# Patient Record
Sex: MALE | Race: ASIAN | NOT HISPANIC OR LATINO | URBAN - METROPOLITAN AREA
[De-identification: names, ages, dates, MRNs, and addresses within clinical notes are randomized per-mention and may not be internally consistent; named-entity substitution may affect disease eponyms.]

---

## 2018-11-01 ENCOUNTER — OUTPATIENT (OUTPATIENT)
Dept: OUTPATIENT SERVICES | Facility: HOSPITAL | Age: 78
LOS: 1 days | End: 2018-11-01
Payer: MEDICARE

## 2018-11-26 PROBLEM — Z00.00 ENCOUNTER FOR PREVENTIVE HEALTH EXAMINATION: Status: ACTIVE | Noted: 2018-11-26

## 2018-11-27 ENCOUNTER — APPOINTMENT (OUTPATIENT)
Dept: NEPHROLOGY | Facility: CLINIC | Age: 78
End: 2018-11-27
Payer: COMMERCIAL

## 2018-11-27 VITALS
DIASTOLIC BLOOD PRESSURE: 80 MMHG | OXYGEN SATURATION: 95 % | HEART RATE: 59 BPM | WEIGHT: 153 LBS | HEIGHT: 66 IN | BODY MASS INDEX: 24.59 KG/M2 | SYSTOLIC BLOOD PRESSURE: 128 MMHG

## 2018-11-27 DIAGNOSIS — I10 ESSENTIAL (PRIMARY) HYPERTENSION: ICD-10-CM

## 2018-11-27 DIAGNOSIS — Z85.118 PERSONAL HISTORY OF OTHER MALIGNANT NEOPLASM OF BRONCHUS AND LUNG: ICD-10-CM

## 2018-11-27 DIAGNOSIS — Z87.891 PERSONAL HISTORY OF NICOTINE DEPENDENCE: ICD-10-CM

## 2018-11-27 DIAGNOSIS — N18.3 CHRONIC KIDNEY DISEASE, STAGE 3 (MODERATE): ICD-10-CM

## 2018-11-27 DIAGNOSIS — I25.10 ATHEROSCLEROTIC HEART DISEASE OF NATIVE CORONARY ARTERY W/OUT ANGINA PECTORIS: ICD-10-CM

## 2018-11-27 PROCEDURE — 99215 OFFICE O/P EST HI 40 MIN: CPT

## 2018-11-28 ENCOUNTER — INPATIENT (INPATIENT)
Facility: HOSPITAL | Age: 78
LOS: 16 days | Discharge: HOME | DRG: 246 | End: 2018-12-15
Attending: FAMILY MEDICINE | Admitting: FAMILY MEDICINE
Payer: COMMERCIAL

## 2018-11-28 VITALS
HEIGHT: 66 IN | RESPIRATION RATE: 20 BRPM | TEMPERATURE: 98 F | SYSTOLIC BLOOD PRESSURE: 126 MMHG | DIASTOLIC BLOOD PRESSURE: 47 MMHG | OXYGEN SATURATION: 94 % | WEIGHT: 114.64 LBS | HEART RATE: 45 BPM

## 2018-11-28 DIAGNOSIS — I10 ESSENTIAL (PRIMARY) HYPERTENSION: ICD-10-CM

## 2018-11-28 DIAGNOSIS — N19 UNSPECIFIED KIDNEY FAILURE: ICD-10-CM

## 2018-11-28 DIAGNOSIS — N17.9 ACUTE KIDNEY FAILURE, UNSPECIFIED: ICD-10-CM

## 2018-11-28 DIAGNOSIS — I25.10 ATHEROSCLEROTIC HEART DISEASE OF NATIVE CORONARY ARTERY WITHOUT ANGINA PECTORIS: ICD-10-CM

## 2018-11-28 DIAGNOSIS — R00.1 BRADYCARDIA, UNSPECIFIED: ICD-10-CM

## 2018-11-28 DIAGNOSIS — Z95.1 PRESENCE OF AORTOCORONARY BYPASS GRAFT: Chronic | ICD-10-CM

## 2018-11-28 DIAGNOSIS — R05 COUGH: ICD-10-CM

## 2018-11-28 LAB
ALBUMIN SERPL ELPH-MCNC: 4.3 G/DL — SIGNIFICANT CHANGE UP (ref 3.3–5.2)
ALP SERPL-CCNC: 40 U/L — SIGNIFICANT CHANGE UP (ref 40–120)
ALT FLD-CCNC: 9 U/L — SIGNIFICANT CHANGE UP
ANION GAP SERPL CALC-SCNC: 12 MMOL/L — SIGNIFICANT CHANGE UP (ref 5–17)
APTT BLD: 33 SEC — SIGNIFICANT CHANGE UP (ref 27.5–36.3)
AST SERPL-CCNC: 15 U/L — SIGNIFICANT CHANGE UP
BASOPHILS # BLD AUTO: 0 K/UL — SIGNIFICANT CHANGE UP (ref 0–0.2)
BASOPHILS NFR BLD AUTO: 0.4 % — SIGNIFICANT CHANGE UP (ref 0–2)
BILIRUB SERPL-MCNC: 0.3 MG/DL — LOW (ref 0.4–2)
BUN SERPL-MCNC: 60 MG/DL — HIGH (ref 8–20)
CALCIUM SERPL-MCNC: 8.3 MG/DL — LOW (ref 8.6–10.2)
CHLORIDE SERPL-SCNC: 103 MMOL/L — SIGNIFICANT CHANGE UP (ref 98–107)
CO2 SERPL-SCNC: 20 MMOL/L — LOW (ref 22–29)
CREAT SERPL-MCNC: 3.89 MG/DL — HIGH (ref 0.5–1.3)
EOSINOPHIL # BLD AUTO: 0.5 K/UL — SIGNIFICANT CHANGE UP (ref 0–0.5)
EOSINOPHIL NFR BLD AUTO: 6.5 % — HIGH (ref 0–5)
GLUCOSE SERPL-MCNC: 100 MG/DL — SIGNIFICANT CHANGE UP (ref 70–115)
HCT VFR BLD CALC: 34 % — LOW (ref 42–52)
HGB BLD-MCNC: 11.1 G/DL — LOW (ref 14–18)
INR BLD: 1.02 RATIO — SIGNIFICANT CHANGE UP (ref 0.88–1.16)
LYMPHOCYTES # BLD AUTO: 2.2 K/UL — SIGNIFICANT CHANGE UP (ref 1–4.8)
LYMPHOCYTES # BLD AUTO: 27.9 % — SIGNIFICANT CHANGE UP (ref 20–55)
MCHC RBC-ENTMCNC: 29.8 PG — SIGNIFICANT CHANGE UP (ref 27–31)
MCHC RBC-ENTMCNC: 32.6 G/DL — SIGNIFICANT CHANGE UP (ref 32–36)
MCV RBC AUTO: 91.2 FL — SIGNIFICANT CHANGE UP (ref 80–94)
MONOCYTES # BLD AUTO: 0.8 K/UL — SIGNIFICANT CHANGE UP (ref 0–0.8)
MONOCYTES NFR BLD AUTO: 10.2 % — HIGH (ref 3–10)
NEUTROPHILS # BLD AUTO: 4.4 K/UL — SIGNIFICANT CHANGE UP (ref 1.8–8)
NEUTROPHILS NFR BLD AUTO: 54.9 % — SIGNIFICANT CHANGE UP (ref 37–73)
NT-PROBNP SERPL-SCNC: 2201 PG/ML — HIGH (ref 0–300)
PLATELET # BLD AUTO: 211 K/UL — SIGNIFICANT CHANGE UP (ref 150–400)
POTASSIUM SERPL-MCNC: 5.2 MMOL/L — SIGNIFICANT CHANGE UP (ref 3.5–5.3)
POTASSIUM SERPL-SCNC: 5.2 MMOL/L — SIGNIFICANT CHANGE UP (ref 3.5–5.3)
PROT SERPL-MCNC: 8 G/DL — SIGNIFICANT CHANGE UP (ref 6.6–8.7)
PROTHROM AB SERPL-ACNC: 11.7 SEC — SIGNIFICANT CHANGE UP (ref 10–12.9)
RAPID RVP RESULT: SIGNIFICANT CHANGE UP
RBC # BLD: 3.73 M/UL — LOW (ref 4.6–6.2)
RBC # FLD: 13.1 % — SIGNIFICANT CHANGE UP (ref 11–15.6)
SODIUM SERPL-SCNC: 135 MMOL/L — SIGNIFICANT CHANGE UP (ref 135–145)
TROPONIN T SERPL-MCNC: <0.01 NG/ML — SIGNIFICANT CHANGE UP (ref 0–0.06)
WBC # BLD: 8 K/UL — SIGNIFICANT CHANGE UP (ref 4.8–10.8)
WBC # FLD AUTO: 8 K/UL — SIGNIFICANT CHANGE UP (ref 4.8–10.8)

## 2018-11-28 PROCEDURE — 99285 EMERGENCY DEPT VISIT HI MDM: CPT

## 2018-11-28 PROCEDURE — 71046 X-RAY EXAM CHEST 2 VIEWS: CPT | Mod: 26

## 2018-11-28 PROCEDURE — 99223 1ST HOSP IP/OBS HIGH 75: CPT

## 2018-11-28 PROCEDURE — 93010 ELECTROCARDIOGRAM REPORT: CPT

## 2018-11-28 RX ORDER — GABAPENTIN 400 MG/1
300 CAPSULE ORAL THREE TIMES A DAY
Refills: 0 | Status: DISCONTINUED | OUTPATIENT
Start: 2018-11-28 | End: 2018-12-03

## 2018-11-28 RX ORDER — PANTOPRAZOLE SODIUM 20 MG/1
40 TABLET, DELAYED RELEASE ORAL
Refills: 0 | Status: DISCONTINUED | OUTPATIENT
Start: 2018-11-28 | End: 2018-12-15

## 2018-11-28 RX ORDER — IPRATROPIUM/ALBUTEROL SULFATE 18-103MCG
3 AEROSOL WITH ADAPTER (GRAM) INHALATION ONCE
Refills: 0 | Status: COMPLETED | OUTPATIENT
Start: 2018-11-28 | End: 2018-11-28

## 2018-11-28 RX ORDER — LORATADINE 10 MG/1
10 TABLET ORAL DAILY
Refills: 0 | Status: DISCONTINUED | OUTPATIENT
Start: 2018-11-28 | End: 2018-12-15

## 2018-11-28 RX ORDER — SODIUM CHLORIDE 9 MG/ML
500 INJECTION INTRAMUSCULAR; INTRAVENOUS; SUBCUTANEOUS ONCE
Refills: 0 | Status: COMPLETED | OUTPATIENT
Start: 2018-11-28 | End: 2018-11-28

## 2018-11-28 RX ORDER — SIMVASTATIN 20 MG/1
20 TABLET, FILM COATED ORAL AT BEDTIME
Refills: 0 | Status: DISCONTINUED | OUTPATIENT
Start: 2018-11-28 | End: 2018-12-01

## 2018-11-28 RX ORDER — HEPARIN SODIUM 5000 [USP'U]/ML
5000 INJECTION INTRAVENOUS; SUBCUTANEOUS EVERY 8 HOURS
Refills: 0 | Status: DISCONTINUED | OUTPATIENT
Start: 2018-11-28 | End: 2018-12-02

## 2018-11-28 RX ORDER — HYDRALAZINE HCL 50 MG
10 TABLET ORAL EVERY 8 HOURS
Refills: 0 | Status: DISCONTINUED | OUTPATIENT
Start: 2018-11-28 | End: 2018-12-02

## 2018-11-28 RX ORDER — SODIUM CHLORIDE 0.65 %
1 AEROSOL, SPRAY (ML) NASAL EVERY 4 HOURS
Refills: 0 | Status: DISCONTINUED | OUTPATIENT
Start: 2018-11-28 | End: 2018-12-15

## 2018-11-28 RX ORDER — DULOXETINE HYDROCHLORIDE 30 MG/1
20 CAPSULE, DELAYED RELEASE ORAL DAILY
Refills: 0 | Status: DISCONTINUED | OUTPATIENT
Start: 2018-11-28 | End: 2018-12-15

## 2018-11-28 RX ORDER — ASPIRIN/CALCIUM CARB/MAGNESIUM 324 MG
81 TABLET ORAL DAILY
Refills: 0 | Status: DISCONTINUED | OUTPATIENT
Start: 2018-11-28 | End: 2018-12-15

## 2018-11-28 RX ORDER — CLOPIDOGREL BISULFATE 75 MG/1
75 TABLET, FILM COATED ORAL DAILY
Refills: 0 | Status: DISCONTINUED | OUTPATIENT
Start: 2018-11-28 | End: 2018-12-15

## 2018-11-28 RX ADMIN — Medication 10 MILLIGRAM(S): at 23:20

## 2018-11-28 RX ADMIN — Medication 1 SPRAY(S): at 23:20

## 2018-11-28 RX ADMIN — GABAPENTIN 300 MILLIGRAM(S): 400 CAPSULE ORAL at 23:20

## 2018-11-28 RX ADMIN — SIMVASTATIN 20 MILLIGRAM(S): 20 TABLET, FILM COATED ORAL at 23:20

## 2018-11-28 RX ADMIN — Medication 200 MILLIGRAM(S): at 23:20

## 2018-11-28 RX ADMIN — SODIUM CHLORIDE 500 MILLILITER(S): 9 INJECTION INTRAMUSCULAR; INTRAVENOUS; SUBCUTANEOUS at 18:44

## 2018-11-28 RX ADMIN — Medication 3 MILLILITER(S): at 15:36

## 2018-11-28 RX ADMIN — HEPARIN SODIUM 5000 UNIT(S): 5000 INJECTION INTRAVENOUS; SUBCUTANEOUS at 23:20

## 2018-11-28 NOTE — H&P ADULT - PROBLEM SELECTOR PLAN 1
pt. will be admitted to telemetry, will get echo, serial trop, will hold norvasc, imdur at this point, will get TFT. pt. has h/o cad but not on b.blk may be has h/o bradycardia. ? cardiology [facility].

## 2018-11-28 NOTE — ED ADULT NURSE NOTE - OBJECTIVE STATEMENT
78 year old male in no acute distress, alert and oriented x 4 c/o cough x 2 days. Pt on cardiac and O2 monitoring, 10% on RA noted to have HR in 40's and Dr Payne is aware. Pt denies history of bradycardia.

## 2018-11-28 NOTE — ED STATDOCS - PROGRESS NOTE DETAILS
79 y/o M pt with hx of HTN, right lung CA (chemo & radiation 2006-07), CABG (1993), pneumonia (2017) presents to ED c/o Productive cough with yellow sputum for 2 days. Pt admits to intermittent chest pain and SOB. Pt states he has difficulty holding his urine; no hx of BPH. Denies fevers,

## 2018-11-28 NOTE — H&P ADULT - PROBLEM SELECTOR PLAN 2
no prior lab work available for comparison, will get renal sono, repeat labs, will hold lasix , no signs of chf, no sob. hold losartan as well. renal consult Dr. Costa, Dr. Miles group.

## 2018-11-28 NOTE — ED PROVIDER NOTE - CARE PLAN
Principal Discharge DX:	Cough Principal Discharge DX:	Acute renal failure, unspecified acute renal failure type  Secondary Diagnosis:	Acute congestive heart failure, unspecified heart failure type

## 2018-11-28 NOTE — H&P ADULT - HISTORY OF PRESENT ILLNESS
79 y/o male was brought in by family for dry cough on and off for past 2 days, no fever, no sick contact. + nasal stuffiness  and scratchy throat. As per family every year around this time he gets this. no h/o asthma. no cp. no abd. pain. no n/v/d. no sob reported. In the er pt's hr was in mid 40's. pt. denies dizziness but reports some fatigue. Family is not aware if he has h/o bradycardia. pt's Cr is 3.89 from blood work done in ER. family does not know if pt. has any kidney problem.  As per family pt. was seen by pcp about 2 weeks ago and had blood work done but results are not known to family and they did not get any call about abnormal labs. Pt. was seen by nephrologist Dr. Costa yesterday as routine check up as per son in law but blood work from pcp office was not available so pt. was instructed to return when blood work is available. As per son in law pt's cough has topped while in the ER.  no other complaints.

## 2018-11-28 NOTE — ED ADULT TRIAGE NOTE - CHIEF COMPLAINT QUOTE
pt son-in-law provides information that pt with cough and back pain that began last night. denies fevers, denies CP

## 2018-11-28 NOTE — ED ADULT NURSE NOTE - NEURO ASSESSMENT
I have sent for pt.
Pt is still waiting for his meds from the mail order pharmacy. He is asking if we can call in a couple weeks of     glimepiride 2 MG Oral Tab  valsartan 80 MG Oral Tab  Linagliptin (TRADJENTA) 5 MG    Sent to the [facility] on file. Please advise. Thank you.
WDL

## 2018-11-28 NOTE — H&P ADULT - PSH
S/P CABG (coronary artery bypass graft)  pt's son in law states h/o some stents near neck area ? carotid ? he is not sure.

## 2018-11-28 NOTE — H&P ADULT - NSHPPHYSICALEXAM_GEN_ALL_CORE
General: Well developed [place] man lying in bed not in distress.   HEENT: AT, NC. PERRL. intact EOM. no throat erythema or exudate.   Neck: supple. no JVD.   Chest: CTA bilaterally.   Heart: normal S1,S2. RRR. no heart murmur. no edema.   Abdomen: soft. non-tender. non-distended. + BS.   rectal : deferred by pt.   Ext: no calf tenderness on either side.   FROM of all ext.  Vascular : 2 + DP B/L.   Neuro: AAO x3. no focal weakness. no speech disorder.  m/ r/s intact. CN II to XII intact.   psych : normal affect, co-operative. no anxiety. no SI/HI.

## 2018-11-28 NOTE — H&P ADULT - PROBLEM SELECTOR PLAN 3
possible from nasal congestion, post nasal drip, seasonal allergies ? will keep on loratadine and nasal saline drops. no fever, no cough in ER, influenza negative. possible from nasal congestion, post nasal drip, seasonal allergies ? will keep on loratadine and nasal saline drops. no fever, no cough in ER, influenza negative.  robitussin prn.

## 2018-11-28 NOTE — H&P ADULT - PMH
Bipolar disorder    CAD (coronary artery disease)    High cholesterol    Hypertension    Lung cancer  had yoni radiation in 2006.

## 2018-11-29 LAB
ANION GAP SERPL CALC-SCNC: 12 MMOL/L — SIGNIFICANT CHANGE UP (ref 5–17)
BASOPHILS # BLD AUTO: 0 K/UL — SIGNIFICANT CHANGE UP (ref 0–0.2)
BASOPHILS NFR BLD AUTO: 0.8 % — SIGNIFICANT CHANGE UP (ref 0–2)
BUN SERPL-MCNC: 52 MG/DL — HIGH (ref 8–20)
CALCIUM SERPL-MCNC: 8.5 MG/DL — LOW (ref 8.6–10.2)
CHLORIDE SERPL-SCNC: 107 MMOL/L — SIGNIFICANT CHANGE UP (ref 98–107)
CK SERPL-CCNC: 100 U/L — SIGNIFICANT CHANGE UP (ref 30–200)
CK SERPL-CCNC: 76 U/L — SIGNIFICANT CHANGE UP (ref 30–200)
CK SERPL-CCNC: 83 U/L — SIGNIFICANT CHANGE UP (ref 30–200)
CO2 SERPL-SCNC: 19 MMOL/L — LOW (ref 22–29)
CREAT SERPL-MCNC: 3.89 MG/DL — HIGH (ref 0.5–1.3)
EOSINOPHIL # BLD AUTO: 0.6 K/UL — HIGH (ref 0–0.5)
EOSINOPHIL NFR BLD AUTO: 9.6 % — HIGH (ref 0–5)
GLUCOSE SERPL-MCNC: 83 MG/DL — SIGNIFICANT CHANGE UP (ref 70–115)
HCT VFR BLD CALC: 36.3 % — LOW (ref 42–52)
HGB BLD-MCNC: 11.7 G/DL — LOW (ref 14–18)
LYMPHOCYTES # BLD AUTO: 2 K/UL — SIGNIFICANT CHANGE UP (ref 1–4.8)
LYMPHOCYTES # BLD AUTO: 31.4 % — SIGNIFICANT CHANGE UP (ref 20–55)
MCHC RBC-ENTMCNC: 29.5 PG — SIGNIFICANT CHANGE UP (ref 27–31)
MCHC RBC-ENTMCNC: 32.2 G/DL — SIGNIFICANT CHANGE UP (ref 32–36)
MCV RBC AUTO: 91.7 FL — SIGNIFICANT CHANGE UP (ref 80–94)
MONOCYTES # BLD AUTO: 0.8 K/UL — SIGNIFICANT CHANGE UP (ref 0–0.8)
MONOCYTES NFR BLD AUTO: 12 % — HIGH (ref 3–10)
NEUTROPHILS # BLD AUTO: 3 K/UL — SIGNIFICANT CHANGE UP (ref 1.8–8)
NEUTROPHILS NFR BLD AUTO: 46 % — SIGNIFICANT CHANGE UP (ref 37–73)
PLATELET # BLD AUTO: 203 K/UL — SIGNIFICANT CHANGE UP (ref 150–400)
POTASSIUM SERPL-MCNC: 5.1 MMOL/L — SIGNIFICANT CHANGE UP (ref 3.5–5.3)
POTASSIUM SERPL-MCNC: 6.1 MMOL/L — CRITICAL HIGH (ref 3.5–5.3)
POTASSIUM SERPL-SCNC: 5.1 MMOL/L — SIGNIFICANT CHANGE UP (ref 3.5–5.3)
POTASSIUM SERPL-SCNC: 6.1 MMOL/L — CRITICAL HIGH (ref 3.5–5.3)
RBC # BLD: 3.96 M/UL — LOW (ref 4.6–6.2)
RBC # FLD: 13.2 % — SIGNIFICANT CHANGE UP (ref 11–15.6)
SODIUM SERPL-SCNC: 138 MMOL/L — SIGNIFICANT CHANGE UP (ref 135–145)
T3 SERPL-MCNC: 74 NG/DL — LOW (ref 80–200)
T4 AB SER-ACNC: 5.6 UG/DL — SIGNIFICANT CHANGE UP (ref 4.5–12)
TROPONIN T SERPL-MCNC: <0.01 NG/ML — SIGNIFICANT CHANGE UP (ref 0–0.06)
TSH SERPL-MCNC: 7.33 UIU/ML — HIGH (ref 0.27–4.2)
WBC # BLD: 6.4 K/UL — SIGNIFICANT CHANGE UP (ref 4.8–10.8)
WBC # FLD AUTO: 6.4 K/UL — SIGNIFICANT CHANGE UP (ref 4.8–10.8)

## 2018-11-29 PROCEDURE — 76775 US EXAM ABDO BACK WALL LIM: CPT | Mod: 26

## 2018-11-29 PROCEDURE — 99233 SBSQ HOSP IP/OBS HIGH 50: CPT

## 2018-11-29 PROCEDURE — 99232 SBSQ HOSP IP/OBS MODERATE 35: CPT

## 2018-11-29 PROCEDURE — 99223 1ST HOSP IP/OBS HIGH 75: CPT

## 2018-11-29 RX ORDER — SODIUM POLYSTYRENE SULFONATE 4.1 MEQ/G
15 POWDER, FOR SUSPENSION ORAL ONCE
Refills: 0 | Status: COMPLETED | OUTPATIENT
Start: 2018-11-29 | End: 2018-11-29

## 2018-11-29 RX ORDER — SODIUM CHLORIDE 9 MG/ML
1000 INJECTION INTRAMUSCULAR; INTRAVENOUS; SUBCUTANEOUS
Refills: 0 | Status: DISCONTINUED | OUTPATIENT
Start: 2018-11-29 | End: 2018-12-02

## 2018-11-29 RX ADMIN — GABAPENTIN 300 MILLIGRAM(S): 400 CAPSULE ORAL at 11:35

## 2018-11-29 RX ADMIN — Medication 1 TABLET(S): at 11:35

## 2018-11-29 RX ADMIN — SODIUM CHLORIDE 80 MILLILITER(S): 9 INJECTION INTRAMUSCULAR; INTRAVENOUS; SUBCUTANEOUS at 17:21

## 2018-11-29 RX ADMIN — GABAPENTIN 300 MILLIGRAM(S): 400 CAPSULE ORAL at 21:53

## 2018-11-29 RX ADMIN — Medication 1 SPRAY(S): at 22:01

## 2018-11-29 RX ADMIN — Medication 10 MILLIGRAM(S): at 21:53

## 2018-11-29 RX ADMIN — Medication 200 MILLIGRAM(S): at 17:21

## 2018-11-29 RX ADMIN — Medication 10 MILLIGRAM(S): at 05:47

## 2018-11-29 RX ADMIN — CLOPIDOGREL BISULFATE 75 MILLIGRAM(S): 75 TABLET, FILM COATED ORAL at 11:35

## 2018-11-29 RX ADMIN — SIMVASTATIN 20 MILLIGRAM(S): 20 TABLET, FILM COATED ORAL at 21:54

## 2018-11-29 RX ADMIN — LORATADINE 10 MILLIGRAM(S): 10 TABLET ORAL at 11:35

## 2018-11-29 RX ADMIN — Medication 1 SPRAY(S): at 05:47

## 2018-11-29 RX ADMIN — HEPARIN SODIUM 5000 UNIT(S): 5000 INJECTION INTRAVENOUS; SUBCUTANEOUS at 21:53

## 2018-11-29 RX ADMIN — SODIUM CHLORIDE 80 MILLILITER(S): 9 INJECTION INTRAMUSCULAR; INTRAVENOUS; SUBCUTANEOUS at 11:36

## 2018-11-29 RX ADMIN — DULOXETINE HYDROCHLORIDE 20 MILLIGRAM(S): 30 CAPSULE, DELAYED RELEASE ORAL at 11:35

## 2018-11-29 RX ADMIN — HEPARIN SODIUM 5000 UNIT(S): 5000 INJECTION INTRAVENOUS; SUBCUTANEOUS at 05:47

## 2018-11-29 RX ADMIN — PANTOPRAZOLE SODIUM 40 MILLIGRAM(S): 20 TABLET, DELAYED RELEASE ORAL at 05:47

## 2018-11-29 RX ADMIN — GABAPENTIN 300 MILLIGRAM(S): 400 CAPSULE ORAL at 05:47

## 2018-11-29 RX ADMIN — Medication 10 MILLIGRAM(S): at 14:06

## 2018-11-29 RX ADMIN — SODIUM POLYSTYRENE SULFONATE 15 GRAM(S): 4.1 POWDER, FOR SUSPENSION ORAL at 11:35

## 2018-11-29 RX ADMIN — Medication 81 MILLIGRAM(S): at 11:35

## 2018-11-29 NOTE — PROGRESS NOTE ADULT - ASSESSMENT
# sinus bradycardia - no evidence for chronotropic incompetence.  Asymptomatic.  Monitor on telemetry for now.  Doubt bradycardia causing hypoperfusion - normal mentation, no chest pain/dizziness.   May be related to hypothyroidism.  Consider increasing levothyroxine.  TTE pending.   # CAD - stable, no anginal symptoms, resume home meds - aspirin, plavix, statin, imdur  # renal failure - ? etiology.  Doubt bradycardia causing hypoperfusion - normal mentation, no chest pain/dizziness. Holding ACEi, further workup by primary team.   Hep SQ for DVT prophylaxis    Thank you for allowing me to participate in care of your patient.   Will follow

## 2018-11-29 NOTE — PROGRESS NOTE ADULT - SUBJECTIVE AND OBJECTIVE BOX
[facility]                                                        Office: [address] [address]                                                       Telephone: [phone]. Fax:[phone]      CARDIOLOGY PROGRESS NOTE   ([facility])    Subjective: Patient seen and examined.  Reports cough.  HRs overnight 40 and abov.e TSH elevated. Neg RVP.     ROS: No headache, no chest pain, no SOB, no palpitations, no dizziness, no nausea, no bleeding    Chronic Conditions:     CURRENT MEDICATIONS  hydrALAZINE 10 milliGRAM(s) Oral every 8 hours      guaiFENesin    Syrup 200 milliGRAM(s) Oral every 6 hours PRN  loratadine 10 milliGRAM(s) Oral daily    DULoxetine 20 milliGRAM(s) Oral daily  gabapentin 300 milliGRAM(s) Oral three times a day    pantoprazole    Tablet 40 milliGRAM(s) Oral before breakfast    simvastatin 20 milliGRAM(s) Oral at bedtime    aspirin enteric coated 81 milliGRAM(s) Oral daily  clopidogrel Tablet 75 milliGRAM(s) Oral daily  heparin  Injectable 5000 Unit(s) SubCutaneous every 8 hours  multivitamin 1 Tablet(s) Oral daily  sodium chloride 0.65% Nasal 1 Spray(s) Both Nostrils every 4 hours    	  TELEMETRY: SR 40s, no significant pauses.   Vitals:  T(C): 36.7 (11-29-18 @ 07:48), Max: 36.8 (11-28-18 @ 21:39)  HR: 63 (11-29-18 @ 07:48) (45 - 63)  BP: 141/64 (11-29-18 @ 07:48) (126/47 - 159/75)  RR: 18 (11-29-18 @ 07:48) (18 - 20)  SpO2: 100% (11-29-18 @ 07:48) (93% - 100%)  Wt(kg): --  I&O's Summary    Height (cm): 167.64 (11-28 @ 12:43)  Weight (kg): 52 (11-28 @ 12:43)  BMI (kg/m2): 18.5 (11-28 @ 12:43)  BSA (m2): 1.58 (11-28 @ 12:43)  Daily T(C): 36.7 (11-29-18 @ 07:48), Max: 36.8 (11-28-18 @ 21:39)  HR: 63 (11-29-18 @ 07:48) (45 - 63)  BP: 141/64 (11-29-18 @ 07:48) (126/47 - 159/75)  RR: 18 (11-29-18 @ 07:48) (18 - 20)  SpO2: 100% (11-29-18 @ 07:48) (93% - 100%)  Wt(kg): --    Daily Height (cm): 167.64 (11-28 @ 12:43)  Weight (kg): 52 (11-28 @ 12:43)  BMI (kg/m2): 18.5 (11-28 @ 12:43)  BSA (m2): 1.58 (11-28 @ 12:43)    PHYSICAL EXAM:  Appearance: Normal	  HEENT:   Normal oral mucosa, PERRL, EOMI	  Lymphatic: No lymphadenopathy  Cardiovascular: Normal S1 S2, No JVD, No murmurs, No edema  Respiratory: Lungs clear to auscultation	  Psychiatry: A & O x 3, Mood & affect appropriate  Gastrointestinal:  Soft, Non-tender, + BS	  Skin: No rashes, No ecchymoses, No cyanosis  Neurologic: Non-focal  Extremities: Normal range of motion, No clubbing, cyanosis or edema  Vascular: Peripheral pulses palpable 2+ bilaterally, warm      ECG (tracing reviewed by me):  	    DIAGNOSTIC TESTING:  Echocardiogram pending                                    11.7   6.4   )-----------( 203      ( 29 Nov 2018 07:11 )             36.3     11-29    138  |  107  |  52.0<H>  ----------------------------<  83  6.1<HH>   |  19.0<L>  |  3.89<H>    Ca    8.5<L>      29 Nov 2018 07:11    TPro  8.0  /  Alb  4.3  /  TBili  0.3<L>  /  DBili  x   /  AST  15  /  ALT  9   /  AlkPhos  40  11-28    BNP: Serum Pro-Brain Natriuretic Peptide: 2201 pg/mL (11-28 @ 16:17)    Lipid Profile:   HgA1c:   TSH: Thyroid Stimulating Hormone, Serum: 7.33 uIU/mL (11-29 @ 02:31)         Rapid Respiratory Viral Panel (11.28.18 @ 16:14)    Rapid RVP Result: [facility] The Film[employer] RVP Rapid uses polymerase chain reaction (PCR) and melt  curve analysis to screen for adenovirus; coronavirus HKU1, NL63, 229E,  OC43; human metapneumovirus (hMPV); human enterovirus/rhinovirus  (Entero/RV); influenza A; influenza A/H1;influenza A/H3; influenza  A/H1-2009; influenza B; parainfluenza viruses 1, 2, 3, 4; respiratory  syncytial virus; Bordetella pertussis; Mycoplasma pneumoniae; and  Chlamydophila pneumoniae.

## 2018-11-29 NOTE — PROGRESS NOTE ADULT - SUBJECTIVE AND OBJECTIVE BOX
Patient: KAUSHIK HOFFMAN [id number] 78y Male                           Internal Medicine Hospitalist Progress Note  Chief Complaint: Patient is a 78y old  Male who presents with a chief complaint of cough (29 Nov 2018 13:39)    Initial HPI:  79 y/o male PMH HTN, CAD, presented for cough x 2 days, found to have ARIELA Cr 3.89, HR 40s.  Admitted for bradycardia, ARIELA.  Started on IVF.    Seen and examined today.  Denies complaints.  No chest pain / palpitations. No SOB.  Still bradycardic.     ____________________PHYSICAL EXAM:  Vitals reviewed as indicated below  GENERAL:  NAD Alert and Oriented x 3   HEENT: NCAT  CARDIOVASCULAR:  S1, S2  LUNGS: CTAB  ABDOMEN:  soft, (-) tenderness, (-) distension, (+) bowel sounds, (-) guarding, (-) rebound (-) rigidity  EXTREMITIES:  no cyanosis / clubbing / edema.   ____________________      BACKGROUND:  HEALTH ISSUES - PROBLEM Dx:  CAD (coronary artery disease): CAD (coronary artery disease)  Essential hypertension: Essential hypertension  Cough: Cough  Renal failure, unspecified chronicity: Renal failure, unspecified chronicity  Bradycardia: Bradycardia        Allergies    No Known Allergies    Intolerances      PAST MEDICAL & SURGICAL HISTORY:  CAD (coronary artery disease)  Lung cancer: had yoni radiation in 2006.  Bipolar disorder  High cholesterol  Hypertension  S/P CABG (coronary artery bypass graft): pt&#x27;s son in [facility] h/o some stents near neck area ? carotid ? he is not sure.        VITALS:  Vital Signs Last 24 Hrs  T(C): 36.7 (29 Nov 2018 07:48), Max: 36.8 (28 Nov 2018 21:39)  T(F): 98.1 (29 Nov 2018 07:48), Max: 98.3 (28 Nov 2018 21:39)  HR: 94 (29 Nov 2018 12:59) (45 - 94)  BP: 163/54 (29 Nov 2018 12:59) (128/65 - 163/54)  BP(mean): --  RR: 17 (29 Nov 2018 12:59) (17 - 18)  SpO2: 96% (29 Nov 2018 12:59) (93% - 100%) Daily     Daily   CAPILLARY BLOOD GLUCOSE        I&O's Summary        LABS:                        11.7   6.4   )-----------( 203      ( 29 Nov 2018 07:11 )             36.3     11-29    138  |  107  |  52.0<H>  ----------------------------<  83  6.1<HH>   |  19.0<L>  |  3.89<H>    Ca    8.5<L>      29 Nov 2018 07:11    TPro  8.0  /  Alb  4.3  /  TBili  0.3<L>  /  DBili  x   /  AST  15  /  ALT  9   /  AlkPhos  40  11-28    PT/INR - ( 28 Nov 2018 16:17 )   PT: 11.7 sec;   INR: 1.02 ratio         PTT - ( 28 Nov 2018 16:17 )  PTT:33.0 sec  LIVER FUNCTIONS - ( 28 Nov 2018 16:17 )  Alb: 4.3 g/dL / Pro: 8.0 g/dL / ALK PHOS: 40 U/L / ALT: 9 U/L / AST: 15 U/L / GGT: x             CARDIAC MARKERS ( 29 Nov 2018 07:11 )  x     / <0.01 ng/mL / 83 U/L / x     / x      CARDIAC MARKERS ( 29 Nov 2018 02:31 )  x     / <0.01 ng/mL / 76 U/L / x     / x      CARDIAC MARKERS ( 28 Nov 2018 16:17 )  x     / <0.01 ng/mL / x     / x     / x              MEDICATIONS:  MEDICATIONS  (STANDING):  aspirin enteric coated 81 milliGRAM(s) Oral daily  clopidogrel Tablet 75 milliGRAM(s) Oral daily  DULoxetine 20 milliGRAM(s) Oral daily  gabapentin 300 milliGRAM(s) Oral three times a day  heparin  Injectable 5000 Unit(s) SubCutaneous every 8 hours  hydrALAZINE 10 milliGRAM(s) Oral every 8 hours  loratadine 10 milliGRAM(s) Oral daily  multivitamin 1 Tablet(s) Oral daily  pantoprazole    Tablet 40 milliGRAM(s) Oral before breakfast  simvastatin 20 milliGRAM(s) Oral at bedtime  sodium chloride 0.65% Nasal 1 Spray(s) Both Nostrils every 4 hours  sodium chloride 0.9%. 1000 milliLiter(s) (80 mL/Hr) IV Continuous <Continuous>    MEDICATIONS  (PRN):  guaiFENesin    Syrup 200 milliGRAM(s) Oral every 6 hours PRN Cough

## 2018-11-30 DIAGNOSIS — Z71.89 OTHER SPECIFIED COUNSELING: ICD-10-CM

## 2018-11-30 LAB
ANION GAP SERPL CALC-SCNC: 14 MMOL/L — SIGNIFICANT CHANGE UP (ref 5–17)
BUN SERPL-MCNC: 52 MG/DL — HIGH (ref 8–20)
CALCIUM SERPL-MCNC: 8 MG/DL — LOW (ref 8.6–10.2)
CHLORIDE SERPL-SCNC: 110 MMOL/L — HIGH (ref 98–107)
CO2 SERPL-SCNC: 18 MMOL/L — LOW (ref 22–29)
CREAT SERPL-MCNC: 3.55 MG/DL — HIGH (ref 0.5–1.3)
GLUCOSE SERPL-MCNC: 84 MG/DL — SIGNIFICANT CHANGE UP (ref 70–115)
HCT VFR BLD CALC: 34.9 % — LOW (ref 42–52)
HGB BLD-MCNC: 11.1 G/DL — LOW (ref 14–18)
MCHC RBC-ENTMCNC: 28.8 PG — SIGNIFICANT CHANGE UP (ref 27–31)
MCHC RBC-ENTMCNC: 31.8 G/DL — LOW (ref 32–36)
MCV RBC AUTO: 90.4 FL — SIGNIFICANT CHANGE UP (ref 80–94)
PLATELET # BLD AUTO: 184 K/UL — SIGNIFICANT CHANGE UP (ref 150–400)
POTASSIUM SERPL-MCNC: 5.2 MMOL/L — SIGNIFICANT CHANGE UP (ref 3.5–5.3)
POTASSIUM SERPL-MCNC: 5.8 MMOL/L — HIGH (ref 3.5–5.3)
POTASSIUM SERPL-SCNC: 5.2 MMOL/L — SIGNIFICANT CHANGE UP (ref 3.5–5.3)
POTASSIUM SERPL-SCNC: 5.8 MMOL/L — HIGH (ref 3.5–5.3)
RBC # BLD: 3.86 M/UL — LOW (ref 4.6–6.2)
RBC # FLD: 13 % — SIGNIFICANT CHANGE UP (ref 11–15.6)
SODIUM SERPL-SCNC: 142 MMOL/L — SIGNIFICANT CHANGE UP (ref 135–145)
WBC # BLD: 7.5 K/UL — SIGNIFICANT CHANGE UP (ref 4.8–10.8)
WBC # FLD AUTO: 7.5 K/UL — SIGNIFICANT CHANGE UP (ref 4.8–10.8)

## 2018-11-30 PROCEDURE — 99232 SBSQ HOSP IP/OBS MODERATE 35: CPT

## 2018-11-30 PROCEDURE — 99233 SBSQ HOSP IP/OBS HIGH 50: CPT

## 2018-11-30 RX ORDER — LEVOTHYROXINE SODIUM 125 MCG
100 TABLET ORAL DAILY
Refills: 0 | Status: DISCONTINUED | OUTPATIENT
Start: 2018-11-30 | End: 2018-12-15

## 2018-11-30 RX ORDER — SODIUM POLYSTYRENE SULFONATE 4.1 MEQ/G
15 POWDER, FOR SUSPENSION ORAL ONCE
Refills: 0 | Status: COMPLETED | OUTPATIENT
Start: 2018-11-30 | End: 2018-11-30

## 2018-11-30 RX ORDER — SODIUM POLYSTYRENE SULFONATE 4.1 MEQ/G
30 POWDER, FOR SUSPENSION ORAL ONCE
Refills: 0 | Status: DISCONTINUED | OUTPATIENT
Start: 2018-11-30 | End: 2018-11-30

## 2018-11-30 RX ADMIN — GABAPENTIN 300 MILLIGRAM(S): 400 CAPSULE ORAL at 21:59

## 2018-11-30 RX ADMIN — Medication 10 MILLIGRAM(S): at 21:59

## 2018-11-30 RX ADMIN — LORATADINE 10 MILLIGRAM(S): 10 TABLET ORAL at 11:10

## 2018-11-30 RX ADMIN — Medication 1 SPRAY(S): at 14:38

## 2018-11-30 RX ADMIN — Medication 1 SPRAY(S): at 06:25

## 2018-11-30 RX ADMIN — SIMVASTATIN 20 MILLIGRAM(S): 20 TABLET, FILM COATED ORAL at 21:59

## 2018-11-30 RX ADMIN — GABAPENTIN 300 MILLIGRAM(S): 400 CAPSULE ORAL at 14:39

## 2018-11-30 RX ADMIN — Medication 10 MILLIGRAM(S): at 06:24

## 2018-11-30 RX ADMIN — Medication 81 MILLIGRAM(S): at 11:10

## 2018-11-30 RX ADMIN — SODIUM POLYSTYRENE SULFONATE 15 GRAM(S): 4.1 POWDER, FOR SUSPENSION ORAL at 12:52

## 2018-11-30 RX ADMIN — HEPARIN SODIUM 5000 UNIT(S): 5000 INJECTION INTRAVENOUS; SUBCUTANEOUS at 06:23

## 2018-11-30 RX ADMIN — Medication 1 SPRAY(S): at 11:09

## 2018-11-30 RX ADMIN — CLOPIDOGREL BISULFATE 75 MILLIGRAM(S): 75 TABLET, FILM COATED ORAL at 11:09

## 2018-11-30 RX ADMIN — Medication 10 MILLIGRAM(S): at 14:38

## 2018-11-30 RX ADMIN — Medication 1 TABLET(S): at 11:10

## 2018-11-30 RX ADMIN — SODIUM CHLORIDE 80 MILLILITER(S): 9 INJECTION INTRAMUSCULAR; INTRAVENOUS; SUBCUTANEOUS at 11:09

## 2018-11-30 RX ADMIN — GABAPENTIN 300 MILLIGRAM(S): 400 CAPSULE ORAL at 06:24

## 2018-11-30 RX ADMIN — HEPARIN SODIUM 5000 UNIT(S): 5000 INJECTION INTRAVENOUS; SUBCUTANEOUS at 14:39

## 2018-11-30 RX ADMIN — PANTOPRAZOLE SODIUM 40 MILLIGRAM(S): 20 TABLET, DELAYED RELEASE ORAL at 06:23

## 2018-11-30 RX ADMIN — DULOXETINE HYDROCHLORIDE 20 MILLIGRAM(S): 30 CAPSULE, DELAYED RELEASE ORAL at 11:10

## 2018-11-30 RX ADMIN — Medication 1 SPRAY(S): at 21:59

## 2018-11-30 RX ADMIN — HEPARIN SODIUM 5000 UNIT(S): 5000 INJECTION INTRAVENOUS; SUBCUTANEOUS at 21:58

## 2018-11-30 NOTE — PROGRESS NOTE ADULT - PROBLEM SELECTOR PLAN 5
no cp, first trop negative, will get echo, serial trop.
Stable.  Continue ASA, Plavix.  Off BBlocker.

## 2018-11-30 NOTE — PROGRESS NOTE ADULT - PROBLEM SELECTOR PLAN 2
? ARIELA.  No baseline Cr available.  Monitor BMP.  Renal input d/w Dr. Miles.  On IVF.  Avoid nephrotoxic agents.
? ARIELA.  No baseline Cr available.  Monitor BMP.  Renal input d/w Dr. Miles.  On IVF.  Avoid nephrotoxic agents.  Repeat Kayexalate for hyperkalemia.  Repeat K level ordered for today.

## 2018-11-30 NOTE — PROGRESS NOTE ADULT - SUBJECTIVE AND OBJECTIVE BOX
[facility] DIVISION OF KIDNEY DISEASES AND HYPERTENSION -- FOLLOW UP NOTE  --------------------------------------------------------------------------------  Chief Complaint: ARIELA on CKD    24 hour events/subjective:  Pt seen and examined  Cr improving  K+ running high ; drinking juice/eating joão donuts;      PAST HISTORY  --------------------------------------------------------------------------------  No significant changes to PMH, PSH, FHx, SHx, unless otherwise noted    ALLERGIES & MEDICATIONS  --------------------------------------------------------------------------------  Allergies    No Known Allergies    Intolerances      Standing Inpatient Medications  aspirin enteric coated 81 milliGRAM(s) Oral daily  clopidogrel Tablet 75 milliGRAM(s) Oral daily  DULoxetine 20 milliGRAM(s) Oral daily  gabapentin 300 milliGRAM(s) Oral three times a day  heparin  Injectable 5000 Unit(s) SubCutaneous every 8 hours  hydrALAZINE 10 milliGRAM(s) Oral every 8 hours  loratadine 10 milliGRAM(s) Oral daily  multivitamin 1 Tablet(s) Oral daily  pantoprazole    Tablet 40 milliGRAM(s) Oral before breakfast  simvastatin 20 milliGRAM(s) Oral at bedtime  sodium chloride 0.65% Nasal 1 Spray(s) Both Nostrils every 4 hours  sodium chloride 0.9%. 1000 milliLiter(s) IV Continuous <Continuous>    PRN Inpatient Medications  guaiFENesin    Syrup 200 milliGRAM(s) Oral every 6 hours PRN      REVIEW OF SYSTEMS  --------------------------------------------------------------------------------  Gen: No weight changes, fatigue, fevers/chills, weakness  Skin: No rashes  Head/Eyes/Ears/Mouth: No headache; Normal hearing; Normal vision w/o blurriness; No sinus pain/discomfort, sore throat  Respiratory: No dyspnea, cough, wheezing, hemoptysis  CV: No chest pain, PND, orthopnea  GI: No abdominal pain, diarrhea, constipation, nausea, vomiting, melena, hematochezia  : No increased frequency, dysuria, hematuria, nocturia  MSK: No joint pain/swelling; no back pain; no edema  Neuro: No dizziness/lightheadedness, weakness, seizures, numbness, tingling  Heme: No easy bruising or bleeding  Endo: No heat/cold intolerance  Psych: No significant nervousness, anxiety, stress, depression    All other systems were reviewed and are negative, except as noted.    VITALS/PHYSICAL EXAM  --------------------------------------------------------------------------------  T(C): 36.7 (11-30-18 @ 11:00), Max: 36.8 (11-29-18 @ 15:22)  HR: 51 (11-30-18 @ 11:00) (48 - 53)  BP: 167/64 (11-30-18 @ 11:00) (126/72 - 167/64)  RR: 18 (11-30-18 @ 11:00) (18 - 18)  SpO2: 98% (11-30-18 @ 11:00) (92% - 98%)  Wt(kg): --        11-29-18 @ 07:01  -  11-30-18 @ 07:00  --------------------------------------------------------  IN: 480 mL / OUT: 250 mL / NET: 230 mL    11-30-18 @ 07:01  -  11-30-18 @ 13:54  --------------------------------------------------------  IN: 760 mL / OUT: 600 mL / NET: 160 mL      Physical Exam:  	Gen: NAD, well-appearing  	HEENT: PERRL, supple neck, clear oropharynx  	Pulm: CTA B/L  	CV: RRR, S1S2; no rub  	Back: No spinal or CVA tenderness; no sacral edema  	Abd: +BS, soft, nontender/nondistended  	: No suprapubic tenderness  	UE: Warm, FROM, no clubbing, intact strength; no edema; no asterixis  	LE: Warm, FROM, no clubbing, intact strength; no edema  	Neuro: No focal deficits, intact gait  	Psych: Normal affect and mood  	Skin: Warm, without rashes  	Vascular access:    LABS/STUDIES  --------------------------------------------------------------------------------              11.1   7.5   >-----------<  184      [11-30-18 @ 06:09]              34.9     142  |  110  |  52.0  ----------------------------<  84      [11-30-18 @ 06:09]  5.8   |  18.0  |  3.55        Ca     8.0     [11-30-18 @ 06:09]    TPro  8.0  /  Alb  4.3  /  TBili  0.3  /  DBili  x   /  AST  15  /  ALT  9   /  AlkPhos  40  [11-28-18 @ 16:17]    PT/INR: PT 11.7 , INR 1.02       [11-28-18 @ 16:17]  PTT: 33.0       [11-28-18 @ 16:17]    Troponin <0.01      [11-29-18 @ 16:13]        [11-29-18 @ 16:13]    Creatinine Trend:  SCr 3.55 [11-30 @ 06:09]  SCr 3.89 [11-29 @ 07:11]  SCr 3.89 [11-28 @ 16:17]        TSH 7.33      [11-29-18 @ 02:31]

## 2018-11-30 NOTE — PROGRESS NOTE ADULT - SUBJECTIVE AND OBJECTIVE BOX
[facility]                                                        Office: [address] [address]                                                       Telephone: [phone]. Fax:[phone]      CARDIOLOGY PROGRESS NOTE   ([facility])    Subjective: Patient seen and examined earlier today.  Feels well.  HRs stable 40s-90s.  Coughing less.     ROS: No headache, no chest pain, no SOB, no palpitations, no dizziness, no nausea, no bleeding    Chronic Conditions:  CAD- stable, no acute issues.      CURRENT MEDICATIONS  hydrALAZINE 10 milliGRAM(s) Oral every 8 hours  guaiFENesin    Syrup 200 milliGRAM(s) Oral every 6 hours PRN  loratadine 10 milliGRAM(s) Oral daily  DULoxetine 20 milliGRAM(s) Oral daily  gabapentin 300 milliGRAM(s) Oral three times a day  pantoprazole    Tablet 40 milliGRAM(s) Oral before breakfast  simvastatin 20 milliGRAM(s) Oral at bedtime  aspirin enteric coated 81 milliGRAM(s) Oral daily  clopidogrel Tablet 75 milliGRAM(s) Oral daily  heparin  Injectable 5000 Unit(s) SubCutaneous every 8 hours  multivitamin 1 Tablet(s) Oral daily  sodium chloride 0.65% Nasal 1 Spray(s) Both Nostrils every 4 hours  sodium chloride 0.9%. 1000 milliLiter(s) IV Continuous <Continuous>    	  TELEMETRY: SB 40s  Vitals:  T(C): 36.4 (11-30-18 @ 06:17), Max: 36.8 (11-29-18 @ 15:22)  HR: 48 (11-30-18 @ 06:17) (48 - 94)  BP: 156/62 (11-30-18 @ 06:17) (126/72 - 163/54)  RR: 18 (11-30-18 @ 06:17) (17 - 18)  SpO2: 92% (11-30-18 @ 06:17) (92% - 97%)  Wt(kg): --  I&O's Summary    29 Nov 2018 07:01  -  30 Nov 2018 07:00  --------------------------------------------------------  IN: 480 mL / OUT: 250 mL / NET: 230 mL    30 Nov 2018 07:01  -  30 Nov 2018 10:27  --------------------------------------------------------  IN: 240 mL / OUT: 600 mL / NET: -360 mL        Daily T(C): 36.4 (11-30-18 @ 06:17), Max: 36.8 (11-29-18 @ 15:22)  HR: 48 (11-30-18 @ 06:17) (48 - 94)  BP: 156/62 (11-30-18 @ 06:17) (126/72 - 163/54)  RR: 18 (11-30-18 @ 06:17) (17 - 18)  SpO2: 92% (11-30-18 @ 06:17) (92% - 97%)  Wt(kg): --    Daily     PHYSICAL EXAM:  Appearance: Normal	  HEENT:   Normal oral mucosa, PERRL, EOMI	  Lymphatic: No lymphadenopathy  Cardiovascular: Normal S1 S2, No JVD, No murmurs, No edema, bradycardic  Respiratory: Lungs clear to auscultation	  Psychiatry: A & O x 3, Mood & affect appropriate  Gastrointestinal:  Soft, Non-tender, + BS	  Skin: No rashes, No ecchymoses, No cyanosis  Neurologic: Non-focal  Extremities: Normal range of motion, No clubbing, cyanosis or edema  Vascular: Peripheral pulses palpable 2+ bilaterally, warm      ECG (tracing reviewed by me):  	    DIAGNOSTIC TESTING:  Echocardiogram (images reviewed by me): < from: TTE Echo Complete w/Doppler (11.29.18 @ 13:00) >  Summary:   1. Left ventricular ejection fraction, by visual estimation, is 60 to   65%.   2. Normal global left ventricular systolic function.   3. Normal right ventricular size and function.   4. There is no evidenceof pericardial effusion.   5. Moderate mitral valve regurgitation.   6. Thickening of the anterior and posterior mitral valve leaflets.   7. Mild tricuspid regurgitation.   8. Sclerotic aortic valve with normal opening.   9. Trace pulmonic valve regurgitation.  10. The main pulmonary artery is normal in size.    [id number] Morgan Mccann MD, Electronically signed on 11/30/2018 at   12:52:45 AM       < end of copied text >    Catheterization:  Stress Test:    CXR (image reviewed by me):  OTHER: 	    LABS:	 	  CARDIAC MARKERS:                                  11.1   7.5   )-----------( 184      ( 30 Nov 2018 06:09 )             34.9     11-30    142  |  110<H>  |  52.0<H>  ----------------------------<  84  5.8<H>   |  18.0<L>  |  3.55<H>    Ca    8.0<L>      30 Nov 2018 06:09    TPro  8.0  /  Alb  4.3  /  TBili  0.3<L>  /  DBili  x   /  AST  15  /  ALT  9   /  AlkPhos  40  11-28    BNP:   Lipid Profile:   HgA1c:   TSH:

## 2018-11-30 NOTE — PROGRESS NOTE ADULT - SUBJECTIVE AND OBJECTIVE BOX
Patient: KAUSHIK HOFFMAN [id number] 78y Male                           Internal Medicine Hospitalist Progress Note  Chief Complaint: Patient is a 78y old  Male who presents with a chief complaint of cough (2018 13:39)    Initial HPI:  77 y/o male PMH HTN, CAD, presented for cough x 2 days, found to have ARIELA Cr 3.89, HR 40s.  Admitted for bradycardia, ARIELA.  Started on IVF.  Given Kayexalate for hyperkalemia.      Seen and examined today.  Denies complaints.  No chest pain / palpitations. No SOB.     ____________________PHYSICAL EXAM:  Vitals reviewed as indicated below  GENERAL:  NAD Alert and Oriented x 3   HEENT: NCAT  CARDIOVASCULAR:  S1, S2  LUNGS: CTAB  ABDOMEN:  soft, (-) tenderness, (-) distension, (+) bowel sounds, (-) guarding, (-) rebound (-) rigidity  EXTREMITIES:  no cyanosis / clubbing / edema.   ____________________    VITALS:  Vital Signs Last 24 Hrs  T(C): 36.7 (2018 11:00), Max: 36.8 (2018 15:22)  T(F): 98.1 (2018 11:00), Max: 98.2 (2018 15:22)  HR: 49 (2018 14:35) (48 - 53)  BP: 162/68 (2018 14:35) (126/72 - 167/64)  BP(mean): --  RR: 18 (2018 11:00) (18 - 18)  SpO2: 98% (2018 11:00) (92% - 98%) Daily     Daily Weight in k (2018 06:12)  CAPILLARY BLOOD GLUCOSE        I&O's Summary    2018 07:  -  2018 07:00  --------------------------------------------------------  IN: 480 mL / OUT: 250 mL / NET: 230 mL    2018 07:  -  2018 15:16  --------------------------------------------------------  IN: 1000 mL / OUT: 1000 mL / NET: 0 mL        LABS:                        11.1   7.5   )-----------( 184      ( 2018 06:09 )             34.9     11-30    142  |  110<H>  |  52.0<H>  ----------------------------<  84  5.8<H>   |  18.0<L>  |  3.55<H>    Ca    8.0<L>      2018 06:09    TPro  8.0  /  Alb  4.3  /  TBili  0.3<L>  /  DBili  x   /  AST  15  /  ALT  9   /  AlkPhos  40  11-28    PT/INR - ( 2018 16:17 )   PT: 11.7 sec;   INR: 1.02 ratio         PTT - ( 2018 16:17 )  PTT:33.0 sec  LIVER FUNCTIONS - ( 2018 16:17 )  Alb: 4.3 g/dL / Pro: 8.0 g/dL / ALK PHOS: 40 U/L / ALT: 9 U/L / AST: 15 U/L / GGT: x             CARDIAC MARKERS ( 2018 16:13 )  x     / <0.01 ng/mL / 100 U/L / x     / x      CARDIAC MARKERS ( 2018 07:11 )  x     / <0.01 ng/mL / 83 U/L / x     / x      CARDIAC MARKERS ( 2018 02:31 )  x     / <0.01 ng/mL / 76 U/L / x     / x      CARDIAC MARKERS ( 2018 16:17 )  x     / <0.01 ng/mL / x     / x     / x            MEDICATIONS:  aspirin enteric coated 81 milliGRAM(s) Oral daily  clopidogrel Tablet 75 milliGRAM(s) Oral daily  DULoxetine 20 milliGRAM(s) Oral daily  gabapentin 300 milliGRAM(s) Oral three times a day  guaiFENesin    Syrup 200 milliGRAM(s) Oral every 6 hours PRN  heparin  Injectable 5000 Unit(s) SubCutaneous every 8 hours  hydrALAZINE 10 milliGRAM(s) Oral every 8 hours  levothyroxine 100 MICROGram(s) Oral daily  loratadine 10 milliGRAM(s) Oral daily  multivitamin 1 Tablet(s) Oral daily  pantoprazole    Tablet 40 milliGRAM(s) Oral before breakfast  simvastatin 20 milliGRAM(s) Oral at bedtime  sodium chloride 0.65% Nasal 1 Spray(s) Both Nostrils every 4 hours  sodium chloride 0.9%. 1000 milliLiter(s) IV Continuous <Continuous>

## 2018-11-30 NOTE — PROGRESS NOTE ADULT - PROBLEM SELECTOR PLAN 3
possible from nasal congestion, post nasal drip, seasonal allergies ? will keep on loratadine and nasal saline drops. no fever, no cough in ER, influenza negative.  robitussin prn.
possible from nasal congestion, post nasal drip, seasonal allergies ? will keep on loratadine and nasal saline drops. no fever, no cough in ER, influenza negative.  robitussin prn.

## 2018-11-30 NOTE — PROGRESS NOTE ADULT - ASSESSMENT
1) ARIELA on CKD IV  2) Prerenal  3) Acidosis  4) Anemia renal dx  5) Hyperkalemia    Renal sono ordered  Check Urine lytes, urine osmo, UA  Continue home meds  Continue with IV NS ;  Start oral sodium bicarb 650mg TID for acidosis and K+  ; to help slow progression of CKD; should be on as outpt as well  Renal diet; explained to son and pt that he should not be drinking juices high in K+ ; K+ restriction diet;

## 2018-11-30 NOTE — PROGRESS NOTE ADULT - ASSESSMENT
# sinus bradycardia - no evidence for chronotropic incompetence.  Asymptomatic.  Monitor on telemetry for now.  Doubt bradycardia causing hypoperfusion - normal mentation, no chest pain/dizziness.   May be related to hypothyroidism.  Consider increasing levothyroxine.  Normal biventricular systolic function, no regional wall motion abnormalities on echo.  Avoid any AV ana blocking agents.  Outpatient surveillance.  No further inpatient cardiac workup needed   # CAD - stable, no anginal symptoms, continue aspirin, plavix, statin, imdur  # renal failure - ? etiology.  Doubt bradycardia causing hypoperfusion - normal mentation, no chest pain/dizziness. Holding ACEi, further workup by primary team.   # moderate mitral regurgitation - BP control, outpatient surveillance    Hep SQ for DVT prophylaxis    Thank you for allowing me to participate in care of your patient.   Please call as needed.

## 2018-12-01 LAB
ALBUMIN SERPL ELPH-MCNC: 4 G/DL — SIGNIFICANT CHANGE UP (ref 3.3–5.2)
ALP SERPL-CCNC: 43 U/L — SIGNIFICANT CHANGE UP (ref 40–120)
ALT FLD-CCNC: 8 U/L — SIGNIFICANT CHANGE UP
ANION GAP SERPL CALC-SCNC: 14 MMOL/L — SIGNIFICANT CHANGE UP (ref 5–17)
ANION GAP SERPL CALC-SCNC: 9 MMOL/L — SIGNIFICANT CHANGE UP (ref 5–17)
APTT BLD: 33.5 SEC — SIGNIFICANT CHANGE UP (ref 27.5–36.3)
AST SERPL-CCNC: 15 U/L — SIGNIFICANT CHANGE UP
BASOPHILS # BLD AUTO: 0 K/UL — SIGNIFICANT CHANGE UP (ref 0–0.2)
BASOPHILS NFR BLD AUTO: 0.5 % — SIGNIFICANT CHANGE UP (ref 0–2)
BILIRUB SERPL-MCNC: 0.3 MG/DL — LOW (ref 0.4–2)
BUN SERPL-MCNC: 42 MG/DL — HIGH (ref 8–20)
BUN SERPL-MCNC: 49 MG/DL — HIGH (ref 8–20)
CALCIUM SERPL-MCNC: 8.2 MG/DL — LOW (ref 8.6–10.2)
CALCIUM SERPL-MCNC: 8.4 MG/DL — LOW (ref 8.6–10.2)
CHLORIDE SERPL-SCNC: 109 MMOL/L — HIGH (ref 98–107)
CHLORIDE SERPL-SCNC: 110 MMOL/L — HIGH (ref 98–107)
CK SERPL-CCNC: 81 U/L — SIGNIFICANT CHANGE UP (ref 30–200)
CO2 SERPL-SCNC: 20 MMOL/L — LOW (ref 22–29)
CO2 SERPL-SCNC: 22 MMOL/L — SIGNIFICANT CHANGE UP (ref 22–29)
CREAT SERPL-MCNC: 3.18 MG/DL — HIGH (ref 0.5–1.3)
CREAT SERPL-MCNC: 3.19 MG/DL — HIGH (ref 0.5–1.3)
EOSINOPHIL # BLD AUTO: 0.7 K/UL — HIGH (ref 0–0.5)
EOSINOPHIL NFR BLD AUTO: 8.1 % — HIGH (ref 0–5)
GLUCOSE BLDC GLUCOMTR-MCNC: 113 MG/DL — HIGH (ref 70–99)
GLUCOSE SERPL-MCNC: 121 MG/DL — HIGH (ref 70–115)
GLUCOSE SERPL-MCNC: 90 MG/DL — SIGNIFICANT CHANGE UP (ref 70–115)
HCT VFR BLD CALC: 34.4 % — LOW (ref 42–52)
HCT VFR BLD CALC: 35.5 % — LOW (ref 42–52)
HGB BLD-MCNC: 10.9 G/DL — LOW (ref 14–18)
HGB BLD-MCNC: 11.3 G/DL — LOW (ref 14–18)
INR BLD: 0.96 RATIO — SIGNIFICANT CHANGE UP (ref 0.88–1.16)
LACTATE SERPL-SCNC: 0.8 MMOL/L — SIGNIFICANT CHANGE UP (ref 0.5–2)
LYMPHOCYTES # BLD AUTO: 2.6 K/UL — SIGNIFICANT CHANGE UP (ref 1–4.8)
LYMPHOCYTES # BLD AUTO: 31.9 % — SIGNIFICANT CHANGE UP (ref 20–55)
MCHC RBC-ENTMCNC: 29 PG — SIGNIFICANT CHANGE UP (ref 27–31)
MCHC RBC-ENTMCNC: 29.1 PG — SIGNIFICANT CHANGE UP (ref 27–31)
MCHC RBC-ENTMCNC: 31.7 G/DL — LOW (ref 32–36)
MCHC RBC-ENTMCNC: 31.8 G/DL — LOW (ref 32–36)
MCV RBC AUTO: 91 FL — SIGNIFICANT CHANGE UP (ref 80–94)
MCV RBC AUTO: 91.7 FL — SIGNIFICANT CHANGE UP (ref 80–94)
MONOCYTES # BLD AUTO: 0.7 K/UL — SIGNIFICANT CHANGE UP (ref 0–0.8)
MONOCYTES NFR BLD AUTO: 8.5 % — SIGNIFICANT CHANGE UP (ref 3–10)
NEUTROPHILS # BLD AUTO: 4.1 K/UL — SIGNIFICANT CHANGE UP (ref 1.8–8)
NEUTROPHILS NFR BLD AUTO: 50.8 % — SIGNIFICANT CHANGE UP (ref 37–73)
PLATELET # BLD AUTO: 190 K/UL — SIGNIFICANT CHANGE UP (ref 150–400)
PLATELET # BLD AUTO: 200 K/UL — SIGNIFICANT CHANGE UP (ref 150–400)
POTASSIUM SERPL-MCNC: 4.9 MMOL/L — SIGNIFICANT CHANGE UP (ref 3.5–5.3)
POTASSIUM SERPL-MCNC: 5.9 MMOL/L — HIGH (ref 3.5–5.3)
POTASSIUM SERPL-SCNC: 4.9 MMOL/L — SIGNIFICANT CHANGE UP (ref 3.5–5.3)
POTASSIUM SERPL-SCNC: 5.9 MMOL/L — HIGH (ref 3.5–5.3)
PROT SERPL-MCNC: 8.1 G/DL — SIGNIFICANT CHANGE UP (ref 6.6–8.7)
PROTHROM AB SERPL-ACNC: 11 SEC — SIGNIFICANT CHANGE UP (ref 10–12.9)
RBC # BLD: 3.75 M/UL — LOW (ref 4.6–6.2)
RBC # BLD: 3.9 M/UL — LOW (ref 4.6–6.2)
RBC # FLD: 13.1 % — SIGNIFICANT CHANGE UP (ref 11–15.6)
RBC # FLD: 13.1 % — SIGNIFICANT CHANGE UP (ref 11–15.6)
SODIUM SERPL-SCNC: 141 MMOL/L — SIGNIFICANT CHANGE UP (ref 135–145)
SODIUM SERPL-SCNC: 143 MMOL/L — SIGNIFICANT CHANGE UP (ref 135–145)
TROPONIN T SERPL-MCNC: <0.01 NG/ML — SIGNIFICANT CHANGE UP (ref 0–0.06)
WBC # BLD: 7.2 K/UL — SIGNIFICANT CHANGE UP (ref 4.8–10.8)
WBC # BLD: 8.1 K/UL — SIGNIFICANT CHANGE UP (ref 4.8–10.8)
WBC # FLD AUTO: 7.2 K/UL — SIGNIFICANT CHANGE UP (ref 4.8–10.8)
WBC # FLD AUTO: 8.1 K/UL — SIGNIFICANT CHANGE UP (ref 4.8–10.8)

## 2018-12-01 PROCEDURE — 99233 SBSQ HOSP IP/OBS HIGH 50: CPT

## 2018-12-01 PROCEDURE — 71045 X-RAY EXAM CHEST 1 VIEW: CPT | Mod: 26

## 2018-12-01 PROCEDURE — 93010 ELECTROCARDIOGRAM REPORT: CPT | Mod: 76

## 2018-12-01 RX ORDER — HYDRALAZINE HCL 50 MG
10 TABLET ORAL ONCE
Refills: 0 | Status: COMPLETED | OUTPATIENT
Start: 2018-12-01 | End: 2018-12-01

## 2018-12-01 RX ORDER — ATORVASTATIN CALCIUM 80 MG/1
80 TABLET, FILM COATED ORAL AT BEDTIME
Refills: 0 | Status: DISCONTINUED | OUTPATIENT
Start: 2018-12-01 | End: 2018-12-15

## 2018-12-01 RX ORDER — SODIUM POLYSTYRENE SULFONATE 4.1 MEQ/G
30 POWDER, FOR SUSPENSION ORAL ONCE
Refills: 0 | Status: COMPLETED | OUTPATIENT
Start: 2018-12-01 | End: 2018-12-01

## 2018-12-01 RX ORDER — NITROGLYCERIN 6.5 MG
0.4 CAPSULE, EXTENDED RELEASE ORAL ONCE
Refills: 0 | Status: COMPLETED | OUTPATIENT
Start: 2018-12-01 | End: 2018-12-01

## 2018-12-01 RX ORDER — CLOPIDOGREL BISULFATE 75 MG/1
300 TABLET, FILM COATED ORAL ONCE
Refills: 0 | Status: COMPLETED | OUTPATIENT
Start: 2018-12-01 | End: 2018-12-01

## 2018-12-01 RX ORDER — HYDRALAZINE HCL 50 MG
10 TABLET ORAL ONCE
Refills: 0 | Status: DISCONTINUED | OUTPATIENT
Start: 2018-12-01 | End: 2018-12-01

## 2018-12-01 RX ORDER — ATORVASTATIN CALCIUM 80 MG/1
40 TABLET, FILM COATED ORAL AT BEDTIME
Refills: 0 | Status: DISCONTINUED | OUTPATIENT
Start: 2018-12-01 | End: 2018-12-01

## 2018-12-01 RX ORDER — METOPROLOL TARTRATE 50 MG
25 TABLET ORAL
Refills: 0 | Status: DISCONTINUED | OUTPATIENT
Start: 2018-12-01 | End: 2018-12-02

## 2018-12-01 RX ORDER — ACETAMINOPHEN 500 MG
1000 TABLET ORAL ONCE
Refills: 0 | Status: COMPLETED | OUTPATIENT
Start: 2018-12-01 | End: 2018-12-01

## 2018-12-01 RX ORDER — MORPHINE SULFATE 50 MG/1
2 CAPSULE, EXTENDED RELEASE ORAL ONCE
Refills: 0 | Status: DISCONTINUED | OUTPATIENT
Start: 2018-12-01 | End: 2018-12-01

## 2018-12-01 RX ORDER — ASPIRIN/CALCIUM CARB/MAGNESIUM 324 MG
325 TABLET ORAL ONCE
Refills: 0 | Status: COMPLETED | OUTPATIENT
Start: 2018-12-01 | End: 2018-12-01

## 2018-12-01 RX ORDER — ISOSORBIDE MONONITRATE 60 MG/1
30 TABLET, EXTENDED RELEASE ORAL DAILY
Refills: 0 | Status: DISCONTINUED | OUTPATIENT
Start: 2018-12-01 | End: 2018-12-15

## 2018-12-01 RX ORDER — NITROGLYCERIN 6.5 MG
1 CAPSULE, EXTENDED RELEASE ORAL ONCE
Refills: 0 | Status: COMPLETED | OUTPATIENT
Start: 2018-12-01 | End: 2018-12-01

## 2018-12-01 RX ADMIN — Medication 200 MILLIGRAM(S): at 21:28

## 2018-12-01 RX ADMIN — Medication 1 SPRAY(S): at 05:27

## 2018-12-01 RX ADMIN — LORATADINE 10 MILLIGRAM(S): 10 TABLET ORAL at 12:00

## 2018-12-01 RX ADMIN — Medication 1 TABLET(S): at 12:00

## 2018-12-01 RX ADMIN — HEPARIN SODIUM 5000 UNIT(S): 5000 INJECTION INTRAVENOUS; SUBCUTANEOUS at 05:25

## 2018-12-01 RX ADMIN — Medication 100 MICROGRAM(S): at 05:25

## 2018-12-01 RX ADMIN — MORPHINE SULFATE 2 MILLIGRAM(S): 50 CAPSULE, EXTENDED RELEASE ORAL at 21:55

## 2018-12-01 RX ADMIN — GABAPENTIN 300 MILLIGRAM(S): 400 CAPSULE ORAL at 13:07

## 2018-12-01 RX ADMIN — PANTOPRAZOLE SODIUM 40 MILLIGRAM(S): 20 TABLET, DELAYED RELEASE ORAL at 05:25

## 2018-12-01 RX ADMIN — Medication 10 MILLIGRAM(S): at 05:25

## 2018-12-01 RX ADMIN — MORPHINE SULFATE 2 MILLIGRAM(S): 50 CAPSULE, EXTENDED RELEASE ORAL at 22:10

## 2018-12-01 RX ADMIN — Medication 400 MILLIGRAM(S): at 18:42

## 2018-12-01 RX ADMIN — Medication 1 SPRAY(S): at 21:28

## 2018-12-01 RX ADMIN — Medication 1 INCH(S): at 19:23

## 2018-12-01 RX ADMIN — Medication 10 MILLIGRAM(S): at 13:07

## 2018-12-01 RX ADMIN — Medication 81 MILLIGRAM(S): at 11:59

## 2018-12-01 RX ADMIN — Medication 0.4 MILLIGRAM(S): at 18:49

## 2018-12-01 RX ADMIN — Medication 0.4 MILLIGRAM(S): at 18:58

## 2018-12-01 RX ADMIN — DULOXETINE HYDROCHLORIDE 20 MILLIGRAM(S): 30 CAPSULE, DELAYED RELEASE ORAL at 12:00

## 2018-12-01 RX ADMIN — Medication 10 MILLIGRAM(S): at 21:28

## 2018-12-01 RX ADMIN — Medication 0.4 MILLIGRAM(S): at 19:04

## 2018-12-01 RX ADMIN — Medication 10 MILLIGRAM(S): at 18:14

## 2018-12-01 RX ADMIN — HEPARIN SODIUM 5000 UNIT(S): 5000 INJECTION INTRAVENOUS; SUBCUTANEOUS at 21:28

## 2018-12-01 RX ADMIN — Medication 1 SPRAY(S): at 11:59

## 2018-12-01 RX ADMIN — SODIUM POLYSTYRENE SULFONATE 30 GRAM(S): 4.1 POWDER, FOR SUSPENSION ORAL at 11:59

## 2018-12-01 RX ADMIN — GABAPENTIN 300 MILLIGRAM(S): 400 CAPSULE ORAL at 05:25

## 2018-12-01 RX ADMIN — ATORVASTATIN CALCIUM 80 MILLIGRAM(S): 80 TABLET, FILM COATED ORAL at 21:28

## 2018-12-01 RX ADMIN — HEPARIN SODIUM 5000 UNIT(S): 5000 INJECTION INTRAVENOUS; SUBCUTANEOUS at 13:07

## 2018-12-01 RX ADMIN — CLOPIDOGREL BISULFATE 75 MILLIGRAM(S): 75 TABLET, FILM COATED ORAL at 11:59

## 2018-12-01 RX ADMIN — Medication 30 MILLILITER(S): at 18:43

## 2018-12-01 RX ADMIN — CLOPIDOGREL BISULFATE 300 MILLIGRAM(S): 75 TABLET, FILM COATED ORAL at 19:58

## 2018-12-01 RX ADMIN — GABAPENTIN 300 MILLIGRAM(S): 400 CAPSULE ORAL at 21:28

## 2018-12-01 RX ADMIN — Medication 1000 MILLIGRAM(S): at 21:20

## 2018-12-01 RX ADMIN — Medication 10 MILLIGRAM(S): at 19:42

## 2018-12-01 RX ADMIN — Medication 325 MILLIGRAM(S): at 19:58

## 2018-12-01 NOTE — PROGRESS NOTE ADULT - SUBJECTIVE AND OBJECTIVE BOX
CC: ARIELA .Bradycardia.    HPI:  77 y/o male was brought in by family for dry cough on and off for past 2 days, no fever, no sick contact. + nasal stuffiness  and scratchy throat. As per family every year around this time he gets this. no h/o asthma. no cp. no abd. pain. no n/v/d. no sob reported. In the er pt's hr was in mid 40's. pt. denies dizziness but reports some fatigue. Family is not aware if he has h/o bradycardia. pt's Cr is 3.89 from blood work done in ER. family does not know if pt. has any kidney problem.  As per family pt. was seen by pcp about 2 weeks ago and had blood work done but results are not known to family and they did not get any call about abnormal labs. Pt. was seen by nephrologist Dr. Costa yesterday as routine check up as per son in law but blood work from pcp office was not available so pt. was instructed to return when blood work is available. As per son in law pt's cough has topped while in the ER.  no other complaints. (28 Nov 2018 19:56)    REVIEW OF SYSTEMS:    Patient denied fever, chills, abdominal pain, nausea, vomiting, cough, shortness of breath, chest pain or palpitations    Vital Signs Last 24 Hrs  T(C): 36.8 (01 Dec 2018 16:03), Max: 37.2 (30 Nov 2018 16:47)  T(F): 98.3 (01 Dec 2018 16:03), Max: 99 (30 Nov 2018 16:47)  HR: 56 (01 Dec 2018 16:03) (51 - 58)  BP: 158/56 (01 Dec 2018 12:01) (158/56 - 180/60)  BP(mean): --  RR: 18 (01 Dec 2018 16:03) (17 - 18)  SpO2: 96% (01 Dec 2018 16:03) (96% - 98%)I&O's Summary    30 Nov 2018 07:01  -  01 Dec 2018 07:00  --------------------------------------------------------  IN: 2620 mL / OUT: 2480 mL / NET: 140 mL    01 Dec 2018 07:01  -  01 Dec 2018 16:23  --------------------------------------------------------  IN: 360 mL / OUT: 500 mL / NET: -140 mL      PHYSICAL EXAM:  GENERAL: NAD,   HEENT: PERRL, +EOMI, anicteric, no [place]  NECK: Supple, No JVD   CHEST/LUNG: CTA bilaterally; Normal effort  HEART: S1S2 Normal intensity, no murmurs, gallops or rubs noted  ABDOMEN: Soft, BS Normoactive, NT, ND, no HSM noted  EXTREMITIES:  2+ radial and DP pulses noted, no clubbing, cyanosis, or edema noted, Limited mobility   SKIN: No rashes or lesions noted  NEURO: A&O, no focal deficits noted, CN II-XII intact  PSYCH: Depressed mood and affect; insight/judgement appropriate  LABS:                        10.9   7.2   )-----------( 190      ( 01 Dec 2018 06:30 )             34.4     12-01    141  |  110<H>  |  49.0<H>  ----------------------------<  90  5.9<H>   |  22.0  |  3.19<H>    Ca    8.2<L>      01 Dec 2018 06:30          RADIOLOGY & ADDITIONAL TESTS:    MEDICATIONS:  MEDICATIONS  (STANDING):  aspirin enteric coated 81 milliGRAM(s) Oral daily  clopidogrel Tablet 75 milliGRAM(s) Oral daily  DULoxetine 20 milliGRAM(s) Oral daily  gabapentin 300 milliGRAM(s) Oral three times a day  heparin  Injectable 5000 Unit(s) SubCutaneous every 8 hours  hydrALAZINE 10 milliGRAM(s) Oral every 8 hours  levothyroxine 100 MICROGram(s) Oral daily  loratadine 10 milliGRAM(s) Oral daily  multivitamin 1 Tablet(s) Oral daily  pantoprazole    Tablet 40 milliGRAM(s) Oral before breakfast  simvastatin 20 milliGRAM(s) Oral at bedtime  sodium chloride 0.65% Nasal 1 Spray(s) Both Nostrils every 4 hours  sodium chloride 0.9%. 1000 milliLiter(s) (80 mL/Hr) IV Continuous <Continuous>    MEDICATIONS  (PRN):  guaiFENesin    Syrup 200 milliGRAM(s) Oral every 6 hours PRN Cough

## 2018-12-01 NOTE — PROGRESS NOTE ADULT - SUBJECTIVE AND OBJECTIVE BOX
Called to evaluate for chest pain.   Pain, sudden midsternal, burping and gas noted as well.  Pain 8/10, skin warm and dry, no nausea no vomiting, no palpitation, stat ekg with flipped t waves in 1, 2, avf, avl, and v4.   MD Cordoba notified and message left with ani.         Chest pain  nitro given x 2 without pain relief  rapid response called.    ekg with flipped t waves noted  Mylanta also given with no relief. Called to evaluate for chest pain.   Pain, sudden midsternal, burping and gas noted as well.  Pain 8/10, skin warm and dry, no nausea no vomiting, no palpitation, stat ekg with flipped t waves in 1, 2, avf, avl, and v4.   MD Cordoba notified and message left with ani.         Chest pain  nitro given x 2 without pain relief  rapid response called.    ekg with flipped t waves noted  Mylanta also given with no relief.    report given to night np

## 2018-12-01 NOTE — PROGRESS NOTE ADULT - ASSESSMENT
Problem: Bradycardia.  Plan: Cardiology recommend adjust  Synthroid dosage upwards . No intervention procedure.      Problem/Plan - 2:  ·  Problem: Renal failure, unspecified chronicity.  Plan: ? ARIELA.  Renal indices is improving . Monitor BMP.  Renal input d/w Dr. Miles.  On IVF.  Avoid nephrotoxic agents.  Repeat Kayexalate for hyperkalemia.       Problem/Plan - 3:  ·  Problem: Cough.  Plan: possible from nasal congestion, post nasal drip, seasonal allergies ? will keep on loratadine and nasal saline drops. no fever, no cough in ER, influenza negative.  robitussin prn.      Problem/Plan - 4:  ·  Problem: Essential hypertension.  Plan: BP still elevated.   Hydralazine.      Problem/Plan - 5:  ·  Problem: CAD (coronary artery disease).  Plan: Stable.  Continue ASA, Plavix.  Off BBlocker.     Disposition: discharge planning .

## 2018-12-01 NOTE — PROGRESS NOTE ADULT - ASSESSMENT
1) ARIELA on CKD IV  2) Prerenal  3) Acidosis  4) Anemia renal dx  5) Hyperkalemia    Renal sono reviewed; small cysts  Start oral sodium bicarb 650mg TID for acidosis and K+  ; to help slow progression of CKD; should be on as outpt as well  Renal diet; explained to son and pt that he should not be drinking juices high in K+ ; K+ restriction diet;    Will follow as outpatient  [address]  [place]  Please recall if needed

## 2018-12-01 NOTE — PROGRESS NOTE ADULT - SUBJECTIVE AND OBJECTIVE BOX
Patient with noted elevated bp as per nursing.  Denies fever, chills, n/v/d dizziness, pain 4/10. perrla, no neurological deficits noted.      HTN  stat dose of apresoline  repeat 3o minutes   Notify pcp MD if remains elevated    Pain  IV Tylenol stat x 1.

## 2018-12-01 NOTE — PROGRESS NOTE ADULT - SUBJECTIVE AND OBJECTIVE BOX
[facility] DIVISION OF KIDNEY DISEASES AND HYPERTENSION -- FOLLOW UP NOTE  --------------------------------------------------------------------------------  Chief Complaint:  ARIELA on CKD    24 hour events/subjective:  Pt seen and examined  Cr improving      PAST HISTORY  --------------------------------------------------------------------------------  No significant changes to PMH, PSH, FHx, SHx, unless otherwise noted    ALLERGIES & MEDICATIONS  --------------------------------------------------------------------------------  Allergies    No Known Allergies    Intolerances      Standing Inpatient Medications  aspirin enteric coated 81 milliGRAM(s) Oral daily  clopidogrel Tablet 75 milliGRAM(s) Oral daily  DULoxetine 20 milliGRAM(s) Oral daily  gabapentin 300 milliGRAM(s) Oral three times a day  heparin  Injectable 5000 Unit(s) SubCutaneous every 8 hours  hydrALAZINE 10 milliGRAM(s) Oral every 8 hours  levothyroxine 100 MICROGram(s) Oral daily  loratadine 10 milliGRAM(s) Oral daily  multivitamin 1 Tablet(s) Oral daily  pantoprazole    Tablet 40 milliGRAM(s) Oral before breakfast  simvastatin 20 milliGRAM(s) Oral at bedtime  sodium chloride 0.65% Nasal 1 Spray(s) Both Nostrils every 4 hours  sodium chloride 0.9%. 1000 milliLiter(s) IV Continuous <Continuous>    PRN Inpatient Medications  guaiFENesin    Syrup 200 milliGRAM(s) Oral every 6 hours PRN      REVIEW OF SYSTEMS  --------------------------------------------------------------------------------  Gen: No weight changes, fatigue, fevers/chills, weakness  Skin: No rashes  Head/Eyes/Ears/Mouth: No headache; Normal hearing; Normal vision w/o blurriness; No sinus pain/discomfort, sore throat  Respiratory: No dyspnea, cough, wheezing, hemoptysis  CV: No chest pain, PND, orthopnea  GI: No abdominal pain, diarrhea, constipation, nausea, vomiting, melena, hematochezia  : No increased frequency, dysuria, hematuria, nocturia  MSK: No joint pain/swelling; no back pain; no edema  Neuro: No dizziness/lightheadedness, weakness, seizures, numbness, tingling  Heme: No easy bruising or bleeding  Endo: No heat/cold intolerance  Psych: No significant nervousness, anxiety, stress, depression    All other systems were reviewed and are negative, except as noted.    VITALS/PHYSICAL EXAM  --------------------------------------------------------------------------------  T(C): 36.3 (12-01-18 @ 05:20), Max: 37.2 (11-30-18 @ 16:47)  HR: 58 (12-01-18 @ 12:01) (49 - 58)  BP: 158/56 (12-01-18 @ 12:01) (158/56 - 180/60)  RR: 18 (12-01-18 @ 12:01) (17 - 18)  SpO2: 98% (12-01-18 @ 12:01) (98% - 98%)  Wt(kg): --        11-30-18 @ 07:01  -  12-01-18 @ 07:00  --------------------------------------------------------  IN: 2620 mL / OUT: 2480 mL / NET: 140 mL    12-01-18 @ 07:01  -  12-01-18 @ 13:45  --------------------------------------------------------  IN: 360 mL / OUT: 200 mL / NET: 160 mL      Physical Exam:  	Gen: NAD, well-appearing  	HEENT: PERRL, supple neck, clear oropharynx  	Pulm: CTA B/L  	CV: RRR, S1S2; no rub  	Back: No spinal or CVA tenderness; no sacral edema  	Abd: +BS, soft, nontender/nondistended  	: No suprapubic tenderness  	UE: Warm, FROM, no clubbing, intact strength; no edema; no asterixis  	LE: Warm, FROM, no clubbing, intact strength; no edema  	Neuro: No focal deficits, intact gait  	Psych: Normal affect and mood  	Skin: Warm, without rashes  	Vascular access:    LABS/STUDIES  --------------------------------------------------------------------------------              10.9   7.2   >-----------<  190      [12-01-18 @ 06:30]              34.4     141  |  110  |  49.0  ----------------------------<  90      [12-01-18 @ 06:30]  5.9   |  22.0  |  3.19        Ca     8.2     [12-01-18 @ 06:30]          Troponin <0.01      [11-29-18 @ 16:13]        [11-29-18 @ 16:13]    Creatinine Trend:  SCr 3.19 [12-01 @ 06:30]  SCr 3.55 [11-30 @ 06:09]  SCr 3.89 [11-29 @ 07:11]  SCr 3.89 [11-28 @ 16:17]        TSH 7.33      [11-29-18 @ 02:31]

## 2018-12-02 LAB
APTT BLD: 48.6 SEC — HIGH (ref 27.5–36.3)
HCT VFR BLD CALC: 34.5 % — LOW (ref 42–52)
HGB BLD-MCNC: 11.3 G/DL — LOW (ref 14–18)
MCHC RBC-ENTMCNC: 30 PG — SIGNIFICANT CHANGE UP (ref 27–31)
MCHC RBC-ENTMCNC: 32.8 G/DL — SIGNIFICANT CHANGE UP (ref 32–36)
MCV RBC AUTO: 91.5 FL — SIGNIFICANT CHANGE UP (ref 80–94)
PLATELET # BLD AUTO: 184 K/UL — SIGNIFICANT CHANGE UP (ref 150–400)
RBC # BLD: 3.77 M/UL — LOW (ref 4.6–6.2)
RBC # FLD: 13.2 % — SIGNIFICANT CHANGE UP (ref 11–15.6)
TROPONIN T SERPL-MCNC: 0.22 NG/ML — HIGH (ref 0–0.06)
WBC # BLD: 9.6 K/UL — SIGNIFICANT CHANGE UP (ref 4.8–10.8)
WBC # FLD AUTO: 9.6 K/UL — SIGNIFICANT CHANGE UP (ref 4.8–10.8)

## 2018-12-02 PROCEDURE — 99233 SBSQ HOSP IP/OBS HIGH 50: CPT

## 2018-12-02 RX ORDER — HEPARIN SODIUM 5000 [USP'U]/ML
3200 INJECTION INTRAVENOUS; SUBCUTANEOUS EVERY 6 HOURS
Refills: 0 | Status: DISCONTINUED | OUTPATIENT
Start: 2018-12-02 | End: 2018-12-05

## 2018-12-02 RX ORDER — AMLODIPINE BESYLATE 2.5 MG/1
10 TABLET ORAL DAILY
Refills: 0 | Status: DISCONTINUED | OUTPATIENT
Start: 2018-12-02 | End: 2018-12-15

## 2018-12-02 RX ORDER — HYDRALAZINE HCL 50 MG
50 TABLET ORAL EVERY 8 HOURS
Refills: 0 | Status: DISCONTINUED | OUTPATIENT
Start: 2018-12-02 | End: 2018-12-15

## 2018-12-02 RX ORDER — FUROSEMIDE 40 MG
20 TABLET ORAL ONCE
Refills: 0 | Status: COMPLETED | OUTPATIENT
Start: 2018-12-02 | End: 2018-12-02

## 2018-12-02 RX ORDER — HEPARIN SODIUM 5000 [USP'U]/ML
INJECTION INTRAVENOUS; SUBCUTANEOUS
Qty: 25000 | Refills: 0 | Status: DISCONTINUED | OUTPATIENT
Start: 2018-12-02 | End: 2018-12-05

## 2018-12-02 RX ORDER — METOPROLOL TARTRATE 50 MG
50 TABLET ORAL
Refills: 0 | Status: DISCONTINUED | OUTPATIENT
Start: 2018-12-02 | End: 2018-12-15

## 2018-12-02 RX ORDER — IPRATROPIUM/ALBUTEROL SULFATE 18-103MCG
3 AEROSOL WITH ADAPTER (GRAM) INHALATION EVERY 6 HOURS
Refills: 0 | Status: DISCONTINUED | OUTPATIENT
Start: 2018-12-02 | End: 2018-12-10

## 2018-12-02 RX ORDER — ACETAMINOPHEN 500 MG
650 TABLET ORAL EVERY 6 HOURS
Refills: 0 | Status: DISCONTINUED | OUTPATIENT
Start: 2018-12-02 | End: 2018-12-15

## 2018-12-02 RX ADMIN — DULOXETINE HYDROCHLORIDE 20 MILLIGRAM(S): 30 CAPSULE, DELAYED RELEASE ORAL at 11:21

## 2018-12-02 RX ADMIN — Medication 200 MILLIGRAM(S): at 05:41

## 2018-12-02 RX ADMIN — HEPARIN SODIUM 650 UNIT(S)/HR: 5000 INJECTION INTRAVENOUS; SUBCUTANEOUS at 06:33

## 2018-12-02 RX ADMIN — Medication 10 MILLIGRAM(S): at 05:25

## 2018-12-02 RX ADMIN — GABAPENTIN 300 MILLIGRAM(S): 400 CAPSULE ORAL at 13:48

## 2018-12-02 RX ADMIN — Medication 1 TABLET(S): at 11:20

## 2018-12-02 RX ADMIN — Medication 100 MICROGRAM(S): at 05:25

## 2018-12-02 RX ADMIN — Medication 3 MILLILITER(S): at 20:59

## 2018-12-02 RX ADMIN — Medication 650 MILLIGRAM(S): at 16:43

## 2018-12-02 RX ADMIN — ATORVASTATIN CALCIUM 80 MILLIGRAM(S): 80 TABLET, FILM COATED ORAL at 21:24

## 2018-12-02 RX ADMIN — CLOPIDOGREL BISULFATE 75 MILLIGRAM(S): 75 TABLET, FILM COATED ORAL at 11:21

## 2018-12-02 RX ADMIN — Medication 200 MILLIGRAM(S): at 11:47

## 2018-12-02 RX ADMIN — Medication 1 SPRAY(S): at 02:30

## 2018-12-02 RX ADMIN — Medication 1 SPRAY(S): at 11:19

## 2018-12-02 RX ADMIN — Medication 20 MILLIGRAM(S): at 07:24

## 2018-12-02 RX ADMIN — GABAPENTIN 300 MILLIGRAM(S): 400 CAPSULE ORAL at 21:24

## 2018-12-02 RX ADMIN — HEPARIN SODIUM 750 UNIT(S)/HR: 5000 INJECTION INTRAVENOUS; SUBCUTANEOUS at 17:06

## 2018-12-02 RX ADMIN — Medication 1 SPRAY(S): at 21:24

## 2018-12-02 RX ADMIN — Medication 650 MILLIGRAM(S): at 15:43

## 2018-12-02 RX ADMIN — Medication 1 SPRAY(S): at 13:47

## 2018-12-02 RX ADMIN — Medication 50 MILLIGRAM(S): at 21:24

## 2018-12-02 RX ADMIN — PANTOPRAZOLE SODIUM 40 MILLIGRAM(S): 20 TABLET, DELAYED RELEASE ORAL at 05:26

## 2018-12-02 RX ADMIN — Medication 50 MILLIGRAM(S): at 13:48

## 2018-12-02 RX ADMIN — Medication 1 SPRAY(S): at 05:25

## 2018-12-02 RX ADMIN — ISOSORBIDE MONONITRATE 30 MILLIGRAM(S): 60 TABLET, EXTENDED RELEASE ORAL at 11:20

## 2018-12-02 RX ADMIN — Medication 81 MILLIGRAM(S): at 11:23

## 2018-12-02 RX ADMIN — Medication 1 INCH(S): at 07:00

## 2018-12-02 RX ADMIN — LORATADINE 10 MILLIGRAM(S): 10 TABLET ORAL at 11:21

## 2018-12-02 RX ADMIN — GABAPENTIN 300 MILLIGRAM(S): 400 CAPSULE ORAL at 05:25

## 2018-12-02 RX ADMIN — Medication 25 MILLIGRAM(S): at 05:27

## 2018-12-02 RX ADMIN — AMLODIPINE BESYLATE 10 MILLIGRAM(S): 2.5 TABLET ORAL at 11:20

## 2018-12-02 RX ADMIN — Medication 50 MILLIGRAM(S): at 17:19

## 2018-12-02 NOTE — PROGRESS NOTE ADULT - SUBJECTIVE AND OBJECTIVE BOX
CC: Cough headache. Hypertension . Stage 4 CKD.   HPI:  79 y/o male was brought in by family for dry cough on and off for past 2 days, no fever, no sick contact. + nasal stuffiness  and scratchy throat. As per family every year around this time he gets this. no h/o asthma. no cp. no abd. pain. no n/v/d. no sob reported. In the er pt's hr was in mid 40's. pt. denies dizziness but reports some fatigue. Family is not aware if he has h/o bradycardia. pt's Cr is 3.89 from blood work done in ER. family does not know if pt. has any kidney problem.  As per family pt. was seen by pcp about 2 weeks ago and had blood work done but results are not known to family and they did not get any call about abnormal labs. Pt. was seen by nephrologist Dr. Costa yesterday as routine check up as per son in law but blood work from pcp office was not available so pt. was instructed to return when blood work is available. As per son in law pt's cough has topped while in the ER.  no other complaints. (28 Nov 2018 19:56)    REVIEW OF SYSTEMS:    Patient denied fever, chills, abdominal pain, nausea, vomiting, cough, shortness of breath, chest pain or palpitations    Vital Signs Last 24 Hrs  T(C): 36.7 (02 Dec 2018 11:17), Max: 36.8 (01 Dec 2018 16:03)  T(F): 98 (02 Dec 2018 11:17), Max: 98.3 (01 Dec 2018 16:03)  HR: 64 (02 Dec 2018 11:17) (56 - 86)  BP: 154/66 (02 Dec 2018 11:17) (146/60 - 198/82)  BP(mean): --  RR: 20 (02 Dec 2018 11:17) (18 - 28)  SpO2: 97% (02 Dec 2018 11:17) (94% - 97%)I&O's Summary    01 Dec 2018 07:01  -  02 Dec 2018 07:00  --------------------------------------------------------  IN: 600 mL / OUT: 825 mL / NET: -225 mL    02 Dec 2018 07:01  -  02 Dec 2018 15:00  --------------------------------------------------------  IN: 840 mL / OUT: 1650 mL / NET: -810 mL      PHYSICAL EXAM:  GENERAL: NAD,   HEENT: PERRL, +EOMI, anicteric, no [place]  NECK: Supple, No JVD   CHEST/LUNG: bilateral rales crackles   HEART: S1S2 Normal intensity, no murmurs, gallops or rubs noted  ABDOMEN: Soft, BS Normoactive, NT, ND, no HSM noted  EXTREMITIES:  2+ radial and DP pulses noted, no clubbing, cyanosis, or edema noted, Limited mobility   SKIN: No rashes or lesions noted  NEURO: A&O, no focal deficits noted, CN II-XII intact  PSYCH: Depressed mood and affect; insight/judgement appropriate  LABS:                        11.3   8.1   )-----------( 200      ( 01 Dec 2018 19:51 )             35.5     12-01    143  |  109<H>  |  42.0<H>  ----------------------------<  121<H>  4.9   |  20.0<L>  |  3.18<H>    Ca    8.4<L>      01 Dec 2018 19:46    TPro  8.1  /  Alb  4.0  /  TBili  0.3<L>  /  DBili  x   /  AST  15  /  ALT  8   /  AlkPhos  43  12-01    PT/INR - ( 01 Dec 2018 19:52 )   PT: 11.0 sec;   INR: 0.96 ratio         PTT - ( 01 Dec 2018 19:52 )  PTT:33.5 sec    RADIOLOGY & ADDITIONAL TESTS:    MEDICATIONS:  MEDICATIONS  (STANDING):  amLODIPine   Tablet 10 milliGRAM(s) Oral daily  aspirin enteric coated 81 milliGRAM(s) Oral daily  atorvastatin 80 milliGRAM(s) Oral at bedtime  clopidogrel Tablet 75 milliGRAM(s) Oral daily  DULoxetine 20 milliGRAM(s) Oral daily  gabapentin 300 milliGRAM(s) Oral three times a day  heparin  Infusion.  Unit(s)/Hr (6.5 mL/Hr) IV Continuous <Continuous>  hydrALAZINE 50 milliGRAM(s) Oral every 8 hours  isosorbide   mononitrate ER Tablet (IMDUR) 30 milliGRAM(s) Oral daily  levothyroxine 100 MICROGram(s) Oral daily  loratadine 10 milliGRAM(s) Oral daily  metoprolol tartrate 50 milliGRAM(s) Oral two times a day  multivitamin 1 Tablet(s) Oral daily  pantoprazole    Tablet 40 milliGRAM(s) Oral before breakfast  sodium chloride 0.65% Nasal 1 Spray(s) Both Nostrils every 4 hours    MEDICATIONS  (PRN):  aluminum hydroxide/magnesium hydroxide/simethicone Suspension 30 milliLiter(s) Oral every 4 hours PRN Dyspepsia  guaiFENesin    Syrup 200 milliGRAM(s) Oral every 6 hours PRN Cough  heparin  Injectable 3200 Unit(s) IV Push every 6 hours PRN For aPTT less than 40

## 2018-12-02 NOTE — PROGRESS NOTE ADULT - SUBJECTIVE AND OBJECTIVE BOX
Events noted : sscpwith dynamic STD inf/apical in context of HTN urgency (+) tni today CP free BP improved with pseudo normalization of ECG changes       TELE:SR    MEDICATIONS  (STANDING):  amLODIPine   Tablet 10 milliGRAM(s) Oral daily  aspirin enteric coated 81 milliGRAM(s) Oral daily  atorvastatin 80 milliGRAM(s) Oral at bedtime  clopidogrel Tablet 75 milliGRAM(s) Oral daily  DULoxetine 20 milliGRAM(s) Oral daily  gabapentin 300 milliGRAM(s) Oral three times a day  heparin  Infusion.  Unit(s)/Hr (6.5 mL/Hr) IV Continuous <Continuous>  hydrALAZINE 50 milliGRAM(s) Oral every 8 hours  isosorbide   mononitrate ER Tablet (IMDUR) 30 milliGRAM(s) Oral daily  levothyroxine 100 MICROGram(s) Oral daily  loratadine 10 milliGRAM(s) Oral daily  metoprolol tartrate 50 milliGRAM(s) Oral two times a day  multivitamin 1 Tablet(s) Oral daily  pantoprazole    Tablet 40 milliGRAM(s) Oral before breakfast  sodium chloride 0.65% Nasal 1 Spray(s) Both Nostrils every 4 hours    MEDICATIONS  (PRN):  aluminum hydroxide/magnesium hydroxide/simethicone Suspension 30 milliLiter(s) Oral every 4 hours PRN Dyspepsia  guaiFENesin    Syrup 200 milliGRAM(s) Oral every 6 hours PRN Cough  heparin  Injectable 3200 Unit(s) IV Push every 6 hours PRN For aPTT less than 40      Allergies    No Known Allergies    Intolerances      PAST MEDICAL & SURGICAL HISTORY:  CAD (coronary artery disease)  Lung cancer: had yoni radiation in 2006.  Bipolar disorder  High cholesterol  Hypertension  S/P CABG (coronary artery bypass graft): pt&#x27;s son in [facility] h/o some stents near neck area ? carotid ? he is not sure.      Vital Signs Last 24 Hrs  T(C): 36.7 (02 Dec 2018 05:19), Max: 36.8 (01 Dec 2018 16:03)  T(F): 98 (02 Dec 2018 05:19), Max: 98.3 (01 Dec 2018 16:03)  HR: 70 (02 Dec 2018 05:19) (56 - 86)  BP: 156/76 (02 Dec 2018 05:19) (146/60 - 198/82)  BP(mean): --  RR: 20 (02 Dec 2018 05:19) (18 - 28)  SpO2: 97% (02 Dec 2018 05:19) (94% - 98%)    Physical Exam:  Constitutional: NAD, AAOx3  Cardiovascular: +S1S2 RRR  Pulmonary: CTA b/l, unlabored  Abd: soft NTND +BS  Groins: C/D/I bilaterally; no bleeding, hematoma, edema  Extremities: no pedal edema, +distal pulses b/l  Neuro: non focal, MORALES x4    LABS:                        11.3   8.1   )-----------( 200      ( 01 Dec 2018 19:51 )             35.5     12-01    143  |  109<H>  |  42.0<H>  ----------------------------<  121<H>  4.9   |  20.0<L>  |  3.18<H>    Ca    8.4<L>      01 Dec 2018 19:46    TPro  8.1  /  Alb  4.0  /  TBili  0.3<L>  /  DBili  x   /  AST  15  /  ALT  8   /  AlkPhos  43  12-01    PT/INR - ( 01 Dec 2018 19:52 )   PT: 11.0 sec;   INR: 0.96 ratio         PTT - ( 01 Dec 2018 19:52 )  PTT:33.5 sec

## 2018-12-02 NOTE — PROGRESS NOTE ADULT - ASSESSMENT
77 y/o male was brought in by family for dry cough on and off for past 2 days, no fever, no sick contact. + nasal stuffiness  and scratchy throat. As per family every year around this time he gets this. no h/o asthma. no cp. no abd. pain. no n/v/d. no sob reported. In the er pt's hr was in mid 40's. pt. denies dizziness but reports some fatigue. Family is not aware if he has h/o bradycardia. pt's Cr is 3.89 from blood work done in ER. family does not know if pt. has any kidney problem.  As per family pt. was seen by pcp about 2 weeks ago and had blood work done but results are not known to family and they did not get any call about abnormal labs. Pt. was seen by nephrologist Dr. Costa yesterday as routine check up as per son in law but blood work from pcp office was not available so pt. was instructed to return when blood work is available. As per son in law pt's cough has topped while in the ER.  no other complaints. (28 Nov 2018 19:56)    Problem/Plan -1   Problem: CAD. Plan: Cardiology planning ROBERT , heart cath.  Gentle diuresis  Continue ASA, Plavix.  Off B-Blocker.    Problem: Bradycardia.  Plan: Cardiology recommend adjust  Synthroid dosage upwards.      Problem/Plan - 2:  ·  Problem: Renal failure, unspecified chronicity.  Plan: ? ARIELA.  Renal indices is improving . Monitor BMP.  Renal input d/w Dr. Miles.  On IVF.  Avoid nephrotoxic agents.  Repeat Kayexalate for hyperkalemia.       Problem/Plan - 3:  ·  Problem: Cough.  Plan: possible from nasal congestion, post nasal drip, seasonal allergies ? will keep on loratadine and nasal saline drops. no fever, no cough in ER, influenza negative.  robitussin prn. Duonebs.     Problem/Plan - 4:  ·  Problem: Essential hypertension.  Plan: BP still elevated.   Hydralazine.

## 2018-12-02 NOTE — PROGRESS NOTE ADULT - ASSESSMENT
sscp with dynamic STD inf/apical in context of HTN urgency (+) tni today CP free BP improved with pseudo normalization of ECG changes     Imp;    CAD s/p remote CABG 1996 presents with cough and atypical CP, patient REYES (-) x 3 tni and TTE LVEF 60%. yesterday acut eonset SSCP in context of labile BP. (+) TNI overnight started on Heparin infusion and s/p plavix 300mg po x 1. Currently CP free.    recc:  CT After load reduction with hydralzaine/nitrates/BB DAPT (ASA+Plavix) statin  and UFH infusion for now, repeat TNI  Repeat TTE to assess for new RWMA  Gentle diuresis   tele  Renal eval today please in anticipation of possible cardiac cath  Optimize Bp

## 2018-12-03 DIAGNOSIS — Z76.89 PERSONS ENCOUNTERING HEALTH SERVICES IN OTHER SPECIFIED CIRCUMSTANCES: ICD-10-CM

## 2018-12-03 LAB
ANION GAP SERPL CALC-SCNC: 15 MMOL/L — SIGNIFICANT CHANGE UP (ref 5–17)
APTT BLD: 44.4 SEC — HIGH (ref 27.5–36.3)
APTT BLD: 48.4 SEC — HIGH (ref 27.5–36.3)
APTT BLD: 60.6 SEC — HIGH (ref 27.5–36.3)
BUN SERPL-MCNC: 46 MG/DL — HIGH (ref 8–20)
CALCIUM SERPL-MCNC: 8.3 MG/DL — LOW (ref 8.6–10.2)
CHLORIDE SERPL-SCNC: 102 MMOL/L — SIGNIFICANT CHANGE UP (ref 98–107)
CK MB CFR SERPL CALC: 13 NG/ML — HIGH (ref 0–6.7)
CK MB CFR SERPL CALC: 15.7 NG/ML — HIGH (ref 0–6.7)
CK SERPL-CCNC: 254 U/L — HIGH (ref 30–200)
CK SERPL-CCNC: 283 U/L — HIGH (ref 30–200)
CO2 SERPL-SCNC: 18 MMOL/L — LOW (ref 22–29)
CREAT SERPL-MCNC: 3.58 MG/DL — HIGH (ref 0.5–1.3)
GLUCOSE SERPL-MCNC: 121 MG/DL — HIGH (ref 70–115)
HCT VFR BLD CALC: 34.3 % — LOW (ref 42–52)
HGB BLD-MCNC: 11.1 G/DL — LOW (ref 14–18)
INR BLD: 1.03 RATIO — SIGNIFICANT CHANGE UP (ref 0.88–1.16)
MCHC RBC-ENTMCNC: 29.7 PG — SIGNIFICANT CHANGE UP (ref 27–31)
MCHC RBC-ENTMCNC: 32.4 G/DL — SIGNIFICANT CHANGE UP (ref 32–36)
MCV RBC AUTO: 91.7 FL — SIGNIFICANT CHANGE UP (ref 80–94)
NT-PROBNP SERPL-SCNC: HIGH PG/ML (ref 0–300)
NT-PROBNP SERPL-SCNC: HIGH PG/ML (ref 0–300)
PLATELET # BLD AUTO: 212 K/UL — SIGNIFICANT CHANGE UP (ref 150–400)
POTASSIUM SERPL-MCNC: 4.8 MMOL/L — SIGNIFICANT CHANGE UP (ref 3.5–5.3)
POTASSIUM SERPL-SCNC: 4.8 MMOL/L — SIGNIFICANT CHANGE UP (ref 3.5–5.3)
PROTHROM AB SERPL-ACNC: 11.8 SEC — SIGNIFICANT CHANGE UP (ref 10–12.9)
RBC # BLD: 3.74 M/UL — LOW (ref 4.6–6.2)
RBC # FLD: 13.5 % — SIGNIFICANT CHANGE UP (ref 11–15.6)
SODIUM SERPL-SCNC: 135 MMOL/L — SIGNIFICANT CHANGE UP (ref 135–145)
TROPONIN T SERPL-MCNC: 1.06 NG/ML — HIGH (ref 0–0.06)
TROPONIN T SERPL-MCNC: 1.07 NG/ML — HIGH (ref 0–0.06)
TROPONIN T SERPL-MCNC: 1.09 NG/ML — HIGH (ref 0–0.06)
WBC # BLD: 9.8 K/UL — SIGNIFICANT CHANGE UP (ref 4.8–10.8)
WBC # FLD AUTO: 9.8 K/UL — SIGNIFICANT CHANGE UP (ref 4.8–10.8)

## 2018-12-03 PROCEDURE — 99233 SBSQ HOSP IP/OBS HIGH 50: CPT

## 2018-12-03 PROCEDURE — 93306 TTE W/DOPPLER COMPLETE: CPT | Mod: 26

## 2018-12-03 PROCEDURE — 71045 X-RAY EXAM CHEST 1 VIEW: CPT | Mod: 26

## 2018-12-03 RX ORDER — FUROSEMIDE 40 MG
20 TABLET ORAL ONCE
Refills: 0 | Status: COMPLETED | OUTPATIENT
Start: 2018-12-03 | End: 2018-12-03

## 2018-12-03 RX ORDER — FUROSEMIDE 40 MG
40 TABLET ORAL ONCE
Refills: 0 | Status: COMPLETED | OUTPATIENT
Start: 2018-12-03 | End: 2018-12-03

## 2018-12-03 RX ADMIN — ISOSORBIDE MONONITRATE 30 MILLIGRAM(S): 60 TABLET, EXTENDED RELEASE ORAL at 12:47

## 2018-12-03 RX ADMIN — Medication 100 MICROGRAM(S): at 05:42

## 2018-12-03 RX ADMIN — Medication 3 MILLILITER(S): at 09:38

## 2018-12-03 RX ADMIN — HEPARIN SODIUM 750 UNIT(S)/HR: 5000 INJECTION INTRAVENOUS; SUBCUTANEOUS at 00:30

## 2018-12-03 RX ADMIN — Medication 3 MILLILITER(S): at 20:24

## 2018-12-03 RX ADMIN — AMLODIPINE BESYLATE 10 MILLIGRAM(S): 2.5 TABLET ORAL at 05:42

## 2018-12-03 RX ADMIN — Medication 1 TABLET(S): at 12:48

## 2018-12-03 RX ADMIN — LORATADINE 10 MILLIGRAM(S): 10 TABLET ORAL at 22:12

## 2018-12-03 RX ADMIN — Medication 50 MILLIGRAM(S): at 14:45

## 2018-12-03 RX ADMIN — Medication 50 MILLIGRAM(S): at 22:12

## 2018-12-03 RX ADMIN — Medication 1 SPRAY(S): at 22:06

## 2018-12-03 RX ADMIN — HEPARIN SODIUM 950 UNIT(S)/HR: 5000 INJECTION INTRAVENOUS; SUBCUTANEOUS at 18:16

## 2018-12-03 RX ADMIN — DULOXETINE HYDROCHLORIDE 20 MILLIGRAM(S): 30 CAPSULE, DELAYED RELEASE ORAL at 12:48

## 2018-12-03 RX ADMIN — Medication 3 MILLILITER(S): at 15:07

## 2018-12-03 RX ADMIN — Medication 50 MILLIGRAM(S): at 05:42

## 2018-12-03 RX ADMIN — Medication 40 MILLIGRAM(S): at 09:00

## 2018-12-03 RX ADMIN — CLOPIDOGREL BISULFATE 75 MILLIGRAM(S): 75 TABLET, FILM COATED ORAL at 12:47

## 2018-12-03 RX ADMIN — GABAPENTIN 300 MILLIGRAM(S): 400 CAPSULE ORAL at 05:42

## 2018-12-03 RX ADMIN — Medication 81 MILLIGRAM(S): at 12:47

## 2018-12-03 RX ADMIN — Medication 3 MILLILITER(S): at 03:49

## 2018-12-03 RX ADMIN — Medication 20 MILLIGRAM(S): at 01:50

## 2018-12-03 RX ADMIN — Medication 200 MILLIGRAM(S): at 01:50

## 2018-12-03 RX ADMIN — ATORVASTATIN CALCIUM 80 MILLIGRAM(S): 80 TABLET, FILM COATED ORAL at 22:12

## 2018-12-03 RX ADMIN — PANTOPRAZOLE SODIUM 40 MILLIGRAM(S): 20 TABLET, DELAYED RELEASE ORAL at 05:42

## 2018-12-03 RX ADMIN — Medication 200 MILLIGRAM(S): at 22:10

## 2018-12-03 RX ADMIN — Medication 200 MILLIGRAM(S): at 12:47

## 2018-12-03 RX ADMIN — HEPARIN SODIUM 850 UNIT(S)/HR: 5000 INJECTION INTRAVENOUS; SUBCUTANEOUS at 07:40

## 2018-12-03 RX ADMIN — Medication 50 MILLIGRAM(S): at 18:01

## 2018-12-03 NOTE — DIETITIAN INITIAL EVALUATION ADULT. - NS AS NUTRI INTERV ED CONTENT
Diet education left at bedside- RD to follow up with verbal education as feasible/Purpose of the nutrition education/Nutrition relationship to health/disease/Recommended modifications

## 2018-12-03 NOTE — PROGRESS NOTE ADULT - ASSESSMENT
sscp with dynamic STD inf/apical in context of HTN urgency (+) tni today CP free BP improved with pseudo normalization of ECG changes     Imp;    CAD s/p remote CABG 1996 presents with cough and atypical CP, patient REYES (-) x 3 tni and TTE LVEF 60%. yesterday acut eonset SSCP in context of labile BP. (+) TNI overnight started on Heparin infusion and s/p plavix 300mg po x 1. Currently CP free.  hypertension - uncontrolled  worsening renal failure  dyspnea    recc:  repeat TTE  IV heparin  will need cath - renal consult for optimization  continue beta blocker, plavix, statin, aspirin  add imdur 30 mg daily - uptitrate as needed for BP control  continue amlodipine  diurese - lasix 40 mg IV x 1  repeat CXR  trend cardiac enzymes (include CK/CKMB), BNP    D/W Dr. Ani sscp with dynamic STD inf/apical in context of HTN urgency (+) tni today CP free BP improved with pseudo normalization of ECG changes     Imp;    CAD s/p remote CABG 1996 presents with cough and atypical CP, patient REYES (-) x 3 tni and TTE LVEF 60%. yesterday acut eonset SSCP in context of labile BP. (+) TNI overnight started on Heparin infusion and s/p plavix 300mg po x 1. Currently CP free.  hypertension - uncontrolled  worsening renal failure  dyspnea    recc:  repeat TTE  IV heparin  will need cath - renal consult for optimization  continue beta blocker, plavix, statin, aspirin  on imdur 30 mg daily - uptitrate as needed for BP control  continue amlodipine  diurese - lasix 40 mg IV x 1  repeat CXR  trend cardiac enzymes (include CK/CKMB), BNP    D/W Dr. Ani

## 2018-12-03 NOTE — PROGRESS NOTE ADULT - ASSESSMENT
77 y/o male was brought in by family for dry cough on and off for past 2 days, no fever, no sick contact. + nasal stuffiness  and scratchy throat. As per family every year around this time he gets this. no h/o asthma. no cp. no abd. pain. no n/v/d. no sob reported. In the er pt's hr was in mid 40's. pt. denies dizziness but reports some fatigue. Family is not aware if he has h/o bradycardia. pt's Cr is 3.89 from blood work done in ER. family does not know if pt. has any kidney problem.  As per family pt. was seen by pcp about 2 weeks ago and had blood work done but results are not known to family and they did not get any call about abnormal labs. Pt. was seen by nephrologist Dr. Costa yesterday as routine check up as per son in law but blood work from pcp office was not available so pt. was instructed to return when blood work is available. As per son in law pt's cough has topped while in the ER.  no other complaints. (28 Nov 2018 19:56)    Problem/Plan -1   Problem: CAD with elevated troponin NSTEMI. Plan: Cardiology planning ROBERT , heart cath.  Gentle diuresis  Continue ASA, Plavix. B-Blocker.    Problem: Bradycardia.  Plan: Cardiology recommend adjust  Synthroid dosage upwards.      Problem/Plan - 2:  ·  Problem: Renal failure, unspecified chronicity.  Plan: ? ARIELA.  Renal indices slight worsening since starting loop diuretics . Monitor BMP.  Recall Renal  Dr. Miles.   Avoid nephrotoxic agents.         Problem/Plan - 3:  ·  Problem: Cough.  Plan: possible from nasal congestion, post nasal drip, seasonal allergies ? will keep on loratadine and nasal saline drops. no fever, no cough in ER, influenza negative.  robitussin prn. Duonebs.     Problem/Plan - 4:  ·  Problem: Essential hypertension.  Plan: BP still elevated.   Hydralazine. Metoprolo 77 y/o male was brought in by family for dry cough on and off for past 2 days, no fever, no sick contact. + nasal stuffiness  and scratchy throat. As per family every year around this time he gets this. no h/o asthma. no cp. no abd. pain. no n/v/d. no sob reported. In the er pt's hr was in mid 40's. pt. denies dizziness but reports some fatigue. Family is not aware if he has h/o bradycardia. pt's Cr is 3.89 from blood work done in ER. family does not know if pt. has any kidney problem.  As per family pt. was seen by pcp about 2 weeks ago and had blood work done but results are not known to family and they did not get any call about abnormal labs. Pt. was seen by nephrologist Dr. Costa yesterday as routine check up as per son in law but blood work from pcp office was not available so pt. was instructed to return when blood work is available. As per son in law pt's cough has topped while in the ER.  no other complaints. (28 Nov 2018 19:56)    Problem/Plan -1   Problem: CAD with elevated troponin NSTEMI. Plan: Cardiology planning ROBERT , heart cath.  Gentle diuresis  Continue ASA, Plavix. B-Blocker. Heparin infusion.  Trend cardiac enzyme.     Problem: Bradycardia.  Plan: Cardiology recommend adjust  Synthroid dosage upwards.      Problem/Plan - 2:  ·  Problem: Renal failure, unspecified chronicity.  Plan: ? ARIELA.  Renal indices slight worsening since starting loop diuretics . Monitor BMP.  Recall Renal  Dr. Miles.   Avoid nephrotoxic agents.         Problem/Plan - 3:  ·  Problem: Cough.  Plan: possible from nasal congestion, post nasal drip, seasonal allergies ? will keep on loratadine and nasal saline drops. no fever, no cough in ER, influenza negative.  robitussin prn. Duonebs.     Problem/Plan - 4:  ·  Problem: Essential hypertension.  Plan: BP still elevated.   Hydralazine. Metoprolol

## 2018-12-03 NOTE — DIETITIAN INITIAL EVALUATION ADULT. - PERTINENT LABORATORY DATA
12-03 Na135 mmol/L Glu 121 mg/dL<H> K+ 4.8 mmol/L Cr  3.58 mg/dL<H> BUN 46.0 mg/dL<H> Phos n/a   Alb n/a   PAB n/a

## 2018-12-03 NOTE — DIETITIAN INITIAL EVALUATION ADULT. - PHYSICAL APPEARANCE
BMI 24.9 (based on current EMR weight of 154.3 lbs- appears more accurate than admission weight of 114.6 lbs)

## 2018-12-03 NOTE — DIETITIAN INITIAL EVALUATION ADULT. - OTHER INFO
77 y/o male PMH HTN, CAD, presented for cough x 2 days, admitted with bradycardia and ARIELA. Attempted to interview x 2, pt sleeping soundly, unarousable. No family at bedside to obtain nutrition hx. Per EMR review, pt with fair/good po intake. Lunch tray noted at bedside, pt consumed 100% of grilled chicken, also noted containers of food likely from outside hospital. Left CKD nutrition therapy diet ed at bedside for pt/family.

## 2018-12-03 NOTE — PROGRESS NOTE ADULT - SUBJECTIVE AND OBJECTIVE BOX
[facility]                                                        Office: [address] [place] [address]                                                       Telephone: [phone]. Fax:[phone]      CARDIOLOGY PROGRESS NOTE   ([facility])    Subjective: Patient seen and examined.  Tachypneic, short of breath - began last night.  Received 20 mg IV lasix this am, neb tx - minimal help. Saturday evening (12/1) with hypertensive urgency, developed chest pain , troponins now elevated.      ROS: No headache, no chest pain, no SOB, no palpitations, no dizziness, no nausea, no bleeding    Chronic Conditions:     CURRENT MEDICATIONS  amLODIPine   Tablet 10 milliGRAM(s) Oral daily  hydrALAZINE 50 milliGRAM(s) Oral every 8 hours  isosorbide   mononitrate ER Tablet (IMDUR) 30 milliGRAM(s) Oral daily  metoprolol tartrate 50 milliGRAM(s) Oral two times a day      ALBUTerol/ipratropium for Nebulization 3 milliLiter(s) Nebulizer every 6 hours  guaiFENesin    Syrup 200 milliGRAM(s) Oral every 6 hours PRN  loratadine 10 milliGRAM(s) Oral daily    acetaminophen   Tablet .. 650 milliGRAM(s) Oral every 6 hours PRN  DULoxetine 20 milliGRAM(s) Oral daily  gabapentin 300 milliGRAM(s) Oral three times a day    aluminum hydroxide/magnesium hydroxide/simethicone Suspension 30 milliLiter(s) Oral every 4 hours PRN  pantoprazole    Tablet 40 milliGRAM(s) Oral before breakfast    atorvastatin 80 milliGRAM(s) Oral at bedtime  levothyroxine 100 MICROGram(s) Oral daily    aspirin enteric coated 81 milliGRAM(s) Oral daily  clopidogrel Tablet 75 milliGRAM(s) Oral daily  heparin  Infusion.  Unit(s)/Hr IV Continuous <Continuous>  heparin  Injectable 3200 Unit(s) IV Push every 6 hours PRN  multivitamin 1 Tablet(s) Oral daily  sodium chloride 0.65% Nasal 1 Spray(s) Both Nostrils every 4 hours    	  TELEMETRY:   Vitals:  T(C): 36.7 (12-03-18 @ 05:39), Max: 37.5 (12-02-18 @ 21:21)  HR: 71 (12-03-18 @ 05:39) (63 - 71)  BP: 164/76 (12-03-18 @ 05:39) (123/55 - 164/76)  RR: 20 (12-03-18 @ 05:39) (19 - 20)  SpO2: 97% (12-03-18 @ 05:39) (94% - 97%)  Wt(kg): --  I&O's Summary    02 Dec 2018 07:01  -  03 Dec 2018 07:00  --------------------------------------------------------  IN: 984 mL / OUT: 2050 mL / NET: -1066 mL        Daily T(C): 36.7 (12-03-18 @ 05:39), Max: 37.5 (12-02-18 @ 21:21)  HR: 71 (12-03-18 @ 05:39) (63 - 71)  BP: 164/76 (12-03-18 @ 05:39) (123/55 - 164/76)  RR: 20 (12-03-18 @ 05:39) (19 - 20)  SpO2: 97% (12-03-18 @ 05:39) (94% - 97%)  Wt(kg): --    Daily     PHYSICAL EXAM:  Appearance: Normal	  HEENT:   Normal oral mucosa, PERRL, EOMI	  Lymphatic: No lymphadenopathy  Cardiovascular: Normal S1 S2, No JVD, No murmurs, no edema  Respiratory: diffuse crackles and expiratory wheezing  Psychiatry: A & O x 3, Mood & affect appropriate  Gastrointestinal:  Soft, Non-tender, + BS	  Skin: No rashes, No ecchymoses, No cyanosis  Neurologic: Non-focal  Extremities: Normal range of motion, No clubbing, cyanosis or edema  Vascular: Peripheral pulses palpable 2+ bilaterally, warm      ECG (tracing reviewed by me):  	    DIAGNOSTIC TESTING:  Echocardiogram (images reviewed by me): < from: TTE Echo Complete w/Doppler (11.29.18 @ 13:00) >  Summary:   1. Left ventricular ejection fraction, by visual estimation, is 60 to   65%.   2. Normal global left ventricular systolic function.   3. Normal right ventricular size and function.   4. There is no evidenceof pericardial effusion.   5. Moderate mitral valve regurgitation.   6. Thickening of the anterior and posterior mitral valve leaflets.   7. Mild tricuspid regurgitation.   8. Sclerotic aortic valve with normal opening.   9. Trace pulmonic valve regurgitation.  10. The main pulmonary artery is normal in size.    [id number] Morgan Mccann MD, Electronically signed on 11/30/2018 at   12:52:45 AM       < end of copied text >    Catheterization:  Stress Test:    CXR (image reviewed by me): < from: Xray Chest 1 View-PORTABLE IMMEDIATE (12.01.18 @ 19:55) >  IMPRESSION:   Cardiomegaly. Status post coronary artery bypass graft  procedure. The  Interstitial increased markings with RIGHT effusion.    Findings may indicate pulmonary edema and RIGHT effusion of cardiac or   noncardiac origin.                GALLITO DE SANTIAGO M.D., ATTENDING RADIOLOGIST  This document has been electronically signed. Dec  2 2018 12:44PM        < end of copied text >    OTHER: 	    LABS:	 	  CARDIAC MARKERS:                                  11.1   9.8   )-----------( 212      ( 03 Dec 2018 07:03 )             34.3     12-03    135  |  102  |  46.0<H>  ----------------------------<  121<H>  4.8   |  18.0<L>  |  3.58<H>    Ca    8.3<L>      03 Dec 2018 07:03    TPro  8.1  /  Alb  4.0  /  TBili  0.3<L>  /  DBili  x   /  AST  15  /  ALT  8   /  AlkPhos  43  12-01    BNP:   Lipid Profile:   HgA1c:   TSH:      CARDIAC MARKERS ( 03 Dec 2018 07:03 )  x     / 1.07 ng/mL / x     / x     / x      CARDIAC MARKERS ( 02 Dec 2018 03:53 )  x     / 0.22 ng/mL / x     / x     / x      CARDIAC MARKERS ( 01 Dec 2018 19:46 )  x     / <0.01 ng/mL / 81 U/L / x     / x

## 2018-12-03 NOTE — PROGRESS NOTE ADULT - SUBJECTIVE AND OBJECTIVE BOX
CC: Cough and shortness of breath. No chest pain. CXR pulm edema, right lung effusion.  BNP 40,000, slight worsening of renal indices on loop diuretics for fluid overload. Trop elevation trending up in the setting of stage 4 renal disease.   HPI:  77 y/o male was brought in by family for dry cough on and off for past 2 days, no fever, no sick contact. + nasal stuffiness  and scratchy throat. As per family every year around this time he gets this. no h/o asthma. no cp. no abd. pain. no n/v/d. no sob reported. In the er pt's hr was in mid 40's. pt. denies dizziness but reports some fatigue. Family is not aware if he has h/o bradycardia. pt's Cr is 3.89 from blood work done in ER. family does not know if pt. has any kidney problem.  As per family pt. was seen by pcp about 2 weeks ago and had blood work done but results are not known to family and they did not get any call about abnormal labs. Pt. was seen by nephrologist Dr. Costa yesterday as routine check up as per son in law but blood work from pcp office was not available so pt. was instructed to return when blood work is available. As per son in law pt's cough has topped while in the ER.  no other complaints. (28 Nov 2018 19:56)    REVIEW OF SYSTEMS:    Patient denied fever, chills, abdominal pain, nausea, vomiting, cough, shortness of breath, chest pain or palpitations    Vital Signs Last 24 Hrs  T(C): 37.1 (03 Dec 2018 09:45), Max: 37.5 (02 Dec 2018 21:21)  T(F): 98.8 (03 Dec 2018 09:45), Max: 99.5 (02 Dec 2018 21:21)  HR: 62 (03 Dec 2018 09:45) (61 - 71)  BP: 122/66 (03 Dec 2018 09:45) (122/66 - 164/76)  BP(mean): --  RR: 20 (03 Dec 2018 09:45) (19 - 20)  SpO2: 95% (03 Dec 2018 09:45) (94% - 97%)I&O's Summary    02 Dec 2018 07:01  -  03 Dec 2018 07:00  --------------------------------------------------------  IN: 984 mL / OUT: 2050 mL / NET: -1066 mL    03 Dec 2018 07:01  -  03 Dec 2018 12:01  --------------------------------------------------------  IN: 25.5 mL / OUT: 0 mL / NET: 25.5 mL      PHYSICAL EXAM:  GENERAL: NAD,   HEENT: PERRL, +EOMI, anicteric, no [place]  NECK: Supple, No JVD   CHEST/LUNG: Bilateral rales, crackles; Normal effort  HEART: S1S2 Normal intensity, no murmurs, gallops or rubs noted  ABDOMEN: Soft, BS Normoactive, NT, ND, no HSM noted  EXTREMITIES:  2+ radial and DP pulses noted, no clubbing, cyanosis, or edema noted, Limited mobility   SKIN: No rashes or lesions noted  NEURO: A&O, no focal deficits noted, CN II-XII intact  PSYCH: Depressed mood and affect; insight/judgement appropriate  LABS:                        11.1   9.8   )-----------( 212      ( 03 Dec 2018 07:03 )             34.3     12-03    135  |  102  |  46.0<H>  ----------------------------<  121<H>  4.8   |  18.0<L>  |  3.58<H>    Ca    8.3<L>      03 Dec 2018 07:03    TPro  8.1  /  Alb  4.0  /  TBili  0.3<L>  /  DBili  x   /  AST  15  /  ALT  8   /  AlkPhos  43  12-01    PT/INR - ( 01 Dec 2018 19:52 )   PT: 11.0 sec;   INR: 0.96 ratio         PTT - ( 03 Dec 2018 07:14 )  PTT:48.4 sec    RADIOLOGY & ADDITIONAL TESTS:    MEDICATIONS:  MEDICATIONS  (STANDING):  ALBUTerol/ipratropium for Nebulization 3 milliLiter(s) Nebulizer every 6 hours  amLODIPine   Tablet 10 milliGRAM(s) Oral daily  aspirin enteric coated 81 milliGRAM(s) Oral daily  atorvastatin 80 milliGRAM(s) Oral at bedtime  clopidogrel Tablet 75 milliGRAM(s) Oral daily  DULoxetine 20 milliGRAM(s) Oral daily  gabapentin 300 milliGRAM(s) Oral three times a day  heparin  Infusion.  Unit(s)/Hr (6.5 mL/Hr) IV Continuous <Continuous>  hydrALAZINE 50 milliGRAM(s) Oral every 8 hours  isosorbide   mononitrate ER Tablet (IMDUR) 30 milliGRAM(s) Oral daily  levothyroxine 100 MICROGram(s) Oral daily  loratadine 10 milliGRAM(s) Oral daily  metoprolol tartrate 50 milliGRAM(s) Oral two times a day  multivitamin 1 Tablet(s) Oral daily  pantoprazole    Tablet 40 milliGRAM(s) Oral before breakfast  sodium chloride 0.65% Nasal 1 Spray(s) Both Nostrils every 4 hours    MEDICATIONS  (PRN):  acetaminophen   Tablet .. 650 milliGRAM(s) Oral every 6 hours PRN Temp greater or equal to 38C (100.4F), Moderate Pain (4 - 6)  aluminum hydroxide/magnesium hydroxide/simethicone Suspension 30 milliLiter(s) Oral every 4 hours PRN Dyspepsia  guaiFENesin    Syrup 200 milliGRAM(s) Oral every 6 hours PRN Cough  heparin  Injectable 3200 Unit(s) IV Push every 6 hours PRN For aPTT less than 40

## 2018-12-03 NOTE — PROGRESS NOTE ADULT - SUBJECTIVE AND OBJECTIVE BOX
[facility] DIVISION OF KIDNEY DISEASES AND HYPERTENSION -- FOLLOW UP NOTE  --------------------------------------------------------------------------------  Chief Complaint:   ARIELA on CKD IV (Recalled - worsening SCr.)    24 hour events/subjective:  Pt seen and examined  NAD  SCr worsened, 3.5 today  eGFR<20        PAST HISTORY  --------------------------------------------------------------------------------  No significant changes to PMH, PSH, FHx, SHx, unless otherwise noted    ALLERGIES & MEDICATIONS  --------------------------------------------------------------------------------  Allergies    No Known Allergies    Intolerances      Standing Inpatient Medications  ALBUTerol/ipratropium for Nebulization 3 milliLiter(s) Nebulizer every 6 hours  amLODIPine   Tablet 10 milliGRAM(s) Oral daily  aspirin enteric coated 81 milliGRAM(s) Oral daily  atorvastatin 80 milliGRAM(s) Oral at bedtime  clopidogrel Tablet 75 milliGRAM(s) Oral daily  DULoxetine 20 milliGRAM(s) Oral daily  heparin  Infusion.  Unit(s)/Hr IV Continuous <Continuous>  hydrALAZINE 50 milliGRAM(s) Oral every 8 hours  isosorbide   mononitrate ER Tablet (IMDUR) 30 milliGRAM(s) Oral daily  levothyroxine 100 MICROGram(s) Oral daily  loratadine 10 milliGRAM(s) Oral daily  metoprolol tartrate 50 milliGRAM(s) Oral two times a day  multivitamin 1 Tablet(s) Oral daily  pantoprazole    Tablet 40 milliGRAM(s) Oral before breakfast  sodium chloride 0.65% Nasal 1 Spray(s) Both Nostrils every 4 hours    PRN Inpatient Medications  acetaminophen   Tablet .. 650 milliGRAM(s) Oral every 6 hours PRN  aluminum hydroxide/magnesium hydroxide/simethicone Suspension 30 milliLiter(s) Oral every 4 hours PRN  guaiFENesin    Syrup 200 milliGRAM(s) Oral every 6 hours PRN  heparin  Injectable 3200 Unit(s) IV Push every 6 hours PRN      REVIEW OF SYSTEMS  --------------------------------------------------------------------------------  Gen: No weight changes, fatigue, fevers/chills, weakness  Skin: No rashes  Head/Eyes/Ears/Mouth: No headache; Normal hearing; Normal vision w/o blurriness; No sinus pain/discomfort, sore throat  Respiratory: No dyspnea, cough, wheezing, hemoptysis  CV: No chest pain, PND, orthopnea  GI: No abdominal pain, diarrhea, constipation, nausea, vomiting, melena, hematochezia  : No increased frequency, dysuria, hematuria, nocturia  MSK: No joint pain/swelling; no back pain; no edema  Neuro: No dizziness/lightheadedness, weakness, seizures, numbness, tingling  Heme: No easy bruising or bleeding  Endo: No heat/cold intolerance  Psych: No significant nervousness, anxiety, stress, depression    All other systems were reviewed and are negative, except as noted.    VITALS/PHYSICAL EXAM  --------------------------------------------------------------------------------  T(C): 37.1 (12-03-18 @ 09:45), Max: 37.5 (12-02-18 @ 21:21)  HR: 62 (12-03-18 @ 09:45) (61 - 71)  BP: 122/66 (12-03-18 @ 09:45) (122/66 - 164/76)  RR: 20 (12-03-18 @ 09:45) (19 - 20)  SpO2: 95% (12-03-18 @ 09:45) (94% - 97%)  Wt(kg): --        12-02-18 @ 07:01  -  12-03-18 @ 07:00  --------------------------------------------------------  IN: 984 mL / OUT: 2050 mL / NET: -1066 mL    12-03-18 @ 07:01  -  12-03-18 @ 12:34  --------------------------------------------------------  IN: 265.5 mL / OUT: 300 mL / NET: -34.5 mL      Physical Exam:  	Gen: NAD, well-appearing  	HEENT: PERRL, supple neck, clear oropharynx  	Pulm: CTA B/L  	CV: RRR, S1S2; no rub  	Back: No spinal or CVA tenderness; no sacral edema  	Abd: +BS, soft, nontender/nondistended  	: No suprapubic tenderness  	UE: Warm, FROM, no clubbing, intact strength; no edema; no asterixis  	LE: Warm, FROM, no clubbing, intact strength; no edema  	Neuro: No focal deficits, intact gait  	Psych: Normal affect and mood  	Skin: Warm, without rashes  	Vascular access:    LABS/STUDIES  --------------------------------------------------------------------------------              11.1   9.8   >-----------<  212      [12-03-18 @ 07:03]              34.3     135  |  102  |  46.0  ----------------------------<  121      [12-03-18 @ 07:03]  4.8   |  18.0  |  3.58        Ca     8.3     [12-03-18 @ 07:03]    TPro  8.1  /  Alb  4.0  /  TBili  0.3  /  DBili  x   /  AST  15  /  ALT  8   /  AlkPhos  43  [12-01-18 @ 19:46]    PT/INR: PT 11.0 , INR 0.96       [12-01-18 @ 19:52]  PTT: 48.4       [12-03-18 @ 07:14]    Troponin 1.09      [12-03-18 @ 10:11]        [12-03-18 @ 10:11]    Creatinine Trend:  SCr 3.58 [12-03 @ 07:03]  SCr 3.18 [12-01 @ 19:46]  SCr 3.19 [12-01 @ 06:30]  SCr 3.55 [11-30 @ 06:09]  SCr 3.89 [11-29 @ 07:11]        TSH 7.33      [11-29-18 @ 02:31]

## 2018-12-03 NOTE — DIETITIAN INITIAL EVALUATION ADULT. - PROBLEM SELECTOR PLAN 3
possible from nasal congestion, post nasal drip, seasonal allergies ? will keep on loratadine and nasal saline drops. no fever, no cough in ER, influenza negative.  robitussin prn.

## 2018-12-03 NOTE — PROGRESS NOTE ADULT - ASSESSMENT
1) ARIELA on CKD IV  2) Prerenal  3) Acidosis  4) Anemia renal dx  5) Hyperkalemia    Recalled for worsening SCr.  Renal sono reviewed; small cysts  Start oral sodium bicarb 650mg TID for acidosis and K+; to help slow progression of CKD; should be on as outpt as well  Renal diet; explained to son and pt that he should not be drinking juices high in K+ ; K+ restriction diet;  Recommend Vascular consult for vein mapping/eventual HD access creation  Will follow

## 2018-12-04 DIAGNOSIS — R06.89 OTHER ABNORMALITIES OF BREATHING: ICD-10-CM

## 2018-12-04 LAB
ALT FLD-CCNC: 16 U/L — SIGNIFICANT CHANGE UP
ANION GAP SERPL CALC-SCNC: 15 MMOL/L — SIGNIFICANT CHANGE UP (ref 5–17)
APTT BLD: 45.8 SEC — HIGH (ref 27.5–36.3)
APTT BLD: 47.9 SEC — HIGH (ref 27.5–36.3)
APTT BLD: 60.5 SEC — HIGH (ref 27.5–36.3)
BASE EXCESS BLDA CALC-SCNC: -6.7 MMOL/L — LOW (ref -2–2)
BLOOD GAS COMMENTS ARTERIAL: SIGNIFICANT CHANGE UP
BUN SERPL-MCNC: 55 MG/DL — HIGH (ref 8–20)
CALCIUM SERPL-MCNC: 8.7 MG/DL — SIGNIFICANT CHANGE UP (ref 8.6–10.2)
CHLORIDE SERPL-SCNC: 99 MMOL/L — SIGNIFICANT CHANGE UP (ref 98–107)
CO2 SERPL-SCNC: 18 MMOL/L — LOW (ref 22–29)
CREAT SERPL-MCNC: 3.95 MG/DL — HIGH (ref 0.5–1.3)
GAS PNL BLDA: SIGNIFICANT CHANGE UP
GLUCOSE SERPL-MCNC: 113 MG/DL — SIGNIFICANT CHANGE UP (ref 70–115)
HCO3 BLDA-SCNC: 19 MMOL/L — LOW (ref 20–26)
HCT VFR BLD CALC: 32.5 % — LOW (ref 42–52)
HGB BLD-MCNC: 10.4 G/DL — LOW (ref 14–18)
HOROWITZ INDEX BLDA+IHG-RTO: SIGNIFICANT CHANGE UP
MCHC RBC-ENTMCNC: 29.2 PG — SIGNIFICANT CHANGE UP (ref 27–31)
MCHC RBC-ENTMCNC: 32 G/DL — SIGNIFICANT CHANGE UP (ref 32–36)
MCV RBC AUTO: 91.3 FL — SIGNIFICANT CHANGE UP (ref 80–94)
PCO2 BLDA: 49 MMHG — HIGH (ref 35–45)
PH BLDA: 7.24 — LOW (ref 7.35–7.45)
PLATELET # BLD AUTO: 200 K/UL — SIGNIFICANT CHANGE UP (ref 150–400)
PO2 BLDA: 72 MMHG — LOW (ref 83–108)
POTASSIUM SERPL-MCNC: 5 MMOL/L — SIGNIFICANT CHANGE UP (ref 3.5–5.3)
POTASSIUM SERPL-SCNC: 5 MMOL/L — SIGNIFICANT CHANGE UP (ref 3.5–5.3)
RBC # BLD: 3.56 M/UL — LOW (ref 4.6–6.2)
RBC # FLD: 13.2 % — SIGNIFICANT CHANGE UP (ref 11–15.6)
SAO2 % BLDA: 91 % — LOW (ref 95–99)
SODIUM SERPL-SCNC: 132 MMOL/L — LOW (ref 135–145)
TROPONIN T SERPL-MCNC: 1.09 NG/ML — HIGH (ref 0–0.06)
WBC # BLD: 8.9 K/UL — SIGNIFICANT CHANGE UP (ref 4.8–10.8)
WBC # FLD AUTO: 8.9 K/UL — SIGNIFICANT CHANGE UP (ref 4.8–10.8)

## 2018-12-04 PROCEDURE — 90937 HEMODIALYSIS REPEATED EVAL: CPT

## 2018-12-04 PROCEDURE — 99223 1ST HOSP IP/OBS HIGH 75: CPT

## 2018-12-04 PROCEDURE — 71045 X-RAY EXAM CHEST 1 VIEW: CPT | Mod: 26,77

## 2018-12-04 PROCEDURE — 99233 SBSQ HOSP IP/OBS HIGH 50: CPT

## 2018-12-04 PROCEDURE — 71045 X-RAY EXAM CHEST 1 VIEW: CPT | Mod: 26

## 2018-12-04 PROCEDURE — 99222 1ST HOSP IP/OBS MODERATE 55: CPT

## 2018-12-04 RX ORDER — FUROSEMIDE 40 MG
5 TABLET ORAL
Qty: 500 | Refills: 0 | Status: DISCONTINUED | OUTPATIENT
Start: 2018-12-04 | End: 2018-12-04

## 2018-12-04 RX ORDER — FUROSEMIDE 40 MG
60 TABLET ORAL ONCE
Refills: 0 | Status: COMPLETED | OUTPATIENT
Start: 2018-12-04 | End: 2018-12-04

## 2018-12-04 RX ORDER — ALBUTEROL 90 UG/1
2.5 AEROSOL, METERED ORAL ONCE
Refills: 0 | Status: COMPLETED | OUTPATIENT
Start: 2018-12-04 | End: 2018-12-04

## 2018-12-04 RX ORDER — FUROSEMIDE 40 MG
15 TABLET ORAL
Qty: 500 | Refills: 0 | Status: DISCONTINUED | OUTPATIENT
Start: 2018-12-04 | End: 2018-12-05

## 2018-12-04 RX ORDER — FUROSEMIDE 40 MG
60 TABLET ORAL ONCE
Refills: 0 | Status: DISCONTINUED | OUTPATIENT
Start: 2018-12-04 | End: 2018-12-04

## 2018-12-04 RX ADMIN — Medication 3 MILLILITER(S): at 04:38

## 2018-12-04 RX ADMIN — HEPARIN SODIUM 1050 UNIT(S)/HR: 5000 INJECTION INTRAVENOUS; SUBCUTANEOUS at 08:23

## 2018-12-04 RX ADMIN — Medication 1 SPRAY(S): at 05:57

## 2018-12-04 RX ADMIN — Medication 3 MILLILITER(S): at 08:41

## 2018-12-04 RX ADMIN — Medication 650 MILLIGRAM(S): at 18:20

## 2018-12-04 RX ADMIN — Medication 3 MILLILITER(S): at 14:13

## 2018-12-04 RX ADMIN — DULOXETINE HYDROCHLORIDE 20 MILLIGRAM(S): 30 CAPSULE, DELAYED RELEASE ORAL at 12:32

## 2018-12-04 RX ADMIN — ALBUTEROL 2.5 MILLIGRAM(S): 90 AEROSOL, METERED ORAL at 07:17

## 2018-12-04 RX ADMIN — Medication 1 SPRAY(S): at 21:29

## 2018-12-04 RX ADMIN — Medication 50 MILLIGRAM(S): at 05:58

## 2018-12-04 RX ADMIN — LORATADINE 10 MILLIGRAM(S): 10 TABLET ORAL at 21:26

## 2018-12-04 RX ADMIN — ATORVASTATIN CALCIUM 80 MILLIGRAM(S): 80 TABLET, FILM COATED ORAL at 21:26

## 2018-12-04 RX ADMIN — CLOPIDOGREL BISULFATE 75 MILLIGRAM(S): 75 TABLET, FILM COATED ORAL at 12:33

## 2018-12-04 RX ADMIN — Medication 50 MILLIGRAM(S): at 21:26

## 2018-12-04 RX ADMIN — Medication 650 MILLIGRAM(S): at 17:45

## 2018-12-04 RX ADMIN — HEPARIN SODIUM 1050 UNIT(S)/HR: 5000 INJECTION INTRAVENOUS; SUBCUTANEOUS at 01:51

## 2018-12-04 RX ADMIN — Medication 3 MILLILITER(S): at 21:38

## 2018-12-04 RX ADMIN — Medication 50 MILLIGRAM(S): at 21:27

## 2018-12-04 RX ADMIN — Medication 81 MILLIGRAM(S): at 12:32

## 2018-12-04 RX ADMIN — Medication 60 MILLIGRAM(S): at 06:33

## 2018-12-04 RX ADMIN — AMLODIPINE BESYLATE 10 MILLIGRAM(S): 2.5 TABLET ORAL at 05:57

## 2018-12-04 RX ADMIN — ISOSORBIDE MONONITRATE 30 MILLIGRAM(S): 60 TABLET, EXTENDED RELEASE ORAL at 12:33

## 2018-12-04 RX ADMIN — Medication 1 TABLET(S): at 12:33

## 2018-12-04 RX ADMIN — Medication 100 MICROGRAM(S): at 05:58

## 2018-12-04 RX ADMIN — PANTOPRAZOLE SODIUM 40 MILLIGRAM(S): 20 TABLET, DELAYED RELEASE ORAL at 05:58

## 2018-12-04 NOTE — PROCEDURE NOTE - NSSITEPREP_SKIN_A_CORE
Outpatient Physical Therapy Ortho Progress Note  [facility]     Patient Name: Allyssa Holden  : 1990  MRN: [id number]  Today's Date: 10/10/2017      Visit Date: 10/10/2017    Visit Dx:    ICD-10-CM ICD-9-CM   1. Right shoulder strain, initial encounter S46.911A 840.9   2. Acute pain of right shoulder M25.511 719.41       There is no problem list on file for this patient.       Past Medical History:   Diagnosis Date   • ADD (attention deficit disorder)    • Anxiety    • Dementia    • Hypertension    • PTSD (post-traumatic stress disorder)         Past Surgical History:   Procedure Laterality Date   • APPENDECTOMY     •  SECTION                               PT Assessment/Plan       10/10/17 1441       PT Assessment    Assessment Comments This was only the third session in which I have treated her, and while she has made some progress it has been a little less than what I would normally expect. I advised her due to the continued elevated pain rating over an extended period it may be beneficial to contct her PCP and follow up our POC with some imaging and or other treatment options for better success. She still requires some significant strengthening especially for the scappular stabilizers and posterior cuff as well.  -EC     PT Plan    PT Plan Comments Progress strengthening, but attempt to avoid activities that increase the sharp pain senstions that rowing and shoulder extension did today.  -EC       User Key  (r) = Recorded By, (t) = Taken By, (c) = Cosigned By    Initials Name Provider Type    EC Spike Estrada PTA Physical Therapy Assistant                    Exercises       10/10/17 1400 10/10/17 1300       Subjective Comments    Subjective Comments  She states her shoulder pain fluctuates, felt really stiff this past weekend. She reports she feels like she is 60 % recovered   -EC     Subjective Pain    Able to rate subjective pain?  yes  -EC     Pre-Treatment Pain Level  4  -EC      chlorhexidine "Exercise 1    Exercise Name 1  reviewed all goals for progress note  -EC     Exercise 2    Exercise Name 2 wand flexion with 1# to abut 90; focuse on preventign shruggingn.   -EC      Cueing 2 Verbal  -EC      Sets 2 1  -EC      Reps 2 15  -EC      Additional Comments #2 hooklying  -EC      Exercise 3    Exercise Name 3 isometric ER/IR holds with manual perturbations  -EC      Sets 3 4  -EC      Time (Seconds) 3 30  -EC      Exercise 4    Exercise Name 4 attempted prone row and shoulder extension but c/o increased and sharp pain  -EC      Exercise 5    Exercise Name 5 prone \"W's\"  -EC      Sets 5 2  -EC      Reps 5 10  -EC        User Key  (r) = Recorded By, (t) = Taken By, (c) = Cosigned By    Initials Name Provider Type    DEVAN Estrada PTA Physical Therapy Assistant                               PT OP Goals       10/10/17 1349       PT Short Term Goals    STG Date to Achieve 10/11/17  -EC     STG 1 Pt will report no pain greater than 3-4/10 with ADLs.  -EC     STG 1 Progress Ongoing  -EC     STG 1 Progress Comments 4/10 today prior to treatment  -EC     STG 2 Pt will demonstrated 120 or greater shouldler AROM in supine.   -EC     STG 2 Progress Met  -EC     STG 3 Pt will score 30 or less on Quick Dash  -EC     STG 3 Progress Partially Met  -EC     Long Term Goals    LTG Date to Achieve 11/01/17  -EC     LTG 1 Pt will be able to reach behind head and back for hygene and other ADLs.   -EC     LTG 1 Progress Ongoing  -EC     LTG 1 Progress Comments ER 60 degrees, IR 40 degrees  -EC     LTG 2 Pt will have 120 or greater AROM shoulder flexion in sitting to improve reaching with household and work activities.   -EC     LTG 2 Progress Ongoing  -EC     LTG 2 Progress Comments 110 in sitting  -EC     LTG 3 Pt will have 4+/5 shoulder strength, globally in order to litft children ad perfrom other household and work activities.   -EC     LTG 3 Progress Ongoing  -EC     LTG 3 Progress Comments abduction increased pain  " -EC     LTG 4 Pt will score 10 or less on Quick Dash   -EC     LTG 4 Progress Ongoing  -EC     LTG 4 Progress Comments today's score 50  -EC     Time Calculation    PT Goal Re-Cert Due Date 11/09/17  -EC       User Key  (r) = Recorded By, (t) = Taken By, (c) = Cosigned By    Initials Name Provider Type    EC Spike Estrada PTA Physical Therapy Assistant                Therapy Education       10/10/17 1438          Therapy Education    Given Symptoms/condition management;Posture/body mechanics  -EC      How Provided Verbal  -EC      Provided to Patient  -EC      Level of Understanding Verbalized  -EC        User Key  (r) = Recorded By, (t) = Taken By, (c) = Cosigned By    Initials Name Provider Type    DEVAN Estrada PTA Physical Therapy Assistant                Outcome Measures       10/10/17 1400          Quick DASH    Open a tight or new jar. 2  -EC      Do heavy household chores (e.g., wash walls, wash floors) 3  -EC      Carry a shopping bag or briefcase 2  -EC      Wash your back 4  -EC      Use a knife to cut food 2  -EC      Recreational activities in which you take some force or impact through your arm, should or hand (e.g. golf, hammering, tennis, etc.) 5  -EC      During the past week, to what extent has your arm, shoulder, or hand problem interfered with your normal social activites with family, friends, neighbors or groups? 3  -EC      During the past week, were you limited in your work or other regular daily activities as a result of your arm, shoulder or hand problem? 3  -EC      Arm, Shoulder, or hand pain 3  -EC      Tingling (pins and needles) in your arm, shoulder, or hand 3  -EC      During the past week, how much difficulty have you had sleeping because of the pain in your arm, shoulder or hand? 3  -EC      Number of Questions Answered 11  -EC      Quick DASH Score 50  -EC        User Key  (r) = Recorded By, (t) = Taken By, (c) = Cosigned By    Initials Name Provider Type    DEVAN MYERS  PAULIE Estrada Physical Therapy Assistant            Time Calculation:   Start Time: 1348  Stop Time: 1432  Time Calculation (min): 44 min  Total Timed Code Minutes- PT: 44 minute(s)    Therapy Charges for Today     Code Description Service Date Service Provider Modifiers Qty    [id number] HC PT THER PROC EA 15 MIN 10/10/2017 Spike Estrada PTA GP 3                    Spike Estrada PTA  10/10/2017

## 2018-12-04 NOTE — PROGRESS NOTE ADULT - ATTENDING COMMENTS
Discussed with patient and son-in-law at bedside.  Discussed with Dr Nichole my recommendations.  No need for MICU at present; please reconsult with additional questions/concerns/issues.  Thank you.
Spoke with Dr. Rosie Worthy for AVF creation;  To get vein mapping;   No emergent need for HD at this time
Patient seen and examined in hemodialysis.  Remains on BIPAP, undergoing ultrafiltration (so far nearly -2L).  Tolerating UF.  Breathing better.  For cardiac cath tomorrow.
The Hospitalist Service will assume care of this patient beginning tomorrow.

## 2018-12-04 NOTE — PROGRESS NOTE ADULT - ASSESSMENT
Imp;  CAD s/p remote CABG 1996 presents with cough and atypical CP, patient REYES (-) x 3 tni and TTE LVEF 60%. yesterday acut eonset SSCP in context of labile BP. (+) TNI started on Heparin infusion and s/p plavix 300mg po x 1. Currently with mild chest pain.   hypertension - improving   worsening renal failure  dyspnea now requiring BiPAP   Repeat TTE with EF 60-65%, basal anteroseptal wall is akinetic with basal and mid inferior wall is hypokinetic.       recc:  Pt with worsening fluid overload despite IV lasix.   Initiated lasix gtt at 5mg/hr   Continue BiPAP  Pulm consult for possible COPD component   IV heparin  Needs cardiac cath - renal consult for optimization and timing.   continue beta blocker, plavix, statin, aspirin  on imdur 30 mg daily - uptitrate as needed for BP control  continue amlodipine  repeat CXR with continued pulmonary vascular congestion  trend cardiac enzymes (include CK/CKMB), BNP    D/W Dr. Ani

## 2018-12-04 NOTE — PROGRESS NOTE ADULT - SUBJECTIVE AND OBJECTIVE BOX
Cardiology PA note    Called by nurse patient c/o increased WOB SOB, known ARF with increased PVC in CXR and increasing Pro BNP. Patient sitting up in bed with NC O2 in use SP{O2  92%.   TTE 12/3/19: Summary:   1. Left ventricular ejection fraction, by visual estimation, is 60 to   65%.   2. Normal global left ventricular systolic function.   3. Basal anteroseptal segment and basal and mid inferior wall are   abnormal as described above.   4. Spectral Doppler shows pseudonormal pattern of left ventricular   myocardial filling (Grade II diastolic dysfunction).   5. Estimated pulmonary artery systolic pressure is 53.7 mmHg assuming a   right atrial pressure of 8 mmHg, which is consistent with moderate   pulmonary hypertension.   6. Endocardial visualization was enhanced with intravenous echo contrast.    [id number] Arik Hernandez MD, Electronically signed on 12/3/2018 at 5:44:10   PM    PE  Vital Signs Last 24 Hrs  T(C): 36.7 (04 Dec 2018 05:53), Max: 37.1 (03 Dec 2018 09:45)  T(F): 98 (04 Dec 2018 05:53), Max: 98.8 (03 Dec 2018 09:45)  HR: 69 (04 Dec 2018 05:53) (61 - 73)  BP: 150/75 (04 Dec 2018 05:53) (122/66 - 150/75)  BP(mean): --  RR: 21 (04 Dec 2018 05:53) (19 - 21)  SpO2: 92% (04 Dec 2018 05:53) (92% - 97%)    A&OX3 + dyspnia   Lung defuse bilateral wheezing poor air entry  Heart RRR S1 S2  Abd soft NT  EXT: no edema     A: ARF with marked increase WOB/ SOB defuse bilateral wheezing, known fluid overload,  TTE Ef 60%, PRO BNP [id number], Followed by Dr Higgins renal Recommend Vascular consult for vein mapping/eventual HD access creation.     P: Lasix 60 mg IVP X 1       Albuterol med neb X 1      ABG, CXR, BMP ordered       BIPAP 14/6 50% FIO2       D/W MICU PA and Dr Brown Cardiology PA note    Called by nurse patient c/o increased WOB SOB, known ARF with increased PVC in CXR and increasing Pro BNP. Patient sitting up in bed with NC O2 in use SP{O2  92%.   TTE 12/3/19: Summary:   1. Left ventricular ejection fraction, by visual estimation, is 60 to   65%.   2. Normal global left ventricular systolic function.   3. Basal anteroseptal segment and basal and mid inferior wall are   abnormal as described above.   4. Spectral Doppler shows pseudonormal pattern of left ventricular   myocardial filling (Grade II diastolic dysfunction).   5. Estimated pulmonary artery systolic pressure is 53.7 mmHg assuming a   right atrial pressure of 8 mmHg, which is consistent with moderate   pulmonary hypertension.   6. Endocardial visualization was enhanced with intravenous echo contrast.    [id number] Arik Hernandez MD, Electronically signed on 12/3/2018 at 5:44:10   PM    PE  Vital Signs Last 24 Hrs  T(C): 36.7 (04 Dec 2018 05:53), Max: 37.1 (03 Dec 2018 09:45)  T(F): 98 (04 Dec 2018 05:53), Max: 98.8 (03 Dec 2018 09:45)  HR: 69 (04 Dec 2018 05:53) (61 - 73)  BP: 150/75 (04 Dec 2018 05:53) (122/66 - 150/75)  BP(mean): --  RR: 21 (04 Dec 2018 05:53) (19 - 21)  SpO2: 92% (04 Dec 2018 05:53) (92% - 97%)    A&OX3 + dyspnia   Lung defuse bilateral wheezing poor air entry  Heart RRR S1 S2  Abd soft NT  EXT: no edema     A: ARF with marked increase WOB/ SOB defuse bilateral wheezing, known fluid overload,  TTE Ef 60%, PRO BNP [id number], Followed by Dr lovell renal Recommend Vascular consult for vein mapping/eventual HD access creation.     P: Lasix 60 mg IVP X 1       Albuterol med neb X 1      ABG, CXR, BMP ordered       BIPAP 14/6 50% FIO2       D/W MICU PA and Dr Brown Cardiology PA note    Called by nurse patient c/o increased WOB SOB, known ARF with increased PVC in CXR and increasing Pro BNP. Patient sitting up in bed with NC O2 in use SP{O2  92%.   TTE 12/3/19: Summary:   1. Left ventricular ejection fraction, by visual estimation, is 60 to   65%.   2. Normal global left ventricular systolic function.   3. Basal anteroseptal segment and basal and mid inferior wall are   abnormal as described above.   4. Spectral Doppler shows pseudonormal pattern of left ventricular   myocardial filling (Grade II diastolic dysfunction).   5. Estimated pulmonary artery systolic pressure is 53.7 mmHg assuming a   right atrial pressure of 8 mmHg, which is consistent with moderate   pulmonary hypertension.   6. Endocardial visualization was enhanced with intravenous echo contrast.    [id number] Arik Hernandez MD, Electronically signed on 12/3/2018 at 5:44:10   PM    PE  Vital Signs Last 24 Hrs  T(C): 36.7 (04 Dec 2018 05:53), Max: 37.1 (03 Dec 2018 09:45)  T(F): 98 (04 Dec 2018 05:53), Max: 98.8 (03 Dec 2018 09:45)  HR: 69 (04 Dec 2018 05:53) (61 - 73)  BP: 150/75 (04 Dec 2018 05:53) (122/66 - 150/75)  BP(mean): --  RR: 21 (04 Dec 2018 05:53) (19 - 21)  SpO2: 92% (04 Dec 2018 05:53) (92% - 97%)    A&OX3 + dyspnia   Lung defuse bilateral wheezing poor air entry  Heart RRR S1 S2  Abd soft NT  EXT: no edema     A: ARF with marked increase WOB/ SOB defuse bilateral wheezing, known fluid overload,  TTE Ef 60%, PRO BNP [id number], Followed by Dr lovell renal Recommend Vascular consult for vein mapping/eventual HD access creation.     P: Lasix 60 mg IVP X 1       Albuterol med neb X 1      ABG, CXR, BMP ordered       BIPAP 14/6 50% FIO2       D/W MICU PA and Dr Brown     Addendum : ABG - ( 04 Dec 2018 06:50 )  pH, Arterial: 7.24  pH, Blood: x     /  pCO2: 49    /  pO2: 72    / HCO3: 19    / Base Excess: -6.7  /  SaO2: 91        Respiratory putting patient on Bipap now   Spoke to icu PA

## 2018-12-04 NOTE — PROGRESS NOTE ADULT - SUBJECTIVE AND OBJECTIVE BOX
Vital Signs Last 24 Hrs,    T(C): 36.7 (04 Dec 2018 05:53), Max: 36.9 (03 Dec 2018 22:01)  T(F): 98 (04 Dec 2018 05:53), Max: 98.5 (03 Dec 2018 22:01)  HR: 59 (04 Dec 2018 08:41) (59 - 73)  BP: 150/75 (04 Dec 2018 05:53) (128/64 - 150/75)  BP(mean): --  RR: 20 (04 Dec 2018 08:37) (19 - 21)  SpO2: 95% (04 Dec 2018 08:41) (92% - 97%)    132<L>  |  99     |  55.0<H>  ----------------------------<  113    Ca:8.7   (04 Dec 2018 07:56)  5.0     |  18.0<L>  |  3.95<H>      eGFR if Non : 14 <L>      TPro  8.1    /  Alb  4.0    /  TBili  0.3<L>  /  DBili  x      /  AST  15     /  ALT  8      /  AlkPhos  43     01 Dec 2018 19:46                        10.4<L>  8.9   )-----------( 200      ( 04 Dec 2018 07:56 )             32.5<L>    Phos:-- Mg:-- PTH:-- Uric acid:-- Serum Osm:--  Ferritin:-- Iron:-- TIBC:-- Tsat:--  B12:7.33 uIU/mL<H> TSH:-- (11-29 @ 02:31)    Patient in DOMI, Son @ Bedside,    Cardiology , CC Medicine F.U Noted , D/W Dr. Rosie Worthy ( V. S ) & Dr. Nichole,    P : Maximize Diuresis,     Emergent PUF, ( Access : Emergent  Non Tunneled Catheter - R IJ ) - Scheduled this PM,

## 2018-12-04 NOTE — PROGRESS NOTE ADULT - ASSESSMENT
79 y/o M h/o CAD, HLD, HTN with acute NSTEMI. Patient has ARIELA on CKD with worsening creatinine. Patient requires urgent access for dialysis.    Plan:  -Will place dialysis catheter at bedside  -Dialysis scheduled for this evening  -Rest of medical management as per primary team  -Vascular surgery will continue to follow    Discussed with vascular surgery attending Dr. Briones.

## 2018-12-04 NOTE — PROGRESS NOTE ADULT - SUBJECTIVE AND OBJECTIVE BOX
HD for 2hrs completed.     pt aware. improved  R -  neck pain after receiving  tylenol  650 mg po .     2.0 L  fluid removal. pt is on O2 ( 3liter /min ) w/ O2 sat : 93 %.     post access care done without complications.     Report given to Mary LESTER

## 2018-12-04 NOTE — CONSULT NOTE ADULT - ASSESSMENT
77 y/o M h/o CAD, HLD, HTN with acute NSTEMI. Patient has ARIELA on CKD with worsening creatinine. Patient would benefit from AVF creation in the future.    Plan:  -No vascular surgery intervention at present time  -Will follow up vein mapping study  -Patient should follow up with Dr. Briones upon discharge to discuss dialysis access options  -Vascular surgery will sign off.     Case discussed with Dr. Briones.
1. CKD  2. Cough  3. HTN  4. Lung Cancer  5. Bipolar Disorder    Renal sono ordered  Check Urine lytes, urine osmo, UA  Continue home meds  S/P Echocardiogram - results pending  Serum K+ - 6.1  Bicarb 19  Start Bicarb gtt  Monitor and trend BMP  Continue current management  Will follow
Assess    ARF  Pulmonary Edema    Plan    Hemodialysis  Cardio FU and KAM  Bipap as needed
# sinus bradycardia - no evidence for chronotropic incompetence.  Asymptomatic.  Monitor on telemetry for now.  Doubt bradycardia causing hypoperfusion - normal mentation, no chest pain/dizziness.  Check TSH, check TTE.   # CAD - stable, no anginal symptoms, resume home meds - aspirin, plavix, statin, imdur  # renal failure - ? etiology.  Doubt bradycardia causing hypoperfusion - normal mentation, no chest pain/dizziness. Holding ACEi, further workup by primary team.   Hep SQ for DVT prophylaxis    Thank you for allowing me to participate in care of your patient.   Will follow  D/W Dr Ellington

## 2018-12-04 NOTE — CONSULT NOTE ADULT - SUBJECTIVE AND OBJECTIVE BOX
PULMONARY CONSULT NOTE      KAUSHIK HOFFMANYOVANNY-[id number]    Patient is a 78y old  Male who presents with a chief complaint of cough (04 Dec 2018 10:14)      HISTORY OF PRESENT ILLNESS:    Renal Failure  Episodic respiratory failure requiring bipap  Lung Ca in 2006  CAD post CABG    MEDICATIONS  (STANDING):  ALBUTerol/ipratropium for Nebulization 3 milliLiter(s) Nebulizer every 6 hours  amLODIPine   Tablet 10 milliGRAM(s) Oral daily  aspirin enteric coated 81 milliGRAM(s) Oral daily  atorvastatin 80 milliGRAM(s) Oral at bedtime  clopidogrel Tablet 75 milliGRAM(s) Oral daily  DULoxetine 20 milliGRAM(s) Oral daily  furosemide Infusion 15 mG/Hr (7.5 mL/Hr) IV Continuous <Continuous>  heparin  Infusion.  Unit(s)/Hr (6.5 mL/Hr) IV Continuous <Continuous>  hydrALAZINE 50 milliGRAM(s) Oral every 8 hours  isosorbide   mononitrate ER Tablet (IMDUR) 30 milliGRAM(s) Oral daily  levothyroxine 100 MICROGram(s) Oral daily  loratadine 10 milliGRAM(s) Oral daily  metoprolol tartrate 50 milliGRAM(s) Oral two times a day  multivitamin 1 Tablet(s) Oral daily  pantoprazole    Tablet 40 milliGRAM(s) Oral before breakfast  sodium chloride 0.65% Nasal 1 Spray(s) Both Nostrils every 4 hours      MEDICATIONS  (PRN):  acetaminophen   Tablet .. 650 milliGRAM(s) Oral every 6 hours PRN Temp greater or equal to 38C (100.4F), Moderate Pain (4 - 6)  aluminum hydroxide/magnesium hydroxide/simethicone Suspension 30 milliLiter(s) Oral every 4 hours PRN Dyspepsia  guaiFENesin    Syrup 200 milliGRAM(s) Oral every 6 hours PRN Cough  heparin  Injectable 3200 Unit(s) IV Push every 6 hours PRN For aPTT less than 40      Allergies    No Known Allergies    Intolerances        PAST MEDICAL & SURGICAL HISTORY:  CAD (coronary artery disease)  Lung cancer: had yoni radiation in 2006.  Bipolar disorder  High cholesterol  Hypertension  S/P CABG (coronary artery bypass graft): pt&#x27;s son in [facility] states h/o some stents near neck area ? carotid ? he is not sure.      FAMILY HISTORY:  No pertinent family history in first degree relatives      SOCIAL HISTORY  Smoking History:     REVIEW OF SYSTEMS:    CONSTITUTIONAL:  No fevers, chills, sweats    HEENT:  Eyes:  No diplopia or blurred vision. ENT:  No earache, sore throat or runny nose.    CARDIOVASCULAR:  No pressure, squeezing, tightness, or heaviness about the chest; no palpitations.    RESPIRATORY:  No PND or orthopnea. Mod SOBOE    GASTROINTESTINAL:  No abdominal pain, nausea, vomiting or diarrhea.    GENITOURINARY:  No dysuria, frequency or urgency.    NEUROLOGIC:  No paresthesias, fasciculations, seizures or weakness.    PSYCHIATRIC:  No disorder of thought or mood.    Vital Signs Last 24 Hrs  T(C): 36.7 (04 Dec 2018 05:53), Max: 36.9 (03 Dec 2018 22:01)  T(F): 98 (04 Dec 2018 05:53), Max: 98.5 (03 Dec 2018 22:01)  HR: 67 (04 Dec 2018 11:10) (59 - 73)  BP: 150/75 (04 Dec 2018 05:53) (128/64 - 150/75)  BP(mean): --  RR: 20 (04 Dec 2018 08:37) (19 - 21)  SpO2: 97% (04 Dec 2018 11:10) (92% - 97%)    PHYSICAL EXAMINATION:    GENERAL: The patient is a well-developed, well-nourished _____in no apparent distress.     HEENT: Head is normocephalic and atraumatic. Extraocular muscles are intact. Mucous membranes are moist.     NECK: Supple.     LUNGS: Basilar rale to auscultation without wheezing, rales, or rhonchi. Respirations unlabored    HEART: Regular rate and rhythm without murmur.    ABDOMEN: Soft, nontender, and nondistended.  No hepatosplenomegaly is noted.    EXTREMITIES: Without any cyanosis, clubbing, rash, lesions or edema.    NEUROLOGIC: Grossly intact.      LABS:                        10.4   8.9   )-----------( 200      ( 04 Dec 2018 07:56 )             32.5     12-04    132<L>  |  99  |  55.0<H>  ----------------------------<  113  5.0   |  18.0<L>  |  3.95<H>    Ca    8.7      04 Dec 2018 07:56      PT/INR - ( 03 Dec 2018 17:58 )   PT: 11.8 sec;   INR: 1.03 ratio         PTT - ( 04 Dec 2018 07:58 )  PTT:60.5 sec    ABG - ( 04 Dec 2018 06:50 )  pH, Arterial: 7.24  pH, Blood: x     /  pCO2: 49    /  pO2: 72    / HCO3: 19    / Base Excess: -6.7  /  SaO2: 91                CARDIAC MARKERS ( 04 Dec 2018 01:20 )  x     / 1.09 ng/mL / x     / x     / x      CARDIAC MARKERS ( 03 Dec 2018 15:57 )  x     / 1.06 ng/mL / 254 U/L / x     / 13.0 ng/mL  CARDIAC MARKERS ( 03 Dec 2018 10:11 )  x     / 1.09 ng/mL / 283 U/L / x     / 15.7 ng/mL  CARDIAC MARKERS ( 03 Dec 2018 07:03 )  x     / 1.07 ng/mL / x     / x     / x            Serum Pro-Brain Natriuretic Peptide: 09244 pg/mL (12-03-18 @ 15:56)  Serum Pro-Brain Natriuretic Peptide: 17055 pg/mL (12-03-18 @ 10:11)      RADIOLOGY & ADDITIONAL STUDIES:    CXR on 12/3/18  Pulmonary Edema
ACUTE CARE SURGERY CONSULT    Consulting surgical team: [facility] - Acute Care Surgery  Consulting attending:  Patient seen and examined: 12-03-18 @ 15:58    HPI: 79 y/o M h/o CAD, HLD, HTN that is admitted to medical service for evaluation and management of NSTEMI. Vascular surgery consulted for chronic kidney disease and for dialysis access. Patient has dyspnea, no other symptoms at time of interview. Patient has never been on dialysis before. Tolerating diet.     PAST MEDICAL HISTORY:  CAD (coronary artery disease)  Lung cancer  Bipolar disorder  High cholesterol  Hypertension    PAST SURGICAL HISTORY:  S/P CABG (coronary artery bypass graft)    ALLERGIES:  No Known Allergies    MEDICATIONS  (STANDING):  ALBUTerol/ipratropium for Nebulization 3 milliLiter(s) Nebulizer every 6 hours  amLODIPine   Tablet 10 milliGRAM(s) Oral daily  aspirin enteric coated 81 milliGRAM(s) Oral daily  atorvastatin 80 milliGRAM(s) Oral at bedtime  clopidogrel Tablet 75 milliGRAM(s) Oral daily  DULoxetine 20 milliGRAM(s) Oral daily  heparin  Infusion.  Unit(s)/Hr (6.5 mL/Hr) IV Continuous <Continuous>  hydrALAZINE 50 milliGRAM(s) Oral every 8 hours  isosorbide   mononitrate ER Tablet (IMDUR) 30 milliGRAM(s) Oral daily  levothyroxine 100 MICROGram(s) Oral daily  loratadine 10 milliGRAM(s) Oral daily  metoprolol tartrate 50 milliGRAM(s) Oral two times a day  multivitamin 1 Tablet(s) Oral daily  pantoprazole    Tablet 40 milliGRAM(s) Oral before breakfast  sodium chloride 0.65% Nasal 1 Spray(s) Both Nostrils every 4 hours    MEDICATIONS  (PRN):  acetaminophen   Tablet .. 650 milliGRAM(s) Oral every 6 hours PRN Temp greater or equal to 38C (100.4F), Moderate Pain (4 - 6)  aluminum hydroxide/magnesium hydroxide/simethicone Suspension 30 milliLiter(s) Oral every 4 hours PRN Dyspepsia  guaiFENesin    Syrup 200 milliGRAM(s) Oral every 6 hours PRN Cough  heparin  Injectable 3200 Unit(s) IV Push every 6 hours PRN For aPTT less than 40      VITALS & I/Os:  Vital Signs Last 24 Hrs  T(C): 37.1 (03 Dec 2018 09:45), Max: 37.5 (02 Dec 2018 21:21)  T(F): 98.8 (03 Dec 2018 09:45), Max: 99.5 (02 Dec 2018 21:21)  HR: 66 (03 Dec 2018 15:08) (61 - 73)  BP: 128/64 (03 Dec 2018 14:47) (122/66 - 164/76)  BP(mean): --  RR: 20 (03 Dec 2018 14:47) (19 - 20)  SpO2: 92% (03 Dec 2018 15:08) (92% - 97%)  CAPILLARY BLOOD GLUCOSE    I&O's Summary    02 Dec 2018 07:01  -  03 Dec 2018 07:00  --------------------------------------------------------  IN: 984 mL / OUT: 2050 mL / NET: -1066 mL    03 Dec 2018 07:01  -  03 Dec 2018 15:58  --------------------------------------------------------  IN: 282.5 mL / OUT: 300 mL / NET: -17.5 mL    General: NAD, AOx3, comfortable on examination  HEENT: PERRLA, EOMI, moist mucous membranes  Neck: supple, nontender  Cardiovascular: heart RRR, no murmurs  Respiratory: moderate respiratory distress, course breath sounds, audible wheeze  Abdomen: soft, nontender, nondistended. No guarding or rebound. Normal bowel sounds.  Extremities: no peripheral edema. Normal ROM.  Integumentary: warm, no rash  Neuro: no motor or sensory deficits      LABS:                        11.1   9.8   )-----------( 212      ( 03 Dec 2018 07:03 )             34.3     12-03    135  |  102  |  46.0<H>  ----------------------------<  121<H>  4.8   |  18.0<L>  |  3.58<H>    Ca    8.3<L>      03 Dec 2018 07:03    TPro  8.1  /  Alb  4.0  /  TBili  0.3<L>  /  DBili  x   /  AST  15  /  ALT  8   /  AlkPhos  43  12-01      PT/INR - ( 01 Dec 2018 19:52 )   PT: 11.0 sec;   INR: 0.96 ratio         PTT - ( 03 Dec 2018 07:14 )  PTT:48.4 sec    CARDIAC MARKERS ( 03 Dec 2018 10:11 )  x     / 1.09 ng/mL / 283 U/L / x     / 15.7 ng/mL  CARDIAC MARKERS ( 03 Dec 2018 07:03 )  x     / 1.07 ng/mL / x     / x     / x      CARDIAC MARKERS ( 02 Dec 2018 03:53 )  x     / 0.22 ng/mL / x     / x     / x      CARDIAC MARKERS ( 01 Dec 2018 19:46 )  x     / <0.01 ng/mL / 81 U/L / x     / x        IMAGING:    < from: US Renal (11.29.18 @ 19:58) >   EXAM:  US KIDNEY(S)                          PROCEDURE DATE:  11/29/2018          INTERPRETATION:  CLINICAL INFORMATION: Renal failure    COMPARISON: None available.    TECHNIQUE: Sonography of the kidneys and bladder.     FINDINGS: The kidneys are echogenic suggesting medical renal disease.    Right kidney:  9.8 cm. No renal mass, hydronephrosis or calculi. Upper   pole cyst measures 1.5 x 1.4 x 1.6 cm.    Left kidney:  9.9 cm. No renal mass, hydronephrosis or calculi. Upper   pole cyst measures 0.8 x 0.7 x 0.9 cm.    Urinary bladder: Within normal limits.    IMPRESSION:     Echogenic kidneys suggestive of medical renal disease. Small cysts are   identified..                    NIEVES TRIANA M.D.,ATTENDING RADIOLOGIST  This document has been electronically signed. Nov 29 2018  8:17PM    < end of copied text >
[facility]                                                        Office: [address] [address]                                                       Telephone: [phone]. Fax:[phone]    Patient is a 78y old  Male who presents with a chief complaint of renal failure (28 Nov 2018 20:01)      HPI: Patient seen and examined earlier this evening.  Son at bedside assisted translating.    79 y/o male with hx of CAD s/p CABG in distant past (> 20 yrs ago), PCI? unknown vessels, hypertension, hyperlipidemia,  was brought in by family for dry cough on and off for past 2 days, no fever, no sick contact. + nasal stuffiness  and scratchy throat. As per family every year around this time he gets this. no h/o asthma. no cp. no abd. pain. no n/v/d. no sob reported. In the er pt's hr was in mid 40's. pt. denies dizziness but reports some fatigue. Family is not aware if he has h/o bradycardia. pt's Cr is 3.89 from blood work done in ER. family does not know if pt. has any kidney problem.  As per family pt. was seen by pcp about 2 weeks ago and had blood work done but results are not known to family and they did not get any call about abnormal labs. Pt. was seen by nephrologist Dr. Costa yesterday as routine check up as per son in law but blood work from pcp office was not available so pt. was instructed to return when blood work is available. As per son in law pt's cough has topped while in the ER.  no other complaints. (28 Nov 2018 19:56)      Cardiologist - in [place], they do not recall the name.     PAST MEDICAL & SURGICAL HISTORY:  CAD (coronary artery disease)  Lung cancer: had yoni radiation in 2006.  Bipolar disorder  High cholesterol  Hypertension  S/P CABG (coronary artery bypass graft): pt&#x27;s son in law states h/o some stents near neck area ? carotid ? he is not sure.      Allergies    No Known Allergies    Intolerances        Home Medications:  aspirin 81 mg oral tablet: 1 tab(s) orally once a day (28 Nov 2018 20:07)  Cymbalta 20 mg oral delayed release capsule: 20 milligram(s) orally once a day (28 Nov 2018 20:09)  fexofenadine 60 mg oral tablet: 60 milligram(s) orally once a day (28 Nov 2018 20:13)  gabapentin 300 mg oral capsule: 1 cap(s) orally 3 times a day (28 Nov 2018 20:09)  Imdur 30 mg oral tablet, extended release: 1 tab(s) orally once a day (in the morning) (28 Nov 2018 20:09)  Lasix 20 mg oral tablet: 1 tab(s) orally once a day (28 Nov 2018 20:10)  losartan 100 mg oral tablet: 1 tab(s) orally once a day (28 Nov 2018 20:10)  Norvasc 5 mg oral tablet: 1 tab(s) orally once a day (28 Nov 2018 20:11)  omeprazole 40 mg oral delayed release capsule: 1 cap(s) orally once a day (28 Nov 2018 20:11)  Plavix 75 mg oral tablet: 1 tab(s) orally once a day (28 Nov 2018 20:07)  Pravachol 20 mg oral tablet: 1 tab(s) orally once a day (28 Nov 2018 20:11)  Synthroid 75 mcg (0.075 mg) oral tablet: 1 tab(s) orally once a day (28 Nov 2018 20:12)      MEDICATIONS  (STANDING):  aspirin enteric coated 81 milliGRAM(s) Oral daily  clopidogrel Tablet 75 milliGRAM(s) Oral daily  DULoxetine 20 milliGRAM(s) Oral daily  gabapentin 300 milliGRAM(s) Oral three times a day  heparin  Injectable 5000 Unit(s) SubCutaneous every 8 hours  hydrALAZINE 10 milliGRAM(s) Oral every 8 hours  loratadine 10 milliGRAM(s) Oral daily  multivitamin 1 Tablet(s) Oral daily  pantoprazole    Tablet 40 milliGRAM(s) Oral before breakfast  simvastatin 20 milliGRAM(s) Oral at bedtime  sodium chloride 0.65% Nasal 1 Spray(s) Both Nostrils every 4 hours    MEDICATIONS  (PRN):  guaiFENesin    Syrup 200 milliGRAM(s) Oral every 6 hours PRN Cough      FAMILY HISTORY:  No pertinent family history in first degree relatives      SOCIAL HISTORY: No tobacco/ No etoh/ No illicit drug use    PREVIOUS DIAGNOSTIC TESTING:  NONE IN OUR SYSTEM    ECHO FINDINGS:   STRESS FINDINGS:   CATHETERIZATION FINDINGS:     REVIEW OF SYSTEMS:  CONSTITUTIONAL: [-]fever   [-] weight loss   [-] fatigue  EYES: [-]  eye pain   [-] visual disturbances      [-]  discharge  ENMT:  [-]  difficulty hearing,   [-]  tinnitus   [-] vertigo    [-]  sinus or throat pain  NECK: [-]  pain or stiffness  RESPIRATORY: [-]  cough 	[-] wheezing 	[-]  hemoptysis 		[-]   Shortness of Breath  CARDIOVASCULAR: [-]  chest pain	[-] palpitations		[-]  passing out 		[-] dizziness 	[-]  leg swelling  		[-]  PND 	[-] orthopnea  GASTROINTESTINAL: [-]  abdominal pain		[-]nausea	[-] vomiting	[-]  hematemesis 	[-]  diarrhea  	[-] constipation 		[-]  melena 	[-] hematochezia.  GENITOURINARY: [-] dysuria	[-] frequency	[-] hematuria	[-]  incontinence  NEUROLOGICAL: [-]  headaches		[-] memory loss 	[-]  loss of strength  			[-]  numbness/tingling 	[-]  tremors  SKIN: [-]  itching 	[-] rashes 	[-]  lesions   LYMPH Nodes: [-] enlarged glands  ENDOCRINE: [-] heat or cold intolerance 	[-]   hair loss  MUSCULOSKELETAL: [-] joint pain  [-] joint swelling	[-]  muscle, back, or extremity pain  PSYCHIATRIC: [-]  depression	[-] anxiety	[-] mood swings		[-]  difficulty sleeping   HEME: [-]  easy bruising 	[-]  bleeding   ALLERY AND IMMUNOLOGIC: [-]  hives or eczema	      Vital Signs Last 24 Hrs  T(C): 36.8 (28 Nov 2018 21:39), Max: 36.8 (28 Nov 2018 21:39)  T(F): 98.3 (28 Nov 2018 21:39), Max: 98.3 (28 Nov 2018 21:39)  HR: 52 (28 Nov 2018 21:39) (45 - 52)  BP: 159/75 (28 Nov 2018 21:39) (126/47 - 159/75)  BP(mean): --  RR: 18 (28 Nov 2018 21:39) (18 - 20)  SpO2: 93% (28 Nov 2018 21:39) (93% - 94%)  Daily Height in cm: 167.64 (28 Nov 2018 12:43)    Daily   I&O's Detail        PHYSICAL EXAM: AT bedside while walking in place, HR increased to 70s  Appearance: Normal, well nourished, NAD	  HEENT:   Normal oral mucosa, PERRL, EOMI, sclera non-icteric	  Lymphatic: No cervical lymphadenopathy  Cardiovascular: Normal S1 S2, No JVD, No cardiac murmurs, No carotid bruits, No peripheral edema;  Respiratory: Lungs clear to auscultation	  Psychiatry: A & O x 3, Mood & affect appropriate  Gastrointestinal:  Soft, Non-tender, + BS, no bruits	  Skin: No rashes, No ecchymoses, No cyanosis, Dry  Neurologic: Grossly non-focal with full strength in all four extremities  Extremities: Normal range of motion, No clubbing, cyanosis or edema  Vascular: Peripheral pulses palpable 2+ bilaterally, warm    INTERPRETATION OF TELEMETRY: SR 40s-50s.  While walking in place, HR increased to 70s-90s    ECG (tracing reviewed by me): SR 46 bpm, LAE, anteroseptal infarct (age indeterminate), lateral T wave inversions    LABS:                        11.1   8.0   )-----------( 211      ( 28 Nov 2018 16:17 )             34.0     11-28    135  |  103  |  60.0<H>  ----------------------------<  100  5.2   |  20.0<L>  |  3.89<H>    Ca    8.3<L>      28 Nov 2018 16:17    TPro  8.0  /  Alb  4.3  /  TBili  0.3<L>  /  DBili  x   /  AST  15  /  ALT  9   /  AlkPhos  40  11-28    CARDIAC MARKERS ( 28 Nov 2018 16:17 )  x     / <0.01 ng/mL / x     / x     / x          PT/INR - ( 28 Nov 2018 16:17 )   PT: 11.7 sec;   INR: 1.02 ratio         PTT - ( 28 Nov 2018 16:17 )  PTT:33.0 sec    BNPSerum Pro-Brain Natriuretic Peptide: 2201 pg/mL (11-28 @ 16:17)      RADIOLOGY & ADDITIONAL STUDIES:  CXR (image reviewed by me): < from: Xray Chest 2 Views PA/Lat (11.28.18 @ 15:58) >  IMPRESSION: Sternotomy. Extensive right chest findings with hilar   retraction superiorly and extensive scarring. There is also interstitial   prominence right base.    CAT scan assessment and search for prior history would be appropriate.    < end of copied text >
[facility] DIVISION OF KIDNEY DISEASES AND HYPERTENSION -- INITIAL CONSULT NOTE  --------------------------------------------------------------------------------  HPI: 79 y/o male was brought in by family for dry cough on and off for past 2 days, no fever, no sick contact. + nasal stuffiness and scratchy throat. As per family every year around this time he gets this. Denies h/o asthma, cp, abd. pain, n/v/d, sob reported. In the er pt's hr was in mid 40's. pt. denies dizziness but reports some fatigue. Family is not aware if he has h/o bradycardia. pt's Cr is 3.89 from blood work done in ER. Family does not know if pt. has any kidney problem.  As per family pt. was seen by pcp about 2 weeks ago and had blood work done but results are not known to family and they did not get any call about abnormal labs. Pt. was seen by nephrologist Dr. Newberry yesterday as routine check up as per son in law but blood work from pcp office was not available so pt. was instructed to return when blood work is available. As per son in law pt's cough has topped while in the ER.  no other complaints.   Nephrology consult called for renal work-up for CKD.      PAST HISTORY  --------------------------------------------------------------------------------  PAST MEDICAL & SURGICAL HISTORY:  CAD (coronary artery disease)  Lung cancer: had yoni radiation in 2006.  Bipolar disorder  High cholesterol  Hypertension  S/P CABG (coronary artery bypass graft): pt&#x27;s son in law states h/o some stents near neck area ? carotid ? he is not sure.    FAMILY HISTORY:  No pertinent family history in first degree relatives    PAST SOCIAL HISTORY:    ALLERGIES & MEDICATIONS  --------------------------------------------------------------------------------  Allergies    No Known Allergies    Intolerances      Standing Inpatient Medications  aspirin enteric coated 81 milliGRAM(s) Oral daily  clopidogrel Tablet 75 milliGRAM(s) Oral daily  DULoxetine 20 milliGRAM(s) Oral daily  gabapentin 300 milliGRAM(s) Oral three times a day  heparin  Injectable 5000 Unit(s) SubCutaneous every 8 hours  hydrALAZINE 10 milliGRAM(s) Oral every 8 hours  loratadine 10 milliGRAM(s) Oral daily  multivitamin 1 Tablet(s) Oral daily  pantoprazole    Tablet 40 milliGRAM(s) Oral before breakfast  simvastatin 20 milliGRAM(s) Oral at bedtime  sodium chloride 0.65% Nasal 1 Spray(s) Both Nostrils every 4 hours  sodium chloride 0.9%. 1000 milliLiter(s) IV Continuous <Continuous>    PRN Inpatient Medications  guaiFENesin    Syrup 200 milliGRAM(s) Oral every 6 hours PRN      REVIEW OF SYSTEMS  --------------------------------------------------------------------------------  Gen: No weight changes, fatigue, fevers/chills, weakness  Skin: No rashes  Head/Eyes/Ears/Mouth: No headache; Normal hearing; Normal vision w/o blurriness; No sinus pain/discomfort, sore throat  Respiratory: No dyspnea, cough, wheezing, hemoptysis  CV: No chest pain, PND, orthopnea  GI: No abdominal pain, diarrhea, constipation, nausea, vomiting, melena, hematochezia  : No increased frequency, dysuria, hematuria, nocturia  MSK: No joint pain/swelling; no back pain; no edema  Neuro: No dizziness/lightheadedness, weakness, seizures, numbness, tingling  Heme: No easy bruising or bleeding  Endo: No heat/cold intolerance  Psych: No significant nervousness, anxiety, stress, depression    All other systems were reviewed and are negative, except as noted.    VITALS/PHYSICAL EXAM  --------------------------------------------------------------------------------  T(C): 36.7 (11-29-18 @ 07:48), Max: 36.8 (11-28-18 @ 21:39)  HR: 94 (11-29-18 @ 12:59) (45 - 94)  BP: 163/54 (11-29-18 @ 12:59) (128/65 - 163/54)  RR: 17 (11-29-18 @ 12:59) (17 - 18)  SpO2: 96% (11-29-18 @ 12:59) (93% - 100%)  Wt(kg): --  Height (cm): 167.64 (11-28-18 @ 12:43)  Weight (kg): 52 (11-28-18 @ 12:43)  BMI (kg/m2): 18.5 (11-28-18 @ 12:43)  BSA (m2): 1.58 (11-28-18 @ 12:43)      Physical Exam:  	Gen: NAD, well-appearing  	HEENT: PERRL, supple neck, clear oropharynx  	Pulm: CTA B/L  	CV: RRR, S1S2; no rub  	Back: No spinal or CVA tenderness; no sacral edema  	Abd: +BS, soft, nontender/nondistended  	: No suprapubic tenderness  	UE: Warm, FROM, no clubbing, intact strength; no edema; no asterixis  	LE: Warm, FROM, no clubbing, intact strength; no edema  	Neuro: No focal deficits, intact gait  	Psych: Normal affect and mood  	Skin: Warm, without rashes  	Vascular access:    LABS/STUDIES  --------------------------------------------------------------------------------              11.7   6.4   >-----------<  203      [11-29-18 @ 07:11]              36.3     138  |  107  |  52.0  ----------------------------<  83      [11-29-18 @ 07:11]  6.1   |  19.0  |  3.89        Ca     8.5     [11-29-18 @ 07:11]    TPro  8.0  /  Alb  4.3  /  TBili  0.3  /  DBili  x   /  AST  15  /  ALT  9   /  AlkPhos  40  [11-28-18 @ 16:17]    PT/INR: PT 11.7 , INR 1.02       [11-28-18 @ 16:17]  PTT: 33.0       [11-28-18 @ 16:17]    Troponin <0.01      [11-29-18 @ 07:11]  CK 83      [11-29-18 @ 07:11]    Creatinine Trend:  SCr 3.89 [11-29 @ 07:11]  SCr 3.89 [11-28 @ 16:17]        TSH 7.33      [11-29-18 @ 02:31]
[facility]                                                        Office: [address] [address]                                                       Telephone: [phone]. Fax:[phone]    Patient is a 78y old  Male who presents with a chief complaint of     HPI:      PAST MEDICAL & SURGICAL HISTORY:  Bipolar disorder  High cholesterol  Hypertension      Allergies    No Known Allergies    Intolerances        Home Medications:      MEDICATIONS  (STANDING):    MEDICATIONS  (PRN):      FAMILY HISTORY:      SOCIAL HISTORY: No tobacco/ No etoh/ No illicit drug use    PREVIOUS DIAGNOSTIC TESTING:      ECHO FINDINGS:  STRESS FINDINGS:  CATHETERIZATION FINDINGS:    REVIEW OF SYSTEMS:  CONSTITUTIONAL: [-]fever   [-] weight loss   [-] fatigue  EYES: [-]  eye pain   [-] visual disturbances      [-]  discharge  ENMT:  [-]  difficulty hearing,   [-]  tinnitus   [-] vertigo    [-]  sinus or throat pain  NECK: [-]  pain or stiffness  RESPIRATORY: [-]  cough 	[-] wheezing 	[-]  hemoptysis 		[-]   Shortness of Breath  CARDIOVASCULAR: [-]  chest pain	[-] palpitations		[-]  passing out 		[-] dizziness 	[-]  leg swelling  		[-]  PND 	[-] orthopnea  GASTROINTESTINAL: [-]  abdominal pain		[-]nausea	[-] vomiting	[-]  hematemesis 	[-]  diarrhea  	[-] constipation 		[-]  melena 	[-] hematochezia.  GENITOURINARY: [-] dysuria	[-] frequency	[-] hematuria	[-]  incontinence  NEUROLOGICAL: [-]  headaches		[-] memory loss 	[-]  loss of strength  			[-]  numbness/tingling 	[-]  tremors  SKIN: [-]  itching 	[-] rashes 	[-]  lesions   LYMPH Nodes: [-] enlarged glands  ENDOCRINE: [-] heat or cold intolerance 	[-]   hair loss  MUSCULOSKELETAL: [-] joint pain  [-] joint swelling	[-]  muscle, back, or extremity pain  PSYCHIATRIC: [-]  depression	[-] anxiety	[-] mood swings		[-]  difficulty sleeping   HEME: [-]  easy bruising 	[-]  bleeding   ALLERY AND IMMUNOLOGIC: [-]  hives or eczema	      Vital Signs Last 24 Hrs  T(C): 36.5 (28 Nov 2018 18:36), Max: 36.5 (28 Nov 2018 12:43)  T(F): 97.7 (28 Nov 2018 18:36), Max: 97.7 (28 Nov 2018 12:43)  HR: 47 (28 Nov 2018 18:36) (45 - 47)  BP: 151/54 (28 Nov 2018 18:36) (126/47 - 151/54)  BP(mean): --  RR: 18 (28 Nov 2018 18:36) (18 - 20)  SpO2: 94% (28 Nov 2018 18:36) (94% - 94%)  Daily Height in cm: 167.64 (28 Nov 2018 12:43)    Daily   I&O's Detail        PHYSICAL EXAM:  Appearance: Normal, well nourished, NAD	  HEENT:   Normal oral mucosa, PERRL, EOMI, sclera non-icteric	  Lymphatic: No cervical lymphadenopathy  Cardiovascular: Normal S1 S2, No JVD, No cardiac murmurs, No carotid bruits, No peripheral edema  Respiratory: Lungs clear to auscultation	  Psychiatry: A & O x 3, Mood & affect appropriate  Gastrointestinal:  Soft, Non-tender, + BS, no bruits	  Skin: No rashes, No ecchymoses, No cyanosis, Dry  Neurologic: Grossly non-focal with full strength in all four extremities  Extremities: Normal range of motion, No clubbing, cyanosis or edema  Vascular: Peripheral pulses palpable 2+ bilaterally, warm    INTERPRETATION OF TELEMETRY:    ECG (tracing reviewed by me):    LABS:                        11.1   8.0   )-----------( 211      ( 28 Nov 2018 16:17 )             34.0     11-28    135  |  103  |  60.0<H>  ----------------------------<  100  5.2   |  20.0<L>  |  3.89<H>    Ca    8.3<L>      28 Nov 2018 16:17    TPro  8.0  /  Alb  4.3  /  TBili  0.3<L>  /  DBili  x   /  AST  15  /  ALT  9   /  AlkPhos  40  11-28    CARDIAC MARKERS ( 28 Nov 2018 16:17 )  x     / <0.01 ng/mL / x     / x     / x          PT/INR - ( 28 Nov 2018 16:17 )   PT: 11.7 sec;   INR: 1.02 ratio         PTT - ( 28 Nov 2018 16:17 )  PTT:33.0 sec    BNPSerum Pro-Brain Natriuretic Peptide: 2201 pg/mL (11-28 @ 16:17)      RADIOLOGY & ADDITIONAL STUDIES:  CXR (image reviewed by me):

## 2018-12-04 NOTE — PROGRESS NOTE ADULT - SUBJECTIVE AND OBJECTIVE BOX
INTERVAL HPI/OVERNIGHT EVENTS: Worsening respiratory failure, placed on BiPAP. Vascular surgery called back to place dialysis catheter for urgent dialysis. Patient is comfortable on BiPAP. No chest pain currently. No fever, chills.    MEDICATIONS  (STANDING):  ALBUTerol/ipratropium for Nebulization 3 milliLiter(s) Nebulizer every 6 hours  amLODIPine   Tablet 10 milliGRAM(s) Oral daily  aspirin enteric coated 81 milliGRAM(s) Oral daily  atorvastatin 80 milliGRAM(s) Oral at bedtime  clopidogrel Tablet 75 milliGRAM(s) Oral daily  DULoxetine 20 milliGRAM(s) Oral daily  furosemide Infusion 15 mG/Hr (7.5 mL/Hr) IV Continuous <Continuous>  heparin  Infusion.  Unit(s)/Hr (6.5 mL/Hr) IV Continuous <Continuous>  hydrALAZINE 50 milliGRAM(s) Oral every 8 hours  isosorbide   mononitrate ER Tablet (IMDUR) 30 milliGRAM(s) Oral daily  levothyroxine 100 MICROGram(s) Oral daily  loratadine 10 milliGRAM(s) Oral daily  metoprolol tartrate 50 milliGRAM(s) Oral two times a day  multivitamin 1 Tablet(s) Oral daily  pantoprazole    Tablet 40 milliGRAM(s) Oral before breakfast  sodium chloride 0.65% Nasal 1 Spray(s) Both Nostrils every 4 hours    MEDICATIONS  (PRN):  acetaminophen   Tablet .. 650 milliGRAM(s) Oral every 6 hours PRN Temp greater or equal to 38C (100.4F), Moderate Pain (4 - 6)  aluminum hydroxide/magnesium hydroxide/simethicone Suspension 30 milliLiter(s) Oral every 4 hours PRN Dyspepsia  guaiFENesin    Syrup 200 milliGRAM(s) Oral every 6 hours PRN Cough  heparin  Injectable 3200 Unit(s) IV Push every 6 hours PRN For aPTT less than 40      Vital Signs Last 24 Hrs  T(C): 36.7 (04 Dec 2018 05:53), Max: 36.9 (03 Dec 2018 22:01)  T(F): 98 (04 Dec 2018 05:53), Max: 98.5 (03 Dec 2018 22:01)  HR: 62 (04 Dec 2018 12:48) (59 - 73)  BP: 140/70 (04 Dec 2018 12:48) (128/64 - 150/75)  BP(mean): --  RR: 18 (04 Dec 2018 12:48) (18 - 21)  SpO2: 95% (04 Dec 2018 12:48) (92% - 97%)    Physical exam:  General: NAD, AOx3, resting comfortably in bed  HEENT: PERRLA, EOMI  Neck: supple, nontender  Respiratory: no respiratory distress, course breath sounds, on BiPAP  Heart: regular rate and rhythm, no murmurs  Abdomen: soft, nontender, nondistended. Normal bowel sounds. No guarding or rebound.  Extremities: no peripheral edema. Normal ROM.      I&O's Detail    03 Dec 2018 07:01  -  04 Dec 2018 07:00  --------------------------------------------------------  IN:    heparin  Infusion.: 198.5 mL    Oral Fluid: 240 mL  Total IN: 438.5 mL    OUT:    Voided: 950 mL  Total OUT: 950 mL    Total NET: -511.5 mL      04 Dec 2018 07:01  -  04 Dec 2018 13:03  --------------------------------------------------------  IN:    heparin  Infusion.: 42 mL  Total IN: 42 mL    OUT:    Voided: 800 mL  Total OUT: 800 mL    Total NET: -758 mL          LABS:                        10.4   8.9   )-----------( 200      ( 04 Dec 2018 07:56 )             32.5     12-04    132<L>  |  99  |  55.0<H>  ----------------------------<  113  5.0   |  18.0<L>  |  3.95<H>    Ca    8.7      04 Dec 2018 07:56      PT/INR - ( 03 Dec 2018 17:58 )   PT: 11.8 sec;   INR: 1.03 ratio         PTT - ( 04 Dec 2018 07:58 )  PTT:60.5 sec

## 2018-12-04 NOTE — PROCEDURE NOTE - NSPOSTPRCRAD_GEN_A_CORE
central line located in the/central line located in the superior vena cava/no pneumothorax/post-procedure radiography performed

## 2018-12-04 NOTE — PROCEDURE NOTE - PROCEDURE
<<-----Click on this checkbox to enter Procedure Insertion of catheter for hemodialysis  12/04/2018    Active  GALYATEEM

## 2018-12-04 NOTE — PROGRESS NOTE ADULT - ASSESSMENT
77 y/o male was brought in by family for dry cough on and off for past 2 days, no fever, no sick contact. + nasal stuffiness  and scratchy throat. As per family every year around this time he gets this. no h/o asthma. no cp. no abd. pain. no n/v/d. no sob reported. In the er pt's hr was in mid 40's. pt. denies dizziness but reports some fatigue. Family is not aware if he has h/o bradycardia. pt's Cr is 3.89 from blood work done in ER. family does not know if pt. has any kidney problem.  As per family pt. was seen by pcp about 2 weeks ago and had blood work done but results are not known to family and they did not get any call about abnormal labs. Pt. was seen by nephrologist Dr. Costa yesterday as routine check up as per son in law but blood work from pcp office was not available so pt. was instructed to return when blood work is available. As per son in law pt's cough has topped while in the ER.  no other complaints. (28 Nov 2018 19:56)     Problem/Plan - 1:  ·  Problem: Pulmonary Edema.   Plan: Fluid overload .  Had Arlen HD catheter placed to start HD.  Nephrology recommended lasix drip.     Duonebs.    Problem/Plan -2   Problem: CAD with elevated troponin NSTEMI. Plan: TTE  EF 60-65% grade 2 diastolic dysfunction. Cardiology planning heart cath.    Continue ASA, Plavix. B-Blocker. Heparin infusion.  Trend cardiac enzyme.     Problem: Bradycardia.  Plan: Cardiology recommend adjust  Synthroid dosage upwards.      Problem/Plan - 3:  ·  Problem: Renal failure, unspecified chronicity.  Plan: ? ARIELA.  Renal indices slight worsening since starting loop diuretics . Monitor BMP.  Recall Renal  Dr. Miles.   Avoid nephrotoxic agents.         Problem/Plan - 4:  ·  Problem: Essential hypertension.  Plan: BP still elevated.   Hydralazine. Metoprolol

## 2018-12-04 NOTE — PROGRESS NOTE ADULT - SUBJECTIVE AND OBJECTIVE BOX
[facility]                                                        Office: [address] [place] [address]                                                       Telephone: [phone]. Fax:[phone]      CARDIOLOGY PROGRESS NOTE   ([facility])    Subjective: Patient seen and examined.  Patient with episode of acute dyspnea overnight, started on BiPAP, given a nebulizer, and given Lasix 60 IVP. Patient states that he feels a little bit better this morning, but is still having left sided chest pain and dyspnea. Patient's troponin has trended back up to 1.09. On heparin drip at this time. Still on BiPAP.       ROS: No headache, no chest pain, no SOB, no palpitations, no dizziness, no nausea, no bleeding    Chronic Conditions:    MEDICATIONS  (STANDING):  ALBUTerol/ipratropium for Nebulization 3 milliLiter(s) Nebulizer every 6 hours  amLODIPine   Tablet 10 milliGRAM(s) Oral daily  aspirin enteric coated 81 milliGRAM(s) Oral daily  atorvastatin 80 milliGRAM(s) Oral at bedtime  clopidogrel Tablet 75 milliGRAM(s) Oral daily  DULoxetine 20 milliGRAM(s) Oral daily  furosemide Infusion 5 mG/Hr (2.5 mL/Hr) IV Continuous <Continuous>  heparin  Infusion.  Unit(s)/Hr (6.5 mL/Hr) IV Continuous <Continuous>  hydrALAZINE 50 milliGRAM(s) Oral every 8 hours  isosorbide   mononitrate ER Tablet (IMDUR) 30 milliGRAM(s) Oral daily  levothyroxine 100 MICROGram(s) Oral daily  loratadine 10 milliGRAM(s) Oral daily  metoprolol tartrate 50 milliGRAM(s) Oral two times a day  multivitamin 1 Tablet(s) Oral daily  pantoprazole    Tablet 40 milliGRAM(s) Oral before breakfast  sodium chloride 0.65% Nasal 1 Spray(s) Both Nostrils every 4 hours    MEDICATIONS  (PRN):  acetaminophen   Tablet .. 650 milliGRAM(s) Oral every 6 hours PRN Temp greater or equal to 38C (100.4F), Moderate Pain (4 - 6)  aluminum hydroxide/magnesium hydroxide/simethicone Suspension 30 milliLiter(s) Oral every 4 hours PRN Dyspepsia  guaiFENesin    Syrup 200 milliGRAM(s) Oral every 6 hours PRN Cough  heparin  Injectable 3200 Unit(s) IV Push every 6 hours PRN For aPTT less than 40      	  TELEMETRY:   Vital Signs Last 24 Hrs  T(C): 36.7 (04 Dec 2018 05:53), Max: 36.9 (03 Dec 2018 22:01)  T(F): 98 (04 Dec 2018 05:53), Max: 98.5 (03 Dec 2018 22:01)  HR: 59 (04 Dec 2018 08:41) (59 - 73)  BP: 150/75 (04 Dec 2018 05:53) (128/64 - 150/75)  RR: 20 (04 Dec 2018 08:37) (19 - 21)  SpO2: 95% (04 Dec 2018 08:41) (92% - 97%)      PHYSICAL EXAM:  Appearance: Normal, BiPAP in place 	  HEENT:   Normal oral mucosa, PERRL, EOMI	  Lymphatic: No lymphadenopathy  Cardiovascular: Normal S1 S2, No JVD, No murmurs,  Respiratory: diffuse crackles and expiratory wheezing  Psychiatry: A & O x 3, Mood & affect appropriate  Gastrointestinal:  Soft, Non-tender, + BS	  Skin: No rashes, No ecchymoses, No cyanosis  Neurologic: Non-focal  Extremities: Normal range of motion, No clubbing, cyanosis or edema  Vascular: Peripheral pulses palpable 2+ bilaterally, warm      ECG (tracing reviewed by me):  	    DIAGNOSTIC TESTING:  < from: TTE Echo Complete w/Doppler (12.03.18 @ 11:22) >    Summary:   1. Left ventricular ejection fraction, by visual estimation, is 60 to   65%.   2. Normal global left ventricular systolic function.   3. Basal anteroseptal segment and basal and mid inferior wall are   abnormalas described above.   4. Spectral Doppler shows pseudonormal pattern of left ventricular   myocardial filling (Grade II diastolic dysfunction).   5. Estimated pulmonary artery systolic pressure is 53.7 mmHg assuming a   right atrial pressure of 8 mmHg, which is consistent with moderate   pulmonary hypertension.   6. Endocardial visualization was enhanced with intravenous echo contrast.    [id number] Arik Hernandez MD, Electronically signed on 12/3/2018 at 5:44:10   PM       < end of copied text >    Echocardiogram (images reviewed by me): < from: TTE Echo Complete w/Doppler (11.29.18 @ 13:00) >  Summary:   1. Left ventricular ejection fraction, by visual estimation, is 60 to   65%.   2. Normal global left ventricular systolic function.   3. Normal right ventricular size and function.   4. There is no evidenceof pericardial effusion.   5. Moderate mitral valve regurgitation.   6. Thickening of the anterior and posterior mitral valve leaflets.   7. Mild tricuspid regurgitation.   8. Sclerotic aortic valve with normal opening.   9. Trace pulmonic valve regurgitation.  10. The main pulmonary artery is normal in size.    [id number] Morgan Mccann MD, Electronically signed on 11/30/2018 at   12:52:45 AM       < end of copied text >    Catheterization:  Stress Test:    < from: Xray Chest 1 View- PORTABLE-Urgent (12.03.18 @ 08:58) >  INTERPRETATION:  CHEST AP PORTABLE:    History: Abnormal Chest Sounds.     Date and time of exam: 12/3/2018 8:52 AM.    Technique: A single AP view ofthe chest was obtained.    Comparison exam: 12/1/2018.    Findings:  Bilateral pleural effusions, unchanged. Increase in pulmonary vascular   congestion. Increasing bilateral pulmonary edema. Evidence of prior heart   surgery..    Impression:  Worsening congestive heart failure and pulmonary edema..      BERT BARKSDALE M.D., ATTENDING RADIOLOGIST  This document has been electronically signed. Dec  3 2018  2:09PM    < end of copied text >            GALLITO DE SANTIAGO M.D., ATTENDING RADIOLOGIST  This document has been electronically signed. Dec  2 2018 12:44PM        < end of copied text >    OTHER: 	    LABS:	 	  CARDIAC MARKERS:                          10.4   8.9   )-----------( 200      ( 04 Dec 2018 07:56 )             32.5     12-04    132<L>  |  99  |  55.0<H>  ----------------------------<  113  5.0   |  18.0<L>  |  3.95<H>    Ca    8.7      04 Dec 2018 07:56        BNP: 55927    CARDIAC MARKERS ( 04 Dec 2018 01:20 )  x     / 1.09 ng/mL / x     / x     / x      CARDIAC MARKERS ( 03 Dec 2018 15:57 )  x     / 1.06 ng/mL / 254 U/L / x     / 13.0 ng/mL  CARDIAC MARKERS ( 03 Dec 2018 10:11 )  x     / 1.09 ng/mL / 283 U/L / x     / 15.7 ng/mL  CARDIAC MARKERS ( 03 Dec 2018 07:03 )  x     / 1.07 ng/mL / x     / x     / x

## 2018-12-04 NOTE — PROGRESS NOTE ADULT - SUBJECTIVE AND OBJECTIVE BOX
CC: Shortness of breath. ARIELA on CKD statge 4.  Pulm edema.   HPI:  79 y/o male was brought in by family for dry cough on and off for past 2 days, no fever, no sick contact. + nasal stuffiness  and scratchy throat. As per family every year around this time he gets this. no h/o asthma. no cp. no abd. pain. no n/v/d. no sob reported. In the er pt's hr was in mid 40's. pt. denies dizziness but reports some fatigue. Family is not aware if he has h/o bradycardia. pt's Cr is 3.89 from blood work done in ER. family does not know if pt. has any kidney problem.  As per family pt. was seen by pcp about 2 weeks ago and had blood work done but results are not known to family and they did not get any call about abnormal labs. Pt. was seen by nephrologist Dr. Costa yesterday as routine check up as per son in law but blood work from pcp office was not available so pt. was instructed to return when blood work is available. As per son in law pt's cough has topped while in the ER.  no other complaints. (28 Nov 2018 19:56)    REVIEW OF SYSTEMS:    Patient denied fever, chills, abdominal pain, nausea, vomiting, cough, shortness of breath, chest pain or palpitations    Vital Signs Last 24 Hrs  T(C): 36.3 (04 Dec 2018 16:30), Max: 36.9 (03 Dec 2018 22:01)  T(F): 97.3 (04 Dec 2018 16:30), Max: 98.5 (03 Dec 2018 22:01)  HR: 60 (04 Dec 2018 17:15) (59 - 74)  BP: 151/67 (04 Dec 2018 16:30) (140/70 - 151/67)  BP(mean): --  RR: 18 (04 Dec 2018 17:15) (18 - 21)  SpO2: 92% (04 Dec 2018 17:15) (92% - 97%)I&O's Summary    03 Dec 2018 07:01  -  04 Dec 2018 07:00  --------------------------------------------------------  IN: 438.5 mL / OUT: 950 mL / NET: -511.5 mL    04 Dec 2018 07:01  -  04 Dec 2018 17:19  --------------------------------------------------------  IN: 42 mL / OUT: 800 mL / NET: -758 mL      PHYSICAL EXAM:  GENERAL: NAD,   HEENT: PERRL, +EOMI, anicteric, no [place]  NECK: Supple, No JVD   CHEST/LUNG: bilateral rales ;   HEART: S1S2 Normal intensity, no murmurs, gallops or rubs noted  ABDOMEN: Soft, BS Normoactive, NT, ND, no HSM noted  EXTREMITIES:  2+ radial and DP pulses noted, no clubbing, cyanosis, or edema noted, Limited mobility   SKIN: No rashes or lesions noted  NEURO: A&O, no focal deficits noted, CN II-XII intact  PSYCH: Depressed mood and affect; insight/judgement inappropriate  LABS:                        10.4   8.9   )-----------( 200      ( 04 Dec 2018 07:56 )             32.5     12-04    132<L>  |  99  |  55.0<H>  ----------------------------<  113  5.0   |  18.0<L>  |  3.95<H>    Ca    8.7      04 Dec 2018 07:56      PT/INR - ( 03 Dec 2018 17:58 )   PT: 11.8 sec;   INR: 1.03 ratio         PTT - ( 04 Dec 2018 14:45 )  PTT:45.8 sec    RADIOLOGY & ADDITIONAL TESTS:    MEDICATIONS:  MEDICATIONS  (STANDING):  ALBUTerol/ipratropium for Nebulization 3 milliLiter(s) Nebulizer every 6 hours  amLODIPine   Tablet 10 milliGRAM(s) Oral daily  aspirin enteric coated 81 milliGRAM(s) Oral daily  atorvastatin 80 milliGRAM(s) Oral at bedtime  clopidogrel Tablet 75 milliGRAM(s) Oral daily  DULoxetine 20 milliGRAM(s) Oral daily  furosemide Infusion 15 mG/Hr (7.5 mL/Hr) IV Continuous <Continuous>  heparin  Infusion.  Unit(s)/Hr (6.5 mL/Hr) IV Continuous <Continuous>  hydrALAZINE 50 milliGRAM(s) Oral every 8 hours  isosorbide   mononitrate ER Tablet (IMDUR) 30 milliGRAM(s) Oral daily  levothyroxine 100 MICROGram(s) Oral daily  loratadine 10 milliGRAM(s) Oral daily  metoprolol tartrate 50 milliGRAM(s) Oral two times a day  multivitamin 1 Tablet(s) Oral daily  pantoprazole    Tablet 40 milliGRAM(s) Oral before breakfast  sodium chloride 0.65% Nasal 1 Spray(s) Both Nostrils every 4 hours    MEDICATIONS  (PRN):  acetaminophen   Tablet .. 650 milliGRAM(s) Oral every 6 hours PRN Temp greater or equal to 38C (100.4F), Moderate Pain (4 - 6)  aluminum hydroxide/magnesium hydroxide/simethicone Suspension 30 milliLiter(s) Oral every 4 hours PRN Dyspepsia  guaiFENesin    Syrup 200 milliGRAM(s) Oral every 6 hours PRN Cough  heparin  Injectable 3200 Unit(s) IV Push every 6 hours PRN For aPTT less than 40

## 2018-12-04 NOTE — PROGRESS NOTE ADULT - SUBJECTIVE AND OBJECTIVE BOX
pt's HD tx. for 2hrs completed.   pt aware.  significantly  improved rt neck pain after receiving Tylenol 650 mg po .   2.0 kg fluid removal.   pt is on O2 ( 3liter /min ) w/ O2 sat : 93 %.   post access care done without complications.     report given to abdoulaye LESTER

## 2018-12-04 NOTE — PROGRESS NOTE ADULT - SUBJECTIVE AND OBJECTIVE BOX
Patient is a 78y old  Male who presents with a chief complaint of cough (04 Dec 2018 06:09)      BRIEF HOSPITAL COURSE: admitted for cough, acute on chronic renal insufficiency    Events last 24 hours: developed acute respiratory insufficiency last few hours; improved with nippv.  was given furosemide, 220 mL urine output.  Continues to have dyspnea, but improved.  no chest discomfort.  cough mildly improved.      PAST MEDICAL & SURGICAL HISTORY:  CAD (coronary artery disease)  Lung cancer: had yoni radiation in 2006.  Bipolar disorder  High cholesterol  Hypertension  S/P CABG (coronary artery bypass graft): pt&#x27;s son in [facility] h/o some stents near neck area ? carotid ? he is not sure.    Allergies    No Known Allergies    Intolerances      FAMILY HISTORY:  No pertinent family history in first degree relatives      Family history otherwise noncontributory.    Social History: +tobacco, 40 pack year, quit 1993 with CABG.  Review of Systems:    ALL OTHER REVIEW OF SYSTEMS EXCEPT PER HPI NEGATIVE.      Medications:    amLODIPine   Tablet 10 milliGRAM(s) Oral daily  furosemide   Injectable 60 milliGRAM(s) IV Push once  hydrALAZINE 50 milliGRAM(s) Oral every 8 hours  isosorbide   mononitrate ER Tablet (IMDUR) 30 milliGRAM(s) Oral daily  metoprolol tartrate 50 milliGRAM(s) Oral two times a day    ALBUTerol/ipratropium for Nebulization 3 milliLiter(s) Nebulizer every 6 hours  guaiFENesin    Syrup 200 milliGRAM(s) Oral every 6 hours PRN  loratadine 10 milliGRAM(s) Oral daily    acetaminophen   Tablet .. 650 milliGRAM(s) Oral every 6 hours PRN  DULoxetine 20 milliGRAM(s) Oral daily      aspirin enteric coated 81 milliGRAM(s) Oral daily  clopidogrel Tablet 75 milliGRAM(s) Oral daily  heparin  Infusion.  Unit(s)/Hr IV Continuous <Continuous>  heparin  Injectable 3200 Unit(s) IV Push every 6 hours PRN    aluminum hydroxide/magnesium hydroxide/simethicone Suspension 30 milliLiter(s) Oral every 4 hours PRN  pantoprazole    Tablet 40 milliGRAM(s) Oral before breakfast      atorvastatin 80 milliGRAM(s) Oral at bedtime  levothyroxine 100 MICROGram(s) Oral daily    multivitamin 1 Tablet(s) Oral daily      sodium chloride 0.65% Nasal 1 Spray(s) Both Nostrils every 4 hours            ICU Vital Signs Last 24 Hrs  T(C): 36.7 (04 Dec 2018 05:53), Max: 37.1 (03 Dec 2018 09:45)  T(F): 98 (04 Dec 2018 05:53), Max: 98.8 (03 Dec 2018 09:45)  HR: 59 (04 Dec 2018 08:41) (59 - 73)  BP: 150/75 (04 Dec 2018 05:53) (122/66 - 150/75)  BP(mean): --  ABP: --  ABP(mean): --  RR: 20 (04 Dec 2018 08:37) (19 - 21)  SpO2: 95% (04 Dec 2018 08:41) (92% - 97%)    Vital Signs Last 24 Hrs  T(C): 36.7 (04 Dec 2018 05:53), Max: 37.1 (03 Dec 2018 09:45)  T(F): 98 (04 Dec 2018 05:53), Max: 98.8 (03 Dec 2018 09:45)  HR: 59 (04 Dec 2018 08:41) (59 - 73)  BP: 150/75 (04 Dec 2018 05:53) (122/66 - 150/75)  BP(mean): --  RR: 20 (04 Dec 2018 08:37) (19 - 21)  SpO2: 95% (04 Dec 2018 08:41) (92% - 97%)    ABG - ( 04 Dec 2018 06:50 )  pH, Arterial: 7.24  pH, Blood: x     /  pCO2: 49    /  pO2: 72    / HCO3: 19    / Base Excess: -6.7  /  SaO2: 91                  I&O's Detail    03 Dec 2018 07:01  -  04 Dec 2018 07:00  --------------------------------------------------------  IN:    heparin  Infusion.: 198.5 mL    Oral Fluid: 240 mL  Total IN: 438.5 mL    OUT:    Voided: 950 mL  Total OUT: 950 mL    Total NET: -511.5 mL            LABS:                        10.4   8.9   )-----------( 200      ( 04 Dec 2018 07:56 )             32.5     12-04    132<L>  |  99  |  55.0<H>  ----------------------------<  113  5.0   |  18.0<L>  |  3.95<H>    Ca    8.7      04 Dec 2018 07:56        CARDIAC MARKERS ( 04 Dec 2018 01:20 )  x     / 1.09 ng/mL / x     / x     / x      CARDIAC MARKERS ( 03 Dec 2018 15:57 )  x     / 1.06 ng/mL / 254 U/L / x     / 13.0 ng/mL  CARDIAC MARKERS ( 03 Dec 2018 10:11 )  x     / 1.09 ng/mL / 283 U/L / x     / 15.7 ng/mL  CARDIAC MARKERS ( 03 Dec 2018 07:03 )  x     / 1.07 ng/mL / x     / x     / x          CAPILLARY BLOOD GLUCOSE        PT/INR - ( 03 Dec 2018 17:58 )   PT: 11.8 sec;   INR: 1.03 ratio         PTT - ( 04 Dec 2018 07:58 )  PTT:60.5 sec    CULTURES:      Physical Examination:    GENERAL: No acute distress.      EYES: Pupils equal, reactive to light.  Symmetric.    EARS, NOSE, THROAT: Normal; supple neck, no lymphadenopathy; trachea midline    PULM: No increased wob.  speaks in complete sentences on NIPPV.  diffuse bilateral wheezing, R>L, throughout expiratory phase, with prolongation.    CVS: Regular rate and rhythm, no murmurs, rubs, or gallops    GI: Soft, nondistended, nontender, normoactive bowel sounds, no masses, no guarding    EXTREMITIES: No edema    SKIN: Warm and well perfused, no rashes noted.    NEURO: Alert, oriented, interactive, nonfocal    RADIOLOGY: +pulm edema

## 2018-12-05 ENCOUNTER — TRANSCRIPTION ENCOUNTER (OUTPATIENT)
Age: 78
End: 2018-12-05

## 2018-12-05 DIAGNOSIS — I21.4 NON-ST ELEVATION (NSTEMI) MYOCARDIAL INFARCTION: ICD-10-CM

## 2018-12-05 LAB
ANION GAP SERPL CALC-SCNC: 17 MMOL/L — SIGNIFICANT CHANGE UP (ref 5–17)
APTT BLD: 31.2 SEC — SIGNIFICANT CHANGE UP (ref 27.5–36.3)
BLD GP AB SCN SERPL QL: SIGNIFICANT CHANGE UP
BUN SERPL-MCNC: 65 MG/DL — HIGH (ref 8–20)
CALCIUM SERPL-MCNC: 8.6 MG/DL — SIGNIFICANT CHANGE UP (ref 8.6–10.2)
CHLORIDE SERPL-SCNC: 94 MMOL/L — LOW (ref 98–107)
CO2 SERPL-SCNC: 23 MMOL/L — SIGNIFICANT CHANGE UP (ref 22–29)
CREAT SERPL-MCNC: 4.44 MG/DL — HIGH (ref 0.5–1.3)
GLUCOSE SERPL-MCNC: 107 MG/DL — SIGNIFICANT CHANGE UP (ref 70–115)
HAV IGM SER-ACNC: SIGNIFICANT CHANGE UP
HBV CORE AB SER-ACNC: SIGNIFICANT CHANGE UP
HBV CORE IGM SER-ACNC: SIGNIFICANT CHANGE UP
HBV SURFACE AB SER-ACNC: <3 MIU/ML — LOW
HBV SURFACE AG SER-ACNC: SIGNIFICANT CHANGE UP
HCT VFR BLD CALC: 31.1 % — LOW (ref 42–52)
HCV AB S/CO SERPL IA: 0.14 S/CO — SIGNIFICANT CHANGE UP
HCV AB SERPL-IMP: SIGNIFICANT CHANGE UP
HGB BLD-MCNC: 10.1 G/DL — LOW (ref 14–18)
MCHC RBC-ENTMCNC: 29.1 PG — SIGNIFICANT CHANGE UP (ref 27–31)
MCHC RBC-ENTMCNC: 32.5 G/DL — SIGNIFICANT CHANGE UP (ref 32–36)
MCV RBC AUTO: 89.6 FL — SIGNIFICANT CHANGE UP (ref 80–94)
PLATELET # BLD AUTO: 201 K/UL — SIGNIFICANT CHANGE UP (ref 150–400)
POTASSIUM SERPL-MCNC: 4.2 MMOL/L — SIGNIFICANT CHANGE UP (ref 3.5–5.3)
POTASSIUM SERPL-SCNC: 4.2 MMOL/L — SIGNIFICANT CHANGE UP (ref 3.5–5.3)
RBC # BLD: 3.47 M/UL — LOW (ref 4.6–6.2)
RBC # FLD: 13 % — SIGNIFICANT CHANGE UP (ref 11–15.6)
SODIUM SERPL-SCNC: 134 MMOL/L — LOW (ref 135–145)
TYPE + AB SCN PNL BLD: SIGNIFICANT CHANGE UP
WBC # BLD: 7.7 K/UL — SIGNIFICANT CHANGE UP (ref 4.8–10.8)
WBC # FLD AUTO: 7.7 K/UL — SIGNIFICANT CHANGE UP (ref 4.8–10.8)

## 2018-12-05 PROCEDURE — 90937 HEMODIALYSIS REPEATED EVAL: CPT

## 2018-12-05 PROCEDURE — 99233 SBSQ HOSP IP/OBS HIGH 50: CPT

## 2018-12-05 PROCEDURE — 93010 ELECTROCARDIOGRAM REPORT: CPT

## 2018-12-05 PROCEDURE — 76937 US GUIDE VASCULAR ACCESS: CPT | Mod: 26

## 2018-12-05 PROCEDURE — 92941 PRQ TRLML REVSC TOT OCCL AMI: CPT | Mod: LC

## 2018-12-05 PROCEDURE — 93567 NJX CAR CTH SPRVLV AORTGRPHY: CPT

## 2018-12-05 PROCEDURE — 93459 L HRT ART/GRFT ANGIO: CPT | Mod: 26,XU

## 2018-12-05 PROCEDURE — 99152 MOD SED SAME PHYS/QHP 5/>YRS: CPT

## 2018-12-05 RX ORDER — FUROSEMIDE 40 MG
40 TABLET ORAL
Refills: 0 | Status: COMPLETED | OUTPATIENT
Start: 2018-12-05 | End: 2018-12-06

## 2018-12-05 RX ORDER — TUBERCULIN PURIFIED PROTEIN DERIVATIVE 5 [IU]/.1ML
5 INJECTION, SOLUTION INTRADERMAL ONCE
Refills: 0 | Status: COMPLETED | OUTPATIENT
Start: 2018-12-05 | End: 2018-12-06

## 2018-12-05 RX ADMIN — Medication 3 MILLILITER(S): at 08:50

## 2018-12-05 RX ADMIN — DULOXETINE HYDROCHLORIDE 20 MILLIGRAM(S): 30 CAPSULE, DELAYED RELEASE ORAL at 21:09

## 2018-12-05 RX ADMIN — Medication 1 SPRAY(S): at 05:29

## 2018-12-05 RX ADMIN — AMLODIPINE BESYLATE 10 MILLIGRAM(S): 2.5 TABLET ORAL at 05:28

## 2018-12-05 RX ADMIN — HEPARIN SODIUM 1200 UNIT(S)/HR: 5000 INJECTION INTRAVENOUS; SUBCUTANEOUS at 08:20

## 2018-12-05 RX ADMIN — HEPARIN SODIUM 3200 UNIT(S): 5000 INJECTION INTRAVENOUS; SUBCUTANEOUS at 08:24

## 2018-12-05 RX ADMIN — PANTOPRAZOLE SODIUM 40 MILLIGRAM(S): 20 TABLET, DELAYED RELEASE ORAL at 05:29

## 2018-12-05 RX ADMIN — Medication 650 MILLIGRAM(S): at 22:05

## 2018-12-05 RX ADMIN — Medication 3 MILLILITER(S): at 03:49

## 2018-12-05 RX ADMIN — Medication 50 MILLIGRAM(S): at 17:48

## 2018-12-05 RX ADMIN — ISOSORBIDE MONONITRATE 30 MILLIGRAM(S): 60 TABLET, EXTENDED RELEASE ORAL at 21:09

## 2018-12-05 RX ADMIN — Medication 1 TABLET(S): at 21:09

## 2018-12-05 RX ADMIN — Medication 40 MILLIGRAM(S): at 20:23

## 2018-12-05 RX ADMIN — Medication 50 MILLIGRAM(S): at 05:28

## 2018-12-05 RX ADMIN — Medication 650 MILLIGRAM(S): at 22:45

## 2018-12-05 RX ADMIN — Medication 40 MILLIGRAM(S): at 17:30

## 2018-12-05 RX ADMIN — Medication 50 MILLIGRAM(S): at 21:09

## 2018-12-05 RX ADMIN — Medication 1 SPRAY(S): at 21:09

## 2018-12-05 RX ADMIN — Medication 1 SPRAY(S): at 02:27

## 2018-12-05 RX ADMIN — CLOPIDOGREL BISULFATE 75 MILLIGRAM(S): 75 TABLET, FILM COATED ORAL at 11:04

## 2018-12-05 RX ADMIN — Medication 81 MILLIGRAM(S): at 11:04

## 2018-12-05 RX ADMIN — Medication 3 MILLILITER(S): at 21:48

## 2018-12-05 RX ADMIN — LORATADINE 10 MILLIGRAM(S): 10 TABLET ORAL at 21:09

## 2018-12-05 RX ADMIN — ATORVASTATIN CALCIUM 80 MILLIGRAM(S): 80 TABLET, FILM COATED ORAL at 21:09

## 2018-12-05 RX ADMIN — Medication 100 MICROGRAM(S): at 05:29

## 2018-12-05 NOTE — PROGRESS NOTE ADULT - SUBJECTIVE AND OBJECTIVE BOX
CC: Dyspnea is resolving. Patient is more comfortable today since initiation of HD.   HPI:  77 y/o male was brought in by family for dry cough on and off for past 2 days, no fever, no sick contact. + nasal stuffiness  and scratchy throat. As per family every year around this time he gets this. no h/o asthma. no cp. no abd. pain. no n/v/d. no sob reported. In the er pt's hr was in mid 40's. pt. denies dizziness but reports some fatigue. Family is not aware if he has h/o bradycardia. pt's Cr is 3.89 from blood work done in ER. family does not know if pt. has any kidney problem.  As per family pt. was seen by pcp about 2 weeks ago and had blood work done but results are not known to family and they did not get any call about abnormal labs. Pt. was seen by nephrologist Dr. Costa yesterday as routine check up as per son in law but blood work from pcp office was not available so pt. was instructed to return when blood work is available. As per son in law pt's cough has topped while in the ER.  no other complaints. (28 Nov 2018 19:56)    REVIEW OF SYSTEMS:    Patient denied fever, chills, abdominal pain, nausea, vomiting, cough, shortness of breath, chest pain or palpitations    Vital Signs Last 24 Hrs  T(C): 37.1 (05 Dec 2018 11:26), Max: 37.5 (05 Dec 2018 10:00)  T(F): 98.8 (05 Dec 2018 11:26), Max: 99.5 (05 Dec 2018 10:00)  HR: 68 (05 Dec 2018 11:26) (18 - 130)  BP: 147/69 (05 Dec 2018 11:26) (105/45 - 151/67)  BP(mean): --  RR: 18 (05 Dec 2018 11:26) (18 - 20)  SpO2: 95% (05 Dec 2018 11:26) (90% - 98%)I&O's Summary    04 Dec 2018 07:01  -  05 Dec 2018 07:00  --------------------------------------------------------  IN: 582 mL / OUT: 3700 mL / NET: -3118 mL    05 Dec 2018 07:01  -  05 Dec 2018 11:56  --------------------------------------------------------  IN: 0 mL / OUT: 925 mL / NET: -925 mL      PHYSICAL EXAM:  GENERAL: NAD, well-groomed  HEENT: PERRL, +EOMI, anicteric, no [place]  NECK: Supple, No JVD   CHEST/LUNG: CTA bilaterally; Normal effort  HEART: S1S2 Normal intensity, no murmurs, gallops or rubs noted  ABDOMEN: Soft, BS Normoactive, NT, ND, no HSM noted  EXTREMITIES:  2+ radial and DP pulses noted, no clubbing, cyanosis, or edema noted, FROM x 4  SKIN: No rashes or lesions noted  NEURO: A&Ox3, no focal deficits noted, CN II-XII intact  PSYCH: normal mood and affect; insight/judgement appropriate  LABS:                        10.1   7.7   )-----------( 201      ( 05 Dec 2018 06:14 )             31.1     12-05    134<L>  |  94<L>  |  65.0<H>  ----------------------------<  107  4.2   |  23.0  |  4.44<H>    Ca    8.6      05 Dec 2018 06:14    TPro  x   /  Alb  x   /  TBili  x   /  DBili  x   /  AST  x   /  ALT  16  /  AlkPhos  x   12-04    PT/INR - ( 03 Dec 2018 17:58 )   PT: 11.8 sec;   INR: 1.03 ratio         PTT - ( 05 Dec 2018 07:53 )  PTT:31.2 sec    RADIOLOGY & ADDITIONAL TESTS:    MEDICATIONS:  MEDICATIONS  (STANDING):  ALBUTerol/ipratropium for Nebulization 3 milliLiter(s) Nebulizer every 6 hours  amLODIPine   Tablet 10 milliGRAM(s) Oral daily  aspirin enteric coated 81 milliGRAM(s) Oral daily  atorvastatin 80 milliGRAM(s) Oral at bedtime  clopidogrel Tablet 75 milliGRAM(s) Oral daily  DULoxetine 20 milliGRAM(s) Oral daily  heparin  Infusion.  Unit(s)/Hr (6.5 mL/Hr) IV Continuous <Continuous>  hydrALAZINE 50 milliGRAM(s) Oral every 8 hours  isosorbide   mononitrate ER Tablet (IMDUR) 30 milliGRAM(s) Oral daily  levothyroxine 100 MICROGram(s) Oral daily  loratadine 10 milliGRAM(s) Oral daily  metoprolol tartrate 50 milliGRAM(s) Oral two times a day  multivitamin 1 Tablet(s) Oral daily  pantoprazole    Tablet 40 milliGRAM(s) Oral before breakfast  PPD  5 Tuberculin Unit(s) Injectable 5 Unit(s) IntraDermal once  sodium chloride 0.65% Nasal 1 Spray(s) Both Nostrils every 4 hours    MEDICATIONS  (PRN):  acetaminophen   Tablet .. 650 milliGRAM(s) Oral every 6 hours PRN Temp greater or equal to 38C (100.4F), Moderate Pain (4 - 6)  aluminum hydroxide/magnesium hydroxide/simethicone Suspension 30 milliLiter(s) Oral every 4 hours PRN Dyspepsia  guaiFENesin    Syrup 200 milliGRAM(s) Oral every 6 hours PRN Cough  heparin  Injectable 3200 Unit(s) IV Push every 6 hours PRN For aPTT less than 40 CC: Dyspnea is resolving. Patient is more comfortable today since initiation of HD. ESRD .  Bronchitis  HPI:  77 y/o male was brought in by family for dry cough on and off for past 2 days, no fever, no sick contact. + nasal stuffiness  and scratchy throat. As per family every year around this time he gets this. no h/o asthma. no cp. no abd. pain. no n/v/d. no sob reported. In the er pt's hr was in mid 40's. pt. denies dizziness but reports some fatigue. Family is not aware if he has h/o bradycardia. pt's Cr is 3.89 from blood work done in ER. family does not know if pt. has any kidney problem.  As per family pt. was seen by pcp about 2 weeks ago and had blood work done but results are not known to family and they did not get any call about abnormal labs. Pt. was seen by nephrologist Dr. Costa yesterday as routine check up as per son in law but blood work from pcp office was not available so pt. was instructed to return when blood work is available. As per son in law pt's cough has topped while in the ER.  no other complaints. (28 Nov 2018 19:56)    REVIEW OF SYSTEMS:    Patient denied fever, chills, abdominal pain, nausea, vomiting, cough, shortness of breath, chest pain or palpitations    Vital Signs Last 24 Hrs  T(C): 37.1 (05 Dec 2018 11:26), Max: 37.5 (05 Dec 2018 10:00)  T(F): 98.8 (05 Dec 2018 11:26), Max: 99.5 (05 Dec 2018 10:00)  HR: 68 (05 Dec 2018 11:26) (18 - 130)  BP: 147/69 (05 Dec 2018 11:26) (105/45 - 151/67)  BP(mean): --  RR: 18 (05 Dec 2018 11:26) (18 - 20)  SpO2: 95% (05 Dec 2018 11:26) (90% - 98%)I&O's Summary    04 Dec 2018 07:01  -  05 Dec 2018 07:00  --------------------------------------------------------  IN: 582 mL / OUT: 3700 mL / NET: -3118 mL    05 Dec 2018 07:01  -  05 Dec 2018 11:56  --------------------------------------------------------  IN: 0 mL / OUT: 925 mL / NET: -925 mL      PHYSICAL EXAM:  GENERAL: NAD,   HEENT: PERRL, +EOMI, anicteric, no [place]  NECK: Supple, No JVD   CHEST/LUNG: bilateral basilar crackles R>L  HEART: S1S2 Normal intensity, no murmurs, gallops or rubs noted  ABDOMEN: Soft, BS Normoactive, NT, ND, no HSM noted  EXTREMITIES:  2+ radial and DP pulses noted, no clubbing, cyanosis, or edema noted, Limited mobility   SKIN: No rashes or lesions noted  NEURO: A&O, no focal deficits noted, CN II-XII intact  PSYCH: Depressed mood and affect; insight/judgement appropriate  LABS:                        10.1   7.7   )-----------( 201      ( 05 Dec 2018 06:14 )             31.1     12-05    134<L>  |  94<L>  |  65.0<H>  ----------------------------<  107  4.2   |  23.0  |  4.44<H>    Ca    8.6      05 Dec 2018 06:14    TPro  x   /  Alb  x   /  TBili  x   /  DBili  x   /  AST  x   /  ALT  16  /  AlkPhos  x   12-04    PT/INR - ( 03 Dec 2018 17:58 )   PT: 11.8 sec;   INR: 1.03 ratio         PTT - ( 05 Dec 2018 07:53 )  PTT:31.2 sec    RADIOLOGY & ADDITIONAL TESTS:    MEDICATIONS:  MEDICATIONS  (STANDING):  ALBUTerol/ipratropium for Nebulization 3 milliLiter(s) Nebulizer every 6 hours  amLODIPine   Tablet 10 milliGRAM(s) Oral daily  aspirin enteric coated 81 milliGRAM(s) Oral daily  atorvastatin 80 milliGRAM(s) Oral at bedtime  clopidogrel Tablet 75 milliGRAM(s) Oral daily  DULoxetine 20 milliGRAM(s) Oral daily  heparin  Infusion.  Unit(s)/Hr (6.5 mL/Hr) IV Continuous <Continuous>  hydrALAZINE 50 milliGRAM(s) Oral every 8 hours  isosorbide   mononitrate ER Tablet (IMDUR) 30 milliGRAM(s) Oral daily  levothyroxine 100 MICROGram(s) Oral daily  loratadine 10 milliGRAM(s) Oral daily  metoprolol tartrate 50 milliGRAM(s) Oral two times a day  multivitamin 1 Tablet(s) Oral daily  pantoprazole    Tablet 40 milliGRAM(s) Oral before breakfast  PPD  5 Tuberculin Unit(s) Injectable 5 Unit(s) IntraDermal once  sodium chloride 0.65% Nasal 1 Spray(s) Both Nostrils every 4 hours    MEDICATIONS  (PRN):  acetaminophen   Tablet .. 650 milliGRAM(s) Oral every 6 hours PRN Temp greater or equal to 38C (100.4F), Moderate Pain (4 - 6)  aluminum hydroxide/magnesium hydroxide/simethicone Suspension 30 milliLiter(s) Oral every 4 hours PRN Dyspepsia  guaiFENesin    Syrup 200 milliGRAM(s) Oral every 6 hours PRN Cough  heparin  Injectable 3200 Unit(s) IV Push every 6 hours PRN For aPTT less than 40

## 2018-12-05 NOTE — DISCHARGE NOTE ADULT - MEDICATION SUMMARY - MEDICATIONS TO CHANGE
I will SWITCH the dose or number of times a day I take the medications listed below when I get home from the hospital:    gabapentin 300 mg oral capsule  -- 1 cap(s) by mouth 3 times a day    Norvasc 5 mg oral tablet  -- 1 tab(s) by mouth once a day I will SWITCH the dose or number of times a day I take the medications listed below when I get home from the hospital:    gabapentin 300 mg oral capsule  -- 1 cap(s) by mouth 3 times a day    Norvasc 5 mg oral tablet  -- 1 tab(s) by mouth once a day    Synthroid 75 mcg (0.075 mg) oral tablet  -- 1 tab(s) by mouth once a day

## 2018-12-05 NOTE — DISCHARGE NOTE ADULT - HOME CARE AGENCY
cardiology DC follow up appt. us with Christelle MCINTOSH  Address: [address] [place] [address]  Phone Number: [phone]  Date and Time of appointment: Monday 12/31/2018 at 10:45 office will call pt it something becomes available sooner.

## 2018-12-05 NOTE — DISCHARGE NOTE ADULT - MEDICATION SUMMARY - MEDICATIONS TO STOP TAKING
I will STOP taking the medications listed below when I get home from the hospital:    Lasix 20 mg oral tablet  -- 1 tab(s) by mouth once a day    losartan 100 mg oral tablet  -- 1 tab(s) by mouth once a day I will STOP taking the medications listed below when I get home from the hospital:    Lasix 20 mg oral tablet  -- 1 tab(s) by mouth once a day    Pravachol 20 mg oral tablet  -- 1 tab(s) by mouth once a day

## 2018-12-05 NOTE — DISCHARGE NOTE ADULT - CARE PROVIDERS DIRECT ADDRESSES
[email or web address]

## 2018-12-05 NOTE — PROGRESS NOTE ADULT - PROBLEM SELECTOR PLAN 1
Cardiology input noted.  Monitor on Tele.  Currently asymptomatic.
Agree with NIPPV for now; taking good volumes at this point.  Does have a history of treated lung cancer with 40 pack year history of tobacco, now with ?bronchospasm vs cardiac wheezing with diastolic dysfunction.  Agree with furosemide administration earlier; some urine output seen.  Does have pulmonary edema; will administer another dose of furosemide.  Continue NIPPV for now; taking good volumes, no increased work of breathing.  bronchodilators.  consider potential for steroids as well if he also has copd; would suggest pulmonary medicine consultation.
DAP aspirin/plavix  Continue statin and prior meds  Rt groin care w/instruction to pt  AM HD  Post LHC diuresis with Lasix ordered  AM ECG/Labs  FU Dr. San outpt
Cardiology followup noted.  Synthroid dosage changed to 100mcg daily.

## 2018-12-05 NOTE — PROGRESS NOTE ADULT - ASSESSMENT
1) ARIELA on CKD IV  2) Prerenal  3) Acidosis  4) Anemia renal dx  5) Hyperkalemia    HD today;  Likely will HD tomorrow;  Assess renal fx ; may require ongoing HD from this point onwards  d/w son;

## 2018-12-05 NOTE — DISCHARGE NOTE ADULT - HOSPITAL COURSE
NSTEMI  s/p C #6 with CHAN PCI mCIRC Patient was admitted with Bradycardia and Acute on Chronic Kidney Injury. During hospitalization, he started complaining of chest pain - found to have NSTEMI. Had LHC with CHAN x 1. Postprocedure he was monitored closely. His symptoms have improved.  For his acute on chronic kidney injury, he was seen by Nephrology and was started on Dialysis. He is getting HD on Tuesday, Thursday and Saturday.  He is feeling much better and is stable for discharge. He will be discharged today after dialysis and will follow up at [facility] on Tuesday for outpatient dialysis.     Vital Signs   T(C): 37.1 (15 Dec 2018 11:05), Max: 37.3 (14 Dec 2018 15:25)  T(F): 98.8 (15 Dec 2018 11:05), Max: 99.1 (14 Dec 2018 15:25)  HR: 62 (15 Dec 2018 11:05) (61 - 67)  BP: 128/63 (15 Dec 2018 11:05) (118/48 - 136/66)  RR: 18 (15 Dec 2018 11:05) (18 - 19)  SpO2: 95% (15 Dec 2018 11:05) (93% - 97%)  General: Elderly male lying in bed comfortably. No acute distress  HEENT: PERRLA. EOMI. Clear conjunctivae. Moist mucus membrane  Neck: Supple. No JVD. No Thyromegaly   Chest: CTA bilaterally - no wheezing, rales or rhonchi. Permacath on right side of chest.   Heart: Normal S1 & S2. RRR. No murmur.   Abdomen: Soft. Non-tender. Non-distended. + BS  Ext: No pedal edema. No calf tenderness   Neuro: AAO x 3. No focal deficit. No speech disorder  Skin: Warm and Dry  Psychiatry: Normal mood and affect    Time spent: 39 minutes

## 2018-12-05 NOTE — PROGRESS NOTE ADULT - PROBLEM SELECTOR PROBLEM 1
Acute respiratory insufficiency
NSTEMI (non-ST elevated myocardial infarction)
Bradycardia
Bradycardia

## 2018-12-05 NOTE — PROGRESS NOTE ADULT - SUBJECTIVE AND OBJECTIVE BOX
INTERVAL HPI/OVERNIGHT EVENTS/SUBJECTIVE:  Pt seen and examined. s/p R IJ shiley placement. Dialyzed yesterday.     ICU Vital Signs Last 24 Hrs  T(C): 37.1 (05 Dec 2018 11:26), Max: 37.5 (05 Dec 2018 10:00)  T(F): 98.8 (05 Dec 2018 11:26), Max: 99.5 (05 Dec 2018 10:00)  HR: 66 (05 Dec 2018 14:34) (18 - 130)  BP: 132/63 (05 Dec 2018 14:34) (105/45 - 147/69)  BP(mean): --  ABP: --  ABP(mean): --  RR: 18 (05 Dec 2018 14:34) (18 - 19)  SpO2: 95% (05 Dec 2018 14:34) (90% - 98%)      I&O's Detail    04 Dec 2018 07:01  -  05 Dec 2018 07:00  --------------------------------------------------------  IN:    heparin  Infusion.: 42 mL    Oral Fluid: 240 mL    Other: 300 mL  Total IN: 582 mL    OUT:    Other: 2300 mL    Voided: 1400 mL  Total OUT: 3700 mL    Total NET: -3118 mL      05 Dec 2018 07:01  -  05 Dec 2018 16:56  --------------------------------------------------------  IN:  Total IN: 0 mL    OUT:    Voided: 925 mL  Total OUT: 925 mL    Total NET: -925 mL      ABG - ( 04 Dec 2018 06:50 )  pH, Arterial: 7.24  pH, Blood: x     /  pCO2: 49    /  pO2: 72    / HCO3: 19    / Base Excess: -6.7  /  SaO2: 91        MEDICATIONS  (STANDING):  ALBUTerol/ipratropium for Nebulization 3 milliLiter(s) Nebulizer every 6 hours  amLODIPine   Tablet 10 milliGRAM(s) Oral daily  aspirin enteric coated 81 milliGRAM(s) Oral daily  atorvastatin 80 milliGRAM(s) Oral at bedtime  clopidogrel Tablet 75 milliGRAM(s) Oral daily  DULoxetine 20 milliGRAM(s) Oral daily  heparin  Infusion.  Unit(s)/Hr (6.5 mL/Hr) IV Continuous <Continuous>  hydrALAZINE 50 milliGRAM(s) Oral every 8 hours  isosorbide   mononitrate ER Tablet (IMDUR) 30 milliGRAM(s) Oral daily  levothyroxine 100 MICROGram(s) Oral daily  loratadine 10 milliGRAM(s) Oral daily  metoprolol tartrate 50 milliGRAM(s) Oral two times a day  multivitamin 1 Tablet(s) Oral daily  pantoprazole    Tablet 40 milliGRAM(s) Oral before breakfast  PPD  5 Tuberculin Unit(s) Injectable 5 Unit(s) IntraDermal once  sodium chloride 0.65% Nasal 1 Spray(s) Both Nostrils every 4 hours    MEDICATIONS  (PRN):  acetaminophen   Tablet .. 650 milliGRAM(s) Oral every 6 hours PRN Temp greater or equal to 38C (100.4F), Moderate Pain (4 - 6)  aluminum hydroxide/magnesium hydroxide/simethicone Suspension 30 milliLiter(s) Oral every 4 hours PRN Dyspepsia  guaiFENesin    Syrup 200 milliGRAM(s) Oral every 6 hours PRN Cough  heparin  Injectable 3200 Unit(s) IV Push every 6 hours PRN For aPTT less than 40    MISC:     PHYSICAL EXAM:    Gen: NAD, laying comfortably  Neurological: sensations intact  Neck: R IJ shiley placed. neck soft.   Pulmonary: non-labored, no accessory muscle use  Cardiovascular: s1/s2  Skin: warm, dry, no rashes    LABS:  CBC Full  -  ( 05 Dec 2018 06:14 )  WBC Count : 7.7 K/uL  Hemoglobin : 10.1 g/dL  Hematocrit : 31.1 %  Platelet Count - Automated : 201 K/uL  Mean Cell Volume : 89.6 fl  Mean Cell Hemoglobin : 29.1 pg  Mean Cell Hemoglobin Concentration : 32.5 g/dL  Auto Neutrophil # : x  Auto Lymphocyte # : x  Auto Monocyte # : x  Auto Eosinophil # : x  Auto Basophil # : x  Auto Neutrophil % : x  Auto Lymphocyte % : x  Auto Monocyte % : x  Auto Eosinophil % : x  Auto Basophil % : x    12-05    134<L>  |  94<L>  |  65.0<H>  ----------------------------<  107  4.2   |  23.0  |  4.44<H>    Ca    8.6      05 Dec 2018 06:14    TPro  x   /  Alb  x   /  TBili  x   /  DBili  x   /  AST  x   /  ALT  16  /  AlkPhos  x   12-04    PT/INR - ( 03 Dec 2018 17:58 )   PT: 11.8 sec;   INR: 1.03 ratio         PTT - ( 05 Dec 2018 07:53 )  PTT:31.2 sec    LIVER FUNCTIONS - ( 04 Dec 2018 16:50 )  Alb: x     / Pro: x     / ALK PHOS: x     / ALT: 16 U/L / AST: x     / GGT: x           CARDIAC MARKERS ( 04 Dec 2018 01:20 )  x     / 1.09 ng/mL / x     / x     / x          ASSESSMENT/PLAN:  78yMale  h/o CAD, HLD, HTN with acute NSTEMI. Patient has ARIELA on CKD with worsening creatinine. Patient requiredurgent access for dialysis. R IJ placed yesterday.  -f/u vein mapping for eventual AVF/AVG  -HD through [facility] per primary team

## 2018-12-05 NOTE — DISCHARGE NOTE ADULT - PATIENT PORTAL LINK FT
You can access the [facility] Patient Portal, offered by [facility], by registering with the following website: [email or web address]

## 2018-12-05 NOTE — PROGRESS NOTE ADULT - SUBJECTIVE AND OBJECTIVE BOX
NPO>4 hours  ASA 3 Mallampati 2  Heparin DCd for LHC  Daughter at bedside  Dr. Hussein to consent  +troponins  [place] vague history of some sort of stenting  BIPAP for resp failure this admission  Persistent cough with green expectorate per pt.  Maintained NC O2 in CCL HR  Right IJ HD cath with DSD   Facial bandage for BIPAP/Mask skin tears  HD today per Dr. Loredo  ECHO:Summary:   1. Left ventricular ejection fraction, by visual estimation, is 60 to   65%.   2. Normal global left ventricular systolic function.   3. Basal anteroseptal segment and basal and mid inferior wall are   abnormalas described above.   4. Spectral Doppler shows pseudonormal pattern of left ventricular   myocardial filling (Grade II diastolic dysfunction).   5. Estimated pulmonary artery systolic pressure is 53.7 mmHg assuming a   right atrial pressure of 8 mmHg, which is consistent with moderate   pulmonary hypertension.   6. Endocardial visualization was enhanced with intravenous echo contrast.  Aspirin 81/Plavix 75 today NPO>4 hours  ASA 3 Mallampati 2  Heparin DCd for [facility]  Daughter at bedside  Dr. Hussein to consent  +troponins  s/[place]ABG [place] with vague history of some sort of stenting  BIPAP for resp failure this admission  Persistent cough with green expectorate per pt.  Maintained NC O2 in CCL HR  Right IJ HD cath with DSD   Facial bandage for BIPAP/Mask skin tears  HD today per Dr. Loredo  ECHO:Summary:   1. Left ventricular ejection fraction, by visual estimation, is 60 to   65%.   2. Normal global left ventricular systolic function.   3. Basal anteroseptal segment and basal and mid inferior wall are   abnormalas described above.   4. Spectral Doppler shows pseudonormal pattern of left ventricular   myocardial filling (Grade II diastolic dysfunction).   5. Estimated pulmonary artery systolic pressure is 53.7 mmHg assuming a   right atrial pressure of 8 mmHg, which is consistent with moderate   pulmonary hypertension.   6. Endocardial visualization was enhanced with intravenous echo contrast.  Aspirin 81/Plavix 75 today    1715  S/P [facility] RFA #6   CHAN [employer] [place] mCIRC 90% 3.0x15mm  Sutured insitu  Tolerated procedure well  D/W Dr Thurman contrast of 108cc Will do HD early morning and ordered Lasix 40 IVP q3 x3 doses  Nasal O2 4L in place          REVIEW OF SYSTEMS:  Denies SOB, CP, NV, HA, dizziness, palpitations, site pain    PHYSICAL EXAM: A&Ox3 NAD Skin warm and dry  NEURO: Speech intact +gag +swallow Tongue midline MORALES  NECK: No JVD, trachea midline. Eupneic  HEART: RRR NSR on tele/ECG ST T wave abn  PULMONARY:  CTA kimi  ABDOMEN: Soft nontender X4 +BS Vdg/eating  EXTREMITIES:RFA site: No bleed, hematoma, pain, ecchymosis or swelling Rt DP/PT+ NPO>4 hours  ASA 3 Mallampati 2  Heparin DCd for [facility]  Daughter at bedside  Dr. Hussein to consent  +troponins  [place]G [place] with vague history of some sort of stenting  BIPAP for resp failure this admission  Persistent cough with green expectorate per pt.  Maintained NC O2 in CCL HR  Right IJ HD cath with DSD   Facial bandage for BIPAP/Mask skin tears  HD today per Dr. Loredo  ECHO:Summary:   1. Left ventricular ejection fraction, by visual estimation, is 60 to   65%.   2. Normal global left ventricular systolic function.   3. Basal anteroseptal segment and basal and mid inferior wall are   abnormalas described above.   4. Spectral Doppler shows pseudonormal pattern of left ventricular   myocardial filling (Grade II diastolic dysfunction).   5. Estimated pulmonary artery systolic pressure is 53.7 mmHg assuming a   right atrial pressure of 8 mmHg, which is consistent with moderate   pulmonary hypertension.   6. Endocardial visualization was enhanced with intravenous echo contrast.  Aspirin 81/Plavix 75 today    1715  S/P [facility] RFA #6   CHAN [facility]olute [place] mCIRC 90% 3.0x15mm  Sutured insitu  Tolerated procedure well  D/W Dr Thurman contrast of 108cc Will do HD early morning and ordered Lasix 40 IVP q3 x3 doses  Nasal O2 4L in place          REVIEW OF SYSTEMS:  Denies SOB, CP, NV, HA, dizziness, palpitations, site pain    PHYSICAL EXAM: A&Ox3 NAD Skin warm and dry  NEURO: Speech intact +gag +swallow Tongue midline MORALES  NECK: No JVD, trachea midline. Eupneic  HEART: RRR NSR on tele/ECG ST T wave abn  PULMONARY:  CTA kimi  ABDOMEN: Soft nontender X4 +BS   EXTREMITIES:RFA site: No bleed, hematoma, pain, ecchymosis or swelling Rt DP/PT+ #6 RFA sheath sutured insitu NPO>4 hours  ASA 3 Mallampati 2  Heparin DCd for [facility]  Daughter at bedside  Dr. Hussein to consent  +troponins  [place] with vague history of some sort of stenting  BIPAP for resp failure this admission  Persistent cough with green expectorate per pt.  Maintained NC O2 in CCL HR  Right IJ HD cath with DSD   Facial bandage for BIPAP/Mask skin tears  HD today per Dr. Loredo  ECHO:Summary:   1. Left ventricular ejection fraction, by visual estimation, is 60 to   65%.   2. Normal global left ventricular systolic function.   3. Basal anteroseptal segment and basal and mid inferior wall are   abnormalas described above.   4. Spectral Doppler shows pseudonormal pattern of left ventricular   myocardial filling (Grade II diastolic dysfunction).   5. Estimated pulmonary artery systolic pressure is 53.7 mmHg assuming a   right atrial pressure of 8 mmHg, which is consistent with moderate   pulmonary hypertension.   6. Endocardial visualization was enhanced with intravenous echo contrast.  Aspirin 81/Plavix 75 today    1715  S/P [facility] RFA #6   CHAN [employer] [place] mCIRC 90% 3.0x15mm  Sutured insitu  Tolerated procedure well  D/W Dr Thurman contrast of 108cc Will do HD early morning and ordered Lasix 40 IVP q3 x3 doses  Nasal O2 4L in place          REVIEW OF SYSTEMS:  Denies SOB, CP, NV, HA, dizziness, palpitations, site pain    PHYSICAL EXAM: A&Ox3 NAD Skin warm and dry  NEURO: Speech intact +gag +swallow Tongue midline MORALES  NECK: No JVD, trachea midline. Eupneic  HEART: RRR NSR on tele/ECG ST T wave abn  PULMONARY:  CTA kimi  ABDOMEN: Soft nontender X4 +BS   EXTREMITIES:RFA site: No bleed, hematoma, pain, ecchymosis or swelling Rt DP/PT+ #6 RFA sheath sutured insitu  1930  #6 RFA sheath aseptically removed with adequate hemostasis w/DSTAT x25 minutes DSD applied report to NP given

## 2018-12-05 NOTE — PROGRESS NOTE ADULT - ASSESSMENT
79 y/o male was brought in by family for dry cough on and off for past 2 days, no fever, no sick contact. + nasal stuffiness  and scratchy throat. As per family every year around this time he gets this. no h/o asthma. no cp. no abd. pain. no n/v/d. no sob reported. In the er pt's hr was in mid 40's. pt. denies dizziness but reports some fatigue. Family is not aware if he has h/o bradycardia. pt's Cr is 3.89 from blood work done in ER. family does not know if pt. has any kidney problem.  As per family pt. was seen by pcp about 2 weeks ago and had blood work done but results are not known to family and they did not get any call about abnormal labs. Pt. was seen by nephrologist Dr. Costa yesterday as routine check up as per son in law but blood work from pcp office was not available so pt. was instructed to return when blood work is available. As per son in law pt's cough has topped while in the ER.  no other complaints. (28 Nov 2018 19:56)     Problem/Plan - 1:  ·  Problem: Pulmonary Edema.   Plan: Fluid overload .  Had Arlen HD catheter placed to start HD.  Nephrology recommended lasix drip.     Duonebs.    Problem/Plan -2   Problem: CAD with elevated troponin NSTEMI. Plan: TTE  EF 60-65% grade 2 diastolic dysfunction. Cardiology planning heart cath.    Continue ASA, Plavix. B-Blocker. Heparin infusion.  Trend cardiac enzyme.     Problem: Bradycardia.  Plan: Cardiology recommend adjust  Synthroid dosage upwards.      Problem/Plan - 3:  ·  Problem: Renal failure, unspecified chronicity.  Plan: ? ARIELA.  Renal indices slight worsening since starting loop diuretics . Monitor BMP.  Recall Renal  Dr. Miles.   Avoid nephrotoxic agents.         Problem/Plan - 4:  ·  Problem: Essential hypertension.  Plan: BP still elevated.   Hydralazine. Metoprolol 79 y/o male was brought in by family for dry cough on and off for past 2 days, no fever, no sick contact. + nasal stuffiness  and scratchy throat. As per family every year around this time he gets this. no h/o asthma. no cp. no abd. pain. no n/v/d. no sob reported. In the er pt's hr was in mid 40's. pt. denies dizziness but reports some fatigue. Family is not aware if he has h/o bradycardia. pt's Cr is 3.89 from blood work done in ER. family does not know if pt. has any kidney problem.  As per family pt. was seen by pcp about 2 weeks ago and had blood work done but results are not known to family and they did not get any call about abnormal labs. Pt. was seen by nephrologist Dr. Costa yesterday as routine check up as per son in law but blood work from pcp office was not available so pt. was instructed to return when blood work is available. As per son in law pt's cough has topped while in the ER.  no other complaints. (28 Nov 2018 19:56)     Problem/Plan - 1:  ·  Problem: Pulmonary Edema.   Plan: Fluid overload .  Had Arlen HD catheter placed and initiated HD to good effect.  Respiratory distress is resolving.      Duonebs.    Problem/Plan -2   Problem: CAD with elevated troponin NSTEMI. Plan: TTE  EF 60-65% grade 2 diastolic dysfunction. Cardiology planned for  heart cath today.     Continue ASA, Plavix. B-Blocker. Heparin infusion.     Problem: Bradycardia.  Plan: Cardiology recommend adjust  Synthroid dosage upwards.      Problem/Plan - 3:  ·  Problem: ESRD on HD.  Plan:  Initiated HD and  demonstrable clinical improvement in fluid overload symptoms.          Problem/Plan - 4:  ·  Problem: Essential hypertension.  Plan: BP still elevated.   Hydralazine. Metoprolol

## 2018-12-05 NOTE — DISCHARGE NOTE ADULT - NS AS ACTIVITY OBS
No Heavy lifting/straining/Do not make important decisions/Showering allowed Bathing allowed/Showering allowed/Walking-Indoors allowed/Walking-Outdoors allowed/Stairs allowed

## 2018-12-05 NOTE — PROGRESS NOTE ADULT - SUBJECTIVE AND OBJECTIVE BOX
[facility]                                                        Office: [address] [address]                                                       Telephone: [phone]. Fax:[phone]      CARDIOLOGY PROGRESS NOTE   (Ensign Cardiology)    Subjective: Patient seen and examined.  Reports pain at [facility] Cath site. Breathing is much better.  Tolerated UF yesterday.  For cardiac cath today.     ROS: No headache, no chest pain, no SOB, no palpitations, no dizziness, no nausea, no bleeding    Chronic Conditions:     CURRENT MEDICATIONS  amLODIPine   Tablet 10 milliGRAM(s) Oral daily  furosemide Infusion 15 mG/Hr IV Continuous <Continuous>  hydrALAZINE 50 milliGRAM(s) Oral every 8 hours  isosorbide   mononitrate ER Tablet (IMDUR) 30 milliGRAM(s) Oral daily  metoprolol tartrate 50 milliGRAM(s) Oral two times a day      ALBUTerol/ipratropium for Nebulization 3 milliLiter(s) Nebulizer every 6 hours  guaiFENesin    Syrup 200 milliGRAM(s) Oral every 6 hours PRN  loratadine 10 milliGRAM(s) Oral daily    acetaminophen   Tablet .. 650 milliGRAM(s) Oral every 6 hours PRN  DULoxetine 20 milliGRAM(s) Oral daily    aluminum hydroxide/magnesium hydroxide/simethicone Suspension 30 milliLiter(s) Oral every 4 hours PRN  pantoprazole    Tablet 40 milliGRAM(s) Oral before breakfast    atorvastatin 80 milliGRAM(s) Oral at bedtime  levothyroxine 100 MICROGram(s) Oral daily    aspirin enteric coated 81 milliGRAM(s) Oral daily  clopidogrel Tablet 75 milliGRAM(s) Oral daily  heparin  Infusion.  Unit(s)/Hr IV Continuous <Continuous>  heparin  Injectable 3200 Unit(s) IV Push every 6 hours PRN  multivitamin 1 Tablet(s) Oral daily  PPD  5 Tuberculin Unit(s) Injectable 5 Unit(s) IntraDermal once  sodium chloride 0.65% Nasal 1 Spray(s) Both Nostrils every 4 hours    	  TELEMETRY: SR, frequent PACs  Vitals:  T(C): 36.6 (12-05-18 @ 05:27), Max: 36.6 (12-05-18 @ 05:27)  HR: 18 (12-05-18 @ 08:53) (18 - 74)  BP: 121/55 (12-05-18 @ 05:27) (121/55 - 151/67)  RR: 18 (12-05-18 @ 05:27) (18 - 20)  SpO2: 96% (12-05-18 @ 08:53) (90% - 98%)  Wt(kg): --  I&O's Summary    04 Dec 2018 07:01  -  05 Dec 2018 07:00  --------------------------------------------------------  IN: 582 mL / OUT: 3700 mL / NET: -3118 mL    05 Dec 2018 07:01  -  05 Dec 2018 10:24  --------------------------------------------------------  IN: 0 mL / OUT: 925 mL / NET: -925 mL        Daily T(C): 36.6 (12-05-18 @ 05:27), Max: 36.6 (12-05-18 @ 05:27)  HR: 18 (12-05-18 @ 08:53) (18 - 74)  BP: 121/55 (12-05-18 @ 05:27) (121/55 - 151/67)  RR: 18 (12-05-18 @ 05:27) (18 - 20)  SpO2: 96% (12-05-18 @ 08:53) (90% - 98%)  Wt(kg): --    Daily     PHYSICAL EXAM:  Appearance: Normal, NAD	  HEENT:   Normal oral mucosa, PERRL, EOMI	  Lymphatic: No lymphadenopathy  Cardiovascular: Normal S1 S2, No JVD, No murmurs, No edema  Respiratory: decreased breath sounds mid to base bilaterally.   Psychiatry: A & O x 3, Mood & affect appropriate  Gastrointestinal:  Soft, Non-tender, + BS	  Skin: No rashes, No ecchymoses, No cyanosis  Neurologic: Non-focal  Extremities: Normal range of motion, No clubbing, cyanosis or edema  Vascular: Peripheral pulses palpable 2+ bilaterally, warm; R IJ venous access      ECG (tracing reviewed by me):  	    DIAGNOSTIC TESTING:  Echocardiogram (images reviewed by me):  < from: TTE Echo Complete w/Doppler (12.03.18 @ 11:22) >  Summary:   1. Left ventricular ejection fraction, by visual estimation, is 60 to   65%.   2. Normal global left ventricular systolic function.   3. Basal anteroseptal segment and basal and mid inferior wall are   abnormalas described above.   4. Spectral Doppler shows pseudonormal pattern of left ventricular   myocardial filling (Grade II diastolic dysfunction).   5. Estimated pulmonary artery systolic pressure is 53.7 mmHg assuming a   right atrial pressure of 8 mmHg, which is consistent with moderate   pulmonary hypertension.   6. Endocardial visualization was enhanced with intravenous echo contrast.    [id number] Arik Hernandez MD, Electronically signed on 12/3/2018 at 5:44:10   PM       < end of copied text >    Catheterization:  Stress Test:    CXR (image reviewed by me):  OTHER: 	    LABS:	 	  CARDIAC MARKERS:                                  10.1   7.7   )-----------( 201      ( 05 Dec 2018 06:14 )             31.1     12-05    134<L>  |  94<L>  |  65.0<H>  ----------------------------<  107  4.2   |  23.0  |  4.44<H>    Ca    8.6      05 Dec 2018 06:14    TPro  x   /  Alb  x   /  TBili  x   /  DBili  x   /  AST  x   /  ALT  16  /  AlkPhos  x   12-04    BNP:   Lipid Profile:   HgA1c:   TSH:

## 2018-12-05 NOTE — DISCHARGE NOTE ADULT - CARE PROVIDER_API CALL
Nora Euceda), Internal Medicine  [address] [place] [address]  Phone: [phone]  Fax: [phone]    Melanie San (DO), Cardiology; Internal Medicine  [address] [address]  Phone: [phone]  Fax: [phone]    Brigido Newberry (), Internal Medicine  [address] [place] [address]  Phone: [phone]  Fax: [phone]

## 2018-12-05 NOTE — PROGRESS NOTE ADULT - ASSESSMENT
# CAD s/p remote CABG 1996 presents with cough and atypical CP, patient REYES (-) x 3 tni and TTE LVEF 60%. NSTEMI in setting of labile hypertension. Repeat TTE with EF 60-65%, basal anteroseptal wall is akinetic with basal and mid inferior wall is hypokinetic.     # decompensated HFpEF/pulmonary edema - improved  # acute renal failure  # hypertension  # acute respiratory failure - now resolved after volume removal.  # bradycardia - stable  # hypothyroidism - on levothyroxine    for cardiac catheterization today  IV heparin  place on standing lasix dose - 60 mg IV q 12hr  aspirin/plavix/statin/imdur/amlodipine  HD planning per renal    D/W patient (via ), son

## 2018-12-05 NOTE — DISCHARGE NOTE ADULT - CARE PLAN
Principal Discharge DX:	NSTEMI (non-ST elevated myocardial infarction)  Goal:	OPTIMAL CARDAC FUNCTION  Assessment and plan of treatment:	COLLEEN San 2-4 WEEKS Principal Discharge DX:	NSTEMI (non-ST elevated myocardial infarction)  Goal:	OPTIMAL CARDAC FUNCTION  Assessment and plan of treatment:	Continue medications as prescribed.  Follow up with Dr. San in 2 weeks.  Secondary Diagnosis:	ESRD (end stage renal disease)  Assessment and plan of treatment:	Follow up with Nephrology for Dialysis on Tuesday / Thursday / Saturday at [facility].  Secondary Diagnosis:	HTN (hypertension)  Assessment and plan of treatment:	Continue medications as prescribed.  Follow up with PMD in 1 week.  Secondary Diagnosis:	HLD (hyperlipidemia)  Assessment and plan of treatment:	Continue medications as prescribed.  Follow up with PMD in 1 week. Principal Discharge DX:	NSTEMI (non-ST elevated myocardial infarction)  Goal:	OPTIMAL CARDIAC FUNCTION  Assessment and plan of treatment:	Continue medications as prescribed.  Follow up with Dr. San in 2 weeks.  Secondary Diagnosis:	ESRD (end stage renal disease)  Assessment and plan of treatment:	Follow up with Nephrology for Dialysis on Tuesday / Thursday / Saturday at [facility].  Secondary Diagnosis:	HTN (hypertension)  Assessment and plan of treatment:	Continue medications as prescribed.  Follow up with PMD in 1 week.  Secondary Diagnosis:	HLD (hyperlipidemia)  Assessment and plan of treatment:	Continue medications as prescribed.  Follow up with PMD in 1 week.

## 2018-12-05 NOTE — DISCHARGE NOTE ADULT - INSTRUCTIONS
No heavy lifting, driving, sex, tub baths, swimming, or any activity that submerges the lower half of the body in water for 48 hours.  Limited walking and stairs for 48 hours.    Change the bandaid after 24 hours and every 24 hours after that.  Keep the puncture site dry and covered with a bandaid until a scab forms.    Observe the site frequently.  If bleeding or a large lump (the size of a golf ball or bigger) occurs lie flat, apply continuous direct pressure just above the puncture site for at least 10 minutes, and notify your physician immediately.  If the bleeding cannot be controlled, call 911 immediately for assistance.  Notify your physician of pain, swelling or any drainage.    Notify your physician immediately if coldness, numbness, discoloration or pain in your foot occurs.  REMOVE RIGHT GROIN DRESSING WITHIN 24 HOURS OF DISCHARGE Renal / Low Salt / Low Fat

## 2018-12-05 NOTE — PROGRESS NOTE ADULT - SUBJECTIVE AND OBJECTIVE BOX
[facility] DIVISION OF KIDNEY DISEASES AND HYPERTENSION -- FOLLOW UP NOTE  --------------------------------------------------------------------------------  Chief Complaint: ARIELA on CKD    24 hour events/subjective:  Pt seen and examined  Tolerated UF yesterday  HD today      PAST HISTORY  --------------------------------------------------------------------------------  No significant changes to PMH, PSH, FHx, SHx, unless otherwise noted    ALLERGIES & MEDICATIONS  --------------------------------------------------------------------------------  Allergies    No Known Allergies    Intolerances      Standing Inpatient Medications  ALBUTerol/ipratropium for Nebulization 3 milliLiter(s) Nebulizer every 6 hours  amLODIPine   Tablet 10 milliGRAM(s) Oral daily  aspirin enteric coated 81 milliGRAM(s) Oral daily  atorvastatin 80 milliGRAM(s) Oral at bedtime  clopidogrel Tablet 75 milliGRAM(s) Oral daily  DULoxetine 20 milliGRAM(s) Oral daily  heparin  Infusion.  Unit(s)/Hr IV Continuous <Continuous>  hydrALAZINE 50 milliGRAM(s) Oral every 8 hours  isosorbide   mononitrate ER Tablet (IMDUR) 30 milliGRAM(s) Oral daily  levothyroxine 100 MICROGram(s) Oral daily  loratadine 10 milliGRAM(s) Oral daily  metoprolol tartrate 50 milliGRAM(s) Oral two times a day  multivitamin 1 Tablet(s) Oral daily  pantoprazole    Tablet 40 milliGRAM(s) Oral before breakfast  PPD  5 Tuberculin Unit(s) Injectable 5 Unit(s) IntraDermal once  sodium chloride 0.65% Nasal 1 Spray(s) Both Nostrils every 4 hours    PRN Inpatient Medications  acetaminophen   Tablet .. 650 milliGRAM(s) Oral every 6 hours PRN  aluminum hydroxide/magnesium hydroxide/simethicone Suspension 30 milliLiter(s) Oral every 4 hours PRN  guaiFENesin    Syrup 200 milliGRAM(s) Oral every 6 hours PRN  heparin  Injectable 3200 Unit(s) IV Push every 6 hours PRN      REVIEW OF SYSTEMS  --------------------------------------------------------------------------------  Gen: No weight changes, fatigue, fevers/chills, weakness  Skin: No rashes  Head/Eyes/Ears/Mouth: No headache; Normal hearing; Normal vision w/o blurriness; No sinus pain/discomfort, sore throat  Respiratory: No dyspnea, cough, wheezing, hemoptysis  CV: No chest pain, PND, orthopnea  GI: No abdominal pain, diarrhea, constipation, nausea, vomiting, melena, hematochezia  : No increased frequency, dysuria, hematuria, nocturia  MSK: No joint pain/swelling; no back pain; no edema  Neuro: No dizziness/lightheadedness, weakness, seizures, numbness, tingling  Heme: No easy bruising or bleeding  Endo: No heat/cold intolerance  Psych: No significant nervousness, anxiety, stress, depression    All other systems were reviewed and are negative, except as noted.    VITALS/PHYSICAL EXAM  --------------------------------------------------------------------------------  T(C): 37.1 (12-05-18 @ 11:26), Max: 37.5 (12-05-18 @ 10:00)  HR: 68 (12-05-18 @ 11:26) (18 - 130)  BP: 147/69 (12-05-18 @ 11:26) (105/45 - 151/67)  RR: 18 (12-05-18 @ 11:26) (18 - 20)  SpO2: 95% (12-05-18 @ 11:26) (90% - 98%)  Wt(kg): --        12-04-18 @ 07:01  -  12-05-18 @ 07:00  --------------------------------------------------------  IN: 582 mL / OUT: 3700 mL / NET: -3118 mL    12-05-18 @ 07:01  -  12-05-18 @ 12:58  --------------------------------------------------------  IN: 0 mL / OUT: 925 mL / NET: -925 mL      Physical Exam:  	Gen: NAD  	HEENT: PERRL, supple neck, clear oropharynx  	Pulm: CTA B/L  	CV: RRR, S1S2; no rub  	Back: No spinal or CVA tenderness; no sacral edema  	Abd: +BS, soft, nontender/nondistended  	: No suprapubic tenderness  	UE: Warm, FROM, no clubbing, intact strength; no edema; no asterixis  	LE: Warm, FROM, no clubbing, intact strength; no edema  	Neuro: No focal deficits, intact gait  	Psych: Normal affect and mood  	Skin: Warm, without rashes      LABS/STUDIES  --------------------------------------------------------------------------------              10.1   7.7   >-----------<  201      [12-05-18 @ 06:14]              31.1     134  |  94  |  65.0  ----------------------------<  107      [12-05-18 @ 06:14]  4.2   |  23.0  |  4.44        Ca     8.6     [12-05-18 @ 06:14]    TPro  x   /  Alb  x   /  TBili  x   /  DBili  x   /  AST  x   /  ALT  16  /  AlkPhos  x   [12-04-18 @ 16:50]    PT/INR: PT 11.8 , INR 1.03       [12-03-18 @ 17:58]  PTT: 31.2       [12-05-18 @ 07:53]    Troponin 1.09      [12-04-18 @ 01:20]        [12-03-18 @ 15:57]    Creatinine Trend:  SCr 4.44 [12-05 @ 06:14]  SCr 3.95 [12-04 @ 07:56]  SCr 3.58 [12-03 @ 07:03]  SCr 3.18 [12-01 @ 19:46]  SCr 3.19 [12-01 @ 06:30]        TSH 7.33      [11-29-18 @ 02:31]

## 2018-12-05 NOTE — DISCHARGE NOTE ADULT - PLAN OF CARE
COLLEEN San 2-4 WEEKS OPTIMAL CARDAC FUNCTION Continue medications as prescribed.  Follow up with Dr. San in 2 weeks. Follow up with Nephrology for Dialysis on Tuesday / Thursday / Saturday at [facility] in [place]. Continue medications as prescribed.  Follow up with PMD in 1 week. OPTIMAL CARDIAC FUNCTION

## 2018-12-05 NOTE — DISCHARGE NOTE ADULT - MEDICATION SUMMARY - MEDICATIONS TO TAKE
I will START or STAY ON the medications listed below when I get home from the hospital:    aspirin 81 mg oral tablet  -- 1 tab(s) by mouth once a day  -- Indication: For CAD (coronary artery disease)    isosorbide mononitrate 30 mg oral tablet, extended release  -- 1 tab(s) by mouth once a day   -- Do not drink alcoholic beverages when taking this medication.  It is very important that you take or use this exactly as directed.  Do not skip doses or discontinue unless directed by your doctor.  Swallow whole.  Do not crush.    -- Indication: For CAD (coronary artery disease)    gabapentin 100 mg oral capsule  -- 1 cap(s) by mouth 3 times a day x 30 days   -- It is very important that you take or use this exactly as directed.  Do not skip doses or discontinue unless directed by your doctor.  May cause drowsiness.  Alcohol may intensify this effect.  Use care when operating dangerous machinery.    -- Indication: For Neuropathy    Cymbalta 20 mg oral delayed release capsule  -- 20 milligram(s) by mouth once a day  -- Indication: For Neuropathy    fexofenadine 60 mg oral tablet  -- 60 milligram(s) by mouth once a day  -- Indication: For Allergy    atorvastatin 80 mg oral tablet  -- 1 tab(s) by mouth once a day (at bedtime)  -- Indication: For CAD (coronary artery disease)    Plavix 75 mg oral tablet  -- 1 tab(s) by mouth once a day  -- Indication: For CAD (coronary artery disease)    metoprolol tartrate 50 mg oral tablet  -- 1 tab(s) by mouth 2 times a day  -- Indication: For CAD (coronary artery disease)    amLODIPine 10 mg oral tablet  -- 1 tab(s) by mouth once a day  -- Indication: For HTN    epoetin nguyễn  -- 6000 unit(s) injectable Tuesday, Thursday, Saturday with dialysis  -- Indication: For Anemia    omeprazole 40 mg oral delayed release capsule  -- 1 cap(s) by mouth once a day  -- Indication: For GERD    levothyroxine 100 mcg (0.1 mg) oral tablet  -- 1 tab(s) by mouth once a day  -- Indication: For Hypothyroidism    hydrALAZINE 50 mg oral tablet  -- 1 tab(s) by mouth every 8 hours  -- Indication: For HTN    Nephro-Gloria oral tablet  -- 1 tab(s) by mouth once a day   -- Indication: For Vitamin Supplement I will START or STAY ON the medications listed below when I get home from the hospital:    aspirin 81 mg oral tablet  -- 1 tab(s) by mouth once a day  -- Indication: For CAD (coronary artery disease)    isosorbide mononitrate 30 mg oral tablet, extended release  -- 1 tab(s) by mouth once a day   -- Do not drink alcoholic beverages when taking this medication.  It is very important that you take or use this exactly as directed.  Do not skip doses or discontinue unless directed by your doctor.  Swallow whole.  Do not crush.    -- Indication: For CAD (coronary artery disease)    gabapentin 100 mg oral capsule  -- 1 cap(s) by mouth 3 times a day x 30 days   -- It is very important that you take or use this exactly as directed.  Do not skip doses or discontinue unless directed by your doctor.  May cause drowsiness.  Alcohol may intensify this effect.  Use care when operating dangerous machinery.    -- Indication: For Neuropathy    Cymbalta 20 mg oral delayed release capsule  -- 20 milligram(s) by mouth once a day  -- Indication: For Neuropathy    fexofenadine 60 mg oral tablet  -- 60 milligram(s) by mouth once a day  -- Indication: For Allergy    atorvastatin 80 mg oral tablet  -- 1 tab(s) by mouth once a day (at bedtime)  -- Indication: For CAD (coronary artery disease)    Plavix 75 mg oral tablet  -- 1 tab(s) by mouth once a day  -- Indication: For CAD (coronary artery disease)    metoprolol tartrate 50 mg oral tablet  -- 1 tab(s) by mouth 2 times a day  -- Indication: For HTN (hypertension)    amLODIPine 10 mg oral tablet  -- 1 tab(s) by mouth once a day  -- Indication: For HTN (hypertension)    epoetin nguyễn  -- 6000 unit(s) injectable Tuesday, Thursday, Saturday with dialysis  -- Indication: For Anemia    omeprazole 40 mg oral delayed release capsule  -- 1 cap(s) by mouth once a day  -- Indication: For GERD    levothyroxine 100 mcg (0.1 mg) oral tablet  -- 1 tab(s) by mouth once a day  -- Indication: For Hypothyroidism    hydrALAZINE 50 mg oral tablet  -- 1 tab(s) by mouth every 8 hours  -- Indication: For HTN (hypertension)    Nephro-Gloria oral tablet  -- 1 tab(s) by mouth once a day   -- Indication: For Vitamin Supplement

## 2018-12-05 NOTE — DISCHARGE NOTE ADULT - ADDITIONAL INSTRUCTIONS
Follow up with PMD in 1 week.  Follow up with Nephrology on Tuesday, 12/18/18 (3:45 pm) at [facility] ([address] [place] [address]).  Follow up with Cardio in 2 weeks.

## 2018-12-06 PROBLEM — Z00.00 ENCOUNTER FOR PREVENTIVE HEALTH EXAMINATION: Status: ACTIVE | Noted: 2018-12-06

## 2018-12-06 LAB
ABO RH CONFIRMATION: SIGNIFICANT CHANGE UP
ALBUMIN SERPL ELPH-MCNC: 3.6 G/DL — SIGNIFICANT CHANGE UP (ref 3.3–5.2)
ALP SERPL-CCNC: 41 U/L — SIGNIFICANT CHANGE UP (ref 40–120)
ALT FLD-CCNC: 14 U/L — SIGNIFICANT CHANGE UP
ANION GAP SERPL CALC-SCNC: 18 MMOL/L — HIGH (ref 5–17)
ANION GAP SERPL CALC-SCNC: 19 MMOL/L — HIGH (ref 5–17)
APTT BLD: 31.2 SEC — SIGNIFICANT CHANGE UP (ref 27.5–36.3)
APTT BLD: 31.3 SEC — SIGNIFICANT CHANGE UP (ref 27.5–36.3)
AST SERPL-CCNC: 26 U/L — SIGNIFICANT CHANGE UP
BASOPHILS # BLD AUTO: 0 K/UL — SIGNIFICANT CHANGE UP (ref 0–0.2)
BASOPHILS NFR BLD AUTO: 0.1 % — SIGNIFICANT CHANGE UP (ref 0–2)
BILIRUB SERPL-MCNC: 0.5 MG/DL — SIGNIFICANT CHANGE UP (ref 0.4–2)
BUN SERPL-MCNC: 52 MG/DL — HIGH (ref 8–20)
BUN SERPL-MCNC: 73 MG/DL — HIGH (ref 8–20)
CALCIUM SERPL-MCNC: 8.2 MG/DL — LOW (ref 8.6–10.2)
CALCIUM SERPL-MCNC: 8.6 MG/DL — SIGNIFICANT CHANGE UP (ref 8.6–10.2)
CHLORIDE SERPL-SCNC: 90 MMOL/L — LOW (ref 98–107)
CHLORIDE SERPL-SCNC: 94 MMOL/L — LOW (ref 98–107)
CO2 SERPL-SCNC: 20 MMOL/L — LOW (ref 22–29)
CO2 SERPL-SCNC: 21 MMOL/L — LOW (ref 22–29)
CREAT SERPL-MCNC: 3.4 MG/DL — HIGH (ref 0.5–1.3)
CREAT SERPL-MCNC: 4.65 MG/DL — HIGH (ref 0.5–1.3)
EOSINOPHIL # BLD AUTO: 0.2 K/UL — SIGNIFICANT CHANGE UP (ref 0–0.5)
EOSINOPHIL NFR BLD AUTO: 2.3 % — SIGNIFICANT CHANGE UP (ref 0–5)
GAS PNL BLDA: SIGNIFICANT CHANGE UP
GLUCOSE SERPL-MCNC: 124 MG/DL — HIGH (ref 70–115)
GLUCOSE SERPL-MCNC: 124 MG/DL — HIGH (ref 70–115)
HCT VFR BLD CALC: 31.8 % — LOW (ref 42–52)
HCT VFR BLD CALC: 32.6 % — LOW (ref 42–52)
HGB BLD-MCNC: 10.4 G/DL — LOW (ref 14–18)
HGB BLD-MCNC: 10.9 G/DL — LOW (ref 14–18)
INR BLD: 1.06 RATIO — SIGNIFICANT CHANGE UP (ref 0.88–1.16)
LYMPHOCYTES # BLD AUTO: 1.4 K/UL — SIGNIFICANT CHANGE UP (ref 1–4.8)
LYMPHOCYTES # BLD AUTO: 14.5 % — LOW (ref 20–55)
MAGNESIUM SERPL-MCNC: 1.9 MG/DL — SIGNIFICANT CHANGE UP (ref 1.6–2.6)
MCHC RBC-ENTMCNC: 28.9 PG — SIGNIFICANT CHANGE UP (ref 27–31)
MCHC RBC-ENTMCNC: 29.8 PG — SIGNIFICANT CHANGE UP (ref 27–31)
MCHC RBC-ENTMCNC: 32.7 G/DL — SIGNIFICANT CHANGE UP (ref 32–36)
MCHC RBC-ENTMCNC: 33.4 G/DL — SIGNIFICANT CHANGE UP (ref 32–36)
MCV RBC AUTO: 88.3 FL — SIGNIFICANT CHANGE UP (ref 80–94)
MCV RBC AUTO: 89.1 FL — SIGNIFICANT CHANGE UP (ref 80–94)
MONOCYTES # BLD AUTO: 0.4 K/UL — SIGNIFICANT CHANGE UP (ref 0–0.8)
MONOCYTES NFR BLD AUTO: 4.5 % — SIGNIFICANT CHANGE UP (ref 3–10)
NEUTROPHILS # BLD AUTO: 7.7 K/UL — SIGNIFICANT CHANGE UP (ref 1.8–8)
NEUTROPHILS NFR BLD AUTO: 78.2 % — HIGH (ref 37–73)
PHOSPHATE SERPL-MCNC: 4 MG/DL — SIGNIFICANT CHANGE UP (ref 2.4–4.7)
PLATELET # BLD AUTO: 210 K/UL — SIGNIFICANT CHANGE UP (ref 150–400)
PLATELET # BLD AUTO: 223 K/UL — SIGNIFICANT CHANGE UP (ref 150–400)
POTASSIUM SERPL-MCNC: 3.6 MMOL/L — SIGNIFICANT CHANGE UP (ref 3.5–5.3)
POTASSIUM SERPL-MCNC: 3.9 MMOL/L — SIGNIFICANT CHANGE UP (ref 3.5–5.3)
POTASSIUM SERPL-SCNC: 3.6 MMOL/L — SIGNIFICANT CHANGE UP (ref 3.5–5.3)
POTASSIUM SERPL-SCNC: 3.9 MMOL/L — SIGNIFICANT CHANGE UP (ref 3.5–5.3)
PROT SERPL-MCNC: 7.6 G/DL — SIGNIFICANT CHANGE UP (ref 6.6–8.7)
PROTHROM AB SERPL-ACNC: 12.2 SEC — SIGNIFICANT CHANGE UP (ref 10–12.9)
RBC # BLD: 3.6 M/UL — LOW (ref 4.6–6.2)
RBC # BLD: 3.66 M/UL — LOW (ref 4.6–6.2)
RBC # FLD: 13 % — SIGNIFICANT CHANGE UP (ref 11–15.6)
RBC # FLD: 13 % — SIGNIFICANT CHANGE UP (ref 11–15.6)
SODIUM SERPL-SCNC: 130 MMOL/L — LOW (ref 135–145)
SODIUM SERPL-SCNC: 132 MMOL/L — LOW (ref 135–145)
TROPONIN T SERPL-MCNC: 2.76 NG/ML — HIGH (ref 0–0.06)
WBC # BLD: 10.4 K/UL — SIGNIFICANT CHANGE UP (ref 4.8–10.8)
WBC # BLD: 9.9 K/UL — SIGNIFICANT CHANGE UP (ref 4.8–10.8)
WBC # FLD AUTO: 10.4 K/UL — SIGNIFICANT CHANGE UP (ref 4.8–10.8)
WBC # FLD AUTO: 9.9 K/UL — SIGNIFICANT CHANGE UP (ref 4.8–10.8)

## 2018-12-06 PROCEDURE — G0365: CPT | Mod: 26

## 2018-12-06 PROCEDURE — 74176 CT ABD & PELVIS W/O CONTRAST: CPT | Mod: 26

## 2018-12-06 PROCEDURE — 93010 ELECTROCARDIOGRAM REPORT: CPT

## 2018-12-06 PROCEDURE — 99232 SBSQ HOSP IP/OBS MODERATE 35: CPT

## 2018-12-06 PROCEDURE — 99233 SBSQ HOSP IP/OBS HIGH 50: CPT

## 2018-12-06 PROCEDURE — 93926 LOWER EXTREMITY STUDY: CPT | Mod: 26,RT

## 2018-12-06 PROCEDURE — 90937 HEMODIALYSIS REPEATED EVAL: CPT

## 2018-12-06 RX ORDER — CHLORHEXIDINE GLUCONATE 213 G/1000ML
1 SOLUTION TOPICAL DAILY
Refills: 0 | Status: DISCONTINUED | OUTPATIENT
Start: 2018-12-06 | End: 2018-12-15

## 2018-12-06 RX ORDER — LACTULOSE 10 G/15ML
10 SOLUTION ORAL ONCE
Refills: 0 | Status: COMPLETED | OUTPATIENT
Start: 2018-12-06 | End: 2018-12-06

## 2018-12-06 RX ORDER — DOCUSATE SODIUM 100 MG
100 CAPSULE ORAL DAILY
Refills: 0 | Status: DISCONTINUED | OUTPATIENT
Start: 2018-12-06 | End: 2018-12-15

## 2018-12-06 RX ORDER — SENNA PLUS 8.6 MG/1
2 TABLET ORAL AT BEDTIME
Refills: 0 | Status: DISCONTINUED | OUTPATIENT
Start: 2018-12-06 | End: 2018-12-15

## 2018-12-06 RX ADMIN — Medication 1 SPRAY(S): at 05:40

## 2018-12-06 RX ADMIN — ISOSORBIDE MONONITRATE 30 MILLIGRAM(S): 60 TABLET, EXTENDED RELEASE ORAL at 18:42

## 2018-12-06 RX ADMIN — ATORVASTATIN CALCIUM 80 MILLIGRAM(S): 80 TABLET, FILM COATED ORAL at 21:47

## 2018-12-06 RX ADMIN — Medication 50 MILLIGRAM(S): at 05:39

## 2018-12-06 RX ADMIN — Medication 650 MILLIGRAM(S): at 13:20

## 2018-12-06 RX ADMIN — Medication 3 MILLILITER(S): at 20:13

## 2018-12-06 RX ADMIN — LACTULOSE 10 GRAM(S): 10 SOLUTION ORAL at 21:48

## 2018-12-06 RX ADMIN — Medication 50 MILLIGRAM(S): at 18:42

## 2018-12-06 RX ADMIN — Medication 650 MILLIGRAM(S): at 15:28

## 2018-12-06 RX ADMIN — SENNA PLUS 2 TABLET(S): 8.6 TABLET ORAL at 21:47

## 2018-12-06 RX ADMIN — Medication 50 MILLIGRAM(S): at 21:47

## 2018-12-06 RX ADMIN — Medication 1 TABLET(S): at 13:18

## 2018-12-06 RX ADMIN — PANTOPRAZOLE SODIUM 40 MILLIGRAM(S): 20 TABLET, DELAYED RELEASE ORAL at 05:39

## 2018-12-06 RX ADMIN — AMLODIPINE BESYLATE 10 MILLIGRAM(S): 2.5 TABLET ORAL at 05:39

## 2018-12-06 RX ADMIN — Medication 3 MILLILITER(S): at 15:23

## 2018-12-06 RX ADMIN — Medication 40 MILLIGRAM(S): at 02:00

## 2018-12-06 RX ADMIN — TUBERCULIN PURIFIED PROTEIN DERIVATIVE 5 UNIT(S): 5 INJECTION, SOLUTION INTRADERMAL at 19:21

## 2018-12-06 RX ADMIN — Medication 3 MILLILITER(S): at 09:54

## 2018-12-06 RX ADMIN — Medication 100 MILLIGRAM(S): at 21:47

## 2018-12-06 RX ADMIN — Medication 1 SPRAY(S): at 18:43

## 2018-12-06 RX ADMIN — DULOXETINE HYDROCHLORIDE 20 MILLIGRAM(S): 30 CAPSULE, DELAYED RELEASE ORAL at 18:42

## 2018-12-06 RX ADMIN — Medication 100 MICROGRAM(S): at 19:42

## 2018-12-06 RX ADMIN — LORATADINE 10 MILLIGRAM(S): 10 TABLET ORAL at 18:42

## 2018-12-06 RX ADMIN — Medication 81 MILLIGRAM(S): at 13:18

## 2018-12-06 RX ADMIN — Medication 3 MILLILITER(S): at 03:08

## 2018-12-06 RX ADMIN — CLOPIDOGREL BISULFATE 75 MILLIGRAM(S): 75 TABLET, FILM COATED ORAL at 13:18

## 2018-12-06 NOTE — PROGRESS NOTE ADULT - ASSESSMENT
12/3/18 TTELVEF 60-65%, basal anteroseptum, basal to mid inferior hypokinesis, moderate diastolic dysfunction, moderate pulmonary hypertension  12/5/18 Catheterization: d/w Dr. Hussein - ANA to LAD patent, other grafts down.  Status post PCI to Lcx with CHAN x 1    # abdominal pain/ groin pain - groin hematoma, + bruit -  groin US to evaluate for pseudoaneurysm or AV fistula.  No retroperitoneal hematoma on CT.   # vasovagal syncope - triggered by abdominal pain.  Monitor on telemetry.    # CAD s/p remote CABG 1996 presents with cough and atypical CP initially normal wall motion on echo, then had NSTEMI with inferior hypokinesis on echo.  Cardiac cath s/p PCI to LCx with CHAN x 1.    # decompensated HFpEF/pulmonary edema - improved  # acute renal failure  # hypertension - well controlled on current regimen  # acute respiratory failure - now resolved after volume removal.  # bradycardia - stable, monitor while on BB.   # hypothyroidism - on levothyroxine    aspirin/plavix/statin/imdur/amlodipine  HD planning per renal    D/W patient, cardiology NP

## 2018-12-06 NOTE — PROGRESS NOTE ADULT - ASSESSMENT
77 y/o male was brought in by family for dry cough on and off for past 2 days, no fever, no sick contact. + nasal stuffiness  and scratchy throat. As per family every year around this time he gets this. no h/o asthma. no cp. no abd. pain. no n/v/d. no sob reported. In the er pt's hr was in mid 40's. pt. denies dizziness but reports some fatigue. Family is not aware if he has h/o bradycardia. pt's Cr is 3.89 from blood work done in ER. family does not know if pt. has any kidney problem.  As per family pt. was seen by pcp about 2 weeks ago and had blood work done but results are not known to family and they did not get any call about abnormal labs. Pt. was seen by nephrologist Dr. Yoon  yesterday as routine check up as per son in law but blood work from pcp office was not available so pt. was instructed to return when blood work is available. As per son in law pt's cough has topped while in the ER.  no other complaints. (28 Nov 2018 19:56)    Problem: CAD with elevated troponin NSTEMI. Plan: Status post PCI 12/5/18  [facility]  patent ANA to LAD ,other grafts occluded DESx1 to LCirc   TTE  EF 60-65% grade 2 diastolic dysfunction.    Continue ASA, Plavix. B-Blocker. Imdur     Problem/Plan - 3:  Problem: ESRD on HD.  Plan:  Initiated HD and  demonstrable clinical improvement in fluid overload symptoms.       Became unresponsive during HD session today. Severe hypotension. Fluid replaced. Hypotension resolved. Abdominal pain likely secondary to acute bowel ischemia from hypotension. Now resolved. HD session held to do tomorrow.     ·  Problem: Pulmonary Edema.   Plan: Fluid overload .  Initiated HD with ultrafiltration fluid removal. Respiratory distress is resolved..      Duonebs.    Problem: Bradycardia.  Plan: Cardiology recommend adjust  Synthroid dosage upwards.     Problem: Essential hypertension.  Plan: BP still elevated.   Hydralazine. Metoprolol 77 y/o male was brought in by family for dry cough on and off for past 2 days, no fever, no sick contact. + nasal stuffiness  and scratchy throat. As per family every year around this time he gets this. no h/o asthma. no cp. no abd. pain. no n/v/d. no sob reported. In the er pt's hr was in mid 40's. pt. denies dizziness but reports some fatigue. Family is not aware if he has h/o bradycardia. pt's Cr is 3.89 from blood work done in ER. family does not know if pt. has any kidney problem.  As per family pt. was seen by pcp about 2 weeks ago and had blood work done but results are not known to family and they did not get any call about abnormal labs. Pt. was seen by nephrologist Dr. Yoon  yesterday as routine check up as per son in law but blood work from pcp office was not available so pt. was instructed to return when blood work is available. As per son in law pt's cough has topped while in the ER.  no other complaints. (28 Nov 2018 19:56)    Problem: CAD with elevated troponin NSTEMI. Plan: Status post PCI 12/5/18  [facility]  patent ANA to LAD ,other grafts occluded DESx1 to LCirc   TTE  EF 60-65% grade 2 diastolic dysfunction.    Continue ASA, Plavix. B-Blocker. Imdur     Problem/Plan - 3:  Problem: ESRD on HD.  Plan:  Initiated HD and  demonstrable clinical improvement in fluid overload symptoms.       Became unresponsive during HD session today. Severe hypotension. Fluid replaced. Hypotension resolved. Abdominal pain likely secondary to acute bowel ischemia from hypotension. Abd ct showing small groin hematoma. No pseudoaneurysm on right groin arterial doppler. Abd pain is now resolved. HD session held to do tomorrow.     ·  Problem: Pulmonary Edema.   Plan: Fluid overload .  Initiated HD with ultrafiltration fluid removal. Respiratory distress is resolved..      Duonebs.    Problem: Bradycardia.  Plan: Cardiology recommend adjust  Synthroid dosage upwards.     Problem: Essential hypertension.  Plan: BP still elevated.   Hydralazine. Metoprolol

## 2018-12-06 NOTE — PROGRESS NOTE ADULT - SUBJECTIVE AND OBJECTIVE BOX
[facility]                                                        Office: [address] [address]                                                       Telephone: [phone]. Fax:[phone]      CARDIOLOGY PROGRESS NOTE   ([facility])    Subjective: Patient seen and examined.  Syncopal episode this am during HD - briefly unresponsive, hypotensive, preceded by abdominal pain.  Reports bilateral lower quadrant pain.  With right groin tenderness. CT abd showed groin hematoma.      ROS: No headache, no chest pain, no SOB, no palpitations, no dizziness, no nausea, no bleeding    Chronic Conditions:     CURRENT MEDICATIONS  amLODIPine   Tablet 10 milliGRAM(s) Oral daily  hydrALAZINE 50 milliGRAM(s) Oral every 8 hours  isosorbide   mononitrate ER Tablet (IMDUR) 30 milliGRAM(s) Oral daily  metoprolol tartrate 50 milliGRAM(s) Oral two times a day      ALBUTerol/ipratropium for Nebulization 3 milliLiter(s) Nebulizer every 6 hours  guaiFENesin    Syrup 200 milliGRAM(s) Oral every 6 hours PRN  loratadine 10 milliGRAM(s) Oral daily    acetaminophen   Tablet .. 650 milliGRAM(s) Oral every 6 hours PRN  DULoxetine 20 milliGRAM(s) Oral daily    aluminum hydroxide/magnesium hydroxide/simethicone Suspension 30 milliLiter(s) Oral every 4 hours PRN  pantoprazole    Tablet 40 milliGRAM(s) Oral before breakfast    atorvastatin 80 milliGRAM(s) Oral at bedtime  levothyroxine 100 MICROGram(s) Oral daily    aspirin enteric coated 81 milliGRAM(s) Oral daily  clopidogrel Tablet 75 milliGRAM(s) Oral daily  multivitamin 1 Tablet(s) Oral daily  PPD  5 Tuberculin Unit(s) Injectable 5 Unit(s) IntraDermal once  sodium chloride 0.65% Nasal 1 Spray(s) Both Nostrils every 4 hours    	  TELEMETRY: SR  Vitals:  T(C): 37 (12-06-18 @ 07:10), Max: 37.1 (12-05-18 @ 11:26)  HR: 64 (12-06-18 @ 09:00) (56 - 72)  BP: 128/56 (12-06-18 @ 09:00) (120/58 - 170/77)  RR: 18 (12-06-18 @ 09:00) (11 - 22)  SpO2: 98% (12-06-18 @ 09:00) (92% - 100%)  Wt(kg): --  I&O's Summary    05 Dec 2018 07:01  -  06 Dec 2018 07:00  --------------------------------------------------------  IN: 120 mL / OUT: 1225 mL / NET: -1105 mL    06 Dec 2018 07:01  -  06 Dec 2018 10:28  --------------------------------------------------------  IN: 0 mL / OUT: 175 mL / NET: -175 mL        Daily T(C): 37 (12-06-18 @ 07:10), Max: 37.1 (12-05-18 @ 11:26)  HR: 64 (12-06-18 @ 09:00) (56 - 72)  BP: 128/56 (12-06-18 @ 09:00) (120/58 - 170/77)  RR: 18 (12-06-18 @ 09:00) (11 - 22)  SpO2: 98% (12-06-18 @ 09:00) (92% - 100%)  Wt(kg): --    Daily     PHYSICAL EXAM:  Appearance: lethargic,	  HEENT:   Normal oral mucosa, PERRL, EOMI	  Lymphatic: No lymphadenopathy  Cardiovascular: Normal S1 S2, No JVD, No murmurs, No edema  Respiratory: Lungs clear to auscultation	  Psychiatry: A & O x 3, Mood & affect appropriate  Gastrointestinal:  Soft, Non-tender, + BS	  Skin: No rashes, No ecchymoses, No cyanosis  Neurologic: Non-focal  Extremities: Normal range of motion, No clubbing, cyanosis or edema  Vascular: Peripheral pulses palpable 2+ bilaterally, warm; right groin with bruit, tenderness to palpation.       ECG (tracing reviewed by me):  	    DIAGNOSTIC TESTING:  Echocardiogram (images reviewed by me): < from: TTE Echo Complete w/Doppler (12.03.18 @ 11:22) >  Summary:   1. Left ventricular ejection fraction, by visual estimation, is 60 to   65%.   2. Normal global left ventricular systolic function.   3. Basal anteroseptal segment and basal and mid inferior wall are   abnormalas described above.   4. Spectral Doppler shows pseudonormal pattern of left ventricular   myocardial filling (Grade II diastolic dysfunction).   5. Estimated pulmonary artery systolic pressure is 53.7 mmHg assuming a   right atrial pressure of 8 mmHg, which is consistent with moderate   pulmonary hypertension.   6. Endocardial visualization was enhanced with intravenous echo contrast.    < end of copied text >    Catheterization: d/w Dr. Keenan PEREZ to LAD patent, other grafts down.  Status post PCI to Lcx with CHAN x 1  Stress Test:    CXR (image reviewed by me):  OTHER: 	    LABS:	 	  CARDIAC MARKERS:                                  10.4   9.9   )-----------( 210      ( 06 Dec 2018 08:17 )             31.8     12-06    132<L>  |  94<L>  |  52.0<H>  ----------------------------<  124<H>  3.6   |  20.0<L>  |  3.40<H>    Ca    8.2<L>      06 Dec 2018 08:17  Phos  4.0     12-06  Mg     1.9     12-06    TPro  7.6  /  Alb  3.6  /  TBili  0.5  /  DBili  x   /  AST  26  /  ALT  14  /  AlkPhos  41  12-06    BNP:   Lipid Profile:   HgA1c:   TSH:

## 2018-12-06 NOTE — CHART NOTE - NSCHARTNOTEFT_GEN_A_CORE
Code Blue PGY 2/ PGY 3 Note  Case discussed with Hospitalist FREDY Herrera    79 y/o male PMH CAD, HTN, s/p CABG was brought in by family for dry cough on and off for past 2 days, no fever, no sick contact. + nasal stuffiness  and scratchy throat.   Patient admitted for pulmonary edema found in fluid overload, had tab HD catheter placed.      Code Blue team called because patient had abdominal pain, then developed syncopal episode while laying in bed, getting HD.  -patient was giving 600cc fluid, diuresed 300cc fluid, net positive 300cc fluid.     -Patient was seen and examined at the bedside by the Code Blue team.    Vital Signs Last 24 Hrs  T(C): 36.4 (06 Dec 2018 05:37), Max: 37.5 (05 Dec 2018 10:00)  T(F): 97.6 (06 Dec 2018 05:37), Max: 99.5 (05 Dec 2018 10:00)  HR: 69 (06 Dec 2018 05:37) (18 - 130)  BP: 126/62 (06 Dec 2018 05:37) (105/45 - 170/77)  RR: 16 (06 Dec 2018 05:37) (11 - 22)  SpO2: 96% (06 Dec 2018 05:37) (92% - 100%)    Gen: Elderly male, laying in bed, sleepy, waking up  HEENT: PERRL, constricted  from 3mm to 2mm b/l; supple neck, clear oropharynx  Pulm: CTA B/L  CV: RRR, S1S2; no rub;  Right groin catheter size, no bruit appreciated;  Abd: +BS, soft, nontender/nondistended  Extremities:  no edema/clubbing;  intact stength;   FROM;    Neuro: No focal deficits illicited;   Psych: sleepy;    SKIN:  bruising of Right lower abdomen skin;         12-05 @ 07:01  -  12-06 @ 07:00  --------------------------------------------------------  IN: 120 mL / OUT: 1225 mL / NET: -1105 mL                        10.1   7.7   )-----------( 201      ( 05 Dec 2018 06:14 )             31.1     12-05    134<L>  |  94<L>  |  65.0<H>  ----------------------------<  107  4.2   |  23.0  |  4.44<H>    Ca    8.6      05 Dec 2018 06:14    TPro  x   /  Alb  x   /  TBili  x   /  DBili  x   /  AST  x   /  ALT  16  /  AlkPhos  x   12-04         LIVER FUNCTIONS - ( 04 Dec 2018 16:50 )  Alb: x     / Pro: x     / ALK PHOS: x     / ALT: 16 U/L / AST: x     / GGT: x         PTT - ( 06 Dec 2018 02:45 )  PTT:31.2 sec    -EKG similar to previous on 12/5/18 on 5:03pm;    st dep in leads 1, v5, v6, mildly more pronounced;         Assessment- 79 y/o male PMH CAD, HTN, s/p CABG was brought in by family for dry cough on and off for past 2 days, no fever, no sick contact. + nasal stuffiness  and scratchy throat.   Patient admitted for pulmonary edema found in fluid overload, had tab HD catheter placed.      Code [facility] team called because patient had abdominal pain, then developed syncopal episode while laying in bed, getting HD.    Plan-  -Concern for possible electrolyte abnormality vs vasovagal syncope  -patient reported that he had abdominal pain preceding syncopal episode.   Patient has mild ttp abdominal exam, nd, no guarding;      -CT Abdomen stat;      -Pending cbc, cmp, mag, phos, trop,   -Patient is s/p cath yesterday, as per nurses;      -[facility]  Case discussed with Hospitalist , FREDY Ricardo    79 y/o male PMH CAD, HTN, s/p CABG was brought in by family for dry cough on and off for past 2 days, no fever, no sick contact. + nasal stuffiness  and scratchy throat.   Patient admitted for pulmonary edema found in fluid overload, had tab HD catheter placed.      [facility] team called because patient had abdominal pain, then developed syncopal episode while laying in bed, getting HD.  -patient was giving 600cc fluid, diuresed 300cc fluid, net positive 300cc fluid.     -Patient was seen and examined at the bedside by the [facility] team.    Vital Signs Last 24 Hrs  T(C): 36.4 (06 Dec 2018 05:37), Max: 37.5 (05 Dec 2018 10:00)  T(F): 97.6 (06 Dec 2018 05:37), Max: 99.5 (05 Dec 2018 10:00)  HR: 69 (06 Dec 2018 05:37) (18 - 130)  BP: 126/62 (06 Dec 2018 05:37) (105/45 - 170/77)  RR: 16 (06 Dec 2018 05:37) (11 - 22)  SpO2: 96% (06 Dec 2018 05:37) (92% - 100%)    Gen: Elderly male, laying in bed, sleepy, waking up  HEENT: PERRL, constricted  from 3mm to 2mm b/l; supple neck, clear oropharynx  Pulm: CTA B/L  CV: RRR, S1S2; no rub;  Right groin catheter size, no bruit appreciated;  Abd: +BS, soft, nondistended, mild ttp;    Extremities:  no edema/clubbing;  intact stength;   FROM;    Neuro: No focal deficits illicited;   Psych: sleepy;    SKIN:  bruising of Right lower abdomen skin;         12-05 @ 07:01  -  12-06 @ 07:00  --------------------------------------------------------  IN: 120 mL / OUT: 1225 mL / NET: -1105 mL                        10.1   7.7   )-----------( 201      ( 05 Dec 2018 06:14 )             31.1     12-05    134<L>  |  94<L>  |  65.0<H>  ----------------------------<  107  4.2   |  23.0  |  4.44<H>    Ca    8.6      05 Dec 2018 06:14    TPro  x   /  Alb  x   /  TBili  x   /  DBili  x   /  AST  x   /  ALT  16  /  AlkPhos  x   12-04         LIVER FUNCTIONS - ( 04 Dec 2018 16:50 )  Alb: x     / Pro: x     / ALK PHOS: x     / ALT: 16 U/L / AST: x     / GGT: x         PTT - ( 06 Dec 2018 02:45 )  PTT:31.2 sec    -EKG similar to previous on 12/5/18 on 5:03pm;    st dep in leads 1, v5, v6, mildly more pronounced;         Assessment- 79 y/o male PMH CAD, HTN, s/p CABG was brought in by family for dry cough on and off for past 2 days, no fever, no sick contact. + nasal stuffiness  and scratchy throat.   Patient admitted for pulmonary edema found in fluid overload, had tab HD catheter placed.      Code Blue team called because patient had abdominal pain, then developed syncopal episode while laying in bed, getting HD.    Plan-  -Concern for possible electrolyte abnormality vs vasovagal syncope  -patient reported that he had abdominal pain preceding syncopal episode.   Patient has mild ttp abdominal exam, nd, no guarding;      -CT Abdomen stat;      -Pending cbc, cmp, mag, phos, trop,   -Patient is s/p cath yesterday, as per nurses;      -[facility] , [facility] 3 [facility]  Case discussed with Hospitalist , FREDY Ricardo    79 y/o male PMH CAD, HTN, s/p CABG was brought in by family for dry cough on and off for past 2 days, no fever, no sick contact. + nasal stuffiness  and scratchy throat.   Patient admitted for pulmonary edema found in fluid overload, had tab HD catheter placed.      Code Blue team called because patient had abdominal pain, then developed syncopal episode while laying in bed, getting HD.  -reports diffuse abdominal pain that comes and goes;    -patient was giving 600cc fluid, diuresed 300cc fluid, net positive 300cc fluid.     -Patient was seen and examined at the bedside by the Code [facility] team.    Vital Signs Last 24 Hrs  T(C): 36.4 (06 Dec 2018 05:37), Max: 37.5 (05 Dec 2018 10:00)  T(F): 97.6 (06 Dec 2018 05:37), Max: 99.5 (05 Dec 2018 10:00)  HR: 69 (06 Dec 2018 05:37) (18 - 130)  BP: 126/62 (06 Dec 2018 05:37) (105/45 - 170/77)  RR: 16 (06 Dec 2018 05:37) (11 - 22)  SpO2: 96% (06 Dec 2018 05:37) (92% - 100%)    Gen: Elderly male, laying in bed, sleepy, waking up  HEENT: PERRL, constricted  from 3mm to 2mm b/l; supple neck, clear oropharynx  Pulm: CTA B/L  CV: RRR, S1S2; no rub;  Right groin catheter size, no bruit appreciated;  Abd: +BS, soft, nondistended, nt;     Extremities:  no edema/clubbing;  intact stength;   FROM;    Neuro: No focal deficits illicited;   Psych: sleepy;    SKIN:  bruising of Right lower abdomen skin;         12-05 @ 07:01  -  12-06 @ 07:00  --------------------------------------------------------  IN: 120 mL / OUT: 1225 mL / NET: -1105 mL                        10.1   7.7   )-----------( 201      ( 05 Dec 2018 06:14 )             31.1     12-05    134<L>  |  94<L>  |  65.0<H>  ----------------------------<  107  4.2   |  23.0  |  4.44<H>    Ca    8.6      05 Dec 2018 06:14    TPro  x   /  Alb  x   /  TBili  x   /  DBili  x   /  AST  x   /  ALT  16  /  AlkPhos  x   12-04         LIVER FUNCTIONS - ( 04 Dec 2018 16:50 )  Alb: x     / Pro: x     / ALK PHOS: x     / ALT: 16 U/L / AST: x     / GGT: x         PTT - ( 06 Dec 2018 02:45 )  PTT:31.2 sec    -EKG similar to previous on 12/5/18 on 5:03pm;    st dep in leads 1, v5, v6, mildly more pronounced;         Assessment- 79 y/o male PMH CAD, HTN, s/p CABG was brought in by family for dry cough on and off for past 2 days, no fever, no sick contact. + nasal stuffiness  and scratchy throat.   Patient admitted for pulmonary edema found in fluid overload, had tab HD catheter placed.  patient likely had hypoperpufsion to gi tract, resulting in diffuse abdominal pain;      Code Blue team called because patient had abdominal pain, then developed syncopal episode while laying in bed, getting HD.    Plan-  -Concern for possible electrolyte abnormality vs vasovagal syncope  -patient reported that he had abdominal pain preceding syncopal episode.   Patient has benign abdominal exam, nd, nt, no guarding;   abdominal pain coming and going;      -CT Abdomen stat;      -Pending cbc, cmp, mag, phos, trop,   -Patient is s/p cath yesterday, as per nurses;      -Case discussed with Hospitalist , FREDY Ricardo Code Blue PGY 3 Note  Case discussed with Hospitalist , [facility] WALKER Ricardo, Nephro     79 y/o male PMH CAD, HTN, s/p CABG was brought in by family for dry cough on and off for past 2 days, no fever, no sick contact. + nasal stuffiness  and scratchy throat.   Patient admitted for pulmonary edema found in fluid overload, had tab HD catheter placed.      Code Blue team called because patient had abdominal pain, then developed syncopal episode while laying in bed, getting HD.  -reports diffuse abdominal pain that comes and goes;    -patient was given 600cc fluid, diuresed 300cc fluid, net positive 300cc fluid.     -Patient was seen and examined at the bedside by the [facility] team.    Vital Signs Last 24 Hrs  T(C): 36.4 (06 Dec 2018 05:37), Max: 37.5 (05 Dec 2018 10:00)  T(F): 97.6 (06 Dec 2018 05:37), Max: 99.5 (05 Dec 2018 10:00)  HR: 69 (06 Dec 2018 05:37) (18 - 130)  BP: 126/62 (06 Dec 2018 05:37) (105/45 - 170/77)  RR: 16 (06 Dec 2018 05:37) (11 - 22)  SpO2: 96% (06 Dec 2018 05:37) (92% - 100%)    Gen: Elderly male, laying in bed, sleepy, waking up  HEENT: PERRL, constricted  from 3mm to 2mm b/l; supple neck, clear oropharynx  Pulm: CTA B/L  CV: RRR, S1S2; no rub;  Right groin catheter size, no bruit appreciated;  Abd: +BS, soft, nondistended, nt;     Extremities:  no edema/clubbing;  intact stength;   FROM;    Neuro: No focal deficits illicited;   Psych: sleepy;    SKIN:  bruising of Right lower abdomen skin;         12-05 @ 07:01  -  12-06 @ 07:00  --------------------------------------------------------  IN: 120 mL / OUT: 1225 mL / NET: -1105 mL                        10.1   7.7   )-----------( 201      ( 05 Dec 2018 06:14 )             31.1     12-05    134<L>  |  94<L>  |  65.0<H>  ----------------------------<  107  4.2   |  23.0  |  4.44<H>    Ca    8.6      05 Dec 2018 06:14    TPro  x   /  Alb  x   /  TBili  x   /  DBili  x   /  AST  x   /  ALT  16  /  AlkPhos  x   12-04         LIVER FUNCTIONS - ( 04 Dec 2018 16:50 )  Alb: x     / Pro: x     / ALK PHOS: x     / ALT: 16 U/L / AST: x     / GGT: x         PTT - ( 06 Dec 2018 02:45 )  PTT:31.2 sec    -EKG similar to previous on 12/5/18 on 5:03pm;    st dep in leads 1, v5, v6, mildly more pronounced;         Assessment- 79 y/o male PMH CAD, HTN, s/p CABG was brought in by family for dry cough on and off for past 2 days, no fever, no sick contact. + nasal stuffiness  and scratchy throat.   Patient admitted for pulmonary edema found in fluid overload, had tab HD catheter placed.  patient likely had hypoperpufsion to gi tract, resulting in diffuse abdominal pain;      Code [facility] team called because patient had abdominal pain, then developed syncopal episode while laying in bed, getting HD.    Plan-  -Concern for possible electrolyte abnormality vs vasovagal syncope  -patient reported that he had abdominal pain preceding syncopal episode.   Patient has benign abdominal exam, nd, nt, no guarding;   abdominal pain coming and going;      -CT Abdomen stat shows Hematoma of Right groin, Pulmonary Edema, spoke with Radiology ;    says he will put in an order for a midline;     -Pending cbc, cmp, mag, phos, trop,   -Patient is s/p cath yesterday;       -Case discussed with Hospitalist , FREDY Riacrdo Nephro

## 2018-12-06 NOTE — PROGRESS NOTE ADULT - SUBJECTIVE AND OBJECTIVE BOX
SUBJECTIVE:  Cardiology NP F/U note:  SP: C which revealed:  Patent ANA to LAD. Other grafts occluded.  Severe circumflex disease. PCI performed with 1 CHAN.  denies complaints of chest pain/sob/dizziness/palps overnight  this am went to HD and had brief beriod of unresponsiveness and hypotension/ volume added and BP normalized RRT was called  pt complained of right lower abd pain / CT abd done urgently revealed nothing acute in the abd but a sm. hematoma of the right groin      	  MEDICATIONS:  amLODIPine   Tablet 10 milliGRAM(s) Oral daily  hydrALAZINE 50 milliGRAM(s) Oral every 8 hours  isosorbide   mononitrate ER Tablet (IMDUR) 30 milliGRAM(s) Oral daily  metoprolol tartrate 50 milliGRAM(s) Oral two times a day  ALBUTerol/ipratropium for Nebulization 3 milliLiter(s) Nebulizer every 6 hours  guaiFENesin    Syrup 200 milliGRAM(s) Oral every 6 hours PRN  loratadine 10 milliGRAM(s) Oral daily  acetaminophen   Tablet .. 650 milliGRAM(s) Oral every 6 hours PRN  DULoxetine 20 milliGRAM(s) Oral daily  aluminum hydroxide/magnesium hydroxide/simethicone Suspension 30 milliLiter(s) Oral every 4 hours PRN  pantoprazole    Tablet 40 milliGRAM(s) Oral before breakfast  atorvastatin 80 milliGRAM(s) Oral at bedtime  levothyroxine 100 MICROGram(s) Oral daily  aspirin enteric coated 81 milliGRAM(s) Oral daily  clopidogrel Tablet 75 milliGRAM(s) Oral daily  multivitamin 1 Tablet(s) Oral daily  PPD  5 Tuberculin Unit(s) Injectable 5 Unit(s) IntraDermal once  sodium chloride 0.65% Nasal 1 Spray(s) Both Nostrils every 4 hours        PHYSICAL EXAM:    T(C): 36.8 (18 @ 08:00), Max: 37.1 (18 @ 11:26)  HR: 64 (18 @ 09:00) (56 - 72)  BP: 128/56 (18 @ 09:00) (120/58 - 170/77)  RR: 18 (18 @ 09:00) (11 - 22)  SpO2: 98% (18 @ 09:00) (92% - 100%)  Wt(kg): --    I&O's Summary    05 Dec 2018 07:  -  06 Dec 2018 07:00  --------------------------------------------------------  IN: 120 mL / OUT: 1225 mL / NET: -1105 mL    06 Dec 2018 07:  -  06 Dec 2018 11:02  --------------------------------------------------------  IN: 900 mL / OUT: 475 mL / NET: 425 mL        Daily     Daily Weight in k.5 (06 Dec 2018 07:10)    Appearance: lethargic / alert , will answer questions	  HEENT:   MM dry / Right neck HD cath in place  Lymphatic: No lymphadenopathy  Cardiovascular: Normal S1 S2,RRR 70's No JVD, No murmurs, No edema  Respiratory: Lungs clear to auscultation	  Psychiatry: Alert  lethargic at times.   Gastrointestinal:  firm + distended  + BS	  Skin: warm and dry  Neurologic: Non-focal  Extremities: Normal range of motion,:  Right Groin soft /small hematoma/ compressed x 15 min/ + tenderness/ + pulse   dressing removed  Vascular: Peripheral pulses difficult but palpable 1+ pulses bilaterally heard with doppler     TELEMETRY: 	RSR 70's / no events on tele    ECG:  	  RADIOLOGY:   DIAGNOSTIC TESTING:  [X ] Echocardiogram:    < from: TTE Echo Complete w/Doppler (18 @ 11:22) >   Left ventricular ejection fraction, by visual estimation, is 60 to   65%.   2. Normal global left ventricular systolic function.   3. Basal anteroseptal segment and basal and mid inferior wall are   abnormalas described above.   4. Spectral Doppler shows pseudonormal pattern of left ventricular   myocardial filling (Grade II diastolic dysfunction).   5. Estimated pulmonary artery systolic pressure is 53.7 mmHg assuming a   right atrial pressure of 8 mmHg, which is consistent with moderate   pulmonary hypertension.   6. Endocardial visualization was enhanced with intravenous echo contrast.    < end of copied text >  [ X]  Catheterization:    < from: Cardiac Cath Lab - Adult (18 @ 15:40) >   Patent ANA to LAD. Other grafts occluded.  Severe circumflex disease. PCI performed with 1 CHAN.      [ ] Stress Test:    OTHER: 	    LABS:	 	    CARDIAC MARKERS positive                                  10.4   9.9   )-----------( 210      ( 06 Dec 2018 08:17 )             31.8     12-    132<L>  |  94<L>  |  52.0<H>  ----------------------------<  124<H>  3.6   |  20.0<L>  |  3.40<H>    Ca    8.2<L>      06 Dec 2018 08:17  Phos  4.0     12-  Mg     1.9     12-    TPro  7.6  /  Alb  3.6  /  TBili  0.5  /  DBili  x   /  AST  26  /  ALT  14  /  AlkPhos  41  12-      ASSESSMENT:  78M presents with cough x 2 days/ noted to have bradycardia and ARF in ED/ + troponins/ NSTEMI  HX: Bipolar/ CAD/CABG/HTN/HLD/   SP: resp failure requiring BIPAP/ SP acute decompensated diastolic  HF pEF / improved   SP renal failure requiring HD  SP: [facility] 18 patent ANA to LAD / other grafts occluded/ DESx1 to Circ  no chest pain overnight / no sob / labs reviewed/ EKG s pending.   SP RRT this am for syncope and abdominal pain/ CT abd , nothing acute/ No RPB / except for right groin hematoma   compressed x 15 min .. area soft to begin with? / Tender      Plan:  continue current meds and management ASA/ Plavix/statin BB/ no ACE/ARB secondary to ARF  right groin sono to eval for pseudoanuerysm/ fistula: d/e Dr. San  HD per schedule  f/u sono results

## 2018-12-06 NOTE — PROGRESS NOTE ADULT - SUBJECTIVE AND OBJECTIVE BOX
Patient was seen and evaluated on dialysis.     Became Hypotensive w. Abdominal Pain,  no F/C  no swelling    T(C): 36.8 (12-06-18 @ 08:00), Max: 37 (12-06-18 @ 07:10)  HR: 67 (12-06-18 @ 11:39) (56 - 72)  BP: 128/56 (12-06-18 @ 09:00) (120/58 - 170/77)    PE ;  NAD, Pale,  lungs - CTA  CV gr 1 murmur,  No gallop or rub  Abd : soft, NT BS +, No masses  Ext- No edema  Neuro : Grossly intact, moving extremities     R - Groin site dry,                          10.4   9.9   )-----------( 210      ( 06 Dec 2018 08:17 )             31.8        12-06    132<L>  |  94<L>  |  52.0<H>  ----------------------------<  124<H>  3.6   |  20.0<L>  |  3.40<H>    Ca    8.2<L>      06 Dec 2018 08:17  Phos  4.0     12-06  Mg     1.9     12-06    TPro  7.6  /  Alb  3.6  /  TBili  0.5  /  DBili  x   /  AST  26  /  ALT  14  /  AlkPhos  41  12-06      MEDICATIONS  (STANDING):  acetaminophen   Tablet .. PRN  ALBUTerol/ipratropium for Nebulization  aluminum hydroxide/magnesium hydroxide/simethicone Suspension PRN  amLODIPine   Tablet  aspirin enteric coated  atorvastatin  clopidogrel Tablet  DULoxetine  guaiFENesin    Syrup PRN  hydrALAZINE  isosorbide   mononitrate ER Tablet (IMDUR)  levothyroxine  loratadine  metoprolol tartrate  multivitamin  pantoprazole    Tablet  PPD  5 Tuberculin Unit(s) Injectable  sodium chloride 0.65% Nasal    HD Terminated,     For CT Abdomen ( No IV C )     Continue  HD in AM,  D/W the PGY,

## 2018-12-06 NOTE — PROGRESS NOTE ADULT - ASSESSMENT
12/3/18 TTE LVEF 60-65%, basal anteroseptum, basal to mid inferior hypokinesis, moderate diastolic dysfunction, moderate pulmonary hypertension  12/5/18 Catheterization: d/w Dr. Hussein - ANA to LAD patent, other grafts down.         Status post PCI to Lcx with CHAN x 1    # abdominal pain/ groin pain - groin hematoma, + bruit -  groin US to evaluate for pseudoaneurysm or AV fistula.  No retroperitoneal hematoma on CT.   # vasovagal syncope - triggered by abdominal pain.  Monitor on telemetry.    # CAD s/p remote CABG 1996 presents with cough and atypical CP initially normal wall motion on echo, then had NSTEMI with inferior hypokinesis on echo.  Cardiac cath s/p PCI to LCx with CHAN x 1.    # decompensated HFpEF/ pulmonary edema - improved  # hypertension - well controlled on current regimen  # acute respiratory failure - now resolved after volume removal.  # bradycardia - stable, monitor while on BB.   # hypothyroidism - on levothyroxine    aspirin/ plavix/statin/imdur/amlodipine      HD - UF in AM,

## 2018-12-06 NOTE — PROGRESS NOTE ADULT - SUBJECTIVE AND OBJECTIVE BOX
CC: ESRD on HD. Improving renal function on HD.  Sudden severe hypertension 90/40  and acute mental change at HD today. Fluid was replaced and HD session was immediately aborted. To do HD tomorrow. Hypotension resolved. Abd pains, now resolved. No shortness of breath.   HPI:  79 y/o male was brought in by family for dry cough on and off for past 2 days, no fever, no sick contact. + nasal stuffiness  and scratchy throat. As per family every year around this time he gets this. no h/o asthma. no cp. no abd. pain. no n/v/d. no sob reported. In the er pt's hr was in mid 40's. pt. denies dizziness but reports some fatigue. Family is not aware if he has h/o bradycardia. pt's Cr is 3.89 from blood work done in ER. family does not know if pt. has any kidney problem.  As per family pt. was seen by pcp about 2 weeks ago and had blood work done but results are not known to family and they did not get any call about abnormal labs. Pt. was seen by nephrologist Dr. Costa yesterday as routine check up as per son in law but blood work from pcp office was not available so pt. was instructed to return when blood work is available. As per son in law pt's cough has topped while in the ER.  no other complaints. (28 Nov 2018 19:56)    REVIEW OF SYSTEMS:    Patient denied fever, chills, abdominal pain, nausea, vomiting, cough, shortness of breath, chest pain or palpitations    Vital Signs Last 24 Hrs  T(C): 36.8 (06 Dec 2018 08:00), Max: 37 (06 Dec 2018 07:10)  T(F): 98.2 (06 Dec 2018 08:00), Max: 98.6 (06 Dec 2018 07:10)  HR: 65 (06 Dec 2018 12:05) (56 - 72)  BP: 128/56 (06 Dec 2018 09:00) (120/58 - 170/77)  BP(mean): --  RR: 18 (06 Dec 2018 11:39) (11 - 22)  SpO2: 94% (06 Dec 2018 12:05) (92% - 100%)I&O's Summary    05 Dec 2018 07:01  -  06 Dec 2018 07:00  --------------------------------------------------------  IN: 120 mL / OUT: 1225 mL / NET: -1105 mL    06 Dec 2018 07:01  -  06 Dec 2018 16:02  --------------------------------------------------------  IN: 900 mL / OUT: 475 mL / NET: 425 mL      PHYSICAL EXAM:  GENERAL: NAD,   HEENT: PERRL, +EOMI, anicteric, no [place]  NECK: Supple, No JVD   CHEST/LUNG: CTA bilaterally; Normal effort  HEART: S1S2 Normal intensity, no murmurs, gallops or rubs noted  ABDOMEN: Soft, BS Normoactive, NT, ND, no HSM noted  EXTREMITIES:  2+ radial and DP pulses noted, no clubbing, cyanosis, or edema noted, Limited mobility   SKIN: No rashes or lesions noted  NEURO: A&O, no focal deficits noted, CN II-XII intact  PSYCH: Depression  mood and affect; insight/judgement appropriate  LABS:                        10.4   9.9   )-----------( 210      ( 06 Dec 2018 08:17 )             31.8     12-06    132<L>  |  94<L>  |  52.0<H>  ----------------------------<  124<H>  3.6   |  20.0<L>  |  3.40<H>    Ca    8.2<L>      06 Dec 2018 08:17  Phos  4.0     12-06  Mg     1.9     12-06    TPro  7.6  /  Alb  3.6  /  TBili  0.5  /  DBili  x   /  AST  26  /  ALT  14  /  AlkPhos  41  12-06    PT/INR - ( 06 Dec 2018 06:41 )   PT: 12.2 sec;   INR: 1.06 ratio         PTT - ( 06 Dec 2018 06:41 )  PTT:31.3 sec    RADIOLOGY & ADDITIONAL TESTS:    MEDICATIONS:  MEDICATIONS  (STANDING):  ALBUTerol/ipratropium for Nebulization 3 milliLiter(s) Nebulizer every 6 hours  amLODIPine   Tablet 10 milliGRAM(s) Oral daily  aspirin enteric coated 81 milliGRAM(s) Oral daily  atorvastatin 80 milliGRAM(s) Oral at bedtime  chlorhexidine 2% Cloths 1 Application(s) Topical daily  clopidogrel Tablet 75 milliGRAM(s) Oral daily  DULoxetine 20 milliGRAM(s) Oral daily  hydrALAZINE 50 milliGRAM(s) Oral every 8 hours  isosorbide   mononitrate ER Tablet (IMDUR) 30 milliGRAM(s) Oral daily  levothyroxine 100 MICROGram(s) Oral daily  loratadine 10 milliGRAM(s) Oral daily  metoprolol tartrate 50 milliGRAM(s) Oral two times a day  multivitamin 1 Tablet(s) Oral daily  pantoprazole    Tablet 40 milliGRAM(s) Oral before breakfast  PPD  5 Tuberculin Unit(s) Injectable 5 Unit(s) IntraDermal once  sodium chloride 0.65% Nasal 1 Spray(s) Both Nostrils every 4 hours    MEDICATIONS  (PRN):  acetaminophen   Tablet .. 650 milliGRAM(s) Oral every 6 hours PRN Temp greater or equal to 38C (100.4F), Moderate Pain (4 - 6)  aluminum hydroxide/magnesium hydroxide/simethicone Suspension 30 milliLiter(s) Oral every 4 hours PRN Dyspepsia  guaiFENesin    Syrup 200 milliGRAM(s) Oral every 6 hours PRN Cough

## 2018-12-06 NOTE — CHART NOTE - NSCHARTNOTEFT_GEN_A_CORE
Rapid response f/u note  Pt. seen at bedside at US getting right groin sono to eval for pseudoanuerysm/ fistula.  Resting comfortably in bed, in NAD  No acute recommendations at this time.  Remaining management as per primary team.

## 2018-12-07 LAB
ANION GAP SERPL CALC-SCNC: 19 MMOL/L — HIGH (ref 5–17)
BUN SERPL-MCNC: 64 MG/DL — HIGH (ref 8–20)
CALCIUM SERPL-MCNC: 8.6 MG/DL — SIGNIFICANT CHANGE UP (ref 8.6–10.2)
CHLORIDE SERPL-SCNC: 92 MMOL/L — LOW (ref 98–107)
CO2 SERPL-SCNC: 22 MMOL/L — SIGNIFICANT CHANGE UP (ref 22–29)
CREAT SERPL-MCNC: 5.05 MG/DL — HIGH (ref 0.5–1.3)
GLUCOSE SERPL-MCNC: 119 MG/DL — HIGH (ref 70–115)
HCT VFR BLD CALC: 31.7 % — LOW (ref 42–52)
HGB BLD-MCNC: 10.5 G/DL — LOW (ref 14–18)
MCHC RBC-ENTMCNC: 29.4 PG — SIGNIFICANT CHANGE UP (ref 27–31)
MCHC RBC-ENTMCNC: 33.1 G/DL — SIGNIFICANT CHANGE UP (ref 32–36)
MCV RBC AUTO: 88.8 FL — SIGNIFICANT CHANGE UP (ref 80–94)
MRSA PCR RESULT.: SIGNIFICANT CHANGE UP
PLATELET # BLD AUTO: 213 K/UL — SIGNIFICANT CHANGE UP (ref 150–400)
POTASSIUM SERPL-MCNC: 4.2 MMOL/L — SIGNIFICANT CHANGE UP (ref 3.5–5.3)
POTASSIUM SERPL-SCNC: 4.2 MMOL/L — SIGNIFICANT CHANGE UP (ref 3.5–5.3)
RBC # BLD: 3.57 M/UL — LOW (ref 4.6–6.2)
RBC # FLD: 12.7 % — SIGNIFICANT CHANGE UP (ref 11–15.6)
S AUREUS DNA NOSE QL NAA+PROBE: DETECTED
SODIUM SERPL-SCNC: 133 MMOL/L — LOW (ref 135–145)
WBC # BLD: 15 K/UL — HIGH (ref 4.8–10.8)
WBC # FLD AUTO: 15 K/UL — HIGH (ref 4.8–10.8)

## 2018-12-07 PROCEDURE — 90937 HEMODIALYSIS REPEATED EVAL: CPT

## 2018-12-07 PROCEDURE — 99232 SBSQ HOSP IP/OBS MODERATE 35: CPT

## 2018-12-07 PROCEDURE — 99233 SBSQ HOSP IP/OBS HIGH 50: CPT

## 2018-12-07 RX ORDER — FUROSEMIDE 40 MG
40 TABLET ORAL DAILY
Refills: 0 | Status: DISCONTINUED | OUTPATIENT
Start: 2018-12-07 | End: 2018-12-15

## 2018-12-07 RX ORDER — ONDANSETRON 8 MG/1
4 TABLET, FILM COATED ORAL EVERY 4 HOURS
Refills: 0 | Status: DISCONTINUED | OUTPATIENT
Start: 2018-12-07 | End: 2018-12-15

## 2018-12-07 RX ADMIN — Medication 3 MILLILITER(S): at 09:01

## 2018-12-07 RX ADMIN — Medication 1 SPRAY(S): at 17:40

## 2018-12-07 RX ADMIN — CHLORHEXIDINE GLUCONATE 1 APPLICATION(S): 213 SOLUTION TOPICAL at 11:55

## 2018-12-07 RX ADMIN — PANTOPRAZOLE SODIUM 40 MILLIGRAM(S): 20 TABLET, DELAYED RELEASE ORAL at 05:17

## 2018-12-07 RX ADMIN — Medication 1 SPRAY(S): at 05:19

## 2018-12-07 RX ADMIN — Medication 50 MILLIGRAM(S): at 17:39

## 2018-12-07 RX ADMIN — Medication 100 MICROGRAM(S): at 05:17

## 2018-12-07 RX ADMIN — Medication 50 MILLIGRAM(S): at 21:26

## 2018-12-07 RX ADMIN — Medication 1 SPRAY(S): at 12:01

## 2018-12-07 RX ADMIN — DULOXETINE HYDROCHLORIDE 20 MILLIGRAM(S): 30 CAPSULE, DELAYED RELEASE ORAL at 17:39

## 2018-12-07 RX ADMIN — Medication 3 MILLILITER(S): at 21:08

## 2018-12-07 RX ADMIN — Medication 3 MILLILITER(S): at 03:20

## 2018-12-07 RX ADMIN — LORATADINE 10 MILLIGRAM(S): 10 TABLET ORAL at 17:40

## 2018-12-07 RX ADMIN — CLOPIDOGREL BISULFATE 75 MILLIGRAM(S): 75 TABLET, FILM COATED ORAL at 12:01

## 2018-12-07 RX ADMIN — Medication 50 MILLIGRAM(S): at 05:18

## 2018-12-07 RX ADMIN — Medication 1 TABLET(S): at 12:01

## 2018-12-07 RX ADMIN — Medication 1 SPRAY(S): at 03:44

## 2018-12-07 RX ADMIN — ISOSORBIDE MONONITRATE 30 MILLIGRAM(S): 60 TABLET, EXTENDED RELEASE ORAL at 17:39

## 2018-12-07 RX ADMIN — Medication 50 MILLIGRAM(S): at 05:16

## 2018-12-07 RX ADMIN — Medication 100 MILLIGRAM(S): at 12:01

## 2018-12-07 RX ADMIN — Medication 1 SPRAY(S): at 21:27

## 2018-12-07 RX ADMIN — SENNA PLUS 2 TABLET(S): 8.6 TABLET ORAL at 21:27

## 2018-12-07 RX ADMIN — ATORVASTATIN CALCIUM 80 MILLIGRAM(S): 80 TABLET, FILM COATED ORAL at 21:26

## 2018-12-07 RX ADMIN — Medication 81 MILLIGRAM(S): at 12:01

## 2018-12-07 RX ADMIN — AMLODIPINE BESYLATE 10 MILLIGRAM(S): 2.5 TABLET ORAL at 05:16

## 2018-12-07 NOTE — PROGRESS NOTE ADULT - ASSESSMENT
79 y/o male was brought in by family for dry cough on and off for past 2 days, no fever, no sick contact. + nasal stuffiness  and scratchy throat. As per family every year around this time he gets this. no h/o asthma. no cp. no abd. pain. no n/v/d. no sob reported. In the er pt's hr was in mid 40's. pt. denies dizziness but reports some fatigue. Family is not aware if he has h/o bradycardia. pt's Cr is 3.89 from blood work done in ER. family does not know if pt. has any kidney problem.  As per family pt. was seen by pcp about 2 weeks ago and had blood work done but results are not known to family and they did not get any call about abnormal labs. Pt. was seen by nephrologist Dr. Yoon  yesterday as routine check up as per son in law but blood work from pcp office was not available so pt. was instructed to return when blood work is available. As per son in law pt's cough has topped while in the ER.  no other complaints. (28 Nov 2018 19:56)    Problem: CAD with elevated troponin NSTEMI. Plan: Status post PCI 12/5/18  [facility]  patent ANA to LAD ,other grafts occluded DESx1 to LCirc   TTE  EF 60-65% grade 2 diastolic dysfunction.    Continue ASA, Plavix. B-Blocker. Imdur     Problem/Plan - 3:  Problem: ESRD on HD.  Plan:  Initiated HD and  demonstrable clinical improvement in fluid overload symptoms.       Became unresponsive during HD session today. Severe hypotension. Fluid replaced. Hypotension resolved. Abdominal pain likely secondary to acute bowel ischemia from hypotension. Abd ct showing small groin hematoma. No pseudoaneurysm on right groin arterial doppler. Abd pain is now resolved. HD session held to do tomorrow.     ·  Problem: Pulmonary Edema.   Plan: Fluid overload .  Initiated HD with ultrafiltration fluid removal. Respiratory distress is resolved..      Duonebs.    Problem: Bradycardia.  Plan: Cardiology recommend adjust  Synthroid dosage upwards.     Problem: Essential hypertension.  Plan: BP still elevated.   Hydralazine. Metoprolol 77 y/o male was brought in by family for dry cough on and off for past 2 days, no fever, no sick contact. + nasal stuffiness  and scratchy throat. As per family every year around this time he gets this. no h/o asthma. no cp. no abd. pain. no n/v/d. no sob reported. In the er pt's hr was in mid 40's. pt. denies dizziness but reports some fatigue. Family is not aware if he has h/o bradycardia. pt's Cr is 3.89 from blood work done in ER. family does not know if pt. has any kidney problem.  As per family pt. was seen by pcp about 2 weeks ago and had blood work done but results are not known to family and they did not get any call about abnormal labs. Pt. was seen by nephrologist Dr. Yoon  yesterday as routine check up as per son in law but blood work from pcp office was not available so pt. was instructed to return when blood work is available. As per son in law pt's cough has topped while in the ER.  no other complaints. (28 Nov 2018 19:56)    Problem: CAD with elevated troponin NSTEMI. Plan: Status post PCI 12/5/18  [facility]  patent ANA to LAD ,other grafts occluded DESx1 to LCirc   TTE  EF 60-65% grade 2 diastolic dysfunction.    Continue ASA, Plavix. B-Blocker. Imdur     Problem/Plan - 3:  Problem: ESRD on HD.  Plan:  Initiated HD and  demonstrable clinical improvement in fluid overload symptoms.       Became altered during HD session yesterday Severe hypotension. Fluid replaced. Hypotension resolved. Abdominal pain likely secondary to acute bowel ischemia from hypotension. Abd ct showing small groin hematoma. No pseudoaneurysm on right groin arterial doppler. Abd pain is now resolved. HD session held to do today.     ·  Problem: CHF  chronic diastolic dysfunction with Pulmonary Edema.   Plan: Fluid overload .  Initiated HD with ultrafiltration fluid removal. Respiratory distress is resolved.      Duonebs.    Problem: Bradycardia.  Plan: Heart rate is normalized.  Synthroid dosage upped.     Problem: Essential hypertension.  Plan: BP still elevated.   Hydralazine. Metoprolol    Disposition: Supportive care.  HD. [facility]waiting [facility] for HD.

## 2018-12-07 NOTE — PROGRESS NOTE ADULT - SUBJECTIVE AND OBJECTIVE BOX
CC: ESRD on HD. Cough and shortness of breath is resolved. Had episode of hypotension with confusion yesterday at HD   HPI:  77 y/o male was brought in by family for dry cough on and off for past 2 days, no fever, no sick contact. + nasal stuffiness  and scratchy throat. As per family every year around this time he gets this. no h/o asthma. no cp. no abd. pain. no n/v/d. no sob reported. In the er pt's hr was in mid 40's. pt. denies dizziness but reports some fatigue. Family is not aware if he has h/o bradycardia. pt's Cr is 3.89 from blood work done in ER. family does not know if pt. has any kidney problem.  As per family pt. was seen by pcp about 2 weeks ago and had blood work done but results are not known to family and they did not get any call about abnormal labs. Pt. was seen by nephrologist Dr. Costa yesterday as routine check up as per son in law but blood work from pcp office was not available so pt. was instructed to return when blood work is available. As per son in law pt's cough has topped while in the ER.  no other complaints. (28 Nov 2018 19:56)    REVIEW OF SYSTEMS:    Patient denied fever, chills, abdominal pain, nausea, vomiting, cough, shortness of breath, chest pain or palpitations    Vital Signs Last 24 Hrs  T(C): 36.9 (07 Dec 2018 05:14), Max: 36.9 (06 Dec 2018 21:46)  T(F): 98.5 (07 Dec 2018 05:14), Max: 98.5 (06 Dec 2018 21:46)  HR: 70 (07 Dec 2018 09:20) (65 - 87)  BP: 161/82 (07 Dec 2018 05:14) (130/48 - 161/82)  BP(mean): --  RR: 18 (07 Dec 2018 05:14) (16 - 18)  SpO2: 96% (07 Dec 2018 09:20) (94% - 99%)I&O's Summary    06 Dec 2018 07:01  -  07 Dec 2018 07:00  --------------------------------------------------------  IN: 900 mL / OUT: 475 mL / NET: 425 mL      PHYSICAL EXAM:  GENERAL: NAD, well-groomed  HEENT: PERRL, +EOMI, anicteric, no [place]  NECK: Supple, No JVD   CHEST/LUNG: CTA bilaterally; Normal effort  HEART: S1S2 Normal intensity, no murmurs, gallops or rubs noted  ABDOMEN: Soft, BS Normoactive, NT, ND, no HSM noted  EXTREMITIES:  2+ radial and DP pulses noted, no clubbing, cyanosis, or edema noted, FROM x 4  SKIN: No rashes or lesions noted  NEURO: A&Ox3, no focal deficits noted, CN II-XII intact  PSYCH: normal mood and affect; insight/judgement appropriate  LABS:                        10.5   15.0  )-----------( 213      ( 07 Dec 2018 05:47 )             31.7     12-07    133<L>  |  92<L>  |  64.0<H>  ----------------------------<  119<H>  4.2   |  22.0  |  5.05<H>    Ca    8.6      07 Dec 2018 05:47  Phos  4.0     12-06  Mg     1.9     12-06    TPro  7.6  /  Alb  3.6  /  TBili  0.5  /  DBili  x   /  AST  26  /  ALT  14  /  AlkPhos  41  12-06    PT/INR - ( 06 Dec 2018 06:41 )   PT: 12.2 sec;   INR: 1.06 ratio         PTT - ( 06 Dec 2018 06:41 )  PTT:31.3 sec    RADIOLOGY & ADDITIONAL TESTS:    MEDICATIONS:  MEDICATIONS  (STANDING):  ALBUTerol/ipratropium for Nebulization 3 milliLiter(s) Nebulizer every 6 hours  amLODIPine   Tablet 10 milliGRAM(s) Oral daily  aspirin enteric coated 81 milliGRAM(s) Oral daily  atorvastatin 80 milliGRAM(s) Oral at bedtime  chlorhexidine 2% Cloths 1 Application(s) Topical daily  clopidogrel Tablet 75 milliGRAM(s) Oral daily  docusate sodium 100 milliGRAM(s) Oral daily  DULoxetine 20 milliGRAM(s) Oral daily  hydrALAZINE 50 milliGRAM(s) Oral every 8 hours  isosorbide   mononitrate ER Tablet (IMDUR) 30 milliGRAM(s) Oral daily  levothyroxine 100 MICROGram(s) Oral daily  loratadine 10 milliGRAM(s) Oral daily  metoprolol tartrate 50 milliGRAM(s) Oral two times a day  multivitamin 1 Tablet(s) Oral daily  pantoprazole    Tablet 40 milliGRAM(s) Oral before breakfast  senna 2 Tablet(s) Oral at bedtime  sodium chloride 0.65% Nasal 1 Spray(s) Both Nostrils every 4 hours    MEDICATIONS  (PRN):  acetaminophen   Tablet .. 650 milliGRAM(s) Oral every 6 hours PRN Temp greater or equal to 38C (100.4F), Moderate Pain (4 - 6)  aluminum hydroxide/magnesium hydroxide/simethicone Suspension 30 milliLiter(s) Oral every 4 hours PRN Dyspepsia  guaiFENesin    Syrup 200 milliGRAM(s) Oral every 6 hours PRN Cough CC: ESRD on HD. Cough and shortness of breath is resolved. Had episode of hypotension with confusion yesterday at HD. HD session was discontinued to be re-attempted today. Had LHC done yesterday.  Patent ANA other graft occluded. Severe circumflex disease with 1 CHAN to Cx.  Today Leukocytosis 15,000 . No fever. Appetite is poor.  Right groin ultrasound no fusiform aneursysm.   HPI:  79 y/o male was brought in by family for dry cough on and off for past 2 days, no fever, no sick contact. + nasal stuffiness  and scratchy throat. As per family every year around this time he gets this. no h/o asthma. no cp. no abd. pain. no n/v/d. no sob reported. In the er pt's hr was in mid 40's. pt. denies dizziness but reports some fatigue. Family is not aware if he has h/o bradycardia. pt's Cr is 3.89 from blood work done in ER. family does not know if pt. has any kidney problem.  As per family pt. was seen by pcp about 2 weeks ago and had blood work done but results are not known to family and they did not get any call about abnormal labs. Pt. was seen by nephrologist Dr. Costa yesterday as routine check up as per son in law but blood work from pcp office was not available so pt. was instructed to return when blood work is available. As per son in law pt's cough has topped while in the ER.  no other complaints. (28 Nov 2018 19:56)    REVIEW OF SYSTEMS:    Patient denied fever, chills, abdominal pain, nausea, vomiting, cough, shortness of breath, chest pain or palpitations    Vital Signs Last 24 Hrs  T(C): 36.9 (07 Dec 2018 05:14), Max: 36.9 (06 Dec 2018 21:46)  T(F): 98.5 (07 Dec 2018 05:14), Max: 98.5 (06 Dec 2018 21:46)  HR: 70 (07 Dec 2018 09:20) (65 - 87)  BP: 161/82 (07 Dec 2018 05:14) (130/48 - 161/82)  BP(mean): --  RR: 18 (07 Dec 2018 05:14) (16 - 18)  SpO2: 96% (07 Dec 2018 09:20) (94% - 99%)I&O's Summary    06 Dec 2018 07:01  -  07 Dec 2018 07:00  --------------------------------------------------------  IN: 900 mL / OUT: 475 mL / NET: 425 mL      PHYSICAL EXAM:  GENERAL: NAD,   HEENT: PERRL, +EOMI, anicteric, no [place]  NECK: Supple, No JVD   CHEST/LUNG: bilateral basilar rales   HEART: S1S2 Normal intensity, no murmurs, gallops or rubs noted  ABDOMEN: Soft, BS Normoactive, NT, ND, no HSM noted  EXTREMITIES:  2+ radial and DP pulses noted, no clubbing, cyanosis, or edema noted, Limited mobility   SKIN: No rashes or lesions noted  NEURO: A&O, no focal deficits noted, CN II-XII intact  PSYCH: Depressed mood and affect; insight/judgement appropriate  LABS:                        10.5   15.0  )-----------( 213      ( 07 Dec 2018 05:47 )             31.7     12-07    133<L>  |  92<L>  |  64.0<H>  ----------------------------<  119<H>  4.2   |  22.0  |  5.05<H>    Ca    8.6      07 Dec 2018 05:47  Phos  4.0     12-06  Mg     1.9     12-06    TPro  7.6  /  Alb  3.6  /  TBili  0.5  /  DBili  x   /  AST  26  /  ALT  14  /  AlkPhos  41  12-06    PT/INR - ( 06 Dec 2018 06:41 )   PT: 12.2 sec;   INR: 1.06 ratio         PTT - ( 06 Dec 2018 06:41 )  PTT:31.3 sec    RADIOLOGY & ADDITIONAL TESTS:    MEDICATIONS:  MEDICATIONS  (STANDING):  ALBUTerol/ipratropium for Nebulization 3 milliLiter(s) Nebulizer every 6 hours  amLODIPine   Tablet 10 milliGRAM(s) Oral daily  aspirin enteric coated 81 milliGRAM(s) Oral daily  atorvastatin 80 milliGRAM(s) Oral at bedtime  chlorhexidine 2% Cloths 1 Application(s) Topical daily  clopidogrel Tablet 75 milliGRAM(s) Oral daily  docusate sodium 100 milliGRAM(s) Oral daily  DULoxetine 20 milliGRAM(s) Oral daily  hydrALAZINE 50 milliGRAM(s) Oral every 8 hours  isosorbide   mononitrate ER Tablet (IMDUR) 30 milliGRAM(s) Oral daily  levothyroxine 100 MICROGram(s) Oral daily  loratadine 10 milliGRAM(s) Oral daily  metoprolol tartrate 50 milliGRAM(s) Oral two times a day  multivitamin 1 Tablet(s) Oral daily  pantoprazole    Tablet 40 milliGRAM(s) Oral before breakfast  senna 2 Tablet(s) Oral at bedtime  sodium chloride 0.65% Nasal 1 Spray(s) Both Nostrils every 4 hours    MEDICATIONS  (PRN):  acetaminophen   Tablet .. 650 milliGRAM(s) Oral every 6 hours PRN Temp greater or equal to 38C (100.4F), Moderate Pain (4 - 6)  aluminum hydroxide/magnesium hydroxide/simethicone Suspension 30 milliLiter(s) Oral every 4 hours PRN Dyspepsia  guaiFENesin    Syrup 200 milliGRAM(s) Oral every 6 hours PRN Cough

## 2018-12-07 NOTE — PROGRESS NOTE ADULT - ASSESSMENT
12/3/18 TTELVEF 60-65%, basal anteroseptum, basal to mid inferior hypokinesis, moderate diastolic dysfunction, moderate pulmonary hypertension  12/5/18 Catheterization: d/w Dr. Hussein - ANA to LAD patent, other grafts down.  Status post PCI to Lcx with CHAN x 1    # abdominal pain/ groin pain - groin hematoma, + bruit - No retroperitoneal hematoma on CT, no AVF or pseudoaneurysm on R CFA US  # vasovagal syncope - triggered by abdominal pain.  Monitor on telemetry.  Can take off Zoll pads.   # CAD s/p remote CABG 1996 presents with cough and atypical CP initially normal wall motion on echo, then had NSTEMI with inferior hypokinesis on echo.  Cardiac cath s/p PCI to LCx with CHAN x 1.  Aspirin, statin, imdur, metoprolol.   # decompensated HFpEF/pulmonary edema - improved.  Place on standing lasix 40 mg daily.   # acute renal failure - HD planning per renal.  Would benefit from HD today.   # hypertension - elevated, starting on lasix. Continue imdur, amlodipine, metoprolol.   # bradycardia - stable, monitor while on BB.   # hypothyroidism - on levothyroxine    D/W patient, family  Thank you for allowing me to participate in care of your patient.

## 2018-12-07 NOTE — PROGRESS NOTE ADULT - ASSESSMENT
1) ARIELA on CKD IV  2) Prerenal  3) Acidosis  4) Anemia renal dx  5) Hyperkalemia    HD today; with minimal UF  Needs outpatient HD facility;  Will need AVF; tunneled CVC  Plan for Monday CVC     d/w Dr Rosie Worthy

## 2018-12-07 NOTE — PROGRESS NOTE ADULT - SUBJECTIVE AND OBJECTIVE BOX
[facility] DIVISION OF KIDNEY DISEASES AND HYPERTENSION -- FOLLOW UP NOTE  --------------------------------------------------------------------------------  Chief Complaint: ESRD HD    24 hour events/subjective:  Seen and examined  Had hypotensive episode on HD yesterday; rinsed back  Today appears very well; sitting up in NAD;      PAST HISTORY  --------------------------------------------------------------------------------  No significant changes to PMH, PSH, FHx, SHx, unless otherwise noted    ALLERGIES & MEDICATIONS  --------------------------------------------------------------------------------  Allergies    No Known Allergies    Intolerances      Standing Inpatient Medications  ALBUTerol/ipratropium for Nebulization 3 milliLiter(s) Nebulizer every 6 hours  amLODIPine   Tablet 10 milliGRAM(s) Oral daily  aspirin enteric coated 81 milliGRAM(s) Oral daily  atorvastatin 80 milliGRAM(s) Oral at bedtime  chlorhexidine 2% Cloths 1 Application(s) Topical daily  clopidogrel Tablet 75 milliGRAM(s) Oral daily  docusate sodium 100 milliGRAM(s) Oral daily  DULoxetine 20 milliGRAM(s) Oral daily  furosemide    Tablet 40 milliGRAM(s) Oral daily  hydrALAZINE 50 milliGRAM(s) Oral every 8 hours  isosorbide   mononitrate ER Tablet (IMDUR) 30 milliGRAM(s) Oral daily  levothyroxine 100 MICROGram(s) Oral daily  loratadine 10 milliGRAM(s) Oral daily  metoprolol tartrate 50 milliGRAM(s) Oral two times a day  multivitamin 1 Tablet(s) Oral daily  pantoprazole    Tablet 40 milliGRAM(s) Oral before breakfast  senna 2 Tablet(s) Oral at bedtime  sodium chloride 0.65% Nasal 1 Spray(s) Both Nostrils every 4 hours    PRN Inpatient Medications  acetaminophen   Tablet .. 650 milliGRAM(s) Oral every 6 hours PRN  aluminum hydroxide/magnesium hydroxide/simethicone Suspension 30 milliLiter(s) Oral every 4 hours PRN  guaiFENesin    Syrup 200 milliGRAM(s) Oral every 6 hours PRN      REVIEW OF SYSTEMS  --------------------------------------------------------------------------------  Gen: No weight changes, fatigue, fevers/chills, weakness  Skin: No rashes  Head/Eyes/Ears/Mouth: No headache; Normal hearing; Normal vision w/o blurriness; No sinus pain/discomfort, sore throat  Respiratory: No dyspnea, cough, wheezing, hemoptysis  CV: No chest pain, PND, orthopnea  GI: No abdominal pain, diarrhea, constipation, nausea, vomiting, melena, hematochezia  : No increased frequency, dysuria, hematuria, nocturia  MSK: No joint pain/swelling; no back pain; no edema  Neuro: No dizziness/lightheadedness, weakness, seizures, numbness, tingling  Heme: No easy bruising or bleeding  Endo: No heat/cold intolerance  Psych: No significant nervousness, anxiety, stress, depression    All other systems were reviewed and are negative, except as noted.    VITALS/PHYSICAL EXAM  --------------------------------------------------------------------------------  T(C): 37.6 (12-07-18 @ 14:02), Max: 37.6 (12-07-18 @ 14:02)  HR: 71 (12-07-18 @ 14:02) (65 - 87)  BP: 153/90 (12-07-18 @ 14:02) (130/48 - 161/82)  RR: 18 (12-07-18 @ 14:02) (16 - 18)  SpO2: 98% (12-07-18 @ 14:02) (96% - 99%)  Wt(kg): --        12-06-18 @ 07:01  -  12-07-18 @ 07:00  --------------------------------------------------------  IN: 900 mL / OUT: 475 mL / NET: 425 mL      Physical Exam:  	Gen: NAD, well-appearing  	HEENT: PERRL, supple neck, clear oropharynx  	Pulm: CTA B/L  	CV: RRR, S1S2; no rub  	Back: No spinal or CVA tenderness; no sacral edema  	Abd: +BS, soft, nontender/nondistended  	: No suprapubic tenderness  	UE: Warm, FROM, no clubbing, intact strength; no edema; no asterixis  	LE: Warm, FROM, no clubbing, intact strength; no edema  	Neuro: No focal deficits, intact gait  	Psych: Normal affect and mood  	Skin: Warm, without rashes  	Vascular access:    LABS/STUDIES  --------------------------------------------------------------------------------              10.5   15.0  >-----------<  213      [12-07-18 @ 05:47]              31.7     133  |  92  |  64.0  ----------------------------<  119      [12-07-18 @ 05:47]  4.2   |  22.0  |  5.05        Ca     8.6     [12-07-18 @ 05:47]      Mg     1.9     [12-06-18 @ 08:17]      Phos  4.0     [12-06-18 @ 08:17]    TPro  7.6  /  Alb  3.6  /  TBili  0.5  /  DBili  x   /  AST  26  /  ALT  14  /  AlkPhos  41  [12-06-18 @ 08:17]    PT/INR: PT 12.2 , INR 1.06       [12-06-18 @ 06:41]  PTT: 31.3       [12-06-18 @ 06:41]    Troponin 2.76      [12-06-18 @ 08:17]    Creatinine Trend:  SCr 5.05 [12-07 @ 05:47]  SCr 3.40 [12-06 @ 08:17]  SCr 4.65 [12-06 @ 06:45]  SCr 4.44 [12-05 @ 06:14]  SCr 3.95 [12-04 @ 07:56]        TSH 7.33      [11-29-18 @ 02:31]    HBsAb <3.0      [12-05-18 @ 16:33]  HBsAg Nonreact      [12-05-18 @ 16:33]  HBcAb Nonreact      [12-05-18 @ 16:33]  HCV 0.14, Nonreact      [12-05-18 @ 16:33]

## 2018-12-07 NOTE — PROGRESS NOTE ADULT - SUBJECTIVE AND OBJECTIVE BOX
[facility]/Health system Practice                                                        Office: [address] [place] [address]                                                       Telephone: [phone]. Fax:[phone]      CARDIOLOGY PROGRESS NOTE   ([facility])    Subjective: Patient seen and examined.  No events overnight. No further abdominal pain.  No bowel movements in 2 days.  Mild right groin tenderness. Family at bedside.     ROS: No headache, no chest pain, no SOB, no palpitations, no dizziness, no nausea, no bleeding    Chronic Conditions:     CURRENT MEDICATIONS  amLODIPine   Tablet 10 milliGRAM(s) Oral daily  hydrALAZINE 50 milliGRAM(s) Oral every 8 hours  isosorbide   mononitrate ER Tablet (IMDUR) 30 milliGRAM(s) Oral daily  metoprolol tartrate 50 milliGRAM(s) Oral two times a day      ALBUTerol/ipratropium for Nebulization 3 milliLiter(s) Nebulizer every 6 hours  guaiFENesin    Syrup 200 milliGRAM(s) Oral every 6 hours PRN  loratadine 10 milliGRAM(s) Oral daily    acetaminophen   Tablet .. 650 milliGRAM(s) Oral every 6 hours PRN  DULoxetine 20 milliGRAM(s) Oral daily    aluminum hydroxide/magnesium hydroxide/simethicone Suspension 30 milliLiter(s) Oral every 4 hours PRN  docusate sodium 100 milliGRAM(s) Oral daily  pantoprazole    Tablet 40 milliGRAM(s) Oral before breakfast  senna 2 Tablet(s) Oral at bedtime    atorvastatin 80 milliGRAM(s) Oral at bedtime  levothyroxine 100 MICROGram(s) Oral daily    aspirin enteric coated 81 milliGRAM(s) Oral daily  chlorhexidine 2% Cloths 1 Application(s) Topical daily  clopidogrel Tablet 75 milliGRAM(s) Oral daily  multivitamin 1 Tablet(s) Oral daily  sodium chloride 0.65% Nasal 1 Spray(s) Both Nostrils every 4 hours    	  TELEMETRY: SR  Vitals:  T(C): 36.9 (12-07-18 @ 05:14), Max: 36.9 (12-06-18 @ 21:46)  HR: 70 (12-07-18 @ 09:20) (65 - 87)  BP: 161/82 (12-07-18 @ 05:14) (130/48 - 161/82)  RR: 18 (12-07-18 @ 05:14) (16 - 18)  SpO2: 96% (12-07-18 @ 09:20) (94% - 99%)  Wt(kg): --  I&O's Summary    06 Dec 2018 07:01  -  07 Dec 2018 07:00  --------------------------------------------------------  IN: 900 mL / OUT: 475 mL / NET: 425 mL        Daily T(C): 36.9 (12-07-18 @ 05:14), Max: 36.9 (12-06-18 @ 21:46)  HR: 70 (12-07-18 @ 09:20) (65 - 87)  BP: 161/82 (12-07-18 @ 05:14) (130/48 - 161/82)  RR: 18 (12-07-18 @ 05:14) (16 - 18)  SpO2: 96% (12-07-18 @ 09:20) (94% - 99%)  Wt(kg): --    Daily     PHYSICAL EXAM:  Appearance: NAD  HEENT:   Normal oral mucosa, PERRL, EOMI	  Lymphatic: No lymphadenopathy  Cardiovascular: Normal S1 S2, No JVD, No murmurs, No edema  Respiratory: bibasilar crackles  Psychiatry: A & O x 3, Mood & affect appropriate  Gastrointestinal:  Soft, Non-tender, + BS	  Skin: No rashes, No ecchymoses, No cyanosis  Neurologic: Non-focal  Extremities: Normal range of motion, No clubbing, cyanosis or edema  Vascular: Peripheral pulses palpable 2+ bilaterally, warm; right groin mild tenderness, + bruit      ECG (tracing reviewed by me):  	    DIAGNOSTIC TESTING:  Echocardiogram (images reviewed by me):   Catheterization:  Stress Test:    CXR (image reviewed by me):  OTHER: 	    LABS:	 	  CARDIAC MARKERS:                                  10.5   15.0  )-----------( 213      ( 07 Dec 2018 05:47 )             31.7     12-07    133<L>  |  92<L>  |  64.0<H>  ----------------------------<  119<H>  4.2   |  22.0  |  5.05<H>    Ca    8.6      07 Dec 2018 05:47  Phos  4.0     12-06  Mg     1.9     12-06    TPro  7.6  /  Alb  3.6  /  TBili  0.5  /  DBili  x   /  AST  26  /  ALT  14  /  AlkPhos  41  12-06    BNP:   Lipid Profile:   HgA1c:   TSH:        < from: US Duplex Arterial Lower Ext Ltd, Right (12.06.18 @ 12:31) >    TECHNIQUE: Grey-scale and color-flow Doppler imaging   FINDINGS: No evidence of hematoma or pseudoaneurysm seen. Right common   femoral artery and vein are patent.    IMPRESSION: No evidence of pseudoaneurysm    < end of copied text >  < from: US Vessel Mapping Hemodialysis (12.06.18 @ 12:28) >  Technically difficult study due to patient's condition.  Left cephalic vein       Patency: Yes       Significant Stenosis: No       Branches: No                Upper Arm:                          proximal: 0.2 cm                          distal: 0.3 cm                 Forearm:                          proximal: 0.2 cm                          wrist: 0.2 cm    IMPRESSION:  Left cephalic vein is patent with measurements as described.    < end of copied text >

## 2018-12-08 LAB
ANISOCYTOSIS BLD QL: SLIGHT — SIGNIFICANT CHANGE UP
BASOPHILS # BLD AUTO: 0 K/UL — SIGNIFICANT CHANGE UP (ref 0–0.2)
BASOPHILS NFR BLD AUTO: 0 % — SIGNIFICANT CHANGE UP (ref 0–2)
DACRYOCYTES BLD QL SMEAR: SLIGHT — SIGNIFICANT CHANGE UP
EOSINOPHIL # BLD AUTO: 0.5 K/UL — SIGNIFICANT CHANGE UP (ref 0–0.5)
EOSINOPHIL NFR BLD AUTO: 4 % — SIGNIFICANT CHANGE UP (ref 0–6)
HCT VFR BLD CALC: 28.3 % — LOW (ref 42–52)
HGB BLD-MCNC: 9.2 G/DL — LOW (ref 14–18)
HYPOCHROMIA BLD QL: SLIGHT — SIGNIFICANT CHANGE UP
LACTATE BLDV-MCNC: 0.5 MMOL/L — SIGNIFICANT CHANGE UP (ref 0.5–2)
LYMPHOCYTES # BLD AUTO: 1.7 K/UL — SIGNIFICANT CHANGE UP (ref 1–4.8)
LYMPHOCYTES # BLD AUTO: 12 % — LOW (ref 20–55)
MCHC RBC-ENTMCNC: 29.1 PG — SIGNIFICANT CHANGE UP (ref 27–31)
MCHC RBC-ENTMCNC: 32.5 G/DL — SIGNIFICANT CHANGE UP (ref 32–36)
MCV RBC AUTO: 89.6 FL — SIGNIFICANT CHANGE UP (ref 80–94)
MONOCYTES # BLD AUTO: 2.1 K/UL — HIGH (ref 0–0.8)
MONOCYTES NFR BLD AUTO: 17 % — HIGH (ref 3–10)
NEUTROPHILS # BLD AUTO: 9.6 K/UL — HIGH (ref 1.8–8)
NEUTROPHILS NFR BLD AUTO: 67 % — SIGNIFICANT CHANGE UP (ref 37–73)
OVALOCYTES BLD QL SMEAR: SLIGHT — SIGNIFICANT CHANGE UP
PLAT MORPH BLD: NORMAL — SIGNIFICANT CHANGE UP
PLATELET # BLD AUTO: 211 K/UL — SIGNIFICANT CHANGE UP (ref 150–400)
POIKILOCYTOSIS BLD QL AUTO: SLIGHT — SIGNIFICANT CHANGE UP
PROCALCITONIN SERPL-MCNC: 0.83 NG/ML — HIGH (ref 0–0.04)
RBC # BLD: 3.16 M/UL — LOW (ref 4.6–6.2)
RBC # FLD: 12.6 % — SIGNIFICANT CHANGE UP (ref 11–15.6)
RBC BLD AUTO: ABNORMAL
SCHISTOCYTES BLD QL AUTO: SLIGHT — SIGNIFICANT CHANGE UP
WBC # BLD: 14 K/UL — HIGH (ref 4.8–10.8)
WBC # FLD AUTO: 14 K/UL — HIGH (ref 4.8–10.8)

## 2018-12-08 PROCEDURE — 99233 SBSQ HOSP IP/OBS HIGH 50: CPT

## 2018-12-08 RX ORDER — HEPARIN SODIUM 5000 [USP'U]/ML
5000 INJECTION INTRAVENOUS; SUBCUTANEOUS EVERY 12 HOURS
Refills: 0 | Status: DISCONTINUED | OUTPATIENT
Start: 2018-12-08 | End: 2018-12-15

## 2018-12-08 RX ORDER — POLYETHYLENE GLYCOL 3350 17 G/17G
17 POWDER, FOR SOLUTION ORAL ONCE
Refills: 0 | Status: COMPLETED | OUTPATIENT
Start: 2018-12-08 | End: 2018-12-08

## 2018-12-08 RX ADMIN — Medication 3 MILLILITER(S): at 20:17

## 2018-12-08 RX ADMIN — Medication 50 MILLIGRAM(S): at 14:15

## 2018-12-08 RX ADMIN — Medication 650 MILLIGRAM(S): at 20:33

## 2018-12-08 RX ADMIN — HEPARIN SODIUM 5000 UNIT(S): 5000 INJECTION INTRAVENOUS; SUBCUTANEOUS at 17:31

## 2018-12-08 RX ADMIN — CHLORHEXIDINE GLUCONATE 1 APPLICATION(S): 213 SOLUTION TOPICAL at 12:36

## 2018-12-08 RX ADMIN — POLYETHYLENE GLYCOL 3350 17 GRAM(S): 17 POWDER, FOR SOLUTION ORAL at 11:43

## 2018-12-08 RX ADMIN — Medication 3 MILLILITER(S): at 03:19

## 2018-12-08 RX ADMIN — Medication 50 MILLIGRAM(S): at 05:06

## 2018-12-08 RX ADMIN — PANTOPRAZOLE SODIUM 40 MILLIGRAM(S): 20 TABLET, DELAYED RELEASE ORAL at 05:06

## 2018-12-08 RX ADMIN — Medication 1 TABLET(S): at 11:43

## 2018-12-08 RX ADMIN — LORATADINE 10 MILLIGRAM(S): 10 TABLET ORAL at 11:43

## 2018-12-08 RX ADMIN — Medication 50 MILLIGRAM(S): at 21:27

## 2018-12-08 RX ADMIN — Medication 3 MILLILITER(S): at 09:23

## 2018-12-08 RX ADMIN — Medication 650 MILLIGRAM(S): at 20:03

## 2018-12-08 RX ADMIN — Medication 1 SPRAY(S): at 11:42

## 2018-12-08 RX ADMIN — Medication 40 MILLIGRAM(S): at 05:06

## 2018-12-08 RX ADMIN — Medication 100 MILLIGRAM(S): at 11:44

## 2018-12-08 RX ADMIN — Medication 50 MILLIGRAM(S): at 17:31

## 2018-12-08 RX ADMIN — Medication 100 MICROGRAM(S): at 05:06

## 2018-12-08 RX ADMIN — Medication 1 SPRAY(S): at 17:31

## 2018-12-08 RX ADMIN — CLOPIDOGREL BISULFATE 75 MILLIGRAM(S): 75 TABLET, FILM COATED ORAL at 11:44

## 2018-12-08 RX ADMIN — DULOXETINE HYDROCHLORIDE 20 MILLIGRAM(S): 30 CAPSULE, DELAYED RELEASE ORAL at 11:43

## 2018-12-08 RX ADMIN — Medication 1 SPRAY(S): at 21:27

## 2018-12-08 RX ADMIN — TUBERCULIN PURIFIED PROTEIN DERIVATIVE 5 UNIT(S): 5 INJECTION, SOLUTION INTRADERMAL at 19:45

## 2018-12-08 RX ADMIN — SENNA PLUS 2 TABLET(S): 8.6 TABLET ORAL at 21:27

## 2018-12-08 RX ADMIN — AMLODIPINE BESYLATE 10 MILLIGRAM(S): 2.5 TABLET ORAL at 05:06

## 2018-12-08 RX ADMIN — Medication 81 MILLIGRAM(S): at 12:36

## 2018-12-08 RX ADMIN — Medication 3 MILLILITER(S): at 15:50

## 2018-12-08 RX ADMIN — ATORVASTATIN CALCIUM 80 MILLIGRAM(S): 80 TABLET, FILM COATED ORAL at 21:27

## 2018-12-08 RX ADMIN — ISOSORBIDE MONONITRATE 30 MILLIGRAM(S): 60 TABLET, EXTENDED RELEASE ORAL at 12:34

## 2018-12-08 RX ADMIN — Medication 1 SPRAY(S): at 05:06

## 2018-12-08 NOTE — PROGRESS NOTE ADULT - SUBJECTIVE AND OBJECTIVE BOX
Acute renal failure      HPI:  79 y/o male was brought in by family for dry cough on and off for past 2 days, no fever, no sick contact. + nasal stuffiness  and scratchy throat. As per family every year around this time he gets this. no h/o asthma. no cp. no abd. pain. no n/v/d. no sob reported. In the er pt's hr was in mid 40's. pt. denies dizziness but reports some fatigue. Family is not aware if he has h/o bradycardia. pt's Cr is 3.89 from blood work done in ER. family does not know if pt. has any kidney problem.  As per family pt. was seen by pcp about 2 weeks ago and had blood work done but results are not known to family and they did not get any call about abnormal labs. Pt. was seen by nephrologist Dr. Costa yesterday as routine check up as per son in law but blood work from pcp office was not available so pt. was instructed to return when blood work is available. As per son in law pt's cough has topped while in the ER.  no other complaints. (28 Nov 2018 19:56)      Interval History:  Patient was seen and examined at bedside around 9:30 am. Feeling better.   Denies chest pain, palpitations, shortness of breath, headache, dizziness, visual symptoms, nausea, vomiting, abdominal pain, urinary symptoms or fever.  No alarms on telemetry.     ROS:  As per interval history otherwise unremarkable.    PHYSICAL EXAM:  Vital Signs   T(C): 37.3 (08 Dec 2018 16:44), Max: 37.4 (07 Dec 2018 17:15)  T(F): 99.2 (08 Dec 2018 16:44), Max: 99.4 (07 Dec 2018 17:15)  HR: 73 (08 Dec 2018 16:44) (65 - 85)  BP: 119/60 (08 Dec 2018 16:44) (119/60 - 155/69)  RR: 18 (08 Dec 2018 16:44) (18 - 18)  SpO2: 97% (08 Dec 2018 16:44) (97% - 100%)  General: Elderly male lying in bed comfortably. No acute distress  HEENT: PERRLA. EOMI. Clear conjunctivae. Moist mucus membrane  Neck: Supple. No JVD. No Thyromegaly   Chest: CTA bilaterally - no wheezing, rales or rhonchi. Dialysis catheter on right side of neck.   Heart: Normal S1 & S2. RRR. No murmur.   Abdomen: Soft. Non-tender. Non-distended. + BS  Ext: No pedal edema. No calf tenderness   Neuro: AAO x 3. No focal deficit. No speech disorder  Skin: Warm and Dry  Psychiatry: Normal mood and affect    I&O's Summary    07 Dec 2018 07:01  -  08 Dec 2018 07:00  --------------------------------------------------------  IN: 820 mL / OUT: 1600 mL / NET: -780 mL    08 Dec 2018 07:01  -  08 Dec 2018 16:48  --------------------------------------------------------  IN: 240 mL / OUT: 300 mL / NET: -60 mL    MEDICATIONS  (STANDING):  ALBUTerol/ipratropium for Nebulization 3 milliLiter(s) Nebulizer every 6 hours  amLODIPine   Tablet 10 milliGRAM(s) Oral daily  aspirin enteric coated 81 milliGRAM(s) Oral daily  atorvastatin 80 milliGRAM(s) Oral at bedtime  chlorhexidine 2% Cloths 1 Application(s) Topical daily  clopidogrel Tablet 75 milliGRAM(s) Oral daily  docusate sodium 100 milliGRAM(s) Oral daily  DULoxetine 20 milliGRAM(s) Oral daily  furosemide    Tablet 40 milliGRAM(s) Oral daily  hydrALAZINE 50 milliGRAM(s) Oral every 8 hours  isosorbide   mononitrate ER Tablet (IMDUR) 30 milliGRAM(s) Oral daily  levothyroxine 100 MICROGram(s) Oral daily  loratadine 10 milliGRAM(s) Oral daily  metoprolol tartrate 50 milliGRAM(s) Oral two times a day  multivitamin 1 Tablet(s) Oral daily  pantoprazole    Tablet 40 milliGRAM(s) Oral before breakfast  senna 2 Tablet(s) Oral at bedtime  sodium chloride 0.65% Nasal 1 Spray(s) Both Nostrils every 4 hours    MEDICATIONS  (PRN):  acetaminophen   Tablet .. 650 milliGRAM(s) Oral every 6 hours PRN Temp greater or equal to 38C (100.4F), Moderate Pain (4 - 6)  aluminum hydroxide/magnesium hydroxide/simethicone Suspension 30 milliLiter(s) Oral every 4 hours PRN Dyspepsia  guaiFENesin    Syrup 200 milliGRAM(s) Oral every 6 hours PRN Cough  ondansetron Injectable 4 milliGRAM(s) IV Push every 4 hours PRN Nausea and/or Vomiting      LABS:                        9.2    14.0  )-----------( 211      ( 08 Dec 2018 13:20 )             28.3     12-07    133<L>  |  92<L>  |  64.0<H>  ----------------------------<  119<H>  4.2   |  22.0  |  5.05<H>    Ca    8.6      07 Dec 2018 05:47      RADIOLOGY & ADDITIONAL STUDIES:  Reviewed

## 2018-12-08 NOTE — PROGRESS NOTE ADULT - ASSESSMENT
1) ARIELA on CKD IV  2) Prerenal  3) Acidosis  4) Anemia renal dx  5) Hyperkalemia    HD Monday  Needs outpatient HD facility;  Will need AVF; tunneled CVC  Plan for Monday CVC   Check labs in AM

## 2018-12-08 NOTE — PROGRESS NOTE ADULT - SUBJECTIVE AND OBJECTIVE BOX
[facility] DIVISION OF KIDNEY DISEASES AND HYPERTENSION -- FOLLOW UP NOTE  --------------------------------------------------------------------------------  Chief Complaint:  ESRD HD    24 hour events/subjective:  Seen and examined  Sitting up in NAD  Appears well    PAST HISTORY  --------------------------------------------------------------------------------  No significant changes to PMH, PSH, FHx, SHx, unless otherwise noted    ALLERGIES & MEDICATIONS  --------------------------------------------------------------------------------  Allergies    No Known Allergies    Intolerances      Standing Inpatient Medications  ALBUTerol/ipratropium for Nebulization 3 milliLiter(s) Nebulizer every 6 hours  amLODIPine   Tablet 10 milliGRAM(s) Oral daily  aspirin enteric coated 81 milliGRAM(s) Oral daily  atorvastatin 80 milliGRAM(s) Oral at bedtime  chlorhexidine 2% Cloths 1 Application(s) Topical daily  clopidogrel Tablet 75 milliGRAM(s) Oral daily  docusate sodium 100 milliGRAM(s) Oral daily  DULoxetine 20 milliGRAM(s) Oral daily  furosemide    Tablet 40 milliGRAM(s) Oral daily  hydrALAZINE 50 milliGRAM(s) Oral every 8 hours  isosorbide   mononitrate ER Tablet (IMDUR) 30 milliGRAM(s) Oral daily  levothyroxine 100 MICROGram(s) Oral daily  loratadine 10 milliGRAM(s) Oral daily  metoprolol tartrate 50 milliGRAM(s) Oral two times a day  multivitamin 1 Tablet(s) Oral daily  pantoprazole    Tablet 40 milliGRAM(s) Oral before breakfast  senna 2 Tablet(s) Oral at bedtime  sodium chloride 0.65% Nasal 1 Spray(s) Both Nostrils every 4 hours    PRN Inpatient Medications  acetaminophen   Tablet .. 650 milliGRAM(s) Oral every 6 hours PRN  aluminum hydroxide/magnesium hydroxide/simethicone Suspension 30 milliLiter(s) Oral every 4 hours PRN  guaiFENesin    Syrup 200 milliGRAM(s) Oral every 6 hours PRN  ondansetron Injectable 4 milliGRAM(s) IV Push every 4 hours PRN      REVIEW OF SYSTEMS  --------------------------------------------------------------------------------  Gen: No weight changes, fatigue, fevers/chills, weakness  Skin: No rashes  Head/Eyes/Ears/Mouth: No headache; Normal hearing; Normal vision w/o blurriness; No sinus pain/discomfort, sore throat  Respiratory: No dyspnea, cough, wheezing, hemoptysis  CV: No chest pain, PND, orthopnea  GI: No abdominal pain, diarrhea, constipation, nausea, vomiting, melena, hematochezia  : No increased frequency, dysuria, hematuria, nocturia  MSK: No joint pain/swelling; no back pain; no edema  Neuro: No dizziness/lightheadedness, weakness, seizures, numbness, tingling  Heme: No easy bruising or bleeding  Endo: No heat/cold intolerance  Psych: No significant nervousness, anxiety, stress, depression    All other systems were reviewed and are negative, except as noted.    VITALS/PHYSICAL EXAM  --------------------------------------------------------------------------------  T(C): 36.9 (12-08-18 @ 05:02), Max: 37.4 (12-07-18 @ 17:15)  HR: 78 (12-08-18 @ 14:13) (65 - 85)  BP: 134/78 (12-08-18 @ 14:13) (128/52 - 155/69)  RR: 18 (12-08-18 @ 05:02) (18 - 18)  SpO2: 97% (12-08-18 @ 09:23) (97% - 100%)  Wt(kg): --        12-07-18 @ 07:01  -  12-08-18 @ 07:00  --------------------------------------------------------  IN: 820 mL / OUT: 1600 mL / NET: -780 mL    12-08-18 @ 07:01  -  12-08-18 @ 15:20  --------------------------------------------------------  IN: 240 mL / OUT: 300 mL / NET: -60 mL      Physical Exam:  	Gen: NAD, well-appearing  	HEENT: PERRL, supple neck, clear oropharynx  	Pulm: CTA B/L  	CV: RRR, S1S2; no rub  	Back: No spinal or CVA tenderness; no sacral edema  	Abd: +BS, soft, nontender/nondistended  	: No suprapubic tenderness  	UE: Warm, FROM, no clubbing, intact strength; no edema; no asterixis  	LE: Warm, FROM, no clubbing, intact strength; no edema  	Neuro: No focal deficits, intact gait  	Psych: Normal affect and mood  	Skin: Warm, without rashes  	Vascular access:    LABS/STUDIES  --------------------------------------------------------------------------------              9.2    14.0  >-----------<  211      [12-08-18 @ 13:20]              28.3     133  |  92  |  64.0  ----------------------------<  119      [12-07-18 @ 05:47]  4.2   |  22.0  |  5.05        Ca     8.6     [12-07-18 @ 05:47]            Creatinine Trend:  SCr 5.05 [12-07 @ 05:47]  SCr 3.40 [12-06 @ 08:17]  SCr 4.65 [12-06 @ 06:45]  SCr 4.44 [12-05 @ 06:14]  SCr 3.95 [12-04 @ 07:56]        TSH 7.33      [11-29-18 @ 02:31]    HBsAb <3.0      [12-05-18 @ 16:33]  HBsAg Nonreact      [12-05-18 @ 16:33]  HBcAb Nonreact      [12-05-18 @ 16:33]  HCV 0.14, Nonreact      [12-05-18 @ 16:33]

## 2018-12-08 NOTE — PROGRESS NOTE ADULT - ASSESSMENT
78 years old male with,    1) NSTEMI  - s/p PCI to LCx with CHAN x 1  - Continue Aspirin, Plavix, Imdur, Lipitor and Metoprolol  - Cardiology recommendations appreciated  2) Diastolic Heart Failure  - Continue Lasix and Metoprolol  3) ARIELA on CKD IV  - requiring HD  - Nephrology on board  - AVF and tunnelled CVC on Monday  4) HTN  - Continue Amlodipine, Hydralazine, Imdur and Metoprolol   5) Bradycardia  - stable  - Monitor while on Metoprolol  6) Leukocytosis  - Likely reactive. No signs of infection  7) Hypothyroidism  - Continue Synthroid   DVT Prophylaxis -- Heparin 5000 Units

## 2018-12-09 LAB
BASOPHILS # BLD AUTO: 0.1 K/UL — SIGNIFICANT CHANGE UP (ref 0–0.2)
BASOPHILS NFR BLD AUTO: 0.5 % — SIGNIFICANT CHANGE UP (ref 0–2)
EOSINOPHIL # BLD AUTO: 1.4 K/UL — HIGH (ref 0–0.5)
EOSINOPHIL NFR BLD AUTO: 11 % — HIGH (ref 0–6)
HCT VFR BLD CALC: 28.2 % — LOW (ref 42–52)
HGB BLD-MCNC: 9.6 G/DL — LOW (ref 14–18)
LYMPHOCYTES # BLD AUTO: 16.2 % — LOW (ref 20–55)
LYMPHOCYTES # BLD AUTO: 2.1 K/UL — SIGNIFICANT CHANGE UP (ref 1–4.8)
MAGNESIUM SERPL-MCNC: 2 MG/DL — SIGNIFICANT CHANGE UP (ref 1.6–2.6)
MCHC RBC-ENTMCNC: 30.2 PG — SIGNIFICANT CHANGE UP (ref 27–31)
MCHC RBC-ENTMCNC: 34 G/DL — SIGNIFICANT CHANGE UP (ref 32–36)
MCV RBC AUTO: 88.7 FL — SIGNIFICANT CHANGE UP (ref 80–94)
MONOCYTES # BLD AUTO: 1.8 K/UL — HIGH (ref 0–0.8)
MONOCYTES NFR BLD AUTO: 13.8 % — HIGH (ref 3–10)
NEUTROPHILS # BLD AUTO: 7.6 K/UL — SIGNIFICANT CHANGE UP (ref 1.8–8)
NEUTROPHILS NFR BLD AUTO: 58 % — SIGNIFICANT CHANGE UP (ref 37–73)
PLATELET # BLD AUTO: 244 K/UL — SIGNIFICANT CHANGE UP (ref 150–400)
RBC # BLD: 3.18 M/UL — LOW (ref 4.6–6.2)
RBC # FLD: 12.3 % — SIGNIFICANT CHANGE UP (ref 11–15.6)
WBC # BLD: 13 K/UL — HIGH (ref 4.8–10.8)
WBC # FLD AUTO: 13 K/UL — HIGH (ref 4.8–10.8)

## 2018-12-09 PROCEDURE — 99233 SBSQ HOSP IP/OBS HIGH 50: CPT

## 2018-12-09 PROCEDURE — 99232 SBSQ HOSP IP/OBS MODERATE 35: CPT

## 2018-12-09 RX ORDER — LANOLIN ALCOHOL/MO/W.PET/CERES
3 CREAM (GRAM) TOPICAL AT BEDTIME
Refills: 0 | Status: DISCONTINUED | OUTPATIENT
Start: 2018-12-09 | End: 2018-12-15

## 2018-12-09 RX ADMIN — Medication 50 MILLIGRAM(S): at 17:50

## 2018-12-09 RX ADMIN — LORATADINE 10 MILLIGRAM(S): 10 TABLET ORAL at 11:38

## 2018-12-09 RX ADMIN — Medication 50 MILLIGRAM(S): at 15:39

## 2018-12-09 RX ADMIN — DULOXETINE HYDROCHLORIDE 20 MILLIGRAM(S): 30 CAPSULE, DELAYED RELEASE ORAL at 11:38

## 2018-12-09 RX ADMIN — AMLODIPINE BESYLATE 10 MILLIGRAM(S): 2.5 TABLET ORAL at 05:38

## 2018-12-09 RX ADMIN — Medication 50 MILLIGRAM(S): at 05:38

## 2018-12-09 RX ADMIN — Medication 1 SPRAY(S): at 05:38

## 2018-12-09 RX ADMIN — CLOPIDOGREL BISULFATE 75 MILLIGRAM(S): 75 TABLET, FILM COATED ORAL at 11:38

## 2018-12-09 RX ADMIN — Medication 3 MILLILITER(S): at 02:47

## 2018-12-09 RX ADMIN — PANTOPRAZOLE SODIUM 40 MILLIGRAM(S): 20 TABLET, DELAYED RELEASE ORAL at 05:38

## 2018-12-09 RX ADMIN — CHLORHEXIDINE GLUCONATE 1 APPLICATION(S): 213 SOLUTION TOPICAL at 11:41

## 2018-12-09 RX ADMIN — ATORVASTATIN CALCIUM 80 MILLIGRAM(S): 80 TABLET, FILM COATED ORAL at 21:23

## 2018-12-09 RX ADMIN — Medication 81 MILLIGRAM(S): at 11:38

## 2018-12-09 RX ADMIN — Medication 3 MILLILITER(S): at 08:41

## 2018-12-09 RX ADMIN — Medication 1 SPRAY(S): at 11:37

## 2018-12-09 RX ADMIN — ISOSORBIDE MONONITRATE 30 MILLIGRAM(S): 60 TABLET, EXTENDED RELEASE ORAL at 11:38

## 2018-12-09 RX ADMIN — HEPARIN SODIUM 5000 UNIT(S): 5000 INJECTION INTRAVENOUS; SUBCUTANEOUS at 11:41

## 2018-12-09 RX ADMIN — Medication 3 MILLILITER(S): at 15:36

## 2018-12-09 RX ADMIN — Medication 1 TABLET(S): at 11:38

## 2018-12-09 RX ADMIN — Medication 50 MILLIGRAM(S): at 21:23

## 2018-12-09 RX ADMIN — Medication 3 MILLILITER(S): at 20:44

## 2018-12-09 RX ADMIN — Medication 100 MICROGRAM(S): at 05:38

## 2018-12-09 RX ADMIN — Medication 40 MILLIGRAM(S): at 05:38

## 2018-12-09 RX ADMIN — SENNA PLUS 2 TABLET(S): 8.6 TABLET ORAL at 21:23

## 2018-12-09 RX ADMIN — Medication 1 SPRAY(S): at 21:23

## 2018-12-09 RX ADMIN — HEPARIN SODIUM 5000 UNIT(S): 5000 INJECTION INTRAVENOUS; SUBCUTANEOUS at 21:23

## 2018-12-09 RX ADMIN — Medication 3 MILLIGRAM(S): at 00:17

## 2018-12-09 RX ADMIN — Medication 1 SPRAY(S): at 17:50

## 2018-12-09 NOTE — PROGRESS NOTE ADULT - SUBJECTIVE AND OBJECTIVE BOX
[facility] DIVISION OF KIDNEY DISEASES AND HYPERTENSION -- FOLLOW UP NOTE  --------------------------------------------------------------------------------  Chief Complaint: ESRD HD    24 hour events/subjective:  Pt seen and examined this AM  Plan for hd tomorrow      PAST HISTORY  --------------------------------------------------------------------------------  No significant changes to PMH, PSH, FHx, SHx, unless otherwise noted    ALLERGIES & MEDICATIONS  --------------------------------------------------------------------------------  Allergies    No Known Allergies    Intolerances      Standing Inpatient Medications  ALBUTerol/ipratropium for Nebulization 3 milliLiter(s) Nebulizer every 6 hours  amLODIPine   Tablet 10 milliGRAM(s) Oral daily  aspirin enteric coated 81 milliGRAM(s) Oral daily  atorvastatin 80 milliGRAM(s) Oral at bedtime  chlorhexidine 2% Cloths 1 Application(s) Topical daily  clopidogrel Tablet 75 milliGRAM(s) Oral daily  docusate sodium 100 milliGRAM(s) Oral daily  DULoxetine 20 milliGRAM(s) Oral daily  furosemide    Tablet 40 milliGRAM(s) Oral daily  heparin  Injectable 5000 Unit(s) SubCutaneous every 12 hours  hydrALAZINE 50 milliGRAM(s) Oral every 8 hours  isosorbide   mononitrate ER Tablet (IMDUR) 30 milliGRAM(s) Oral daily  levothyroxine 100 MICROGram(s) Oral daily  loratadine 10 milliGRAM(s) Oral daily  metoprolol tartrate 50 milliGRAM(s) Oral two times a day  multivitamin 1 Tablet(s) Oral daily  pantoprazole    Tablet 40 milliGRAM(s) Oral before breakfast  senna 2 Tablet(s) Oral at bedtime  sodium chloride 0.65% Nasal 1 Spray(s) Both Nostrils every 4 hours    PRN Inpatient Medications  acetaminophen   Tablet .. 650 milliGRAM(s) Oral every 6 hours PRN  aluminum hydroxide/magnesium hydroxide/simethicone Suspension 30 milliLiter(s) Oral every 4 hours PRN  guaiFENesin    Syrup 200 milliGRAM(s) Oral every 6 hours PRN  melatonin 3 milliGRAM(s) Oral at bedtime PRN  ondansetron Injectable 4 milliGRAM(s) IV Push every 4 hours PRN      REVIEW OF SYSTEMS  --------------------------------------------------------------------------------  Gen: No weight changes, fatigue, fevers/chills, weakness  Skin: No rashes  Head/Eyes/Ears/Mouth: No headache; Normal hearing; Normal vision w/o blurriness; No sinus pain/discomfort, sore throat  Respiratory: No dyspnea, cough, wheezing, hemoptysis  CV: No chest pain, PND, orthopnea  GI: No abdominal pain, diarrhea, constipation, nausea, vomiting, melena, hematochezia  : No increased frequency, dysuria, hematuria, nocturia  MSK: No joint pain/swelling; no back pain; no edema  Neuro: No dizziness/lightheadedness, weakness, seizures, numbness, tingling  Heme: No easy bruising or bleeding  Endo: No heat/cold intolerance  Psych: No significant nervousness, anxiety, stress, depression    All other systems were reviewed and are negative, except as noted.    VITALS/PHYSICAL EXAM  --------------------------------------------------------------------------------  T(C): 37 (12-09-18 @ 11:36), Max: 37.4 (12-08-18 @ 21:24)  HR: 76 (12-09-18 @ 11:36) (59 - 76)  BP: 128/54 (12-09-18 @ 11:36) (119/60 - 148/62)  RR: 18 (12-09-18 @ 11:36) (18 - 18)  SpO2: 100% (12-09-18 @ 11:36) (96% - 100%)  Wt(kg): --        12-08-18 @ 07:01  -  12-09-18 @ 07:00  --------------------------------------------------------  IN: 860 mL / OUT: 450 mL / NET: 410 mL    12-09-18 @ 07:01  -  12-09-18 @ 15:22  --------------------------------------------------------  IN: 240 mL / OUT: 800 mL / NET: -560 mL      Physical Exam:  	Gen: NAD, well-appearing  	HEENT: PERRL, supple neck, clear oropharynx  	Pulm: CTA B/L  	CV: RRR, S1S2; no rub  	Back: No spinal or CVA tenderness; no sacral edema  	Abd: +BS, soft, nontender/nondistended  	: No suprapubic tenderness  	UE: Warm, FROM, no clubbing, intact strength; no edema; no asterixis  	LE: Warm, FROM, no clubbing, intact strength; no edema  	Neuro: No focal deficits, intact gait  	Psych: Normal affect and mood  	Skin: Warm, without rashes  	Vascular access:    LABS/STUDIES  --------------------------------------------------------------------------------              9.6    13.0  >-----------<  244      [12-09-18 @ 06:12]              28.2                 Creatinine Trend:  SCr 5.05 [12-07 @ 05:47]  SCr 3.40 [12-06 @ 08:17]  SCr 4.65 [12-06 @ 06:45]  SCr 4.44 [12-05 @ 06:14]  SCr 3.95 [12-04 @ 07:56]        TSH 7.33      [11-29-18 @ 02:31]    HBsAb <3.0      [12-05-18 @ 16:33]  HBsAg Nonreact      [12-05-18 @ 16:33]  HBcAb Nonreact      [12-05-18 @ 16:33]  HCV 0.14, Nonreact      [12-05-18 @ 16:33]

## 2018-12-09 NOTE — PROGRESS NOTE ADULT - ASSESSMENT
78 years old male with,    1) NSTEMI  - s/p PCI to LCx with CHAN x 1  - Continue Aspirin, Plavix, Imdur, Lipitor and Metoprolol  - Cardiology recommendations appreciated  2) Diastolic Heart Failure  - Continue Lasix and Metoprolol  3) ARIELA on CKD IV  - requiring HD  - Nephrology on board  - AVF and tunnelled CVC on Monday  4) HTN  - Continue Amlodipine, Hydralazine, Imdur and Metoprolol   5) Bradycardia  - stable  - Monitor while on Metoprolol  6) Leukocytosis  - Likely reactive. No signs of infection  7) Hypothyroidism  - Continue Synthroid   DVT Prophylaxis -- Heparin 5000 Units 78 years old male with,    1) NSTEMI  - s/p PCI to LCx with CHAN x 1  - Continue Aspirin, Plavix, Imdur, Lipitor and Metoprolol  - Cardiology recommendations appreciated  2) Diastolic Heart Failure  - Continue Lasix and Metoprolol  3) ARIELA on CKD IV  - requiring HD  - Nephrology on board  - Tunnelled CVC on Monday  4) HTN  - Continue Amlodipine, Hydralazine, Imdur and Metoprolol   5) Bradycardia  - stable  - Monitor while on Metoprolol  6) Leukocytosis  - Likely reactive. No signs of infection. Improving.   7) Hypothyroidism  - Continue Synthroid   DVT Prophylaxis -- Heparin 5000 Units

## 2018-12-09 NOTE — CHART NOTE - NSCHARTNOTEFT_GEN_A_CORE
Called to see patient having unifocal pvc  Strips reviewed no nsvt  unifocal pvc  K 4.5  EF 65%  b/p 120/70  patient is without sob cp or palp  will continue to montior  no intervention at this time

## 2018-12-09 NOTE — PROGRESS NOTE ADULT - SUBJECTIVE AND OBJECTIVE BOX
Acute renal failure    HPI:  77 y/o male was brought in by family for dry cough on and off for past 2 days, no fever, no sick contact. + nasal stuffiness  and scratchy throat. As per family every year around this time he gets this. no h/o asthma. no cp. no abd. pain. no n/v/d. no sob reported. In the er pt's hr was in mid 40's. pt. denies dizziness but reports some fatigue. Family is not aware if he has h/o bradycardia. pt's Cr is 3.89 from blood work done in ER. family does not know if pt. has any kidney problem.  As per family pt. was seen by pcp about 2 weeks ago and had blood work done but results are not known to family and they did not get any call about abnormal labs. Pt. was seen by nephrologist Dr. Costa yesterday as routine check up as per son in law but blood work from pcp office was not available so pt. was instructed to return when blood work is available. As per son in law pt's cough has topped while in the ER.  no other complaints. (28 Nov 2018 19:56)    Interval History:  Patient was seen and examined at bedside this morning around 8:30 am, was getting neb treatment. Complaining of decreased appetite and not feeling well. Denies chest pain, palpitations, shortness of breath, headache, dizziness, visual symptoms, nausea, vomiting, abdominal pain, urinary symptoms or fever. No alarms on telemetry.     ROS:  As per interval history otherwise unremarkable.    PHYSICAL EXAM:  Vital Signs   T(C): 37 (09 Dec 2018 11:36), Max: 37.4 (08 Dec 2018 21:24)  T(F): 98.6 (09 Dec 2018 11:36), Max: 99.3 (08 Dec 2018 21:24)  HR: 83 (09 Dec 2018 15:38) (59 - 83)  BP: 138/64 (09 Dec 2018 15:38) (119/60 - 148/62)  RR: 18 (09 Dec 2018 15:38) (18 - 18)  SpO2: 100% (09 Dec 2018 15:38) (96% - 100%)  General: Elderly male lying in bed comfortably - getting neb treatment. No acute distress  HEENT: PERRLA. EOMI. Clear conjunctivae. Moist mucus membrane  Neck: Supple. No JVD. No Thyromegaly   Chest: CTA bilaterally - no wheezing, rales or rhonchi. Dialysis catheter on right side of neck.   Heart: Normal S1 & S2. RRR. No murmur.   Abdomen: Soft. Non-tender. Non-distended. + BS  Ext: No pedal edema. No calf tenderness   Neuro: AAO x 3. No focal deficit. No speech disorder  Skin: Warm and Dry  Psychiatry: Normal mood and affect    I&O's Summary    07 Dec 2018 07:01  -  08 Dec 2018 07:00  --------------------------------------------------------  IN: 820 mL / OUT: 1600 mL / NET: -780 mL    08 Dec 2018 07:01  -  08 Dec 2018 16:48  --------------------------------------------------------  IN: 240 mL / OUT: 300 mL / NET: -60 mL    MEDICATIONS  (STANDING):  ALBUTerol/ipratropium for Nebulization 3 milliLiter(s) Nebulizer every 6 hours  amLODIPine   Tablet 10 milliGRAM(s) Oral daily  aspirin enteric coated 81 milliGRAM(s) Oral daily  atorvastatin 80 milliGRAM(s) Oral at bedtime  chlorhexidine 2% Cloths 1 Application(s) Topical daily  clopidogrel Tablet 75 milliGRAM(s) Oral daily  docusate sodium 100 milliGRAM(s) Oral daily  DULoxetine 20 milliGRAM(s) Oral daily  furosemide    Tablet 40 milliGRAM(s) Oral daily  hydrALAZINE 50 milliGRAM(s) Oral every 8 hours  isosorbide   mononitrate ER Tablet (IMDUR) 30 milliGRAM(s) Oral daily  levothyroxine 100 MICROGram(s) Oral daily  loratadine 10 milliGRAM(s) Oral daily  metoprolol tartrate 50 milliGRAM(s) Oral two times a day  multivitamin 1 Tablet(s) Oral daily  pantoprazole    Tablet 40 milliGRAM(s) Oral before breakfast  senna 2 Tablet(s) Oral at bedtime  sodium chloride 0.65% Nasal 1 Spray(s) Both Nostrils every 4 hours    MEDICATIONS  (PRN):  acetaminophen   Tablet .. 650 milliGRAM(s) Oral every 6 hours PRN Temp greater or equal to 38C (100.4F), Moderate Pain (4 - 6)  aluminum hydroxide/magnesium hydroxide/simethicone Suspension 30 milliLiter(s) Oral every 4 hours PRN Dyspepsia  guaiFENesin    Syrup 200 milliGRAM(s) Oral every 6 hours PRN Cough  ondansetron Injectable 4 milliGRAM(s) IV Push every 4 hours PRN Nausea and/or Vomiting      LABS:                        9.2    14.0  )-----------( 211      ( 08 Dec 2018 13:20 )             28.3     12-07    133<L>  |  92<L>  |  64.0<H>  ----------------------------<  119<H>  4.2   |  22.0  |  5.05<H>    Ca    8.6      07 Dec 2018 05:47      RADIOLOGY & ADDITIONAL STUDIES:  Reviewed Acute renal failure    HPI:  79 y/o male was brought in by family for dry cough on and off for past 2 days, no fever, no sick contact. + nasal stuffiness  and scratchy throat. As per family every year around this time he gets this. no h/o asthma. no cp. no abd. pain. no n/v/d. no sob reported. In the er pt's hr was in mid 40's. pt. denies dizziness but reports some fatigue. Family is not aware if he has h/o bradycardia. pt's Cr is 3.89 from blood work done in ER. family does not know if pt. has any kidney problem.  As per family pt. was seen by pcp about 2 weeks ago and had blood work done but results are not known to family and they did not get any call about abnormal labs. Pt. was seen by nephrologist Dr. Costa yesterday as routine check up as per son in law but blood work from pcp office was not available so pt. was instructed to return when blood work is available. As per son in law pt's cough has topped while in the ER.  no other complaints. (28 Nov 2018 19:56)    Interval History:  Patient was seen and examined at bedside this morning around 8:30 am, was getting neb treatment. Complaining of decreased appetite and not feeling well. Denies chest pain, palpitations, shortness of breath, headache, dizziness, visual symptoms, nausea, vomiting, abdominal pain, urinary symptoms or fever. No alarms on telemetry.     ROS:  As per interval history otherwise unremarkable.    PHYSICAL EXAM:  Vital Signs   T(C): 37 (09 Dec 2018 11:36), Max: 37.4 (08 Dec 2018 21:24)  T(F): 98.6 (09 Dec 2018 11:36), Max: 99.3 (08 Dec 2018 21:24)  HR: 83 (09 Dec 2018 15:38) (59 - 83)  BP: 138/64 (09 Dec 2018 15:38) (119/60 - 148/62)  RR: 18 (09 Dec 2018 15:38) (18 - 18)  SpO2: 100% (09 Dec 2018 15:38) (96% - 100%)  General: Elderly male lying in bed comfortably - getting neb treatment. No acute distress  HEENT: PERRLA. EOMI. Clear conjunctivae. Moist mucus membrane  Neck: Supple. No JVD. No Thyromegaly   Chest: CTA bilaterally - no wheezing, rales or rhonchi. Dialysis catheter on right side of neck.   Heart: Normal S1 & S2. RRR. No murmur.   Abdomen: Soft. Non-tender. Non-distended. + BS  Ext: No pedal edema. No calf tenderness   Neuro: AAO x 3. No focal deficit. No speech disorder  Skin: Warm and Dry  Psychiatry: Normal mood and affect    MEDICATIONS  (STANDING):  ALBUTerol/ipratropium for Nebulization 3 milliLiter(s) Nebulizer every 6 hours  amLODIPine   Tablet 10 milliGRAM(s) Oral daily  aspirin enteric coated 81 milliGRAM(s) Oral daily  atorvastatin 80 milliGRAM(s) Oral at bedtime  chlorhexidine 2% Cloths 1 Application(s) Topical daily  clopidogrel Tablet 75 milliGRAM(s) Oral daily  docusate sodium 100 milliGRAM(s) Oral daily  DULoxetine 20 milliGRAM(s) Oral daily  furosemide    Tablet 40 milliGRAM(s) Oral daily  heparin  Injectable 5000 Unit(s) SubCutaneous every 12 hours  hydrALAZINE 50 milliGRAM(s) Oral every 8 hours  isosorbide   mononitrate ER Tablet (IMDUR) 30 milliGRAM(s) Oral daily  levothyroxine 100 MICROGram(s) Oral daily  loratadine 10 milliGRAM(s) Oral daily  metoprolol tartrate 50 milliGRAM(s) Oral two times a day  multivitamin 1 Tablet(s) Oral daily  pantoprazole    Tablet 40 milliGRAM(s) Oral before breakfast  senna 2 Tablet(s) Oral at bedtime  sodium chloride 0.65% Nasal 1 Spray(s) Both Nostrils every 4 hours    MEDICATIONS  (PRN):  acetaminophen   Tablet .. 650 milliGRAM(s) Oral every 6 hours PRN Temp greater or equal to 38C (100.4F), Moderate Pain (4 - 6)  aluminum hydroxide/magnesium hydroxide/simethicone Suspension 30 milliLiter(s) Oral every 4 hours PRN Dyspepsia  guaiFENesin    Syrup 200 milliGRAM(s) Oral every 6 hours PRN Cough  melatonin 3 milliGRAM(s) Oral at bedtime PRN Insomnia  ondansetron Injectable 4 milliGRAM(s) IV Push every 4 hours PRN Nausea and/or Vomiting    LABS:                        9.6    13.0  )-----------( 244      ( 09 Dec 2018 06:12 )             28.2     RADIOLOGY & ADDITIONAL STUDIES:  Reviewed

## 2018-12-10 ENCOUNTER — TRANSCRIPTION ENCOUNTER (OUTPATIENT)
Age: 78
End: 2018-12-10

## 2018-12-10 LAB
ANION GAP SERPL CALC-SCNC: 15 MMOL/L — SIGNIFICANT CHANGE UP (ref 5–17)
BUN SERPL-MCNC: 51 MG/DL — HIGH (ref 8–20)
CALCIUM SERPL-MCNC: 8.1 MG/DL — LOW (ref 8.6–10.2)
CHLORIDE SERPL-SCNC: 90 MMOL/L — LOW (ref 98–107)
CO2 SERPL-SCNC: 27 MMOL/L — SIGNIFICANT CHANGE UP (ref 22–29)
CREAT SERPL-MCNC: 4.99 MG/DL — HIGH (ref 0.5–1.3)
GLUCOSE SERPL-MCNC: 147 MG/DL — HIGH (ref 70–115)
HCT VFR BLD CALC: 26 % — LOW (ref 42–52)
HGB BLD-MCNC: 8.7 G/DL — LOW (ref 14–18)
MCHC RBC-ENTMCNC: 29 PG — SIGNIFICANT CHANGE UP (ref 27–31)
MCHC RBC-ENTMCNC: 33.5 G/DL — SIGNIFICANT CHANGE UP (ref 32–36)
MCV RBC AUTO: 86.7 FL — SIGNIFICANT CHANGE UP (ref 80–94)
PLATELET # BLD AUTO: 218 K/UL — SIGNIFICANT CHANGE UP (ref 150–400)
POTASSIUM SERPL-MCNC: 3.4 MMOL/L — LOW (ref 3.5–5.3)
POTASSIUM SERPL-SCNC: 3.4 MMOL/L — LOW (ref 3.5–5.3)
RBC # BLD: 3 M/UL — LOW (ref 4.6–6.2)
RBC # FLD: 12.2 % — SIGNIFICANT CHANGE UP (ref 11–15.6)
SODIUM SERPL-SCNC: 132 MMOL/L — LOW (ref 135–145)
WBC # BLD: 9.2 K/UL — SIGNIFICANT CHANGE UP (ref 4.8–10.8)
WBC # FLD AUTO: 9.2 K/UL — SIGNIFICANT CHANGE UP (ref 4.8–10.8)

## 2018-12-10 PROCEDURE — 93010 ELECTROCARDIOGRAM REPORT: CPT

## 2018-12-10 PROCEDURE — 71045 X-RAY EXAM CHEST 1 VIEW: CPT | Mod: 26

## 2018-12-10 PROCEDURE — 99232 SBSQ HOSP IP/OBS MODERATE 35: CPT

## 2018-12-10 PROCEDURE — 90937 HEMODIALYSIS REPEATED EVAL: CPT

## 2018-12-10 RX ADMIN — Medication 650 MILLIGRAM(S): at 19:52

## 2018-12-10 RX ADMIN — Medication 3 MILLILITER(S): at 09:29

## 2018-12-10 RX ADMIN — HEPARIN SODIUM 5000 UNIT(S): 5000 INJECTION INTRAVENOUS; SUBCUTANEOUS at 21:40

## 2018-12-10 RX ADMIN — Medication 50 MILLIGRAM(S): at 05:40

## 2018-12-10 RX ADMIN — AMLODIPINE BESYLATE 10 MILLIGRAM(S): 2.5 TABLET ORAL at 05:40

## 2018-12-10 RX ADMIN — Medication 1 SPRAY(S): at 21:40

## 2018-12-10 RX ADMIN — Medication 50 MILLIGRAM(S): at 21:42

## 2018-12-10 RX ADMIN — ISOSORBIDE MONONITRATE 30 MILLIGRAM(S): 60 TABLET, EXTENDED RELEASE ORAL at 23:06

## 2018-12-10 RX ADMIN — ATORVASTATIN CALCIUM 80 MILLIGRAM(S): 80 TABLET, FILM COATED ORAL at 21:42

## 2018-12-10 RX ADMIN — PANTOPRAZOLE SODIUM 40 MILLIGRAM(S): 20 TABLET, DELAYED RELEASE ORAL at 05:40

## 2018-12-10 RX ADMIN — Medication 100 MICROGRAM(S): at 05:40

## 2018-12-10 RX ADMIN — LORATADINE 10 MILLIGRAM(S): 10 TABLET ORAL at 12:29

## 2018-12-10 RX ADMIN — Medication 81 MILLIGRAM(S): at 12:03

## 2018-12-10 RX ADMIN — Medication 40 MILLIGRAM(S): at 05:40

## 2018-12-10 RX ADMIN — SENNA PLUS 2 TABLET(S): 8.6 TABLET ORAL at 21:42

## 2018-12-10 RX ADMIN — Medication 650 MILLIGRAM(S): at 19:22

## 2018-12-10 RX ADMIN — DULOXETINE HYDROCHLORIDE 20 MILLIGRAM(S): 30 CAPSULE, DELAYED RELEASE ORAL at 12:29

## 2018-12-10 RX ADMIN — Medication 200 MILLIGRAM(S): at 21:41

## 2018-12-10 RX ADMIN — Medication 100 MILLIGRAM(S): at 21:42

## 2018-12-10 RX ADMIN — CLOPIDOGREL BISULFATE 75 MILLIGRAM(S): 75 TABLET, FILM COATED ORAL at 12:29

## 2018-12-10 RX ADMIN — Medication 1 SPRAY(S): at 05:39

## 2018-12-10 NOTE — CHART NOTE - NSCHARTNOTEFT_GEN_A_CORE
Source: Patient [ ]  Family [ ]   other [x ] RN.  Pt is currently sleeping, unable to arouse.  Pt is currently NPO for permacath placement today.    Current Diet: Diet, Renal Restrictions:   For patients receiving Renal Replacement - No Protein Restr, No Conc K, No Conc Phos, Low Sodium  Halal  Supplement Feeding Modality:  Oral  Nepro Cans or Servings Per Day:  1       Frequency:  Three Times a day (12-07-18 @ 11:23)    PO intake:  Pt with fair to good po intake at meals per RN flowsheets.    Current Weight: aware admission wt 114#-- question accuracy  (12/3) 154#  (12/10) 150#    % Weight Change will continue to trend wts for accuracy    Pertinent Medications: MEDICATIONS  (STANDING):  ALBUTerol/ipratropium for Nebulization 3 milliLiter(s) Nebulizer every 6 hours  amLODIPine   Tablet 10 milliGRAM(s) Oral daily  aspirin enteric coated 81 milliGRAM(s) Oral daily  atorvastatin 80 milliGRAM(s) Oral at bedtime  chlorhexidine 2% Cloths 1 Application(s) Topical daily  clopidogrel Tablet 75 milliGRAM(s) Oral daily  docusate sodium 100 milliGRAM(s) Oral daily  DULoxetine 20 milliGRAM(s) Oral daily  furosemide    Tablet 40 milliGRAM(s) Oral daily  heparin  Injectable 5000 Unit(s) SubCutaneous every 12 hours  hydrALAZINE 50 milliGRAM(s) Oral every 8 hours  isosorbide   mononitrate ER Tablet (IMDUR) 30 milliGRAM(s) Oral daily  levothyroxine 100 MICROGram(s) Oral daily  loratadine 10 milliGRAM(s) Oral daily  metoprolol tartrate 50 milliGRAM(s) Oral two times a day  multivitamin 1 Tablet(s) Oral daily  pantoprazole    Tablet 40 milliGRAM(s) Oral before breakfast  senna 2 Tablet(s) Oral at bedtime  sodium chloride 0.65% Nasal 1 Spray(s) Both Nostrils every 4 hours    MEDICATIONS  (PRN):  acetaminophen   Tablet .. 650 milliGRAM(s) Oral every 6 hours PRN Temp greater or equal to 38C (100.4F), Moderate Pain (4 - 6)  aluminum hydroxide/magnesium hydroxide/simethicone Suspension 30 milliLiter(s) Oral every 4 hours PRN Dyspepsia  guaiFENesin    Syrup 200 milliGRAM(s) Oral every 6 hours PRN Cough  melatonin 3 milliGRAM(s) Oral at bedtime PRN Insomnia  ondansetron Injectable 4 milliGRAM(s) IV Push every 4 hours PRN Nausea and/or Vomiting    Pertinent Labs: CBC Full  -  ( 09 Dec 2018 06:12 )  WBC Count : 13.0 K/uL  Hemoglobin : 9.6 g/dL  Hematocrit : 28.2 %  Platelet Count - Automated : 244 K/uL  Mean Cell Volume : 88.7 fl  Mean Cell Hemoglobin : 30.2 pg  Mean Cell Hemoglobin Concentration : 34.0 g/dL  Auto Neutrophil # : 7.6 K/uL  Auto Lymphocyte # : 2.1 K/uL  Auto Monocyte # : 1.8 K/uL  Auto Eosinophil # : 1.4 K/uL  Auto Basophil # : 0.1 K/uL  Auto Neutrophil % : 58.0 %  Auto Lymphocyte % : 16.2 %  Auto Monocyte % : 13.8 %  Auto Eosinophil % : 11.0 %  Auto Basophil % : 0.5 %    Skin: sx incision right groin    Nutrition focused physical exam NOT conducted - found signs of malnutrition [ ]absent [ ]present    Subcutaneous fat loss: [ ] Orbital fat pads region, [ ]Buccal fat region, [ ]Triceps region,  [ ]Ribs region    Muscle wasting: [ ]Temples region, [ ]Clavicle region, [ ]Shoulder region, [ ]Scapula region, [ ]Interosseous region,  [ ]thigh region, [ ]Calf region    Estimated Needs:   [x ] no change since previous assessment  [ ] recalculated:     Current Nutrition Diagnosis: Pt remains at nutrition risk secondary to altered nutrition labs related to renal insufficiency- on HD as evidenced by pt with elevated BUN/creat and low H/H and GFR.    Aware nutrition literature left at bedside on previous interview- will continue to follow up when pt is more awake.    Recommendations:   Continue with diet rx and encourage intake of Nepro tid  Change MVI to Neprovite daily  Daily wts for accuracy    Monitoring and Evaluation:   [x ] PO intake [x ] Tolerance to diet prescription [X] Weights  [X] Follow up per protocol [X] Labs:

## 2018-12-10 NOTE — PROGRESS NOTE ADULT - ASSESSMENT
1) ARIELA on CKD IV  2) Prerenal  3) Acidosis  4) Anemia renal dx  5) Hyperkalemia    HD today  Needs outpatient HD facility;  Will need AVF; tunneled CVC  Plan for CVC tomorrow by Dr Rosie Worthy

## 2018-12-10 NOTE — PROGRESS NOTE ADULT - SUBJECTIVE AND OBJECTIVE BOX
[facility] DIVISION OF KIDNEY DISEASES AND HYPERTENSION -- FOLLOW UP NOTE  --------------------------------------------------------------------------------  Chief Complaint:  ESRD HD    24 hour events/subjective:  Pt seen and examined  Comfortable  Family at bedside  Plan for tunneled CVC tomorrow      PAST HISTORY  --------------------------------------------------------------------------------  No significant changes to PMH, PSH, FHx, SHx, unless otherwise noted    ALLERGIES & MEDICATIONS  --------------------------------------------------------------------------------  Allergies    No Known Allergies    Intolerances      Standing Inpatient Medications  amLODIPine   Tablet 10 milliGRAM(s) Oral daily  aspirin enteric coated 81 milliGRAM(s) Oral daily  atorvastatin 80 milliGRAM(s) Oral at bedtime  chlorhexidine 2% Cloths 1 Application(s) Topical daily  clopidogrel Tablet 75 milliGRAM(s) Oral daily  docusate sodium 100 milliGRAM(s) Oral daily  DULoxetine 20 milliGRAM(s) Oral daily  furosemide    Tablet 40 milliGRAM(s) Oral daily  heparin  Injectable 5000 Unit(s) SubCutaneous every 12 hours  hydrALAZINE 50 milliGRAM(s) Oral every 8 hours  isosorbide   mononitrate ER Tablet (IMDUR) 30 milliGRAM(s) Oral daily  levothyroxine 100 MICROGram(s) Oral daily  loratadine 10 milliGRAM(s) Oral daily  metoprolol tartrate 50 milliGRAM(s) Oral two times a day  Nephro-jeana 1 Tablet(s) Oral daily  pantoprazole    Tablet 40 milliGRAM(s) Oral before breakfast  senna 2 Tablet(s) Oral at bedtime  sodium chloride 0.65% Nasal 1 Spray(s) Both Nostrils every 4 hours    PRN Inpatient Medications  acetaminophen   Tablet .. 650 milliGRAM(s) Oral every 6 hours PRN  aluminum hydroxide/magnesium hydroxide/simethicone Suspension 30 milliLiter(s) Oral every 4 hours PRN  guaiFENesin    Syrup 200 milliGRAM(s) Oral every 6 hours PRN  melatonin 3 milliGRAM(s) Oral at bedtime PRN  ondansetron Injectable 4 milliGRAM(s) IV Push every 4 hours PRN      REVIEW OF SYSTEMS  --------------------------------------------------------------------------------  Gen: No weight changes, fatigue, fevers/chills, weakness  Skin: No rashes  Head/Eyes/Ears/Mouth: No headache; Normal hearing; Normal vision w/o blurriness; No sinus pain/discomfort, sore throat  Respiratory: No dyspnea, cough, wheezing, hemoptysis  CV: No chest pain, PND, orthopnea  GI: No abdominal pain, diarrhea, constipation, nausea, vomiting, melena, hematochezia  : No increased frequency, dysuria, hematuria, nocturia  MSK: No joint pain/swelling; no back pain; no edema  Neuro: No dizziness/lightheadedness, weakness, seizures, numbness, tingling  Heme: No easy bruising or bleeding  Endo: No heat/cold intolerance  Psych: No significant nervousness, anxiety, stress, depression    All other systems were reviewed and are negative, except as noted.    VITALS/PHYSICAL EXAM  --------------------------------------------------------------------------------  T(C): 36.9 (12-10-18 @ 16:22), Max: 37.3 (12-10-18 @ 10:05)  HR: 68 (12-10-18 @ 16:22) (63 - 70)  BP: 136/52 (12-10-18 @ 16:22) (122/54 - 136/52)  RR: 18 (12-10-18 @ 16:22) (18 - 18)  SpO2: 97% (12-10-18 @ 16:22) (97% - 99%)  Wt(kg): --    Weight (kg): 52 (12-10-18 @ 08:31)      12-09-18 @ 07:01  -  12-10-18 @ 07:00  --------------------------------------------------------  IN: 240 mL / OUT: 1550 mL / NET: -1310 mL    12-10-18 @ 07:01  -  12-10-18 @ 18:14  --------------------------------------------------------  IN: 0 mL / OUT: 475 mL / NET: -475 mL      Physical Exam:  	Gen: NAD, well-appearing  	HEENT: PERRL, supple neck, clear oropharynx  	Pulm: CTA B/L  	CV: RRR, S1S2; no rub  	Back: No spinal or CVA tenderness; no sacral edema  	Abd: +BS, soft, nontender/nondistended  	: No suprapubic tenderness  	UE: Warm, FROM, no clubbing, intact strength; no edema; no asterixis  	LE: Warm, FROM, no clubbing, intact strength; no edema  	Neuro: No focal deficits, intact gait  	Psych: Normal affect and mood  	Skin: Warm, without rashes  	Vascular access:    LABS/STUDIES  --------------------------------------------------------------------------------              9.6    13.0  >-----------<  244      [12-09-18 @ 06:12]              28.2           Mg     2.0     [12-09-18 @ 19:04]            Creatinine Trend:  SCr 5.05 [12-07 @ 05:47]  SCr 3.40 [12-06 @ 08:17]  SCr 4.65 [12-06 @ 06:45]  SCr 4.44 [12-05 @ 06:14]  SCr 3.95 [12-04 @ 07:56]        TSH 7.33      [11-29-18 @ 02:31]    HBsAb <3.0      [12-05-18 @ 16:33]  HBsAg Nonreact      [12-05-18 @ 16:33]  HBcAb Nonreact      [12-05-18 @ 16:33]  HCV 0.14, Nonreact      [12-05-18 @ 16:33]

## 2018-12-10 NOTE — PROGRESS NOTE ADULT - ASSESSMENT
78 years old male with,    1) NSTEMI  - s/p PCI to LCx with CHAN x 1  - Continue Aspirin, Plavix, Imdur, Lipitor and Metoprolol  - Cardiology recommendations appreciated  2) Diastolic Heart Failure  - Continue Lasix and Metoprolol  3) ARIELA on CKD IV  - requiring HD  - Nephrology on board  - Tunnelled CVC tomorrow. Vascular Surgery unable to do it today.   4) HTN  - Continue Amlodipine, Hydralazine, Imdur and Metoprolol   5) Bradycardia  - stable  - Monitor while on Metoprolol  6) Leukocytosis  - Likely reactive. No signs of infection. Improving.   7) Hypothyroidism  - Continue Synthroid   DVT Prophylaxis -- Heparin 5000 Units

## 2018-12-10 NOTE — PROGRESS NOTE ADULT - SUBJECTIVE AND OBJECTIVE BOX
Acute renal failure    HPI:  77 y/o male was brought in by family for dry cough on and off for past 2 days, no fever, no sick contact. + nasal stuffiness  and scratchy throat. As per family every year around this time he gets this. no h/o asthma. no cp. no abd. pain. no n/v/d. no sob reported. In the er pt's hr was in mid 40's. pt. denies dizziness but reports some fatigue. Family is not aware if he has h/o bradycardia. pt's Cr is 3.89 from blood work done in ER. family does not know if pt. has any kidney problem.  As per family pt. was seen by pcp about 2 weeks ago and had blood work done but results are not known to family and they did not get any call about abnormal labs. Pt. was seen by nephrologist Dr. Cosat yesterday as routine check up as per son in law but blood work from pcp office was not available so pt. was instructed to return when blood work is available. As per son in law pt's cough has topped while in the ER.  no other complaints. (28 Nov 2018 19:56)    Interval History:  Patient was seen and examined at bedside. Feeling very weak and tired. Denies chest pain, palpitations, shortness of breath, headache, dizziness, visual symptoms, nausea, vomiting, abdominal pain, urinary symptoms or fever. No alarms on telemetry.     ROS:  As per interval history otherwise unremarkable.    PHYSICAL EXAM:  Vital Signs   T(C): 37 (10 Dec 2018 05:00), Max: 37 (09 Dec 2018 11:36)  T(F): 98.6 (10 Dec 2018 05:00), Max: 98.6 (09 Dec 2018 11:36)  HR: 63 (10 Dec 2018 09:30) (63 - 83)  BP: 128/64 (10 Dec 2018 05:00) (122/54 - 138/64)  RR: 18 (10 Dec 2018 05:00) (18 - 18)  SpO2: 98% (10 Dec 2018 09:30) (97% - 100%)  General: Elderly male lying in bed comfortably. No acute distress  HEENT: PERRLA. EOMI. Clear conjunctivae. Moist mucus membrane  Neck: Supple. No JVD. No Thyromegaly   Chest: CTA bilaterally - no wheezing, rales or rhonchi. Dialysis catheter on right side of neck.   Heart: Normal S1 & S2. RRR. No murmur.   Abdomen: Soft. Non-tender. Non-distended. + BS  Ext: No pedal edema. No calf tenderness   Neuro: AAO x 3. No focal deficit. No speech disorder  Skin: Warm and Dry  Psychiatry: Normal mood and affect    MEDICATIONS  (STANDING):  ALBUTerol/ipratropium for Nebulization 3 milliLiter(s) Nebulizer every 6 hours  amLODIPine   Tablet 10 milliGRAM(s) Oral daily  aspirin enteric coated 81 milliGRAM(s) Oral daily  atorvastatin 80 milliGRAM(s) Oral at bedtime  chlorhexidine 2% Cloths 1 Application(s) Topical daily  clopidogrel Tablet 75 milliGRAM(s) Oral daily  docusate sodium 100 milliGRAM(s) Oral daily  DULoxetine 20 milliGRAM(s) Oral daily  furosemide    Tablet 40 milliGRAM(s) Oral daily  heparin  Injectable 5000 Unit(s) SubCutaneous every 12 hours  hydrALAZINE 50 milliGRAM(s) Oral every 8 hours  isosorbide   mononitrate ER Tablet (IMDUR) 30 milliGRAM(s) Oral daily  levothyroxine 100 MICROGram(s) Oral daily  loratadine 10 milliGRAM(s) Oral daily  metoprolol tartrate 50 milliGRAM(s) Oral two times a day  multivitamin 1 Tablet(s) Oral daily  pantoprazole    Tablet 40 milliGRAM(s) Oral before breakfast  senna 2 Tablet(s) Oral at bedtime  sodium chloride 0.65% Nasal 1 Spray(s) Both Nostrils every 4 hours    MEDICATIONS  (PRN):  acetaminophen   Tablet .. 650 milliGRAM(s) Oral every 6 hours PRN Temp greater or equal to 38C (100.4F), Moderate Pain (4 - 6)  aluminum hydroxide/magnesium hydroxide/simethicone Suspension 30 milliLiter(s) Oral every 4 hours PRN Dyspepsia  guaiFENesin    Syrup 200 milliGRAM(s) Oral every 6 hours PRN Cough  melatonin 3 milliGRAM(s) Oral at bedtime PRN Insomnia  ondansetron Injectable 4 milliGRAM(s) IV Push every 4 hours PRN Nausea and/or Vomiting    LABS:                        9.6    13.0  )-----------( 244      ( 09 Dec 2018 06:12 )             28.2     RADIOLOGY & ADDITIONAL STUDIES:  Reviewed

## 2018-12-11 LAB
ANION GAP SERPL CALC-SCNC: 16 MMOL/L — SIGNIFICANT CHANGE UP (ref 5–17)
APTT BLD: 29.8 SEC — SIGNIFICANT CHANGE UP (ref 27.5–36.3)
BASOPHILS # BLD AUTO: 0 K/UL — SIGNIFICANT CHANGE UP (ref 0–0.2)
BASOPHILS NFR BLD AUTO: 0.3 % — SIGNIFICANT CHANGE UP (ref 0–2)
BLD GP AB SCN SERPL QL: SIGNIFICANT CHANGE UP
BUN SERPL-MCNC: 57 MG/DL — HIGH (ref 8–20)
CALCIUM SERPL-MCNC: 8.4 MG/DL — LOW (ref 8.6–10.2)
CHLORIDE SERPL-SCNC: 90 MMOL/L — LOW (ref 98–107)
CO2 SERPL-SCNC: 25 MMOL/L — SIGNIFICANT CHANGE UP (ref 22–29)
CREAT SERPL-MCNC: 5.33 MG/DL — HIGH (ref 0.5–1.3)
EOSINOPHIL # BLD AUTO: 1 K/UL — HIGH (ref 0–0.5)
EOSINOPHIL NFR BLD AUTO: 8.6 % — HIGH (ref 0–5)
GLUCOSE SERPL-MCNC: 106 MG/DL — SIGNIFICANT CHANGE UP (ref 70–115)
HCT VFR BLD CALC: 29 % — LOW (ref 42–52)
HGB BLD-MCNC: 9.7 G/DL — LOW (ref 14–18)
INR BLD: 1 RATIO — SIGNIFICANT CHANGE UP (ref 0.88–1.16)
LYMPHOCYTES # BLD AUTO: 1.6 K/UL — SIGNIFICANT CHANGE UP (ref 1–4.8)
LYMPHOCYTES # BLD AUTO: 13.6 % — LOW (ref 20–55)
MAGNESIUM SERPL-MCNC: 2.2 MG/DL — SIGNIFICANT CHANGE UP (ref 1.6–2.6)
MCHC RBC-ENTMCNC: 29.2 PG — SIGNIFICANT CHANGE UP (ref 27–31)
MCHC RBC-ENTMCNC: 33.4 G/DL — SIGNIFICANT CHANGE UP (ref 32–36)
MCV RBC AUTO: 87.3 FL — SIGNIFICANT CHANGE UP (ref 80–94)
MONOCYTES # BLD AUTO: 1.3 K/UL — HIGH (ref 0–0.8)
MONOCYTES NFR BLD AUTO: 11.1 % — HIGH (ref 3–10)
NEUTROPHILS # BLD AUTO: 7.8 K/UL — SIGNIFICANT CHANGE UP (ref 1.8–8)
NEUTROPHILS NFR BLD AUTO: 66.1 % — SIGNIFICANT CHANGE UP (ref 37–73)
PHOSPHATE SERPL-MCNC: 5 MG/DL — HIGH (ref 2.4–4.7)
PLATELET # BLD AUTO: 264 K/UL — SIGNIFICANT CHANGE UP (ref 150–400)
POTASSIUM SERPL-MCNC: 4 MMOL/L — SIGNIFICANT CHANGE UP (ref 3.5–5.3)
POTASSIUM SERPL-SCNC: 4 MMOL/L — SIGNIFICANT CHANGE UP (ref 3.5–5.3)
PROTHROM AB SERPL-ACNC: 11.5 SEC — SIGNIFICANT CHANGE UP (ref 10–12.9)
RBC # BLD: 3.32 M/UL — LOW (ref 4.6–6.2)
RBC # FLD: 12.3 % — SIGNIFICANT CHANGE UP (ref 11–15.6)
SODIUM SERPL-SCNC: 131 MMOL/L — LOW (ref 135–145)
TYPE + AB SCN PNL BLD: SIGNIFICANT CHANGE UP
WBC # BLD: 11.8 K/UL — HIGH (ref 4.8–10.8)
WBC # FLD AUTO: 11.8 K/UL — HIGH (ref 4.8–10.8)

## 2018-12-11 PROCEDURE — 36558 INSERT TUNNELED CV CATH: CPT | Mod: RT

## 2018-12-11 PROCEDURE — 90937 HEMODIALYSIS REPEATED EVAL: CPT

## 2018-12-11 PROCEDURE — 99232 SBSQ HOSP IP/OBS MODERATE 35: CPT

## 2018-12-11 PROCEDURE — 76937 US GUIDE VASCULAR ACCESS: CPT | Mod: 26

## 2018-12-11 RX ADMIN — PANTOPRAZOLE SODIUM 40 MILLIGRAM(S): 20 TABLET, DELAYED RELEASE ORAL at 05:10

## 2018-12-11 RX ADMIN — Medication 50 MILLIGRAM(S): at 05:09

## 2018-12-11 RX ADMIN — CLOPIDOGREL BISULFATE 75 MILLIGRAM(S): 75 TABLET, FILM COATED ORAL at 21:18

## 2018-12-11 RX ADMIN — Medication 1 TABLET(S): at 05:09

## 2018-12-11 RX ADMIN — Medication 40 MILLIGRAM(S): at 05:10

## 2018-12-11 RX ADMIN — Medication 30 MILLILITER(S): at 18:11

## 2018-12-11 RX ADMIN — Medication 81 MILLIGRAM(S): at 21:18

## 2018-12-11 RX ADMIN — DULOXETINE HYDROCHLORIDE 20 MILLIGRAM(S): 30 CAPSULE, DELAYED RELEASE ORAL at 21:18

## 2018-12-11 RX ADMIN — AMLODIPINE BESYLATE 10 MILLIGRAM(S): 2.5 TABLET ORAL at 05:10

## 2018-12-11 RX ADMIN — HEPARIN SODIUM 5000 UNIT(S): 5000 INJECTION INTRAVENOUS; SUBCUTANEOUS at 21:20

## 2018-12-11 RX ADMIN — Medication 1 TABLET(S): at 21:17

## 2018-12-11 RX ADMIN — ATORVASTATIN CALCIUM 80 MILLIGRAM(S): 80 TABLET, FILM COATED ORAL at 21:19

## 2018-12-11 RX ADMIN — ISOSORBIDE MONONITRATE 30 MILLIGRAM(S): 60 TABLET, EXTENDED RELEASE ORAL at 21:21

## 2018-12-11 RX ADMIN — Medication 30 MILLILITER(S): at 21:29

## 2018-12-11 RX ADMIN — Medication 50 MILLIGRAM(S): at 21:17

## 2018-12-11 RX ADMIN — SENNA PLUS 2 TABLET(S): 8.6 TABLET ORAL at 21:18

## 2018-12-11 RX ADMIN — Medication 3 MILLIGRAM(S): at 21:18

## 2018-12-11 RX ADMIN — Medication 1 SPRAY(S): at 05:09

## 2018-12-11 RX ADMIN — LORATADINE 10 MILLIGRAM(S): 10 TABLET ORAL at 21:18

## 2018-12-11 RX ADMIN — Medication 100 MICROGRAM(S): at 05:09

## 2018-12-11 RX ADMIN — Medication 100 MILLIGRAM(S): at 21:18

## 2018-12-11 RX ADMIN — Medication 50 MILLIGRAM(S): at 21:27

## 2018-12-11 NOTE — PROGRESS NOTE ADULT - SUBJECTIVE AND OBJECTIVE BOX
Acute renal failure    HPI:  77 y/o male was brought in by family for dry cough on and off for past 2 days, no fever, no sick contact. + nasal stuffiness  and scratchy throat. As per family every year around this time he gets this. no h/o asthma. no cp. no abd. pain. no n/v/d. no sob reported. In the er pt's hr was in mid 40's. pt. denies dizziness but reports some fatigue. Family is not aware if he has h/o bradycardia. pt's Cr is 3.89 from blood work done in ER. family does not know if pt. has any kidney problem.  As per family pt. was seen by pcp about 2 weeks ago and had blood work done but results are not known to family and they did not get any call about abnormal labs. Pt. was seen by nephrologist Dr. Costa yesterday as routine check up as per son in law but blood work from pcp office was not available so pt. was instructed to return when blood work is available. As per son in law pt's cough has topped while in the ER.  no other complaints. (28 Nov 2018 19:56)    Interval History:  Patient was seen and examined at bedside around 10 am. Was waiting for PermCath. Complaining of generalized weakness. Denies chest pain, palpitations, shortness of breath, headache, dizziness, visual symptoms, nausea, vomiting, abdominal pain, urinary symptoms or fever.      ROS:  As per interval history otherwise unremarkable.    PHYSICAL EXAM:  Vital Signs   T(C): 37.1 (11 Dec 2018 10:45), Max: 37.1 (10 Dec 2018 20:01)  T(F): 98.7 (11 Dec 2018 10:45), Max: 98.8 (10 Dec 2018 20:01)  HR: 67 (11 Dec 2018 10:45) (62 - 75)  BP: 136/71 (11 Dec 2018 10:45) (122/62 - 143/69)  RR: 18 (11 Dec 2018 10:45) (18 - 18)  SpO2: 98% (11 Dec 2018 10:45) (96% - 99%)  General: Elderly male lying in bed comfortably. No acute distress  HEENT: PERRLA. EOMI. Clear conjunctivae. Moist mucus membrane  Neck: Supple. No JVD. No Thyromegaly   Chest: CTA bilaterally - no wheezing, rales or rhonchi. Dialysis catheter on right side of neck.   Heart: Normal S1 & S2. RRR. No murmur.   Abdomen: Soft. Non-tender. Non-distended. + BS  Ext: No pedal edema. No calf tenderness   Neuro: AAO x 3. No focal deficit. No speech disorder  Skin: Warm and Dry  Psychiatry: Normal mood and affect    MEDICATIONS  (STANDING):  amLODIPine   Tablet 10 milliGRAM(s) Oral daily  aspirin enteric coated 81 milliGRAM(s) Oral daily  atorvastatin 80 milliGRAM(s) Oral at bedtime  chlorhexidine 2% Cloths 1 Application(s) Topical daily  clopidogrel Tablet 75 milliGRAM(s) Oral daily  docusate sodium 100 milliGRAM(s) Oral daily  DULoxetine 20 milliGRAM(s) Oral daily  furosemide    Tablet 40 milliGRAM(s) Oral daily  heparin  Injectable 5000 Unit(s) SubCutaneous every 12 hours  hydrALAZINE 50 milliGRAM(s) Oral every 8 hours  isosorbide   mononitrate ER Tablet (IMDUR) 30 milliGRAM(s) Oral daily  levothyroxine 100 MICROGram(s) Oral daily  loratadine 10 milliGRAM(s) Oral daily  metoprolol tartrate 50 milliGRAM(s) Oral two times a day  Nephro-jeana 1 Tablet(s) Oral daily  pantoprazole    Tablet 40 milliGRAM(s) Oral before breakfast  senna 2 Tablet(s) Oral at bedtime  sodium chloride 0.65% Nasal 1 Spray(s) Both Nostrils every 4 hours    MEDICATIONS  (PRN):  acetaminophen   Tablet .. 650 milliGRAM(s) Oral every 6 hours PRN Temp greater or equal to 38C (100.4F), Moderate Pain (4 - 6)  aluminum hydroxide/magnesium hydroxide/simethicone Suspension 30 milliLiter(s) Oral every 4 hours PRN Dyspepsia  guaiFENesin    Syrup 200 milliGRAM(s) Oral every 6 hours PRN Cough  melatonin 3 milliGRAM(s) Oral at bedtime PRN Insomnia  ondansetron Injectable 4 milliGRAM(s) IV Push every 4 hours PRN Nausea and/or Vomiting    LABS:                        9.7    11.8  )-----------( 264      ( 11 Dec 2018 06:04 )             29.0     12-11    131<L>  |  90<L>  |  57.0<H>  ----------------------------<  106  4.0   |  25.0  |  5.33<H>    Ca    8.4<L>      11 Dec 2018 06:04  Phos  5.0     12-11  Mg     2.2     12-11      PT/INR - ( 11 Dec 2018 06:04 )   PT: 11.5 sec;   INR: 1.00 ratio         PTT - ( 11 Dec 2018 06:04 )  PTT:29.8 sec    Blood Culture: 12-08 @ 19:45  Organism --  Gram Stain Blood -- Gram Stain --  Specimen Source .Blood  Culture-Blood --    RADIOLOGY & ADDITIONAL STUDIES:  Reviewed

## 2018-12-11 NOTE — BRIEF OPERATIVE NOTE - PROCEDURE
Insertion of catheter for hemodialysis  12/04/2018  Ti Jaramillo <<-----Click on this checkbox to enter Procedure

## 2018-12-11 NOTE — PROGRESS NOTE ADULT - SUBJECTIVE AND OBJECTIVE BOX
Patient seen and examined    I&O's Summary    10 Dec 2018 07:01  -  11 Dec 2018 07:00  --------------------------------------------------------  IN: 0 mL / OUT: 475 mL / NET: -475 mL    11 Dec 2018 07:01  -  11 Dec 2018 13:18  --------------------------------------------------------  IN: 0 mL / OUT: 100 mL / NET: -100 mL    REVIEW OF SYSTEMS:    CONSTITUTIONAL: No F/C  RESPIRATORY: No cough or SOB  CARDIOVASCULAR: No CP/palpitations,    GASTROINTESTINAL: No abdominal pain , NVD   GENITOURINARY: No UTI sx  NEUROLOGICAL: No headaches/wk/numbness  MUSCULOSKELETAL:  No joint pain/swelling; No LBP  EXTREMITIES : no swelling,    Vital Signs Last 24 Hrs  T(C): 37.1 (11 Dec 2018 10:45), Max: 37.1 (10 Dec 2018 20:01)  T(F): 98.7 (11 Dec 2018 10:45), Max: 98.8 (10 Dec 2018 20:01)  HR: 67 (11 Dec 2018 10:45) (62 - 75)  BP: 136/71 (11 Dec 2018 10:45) (122/62 - 143/69)  BP(mean): --  RR: 18 (11 Dec 2018 10:45) (18 - 18)  SpO2: 98% (11 Dec 2018 10:45) (96% - 99%)    PHYSICAL EXAM:    GENERAL: NAD,   EYES:  conjunctiva and sclera clear  NECK: Supple, No JVD/Bruit  NERVOUS SYSTEM:  A/O x3,   CHEST:  CTA ,No rales or rhonchi  HEART:  RRR, No murmurs  ABDOMEN: Soft, NT/ND BS+  EXTREMITIES:  No Edema;  SKIN: No rashes    LABS:                        9.7    11.8  )-----------( 264      ( 11 Dec 2018 06:04 )             29.0     12-11    131<L>  |  90<L>  |  57.0<H>  ----------------------------<  106  4.0   |  25.0  |  5.33<H>    Ca    8.4<L>      11 Dec 2018 06:04  Phos  5.0     12-11  Mg     2.2     12-11        MEDICATIONS  (STANDING):  acetaminophen   Tablet .. PRN  aluminum hydroxide/magnesium hydroxide/simethicone Suspension PRN  amLODIPine   Tablet  aspirin enteric coated  atorvastatin  chlorhexidine 2% Cloths  clopidogrel Tablet  docusate sodium  DULoxetine  furosemide    Tablet  guaiFENesin    Syrup PRN  heparin  Injectable  hydrALAZINE  isosorbide   mononitrate ER Tablet (IMDUR)  levothyroxine  loratadine  melatonin PRN  metoprolol tartrate  Nephro-jeana  ondansetron Injectable PRN  pantoprazole    Tablet  senna  sodium chloride 0.65% Nasal      HD This PM, After CVC Insertion,    D/W TAYLER Ceron,

## 2018-12-11 NOTE — PROGRESS NOTE ADULT - ASSESSMENT
78 years old male with,    1) NSTEMI  - s/p PCI to LCx with CHAN x 1  - Continue Aspirin, Plavix, Imdur, Lipitor and Metoprolol  - Cardiology recommendations appreciated  2) Diastolic Heart Failure  - Continue Lasix and Metoprolol  3) ARIELA on CKD IV  - requiring HD  - Nephrology on board  - s/p Tunnelled CVC    4) HTN  - Continue Amlodipine, Hydralazine, Imdur and Metoprolol   5) Bradycardia  - stable  - Tolerating Metoprolol  6) Leukocytosis  - Likely reactive. No signs of infection.   7) Hypothyroidism  - Continue Synthroid   DVT Prophylaxis -- Heparin 5000 Units

## 2018-12-11 NOTE — PROGRESS NOTE ADULT - ASSESSMENT
A/P: 78 year old male s/p RIJ permacath placement POD#0 receiving HD now, seen to be doing well postoperatively with no major complaints.    -HD  -Long term HD access planning if needed  -rest of care per primary team

## 2018-12-11 NOTE — PROGRESS NOTE ADULT - SUBJECTIVE AND OBJECTIVE BOX
Progress/ Postoperative Note    HPI: Patient s/p RIJ permacath placement was seen resting well postoperatively with no major complaints. Patient was seen in the HD unit getting HD via the permacath with no issues. He denies SOB, chest pain, diarrhea, nausea, vomiting, fever nor chills. Patient states that the pain is well managed at this moment.      MEDICATIONS  (STANDING):  amLODIPine   Tablet 10 milliGRAM(s) Oral daily  aspirin enteric coated 81 milliGRAM(s) Oral daily  atorvastatin 80 milliGRAM(s) Oral at bedtime  chlorhexidine 2% Cloths 1 Application(s) Topical daily  clopidogrel Tablet 75 milliGRAM(s) Oral daily  docusate sodium 100 milliGRAM(s) Oral daily  DULoxetine 20 milliGRAM(s) Oral daily  furosemide    Tablet 40 milliGRAM(s) Oral daily  heparin  Injectable 5000 Unit(s) SubCutaneous every 12 hours  hydrALAZINE 50 milliGRAM(s) Oral every 8 hours  isosorbide   mononitrate ER Tablet (IMDUR) 30 milliGRAM(s) Oral daily  levothyroxine 100 MICROGram(s) Oral daily  loratadine 10 milliGRAM(s) Oral daily  metoprolol tartrate 50 milliGRAM(s) Oral two times a day  Nephro-jeana 1 Tablet(s) Oral daily  pantoprazole    Tablet 40 milliGRAM(s) Oral before breakfast  senna 2 Tablet(s) Oral at bedtime  sodium chloride 0.65% Nasal 1 Spray(s) Both Nostrils every 4 hours    MEDICATIONS  (PRN):  acetaminophen   Tablet .. 650 milliGRAM(s) Oral every 6 hours PRN Temp greater or equal to 38C (100.4F), Moderate Pain (4 - 6)  aluminum hydroxide/magnesium hydroxide/simethicone Suspension 30 milliLiter(s) Oral every 4 hours PRN Dyspepsia  guaiFENesin    Syrup 200 milliGRAM(s) Oral every 6 hours PRN Cough  melatonin 3 milliGRAM(s) Oral at bedtime PRN Insomnia  ondansetron Injectable 4 milliGRAM(s) IV Push every 4 hours PRN Nausea and/or Vomiting      Vital Signs Last 24 Hrs  T(C): 37.1 (11 Dec 2018 15:39), Max: 37.1 (10 Dec 2018 20:01)  T(F): 98.8 (11 Dec 2018 15:39), Max: 98.8 (10 Dec 2018 20:01)  HR: 69 (11 Dec 2018 15:39) (62 - 75)  BP: 136/67 (11 Dec 2018 15:39) (122/62 - 143/69)  BP(mean): --  RR: 18 (11 Dec 2018 15:39) (16 - 18)  SpO2: 92% (11 Dec 2018 15:39) (92% - 99%)    PE  Gen: Not in acute distress  HEENT: RIJ permacath in place with no hematoma nor active bleeding  Pulm: non-labored breathing  CV: RRR, normal S1 and S2  Abd: Soft, non-tender, non-distended  Ext: no pitting edema b/l  Vasc: 2+ radial and PT pulses b/l  Neuro: AAOX3      I&O's Detail    10 Dec 2018 07:01  -  11 Dec 2018 07:00  --------------------------------------------------------  IN:  Total IN: 0 mL    OUT:    Voided: 475 mL  Total OUT: 475 mL    Total NET: -475 mL      11 Dec 2018 07:01  -  11 Dec 2018 18:20  --------------------------------------------------------  IN:  Total IN: 0 mL    OUT:    Voided: 100 mL  Total OUT: 100 mL    Total NET: -100 mL          LABS:                        9.7    11.8  )-----------( 264      ( 11 Dec 2018 06:04 )             29.0     12-11    131<L>  |  90<L>  |  57.0<H>  ----------------------------<  106  4.0   |  25.0  |  5.33<H>    Ca    8.4<L>      11 Dec 2018 06:04  Phos  5.0     12-11  Mg     2.2     12-11      PT/INR - ( 11 Dec 2018 06:04 )   PT: 11.5 sec;   INR: 1.00 ratio         PTT - ( 11 Dec 2018 06:04 )  PTT:29.8 sec      RADIOLOGY & ADDITIONAL STUDIES:

## 2018-12-12 PROCEDURE — 99232 SBSQ HOSP IP/OBS MODERATE 35: CPT

## 2018-12-12 PROCEDURE — 99233 SBSQ HOSP IP/OBS HIGH 50: CPT

## 2018-12-12 RX ADMIN — Medication 1 SPRAY(S): at 22:22

## 2018-12-12 RX ADMIN — ISOSORBIDE MONONITRATE 30 MILLIGRAM(S): 60 TABLET, EXTENDED RELEASE ORAL at 12:31

## 2018-12-12 RX ADMIN — Medication 1 SPRAY(S): at 17:49

## 2018-12-12 RX ADMIN — HEPARIN SODIUM 5000 UNIT(S): 5000 INJECTION INTRAVENOUS; SUBCUTANEOUS at 22:23

## 2018-12-12 RX ADMIN — Medication 50 MILLIGRAM(S): at 13:55

## 2018-12-12 RX ADMIN — HEPARIN SODIUM 5000 UNIT(S): 5000 INJECTION INTRAVENOUS; SUBCUTANEOUS at 09:05

## 2018-12-12 RX ADMIN — SENNA PLUS 2 TABLET(S): 8.6 TABLET ORAL at 22:23

## 2018-12-12 RX ADMIN — Medication 50 MILLIGRAM(S): at 22:23

## 2018-12-12 RX ADMIN — Medication 1 TABLET(S): at 12:31

## 2018-12-12 RX ADMIN — ATORVASTATIN CALCIUM 80 MILLIGRAM(S): 80 TABLET, FILM COATED ORAL at 22:23

## 2018-12-12 RX ADMIN — DULOXETINE HYDROCHLORIDE 20 MILLIGRAM(S): 30 CAPSULE, DELAYED RELEASE ORAL at 12:31

## 2018-12-12 RX ADMIN — Medication 100 MICROGRAM(S): at 06:13

## 2018-12-12 RX ADMIN — Medication 40 MILLIGRAM(S): at 06:12

## 2018-12-12 RX ADMIN — PANTOPRAZOLE SODIUM 40 MILLIGRAM(S): 20 TABLET, DELAYED RELEASE ORAL at 06:13

## 2018-12-12 RX ADMIN — Medication 50 MILLIGRAM(S): at 06:12

## 2018-12-12 RX ADMIN — Medication 1 SPRAY(S): at 09:05

## 2018-12-12 RX ADMIN — LORATADINE 10 MILLIGRAM(S): 10 TABLET ORAL at 12:32

## 2018-12-12 RX ADMIN — Medication 50 MILLIGRAM(S): at 17:49

## 2018-12-12 RX ADMIN — Medication 1 SPRAY(S): at 13:54

## 2018-12-12 RX ADMIN — CLOPIDOGREL BISULFATE 75 MILLIGRAM(S): 75 TABLET, FILM COATED ORAL at 12:30

## 2018-12-12 RX ADMIN — Medication 50 MILLIGRAM(S): at 06:13

## 2018-12-12 RX ADMIN — Medication 81 MILLIGRAM(S): at 12:32

## 2018-12-12 RX ADMIN — AMLODIPINE BESYLATE 10 MILLIGRAM(S): 2.5 TABLET ORAL at 06:13

## 2018-12-12 RX ADMIN — CHLORHEXIDINE GLUCONATE 1 APPLICATION(S): 213 SOLUTION TOPICAL at 12:34

## 2018-12-12 NOTE — PROGRESS NOTE ADULT - ASSESSMENT
78 years old male with,    1) NSTEMI  - s/p PCI to LCx with CHAN x 1  - Continue Aspirin, Plavix, Imdur, Lipitor and Metoprolol  - Cardiology recommendations appreciated  2) Diastolic Heart Failure  - Continue Lasix and Metoprolol  3) ARIELA on CKD IV  - requiring HD (TTS)  - Nephrology on board  - s/p Tunnelled CVC (POD#1)  - CC working on HD seat  4) HTN  - Continue Amlodipine, Hydralazine, Imdur and Metoprolol   5) Bradycardia  - stable  - Tolerating Metoprolol  6) Leukocytosis  - Likely reactive. No signs of infection.   7) Hypothyroidism  - Continue Synthroid   DVT Prophylaxis -- Heparin 5000 Units

## 2018-12-12 NOTE — PROGRESS NOTE ADULT - ASSESSMENT
1) ARIELA on CKD IV  2) Prerenal  3) Acidosis  4) Anemia renal dx  5) Hyperkalemia    HD in AM via tunneled CVC  [facility] as outpt  Needs outpatient HD center

## 2018-12-12 NOTE — PROGRESS NOTE ADULT - SUBJECTIVE AND OBJECTIVE BOX
Acute renal failure    HPI:  77 y/o male was brought in by family for dry cough on and off for past 2 days, no fever, no sick contact. + nasal stuffiness  and scratchy throat. As per family every year around this time he gets this. no h/o asthma. no cp. no abd. pain. no n/v/d. no sob reported. In the er pt's hr was in mid 40's. pt. denies dizziness but reports some fatigue. Family is not aware if he has h/o bradycardia. pt's Cr is 3.89 from blood work done in ER. family does not know if pt. has any kidney problem.  As per family pt. was seen by pcp about 2 weeks ago and had blood work done but results are not known to family and they did not get any call about abnormal labs. Pt. was seen by nephrologist Dr. Costa yesterday as routine check up as per son in law but blood work from pcp office was not available so pt. was instructed to return when blood work is available. As per son in law pt's cough has topped while in the ER.  no other complaints. (28 Nov 2018 19:56)    Interval History:  Patient was seen and examined at bedside. Feeling better today.   Denies chest pain, palpitations, shortness of breath, headache, dizziness, visual symptoms, nausea, vomiting, abdominal pain, urinary symptoms or fever.      ROS:  As per interval history otherwise unremarkable.    PHYSICAL EXAM:  Vital Signs   T(C): 36.8 (11 Dec 2018 23:12), Max: 37.3 (11 Dec 2018 19:01)  T(F): 98.3 (11 Dec 2018 23:12), Max: 99.2 (11 Dec 2018 19:01)  HR: 78 (12 Dec 2018 06:12) (69 - 87)  BP: 142/64 (12 Dec 2018 06:12) (130/69 - 153/77)  RR: 19 (11 Dec 2018 23:12) (16 - 19)  SpO2: 99% (11 Dec 2018 23:12) (92% - 99%)  General: Elderly male lying in bed comfortably. No acute distress  HEENT: PERRLA. EOMI. Clear conjunctivae. Moist mucus membrane  Neck: Supple. No JVD. No Thyromegaly   Chest: CTA bilaterally - no wheezing, rales or rhonchi. Dialysis catheter on right side of neck.   Heart: Normal S1 & S2. RRR. No murmur.   Abdomen: Soft. Non-tender. Non-distended. + BS  Ext: No pedal edema. No calf tenderness   Neuro: AAO x 3. No focal deficit. No speech disorder  Skin: Warm and Dry  Psychiatry: Normal mood and affect    MEDICATIONS  (STANDING):  amLODIPine   Tablet 10 milliGRAM(s) Oral daily  aspirin enteric coated 81 milliGRAM(s) Oral daily  atorvastatin 80 milliGRAM(s) Oral at bedtime  chlorhexidine 2% Cloths 1 Application(s) Topical daily  clopidogrel Tablet 75 milliGRAM(s) Oral daily  docusate sodium 100 milliGRAM(s) Oral daily  DULoxetine 20 milliGRAM(s) Oral daily  furosemide    Tablet 40 milliGRAM(s) Oral daily  heparin  Injectable 5000 Unit(s) SubCutaneous every 12 hours  hydrALAZINE 50 milliGRAM(s) Oral every 8 hours  isosorbide   mononitrate ER Tablet (IMDUR) 30 milliGRAM(s) Oral daily  levothyroxine 100 MICROGram(s) Oral daily  loratadine 10 milliGRAM(s) Oral daily  metoprolol tartrate 50 milliGRAM(s) Oral two times a day  Nephro-jeana 1 Tablet(s) Oral daily  pantoprazole    Tablet 40 milliGRAM(s) Oral before breakfast  senna 2 Tablet(s) Oral at bedtime  sodium chloride 0.65% Nasal 1 Spray(s) Both Nostrils every 4 hours    MEDICATIONS  (PRN):  acetaminophen   Tablet .. 650 milliGRAM(s) Oral every 6 hours PRN Temp greater or equal to 38C (100.4F), Moderate Pain (4 - 6)  aluminum hydroxide/magnesium hydroxide/simethicone Suspension 30 milliLiter(s) Oral every 4 hours PRN Dyspepsia  guaiFENesin    Syrup 200 milliGRAM(s) Oral every 6 hours PRN Cough  melatonin 3 milliGRAM(s) Oral at bedtime PRN Insomnia  ondansetron Injectable 4 milliGRAM(s) IV Push every 4 hours PRN Nausea and/or Vomiting    LABS:                        9.7    11.8  )-----------( 264      ( 11 Dec 2018 06:04 )             29.0     12-11    131<L>  |  90<L>  |  57.0<H>  ----------------------------<  106  4.0   |  25.0  |  5.33<H>    Ca    8.4<L>      11 Dec 2018 06:04  Phos  5.0     12-11  Mg     2.2     12-11      PT/INR - ( 11 Dec 2018 06:04 )   PT: 11.5 sec;   INR: 1.00 ratio         PTT - ( 11 Dec 2018 06:04 )  PTT:29.8 sec    Blood Culture: 12-08 @ 19:45  Organism --  Gram Stain Blood -- Gram Stain --  Specimen Source .Blood  Culture-Blood --    RADIOLOGY & ADDITIONAL STUDIES:  Reviewed

## 2018-12-12 NOTE — PROGRESS NOTE ADULT - SUBJECTIVE AND OBJECTIVE BOX
[facility] DIVISION OF KIDNEY DISEASES AND HYPERTENSION -- FOLLOW UP NOTE  --------------------------------------------------------------------------------  Chief Complaint:  ESRD HD    24 hour events/subjective:  Pt seen and examined  s/p tunneled CVC  Outpt AVF ;     PAST HISTORY  --------------------------------------------------------------------------------  No significant changes to PMH, PSH, FHx, SHx, unless otherwise noted    ALLERGIES & MEDICATIONS  --------------------------------------------------------------------------------  Allergies    No Known Allergies    Intolerances      Standing Inpatient Medications  amLODIPine   Tablet 10 milliGRAM(s) Oral daily  aspirin enteric coated 81 milliGRAM(s) Oral daily  atorvastatin 80 milliGRAM(s) Oral at bedtime  chlorhexidine 2% Cloths 1 Application(s) Topical daily  clopidogrel Tablet 75 milliGRAM(s) Oral daily  docusate sodium 100 milliGRAM(s) Oral daily  DULoxetine 20 milliGRAM(s) Oral daily  furosemide    Tablet 40 milliGRAM(s) Oral daily  heparin  Injectable 5000 Unit(s) SubCutaneous every 12 hours  hydrALAZINE 50 milliGRAM(s) Oral every 8 hours  isosorbide   mononitrate ER Tablet (IMDUR) 30 milliGRAM(s) Oral daily  levothyroxine 100 MICROGram(s) Oral daily  loratadine 10 milliGRAM(s) Oral daily  metoprolol tartrate 50 milliGRAM(s) Oral two times a day  Nephro-jeana 1 Tablet(s) Oral daily  pantoprazole    Tablet 40 milliGRAM(s) Oral before breakfast  senna 2 Tablet(s) Oral at bedtime  sodium chloride 0.65% Nasal 1 Spray(s) Both Nostrils every 4 hours    PRN Inpatient Medications  acetaminophen   Tablet .. 650 milliGRAM(s) Oral every 6 hours PRN  aluminum hydroxide/magnesium hydroxide/simethicone Suspension 30 milliLiter(s) Oral every 4 hours PRN  guaiFENesin    Syrup 200 milliGRAM(s) Oral every 6 hours PRN  melatonin 3 milliGRAM(s) Oral at bedtime PRN  ondansetron Injectable 4 milliGRAM(s) IV Push every 4 hours PRN      REVIEW OF SYSTEMS  --------------------------------------------------------------------------------  Gen: No weight changes, fatigue, fevers/chills, weakness  Skin: No rashes  Head/Eyes/Ears/Mouth: No headache; Normal hearing; Normal vision w/o blurriness; No sinus pain/discomfort, sore throat  Respiratory: No dyspnea, cough, wheezing, hemoptysis  CV: No chest pain, PND, orthopnea  GI: No abdominal pain, diarrhea, constipation, nausea, vomiting, melena, hematochezia  : No increased frequency, dysuria, hematuria, nocturia  MSK: No joint pain/swelling; no back pain; no edema  Neuro: No dizziness/lightheadedness, weakness, seizures, numbness, tingling  Heme: No easy bruising or bleeding  Endo: No heat/cold intolerance  Psych: No significant nervousness, anxiety, stress, depression    All other systems were reviewed and are negative, except as noted.    VITALS/PHYSICAL EXAM  --------------------------------------------------------------------------------  T(C): 36.8 (12-11-18 @ 23:12), Max: 37.3 (12-11-18 @ 19:01)  HR: 78 (12-12-18 @ 06:12) (69 - 87)  BP: 142/64 (12-12-18 @ 06:12) (130/69 - 153/77)  RR: 19 (12-11-18 @ 23:12) (16 - 19)  SpO2: 99% (12-11-18 @ 23:12) (92% - 99%)  Wt(kg): --        12-11-18 @ 07:01  -  12-12-18 @ 07:00  --------------------------------------------------------  IN: 0 mL / OUT: 1100 mL / NET: -1100 mL      Physical Exam:  	Gen: NAD, well-appearing  	HEENT: PERRL, supple neck, clear oropharynx  	Pulm: CTA B/L  	CV: RRR, S1S2; no rub  	Back: No spinal or CVA tenderness; no sacral edema  	Abd: +BS, soft, nontender/nondistended  	: No suprapubic tenderness  	UE: Warm, FROM, no clubbing, intact strength; no edema; no asterixis  	LE: Warm, FROM, no clubbing, intact strength; no edema  	Neuro: No focal deficits, intact gait  	Psych: Normal affect and mood  	Skin: Warm, without rashes  	Vascular access:    LABS/STUDIES  --------------------------------------------------------------------------------              9.7    11.8  >-----------<  264      [12-11-18 @ 06:04]              29.0     131  |  90  |  57.0  ----------------------------<  106      [12-11-18 @ 06:04]  4.0   |  25.0  |  5.33        Ca     8.4     [12-11-18 @ 06:04]      Mg     2.2     [12-11-18 @ 06:04]      Phos  5.0     [12-11-18 @ 06:04]      PT/INR: PT 11.5 , INR 1.00       [12-11-18 @ 06:04]  PTT: 29.8       [12-11-18 @ 06:04]      Creatinine Trend:  SCr 5.33 [12-11 @ 06:04]  SCr 4.99 [12-10 @ 20:35]  SCr 5.05 [12-07 @ 05:47]  SCr 3.40 [12-06 @ 08:17]  SCr 4.65 [12-06 @ 06:45]        TSH 7.33      [11-29-18 @ 02:31]    HBsAb <3.0      [12-05-18 @ 16:33]  HBsAg Nonreact      [12-05-18 @ 16:33]  HBcAb Nonreact      [12-05-18 @ 16:33]  HCV 0.14, Nonreact      [12-05-18 @ 16:33]

## 2018-12-12 NOTE — PHYSICAL THERAPY INITIAL EVALUATION ADULT - GAIT PATTERN USED, PT EVAL
decreased gait velocity and activity tolerance, verbal cues for sequencing, decreased kimi step length

## 2018-12-12 NOTE — PROGRESS NOTE ADULT - SUBJECTIVE AND OBJECTIVE BOX
POD 1 of [facility] Permacath. Catheter functioning well for HD. No hematoma at the insertion site. Will wait to establish a long term HD plan given recent MI.

## 2018-12-12 NOTE — PHYSICAL THERAPY INITIAL EVALUATION ADULT - CRITERIA FOR SKILLED THERAPEUTIC INTERVENTIONS
impairments found/functional limitations in following categories/rehab potential/therapy frequency/predicted duration of therapy intervention/anticipated discharge recommendation

## 2018-12-12 NOTE — PHYSICAL THERAPY INITIAL EVALUATION ADULT - PERTINENT HX OF CURRENT PROBLEM, REHAB EVAL
pt presents to [facility] due to ARF, cough, NSTEMI, RIJ PermaCath 12/11, s/p PCI c DESx1

## 2018-12-13 LAB
ANION GAP SERPL CALC-SCNC: 16 MMOL/L — SIGNIFICANT CHANGE UP (ref 5–17)
BUN SERPL-MCNC: 47 MG/DL — HIGH (ref 8–20)
CALCIUM SERPL-MCNC: 8.4 MG/DL — LOW (ref 8.6–10.2)
CHLORIDE SERPL-SCNC: 91 MMOL/L — LOW (ref 98–107)
CO2 SERPL-SCNC: 24 MMOL/L — SIGNIFICANT CHANGE UP (ref 22–29)
CREAT SERPL-MCNC: 4.88 MG/DL — HIGH (ref 0.5–1.3)
CULTURE RESULTS: SIGNIFICANT CHANGE UP
CULTURE RESULTS: SIGNIFICANT CHANGE UP
GLUCOSE SERPL-MCNC: 116 MG/DL — HIGH (ref 70–115)
HCT VFR BLD CALC: 28.5 % — LOW (ref 42–52)
HGB BLD-MCNC: 9.4 G/DL — LOW (ref 14–18)
MCHC RBC-ENTMCNC: 28.9 PG — SIGNIFICANT CHANGE UP (ref 27–31)
MCHC RBC-ENTMCNC: 33 G/DL — SIGNIFICANT CHANGE UP (ref 32–36)
MCV RBC AUTO: 87.7 FL — SIGNIFICANT CHANGE UP (ref 80–94)
PLATELET # BLD AUTO: 318 K/UL — SIGNIFICANT CHANGE UP (ref 150–400)
POTASSIUM SERPL-MCNC: 3.4 MMOL/L — LOW (ref 3.5–5.3)
POTASSIUM SERPL-SCNC: 3.4 MMOL/L — LOW (ref 3.5–5.3)
RBC # BLD: 3.25 M/UL — LOW (ref 4.6–6.2)
RBC # FLD: 12.2 % — SIGNIFICANT CHANGE UP (ref 11–15.6)
SODIUM SERPL-SCNC: 131 MMOL/L — LOW (ref 135–145)
SPECIMEN SOURCE: SIGNIFICANT CHANGE UP
SPECIMEN SOURCE: SIGNIFICANT CHANGE UP
WBC # BLD: 11.4 K/UL — HIGH (ref 4.8–10.8)
WBC # FLD AUTO: 11.4 K/UL — HIGH (ref 4.8–10.8)

## 2018-12-13 PROCEDURE — 90937 HEMODIALYSIS REPEATED EVAL: CPT

## 2018-12-13 PROCEDURE — 99232 SBSQ HOSP IP/OBS MODERATE 35: CPT

## 2018-12-13 RX ORDER — ALTEPLASE 100 MG
2 KIT INTRAVENOUS ONCE
Refills: 0 | Status: DISCONTINUED | OUTPATIENT
Start: 2018-12-13 | End: 2018-12-15

## 2018-12-13 RX ORDER — POTASSIUM CHLORIDE 20 MEQ
40 PACKET (EA) ORAL ONCE
Refills: 0 | Status: COMPLETED | OUTPATIENT
Start: 2018-12-13 | End: 2018-12-14

## 2018-12-13 RX ORDER — ALTEPLASE 100 MG
2 KIT INTRAVENOUS ONCE
Refills: 0 | Status: COMPLETED | OUTPATIENT
Start: 2018-12-13 | End: 2018-12-13

## 2018-12-13 RX ADMIN — PANTOPRAZOLE SODIUM 40 MILLIGRAM(S): 20 TABLET, DELAYED RELEASE ORAL at 06:21

## 2018-12-13 RX ADMIN — Medication 1 SPRAY(S): at 11:46

## 2018-12-13 RX ADMIN — DULOXETINE HYDROCHLORIDE 20 MILLIGRAM(S): 30 CAPSULE, DELAYED RELEASE ORAL at 17:25

## 2018-12-13 RX ADMIN — Medication 50 MILLIGRAM(S): at 06:21

## 2018-12-13 RX ADMIN — Medication 100 MICROGRAM(S): at 06:21

## 2018-12-13 RX ADMIN — LORATADINE 10 MILLIGRAM(S): 10 TABLET ORAL at 17:25

## 2018-12-13 RX ADMIN — Medication 1 SPRAY(S): at 17:19

## 2018-12-13 RX ADMIN — Medication 50 MILLIGRAM(S): at 17:25

## 2018-12-13 RX ADMIN — HEPARIN SODIUM 5000 UNIT(S): 5000 INJECTION INTRAVENOUS; SUBCUTANEOUS at 22:08

## 2018-12-13 RX ADMIN — Medication 1 SPRAY(S): at 22:12

## 2018-12-13 RX ADMIN — ATORVASTATIN CALCIUM 80 MILLIGRAM(S): 80 TABLET, FILM COATED ORAL at 22:08

## 2018-12-13 RX ADMIN — ALTEPLASE 2 MILLIGRAM(S): KIT at 14:26

## 2018-12-13 RX ADMIN — Medication 50 MILLIGRAM(S): at 22:08

## 2018-12-13 RX ADMIN — Medication 40 MILLIGRAM(S): at 06:21

## 2018-12-13 RX ADMIN — SENNA PLUS 2 TABLET(S): 8.6 TABLET ORAL at 22:08

## 2018-12-13 RX ADMIN — ISOSORBIDE MONONITRATE 30 MILLIGRAM(S): 60 TABLET, EXTENDED RELEASE ORAL at 17:25

## 2018-12-13 RX ADMIN — Medication 1 TABLET(S): at 17:25

## 2018-12-13 RX ADMIN — AMLODIPINE BESYLATE 10 MILLIGRAM(S): 2.5 TABLET ORAL at 06:20

## 2018-12-13 RX ADMIN — Medication 1 SPRAY(S): at 06:21

## 2018-12-13 RX ADMIN — CLOPIDOGREL BISULFATE 75 MILLIGRAM(S): 75 TABLET, FILM COATED ORAL at 17:25

## 2018-12-13 NOTE — PROGRESS NOTE ADULT - SUBJECTIVE AND OBJECTIVE BOX
Acute renal failure    HPI:  77 y/o male was brought in by family for dry cough on and off for past 2 days, no fever, no sick contact. + nasal stuffiness  and scratchy throat. As per family every year around this time he gets this. no h/o asthma. no cp. no abd. pain. no n/v/d. no sob reported. In the er pt's hr was in mid 40's. pt. denies dizziness but reports some fatigue. Family is not aware if he has h/o bradycardia. pt's Cr is 3.89 from blood work done in ER. family does not know if pt. has any kidney problem.  As per family pt. was seen by pcp about 2 weeks ago and had blood work done but results are not known to family and they did not get any call about abnormal labs. Pt. was seen by nephrologist Dr. Costa yesterday as routine check up as per son in law but blood work from pcp office was not available so pt. was instructed to return when blood work is available. As per son in law pt's cough has topped while in the ER.  no other complaints. (28 Nov 2018 19:56)    Interval History:  Patient was seen and examined at bedside around 9:30 this morning. Doing well.    Denies chest pain, palpitations, shortness of breath, headache, dizziness, visual symptoms, nausea, vomiting, abdominal pain, urinary symptoms or fever.      ROS:  As per interval history otherwise unremarkable.    PHYSICAL EXAM:  Vital Signs   T(C): 36.7 (13 Dec 2018 15:05), Max: 37 (13 Dec 2018 11:25)  T(F): 98.1 (13 Dec 2018 15:05), Max: 98.6 (13 Dec 2018 11:25)  HR: 68 (13 Dec 2018 15:05) (56 - 93)  BP: 157/60 (13 Dec 2018 15:05) (110/55 - 157/60)  RR: 18 (13 Dec 2018 15:05) (18 - 19)  SpO2: 92% (13 Dec 2018 15:05) (92% - 98%)  General: Elderly male lying in bed comfortably. No acute distress  HEENT: PERRLA. EOMI. Clear conjunctivae. Moist mucus membrane  Neck: Supple. No JVD. No Thyromegaly   Chest: CTA bilaterally - no wheezing, rales or rhonchi. Permacath on right side of chest.   Heart: Normal S1 & S2. RRR. No murmur.   Abdomen: Soft. Non-tender. Non-distended. + BS  Ext: No pedal edema. No calf tenderness   Neuro: AAO x 3. No focal deficit. No speech disorder  Skin: Warm and Dry  Psychiatry: Normal mood and affect    MEDICATIONS  (STANDING):  amLODIPine   Tablet 10 milliGRAM(s) Oral daily  aspirin enteric coated 81 milliGRAM(s) Oral daily  atorvastatin 80 milliGRAM(s) Oral at bedtime  chlorhexidine 2% Cloths 1 Application(s) Topical daily  clopidogrel Tablet 75 milliGRAM(s) Oral daily  docusate sodium 100 milliGRAM(s) Oral daily  DULoxetine 20 milliGRAM(s) Oral daily  furosemide    Tablet 40 milliGRAM(s) Oral daily  heparin  Injectable 5000 Unit(s) SubCutaneous every 12 hours  hydrALAZINE 50 milliGRAM(s) Oral every 8 hours  isosorbide   mononitrate ER Tablet (IMDUR) 30 milliGRAM(s) Oral daily  levothyroxine 100 MICROGram(s) Oral daily  loratadine 10 milliGRAM(s) Oral daily  metoprolol tartrate 50 milliGRAM(s) Oral two times a day  Nephro-jeana 1 Tablet(s) Oral daily  pantoprazole    Tablet 40 milliGRAM(s) Oral before breakfast  senna 2 Tablet(s) Oral at bedtime  sodium chloride 0.65% Nasal 1 Spray(s) Both Nostrils every 4 hours    MEDICATIONS  (PRN):  acetaminophen   Tablet .. 650 milliGRAM(s) Oral every 6 hours PRN Temp greater or equal to 38C (100.4F), Moderate Pain (4 - 6)  aluminum hydroxide/magnesium hydroxide/simethicone Suspension 30 milliLiter(s) Oral every 4 hours PRN Dyspepsia  guaiFENesin    Syrup 200 milliGRAM(s) Oral every 6 hours PRN Cough  melatonin 3 milliGRAM(s) Oral at bedtime PRN Insomnia  ondansetron Injectable 4 milliGRAM(s) IV Push every 4 hours PRN Nausea and/or Vomiting        LABS:                        9.4    11.4  )-----------( 318      ( 13 Dec 2018 12:18 )             28.5     12-13    131<L>  |  91<L>  |  47.0<H>  ----------------------------<  116<H>  3.4<L>   |  24.0  |  4.88<H>    Ca    8.4<L>      13 Dec 2018 12:18    RADIOLOGY & ADDITIONAL STUDIES:  Reviewed

## 2018-12-13 NOTE — PROGRESS NOTE ADULT - ASSESSMENT
78 years old male with,    1) NSTEMI  - s/p PCI to LCx with CHAN x 1  - Continue Aspirin, Plavix, Imdur, Lipitor and Metoprolol  - Cardiology recommendations appreciated  2) Diastolic Heart Failure  - Continue Lasix and Metoprolol  3) ARIELA on CKD IV  - requiring HD (TTS)  - Nephrology on board  - s/p Tunnelled CVC (POD#2)  - CC working on HD seat  4) HTN  - Continue Amlodipine, Hydralazine, Imdur and Metoprolol   5) Bradycardia  - stable  - Tolerating Metoprolol  6) Leukocytosis  - Likely reactive. No signs of infection.   7) Hypothyroidism  - Continue Synthroid   8) Hypokalemia  - KCl 40 mEq x 1  DVT Prophylaxis -- Heparin 5000 Units

## 2018-12-13 NOTE — PROGRESS NOTE ADULT - SUBJECTIVE AND OBJECTIVE BOX
Vital Signs Last 24 Hrs  T(C): 37 (13 Dec 2018 11:25), Max: 37 (13 Dec 2018 11:25)  T(F): 98.6 (13 Dec 2018 11:), Max: 98.6 (13 Dec 2018 11:)  HR: 93 (13 Dec 2018 11:) (56 - 93)  BP: 133/60 (13 Dec 2018 11:25) (110/55 - 133/60)  BP(mean): --  RR: 18 (13 Dec 2018 11:) (18 - 19)  SpO2: 94% (13 Dec 2018 11:) (92% - 98%)    Phos: 5.0 mg/dL<H> M.2 mg/dL PTH:-- Uric acid:-- Serum Osm:--  Ferritin:-- Iron:-- TIBC:-- Tsat:--  B12:-- TSH:-- ( @ 06:04)    Patient was seen and evaluated on dialysis.   Patient is tolerating the procedure well.   T(C): 37 (18 @ 11:25), Max: 37 (18 @ 11:25)  HR: 93 (18 @ 11:25) (56 - 93)  BP: 133/60 (18 @ 11:25) (110/55 - 133/60)  Continue dialysis:   Dialyzer:  Revaclear 300        QB:  400 ml.,      QD: 600ml.,  Goal UF  0.5 L  over 3.5 Hours

## 2018-12-14 PROCEDURE — 99233 SBSQ HOSP IP/OBS HIGH 50: CPT

## 2018-12-14 PROCEDURE — 99232 SBSQ HOSP IP/OBS MODERATE 35: CPT

## 2018-12-14 RX ADMIN — Medication 1 SPRAY(S): at 13:39

## 2018-12-14 RX ADMIN — AMLODIPINE BESYLATE 10 MILLIGRAM(S): 2.5 TABLET ORAL at 05:13

## 2018-12-14 RX ADMIN — DULOXETINE HYDROCHLORIDE 20 MILLIGRAM(S): 30 CAPSULE, DELAYED RELEASE ORAL at 12:01

## 2018-12-14 RX ADMIN — CLOPIDOGREL BISULFATE 75 MILLIGRAM(S): 75 TABLET, FILM COATED ORAL at 12:01

## 2018-12-14 RX ADMIN — LORATADINE 10 MILLIGRAM(S): 10 TABLET ORAL at 12:01

## 2018-12-14 RX ADMIN — Medication 1 SPRAY(S): at 17:21

## 2018-12-14 RX ADMIN — Medication 50 MILLIGRAM(S): at 22:01

## 2018-12-14 RX ADMIN — HEPARIN SODIUM 5000 UNIT(S): 5000 INJECTION INTRAVENOUS; SUBCUTANEOUS at 22:01

## 2018-12-14 RX ADMIN — SENNA PLUS 2 TABLET(S): 8.6 TABLET ORAL at 22:02

## 2018-12-14 RX ADMIN — Medication 50 MILLIGRAM(S): at 13:39

## 2018-12-14 RX ADMIN — Medication 1 SPRAY(S): at 22:02

## 2018-12-14 RX ADMIN — Medication 1 SPRAY(S): at 02:49

## 2018-12-14 RX ADMIN — Medication 50 MILLIGRAM(S): at 05:13

## 2018-12-14 RX ADMIN — Medication 100 MILLIGRAM(S): at 12:01

## 2018-12-14 RX ADMIN — ATORVASTATIN CALCIUM 80 MILLIGRAM(S): 80 TABLET, FILM COATED ORAL at 22:02

## 2018-12-14 RX ADMIN — Medication 100 MICROGRAM(S): at 05:13

## 2018-12-14 RX ADMIN — Medication 1 SPRAY(S): at 10:08

## 2018-12-14 RX ADMIN — Medication 1 TABLET(S): at 12:01

## 2018-12-14 RX ADMIN — Medication 40 MILLIEQUIVALENT(S): at 05:13

## 2018-12-14 RX ADMIN — ISOSORBIDE MONONITRATE 30 MILLIGRAM(S): 60 TABLET, EXTENDED RELEASE ORAL at 12:01

## 2018-12-14 RX ADMIN — Medication 1 SPRAY(S): at 05:14

## 2018-12-14 RX ADMIN — Medication 40 MILLIGRAM(S): at 05:13

## 2018-12-14 RX ADMIN — Medication 50 MILLIGRAM(S): at 17:21

## 2018-12-14 RX ADMIN — HEPARIN SODIUM 5000 UNIT(S): 5000 INJECTION INTRAVENOUS; SUBCUTANEOUS at 10:08

## 2018-12-14 RX ADMIN — PANTOPRAZOLE SODIUM 40 MILLIGRAM(S): 20 TABLET, DELAYED RELEASE ORAL at 05:13

## 2018-12-14 RX ADMIN — CHLORHEXIDINE GLUCONATE 1 APPLICATION(S): 213 SOLUTION TOPICAL at 13:38

## 2018-12-14 RX ADMIN — Medication 81 MILLIGRAM(S): at 12:04

## 2018-12-14 NOTE — PROGRESS NOTE ADULT - SUBJECTIVE AND OBJECTIVE BOX
[facility] DIVISION OF KIDNEY DISEASES AND HYPERTENSION -- FOLLOW UP NOTE  --------------------------------------------------------------------------------  Chief Complaint:  ESRD HD    24 hour events/subjective:  Pt seen and examined  s/p tunneled CVC  Outpt AVF     PAST HISTORY  --------------------------------------------------------------------------------  No significant changes to PMH, PSH, FHx, SHx, unless otherwise noted    ALLERGIES & MEDICATIONS  --------------------------------------------------------------------------------  Allergies    No Known Allergies    Intolerances      Standing Inpatient Medications  alteplase for catheter clearance 2 milliGRAM(s) Catheter once  alteplase for catheter clearance 2 milliGRAM(s) Catheter once  amLODIPine   Tablet 10 milliGRAM(s) Oral daily  aspirin enteric coated 81 milliGRAM(s) Oral daily  atorvastatin 80 milliGRAM(s) Oral at bedtime  chlorhexidine 2% Cloths 1 Application(s) Topical daily  clopidogrel Tablet 75 milliGRAM(s) Oral daily  docusate sodium 100 milliGRAM(s) Oral daily  DULoxetine 20 milliGRAM(s) Oral daily  furosemide    Tablet 40 milliGRAM(s) Oral daily  heparin  Injectable 5000 Unit(s) SubCutaneous every 12 hours  hydrALAZINE 50 milliGRAM(s) Oral every 8 hours  isosorbide   mononitrate ER Tablet (IMDUR) 30 milliGRAM(s) Oral daily  levothyroxine 100 MICROGram(s) Oral daily  loratadine 10 milliGRAM(s) Oral daily  metoprolol tartrate 50 milliGRAM(s) Oral two times a day  Nephro-jeana 1 Tablet(s) Oral daily  pantoprazole    Tablet 40 milliGRAM(s) Oral before breakfast  senna 2 Tablet(s) Oral at bedtime  sodium chloride 0.65% Nasal 1 Spray(s) Both Nostrils every 4 hours    PRN Inpatient Medications  acetaminophen   Tablet .. 650 milliGRAM(s) Oral every 6 hours PRN  aluminum hydroxide/magnesium hydroxide/simethicone Suspension 30 milliLiter(s) Oral every 4 hours PRN  guaiFENesin    Syrup 200 milliGRAM(s) Oral every 6 hours PRN  melatonin 3 milliGRAM(s) Oral at bedtime PRN  ondansetron Injectable 4 milliGRAM(s) IV Push every 4 hours PRN      REVIEW OF SYSTEMS  --------------------------------------------------------------------------------  Gen: No weight changes, fatigue, fevers/chills, weakness  Skin: No rashes  Head/Eyes/Ears/Mouth: No headache; Normal hearing; Normal vision w/o blurriness; No sinus pain/discomfort, sore throat  Respiratory: No dyspnea, cough, wheezing, hemoptysis  CV: No chest pain, PND, orthopnea  GI: No abdominal pain, diarrhea, constipation, nausea, vomiting, melena, hematochezia  : No increased frequency, dysuria, hematuria, nocturia  MSK: No joint pain/swelling; no back pain; no edema  Neuro: No dizziness/lightheadedness, weakness, seizures, numbness, tingling  Heme: No easy bruising or bleeding  Endo: No heat/cold intolerance  Psych: No significant nervousness, anxiety, stress, depression    All other systems were reviewed and are negative, except as noted.    VITALS/PHYSICAL EXAM  --------------------------------------------------------------------------------  T(C): 36.8 (12-14-18 @ 08:41), Max: 37.2 (12-13-18 @ 23:18)  HR: 62 (12-14-18 @ 08:41) (61 - 68)  BP: 109/59 (12-14-18 @ 08:41) (109/59 - 157/60)  RR: 18 (12-14-18 @ 08:41) (18 - 19)  SpO2: 96% (12-14-18 @ 08:41) (92% - 97%)  Wt(kg): --        12-13-18 @ 07:01  -  12-14-18 @ 07:00  --------------------------------------------------------  IN: 0 mL / OUT: 500 mL / NET: -500 mL      Physical Exam:  	Gen: NAD, well-appearing  	HEENT: PERRL, supple neck, clear oropharynx  	Pulm: CTA B/L  	CV: RRR, S1S2; no rub  	Back: No spinal or CVA tenderness; no sacral edema  	Abd: +BS, soft, nontender/nondistended  	: No suprapubic tenderness  	UE: Warm, FROM, no clubbing, intact strength; no edema; no asterixis  	LE: Warm, FROM, no clubbing, intact strength; no edema  	Neuro: No focal deficits, intact gait  	Psych: Normal affect and mood  	Skin: Warm, without rashes  	Vascular access: CVC+    LABS/STUDIES  --------------------------------------------------------------------------------              9.4    11.4  >-----------<  318      [12-13-18 @ 12:18]              28.5     131  |  91  |  47.0  ----------------------------<  116      [12-13-18 @ 12:18]  3.4   |  24.0  |  4.88        Ca     8.4     [12-13-18 @ 12:18]            Creatinine Trend:  SCr 4.88 [12-13 @ 12:18]  SCr 5.33 [12-11 @ 06:04]  SCr 4.99 [12-10 @ 20:35]  SCr 5.05 [12-07 @ 05:47]  SCr 3.40 [12-06 @ 08:17]        TSH 7.33      [11-29-18 @ 02:31]    HBsAb <3.0      [12-05-18 @ 16:33]  HBsAg Nonreact      [12-05-18 @ 16:33]  HBcAb Nonreact      [12-05-18 @ 16:33]  HCV 0.14, Nonreact      [12-05-18 @ 16:33]

## 2018-12-14 NOTE — PROGRESS NOTE ADULT - ASSESSMENT
78 years old male with,    1) NSTEMI  - s/p PCI to LCx with CHAN x 1  - Continue Aspirin, Plavix, Imdur, Lipitor and Metoprolol  - Cardiology recommendations appreciated  2) Diastolic Heart Failure  - Continue Lasix and Metoprolol  3) ARIELA on CKD IV  - requiring HD (TTS)  - Nephrology on board  - s/p Tunnelled CVC (POD#3)  - HD Seat approved at [facility] in [place]. Will start from Tuesday 12/18/18.   4) HTN  - Continue Amlodipine, Hydralazine, Imdur and Metoprolol   5) Bradycardia  - stable  - Tolerating Metoprolol  6) Leukocytosis  - Likely reactive. No signs of infection.   7) Hypothyroidism  - Continue Synthroid   8) Hypokalemia  - Replaced  DVT Prophylaxis -- Heparin 5000 Units    Dispo: D/C in am after HD.

## 2018-12-14 NOTE — PROGRESS NOTE ADULT - ASSESSMENT
1) ARIELA on CKD IV  2) Prerenal  3) Acidosis  4) Anemia renal dx  5) Hyperkalemia    HD in AM via tunneled CVC  To see Dr Rosie Worthy upon discharge for AVF  Accepted at [place] TTS will start Tues 3:45pm; then TTS 6:20am  d/w Dr Parker

## 2018-12-14 NOTE — PROGRESS NOTE ADULT - SUBJECTIVE AND OBJECTIVE BOX
Acute renal failure    HPI:  79 y/o male was brought in by family for dry cough on and off for past 2 days, no fever, no sick contact. + nasal stuffiness  and scratchy throat. As per family every year around this time he gets this. no h/o asthma. no cp. no abd. pain. no n/v/d. no sob reported. In the er pt's hr was in mid 40's. pt. denies dizziness but reports some fatigue. Family is not aware if he has h/o bradycardia. pt's Cr is 3.89 from blood work done in ER. family does not know if pt. has any kidney problem.  As per family pt. was seen by pcp about 2 weeks ago and had blood work done but results are not known to family and they did not get any call about abnormal labs. Pt. was seen by nephrologist Dr. Costa yesterday as routine check up as per son in law but blood work from pcp office was not available so pt. was instructed to return when blood work is available. As per son in law pt's cough has topped while in the ER.  no other complaints. (28 Nov 2018 19:56)    Interval History:  Patient was seen and examined at bedside around 8 am this morning. Feeling better.    Denies chest pain, palpitations, shortness of breath, headache, dizziness, visual symptoms, nausea, vomiting, abdominal pain, urinary symptoms or fever.    Tolerating PO. No overnight issues.     ROS:  As per interval history otherwise unremarkable.    PHYSICAL EXAM:  Vital Signs   T(C): 37.3 (14 Dec 2018 15:25), Max: 37.3 (14 Dec 2018 15:25)  T(F): 99.1 (14 Dec 2018 15:25), Max: 99.1 (14 Dec 2018 15:25)  HR: 67 (14 Dec 2018 15:25) (61 - 67)  BP: 118/56 (14 Dec 2018 15:25) (109/59 - 118/56)  RR: 18 (14 Dec 2018 15:25) (18 - 19)  SpO2: 93% (14 Dec 2018 15:25) (93% - 97%)  General: Elderly male lying in bed comfortably. No acute distress  HEENT: PERRLA. EOMI. Clear conjunctivae. Moist mucus membrane  Neck: Supple. No JVD. No Thyromegaly   Chest: CTA bilaterally - no wheezing, rales or rhonchi. Permacath on right side of chest.   Heart: Normal S1 & S2. RRR. No murmur.   Abdomen: Soft. Non-tender. Non-distended. + BS  Ext: No pedal edema. No calf tenderness   Neuro: AAO x 3. No focal deficit. No speech disorder  Skin: Warm and Dry  Psychiatry: Normal mood and affect    MEDICATIONS  (STANDING):  alteplase for catheter clearance 2 milliGRAM(s) Catheter once  alteplase for catheter clearance 2 milliGRAM(s) Catheter once  amLODIPine   Tablet 10 milliGRAM(s) Oral daily  aspirin enteric coated 81 milliGRAM(s) Oral daily  atorvastatin 80 milliGRAM(s) Oral at bedtime  chlorhexidine 2% Cloths 1 Application(s) Topical daily  clopidogrel Tablet 75 milliGRAM(s) Oral daily  docusate sodium 100 milliGRAM(s) Oral daily  DULoxetine 20 milliGRAM(s) Oral daily  furosemide    Tablet 40 milliGRAM(s) Oral daily  heparin  Injectable 5000 Unit(s) SubCutaneous every 12 hours  hydrALAZINE 50 milliGRAM(s) Oral every 8 hours  isosorbide   mononitrate ER Tablet (IMDUR) 30 milliGRAM(s) Oral daily  levothyroxine 100 MICROGram(s) Oral daily  loratadine 10 milliGRAM(s) Oral daily  metoprolol tartrate 50 milliGRAM(s) Oral two times a day  Nephro-jeana 1 Tablet(s) Oral daily  pantoprazole    Tablet 40 milliGRAM(s) Oral before breakfast  senna 2 Tablet(s) Oral at bedtime  sodium chloride 0.65% Nasal 1 Spray(s) Both Nostrils every 4 hours    MEDICATIONS  (PRN):  acetaminophen   Tablet .. 650 milliGRAM(s) Oral every 6 hours PRN Temp greater or equal to 38C (100.4F), Moderate Pain (4 - 6)  aluminum hydroxide/magnesium hydroxide/simethicone Suspension 30 milliLiter(s) Oral every 4 hours PRN Dyspepsia  guaiFENesin    Syrup 200 milliGRAM(s) Oral every 6 hours PRN Cough  melatonin 3 milliGRAM(s) Oral at bedtime PRN Insomnia  ondansetron Injectable 4 milliGRAM(s) IV Push every 4 hours PRN Nausea and/or Vomiting    LABS:                        9.4    11.4  )-----------( 318      ( 13 Dec 2018 12:18 )             28.5     12-13    131<L>  |  91<L>  |  47.0<H>  ----------------------------<  116<H>  3.4<L>   |  24.0  |  4.88<H>    Ca    8.4<L>      13 Dec 2018 12:18    RADIOLOGY & ADDITIONAL STUDIES:  Reviewed

## 2018-12-15 VITALS
HEART RATE: 70 BPM | SYSTOLIC BLOOD PRESSURE: 130 MMHG | RESPIRATION RATE: 18 BRPM | OXYGEN SATURATION: 96 % | DIASTOLIC BLOOD PRESSURE: 65 MMHG | TEMPERATURE: 99 F

## 2018-12-15 LAB
ANION GAP SERPL CALC-SCNC: 13 MMOL/L — SIGNIFICANT CHANGE UP (ref 5–17)
BUN SERPL-MCNC: 37 MG/DL — HIGH (ref 8–20)
CALCIUM SERPL-MCNC: 8.2 MG/DL — LOW (ref 8.6–10.2)
CHLORIDE SERPL-SCNC: 94 MMOL/L — LOW (ref 98–107)
CO2 SERPL-SCNC: 25 MMOL/L — SIGNIFICANT CHANGE UP (ref 22–29)
CREAT SERPL-MCNC: 4.35 MG/DL — HIGH (ref 0.5–1.3)
GLUCOSE SERPL-MCNC: 128 MG/DL — HIGH (ref 70–115)
HCT VFR BLD CALC: 25.5 % — LOW (ref 42–52)
HGB BLD-MCNC: 8.3 G/DL — LOW (ref 14–18)
MCHC RBC-ENTMCNC: 28.6 PG — SIGNIFICANT CHANGE UP (ref 27–31)
MCHC RBC-ENTMCNC: 32.5 G/DL — SIGNIFICANT CHANGE UP (ref 32–36)
MCV RBC AUTO: 87.9 FL — SIGNIFICANT CHANGE UP (ref 80–94)
PLATELET # BLD AUTO: 287 K/UL — SIGNIFICANT CHANGE UP (ref 150–400)
POTASSIUM SERPL-MCNC: 3.4 MMOL/L — LOW (ref 3.5–5.3)
POTASSIUM SERPL-SCNC: 3.4 MMOL/L — LOW (ref 3.5–5.3)
RBC # BLD: 2.9 M/UL — LOW (ref 4.6–6.2)
RBC # FLD: 12.2 % — SIGNIFICANT CHANGE UP (ref 11–15.6)
SODIUM SERPL-SCNC: 132 MMOL/L — LOW (ref 135–145)
WBC # BLD: 9.5 K/UL — SIGNIFICANT CHANGE UP (ref 4.8–10.8)
WBC # FLD AUTO: 9.5 K/UL — SIGNIFICANT CHANGE UP (ref 4.8–10.8)

## 2018-12-15 PROCEDURE — 80048 BASIC METABOLIC PNL TOTAL CA: CPT

## 2018-12-15 PROCEDURE — 84484 ASSAY OF TROPONIN QUANT: CPT

## 2018-12-15 PROCEDURE — C1887: CPT

## 2018-12-15 PROCEDURE — 84145 PROCALCITONIN (PCT): CPT

## 2018-12-15 PROCEDURE — C1769: CPT

## 2018-12-15 PROCEDURE — 86709 HEPATITIS A IGM ANTIBODY: CPT

## 2018-12-15 PROCEDURE — 87640 STAPH A DNA AMP PROBE: CPT

## 2018-12-15 PROCEDURE — 99153 MOD SED SAME PHYS/QHP EA: CPT

## 2018-12-15 PROCEDURE — 99239 HOSP IP/OBS DSCHRG MGMT >30: CPT

## 2018-12-15 PROCEDURE — 99285 EMERGENCY DEPT VISIT HI MDM: CPT | Mod: 25

## 2018-12-15 PROCEDURE — C1750: CPT

## 2018-12-15 PROCEDURE — 82962 GLUCOSE BLOOD TEST: CPT

## 2018-12-15 PROCEDURE — 82553 CREATINE MB FRACTION: CPT

## 2018-12-15 PROCEDURE — 86900 BLOOD TYPING SEROLOGIC ABO: CPT

## 2018-12-15 PROCEDURE — 97163 PT EVAL HIGH COMPLEX 45 MIN: CPT

## 2018-12-15 PROCEDURE — 86706 HEP B SURFACE ANTIBODY: CPT

## 2018-12-15 PROCEDURE — 84443 ASSAY THYROID STIM HORMONE: CPT

## 2018-12-15 PROCEDURE — 93306 TTE W/DOPPLER COMPLETE: CPT

## 2018-12-15 PROCEDURE — 86850 RBC ANTIBODY SCREEN: CPT

## 2018-12-15 PROCEDURE — 76775 US EXAM ABDO BACK WALL LIM: CPT

## 2018-12-15 PROCEDURE — C1894: CPT

## 2018-12-15 PROCEDURE — 85027 COMPLETE CBC AUTOMATED: CPT

## 2018-12-15 PROCEDURE — 82947 ASSAY GLUCOSE BLOOD QUANT: CPT

## 2018-12-15 PROCEDURE — 84100 ASSAY OF PHOSPHORUS: CPT

## 2018-12-15 PROCEDURE — 93005 ELECTROCARDIOGRAM TRACING: CPT

## 2018-12-15 PROCEDURE — 94760 N-INVAS EAR/PLS OXIMETRY 1: CPT

## 2018-12-15 PROCEDURE — 84436 ASSAY OF TOTAL THYROXINE: CPT

## 2018-12-15 PROCEDURE — 99261: CPT

## 2018-12-15 PROCEDURE — G0365: CPT

## 2018-12-15 PROCEDURE — 84295 ASSAY OF SERUM SODIUM: CPT

## 2018-12-15 PROCEDURE — 87040 BLOOD CULTURE FOR BACTERIA: CPT

## 2018-12-15 PROCEDURE — 86803 HEPATITIS C AB TEST: CPT

## 2018-12-15 PROCEDURE — 84132 ASSAY OF SERUM POTASSIUM: CPT

## 2018-12-15 PROCEDURE — 84460 ALANINE AMINO (ALT) (SGPT): CPT

## 2018-12-15 PROCEDURE — 74176 CT ABD & PELVIS W/O CONTRAST: CPT

## 2018-12-15 PROCEDURE — 80053 COMPREHEN METABOLIC PANEL: CPT

## 2018-12-15 PROCEDURE — 71045 X-RAY EXAM CHEST 1 VIEW: CPT

## 2018-12-15 PROCEDURE — 82330 ASSAY OF CALCIUM: CPT

## 2018-12-15 PROCEDURE — 93926 LOWER EXTREMITY STUDY: CPT

## 2018-12-15 PROCEDURE — 85730 THROMBOPLASTIN TIME PARTIAL: CPT

## 2018-12-15 PROCEDURE — 83880 ASSAY OF NATRIURETIC PEPTIDE: CPT

## 2018-12-15 PROCEDURE — 94640 AIRWAY INHALATION TREATMENT: CPT

## 2018-12-15 PROCEDURE — 71046 X-RAY EXAM CHEST 2 VIEWS: CPT

## 2018-12-15 PROCEDURE — 86704 HEP B CORE ANTIBODY TOTAL: CPT

## 2018-12-15 PROCEDURE — 86705 HEP B CORE ANTIBODY IGM: CPT

## 2018-12-15 PROCEDURE — 97116 GAIT TRAINING THERAPY: CPT

## 2018-12-15 PROCEDURE — 82803 BLOOD GASES ANY COMBINATION: CPT

## 2018-12-15 PROCEDURE — 93459 L HRT ART/GRFT ANGIO: CPT | Mod: XU

## 2018-12-15 PROCEDURE — 90937 HEMODIALYSIS REPEATED EVAL: CPT

## 2018-12-15 PROCEDURE — 76942 ECHO GUIDE FOR BIOPSY: CPT

## 2018-12-15 PROCEDURE — 99152 MOD SED SAME PHYS/QHP 5/>YRS: CPT

## 2018-12-15 PROCEDURE — 94660 CPAP INITIATION&MGMT: CPT

## 2018-12-15 PROCEDURE — 87340 HEPATITIS B SURFACE AG IA: CPT

## 2018-12-15 PROCEDURE — 84480 ASSAY TRIIODOTHYRONINE (T3): CPT

## 2018-12-15 PROCEDURE — 77001 FLUOROGUIDE FOR VEIN DEVICE: CPT

## 2018-12-15 PROCEDURE — 36600 WITHDRAWAL OF ARTERIAL BLOOD: CPT

## 2018-12-15 PROCEDURE — 36415 COLL VENOUS BLD VENIPUNCTURE: CPT

## 2018-12-15 PROCEDURE — 76937 US GUIDE VASCULAR ACCESS: CPT

## 2018-12-15 PROCEDURE — 93567 NJX CAR CTH SPRVLV AORTGRPHY: CPT

## 2018-12-15 PROCEDURE — 82550 ASSAY OF CK (CPK): CPT

## 2018-12-15 PROCEDURE — 83605 ASSAY OF LACTIC ACID: CPT

## 2018-12-15 PROCEDURE — 82435 ASSAY OF BLOOD CHLORIDE: CPT

## 2018-12-15 PROCEDURE — C1874: CPT

## 2018-12-15 PROCEDURE — 87486 CHLMYD PNEUM DNA AMP PROBE: CPT

## 2018-12-15 PROCEDURE — 85610 PROTHROMBIN TIME: CPT

## 2018-12-15 PROCEDURE — 87581 M.PNEUMON DNA AMP PROBE: CPT

## 2018-12-15 PROCEDURE — 87798 DETECT AGENT NOS DNA AMP: CPT

## 2018-12-15 PROCEDURE — 85014 HEMATOCRIT: CPT

## 2018-12-15 PROCEDURE — 86901 BLOOD TYPING SEROLOGIC RH(D): CPT

## 2018-12-15 PROCEDURE — 87641 MR-STAPH DNA AMP PROBE: CPT

## 2018-12-15 PROCEDURE — 87633 RESP VIRUS 12-25 TARGETS: CPT

## 2018-12-15 PROCEDURE — C9606: CPT | Mod: LC

## 2018-12-15 PROCEDURE — 83735 ASSAY OF MAGNESIUM: CPT

## 2018-12-15 PROCEDURE — C1725: CPT

## 2018-12-15 RX ORDER — ISOSORBIDE MONONITRATE 60 MG/1
1 TABLET, EXTENDED RELEASE ORAL
Qty: 30 | Refills: 0
Start: 2018-12-15 | End: 2019-01-13

## 2018-12-15 RX ORDER — HYDRALAZINE HCL 50 MG
1 TABLET ORAL
Qty: 90 | Refills: 0
Start: 2018-12-15 | End: 2019-01-13

## 2018-12-15 RX ORDER — LOSARTAN POTASSIUM 100 MG/1
1 TABLET, FILM COATED ORAL
Qty: 30 | Refills: 0
Start: 2018-12-15 | End: 2019-01-13

## 2018-12-15 RX ORDER — GABAPENTIN 400 MG/1
1 CAPSULE ORAL
Qty: 90 | Refills: 0
Start: 2018-12-15 | End: 2019-01-13

## 2018-12-15 RX ORDER — ATORVASTATIN CALCIUM 80 MG/1
1 TABLET, FILM COATED ORAL
Qty: 30 | Refills: 0
Start: 2018-12-15 | End: 2019-01-13

## 2018-12-15 RX ORDER — AMLODIPINE BESYLATE 2.5 MG/1
1 TABLET ORAL
Qty: 30 | Refills: 0
Start: 2018-12-15 | End: 2019-01-13

## 2018-12-15 RX ORDER — ASPIRIN/CALCIUM CARB/MAGNESIUM 324 MG
1 TABLET ORAL
Qty: 30 | Refills: 0
Start: 2018-12-15 | End: 2019-01-13

## 2018-12-15 RX ORDER — ERYTHROPOIETIN 10000 [IU]/ML
6000 INJECTION, SOLUTION INTRAVENOUS; SUBCUTANEOUS
Refills: 0 | Status: DISCONTINUED | OUTPATIENT
Start: 2018-12-15 | End: 2018-12-15

## 2018-12-15 RX ORDER — METOPROLOL TARTRATE 50 MG
1 TABLET ORAL
Qty: 60 | Refills: 0
Start: 2018-12-15 | End: 2019-01-13

## 2018-12-15 RX ORDER — CLOPIDOGREL BISULFATE 75 MG/1
1 TABLET, FILM COATED ORAL
Qty: 30 | Refills: 0
Start: 2018-12-15 | End: 2019-01-13

## 2018-12-15 RX ORDER — LEVOTHYROXINE SODIUM 125 MCG
1 TABLET ORAL
Qty: 30 | Refills: 0
Start: 2018-12-15 | End: 2019-01-13

## 2018-12-15 RX ADMIN — Medication 1 SPRAY(S): at 05:26

## 2018-12-15 RX ADMIN — PANTOPRAZOLE SODIUM 40 MILLIGRAM(S): 20 TABLET, DELAYED RELEASE ORAL at 05:26

## 2018-12-15 RX ADMIN — Medication 100 MICROGRAM(S): at 05:26

## 2018-12-15 RX ADMIN — ERYTHROPOIETIN 6000 UNIT(S): 10000 INJECTION, SOLUTION INTRAVENOUS; SUBCUTANEOUS at 11:37

## 2018-12-15 RX ADMIN — Medication 40 MILLIGRAM(S): at 05:26

## 2018-12-15 RX ADMIN — AMLODIPINE BESYLATE 10 MILLIGRAM(S): 2.5 TABLET ORAL at 05:26

## 2018-12-15 RX ADMIN — Medication 50 MILLIGRAM(S): at 05:26

## 2018-12-15 RX ADMIN — Medication 1 SPRAY(S): at 02:26

## 2018-12-15 NOTE — PROGRESS NOTE ADULT - REASON FOR ADMISSION
cough

## 2018-12-15 NOTE — PROGRESS NOTE ADULT - SUBJECTIVE AND OBJECTIVE BOX
Patient seen and examined    I&O's Summary      REVIEW OF SYSTEMS:    CONSTITUTIONAL: No F/C  RESPIRATORY: No cough or SOB  CARDIOVASCULAR: No CP/palpitations,    GASTROINTESTINAL: No abdominal pain , NVD   GENITOURINARY: No UTI sx  NEUROLOGICAL: No headaches/wk/numbness  MUSCULOSKELETAL:  No joint pain/swelling; No LBP  EXTREMITIES : no swelling,    Vital Signs Last 24 Hrs  T(C): 36.7 (14 Dec 2018 23:11), Max: 37.3 (14 Dec 2018 15:25)  T(F): 98 (14 Dec 2018 23:11), Max: 99.1 (14 Dec 2018 15:25)  HR: 61 (14 Dec 2018 23:11) (61 - 67)  BP: 118/48 (14 Dec 2018 23:11) (109/59 - 118/56)  BP(mean): --  RR: 19 (14 Dec 2018 23:11) (18 - 19)  SpO2: 94% (14 Dec 2018 23:11) (93% - 96%)    PHYSICAL EXAM:    GENERAL: NAD, Pale,  EYES:  conjunctiva and sclera clear  NECK: Supple, No JVD/Bruit  NERVOUS SYSTEM:  A/O x3,   CHEST:  CTA ,No rales or rhonchi  HEART:  RRR, No murmurs  ABDOMEN: Soft, NT/ND BS+  EXTREMITIES:  No Edema;  SKIN: No rashes , R - CVC Exit site dry,    LABS:                        9.4    11.4  )-----------( 318      ( 13 Dec 2018 12:18 )             28.5     12-13    131<L>  |  91<L>  |  47.0<H>  ----------------------------<  116<H>  3.4<L>   |  24.0  |  4.88<H>    Ca    8.4<L>      13 Dec 2018 12:18    MEDICATIONS  (STANDING):  acetaminophen   Tablet .. PRN  alteplase for catheter clearance  alteplase for catheter clearance  aluminum hydroxide/magnesium hydroxide/simethicone Suspension PRN  amLODIPine   Tablet  aspirin enteric coated  atorvastatin  chlorhexidine 2% Cloths  clopidogrel Tablet  docusate sodium  DULoxetine  furosemide    Tablet  guaiFENesin    Syrup PRN  heparin  Injectable  hydrALAZINE  isosorbide   mononitrate ER Tablet (IMDUR)  levothyroxine  loratadine  melatonin PRN  metoprolol tartrate  Nephro-jeana  ondansetron Injectable PRN  pantoprazole    Tablet  senna  sodium chloride 0.65% Nasal

## 2018-12-15 NOTE — PROGRESS NOTE ADULT - PROVIDER SPECIALTY LIST ADULT
Cardiology
Hospitalist
Nephrology
Vascular Surgery
Cardiology
Hospitalist
Nephrology
Nephrology
Vascular Surgery
Vascular Surgery
Cardiology
Nephrology
Vascular Surgery
Vascular Surgery
Cardiology
Cardiology
Hospitalist
Nephrology
Hospitalist
Hospitalist
Critical Care

## 2018-12-15 NOTE — PROGRESS NOTE ADULT - ASSESSMENT
ESRD - HD,    Access : Tunneled CVC,    HD today,    OP HD arrangements complete, Cleared for discharge after HD , today,

## 2018-12-22 ENCOUNTER — INPATIENT (INPATIENT)
Facility: HOSPITAL | Age: 78
LOS: 19 days | Discharge: HOME HEALTH | DRG: 264 | End: 2019-01-11
Attending: INTERNAL MEDICINE | Admitting: HOSPITALIST
Payer: COMMERCIAL

## 2018-12-22 VITALS
DIASTOLIC BLOOD PRESSURE: 53 MMHG | HEIGHT: 64 IN | SYSTOLIC BLOOD PRESSURE: 102 MMHG | WEIGHT: 119.93 LBS | OXYGEN SATURATION: 85 % | TEMPERATURE: 98 F | RESPIRATION RATE: 22 BRPM | HEART RATE: 61 BPM

## 2018-12-22 DIAGNOSIS — Z95.1 PRESENCE OF AORTOCORONARY BYPASS GRAFT: Chronic | ICD-10-CM

## 2018-12-22 DIAGNOSIS — R09.02 HYPOXEMIA: ICD-10-CM

## 2018-12-22 LAB
ALBUMIN SERPL ELPH-MCNC: 3.7 G/DL — SIGNIFICANT CHANGE UP (ref 3.3–5.2)
ALP SERPL-CCNC: 61 U/L — SIGNIFICANT CHANGE UP (ref 40–120)
ALT FLD-CCNC: 28 U/L — SIGNIFICANT CHANGE UP
ANION GAP SERPL CALC-SCNC: 13 MMOL/L — SIGNIFICANT CHANGE UP (ref 5–17)
APTT BLD: 29.4 SEC — SIGNIFICANT CHANGE UP (ref 27.5–36.3)
AST SERPL-CCNC: 32 U/L — SIGNIFICANT CHANGE UP
BASE EXCESS BLDA CALC-SCNC: 9.6 MMOL/L — HIGH (ref -2–2)
BASOPHILS # BLD AUTO: 0.1 K/UL — SIGNIFICANT CHANGE UP (ref 0–0.2)
BASOPHILS NFR BLD AUTO: 0.5 % — SIGNIFICANT CHANGE UP (ref 0–2)
BILIRUB SERPL-MCNC: 0.4 MG/DL — SIGNIFICANT CHANGE UP (ref 0.4–2)
BLOOD GAS COMMENTS ARTERIAL: SIGNIFICANT CHANGE UP
BUN SERPL-MCNC: 11 MG/DL — SIGNIFICANT CHANGE UP (ref 8–20)
CALCIUM SERPL-MCNC: 7.8 MG/DL — LOW (ref 8.6–10.2)
CHLORIDE SERPL-SCNC: 93 MMOL/L — LOW (ref 98–107)
CO2 SERPL-SCNC: 30 MMOL/L — HIGH (ref 22–29)
CREAT SERPL-MCNC: 2.66 MG/DL — HIGH (ref 0.5–1.3)
EOSINOPHIL # BLD AUTO: 1.4 K/UL — HIGH (ref 0–0.5)
EOSINOPHIL NFR BLD AUTO: 9.8 % — HIGH (ref 0–5)
GAS PNL BLDA: SIGNIFICANT CHANGE UP
GLUCOSE SERPL-MCNC: 124 MG/DL — HIGH (ref 70–115)
HCO3 BLDA-SCNC: 33 MMOL/L — HIGH (ref 20–26)
HCT VFR BLD CALC: 26.7 % — LOW (ref 42–52)
HGB BLD-MCNC: 8.4 G/DL — LOW (ref 14–18)
HOROWITZ INDEX BLDA+IHG-RTO: 3 — SIGNIFICANT CHANGE UP
INR BLD: 1.03 RATIO — SIGNIFICANT CHANGE UP (ref 0.88–1.16)
LYMPHOCYTES # BLD AUTO: 1.8 K/UL — SIGNIFICANT CHANGE UP (ref 1–4.8)
LYMPHOCYTES # BLD AUTO: 11.9 % — LOW (ref 20–55)
MCHC RBC-ENTMCNC: 28.9 PG — SIGNIFICANT CHANGE UP (ref 27–31)
MCHC RBC-ENTMCNC: 31.5 G/DL — LOW (ref 32–36)
MCV RBC AUTO: 91.8 FL — SIGNIFICANT CHANGE UP (ref 80–94)
MONOCYTES # BLD AUTO: 1.2 K/UL — HIGH (ref 0–0.8)
MONOCYTES NFR BLD AUTO: 8.5 % — SIGNIFICANT CHANGE UP (ref 3–10)
NEUTROPHILS # BLD AUTO: 10.1 K/UL — HIGH (ref 1.8–8)
NEUTROPHILS NFR BLD AUTO: 69 % — SIGNIFICANT CHANGE UP (ref 37–73)
NT-PROBNP SERPL-SCNC: HIGH PG/ML (ref 0–300)
PCO2 BLDA: 44 MMHG — SIGNIFICANT CHANGE UP (ref 35–45)
PH BLDA: 7.5 — HIGH (ref 7.35–7.45)
PLATELET # BLD AUTO: 254 K/UL — SIGNIFICANT CHANGE UP (ref 150–400)
PO2 BLDA: 58 MMHG — LOW (ref 83–108)
POTASSIUM SERPL-MCNC: 3.2 MMOL/L — LOW (ref 3.5–5.3)
POTASSIUM SERPL-SCNC: 3.2 MMOL/L — LOW (ref 3.5–5.3)
PROT SERPL-MCNC: 7.6 G/DL — SIGNIFICANT CHANGE UP (ref 6.6–8.7)
PROTHROM AB SERPL-ACNC: 11.9 SEC — SIGNIFICANT CHANGE UP (ref 10–12.9)
RBC # BLD: 2.91 M/UL — LOW (ref 4.6–6.2)
RBC # FLD: 12.6 % — SIGNIFICANT CHANGE UP (ref 11–15.6)
SAO2 % BLDA: 92 % — LOW (ref 95–99)
SODIUM SERPL-SCNC: 136 MMOL/L — SIGNIFICANT CHANGE UP (ref 135–145)
TROPONIN T SERPL-MCNC: 0.1 NG/ML — HIGH (ref 0–0.06)
WBC # BLD: 14.7 K/UL — HIGH (ref 4.8–10.8)
WBC # FLD AUTO: 14.7 K/UL — HIGH (ref 4.8–10.8)

## 2018-12-22 PROCEDURE — 71045 X-RAY EXAM CHEST 1 VIEW: CPT | Mod: 26

## 2018-12-22 PROCEDURE — 93010 ELECTROCARDIOGRAM REPORT: CPT

## 2018-12-22 PROCEDURE — 71250 CT THORAX DX C-: CPT | Mod: 26

## 2018-12-22 PROCEDURE — 99285 EMERGENCY DEPT VISIT HI MDM: CPT

## 2018-12-22 RX ORDER — PIPERACILLIN AND TAZOBACTAM 4; .5 G/20ML; G/20ML
3.38 INJECTION, POWDER, LYOPHILIZED, FOR SOLUTION INTRAVENOUS ONCE
Refills: 0 | Status: COMPLETED | OUTPATIENT
Start: 2018-12-22 | End: 2018-12-22

## 2018-12-22 RX ORDER — IPRATROPIUM/ALBUTEROL SULFATE 18-103MCG
3 AEROSOL WITH ADAPTER (GRAM) INHALATION ONCE
Refills: 0 | Status: COMPLETED | OUTPATIENT
Start: 2018-12-22 | End: 2018-12-22

## 2018-12-22 RX ORDER — VANCOMYCIN HCL 1 G
1000 VIAL (EA) INTRAVENOUS ONCE
Refills: 0 | Status: COMPLETED | OUTPATIENT
Start: 2018-12-22 | End: 2018-12-22

## 2018-12-22 RX ORDER — SODIUM CHLORIDE 9 MG/ML
3 INJECTION INTRAMUSCULAR; INTRAVENOUS; SUBCUTANEOUS ONCE
Refills: 0 | Status: COMPLETED | OUTPATIENT
Start: 2018-12-22 | End: 2018-12-22

## 2018-12-22 RX ADMIN — SODIUM CHLORIDE 3 MILLILITER(S): 9 INJECTION INTRAMUSCULAR; INTRAVENOUS; SUBCUTANEOUS at 18:09

## 2018-12-22 RX ADMIN — Medication 250 MILLIGRAM(S): at 20:12

## 2018-12-22 RX ADMIN — PIPERACILLIN AND TAZOBACTAM 200 GRAM(S): 4; .5 INJECTION, POWDER, LYOPHILIZED, FOR SOLUTION INTRAVENOUS at 22:05

## 2018-12-22 RX ADMIN — Medication 3 MILLILITER(S): at 19:10

## 2018-12-22 NOTE — ED ADULT TRIAGE NOTE - CHIEF COMPLAINT QUOTE
Patient arrived to ED today due to his right chest port-a-cath not working properly.  Patient arrived to have hemodialysis today and was told to come to ED due to catheter not working.

## 2018-12-22 NOTE — ED PROVIDER NOTE - OBJECTIVE STATEMENT
79 yo male with ESRD, diastolic CHF, CAD s/p PCI and CABG; recently on dialysis; presents for "leaking around catheter" after dialysis today; patient completed 3 hrs of outpt dialysis without incident, and afterwards was noted by staff to have leaking around the catheter site; currently has no bleeding; feels SOB however and noted to be hypoxic at intake; according to patient and son, he has been having progressive SOB for several weeks, has seen PMD but was unable to be prescribed home oxygen, unclear why he needs home oxygen; has no O2 at home; +subjectively felt febrile earlier took tylenol

## 2018-12-22 NOTE — ED PROVIDER NOTE - CARE PLAN
Principal Discharge DX:	Hypoxia  Secondary Diagnosis:	Pulmonary scarring  Secondary Diagnosis:	Complication of vascular dialysis catheter, initial encounter

## 2018-12-22 NOTE — ED ADULT NURSE REASSESSMENT NOTE - NS ED NURSE REASSESS COMMENT FT1
Pt. received at 1930. resting comfortably in stretcher, denies any pain or discomfort at this time. VSS, informed of plan of care.

## 2018-12-22 NOTE — ED ADULT NURSE NOTE - NSIMPLEMENTINTERV_GEN_ALL_ED
Implemented All Universal Safety Interventions:  [place] to call system. Call bell, personal items and telephone within reach. Instruct patient to call for assistance. Room bathroom lighting operational. Non-slip footwear when patient is off stretcher. Physically safe environment: no spills, clutter or unnecessary equipment. Stretcher in lowest position, wheels locked, appropriate side rails in place.

## 2018-12-22 NOTE — ED PROVIDER NOTE - PROGRESS NOTE DETAILS
d/w renal dr sandhu and hospitalist for admission; renal states patient was noted to have thick purulent d/c from catheter site, not bleeding; has some concern for infection; plus hypoxic with chronic pulm findings that need to be addressed, will admit for antibiotics, O2 supplementation, pulm and renal eval.

## 2018-12-22 NOTE — ED PROVIDER NOTE - CARDIAC, MLM
Normal rate, regular rhythm.  Heart sounds S1, S2.  No murmurs, rubs or gallops. right chest wall permacath

## 2018-12-22 NOTE — ED ADULT NURSE NOTE - OBJECTIVE STATEMENT
78 yr old male a+ox4 presents to ED c/o LUQ and LLQ pain, and permacath complications.  pt states he was at dialysis center this morning, but was unable to be dialyzed.  pt also reports one episode of vomiting this morning and fever.  pt received tylenol at dialysis center for fever.  he does not remember what temp was.

## 2018-12-22 NOTE — ED PROVIDER NOTE - MEDICAL DECISION MAKING DETAILS
hypoxic, requiring 4 L NC; paged renal; patient states makes no urine, so unlikely to respond to lasix, pending imaging results; will need to admit for hypoxia

## 2018-12-22 NOTE — PROGRESS NOTE ADULT - SUBJECTIVE AND OBJECTIVE BOX
Nephrology Telephone Progress Note    Patient had 3 hours of dialysis today at [facility];  Noted by RN at facility to have thick discharge around permacath site with swelling/erythema; temp of 100 (low grade).  Sent to [facility] ER post HD; blood cultures x 2 after speaking with Dr. Coughlin from ER; given vanco/zosyn in ER  Will need ID consult ;   May need possible removal of tunneled CVC  SOB ; underlying lung dx ; improved O2 sat on nasal cannula; not fluid related  Will follow; discussed with Dr. Coughlin in ER  Plan for HD Monday

## 2018-12-23 DIAGNOSIS — T82.9XXA UNSPECIFIED COMPLICATION OF CARDIAC AND VASCULAR PROSTHETIC DEVICE, IMPLANT AND GRAFT, INITIAL ENCOUNTER: ICD-10-CM

## 2018-12-23 DIAGNOSIS — N18.6 END STAGE RENAL DISEASE: ICD-10-CM

## 2018-12-23 DIAGNOSIS — E78.00 PURE HYPERCHOLESTEROLEMIA, UNSPECIFIED: ICD-10-CM

## 2018-12-23 DIAGNOSIS — C34.90 MALIGNANT NEOPLASM OF UNSPECIFIED PART OF UNSPECIFIED BRONCHUS OR LUNG: ICD-10-CM

## 2018-12-23 DIAGNOSIS — D64.9 ANEMIA, UNSPECIFIED: ICD-10-CM

## 2018-12-23 DIAGNOSIS — I10 ESSENTIAL (PRIMARY) HYPERTENSION: ICD-10-CM

## 2018-12-23 DIAGNOSIS — R09.02 HYPOXEMIA: ICD-10-CM

## 2018-12-23 DIAGNOSIS — I25.10 ATHEROSCLEROTIC HEART DISEASE OF NATIVE CORONARY ARTERY WITHOUT ANGINA PECTORIS: ICD-10-CM

## 2018-12-23 PROCEDURE — 12345: CPT | Mod: NC

## 2018-12-23 PROCEDURE — 99223 1ST HOSP IP/OBS HIGH 75: CPT

## 2018-12-23 PROCEDURE — 99221 1ST HOSP IP/OBS SF/LOW 40: CPT

## 2018-12-23 RX ORDER — ASPIRIN/CALCIUM CARB/MAGNESIUM 324 MG
81 TABLET ORAL DAILY
Refills: 0 | Status: DISCONTINUED | OUTPATIENT
Start: 2018-12-23 | End: 2018-12-27

## 2018-12-23 RX ORDER — ISOSORBIDE MONONITRATE 60 MG/1
30 TABLET, EXTENDED RELEASE ORAL DAILY
Refills: 0 | Status: DISCONTINUED | OUTPATIENT
Start: 2018-12-23 | End: 2019-01-03

## 2018-12-23 RX ORDER — ONDANSETRON 8 MG/1
4 TABLET, FILM COATED ORAL EVERY 6 HOURS
Refills: 0 | Status: DISCONTINUED | OUTPATIENT
Start: 2018-12-23 | End: 2019-01-03

## 2018-12-23 RX ORDER — HYDRALAZINE HCL 50 MG
50 TABLET ORAL EVERY 8 HOURS
Refills: 0 | Status: DISCONTINUED | OUTPATIENT
Start: 2018-12-23 | End: 2019-01-03

## 2018-12-23 RX ORDER — METOPROLOL TARTRATE 50 MG
50 TABLET ORAL
Refills: 0 | Status: DISCONTINUED | OUTPATIENT
Start: 2018-12-23 | End: 2019-01-03

## 2018-12-23 RX ORDER — PIPERACILLIN AND TAZOBACTAM 4; .5 G/20ML; G/20ML
2.25 INJECTION, POWDER, LYOPHILIZED, FOR SOLUTION INTRAVENOUS EVERY 12 HOURS
Refills: 0 | Status: DISCONTINUED | OUTPATIENT
Start: 2018-12-23 | End: 2018-12-27

## 2018-12-23 RX ORDER — DULOXETINE HYDROCHLORIDE 30 MG/1
20 CAPSULE, DELAYED RELEASE ORAL DAILY
Refills: 0 | Status: DISCONTINUED | OUTPATIENT
Start: 2018-12-23 | End: 2019-01-03

## 2018-12-23 RX ORDER — ATORVASTATIN CALCIUM 80 MG/1
80 TABLET, FILM COATED ORAL AT BEDTIME
Refills: 0 | Status: DISCONTINUED | OUTPATIENT
Start: 2018-12-23 | End: 2019-01-03

## 2018-12-23 RX ORDER — SODIUM CHLORIDE 9 MG/ML
3 INJECTION INTRAMUSCULAR; INTRAVENOUS; SUBCUTANEOUS EVERY 8 HOURS
Refills: 0 | Status: DISCONTINUED | OUTPATIENT
Start: 2018-12-23 | End: 2019-01-03

## 2018-12-23 RX ORDER — CLOPIDOGREL BISULFATE 75 MG/1
75 TABLET, FILM COATED ORAL DAILY
Refills: 0 | Status: DISCONTINUED | OUTPATIENT
Start: 2018-12-23 | End: 2018-12-27

## 2018-12-23 RX ORDER — HEPARIN SODIUM 5000 [USP'U]/ML
5000 INJECTION INTRAVENOUS; SUBCUTANEOUS EVERY 8 HOURS
Refills: 0 | Status: DISCONTINUED | OUTPATIENT
Start: 2018-12-23 | End: 2018-12-27

## 2018-12-23 RX ORDER — LEVOTHYROXINE SODIUM 125 MCG
100 TABLET ORAL DAILY
Refills: 0 | Status: DISCONTINUED | OUTPATIENT
Start: 2018-12-23 | End: 2019-01-03

## 2018-12-23 RX ORDER — GABAPENTIN 400 MG/1
100 CAPSULE ORAL THREE TIMES A DAY
Refills: 0 | Status: DISCONTINUED | OUTPATIENT
Start: 2018-12-23 | End: 2018-12-24

## 2018-12-23 RX ORDER — MENTHOL AND METHYL SALICYLATE 10; 30 G/100G; G/100G
1 STICK TOPICAL
Refills: 0 | Status: DISCONTINUED | OUTPATIENT
Start: 2018-12-23 | End: 2019-01-03

## 2018-12-23 RX ORDER — AMLODIPINE BESYLATE 2.5 MG/1
10 TABLET ORAL DAILY
Refills: 0 | Status: DISCONTINUED | OUTPATIENT
Start: 2018-12-23 | End: 2019-01-03

## 2018-12-23 RX ORDER — PANTOPRAZOLE SODIUM 20 MG/1
40 TABLET, DELAYED RELEASE ORAL
Refills: 0 | Status: DISCONTINUED | OUTPATIENT
Start: 2018-12-23 | End: 2018-12-29

## 2018-12-23 RX ADMIN — PIPERACILLIN AND TAZOBACTAM 200 GRAM(S): 4; .5 INJECTION, POWDER, LYOPHILIZED, FOR SOLUTION INTRAVENOUS at 06:10

## 2018-12-23 RX ADMIN — SODIUM CHLORIDE 3 MILLILITER(S): 9 INJECTION INTRAMUSCULAR; INTRAVENOUS; SUBCUTANEOUS at 11:50

## 2018-12-23 RX ADMIN — PIPERACILLIN AND TAZOBACTAM 200 GRAM(S): 4; .5 INJECTION, POWDER, LYOPHILIZED, FOR SOLUTION INTRAVENOUS at 17:33

## 2018-12-23 RX ADMIN — Medication 50 MILLIGRAM(S): at 17:34

## 2018-12-23 RX ADMIN — Medication 50 MILLIGRAM(S): at 06:17

## 2018-12-23 RX ADMIN — ISOSORBIDE MONONITRATE 30 MILLIGRAM(S): 60 TABLET, EXTENDED RELEASE ORAL at 11:49

## 2018-12-23 RX ADMIN — Medication 1 TABLET(S): at 11:49

## 2018-12-23 RX ADMIN — Medication 81 MILLIGRAM(S): at 11:50

## 2018-12-23 RX ADMIN — Medication 50 MILLIGRAM(S): at 06:11

## 2018-12-23 RX ADMIN — HEPARIN SODIUM 5000 UNIT(S): 5000 INJECTION INTRAVENOUS; SUBCUTANEOUS at 21:20

## 2018-12-23 RX ADMIN — HEPARIN SODIUM 5000 UNIT(S): 5000 INJECTION INTRAVENOUS; SUBCUTANEOUS at 17:33

## 2018-12-23 RX ADMIN — PANTOPRAZOLE SODIUM 40 MILLIGRAM(S): 20 TABLET, DELAYED RELEASE ORAL at 06:12

## 2018-12-23 RX ADMIN — GABAPENTIN 100 MILLIGRAM(S): 400 CAPSULE ORAL at 21:19

## 2018-12-23 RX ADMIN — Medication 100 MICROGRAM(S): at 06:11

## 2018-12-23 RX ADMIN — Medication 50 MILLIGRAM(S): at 21:19

## 2018-12-23 RX ADMIN — ATORVASTATIN CALCIUM 80 MILLIGRAM(S): 80 TABLET, FILM COATED ORAL at 21:20

## 2018-12-23 RX ADMIN — GABAPENTIN 100 MILLIGRAM(S): 400 CAPSULE ORAL at 17:34

## 2018-12-23 RX ADMIN — AMLODIPINE BESYLATE 10 MILLIGRAM(S): 2.5 TABLET ORAL at 06:17

## 2018-12-23 RX ADMIN — SODIUM CHLORIDE 3 MILLILITER(S): 9 INJECTION INTRAMUSCULAR; INTRAVENOUS; SUBCUTANEOUS at 20:58

## 2018-12-23 RX ADMIN — HEPARIN SODIUM 5000 UNIT(S): 5000 INJECTION INTRAVENOUS; SUBCUTANEOUS at 06:12

## 2018-12-23 RX ADMIN — SODIUM CHLORIDE 3 MILLILITER(S): 9 INJECTION INTRAMUSCULAR; INTRAVENOUS; SUBCUTANEOUS at 06:12

## 2018-12-23 RX ADMIN — CLOPIDOGREL BISULFATE 75 MILLIGRAM(S): 75 TABLET, FILM COATED ORAL at 11:50

## 2018-12-23 RX ADMIN — DULOXETINE HYDROCHLORIDE 20 MILLIGRAM(S): 30 CAPSULE, DELAYED RELEASE ORAL at 11:50

## 2018-12-23 RX ADMIN — GABAPENTIN 100 MILLIGRAM(S): 400 CAPSULE ORAL at 11:49

## 2018-12-23 NOTE — CONSULT NOTE ADULT - SUBJECTIVE AND OBJECTIVE BOX
78 year old male sent in from outpatient HD yesterday after having a low grade fever of 100 during the session. Session was able to be completed however HD nurses apparently noted thick whitish drainage from permacath site. Patient is admitted to medicine receiving IV antibiotics. R internal jugular vein permacath placed by vascular service 12/11 without complications. Prior to that, patient had a R IJ shiley  placed. Currently, patient does not express severe tenderness around permacath site.  since admission patient has been afebrile.     ROS: denies fevers, chills, nausea, vomiting, chest pain, shortness of breath    PAST MEDICAL HISTORY:  CAD (coronary artery disease)  Lung cancer  Bipolar disorder  High cholesterol  Hypertension    PAST SURGICAL HISTORY:  S/P CABG (coronary artery bypass graft)  s/p R IJ permacath placement 12/11/18    ALLERGIES:  No Known Allergies    MEDICATIONS:   as per med rec    FH: noncontributory    SH: denies toxic habits x3          MEDICATIONS  (STANDING):  amLODIPine   Tablet 10 milliGRAM(s) Oral daily  aspirin enteric coated 81 milliGRAM(s) Oral daily  atorvastatin 80 milliGRAM(s) Oral at bedtime  clopidogrel Tablet 75 milliGRAM(s) Oral daily  DULoxetine 20 milliGRAM(s) Oral daily  gabapentin 100 milliGRAM(s) Oral three times a day  heparin  Injectable 5000 Unit(s) SubCutaneous every 8 hours  hydrALAZINE 50 milliGRAM(s) Oral every 8 hours  isosorbide   mononitrate ER Tablet (IMDUR) 30 milliGRAM(s) Oral daily  levothyroxine 100 MICROGram(s) Oral daily  methyl salicylate 14%/menthol 6% Topical Ointment 1 Application(s) Topical two times a day  metoprolol tartrate 50 milliGRAM(s) Oral two times a day  Nephro-jeana 1 Tablet(s) Oral daily  pantoprazole    Tablet 40 milliGRAM(s) Oral before breakfast  piperacillin/tazobactam IVPB. 2.25 Gram(s) IV Intermittent every 12 hours  sodium chloride 0.9% lock flush 3 milliLiter(s) IV Push every 8 hours    MEDICATIONS  (PRN):  ondansetron Injectable 4 milliGRAM(s) IV Push every 6 hours PRN Nausea      Vital Signs Last 24 Hrs  T(C): 36.8 (23 Dec 2018 23:56), Max: 36.9 (23 Dec 2018 03:40)  T(F): 98.2 (23 Dec 2018 23:56), Max: 98.4 (23 Dec 2018 03:40)  HR: 58 (23 Dec 2018 23:56) (58 - 64)  BP: 120/55 (23 Dec 2018 23:56) (120/55 - 138/70)  BP(mean): --  RR: 20 (23 Dec 2018 16:54) (20 - 22)  SpO2: 95% (23 Dec 2018 23:56) (93% - 96%)    Physical Exam:    General: no acute distress, AOx3  HEENT: PERRLA, EOMI, no drainage or redness  Respiratory: Breath Sounds equal & clear to auscultation, no accessory muscle use  Cardiovascular: Regular rate & rhythm, normal S1, S2; no murmurs, gallops or rubs  Gastrointestinal: Soft, non-tender, normal bowel sounds  Extremities: No peripheral edema, No cyanosis, clubbing   Vascular: Equal and normal pulses: 2+ peripheral pulses throughout  Skin: mild erythema on superior aspect of catheter site. no induration, fluctuance or drainage noted      I&O's Detail    22 Dec 2018 07:01  -  23 Dec 2018 07:00  --------------------------------------------------------  IN:  Total IN: 0 mL    OUT:    Voided: 325 mL  Total OUT: 325 mL    Total NET: -325 mL          LABS:                        8.4    14.7  )-----------( 254      ( 22 Dec 2018 18:10 )             26.7     12-22    136  |  93<L>  |  11.0  ----------------------------<  124<H>  3.2<L>   |  30.0<H>  |  2.66<H>    Ca    7.8<L>      22 Dec 2018 18:10    TPro  7.6  /  Alb  3.7  /  TBili  0.4  /  DBili  x   /  AST  32  /  ALT  28  /  AlkPhos  61  12-22    PT/INR - ( 22 Dec 2018 18:10 )   PT: 11.9 sec;   INR: 1.03 ratio         PTT - ( 22 Dec 2018 18:10 )  PTT:29.4 sec      RADIOLOGY & ADDITIONAL STUDIES:

## 2018-12-23 NOTE — PROGRESS NOTE ADULT - SUBJECTIVE AND OBJECTIVE BOX
cc: fever       interval hx: pt seen and evaluated comfortable. in no acute distress. family at bedside. denies any fever at this time. rt upper chest port erythema noted. deneis cp, sob, n/v/abd pain/ diarrhea.     Vital Signs Last 24 Hrs  T(C): 36.8 (23 Dec 2018 08:01), Max: 37.3 (22 Dec 2018 20:05)  T(F): 98.2 (23 Dec 2018 08:01), Max: 99.2 (22 Dec 2018 20:05)  HR: 62 (23 Dec 2018 08:01) (60 - 76)  BP: 138/70 (23 Dec 2018 08:01) (130/53 - 143/76)  BP(mean): 98 (23 Dec 2018 02:19) (98 - 98)  RR: 20 (23 Dec 2018 08:01) (18 - 24)  SpO2: 95% (23 Dec 2018 08:01) (93% - 96%)    Physical Exam:  · Constitutional	detailed exam  · Constitutional Details	no distress  · Constitutional Comments	lying in bed in NAD  · Eyes	detailed exam  · Eyes Details	conjunctiva clear  · ENMT	detailed exam  · ENMT Details	mouth  · Mouth	moist; dental caries  · Neck	detailed exam  · Neck Details	no JVD  · Respiratory	detailed exam  · Respiratory Details	no rales; no rhonchi; no wheezes  · Cardiovascular	detailed exam  · Cardiovascular Details	regular rate and rhythm  no rub  · Cardiovascular Details	positive S1; positive S2  · Gastrointestinal	detailed exam  · GI Normal	soft; nontender  · Extremities	detailed exam  · Extremities Details	no clubbing; no cyanosis  · Vascular	detailed exam  · DP Pulse	+1  · PT Pulse	+1  · Neurological	detailed exam  · Mental Status	AAOx3  · Cranial Nerve	2-12 intact  · Motor	5/5 x 4  · Sensory	intact to light touch  · Gait/Balance	not tested  · Skin	detailed exam  · Skin Details	warm and dry  · Skin Comments	left PermCath insertion site as described above  · Musculoskeletal	detailed exam  · Musculoskeletal Details	no joint swelling  · Psychiatric	Affect and characteristics of appearance, verbalizations, behaviors are appropriate

## 2018-12-23 NOTE — CONSULT NOTE ADULT - SUBJECTIVE AND OBJECTIVE BOX
[facility] DIVISION OF KIDNEY DISEASES AND HYPERTENSION -- INITIAL CONSULT NOTE  --------------------------------------------------------------------------------  HPI:  77 y/o male with hx of ESRD on HD T/Th/Sat, CAD s/p PCI on recent admission, HTN, Pulm HTN, Lung cancer s/p XRT in 06 Was at HD yesterday evening and noted to have low grade temp and purulent drainage from catheter insertion site with erythema. Patient completed his HD session and was sent to ED. In ED was noted to have WBC of 15, no shift, no fever. There was some scant drainage around catheter insertion site and erythema. Patient also noted to have hypoxia. Denies cough, CP, Chills. Admits to subjective fevers at home over past few days. No focal weakness.   Seen and examined this AM; doing well; on IV abx; on nasal O2 with improvement in saturation.      PAST HISTORY  --------------------------------------------------------------------------------  PAST MEDICAL & SURGICAL HISTORY:  Chronic anemia  ESRD (end stage renal disease)  CAD (coronary artery disease)  Lung cancer: had yoni radiation in 2006.  Bipolar disorder  High cholesterol  Hypertension  S/P CABG (coronary artery bypass graft): pt&#x27;s son in [facility] h/o some stents near neck area ? carotid ? he is not sure.    FAMILY HISTORY:  Family history of essential hypertension (Father)    PAST SOCIAL HISTORY:    ALLERGIES & MEDICATIONS  --------------------------------------------------------------------------------  Allergies    No Known Allergies    Intolerances      Standing Inpatient Medications  amLODIPine   Tablet 10 milliGRAM(s) Oral daily  aspirin enteric coated 81 milliGRAM(s) Oral daily  atorvastatin 80 milliGRAM(s) Oral at bedtime  clopidogrel Tablet 75 milliGRAM(s) Oral daily  DULoxetine 20 milliGRAM(s) Oral daily  gabapentin 100 milliGRAM(s) Oral three times a day  heparin  Injectable 5000 Unit(s) SubCutaneous every 8 hours  hydrALAZINE 50 milliGRAM(s) Oral every 8 hours  isosorbide   mononitrate ER Tablet (IMDUR) 30 milliGRAM(s) Oral daily  levothyroxine 100 MICROGram(s) Oral daily  metoprolol tartrate 50 milliGRAM(s) Oral two times a day  Nephro-jeana 1 Tablet(s) Oral daily  pantoprazole    Tablet 40 milliGRAM(s) Oral before breakfast  piperacillin/tazobactam IVPB. 2.25 Gram(s) IV Intermittent every 12 hours  sodium chloride 0.9% lock flush 3 milliLiter(s) IV Push every 8 hours    PRN Inpatient Medications  ondansetron Injectable 4 milliGRAM(s) IV Push every 6 hours PRN      REVIEW OF SYSTEMS  --------------------------------------------------------------------------------  Gen: No weight changes, fatigue, fevers/chills, weakness  Skin: No rashes  Head/Eyes/Ears/Mouth: No headache; Normal hearing; Normal vision w/o blurriness; No sinus pain/discomfort, sore throat  Respiratory: No dyspnea, cough, wheezing, hemoptysis  CV: No chest pain, PND, orthopnea  GI: No abdominal pain, diarrhea, constipation, nausea, vomiting, melena, hematochezia  : No increased frequency, dysuria, hematuria, nocturia  MSK: No joint pain/swelling; no back pain; no edema  Neuro: No dizziness/lightheadedness, weakness, seizures, numbness, tingling  Heme: No easy bruising or bleeding  Endo: No heat/cold intolerance  Psych: No significant nervousness, anxiety, stress, depression    All other systems were reviewed and are negative, except as noted.    VITALS/PHYSICAL EXAM  --------------------------------------------------------------------------------  T(C): 36.8 (12-23-18 @ 08:01), Max: 37.3 (12-22-18 @ 20:05)  HR: 62 (12-23-18 @ 08:01) (60 - 76)  BP: 138/70 (12-23-18 @ 08:01) (102/53 - 143/76)  RR: 20 (12-23-18 @ 08:01) (18 - 24)  SpO2: 95% (12-23-18 @ 08:01) (85% - 96%)  Wt(kg): --  Height (cm): 162.56 (12-22-18 @ 15:17)  Weight (kg): 54.255478645536034 (12-22-18 @ 15:17)  BMI (kg/m2): 20.6 (12-22-18 @ 15:17)  BSA (m2): 1.57 (12-22-18 @ 15:17)      12-22-18 @ 07:01  -  12-23-18 @ 07:00  --------------------------------------------------------  IN: 0 mL / OUT: 325 mL / NET: -325 mL      Physical Exam:  · Constitutional	detailed exam	  · Constitutional Details	no distress	  · Constitutional Comments	lying in bed in NAD	  · Eyes	detailed exam	  · Eyes Details	conjunctiva clear	  · ENMT	detailed exam	  · ENMT Details	mouth	  · Mouth	moist; dental caries	  · Neck	detailed exam	  · Neck Details	no JVD	  · Respiratory	detailed exam	  · Respiratory Details	no rales; no rhonchi; no wheezes	  · Cardiovascular	detailed exam	  · Cardiovascular Details	regular rate and rhythm  no rub	  · Cardiovascular Details	positive S1; positive S2	  · Gastrointestinal	detailed exam	  · GI Normal	soft; nontender	  · Extremities	detailed exam	  · Extremities Details	no clubbing; no cyanosis	  · Vascular	detailed exam	  · DP Pulse	+1	  · PT Pulse	+1	  · Neurological	detailed exam	  · Mental Status	AAOx3	  · Cranial Nerve	2-12 intact	  · Motor	5/5 x 4	  · Sensory	intact to light touch	  · Gait/Balance	not tested	  · Skin	detailed exam	  · Skin Details	warm and dry	  · Skin Comments	left PermCath insertion site as described above	  · Musculoskeletal	detailed exam	  · Musculoskeletal Details	no joint swelling	  · Psychiatric	Affect and characteristics of appearance, verbalizations, behaviors are appropriate	    LABS/STUDIES  --------------------------------------------------------------------------------              8.4    14.7  >-----------<  254      [12-22-18 @ 18:10]              26.7     136  |  93  |  11.0  ----------------------------<  124      [12-22-18 @ 18:10]  3.2   |  30.0  |  2.66        Ca     7.8     [12-22-18 @ 18:10]    TPro  7.6  /  Alb  3.7  /  TBili  0.4  /  DBili  x   /  AST  32  /  ALT  28  /  AlkPhos  61  [12-22-18 @ 18:10]    PT/INR: PT 11.9 , INR 1.03       [12-22-18 @ 18:10]  PTT: 29.4       [12-22-18 @ 18:10]    Troponin 0.10      [12-22-18 @ 18:10]    Creatinine Trend:  SCr 2.66 [12-22 @ 18:10]  SCr 4.35 [12-15 @ 11:18]  SCr 4.88 [12-13 @ 12:18]  SCr 5.33 [12-11 @ 06:04]  SCr 4.99 [12-10 @ 20:35]        TSH 7.33      [11-29-18 @ 02:31]    HBsAb <3.0      [12-05-18 @ 16:33]  HBsAg Nonreact      [12-05-18 @ 16:33]  HBcAb Nonreact      [12-05-18 @ 16:33]  HCV 0.14, Nonreact      [12-05-18 @ 16:33]

## 2018-12-23 NOTE — H&P ADULT - PROBLEM SELECTOR PLAN 1
Cont. empiric abx for now. Patient is unsure when catheter was placed. If infection localized to the proximal insertion site may be treatable with abx and avoid needing catheter changed. No wound culture sent in ED. Blood cx sent. Will order wound cx to asses for mrsa. S/P 1gm vanco in ED, will hold further dosing being patient is on HD. No probiotics with central venous catheter. Monitor WBC, temp curve. ID eval.

## 2018-12-23 NOTE — CONSULT NOTE ADULT - SUBJECTIVE AND OBJECTIVE BOX
[facility]  INFECTIOUS DISEASES AND INTERNAL MEDICINE at [place]  =======================================================  Cricket Lara MD  Diplomates American Board of Internal Medicine and Infectious Diseases  =======================================================      MRN-[id number]  KAUSHIK HOFFMAN    CC: Patient is a 78y old  Male who presents with a chief complaint of Sent in for possible catheter infection (23 Dec 2018 13:53)    79y/o  Male with h/o ESRD on HD T/Th/Sat, CAD s/p PCI on recent admission, HTN, Pulm HTN, Lung cancer s/p XRT in 2006 ([place]). Patient was at HD yesterday evening and noted to have low grade temp and purulent drainage from catheter insertion site with overlying erythema. Patient completed his HD session and was sent to ED. In ED was noted to have WBC of 15, no shift, no fever. There was some scant drainage around catheter insertion site and erythema. Patient also noted to have hypoxia. Patient was given a dose of IV vancomycin and continued on Zosyn. ID input requested.       Past Medical & Surgical Hx:  Chronic anemia  ESRD (end stage renal disease)  CAD (coronary artery disease)  Lung cancer: had yoni radiation in 2006.  Bipolar disorder  High cholesterol  Hypertension  S/P CABG (coronary artery bypass graft): pt&#x27;s son in [facility] states h/o some stents near neck area ? carotid ? he is not sure.      Social Hx:  Former smoker      FAMILY HISTORY:  Family history of essential hypertension (Father)      Allergies  No Known Allergies      Antibiotics:  piperacillin/tazobactam IVPB. 2.25 Gram(s) IV Intermittent every 12 hours       REVIEW OF SYSTEMS:  CONSTITUTIONAL:  No Fever or chills  HEENT:  No diplopia or blurred vision.  No earache, sore throat or runny nose.  CARDIOVASCULAR:  No pressure, squeezing, strangling, tightness, heaviness or aching about the chest, neck, axilla or epigastrium.  RESPIRATORY:  No cough, shortness of breath  GASTROINTESTINAL:  No nausea, vomiting or diarrhea.  GENITOURINARY:  No flank pain  MUSCULOSKELETAL:  no joint aches, no muscle pain  SKIN:  No change in skin, hair or nails.  NEUROLOGIC:  No paresthesias, fasciculations  PSYCHIATRIC:  No disorder of thought or mood.  ENDOCRINE:  No heat or cold intolerance  HEMATOLOGICAL:  No easy bruising or bleeding.       Physical Exam:  Vital Signs Last 24 Hrs  T(C): 36.8 (23 Dec 2018 08:01), Max: 37.3 (22 Dec 2018 20:05)  T(F): 98.2 (23 Dec 2018 08:01), Max: 99.2 (22 Dec 2018 20:05)  HR: 62 (23 Dec 2018 08:01) (60 - 76)  BP: 138/70 (23 Dec 2018 08:01) (102/53 - 143/76)  BP(mean): 98 (23 Dec 2018 02:19) (98 - 98)  RR: 20 (23 Dec 2018 08:01) (18 - 24)  SpO2: 95% (23 Dec 2018 08:01) (85% - 96%)  Height (cm): 162.56 (12-22 @ 15:17)  Weight (kg): 54.049055007028957 (12-22 @ 15:17)  BMI (kg/m2): 20.6 (12-22 @ 15:17)  BSA (m2): 1.57 (12-22 @ 15:17)      GEN: NAD, pleasant  HEENT: normocephalic and atraumatic. EOMI. PERRL.  Anicteric  NECK: Supple.   LUNGS: Clear to auscultation.  HEART: Regular rate and rhythm   ABDOMEN: Soft, nontender, and nondistended.  Positive bowel sounds.    : No CVA tenderness  EXTREMITIES: Without any edema.  MSK: No joint swelling  NEUROLOGIC: No Focal Deficits  PSYCHIATRIC: Appropriate affect .  SKIN: No Rash      Labs:  12-22    136  |  93<L>  |  11.0  ----------------------------<  124<H>  3.2<L>   |  30.0<H>  |  2.66<H>    Ca    7.8<L>      22 Dec 2018 18:10    TPro  7.6  /  Alb  3.7  /  TBili  0.4  /  DBili  x   /  AST  32  /  ALT  28  /  AlkPhos  61  12-22                          8.4    14.7  )-----------( 254      ( 22 Dec 2018 18:10 )             26.7       PT/INR - ( 22 Dec 2018 18:10 )   PT: 11.9 sec;   INR: 1.03 ratio         PTT - ( 22 Dec 2018 18:10 )  PTT:29.4 sec    LIVER FUNCTIONS - ( 22 Dec 2018 18:10 )  Alb: 3.7 g/dL / Pro: 7.6 g/dL / ALK PHOS: 61 U/L / ALT: 28 U/L / AST: 32 U/L / GGT: x           CARDIAC MARKERS ( 22 Dec 2018 18:10 )  x     / 0.10 ng/mL / x     / x     / x        ABG - ( 22 Dec 2018 17:47 )  pH, Arterial: 7.50  pH, Blood: x     /  pCO2: 44    /  pO2: 58    / HCO3: 33    / Base Excess: 9.6   /  SaO2: 92              EXAM:  CT CHEST                        PROCEDURE DATE:  12/22/2018    INTERPRETATION:  CT of the chest  Indication: [Leaking catheter, evaluate for infiltrate or edema  Technique: Axial images were obtained from the thoracic inlet through   lung bases without oral or IV contrast.       Reformatted coronal and   sagittal images were submitted.  Comparison: None  FINDINGS:  Evaluation of the solid abdominal organs is limited without contrast.  The visualized neck, axilla and subcutaneous tissues are unremarkable.  The tracheobronchial tree is patent centrally.  There is no mediastinal   hematoma.  The great vessels are not enlarged.  There are atherosclerotic   calcifications of the thoracic urinary and coronary arteries. There is a   dual-lumen catheter in the right supraclavicular region with its catheter   tip terminating in the SVC. Pericardiophrenic lymph nodes measure up to   2.1 x 1.2 cm. The heart is enlarged.  There is no pericardial effusion.  Lungs: Mild centrilobular emphysematous change. Airspace opacity with   linear demarcation in the medial aspect of the right upper lobe may   represent post radiation change or fibrosis. There are fibrotic changes   in the right middle and lower lobes. There are calcified granuloma in the lung bases.  Pleura: Trace bilateral pleural effusions  Bones: Chronic degenerative changes of the spine.  Subcutaneous tissues: Sternotomy wires.  Small hiatal hernia.  IMPRESSION:  Tip of central venous catheter tip terminates in the SVC.  Patchy airspace opacity in the right upper lobe may represent   postradiation change or scarring from remote infection/inflammation.  Mild centrilobular emphysematous change. Comparison with previous studies   would be helpful. Fibrotic changes in the right middle and lower lobes.  Trace bilateral pleural effusions.  Cardiomegaly.

## 2018-12-23 NOTE — PROGRESS NOTE ADULT - ASSESSMENT
79 y/o male with infection of permacath site, hypoxia, ESRD on HD, HTN, Pulm HTN, CAD s/p PCI    >Complication of vascular dialysis catheter, initial encounter/ possible infection  -Cont. empiric abx for now. Patient is unsure when catheter was placed. If infection localized to the proximal insertion site may be treatable with abx and avoid needing catheter changed. No wound culture sent in ED. Blood cx sent. Will order wound cx to asses for mrsa. S/P 1gm vanco in ED, will hold further dosing being patient is on HD.   -No probiotics with central venous catheter.   -Monitor WBC, temp curve. ID eval.   -ID on board   -vascular consult for cath removal   -f/u cxs      Problem/Plan - 2:  ·  Problem: R/O Hypoxia.  Plan: likely related to prior hx of lung ca with xrt induced pulm fibrosis and pulm htn. No SOB, 02 sat on 3 liters is 96%. Check sat with ambulation to see if may need home 02. No evidence of PNA, or volume overload.      Problem/Plan - 3:  ·  Problem: ESRD (end stage renal disease).  Plan: Cont. HD as per Renal.      Problem/Plan - 4:  ·  Problem: Hypertension, unspecified type.  Plan: cont. o/p regimen, renal/low sodium diet.      Problem/Plan - 5:  ·  Problem: Chronic anemia.  Plan: stable.      Problem/Plan - 6:  Problem: Coronary artery disease involving native coronary artery of native heart without angina pectoris. Plan: No CP, cont. o/p regimen.     Problem/Plan - 7:  ·  Problem: High cholesterol.  Plan: Statin. 77 y/o male with infection of permacath site, hypoxia, ESRD on HD, HTN, Pulm HTN, CAD s/p PCI    >fever/ likely source dialysis catheter possible infection  -Cont. empiric abx for now.  -f/u Blood cx sent.   -will get cxs from cath as well   -f/u wound cx to asses for mrsa. S/P 1gm vanco in ED,  -No probiotics with central venous catheter.   -Monitor WBC, temp curve. ID eval.   -ID on board   -vascular consult called for cath removal   -f/u cxs     >Hypoxia.  Plan: likely related to prior hx of lung ca with xrt induced pulm fibrosis and pulm htn.   - 02 sat on 3 liters is 96%. Check sat with ambulation to see if may need home 02.   -No evidence of PNA, or volume overload.      >ESRD (end stage renal disease).  Plan: Cont. HD as per Renal.     >Hypertension, unspecified type.  Plan: cont. o/p regimen, renal/low sodium diet.     >anemia of esrd, stable , monitor      > Coronary artery disease involving native coronary artery of native heart without angina pectoris. Plan: No CP, cont. o/p regimen.    >hlp:  -c/w  Statin.

## 2018-12-23 NOTE — H&P ADULT - HISTORY OF PRESENT ILLNESS
77 y/o male with hx of ESRD on HD T/Th/Sat, CAD s/p PCI on recent admission, HTN, Pulm HTN, Lung cancer s/p XRT in 06 Was at HD yesterday evening and noted to have low grade temp and purulent drainage from catheter insertion site with erythema. Patient completed his HD session and was sent to ED. In ED was noted to have WBC of 15, no shift, no fever. There was some scant drainage around catheter insertion site and erythema. Patient also noted to have hypoxia. Denies cough, CP, Chills. Admits to subjective fevers at home over past few days. No focal weakness.

## 2018-12-23 NOTE — H&P ADULT - PROBLEM SELECTOR PLAN 2
likely related to prior hx of lung ca with xrt induced pulm fibrosis and pulm htn. No SOB, 02 sat on 3 liters is 96%. Check sat with ambulation to see if may need home 02. No evidence of PNA, or volume overload

## 2018-12-23 NOTE — H&P ADULT - PMH
Bipolar disorder    CAD (coronary artery disease)    Chronic anemia    ESRD (end stage renal disease)    High cholesterol    Hypertension    Lung cancer  had yoni radiation in 2006.

## 2018-12-24 LAB
ANION GAP SERPL CALC-SCNC: 17 MMOL/L — SIGNIFICANT CHANGE UP (ref 5–17)
BUN SERPL-MCNC: 31 MG/DL — HIGH (ref 8–20)
CALCIUM SERPL-MCNC: 7.9 MG/DL — LOW (ref 8.6–10.2)
CHLORIDE SERPL-SCNC: 93 MMOL/L — LOW (ref 98–107)
CO2 SERPL-SCNC: 25 MMOL/L — SIGNIFICANT CHANGE UP (ref 22–29)
CREAT SERPL-MCNC: 5.65 MG/DL — HIGH (ref 0.5–1.3)
GLUCOSE SERPL-MCNC: 119 MG/DL — HIGH (ref 70–115)
HCT VFR BLD CALC: 25 % — LOW (ref 42–52)
HGB BLD-MCNC: 7.8 G/DL — LOW (ref 14–18)
MCHC RBC-ENTMCNC: 28.9 PG — SIGNIFICANT CHANGE UP (ref 27–31)
MCHC RBC-ENTMCNC: 31.2 G/DL — LOW (ref 32–36)
MCV RBC AUTO: 92.6 FL — SIGNIFICANT CHANGE UP (ref 80–94)
PLATELET # BLD AUTO: 290 K/UL — SIGNIFICANT CHANGE UP (ref 150–400)
POTASSIUM SERPL-MCNC: 3.8 MMOL/L — SIGNIFICANT CHANGE UP (ref 3.5–5.3)
POTASSIUM SERPL-SCNC: 3.8 MMOL/L — SIGNIFICANT CHANGE UP (ref 3.5–5.3)
RBC # BLD: 2.7 M/UL — LOW (ref 4.6–6.2)
RBC # FLD: 12.9 % — SIGNIFICANT CHANGE UP (ref 11–15.6)
SODIUM SERPL-SCNC: 135 MMOL/L — SIGNIFICANT CHANGE UP (ref 135–145)
VANCOMYCIN FLD-MCNC: 12 UG/ML — SIGNIFICANT CHANGE UP
WBC # BLD: 9.6 K/UL — SIGNIFICANT CHANGE UP (ref 4.8–10.8)
WBC # FLD AUTO: 9.6 K/UL — SIGNIFICANT CHANGE UP (ref 4.8–10.8)

## 2018-12-24 PROCEDURE — 99232 SBSQ HOSP IP/OBS MODERATE 35: CPT

## 2018-12-24 PROCEDURE — 90937 HEMODIALYSIS REPEATED EVAL: CPT

## 2018-12-24 PROCEDURE — 99223 1ST HOSP IP/OBS HIGH 75: CPT

## 2018-12-24 RX ORDER — CHLORHEXIDINE GLUCONATE 213 G/1000ML
1 SOLUTION TOPICAL DAILY
Refills: 0 | Status: DISCONTINUED | OUTPATIENT
Start: 2018-12-24 | End: 2019-01-03

## 2018-12-24 RX ORDER — VANCOMYCIN HCL 1 G
750 VIAL (EA) INTRAVENOUS ONCE
Refills: 0 | Status: COMPLETED | OUTPATIENT
Start: 2018-12-24 | End: 2018-12-24

## 2018-12-24 RX ORDER — GABAPENTIN 400 MG/1
200 CAPSULE ORAL THREE TIMES A DAY
Refills: 0 | Status: DISCONTINUED | OUTPATIENT
Start: 2018-12-24 | End: 2018-12-24

## 2018-12-24 RX ORDER — GABAPENTIN 400 MG/1
100 CAPSULE ORAL DAILY
Refills: 0 | Status: DISCONTINUED | OUTPATIENT
Start: 2018-12-24 | End: 2019-01-03

## 2018-12-24 RX ORDER — IPRATROPIUM/ALBUTEROL SULFATE 18-103MCG
3 AEROSOL WITH ADAPTER (GRAM) INHALATION EVERY 6 HOURS
Refills: 0 | Status: DISCONTINUED | OUTPATIENT
Start: 2018-12-24 | End: 2019-01-03

## 2018-12-24 RX ADMIN — ISOSORBIDE MONONITRATE 30 MILLIGRAM(S): 60 TABLET, EXTENDED RELEASE ORAL at 17:39

## 2018-12-24 RX ADMIN — CHLORHEXIDINE GLUCONATE 1 APPLICATION(S): 213 SOLUTION TOPICAL at 17:32

## 2018-12-24 RX ADMIN — Medication 50 MILLIGRAM(S): at 17:39

## 2018-12-24 RX ADMIN — SODIUM CHLORIDE 3 MILLILITER(S): 9 INJECTION INTRAMUSCULAR; INTRAVENOUS; SUBCUTANEOUS at 17:34

## 2018-12-24 RX ADMIN — PIPERACILLIN AND TAZOBACTAM 200 GRAM(S): 4; .5 INJECTION, POWDER, LYOPHILIZED, FOR SOLUTION INTRAVENOUS at 17:40

## 2018-12-24 RX ADMIN — MENTHOL AND METHYL SALICYLATE 1 APPLICATION(S): 10; 30 STICK TOPICAL at 06:04

## 2018-12-24 RX ADMIN — ATORVASTATIN CALCIUM 80 MILLIGRAM(S): 80 TABLET, FILM COATED ORAL at 21:15

## 2018-12-24 RX ADMIN — PANTOPRAZOLE SODIUM 40 MILLIGRAM(S): 20 TABLET, DELAYED RELEASE ORAL at 05:59

## 2018-12-24 RX ADMIN — Medication 3 MILLILITER(S): at 20:12

## 2018-12-24 RX ADMIN — Medication 200 MILLIGRAM(S): at 17:39

## 2018-12-24 RX ADMIN — Medication 81 MILLIGRAM(S): at 17:39

## 2018-12-24 RX ADMIN — Medication 50 MILLIGRAM(S): at 06:04

## 2018-12-24 RX ADMIN — AMLODIPINE BESYLATE 10 MILLIGRAM(S): 2.5 TABLET ORAL at 05:59

## 2018-12-24 RX ADMIN — Medication 50 MILLIGRAM(S): at 21:15

## 2018-12-24 RX ADMIN — CLOPIDOGREL BISULFATE 75 MILLIGRAM(S): 75 TABLET, FILM COATED ORAL at 17:39

## 2018-12-24 RX ADMIN — GABAPENTIN 100 MILLIGRAM(S): 400 CAPSULE ORAL at 17:39

## 2018-12-24 RX ADMIN — Medication 1 TABLET(S): at 17:39

## 2018-12-24 RX ADMIN — HEPARIN SODIUM 5000 UNIT(S): 5000 INJECTION INTRAVENOUS; SUBCUTANEOUS at 17:38

## 2018-12-24 RX ADMIN — SODIUM CHLORIDE 3 MILLILITER(S): 9 INJECTION INTRAMUSCULAR; INTRAVENOUS; SUBCUTANEOUS at 20:56

## 2018-12-24 RX ADMIN — Medication 250 MILLIGRAM(S): at 20:11

## 2018-12-24 RX ADMIN — Medication 100 MICROGRAM(S): at 05:59

## 2018-12-24 RX ADMIN — Medication 50 MILLIGRAM(S): at 05:59

## 2018-12-24 RX ADMIN — SODIUM CHLORIDE 3 MILLILITER(S): 9 INJECTION INTRAMUSCULAR; INTRAVENOUS; SUBCUTANEOUS at 05:33

## 2018-12-24 RX ADMIN — DULOXETINE HYDROCHLORIDE 20 MILLIGRAM(S): 30 CAPSULE, DELAYED RELEASE ORAL at 17:39

## 2018-12-24 RX ADMIN — PIPERACILLIN AND TAZOBACTAM 200 GRAM(S): 4; .5 INJECTION, POWDER, LYOPHILIZED, FOR SOLUTION INTRAVENOUS at 05:59

## 2018-12-24 RX ADMIN — HEPARIN SODIUM 5000 UNIT(S): 5000 INJECTION INTRAVENOUS; SUBCUTANEOUS at 22:10

## 2018-12-24 RX ADMIN — MENTHOL AND METHYL SALICYLATE 1 APPLICATION(S): 10; 30 STICK TOPICAL at 17:39

## 2018-12-24 RX ADMIN — GABAPENTIN 100 MILLIGRAM(S): 400 CAPSULE ORAL at 05:59

## 2018-12-24 RX ADMIN — HEPARIN SODIUM 5000 UNIT(S): 5000 INJECTION INTRAVENOUS; SUBCUTANEOUS at 06:04

## 2018-12-24 NOTE — CONSULT NOTE ADULT - SUBJECTIVE AND OBJECTIVE BOX
PULMONARY CONSULT NOTE      KAUSHIK HOFFMANYOVANNY-[id number]    Patient is a 78y old  Male who presents with a chief complaint of Sent in for possible catheter infection (24 Dec 2018 08:25)      INTERVAL HPI/OVERNIGHT EVENTS:79 y/o male with hx of ESRD on HD T/Th/Sat, CAD s/p PCI on recent admission, HTN, Pulm HTN, Lung cancer s/p XRT in 06 Was at HD yesterday evening and noted to have low grade temp and purulent drainage from catheter insertion site with erythema. Patient completed his HD session and was sent to ED. In ED was noted to have WBC of 15, no shift, no fever. There was some scant drainage around catheter insertion site and erythema. Patient also noted to have hypoxia. Denies cough, CP, Chills. Admits to subjective fevers at home over past few days. No focal weakness.     Pt with h/o lung Ca s/p rtx 2006.  Has had no pulm f/u in many yrs.  Former smoker.  2 wks cough SOB.    MEDICATIONS  (STANDING):  amLODIPine   Tablet 10 milliGRAM(s) Oral daily  aspirin enteric coated 81 milliGRAM(s) Oral daily  atorvastatin 80 milliGRAM(s) Oral at bedtime  chlorhexidine 2% Cloths 1 Application(s) Topical daily  clopidogrel Tablet 75 milliGRAM(s) Oral daily  DULoxetine 20 milliGRAM(s) Oral daily  gabapentin 100 milliGRAM(s) Oral three times a day  heparin  Injectable 5000 Unit(s) SubCutaneous every 8 hours  hydrALAZINE 50 milliGRAM(s) Oral every 8 hours  isosorbide   mononitrate ER Tablet (IMDUR) 30 milliGRAM(s) Oral daily  levothyroxine 100 MICROGram(s) Oral daily  methyl salicylate 14%/menthol 6% Topical Ointment 1 Application(s) Topical two times a day  metoprolol tartrate 50 milliGRAM(s) Oral two times a day  Nephro-jeana 1 Tablet(s) Oral daily  pantoprazole    Tablet 40 milliGRAM(s) Oral before breakfast  piperacillin/tazobactam IVPB. 2.25 Gram(s) IV Intermittent every 12 hours  sodium chloride 0.9% lock flush 3 milliLiter(s) IV Push every 8 hours      MEDICATIONS  (PRN):  ondansetron Injectable 4 milliGRAM(s) IV Push every 6 hours PRN Nausea      Allergies    No Known Allergies    Intolerances        PAST MEDICAL & SURGICAL HISTORY:  Chronic anemia  ESRD (end stage renal disease)  CAD (coronary artery disease)  Lung cancer: had yoni radiation in 2006.  Bipolar disorder  High cholesterol  Hypertension  S/P CABG (coronary artery bypass graft): pt&#x27;s son in [facility] states h/o some stents near neck area ? carotid ? he is not sure.      FAMILY HISTORY:  Family history of essential hypertension (Father)      SOCIAL HISTORY  Smoking History: former    REVIEW OF SYSTEMS:    CONSTITUTIONAL:  As per HPI.    HEENT:  Eyes:  No diplopia or blurred vision. ENT:  No earache, sore throat or runny nose.    CARDIOVASCULAR:  No pressure, squeezing, tightness, heaviness or aching about the chest; no palpitations.    RESPIRATORY:  Per HPI    GASTROINTESTINAL:  No nausea, vomiting or diarrhea.    GENITOURINARY:  No dysuria, frequency or urgency.    MUSCULOSKELETAL:  No joint pains    SKIN:  No new lesions.    NEUROLOGIC:  No paresthesias, fasciculations, seizures or weakness.    PSYCHIATRIC:  No disorder of thought or mood.    ENDOCRINE:  No heat or cold intolerance, polyuria or polydipsia.    HEMATOLOGICAL:  No easy bruising or bleeding.     Vital Signs Last 24 Hrs  T(C): 36.8 (24 Dec 2018 09:39), Max: 36.8 (23 Dec 2018 16:54)  T(F): 98.3 (24 Dec 2018 09:39), Max: 98.3 (24 Dec 2018 09:39)  HR: 60 (24 Dec 2018 09:39) (58 - 60)  BP: 131/72 (24 Dec 2018 09:39) (120/55 - 131/72)  BP(mean): --  RR: 18 (24 Dec 2018 09:39) (18 - 20)  SpO2: 96% (24 Dec 2018 09:39) (95% - 96%)    PHYSICAL EXAMINATION:    GENERAL: The patient is a well-developed, well-nourished _AM____in no apparent distress.     HEENT: Head is normocephalic and atraumatic. Extraocular muscles are intact. Mucous membranes are moist.     NECK: Supple.     LUNGS: diminished bs, scattered exp wheezes.    HEART: Regular rate and rhythm without murmur.    ABDOMEN: Soft, nontender, and nondistended.  No hepatosplenomegaly is noted.    EXTREMITIES: Without any cyanosis, clubbing, rash, lesions or edema.    NEUROLOGIC: Grossly intact.    SKIN: No ulceration or induration present.      LABS:                        8.4    14.7  )-----------( 254      ( 22 Dec 2018 18:10 )             26.7     12-24    135  |  93<L>  |  31.0<H>  ----------------------------<  119<H>  3.8   |  25.0  |  5.65<H>    Ca    7.9<L>      24 Dec 2018 10:07    TPro  7.6  /  Alb  3.7  /  TBili  0.4  /  DBili  x   /  AST  32  /  ALT  28  /  AlkPhos  61  12-22    PT/INR - ( 22 Dec 2018 18:10 )   PT: 11.9 sec;   INR: 1.03 ratio         PTT - ( 22 Dec 2018 18:10 )  PTT:29.4 sec    ABG - ( 22 Dec 2018 17:47 )  pH, Arterial: 7.50  pH, Blood: x     /  pCO2: 44    /  pO2: 58    / HCO3: 33    / Base Excess: 9.6   /  SaO2: 92                CARDIAC MARKERS ( 22 Dec 2018 18:10 )  x     / 0.10 ng/mL / x     / x     / x            Serum Pro-Brain Natriuretic Peptide: [id number] pg/mL (12-22-18 @ 18:10)          MICROBIOLOGY:Rapid Respiratory Viral Panel (11.28.18 @ 16:14)    Rapid RVP Result: NotDetec: The Film[employer] RVP Rapid uses polymerase chain reaction (PCR) and melt  curve analysis to screen for adenovirus; coronavirus HKU1, NL63, 229E,  OC43; human metapneumovirus (hMPV); human enterovirus/rhinovirus  (Entero/RV); influenza A; influenza A/H1;influenza A/H3; influenza  A/H1-2009; influenza B; parainfluenza viruses 1, 2, 3, 4; respiratory  syncytial virus; Bordetella pertussis; Mycoplasma pneumoniae; and  Chlamydophila pneumoniae.        RADIOLOGY & ADDITIONAL STUDIES:< from: CT Chest No Cont (12.22.18 @ 18:32) >  IMPRESSION:    Tip of central venous catheter tip terminates in the SVC.  Patchy airspace opacity in the right upper lobe may represent   postradiation change or scarring from remote infection/inflammation.  Mild centrilobular emphysematous change. Comparison with previous studies   would be helpful. Fibrotic changes in the right middle and lower lobes.  Trace bilateral pleural effusions.  Cardiomegaly.    < end of copied text >

## 2018-12-24 NOTE — PROGRESS NOTE ADULT - SUBJECTIVE AND OBJECTIVE BOX
KAUSHIK HOFFMAN    [id number]    History:    Vascular surgery follow up.  ERSD wth HD via right IJ permacath.  There are some concerns for permacath site compromise due to elevated wbc, and reported drainage from the catheter site when patient was in the ED.  Currently Denies nausea, vomiting, chest pain, shortness of breath, abdominal pain or fever. No new complaints. No acute motor or sensory changes are reported.    Vital Signs Last 24 Hrs  T(C): 36.8 (23 Dec 2018 23:56), Max: 36.8 (23 Dec 2018 16:54)  T(F): 98.2 (23 Dec 2018 23:56), Max: 98.2 (23 Dec 2018 16:54)  HR: 58 (23 Dec 2018 23:56) (58 - 58)  BP: 120/55 (23 Dec 2018 23:56) (120/55 - 130/67)  BP(mean): --  RR: 20 (23 Dec 2018 16:54) (20 - 20)  SpO2: 95% (23 Dec 2018 23:56) (95% - 96%)  I&O's Summary                            8.4    14.7  )-----------( 254      ( 22 Dec 2018 18:10 )             26.7     12-22    136  |  93<L>  |  11.0  ----------------------------<  124<H>  3.2<L>   |  30.0<H>  |  2.66<H>    Ca    7.8<L>      22 Dec 2018 18:10    TPro  7.6  /  Alb  3.7  /  TBili  0.4  /  DBili  x   /  AST  32  /  ALT  28  /  AlkPhos  61  12-22      MEDICATIONS  (STANDING):  amLODIPine   Tablet 10 milliGRAM(s) Oral daily  aspirin enteric coated 81 milliGRAM(s) Oral daily  atorvastatin 80 milliGRAM(s) Oral at bedtime  clopidogrel Tablet 75 milliGRAM(s) Oral daily  DULoxetine 20 milliGRAM(s) Oral daily  gabapentin 100 milliGRAM(s) Oral three times a day  heparin  Injectable 5000 Unit(s) SubCutaneous every 8 hours  hydrALAZINE 50 milliGRAM(s) Oral every 8 hours  isosorbide   mononitrate ER Tablet (IMDUR) 30 milliGRAM(s) Oral daily  levothyroxine 100 MICROGram(s) Oral daily  methyl salicylate 14%/menthol 6% Topical Ointment 1 Application(s) Topical two times a day  metoprolol tartrate 50 milliGRAM(s) Oral two times a day  Nephro-jeana 1 Tablet(s) Oral daily  pantoprazole    Tablet 40 milliGRAM(s) Oral before breakfast  piperacillin/tazobactam IVPB. 2.25 Gram(s) IV Intermittent every 12 hours  sodium chloride 0.9% lock flush 3 milliLiter(s) IV Push every 8 hours    MEDICATIONS  (PRN):  ondansetron Injectable 4 milliGRAM(s) IV Push every 6 hours PRN Nausea      Physical exam:   Ill appearing  no acute distress.  Right chest, The dressing is clean, No satish- wound erythema, discharge, drainage is noted.   The felt of the permacath is exposed and the catheter is loose.  No palpable tenderness around exit site     Assessment:  • leukocytosis, moderate suspicion for infected right IJ permacath   Blood cultures negative thus far  Plan:   • continue with HD today  - will have the covering attending evaluate and if needed we will remove catheter AFTER dialysis today KAUSHIK HOFFMAN    [id number]    History:    Vascular surgery follow up.  ERSD wth HD via right IJ permacath.  There are some concerns for permacath site compromise due to elevated wbc, and reported drainage from the catheter site when patient was in the ED.  Currently Denies nausea, vomiting, chest pain, shortness of breath, abdominal pain or fever. No new complaints. No acute motor or sensory changes are reported.    Vital Signs Last 24 Hrs  T(C): 36.8 (23 Dec 2018 23:56), Max: 36.8 (23 Dec 2018 16:54)  T(F): 98.2 (23 Dec 2018 23:56), Max: 98.2 (23 Dec 2018 16:54)  HR: 58 (23 Dec 2018 23:56) (58 - 58)  BP: 120/55 (23 Dec 2018 23:56) (120/55 - 130/67)  BP(mean): --  RR: 20 (23 Dec 2018 16:54) (20 - 20)  SpO2: 95% (23 Dec 2018 23:56) (95% - 96%)  I&O's Summary                            8.4    14.7  )-----------( 254      ( 22 Dec 2018 18:10 )             26.7     12-22    136  |  93<L>  |  11.0  ----------------------------<  124<H>  3.2<L>   |  30.0<H>  |  2.66<H>    Ca    7.8<L>      22 Dec 2018 18:10    TPro  7.6  /  Alb  3.7  /  TBili  0.4  /  DBili  x   /  AST  32  /  ALT  28  /  AlkPhos  61  12-22      MEDICATIONS  (STANDING):  amLODIPine   Tablet 10 milliGRAM(s) Oral daily  aspirin enteric coated 81 milliGRAM(s) Oral daily  atorvastatin 80 milliGRAM(s) Oral at bedtime  clopidogrel Tablet 75 milliGRAM(s) Oral daily  DULoxetine 20 milliGRAM(s) Oral daily  gabapentin 100 milliGRAM(s) Oral three times a day  heparin  Injectable 5000 Unit(s) SubCutaneous every 8 hours  hydrALAZINE 50 milliGRAM(s) Oral every 8 hours  isosorbide   mononitrate ER Tablet (IMDUR) 30 milliGRAM(s) Oral daily  levothyroxine 100 MICROGram(s) Oral daily  methyl salicylate 14%/menthol 6% Topical Ointment 1 Application(s) Topical two times a day  metoprolol tartrate 50 milliGRAM(s) Oral two times a day  Nephro-jeana 1 Tablet(s) Oral daily  pantoprazole    Tablet 40 milliGRAM(s) Oral before breakfast  piperacillin/tazobactam IVPB. 2.25 Gram(s) IV Intermittent every 12 hours  sodium chloride 0.9% lock flush 3 milliLiter(s) IV Push every 8 hours    MEDICATIONS  (PRN):  ondansetron Injectable 4 milliGRAM(s) IV Push every 6 hours PRN Nausea      Physical exam:   Ill appearing  no acute distress.  Right chest, The dressing is clean, No satish- wound erythema, discharge, drainage is noted.   The felt of the permacath is exposed and the catheter is loose.  No palpable tenderness around exit site     Assessment:  • leukocytosis, moderate suspicion for infected right IJ permacath   No blood cultures to date   Plan:   • continue with HD today  - will have the covering attending evaluate and if needed we will remove catheter AFTER dialysis today

## 2018-12-24 NOTE — PROGRESS NOTE ADULT - SUBJECTIVE AND OBJECTIVE BOX
cc: fever , cough , catheter infection       interval hx: pt seen and evaluated comfortable. in no acute distress. family at bedside. denies any fever at this time. rt upper chest port erythema noted. deneis cp, sob, n/v/abd pain/ diarrhea. c/o dry cough     Vital Signs Last 24 Hrs  T(C): 36.7 (24 Dec 2018 13:37), Max: 36.8 (23 Dec 2018 16:54)  T(F): 98.1 (24 Dec 2018 13:37), Max: 98.3 (24 Dec 2018 09:39)  HR: 56 (24 Dec 2018 13:37) (56 - 60)  BP: 127/65 (24 Dec 2018 13:37) (120/55 - 131/72)  BP(mean): --  RR: 18 (24 Dec 2018 13:37) (18 - 20)  SpO2: 92% (24 Dec 2018 13:37) (92% - 96%)      Physical Exam:  · Constitutional Details	no distress  · Constitutional Comments	lying in bed in NAD  · Eyes Details	conjunctiva clear  · ENMT Details	mouth  · Mouth	moist; dental caries  · Neck Details	no JVD  · Respiratory Details	no rales; no rhonchi; no wheezes  · Cardiovascular Details	regular rate and rhythm  no rub  · Cardiovascular Details	positive S1; positive S2  · Gastrointestinal	detailed exam  · GI Normal	soft; nontender  · Extremities	detailed exam  · Extremities Details	no clubbing; no cyanosis  · Vascular	detailed exam  · DP Pulse	+1  · PT Pulse	+1  · Neurological	detailed exam  · Mental Status	AAOx3  · Cranial Nerve	2-12 intact  · Motor	5/5 x 4  · Sensory	intact to light touch  · Gait/Balance	not tested  · Skin Details	warm and dry  · Skin Comments	left PermCath insertion site as described above  · Musculoskeletal Details	no joint swelling  · Psychiatric	Affect and characteristics of appearance, verbalizations, behaviors are appropriate                            7.8    9.6   )-----------( 290      ( 24 Dec 2018 15:03 )             25.0   12-24    135  |  93<L>  |  31.0<H>  ----------------------------<  119<H>  3.8   |  25.0  |  5.65<H>    Ca    7.9<L>      24 Dec 2018 10:07    TPro  7.6  /  Alb  3.7  /  TBili  0.4  /  DBili  x   /  AST  32  /  ALT  28  /  AlkPhos  61  12-22

## 2018-12-24 NOTE — PROGRESS NOTE ADULT - ASSESSMENT
79 y/o male with infection of permacath site, hypoxia, ESRD on HD, HTN, Pulm HTN, CAD s/p PCI    >fever/ likely source dialysis catheter possible infection  -Cont. empiric abx for now.  -f/u Blood cx pending   -will get cxs from cath as well   -f/u wound cx   -No probiotics with central venous catheter.   -ID on board   -vascular consult called for cath removal / tip to be sent for cxs/ vascular team aware   -c/w vanc with HD   -monitor vanc trough     >Hypoxia.  Plan: likely related to prior hx of lung ca with xrt induced pulm fibrosis and pulm htn.   - 02 sat on 3 liters is 96%. Check sat with ambulation to see if may need home 02.   -No evidence of PNA, or volume overload.   -no steroids as per pulm given sepsis   -Robitussin prn  / nebs      >ESRD (end stage renal disease).  Plan: Cont. HD as per Renal.     >Hypertension, unspecified type.  Plan: cont. o/p regimen, renal/low sodium diet.     >anemia of esrd, stable , monitor      > Coronary artery disease involving native coronary artery of native heart without angina pectoris. Plan: No CP, cont. o/p regimen.    >hlp:  -c/w  Statin.

## 2018-12-24 NOTE — PROGRESS NOTE ADULT - SUBJECTIVE AND OBJECTIVE BOX
[facility]  INFECTIOUS DISEASES AND INTERNAL MEDICINE at [place]  =======================================================  Cricket Lara MD  Diplomates American Board of Internal Medicine and Infectious Diseases  =======================================================    YASMIN KAUSHIK [id number]    Follow up: Permacath infection    no fever  no chills  No complaints    Allergies:  No Known Allergies      Antibiotics:   piperacillin/tazobactam IVPB. 2.25 Gram(s) IV Intermittent every 12 hours      REVIEW OF SYSTEMS:  CONSTITUTIONAL:  No Fever or chills  HEENT:  No diplopia or blurred vision.  No earache, sore throat or runny nose.  CARDIOVASCULAR:  No pressure, squeezing, strangling, tightness, heaviness or aching about the chest, neck, axilla or epigastrium.  RESPIRATORY:  No cough, shortness of breath  GASTROINTESTINAL:  No nausea, vomiting or diarrhea.  GENITOURINARY:  No flank pain  MUSCULOSKELETAL:  no joint aches, no muscle pain  SKIN:  No change in skin, hair or nails.  NEUROLOGIC:  No paresthesias, fasciculations  PSYCHIATRIC:  No disorder of thought or mood.  ENDOCRINE:  No heat or cold intolerance  HEMATOLOGICAL:  No easy bruising or bleeding.         Physical Exam:  Vital Signs Last 24 Hrs  T(C): 36.7 (24 Dec 2018 13:37), Max: 36.8 (23 Dec 2018 16:54)  T(F): 98.1 (24 Dec 2018 13:37), Max: 98.3 (24 Dec 2018 09:39)  HR: 56 (24 Dec 2018 13:37) (56 - 60)  BP: 127/65 (24 Dec 2018 13:37) (120/55 - 131/72)  RR: 18 (24 Dec 2018 13:37) (18 - 20)  SpO2: 92% (24 Dec 2018 13:37) (92% - 96%)      GEN: NAD, pleasant  HEENT: normocephalic and atraumatic. EOMI. PERRL.  Anicteric  NECK: Supple.   LUNGS: Clear to auscultation.  HEART: Regular rate and rhythm   ABDOMEN: Soft, nontender, and nondistended.  Positive bowel sounds.    : No CVA tenderness  EXTREMITIES: Without any edema.  MSK: No joint swelling  NEUROLOGIC: No Focal Deficits  PSYCHIATRIC: Appropriate affect .  SKIN: No Rash      Labs:  12-24    135  |  93<L>  |  31.0<H>  ----------------------------<  119<H>  3.8   |  25.0  |  5.65<H>    Ca    7.9<L>      24 Dec 2018 10:07    TPro  7.6  /  Alb  3.7  /  TBili  0.4  /  DBili  x   /  AST  32  /  ALT  28  /  AlkPhos  61  12-22               8.4    14.7  )-----------( 254      ( 22 Dec 2018 18:10 )             26.7       PT/INR - ( 22 Dec 2018 18:10 )   PT: 11.9 sec;   INR: 1.03 ratio         PTT - ( 22 Dec 2018 18:10 )  PTT:29.4 sec    LIVER FUNCTIONS - ( 22 Dec 2018 18:10 )  Alb: 3.7 g/dL / Pro: 7.6 g/dL / ALK PHOS: 61 U/L / ALT: 28 U/L / AST: 32 U/L / GGT: x           CARDIAC MARKERS ( 22 Dec 2018 18:10 )  x     / 0.10 ng/mL / x     / x     / x          ABG - ( 22 Dec 2018 17:47 )  pH, Arterial: 7.50  pH, Blood: x     /  pCO2: 44    /  pO2: 58    / HCO3: 33    / Base Excess: 9.6   /  SaO2: 92

## 2018-12-24 NOTE — PROGRESS NOTE ADULT - ASSESSMENT
77y/o  Male with h/o ESRD on HD T/Th/Sat, CAD s/p PCI on recent admission, s/p CABG HTN, Pulm HTN, [place]   Here with Permacath infection      Permacath infection  Hypoxia   ESRD on HD  CAD s/p PCI  Pulm HTN  Lung Ca    - Blood cultures pending  - Would remove permacath and culture tip  - Patient will need 3 day holiday and negative blood cultures prior to replacing the permacath  - Will Continue Vancomycin post HD  - Trend fever  - Trend leukocytosis    Will Follow

## 2018-12-24 NOTE — PROGRESS NOTE ADULT - SUBJECTIVE AND OBJECTIVE BOX
[facility] DIVISION OF KIDNEY DISEASES AND HYPERTENSION -- HEMODIALYSIS NOTE  --------------------------------------------------------------------------------  Chief Complaint: ESRD/Ongoing hemodialysis requirement    24 hour events/subjective:  HD today; catheter still in;      PAST HISTORY  --------------------------------------------------------------------------------  No significant changes to PMH, PSH, FHx, SHx, unless otherwise noted    ALLERGIES & MEDICATIONS  --------------------------------------------------------------------------------  Allergies    No Known Allergies    Intolerances      Standing Inpatient Medications  ALBUTerol/ipratropium for Nebulization 3 milliLiter(s) Nebulizer every 6 hours  amLODIPine   Tablet 10 milliGRAM(s) Oral daily  aspirin enteric coated 81 milliGRAM(s) Oral daily  atorvastatin 80 milliGRAM(s) Oral at bedtime  chlorhexidine 2% Cloths 1 Application(s) Topical daily  clopidogrel Tablet 75 milliGRAM(s) Oral daily  DULoxetine 20 milliGRAM(s) Oral daily  gabapentin 100 milliGRAM(s) Oral three times a day  heparin  Injectable 5000 Unit(s) SubCutaneous every 8 hours  hydrALAZINE 50 milliGRAM(s) Oral every 8 hours  isosorbide   mononitrate ER Tablet (IMDUR) 30 milliGRAM(s) Oral daily  levothyroxine 100 MICROGram(s) Oral daily  methyl salicylate 14%/menthol 6% Topical Ointment 1 Application(s) Topical two times a day  metoprolol tartrate 50 milliGRAM(s) Oral two times a day  Nephro-jeana 1 Tablet(s) Oral daily  pantoprazole    Tablet 40 milliGRAM(s) Oral before breakfast  piperacillin/tazobactam IVPB. 2.25 Gram(s) IV Intermittent every 12 hours  sodium chloride 0.9% lock flush 3 milliLiter(s) IV Push every 8 hours    PRN Inpatient Medications  ondansetron Injectable 4 milliGRAM(s) IV Push every 6 hours PRN      REVIEW OF SYSTEMS  --------------------------------------------------------------------------------  Gen: No weight changes, fatigue, fevers/chills, weakness  Skin: No rashes  Head/Eyes/Ears/Mouth: No headache; Normal hearing; Normal vision w/o blurriness; No sinus pain/discomfort, sore throat  Respiratory: No dyspnea, cough, wheezing, hemoptysis  CV: No chest pain, PND, orthopnea  GI: No abdominal pain, diarrhea, constipation, nausea, vomiting, melena, hematochezia  : No increased frequency, dysuria, hematuria, nocturia  MSK: No joint pain/swelling; no back pain; no edema  Neuro: No dizziness/lightheadedness, weakness, seizures, numbness, tingling  Heme: No easy bruising or bleeding  Endo: No heat/cold intolerance  Psych: No significant nervousness, anxiety, stress, depression    All other systems were reviewed and are negative, except as noted.    VITALS/PHYSICAL EXAM  --------------------------------------------------------------------------------  T(C): 36.8 (12-24-18 @ 09:39), Max: 36.8 (12-23-18 @ 16:54)  HR: 60 (12-24-18 @ 09:39) (58 - 60)  BP: 131/72 (12-24-18 @ 09:39) (120/55 - 131/72)  RR: 18 (12-24-18 @ 09:39) (18 - 20)  SpO2: 96% (12-24-18 @ 09:39) (95% - 96%)  Wt(kg): --  Height (cm): 162.56 (12-22-18 @ 15:17)  Weight (kg): 54.592579583789167 (12-22-18 @ 15:17)  BMI (kg/m2): 20.6 (12-22-18 @ 15:17)  BSA (m2): 1.57 (12-22-18 @ 15:17)      Physical Exam:  	Gen: NAD,   	HEENT: PERRL, supple neck, clear oropharynx  	Pulm: CTA B/L  	CV: RRR, S1S2; no rub  	Back: No spinal or CVA tenderness; no sacral edema  	Abd: +BS, soft, nontender/nondistended  	: No suprapubic tenderness  	UE: Warm, FROM, no clubbing, intact strength; no edema; no asterixis  	LE: Warm, FROM, no clubbing, intact strength; no edema  	Neuro: No focal deficits, intact gait  	Psych: Normal affect and mood  	Skin: Warm, without rashes  	Vascular access: tunneled CVC    LABS/STUDIES  --------------------------------------------------------------------------------              8.4    14.7  >-----------<  254      [12-22-18 @ 18:10]              26.7     135  |  93  |  31.0  ----------------------------<  119      [12-24-18 @ 10:07]  3.8   |  25.0  |  5.65        Ca     7.9     [12-24-18 @ 10:07]    TPro  7.6  /  Alb  3.7  /  TBili  0.4  /  DBili  x   /  AST  32  /  ALT  28  /  AlkPhos  61  [12-22-18 @ 18:10]    PT/INR: PT 11.9 , INR 1.03       [12-22-18 @ 18:10]  PTT: 29.4       [12-22-18 @ 18:10]    Troponin 0.10      [12-22-18 @ 18:10]    TSH 7.33      [11-29-18 @ 02:31]    HBsAb <3.0      [12-05-18 @ 16:33]  HBsAg Nonreact      [12-05-18 @ 16:33]  HBcAb Nonreact      [12-05-18 @ 16:33]  HCV 0.14, Nonreact      [12-05-18 @ 16:33]

## 2018-12-24 NOTE — PROGRESS NOTE ADULT - ASSESSMENT
1) ESRD on HD  2 MBD of renal dx  3) Anemia of renal dx  4) Vol HTN  5) Permacath site infx    Spoke with infectious disease; will need permacath removed and holiday for 3 days  Well dialyzed at [place]; BUN down considerably;  Catheter needs to be removed post HD today; discussed with vascular yesterday and again today  On Zosyn;  Awaiting culture results; send catheter for culture  On schedule for HD today;    d/w vascular surgery and ID Dr Ratliff

## 2018-12-25 LAB
ANION GAP SERPL CALC-SCNC: 15 MMOL/L — SIGNIFICANT CHANGE UP (ref 5–17)
BUN SERPL-MCNC: 18 MG/DL — SIGNIFICANT CHANGE UP (ref 8–20)
CALCIUM SERPL-MCNC: 8 MG/DL — LOW (ref 8.6–10.2)
CHLORIDE SERPL-SCNC: 94 MMOL/L — LOW (ref 98–107)
CO2 SERPL-SCNC: 26 MMOL/L — SIGNIFICANT CHANGE UP (ref 22–29)
CREAT SERPL-MCNC: 4.39 MG/DL — HIGH (ref 0.5–1.3)
GLUCOSE SERPL-MCNC: 173 MG/DL — HIGH (ref 70–115)
HCT VFR BLD CALC: 27.3 % — LOW (ref 42–52)
HGB BLD-MCNC: 8.5 G/DL — LOW (ref 14–18)
MCHC RBC-ENTMCNC: 29 PG — SIGNIFICANT CHANGE UP (ref 27–31)
MCHC RBC-ENTMCNC: 31.1 G/DL — LOW (ref 32–36)
MCV RBC AUTO: 93.2 FL — SIGNIFICANT CHANGE UP (ref 80–94)
PLATELET # BLD AUTO: 274 K/UL — SIGNIFICANT CHANGE UP (ref 150–400)
POTASSIUM SERPL-MCNC: 4.3 MMOL/L — SIGNIFICANT CHANGE UP (ref 3.5–5.3)
POTASSIUM SERPL-SCNC: 4.3 MMOL/L — SIGNIFICANT CHANGE UP (ref 3.5–5.3)
RBC # BLD: 2.93 M/UL — LOW (ref 4.6–6.2)
RBC # FLD: 13.1 % — SIGNIFICANT CHANGE UP (ref 11–15.6)
SODIUM SERPL-SCNC: 135 MMOL/L — SIGNIFICANT CHANGE UP (ref 135–145)
WBC # BLD: 10.8 K/UL — SIGNIFICANT CHANGE UP (ref 4.8–10.8)
WBC # FLD AUTO: 10.8 K/UL — SIGNIFICANT CHANGE UP (ref 4.8–10.8)

## 2018-12-25 PROCEDURE — 99232 SBSQ HOSP IP/OBS MODERATE 35: CPT

## 2018-12-25 PROCEDURE — 99233 SBSQ HOSP IP/OBS HIGH 50: CPT

## 2018-12-25 RX ORDER — DICLOFENAC SODIUM 30 MG/G
4 GEL TOPICAL
Refills: 0 | Status: DISCONTINUED | OUTPATIENT
Start: 2018-12-25 | End: 2019-01-03

## 2018-12-25 RX ADMIN — DULOXETINE HYDROCHLORIDE 20 MILLIGRAM(S): 30 CAPSULE, DELAYED RELEASE ORAL at 12:16

## 2018-12-25 RX ADMIN — HEPARIN SODIUM 5000 UNIT(S): 5000 INJECTION INTRAVENOUS; SUBCUTANEOUS at 05:47

## 2018-12-25 RX ADMIN — Medication 50 MILLIGRAM(S): at 05:47

## 2018-12-25 RX ADMIN — HEPARIN SODIUM 5000 UNIT(S): 5000 INJECTION INTRAVENOUS; SUBCUTANEOUS at 12:16

## 2018-12-25 RX ADMIN — SODIUM CHLORIDE 3 MILLILITER(S): 9 INJECTION INTRAMUSCULAR; INTRAVENOUS; SUBCUTANEOUS at 22:16

## 2018-12-25 RX ADMIN — SODIUM CHLORIDE 3 MILLILITER(S): 9 INJECTION INTRAMUSCULAR; INTRAVENOUS; SUBCUTANEOUS at 12:07

## 2018-12-25 RX ADMIN — SODIUM CHLORIDE 3 MILLILITER(S): 9 INJECTION INTRAMUSCULAR; INTRAVENOUS; SUBCUTANEOUS at 05:45

## 2018-12-25 RX ADMIN — PIPERACILLIN AND TAZOBACTAM 200 GRAM(S): 4; .5 INJECTION, POWDER, LYOPHILIZED, FOR SOLUTION INTRAVENOUS at 16:50

## 2018-12-25 RX ADMIN — CLOPIDOGREL BISULFATE 75 MILLIGRAM(S): 75 TABLET, FILM COATED ORAL at 12:15

## 2018-12-25 RX ADMIN — Medication 100 MICROGRAM(S): at 05:48

## 2018-12-25 RX ADMIN — Medication 3 MILLILITER(S): at 15:44

## 2018-12-25 RX ADMIN — ATORVASTATIN CALCIUM 80 MILLIGRAM(S): 80 TABLET, FILM COATED ORAL at 22:16

## 2018-12-25 RX ADMIN — PIPERACILLIN AND TAZOBACTAM 200 GRAM(S): 4; .5 INJECTION, POWDER, LYOPHILIZED, FOR SOLUTION INTRAVENOUS at 05:48

## 2018-12-25 RX ADMIN — ISOSORBIDE MONONITRATE 30 MILLIGRAM(S): 60 TABLET, EXTENDED RELEASE ORAL at 16:50

## 2018-12-25 RX ADMIN — Medication 1 TABLET(S): at 16:49

## 2018-12-25 RX ADMIN — Medication 200 MILLIGRAM(S): at 20:12

## 2018-12-25 RX ADMIN — AMLODIPINE BESYLATE 10 MILLIGRAM(S): 2.5 TABLET ORAL at 05:47

## 2018-12-25 RX ADMIN — HEPARIN SODIUM 5000 UNIT(S): 5000 INJECTION INTRAVENOUS; SUBCUTANEOUS at 22:16

## 2018-12-25 RX ADMIN — MENTHOL AND METHYL SALICYLATE 1 APPLICATION(S): 10; 30 STICK TOPICAL at 16:51

## 2018-12-25 RX ADMIN — Medication 3 MILLILITER(S): at 03:17

## 2018-12-25 RX ADMIN — Medication 50 MILLIGRAM(S): at 12:16

## 2018-12-25 RX ADMIN — Medication 3 MILLILITER(S): at 09:24

## 2018-12-25 RX ADMIN — GABAPENTIN 100 MILLIGRAM(S): 400 CAPSULE ORAL at 12:16

## 2018-12-25 RX ADMIN — PANTOPRAZOLE SODIUM 40 MILLIGRAM(S): 20 TABLET, DELAYED RELEASE ORAL at 05:49

## 2018-12-25 RX ADMIN — CHLORHEXIDINE GLUCONATE 1 APPLICATION(S): 213 SOLUTION TOPICAL at 12:16

## 2018-12-25 RX ADMIN — Medication 50 MILLIGRAM(S): at 16:50

## 2018-12-25 RX ADMIN — Medication 200 MILLIGRAM(S): at 08:45

## 2018-12-25 RX ADMIN — Medication 3 MILLILITER(S): at 20:20

## 2018-12-25 RX ADMIN — Medication 81 MILLIGRAM(S): at 12:15

## 2018-12-25 NOTE — PROGRESS NOTE ADULT - ASSESSMENT
77y/o  Male with h/o ESRD on HD T/Th/Sat, CAD s/p PCI on recent admission, s/p CABG HTN, Pulm HTN, Lung [place]   Here with Permacath infection      Permacath infection  Hypoxia   ESRD on HD  CAD s/p PCI  Pulm HTN  Lung Ca    - Blood cultures no growth  - S/P permacath removal 12/24/18  - Follow up culture tip  - Patient will need 3 day holiday and negative blood cultures prior to replacing the permacath  - Will Continue Vancomycin post HD  - Trend fever  - Trend leukocytosis    Will Follow

## 2018-12-25 NOTE — PROGRESS NOTE ADULT - ASSESSMENT
Assessment/plan:     • permacath removed and tip sent for culture. Leucocytosis resolved .  . FU tip and blood culture  . C/w IV abx  . Access holiday for now   No blood cultures to date   will continue to follow.

## 2018-12-25 NOTE — PROGRESS NOTE ADULT - SUBJECTIVE AND OBJECTIVE BOX
PULMONARY CONSULT NOTE      KAUSHIK HOFFMANYOVANNY-[id number]    Patient is a 78y old  Male who presents with a chief complaint of Sent in for possible catheter infection (24 Dec 2018 08:25)    HPI  77 y/o male with hx of ESRD on HD T/Th/Sat, CAD s/p PCI on recent admission, HTN, Pulm HTN, Lung cancer s/p XRT in 06 Was at HD 12/23/18 evening and noted to have low grade temp and purulent drainage from catheter insertion site with erythema. Patient completed his HD session and was sent to ED. In ED was noted to have WBC of 15, no shift, no fever. There was some scant drainage around catheter insertion site and erythema. Patient also noted to have hypoxia. Denies cough, CP, Chills. Admits to subjective fevers at home over past few days. No focal weakness.     Pt with h/o lung Ca s/p rtx 2006.  Has had no pulm f/u in many yrs.  Former smoker.  2 wks cough SOB.    INTERVAL HPI/OVERNIGHT EVENTS: Comfortable.  No complaints    MEDICATIONS  (STANDING):  amLODIPine   Tablet 10 milliGRAM(s) Oral daily  aspirin enteric coated 81 milliGRAM(s) Oral daily  atorvastatin 80 milliGRAM(s) Oral at bedtime  chlorhexidine 2% Cloths 1 Application(s) Topical daily  clopidogrel Tablet 75 milliGRAM(s) Oral daily  DULoxetine 20 milliGRAM(s) Oral daily  gabapentin 100 milliGRAM(s) Oral three times a day  heparin  Injectable 5000 Unit(s) SubCutaneous every 8 hours  hydrALAZINE 50 milliGRAM(s) Oral every 8 hours  isosorbide   mononitrate ER Tablet (IMDUR) 30 milliGRAM(s) Oral daily  levothyroxine 100 MICROGram(s) Oral daily  methyl salicylate 14%/menthol 6% Topical Ointment 1 Application(s) Topical two times a day  metoprolol tartrate 50 milliGRAM(s) Oral two times a day  Nephro-jeana 1 Tablet(s) Oral daily  pantoprazole    Tablet 40 milliGRAM(s) Oral before breakfast  piperacillin/tazobactam IVPB. 2.25 Gram(s) IV Intermittent every 12 hours  sodium chloride 0.9% lock flush 3 milliLiter(s) IV Push every 8 hours      MEDICATIONS  (PRN):  ondansetron Injectable 4 milliGRAM(s) IV Push every 6 hours PRN Nausea      Allergies    No Known Allergies    Intolerances        PAST MEDICAL & SURGICAL HISTORY:  Chronic anemia  ESRD (end stage renal disease)  CAD (coronary artery disease)  Lung cancer: had yoni radiation in 2006.  Bipolar disorder  High cholesterol  Hypertension  S/P CABG (coronary artery bypass graft): pt&#x27;s son in [facility] states h/o some stents near neck area ? carotid ? he is not sure.      FAMILY HISTORY:  Family history of essential hypertension (Father)      SOCIAL HISTORY  Smoking History: former    REVIEW OF SYSTEMS:    CONSTITUTIONAL:  As per HPI.    HEENT:  Eyes:  No diplopia or blurred vision. ENT:  No earache, sore throat or runny nose.    CARDIOVASCULAR:  No pressure, squeezing, tightness, heaviness or aching about the chest; no palpitations.    RESPIRATORY:  Per HPI    GASTROINTESTINAL:  No nausea, vomiting or diarrhea.    GENITOURINARY:  No dysuria, frequency or urgency.    MUSCULOSKELETAL:  No joint pains    SKIN:  No new lesions.    NEUROLOGIC:  No paresthesias, fasciculations, seizures or weakness.    PSYCHIATRIC:  No disorder of thought or mood.    ENDOCRINE:  No heat or cold intolerance, polyuria or polydipsia.    HEMATOLOGICAL:  No easy bruising or bleeding.     Vital Signs Last 24 Hrs  T(C): 36.8 (24 Dec 2018 09:39), Max: 36.8 (23 Dec 2018 16:54)  T(F): 98.3 (24 Dec 2018 09:39), Max: 98.3 (24 Dec 2018 09:39)  HR: 60 (24 Dec 2018 09:39) (58 - 60)  BP: 131/72 (24 Dec 2018 09:39) (120/55 - 131/72)  BP(mean): --  RR: 18 (24 Dec 2018 09:39) (18 - 20)  SpO2: 96% (24 Dec 2018 09:39) (95% - 96%)    PHYSICAL EXAMINATION:    GENERAL: The patient is a well-developed, well-nourished _AM____in no apparent distress.     HEENT: Head is normocephalic and atraumatic. Extraocular muscles are intact. Mucous membranes are moist.     NECK: Supple.     LUNGS: diminished bs, scattered exp wheezes.    HEART: Regular rate and rhythm without murmur.    ABDOMEN: Soft, nontender, and nondistended.  No hepatosplenomegaly is noted.    EXTREMITIES: Without any cyanosis, clubbing, rash, lesions or edema.    NEUROLOGIC: Grossly intact.    SKIN: No ulceration or induration present.      LABS:                        8.4    14.7  )-----------( 254      ( 22 Dec 2018 18:10 )             26.7     12-24    135  |  93<L>  |  31.0<H>  ----------------------------<  119<H>  3.8   |  25.0  |  5.65<H>    Ca    7.9<L>      24 Dec 2018 10:07    TPro  7.6  /  Alb  3.7  /  TBili  0.4  /  DBili  x   /  AST  32  /  ALT  28  /  AlkPhos  61  12-22    PT/INR - ( 22 Dec 2018 18:10 )   PT: 11.9 sec;   INR: 1.03 ratio         PTT - ( 22 Dec 2018 18:10 )  PTT:29.4 sec    ABG - ( 22 Dec 2018 17:47 )  pH, Arterial: 7.50  pH, Blood: x     /  pCO2: 44    /  pO2: 58    / HCO3: 33    / Base Excess: 9.6   /  SaO2: 92                CARDIAC MARKERS ( 22 Dec 2018 18:10 )  x     / 0.10 ng/mL / x     / x     / x            Serum Pro-Brain Natriuretic Peptide: 81317 pg/mL (12-22-18 @ 18:10)          MICROBIOLOGY:Rapid Respiratory Viral Panel (11.28.18 @ 16:14)    Rapid RVP Result: NotDete: The [employer] RVP Rapid uses polymerase chain reaction (PCR) and melt  curve analysis to screen for adenovirus; coronavirus HKU1, NL63, 229E,  OC43; human metapneumovirus (hMPV); human enterovirus/rhinovirus  (Entero/RV); influenza A; influenza A/H1;influenza A/H3; influenza  A/H1-2009; influenza B; parainfluenza viruses 1, 2, 3, 4; respiratory  syncytial virus; Bordetella pertussis; Mycoplasma pneumoniae; and  Chlamydophila pneumoniae.        RADIOLOGY & ADDITIONAL STUDIES:< from: CT Chest No Cont (12.22.18 @ 18:32) >  IMPRESSION:    Tip of central venous catheter tip terminates in the SVC.  Patchy airspace opacity in the right upper lobe may represent   postradiation change or scarring from remote infection/inflammation.  Mild centrilobular emphysematous change. Comparison with previous studies   would be helpful. Fibrotic changes in the right middle and lower lobes.  Trace bilateral pleural effusions.  Cardiomegaly.    < end of copied text >

## 2018-12-25 NOTE — PROGRESS NOTE ADULT - ASSESSMENT
77 y/o male with infection of permacath site, hypoxia, ESRD on HD, HTN, Pulm HTN, CAD s/p PCI    >fever/ likely source dialysis catheter possible infection  -Cont.abx   -f/u Blood cx neg   -perm cath tip cxs/ wound cxs pending   -ID on board   -c/w vanc with HD / monitor vanc trough     >Hypoxia.  Plan: likely related to prior hx of lung ca with xrt induced pulm fibrosis and pulm htn.   - 02 sat on 3 liters is 96%. Check sat with ambulation to see if may need home 02.   -No evidence of PNA, or volume overload.   -no steroids as per pulm given sepsis   -Robitussin prn  / nebs      >ESRD (end stage renal disease).  Plan: Cont. HD as per Renal.     >Hypertension, unspecified type.  Plan: cont. o/p regimen, renal/low sodium diet.     >anemia of esrd, stable , monitor      > Coronary artery disease involving native coronary artery of native heart without angina pectoris. Plan: No CP, cont. o/p regimen.    >hlp:  -c/w  Statin.

## 2018-12-25 NOTE — PROGRESS NOTE ADULT - SUBJECTIVE AND OBJECTIVE BOX
INTERVAL HPI/OVERNIGHT EVENTS:   Vascular surgery follow up.  ERSD wth HD via right IJ permacath yesterday  There are some concerns for permacath site compromise due to elevated wbc, and reported drainage from the catheter site when patient was in the ED, Premacath was removed after dialysis and tip sent for culture.   Currently Denies nausea, vomiting, chest pain, shortness of breath, abdominal pain or fever. No new complaints. No acute motor or sensory changes are reported.  leucocystosis resolved.         MEDICATIONS  (STANDING):  ALBUTerol/ipratropium for Nebulization 3 milliLiter(s) Nebulizer every 6 hours  amLODIPine   Tablet 10 milliGRAM(s) Oral daily  aspirin enteric coated 81 milliGRAM(s) Oral daily  atorvastatin 80 milliGRAM(s) Oral at bedtime  chlorhexidine 2% Cloths 1 Application(s) Topical daily  clopidogrel Tablet 75 milliGRAM(s) Oral daily  DULoxetine 20 milliGRAM(s) Oral daily  gabapentin 100 milliGRAM(s) Oral daily  heparin  Injectable 5000 Unit(s) SubCutaneous every 8 hours  hydrALAZINE 50 milliGRAM(s) Oral every 8 hours  isosorbide   mononitrate ER Tablet (IMDUR) 30 milliGRAM(s) Oral daily  levothyroxine 100 MICROGram(s) Oral daily  methyl salicylate 14%/menthol 6% Topical Ointment 1 Application(s) Topical two times a day  metoprolol tartrate 50 milliGRAM(s) Oral two times a day  Nephro-jeana 1 Tablet(s) Oral daily  pantoprazole    Tablet 40 milliGRAM(s) Oral before breakfast  piperacillin/tazobactam IVPB. 2.25 Gram(s) IV Intermittent every 12 hours  sodium chloride 0.9% lock flush 3 milliLiter(s) IV Push every 8 hours    MEDICATIONS  (PRN):  guaiFENesin    Syrup 200 milliGRAM(s) Oral every 6 hours PRN Cough  ondansetron Injectable 4 milliGRAM(s) IV Push every 6 hours PRN Nausea      Vital Signs Last 24 Hrs  T(C): 36.9 (25 Dec 2018 08:09), Max: 37.7 (24 Dec 2018 21:20)  T(F): 98.4 (25 Dec 2018 08:09), Max: 99.9 (24 Dec 2018 21:27)  HR: 54 (25 Dec 2018 08:09) (54 - 66)  BP: 121/51 (25 Dec 2018 08:09) (120/61 - 129/53)  BP(mean): --  RR: 19 (25 Dec 2018 08:09) (18 - 19)  SpO2: 92% (25 Dec 2018 08:09) (90% - 97%)    Physical Exam:    Physical exam:   Ill appearing  no acute distress.  Right chest, The dressing is c/d/i          I&O's Detail    24 Dec 2018 07:01  -  25 Dec 2018 07:00  --------------------------------------------------------  IN:  Total IN: 0 mL    OUT:    Other: 1000 mL  Total OUT: 1000 mL    Total NET: -1000 mL          LABS:                        8.5    10.8  )-----------( 274      ( 25 Dec 2018 07:06 )             27.3     12-25    135  |  94<L>  |  18.0  ----------------------------<  173<H>  4.3   |  26.0  |  4.39<H>    Ca    8.0<L>      25 Dec 2018 07:06            RADIOLOGY & ADDITIONAL STUDIES:

## 2018-12-25 NOTE — PROGRESS NOTE ADULT - SUBJECTIVE AND OBJECTIVE BOX
cc: fever , cough , catheter infection       interval hx: pt seen and evaluated comfortable. in no acute distress.   family at bedside. denies any fever at this time. rt upper chest port erythema noted. deneis cp, sob, n/v/abd pain/ diarrhea.   s/p HD cath removal 12/24/18       Vital Signs Last 24 Hrs  T(C): 36.7 (25 Dec 2018 16:30), Max: 37.7 (24 Dec 2018 21:20)  T(F): 98 (25 Dec 2018 16:30), Max: 99.9 (24 Dec 2018 21:27)  HR: 64 (25 Dec 2018 16:30) (54 - 66)  BP: 123/63 (25 Dec 2018 16:30) (120/61 - 129/53)  BP(mean): --  RR: 18 (25 Dec 2018 16:30) (18 - 19)  SpO2: 94% (25 Dec 2018 15:45) (90% - 97%)    Physical Exam:  · Constitutional Details	no distress  · Constitutional Comments	lying in bed in NAD  · Eyes Details	conjunctiva clear  · ENMT Details	mouth  · Mouth	moist; dental caries  · Neck Details	no JVD  · Respiratory Details	no rales; no rhonchi; no wheezes  · Cardiovascular Details	regular rate and rhythm  no rub  · Cardiovascular Details	positive S1; positive S2  · Gastrointestinal	detailed exam  · GI Normal	soft; nontender  · Extremities	detailed exam  · Extremities Details	no clubbing; no cyanosis  · Vascular	detailed exam  · DP Pulse	+1  · PT Pulse	+1  · Neurological	detailed exam  · Mental Status	AAOx3  · Cranial Nerve	2-12 intact  · Motor	5/5 x 4  · Sensory	intact to light touch  · Gait/Balance	not tested  · Skin Details	warm and dry  · Skin Comments	left PermCath insertion site as described above  · Musculoskeletal Details	no joint swelling  · Psychiatric	Affect and characteristics of appearance, verbalizations, behaviors are appropriate                          8.5    10.8  )-----------( 274      ( 25 Dec 2018 07:06 )             27.3   12-25    135  |  94<L>  |  18.0  ----------------------------<  173<H>  4.3   |  26.0  |  4.39<H>    Ca    8.0<L>      25 Dec 2018 07:06

## 2018-12-25 NOTE — PROGRESS NOTE ADULT - SUBJECTIVE AND OBJECTIVE BOX
[facility] DIVISION OF KIDNEY DISEASES AND HYPERTENSION -- FOLLOW UP NOTE  --------------------------------------------------------------------------------  Chief Complaint: ESRD HD    24 hour events/subjective:  Seen/examined  Tunneled CVC removed post HD yesterday  On Abx  Lying in bed in NAD      PAST HISTORY  --------------------------------------------------------------------------------  No significant changes to PMH, PSH, FHx, SHx, unless otherwise noted    ALLERGIES & MEDICATIONS  --------------------------------------------------------------------------------  Allergies    No Known Allergies    Intolerances      Standing Inpatient Medications  ALBUTerol/ipratropium for Nebulization 3 milliLiter(s) Nebulizer every 6 hours  amLODIPine   Tablet 10 milliGRAM(s) Oral daily  aspirin enteric coated 81 milliGRAM(s) Oral daily  atorvastatin 80 milliGRAM(s) Oral at bedtime  chlorhexidine 2% Cloths 1 Application(s) Topical daily  clopidogrel Tablet 75 milliGRAM(s) Oral daily  DULoxetine 20 milliGRAM(s) Oral daily  gabapentin 100 milliGRAM(s) Oral daily  heparin  Injectable 5000 Unit(s) SubCutaneous every 8 hours  hydrALAZINE 50 milliGRAM(s) Oral every 8 hours  isosorbide   mononitrate ER Tablet (IMDUR) 30 milliGRAM(s) Oral daily  levothyroxine 100 MICROGram(s) Oral daily  methyl salicylate 14%/menthol 6% Topical Ointment 1 Application(s) Topical two times a day  metoprolol tartrate 50 milliGRAM(s) Oral two times a day  Nephro-jeana 1 Tablet(s) Oral daily  pantoprazole    Tablet 40 milliGRAM(s) Oral before breakfast  piperacillin/tazobactam IVPB. 2.25 Gram(s) IV Intermittent every 12 hours  sodium chloride 0.9% lock flush 3 milliLiter(s) IV Push every 8 hours    PRN Inpatient Medications  guaiFENesin    Syrup 200 milliGRAM(s) Oral every 6 hours PRN  ondansetron Injectable 4 milliGRAM(s) IV Push every 6 hours PRN      REVIEW OF SYSTEMS  --------------------------------------------------------------------------------  Gen: No weight changes, fatigue, fevers/chills, weakness  Skin: No rashes  Head/Eyes/Ears/Mouth: No headache; Normal hearing; Normal vision w/o blurriness; No sinus pain/discomfort, sore throat  Respiratory: No dyspnea, cough, wheezing, hemoptysis  CV: No chest pain, PND, orthopnea  GI: No abdominal pain, diarrhea, constipation, nausea, vomiting, melena, hematochezia  : No increased frequency, dysuria, hematuria, nocturia  MSK: No joint pain/swelling; no back pain; no edema  Neuro: No dizziness/lightheadedness, weakness, seizures, numbness, tingling  Heme: No easy bruising or bleeding  Endo: No heat/cold intolerance  Psych: No significant nervousness, anxiety, stress, depression    All other systems were reviewed and are negative, except as noted.    VITALS/PHYSICAL EXAM  --------------------------------------------------------------------------------  T(C): 36.9 (12-25-18 @ 08:09), Max: 37.7 (12-24-18 @ 21:20)  HR: 54 (12-25-18 @ 08:09) (54 - 66)  BP: 121/51 (12-25-18 @ 08:09) (120/61 - 129/53)  RR: 19 (12-25-18 @ 08:09) (18 - 19)  SpO2: 94% (12-25-18 @ 10:15) (90% - 97%)  Wt(kg): --        12-24-18 @ 07:01  -  12-25-18 @ 07:00  --------------------------------------------------------  IN: 0 mL / OUT: 1000 mL / NET: -1000 mL      Physical Exam:  	Gen: NAD, well-appearing  	HEENT: PERRL, supple neck, clear oropharynx  	Pulm: CTA B/L  	CV: RRR, S1S2; no rub  	Back: No spinal or CVA tenderness; no sacral edema  	Abd: +BS, soft, nontender/nondistended  	: No suprapubic tenderness  	UE: Warm, FROM, no clubbing, intact strength; no edema; no asterixis  	LE: Warm, FROM, no clubbing, intact strength; no edema  	Neuro: No focal deficits, intact gait  	Psych: Normal affect and mood  	Skin: Warm, without rashes  	Vascular access:    LABS/STUDIES  --------------------------------------------------------------------------------              8.5    10.8  >-----------<  274      [12-25-18 @ 07:06]              27.3     135  |  94  |  18.0  ----------------------------<  173      [12-25-18 @ 07:06]  4.3   |  26.0  |  4.39        Ca     8.0     [12-25-18 @ 07:06]            Creatinine Trend:  SCr 4.39 [12-25 @ 07:06]  SCr 5.65 [12-24 @ 10:07]  SCr 2.66 [12-22 @ 18:10]  SCr 4.35 [12-15 @ 11:18]  SCr 4.88 [12-13 @ 12:18]        TSH 7.33      [11-29-18 @ 02:31]    HBsAb <3.0      [12-05-18 @ 16:33]  HBsAg Nonreact      [12-05-18 @ 16:33]  HBcAb Nonreact      [12-05-18 @ 16:33]  HCV 0.14, Nonreact      [12-05-18 @ 16:33]

## 2018-12-25 NOTE — PROGRESS NOTE ADULT - ASSESSMENT
1) ESRD on HD  2 MBD of renal dx  3) Anemia of renal dx  4) Vol HTN  5) Permacath site infx    Plan for CVC replacement on 27th or 28th by vascular  Well dialyzed at [facility]; BUN down considerably;  On Zosyn;  Awaiting culture results; sent catheter tip for culture    d/w vascular surgery and ID

## 2018-12-25 NOTE — PROGRESS NOTE ADULT - SUBJECTIVE AND OBJECTIVE BOX
[facility]  INFECTIOUS DISEASES AND INTERNAL MEDICINE at [place]  =======================================================  Cricket Lara MD  Diplomates American Board of Internal Medicine and Infectious Diseases  =======================================================    YASMIN KAUSHIK [id number]    Follow up: Permacath infection    no fever  no chills  No complaints    Allergies:  No Known Allergies      Antibiotics:   piperacillin/tazobactam IVPB. 2.25 Gram(s) IV Intermittent every 12 hours      REVIEW OF SYSTEMS:  CONSTITUTIONAL:  No Fever or chills  HEENT:  No diplopia or blurred vision.  No earache, sore throat or runny nose.  CARDIOVASCULAR:  No pressure, squeezing, strangling, tightness, heaviness or aching about the chest, neck, axilla or epigastrium.  RESPIRATORY:  No cough, shortness of breath  GASTROINTESTINAL:  No nausea, vomiting or diarrhea.  GENITOURINARY:  No flank pain  MUSCULOSKELETAL:  no joint aches, no muscle pain  SKIN:  No change in skin, hair or nails.  NEUROLOGIC:  No paresthesias, fasciculations  PSYCHIATRIC:  No disorder of thought or mood.  ENDOCRINE:  No heat or cold intolerance  HEMATOLOGICAL:  No easy bruising or bleeding.         Physical Exam:  Vital Signs Last 24 Hrs  T(C): 36.7 (25 Dec 2018 16:30), Max: 37.7 (24 Dec 2018 21:20)  T(F): 98 (25 Dec 2018 16:30), Max: 99.9 (24 Dec 2018 21:27)  HR: 64 (25 Dec 2018 16:30) (54 - 66)  BP: 123/63 (25 Dec 2018 16:30) (120/61 - 129/53)  RR: 18 (25 Dec 2018 16:30) (18 - 19)  SpO2: 94% (25 Dec 2018 10:15) (90% - 97%)      GEN: NAD, pleasant  HEENT: normocephalic and atraumatic. EOMI. PERRL.  Anicteric  NECK: Supple.   LUNGS: Clear to auscultation.  HEART: Regular rate and rhythm   ABDOMEN: Soft, nontender, and nondistended.  Positive bowel sounds.    : No CVA tenderness  EXTREMITIES: Without any edema.  MSK: No joint swelling  NEUROLOGIC: No Focal Deficits  PSYCHIATRIC: Appropriate affect .  SKIN: No Rash      Labs:  12-25    135  |  94<L>  |  18.0  ----------------------------<  173<H>  4.3   |  26.0  |  4.39<H>    Ca    8.0<L>      25 Dec 2018 07:06                        8.5    10.8  )-----------( 274      ( 25 Dec 2018 07:06 )             27.3         RECENT CULTURES:  12-22 @ 19:42 .Blood     No growth at 48 hours      12-22 @ 18:14 .Blood     No growth at 48 hours

## 2018-12-25 NOTE — PROGRESS NOTE ADULT - ASSESSMENT
Imp--Pt with evidence RT fibrosis, COPD  Plan--Nebs, abx for infected catheter.  Will defer systemic steroids in face of possible sepsis.  ABX per ID  Catheter per vascular  May need home O2 and OP pulm FU

## 2018-12-26 ENCOUNTER — APPOINTMENT (OUTPATIENT)
Dept: NEPHROLOGY | Facility: CLINIC | Age: 78
End: 2018-12-26

## 2018-12-26 DIAGNOSIS — Z51.5 ENCOUNTER FOR PALLIATIVE CARE: ICD-10-CM

## 2018-12-26 DIAGNOSIS — G47.00 INSOMNIA, UNSPECIFIED: ICD-10-CM

## 2018-12-26 LAB
ANION GAP SERPL CALC-SCNC: 16 MMOL/L — SIGNIFICANT CHANGE UP (ref 5–17)
BASE EXCESS BLDA CALC-SCNC: 1.8 MMOL/L — SIGNIFICANT CHANGE UP (ref -2–2)
BLD GP AB SCN SERPL QL: SIGNIFICANT CHANGE UP
BUN SERPL-MCNC: 29 MG/DL — HIGH (ref 8–20)
CALCIUM SERPL-MCNC: 7.7 MG/DL — LOW (ref 8.6–10.2)
CHLORIDE SERPL-SCNC: 93 MMOL/L — LOW (ref 98–107)
CO2 SERPL-SCNC: 25 MMOL/L — SIGNIFICANT CHANGE UP (ref 22–29)
CREAT SERPL-MCNC: 6.12 MG/DL — HIGH (ref 0.5–1.3)
GAS PNL BLDA: SIGNIFICANT CHANGE UP
GLUCOSE SERPL-MCNC: 106 MG/DL — SIGNIFICANT CHANGE UP (ref 70–115)
HCO3 BLDA-SCNC: 26 MMOL/L — SIGNIFICANT CHANGE UP (ref 20–26)
HCT VFR BLD CALC: 24.6 % — LOW (ref 42–52)
HGB BLD-MCNC: 7.8 G/DL — LOW (ref 14–18)
HOROWITZ INDEX BLDA+IHG-RTO: SIGNIFICANT CHANGE UP
MCHC RBC-ENTMCNC: 28.9 PG — SIGNIFICANT CHANGE UP (ref 27–31)
MCHC RBC-ENTMCNC: 31.7 G/DL — LOW (ref 32–36)
MCV RBC AUTO: 91.1 FL — SIGNIFICANT CHANGE UP (ref 80–94)
PCO2 BLDA: 45 MMHG — SIGNIFICANT CHANGE UP (ref 35–45)
PH BLDA: 7.39 — SIGNIFICANT CHANGE UP (ref 7.35–7.45)
PLATELET # BLD AUTO: 276 K/UL — SIGNIFICANT CHANGE UP (ref 150–400)
PO2 BLDA: 74 MMHG — LOW (ref 83–108)
POTASSIUM SERPL-MCNC: 4.2 MMOL/L — SIGNIFICANT CHANGE UP (ref 3.5–5.3)
POTASSIUM SERPL-SCNC: 4.2 MMOL/L — SIGNIFICANT CHANGE UP (ref 3.5–5.3)
RBC # BLD: 2.7 M/UL — LOW (ref 4.6–6.2)
RBC # FLD: 13.3 % — SIGNIFICANT CHANGE UP (ref 11–15.6)
SAO2 % BLDA: 95 % — SIGNIFICANT CHANGE UP (ref 95–99)
SODIUM SERPL-SCNC: 134 MMOL/L — LOW (ref 135–145)
TYPE + AB SCN PNL BLD: SIGNIFICANT CHANGE UP
WBC # BLD: 9.2 K/UL — SIGNIFICANT CHANGE UP (ref 4.8–10.8)
WBC # FLD AUTO: 9.2 K/UL — SIGNIFICANT CHANGE UP (ref 4.8–10.8)

## 2018-12-26 PROCEDURE — 93010 ELECTROCARDIOGRAM REPORT: CPT

## 2018-12-26 PROCEDURE — 99223 1ST HOSP IP/OBS HIGH 75: CPT

## 2018-12-26 PROCEDURE — 99233 SBSQ HOSP IP/OBS HIGH 50: CPT

## 2018-12-26 PROCEDURE — 71045 X-RAY EXAM CHEST 1 VIEW: CPT | Mod: 26

## 2018-12-26 RX ORDER — FUROSEMIDE 40 MG
80 TABLET ORAL ONCE
Refills: 0 | Status: COMPLETED | OUTPATIENT
Start: 2018-12-26 | End: 2018-12-26

## 2018-12-26 RX ORDER — MORPHINE SULFATE 50 MG/1
2 CAPSULE, EXTENDED RELEASE ORAL ONCE
Refills: 0 | Status: DISCONTINUED | OUTPATIENT
Start: 2018-12-26 | End: 2018-12-26

## 2018-12-26 RX ORDER — ALBUTEROL 90 UG/1
2.5 AEROSOL, METERED ORAL ONCE
Refills: 0 | Status: COMPLETED | OUTPATIENT
Start: 2018-12-26 | End: 2018-12-26

## 2018-12-26 RX ORDER — FUROSEMIDE 40 MG
40 TABLET ORAL DAILY
Refills: 0 | Status: DISCONTINUED | OUTPATIENT
Start: 2018-12-26 | End: 2018-12-28

## 2018-12-26 RX ADMIN — CHLORHEXIDINE GLUCONATE 1 APPLICATION(S): 213 SOLUTION TOPICAL at 11:14

## 2018-12-26 RX ADMIN — Medication 3 MILLILITER(S): at 08:02

## 2018-12-26 RX ADMIN — MORPHINE SULFATE 2 MILLIGRAM(S): 50 CAPSULE, EXTENDED RELEASE ORAL at 20:51

## 2018-12-26 RX ADMIN — MORPHINE SULFATE 2 MILLIGRAM(S): 50 CAPSULE, EXTENDED RELEASE ORAL at 20:36

## 2018-12-26 RX ADMIN — Medication 40 MILLIGRAM(S): at 20:35

## 2018-12-26 RX ADMIN — MENTHOL AND METHYL SALICYLATE 1 APPLICATION(S): 10; 30 STICK TOPICAL at 06:43

## 2018-12-26 RX ADMIN — Medication 3 MILLILITER(S): at 20:15

## 2018-12-26 RX ADMIN — ATORVASTATIN CALCIUM 80 MILLIGRAM(S): 80 TABLET, FILM COATED ORAL at 20:35

## 2018-12-26 RX ADMIN — Medication 81 MILLIGRAM(S): at 11:14

## 2018-12-26 RX ADMIN — SODIUM CHLORIDE 3 MILLILITER(S): 9 INJECTION INTRAMUSCULAR; INTRAVENOUS; SUBCUTANEOUS at 06:45

## 2018-12-26 RX ADMIN — Medication 50 MILLIGRAM(S): at 06:42

## 2018-12-26 RX ADMIN — Medication 200 MILLIGRAM(S): at 17:42

## 2018-12-26 RX ADMIN — PANTOPRAZOLE SODIUM 40 MILLIGRAM(S): 20 TABLET, DELAYED RELEASE ORAL at 06:42

## 2018-12-26 RX ADMIN — PIPERACILLIN AND TAZOBACTAM 200 GRAM(S): 4; .5 INJECTION, POWDER, LYOPHILIZED, FOR SOLUTION INTRAVENOUS at 06:43

## 2018-12-26 RX ADMIN — Medication 80 MILLIGRAM(S): at 17:44

## 2018-12-26 RX ADMIN — HEPARIN SODIUM 5000 UNIT(S): 5000 INJECTION INTRAVENOUS; SUBCUTANEOUS at 20:35

## 2018-12-26 RX ADMIN — Medication 3 MILLILITER(S): at 15:37

## 2018-12-26 RX ADMIN — AMLODIPINE BESYLATE 10 MILLIGRAM(S): 2.5 TABLET ORAL at 06:42

## 2018-12-26 RX ADMIN — GABAPENTIN 100 MILLIGRAM(S): 400 CAPSULE ORAL at 11:14

## 2018-12-26 RX ADMIN — Medication 3 MILLILITER(S): at 03:58

## 2018-12-26 RX ADMIN — Medication 50 MILLIGRAM(S): at 20:35

## 2018-12-26 RX ADMIN — SODIUM CHLORIDE 3 MILLILITER(S): 9 INJECTION INTRAMUSCULAR; INTRAVENOUS; SUBCUTANEOUS at 13:48

## 2018-12-26 RX ADMIN — Medication 100 MICROGRAM(S): at 06:42

## 2018-12-26 RX ADMIN — DULOXETINE HYDROCHLORIDE 20 MILLIGRAM(S): 30 CAPSULE, DELAYED RELEASE ORAL at 11:14

## 2018-12-26 RX ADMIN — MENTHOL AND METHYL SALICYLATE 1 APPLICATION(S): 10; 30 STICK TOPICAL at 17:44

## 2018-12-26 RX ADMIN — CLOPIDOGREL BISULFATE 75 MILLIGRAM(S): 75 TABLET, FILM COATED ORAL at 11:14

## 2018-12-26 RX ADMIN — Medication 1 TABLET(S): at 11:10

## 2018-12-26 RX ADMIN — HEPARIN SODIUM 5000 UNIT(S): 5000 INJECTION INTRAVENOUS; SUBCUTANEOUS at 13:43

## 2018-12-26 RX ADMIN — SODIUM CHLORIDE 3 MILLILITER(S): 9 INJECTION INTRAMUSCULAR; INTRAVENOUS; SUBCUTANEOUS at 22:00

## 2018-12-26 RX ADMIN — Medication 50 MILLIGRAM(S): at 13:43

## 2018-12-26 RX ADMIN — ISOSORBIDE MONONITRATE 30 MILLIGRAM(S): 60 TABLET, EXTENDED RELEASE ORAL at 11:14

## 2018-12-26 RX ADMIN — Medication 50 MILLIGRAM(S): at 17:42

## 2018-12-26 RX ADMIN — HEPARIN SODIUM 5000 UNIT(S): 5000 INJECTION INTRAVENOUS; SUBCUTANEOUS at 06:42

## 2018-12-26 RX ADMIN — PIPERACILLIN AND TAZOBACTAM 200 GRAM(S): 4; .5 INJECTION, POWDER, LYOPHILIZED, FOR SOLUTION INTRAVENOUS at 17:55

## 2018-12-26 RX ADMIN — Medication 200 MILLIGRAM(S): at 06:45

## 2018-12-26 NOTE — PROVIDER CONTACT NOTE (OTHER) - SITUATION
PT IN 6220W C/O CHEST PAIN UNABLE TO CONTACT ECG. TRIED OVERHEAD PAGING STAT, TRIED ON VOCERA, TRIED CALLING EXTENSION. KIERRA SUPERVISOR WAS MADE AWARE

## 2018-12-26 NOTE — PROVIDER CONTACT NOTE (MEDICATION) - RECOMMENDATIONS
pt needs resp tx, md gonzalez made aware to come to unit to assess patient.
stat ecg and assess patient

## 2018-12-26 NOTE — CONSULT NOTE ADULT - SUBJECTIVE AND OBJECTIVE BOX
Palliative Medicine Initial Consultation Note    HPI:  History from chart and son in law- patient deferring all questioning to son in law  77 y/o male with hx of ESRD on HD T/Th/Sat, CAD s/p PCI on recent admission, HTN, Pulm HTN, Lung cancer s/p XRT in 06 Was at HD yesterday evening and noted to have low grade temp and purulent drainage from catheter insertion site with erythema. Patient completed his HD session and was sent to ED.    Per son in law- patient recently began HD, had 3 sessions prior to hospitalizations. Son in law drives him to HD, needs some assistance with ADLs.   Discussed with nurse- episode of cough and desaturation. CXR ordered and placed on venti mask from nasal canula.     PERTINENT PMH REVIEWED:  [ x] YES [ ] NO          Past Medical History:  Bipolar disorder    CAD (coronary artery disease)    Chronic anemia    ESRD (end stage renal disease)    High cholesterol    Hypertension    Lung cancer  had yoni radiation in 2006.     Past Surgical History:  S/P CABG (coronary artery bypass graft)  pt's son in law states h/o some stents near neck area ? carotid ? he is not sure.    SOCIAL HISTORY:  EtOH [ ] Yes  [ x] No                                    Drugs [ ] Yes [ x] No                                   [ ] smoker [ ]x nonsmoker                                    Admitted from: [x ] home [ ] SNF _________ [ ] [facility] ________    Surrogate/HCP/Guardian: [ ] YES [ ] NO                                Phone#:    FAMILY HISTORY:  Family history of essential hypertension (Father)  Unable to obtain further FH 2/2 patient's condition    Baseline ADLs (prior to admission):  Independent [ ] moderately [ ] fully   Dependent   [ ] moderately [ ]fully    MEDICATIONS  (STANDING):  ALBUTerol/ipratropium for Nebulization 3 milliLiter(s) Nebulizer every 6 hours  amLODIPine   Tablet 10 milliGRAM(s) Oral daily  aspirin enteric coated 81 milliGRAM(s) Oral daily  atorvastatin 80 milliGRAM(s) Oral at bedtime  chlorhexidine 2% Cloths 1 Application(s) Topical daily  clopidogrel Tablet 75 milliGRAM(s) Oral daily  diclofenac sodium 1% Gel 4 Gram(s) Topical <User Schedule>  DULoxetine 20 milliGRAM(s) Oral daily  gabapentin 100 milliGRAM(s) Oral daily  heparin  Injectable 5000 Unit(s) SubCutaneous every 8 hours  hydrALAZINE 50 milliGRAM(s) Oral every 8 hours  isosorbide   mononitrate ER Tablet (IMDUR) 30 milliGRAM(s) Oral daily  levothyroxine 100 MICROGram(s) Oral daily  methyl salicylate 14%/menthol 6% Topical Ointment 1 Application(s) Topical two times a day  metoprolol tartrate 50 milliGRAM(s) Oral two times a day  Nephro-jeana 1 Tablet(s) Oral daily  pantoprazole    Tablet 40 milliGRAM(s) Oral before breakfast  piperacillin/tazobactam IVPB. 2.25 Gram(s) IV Intermittent every 12 hours  sodium chloride 0.9% lock flush 3 milliLiter(s) IV Push every 8 hours    MEDICATIONS  (PRN):  benzonatate 100 milliGRAM(s) Oral every 8 hours PRN Cough  guaiFENesin    Syrup 200 milliGRAM(s) Oral every 6 hours PRN Cough  ondansetron Injectable 4 milliGRAM(s) IV Push every 6 hours PRN Nausea      Allergies    No Known Allergies    Intolerances        REVIEW OF SYSTEMS   see HPI    [x ] Unable to obtain due to patient not up to conversing    	  Karnofsky Performance Score/Palliative Performance Status Version 2:  30   %    Vital Signs Last 24 Hrs  T(C): 37 (26 Dec 2018 08:06), Max: 37.2 (25 Dec 2018 23:41)  T(F): 98.6 (26 Dec 2018 08:06), Max: 98.9 (25 Dec 2018 23:41)  HR: 64 (26 Dec 2018 08:06) (54 - 76)  BP: 137/64 (26 Dec 2018 08:06) (121/60 - 137/64)  BP(mean): --  RR: 18 (26 Dec 2018 08:06) (18 - 18)  SpO2: 95% (26 Dec 2018 11:41) (87% - 99%)    PHYSICAL EXAM:    General: [ ] alert  [ ] oriented x ____ [ ] lethargic [ ] agitated                  [ ] cachexia  [ ] nonverbal  [ ] coma    HEENT: [ ] normal  [ ] dry mouth  [ ] ET tube/trach    Lungs: [ ] comfortable [ ] tachypnea/labored breathing  [ ] excessive secretions    CV: [ ] normal  [ ] tachycardia    GI: [ ] normal  [ ] distended  [ ] tender  [ ] no BS               [ ] PEG/NG/OG tube    : [ ] normal  [ ] incontinent  [ ] oliguria/anuria  [ ] olea    MSK: [ ] normal  [ ] weakness  [ ] edema             [ ] ambulatory  [ ] bedbound/wheelchair bound    Skin: [ ] normal  [ ] pressure ulcers- Stage_____  [ ] no rash    LABS:                        7.8    9.2   )-----------( 276      ( 26 Dec 2018 09:34 )             24.6     12-26    134<L>  |  93<L>  |  29.0<H>  ----------------------------<  106  4.2   |  25.0  |  6.12<H>    Ca    7.7<L>      26 Dec 2018 09:34      RADIOLOGY & ADDITIONAL STUDIES:  < from: Xray Chest 1 View-PORTABLE IMMEDIATE (12.26.18 @ 10:14) >     EXAM:  XR CHEST PORTABLE IMMED 1V                          PROCEDURE DATE:  12/26/2018          INTERPRETATION:  XR CHEST PORTABLE IMMED 1V    Single AP view    HISTORY:  Abnormal Chest Sounds with history of lung cancer    Comparison:  Chest x-ray 12/22/2018    Status post sternotomy and CABG. Removal of dialysis catheter. Worsening   pulmonary edema with moderate right and small left pleural effusion.    IMPRESSION: Worsening pulmonary edema with moderate right and small left   pleural effusion.        < end of copied text >    ADVANCE DIRECTIVES:  [ ] YES [x ] NO   DNR [ ] YES [x ] NO  Completed on:                     MOLST  [ ] YES [x NO   Completed on:  Living Will  [ ] YES [ x] NO   Completed on:    Thank you for the opportunity to assist with the care of this patient.   [place] [facility] Consult Service [phone].

## 2018-12-26 NOTE — PROVIDER CONTACT NOTE (OTHER) - ACTION/TREATMENT ORDERED:
AS PER KIERRA NURSING SUPERVISOR HE WILL GET IN TOUCH WITH ECG AND SEND THEM TO [facility]

## 2018-12-26 NOTE — PROGRESS NOTE ADULT - ASSESSMENT
1) ESRD on HD  2 MBD of renal dx  3) Anemia of renal dx  4) Vol HTN  5) Permacath site infx    Plan for CVC replacement on 27th by vascular Dr Sparks ; HD tomorrow post catheter  Well dialyzed at [place]; BUN down considerably;  On Zosyn;  Awaiting culture results; sent catheter tip for culture    d/w vascular surgery this AM

## 2018-12-26 NOTE — PROGRESS NOTE ADULT - SUBJECTIVE AND OBJECTIVE BOX
cc: fever , cough , catheter infection , sob       interval hx: pt seen and evaluated comfortable. c/o sob this am requiring more o2. sats low 90s on 5 L NC , given neb tx and switched to high flow, sats now   family at bedside. denies any fever at this time. rt upper chest port erythema noted. deneis cp, sob, n/v/abd pain/ diarrhea.   s/p HD cath removal 12/24/18     Vital Signs Last 24 Hrs  T(C): 37 (26 Dec 2018 08:06), Max: 37.2 (25 Dec 2018 23:41)  T(F): 98.6 (26 Dec 2018 08:06), Max: 98.9 (25 Dec 2018 23:41)  HR: 66 (26 Dec 2018 13:41) (54 - 76)  BP: 129/60 (26 Dec 2018 13:41) (121/60 - 137/64)  BP(mean): --  RR: 18 (26 Dec 2018 08:06) (18 - 18)  SpO2: 95% (26 Dec 2018 11:41) (87% - 99%)    Physical Exam:  · Constitutional Comments siting in bed in mod resp distress.   · Eyes Details	conjunctiva clear  · Mouth	moist; dental caries  · Respiratory Details	b/l rhales, r>L , good air entry b/l   · Cardiovascular Details	positive S1; positive S2  · GI Normal	soft; nontender  · Extremities Details	no clubbing; no cyanosis  · Vascular	detailed exam  · DP Pulse	+1  · PT Pulse	+1  · Neurological	detailed exam  · Mental Status	AAOx3  · Cranial Nerve	2-12 intact  · Motor	5/5 x 4  · Sensory	intact to light touch  · Skin Details	warm and dry  · Skin Comments	left PermCath insertion site as described above  · Musculoskeletal Details	no joint swelling  · Psychiatric	Affect and characteristics of appearance, verbalizations, behaviors are appropriate                                     7.8    9.2   )-----------( 276      ( 26 Dec 2018 09:34 )             24.6   12-26    134<L>  |  93<L>  |  29.0<H>  ----------------------------<  106  4.2   |  25.0  |  6.12<H>    Ca    7.7<L>      26 Dec 2018 09:34

## 2018-12-26 NOTE — PROVIDER CONTACT NOTE (MEDICATION) - SITUATION
pt states he has chest pain
pts cxr results pt has worsening pulmonary edema and r/l pleural effusions
assessed pt at 0730, pt noted to have o2 sat 84% on 4L 02 nc. no s/s resp distress. pt afebrile, vss. pt denies sob, pt denies pain. resp therapist manda called and made aware of pts o2 sat

## 2018-12-26 NOTE — CONSULT NOTE ADULT - PROBLEM SELECTOR RECOMMENDATION 6
Met with patient on venti mask,  son in law at bedside. Patient deferring conversation to son in law. Son in law states, patient recently started HD, completed 3 sessions thus far.  He is aware of current issue of infected catheter and need for replacement.    Patient currently residing with wife, daughter and son in law.  Given patient not up to discussions, will need next surrogate, wife and then daughter for further goals of care discussions. Met with patient on venti mask,  son in law at bedside. Patient deferring conversation to son in law. Son in law states, patient recently started HD, completed 3 sessions thus far.  He is aware of current issue of infected catheter and need for replacement.    Patient currently residing with wife, daughter and son in law.  Given patient not up to discussions, will need next surrogate, wife and then daughter for further goals of care discussions.  Plan to reach out to family Met with patient on venti mask,  son in law at bedside. Patient deferring conversation to son in law. Son in law states, patient recently started HD, completed 3 sessions thus far.  He is aware of current issue of infected catheter and need for replacement.    Patient currently residing with wife, daughter and son in law.  Given patient not up to discussions, would need next surrogate, wife and then daughter for further goals of care discussions, unless patient designates son in law as official health care proxy.  Palliative Care to follow

## 2018-12-26 NOTE — PROGRESS NOTE ADULT - SUBJECTIVE AND OBJECTIVE BOX
[facility] DIVISION OF KIDNEY DISEASES AND HYPERTENSION -- FOLLOW UP NOTE  --------------------------------------------------------------------------------  Chief Complaint: ESRD HD    24 hour events/subjective:  Tunneled CVC removed; on catheter holiday  Plan for tunneled catheter tomorrow AM  HD tomorrow;      PAST HISTORY  --------------------------------------------------------------------------------  No significant changes to PMH, PSH, FHx, SHx, unless otherwise noted    ALLERGIES & MEDICATIONS  --------------------------------------------------------------------------------  Allergies    No Known Allergies    Intolerances      Standing Inpatient Medications  ALBUTerol/ipratropium for Nebulization 3 milliLiter(s) Nebulizer every 6 hours  amLODIPine   Tablet 10 milliGRAM(s) Oral daily  aspirin enteric coated 81 milliGRAM(s) Oral daily  atorvastatin 80 milliGRAM(s) Oral at bedtime  chlorhexidine 2% Cloths 1 Application(s) Topical daily  clopidogrel Tablet 75 milliGRAM(s) Oral daily  diclofenac sodium 1% Gel 4 Gram(s) Topical <User Schedule>  DULoxetine 20 milliGRAM(s) Oral daily  gabapentin 100 milliGRAM(s) Oral daily  heparin  Injectable 5000 Unit(s) SubCutaneous every 8 hours  hydrALAZINE 50 milliGRAM(s) Oral every 8 hours  isosorbide   mononitrate ER Tablet (IMDUR) 30 milliGRAM(s) Oral daily  levothyroxine 100 MICROGram(s) Oral daily  methyl salicylate 14%/menthol 6% Topical Ointment 1 Application(s) Topical two times a day  metoprolol tartrate 50 milliGRAM(s) Oral two times a day  Nephro-jeana 1 Tablet(s) Oral daily  pantoprazole    Tablet 40 milliGRAM(s) Oral before breakfast  piperacillin/tazobactam IVPB. 2.25 Gram(s) IV Intermittent every 12 hours  sodium chloride 0.9% lock flush 3 milliLiter(s) IV Push every 8 hours    PRN Inpatient Medications  benzonatate 100 milliGRAM(s) Oral every 8 hours PRN  guaiFENesin    Syrup 200 milliGRAM(s) Oral every 6 hours PRN  ondansetron Injectable 4 milliGRAM(s) IV Push every 6 hours PRN      REVIEW OF SYSTEMS  --------------------------------------------------------------------------------  Gen: No weight changes, fatigue, fevers/chills, weakness  Skin: No rashes  Head/Eyes/Ears/Mouth: No headache; Normal hearing; Normal vision w/o blurriness; No sinus pain/discomfort, sore throat  Respiratory: No dyspnea, cough, wheezing, hemoptysis  CV: No chest pain, PND, orthopnea  GI: No abdominal pain, diarrhea, constipation, nausea, vomiting, melena, hematochezia  : No increased frequency, dysuria, hematuria, nocturia  MSK: No joint pain/swelling; no back pain; no edema  Neuro: No dizziness/lightheadedness, weakness, seizures, numbness, tingling  Heme: No easy bruising or bleeding  Endo: No heat/cold intolerance  Psych: No significant nervousness, anxiety, stress, depression    All other systems were reviewed and are negative, except as noted.    VITALS/PHYSICAL EXAM  --------------------------------------------------------------------------------  T(C): 37 (12-26-18 @ 08:06), Max: 37.2 (12-25-18 @ 23:41)  HR: 64 (12-26-18 @ 08:06) (54 - 76)  BP: 137/64 (12-26-18 @ 08:06) (121/60 - 137/64)  RR: 18 (12-26-18 @ 08:06) (18 - 18)  SpO2: 95% (12-26-18 @ 11:41) (87% - 99%)  Wt(kg): --        Physical Exam:  	Gen: NAD  	HEENT: PERRL, supple neck, clear oropharynx  	Pulm: CTA B/L  	CV: RRR, S1S2; no rub  	Back: No spinal or CVA tenderness; no sacral edema  	Abd: +BS, soft, nontender/nondistended  	: No suprapubic tenderness  	UE: Warm, FROM, no clubbing, intact strength; no edema; no asterixis  	LE: Warm, FROM, no clubbing, intact strength; no edema  	Neuro: No focal deficits, intact gait  	Psych: Normal affect and mood  	Skin: Warm, without rashes  	Vascular access:    LABS/STUDIES  --------------------------------------------------------------------------------              7.8    9.2   >-----------<  276      [12-26-18 @ 09:34]              24.6     134  |  93  |  29.0  ----------------------------<  106      [12-26-18 @ 09:34]  4.2   |  25.0  |  6.12        Ca     7.7     [12-26-18 @ 09:34]            Creatinine Trend:  SCr 6.12 [12-26 @ 09:34]  SCr 4.39 [12-25 @ 07:06]  SCr 5.65 [12-24 @ 10:07]  SCr 2.66 [12-22 @ 18:10]  SCr 4.35 [12-15 @ 11:18]        TSH 7.33      [11-29-18 @ 02:31]    HBsAb <3.0      [12-05-18 @ 16:33]  HBsAg Nonreact      [12-05-18 @ 16:33]  HBcAb Nonreact      [12-05-18 @ 16:33]  HCV 0.14, Nonreact      [12-05-18 @ 16:33]

## 2018-12-26 NOTE — PROGRESS NOTE ADULT - SUBJECTIVE AND OBJECTIVE BOX
Patient discussed with Dr. Newberry    Plans for HD tomorrow    will reschedule permacath tomorrow     - restart diet  - npo at midnight

## 2018-12-26 NOTE — PROGRESS NOTE ADULT - ASSESSMENT
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   - 02 sat on 3 liters is 96%. Check sat with ambulation to see if may need home 02.   -No evidence of PNA, or volume overload.   -no steroids as per pulm given sepsis   -Robitussin prn  / nebs     >fever/ likely source dialysis catheter possible infection  -Cont.abx   -f/u Blood cx neg   -perm cath tip cxs/ wound cxs pending   -ID on board   -c/w vanc with HD / monitor vanc trough      >ESRD (end stage renal disease).  Plan: Cont. HD as per Renal.     >Hypertension, unspecified type.  Plan: cont. o/p regimen, renal/low sodium diet.     >anemia of esrd, stable , monitor      > Coronary artery disease involving native coronary artery of native heart without angina pectoris. Plan: No CP, cont. o/p regimen.    >hlp:  -c/w  Statin.

## 2018-12-26 NOTE — PROVIDER CONTACT NOTE (MEDICATION) - ASSESSMENT
pt on 50% vm o2 sat 95% no s/s resp distress pt denies sob
right lung sounds diminished md made aware of patient condition. left lung sounds +wheezing
pt vss, afebrile, o2 sat 94% on 50% VM. no s/s resp distress. pt denies sob

## 2018-12-26 NOTE — PROVIDER CONTACT NOTE (MEDICATION) - ACTION/TREATMENT ORDERED:
no further tx ordered at this time. continue to monitor pts  and vs
STAT ECG ORDERED, PA paged to come assess patient because md gonzalez is no longer in hospital
chest xray stat ordered

## 2018-12-27 DIAGNOSIS — J90 PLEURAL EFFUSION, NOT ELSEWHERE CLASSIFIED: ICD-10-CM

## 2018-12-27 DIAGNOSIS — I50.31 ACUTE DIASTOLIC (CONGESTIVE) HEART FAILURE: ICD-10-CM

## 2018-12-27 DIAGNOSIS — J96.01 ACUTE RESPIRATORY FAILURE WITH HYPOXIA: ICD-10-CM

## 2018-12-27 LAB
-  AMPICILLIN/SULBACTAM: SIGNIFICANT CHANGE UP
-  CEFAZOLIN: SIGNIFICANT CHANGE UP
-  CLINDAMYCIN: SIGNIFICANT CHANGE UP
-  ERYTHROMYCIN: SIGNIFICANT CHANGE UP
-  GENTAMICIN: SIGNIFICANT CHANGE UP
-  OXACILLIN: SIGNIFICANT CHANGE UP
-  PENICILLIN: SIGNIFICANT CHANGE UP
-  RIFAMPIN: SIGNIFICANT CHANGE UP
-  TETRACYCLINE: SIGNIFICANT CHANGE UP
-  TRIMETHOPRIM/SULFAMETHOXAZOLE: SIGNIFICANT CHANGE UP
-  VANCOMYCIN: SIGNIFICANT CHANGE UP
ANION GAP SERPL CALC-SCNC: 16 MMOL/L — SIGNIFICANT CHANGE UP (ref 5–17)
BASE EXCESS BLDA CALC-SCNC: -0.4 MMOL/L — SIGNIFICANT CHANGE UP (ref -2–2)
BASE EXCESS BLDA CALC-SCNC: -1.6 MMOL/L — SIGNIFICANT CHANGE UP (ref -2–2)
BLOOD GAS COMMENTS ARTERIAL: SIGNIFICANT CHANGE UP
BLOOD GAS COMMENTS ARTERIAL: SIGNIFICANT CHANGE UP
BUN SERPL-MCNC: 29 MG/DL — HIGH (ref 8–20)
CALCIUM SERPL-MCNC: 8 MG/DL — LOW (ref 8.6–10.2)
CHLORIDE SERPL-SCNC: 93 MMOL/L — LOW (ref 98–107)
CO2 SERPL-SCNC: 25 MMOL/L — SIGNIFICANT CHANGE UP (ref 22–29)
CREAT SERPL-MCNC: 4.09 MG/DL — HIGH (ref 0.5–1.3)
CULTURE RESULTS: SIGNIFICANT CHANGE UP
GAS PNL BLDA: SIGNIFICANT CHANGE UP
GAS PNL BLDA: SIGNIFICANT CHANGE UP
GLUCOSE SERPL-MCNC: 107 MG/DL — SIGNIFICANT CHANGE UP (ref 70–115)
HCO3 BLDA-SCNC: 23 MMOL/L — SIGNIFICANT CHANGE UP (ref 20–26)
HCO3 BLDA-SCNC: 24 MMOL/L — SIGNIFICANT CHANGE UP (ref 20–26)
HCT VFR BLD CALC: 25.2 % — LOW (ref 42–52)
HGB BLD-MCNC: 8 G/DL — LOW (ref 14–18)
HOROWITZ INDEX BLDA+IHG-RTO: 5 — SIGNIFICANT CHANGE UP
HOROWITZ INDEX BLDA+IHG-RTO: 60 — SIGNIFICANT CHANGE UP
MCHC RBC-ENTMCNC: 28.7 PG — SIGNIFICANT CHANGE UP (ref 27–31)
MCHC RBC-ENTMCNC: 31.7 G/DL — LOW (ref 32–36)
MCV RBC AUTO: 90.3 FL — SIGNIFICANT CHANGE UP (ref 80–94)
METHOD TYPE: SIGNIFICANT CHANGE UP
ORGANISM # SPEC MICROSCOPIC CNT: SIGNIFICANT CHANGE UP
ORGANISM # SPEC MICROSCOPIC CNT: SIGNIFICANT CHANGE UP
PCO2 BLDA: 42 MMHG — SIGNIFICANT CHANGE UP (ref 35–45)
PCO2 BLDA: 49 MMHG — HIGH (ref 35–45)
PH BLDA: 7.31 — LOW (ref 7.35–7.45)
PH BLDA: 7.38 — SIGNIFICANT CHANGE UP (ref 7.35–7.45)
PLATELET # BLD AUTO: 296 K/UL — SIGNIFICANT CHANGE UP (ref 150–400)
PO2 BLDA: 58 MMHG — LOW (ref 83–108)
PO2 BLDA: 79 MMHG — LOW (ref 83–108)
POTASSIUM SERPL-MCNC: 4 MMOL/L — SIGNIFICANT CHANGE UP (ref 3.5–5.3)
POTASSIUM SERPL-SCNC: 4 MMOL/L — SIGNIFICANT CHANGE UP (ref 3.5–5.3)
RBC # BLD: 2.79 M/UL — LOW (ref 4.6–6.2)
RBC # FLD: 13.4 % — SIGNIFICANT CHANGE UP (ref 11–15.6)
SAO2 % BLDA: 90 % — LOW (ref 95–99)
SAO2 % BLDA: 95 % — SIGNIFICANT CHANGE UP (ref 95–99)
SODIUM SERPL-SCNC: 134 MMOL/L — LOW (ref 135–145)
SPECIMEN SOURCE: SIGNIFICANT CHANGE UP
WBC # BLD: 6.3 K/UL — SIGNIFICANT CHANGE UP (ref 4.8–10.8)
WBC # FLD AUTO: 6.3 K/UL — SIGNIFICANT CHANGE UP (ref 4.8–10.8)

## 2018-12-27 PROCEDURE — 99233 SBSQ HOSP IP/OBS HIGH 50: CPT

## 2018-12-27 PROCEDURE — 99291 CRITICAL CARE FIRST HOUR: CPT

## 2018-12-27 PROCEDURE — 36569 INSJ PICC 5 YR+ W/O IMAGING: CPT

## 2018-12-27 PROCEDURE — 99232 SBSQ HOSP IP/OBS MODERATE 35: CPT

## 2018-12-27 PROCEDURE — 90937 HEMODIALYSIS REPEATED EVAL: CPT

## 2018-12-27 RX ORDER — FUROSEMIDE 40 MG
80 TABLET ORAL ONCE
Refills: 0 | Status: COMPLETED | OUTPATIENT
Start: 2018-12-27 | End: 2018-12-27

## 2018-12-27 RX ORDER — VANCOMYCIN HCL 1 G
1000 VIAL (EA) INTRAVENOUS
Refills: 0 | Status: DISCONTINUED | OUTPATIENT
Start: 2018-12-27 | End: 2018-12-28

## 2018-12-27 RX ADMIN — Medication 50 MILLIGRAM(S): at 06:02

## 2018-12-27 RX ADMIN — ISOSORBIDE MONONITRATE 30 MILLIGRAM(S): 60 TABLET, EXTENDED RELEASE ORAL at 18:38

## 2018-12-27 RX ADMIN — Medication 80 MILLIGRAM(S): at 09:40

## 2018-12-27 RX ADMIN — DULOXETINE HYDROCHLORIDE 20 MILLIGRAM(S): 30 CAPSULE, DELAYED RELEASE ORAL at 18:38

## 2018-12-27 RX ADMIN — CHLORHEXIDINE GLUCONATE 1 APPLICATION(S): 213 SOLUTION TOPICAL at 11:30

## 2018-12-27 RX ADMIN — Medication 80 MILLIGRAM(S): at 18:38

## 2018-12-27 RX ADMIN — Medication 250 MILLIGRAM(S): at 19:07

## 2018-12-27 RX ADMIN — Medication 50 MILLIGRAM(S): at 21:16

## 2018-12-27 RX ADMIN — ATORVASTATIN CALCIUM 80 MILLIGRAM(S): 80 TABLET, FILM COATED ORAL at 21:16

## 2018-12-27 RX ADMIN — Medication 3 MILLILITER(S): at 15:34

## 2018-12-27 RX ADMIN — PANTOPRAZOLE SODIUM 40 MILLIGRAM(S): 20 TABLET, DELAYED RELEASE ORAL at 06:02

## 2018-12-27 RX ADMIN — Medication 100 MICROGRAM(S): at 06:02

## 2018-12-27 RX ADMIN — AMLODIPINE BESYLATE 10 MILLIGRAM(S): 2.5 TABLET ORAL at 06:02

## 2018-12-27 RX ADMIN — Medication 3 MILLILITER(S): at 03:01

## 2018-12-27 RX ADMIN — SODIUM CHLORIDE 3 MILLILITER(S): 9 INJECTION INTRAMUSCULAR; INTRAVENOUS; SUBCUTANEOUS at 21:17

## 2018-12-27 RX ADMIN — Medication 3 MILLILITER(S): at 20:17

## 2018-12-27 RX ADMIN — MENTHOL AND METHYL SALICYLATE 1 APPLICATION(S): 10; 30 STICK TOPICAL at 18:39

## 2018-12-27 RX ADMIN — MENTHOL AND METHYL SALICYLATE 1 APPLICATION(S): 10; 30 STICK TOPICAL at 06:01

## 2018-12-27 RX ADMIN — SODIUM CHLORIDE 3 MILLILITER(S): 9 INJECTION INTRAMUSCULAR; INTRAVENOUS; SUBCUTANEOUS at 11:34

## 2018-12-27 RX ADMIN — GABAPENTIN 100 MILLIGRAM(S): 400 CAPSULE ORAL at 18:38

## 2018-12-27 RX ADMIN — HEPARIN SODIUM 5000 UNIT(S): 5000 INJECTION INTRAVENOUS; SUBCUTANEOUS at 14:03

## 2018-12-27 RX ADMIN — SODIUM CHLORIDE 3 MILLILITER(S): 9 INJECTION INTRAMUSCULAR; INTRAVENOUS; SUBCUTANEOUS at 06:02

## 2018-12-27 RX ADMIN — Medication 1 TABLET(S): at 18:38

## 2018-12-27 RX ADMIN — Medication 3 MILLILITER(S): at 08:25

## 2018-12-27 RX ADMIN — PIPERACILLIN AND TAZOBACTAM 200 GRAM(S): 4; .5 INJECTION, POWDER, LYOPHILIZED, FOR SOLUTION INTRAVENOUS at 06:01

## 2018-12-27 RX ADMIN — Medication 80 MILLIGRAM(S): at 10:00

## 2018-12-27 RX ADMIN — Medication 50 MILLIGRAM(S): at 18:38

## 2018-12-27 NOTE — PROCEDURE NOTE - PROCEDURE
Insertion of dual-lumen, non-tunneled, non-cuffed central venous dialysis catheter into subclavian vein  12/27/2018  right femoral  Active  JULIA <<-----Click on this checkbox to enter Procedure

## 2018-12-27 NOTE — PROGRESS NOTE ADULT - SUBJECTIVE AND OBJECTIVE BOX
[facility]  INFECTIOUS DISEASES AND INTERNAL MEDICINE at [place]  =======================================================  Cricket Lara MD  Diplomates American Board of Internal Medicine and Infectious Diseases  =======================================================    KAUSHIK HOFFMAN [id number]    Follow up: Permacath infection    Developed respiratory distress  On BiPAP   In MICU now  no fever  no chills    Allergies:  No Known Allergies      Antibiotics:   vancomycin  IVPB 1000 milliGRAM(s) IV Intermittent <User Schedule>      REVIEW OF SYSTEMS:  Unable to obtain, BIPAP mask and SOB        Physical Exam:  Vital Signs Last 24 Hrs  T(C): 36.3 (27 Dec 2018 13:55), Max: 36.8 (27 Dec 2018 00:49)  T(F): 97.4 (27 Dec 2018 13:55), Max: 98.3 (27 Dec 2018 07:20)  HR: 62 (27 Dec 2018 14:07) (56 - 88)  BP: 129/64 (27 Dec 2018 14:07) (120/60 - 153/65)  BP(mean): 92 (27 Dec 2018 14:07) (92 - 92)  RR: 28 (27 Dec 2018 14:07) (18 - 42)  SpO2: 95% (27 Dec 2018 14:07) (91% - 100%)      GEN: Mild respiratory distress  HEENT: normocephalic and atraumatic. EOMI. PERRL.  Anicteric+ BIPAP  NECK: Supple.   LUNGS: Coarse BS B/L  HEART: Regular rate and rhythm   ABDOMEN: Soft, nontender, and nondistended.  Positive bowel sounds.    : No CVA tenderness  EXTREMITIES: Without any edema.  MSK: No joint swelling  NEUROLOGIC: No Focal Deficits  PSYCHIATRIC: Unable to assess   SKIN: No Rash      Labs:  12-27    134<L>  |  93<L>  |  29.0<H>  ----------------------------<  107  4.0   |  25.0  |  4.09<H>    Ca    8.0<L>      27 Dec 2018 14:22               8.0    6.3   )-----------( 296      ( 27 Dec 2018 14:22 )             25.2     ABG - ( 27 Dec 2018 06:48 )  pH, Arterial: 7.31  pH, Blood: x     /  pCO2: 49    /  pO2: 79    / HCO3: 23    / Base Excess: -1.6  /  SaO2: 95          RECENT CULTURES:  12-24 @ 18:42 .Catheter     < 15 colonies Staphylococcus species Identification and susceptibility to follow.  Culture in progress      12-22 @ 19:42 .Blood     No growth at 48 hours      12-22 @ 18:14 .Blood     No growth at 48 hours

## 2018-12-27 NOTE — CONSULT NOTE ADULT - PROBLEM SELECTOR RECOMMENDATION 3
patient hx of lung cancer 2006- underwent chemo and radiation.  Follows with oncologist in [facility].
as above

## 2018-12-27 NOTE — PROGRESS NOTE ADULT - SUBJECTIVE AND OBJECTIVE BOX
[facility] DIVISION OF KIDNEY DISEASES AND HYPERTENSION -- FOLLOW UP NOTE  --------------------------------------------------------------------------------  Chief Complaint: ESRD on HD    24 hour events/subjective:  Pt seen in SICU today  Lying in bed, BiPap in use, family at bedside   NAD  Transferred to MICU service, boarding in SICU for respiratory distress - now on BiPaP  S/P Right femoral Shiley placed today  HD today        PAST HISTORY  --------------------------------------------------------------------------------  No significant changes to PMH, PSH, FHx, SHx, unless otherwise noted    ALLERGIES & MEDICATIONS  --------------------------------------------------------------------------------  Allergies    No Known Allergies    Intolerances      Standing Inpatient Medications  ALBUTerol/ipratropium for Nebulization 3 milliLiter(s) Nebulizer every 6 hours  amLODIPine   Tablet 10 milliGRAM(s) Oral daily  aspirin enteric coated 81 milliGRAM(s) Oral daily  atorvastatin 80 milliGRAM(s) Oral at bedtime  chlorhexidine 2% Cloths 1 Application(s) Topical daily  clopidogrel Tablet 75 milliGRAM(s) Oral daily  diclofenac sodium 1% Gel 4 Gram(s) Topical <User Schedule>  DULoxetine 20 milliGRAM(s) Oral daily  furosemide   Injectable 40 milliGRAM(s) IV Push daily  gabapentin 100 milliGRAM(s) Oral daily  heparin  Injectable 5000 Unit(s) SubCutaneous every 8 hours  hydrALAZINE 50 milliGRAM(s) Oral every 8 hours  isosorbide   mononitrate ER Tablet (IMDUR) 30 milliGRAM(s) Oral daily  levothyroxine 100 MICROGram(s) Oral daily  methyl salicylate 14%/menthol 6% Topical Ointment 1 Application(s) Topical two times a day  methylPREDNISolone sodium succinate Injectable 80 milliGRAM(s) IV Push every 6 hours  metoprolol tartrate 50 milliGRAM(s) Oral two times a day  Nephro-jeana 1 Tablet(s) Oral daily  pantoprazole    Tablet 40 milliGRAM(s) Oral before breakfast  piperacillin/tazobactam IVPB. 2.25 Gram(s) IV Intermittent every 12 hours  sodium chloride 0.9% lock flush 3 milliLiter(s) IV Push every 8 hours    PRN Inpatient Medications  benzonatate 100 milliGRAM(s) Oral every 8 hours PRN  guaiFENesin    Syrup 200 milliGRAM(s) Oral every 6 hours PRN  ondansetron Injectable 4 milliGRAM(s) IV Push every 6 hours PRN      REVIEW OF SYSTEMS  --------------------------------------------------------------------------------  Gen: No weight changes, fatigue, fevers/chills, weakness  Skin: No rashes  Head/Eyes/Ears/Mouth: No headache; Normal hearing; Normal vision w/o blurriness; No sinus pain/discomfort, sore throat  Respiratory: No dyspnea, cough, wheezing, hemoptysis  CV: No chest pain, PND, orthopnea  GI: No abdominal pain, diarrhea, constipation, nausea, vomiting, melena, hematochezia  : No increased frequency, dysuria, hematuria, nocturia  MSK: No joint pain/swelling; no back pain; no edema  Neuro: No dizziness/lightheadedness, weakness, seizures, numbness, tingling  Heme: No easy bruising or bleeding  Endo: No heat/cold intolerance  Psych: No significant nervousness, anxiety, stress, depression    All other systems were reviewed and are negative, except as noted.    VITALS/PHYSICAL EXAM  --------------------------------------------------------------------------------  T(C): 36.4 (12-27-18 @ 11:00), Max: 36.8 (12-27-18 @ 00:49)  HR: 58 (12-27-18 @ 13:07) (56 - 88)  BP: 153/65 (12-27-18 @ 12:00) (120/60 - 153/65)  RR: 20 (12-27-18 @ 12:00) (18 - 30)  SpO2: 94% (12-27-18 @ 13:07) (91% - 100%)  Wt(kg): --        Physical Exam:  	Gen: NAD, well-appearing  	HEENT: PERRL, supple neck, clear oropharynx  	Pulm: BiPap  	CV: RRR, S1S2; no rub  	Back: No spinal or CVA tenderness; no sacral edema  	Abd: +BS, soft, nontender/nondistended  	: No suprapubic tenderness  	UE: Warm, FROM, no clubbing, intact strength; no edema; no asterixis  	LE: Warm, FROM, no clubbing, intact strength; no edema  	Neuro: No focal deficits, intact gait  	Psych: Normal affect and mood  	Skin: Warm, without rashes  	Vascular access: Right femoral Shiley catheter    LABS/STUDIES  --------------------------------------------------------------------------------              7.8    9.2   >-----------<  276      [12-26-18 @ 09:34]              24.6     134  |  93  |  29.0  ----------------------------<  106      [12-26-18 @ 09:34]  4.2   |  25.0  |  6.12        Ca     7.7     [12-26-18 @ 09:34]            Creatinine Trend:  SCr 6.12 [12-26 @ 09:34]  SCr 4.39 [12-25 @ 07:06]  SCr 5.65 [12-24 @ 10:07]  SCr 2.66 [12-22 @ 18:10]  SCr 4.35 [12-15 @ 11:18]        TSH 7.33      [11-29-18 @ 02:31]    HBsAb <3.0      [12-05-18 @ 16:33]  HBsAg Nonreact      [12-05-18 @ 16:33]  HBcAb Nonreact      [12-05-18 @ 16:33]  HCV 0.14, Nonreact      [12-05-18 @ 16:33]

## 2018-12-27 NOTE — PROGRESS NOTE ADULT - PROBLEM SELECTOR PLAN 1
post procedure orders and observations  Admit to MICU   MICU  team for management   Dialysis today   reviewed with patient and family at bedside

## 2018-12-27 NOTE — PROGRESS NOTE ADULT - ASSESSMENT
1) ESRD on HD  2 MBD of renal dx  3) Anemia of renal dx  4) Vol HTN  5) Permacath site infx    S/P Respiratory distress on [address] [place] - MICU consulted and accepted to service  Boarding in SICU for now, BiPap in use  Failed CVC replacement today - Right femoral Shiley placed for now.  HD today  On Zosyn  Awaiting culture results; sent catheter tip for culture  Continue care as per MICU

## 2018-12-27 NOTE — PROGRESS NOTE ADULT - ASSESSMENT
79 y/o male with hx of ESRD on HD T/Th/Sat, CAD s/p PCI on recent admission, HTN, Pulm HTN, Lung cancer s/p XRT in 06 Was at HD yesterday evening and noted to have low grade temp and purulent drainage from catheter insertion site with erythema. Patient completed his HD session and was sent to ED. In ED was noted to have WBC of 15, no shift, no fever. Pt found to have a infected permacath removed and on dialysis holiday for 3 days, pt presented today for insertion of perma-cath . pt onadmission to [facility] found to be SOb and unable to lie flat   nebulizer given. reviewed case with MD . Attempted to insert perma-cath unable a R femoral shiley was inserted fro urgent dialysis   Pt increased SOB  post procedure   Dr Jensen notified MICU consult . Pt placed on  Bi pap , given lasix and steroids with improvement

## 2018-12-27 NOTE — PROGRESS NOTE ADULT - SUBJECTIVE AND OBJECTIVE BOX
Subjective:Pt presents for  permacath insertion   Currently remains on O2  with some SOB     Medications:  ALBUTerol/ipratropium for Nebulization 3 milliLiter(s) Nebulizer every 6 hours  amLODIPine   Tablet 10 milliGRAM(s) Oral daily  aspirin enteric coated 81 milliGRAM(s) Oral daily  atorvastatin 80 milliGRAM(s) Oral at bedtime  benzonatate 100 milliGRAM(s) Oral every 8 hours PRN  chlorhexidine 2% Cloths 1 Application(s) Topical daily  clopidogrel Tablet 75 milliGRAM(s) Oral daily  diclofenac sodium 1% Gel 4 Gram(s) Topical <User Schedule>  DULoxetine 20 milliGRAM(s) Oral daily  furosemide   Injectable 40 milliGRAM(s) IV Push daily  gabapentin 100 milliGRAM(s) Oral daily  guaiFENesin    Syrup 200 milliGRAM(s) Oral every 6 hours PRN  heparin  Injectable 5000 Unit(s) SubCutaneous every 8 hours  hydrALAZINE 50 milliGRAM(s) Oral every 8 hours  isosorbide   mononitrate ER Tablet (IMDUR) 30 milliGRAM(s) Oral daily  levothyroxine 100 MICROGram(s) Oral daily  methyl salicylate 14%/menthol 6% Topical Ointment 1 Application(s) Topical two times a day  metoprolol tartrate 50 milliGRAM(s) Oral two times a day  Nephro-jeana 1 Tablet(s) Oral daily  ondansetron Injectable 4 milliGRAM(s) IV Push every 6 hours PRN  pantoprazole    Tablet 40 milliGRAM(s) Oral before breakfast  piperacillin/tazobactam IVPB. 2.25 Gram(s) IV Intermittent every 12 hours  sodium chloride 0.9% lock flush 3 milliLiter(s) IV Push every 8 hours      PHYSICAL EXAM:  Vital Signs Last 24 Hrs  T(C): 36.8 (27 Dec 2018 07:57), Max: 36.8 (27 Dec 2018 00:49)  T(F): 98.3 (27 Dec 2018 07:57), Max: 98.3 (27 Dec 2018 07:20)  HR: 58 (27 Dec 2018 07:57) (58 - 88)  BP: 143/66 (27 Dec 2018 07:57) (120/60 - 143/66)  RR: 24 (27 Dec 2018 07:57) (18 - 24)  SpO2: 100% (27 Dec 2018 07:57) (91% - 100%  Daily   I&O's Detail      General: A/ox 3, No acute Distress  Neck: Supple, NO JVD  Cardiac: S1 S2, No M/R/G  Pulmonary: On o2   Abdomen: Soft, Non -tender, +BS   Extremities: No Rashes, No edema  Neuro: A/o x 3, No focal deficits  Psch: normal mood , normal affect    LABS:                          7.8    9.2   )-----------( 276      ( 26 Dec 2018 09:34 )             24.6     12-26    134<L>  |  93<L>  |  29.0<H>  ----------------------------<  106  4.2   |  25.0  |  6.12<H>    Ca    7.7<L>      26 Dec 2018 09:34

## 2018-12-27 NOTE — PROGRESS NOTE ADULT - ASSESSMENT
78 yr man, ESRD recently placed on  HD, HTN, Pulm HTN, CAD s/p PCI male admitted with infection from  [facility]

## 2018-12-27 NOTE — PROGRESS NOTE ADULT - ASSESSMENT
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   --> pulm following     3) fever/ likely source dialysis catheter possible infection  -Cont.abx   -f/u Blood cx neg   -perm cath tip cxs/ wound cxs pending   -ID folowing     4) ESRD (end stage renal disease).  hd today     5) Hypertension, unspecified type.  Plan: cont. o/p regimen, renal/low sodium diet.     6) anemia of esrd, stable , monitor     7)  Coronary artery disease involving native coronary artery of native heart without angina pectoris. Plan: No CP, cont. o/p regimen.    8) hld - statin     9) dvt prop --> hep sub q

## 2018-12-27 NOTE — DIETITIAN INITIAL EVALUATION ADULT. - PERTINENT LABORATORY DATA
12/26:  Hgb 7.8 <L>   Hct 24.6 <L>   Na 134 <L>   BUN 29.0  <H>    creat 6.12 <H>  Ca 7.7 <L>   eGFR 8 <L>

## 2018-12-27 NOTE — PROGRESS NOTE ADULT - SUBJECTIVE AND OBJECTIVE BOX
KAUSHIK HOFFMAN    [id number]    78y      Male    INTERVAL HPI/OVERNIGHT EVENTS:    patient being seen for chf, ckd and med management. was called by nurse in cath lab post shiley placement and was sob. patient will be placed on bipap and may receive hd in micu.       REVIEW OF SYSTEMS:    CONSTITUTIONAL: No fever, weight loss, or fatigue  RESPIRATORY: sob and cough   CARDIOVASCULAR: No chest pain, palpitations  GASTROINTESTINAL: No abdominal or epigastric pain. No nausea, vomiting  NEUROLOGICAL: No headaches, memory loss, loss of strength.  MISCELLANEOUS:      Vital Signs Last 24 Hrs  T(C): 36.8 (27 Dec 2018 07:57), Max: 36.8 (27 Dec 2018 00:49)  T(F): 98.3 (27 Dec 2018 07:57), Max: 98.3 (27 Dec 2018 07:20)  HR: 58 (27 Dec 2018 10:15) (56 - 88)  BP: 124/61 (27 Dec 2018 10:15) (120/60 - 153/60)  BP(mean): --  RR: 28 (27 Dec 2018 10:15) (18 - 30)  SpO2: 93% (27 Dec 2018 10:15) (91% - 100%)    PHYSICAL EXAM:    GENERAL: anxious,   HEENT: PERRL, +EOMI  NECK: soft, Supple, No JVD,   CHEST/LUNG: wheezing   HEART: S1S2+, Regular rate and rhythm; No murmurs, rubs, or gallops  ABDOMEN: Soft, Nontender,  EXTREMITIES: +2 pulses  SKIN: No rashes or lesions  NEURO: AAOX3, no focal deficits,   PSYCH: normal mood      LABS:                        7.8    9.2   )-----------( 276      ( 26 Dec 2018 09:34 )             24.6     12-26    134<L>  |  93<L>  |  29.0<H>  ----------------------------<  106  4.2   |  25.0  |  6.12<H>    Ca    7.7<L>      26 Dec 2018 09:34              MEDICATIONS  (STANDING):  ALBUTerol/ipratropium for Nebulization 3 milliLiter(s) Nebulizer every 6 hours  amLODIPine   Tablet 10 milliGRAM(s) Oral daily  aspirin enteric coated 81 milliGRAM(s) Oral daily  atorvastatin 80 milliGRAM(s) Oral at bedtime  chlorhexidine 2% Cloths 1 Application(s) Topical daily  clopidogrel Tablet 75 milliGRAM(s) Oral daily  diclofenac sodium 1% Gel 4 Gram(s) Topical <User Schedule>  DULoxetine 20 milliGRAM(s) Oral daily  furosemide   Injectable 40 milliGRAM(s) IV Push daily  gabapentin 100 milliGRAM(s) Oral daily  heparin  Injectable 5000 Unit(s) SubCutaneous every 8 hours  hydrALAZINE 50 milliGRAM(s) Oral every 8 hours  isosorbide   mononitrate ER Tablet (IMDUR) 30 milliGRAM(s) Oral daily  levothyroxine 100 MICROGram(s) Oral daily  methyl salicylate 14%/menthol 6% Topical Ointment 1 Application(s) Topical two times a day  methylPREDNISolone sodium succinate Injectable 80 milliGRAM(s) IV Push every 6 hours  metoprolol tartrate 50 milliGRAM(s) Oral two times a day  Nephro-jeana 1 Tablet(s) Oral daily  pantoprazole    Tablet 40 milliGRAM(s) Oral before breakfast  piperacillin/tazobactam IVPB. 2.25 Gram(s) IV Intermittent every 12 hours  sodium chloride 0.9% lock flush 3 milliLiter(s) IV Push every 8 hours    MEDICATIONS  (PRN):  benzonatate 100 milliGRAM(s) Oral every 8 hours PRN Cough  guaiFENesin    Syrup 200 milliGRAM(s) Oral every 6 hours PRN Cough  ondansetron Injectable 4 milliGRAM(s) IV Push every 6 hours PRN Nausea      RADIOLOGY & ADDITIONAL TESTS:

## 2018-12-27 NOTE — DIETITIAN INITIAL EVALUATION ADULT. - NS AS NUTRI INTERV ED CONTENT
RD to follow up with verbal education/Purpose of the nutrition education/Priority modifications/Nutrition relationship to health/disease/Recommended modifications

## 2018-12-27 NOTE — DIETITIAN INITIAL EVALUATION ADULT. - OTHER INFO
Pt admit with hypoxemia, hx of lung CA (s/p XRT 2006), ESRD on HD. Pt for placement of permacath today, unable to interview at this time. Pt with varied PO, % per documentation. Pt relatively new to HD, Pt was presented with written literature at previous admission Nov 2018. Pt had 3 sessions of HD as outpatient before this admission, may benefit from additional, verbal nutrition education at follow up.

## 2018-12-27 NOTE — PROGRESS NOTE ADULT - SUBJECTIVE AND OBJECTIVE BOX
Patient seen earlier this AM     awaiting call for HD access    patient currently receiving nebulizer due to sob and difficulty laying flat.    currently positioned right side lateral  on bed    - Hd access today perm vs shiley    - if shiley is placed, will be femoral access and patient will need a permacath post HD if his ability to lay flat improves (can be scheduled tomorrow with the IR team)

## 2018-12-27 NOTE — PROGRESS NOTE ADULT - PROBLEM SELECTOR PLAN 1
PRE-PROCEDURE ASSESSMENT  Permacath   -Patient seen and examined  -Labs reviewed  -Pre-procedure teaching completed with patient and family  -Informed consent witnessed  -Questions answered PRE-PROCEDURE ASSESSMENT  Permacath vs femoral temporary cath pending on ability to lie flat   -Patient seen and examined  -Labs reviewed  -Pre-procedure teaching completed with patient   -Informed consent witnessed  -Questions answered  _ reviewed with MD   _ give Nebulizer now

## 2018-12-27 NOTE — PROGRESS NOTE ADULT - ASSESSMENT
79y/o  Male with h/o ESRD on HD T/Th/Sat, CAD s/p PCI on recent admission, s/p CABG HTN, Pulm HTN, Lung [place]   Here with Permacath infection      Permacath infection  Hypoxia   ESRD on HD  CAD s/p PCI  Pulm HTN  Lung Ca    - Blood cultures no growth  - S/P permacath removal 12/24/18  - culture tip with Staph sp  - Will Continue Vancomycin post HD  - D/C Zosyn  - Trend fever  - Trend leukocytosis    Will Follow

## 2018-12-27 NOTE — PROGRESS NOTE ADULT - SUBJECTIVE AND OBJECTIVE BOX
Subjective:  Pt unable to lie flat for permacath       Medications:  ALBUTerol/ipratropium for Nebulization 3 milliLiter(s) Nebulizer every 6 hours  amLODIPine   Tablet 10 milliGRAM(s) Oral daily  aspirin enteric coated 81 milliGRAM(s) Oral daily  atorvastatin 80 milliGRAM(s) Oral at bedtime  benzonatate 100 milliGRAM(s) Oral every 8 hours PRN  chlorhexidine 2% Cloths 1 Application(s) Topical daily  clopidogrel Tablet 75 milliGRAM(s) Oral daily  diclofenac sodium 1% Gel 4 Gram(s) Topical <User Schedule>  DULoxetine 20 milliGRAM(s) Oral daily  furosemide   Injectable 40 milliGRAM(s) IV Push daily  gabapentin 100 milliGRAM(s) Oral daily  guaiFENesin    Syrup 200 milliGRAM(s) Oral every 6 hours PRN  heparin  Injectable 5000 Unit(s) SubCutaneous every 8 hours  hydrALAZINE 50 milliGRAM(s) Oral every 8 hours  isosorbide   mononitrate ER Tablet (IMDUR) 30 milliGRAM(s) Oral daily  levothyroxine 100 MICROGram(s) Oral daily  methyl salicylate 14%/menthol 6% Topical Ointment 1 Application(s) Topical two times a day  methylPREDNISolone sodium succinate Injectable 80 milliGRAM(s) IV Push every 6 hours  metoprolol tartrate 50 milliGRAM(s) Oral two times a day  Nephro-jeana 1 Tablet(s) Oral daily  ondansetron Injectable 4 milliGRAM(s) IV Push every 6 hours PRN  pantoprazole    Tablet 40 milliGRAM(s) Oral before breakfast  piperacillin/tazobactam IVPB. 2.25 Gram(s) IV Intermittent every 12 hours  sodium chloride 0.9% lock flush 3 milliLiter(s) IV Push every 8 hours      PHYSICAL EXAM:  Vital Signs Last 24 Hrs  T(C): 36.8 (27 Dec 2018 07:57), Max: 36.8 (27 Dec 2018 00:49)  T(F): 98.3 (27 Dec 2018 07:57), Max: 98.3 (27 Dec 2018 07:20)  HR: 56 (27 Dec 2018 09:45) (56 - 88)  BP: 129/60 (27 Dec 2018 09:45) (120/60 - 153/60)  BP(mean): --  RR: 30 (27 Dec 2018 09:45) (18 - 30)  SpO2: 94% (27 Dec 2018 09:45) (91% - 100%)  Daily     Daily Weight in k.9 (27 Dec 2018 10:00)  I&O's Detail      General: A/ox 3, No acute Distress  Neck: Supple, NO JVD  Cardiac: S1 S2, No M/R/G  Pulmonary: CTAB, Breathing unlabored, No Rhonchi/Rales/Wheezing  Abdomen: Soft, Non -tender, +BS   R femoral shiley inserted    Extremities: No Rashes, No edema  Neuro: A/o x 3, No focal deficits  Psch: normal mood , normal affect    LABS:                          7.8    9.2   )-----------( 276      ( 26 Dec 2018 09:34 )             24.6     12-26    134<L>  |  93<L>  |  29.0<H>  ----------------------------<  106  4.2   |  25.0  |  6.12<H>    Ca    7.7<L>      26 Dec 2018 09:34

## 2018-12-27 NOTE — CONSULT NOTE ADULT - PROBLEM SELECTOR PROBLEM 4
Hypoxia
Coronary artery disease involving native coronary artery of native heart without angina pectoris
Hypertension, unspecified type

## 2018-12-27 NOTE — CONSULT NOTE ADULT - SUBJECTIVE AND OBJECTIVE BOX
Patient is a 78y old  Male who presents with a chief complaint of Sent in for possible catheter infection (27 Dec 2018 09:14)      BRIEF HOSPITAL COURSE: 78M with Chronic renal failure. Admitted with permacath site infection, requiring removal and re-insertion of a new temporary HD Cath. During the procedure today, the patient developed respratory distress, ulitimately requiring Bi[pap support. his CXr shows a moderate sized pleural effusion with PVC. Of note he is due for HD today.  He denies Chest pain abnd there are no ischemic changes on EKG.        PAST MEDICAL & SURGICAL HISTORY:  Chronic anemia  ESRD (end stage renal disease)  CAD (coronary artery disease)  Lung cancer: had yoni radiation in 2006.  Bipolar disorder  High cholesterol  Hypertension  S/P CABG (coronary artery bypass graft): pt&#x27;s son in [facility] states h/o some stents near neck area ? carotid ? he is not sure.    Allergies    No Known Allergies    Intolerances      FAMILY HISTORY:  Family history of essential hypertension (Father)      Family history otherwise noncontributory.    Social History: No ETOH Non-smoker   Review of Systems:    ALL OTHER REVIEW OF SYSTEMS EXCEPT PER HPI NEGATIVE.      Medications:  piperacillin/tazobactam IVPB. 2.25 Gram(s) IV Intermittent every 12 hours    amLODIPine   Tablet 10 milliGRAM(s) Oral daily  furosemide   Injectable 40 milliGRAM(s) IV Push daily  hydrALAZINE 50 milliGRAM(s) Oral every 8 hours  isosorbide   mononitrate ER Tablet (IMDUR) 30 milliGRAM(s) Oral daily  metoprolol tartrate 50 milliGRAM(s) Oral two times a day    ALBUTerol/ipratropium for Nebulization 3 milliLiter(s) Nebulizer every 6 hours  benzonatate 100 milliGRAM(s) Oral every 8 hours PRN  guaiFENesin    Syrup 200 milliGRAM(s) Oral every 6 hours PRN    DULoxetine 20 milliGRAM(s) Oral daily  gabapentin 100 milliGRAM(s) Oral daily  ondansetron Injectable 4 milliGRAM(s) IV Push every 6 hours PRN      aspirin enteric coated 81 milliGRAM(s) Oral daily  clopidogrel Tablet 75 milliGRAM(s) Oral daily  heparin  Injectable 5000 Unit(s) SubCutaneous every 8 hours    pantoprazole    Tablet 40 milliGRAM(s) Oral before breakfast      atorvastatin 80 milliGRAM(s) Oral at bedtime  levothyroxine 100 MICROGram(s) Oral daily  methylPREDNISolone sodium succinate Injectable 80 milliGRAM(s) IV Push every 6 hours    Nephro-jeana 1 Tablet(s) Oral daily  sodium chloride 0.9% lock flush 3 milliLiter(s) IV Push every 8 hours      chlorhexidine 2% Cloths 1 Application(s) Topical daily  diclofenac sodium 1% Gel 4 Gram(s) Topical <User Schedule>  methyl salicylate 14%/menthol 6% Topical Ointment 1 Application(s) Topical two times a day            ICU Vital Signs Last 24 Hrs  T(C): 36.8 (27 Dec 2018 07:57), Max: 36.8 (27 Dec 2018 00:49)  T(F): 98.3 (27 Dec 2018 07:57), Max: 98.3 (27 Dec 2018 07:20)  HR: 56 (27 Dec 2018 09:45) (56 - 88)  BP: 129/60 (27 Dec 2018 09:45) (120/60 - 153/60)  BP(mean): --  ABP: --  ABP(mean): --  RR: 30 (27 Dec 2018 09:45) (18 - 30)  SpO2: 94% (27 Dec 2018 09:45) (91% - 100%)    Vital Signs Last 24 Hrs  T(C): 36.8 (27 Dec 2018 07:57), Max: 36.8 (27 Dec 2018 00:49)  T(F): 98.3 (27 Dec 2018 07:57), Max: 98.3 (27 Dec 2018 07:20)  HR: 56 (27 Dec 2018 09:45) (56 - 88)  BP: 129/60 (27 Dec 2018 09:45) (120/60 - 153/60)  BP(mean): --  RR: 30 (27 Dec 2018 09:45) (18 - 30)  SpO2: 94% (27 Dec 2018 09:45) (91% - 100%)    ABG - ( 27 Dec 2018 06:48 )  pH, Arterial: 7.31  pH, Blood: x     /  pCO2: 49    /  pO2: 79    / HCO3: 23    / Base Excess: -1.6  /  SaO2: 95                  I&O's Detail        LABS:                        7.8    9.2   )-----------( 276      ( 26 Dec 2018 09:34 )             24.6     12-26    134<L>  |  93<L>  |  29.0<H>  ----------------------------<  106  4.2   |  25.0  |  6.12<H>    Ca    7.7<L>      26 Dec 2018 09:34            CAPILLARY BLOOD GLUCOSE            CULTURES:  Culture Results:   < 15 colonies Staphylococcus species Identification and susceptibility to  follow.  Culture in progress (12-24 @ 18:42)  Culture Results:   No growth at 48 hours (12-22 @ 19:42)  Culture Results:   No growth at 48 hours (12-22 @ 18:14)      Physical Examination:    GENERAL: Initially in respiratory distress, with severe accessory muscle use, in "tripod" position. Now improved, with moderate tachypnea, breathing comfortably on Bipap      EYES: Pupils equal, reactive to light.  Symmetric.    EARS, NOSE, THROAT: Normal; supple neck, no lymphadenopathy; trachea midline, Mild JVD    PULM: decreased BS on Left with Mild Sales throughout    CVS: Regular rate and rhythm, no murmurs, rubs, or gallops    GI: Soft, nondistended, nontender, normoactive bowel sounds, no masses, no guarding    EXTREMITIES: No edema    SKIN: Warm and well perfused, no rashes noted.    NEURO: Alert, oriented, interactive, nonfocal    DEVICES: BIPAP  RADIOLOGY: CXR: Moderate right Pleural Effusion With PVC     CRITICAL CARE TIME SPENT:  40 mins were spent evaluating and treating this critical patient, including speaking with staff and consultants

## 2018-12-27 NOTE — PROGRESS NOTE ADULT - SUBJECTIVE AND OBJECTIVE BOX
CC: follow up GOC  INTERVAL HPI/OVERNIGHT EVENTS:  events noted on BIPAP  - bordering in SICU  PRESENT SYMPTOMS: SOURCE:  Patient/Family/Team    PAIN SCALE:  0 = none  1 = mild   2 = moderate  3 = severe    Pain: denies    Dyspnea:  [ ] YES [x ] NO on Bipap  Anxiety:  [ ] YES [ x] NO  Fatigue: [x ] YES [ ] NO  Nausea: [ ] YES [ x] NO  Loss of Appetite: [ ] YES [ ] NO  na  Other symptoms: __________    MEDICATIONS  (STANDING):  ALBUTerol/ipratropium for Nebulization 3 milliLiter(s) Nebulizer every 6 hours  amLODIPine   Tablet 10 milliGRAM(s) Oral daily  atorvastatin 80 milliGRAM(s) Oral at bedtime  chlorhexidine 2% Cloths 1 Application(s) Topical daily  diclofenac sodium 1% Gel 4 Gram(s) Topical <User Schedule>  DULoxetine 20 milliGRAM(s) Oral daily  furosemide   Injectable 40 milliGRAM(s) IV Push daily  gabapentin 100 milliGRAM(s) Oral daily  hydrALAZINE 50 milliGRAM(s) Oral every 8 hours  isosorbide   mononitrate ER Tablet (IMDUR) 30 milliGRAM(s) Oral daily  levothyroxine 100 MICROGram(s) Oral daily  methyl salicylate 14%/menthol 6% Topical Ointment 1 Application(s) Topical two times a day  methylPREDNISolone sodium succinate Injectable 80 milliGRAM(s) IV Push every 6 hours  metoprolol tartrate 50 milliGRAM(s) Oral two times a day  Nephro-jeana 1 Tablet(s) Oral daily  pantoprazole    Tablet 40 milliGRAM(s) Oral before breakfast  piperacillin/tazobactam IVPB. 2.25 Gram(s) IV Intermittent every 12 hours  sodium chloride 0.9% lock flush 3 milliLiter(s) IV Push every 8 hours    MEDICATIONS  (PRN):  benzonatate 100 milliGRAM(s) Oral every 8 hours PRN Cough  guaiFENesin    Syrup 200 milliGRAM(s) Oral every 6 hours PRN Cough  ondansetron Injectable 4 milliGRAM(s) IV Push every 6 hours PRN Nausea      Allergies    No Known Allergies    Intolerances    Karnofsky Performance Score/Palliative Performance Status Version 2:  30   %    Vital Signs Last 24 Hrs  T(C): 36.3 (27 Dec 2018 13:55), Max: 36.8 (27 Dec 2018 00:49)  T(F): 97.4 (27 Dec 2018 13:55), Max: 98.3 (27 Dec 2018 07:20)  HR: 62 (27 Dec 2018 14:07) (56 - 88)  BP: 129/64 (27 Dec 2018 14:07) (120/60 - 153/65)  BP(mean): 92 (27 Dec 2018 14:07) (92 - 92)  RR: 28 (27 Dec 2018 14:07) (18 - 42)  SpO2: 95% (27 Dec 2018 14:07) (91% - 100%)    PHYSICAL EXAM:    General:  Awake alert NAD  HEENT: [ x] normal  [ ] dry mouth  [ ] ET tube/trach    Lungs: [ x] comfortable - on Bipap    CV: [ x] normal  [ ] tachycardia    GI: [x normal  [ ] distended  [ ] tender  [ ] no BS               [ ] PEG/NG/OG tube    : [ ] normal  [ ] incontinent  [ x] oliguria/anuria  HD    MSK: [ ] normal  [ x] weakness  [ ] edema             [ ] ambulatory  [ ] bedbound/wheelchair bound    Skin: [ ] normal  [ ] pressure ulcers- Stage_____  [x ] no rash    LABS:                        8.0    6.3   )-----------( 296      ( 27 Dec 2018 14:22 )             25.2     12-27    134<L>  |  93<L>  |  29.0<H>  ----------------------------<  107  4.0   |  25.0  |  4.09<H>    Ca    8.0<L>      27 Dec 2018 14:22        Thank you for the opportunity to assist with the care of this patient.   [facility] [phone].

## 2018-12-28 LAB
ANION GAP SERPL CALC-SCNC: 15 MMOL/L — SIGNIFICANT CHANGE UP (ref 5–17)
BUN SERPL-MCNC: 40 MG/DL — HIGH (ref 8–20)
CALCIUM SERPL-MCNC: 8.1 MG/DL — LOW (ref 8.6–10.2)
CHLORIDE SERPL-SCNC: 97 MMOL/L — LOW (ref 98–107)
CO2 SERPL-SCNC: 24 MMOL/L — SIGNIFICANT CHANGE UP (ref 22–29)
CREAT SERPL-MCNC: 4.86 MG/DL — HIGH (ref 0.5–1.3)
GLUCOSE SERPL-MCNC: 143 MG/DL — HIGH (ref 70–115)
HCT VFR BLD CALC: 23.5 % — LOW (ref 42–52)
HGB BLD-MCNC: 7.5 G/DL — LOW (ref 14–18)
MAGNESIUM SERPL-MCNC: 2.2 MG/DL — SIGNIFICANT CHANGE UP (ref 1.6–2.6)
MCHC RBC-ENTMCNC: 29.1 PG — SIGNIFICANT CHANGE UP (ref 27–31)
MCHC RBC-ENTMCNC: 31.9 G/DL — LOW (ref 32–36)
MCV RBC AUTO: 91.1 FL — SIGNIFICANT CHANGE UP (ref 80–94)
PLATELET # BLD AUTO: 306 K/UL — SIGNIFICANT CHANGE UP (ref 150–400)
POTASSIUM SERPL-MCNC: 4.5 MMOL/L — SIGNIFICANT CHANGE UP (ref 3.5–5.3)
POTASSIUM SERPL-SCNC: 4.5 MMOL/L — SIGNIFICANT CHANGE UP (ref 3.5–5.3)
RBC # BLD: 2.58 M/UL — LOW (ref 4.6–6.2)
RBC # FLD: 13.3 % — SIGNIFICANT CHANGE UP (ref 11–15.6)
SODIUM SERPL-SCNC: 136 MMOL/L — SIGNIFICANT CHANGE UP (ref 135–145)
VANCOMYCIN TROUGH SERPL-MCNC: 22.7 UG/ML — HIGH (ref 10–20)
WBC # BLD: 4.7 K/UL — LOW (ref 4.8–10.8)
WBC # FLD AUTO: 4.7 K/UL — LOW (ref 4.8–10.8)

## 2018-12-28 PROCEDURE — 99232 SBSQ HOSP IP/OBS MODERATE 35: CPT

## 2018-12-28 PROCEDURE — 90937 HEMODIALYSIS REPEATED EVAL: CPT

## 2018-12-28 PROCEDURE — 99233 SBSQ HOSP IP/OBS HIGH 50: CPT

## 2018-12-28 PROCEDURE — 71045 X-RAY EXAM CHEST 1 VIEW: CPT | Mod: 26

## 2018-12-28 RX ORDER — HEPARIN SODIUM 5000 [USP'U]/ML
5000 INJECTION INTRAVENOUS; SUBCUTANEOUS EVERY 12 HOURS
Refills: 0 | Status: DISCONTINUED | OUTPATIENT
Start: 2018-12-28 | End: 2018-12-30

## 2018-12-28 RX ORDER — LOSARTAN POTASSIUM 100 MG/1
50 TABLET, FILM COATED ORAL AT BEDTIME
Refills: 0 | Status: DISCONTINUED | OUTPATIENT
Start: 2018-12-28 | End: 2019-01-03

## 2018-12-28 RX ORDER — ALPRAZOLAM 0.25 MG
0.25 TABLET ORAL
Refills: 0 | Status: DISCONTINUED | OUTPATIENT
Start: 2018-12-28 | End: 2019-01-03

## 2018-12-28 RX ORDER — CLOPIDOGREL BISULFATE 75 MG/1
75 TABLET, FILM COATED ORAL DAILY
Refills: 0 | Status: DISCONTINUED | OUTPATIENT
Start: 2018-12-28 | End: 2019-01-03

## 2018-12-28 RX ORDER — VANCOMYCIN HCL 1 G
750 VIAL (EA) INTRAVENOUS
Refills: 0 | Status: COMPLETED | OUTPATIENT
Start: 2018-12-28 | End: 2018-12-28

## 2018-12-28 RX ORDER — HEPARIN SODIUM 5000 [USP'U]/ML
5000 INJECTION INTRAVENOUS; SUBCUTANEOUS EVERY 8 HOURS
Refills: 0 | Status: DISCONTINUED | OUTPATIENT
Start: 2018-12-28 | End: 2018-12-28

## 2018-12-28 RX ORDER — ASPIRIN/CALCIUM CARB/MAGNESIUM 324 MG
81 TABLET ORAL DAILY
Refills: 0 | Status: DISCONTINUED | OUTPATIENT
Start: 2018-12-28 | End: 2019-01-03

## 2018-12-28 RX ADMIN — Medication 60 MILLIGRAM(S): at 18:22

## 2018-12-28 RX ADMIN — Medication 3 MILLILITER(S): at 03:08

## 2018-12-28 RX ADMIN — Medication 3 MILLILITER(S): at 08:40

## 2018-12-28 RX ADMIN — Medication 40 MILLIGRAM(S): at 05:34

## 2018-12-28 RX ADMIN — Medication 50 MILLIGRAM(S): at 21:27

## 2018-12-28 RX ADMIN — Medication 80 MILLIGRAM(S): at 05:34

## 2018-12-28 RX ADMIN — Medication 3 MILLILITER(S): at 15:25

## 2018-12-28 RX ADMIN — LOSARTAN POTASSIUM 50 MILLIGRAM(S): 100 TABLET, FILM COATED ORAL at 21:27

## 2018-12-28 RX ADMIN — Medication 200 MILLIGRAM(S): at 04:29

## 2018-12-28 RX ADMIN — HEPARIN SODIUM 5000 UNIT(S): 5000 INJECTION INTRAVENOUS; SUBCUTANEOUS at 18:22

## 2018-12-28 RX ADMIN — Medication 81 MILLIGRAM(S): at 18:25

## 2018-12-28 RX ADMIN — GABAPENTIN 100 MILLIGRAM(S): 400 CAPSULE ORAL at 18:25

## 2018-12-28 RX ADMIN — CHLORHEXIDINE GLUCONATE 1 APPLICATION(S): 213 SOLUTION TOPICAL at 12:13

## 2018-12-28 RX ADMIN — Medication 3 MILLILITER(S): at 20:55

## 2018-12-28 RX ADMIN — ATORVASTATIN CALCIUM 80 MILLIGRAM(S): 80 TABLET, FILM COATED ORAL at 21:27

## 2018-12-28 RX ADMIN — Medication 50 MILLIGRAM(S): at 15:16

## 2018-12-28 RX ADMIN — Medication 250 MILLIGRAM(S): at 19:54

## 2018-12-28 RX ADMIN — MENTHOL AND METHYL SALICYLATE 1 APPLICATION(S): 10; 30 STICK TOPICAL at 18:22

## 2018-12-28 RX ADMIN — PANTOPRAZOLE SODIUM 40 MILLIGRAM(S): 20 TABLET, DELAYED RELEASE ORAL at 06:07

## 2018-12-28 RX ADMIN — ISOSORBIDE MONONITRATE 30 MILLIGRAM(S): 60 TABLET, EXTENDED RELEASE ORAL at 18:25

## 2018-12-28 RX ADMIN — DULOXETINE HYDROCHLORIDE 20 MILLIGRAM(S): 30 CAPSULE, DELAYED RELEASE ORAL at 18:25

## 2018-12-28 RX ADMIN — Medication 50 MILLIGRAM(S): at 18:25

## 2018-12-28 RX ADMIN — Medication 80 MILLIGRAM(S): at 00:08

## 2018-12-28 RX ADMIN — Medication 80 MILLIGRAM(S): at 12:57

## 2018-12-28 RX ADMIN — MENTHOL AND METHYL SALICYLATE 1 APPLICATION(S): 10; 30 STICK TOPICAL at 06:10

## 2018-12-28 RX ADMIN — SODIUM CHLORIDE 3 MILLILITER(S): 9 INJECTION INTRAMUSCULAR; INTRAVENOUS; SUBCUTANEOUS at 08:21

## 2018-12-28 RX ADMIN — Medication 200 MILLIGRAM(S): at 20:24

## 2018-12-28 RX ADMIN — Medication 100 MICROGRAM(S): at 06:06

## 2018-12-28 RX ADMIN — AMLODIPINE BESYLATE 10 MILLIGRAM(S): 2.5 TABLET ORAL at 06:06

## 2018-12-28 RX ADMIN — SODIUM CHLORIDE 3 MILLILITER(S): 9 INJECTION INTRAMUSCULAR; INTRAVENOUS; SUBCUTANEOUS at 21:21

## 2018-12-28 RX ADMIN — Medication 50 MILLIGRAM(S): at 06:07

## 2018-12-28 RX ADMIN — Medication 50 MILLIGRAM(S): at 06:06

## 2018-12-28 RX ADMIN — Medication 1 TABLET(S): at 18:25

## 2018-12-28 RX ADMIN — SODIUM CHLORIDE 3 MILLILITER(S): 9 INJECTION INTRAMUSCULAR; INTRAVENOUS; SUBCUTANEOUS at 06:09

## 2018-12-28 RX ADMIN — CLOPIDOGREL BISULFATE 75 MILLIGRAM(S): 75 TABLET, FILM COATED ORAL at 18:25

## 2018-12-28 NOTE — PROGRESS NOTE ADULT - ASSESSMENT
77y/o  Male with h/o ESRD on HD T/Th/Sat, CAD s/p PCI on recent admission, s/p CABG HTN, Pulm HTN, Lung [place]   Here with Permacath infection      Permacath infection  Hypoxia   ESRD on HD  CAD s/p PCI  Pulm HTN  Lung Ca    - Blood cultures no growth  - S/P permacath removal 12/24/18  - culture tip with CoNS  - Will Continue Vancomycin post HD  - Trend fever  - Trend leukocytosis  - Patient will need IV Vancomycin post HD till 1/7/19      Please call with questions over the weekend. Will follow up next week.

## 2018-12-28 NOTE — PROGRESS NOTE ADULT - ASSESSMENT
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   --> pulm following     3) fever/ likely source dialysis catheter possible infection  off abx  --> all cultures negative      4) ESRD (end stage renal disease).  hd again today     5) Hypertension, unspecified type.  Plan: cont. o/p regimen, renal/low sodium diet.     6) anemia of esrd, stable , monitor     7)  Coronary artery disease involving native coronary artery of native heart without angina pectoris. Plan: No CP, cont. o/p regimen.    8) hld - statin     9) dvt prop --> put back on heparin sub q

## 2018-12-28 NOTE — PROGRESS NOTE ADULT - SUBJECTIVE AND OBJECTIVE BOX
INTERVAL HPI/OVERNIGHT EVENTS:  Patient seen at bedside this AM, noted improvement in his respiratory status. Patient was scheduled for permacath placement yesterday 12/27 but had to be aborted since the patient could not lay flat for extended periods secondary to SOB. A right femoral Shiley was placed for immediate HD. Patient scheduled for permacath placement with IR today if no further complications arise. Vascular Surgery will monitor outcome to determine if patient will need further HD access in the near future.       MEDICATIONS  (STANDING):  ALBUTerol/ipratropium for Nebulization 3 milliLiter(s) Nebulizer every 6 hours  amLODIPine   Tablet 10 milliGRAM(s) Oral daily  aspirin enteric coated 81 milliGRAM(s) Oral daily  atorvastatin 80 milliGRAM(s) Oral at bedtime  chlorhexidine 2% Cloths 1 Application(s) Topical daily  clopidogrel Tablet 75 milliGRAM(s) Oral daily  diclofenac sodium 1% Gel 4 Gram(s) Topical <User Schedule>  DULoxetine 20 milliGRAM(s) Oral daily  furosemide   Injectable 40 milliGRAM(s) IV Push daily  gabapentin 100 milliGRAM(s) Oral daily  hydrALAZINE 50 milliGRAM(s) Oral every 8 hours  isosorbide   mononitrate ER Tablet (IMDUR) 30 milliGRAM(s) Oral daily  levothyroxine 100 MICROGram(s) Oral daily  methyl salicylate 14%/menthol 6% Topical Ointment 1 Application(s) Topical two times a day  methylPREDNISolone sodium succinate Injectable 80 milliGRAM(s) IV Push every 6 hours  metoprolol tartrate 50 milliGRAM(s) Oral two times a day  Nephro-jeana 1 Tablet(s) Oral daily  pantoprazole    Tablet 40 milliGRAM(s) Oral before breakfast  sodium chloride 0.9% lock flush 3 milliLiter(s) IV Push every 8 hours    MEDICATIONS  (PRN):  benzonatate 100 milliGRAM(s) Oral every 8 hours PRN Cough  guaiFENesin    Syrup 200 milliGRAM(s) Oral every 6 hours PRN Cough  ondansetron Injectable 4 milliGRAM(s) IV Push every 6 hours PRN Nausea      Vital Signs Last 24 Hrs  T(C): 36.8 (28 Dec 2018 04:03), Max: 37.1 (28 Dec 2018 00:00)  T(F): 98.2 (28 Dec 2018 04:03), Max: 98.8 (28 Dec 2018 00:00)  HR: 60 (28 Dec 2018 08:40) (56 - 72)  BP: 143/65 (28 Dec 2018 08:00) (122/58 - 156/71)  BP(mean): 86 (28 Dec 2018 06:00) (85 - 102)  RR: 20 (28 Dec 2018 08:00) (16 - 42)  SpO2: 95% (28 Dec 2018 08:40) (91% - 98%)    PE  Gen: Not in acute distress   Pulm:  CV:  Abd:  :  Ext:  Vasc:  Neuro:      I&O's Detail    27 Dec 2018 07:01  -  28 Dec 2018 07:00  --------------------------------------------------------  IN:    IV PiggyBack: 250 mL    Oral Fluid: 60 mL  Total IN: 310 mL    OUT:    Other: 1000 mL    Voided: 250 mL  Total OUT: 1250 mL    Total NET: -940 mL          LABS:                        7.5    4.7   )-----------( 306      ( 28 Dec 2018 03:43 )             23.5     12-28    136  |  97<L>  |  40.0<H>  ----------------------------<  143<H>  4.5   |  24.0  |  4.86<H>    Ca    8.1<L>      28 Dec 2018 03:43  Mg     2.2     12-28            RADIOLOGY & ADDITIONAL STUDIES: INTERVAL HPI/OVERNIGHT EVENTS:  Patient seen at bedside this AM, noted improvement in his respiratory status. Patient was scheduled for permacath placement yesterday 12/27 but had to be aborted since the patient could not lay flat for extended periods secondary to SOB. A right femoral Shiley was placed for immediate HD. Patient scheduled for permacath placement with IR today if no further complications arise. Vascular Surgery will monitor outcome to determine if patient will need further HD access in the near future.       MEDICATIONS  (STANDING):  ALBUTerol/ipratropium for Nebulization 3 milliLiter(s) Nebulizer every 6 hours  amLODIPine   Tablet 10 milliGRAM(s) Oral daily  aspirin enteric coated 81 milliGRAM(s) Oral daily  atorvastatin 80 milliGRAM(s) Oral at bedtime  chlorhexidine 2% Cloths 1 Application(s) Topical daily  clopidogrel Tablet 75 milliGRAM(s) Oral daily  diclofenac sodium 1% Gel 4 Gram(s) Topical <User Schedule>  DULoxetine 20 milliGRAM(s) Oral daily  furosemide   Injectable 40 milliGRAM(s) IV Push daily  gabapentin 100 milliGRAM(s) Oral daily  hydrALAZINE 50 milliGRAM(s) Oral every 8 hours  isosorbide   mononitrate ER Tablet (IMDUR) 30 milliGRAM(s) Oral daily  levothyroxine 100 MICROGram(s) Oral daily  methyl salicylate 14%/menthol 6% Topical Ointment 1 Application(s) Topical two times a day  methylPREDNISolone sodium succinate Injectable 80 milliGRAM(s) IV Push every 6 hours  metoprolol tartrate 50 milliGRAM(s) Oral two times a day  Nephro-jeana 1 Tablet(s) Oral daily  pantoprazole    Tablet 40 milliGRAM(s) Oral before breakfast  sodium chloride 0.9% lock flush 3 milliLiter(s) IV Push every 8 hours    MEDICATIONS  (PRN):  benzonatate 100 milliGRAM(s) Oral every 8 hours PRN Cough  guaiFENesin    Syrup 200 milliGRAM(s) Oral every 6 hours PRN Cough  ondansetron Injectable 4 milliGRAM(s) IV Push every 6 hours PRN Nausea      Vital Signs Last 24 Hrs  T(C): 36.8 (28 Dec 2018 04:03), Max: 37.1 (28 Dec 2018 00:00)  T(F): 98.2 (28 Dec 2018 04:03), Max: 98.8 (28 Dec 2018 00:00)  HR: 60 (28 Dec 2018 08:40) (56 - 72)  BP: 143/65 (28 Dec 2018 08:00) (122/58 - 156/71)  BP(mean): 86 (28 Dec 2018 06:00) (85 - 102)  RR: 20 (28 Dec 2018 08:00) (16 - 42)  SpO2: 95% (28 Dec 2018 08:40) (91% - 98%)    PE  Gen: Not in acute distress   Pulm: Non-labored breathing, on High Flow  CV: RRR  Abd: Soft, nondistended  Ext: Right femoral Shiley for HD access in place with no active bleeding nor surround erythema  Neuro: AAOX3, no neurosensory deficits       I&O's Detail    27 Dec 2018 07:01  -  28 Dec 2018 07:00  --------------------------------------------------------  IN:    IV PiggyBack: 250 mL    Oral Fluid: 60 mL  Total IN: 310 mL    OUT:    Other: 1000 mL    Voided: 250 mL  Total OUT: 1250 mL    Total NET: -940 mL          LABS:                        7.5    4.7   )-----------( 306      ( 28 Dec 2018 03:43 )             23.5     12-28    136  |  97<L>  |  40.0<H>  ----------------------------<  143<H>  4.5   |  24.0  |  4.86<H>    Ca    8.1<L>      28 Dec 2018 03:43  Mg     2.2     12-28            RADIOLOGY & ADDITIONAL STUDIES:

## 2018-12-28 NOTE — PROGRESS NOTE ADULT - ASSESSMENT
Patient is tolerating the procedure well.   T(C): 36.8 (12-29-18 @ 04:25), Max: 36.9 (12-28-18 @ 23:00)  HR: 57 (12-29-18 @ 04:25) (57 - 67)  BP: 109/46 (12-29-18 @ 04:25) (109/46 - 146/67)        Continue dialysis: M W F  Dialyzer: Revaclear 300          QB:  400 ml.,      QD: 500ml.,  Goal UF  2 L  over  3 Hours

## 2018-12-28 NOTE — PROGRESS NOTE ADULT - SUBJECTIVE AND OBJECTIVE BOX
KUASHIK HOFFMAN    [id number]    78y      Male    INTERVAL HPI/OVERNIGHT EVENTS:    patient being seen for chf, ckd and med management. patient seen post IR attempt. Patient is on high flow. Patient is upset about being on high flow.       REVIEW OF SYSTEMS:    CONSTITUTIONAL: No fever, weight loss, or fatigue  RESPIRATORY: No cough, wheezing, hemoptysis; No shortness of breath  CARDIOVASCULAR: No chest pain, palpitations  GASTROINTESTINAL: No abdominal or epigastric pain. No nausea, vomiting  NEUROLOGICAL: No headaches, memory loss, loss of strength.  MISCELLANEOUS:      Vital Signs Last 24 Hrs  T(C): 36.4 (28 Dec 2018 12:05), Max: 37.1 (28 Dec 2018 00:00)  T(F): 97.6 (28 Dec 2018 12:05), Max: 98.8 (28 Dec 2018 00:00)  HR: 58 (28 Dec 2018 12:00) (58 - 72)  BP: 142/61 (28 Dec 2018 12:00) (122/58 - 156/71)  BP(mean): 86 (28 Dec 2018 06:00) (85 - 102)  RR: 26 (28 Dec 2018 12:00) (16 - 42)  SpO2: 97% (28 Dec 2018 12:00) (91% - 98%)    PHYSICAL EXAM:    GENERAL: NAD, high flow   HEENT: PERRL, +EOMI  NECK: soft, Supple, No JVD,   CHEST/LUNG: crackles, rhonchi   HEART: S1S2+, Regular rate and rhythm; No murmurs, rubs, or gallops  ABDOMEN: Soft, Nontender, Nondistended; Bowel sounds present  EXTREMITIES:  2+ Peripheral Pulses, No edema  SKIN: No rashes or lesions  NEURO: AAOX3, no focal deficits,   PSYCH: normal mood      LABS:                        7.5    4.7   )-----------( 306      ( 28 Dec 2018 03:43 )             23.5     12-28    136  |  97<L>  |  40.0<H>  ----------------------------<  143<H>  4.5   |  24.0  |  4.86<H>    Ca    8.1<L>      28 Dec 2018 03:43  Mg     2.2     12-28        MEDICATIONS  (STANDING):  ALBUTerol/ipratropium for Nebulization 3 milliLiter(s) Nebulizer every 6 hours  amLODIPine   Tablet 10 milliGRAM(s) Oral daily  aspirin enteric coated 81 milliGRAM(s) Oral daily  atorvastatin 80 milliGRAM(s) Oral at bedtime  chlorhexidine 2% Cloths 1 Application(s) Topical daily  clopidogrel Tablet 75 milliGRAM(s) Oral daily  diclofenac sodium 1% Gel 4 Gram(s) Topical <User Schedule>  DULoxetine 20 milliGRAM(s) Oral daily  gabapentin 100 milliGRAM(s) Oral daily  hydrALAZINE 50 milliGRAM(s) Oral every 8 hours  isosorbide   mononitrate ER Tablet (IMDUR) 30 milliGRAM(s) Oral daily  levothyroxine 100 MICROGram(s) Oral daily  methyl salicylate 14%/menthol 6% Topical Ointment 1 Application(s) Topical two times a day  methylPREDNISolone sodium succinate Injectable 80 milliGRAM(s) IV Push every 6 hours  metoprolol tartrate 50 milliGRAM(s) Oral two times a day  Nephro-jeana 1 Tablet(s) Oral daily  pantoprazole    Tablet 40 milliGRAM(s) Oral before breakfast  sodium chloride 0.9% lock flush 3 milliLiter(s) IV Push every 8 hours    MEDICATIONS  (PRN):  benzonatate 100 milliGRAM(s) Oral every 8 hours PRN Cough  guaiFENesin    Syrup 200 milliGRAM(s) Oral every 6 hours PRN Cough  ondansetron Injectable 4 milliGRAM(s) IV Push every 6 hours PRN Nausea      RADIOLOGY & ADDITIONAL TESTS:

## 2018-12-28 NOTE — PROGRESS NOTE ADULT - SUBJECTIVE AND OBJECTIVE BOX
[facility]  INFECTIOUS DISEASES AND INTERNAL MEDICINE at [place]  =======================================================  Cricket Lara MD  Diplomates American Board of Internal Medicine and Infectious Diseases  =======================================================    YASMIN KAUSHIK [id number]    Follow up: Permacath infection    Mild respiratory distress    In MICU   no fever  no chills    Allergies:  No Known Allergies      Antibiotics:   vancomycin  IVPB 1000 milliGRAM(s) IV Intermittent <User Schedule>      REVIEW OF SYSTEMS:  Unable to obtain, BIPAP mask and SOB        Physical Exam:  Vital Signs Last 24 Hrs  T(C): 36.4 (28 Dec 2018 12:05), Max: 37.1 (28 Dec 2018 00:00)  T(F): 97.6 (28 Dec 2018 12:05), Max: 98.8 (28 Dec 2018 00:00)  HR: 58 (28 Dec 2018 12:00) (58 - 72)  BP: 142/61 (28 Dec 2018 12:00) (122/58 - 156/71)  BP(mean): 86 (28 Dec 2018 06:00) (85 - 102)  RR: 26 (28 Dec 2018 12:00) (16 - 28)  SpO2: 97% (28 Dec 2018 12:00) (91% - 98%)      GEN: Mild respiratory distress  HEENT: normocephalic and atraumatic. EOMI. PERRL.  Anicteric + High low O2  NECK: Supple.   LUNGS: Coarse BS B/L  HEART: Regular rate and rhythm   ABDOMEN: Soft, nontender, and nondistended.  Positive bowel sounds.    : No CVA tenderness  EXTREMITIES: Without any edema.  MSK: No joint swelling  NEUROLOGIC: No Focal Deficits  PSYCHIATRIC: Appropriate affect   SKIN: No Rash      Labs:  12-28    136  |  97<L>  |  40.0<H>  ----------------------------<  143<H>  4.5   |  24.0  |  4.86<H>    Ca    8.1<L>      28 Dec 2018 03:43  Mg     2.2     12-28               7.5    4.7   )-----------( 306      ( 28 Dec 2018 03:43 )             23.5       ABG - ( 27 Dec 2018 12:03 )  pH, Arterial: 7.38  pH, Blood: x     /  pCO2: 42    /  pO2: 58    / HCO3: 24    / Base Excess: -0.4  /  SaO2: 90            RECENT CULTURES:  12-24 @ 18:42 .Catheter Coag Negative Staphylococcus    < 15 colonies Coag Negative Staphylococcus      12-22 @ 19:42 .Blood     No growth at 5 days.      12-22 @ 18:14 .Blood     No growth at 5 days.

## 2018-12-28 NOTE — PROGRESS NOTE ADULT - ASSESSMENT
Pt brought to IR for Tunneled CVC  insertion and placed on IR table, but patient was unable to tolerate laying flat, so procedure couldn't be done and was cancelled.      Discussed with Dr. Jensen.    HD This PM ( Access : FVC )    Transfuse 1 Unit - PRBC,

## 2018-12-28 NOTE — PROGRESS NOTE ADULT - ASSESSMENT
A/P: 78 year old male with ESRD on HD in need of permacath insertion scheduled for today.     -IR to place permacath today  -Vascular surgery to follow if any issues arise  -discontinue groin shiley once permacath HD is achieved   -Rest of care per primary team

## 2018-12-28 NOTE — PROGRESS NOTE ADULT - ASSESSMENT
77 yo male pmhx ESRD on HD (T/Th/Sat), CAD s/p PCI on recent admission, HTN, pulm HTN, Lung Ca s/p XRT (2006) admitted on 12/23 with perma-cath site infection now with respiratory distress due to volume overload.      NEURO: No active issues.    CV: PCI with recent stent placement.  Chart reviewed, no contraindications ASA and Plavix restarted.  HLD on statin therapy. HTN on amlodipine, imdur and metoprolol.    RESP: SOB secondary to pulmonary edema due to volume overload.  HD took off 1L today, patient appears comfortable, given HFNC for hypoxia.  Cough on benzonatate and robitussin.    RENAL: ESRD on HD.  Renally dose medications.  Trend electrolytes and replace as needed.  Strict I&Os.  Continue lasix. Plan for OR by vascular team for placement of new permacath.    GI: NPO.  Pantoprazole.   ENDO: Hypothyroidism on levothyroxine.   ID: Staph aureus bacteremia secondary to HD permacath.  On vancomycin for coverage.  Blood cultures negative.    HEME: Holding heparin subq for permacath placement.   DISPO: Full code. 79 yo male pmhx ESRD on HD (T/Th/Sat), CAD s/p PCI on recent admission, HTN, pulm HTN, Lung Ca s/p XRT (2006) admitted on 12/23 with perma-cath site infection now with respiratory distress due to volume overload.      NEURO: No active issues.    CV: PCI with recent stent placement.  Chart reviewed, no contraindications ASA and Plavix restarted.  HLD on statin therapy. HTN on amlodipine, imdur and metoprolol.    RESP: SOB secondary to pulmonary edema due to volume overload.  HD took off 1L today, patient appears comfortable, given HFNC for hypoxia.  Cough on benzonatate and robitussin.    RENAL: ESRD on HD.  Renally dose medications.  Trend electrolytes and replace as needed.  Strict I&Os.  Continue lasix. Plan for OR by vascular team for placement of new permacath.    GI: NPO.  Pantoprazole.   ENDO: Hypothyroidism on levothyroxine.   ID: Coag negative staph d/t HD permacath.  On vancomycin for coverage.  Blood cultures negative.    HEME: Holding heparin subq for permacath placement.   DISPO: Full code.

## 2018-12-28 NOTE — PROGRESS NOTE ADULT - SUBJECTIVE AND OBJECTIVE BOX
Patient was seen and evaluated on dialysis.   No c/o CP SOB NV  no F/C  ++ swelling    T(C): 36.8 (12-29-18 @ 04:25), Max: 36.9 (12-28-18 @ 23:00)  HR: 57 (12-29-18 @ 04:25) (57 - 67)  BP: 109/46 (12-29-18 @ 04:25) (109/46 - 146/67)  Wt(kg): --  PE ;  NAD, Pale,  lungs - Rales & Rhonchi,  CV gr 1 murmur,  No gallop or rub  Abd : soft, NT BS +, No masses  Ext- ++ edema  Neuro : Grossly intact, moving extremities                         7.2    6.7   )-----------( 332                   22.9     136  |  96<L>  |  45.0<H>  ----------------------------<  141<H>  4.6   |  26.0  |  4.12<H>    Ca    8.3<L>        Mg     2.4         MEDICATIONS  (STANDING):  ALBUTerol/ipratropium for Nebulization  ALPRAZolam PRN  amLODIPine   Tablet  aspirin enteric coated  atorvastatin  benzonatate PRN  chlorhexidine 2% Cloths  clopidogrel Tablet  diclofenac sodium 1% Gel  DULoxetine  gabapentin  guaiFENesin    Syrup PRN  heparin  Injectable  hydrALAZINE  isosorbide   mononitrate ER Tablet (IMDUR)  levothyroxine  losartan  methyl salicylate 14%/menthol 6% Topical Ointment  methylPREDNISolone sodium succinate Injectable  metoprolol tartrate  Nephro-jeana  ondansetron Injectable PRN  pantoprazole    Tablet  sodium chloride 0.9% lock flush      Patient stable  [place] HD easily  Continue

## 2018-12-28 NOTE — PROGRESS NOTE ADULT - SUBJECTIVE AND OBJECTIVE BOX
Pt brought to IR for permacath insertion and placed on IR table, but patient was unable to tolerate laying flat, so procedure couldn't be done and was cancelled.  Discussed with Dr. Jensen.

## 2018-12-28 NOTE — PROGRESS NOTE ADULT - SUBJECTIVE AND OBJECTIVE BOX
Patient is a 78y old  Male who presents with a chief complaint of Sent in for possible catheter infection (27 Dec 2018 15:13)      BRIEF HOSPITAL COURSE:   77 yo male pmhx ESRD on HD (T/Th/Sat), CAD s/p PCI on recent admission, HTN, pulm HTN, Lung Ca s/p XRT (2006) admitted on 12/23 with perma-cath site infection, requiring removal of HD perma-cath and insertion of a new temporary HD Cath. Patient went for placement of temp HD cath for which during the procedure the patient developed respiratory distress requiring Bipap support. Chest xray revealed bilateral cephalization and moderate sized pleural effusion with PVC.  Patient transferred to SICU under MICU service for further management.  Patient received HD and had 1L removed.      Past 24 hour events:   Patient endorses that his breathing feels okay but he feels like his "oxygen is low".  Patient without work of breathing but with sPO2 90% on 5L NC.  HFNC ordered.  Patient endorses mild SOB and cough.       PAST MEDICAL & SURGICAL HISTORY:  Chronic anemia  ESRD (end stage renal disease)  CAD (coronary artery disease)  Lung cancer: had yoni radiation in 2006.  Bipolar disorder  High cholesterol  Hypertension  S/P CABG (coronary artery bypass graft): pt&#x27;s son in law states h/o some stents near neck area ? carotid ? he is not sure.    Allergies  No Known Allergies      FAMILY HISTORY:  Family history of essential hypertension (Father)      Social History:   No etoh, illicit drug or tobacco use.  Ambulates with cane.        Review of Systems:  See past 24 hour events.        Vitals During Exam:   HR: 67  BP: 156/71 mmHg  RR: 28  sPO2: 90% on 5L NC    Physical Examination:    General: Elderly male, sitting in bed, appears comfortable.     HEENT: NC/AT, Pupils equal, reactive to light.  Symmetric. NC in place.     PULM: Symmetrical thorax expansion upon respiration.  Clear to auscultation bilaterally, no significant sputum production appreciated.     CVS: Regular rate and rhythm, no murmurs, rubs, or gallops appreciated.     ABD: Soft, nondistended, nontender, normoactive bowel sounds, no masses appreciated.  +mild ecchymosis at subq heparin injection site.      EXT: No edema, nontender, DP pulses symmetrical     SKIN: Warm and well perfused, no rashes noted.    NEURO: Alert, oriented, interactive, nonfocal      Medications:  vancomycin  IVPB 1000 milliGRAM(s) IV Intermittent <User Schedule>  amLODIPine   Tablet 10 milliGRAM(s) Oral daily  furosemide   Injectable 40 milliGRAM(s) IV Push daily  hydrALAZINE 50 milliGRAM(s) Oral every 8 hours  isosorbide   mononitrate ER Tablet (IMDUR) 30 milliGRAM(s) Oral daily  metoprolol tartrate 50 milliGRAM(s) Oral two times a day  ALBUTerol/ipratropium for Nebulization 3 milliLiter(s) Nebulizer every 6 hours  benzonatate 100 milliGRAM(s) Oral every 8 hours PRN  guaiFENesin    Syrup 200 milliGRAM(s) Oral every 6 hours PRN  DULoxetine 20 milliGRAM(s) Oral daily  gabapentin 100 milliGRAM(s) Oral daily  ondansetron Injectable 4 milliGRAM(s) IV Push every 6 hours PRN  pantoprazole    Tablet 40 milliGRAM(s) Oral before breakfast  atorvastatin 80 milliGRAM(s) Oral at bedtime  levothyroxine 100 MICROGram(s) Oral daily  methylPREDNISolone sodium succinate Injectable 80 milliGRAM(s) IV Push every 6 hours  Nephro-jeana 1 Tablet(s) Oral daily  sodium chloride 0.9% lock flush 3 milliLiter(s) IV Push every 8 hours  chlorhexidine 2% Cloths 1 Application(s) Topical daily  diclofenac sodium 1% Gel 4 Gram(s) Topical <User Schedule>  methyl salicylate 14%/menthol 6% Topical Ointment 1 Application(s) Topical two times a day      ICU Vital Signs Last 24 Hrs  T(C): 37.1 (28 Dec 2018 00:00), Max: 37.1 (28 Dec 2018 00:00)  T(F): 98.8 (28 Dec 2018 00:00), Max: 98.8 (28 Dec 2018 00:00)  HR: 60 (28 Dec 2018 03:09) (56 - 72)  BP: 136/65 (28 Dec 2018 00:00) (122/58 - 156/71)  BP(mean): 93 (28 Dec 2018 00:00) (85 - 102)  ABP: --  ABP(mean): --  RR: 20 (28 Dec 2018 00:00) (16 - 42)  SpO2: 95% (28 Dec 2018 03:09) (91% - 100%)    Vital Signs Last 24 Hrs  T(C): 37.1 (28 Dec 2018 00:00), Max: 37.1 (28 Dec 2018 00:00)  T(F): 98.8 (28 Dec 2018 00:00), Max: 98.8 (28 Dec 2018 00:00)  HR: 60 (28 Dec 2018 03:09) (56 - 72)  BP: 136/65 (28 Dec 2018 00:00) (122/58 - 156/71)  BP(mean): 93 (28 Dec 2018 00:00) (85 - 102)  RR: 20 (28 Dec 2018 00:00) (16 - 42)  SpO2: 95% (28 Dec 2018 03:09) (91% - 100%)    ABG - ( 27 Dec 2018 12:03 )  pH, Arterial: 7.38  pH, Blood: x     /  pCO2: 42    /  pO2: 58    / HCO3: 24    / Base Excess: -0.4  /  SaO2: 90          I&O's Detail    27 Dec 2018 07:01  -  28 Dec 2018 03:58  --------------------------------------------------------  IN:    IV PiggyBack: 250 mL    Oral Fluid: 60 mL  Total IN: 310 mL    OUT:    Other: 1000 mL    Voided: 250 mL  Total OUT: 1250 mL  Total NET: -940 mL      LABS:                        8.0    6.3   )-----------( 296      ( 27 Dec 2018 14:22 )             25.2     12-27    134<L>  |  93<L>  |  29.0<H>  ----------------------------<  107  4.0   |  25.0  |  4.09<H>    Ca    8.0<L>      27 Dec 2018 14:22      CULTURES:  Culture Results:   < 15 colonies Coag Negative Staphylococcus (12-24 @ 18:42)  Culture Results:   No growth at 5 days. (12-22 @ 19:42)  Culture Results:   No growth at 5 days. (12-22 @ 18:14)      RADIOLOGY:   < from: Xray Chest 1 View-PORTABLE IMMEDIATE (12.26.18 @ 10:14) >   EXAM:  XR CHEST PORTABLE IMMED 1V                          PROCEDURE DATE:  12/26/2018          INTERPRETATION:  XR CHEST PORTABLE IMMED 1V    Single AP view    HISTORY:  Abnormal Chest Sounds with history of lung cancer    Comparison:  Chest x-ray 12/22/2018    Status post sternotomy and CABG. Removal of dialysis catheter. Worsening   pulmonary edema with moderate right and small left pleural effusion.    IMPRESSION: Worsening pulmonary edema with moderate right and small left   pleural effusion.      < end of copied text >      SUPPLEMENTAL O2: NC to HFNC  LINES: Peripheral   BAILEY: N  PPx:   CONTACT: N

## 2018-12-28 NOTE — PROGRESS NOTE ADULT - SUBJECTIVE AND OBJECTIVE BOX
Patient seen and examined    I&O's Summary    27 Dec 2018 07:01  -  28 Dec 2018 07:00  --------------------------------------------------------  IN: 310 mL / OUT: 1250 mL / NET: -940 mL    REVIEW OF SYSTEMS:    CONSTITUTIONAL: No F/C  RESPIRATORY: No cough , +  SOB , Orthopnea,   CARDIOVASCULAR: No CP/palpitations,    GASTROINTESTINAL: No abdominal pain , NVD   GENITOURINARY: No UTI sx  NEUROLOGICAL: No headaches/wk/numbness  MUSCULOSKELETAL:  No joint pain/swelling; No LBP  EXTREMITIES : + swelling,    Vital Signs Last 24 Hrs  T(C): 36.4 (28 Dec 2018 12:05), Max: 37.1 (28 Dec 2018 00:00)  T(F): 97.6 (28 Dec 2018 12:05), Max: 98.8 (28 Dec 2018 00:00)  HR: 58 (28 Dec 2018 12:00) (58 - 72)  BP: 142/61 (28 Dec 2018 12:00) (122/58 - 156/71)  BP(mean): 86 (28 Dec 2018 06:00) (85 - 102)  RR: 26 (28 Dec 2018 12:00) (16 - 42)  SpO2: 97% (28 Dec 2018 12:00) (91% - 98%)    PHYSICAL EXAM:    GENERAL: NAD, Pale,  EYES:  conjunctiva and sclera clear  NECK: Supple, No JVD/Bruit  NERVOUS SYSTEM:  A/O x3,   CHEST:  CTA ,No rales or rhonchi  HEART:  RRR, No murmurs  ABDOMEN: Soft, NT/ND BS+  EXTREMITIES:  +  Edema;  SKIN: No rashes    LABS:                        7.5    4.7   )-----------( 306      ( 28 Dec 2018 03:43 )             23.5     12-28    136  |  97<L>  |  40.0<H>  ----------------------------<  143<H>  4.5   |  24.0  |  4.86<H>    Ca    8.1<L>      28 Dec 2018 03:43  Mg     2.2     12-28    MEDICATIONS  (STANDING):  ALBUTerol/ipratropium for Nebulization  amLODIPine   Tablet  aspirin enteric coated  atorvastatin  benzonatate PRN  chlorhexidine 2% Cloths  clopidogrel Tablet  diclofenac sodium 1% Gel  DULoxetine  gabapentin  guaiFENesin    Syrup PRN  heparin  Injectable  hydrALAZINE  isosorbide   mononitrate ER Tablet (IMDUR)  levothyroxine  methyl salicylate 14%/menthol 6% Topical Ointment  methylPREDNISolone sodium succinate Injectable  metoprolol tartrate  Nephro-jeana  ondansetron Injectable PRN  pantoprazole    Tablet  sodium chloride 0.9% lock flush

## 2018-12-29 LAB
ANION GAP SERPL CALC-SCNC: 14 MMOL/L — SIGNIFICANT CHANGE UP (ref 5–17)
BLD GP AB SCN SERPL QL: SIGNIFICANT CHANGE UP
BUN SERPL-MCNC: 45 MG/DL — HIGH (ref 8–20)
CALCIUM SERPL-MCNC: 8.3 MG/DL — LOW (ref 8.6–10.2)
CHLORIDE SERPL-SCNC: 96 MMOL/L — LOW (ref 98–107)
CO2 SERPL-SCNC: 26 MMOL/L — SIGNIFICANT CHANGE UP (ref 22–29)
CREAT SERPL-MCNC: 4.12 MG/DL — HIGH (ref 0.5–1.3)
GLUCOSE SERPL-MCNC: 141 MG/DL — HIGH (ref 70–115)
HCT VFR BLD CALC: 22.9 % — LOW (ref 42–52)
HGB BLD-MCNC: 7.2 G/DL — LOW (ref 14–18)
MAGNESIUM SERPL-MCNC: 2.4 MG/DL — SIGNIFICANT CHANGE UP (ref 1.6–2.6)
MCHC RBC-ENTMCNC: 28.9 PG — SIGNIFICANT CHANGE UP (ref 27–31)
MCHC RBC-ENTMCNC: 31.4 G/DL — LOW (ref 32–36)
MCV RBC AUTO: 92 FL — SIGNIFICANT CHANGE UP (ref 80–94)
PLATELET # BLD AUTO: 332 K/UL — SIGNIFICANT CHANGE UP (ref 150–400)
POTASSIUM SERPL-MCNC: 4.6 MMOL/L — SIGNIFICANT CHANGE UP (ref 3.5–5.3)
POTASSIUM SERPL-SCNC: 4.6 MMOL/L — SIGNIFICANT CHANGE UP (ref 3.5–5.3)
RBC # BLD: 2.49 M/UL — LOW (ref 4.6–6.2)
RBC # FLD: 13.3 % — SIGNIFICANT CHANGE UP (ref 11–15.6)
SODIUM SERPL-SCNC: 136 MMOL/L — SIGNIFICANT CHANGE UP (ref 135–145)
TYPE + AB SCN PNL BLD: SIGNIFICANT CHANGE UP
WBC # BLD: 6.7 K/UL — SIGNIFICANT CHANGE UP (ref 4.8–10.8)
WBC # FLD AUTO: 6.7 K/UL — SIGNIFICANT CHANGE UP (ref 4.8–10.8)

## 2018-12-29 PROCEDURE — 99232 SBSQ HOSP IP/OBS MODERATE 35: CPT

## 2018-12-29 PROCEDURE — 99233 SBSQ HOSP IP/OBS HIGH 50: CPT

## 2018-12-29 RX ORDER — PANTOPRAZOLE SODIUM 20 MG/1
40 TABLET, DELAYED RELEASE ORAL EVERY 12 HOURS
Refills: 0 | Status: DISCONTINUED | OUTPATIENT
Start: 2018-12-29 | End: 2019-01-03

## 2018-12-29 RX ADMIN — Medication 1 TABLET(S): at 13:39

## 2018-12-29 RX ADMIN — MENTHOL AND METHYL SALICYLATE 1 APPLICATION(S): 10; 30 STICK TOPICAL at 18:33

## 2018-12-29 RX ADMIN — SODIUM CHLORIDE 3 MILLILITER(S): 9 INJECTION INTRAMUSCULAR; INTRAVENOUS; SUBCUTANEOUS at 14:07

## 2018-12-29 RX ADMIN — PANTOPRAZOLE SODIUM 40 MILLIGRAM(S): 20 TABLET, DELAYED RELEASE ORAL at 18:33

## 2018-12-29 RX ADMIN — Medication 3 MILLILITER(S): at 21:23

## 2018-12-29 RX ADMIN — GABAPENTIN 100 MILLIGRAM(S): 400 CAPSULE ORAL at 13:43

## 2018-12-29 RX ADMIN — Medication 40 MILLIGRAM(S): at 13:43

## 2018-12-29 RX ADMIN — Medication 40 MILLIGRAM(S): at 22:25

## 2018-12-29 RX ADMIN — Medication 50 MILLIGRAM(S): at 14:11

## 2018-12-29 RX ADMIN — Medication 60 MILLIGRAM(S): at 00:30

## 2018-12-29 RX ADMIN — HEPARIN SODIUM 5000 UNIT(S): 5000 INJECTION INTRAVENOUS; SUBCUTANEOUS at 18:33

## 2018-12-29 RX ADMIN — ISOSORBIDE MONONITRATE 30 MILLIGRAM(S): 60 TABLET, EXTENDED RELEASE ORAL at 13:40

## 2018-12-29 RX ADMIN — PANTOPRAZOLE SODIUM 40 MILLIGRAM(S): 20 TABLET, DELAYED RELEASE ORAL at 05:55

## 2018-12-29 RX ADMIN — Medication 50 MILLIGRAM(S): at 18:33

## 2018-12-29 RX ADMIN — AMLODIPINE BESYLATE 10 MILLIGRAM(S): 2.5 TABLET ORAL at 05:55

## 2018-12-29 RX ADMIN — Medication 3 MILLILITER(S): at 04:07

## 2018-12-29 RX ADMIN — SODIUM CHLORIDE 3 MILLILITER(S): 9 INJECTION INTRAMUSCULAR; INTRAVENOUS; SUBCUTANEOUS at 05:52

## 2018-12-29 RX ADMIN — MENTHOL AND METHYL SALICYLATE 1 APPLICATION(S): 10; 30 STICK TOPICAL at 05:56

## 2018-12-29 RX ADMIN — HEPARIN SODIUM 5000 UNIT(S): 5000 INJECTION INTRAVENOUS; SUBCUTANEOUS at 05:55

## 2018-12-29 RX ADMIN — Medication 100 MICROGRAM(S): at 05:55

## 2018-12-29 RX ADMIN — SODIUM CHLORIDE 3 MILLILITER(S): 9 INJECTION INTRAMUSCULAR; INTRAVENOUS; SUBCUTANEOUS at 22:21

## 2018-12-29 RX ADMIN — CHLORHEXIDINE GLUCONATE 1 APPLICATION(S): 213 SOLUTION TOPICAL at 14:12

## 2018-12-29 RX ADMIN — CLOPIDOGREL BISULFATE 75 MILLIGRAM(S): 75 TABLET, FILM COATED ORAL at 14:11

## 2018-12-29 RX ADMIN — Medication 81 MILLIGRAM(S): at 13:42

## 2018-12-29 RX ADMIN — Medication 3 MILLILITER(S): at 15:48

## 2018-12-29 RX ADMIN — DULOXETINE HYDROCHLORIDE 20 MILLIGRAM(S): 30 CAPSULE, DELAYED RELEASE ORAL at 13:40

## 2018-12-29 RX ADMIN — Medication 60 MILLIGRAM(S): at 05:55

## 2018-12-29 RX ADMIN — Medication 3 MILLILITER(S): at 09:06

## 2018-12-29 RX ADMIN — ATORVASTATIN CALCIUM 80 MILLIGRAM(S): 80 TABLET, FILM COATED ORAL at 22:25

## 2018-12-29 NOTE — PROGRESS NOTE ADULT - SUBJECTIVE AND OBJECTIVE BOX
[facility]  INFECTIOUS DISEASES AND INTERNAL MEDICINE at [place]  =======================================================  Cricket Lara MD  Diplomates American Board of Internal Medicine and Infectious Diseases  =======================================================    KAUSHIK HOFFMAN [id number]    Follow up: Permacath infection  pt non toxic on high flow o2    Mild respiratory distress    In MICU   no fever  no chills    Allergies:  No Known Allergies      Antibiotics:   vancomycin  IVPB 1000 milliGRAM(s) IV Intermittent <User Schedule>      REVIEW OF SYSTEMS:  Unable to obtain, BIPAP mask and SOB        Physical Exam:   Vital Signs Last 24 Hrs  T(C): 36.8 (29 Dec 2018 04:25), Max: 36.9 (28 Dec 2018 23:00)  T(F): 98.2 (29 Dec 2018 04:25), Max: 98.5 (28 Dec 2018 23:00)  HR: 57 (29 Dec 2018 04:25) (57 - 67)  BP: 109/46 (29 Dec 2018 04:25) (109/46 - 146/67)  BP(mean): 96 (28 Dec 2018 20:00) (88 - 100)  RR: 20 (29 Dec 2018 04:25) (20 - 30)  SpO2: 98% (29 Dec 2018 09:00) (91% - 99%)    GEN: Mild respiratory distress  HEENT: normocephalic and atraumatic. EOMI. PERRL.  Anicteric + High low O2  NECK: Supple.   LUNGS: Coarse BS B/L  HEART: Regular rate and rhythm   ABDOMEN: Soft, nontender, and nondistended.  Positive bowel sounds.    : No CVA tenderness  EXTREMITIES: Without any edema.  MSK: No joint swelling  NEUROLOGIC: No Focal Deficits  PSYCHIATRIC: Appropriate affect   SKIN: No Rash      Labs:  1                       7.2    6.7   )-----------( 332      ( 29 Dec 2018 09:24 )             22.9   12-29    136  |  96<L>  |  45.0<H>  ----------------------------<  141<H>  4.6   |  26.0  |  4.12<H>    Ca    8.3<L>      29 Dec 2018 09:24  Mg     2.4     12-29        RECENT CULTURES:  12-24 @ 18:42 .Catheter Coag Negative Staphylococcus    < 15 colonies Coag Negative Staphylococcus      12-22 @ 19:42 .Blood     No growth at 5 days.      12-22 @ 18:14 .Blood     No growth at 5 days.

## 2018-12-29 NOTE — PROGRESS NOTE ADULT - ASSESSMENT
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   --> pulm following     3) fever/ likely source dialysis catheter possible infection  --> id following      4) ESRD (end stage renal disease). renal following   --> FOR PERMACATH placement and AVF on 1.3 as per vascular     5) Hypertension, unspecified type.  Plan: cont. o/p regimen, renal/low sodium diet.     6) anemia of esrd, stable , monitor     7)  Coronary artery disease involving native coronary artery of native heart without angina pectoris. Plan: No CP, cont. o/p regimen.    8) hld - statin     9) chronic blood loss anemia --> 1 unit prbc ordered for today with HD

## 2018-12-29 NOTE — PROGRESS NOTE ADULT - ASSESSMENT
79y/o  Male with h/o ESRD on HD T/Th/Sat, CAD s/p PCI on recent admission, s/p CABG HTN, Pulm HTN, Lung [place]   Here with Permacath infection      Permacath infection  Hypoxia   ESRD on HD  CAD s/p PCI  Pulm HTN  Lung Ca    - Blood cultures no growth  - S/P permacath removal 12/24/18  - culture tip with CoNS  - Will Continue Vancomycin post HD  - Trend fever  - Trend leukocytosis  - Patient will need IV Vancomycin post HD till 1/7/19    OVERALL STABLE SPOKE TO WIFE AT BEDSIDE   .

## 2018-12-29 NOTE — PROGRESS NOTE ADULT - SUBJECTIVE AND OBJECTIVE BOX
INTERVAL HPI/OVERNIGHT EVENTS:  No acute events reported overnight. Patient was scheduled for permacath placement by IR yesterday but encountered the same limiting respiratory issues which prevented permacath placement on 12/27. Patient with right groin Shiley in place receiving HD. Plan is to schedule patient to OR for permacath placement and AVF creation under general anesthesia.  Vein mapping for AVF planning will be needed and preservation of the LUE is also needed in preparation. Previous Permacath tip culture resulted in coag negative Staph cultures. Patient receiving Vancomycin.       MEDICATIONS  (STANDING):  ALBUTerol/ipratropium for Nebulization 3 milliLiter(s) Nebulizer every 6 hours  amLODIPine   Tablet 10 milliGRAM(s) Oral daily  aspirin enteric coated 81 milliGRAM(s) Oral daily  atorvastatin 80 milliGRAM(s) Oral at bedtime  chlorhexidine 2% Cloths 1 Application(s) Topical daily  clopidogrel Tablet 75 milliGRAM(s) Oral daily  diclofenac sodium 1% Gel 4 Gram(s) Topical <User Schedule>  DULoxetine 20 milliGRAM(s) Oral daily  gabapentin 100 milliGRAM(s) Oral daily  heparin  Injectable 5000 Unit(s) SubCutaneous every 12 hours  hydrALAZINE 50 milliGRAM(s) Oral every 8 hours  isosorbide   mononitrate ER Tablet (IMDUR) 30 milliGRAM(s) Oral daily  levothyroxine 100 MICROGram(s) Oral daily  losartan 50 milliGRAM(s) Oral at bedtime  methyl salicylate 14%/menthol 6% Topical Ointment 1 Application(s) Topical two times a day  methylPREDNISolone sodium succinate Injectable 60 milliGRAM(s) IV Push every 6 hours  metoprolol tartrate 50 milliGRAM(s) Oral two times a day  Nephro-jeana 1 Tablet(s) Oral daily  pantoprazole    Tablet 40 milliGRAM(s) Oral before breakfast  sodium chloride 0.9% lock flush 3 milliLiter(s) IV Push every 8 hours    MEDICATIONS  (PRN):  ALPRAZolam 0.25 milliGRAM(s) Oral two times a day PRN anxiety  benzonatate 100 milliGRAM(s) Oral every 8 hours PRN Cough  guaiFENesin    Syrup 200 milliGRAM(s) Oral every 6 hours PRN Cough  ondansetron Injectable 4 milliGRAM(s) IV Push every 6 hours PRN Nausea      Vital Signs Last 24 Hrs  T(C): 36.7 (28 Dec 2018 19:00), Max: 36.8 (28 Dec 2018 04:03)  T(F): 98 (28 Dec 2018 19:00), Max: 98.3 (28 Dec 2018 08:00)  HR: 58 (28 Dec 2018 20:56) (58 - 67)  BP: 146/67 (28 Dec 2018 20:00) (124/60 - 146/67)  BP(mean): 96 (28 Dec 2018 20:00) (85 - 100)  RR: 23 (28 Dec 2018 20:00) (18 - 30)  SpO2: 97% (28 Dec 2018 20:56) (94% - 99%)    PE  Gen: Not in acute distress   Pulm: Non-labored breathing  CV: RRR  Abd: Soft, nondistended  Ext: Right femoral Shiley for HD access in place with no active bleeding nor surround erythema  Neuro: AAOX3, no neurosensory deficits       I&O's Detail    27 Dec 2018 07:01  -  28 Dec 2018 07:00  --------------------------------------------------------  IN:    IV PiggyBack: 250 mL    Oral Fluid: 60 mL  Total IN: 310 mL    OUT:    Other: 1000 mL    Voided: 250 mL  Total OUT: 1250 mL    Total NET: -940 mL      28 Dec 2018 07:01  -  29 Dec 2018 01:56  --------------------------------------------------------  IN:    IV PiggyBack: 250 mL  Total IN: 250 mL    OUT:    Other: 1400 mL  Total OUT: 1400 mL    Total NET: -1150 mL          LABS:                        7.5    4.7   )-----------( 306      ( 28 Dec 2018 03:43 )             23.5     12-28    136  |  97<L>  |  40.0<H>  ----------------------------<  143<H>  4.5   |  24.0  |  4.86<H>    Ca    8.1<L>      28 Dec 2018 03:43  Mg     2.2     12-28            RADIOLOGY & ADDITIONAL STUDIES:

## 2018-12-29 NOTE — PROGRESS NOTE ADULT - SUBJECTIVE AND OBJECTIVE BOX
KAUSHIK HOFFMAN    [id number]    78y      Male    INTERVAL HPI/OVERNIGHT EVENTS:    patient being seen for chf, ckd and med management. patient seen at bedside and states feeling slightly better.       REVIEW OF SYSTEMS:    CONSTITUTIONAL: No fever, weight loss, or fatigue  RESPIRATORY: sob   CARDIOVASCULAR: No chest pain, palpitations  GASTROINTESTINAL: No abdominal or epigastric pain. No nausea, vomiting  NEUROLOGICAL: No headaches, memory loss, loss of strength.  MISCELLANEOUS:      Vital Signs Last 24 Hrs  T(C): 36.8 (29 Dec 2018 04:25), Max: 36.9 (28 Dec 2018 23:00)  T(F): 98.2 (29 Dec 2018 04:25), Max: 98.5 (28 Dec 2018 23:00)  HR: 57 (29 Dec 2018 04:25) (57 - 67)  BP: 109/46 (29 Dec 2018 04:25) (109/46 - 146/67)  BP(mean): 96 (28 Dec 2018 20:00) (88 - 100)  RR: 20 (29 Dec 2018 04:25) (20 - 30)  SpO2: 98% (29 Dec 2018 09:00) (91% - 99%)    PHYSICAL EXAM:    GENERAL: NAD, high flow   HEENT: PERRL, +EOMI  NECK: soft, Supple, No JVD,   CHEST/LUNG: crackles,  HEART: S1S2+, Regular rate and rhythm; No murmurs, rubs, or gallops  ABDOMEN: Soft, Nontender, Nondistended; Bowel sounds present  EXTREMITIES:  2+ Peripheral Pulses, No edema  SKIN: No rashes or lesions  NEURO: AAOX3, no focal deficits,   PSYCH: normal mood        LABS:                        7.2    6.7   )-----------( 332      ( 29 Dec 2018 09:24 )             22.9     12-29    136  |  96<L>  |  45.0<H>  ----------------------------<  141<H>  4.6   |  26.0  |  4.12<H>    Ca    8.3<L>      29 Dec 2018 09:24  Mg     2.4     12-29        MEDICATIONS  (STANDING):  ALBUTerol/ipratropium for Nebulization 3 milliLiter(s) Nebulizer every 6 hours  amLODIPine   Tablet 10 milliGRAM(s) Oral daily  aspirin enteric coated 81 milliGRAM(s) Oral daily  atorvastatin 80 milliGRAM(s) Oral at bedtime  chlorhexidine 2% Cloths 1 Application(s) Topical daily  clopidogrel Tablet 75 milliGRAM(s) Oral daily  diclofenac sodium 1% Gel 4 Gram(s) Topical <User Schedule>  DULoxetine 20 milliGRAM(s) Oral daily  gabapentin 100 milliGRAM(s) Oral daily  heparin  Injectable 5000 Unit(s) SubCutaneous every 12 hours  hydrALAZINE 50 milliGRAM(s) Oral every 8 hours  isosorbide   mononitrate ER Tablet (IMDUR) 30 milliGRAM(s) Oral daily  levothyroxine 100 MICROGram(s) Oral daily  losartan 50 milliGRAM(s) Oral at bedtime  methyl salicylate 14%/menthol 6% Topical Ointment 1 Application(s) Topical two times a day  methylPREDNISolone sodium succinate Injectable 40 milliGRAM(s) IV Push every 8 hours  metoprolol tartrate 50 milliGRAM(s) Oral two times a day  Nephro-jeana 1 Tablet(s) Oral daily  pantoprazole    Tablet 40 milliGRAM(s) Oral before breakfast  sodium chloride 0.9% lock flush 3 milliLiter(s) IV Push every 8 hours    MEDICATIONS  (PRN):  ALPRAZolam 0.25 milliGRAM(s) Oral two times a day PRN anxiety  benzonatate 100 milliGRAM(s) Oral every 8 hours PRN Cough  guaiFENesin    Syrup 200 milliGRAM(s) Oral every 6 hours PRN Cough  ondansetron Injectable 4 milliGRAM(s) IV Push every 6 hours PRN Nausea      RADIOLOGY & ADDITIONAL TESTS:

## 2018-12-29 NOTE — PROGRESS NOTE ADULT - ASSESSMENT
HD on Monday, After CVC ( Tunneled ) insertion,    Hi Flow Oxygen, TX 1 unit - PRBC,    D/W Wife @ Bedside,

## 2018-12-29 NOTE — PROGRESS NOTE ADULT - SUBJECTIVE AND OBJECTIVE BOX
[facility] DIVISION OF KIDNEY DISEASES AND HYPERTENSION -- FOLLOW UP NOTE  --------------------------------------------------------------------------------  Chief Complaint: Dialysis ( FVC ) Last ella,     24 hour events/subjective: Less Dyspneic, More Alert,     PAST HISTORY  --------------------------------------------------------------------------------  No significant changes to PMH, PSH, FHx, SHx, unless otherwise noted    ALLERGIES & MEDICATIONS  --------------------------------------------------------------------------------  Allergies    No Known Allergies    Standing Inpatient Medications  ALBUTerol/ipratropium for Nebulization 3 milliLiter(s) Nebulizer every 6 hours  amLODIPine   Tablet 10 milliGRAM(s) Oral daily  aspirin enteric coated 81 milliGRAM(s) Oral daily  atorvastatin 80 milliGRAM(s) Oral at bedtime  chlorhexidine 2% Cloths 1 Application(s) Topical daily  clopidogrel Tablet 75 milliGRAM(s) Oral daily  diclofenac sodium 1% Gel 4 Gram(s) Topical <User Schedule>  DULoxetine 20 milliGRAM(s) Oral daily  gabapentin 100 milliGRAM(s) Oral daily  heparin  Injectable 5000 Unit(s) SubCutaneous every 12 hours  hydrALAZINE 50 milliGRAM(s) Oral every 8 hours  isosorbide   mononitrate ER Tablet (IMDUR) 30 milliGRAM(s) Oral daily  levothyroxine 100 MICROGram(s) Oral daily  losartan 50 milliGRAM(s) Oral at bedtime  methyl salicylate 14%/menthol 6% Topical Ointment 1 Application(s) Topical two times a day  methylPREDNISolone sodium succinate Injectable 60 milliGRAM(s) IV Push every 6 hours  metoprolol tartrate 50 milliGRAM(s) Oral two times a day  Nephro-jeana 1 Tablet(s) Oral daily  pantoprazole    Tablet 40 milliGRAM(s) Oral before breakfast  sodium chloride 0.9% lock flush 3 milliLiter(s) IV Push every 8 hours    PRN Inpatient Medications  ALPRAZolam 0.25 milliGRAM(s) Oral two times a day PRN  benzonatate 100 milliGRAM(s) Oral every 8 hours PRN  guaiFENesin    Syrup 200 milliGRAM(s) Oral every 6 hours PRN  ondansetron Injectable 4 milliGRAM(s) IV Push every 6 hours PRN    REVIEW OF SYSTEMS  --------------------------------------------------------------------------------  Gen: + weight changes, fatigue,  No fevers/chills, weakness  Skin: No rashes  Head/Eyes/Ears/Mouth: No headache; Normal hearing; Normal vision w/o blurriness; No sinus pain/discomfort, sore throat  Respiratory: No dyspnea, cough, wheezing, hemoptysis  CV: No chest pain, PND, orthopnea  GI: No abdominal pain, diarrhea, constipation, nausea, vomiting, melena, hematochezia  : No increased frequency, dysuria, hematuria, nocturia  MSK: No joint pain/swelling; no back pain; no edema  Neuro: No dizziness/lightheadedness, weakness, seizures, numbness, tingling  Heme: No easy bruising or bleeding  Endo: No heat/cold intolerance  Psych: No significant nervousness, anxiety, stress, depression    All other systems were reviewed and are negative, except as noted.    VITALS/PHYSICAL EXAM  --------------------------------------------------------------------------------  T(C): 36.8 (12-29-18 @ 04:25), Max: 36.9 (12-28-18 @ 23:00)  HR: 57 (12-29-18 @ 04:25) (57 - 67)  BP: 109/46 (12-29-18 @ 04:25) (109/46 - 146/67)  RR: 20 (12-29-18 @ 04:25) (20 - 30)  SpO2: 98% (12-29-18 @ 09:00) (91% - 99%)  Wt(kg): --    12-28-18 @ 07:01  -  12-29-18 @ 07:00  --------------------------------------------------------  IN: 250 mL / OUT: 1400 mL / NET: -1150 mL    Physical Exam:  	Gen: NAD, ill-appearing, Pale,  	HEENT: PERRL, supple neck,   	Pulm: CTA B/L  	CV: RRR, S1S2; no rub  	Back: No spinal or CVA tenderness; no sacral edema  	Abd: +BS, soft, nontender/nondistended  	: No suprapubic tenderness  	UE: Warm, FROM, no clubbing, intact strength; no edema; no asterixis  	LE: Warm, FROM, no clubbing, intact strength; no edema  	Neuro: No focal deficits,  	Psych: Normal affect and mood  	Skin: Warm, without rashes  	Vascular access: FVC,    LABS/STUDIES  --------------------------------------------------------------------------------              7.2    6.7   >-----------<  332      [12-29-18 @ 09:24]              22.9     136  |  96  |  45.0  ----------------------------<  141      [12-29-18 @ 09:24]  4.6   |  26.0  |  4.12        Ca     8.3     [12-29-18 @ 09:24]      Mg     2.4     [12-29-18 @ 09:24]    Creatinine Trend:  SCr 4.12 [12-29 @ 09:24]  SCr 4.86 [12-28 @ 03:43]  SCr 4.09 [12-27 @ 14:22]  SCr 6.12 [12-26 @ 09:34]  SCr 4.39 [12-25 @ 07:06]    TSH 7.33      [11-29-18 @ 02:31]    HBsAb <3.0      [12-05-18 @ 16:33]  HBsAg Nonreact      [12-05-18 @ 16:33]  HBcAb Nonreact      [12-05-18 @ 16:33]  HCV 0.14, Nonreact      [12-05-18 @ 16:33]

## 2018-12-29 NOTE — PROGRESS NOTE ADULT - ASSESSMENT
A/P: 78 year old male with ESRD on HD with multiple aborted permacath insertion attempts secondary to respiratory issues now scheduled for OR on 1/3 for Permacath placement and AVF creation under general anesthesia.     -OR on 1/3 for Permacath placement and AVF creation under general anesthesia.   -Medical clearance for OR  -vein mapping  -protect LUE from IV access and blood draws  -Rest of care per primary team

## 2018-12-30 LAB
ANION GAP SERPL CALC-SCNC: 19 MMOL/L — HIGH (ref 5–17)
BUN SERPL-MCNC: 74 MG/DL — HIGH (ref 8–20)
CALCIUM SERPL-MCNC: 8 MG/DL — LOW (ref 8.6–10.2)
CHLORIDE SERPL-SCNC: 95 MMOL/L — LOW (ref 98–107)
CO2 SERPL-SCNC: 22 MMOL/L — SIGNIFICANT CHANGE UP (ref 22–29)
CREAT SERPL-MCNC: 5.51 MG/DL — HIGH (ref 0.5–1.3)
GLUCOSE SERPL-MCNC: 136 MG/DL — HIGH (ref 70–115)
HCT VFR BLD CALC: 23.3 % — LOW (ref 42–52)
HGB BLD-MCNC: 7.3 G/DL — LOW (ref 14–18)
MAGNESIUM SERPL-MCNC: 2.6 MG/DL — SIGNIFICANT CHANGE UP (ref 1.6–2.6)
MCHC RBC-ENTMCNC: 28.6 PG — SIGNIFICANT CHANGE UP (ref 27–31)
MCHC RBC-ENTMCNC: 31.3 G/DL — LOW (ref 32–36)
MCV RBC AUTO: 91.4 FL — SIGNIFICANT CHANGE UP (ref 80–94)
NT-PROBNP SERPL-SCNC: HIGH PG/ML (ref 0–300)
PHOSPHATE SERPL-MCNC: 5 MG/DL — HIGH (ref 2.4–4.7)
PLATELET # BLD AUTO: 331 K/UL — SIGNIFICANT CHANGE UP (ref 150–400)
POTASSIUM SERPL-MCNC: 5.5 MMOL/L — HIGH (ref 3.5–5.3)
POTASSIUM SERPL-SCNC: 5.5 MMOL/L — HIGH (ref 3.5–5.3)
RBC # BLD: 2.55 M/UL — LOW (ref 4.6–6.2)
RBC # FLD: 13.8 % — SIGNIFICANT CHANGE UP (ref 11–15.6)
SODIUM SERPL-SCNC: 136 MMOL/L — SIGNIFICANT CHANGE UP (ref 135–145)
VANCOMYCIN TROUGH SERPL-MCNC: 21.3 UG/ML — HIGH (ref 10–20)
WBC # BLD: 6.4 K/UL — SIGNIFICANT CHANGE UP (ref 4.8–10.8)
WBC # FLD AUTO: 6.4 K/UL — SIGNIFICANT CHANGE UP (ref 4.8–10.8)

## 2018-12-30 PROCEDURE — 99232 SBSQ HOSP IP/OBS MODERATE 35: CPT

## 2018-12-30 PROCEDURE — 90937 HEMODIALYSIS REPEATED EVAL: CPT

## 2018-12-30 RX ORDER — SODIUM POLYSTYRENE SULFONATE 4.1 MEQ/G
30 POWDER, FOR SUSPENSION ORAL ONCE
Refills: 0 | Status: COMPLETED | OUTPATIENT
Start: 2018-12-30 | End: 2018-12-30

## 2018-12-30 RX ORDER — VANCOMYCIN HCL 1 G
1000 VIAL (EA) INTRAVENOUS ONCE
Refills: 0 | Status: DISCONTINUED | OUTPATIENT
Start: 2018-12-30 | End: 2018-12-30

## 2018-12-30 RX ORDER — VANCOMYCIN HCL 1 G
750 VIAL (EA) INTRAVENOUS ONCE
Refills: 0 | Status: COMPLETED | OUTPATIENT
Start: 2018-12-30 | End: 2018-12-30

## 2018-12-30 RX ADMIN — Medication 81 MILLIGRAM(S): at 15:26

## 2018-12-30 RX ADMIN — Medication 40 MILLIGRAM(S): at 15:25

## 2018-12-30 RX ADMIN — Medication 250 MILLIGRAM(S): at 16:32

## 2018-12-30 RX ADMIN — Medication 100 MILLIGRAM(S): at 12:26

## 2018-12-30 RX ADMIN — SODIUM CHLORIDE 3 MILLILITER(S): 9 INJECTION INTRAMUSCULAR; INTRAVENOUS; SUBCUTANEOUS at 22:47

## 2018-12-30 RX ADMIN — SODIUM POLYSTYRENE SULFONATE 30 GRAM(S): 4.1 POWDER, FOR SUSPENSION ORAL at 15:32

## 2018-12-30 RX ADMIN — PANTOPRAZOLE SODIUM 40 MILLIGRAM(S): 20 TABLET, DELAYED RELEASE ORAL at 19:43

## 2018-12-30 RX ADMIN — ATORVASTATIN CALCIUM 80 MILLIGRAM(S): 80 TABLET, FILM COATED ORAL at 22:50

## 2018-12-30 RX ADMIN — Medication 50 MILLIGRAM(S): at 19:43

## 2018-12-30 RX ADMIN — Medication 200 MILLIGRAM(S): at 05:57

## 2018-12-30 RX ADMIN — Medication 3 MILLILITER(S): at 04:41

## 2018-12-30 RX ADMIN — CHLORHEXIDINE GLUCONATE 1 APPLICATION(S): 213 SOLUTION TOPICAL at 15:51

## 2018-12-30 RX ADMIN — DULOXETINE HYDROCHLORIDE 20 MILLIGRAM(S): 30 CAPSULE, DELAYED RELEASE ORAL at 15:26

## 2018-12-30 RX ADMIN — LOSARTAN POTASSIUM 50 MILLIGRAM(S): 100 TABLET, FILM COATED ORAL at 22:50

## 2018-12-30 RX ADMIN — GABAPENTIN 100 MILLIGRAM(S): 400 CAPSULE ORAL at 15:26

## 2018-12-30 RX ADMIN — MENTHOL AND METHYL SALICYLATE 1 APPLICATION(S): 10; 30 STICK TOPICAL at 05:56

## 2018-12-30 RX ADMIN — AMLODIPINE BESYLATE 10 MILLIGRAM(S): 2.5 TABLET ORAL at 05:56

## 2018-12-30 RX ADMIN — Medication 50 MILLIGRAM(S): at 15:32

## 2018-12-30 RX ADMIN — SODIUM CHLORIDE 3 MILLILITER(S): 9 INJECTION INTRAMUSCULAR; INTRAVENOUS; SUBCUTANEOUS at 05:56

## 2018-12-30 RX ADMIN — Medication 40 MILLIGRAM(S): at 05:55

## 2018-12-30 RX ADMIN — Medication 3 MILLILITER(S): at 14:54

## 2018-12-30 RX ADMIN — Medication 3 MILLILITER(S): at 09:23

## 2018-12-30 RX ADMIN — Medication 50 MILLIGRAM(S): at 22:49

## 2018-12-30 RX ADMIN — Medication 1 TABLET(S): at 15:25

## 2018-12-30 RX ADMIN — Medication 50 MILLIGRAM(S): at 05:56

## 2018-12-30 RX ADMIN — SODIUM CHLORIDE 3 MILLILITER(S): 9 INJECTION INTRAMUSCULAR; INTRAVENOUS; SUBCUTANEOUS at 15:31

## 2018-12-30 RX ADMIN — ISOSORBIDE MONONITRATE 30 MILLIGRAM(S): 60 TABLET, EXTENDED RELEASE ORAL at 15:25

## 2018-12-30 RX ADMIN — Medication 3 MILLILITER(S): at 20:56

## 2018-12-30 RX ADMIN — PANTOPRAZOLE SODIUM 40 MILLIGRAM(S): 20 TABLET, DELAYED RELEASE ORAL at 05:55

## 2018-12-30 RX ADMIN — Medication 200 MILLIGRAM(S): at 12:25

## 2018-12-30 RX ADMIN — Medication 100 MICROGRAM(S): at 05:56

## 2018-12-30 RX ADMIN — CLOPIDOGREL BISULFATE 75 MILLIGRAM(S): 75 TABLET, FILM COATED ORAL at 15:26

## 2018-12-30 RX ADMIN — HEPARIN SODIUM 5000 UNIT(S): 5000 INJECTION INTRAVENOUS; SUBCUTANEOUS at 05:56

## 2018-12-30 NOTE — PROGRESS NOTE ADULT - SUBJECTIVE AND OBJECTIVE BOX
136    |  95<L>  |  74.0<H>  ----------------------------<  136<H>  Ca:8.0<L> (30 Dec 2018 08:28)  5.5<H>   |  22.0   |  5.51<H>      eGFR if Non : 9 <L>  eGFR if : 11 <L>                        7.3<L>  6.4   )-----------( 331      ( 30 Dec 2018 08:28 )             23.3<L>    Phos:-- M.6 mg/dL PTH:-- Uric acid:-- Serum Osm:--  Ferritin:-- Iron:-- TIBC:-- Tsat:--  B12:-- TSH:-- ( @ 08:28)      Patient was seen and evaluated on dialysis.   Patient is tolerating the procedure well.   T(C): 36.4 (18 @ 11:10), Max: 36.9 (18 @ 18:28)  HR: 57 (18 @ 11:10) (55 - 68)  BP: 123/59 (18 @ 11:10) (115/55 - 133/66)  Continue dialysis:   Dialyzer: Revaclear 300  QB: 400 ml., QD: 500ml.,  Goal UF 1.5 - 2 L  over  3 Hours     Receiving 1 Unit PRBC,

## 2018-12-30 NOTE — PROGRESS NOTE ADULT - SUBJECTIVE AND OBJECTIVE BOX
KAUSHIK HOFFMAN    [id number]    78y      Male    INTERVAL HPI/OVERNIGHT EVENTS:    patient being seen for chf, ckd and med management. patient seen at bedside with wife and is less sob .     patient is for hd today     patient complaining of reddish stools.       REVIEW OF SYSTEMS:    CONSTITUTIONAL: No fever, weight loss, or fatigue  RESPIRATORY: No cough, wheezing, hemoptysis; No shortness of breath  CARDIOVASCULAR: No chest pain, palpitations  GASTROINTESTINAL: No abdominal or epigastric pain. No nausea, vomiting  NEUROLOGICAL: No headaches, memory loss, loss of strength.  MISCELLANEOUS:      Vital Signs Last 24 Hrs  T(C): 36.4 (30 Dec 2018 11:10), Max: 36.9 (29 Dec 2018 18:28)  T(F): 97.5 (30 Dec 2018 11:10), Max: 98.5 (29 Dec 2018 18:28)  HR: 57 (30 Dec 2018 11:10) (55 - 68)  BP: 123/59 (30 Dec 2018 11:10) (115/55 - 133/66)  BP(mean): --  RR: 18 (30 Dec 2018 11:10) (16 - 18)  SpO2: 97% (30 Dec 2018 09:35) (95% - 98%)    PHYSICAL EXAM:    GENERAL: NAD, high flow   HEENT: PERRL, +EOMI  NECK: soft, Supple, No JVD,   CHEST/LUNG: crackles,  HEART: S1S2+, Regular rate and rhythm; No murmurs, rubs, or gallops  ABDOMEN: no hemorrhoids noted   EXTREMITIES:  2+ Peripheral Pulses, No edema  SKIN: No rashes or lesions  NEURO: AAOX3, no focal deficits,   PSYCH: normal mood      LABS:                        7.3    6.4   )-----------( 331      ( 30 Dec 2018 08:28 )             23.3     12-30    136  |  95<L>  |  74.0<H>  ----------------------------<  136<H>  5.5<H>   |  22.0  |  5.51<H>    Ca    8.0<L>      30 Dec 2018 08:28  Mg     2.6     12-30          stool guiac pending     MEDICATIONS  (STANDING):  ALBUTerol/ipratropium for Nebulization 3 milliLiter(s) Nebulizer every 6 hours  amLODIPine   Tablet 10 milliGRAM(s) Oral daily  aspirin enteric coated 81 milliGRAM(s) Oral daily  atorvastatin 80 milliGRAM(s) Oral at bedtime  chlorhexidine 2% Cloths 1 Application(s) Topical daily  clopidogrel Tablet 75 milliGRAM(s) Oral daily  diclofenac sodium 1% Gel 4 Gram(s) Topical <User Schedule>  DULoxetine 20 milliGRAM(s) Oral daily  gabapentin 100 milliGRAM(s) Oral daily  hydrALAZINE 50 milliGRAM(s) Oral every 8 hours  isosorbide   mononitrate ER Tablet (IMDUR) 30 milliGRAM(s) Oral daily  levothyroxine 100 MICROGram(s) Oral daily  losartan 50 milliGRAM(s) Oral at bedtime  methyl salicylate 14%/menthol 6% Topical Ointment 1 Application(s) Topical two times a day  prednisone 40mg po daily   metoprolol tartrate 50 milliGRAM(s) Oral two times a day  Nephro-jeana 1 Tablet(s) Oral daily  pantoprazole  Injectable 40 milliGRAM(s) IV Push every 12 hours  sodium chloride 0.9% lock flush 3 milliLiter(s) IV Push every 8 hours  sodium polystyrene sulfonate Suspension 30 Gram(s) Oral once  vancomycin  IVPB 750 milliGRAM(s) IV Intermittent once    MEDICATIONS  (PRN):  ALPRAZolam 0.25 milliGRAM(s) Oral two times a day PRN anxiety  benzonatate 100 milliGRAM(s) Oral every 8 hours PRN Cough  guaiFENesin    Syrup 200 milliGRAM(s) Oral every 6 hours PRN Cough  ondansetron Injectable 4 milliGRAM(s) IV Push every 6 hours PRN Nausea      RADIOLOGY & ADDITIONAL TESTS:

## 2018-12-30 NOTE — PROGRESS NOTE ADULT - ASSESSMENT
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   --> pulm following     3) fever/ likely source dialysis catheter possible infection  --> id following   --> iv vanco     4) ESRD (end stage renal disease). renal following   --> FOR PERMACATH placement and AVF on 1/3 as per vascular   --> will call back cardio in am for cardiac clearance for procedure    5) Hypertension, unspecified type.  Plan: cont. o/p regimen, renal/low sodium diet.     6) anemia of esrd, stable , monitor     7)  Coronary artery disease involving native coronary artery of native heart without angina pectoris. Plan: No CP, cont. o/p regimen.    8) hld - statin     9) chronic blood loss anemia --> 1 unit prbc ordered for today with HD  --> will consult GI for am  --> c.w ppi

## 2018-12-30 NOTE — PROGRESS NOTE ADULT - ASSESSMENT
A/P: 78 year old male with ESRD on HD with multiple aborted  CVC ( Tunneled )  insertion attempts , secondary to respiratory issues now scheduled for OR on 1/3 for CVC  and AVF creation under general anesthesia.     -OR on 1/3 for CVC  placement and AVF creation under general anesthesia.   -vein mapping  -protect LUE from IV access and blood draws    Continue to optimize w. Volume management , ARBs  added,    Goal Hgb > 10 gms.,

## 2018-12-30 NOTE — PROGRESS NOTE ADULT - SUBJECTIVE AND OBJECTIVE BOX
INTERVAL HPI/OVERNIGHT EVENTS:    No acute events reported overnight. Patient was scheduled for permacath placement by IR 12/28 but encountered the same limiting respiratory issues which prevented permacath placement on 12/27. Patient with right groin Shiley in place receiving HD. Plan is to schedule patient to OR for permacath placement and AVF creation under general anesthesia.  Vein mapping for AVF planning will be needed and preservation of the LUE is also needed in preparation. Previous Permacath tip culture resulted in coag negative Staph cultures. Patient receiving Vancomycin.     MEDICATIONS  (STANDING):  ALBUTerol/ipratropium for Nebulization 3 milliLiter(s) Nebulizer every 6 hours  amLODIPine   Tablet 10 milliGRAM(s) Oral daily  aspirin enteric coated 81 milliGRAM(s) Oral daily  atorvastatin 80 milliGRAM(s) Oral at bedtime  chlorhexidine 2% Cloths 1 Application(s) Topical daily  clopidogrel Tablet 75 milliGRAM(s) Oral daily  diclofenac sodium 1% Gel 4 Gram(s) Topical <User Schedule>  DULoxetine 20 milliGRAM(s) Oral daily  gabapentin 100 milliGRAM(s) Oral daily  hydrALAZINE 50 milliGRAM(s) Oral every 8 hours  isosorbide   mononitrate ER Tablet (IMDUR) 30 milliGRAM(s) Oral daily  levothyroxine 100 MICROGram(s) Oral daily  losartan 50 milliGRAM(s) Oral at bedtime  methyl salicylate 14%/menthol 6% Topical Ointment 1 Application(s) Topical two times a day  methylPREDNISolone sodium succinate Injectable 40 milliGRAM(s) IV Push every 8 hours  metoprolol tartrate 50 milliGRAM(s) Oral two times a day  Nephro-jeana 1 Tablet(s) Oral daily  pantoprazole  Injectable 40 milliGRAM(s) IV Push every 12 hours  sodium chloride 0.9% lock flush 3 milliLiter(s) IV Push every 8 hours  sodium polystyrene sulfonate Suspension 30 Gram(s) Oral once  vancomycin  IVPB 1000 milliGRAM(s) IV Intermittent once    MEDICATIONS  (PRN):  ALPRAZolam 0.25 milliGRAM(s) Oral two times a day PRN anxiety  benzonatate 100 milliGRAM(s) Oral every 8 hours PRN Cough  guaiFENesin    Syrup 200 milliGRAM(s) Oral every 6 hours PRN Cough  ondansetron Injectable 4 milliGRAM(s) IV Push every 6 hours PRN Nausea      Vital Signs Last 24 Hrs  T(C): 36.6 (30 Dec 2018 09:05), Max: 36.9 (29 Dec 2018 18:28)  T(F): 97.8 (30 Dec 2018 09:05), Max: 98.5 (29 Dec 2018 18:28)  HR: 56 (30 Dec 2018 09:35) (55 - 68)  BP: 133/66 (30 Dec 2018 09:05) (93/48 - 133/66)  BP(mean): --  RR: 18 (30 Dec 2018 09:05) (16 - 18)  SpO2: 97% (30 Dec 2018 09:35) (95% - 98%)    PE  Gen: Not in acute distress   Pulm: Non-labored breathing  CV: RRR  Abd: Soft, nondistended  Ext: Right femoral Shiley for HD access in place with no active bleeding nor surround erythema  Neuro: AAOX3, no neurosensory deficits       LABS:                        7.3    6.4   )-----------( 331      ( 30 Dec 2018 08:28 )             23.3     12-30    136  |  95<L>  |  74.0<H>  ----------------------------<  136<H>  5.5<H>   |  22.0  |  5.51<H>    Ca    8.0<L>      30 Dec 2018 08:28  Mg     2.6     12-30            RADIOLOGY & ADDITIONAL STUDIES:

## 2018-12-31 ENCOUNTER — APPOINTMENT (OUTPATIENT)
Dept: CARDIOLOGY | Facility: CLINIC | Age: 78
End: 2018-12-31

## 2018-12-31 LAB
ANION GAP SERPL CALC-SCNC: 14 MMOL/L — SIGNIFICANT CHANGE UP (ref 5–17)
BUN SERPL-MCNC: 57 MG/DL — HIGH (ref 8–20)
CALCIUM SERPL-MCNC: 7.5 MG/DL — LOW (ref 8.6–10.2)
CHLORIDE SERPL-SCNC: 93 MMOL/L — LOW (ref 98–107)
CO2 SERPL-SCNC: 26 MMOL/L — SIGNIFICANT CHANGE UP (ref 22–29)
CREAT SERPL-MCNC: 4.73 MG/DL — HIGH (ref 0.5–1.3)
GLUCOSE SERPL-MCNC: 123 MG/DL — HIGH (ref 70–115)
HCT VFR BLD CALC: 27.3 % — LOW (ref 42–52)
HGB BLD-MCNC: 8.6 G/DL — LOW (ref 14–18)
MAGNESIUM SERPL-MCNC: 2.2 MG/DL — SIGNIFICANT CHANGE UP (ref 1.6–2.6)
MCHC RBC-ENTMCNC: 28.4 PG — SIGNIFICANT CHANGE UP (ref 27–31)
MCHC RBC-ENTMCNC: 31.5 G/DL — LOW (ref 32–36)
MCV RBC AUTO: 90.1 FL — SIGNIFICANT CHANGE UP (ref 80–94)
PLATELET # BLD AUTO: 269 K/UL — SIGNIFICANT CHANGE UP (ref 150–400)
POTASSIUM SERPL-MCNC: 4.4 MMOL/L — SIGNIFICANT CHANGE UP (ref 3.5–5.3)
POTASSIUM SERPL-SCNC: 4.4 MMOL/L — SIGNIFICANT CHANGE UP (ref 3.5–5.3)
RBC # BLD: 3.03 M/UL — LOW (ref 4.6–6.2)
RBC # FLD: 14.2 % — SIGNIFICANT CHANGE UP (ref 11–15.6)
SODIUM SERPL-SCNC: 133 MMOL/L — LOW (ref 135–145)
WBC # BLD: 6.3 K/UL — SIGNIFICANT CHANGE UP (ref 4.8–10.8)
WBC # FLD AUTO: 6.3 K/UL — SIGNIFICANT CHANGE UP (ref 4.8–10.8)

## 2018-12-31 PROCEDURE — 99223 1ST HOSP IP/OBS HIGH 75: CPT

## 2018-12-31 PROCEDURE — 76870 US EXAM SCROTUM: CPT | Mod: 26

## 2018-12-31 PROCEDURE — 74176 CT ABD & PELVIS W/O CONTRAST: CPT | Mod: 26

## 2018-12-31 PROCEDURE — 99232 SBSQ HOSP IP/OBS MODERATE 35: CPT

## 2018-12-31 RX ORDER — TRAMADOL HYDROCHLORIDE 50 MG/1
25 TABLET ORAL ONCE
Refills: 0 | Status: DISCONTINUED | OUTPATIENT
Start: 2018-12-31 | End: 2018-12-31

## 2018-12-31 RX ORDER — VANCOMYCIN HCL 1 G
750 VIAL (EA) INTRAVENOUS
Refills: 0 | Status: DISCONTINUED | OUTPATIENT
Start: 2018-12-31 | End: 2019-01-03

## 2018-12-31 RX ADMIN — Medication 1 TABLET(S): at 15:48

## 2018-12-31 RX ADMIN — Medication 3 MILLILITER(S): at 20:39

## 2018-12-31 RX ADMIN — Medication 100 MICROGRAM(S): at 06:04

## 2018-12-31 RX ADMIN — PANTOPRAZOLE SODIUM 40 MILLIGRAM(S): 20 TABLET, DELAYED RELEASE ORAL at 18:20

## 2018-12-31 RX ADMIN — Medication 50 MILLIGRAM(S): at 06:09

## 2018-12-31 RX ADMIN — GABAPENTIN 100 MILLIGRAM(S): 400 CAPSULE ORAL at 15:46

## 2018-12-31 RX ADMIN — Medication 100 MILLIGRAM(S): at 06:06

## 2018-12-31 RX ADMIN — ATORVASTATIN CALCIUM 80 MILLIGRAM(S): 80 TABLET, FILM COATED ORAL at 22:28

## 2018-12-31 RX ADMIN — Medication 3 MILLILITER(S): at 08:47

## 2018-12-31 RX ADMIN — ISOSORBIDE MONONITRATE 30 MILLIGRAM(S): 60 TABLET, EXTENDED RELEASE ORAL at 15:46

## 2018-12-31 RX ADMIN — SODIUM CHLORIDE 3 MILLILITER(S): 9 INJECTION INTRAMUSCULAR; INTRAVENOUS; SUBCUTANEOUS at 06:03

## 2018-12-31 RX ADMIN — TRAMADOL HYDROCHLORIDE 25 MILLIGRAM(S): 50 TABLET ORAL at 11:08

## 2018-12-31 RX ADMIN — SODIUM CHLORIDE 3 MILLILITER(S): 9 INJECTION INTRAMUSCULAR; INTRAVENOUS; SUBCUTANEOUS at 22:29

## 2018-12-31 RX ADMIN — DULOXETINE HYDROCHLORIDE 20 MILLIGRAM(S): 30 CAPSULE, DELAYED RELEASE ORAL at 15:46

## 2018-12-31 RX ADMIN — CLOPIDOGREL BISULFATE 75 MILLIGRAM(S): 75 TABLET, FILM COATED ORAL at 15:46

## 2018-12-31 RX ADMIN — Medication 40 MILLIGRAM(S): at 06:04

## 2018-12-31 RX ADMIN — Medication 3 MILLILITER(S): at 15:02

## 2018-12-31 RX ADMIN — PANTOPRAZOLE SODIUM 40 MILLIGRAM(S): 20 TABLET, DELAYED RELEASE ORAL at 06:07

## 2018-12-31 RX ADMIN — TRAMADOL HYDROCHLORIDE 25 MILLIGRAM(S): 50 TABLET ORAL at 11:15

## 2018-12-31 RX ADMIN — AMLODIPINE BESYLATE 10 MILLIGRAM(S): 2.5 TABLET ORAL at 06:07

## 2018-12-31 RX ADMIN — LOSARTAN POTASSIUM 50 MILLIGRAM(S): 100 TABLET, FILM COATED ORAL at 22:28

## 2018-12-31 RX ADMIN — SODIUM CHLORIDE 3 MILLILITER(S): 9 INJECTION INTRAMUSCULAR; INTRAVENOUS; SUBCUTANEOUS at 15:56

## 2018-12-31 RX ADMIN — Medication 50 MILLIGRAM(S): at 22:28

## 2018-12-31 RX ADMIN — Medication 50 MILLIGRAM(S): at 18:20

## 2018-12-31 RX ADMIN — Medication 3 MILLILITER(S): at 04:17

## 2018-12-31 RX ADMIN — Medication 50 MILLIGRAM(S): at 06:04

## 2018-12-31 RX ADMIN — Medication 81 MILLIGRAM(S): at 15:46

## 2018-12-31 RX ADMIN — CHLORHEXIDINE GLUCONATE 1 APPLICATION(S): 213 SOLUTION TOPICAL at 15:53

## 2018-12-31 NOTE — PROGRESS NOTE ADULT - SUBJECTIVE AND OBJECTIVE BOX
CC: groin and lower abdomen pain    INTERVAL HPI/OVERNIGHT EVENTS:  Patient being seen for chf, ckd and med management. Patient seen at bedside with wife and is less sob.  Patient complaining of reddish stools.  Patient today complain of groin pain and suprapubic discomfort; 10/10; received tramadol  patient had right inguinal hematoma on 12/6; however also had shiley placed 2 days ago on same side  discussed with patient that re-imaging is necessary    he also has had dark urine for 2 days now. He doesn't make much urine at baseline.      Vital Signs Last 24 Hrs  ICU Vital Signs Last 24 Hrs  T(C): 36.8 (31 Dec 2018 11:10), Max: 37.2 (30 Dec 2018 14:20)  T(F): 98.3 (31 Dec 2018 11:10), Max: 99 (30 Dec 2018 14:20)  HR: 55 (31 Dec 2018 11:10) (55 - 67)  BP: 100/58 (31 Dec 2018 11:10) (100/58 - 129/50)  BP(mean): --  ABP: --  ABP(mean): --  RR: 18 (31 Dec 2018 11:10) (18 - 18)  SpO2: 96% (31 Dec 2018 03:57) (96% - 98%)    PHYSICAL EXAM:    GENERAL: NAD, high flow   HEENT: PERRL, +EOMI  NECK: soft, Supple, No JVD,   CHEST/LUNG: crackles,  HEART: S1S2+, Regular rate and rhythm; No murmurs, rubs, or gallops  ABDOMEN: tender suprapubic area noted, otherwise abdomen benign and soft.  EXTREMITIES:  2+ Peripheral Pulses, No edema  GENITOURINARY: penis normal appearing, non-tender scrotum, no swelling on either side. R fem shiley in place. tender suprapubic area  SKIN: No rashes or lesions  NEURO: AAOX3, no focal deficits,       LABS:                        8.6    6.3   )-----------( 269      ( 31 Dec 2018 08:15 )             27.3     12-31    133<L>  |  93<L>  |  57.0<H>  ----------------------------<  123<H>  4.4   |  26.0  |  4.73<H>    Ca    7.5<L>      31 Dec 2018 08:15  Phos  5.0     12-30  Mg     2.2     12-31    MEDICATIONS  (STANDING):  ALBUTerol/ipratropium for Nebulization 3 milliLiter(s) Nebulizer every 6 hours  amLODIPine   Tablet 10 milliGRAM(s) Oral daily  aspirin enteric coated 81 milliGRAM(s) Oral daily  atorvastatin 80 milliGRAM(s) Oral at bedtime  chlorhexidine 2% Cloths 1 Application(s) Topical daily  clopidogrel Tablet 75 milliGRAM(s) Oral daily  diclofenac sodium 1% Gel 4 Gram(s) Topical <User Schedule>  DULoxetine 20 milliGRAM(s) Oral daily  gabapentin 100 milliGRAM(s) Oral daily  hydrALAZINE 50 milliGRAM(s) Oral every 8 hours  isosorbide   mononitrate ER Tablet (IMDUR) 30 milliGRAM(s) Oral daily  levothyroxine 100 MICROGram(s) Oral daily  losartan 50 milliGRAM(s) Oral at bedtime  methyl salicylate 14%/menthol 6% Topical Ointment 1 Application(s) Topical two times a day  prednisone 40mg po daily   metoprolol tartrate 50 milliGRAM(s) Oral two times a day  Nephro-jeana 1 Tablet(s) Oral daily  pantoprazole  Injectable 40 milliGRAM(s) IV Push every 12 hours  sodium chloride 0.9% lock flush 3 milliLiter(s) IV Push every 8 hours  sodium polystyrene sulfonate Suspension 30 Gram(s) Oral once  vancomycin  IVPB 750 milliGRAM(s) IV Intermittent once    MEDICATIONS  (PRN):  ALPRAZolam 0.25 milliGRAM(s) Oral two times a day PRN anxiety  benzonatate 100 milliGRAM(s) Oral every 8 hours PRN Cough  guaiFENesin    Syrup 200 milliGRAM(s) Oral every 6 hours PRN Cough  ondansetron Injectable 4 milliGRAM(s) IV Push every 6 hours PRN Nausea

## 2018-12-31 NOTE — PROGRESS NOTE ADULT - SUBJECTIVE AND OBJECTIVE BOX
Subjective: No acute events reported overnight. Patient was scheduled for permacath placement by IR 12/28 but encountered the same limiting respiratory issues which prevented permacath placement on 12/27.  Patient with right groin Shiley in place receiving HD. Plan is to schedule patient to OR for permacath placement and AVF creation under general anesthesia. No acute changes. Previous Permacath tip culture resulted in coag negative Staph cultures. Patient receiving Vancomycin.   Tolerating HD via shiley   no complaints           MEDICATIONS  (STANDING):  ALBUTerol/ipratropium for Nebulization 3 milliLiter(s) Nebulizer every 6 hours  amLODIPine   Tablet 10 milliGRAM(s) Oral daily  aspirin enteric coated 81 milliGRAM(s) Oral daily  atorvastatin 80 milliGRAM(s) Oral at bedtime  chlorhexidine 2% Cloths 1 Application(s) Topical daily  clopidogrel Tablet 75 milliGRAM(s) Oral daily  diclofenac sodium 1% Gel 4 Gram(s) Topical <User Schedule>  DULoxetine 20 milliGRAM(s) Oral daily  gabapentin 100 milliGRAM(s) Oral daily  hydrALAZINE 50 milliGRAM(s) Oral every 8 hours  isosorbide   mononitrate ER Tablet (IMDUR) 30 milliGRAM(s) Oral daily  levothyroxine 100 MICROGram(s) Oral daily  losartan 50 milliGRAM(s) Oral at bedtime  methyl salicylate 14%/menthol 6% Topical Ointment 1 Application(s) Topical two times a day  metoprolol tartrate 50 milliGRAM(s) Oral two times a day  Nephro-jeana 1 Tablet(s) Oral daily  pantoprazole  Injectable 40 milliGRAM(s) IV Push every 12 hours  predniSONE   Tablet 40 milliGRAM(s) Oral daily  sodium chloride 0.9% lock flush 3 milliLiter(s) IV Push every 8 hours    MEDICATIONS  (PRN):  ALPRAZolam 0.25 milliGRAM(s) Oral two times a day PRN anxiety  benzonatate 100 milliGRAM(s) Oral every 8 hours PRN Cough  guaiFENesin    Syrup 200 milliGRAM(s) Oral every 6 hours PRN Cough  ondansetron Injectable 4 milliGRAM(s) IV Push every 6 hours PRN Nausea      Vital Signs Last 24 Hrs  T(C): 36.9 (31 Dec 2018 05:15), Max: 37.2 (30 Dec 2018 14:20)  T(F): 98.4 (31 Dec 2018 05:15), Max: 99 (30 Dec 2018 14:20)  HR: 63 (31 Dec 2018 05:15) (55 - 67)  BP: 107/58 (31 Dec 2018 05:15) (107/58 - 133/66)  BP(mean): --  RR: 18 (31 Dec 2018 05:15) (18 - 18)  SpO2: 96% (31 Dec 2018 03:57) (96% - 98%)    Physical Exam:    Constitutional: NAD  Gen: Not in acute distress   Pulm: Non-labored breathing  CV: RRR  Abd: Soft, nondistended  Ext: Right femoral Shiley for HD access in place with no active bleeding nor surround erythema    LABS:                        7.3    6.4   )-----------( 331      ( 30 Dec 2018 08:28 )             23.3     12-30    136  |  95<L>  |  74.0<H>  ----------------------------<  136<H>  5.5<H>   |  22.0  |  5.51<H>    Ca    8.0<L>      30 Dec 2018 08:28  Phos  5.0     12-30  Mg     2.6     12-30

## 2018-12-31 NOTE — CONSULT NOTE ADULT - SUBJECTIVE AND OBJECTIVE BOX
Patient is a 78y old  Male who presents with a chief complaint of Sent in for possible catheter infection (31 Dec 2018 09:24)      HPI: 77 y/o M PMHx of CAD (s/p CABG >20 years ago, recent CHAN to LCx on 18), Pulm HTN, ESRD on HD T/Th/Sat, Lung CA s/p XRT , HTN, and HLD who was transferred from rehab on 18 due to low grade temp and purulent drainage from catheter insertion site. Patient has since had previous permacath removed, and had a new temporary HD cath placed on 18 with episode of respiratory distress requiring MICU management, now currently on 5 tower resting comfortable. Pt currently on Vancomycin followed by ID for previous permacath infection, Vascular planning to take the patient for AVF on 18. Patient       77 y/o male with hx of ESRD on HD T/Th/Sat, CAD s/p PCI on recent admission, HTN, Pulm HTN, Lung cancer s/p XRT in  Was at HD yesterday evening and noted to have low grade temp and purulent drainage from catheter insertion site with erythema. Patient completed his HD session and was sent to ED. In ED was noted to have WBC of 15, no shift, no fever. There was some scant drainage around catheter insertion site and erythema. Patient also noted to have hypoxia. Denies cough, CP, Chills. Admits to subjective fevers at home over past few days. No focal weakness. (23 Dec 2018 02:19)    Patient seen and examined earlier this evening.  Son at bedside assisted translating.    77 y/o male with hx of CAD s/p CABG in distant past (> 20 yrs ago), PCI? unknown vessels, hypertension, hyperlipidemia,  was brought in by family for dry cough on and off for past 2 days, no fever, no sick contact. + nasal stuffiness  and scratchy throat. As per family every year around this time he gets this. no h/o asthma. no cp. no abd. pain. no n/v/d. no sob reported. In the er pt's hr was in mid 40's. pt. denies dizziness but reports some fatigue. Family is not aware if he has h/o bradycardia. pt's Cr is 3.89 from blood work done in ER. family does not know if pt. has any kidney problem.  As per family pt. was seen by pcp about 2 weeks ago and had blood work done but results are not known to family and they did not get any call about abnormal labs. Pt. was seen by nephrologist Dr. Costa yesterday as routine check up as per son in law but blood work from pcp office was not available so pt. was instructed to return when blood work is available. As per son in law pt's cough has topped while in the ER.  no other complaints. (2018 19:56)      PAST MEDICAL & SURGICAL HISTORY:  Chronic anemia  ESRD (end stage renal disease)  CAD (coronary artery disease)  Lung cancer: had yoni radiation in .  Bipolar disorder  High cholesterol  Hypertension  S/P CABG (coronary artery bypass graft): pt&#x27;s son in law states h/o some stents near neck area ? carotid ? he is not sure.      PREVIOUS DIAGNOSTIC TESTING:      ECHO  FINDINGS:    STRESS  FINDINGS:    CATHETERIZATION  FINDINGS:      Allergies    No Known Allergies    Intolerances        MEDICATIONS  (STANDING):  ALBUTerol/ipratropium for Nebulization 3 milliLiter(s) Nebulizer every 6 hours  amLODIPine   Tablet 10 milliGRAM(s) Oral daily  aspirin enteric coated 81 milliGRAM(s) Oral daily  atorvastatin 80 milliGRAM(s) Oral at bedtime  chlorhexidine 2% Cloths 1 Application(s) Topical daily  clopidogrel Tablet 75 milliGRAM(s) Oral daily  diclofenac sodium 1% Gel 4 Gram(s) Topical <User Schedule>  DULoxetine 20 milliGRAM(s) Oral daily  gabapentin 100 milliGRAM(s) Oral daily  hydrALAZINE 50 milliGRAM(s) Oral every 8 hours  isosorbide   mononitrate ER Tablet (IMDUR) 30 milliGRAM(s) Oral daily  levothyroxine 100 MICROGram(s) Oral daily  losartan 50 milliGRAM(s) Oral at bedtime  methyl salicylate 14%/menthol 6% Topical Ointment 1 Application(s) Topical two times a day  metoprolol tartrate 50 milliGRAM(s) Oral two times a day  Nephro-jeana 1 Tablet(s) Oral daily  pantoprazole  Injectable 40 milliGRAM(s) IV Push every 12 hours  predniSONE   Tablet 40 milliGRAM(s) Oral daily  sodium chloride 0.9% lock flush 3 milliLiter(s) IV Push every 8 hours  traMADol 25 milliGRAM(s) Oral once    MEDICATIONS  (PRN):  ALPRAZolam 0.25 milliGRAM(s) Oral two times a day PRN anxiety  benzonatate 100 milliGRAM(s) Oral every 8 hours PRN Cough  guaiFENesin    Syrup 200 milliGRAM(s) Oral every 6 hours PRN Cough  ondansetron Injectable 4 milliGRAM(s) IV Push every 6 hours PRN Nausea      FAMILY HISTORY:  Family history of essential hypertension (Father)      SOCIAL HISTORY:    CIGARETTES:    ALCOHOL:    REVIEW OF SYSTEMS:  CONSTITUTIONAL: No fever, weight loss, or fatigue  EYES: No eye pain, visual disturbances, or discharge  ENMT:  No difficulty hearing, tinnitus, vertigo; No sinus or throat pain  NECK: No pain or stiffness  RESPIRATORY: No cough, wheezing, chills or hemoptysis; No Shortness of Breath  CARDIOVASCULAR: No chest pain, palpitations, passing out, dizziness, or leg swelling  GASTROINTESTINAL: No abdominal or epigastric pain. No nausea, vomiting, or hematemesis; No diarrhea or constipation. No melena or hematochezia.  GENITOURINARY: No dysuria, frequency, hematuria, or incontinence  NEUROLOGICAL: No headaches, memory loss, loss of strength, numbness, or tremors  SKIN: No itching, burning, rashes, or lesions   LYMPH Nodes: No enlarged glands  ENDOCRINE: No heat or cold intolerance; No hair loss  MUSCULOSKELETAL: No joint pain or swelling; No muscle, back, or extremity pain  PSYCHIATRIC: No depression, anxiety, mood swings, or difficulty sleeping  HEME/LYMPH: No easy bruising, or bleeding gums  ALLERY AND IMMUNOLOGIC: No hives or eczema	      REVIEW OF SYMPTOMS: Cardiovascular:     chest pain,  dyspnea,  syncope,    palpitaitons,      dizziness,    Orthopnea,      Paroxsymal nocturnal dyspnea;  Respiratory:    Dyspnea,   cough,   ;   Genitourinary:  No dysuria, no hematuria; Gastrointestinal:   No dark color stool, no melena, no diarrhea, no constipation, no abdominal pain; Neurological: No headache, no dizziness, no slurred speech;  Psychiatric: No agitation, no anxiety.  ALL OTHER REVIEW OF SYSTEMS ARE NEGATIVE.    Vital Signs Last 24 Hrs  T(C): 36.9 (31 Dec 2018 05:15), Max: 37.2 (30 Dec 2018 14:20)  T(F): 98.4 (31 Dec 2018 05:15), Max: 99 (30 Dec 2018 14:20)  HR: 63 (31 Dec 2018 05:15) (56 - 67)  BP: 107/58 (31 Dec 2018 05:15) (107/58 - 129/50)  BP(mean): --  RR: 18 (31 Dec 2018 05:15) (18 - 18)  SpO2: 96% (31 Dec 2018 03:57) (96% - 98%)    Daily     Daily Weight in k (30 Dec 2018 14:20)    I&O's Detail    30 Dec 2018 07:01  -  31 Dec 2018 07:00  --------------------------------------------------------  IN:  Total IN: 0 mL    OUT:    Other: 2000 mL  Total OUT: 2000 mL    Total NET: -2000 mL          PHYSICAL EXAM:  Appearance: Normal, well nourished	  HEENT:   Normal oral mucosa, PERRL, EOMI, sclera non-icteric	  Lymphatic: No cervical lymphadenopathy  Cardiovascular: Normal S1 S2, No JVD, No cardiac murmurs, No carotid bruits, No peripheral edema  Respiratory: Lungs clear to auscultation	  Psychiatry: A & O x 3, Mood & affect appropriate  Gastrointestinal:  Soft, Non-tender, + BS, no bruits	  Skin: No rashes, No ecchymoses, No cyanosis  Neurologic: Grossly non-focal with full strength in all four extremities  Extremities: Normal range of motion, No clubbing, cyanosis or edema  Vascular: Peripheral pulses palpable 2+ bilaterally      INTERPRETATION OF TELEMETRY:    ECG:    LABS:                        8.6    6.3   )-----------( 269      ( 31 Dec 2018 08:15 )             27.3     12    133<L>  |  93<L>  |  57.0<H>  ----------------------------<  123<H>  4.4   |  26.0  |  4.73<H>    Ca    7.5<L>      31 Dec 2018 08:15  Phos  5.0       Mg     2.2                   I&O's Summary    30 Dec 2018 07:01  -  31 Dec 2018 07:00  --------------------------------------------------------  IN: 0 mL / OUT: 2000 mL / NET: -2000 mL      BNPSerum Pro-Brain Natriuretic Peptide: 94241 pg/mL ( @ 21:12)    Serum Pro-Brain Natriuretic Peptide: 41808 pg/mL (18 @ 21:12)    RADIOLOGY & ADDITIONAL STUDIES: Patient is a 78y old  Male who presents with a chief complaint of Sent in for possible catheter infection (31 Dec 2018 09:24)      HPI: 77 y/o M PMHx of CAD (s/p CABG >20 years ago, recent CHAN to LCx on 18), HFpEF, Pulm HTN, ESRD on HD T/Th/Sat, Lung CA s/p XRT , HTN, and HLD who was transferred from rehab on 18 due to low grade temp and purulent drainage from catheter insertion site. Patient has since had previous permacath removed, and had a new temporary HD cath placed on 18 with episode of respiratory distress requiring MICU management, now currently on 5 tower resting comfortable. Pt currently on Vancomycin followed by ID for previous permacath infection, Vascular planning to take the patient for AVF on 18. Patient states that he is having a lot pressure in his suprapubic area, and has very dark brown urine. Otherwise patient states that he is feeling ok, denies any chest pain or shortness of breath at this time. Does note a continuos cough. Patient with no recent fevers, chills, CP, palpitations, n/v/d, headache, or dizziness.       PAST MEDICAL & SURGICAL HISTORY:  Chronic anemia  ESRD (end stage renal disease)  CAD (coronary artery disease)  Lung cancer: had yoni radiation in .  Bipolar disorder  High cholesterol  Hypertension  S/P CABG (coronary artery bypass graft): pt&#x27;s son in law states h/o some stents near neck area ? carotid ? he is not sure.      PREVIOUS DIAGNOSTIC TESTING:      ECHO  FINDINGS:< from: TTE Echo Complete w/Doppler (18 @ 11:22) >     PHYSICIAN INTERPRETATION:  Left Ventricle: Endocardial visualization was enhanced with intravenous   echo contrast. The left ventricular internal cavity size is normal.  Global LV systolic function was normal. Left ventricular ejection   fraction, by visual estimation, is 60 to 65%. Spectral Doppler shows   pseudonormal pattern of left ventricular myocardial filling (Grade II   diastolic dysfunction).       LV Wall Scoring:  The basal anteroseptal segment is akinetic. The basal and mid inferior   wall is  hypokinetic.    Right Ventricle: Normal right ventricular size and function.  Left Atrium: Mildlyenlarged left atrium.  Right Atrium: The right atrium is normal in size.  Pericardium: There is no evidence of pericardial effusion.  Mitral Valve: Thickening of the anterior and posterior mitral valve   leaflets. Moderate mitral valve regurgitation is seen.  Tricuspid Valve: The tricuspid valve is normal. Mild-moderate tricuspid   regurgitation is visualized. Estimated pulmonary artery systolic pressure   is 53.7 mmHg assuming a right atrial pressure of 8 mmHg, which is   consistent with moderate pulmonary hypertension.  Aortic Valve: Sclerotic aortic valve with normal opening. No evidence of   aortic valve regurgitation is seen.  Pulmonic Valve: Structurally normal pulmonic valve, with normal leaflet   excursion. Trace pulmonic valve regurgitation.  Aorta: The aortic root and ascending aorta are structurally normal, with   no evidence of dilitation.  Pulmonary Artery: The main pulmonary artery is normal in size.  Venous: The inferior vena cava was normal sized, with respiratory size   variation less than 50%.       Summary:   1. Left ventricular ejection fraction, by visual estimation, is 60 to   65%.   2. Normal global left ventricular systolic function.   3. Basal anteroseptal segment and basal and mid inferior wall are   abnormalas described above.   4. Spectral Doppler shows pseudonormal pattern of left ventricular   myocardial filling (Grade II diastolic dysfunction).   5. Estimated pulmonary artery systolic pressure is 53.7 mmHg assuming a   right atrial pressure of 8 mmHg, which is consistent with moderate   pulmonary hypertension.   6. Endocardial visualization was enhanced with intravenous echo contrast.    [id number] Arik Hernandez MD, Electronically signed on 12/3/2018 at 5:44:10   PM            < end of copied text >    CATHETERIZATION  FINDINGS:  < from: Cardiac Cath Lab - Adult (1205.18 @ 15:40) >  CORONARY VESSELS: The coronary circulation is right dominant.  LM:   --  Ostial LM: There was a 20 % stenosis within the stented segment.  LAD:   --  Ostial LAD: There was a 100 % stenosis.  CX:  --  Mid circumflex: There was a 90 % stenosis.  RCA:   --  Ostial RCA: There was a 100 % stenosis.  GRAFTS:   --  Graft to the LAD: The graft was a ANA. Graft angiography  showed no evidence of disease.  AORTA: Ascending aorta: Normal.  COMPLICATIONS: No complications occurred during the cath lab visit. No  complications occurred during the cath lab visit.  DIAGNOSTIC IMPRESSIONS: Patent ANA to LAD. Other grafts occluded.  Severe circumflex disease. PCI performed with 1 CHAN.  DIAGNOSTIC RECOMMENDATIONS: Aspirin and Plavix.  INTERVENTIONAL IMPRESSIONS: Patent ANA to LAD. Other grafts occluded.  Severe circumflex disease. PCI performed with 1 CHAN.  INTERVENTIONAL RECOMMENDATIONS: Aspirin and Plavix.  Prepared and signed by  Raghav Plunkett  Signed 2018 09:53:03    < end of copied text >        Allergies    No Known Allergies    Intolerances        MEDICATIONS  (STANDING):  ALBUTerol/ipratropium for Nebulization 3 milliLiter(s) Nebulizer every 6 hours  amLODIPine   Tablet 10 milliGRAM(s) Oral daily  aspirin enteric coated 81 milliGRAM(s) Oral daily  atorvastatin 80 milliGRAM(s) Oral at bedtime  chlorhexidine 2% Cloths 1 Application(s) Topical daily  clopidogrel Tablet 75 milliGRAM(s) Oral daily  diclofenac sodium 1% Gel 4 Gram(s) Topical <User Schedule>  DULoxetine 20 milliGRAM(s) Oral daily  gabapentin 100 milliGRAM(s) Oral daily  hydrALAZINE 50 milliGRAM(s) Oral every 8 hours  isosorbide   mononitrate ER Tablet (IMDUR) 30 milliGRAM(s) Oral daily  levothyroxine 100 MICROGram(s) Oral daily  losartan 50 milliGRAM(s) Oral at bedtime  methyl salicylate 14%/menthol 6% Topical Ointment 1 Application(s) Topical two times a day  metoprolol tartrate 50 milliGRAM(s) Oral two times a day  Nephro-jeana 1 Tablet(s) Oral daily  pantoprazole  Injectable 40 milliGRAM(s) IV Push every 12 hours  predniSONE   Tablet 40 milliGRAM(s) Oral daily  sodium chloride 0.9% lock flush 3 milliLiter(s) IV Push every 8 hours  traMADol 25 milliGRAM(s) Oral once    MEDICATIONS  (PRN):  ALPRAZolam 0.25 milliGRAM(s) Oral two times a day PRN anxiety  benzonatate 100 milliGRAM(s) Oral every 8 hours PRN Cough  guaiFENesin    Syrup 200 milliGRAM(s) Oral every 6 hours PRN Cough  ondansetron Injectable 4 milliGRAM(s) IV Push every 6 hours PRN Nausea      FAMILY HISTORY:  Family history of essential hypertension (Father)      SOCIAL HISTORY:    CIGARETTES: Former smoker    ALCOHOL: Denies      REVIEW OF SYMPTOMS:   Cardiovascular:  Denies   chest pain,  dyspnea,  syncope,    palpitaitons,      dizziness,    Orthopnea,      Paroxsymal nocturnal dyspnea;    Respiratory:  AS PER HPI  Genitourinary:  No dysuria, no hematuria;   Gastrointestinal:   No dark color stool, no melena, no diarrhea, no constipation, no abdominal pain;   Neurological: No headache, no dizziness, no slurred speech;    Psychiatric: No agitation, no anxiety.    ALL OTHER REVIEW OF SYSTEMS ARE NEGATIVE.    Vital Signs Last 24 Hrs  T(C): 36.9 (31 Dec 2018 05:15), Max: 37.2 (30 Dec 2018 14:20)  T(F): 98.4 (31 Dec 2018 05:15), Max: 99 (30 Dec 2018 14:20)  HR: 63 (31 Dec 2018 05:15) (56 - 67)  BP: 107/58 (31 Dec 2018 05:15) (107/58 - 129/50)  RR: 18 (31 Dec 2018 05:15) (18 - 18)  SpO2: 96% (31 Dec 2018 03:57) (96% - 98%)    Daily     Daily Weight in k (30 Dec 2018 14:20)    I&O's Detail    30 Dec 2018 07:01  -  31 Dec 2018 07:00  --------------------------------------------------------  IN:  Total IN: 0 mL    OUT:    Other: 2000 mL  Total OUT: 2000 mL    Total NET: -2000 mL          PHYSICAL EXAM:  Appearance: Normal, well nourished	  HEENT:   Normal oral mucosa, PERRL, EOMI, sclera non-icteric	  Lymphatic: No cervical lymphadenopathy  Cardiovascular: Normal S1 S2, No JVD, II/VI systolic murmur lsb, No carotid bruits, No peripheral edema  Respiratory: Coarse rhonchi in upper lungs, trace rales at bases  Psychiatry: A & O x 3, Mood & affect appropriate  Gastrointestinal:  Soft, Non-tender, + BS, no bruits	  Skin: No rashes, No ecchymoses, No cyanosis  Neurologic: Grossly non-focal with full strength in all four extremities  Extremities: Normal range of motion, No clubbing, cyanosis or edema  Vascular: Peripheral pulses palpable 2+ bilaterally      INTERPRETATION OF TELEMETRY: NSR/SB, PVCs    ECG: NSR, prolonged QT, old anteroseptal infarct, nonspecific T wave abnormalities     LABS:                        8.6    6.3   )-----------( 269      ( 31 Dec 2018 08:15 )             27.3         133<L>  |  93<L>  |  57.0<H>  ----------------------------<  123<H>  4.4   |  26.0  |  4.73<H>    Ca    7.5<L>      31 Dec 2018 08:15  Phos  5.0       Mg     2.2         BNPSerum Pro-Brain Natriuretic Peptide: 57258 pg/mL ( @ 21:12)    Serum Pro-Brain Natriuretic Peptide: 16039 pg/mL (18 @ 21:12)    RADIOLOGY & ADDITIONAL STUDIES:  < from: Xray Chest 1 View- PORTABLE-Routine (18 @ 05:17) >   EXAM:  XR CHEST PORTABLE ROUTINE 1V                          PROCEDURE DATE:  2018          INTERPRETATION:  Portable chest radiograph        CLINICAL INFORMATION:   Pulmonary edema. Follow-up.    TECHNIQUE:  Portable  AP view of the chest was obtained.    COMPARISON: 2018 available for review.    FINDINGS:   The lungs  show similar M moderate RIGHT pleural effusion causing   haziness overlying RIGHT lung condition to the RIGHT of a diffuse of   pulmonary opacities. Effusion layersto the RIGHT posterior 10th rib .  There are is a small LEFT pleural effusion. Is mild vascular congestion.    There is a cardiomegaly with changes from prior coronary artery bypass   graft coronary artery bypass graft procedure.         .         Visualized osseous structures are intact.        IMPRESSION:   No interval change as described..            < end of copied text >

## 2018-12-31 NOTE — CONSULT NOTE ADULT - SUBJECTIVE AND OBJECTIVE BOX
HISTORY OF PRESENT ILLNESS:  This is a 78y old Male with a past medical history significant for lung cancer, CAD, CABG, CHF, ESRD, HTN presents with sob. He is being treated for CHF exacerbation. Patient has worsening anemia since admission and complains of constipation. He has a bowel movement every 2-3 days, occasional straining, light brown stool occasionally has red blood on the stool.     REVIEW OF SYSTEMS:  Constitutional:  No unintentional weight loss, fevers, chills or night sweats	  Eyes: No eye pain, redness, discharge, or proptosis  ENMT: No sore throat, ear pain, mouth sores, or swollen glands in the neck  Respiratory: No dyspnea, cough or wheezing  Cardiovascular: No chest pain, dyspnea on exertion, or orthopnea  Gastrointestinal:	Please see HPI  Genitourinary: No dysuria or hematuria  Neurological:	 No changes in sleep/wake cycle, convulsions, confusion, dizziness or lightheadedness  Psychiatric: No changes in personality or emotional problems   Hematology: No easy bruising   Endocrine: No hot or cold flashes or deepening of voice	  All other review of systems were completed and were otherwise negative save what is reported in the HPI.    PAST MEDICAL/SURGICAL HISTORY:  Chronic anemia  ESRD (end stage renal disease)  CAD (coronary artery disease)  Lung cancer: had yoni radiation in 2006.  Bipolar disorder  High cholesterol  Hypertension  S/P CABG (coronary artery bypass graft): pt&#x27;s son in [facility] h/o some stents near neck area ? carotid ? he is not sure.    SOCIAL HISTORY:  - TOBACCO: quit 10 years ago   - ALCOHOL: none  - ILLICIT DRUG USE: Denies    FAMILY HISTORY:  No known history of gastrointestinal or liver disease;  Family history of essential hypertension (Father)      HOME MEDICATIONS:  Cymbalta 20 mg oral delayed release capsule: 20 milligram(s) orally once a day (28 Nov 2018 20:09)  epoetin nguyễn: 6000 unit(s) injectable Tuesday, Thursday, Saturday with dialysis (15 Dec 2018 10:38)  fexofenadine 60 mg oral tablet: 60 milligram(s) orally once a day (28 Nov 2018 20:13)  omeprazole 40 mg oral delayed release capsule: 1 cap(s) orally once a day (28 Nov 2018 20:11)    INPATIENT MEDICATIONS:  MEDICATIONS  (STANDING):  ALBUTerol/ipratropium for Nebulization 3 milliLiter(s) Nebulizer every 6 hours  amLODIPine   Tablet 10 milliGRAM(s) Oral daily  aspirin enteric coated 81 milliGRAM(s) Oral daily  atorvastatin 80 milliGRAM(s) Oral at bedtime  chlorhexidine 2% Cloths 1 Application(s) Topical daily  clopidogrel Tablet 75 milliGRAM(s) Oral daily  diclofenac sodium 1% Gel 4 Gram(s) Topical <User Schedule>  DULoxetine 20 milliGRAM(s) Oral daily  gabapentin 100 milliGRAM(s) Oral daily  hydrALAZINE 50 milliGRAM(s) Oral every 8 hours  isosorbide   mononitrate ER Tablet (IMDUR) 30 milliGRAM(s) Oral daily  levothyroxine 100 MICROGram(s) Oral daily  losartan 50 milliGRAM(s) Oral at bedtime  methyl salicylate 14%/menthol 6% Topical Ointment 1 Application(s) Topical two times a day  metoprolol tartrate 50 milliGRAM(s) Oral two times a day  Nephro-jeana 1 Tablet(s) Oral daily  pantoprazole  Injectable 40 milliGRAM(s) IV Push every 12 hours  predniSONE   Tablet 40 milliGRAM(s) Oral daily  sodium chloride 0.9% lock flush 3 milliLiter(s) IV Push every 8 hours    MEDICATIONS  (PRN):  ALPRAZolam 0.25 milliGRAM(s) Oral two times a day PRN anxiety  benzonatate 100 milliGRAM(s) Oral every 8 hours PRN Cough  guaiFENesin    Syrup 200 milliGRAM(s) Oral every 6 hours PRN Cough  ondansetron Injectable 4 milliGRAM(s) IV Push every 6 hours PRN Nausea    ALLERGIES:  No Known Allergies    VITAL SIGNS LAST 24 HOURS:  T(C): 36.9 (31 Dec 2018 05:15), Max: 37.2 (30 Dec 2018 14:20)  T(F): 98.4 (31 Dec 2018 05:15), Max: 99 (30 Dec 2018 14:20)  HR: 63 (31 Dec 2018 05:15) (56 - 67)  BP: 107/58 (31 Dec 2018 05:15) (107/58 - 129/50)  BP(mean): --  RR: 18 (31 Dec 2018 05:15) (18 - 18)  SpO2: 96% (31 Dec 2018 03:57) (96% - 98%)      12-30-18 @ 07:01  -  12-31-18 @ 07:00  --------------------------------------------------------  IN: 0 mL / OUT: 2000 mL / NET: -2000 mL        12-30-18 @ 07:01  -  12-31-18 @ 07:00  --------------------------------------------------------  IN: 0 mL / OUT: 2000 mL / NET: -2000 mL          PHYSICAL EXAM:  Constitutional: Well-developed, well-nourished, in no apparent distress on ventimask  Eyes: Sclerae anicteric, conjunctivae normal  ENMT: Mucus membranes moist, no oropharyngeal thrush noted  Neck: No thyroid nodules appreciated, no significant cervical or supraclavicular lymphadenopathy  Respiratory: tachypneic ; rales at bases B/l  Cardiovascular: Regular rate and rhythm  Gastrointestinal: Soft, nontender, nondistended, normoactive bowel sounds; no hepatosplenomegaly appreciated; no rebound tenderness or involuntary guarding  Rectal: Perianal exam within normal limits; normal sphincter tone; Scant brown stool on glove no blood or external hemorrhoids  Extremities: No clubbing, cyanosis or edema  Neurological: Alert and oriented to person, place and time; no asterixis  Skin: No jaundice  Lymph Nodes: No significant lymphadenopathy  Musculoskeletal: No significant peripheral atrophy    LABS:                        8.6    6.3   )-----------( 269      ( 31 Dec 2018 08:15 )             27.3       12-31    133<L>  |  93<L>  |  57.0<H>  ----------------------------<  123<H>  4.4   |  26.0  |  4.73<H>    Ca    7.5<L>      31 Dec 2018 08:15  Phos  5.0     12-30  Mg     2.2     12-31    Serum Pro-Brain Natriuretic Peptide: 68614: NT-proBNP Interpretive comments:  Acute Congestive Heart Failure is unlikely if NT-proBNP is less than 300  pg/mL, for any age.  Consider Acute Congestive Heart Failure if:  AGE               NT-proBNP Result  ___               ________________  < 50year           > 450 pg/mL  50 - 75 years     > 900 pg/mL  > 75 years         > 1800 pg/ml  All results require clinical correlation. Consider obtaining a baseline or  "dry" NT-proBNP level when the patient is stabilized, so that subsequent  levels can be related to that. Patients with recurrent CHF may have  elevated  NT-proBNP levels. Acute failure episodes generally produce levels at  least 25  % greater than base line levels. The above values are derived from a large  multi-center international study, "Gaston, MELISSA, et al, European Heart  Journal,  2006; 27:330-337. pg/mL (12.30.18 @ 21:12)      IMAGING:  < from: Xray Chest 1 View- PORTABLE-Routine (12.28.18 @ 05:17) >  FINDINGS:   The lungs  show similar M moderate RIGHT pleural effusion causing   haziness overlying RIGHT lung condition to the RIGHT of a diffuse of   pulmonary opacities. Effusion layersto the RIGHT posterior 10th rib .  There are is a small LEFT pleural effusion. Is mild vascular congestion.    There is a cardiomegaly with changes from prior coronary artery bypass   graft coronary artery bypass graft procedure.         Visualized osseous structures are intact.    IMPRESSION:   No interval change as described..          < end of copied text >      < from: TTE Echo Complete w/Doppler (12.03.18 @ 11:22) >  Summary:   1. Left ventricular ejection fraction, by visual estimation, is 60 to   65%.   2. Normal global left ventricular systolic function.   3. Basal anteroseptal segment and basal and mid inferior wall are   abnormalas described above.   4. Spectral Doppler shows pseudonormal pattern of left ventricular   myocardial filling (Grade II diastolic dysfunction).   5. Estimated pulmonary artery systolic pressure is 53.7 mmHg assuming a   right atrial pressure of 8 mmHg, which is consistent with moderate   pulmonary hypertension.   6. Endocardial visualization was enhanced with intravenous echo contrast.    < end of copied text > HISTORY OF PRESENT ILLNESS:  This is a 78y old Male with a past medical history significant for lung cancer s/p XRT, COPD, CAD, CABG, ESRD on HD, HTN presents with permacath infection. He is awaiting new permacath and AVF but it has been delayed due to respiratory issues.  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 Patient has worsening anemia since admission and complains of constipation. He received 1 UPRBC on 12/29. He has a bowel movement every 2-3 days, occasional constipation/straining, light brown stool occasionally has red blood on the stool. He has never had a colonoscopy or EGD. He denies abdominal pain.     REVIEW OF SYSTEMS:  Constitutional:  No unintentional weight loss, fevers, chills or night sweats	  Eyes: No eye pain, redness, discharge, or proptosis  ENMT: No sore throat, ear pain, mouth sores, or swollen glands in the neck  Respiratory: No dyspnea, cough or wheezing  Cardiovascular: No chest pain, dyspnea on exertion, or orthopnea  Gastrointestinal:	Please see HPI  Genitourinary: No dysuria or hematuria  Neurological:	 No changes in sleep/wake cycle, convulsions, confusion, dizziness or lightheadedness  Psychiatric: No changes in personality or emotional problems   Hematology: No easy bruising   Endocrine: No hot or cold flashes or deepening of voice	  All other review of systems were completed and were otherwise negative save what is reported in the HPI.    PAST MEDICAL/SURGICAL HISTORY:  Chronic anemia  ESRD (end stage renal disease)  CAD (coronary artery disease)  Lung cancer: had yoni radiation in 2006.  Bipolar disorder  High cholesterol  Hypertension  S/P CABG (coronary artery bypass graft): pt&#x27;s son in [facility] h/o some stents near neck area ? carotid ? he is not sure.    SOCIAL HISTORY:  - TOBACCO: quit 10 years ago   - ALCOHOL: none  - ILLICIT DRUG USE: Denies    FAMILY HISTORY:  No known history of gastrointestinal or liver disease;  Family history of essential hypertension (Father)      HOME MEDICATIONS:  Cymbalta 20 mg oral delayed release capsule: 20 milligram(s) orally once a day (28 Nov 2018 20:09)  epoetin nguyễn: 6000 unit(s) injectable Tuesday, Thursday, Saturday with dialysis (15 Dec 2018 10:38)  fexofenadine 60 mg oral tablet: 60 milligram(s) orally once a day (28 Nov 2018 20:13)  omeprazole 40 mg oral delayed release capsule: 1 cap(s) orally once a day (28 Nov 2018 20:11)    INPATIENT MEDICATIONS:  MEDICATIONS  (STANDING):  ALBUTerol/ipratropium for Nebulization 3 milliLiter(s) Nebulizer every 6 hours  amLODIPine   Tablet 10 milliGRAM(s) Oral daily  aspirin enteric coated 81 milliGRAM(s) Oral daily  atorvastatin 80 milliGRAM(s) Oral at bedtime  chlorhexidine 2% Cloths 1 Application(s) Topical daily  clopidogrel Tablet 75 milliGRAM(s) Oral daily  diclofenac sodium 1% Gel 4 Gram(s) Topical <User Schedule>  DULoxetine 20 milliGRAM(s) Oral daily  gabapentin 100 milliGRAM(s) Oral daily  hydrALAZINE 50 milliGRAM(s) Oral every 8 hours  isosorbide   mononitrate ER Tablet (IMDUR) 30 milliGRAM(s) Oral daily  levothyroxine 100 MICROGram(s) Oral daily  losartan 50 milliGRAM(s) Oral at bedtime  methyl salicylate 14%/menthol 6% Topical Ointment 1 Application(s) Topical two times a day  metoprolol tartrate 50 milliGRAM(s) Oral two times a day  Nephro-jeana 1 Tablet(s) Oral daily  pantoprazole  Injectable 40 milliGRAM(s) IV Push every 12 hours  predniSONE   Tablet 40 milliGRAM(s) Oral daily  sodium chloride 0.9% lock flush 3 milliLiter(s) IV Push every 8 hours    MEDICATIONS  (PRN):  ALPRAZolam 0.25 milliGRAM(s) Oral two times a day PRN anxiety  benzonatate 100 milliGRAM(s) Oral every 8 hours PRN Cough  guaiFENesin    Syrup 200 milliGRAM(s) Oral every 6 hours PRN Cough  ondansetron Injectable 4 milliGRAM(s) IV Push every 6 hours PRN Nausea    ALLERGIES:  No Known Allergies    VITAL SIGNS LAST 24 HOURS:  T(C): 36.9 (31 Dec 2018 05:15), Max: 37.2 (30 Dec 2018 14:20)  T(F): 98.4 (31 Dec 2018 05:15), Max: 99 (30 Dec 2018 14:20)  HR: 63 (31 Dec 2018 05:15) (56 - 67)  BP: 107/58 (31 Dec 2018 05:15) (107/58 - 129/50)  BP(mean): --  RR: 18 (31 Dec 2018 05:15) (18 - 18)  SpO2: 96% (31 Dec 2018 03:57) (96% - 98%)      12-30-18 @ 07:01  -  12-31-18 @ 07:00  --------------------------------------------------------  IN: 0 mL / OUT: 2000 mL / NET: -2000 mL        12-30-18 @ 07:01  -  12-31-18 @ 07:00  --------------------------------------------------------  IN: 0 mL / OUT: 2000 mL / NET: -2000 mL          PHYSICAL EXAM:  Constitutional: Well-developed, well-nourished, in no apparent distress on ventimask  Eyes: Sclerae anicteric, conjunctivae normal  ENMT: Mucus membranes moist, no oropharyngeal thrush noted  Neck: No thyroid nodules appreciated, no significant cervical or supraclavicular lymphadenopathy  Respiratory: tachypneic ; rales at bases B/l  Cardiovascular: Regular rate and rhythm  Gastrointestinal: Soft, nontender, nondistended, normoactive bowel sounds; no hepatosplenomegaly appreciated; no rebound tenderness or involuntary guarding  Rectal: Perianal exam within normal limits; normal sphincter tone; Scant brown stool on glove no blood or external hemorrhoids  Extremities: No clubbing, cyanosis or edema  Neurological: Alert and oriented to person, place and time; no asterixis  Skin: No jaundice  Lymph Nodes: No significant lymphadenopathy  Musculoskeletal: No significant peripheral atrophy    LABS:                        8.6    6.3   )-----------( 269      ( 31 Dec 2018 08:15 )             27.3       12-31    133<L>  |  93<L>  |  57.0<H>  ----------------------------<  123<H>  4.4   |  26.0  |  4.73<H>    Ca    7.5<L>      31 Dec 2018 08:15  Phos  5.0     12-30  Mg     2.2     12-31    Serum Pro-Brain Natriuretic Peptide: 49819: NT-proBNP Interpretive comments:  Acute Congestive Heart Failure is unlikely if NT-proBNP is less than 300  pg/mL, for any age.  Consider Acute Congestive Heart Failure if:  AGE               NT-proBNP Result  ___               ________________  < 50year           > 450 pg/mL  50 - 75 years     > 900 pg/mL  > 75 years         > 1800 pg/ml  All results require clinical correlation. Consider obtaining a baseline or  "dry" NT-proBNP level when the patient is stabilized, so that subsequent  levels can be related to that. Patients with recurrent CHF may have  elevated  NT-proBNP levels. Acute failure episodes generally produce levels at  least 25  % greater than base line levels. The above values are derived from a large  multi-center international study, "MELISSA Feldman, et al, European Heart  Journal,  2006; 27:330-337. pg/mL (12.30.18 @ 21:12)      IMAGING:  < from: Xray Chest 1 View- PORTABLE-Routine (12.28.18 @ 05:17) >  FINDINGS:   The lungs  show similar M moderate RIGHT pleural effusion causing   haziness overlying RIGHT lung condition to the RIGHT of a diffuse of   pulmonary opacities. Effusion layersto the RIGHT posterior 10th rib .  There are is a small LEFT pleural effusion. Is mild vascular congestion.    There is a cardiomegaly with changes from prior coronary artery bypass   graft coronary artery bypass graft procedure.         Visualized osseous structures are intact.    IMPRESSION:   No interval change as described..          < end of copied text >      < from: TTE Echo Complete w/Doppler (12.03.18 @ 11:22) >  Summary:   1. Left ventricular ejection fraction, by visual estimation, is 60 to   65%.   2. Normal global left ventricular systolic function.   3. Basal anteroseptal segment and basal and mid inferior wall are   abnormalas described above.   4. Spectral Doppler shows pseudonormal pattern of left ventricular   myocardial filling (Grade II diastolic dysfunction).   5. Estimated pulmonary artery systolic pressure is 53.7 mmHg assuming a   right atrial pressure of 8 mmHg, which is consistent with moderate   pulmonary hypertension.   6. Endocardial visualization was enhanced with intravenous echo contrast.    < end of copied text >

## 2018-12-31 NOTE — PROGRESS NOTE ADULT - ASSESSMENT
A/P: 78 year old male with ESRD on HD with multiple aborted permacath insertion attempts secondary to respiratory issues now scheduled for OR on 1/3 for Permacath placement and AVF creation under general anesthesia.     -OR on 1/3 for Permacath placement and AVF creation under general anesthesia.   -Medical clearance for OR  -vein mapping done on 12/6.   -protect LUE from IV access and blood draws  -Rest of care per primary team    -will continue to follow, will preop on 1/2

## 2018-12-31 NOTE — PROGRESS NOTE ADULT - ASSESSMENT
77y/o  Male with h/o ESRD on HD T/Th/Sat, CAD s/p PCI on recent admission, s/p CABG HTN, Pulm HTN, Lung cancer [place] XRT in 2006 ([place]   Here with Permacath infection      Permacath infection  Hypoxia   ESRD on HD  CAD s/p PCI  Pulm HTN  Lung Ca    - Blood cultures no growth x 5 days  - S/P permacath removal 12/24/18  - culture tip with CoNS  - Will Continue Vancomycin post HD  - Trend fever, afebrile  - Trend leukocytosis  - Patient will need IV Vancomycin post HD till 1/7/19  - Ok to place new permacath since blood cultures negative x 5 days      Will sign off. Please call PRN.

## 2018-12-31 NOTE — PROGRESS NOTE ADULT - ASSESSMENT
79 y/o male with infection of permacath site, hypoxia, ESRD on HD, HTN, Pulm HTN, CAD s/p PCI    right groin pain; will evaluate with repeat CT given history of hematoma there to see if further disseminating  --> US scrotum ordered; CT abdomen/pelvis ordered (without contrast)    dark urine; per nephrology patient with ESRD and this may be old urine  --> monitor for now  --> follow up CT but low suspicion for enterovesical fistula  --> hold off on testing urine as it is likely colonized with increased WBC; unless clinical suspicion for UTI.    acute hypoxic resp failure / likely sec to vol overload  --> on NC  --> renal and cardio following  --> HD seems to be improving respiratory status; patient no longer on high flow.  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   --> pulm following     fever/ likely source dialysis catheter possible infection  --> id following   --> iv vanco for now    ESRD (end stage renal disease). renal following   --> FOR PERMACATH placement and AVF on 1/3 as per vascular   --> cardio following for clearance    Hypertension, unspecified type.  Plan: cont. o/p regimen, renal/low sodium diet.     anemia of esrd, stable , monitor     Coronary artery disease involving native coronary artery of native heart without angina pectoris. Plan: No CP, cont. o/p regimen.    HLD - statin     Chronic blood loss anemia   --> 1 unit prbc ordered yesterday; H/H improved  --> will consult GI for am  --> c.w ppi

## 2018-12-31 NOTE — PROGRESS NOTE ADULT - SUBJECTIVE AND OBJECTIVE BOX
[facility]  INFECTIOUS DISEASES AND INTERNAL MEDICINE at [place]  =======================================================  Cricket Lara MD  Diplomates American Board of Internal Medicine and Infectious Diseases  =======================================================    YASMIN KAUSHIK [id number]    Follow up: Permacath infection    No respiratory distress  no fever  no chills    Allergies:  No Known Allergies      Antibiotics:   vancomycin  IVPB 1000 milliGRAM(s) IV Intermittent <User Schedule>      REVIEW OF SYSTEMS:  CONSTITUTIONAL:  No Fever or chills  HEENT:  No diplopia or blurred vision.  No earache, sore throat or runny nose.  CARDIOVASCULAR:  No pressure, squeezing, strangling, tightness, heaviness or aching about the chest, neck, axilla or epigastrium.  RESPIRATORY:  No cough, shortness of breath  GASTROINTESTINAL:  No nausea, vomiting or diarrhea.  GENITOURINARY:  No dysuria, frequency or urgency.  MUSCULOSKELETAL:  no joint aches, no muscle pain  SKIN:  No change in skin, hair or nails.  NEUROLOGIC:  No paresthesias, fasciculations  PSYCHIATRIC:  No disorder of thought or mood.  ENDOCRINE:  No heat or cold intolerance  HEMATOLOGICAL:  No easy bruising or bleeding.       Physical Exam:  Vital Signs Last 24 Hrs  T(C): 36.9 (31 Dec 2018 05:15), Max: 37.2 (30 Dec 2018 14:20)  T(F): 98.4 (31 Dec 2018 05:15), Max: 99 (30 Dec 2018 14:20)  HR: 63 (31 Dec 2018 05:15) (55 - 67)  BP: 107/58 (31 Dec 2018 05:15) (107/58 - 133/66)  RR: 18 (31 Dec 2018 05:15) (18 - 18)  SpO2: 96% (31 Dec 2018 03:57) (96% - 98%)      GEN: NAD  HEENT: normocephalic and atraumatic. EOMI. PERRL.  Anicteric   NECK: Supple.   LUNGS: Coarse BS B/L  HEART: Regular rate and rhythm   ABDOMEN: Soft, nontender, and nondistended.  Positive bowel sounds.    : No CVA tenderness  EXTREMITIES: Without any edema.  MSK: No joint swelling  NEUROLOGIC: No Focal Deficits  PSYCHIATRIC: Appropriate affect   SKIN: No Rash      Labs:  12-30    136  |  95<L>  |  74.0<H>  ----------------------------<  136<H>  5.5<H>   |  22.0  |  5.51<H>    Ca    8.0<L>      30 Dec 2018 08:28  Phos  5.0     12-30  Mg     2.6     12-30               7.3    6.4   )-----------( 331      ( 30 Dec 2018 08:28 )             23.3     RECENT CULTURES:  12-24 @ 18:42 .Catheter Coag Negative Staphylococcus    < 15 colonies Coag Negative Staphylococcus      12-22 @ 19:42 .Blood     No growth at 5 days.      12-22 @ 18:14 .Blood     No growth at 5 days.

## 2019-01-01 LAB
APPEARANCE UR: ABNORMAL
APPEARANCE UR: ABNORMAL
BACTERIA # UR AUTO: ABNORMAL
BACTERIA # UR AUTO: ABNORMAL
BILIRUB UR-MCNC: ABNORMAL
BILIRUB UR-MCNC: NEGATIVE — SIGNIFICANT CHANGE UP
COLOR SPEC: ABNORMAL
COLOR SPEC: ABNORMAL
COMMENT - URINE: SIGNIFICANT CHANGE UP
COMMENT - URINE: SIGNIFICANT CHANGE UP
DIFF PNL FLD: ABNORMAL
DIFF PNL FLD: ABNORMAL
EPI CELLS # UR: NEGATIVE — SIGNIFICANT CHANGE UP
EPI CELLS # UR: NEGATIVE — SIGNIFICANT CHANGE UP
FERRITIN SERPL-MCNC: 470 NG/ML — HIGH (ref 30–400)
FOLATE SERPL-MCNC: 15.2 NG/ML — SIGNIFICANT CHANGE UP
GLUCOSE UR QL: NEGATIVE MG/DL — SIGNIFICANT CHANGE UP
GLUCOSE UR QL: NEGATIVE MG/DL — SIGNIFICANT CHANGE UP
HCT VFR BLD CALC: 30.2 % — LOW (ref 42–52)
HGB BLD-MCNC: 9.5 G/DL — LOW (ref 14–18)
IRON SATN MFR SERPL: 18 % — SIGNIFICANT CHANGE UP (ref 16–55)
IRON SATN MFR SERPL: 46 UG/DL — LOW (ref 59–158)
KETONES UR-MCNC: NEGATIVE — SIGNIFICANT CHANGE UP
KETONES UR-MCNC: NEGATIVE — SIGNIFICANT CHANGE UP
LEUKOCYTE ESTERASE UR-ACNC: ABNORMAL
LEUKOCYTE ESTERASE UR-ACNC: ABNORMAL
MCHC RBC-ENTMCNC: 28.5 PG — SIGNIFICANT CHANGE UP (ref 27–31)
MCHC RBC-ENTMCNC: 31.5 G/DL — LOW (ref 32–36)
MCV RBC AUTO: 90.7 FL — SIGNIFICANT CHANGE UP (ref 80–94)
NITRITE UR-MCNC: NEGATIVE — SIGNIFICANT CHANGE UP
NITRITE UR-MCNC: NEGATIVE — SIGNIFICANT CHANGE UP
PH UR: 5 — SIGNIFICANT CHANGE UP (ref 5–8)
PH UR: 6 — SIGNIFICANT CHANGE UP (ref 5–8)
PLATELET # BLD AUTO: 325 K/UL — SIGNIFICANT CHANGE UP (ref 150–400)
PROT UR-MCNC: 100 MG/DL
PROT UR-MCNC: 500 MG/DL
RBC # BLD: 3.33 M/UL — LOW (ref 4.6–6.2)
RBC # FLD: 14 % — SIGNIFICANT CHANGE UP (ref 11–15.6)
RBC CASTS # UR COMP ASSIST: SIGNIFICANT CHANGE UP /HPF (ref 0–4)
RBC CASTS # UR COMP ASSIST: SIGNIFICANT CHANGE UP /HPF (ref 0–4)
SP GR SPEC: 1.01 — SIGNIFICANT CHANGE UP (ref 1.01–1.02)
SP GR SPEC: 1.02 — SIGNIFICANT CHANGE UP (ref 1.01–1.02)
TIBC SERPL-MCNC: 259 UG/DL — SIGNIFICANT CHANGE UP (ref 220–430)
TRANSFERRIN SERPL-MCNC: 181 MG/DL — SIGNIFICANT CHANGE UP (ref 180–329)
UROBILINOGEN FLD QL: 1 MG/DL
UROBILINOGEN FLD QL: NEGATIVE MG/DL — SIGNIFICANT CHANGE UP
VANCOMYCIN TROUGH SERPL-MCNC: 25.5 UG/ML — CRITICAL HIGH (ref 10–20)
VIT B12 SERPL-MCNC: 1986 PG/ML — HIGH (ref 232–1245)
WBC # BLD: 7.8 K/UL — SIGNIFICANT CHANGE UP (ref 4.8–10.8)
WBC # FLD AUTO: 7.8 K/UL — SIGNIFICANT CHANGE UP (ref 4.8–10.8)
WBC UR QL: ABNORMAL
WBC UR QL: SIGNIFICANT CHANGE UP

## 2019-01-01 PROCEDURE — 99233 SBSQ HOSP IP/OBS HIGH 50: CPT

## 2019-01-01 PROCEDURE — G9005: CPT

## 2019-01-01 PROCEDURE — 99232 SBSQ HOSP IP/OBS MODERATE 35: CPT

## 2019-01-01 RX ORDER — ACETYLCYSTEINE 200 MG/ML
1 VIAL (ML) MISCELLANEOUS ONCE
Refills: 0 | Status: COMPLETED | OUTPATIENT
Start: 2019-01-01 | End: 2019-01-01

## 2019-01-01 RX ADMIN — Medication 40 MILLIGRAM(S): at 05:35

## 2019-01-01 RX ADMIN — Medication 81 MILLIGRAM(S): at 13:38

## 2019-01-01 RX ADMIN — Medication 50 MILLIGRAM(S): at 17:36

## 2019-01-01 RX ADMIN — AMLODIPINE BESYLATE 10 MILLIGRAM(S): 2.5 TABLET ORAL at 05:34

## 2019-01-01 RX ADMIN — PANTOPRAZOLE SODIUM 40 MILLIGRAM(S): 20 TABLET, DELAYED RELEASE ORAL at 17:36

## 2019-01-01 RX ADMIN — SODIUM CHLORIDE 3 MILLILITER(S): 9 INJECTION INTRAMUSCULAR; INTRAVENOUS; SUBCUTANEOUS at 13:39

## 2019-01-01 RX ADMIN — Medication 3 MILLILITER(S): at 01:38

## 2019-01-01 RX ADMIN — Medication 100 MILLIGRAM(S): at 22:04

## 2019-01-01 RX ADMIN — Medication 1 TABLET(S): at 13:38

## 2019-01-01 RX ADMIN — Medication 50 MILLIGRAM(S): at 05:34

## 2019-01-01 RX ADMIN — Medication 1 MILLILITER(S): at 21:45

## 2019-01-01 RX ADMIN — Medication 100 MICROGRAM(S): at 05:34

## 2019-01-01 RX ADMIN — CHLORHEXIDINE GLUCONATE 1 APPLICATION(S): 213 SOLUTION TOPICAL at 13:39

## 2019-01-01 RX ADMIN — Medication 200 MILLIGRAM(S): at 13:37

## 2019-01-01 RX ADMIN — SODIUM CHLORIDE 3 MILLILITER(S): 9 INJECTION INTRAMUSCULAR; INTRAVENOUS; SUBCUTANEOUS at 07:46

## 2019-01-01 RX ADMIN — ATORVASTATIN CALCIUM 80 MILLIGRAM(S): 80 TABLET, FILM COATED ORAL at 22:03

## 2019-01-01 RX ADMIN — PANTOPRAZOLE SODIUM 40 MILLIGRAM(S): 20 TABLET, DELAYED RELEASE ORAL at 05:34

## 2019-01-01 RX ADMIN — CLOPIDOGREL BISULFATE 75 MILLIGRAM(S): 75 TABLET, FILM COATED ORAL at 13:38

## 2019-01-01 RX ADMIN — GABAPENTIN 100 MILLIGRAM(S): 400 CAPSULE ORAL at 13:39

## 2019-01-01 RX ADMIN — Medication 3 MILLILITER(S): at 09:25

## 2019-01-01 RX ADMIN — SODIUM CHLORIDE 3 MILLILITER(S): 9 INJECTION INTRAMUSCULAR; INTRAVENOUS; SUBCUTANEOUS at 21:48

## 2019-01-01 RX ADMIN — Medication 3 MILLILITER(S): at 21:43

## 2019-01-01 RX ADMIN — Medication 50 MILLIGRAM(S): at 13:38

## 2019-01-01 RX ADMIN — ISOSORBIDE MONONITRATE 30 MILLIGRAM(S): 60 TABLET, EXTENDED RELEASE ORAL at 13:38

## 2019-01-01 RX ADMIN — DULOXETINE HYDROCHLORIDE 20 MILLIGRAM(S): 30 CAPSULE, DELAYED RELEASE ORAL at 13:38

## 2019-01-01 RX ADMIN — Medication 200 MILLIGRAM(S): at 05:35

## 2019-01-01 RX ADMIN — Medication 50 MILLIGRAM(S): at 22:03

## 2019-01-01 RX ADMIN — LOSARTAN POTASSIUM 50 MILLIGRAM(S): 100 TABLET, FILM COATED ORAL at 22:03

## 2019-01-01 RX ADMIN — MENTHOL AND METHYL SALICYLATE 1 APPLICATION(S): 10; 30 STICK TOPICAL at 07:45

## 2019-01-01 NOTE — PROGRESS NOTE ADULT - SUBJECTIVE AND OBJECTIVE BOX
CC: groin and lower abdomen pain    INTERVAL HPI/OVERNIGHT EVENTS:  No acute events overnight  reviewed CT from yesterday; no new findings  patient today without complaints feeling better    Vital Signs Last 24 Hrs  ICU Vital Signs Last 24 Hrs  T(C): 36.9 (01 Jan 2019 11:38), Max: 37 (31 Dec 2018 22:24)  T(F): 98.5 (01 Jan 2019 11:38), Max: 98.6 (31 Dec 2018 22:24)  HR: 60 (01 Jan 2019 11:38) (56 - 68)  BP: 116/69 (01 Jan 2019 11:38) (116/69 - 137/69)  BP(mean): --  ABP: --  ABP(mean): --  RR: 18 (01 Jan 2019 11:38) (18 - 18)  SpO2: 98% (01 Jan 2019 09:44) (89% - 98%)    PHYSICAL EXAM:    GENERAL: NAD, on NC  HEENT: PERRL, +EOMI  NECK: soft, Supple, No JVD,   CHEST/LUNG: crackles towards bases bilaterally; unchanged  HEART: S1S2+, Regular rate and rhythm; No murmurs, rubs, or gallops  ABDOMEN: abdomen benign and soft. active bowel sounds  EXTREMITIES:  2+ Peripheral Pulses, No edema  GENITOURINARY: R fem shiley in place. non-tender suprapubic area now  SKIN: No rashes or lesions  NEURO: AAOX3, no focal deficits,       LABS:                          8.6    6.3   )-----------( 269      ( 31 Dec 2018 08:15 )             27.3     12-31    133<L>  |  93<L>  |  57.0<H>  ----------------------------<  123<H>  4.4   |  26.0  |  4.73<H>    Ca    7.5<L>      31 Dec 2018 08:15  Phos  5.0     12-30  Mg     2.2     12-31           MEDICATIONS  (STANDING):  ALBUTerol/ipratropium for Nebulization 3 milliLiter(s) Nebulizer every 6 hours  amLODIPine   Tablet 10 milliGRAM(s) Oral daily  aspirin enteric coated 81 milliGRAM(s) Oral daily  atorvastatin 80 milliGRAM(s) Oral at bedtime  chlorhexidine 2% Cloths 1 Application(s) Topical daily  clopidogrel Tablet 75 milliGRAM(s) Oral daily  diclofenac sodium 1% Gel 4 Gram(s) Topical <User Schedule>  DULoxetine 20 milliGRAM(s) Oral daily  gabapentin 100 milliGRAM(s) Oral daily  hydrALAZINE 50 milliGRAM(s) Oral every 8 hours  isosorbide   mononitrate ER Tablet (IMDUR) 30 milliGRAM(s) Oral daily  levothyroxine 100 MICROGram(s) Oral daily  losartan 50 milliGRAM(s) Oral at bedtime  methyl salicylate 14%/menthol 6% Topical Ointment 1 Application(s) Topical two times a day  metoprolol tartrate 50 milliGRAM(s) Oral two times a day  Nephro-jeana 1 Tablet(s) Oral daily  pantoprazole  Injectable 40 milliGRAM(s) IV Push every 12 hours  predniSONE   Tablet 40 milliGRAM(s) Oral daily  sodium chloride 0.9% lock flush 3 milliLiter(s) IV Push every 8 hours  vancomycin  IVPB 750 milliGRAM(s) IV Intermittent <User Schedule>    MEDICATIONS  (PRN):  ALPRAZolam 0.25 milliGRAM(s) Oral two times a day PRN anxiety  benzonatate 100 milliGRAM(s) Oral every 8 hours PRN Cough  guaiFENesin    Syrup 200 milliGRAM(s) Oral every 6 hours PRN Cough  ondansetron Injectable 4 milliGRAM(s) IV Push every 6 hours PRN Nausea

## 2019-01-01 NOTE — PROGRESS NOTE ADULT - ASSESSMENT
HD in AM (  FVC )    Tunneled CVC Insertion - P :    Catheterize Bladder X 1  ( Bladder Residual )  , Urine C/S,

## 2019-01-01 NOTE — PROGRESS NOTE ADULT - ASSESSMENT
Patient with ESRD, with anemia. Most likely chronic disease anemia. No need for endoscopic work up at this time. Once better, can follow up as outpatient for consideration for EGD and colonoscopy.

## 2019-01-01 NOTE — PROGRESS NOTE ADULT - SUBJECTIVE AND OBJECTIVE BOX
INTERVAL HPI/OVERNIGHT EVENTS:FU for anemia. No evidence of any iron deficiency. C/o bloody urine. NO other GI complaints.     MEDICATIONS  (STANDING):  ALBUTerol/ipratropium for Nebulization 3 milliLiter(s) Nebulizer every 6 hours  amLODIPine   Tablet 10 milliGRAM(s) Oral daily  aspirin enteric coated 81 milliGRAM(s) Oral daily  atorvastatin 80 milliGRAM(s) Oral at bedtime  chlorhexidine 2% Cloths 1 Application(s) Topical daily  clopidogrel Tablet 75 milliGRAM(s) Oral daily  diclofenac sodium 1% Gel 4 Gram(s) Topical <User Schedule>  DULoxetine 20 milliGRAM(s) Oral daily  gabapentin 100 milliGRAM(s) Oral daily  hydrALAZINE 50 milliGRAM(s) Oral every 8 hours  isosorbide   mononitrate ER Tablet (IMDUR) 30 milliGRAM(s) Oral daily  levothyroxine 100 MICROGram(s) Oral daily  losartan 50 milliGRAM(s) Oral at bedtime  methyl salicylate 14%/menthol 6% Topical Ointment 1 Application(s) Topical two times a day  metoprolol tartrate 50 milliGRAM(s) Oral two times a day  Nephro-jeana 1 Tablet(s) Oral daily  pantoprazole  Injectable 40 milliGRAM(s) IV Push every 12 hours  predniSONE   Tablet 40 milliGRAM(s) Oral daily  sodium chloride 0.9% lock flush 3 milliLiter(s) IV Push every 8 hours  vancomycin  IVPB 750 milliGRAM(s) IV Intermittent <User Schedule>    MEDICATIONS  (PRN):  ALPRAZolam 0.25 milliGRAM(s) Oral two times a day PRN anxiety  benzonatate 100 milliGRAM(s) Oral every 8 hours PRN Cough  guaiFENesin    Syrup 200 milliGRAM(s) Oral every 6 hours PRN Cough  ondansetron Injectable 4 milliGRAM(s) IV Push every 6 hours PRN Nausea      Allergies    No Known Allergies    Intolerances        Vital Signs Last 24 Hrs  T(C): 36.9 (2019 11:38), Max: 37 (31 Dec 2018 22:24)  T(F): 98.5 (2019 11:38), Max: 98.6 (31 Dec 2018 22:24)  HR: 59 (2019 13:36) (56 - 68)  BP: 115/58 (2019 13:36) (115/58 - 137/69)  BP(mean): --  RR: 18 (2019 11:38) (18 - 18)  SpO2: 98% (2019 09:44) (89% - 98%)    LABS:                        8.6    6.3   )-----------( 269      ( 31 Dec 2018 08:15 )             27.3     12-    133<L>  |  93<L>  |  57.0<H>  ----------------------------<  123<H>  4.4   |  26.0  |  4.73<H>    Ca    7.5<L>      31 Dec 2018 08:15  Phos  5.0     12-  Mg     2.2             Urinalysis Basic - ( 2019 07:38 )    Color: Brown / Appearance: Cloudy / S.020 / pH: x  Gluc: x / Ketone: Negative  / Bili: Small / Urobili: 1 mg/dL   Blood: x / Protein: 100 mg/dL / Nitrite: Negative   Leuk Esterase: Small / RBC: TNTC /HPF / WBC 6-10   Sq Epi: x / Non Sq Epi: Negative / Bacteria: Occasional        RADIOLOGY & ADDITIONAL TESTS:

## 2019-01-01 NOTE — PROGRESS NOTE ADULT - ASSESSMENT
79 y/o male with infection of permacath site, hypoxia, ESRD on HD, HTN, Pulm HTN, CAD s/p PCI    ESRD (end stage renal disease). renal following   --> FOR PERMACATH placement and AVF on 1/3 as per vascular   --> cardio following for clearance    right groin pain; resolved; reviewed CT of ab/pelvis which showed stable hematoma from previous exam; shiley in place and undisturbed; US of the scrotum showed thickening of scrotum but no significant findings on exam  --> monitor for now.    dark urine; per nephrology patient with ESRD and this may be old urine  --> monitor for now  --> if fever or signs of SIRS will send urine for culture    acute hypoxic resp failure / likely sec to vol overload  --> on NC  --> renal and cardio following  --> HD seems to be improving respiratory status; patient no longer on high flow.  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   --> pulm following     fever/ likely source dialysis catheter possible infection  --> id following   --> iv vanco for now    Hypertension, unspecified type.  Plan: cont. o/p regimen, renal/low sodium diet.     anemia of esrd, stable , monitor     Coronary artery disease involving native coronary artery of native heart without angina pectoris. Plan: No CP, cont. o/p regimen.    HLD - statin     Chronic blood loss anemia   --> 1 unit prbc ordered yesterday; H/H improved  --> GI recs appreciated; will treat constipation  --> c.w ppi 77 y/o male with infection of permacath site, hypoxia, ESRD on HD, HTN, Pulm HTN, CAD s/p PCI.    ESRD (end stage renal disease). renal following   --> FOR PERMACATH placement and AVF on 1/3 as per vascular   --> cardiac risk assessment appreciated  --> patient has no medical issues that would prevent AVF placement surgery and is cleared for 1/3    right groin pain; resolved; reviewed CT of ab/pelvis which showed stable hematoma from previous exam; shiley in place and undisturbed; US of the scrotum showed thickening of scrotum but no significant findings on exam  --> monitor for now.    dark urine; per nephrology patient with ESRD and this may be old urine  --> monitor for now  --> if fever or signs of SIRS will send urine for culture    acute hypoxic resp failure / likely sec to vol overload  --> on NC  --> renal and cardio following  --> HD seems to be improving respiratory status; patient no longer on high flow.  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   --> pulm following     fever/ likely source dialysis catheter possible infection  --> id following   --> iv vanco for now    Hypertension, unspecified type.  Plan: cont. o/p regimen, renal/low sodium diet.     anemia of esrd, stable , monitor     Coronary artery disease involving native coronary artery of native heart without angina pectoris. Plan: No CP, cont. o/p regimen.    HLD - statin     Chronic blood loss anemia   --> 1 unit prbc ordered yesterday; H/H improved  --> GI recs appreciated; will treat constipation  --> c.w ppi

## 2019-01-01 NOTE — PROGRESS NOTE ADULT - SUBJECTIVE AND OBJECTIVE BOX
CC: groin and lower abdominal  pain    INTERVAL HPI/ OVERNIGHT EVENTS:  No acute events overnight  reviewed CT from yesterday; no new findings  patient today without complaints feeling better, Unable to void,    Vital Signs Last 24 Hrs,    T(C): 36.9 (01 Jan 2019 11:38), Max: 37 (31 Dec 2018 22:24)  T(F): 98.5 (01 Jan 2019 11:38), Max: 98.6 (31 Dec 2018 22:24)  HR: 60 (01 Jan 2019 11:38) (56 - 68)  BP: 116/69 (01 Jan 2019 11:38) (116/69 - 137/69)  BP(mean): --  ABP: --  ABP(mean): --  RR: 18 (01 Jan 2019 11:38) (18 - 18)  SpO2: 98% (01 Jan 2019 09:44) (89% - 98%)    PHYSICAL EXAM:    GENERAL: NAD, on NC  HEENT: PERRL, +EOMI  NECK: soft, Supple, No JVD,   CHEST/LUNG: crackles towards bases bilaterally; unchanged  HEART: S1S2+, Regular rate and rhythm; No murmurs, rubs, or gallops  ABDOMEN: abdomen benign and soft. active bowel sounds  EXTREMITIES:  2+ Peripheral Pulses, No edema  GENITOURINARY: R fem shiley in place. non-tender suprapubic area now  SKIN: No rashes or lesions  NEURO: AAOX3, no focal deficits,     LABS:                       8.6    6.3   )-----------( 269      ( 31 Dec 2018 08:15 )             27.3     12-31    133<L>  |  93<L>  |  57.0<H>  ----------------------------<  123<H>  4.4   |  26.0  |  4.73<H>    Ca    7.5<L>      31 Dec 2018 08:15  Phos  5.0     12-30  Mg     2.2     12-31    MEDICATIONS  (STANDING):    ALBUTerol/ipratropium for Nebulization 3 milliLiter(s) Nebulizer every 6 hours  amLODIPine   Tablet 10 milliGRAM(s) Oral daily  aspirin enteric coated 81 milliGRAM(s) Oral daily  atorvastatin 80 milliGRAM(s) Oral at bedtime  chlorhexidine 2% Cloths 1 Application(s) Topical daily  clopidogrel Tablet 75 milliGRAM(s) Oral daily  diclofenac sodium 1% Gel 4 Gram(s) Topical <User Schedule>  DULoxetine 20 milliGRAM(s) Oral daily  gabapentin 100 milliGRAM(s) Oral daily  hydrALAZINE 50 milliGRAM(s) Oral every 8 hours  isosorbide   mononitrate ER Tablet (IMDUR) 30 milliGRAM(s) Oral daily  levothyroxine 100 MICROGram(s) Oral daily  losartan 50 milliGRAM(s) Oral at bedtime  methyl salicylate 14%/menthol 6% Topical Ointment 1 Application(s) Topical two times a day  metoprolol tartrate 50 milliGRAM(s) Oral two times a day  Nephro-jeana 1 Tablet(s) Oral daily  pantoprazole  Injectable 40 milliGRAM(s) IV Push every 12 hours  predniSONE   Tablet 40 milliGRAM(s) Oral daily  sodium chloride 0.9% lock flush 3 milliLiter(s) IV Push every 8 hours  vancomycin  IVPB 750 milliGRAM(s) IV Intermittent <User Schedule>    MEDICATIONS  (PRN):  ALPRAZolam 0.25 milliGRAM(s) Oral two times a day PRN anxiety  benzonatate 100 milliGRAM(s) Oral every 8 hours PRN Cough  guaiFENesin    Syrup 200 milliGRAM(s) Oral every 6 hours PRN Cough  ondansetron Injectable 4 milliGRAM(s) IV Push every 6 hours PRN Nausea                   77 y/o male with infection of Tunneled CVC Exit  site, hypoxia, ESRD on HD, Systemic HTN, Pulm HTN, CAD s/p PCI.    ESRD (end stage renal disease).   --> FOR  CVC  placement and AVF on 1/3 as per vascular  Surgery,    Right groin pain; resolved; reviewed CT of ab/pelvis which showed stable hematoma from previous exam; FVC  in place and undisturbed; US of the scrotum showed thickening of scrotum but no significant findings on exam  --> monitor for now.    dark urine;   --> monitor for now  --> urine for culture    acute hypoxic respiratory  failure / likely sec to vol overload  --> on NC  --> HD seems to be improving respiratory status; patient no longer on high flow.  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   --> pulm following     fever/ likely source dialysis catheter possible infection  --> iv vanco for now    Hypertension, unspecified type.  Plan: cont. o/p regimen, renal/low sodium diet.     anemia of CKD - 5 , stable , monitor     Coronary artery disease involving native coronary artery of native heart without angina pectoris. Plan: No CP, cont. o/p regimen.    Chronic blood loss anemia   --> 1 unit prbc ordered yesterday; H/H improved

## 2019-01-02 ENCOUNTER — TRANSCRIPTION ENCOUNTER (OUTPATIENT)
Age: 79
End: 2019-01-02

## 2019-01-02 DIAGNOSIS — R04.2 HEMOPTYSIS: ICD-10-CM

## 2019-01-02 DIAGNOSIS — N50.89 OTHER SPECIFIED DISORDERS OF THE MALE GENITAL ORGANS: ICD-10-CM

## 2019-01-02 DIAGNOSIS — J18.9 PNEUMONIA, UNSPECIFIED ORGANISM: ICD-10-CM

## 2019-01-02 DIAGNOSIS — R31.9 HEMATURIA, UNSPECIFIED: ICD-10-CM

## 2019-01-02 LAB
CULTURE RESULTS: NO GROWTH — SIGNIFICANT CHANGE UP
SPECIMEN SOURCE: SIGNIFICANT CHANGE UP
VANCOMYCIN TROUGH SERPL-MCNC: 20.9 UG/ML — HIGH (ref 10–20)

## 2019-01-02 PROCEDURE — 99232 SBSQ HOSP IP/OBS MODERATE 35: CPT

## 2019-01-02 PROCEDURE — 99221 1ST HOSP IP/OBS SF/LOW 40: CPT

## 2019-01-02 PROCEDURE — 90937 HEMODIALYSIS REPEATED EVAL: CPT

## 2019-01-02 PROCEDURE — 71250 CT THORAX DX C-: CPT | Mod: 26

## 2019-01-02 PROCEDURE — 99223 1ST HOSP IP/OBS HIGH 75: CPT

## 2019-01-02 RX ORDER — IRON SUCROSE 20 MG/ML
100 INJECTION, SOLUTION INTRAVENOUS
Refills: 0 | Status: DISCONTINUED | OUTPATIENT
Start: 2019-01-02 | End: 2019-01-03

## 2019-01-02 RX ADMIN — Medication 81 MILLIGRAM(S): at 13:51

## 2019-01-02 RX ADMIN — CLOPIDOGREL BISULFATE 75 MILLIGRAM(S): 75 TABLET, FILM COATED ORAL at 13:51

## 2019-01-02 RX ADMIN — SODIUM CHLORIDE 3 MILLILITER(S): 9 INJECTION INTRAMUSCULAR; INTRAVENOUS; SUBCUTANEOUS at 06:08

## 2019-01-02 RX ADMIN — Medication 50 MILLIGRAM(S): at 22:32

## 2019-01-02 RX ADMIN — PANTOPRAZOLE SODIUM 40 MILLIGRAM(S): 20 TABLET, DELAYED RELEASE ORAL at 18:07

## 2019-01-02 RX ADMIN — Medication 3 MILLILITER(S): at 09:45

## 2019-01-02 RX ADMIN — Medication 40 MILLIGRAM(S): at 06:10

## 2019-01-02 RX ADMIN — Medication 50 MILLIGRAM(S): at 13:52

## 2019-01-02 RX ADMIN — Medication 50 MILLIGRAM(S): at 18:07

## 2019-01-02 RX ADMIN — LOSARTAN POTASSIUM 50 MILLIGRAM(S): 100 TABLET, FILM COATED ORAL at 22:32

## 2019-01-02 RX ADMIN — AMLODIPINE BESYLATE 10 MILLIGRAM(S): 2.5 TABLET ORAL at 06:11

## 2019-01-02 RX ADMIN — Medication 100 MICROGRAM(S): at 06:11

## 2019-01-02 RX ADMIN — SODIUM CHLORIDE 3 MILLILITER(S): 9 INJECTION INTRAMUSCULAR; INTRAVENOUS; SUBCUTANEOUS at 13:52

## 2019-01-02 RX ADMIN — ISOSORBIDE MONONITRATE 30 MILLIGRAM(S): 60 TABLET, EXTENDED RELEASE ORAL at 13:51

## 2019-01-02 RX ADMIN — Medication 50 MILLIGRAM(S): at 06:11

## 2019-01-02 RX ADMIN — IRON SUCROSE 210 MILLIGRAM(S): 20 INJECTION, SOLUTION INTRAVENOUS at 19:57

## 2019-01-02 RX ADMIN — DULOXETINE HYDROCHLORIDE 20 MILLIGRAM(S): 30 CAPSULE, DELAYED RELEASE ORAL at 13:52

## 2019-01-02 RX ADMIN — ATORVASTATIN CALCIUM 80 MILLIGRAM(S): 80 TABLET, FILM COATED ORAL at 22:32

## 2019-01-02 RX ADMIN — Medication 3 MILLILITER(S): at 15:02

## 2019-01-02 RX ADMIN — SODIUM CHLORIDE 3 MILLILITER(S): 9 INJECTION INTRAMUSCULAR; INTRAVENOUS; SUBCUTANEOUS at 22:31

## 2019-01-02 RX ADMIN — CHLORHEXIDINE GLUCONATE 1 APPLICATION(S): 213 SOLUTION TOPICAL at 13:52

## 2019-01-02 RX ADMIN — Medication 50 MILLIGRAM(S): at 06:10

## 2019-01-02 RX ADMIN — GABAPENTIN 100 MILLIGRAM(S): 400 CAPSULE ORAL at 13:52

## 2019-01-02 RX ADMIN — PANTOPRAZOLE SODIUM 40 MILLIGRAM(S): 20 TABLET, DELAYED RELEASE ORAL at 06:11

## 2019-01-02 RX ADMIN — Medication 200 MILLIGRAM(S): at 02:40

## 2019-01-02 RX ADMIN — Medication 1 TABLET(S): at 13:52

## 2019-01-02 RX ADMIN — Medication 3 MILLILITER(S): at 04:11

## 2019-01-02 NOTE — CONSULT NOTE ADULT - ATTENDING COMMENTS
77 y/o male with CAD s/p CABG recent CHAN to LCx , HFpEF , moderate pulmonary hypertension , ESRD   going for low risk surgery ( AV fistula)   Elevated risk for perioperative cardiac events under GA   No active cardiac conditions   needs to continue DAPT in the perioperative period ( recent PCI )
Pt seen and evaluated. Agree with resident note above  Has recently placed RIJ permcath, now with mild surrounding erythema and tenderness with poor scarring of tunnel track and visualized cuff  Due for dialysis this AM (12/24)  Plan:  Check blood cultures  f/u WBC count  Emp abx - vanc/zosyn  Will dialyze through cath today and then remove (send tip for culture)  Will then allow for 48 hr line holiday and plan for new catheter placement on 12/26 (shiley vs new permcath depending on culture results)
Patient has gross hematuria. on Palliative care but in need of dialysis catheter replacement.  Etiology of gross hematuria not certain.  May be upper tract given the groin and testicle pain which may be due to ureteral transit.      When stable could consider cysto and bilateral retrograde pyelograms.
78M with Chronic renal failure. Admitted with PermCath site infection, requiring removal and re-insertion of a new temporary HD Cath. During the procedure today, the patient developed respiratory distress, ultimately requiring Bi[pap support. his CXr shows a moderate sized pleural effusion with PVC. Of note he is due for HD today.  He denies Chest pain and there are no ischemic changes on EKG.        1: Acute hypoxic respiratory failure   2: Acute pulmonary edema  3: Acute on chronic heart failure, HFpEF  4: Pleural Effusion    patient seen and examined by me  today  Patient in no resp distress on BiPAP   Will c/w BiPAP while getting HD   HDs for now  Will wean off of BiPAP post HD and trial of HFNC vs NC  If persistent hypoxia-> contemplate thoracentesis     Vanco post HD  Plan d/w ID, S/p source control    CCT : 35 min

## 2019-01-02 NOTE — PROGRESS NOTE ADULT - ASSESSMENT
78 yr man, ESRD recently placed on  HD, HTN, Pulm HTN, CAD s/p PCI male admitted with infection from  [facility] site.

## 2019-01-02 NOTE — CONSULT NOTE ADULT - SUBJECTIVE AND OBJECTIVE BOX
Called to see patient for hematuria, swelling scrotum; who was admitted via emergency room r/o infection dialysis catheter, with drainage from right chest dialysis catheter placed early 12/2018.  Patient with history of ESRD, , SOB, anemia, lung cancer, CHF, HTN, Bipolar, seen by palliative care, CAD, CABG with possible stents ( doesn't recall )  recent placement of right internal jugular perm-cath.     denies any allergies    see admission note for full history.

## 2019-01-02 NOTE — PROGRESS NOTE ADULT - ASSESSMENT
1. ESRD on HD  2. HTN  3. Anemia of chronic renal disease    HD today  Tunneled catheter insertion pending  Continue current management

## 2019-01-02 NOTE — CONSULT NOTE ADULT - PROBLEM SELECTOR PROBLEM 2
ESRD (end stage renal disease)
Acute diastolic congestive heart failure
Hemoptysis
Scrotal swelling
Hypoxia

## 2019-01-02 NOTE — CONSULT NOTE ADULT - REASON FOR ADMISSION
Sent in for possible catheter infection

## 2019-01-02 NOTE — PROGRESS NOTE ADULT - ASSESSMENT
79 y/o male with infection of permacath site, hypoxia, ESRD on HD, HTN, Pulm HTN, CAD s/p PCI.    ESRD (end stage renal disease). renal following   --> FOR PERMACATH placement and AVF on 1/3 as per vascular   --> cardiac risk assessment appreciated  --> patient has no medical issues that would prevent AVF placement surgery and is cleared for 1/3    right groin pain  CT of ab/pelvis which showed stable hematoma from previous exam; shiley in place and undisturbed; US of the scrotum showed thickening of scrotum but no significant findings on exam  --> Urology consult for further evaluation of hematuria and scrotal swelling.    current dark urine in urinal; per nephrology patient with ESRD and this may be old urine  --> monitor for now  --> if fever or signs of SIRS will send urine for culture    acute hypoxic resp failure / likely sec to vol overload  --> on NC  --> renal and cardio following  --> HD seems to be improving respiratory status; patient no longer on high flow.  --> tapering steroids  --->CT chest completed today with worsening condition.  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   --> pulm following     fever/ likely source dialysis catheter possible infection  --> id following   --> iv vanco for now    Hypertension, unspecified type.  Plan: cont. o/p regimen, renal/low sodium diet.     anemia of esrd, stable , monitor     Coronary artery disease involving native coronary artery of native heart without angina pectoris. Plan: No CP, cont. o/p regimen.    HLD - statin     Chronic blood loss anemia   --> 1 unit prbc ordered 12/31; H/H improved  --> GI recs appreciated; will treat constipation  --> c.w ppi

## 2019-01-02 NOTE — PROGRESS NOTE ADULT - SUBJECTIVE AND OBJECTIVE BOX
CC:  follow up GOC  INTERVAL HPI/OVERNIGHT EVENTS:  hemoptysis - CTS consulted  PRESENT SYMPTOMS: SOURCE:  Patient/Family/Team    PAIN SCALE:  0 = none  1 = mild   2 = moderate  3 = severe    Pain: 0    Dyspnea:  [ ] YES [ x] NO  Anxiety:  [ ] YES [ x] NO  Fatigue: [ x] YES [ ] NO  Nausea: [ ] YES [ x] NO  Loss of Appetite: [x ] YES [ ] NO  Other symptoms: __________    MEDICATIONS  (STANDING):  ALBUTerol/ipratropium for Nebulization 3 milliLiter(s) Nebulizer every 6 hours  amLODIPine   Tablet 10 milliGRAM(s) Oral daily  aspirin enteric coated 81 milliGRAM(s) Oral daily  atorvastatin 80 milliGRAM(s) Oral at bedtime  chlorhexidine 2% Cloths 1 Application(s) Topical daily  clopidogrel Tablet 75 milliGRAM(s) Oral daily  diclofenac sodium 1% Gel 4 Gram(s) Topical <User Schedule>  DULoxetine 20 milliGRAM(s) Oral daily  gabapentin 100 milliGRAM(s) Oral daily  hydrALAZINE 50 milliGRAM(s) Oral every 8 hours  iron sucrose IVPB 100 milliGRAM(s) IV Intermittent <User Schedule>  isosorbide   mononitrate ER Tablet (IMDUR) 30 milliGRAM(s) Oral daily  levothyroxine 100 MICROGram(s) Oral daily  losartan 50 milliGRAM(s) Oral at bedtime  methyl salicylate 14%/menthol 6% Topical Ointment 1 Application(s) Topical two times a day  metoprolol tartrate 50 milliGRAM(s) Oral two times a day  Nephro-jeana 1 Tablet(s) Oral daily  pantoprazole  Injectable 40 milliGRAM(s) IV Push every 12 hours  predniSONE   Tablet 40 milliGRAM(s) Oral daily  sodium chloride 0.9% lock flush 3 milliLiter(s) IV Push every 8 hours  vancomycin  IVPB 750 milliGRAM(s) IV Intermittent <User Schedule>    MEDICATIONS  (PRN):  ALPRAZolam 0.25 milliGRAM(s) Oral two times a day PRN anxiety  benzonatate 100 milliGRAM(s) Oral every 8 hours PRN Cough  guaiFENesin    Syrup 200 milliGRAM(s) Oral every 6 hours PRN Cough  ondansetron Injectable 4 milliGRAM(s) IV Push every 6 hours PRN Nausea      Allergies    No Known Allergies    Intolerances    Karnofsky Performance Score/Palliative Performance Status Version 2:   30%    Vital Signs Last 24 Hrs  T(C): 37.1 (2019 11:26), Max: 37.1 (2019 11:26)  T(F): 98.7 (2019 11:26), Max: 98.7 (2019 11:26)  HR: 56 (2019 15:06) (56 - 85)  BP: 121/68 (2019 11:26) (113/46 - 137/74)  BP(mean): --  RR: 18 (2019 11:26) (18 - 19)  SpO2: 92% (2019 15:06) (92% - 97%)    PHYSICAL EXAM:    General:  Sleeping arousable NAD    HEENT: [x ] normal  [ ] dry mouth  [ ] ET tube/trach    Lungs: [ x] comfortable [ ] tachypnea/labored breathing  [ ] excessive secretions    CV: [ x] normal  [ ] tachycardia    GI: [x ] normal  [ ] distended  [ ] tender  [ ] no BS               [ ] PEG/NG/OG tube    : [ x] normal  [ ] incontinent  [ ] oliguria/anuria  [ ] olea    MSK: [ ] normal  [ x] weakness  [ ] edema             [ ] ambulatory  [ ] bedbound/wheelchair bound    Skin: [ ] normal  [ ] pressure ulcers- Stage_____  [x ] no rash    LABS:                        9.5    7.8   )-----------( 325      ( 2019 22:36 )             30.2       Urinalysis Basic - ( 2019 15:02 )    Color: Red / Appearance: Bloody / S.015 / pH: x  Gluc: x / Ketone: Negative  / Bili: Negative / Urobili: Negative mg/dL   Blood: x / Protein: 500 mg/dL / Nitrite: Negative   Leuk Esterase: Trace / RBC: TNTC /HPF / WBC 3-5   Sq Epi: x / Non Sq Epi: Negative / Bacteria: Occasional      I&O's Summary    2019 07:01  -  2019 07:00  --------------------------------------------------------  IN: 1080 mL / OUT: 100 mL / NET: 980 mL    2019 07:01  -  2019 16:14  --------------------------------------------------------  IN: 360 mL / OUT: 150 mL / NET: 210 mL        RADIOLOGY & ADDITIONAL STUDIES:    < from: CT Chest No Cont (19 @ 09:23) >   EXAM:  CT CHEST                          PROCEDURE DATE:  2019          INTERPRETATION:  CLINICAL INFORMATION: Redness of breath for 2 to 3 days.    COMPARISON: 2018.    PROCEDURE:   CT of the Chest was performed without intravenous contrast.  Sagittal and coronal reformats were performed.    FINDINGS:    LUNGS AND LARGE AIRWAYS: Emphysema. Bronchiectasis and consolidation in   the medial right upper lung, possibly post radiation change.. New patchy   airspace opacities in the right lung, consistent with pneumonia.   Unchanged calcification in the right apex.  PLEURA: Small bilateral pleural effusions.  VESSELS: Atherosclerotic calcifications.   HEART: Cardiomegaly. No pericardial effusion.  MEDIASTINUM AND ASHWIN: Unchanged enlarged pericardiophrenic lymph nodes,   measuring up to 2.3 x 1.4 cm.  CHEST WALL AND LOWER NECK: Within normal limits.  VISUALIZED UPPER ABDOMEN: Right renal cyst.  BONES: Degenerative changes of the spine. Sternotomy.    IMPRESSION: Since 2018, new patchy opacities in the right   lung, consistent with pneumonia.    < end of copied text >      Thank you for the opportunity to assist with the care of this patient.   [facility] [phone].

## 2019-01-02 NOTE — CONSULT NOTE ADULT - CONSULT REASON
Anemia
hematuria, swelling scrotum
Hypoxemia
pleural effusion and hemoptysis
Elisabeth-operative Cardiac Risk Stratification
Goals of Care
Permacath infection
Permacath infx
infected right permacath
Hypoxia, respiratory distress

## 2019-01-02 NOTE — PROGRESS NOTE ADULT - SUBJECTIVE AND OBJECTIVE BOX
[id number]  KAUSHIK HOFFMAN      Patient was seen and examined at the bedside by the Hospitalist/PA team.    Allergies    No Known Allergies    Intolerances        PAST MEDICAL & SURGICAL HISTORY:  Chronic anemia  ESRD (end stage renal disease)  CAD (coronary artery disease)  Lung cancer: had yoni radiation in .  Bipolar disorder  High cholesterol  Hypertension  S/P CABG (coronary artery bypass graft): pt&#x27;s son in [facility] states h/o some stents near neck area ? carotid ? he is not sure.      Vital Signs Last 24 Hrs  T(C): 36.9 (2019 04:43), Max: 36.9 (2019 11:38)  T(F): 98.4 (2019 04:43), Max: 98.5 (2019 11:38)  HR: 60 (2019 09:50) (58 - 85)  BP: 113/46 (2019 04:43) (113/46 - 137/74)  BP(mean): --  RR: 19 (2019 04:43) (18 - 20)  SpO2: 92% (2019 09:50) (92% - 97%)          PHYSICAL EXAM:    GENERAL: NAD, well-groomed, well-developed  HEAD:  Atraumatic, Normocephalic  EYES: EOMI, PERRLA, conjunctiva and sclera clear  ENMT: No tonsillar erythema, exudates, or enlargement; Moist mucous membranes, Good dentition, No lesions  NECK: Supple, No JVD, Normal thyroid  NERVOUS SYSTEM:  Alert & Oriented X3, Good concentration; Motor Strength 5/5 B/L upper and lower extremities; DTRs 2+ intact and symmetric  CHEST/LUNG: Clear to percussion bilaterally; No rales, rhonchi, wheezing, or rubs  HEART: Regular rate and rhythm; No murmurs, rubs, or gallops  ABDOMEN: Soft, Nontender, Nondistended; Bowel sounds present  EXTREMITIES:  2+ Peripheral Pulses, No clubbing, cyanosis, or edema  LYMPH: No lymphadenopathy noted  RECTAL: No masses, prostate normal size and smooth, Guiac negative   BREAST: No palpatble masses, skin no lesions no retractions, no discharages. adenexa no palpable masses noted   GYN: uterus normal size, adenexa no palpable masses, no CMT, no uterine discharge   SKIN: No rashes or lesions       @ 07: @ 07:00  --------------------------------------------------------  IN: 1080 mL / OUT: 100 mL / NET: 980 mL     @ 07: @ 11:27  --------------------------------------------------------  IN: 240 mL / OUT: 150 mL / NET: 90 mL                              9.5    7.8   )-----------( 325      ( 2019 22:36 )             30.2                Urinalysis Basic - ( 2019 15:02 )    Color: Red / Appearance: Bloody / S.015 / pH: x  Gluc: x / Ketone: Negative  / Bili: Negative / Urobili: Negative mg/dL   Blood: x / Protein: 500 mg/dL / Nitrite: Negative   Leuk Esterase: Trace / RBC: TNTC /HPF / WBC 3-5   Sq Epi: x / Non Sq Epi: Negative / Bacteria: Occasional       ICU Vital Signs Last 24 Hrs  T(C): 36.9 (2019 04:43), Max: 36.9 (2019 11:38)  T(F): 98.4 (2019 04:43), Max: 98.5 (2019 11:38)  HR: 60 (2019 09:50) (58 - 85)  BP: 113/46 (2019 04:43) (113/46 - 137/74)  BP(mean): --  ABP: --  ABP(mean): --  RR: 19 (2019 04:43) (18 - 20)  SpO2: 92% (2019 09:50) (92% - 97%) [id number]  [facility]      Patient was seen and examined at the bedside by the Hospitalist/PA team.    Interval overnight events:  Patient complains of hemoptysis.  Hematuria per nurse overnight.  Ultrasound reviewed.  Reviewed patient assessment of cardiology risk analysis for AVF 1/3.                   HPI: 78 year old male with ESRD on HD with multiple aborted permacath insertion attempts secondary to respiratory issues scheduled for OR on 1/3 for Permacath placement and AVF creation.        Allergies    No Known Allergies    Intolerances        PAST MEDICAL & SURGICAL HISTORY:  Chronic anemia  ESRD (end stage renal disease)  CAD (coronary artery disease)  Lung cancer: had yoni radiation in .  Bipolar disorder  High cholesterol  Hypertension  S/P CABG (coronary artery bypass graft): pt&#x27;s son in [facility] states h/o some stents near neck area ? carotid ? he is not sure.      Vital Signs Last 24 Hrs  T(C): 36.9 (2019 04:43), Max: 36.9 (2019 11:38)  T(F): 98.4 (2019 04:43), Max: 98.5 (2019 11:38)  HR: 60 (2019 09:50) (58 - 85)  BP: 113/46 (2019 04:43) (113/46 - 137/74)  BP(mean): --  RR: 19 (2019 04:43) (18 - 20)  SpO2: 92% (2019 09:50) (92% - 97%)          PHYSICAL EXAM:    GENERAL: NAD  HEAD:  Atraumatic, Normocephalic  EYES: EOMI, PERRLA, conjunctiva and sclera clear  ENMT: No tonsillar erythema, exudates, or enlargement; Moist mucous membranes, Good dentition, No lesions  NECK: Supple, No JVD, Normal thyroid  NERVOUS SYSTEM:  Alert & Oriented X3, Good concentration; Motor Strength 5/5 B/L upper and lower extremities; DTRs 2+ intact and symmetric  CHEST/LUNG: Clear to percussion bilaterally; crackles towards bases bilaterally; unchanged  HEART: Regular rate and rhythm; No murmurs, rubs, or gallops  ABDOMEN: Soft, Nontender, Nondistended; Bowel sounds present  EXTREMITIES:  2+ Peripheral Pulses, No clubbing, cyanosis, or edema  LYMPH: No lymphadenopathy noted  : Right permacath in groin  SKIN: No rashes or lesions       @ 07: @ 07:00  --------------------------------------------------------  IN: 1080 mL / OUT: 100 mL / NET: 980 mL     @ 07: @ 11:27  --------------------------------------------------------  IN: 240 mL / OUT: 150 mL / NET: 90 mL                              9.5    7.8   )-----------( 325      ( 2019 22:36 )             30.2                Urinalysis Basic - ( 2019 15:02 )    Color: Red / Appearance: Bloody / S.015 / pH: x  Gluc: x / Ketone: Negative  / Bili: Negative / Urobili: Negative mg/dL   Blood: x / Protein: 500 mg/dL / Nitrite: Negative   Leuk Esterase: Trace / RBC: TNTC /HPF / WBC 3-5   Sq Epi: x / Non Sq Epi: Negative / Bacteria: Occasional       ICU Vital Signs Last 24 Hrs  T(C): 36.9 (2019 04:43), Max: 36.9 (2019 11:38)  T(F): 98.4 (2019 04:43), Max: 98.5 (2019 11:38)  HR: 60 (2019 09:50) (58 - 85)  BP: 113/46 (2019 04:43) (113/46 - 137/74)  BP(mean): --  ABP: --  ABP(mean): --  RR: 19 (2019 04:43) (18 - 20)  SpO2: 92% (2019 09:50) (92% - 97%) CC: hemoptysis and hematuria    Patient was seen and examined at the bedside by the Hospitalist/PA team.    Interval overnight events:  Patient complains of hemoptysis.  Hematuria per nurse overnight.  Ultrasound reviewed.  Reviewed patient assessment of cardiology risk analysis for AVF 1/3.          HPI: 78 year old male with ESRD on HD with multiple aborted permacath insertion attempts secondary to respiratory issues scheduled for OR on 1/3 for Permacath placement and AVF creation.      Vital Signs Last 24 Hrs  T(C): 36.9 (2019 04:43), Max: 36.9 (2019 11:38)  T(F): 98.4 (2019 04:43), Max: 98.5 (2019 11:38)  HR: 60 (2019 09:50) (58 - 85)  BP: 113/46 (2019 04:43) (113/46 - 137/74)  BP(mean): --  RR: 19 (2019 04:43) (18 - 20)  SpO2: 92% (2019 09:50) (92% - 97%)    PHYSICAL EXAM:    GENERAL: NAD  HEAD:  Atraumatic, Normocephalic  EYES: EOMI, PERRLA, conjunctiva and sclera clear  ENMT: No tonsillar erythema, exudates, or enlargement; Moist mucous membranes, Good dentition, No lesions  NECK: Supple, No JVD, Normal thyroid  NERVOUS SYSTEM:  Alert & Oriented X3, Good concentration; Motor Strength 5/5 B/L upper and lower extremities; DTRs 2+ intact and symmetric  CHEST/LUNG: Clear to percussion bilaterally; crackles towards bases bilaterally; unchanged  HEART: Regular rate and rhythm; No murmurs, rubs, or gallops  ABDOMEN: Soft, Nontender, Nondistended; Bowel sounds present  EXTREMITIES:  2+ Peripheral Pulses, No clubbing, cyanosis, or edema  LYMPH: No lymphadenopathy noted  : Right permacath in groin  SKIN: No rashes or lesions       @ 07:  -   @ 07:00  --------------------------------------------------------  IN: 1080 mL / OUT: 100 mL / NET: 980 mL     @ 07:01  -   @ 11:27  --------------------------------------------------------  IN: 240 mL / OUT: 150 mL / NET: 90 mL                              9.5    7.8   )-----------( 325      ( 2019 22:36 )             30.2                Urinalysis Basic - ( 2019 15:02 )    Color: Red / Appearance: Bloody / S.015 / pH: x  Gluc: x / Ketone: Negative  / Bili: Negative / Urobili: Negative mg/dL   Blood: x / Protein: 500 mg/dL / Nitrite: Negative   Leuk Esterase: Trace / RBC: TNTC /HPF / WBC 3-5   Sq Epi: x / Non Sq Epi: Negative / Bacteria: Occasional       ICU Vital Signs Last 24 Hrs  T(C): 36.9 (2019 04:43), Max: 36.9 (2019 11:38)  T(F): 98.4 (2019 04:43), Max: 98.5 (2019 11:38)  HR: 60 (2019 09:50) (58 - 85)  BP: 113/46 (2019 04:43) (113/46 - 137/74)  BP(mean): --  ABP: --  ABP(mean): --  RR: 19 (2019 04:43) (18 - 20)  SpO2: 92% (2019 09:50) (92% - 97%)

## 2019-01-02 NOTE — CONSULT NOTE ADULT - SUBJECTIVE AND OBJECTIVE BOX
Surgeon: Yanique    Consult requesting by:Christy    HISTORY OF PRESENT ILLNESS:  77 y/o male with hx of ESRD on HD T//Sat, CAD s/p PCI on recent admission, HTN, Pulm HTN, Lung cancer s/p XRT in 06 Was at HD yesterday evening and noted to have low grade temp and purulent drainage from catheter insertion site with erythema. Patient completed his HD session and was sent to ED. In ED was noted to have WBC of 15, no shift, no fever. There was some scant drainage around catheter insertion site and erythema. Patient also noted to have hypoxia. Denies cough, CP, Chills. Admits to subjective fevers at home over past few days. No focal weakness.     PAST MEDICAL & SURGICAL HISTORY:  Chronic anemia  ESRD (end stage renal disease)  CAD (coronary artery disease)  Lung cancer: had yoni radiation in .  Bipolar disorder  High cholesterol  Hypertension  S/P CABG (coronary artery bypass graft): pt&#x27;s son in [facility] h/o some stents near neck area ? carotid ? he is not sure.      MEDICATIONS  (STANDING):  ALBUTerol/ipratropium for Nebulization 3 milliLiter(s) Nebulizer every 6 hours  amLODIPine   Tablet 10 milliGRAM(s) Oral daily  aspirin enteric coated 81 milliGRAM(s) Oral daily  atorvastatin 80 milliGRAM(s) Oral at bedtime  chlorhexidine 2% Cloths 1 Application(s) Topical daily  clopidogrel Tablet 75 milliGRAM(s) Oral daily  diclofenac sodium 1% Gel 4 Gram(s) Topical <User Schedule>  DULoxetine 20 milliGRAM(s) Oral daily  gabapentin 100 milliGRAM(s) Oral daily  hydrALAZINE 50 milliGRAM(s) Oral every 8 hours  iron sucrose IVPB 100 milliGRAM(s) IV Intermittent <User Schedule>  isosorbide   mononitrate ER Tablet (IMDUR) 30 milliGRAM(s) Oral daily  levothyroxine 100 MICROGram(s) Oral daily  losartan 50 milliGRAM(s) Oral at bedtime  methyl salicylate 14%/menthol 6% Topical Ointment 1 Application(s) Topical two times a day  metoprolol tartrate 50 milliGRAM(s) Oral two times a day  Nephro-jeana 1 Tablet(s) Oral daily  pantoprazole  Injectable 40 milliGRAM(s) IV Push every 12 hours  predniSONE   Tablet 40 milliGRAM(s) Oral daily  sodium chloride 0.9% lock flush 3 milliLiter(s) IV Push every 8 hours  vancomycin  IVPB 750 milliGRAM(s) IV Intermittent <User Schedule>    MEDICATIONS  (PRN):  ALPRAZolam 0.25 milliGRAM(s) Oral two times a day PRN anxiety  benzonatate 100 milliGRAM(s) Oral every 8 hours PRN Cough  guaiFENesin    Syrup 200 milliGRAM(s) Oral every 6 hours PRN Cough  ondansetron Injectable 4 milliGRAM(s) IV Push every 6 hours PRN Nausea    Antiplatelet therapy:     plavix & ASA                       Last dose/amt:    Allergies    No Known Allergies    Intolerances        SOCIAL HISTORY:  Smoker: [ ] Yes  [x ] No        PACK YEARS:                         WHEN QUIT?  ETOH use: [ ] Yes  [x ] No              FREQUENCY / QUANTITY:  Ilicit Drug use:  [ ] Yes  [ x] No  Occupation: retired  Live with: family       FAMILY HISTORY:  Family history of essential hypertension (Father)      Review of Systems  CONSTITUTIONAL:  Fevers[ ] chills[ ] sweats[ ] fatigue[ ] weight loss[ ] weight gain [ ]                                     NEGATIVE [x ]   NEURO:  parathesias[ ] seizures [ ]  syncope [ ]  confusion [ ]                                                                                NEGATIVE[ x]   EYES: glasses[ ]  blurry vision[ ]  discharge[ ] pain[ ] glaucoma [ ]                                                                          NEGATIVE[ x]   ENMT:  difficulty hearing [ ]  vertigo[ ]  dysphagia[ ] epistaxis[ ] recent dental work [ ]                                    NEGATIVE[x ]   CV:  chest pain[ ] palpitations[ ] RODRIGUEZ [ x] diaphoresis [ ]                                                                                           NEGATIVE[ ]   RESPIRATORY:  wheezing[ ] SOB[x ] cough [x ] sputum[ ] hemoptysis[ x]                                                                  NEGATIVE[ ]   GI:  nausea[ ]  vommiting [ ]  diarrhea[ ] constipation [ ] melena [ ]                                                                      NEGATIVE[x ]   : hematuria[ ]  dysuria[ ] urgency[ ] incontinence[ ]                                                                                            NEGATIVE[ x]   MUSKULOSKELETAL:  arthritis[ ]  joint swelling [ ] muscle weakness [ ]                                                                NEGATIVE[x ]   SKIN/BREAST:  rash[ ] itching [ ]  hair loss[ ] masses[ ]                                                                                              NEGATIVE[x ]   PSYCH:  dementia [ ] depresion [ ] anxiety[ ]                                                                                                               NEGATIVE[x ]   HEME/LYMPH:  bruises easily[ ] enlarged lymph nodes[ ] tender lymph nodes[ ]                                               NEGATIVE[x ]   ENDOCRINE:  cold intolerance[ ] heat intolerance[ ] polydipsia[ ]                                                                          NEGATIVE[x ]     PHYSICAL EXAM  Vital Signs Last 24 Hrs  T(C): 37.1 (2019 11:26), Max: 37.1 (2019 11:26)  T(F): 98.7 (2019 11:26), Max: 98.7 (2019 11:26)  HR: 56 (2019 15:06) (56 - 85)  BP: 121/68 (2019 11:26) (113/46 - 137/74)  BP(mean): --  RR: 18 (2019 11:26) (18 - 20)  SpO2: 92% (2019 15:06) (92% - 97%)    CONSTITUTIONAL:                                                                          WNL\[ x]   Neuro: WNL[x ] Normal exam oriented to person/place & time with no focal motor or sensory  deficits. Other                     Eyes: WNL[x ]   Normal exam of conjunctiva & lids, pupils equally reactive. Other     ENT: WNL[x ]    Normal exam of nasal/oral mucosa with absence of cyanosis. Other  Neck: WNL[x ]  Normal exam of jugular veins, trachea & thyroid. Other  Chest: WNL[ ] Normal lung exam with good air movement absence of wheezes, rales, or rhonchi: Other     decreased at bases Rt > Lt                                                                            CV:  Auscultation: normal [x ] S3[ ] S4[ ] Irregular [ ] Rub[ ] Clicks[ ]    Murmurs none:[ x]systolic [ ]  diastolic [ ] holosystolic [ ]  Carotids: No Bruits[x ] Other                 Abdominal Aorta: normal [x ] nonpalpable[ ]Other                                                                                      GI:           WNL[x ] Normal exam of abdomen, liver & spleen with no noted masses or tenderness. Other                                                                                                        Extremities: WNL[x ] Normal no evidence of cyanosis or deformity Edema: none[ ]trace[ ]1+[ ]2+[ ]3+[ ]4+[ ]  Lower Extremity Pulses: Right[2+ ] Left[2+ ]Varicosities[ ]  SKIN :WNL[x ] Normal exam to inspection & palation. Other:                                                          LABS:                        9.5    7.8   )-----------( 325      ( 2019 22:36 )             30.2             Urinalysis Basic - ( 2019 15:02 )    Color: Red / Appearance: Bloody / S.015 / pH: x  Gluc: x / Ketone: Negative  / Bili: Negative / Urobili: Negative mg/dL   Blood: x / Protein: 500 mg/dL / Nitrite: Negative   Leuk Esterase: Trace / RBC: TNTC /HPF / WBC 3-5   Sq Epi: x / Non Sq Epi: Negative / Bacteria: Occasional                TTE / ROBERT:     < from: TTE Echo Complete w/Doppler (18 @ 11:22) >   1. Left ventricular ejection fraction, by visual estimation, is 60 to   65%.   2. Normal global left ventricular systolic function.   3. Basal anteroseptal segment and basal and mid inferior wall are   abnormalas described above.   4. Spectral Doppler shows pseudonormal pattern of left ventricular   myocardial filling (Grade II diastolic dysfunction).   5. Estimated pulmonary artery systolic pressure is 53.7 mmHg assuming a   right atrial pressure of 8 mmHg, which is consistent with moderate   pulmonary hypertension.   6. Endocardial visualization was enhanced with intravenous echo contrast.    < end of copied text >    CT SCAN      < from: CT Chest No Cont (19 @ 09:23) >  LUNGS AND LARGE AIRWAYS: Emphysema. Bronchiectasis and consolidation in   the medial right upper lung, possibly post radiation change.. New patchy   airspace opacities in the right lung, consistent with pneumonia.   Unchanged calcification in the right apex.  PLEURA: Small bilateral pleural effusions.  VESSELS: Atherosclerotic calcifications.   HEART: Cardiomegaly. No pericardial effusion.  MEDIASTINUM AND ASHWIN: Unchanged enlarged pericardiophrenic lymph nodes,   measuring up to 2.3 x 1.4 cm.  CHEST WALL AND LOWER NECK: Within normal limits.  VISUALIZED UPPER ABDOMEN: Right renal cyst.  BONES: Degenerative changes of the spine. Sternotomy.    IMPRESSION: Since 2018, new patchy opacities in the right   lung, consistent with pneumonia.    < end of copied text >

## 2019-01-02 NOTE — PROGRESS NOTE ADULT - SUBJECTIVE AND OBJECTIVE BOX
INTERVAL HPI/OVERNIGHT EVENTS:    Patient pending permacath placement and AVF creation after two aborted permacath insertion attempts due to respiratory issues. OR scheduled for 1/3, Protect LUE. Continue to HD via catheter which will be discontinued as soon as more definitive access is obtained      MEDICATIONS  (STANDING):  ALBUTerol/ipratropium for Nebulization 3 milliLiter(s) Nebulizer every 6 hours  amLODIPine   Tablet 10 milliGRAM(s) Oral daily  aspirin enteric coated 81 milliGRAM(s) Oral daily  atorvastatin 80 milliGRAM(s) Oral at bedtime  chlorhexidine 2% Cloths 1 Application(s) Topical daily  clopidogrel Tablet 75 milliGRAM(s) Oral daily  diclofenac sodium 1% Gel 4 Gram(s) Topical <User Schedule>  DULoxetine 20 milliGRAM(s) Oral daily  gabapentin 100 milliGRAM(s) Oral daily  hydrALAZINE 50 milliGRAM(s) Oral every 8 hours  isosorbide   mononitrate ER Tablet (IMDUR) 30 milliGRAM(s) Oral daily  levothyroxine 100 MICROGram(s) Oral daily  losartan 50 milliGRAM(s) Oral at bedtime  methyl salicylate 14%/menthol 6% Topical Ointment 1 Application(s) Topical two times a day  metoprolol tartrate 50 milliGRAM(s) Oral two times a day  Nephro-jeana 1 Tablet(s) Oral daily  pantoprazole  Injectable 40 milliGRAM(s) IV Push every 12 hours  predniSONE   Tablet 40 milliGRAM(s) Oral daily  sodium chloride 0.9% lock flush 3 milliLiter(s) IV Push every 8 hours  vancomycin  IVPB 750 milliGRAM(s) IV Intermittent <User Schedule>    MEDICATIONS  (PRN):  ALPRAZolam 0.25 milliGRAM(s) Oral two times a day PRN anxiety  benzonatate 100 milliGRAM(s) Oral every 8 hours PRN Cough  guaiFENesin    Syrup 200 milliGRAM(s) Oral every 6 hours PRN Cough  ondansetron Injectable 4 milliGRAM(s) IV Push every 6 hours PRN Nausea      Vital Signs Last 24 Hrs  T(C): 36.8 (2019 20:34), Max: 36.9 (2019 11:38)  T(F): 98.2 (2019 20:34), Max: 98.5 (2019 11:38)  HR: 85 (2019 21:44) (58 - 85)  BP: 134/70 (2019 20:34) (115/58 - 137/74)  BP(mean): --  RR: 19 (2019 20:34) (18 - 20)  SpO2: 97% (2019 21:44) (97% - 98%)    PE  Gen: Not in acute distress   Pulm: Non-labored breathing  CV: RRR  Abd: Soft, nondistended  Ext: Right femoral Shiley for HD access in place with no active bleeding nor surround erythema  Neuro: AAOX3, no neurosensory deficits       I&O's Detail    31 Dec 2018 07:  -  2019 07:00  --------------------------------------------------------  IN:    Nepro with Carb Steady: 550 mL    Oral Fluid: 720 mL  Total IN: 1270 mL    OUT:  Total OUT: 0 mL    Total NET: 1270 mL      2019 07:01  -  2019 04:50  --------------------------------------------------------  IN:    Oral Fluid: 1080 mL  Total IN: 1080 mL    OUT:    Voided: 100 mL  Total OUT: 100 mL    Total NET: 980 mL          LABS:                        9.5    7.8   )-----------( 325      ( 2019 22:36 )             30.2     12-31    133<L>  |  93<L>  |  57.0<H>  ----------------------------<  123<H>  4.4   |  26.0  |  4.73<H>    Ca    7.5<L>      31 Dec 2018 08:15  Mg     2.2             Urinalysis Basic - ( 2019 15:02 )    Color: Red / Appearance: Bloody / S.015 / pH: x  Gluc: x / Ketone: Negative  / Bili: Negative / Urobili: Negative mg/dL   Blood: x / Protein: 500 mg/dL / Nitrite: Negative   Leuk Esterase: Trace / RBC: TNTC /HPF / WBC 3-5   Sq Epi: x / Non Sq Epi: Negative / Bacteria: Occasional        RADIOLOGY & ADDITIONAL STUDIES:

## 2019-01-02 NOTE — CONSULT NOTE ADULT - CONSULT REQUESTED BY NAME
Christy
Dr. Brandon
Dr. Dickerson
Dr. Moura
Martell
Dr Moura
Bipin Brandon
Mikey
Dr. IRVING Brandon
Dr Newberry

## 2019-01-02 NOTE — CONSULT NOTE ADULT - PROBLEM SELECTOR RECOMMENDATION 9
-continue IV antibiotics as per primary team  -vascular surgery attending to evaluate need for permacath removal  -follow up wound and blood cultures
history ESRD with routine dialysis
Continue medical management with ABX
Related to volume overload and needing urgent Hemodialysis.  Will continue Bipap for now for added support, reducing preload.  BP control and initiate urgent hemodialysis ASAP.  Taper off Bipap after HD Completed
Plan for CVC replacement  pending cultures

## 2019-01-02 NOTE — CONSULT NOTE ADULT - PROBLEM SELECTOR PROBLEM 1
Hematuria
PNA (pneumonia)
Acute respiratory failure with hypoxia
ESRD (end stage renal disease)
Complication of vascular dialysis catheter, initial encounter
Complication of vascular dialysis catheter, initial encounter

## 2019-01-02 NOTE — CONSULT NOTE ADULT - PROBLEM SELECTOR RECOMMENDATION 2
kimi small epididymal head cysts.
HD per nephro
D/C AC is possible and observe   Cont medical management   GEOVANY Chanel
Related to volume overload and elevated BP.  Need to control BP with vasodilators and reduce preload with BP now.  Ultimately needs urgent HD

## 2019-01-02 NOTE — CONSULT NOTE ADULT - PROVIDER SPECIALTY LIST ADULT
Vascular Surgery
CT Surgery
Cardiology
Gastroenterology
Nephrology
Palliative Care
Pulmonology
Urology
Critical Care
Infectious Disease

## 2019-01-02 NOTE — PROGRESS NOTE ADULT - ASSESSMENT
A/P: 78 year old male with ESRD on HD with multiple aborted permacath insertion attempts secondary to respiratory issues scheduled for OR on 1/3 for Permacath placement and AVF creation.     -OR on 1/3/19 for Permacath placement and AVF creation.   -protect LUE from IV access and blood draws  -Rest of care per primary team    -Cardiac risk stratification and clearance noted and appreciated  -preop 1/2/19 (NPO, type and screen)

## 2019-01-02 NOTE — PROGRESS NOTE ADULT - SUBJECTIVE AND OBJECTIVE BOX
[facility] DIVISION OF KIDNEY DISEASES AND HYPERTENSION -- FOLLOW UP NOTE  --------------------------------------------------------------------------------  Chief Complaint:  ESRD on HD    24 hour events/subjective:  Pt seen today  NAD  HD today        PAST HISTORY  --------------------------------------------------------------------------------  No significant changes to PMH, PSH, FHx, SHx, unless otherwise noted    ALLERGIES & MEDICATIONS  --------------------------------------------------------------------------------  Allergies    No Known Allergies    Intolerances      Standing Inpatient Medications  ALBUTerol/ipratropium for Nebulization 3 milliLiter(s) Nebulizer every 6 hours  amLODIPine   Tablet 10 milliGRAM(s) Oral daily  aspirin enteric coated 81 milliGRAM(s) Oral daily  atorvastatin 80 milliGRAM(s) Oral at bedtime  chlorhexidine 2% Cloths 1 Application(s) Topical daily  clopidogrel Tablet 75 milliGRAM(s) Oral daily  diclofenac sodium 1% Gel 4 Gram(s) Topical <User Schedule>  DULoxetine 20 milliGRAM(s) Oral daily  gabapentin 100 milliGRAM(s) Oral daily  hydrALAZINE 50 milliGRAM(s) Oral every 8 hours  isosorbide   mononitrate ER Tablet (IMDUR) 30 milliGRAM(s) Oral daily  levothyroxine 100 MICROGram(s) Oral daily  losartan 50 milliGRAM(s) Oral at bedtime  methyl salicylate 14%/menthol 6% Topical Ointment 1 Application(s) Topical two times a day  metoprolol tartrate 50 milliGRAM(s) Oral two times a day  Nephro-jeana 1 Tablet(s) Oral daily  pantoprazole  Injectable 40 milliGRAM(s) IV Push every 12 hours  predniSONE   Tablet 40 milliGRAM(s) Oral daily  sodium chloride 0.9% lock flush 3 milliLiter(s) IV Push every 8 hours  vancomycin  IVPB 750 milliGRAM(s) IV Intermittent <User Schedule>    PRN Inpatient Medications  ALPRAZolam 0.25 milliGRAM(s) Oral two times a day PRN  benzonatate 100 milliGRAM(s) Oral every 8 hours PRN  guaiFENesin    Syrup 200 milliGRAM(s) Oral every 6 hours PRN  ondansetron Injectable 4 milliGRAM(s) IV Push every 6 hours PRN      REVIEW OF SYSTEMS  --------------------------------------------------------------------------------  Gen: No weight changes, fatigue, fevers/chills, weakness  Skin: No rashes  Head/Eyes/Ears/Mouth: No headache; Normal hearing; Normal vision w/o blurriness; No sinus pain/discomfort, sore throat  Respiratory: No dyspnea, cough, wheezing, hemoptysis  CV: No chest pain, PND, orthopnea  GI: No abdominal pain, diarrhea, constipation, nausea, vomiting, melena, hematochezia  : No increased frequency, dysuria, hematuria, nocturia  MSK: No joint pain/swelling; no back pain; no edema  Neuro: No dizziness/lightheadedness, weakness, seizures, numbness, tingling  Heme: No easy bruising or bleeding  Endo: No heat/cold intolerance  Psych: No significant nervousness, anxiety, stress, depression    All other systems were reviewed and are negative, except as noted.    VITALS/PHYSICAL EXAM  --------------------------------------------------------------------------------  T(C): 37.1 (01-02-19 @ 11:26), Max: 37.1 (01-02-19 @ 11:26)  HR: 64 (01-02-19 @ 11:26) (58 - 85)  BP: 121/68 (01-02-19 @ 11:26) (113/46 - 137/74)  RR: 18 (01-02-19 @ 11:26) (18 - 20)  SpO2: 95% (01-02-19 @ 11:26) (92% - 97%)  Wt(kg): --        01-01-19 @ 07:01  -  01-02-19 @ 07:00  --------------------------------------------------------  IN: 1080 mL / OUT: 100 mL / NET: 980 mL    01-02-19 @ 07:01  -  01-02-19 @ 12:45  --------------------------------------------------------  IN: 240 mL / OUT: 150 mL / NET: 90 mL      Physical Exam:  	Gen: NAD, pale  	HEENT: PERRL, supple neck, clear oropharynx  	Pulm: CTA B/L  	CV: RRR, S1S2; no rub  	Back: No spinal or CVA tenderness; no sacral edema  	Abd: +BS, soft, nontender/nondistended  	: No suprapubic tenderness  	UE: Warm, FROM, no clubbing, intact strength; no edema; no asterixis  	LE: Warm, FROM, no clubbing, intact strength; no edema  	Neuro: No focal deficits, intact gait  	Psych: Normal affect and mood  	Skin: Warm, without rashes  	Vascular access: Right femoral Shiley    LABS/STUDIES  --------------------------------------------------------------------------------              9.5    7.8   >-----------<  325      [01-01-19 @ 22:36]              30.2                 Creatinine Trend:  SCr 4.73 [12-31 @ 08:15]  SCr 5.51 [12-30 @ 08:28]  SCr 4.12 [12-29 @ 09:24]  SCr 4.86 [12-28 @ 03:43]  SCr 4.09 [12-27 @ 14:22]    Urinalysis - [01-01-19 @ 15:02]      Color Red / Appearance Bloody / SG 1.015 / pH 6.0      Gluc Negative / Ketone Negative  / Bili Negative / Urobili Negative       Blood Large / Protein 500 / Leuk Est Trace / Nitrite Negative      RBC TNTC / WBC 3-5 / Hyaline  / Gran  / Sq Epi  / Non Sq Epi Negative / Bacteria Occasional      Iron 46, TIBC 259, %sat 18      [01-01-19 @ 09:11]  Ferritin 470      [01-01-19 @ 09:11]  TSH 7.33      [11-29-18 @ 02:31]    HBsAb <3.0      [12-05-18 @ 16:33]  HBsAg Nonreact      [12-05-18 @ 16:33]  HBcAb Nonreact      [12-05-18 @ 16:33]  HCV 0.14, Nonreact      [12-05-18 @ 16:33]

## 2019-01-02 NOTE — CONSULT NOTE ADULT - CONSULT REQUESTED DATE/TIME
23-Dec-2018
31-Dec-2018 10:34
24-Dec-2018
02-Jan-2019 15:30
23-Dec-2018 13:53
31-Dec-2018 09:24
02-Jan-2019 15:42
23-Dec-2018 14:23
26-Dec-2018 13:39
27-Dec-2018 10:22

## 2019-01-03 LAB
ANION GAP SERPL CALC-SCNC: 12 MMOL/L — SIGNIFICANT CHANGE UP (ref 5–17)
BLD GP AB SCN SERPL QL: SIGNIFICANT CHANGE UP
BUN SERPL-MCNC: 57 MG/DL — HIGH (ref 8–20)
CALCIUM SERPL-MCNC: 7.7 MG/DL — LOW (ref 8.6–10.2)
CHLORIDE SERPL-SCNC: 96 MMOL/L — LOW (ref 98–107)
CO2 SERPL-SCNC: 28 MMOL/L — SIGNIFICANT CHANGE UP (ref 22–29)
CREAT SERPL-MCNC: 4.38 MG/DL — HIGH (ref 0.5–1.3)
GLUCOSE SERPL-MCNC: 87 MG/DL — SIGNIFICANT CHANGE UP (ref 70–115)
HCT VFR BLD CALC: 29.7 % — LOW (ref 42–52)
HGB BLD-MCNC: 9.3 G/DL — LOW (ref 14–18)
MAGNESIUM SERPL-MCNC: 2.1 MG/DL — SIGNIFICANT CHANGE UP (ref 1.6–2.6)
MCHC RBC-ENTMCNC: 28.4 PG — SIGNIFICANT CHANGE UP (ref 27–31)
MCHC RBC-ENTMCNC: 31.3 G/DL — LOW (ref 32–36)
MCV RBC AUTO: 90.8 FL — SIGNIFICANT CHANGE UP (ref 80–94)
PHOSPHATE SERPL-MCNC: 4.5 MG/DL — SIGNIFICANT CHANGE UP (ref 2.4–4.7)
PLATELET # BLD AUTO: 279 K/UL — SIGNIFICANT CHANGE UP (ref 150–400)
POTASSIUM SERPL-MCNC: 4.6 MMOL/L — SIGNIFICANT CHANGE UP (ref 3.5–5.3)
POTASSIUM SERPL-SCNC: 4.6 MMOL/L — SIGNIFICANT CHANGE UP (ref 3.5–5.3)
RBC # BLD: 3.27 M/UL — LOW (ref 4.6–6.2)
RBC # FLD: 14.1 % — SIGNIFICANT CHANGE UP (ref 11–15.6)
SODIUM SERPL-SCNC: 136 MMOL/L — SIGNIFICANT CHANGE UP (ref 135–145)
TYPE + AB SCN PNL BLD: SIGNIFICANT CHANGE UP
VANCOMYCIN TROUGH SERPL-MCNC: 18.2 UG/ML — SIGNIFICANT CHANGE UP (ref 10–20)
WBC # BLD: 13.3 K/UL — HIGH (ref 4.8–10.8)
WBC # FLD AUTO: 13.3 K/UL — HIGH (ref 4.8–10.8)

## 2019-01-03 PROCEDURE — 99233 SBSQ HOSP IP/OBS HIGH 50: CPT

## 2019-01-03 PROCEDURE — 76937 US GUIDE VASCULAR ACCESS: CPT | Mod: 26

## 2019-01-03 PROCEDURE — 36830 ARTERY-VEIN NONAUTOGRAFT: CPT | Mod: 58

## 2019-01-03 PROCEDURE — 99232 SBSQ HOSP IP/OBS MODERATE 35: CPT

## 2019-01-03 PROCEDURE — 36558 INSERT TUNNELED CV CATH: CPT | Mod: RT

## 2019-01-03 RX ORDER — IRON SUCROSE 20 MG/ML
100 INJECTION, SOLUTION INTRAVENOUS
Refills: 0 | Status: DISCONTINUED | OUTPATIENT
Start: 2019-01-03 | End: 2019-01-11

## 2019-01-03 RX ORDER — CLOPIDOGREL BISULFATE 75 MG/1
75 TABLET, FILM COATED ORAL DAILY
Refills: 0 | Status: DISCONTINUED | OUTPATIENT
Start: 2019-01-03 | End: 2019-01-11

## 2019-01-03 RX ORDER — GABAPENTIN 400 MG/1
100 CAPSULE ORAL DAILY
Refills: 0 | Status: DISCONTINUED | OUTPATIENT
Start: 2019-01-03 | End: 2019-01-11

## 2019-01-03 RX ORDER — SODIUM CHLORIDE 9 MG/ML
1000 INJECTION INTRAMUSCULAR; INTRAVENOUS; SUBCUTANEOUS
Refills: 0 | Status: DISCONTINUED | OUTPATIENT
Start: 2019-01-03 | End: 2019-01-03

## 2019-01-03 RX ORDER — VANCOMYCIN HCL 1 G
1000 VIAL (EA) INTRAVENOUS ONCE
Refills: 0 | Status: COMPLETED | OUTPATIENT
Start: 2019-01-03 | End: 2019-01-03

## 2019-01-03 RX ORDER — FENTANYL CITRATE 50 UG/ML
25 INJECTION INTRAVENOUS ONCE
Refills: 0 | Status: DISCONTINUED | OUTPATIENT
Start: 2019-01-03 | End: 2019-01-03

## 2019-01-03 RX ORDER — AMLODIPINE BESYLATE 2.5 MG/1
10 TABLET ORAL DAILY
Refills: 0 | Status: DISCONTINUED | OUTPATIENT
Start: 2019-01-03 | End: 2019-01-11

## 2019-01-03 RX ORDER — ATORVASTATIN CALCIUM 80 MG/1
80 TABLET, FILM COATED ORAL AT BEDTIME
Refills: 0 | Status: DISCONTINUED | OUTPATIENT
Start: 2019-01-03 | End: 2019-01-11

## 2019-01-03 RX ORDER — LEVOTHYROXINE SODIUM 125 MCG
100 TABLET ORAL DAILY
Refills: 0 | Status: DISCONTINUED | OUTPATIENT
Start: 2019-01-03 | End: 2019-01-11

## 2019-01-03 RX ORDER — SODIUM CHLORIDE 9 MG/ML
3 INJECTION INTRAMUSCULAR; INTRAVENOUS; SUBCUTANEOUS EVERY 8 HOURS
Refills: 0 | Status: DISCONTINUED | OUTPATIENT
Start: 2019-01-03 | End: 2019-01-11

## 2019-01-03 RX ORDER — ALPRAZOLAM 0.25 MG
0.25 TABLET ORAL
Refills: 0 | Status: DISCONTINUED | OUTPATIENT
Start: 2019-01-03 | End: 2019-01-03

## 2019-01-03 RX ORDER — ONDANSETRON 8 MG/1
4 TABLET, FILM COATED ORAL ONCE
Refills: 0 | Status: DISCONTINUED | OUTPATIENT
Start: 2019-01-03 | End: 2019-01-03

## 2019-01-03 RX ORDER — CHLORHEXIDINE GLUCONATE 213 G/1000ML
1 SOLUTION TOPICAL DAILY
Refills: 0 | Status: DISCONTINUED | OUTPATIENT
Start: 2019-01-03 | End: 2019-01-11

## 2019-01-03 RX ORDER — LOSARTAN POTASSIUM 100 MG/1
50 TABLET, FILM COATED ORAL AT BEDTIME
Refills: 0 | Status: DISCONTINUED | OUTPATIENT
Start: 2019-01-03 | End: 2019-01-11

## 2019-01-03 RX ORDER — PIPERACILLIN AND TAZOBACTAM 4; .5 G/20ML; G/20ML
2.25 INJECTION, POWDER, LYOPHILIZED, FOR SOLUTION INTRAVENOUS ONCE
Refills: 0 | Status: COMPLETED | OUTPATIENT
Start: 2019-01-03 | End: 2019-01-03

## 2019-01-03 RX ORDER — ASPIRIN/CALCIUM CARB/MAGNESIUM 324 MG
81 TABLET ORAL DAILY
Refills: 0 | Status: DISCONTINUED | OUTPATIENT
Start: 2019-01-03 | End: 2019-01-11

## 2019-01-03 RX ORDER — PANTOPRAZOLE SODIUM 20 MG/1
40 TABLET, DELAYED RELEASE ORAL EVERY 12 HOURS
Refills: 0 | Status: DISCONTINUED | OUTPATIENT
Start: 2019-01-03 | End: 2019-01-08

## 2019-01-03 RX ORDER — ONDANSETRON 8 MG/1
4 TABLET, FILM COATED ORAL EVERY 6 HOURS
Refills: 0 | Status: DISCONTINUED | OUTPATIENT
Start: 2019-01-03 | End: 2019-01-11

## 2019-01-03 RX ORDER — METOPROLOL TARTRATE 50 MG
50 TABLET ORAL
Refills: 0 | Status: DISCONTINUED | OUTPATIENT
Start: 2019-01-03 | End: 2019-01-11

## 2019-01-03 RX ORDER — HYDRALAZINE HCL 50 MG
50 TABLET ORAL EVERY 8 HOURS
Refills: 0 | Status: DISCONTINUED | OUTPATIENT
Start: 2019-01-03 | End: 2019-01-11

## 2019-01-03 RX ORDER — DULOXETINE HYDROCHLORIDE 30 MG/1
20 CAPSULE, DELAYED RELEASE ORAL DAILY
Refills: 0 | Status: DISCONTINUED | OUTPATIENT
Start: 2019-01-03 | End: 2019-01-11

## 2019-01-03 RX ORDER — ISOSORBIDE MONONITRATE 60 MG/1
30 TABLET, EXTENDED RELEASE ORAL DAILY
Refills: 0 | Status: DISCONTINUED | OUTPATIENT
Start: 2019-01-03 | End: 2019-01-11

## 2019-01-03 RX ADMIN — ISOSORBIDE MONONITRATE 30 MILLIGRAM(S): 60 TABLET, EXTENDED RELEASE ORAL at 14:29

## 2019-01-03 RX ADMIN — Medication 3 MILLILITER(S): at 03:55

## 2019-01-03 RX ADMIN — CLOPIDOGREL BISULFATE 75 MILLIGRAM(S): 75 TABLET, FILM COATED ORAL at 14:29

## 2019-01-03 RX ADMIN — AMLODIPINE BESYLATE 10 MILLIGRAM(S): 2.5 TABLET ORAL at 06:32

## 2019-01-03 RX ADMIN — Medication 81 MILLIGRAM(S): at 14:29

## 2019-01-03 RX ADMIN — SODIUM CHLORIDE 3 MILLILITER(S): 9 INJECTION INTRAMUSCULAR; INTRAVENOUS; SUBCUTANEOUS at 05:39

## 2019-01-03 RX ADMIN — PANTOPRAZOLE SODIUM 40 MILLIGRAM(S): 20 TABLET, DELAYED RELEASE ORAL at 06:32

## 2019-01-03 RX ADMIN — SODIUM CHLORIDE 3 MILLILITER(S): 9 INJECTION INTRAMUSCULAR; INTRAVENOUS; SUBCUTANEOUS at 14:27

## 2019-01-03 RX ADMIN — Medication 50 MILLIGRAM(S): at 06:32

## 2019-01-03 RX ADMIN — PIPERACILLIN AND TAZOBACTAM 200 GRAM(S): 4; .5 INJECTION, POWDER, LYOPHILIZED, FOR SOLUTION INTRAVENOUS at 18:16

## 2019-01-03 RX ADMIN — SODIUM CHLORIDE 3 MILLILITER(S): 9 INJECTION INTRAMUSCULAR; INTRAVENOUS; SUBCUTANEOUS at 23:13

## 2019-01-03 RX ADMIN — Medication 1 TABLET(S): at 14:29

## 2019-01-03 RX ADMIN — PANTOPRAZOLE SODIUM 40 MILLIGRAM(S): 20 TABLET, DELAYED RELEASE ORAL at 18:16

## 2019-01-03 RX ADMIN — ATORVASTATIN CALCIUM 80 MILLIGRAM(S): 80 TABLET, FILM COATED ORAL at 23:16

## 2019-01-03 RX ADMIN — Medication 100 MICROGRAM(S): at 06:31

## 2019-01-03 RX ADMIN — Medication 40 MILLIGRAM(S): at 06:32

## 2019-01-03 RX ADMIN — DULOXETINE HYDROCHLORIDE 20 MILLIGRAM(S): 30 CAPSULE, DELAYED RELEASE ORAL at 14:29

## 2019-01-03 RX ADMIN — Medication 50 MILLIGRAM(S): at 18:16

## 2019-01-03 RX ADMIN — Medication 250 MILLIGRAM(S): at 18:16

## 2019-01-03 RX ADMIN — Medication 50 MILLIGRAM(S): at 14:29

## 2019-01-03 RX ADMIN — GABAPENTIN 100 MILLIGRAM(S): 400 CAPSULE ORAL at 14:29

## 2019-01-03 RX ADMIN — CHLORHEXIDINE GLUCONATE 1 APPLICATION(S): 213 SOLUTION TOPICAL at 14:30

## 2019-01-03 NOTE — BRIEF OPERATIVE NOTE - PROCEDURE
Permacath dialysis catheter insertion  01/03/2019  right IJ  Active  MSOLIMAN3  AV graft  01/03/2019  left av graft (brachiocephalic)  Active  MSOLIMAN3 <<-----Click on this checkbox to enter Procedure

## 2019-01-03 NOTE — BRIEF OPERATIVE NOTE - OPERATION/FINDINGS
LUE cephalic vein with thrombosis found on ultrasound.  Opted for 4-7mm tapered PTFE av graft.    RIJ permacath placement with fluoroscopic confirmation.

## 2019-01-03 NOTE — PROGRESS NOTE ADULT - SUBJECTIVE AND OBJECTIVE BOX
INTERVAL HPI/OVERNIGHT EVENTS:  Post-op note    SUBJECTIVE:  79yo M h/o ESRD on HD, CAD s/p PCI is now s/p L AV graft and RIJ permacath with removal of R femoral shiley.  Pt tolerated procedures well.  Stable post-operatively.    Pt seen at bedside resting comfortably.  Pt had no complaints.  Mild pain at best.  Pt does endorse sob, unchanged from before the procedure, and especially the patient cannot tolerate staying flat on bed. Pt was able to tolerate 30 degree elevation for the most part.  No cp/lightheadedness/headaches/dizziness/numbness/tingling/weakness/fevers/chills/n/v.      MEDICATIONS  (STANDING):  amLODIPine   Tablet 10 milliGRAM(s) Oral daily  aspirin enteric coated 81 milliGRAM(s) Oral daily  atorvastatin 80 milliGRAM(s) Oral at bedtime  chlorhexidine 2% Cloths 1 Application(s) Topical daily  clopidogrel Tablet 75 milliGRAM(s) Oral daily  DULoxetine 20 milliGRAM(s) Oral daily  gabapentin 100 milliGRAM(s) Oral daily  hydrALAZINE 50 milliGRAM(s) Oral every 8 hours  iron sucrose IVPB 100 milliGRAM(s) IV Intermittent <User Schedule>  isosorbide   mononitrate ER Tablet (IMDUR) 30 milliGRAM(s) Oral daily  levothyroxine 100 MICROGram(s) Oral daily  losartan 50 milliGRAM(s) Oral at bedtime  metoprolol tartrate 50 milliGRAM(s) Oral two times a day  Nephro-jeana 1 Tablet(s) Oral daily  pantoprazole  Injectable 40 milliGRAM(s) IV Push every 12 hours  predniSONE   Tablet 40 milliGRAM(s) Oral daily  sodium chloride 0.9% lock flush 3 milliLiter(s) IV Push every 8 hours    MEDICATIONS  (PRN):  ALPRAZolam 0.25 milliGRAM(s) Oral two times a day PRN anxiety  artificial  tears Solution 1 Drop(s) Both EYES four times a day PRN Dry Eyes  benzonatate 100 milliGRAM(s) Oral every 8 hours PRN Cough  guaiFENesin    Syrup 200 milliGRAM(s) Oral every 6 hours PRN Cough  ondansetron Injectable 4 milliGRAM(s) IV Push every 6 hours PRN Nausea      Vital Signs Last 24 Hrs  SpO2 (%) SpO2 (%): 95 %  O2 delivery Patient On: supplemental O2    Temperature  Temp (F): 98.2 Degrees F  Temp (C) Temp (C): 36.8 Degrees C  Temp site Temp Site: oral    Heart Rate  Heart Rate Heart Rate (beats/min): 60 /min  Heart Rate Method: noninvasive blood pressure monitor    Noninvasive Blood Pressure  BP Systolic Systolic: 119 mm Hg  BP Diastolic Diastolic (mm Hg): 58 mm Hg  Blood Pressure - Site Site: right upper arm  Blood Pressure - Method Method: electronic    Respiratory/Pulse Oximetry  Respiration Rate (breaths/min) Respiration Rate (breaths/min): 20 /min  O2 delivery Patient On: supplemental O2      PE  Gen: NAD  Pulm: nonlabored breathing; has nasal cannula  CV: RRR  Ext: no cyanosis, swelling, or clubbing; Right groin c/d/i with no swelling or discharge.  Right IJ permacath dressing c/d/i, no swelling or discharge.  LUE wound dressings changed due to moderate saturation.  Wounds c/d/i otherwise.  Vasc:  B/L radials 2+, LUE thrill felt  Neuro:  no sensory or motor deficits noted            LABS:                        9.3    13.3  )-----------( 279      ( 03 Jan 2019 08:14 )             29.7     01-03    136  |  96<L>  |  57.0<H>  ----------------------------<  87  4.6   |  28.0  |  4.38<H>    Ca    7.7<L>      03 Jan 2019 08:16  Phos  4.5     01-03  Mg     2.1     01-03            RADIOLOGY & ADDITIONAL STUDIES:

## 2019-01-03 NOTE — PROGRESS NOTE ADULT - SUBJECTIVE AND OBJECTIVE BOX
CC: hemoptysis and hematuria    Patient had AV graft placed today; continues to have hemoptysis and cough; no fevers; slightly elevated WBC count; patient complaining of dressings soaked with blood. Patient otherwise without complaints.    HPI: 78 year old male with ESRD on HD with multiple aborted permacath insertion attempts secondary to respiratory issues scheduled for OR on 1/3 for Permacath placement and AVF creation.      Vital Signs Last 24 Hrs  T(C): 36.9 (2019 04:43), Max: 36.9 (2019 11:38)  T(F): 98.4 (2019 04:43), Max: 98.5 (2019 11:38)  HR: 60 (2019 09:50) (58 - 85)  BP: 113/46 (2019 04:43) (113/46 - 137/74)  BP(mean): --  RR: 19 (2019 04:43) (18 - 20)  SpO2: 92% (2019 09:50) (92% - 97%)    PHYSICAL EXAM:    GENERAL: NAD  HEAD:  Atraumatic, Normocephalic  EYES: EOMI, PERRLA, conjunctiva and sclera clear  ENMT: No tonsillar erythema, exudates, or enlargement; Moist mucous membranes, Good dentition, No lesions  NECK: Supple, No JVD, Normal thyroid  NERVOUS SYSTEM:  Alert & Oriented X3, Good concentration; Motor Strength 5/5 B/L upper and lower extremities; DTRs 2+ intact and symmetric  CHEST/LUNG: Clear to percussion bilaterally; crackles towards bases bilaterally; unchanged  HEART: Regular rate and rhythm; No murmurs, rubs, or gallops  ABDOMEN: Soft, Nontender, Nondistended; Bowel sounds present  EXTREMITIES:  2+ Peripheral Pulses, No clubbing, cyanosis, or edema  LYMPH: No lymphadenopathy noted  : Right permacath in groin  SKIN: No rashes or lesions       @ 07:  -   @ 07:00  --------------------------------------------------------  IN: 1080 mL / OUT: 100 mL / NET: 980 mL     @ 07:01  -   @ 11:27  --------------------------------------------------------  IN: 240 mL / OUT: 150 mL / NET: 90 mL      Complete Blood Count in AM (19 @ 08:14)    Hemoglobin: 9.3 g/dL    Mean Cell Hemoglobin: 28.4 pg    Mean Cell Hemoglobin Conc: 31.3 g/dL       Urinalysis Basic - ( 2019 15:02 )    Color: Red / Appearance: Bloody / S.015 / pH: x  Gluc: x / Ketone: Negative  / Bili: Negative / Urobili: Negative mg/dL   Blood: x / Protein: 500 mg/dL / Nitrite: Negative   Leuk Esterase: Trace / RBC: TNTC /HPF / WBC 3-5   Sq Epi: x / Non Sq Epi: Negative / Bacteria: Occasional       ICU Vital Signs Last 24 Hrs  T(C): 36.9 (2019 04:43), Max: 36.9 (2019 11:38)  T(F): 98.4 (2019 04:43), Max: 98.5 (2019 11:38)  HR: 60 (2019 09:50) (58 - 85)  BP: 113/46 (2019 04:43) (113/46 - 137/74)  BP(mean): --  ABP: --  ABP(mean): --  RR: 19 (2019 04:43) (18 - 20)  SpO2: 92% (2019 09:50) (92% - 97%)

## 2019-01-03 NOTE — PROGRESS NOTE ADULT - SUBJECTIVE AND OBJECTIVE BOX
HPI/OVERNIGHT EVENTS:  No acute events overnight. Patient kept NPO/IVF for OR today for AVF and permcath placement. Patient continues to remain on O2 for shortness of breath. Otherwise denies f/c/n/v/cp.        Vital Signs Last 24 Hrs  T(C): 36.6 (2019 07:41), Max: 36.9 (2019 21:50)  T(F): 97.9 (2019 07:41), Max: 98.5 (2019 21:50)  HR: 76 (2019 07:41) (52 - 76)  BP: 133/48 (2019 07:41) (119/54 - 137/72)  BP(mean): --  RR: 20 (2019 07:41) (18 - 20)  SpO2: 99% (2019 07:41) (92% - 100%)    Gen: Not in acute distress   Pulm: Non-labored breathing  CV: RRR  Abd: Soft, nondistended  Ext: Right femoral Shiley for HD access in place with no active bleeding nor surround erythema  Neuro: AAOX3, no neurosensory deficits       I&O's Detail    2019 07:01  -  2019 07:00  --------------------------------------------------------  IN:    Oral Fluid: 360 mL  Total IN: 360 mL    OUT:    Other: 1000 mL    Voided: 150 mL  Total OUT: 1150 mL    Total NET: -790 mL          LABS:                        9.3    13.3  )-----------( 279      ( 2019 08:14 )             29.7     01-03    136  |  96<L>  |  57.0<H>  ----------------------------<  87  4.6   |  28.0  |  4.38<H>    Ca    7.7<L>      2019 08:16  Phos  4.5     01-03  Mg     2.1     01-03        Urinalysis Basic - ( 2019 15:02 )    Color: Red / Appearance: Bloody / S.015 / pH: x  Gluc: x / Ketone: Negative  / Bili: Negative / Urobili: Negative mg/dL   Blood: x / Protein: 500 mg/dL / Nitrite: Negative   Leuk Esterase: Trace / RBC: TNTC /HPF / WBC 3-5   Sq Epi: x / Non Sq Epi: Negative / Bacteria: Occasional

## 2019-01-03 NOTE — PROGRESS NOTE ADULT - ASSESSMENT
ESRD (end stage renal disease) on dialysis  ,    Post-Op Dx:  ESRD (end stage renal disease) on dialysis ,    Procedure:    Tunneled CVC - R IJ  dialysis catheter insertion  ,  AV graft  - Left,       Operative Findings:  · Operative Findings	LUE cephalic vein with thrombosis found on ultrasound.  Opted for 4-7mm tapered PTFE av graft.  RIJ - Tunneled CVC  placement with fluoroscopic confirmation.	      HD in AM,

## 2019-01-03 NOTE — PROGRESS NOTE ADULT - ASSESSMENT
78 year old male with ESRD on HD with multiple aborted permacath insertion attempts secondary to respiratory issues scheduled for OR on 1/3 for Permacath placement and AVF creation.   - OR today 1/3 for AVF and permacath placement  - remain NPO/IVF  - rest of care per primary team intact

## 2019-01-03 NOTE — PROGRESS NOTE ADULT - ASSESSMENT
79yo M h/o ESRD on HD, CAD s/p PCI is now s/p L AV graft and RIJ permacath with removal of R femoral shiley    - continue ASA and plavix as ordered  - may use permacath for HD  - continue regular diet  - Pt to follow up on outpatient vascular office with Dr. Sparks about one week after discharge  - No further vascular surgery indications at this time.  Thank you for allowing us to share in the health care of your patient.  - rest of care and dispo primary team

## 2019-01-03 NOTE — PROGRESS NOTE ADULT - ASSESSMENT
79 y/o male with infection of permacath site, hypoxia, ESRD on HD, HTN, Pulm HTN, CAD s/p PCI.    ESRD (end stage renal disease). renal following   --> s/p permacath and AV graft placement  --> continues to ooze from site will ask vascular to come evaluate  --> cardiac risk assessment appreciated  --> patient has no medical issues that would prevent AVF placement surgery and is cleared for 1/3    right groin pain  CT of ab/pelvis which showed stable hematoma from previous exam; shiley in place and undisturbed; US of the scrotum showed thickening of scrotum but no significant findings on exam  --> Urology consult for further evaluation of hematuria and scrotal swelling.    current dark urine in urinal; per nephrology patient with ESRD and this may be old urine  --> monitor for now  --> if fever or signs of SIRS will send urine for culture    acute hypoxic resp failure / likely sec to vol overload; now with new infiltrate on CT; may represent pneumonia given elevated WBC and hemoptysis; Wells score of 1  --> on NC  --> renal and cardio following  --> HD seems to be improving respiratory status; patient no longer on high flow.  --> tapering steroids  --->CT chest completed today with worsening condition.  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   --> pulm following     Pneumonia; sputum obtained  - continue zosyn  - follow up sputum    fever/ likely source dialysis catheter possible infection  --> id following   --> iv vanco for now    Hypertension, unspecified type.  Plan: cont. o/p regimen, renal/low sodium diet.     anemia of esrd, stable , monitor     Coronary artery disease involving native coronary artery of native heart without angina pectoris. Plan: No CP, cont. o/p regimen.    HLD - statin     Chronic blood loss anemia   --> 1 unit prbc ordered 12/31; H/H improved  --> GI recs appreciated; will treat constipation  --> c.w ppi

## 2019-01-03 NOTE — PROGRESS NOTE ADULT - SUBJECTIVE AND OBJECTIVE BOX
[facility] DIVISION OF KIDNEY DISEASES AND HYPERTENSION -- FOLLOW UP NOTE  --------------------------------------------------------------------------------  Chief Complaint:  ESRD on HD    24 hour events/subjective:  Pt seen today  NAD    PAST HISTORY  --------------------------------------------------------------------------------  No significant changes to PMH, PSH, FHx, SHx, unless otherwise noted    ALLERGIES & MEDICATIONS  --------------------------------------------------------------------------------  Allergies    No Known Allergies    Standing Inpatient Medications  ALBUTerol/ipratropium for Nebulization 3 milliLiter(s) Nebulizer every 6 hours  amLODIPine   Tablet 10 milliGRAM(s) Oral daily  aspirin enteric coated 81 milliGRAM(s) Oral daily  atorvastatin 80 milliGRAM(s) Oral at bedtime  chlorhexidine 2% Cloths 1 Application(s) Topical daily  clopidogrel Tablet 75 milliGRAM(s) Oral daily  diclofenac sodium 1% Gel 4 Gram(s) Topical <User Schedule>  DULoxetine 20 milliGRAM(s) Oral daily  gabapentin 100 milliGRAM(s) Oral daily  hydrALAZINE 50 milliGRAM(s) Oral every 8 hours  isosorbide   mononitrate ER Tablet (IMDUR) 30 milliGRAM(s) Oral daily  levothyroxine 100 MICROGram(s) Oral daily  losartan 50 milliGRAM(s) Oral at bedtime  methyl salicylate 14%/menthol 6% Topical Ointment 1 Application(s) Topical two times a day  metoprolol tartrate 50 milliGRAM(s) Oral two times a day  Nephro-jeana 1 Tablet(s) Oral daily  pantoprazole  Injectable 40 milliGRAM(s) IV Push every 12 hours  predniSONE   Tablet 40 milliGRAM(s) Oral daily  sodium chloride 0.9% lock flush 3 milliLiter(s) IV Push every 8 hours  vancomycin  IVPB 750 milliGRAM(s) IV Intermittent <User Schedule>    PRN Inpatient Medications  ALPRAZolam 0.25 milliGRAM(s) Oral two times a day PRN  benzonatate 100 milliGRAM(s) Oral every 8 hours PRN  guaiFENesin    Syrup 200 milliGRAM(s) Oral every 6 hours PRN  ondansetron Injectable 4 milliGRAM(s) IV Push every 6 hours PRN    REVIEW OF SYSTEMS  --------------------------------------------------------------------------------  Gen: No weight changes, fatigue, fevers/chills, weakness  Skin: No rashes  Head/Eyes/Ears/Mouth: No headache; Normal hearing; Normal vision w/o blurriness; No sinus pain/discomfort, sore throat  Respiratory: No dyspnea, cough, wheezing, hemoptysis  CV: No chest pain, PND, orthopnea  GI: No abdominal pain, diarrhea, constipation, nausea, vomiting, melena, hematochezia  : No increased frequency, dysuria, hematuria, nocturia  MSK: No joint pain/swelling; no back pain; no edema  Neuro: No dizziness/lightheadedness, weakness, seizures, numbness, tingling  Heme: No easy bruising or bleeding  Endo: No heat/cold intolerance  Psych: No significant nervousness, anxiety, stress, depression    All other systems were reviewed and are negative, except as noted.    VITALS/PHYSICAL EXAM  --------------------------------------------------------------------------------    Vital Signs Last  48 Hrs,    T(C): 36.6 (2019 12:45), Max: 36.9 (2019 21:50)  T(F): 97.8 (2019 12:45), Max: 98.5 (2019 21:50)  HR: 55 (2019 13:00) (52 - 76)  BP: 122/51 (2019 13:00) (119/47 - 137/72)  BP(mean): 66 (2019 12:45) (66 - 66)  RR: 18 (2019 13:00) (14 - 20)  SpO2: 95% (2019 13:00) (92% - 100%),    T(C): 37.1 (19 @ 11:26), Max: 37.1 (19 @ 11:26)  HR: 64 (19 @ 11:26) (58 - 85)  BP: 121/68 (19 @ 11:26) (113/46 - 137/74)  RR: 18 (19 @ 11:26) (18 - 20)  SpO2: 95% (19 @ 11:26) (92% - 97%)  Wt(kg): --    19 @ 07:01  -  19 @ 07:00  --------------------------------------------------------  IN: 1080 mL / OUT: 100 mL / NET: 980 mL    19 @ 07:01  -  19 @ 12:45  --------------------------------------------------------  IN: 240 mL / OUT: 150 mL / NET: 90 mL      Physical Exam:  	Gen: NAD, pale  	HEENT: PERRL, supple neck, clear oropharynx  	Pulm: CTA B/L  	CV: RRR, S1S2; no rub  	Back: No spinal or CVA tenderness; no sacral edema  	Abd: +BS, soft, nontender/nondistended  	: No suprapubic tenderness  	UE: Warm, FROM, no clubbing, intact strength; no edema; no asterixis  	LE: Warm, FROM, no clubbing, intact strength; no edema  	Neuro: No focal deficits, intact gait  	Psych: Normal affect and mood  	Skin: Warm, without rashes  	Vascular access: Right femoral Shiley    LABS/STUDIES  -------------------------------------------------------------------------------    136    |  96<L>  |  57.0<H>  ----------------------------<  87     Ca:7.7<L> (2019 08:16)  4.6     |  28.0   |  4.38<H>      eGFR if Non : 12 <L>  eGFR if : 14 <L>                             9.3<L>  13.3<H> )-----------( 279      ( 2019 08:14 )                 29.7<L>    Phos:4.5 mg/dL M.1 mg/dL PTH:-- Uric acid:-- Serum Osm:--  Ferritin:-- Iron:-- TIBC:-- Tsat:--  B12:-- TSH:-- ( @ 08:16)    Urinalysis Basic - ( 2019 15:02 )  Color: Red<!> / Appearance: Bloody<!> / S.015 / pH: x  Gluc: x / Ketone: Negative  / Bili: Negative / Urobili: Negative mg/dL   Blood: x / Protein: 500 mg/dL<!> / Nitrite: Negative   Leuk Esterase: Trace<!> / RBC: TNTC /HPF / WBC 3-5   Sq Epi: x / Non Sq Epi: Negative / Bacteria: Occasional<!>                9.5    7.8   >-----------<  325      [19 @ 22:36]              30.2     Creatinine Trend:  SCr 4.73 [ @ 08:15]  SCr 5.51 [ @ 08:28]  SCr 4.12 [ @ 09:24]  SCr 4.86 [ @ 03:43]  SCr 4.09 [ @ 14:22]    Urinalysis - [19 @ 15:02]      Color Red / Appearance Bloody / SG 1.015 / pH 6.0      Gluc Negative / Ketone Negative  / Bili Negative / Urobili Negative       Blood Large / Protein 500 / Leuk Est Trace / Nitrite Negative      RBC TNTC / WBC 3-5 / Hyaline  / Gran  / Sq Epi  / Non Sq Epi Negative / Bacteria Occasional      Iron 46, TIBC 259, %sat 18      [19 @ 09:11]  Ferritin 470      [19 @ 09:11]  TSH 7.33      [18 @ 02:31]    HBsAb <3.0      [18 @ 16:33]  HBsAg Nonreact      [18 @ 16:33]  HBcAb Nonreact      [18 @ 16:33]  HCV 0.14, Nonreact      [18 @ 16:33]    1. ESRD on HD  2. HTN  3. Anemia of chronic renal disease    HD in AM,    Continue current management

## 2019-01-04 LAB
GRAM STN FLD: SIGNIFICANT CHANGE UP
HCT VFR BLD CALC: 27.2 % — LOW (ref 42–52)
HGB BLD-MCNC: 8.4 G/DL — LOW (ref 14–18)
MCHC RBC-ENTMCNC: 28.7 PG — SIGNIFICANT CHANGE UP (ref 27–31)
MCHC RBC-ENTMCNC: 30.9 G/DL — LOW (ref 32–36)
MCV RBC AUTO: 92.8 FL — SIGNIFICANT CHANGE UP (ref 80–94)
PLATELET # BLD AUTO: 269 K/UL — SIGNIFICANT CHANGE UP (ref 150–400)
RBC # BLD: 2.93 M/UL — LOW (ref 4.6–6.2)
RBC # FLD: 14.3 % — SIGNIFICANT CHANGE UP (ref 11–15.6)
SPECIMEN SOURCE: SIGNIFICANT CHANGE UP
WBC # BLD: 13.8 K/UL — HIGH (ref 4.8–10.8)
WBC # FLD AUTO: 13.8 K/UL — HIGH (ref 4.8–10.8)

## 2019-01-04 PROCEDURE — 71045 X-RAY EXAM CHEST 1 VIEW: CPT | Mod: 26

## 2019-01-04 PROCEDURE — 90937 HEMODIALYSIS REPEATED EVAL: CPT

## 2019-01-04 PROCEDURE — 99232 SBSQ HOSP IP/OBS MODERATE 35: CPT

## 2019-01-04 RX ORDER — PIPERACILLIN AND TAZOBACTAM 4; .5 G/20ML; G/20ML
2.25 INJECTION, POWDER, LYOPHILIZED, FOR SOLUTION INTRAVENOUS EVERY 12 HOURS
Refills: 0 | Status: DISCONTINUED | OUTPATIENT
Start: 2019-01-04 | End: 2019-01-06

## 2019-01-04 RX ADMIN — Medication 200 MILLIGRAM(S): at 00:44

## 2019-01-04 RX ADMIN — Medication 50 MILLIGRAM(S): at 21:17

## 2019-01-04 RX ADMIN — PIPERACILLIN AND TAZOBACTAM 200 GRAM(S): 4; .5 INJECTION, POWDER, LYOPHILIZED, FOR SOLUTION INTRAVENOUS at 21:16

## 2019-01-04 RX ADMIN — SODIUM CHLORIDE 3 MILLILITER(S): 9 INJECTION INTRAMUSCULAR; INTRAVENOUS; SUBCUTANEOUS at 07:42

## 2019-01-04 RX ADMIN — Medication 100 MICROGRAM(S): at 06:50

## 2019-01-04 RX ADMIN — SODIUM CHLORIDE 3 MILLILITER(S): 9 INJECTION INTRAMUSCULAR; INTRAVENOUS; SUBCUTANEOUS at 13:06

## 2019-01-04 RX ADMIN — GABAPENTIN 100 MILLIGRAM(S): 400 CAPSULE ORAL at 12:12

## 2019-01-04 RX ADMIN — ATORVASTATIN CALCIUM 80 MILLIGRAM(S): 80 TABLET, FILM COATED ORAL at 21:17

## 2019-01-04 RX ADMIN — LOSARTAN POTASSIUM 50 MILLIGRAM(S): 100 TABLET, FILM COATED ORAL at 00:00

## 2019-01-04 RX ADMIN — PANTOPRAZOLE SODIUM 40 MILLIGRAM(S): 20 TABLET, DELAYED RELEASE ORAL at 06:50

## 2019-01-04 RX ADMIN — ISOSORBIDE MONONITRATE 30 MILLIGRAM(S): 60 TABLET, EXTENDED RELEASE ORAL at 13:05

## 2019-01-04 RX ADMIN — LOSARTAN POTASSIUM 50 MILLIGRAM(S): 100 TABLET, FILM COATED ORAL at 22:58

## 2019-01-04 RX ADMIN — IRON SUCROSE 210 MILLIGRAM(S): 20 INJECTION, SOLUTION INTRAVENOUS at 18:23

## 2019-01-04 RX ADMIN — CHLORHEXIDINE GLUCONATE 1 APPLICATION(S): 213 SOLUTION TOPICAL at 12:13

## 2019-01-04 RX ADMIN — CLOPIDOGREL BISULFATE 75 MILLIGRAM(S): 75 TABLET, FILM COATED ORAL at 12:12

## 2019-01-04 RX ADMIN — Medication 1 TABLET(S): at 13:05

## 2019-01-04 RX ADMIN — Medication 40 MILLIGRAM(S): at 06:50

## 2019-01-04 RX ADMIN — DULOXETINE HYDROCHLORIDE 20 MILLIGRAM(S): 30 CAPSULE, DELAYED RELEASE ORAL at 13:05

## 2019-01-04 RX ADMIN — Medication 200 MILLIGRAM(S): at 06:50

## 2019-01-04 RX ADMIN — Medication 81 MILLIGRAM(S): at 12:12

## 2019-01-04 RX ADMIN — Medication 1 DROP(S): at 23:10

## 2019-01-04 RX ADMIN — SODIUM CHLORIDE 3 MILLILITER(S): 9 INJECTION INTRAMUSCULAR; INTRAVENOUS; SUBCUTANEOUS at 22:55

## 2019-01-04 NOTE — PROGRESS NOTE ADULT - ASSESSMENT
1. ESRD on HD  2. HTN  3. Anemia of chronic renal disease    HD today  S/P Tunneled catheter, AVG placement  Continue current management

## 2019-01-04 NOTE — PROGRESS NOTE ADULT - SUBJECTIVE AND OBJECTIVE BOX
[facility] DIVISION OF KIDNEY DISEASES AND HYPERTENSION -- FOLLOW UP NOTE  --------------------------------------------------------------------------------  Chief Complaint:  ESRD on HD    24 hour events/subjective:  Pt seen today  NAD  HD today        PAST HISTORY  --------------------------------------------------------------------------------  No significant changes to PMH, PSH, FHx, SHx, unless otherwise noted    ALLERGIES & MEDICATIONS  --------------------------------------------------------------------------------  Allergies    No Known Allergies    Intolerances      Standing Inpatient Medications  amLODIPine   Tablet 10 milliGRAM(s) Oral daily  aspirin enteric coated 81 milliGRAM(s) Oral daily  atorvastatin 80 milliGRAM(s) Oral at bedtime  chlorhexidine 2% Cloths 1 Application(s) Topical daily  clopidogrel Tablet 75 milliGRAM(s) Oral daily  DULoxetine 20 milliGRAM(s) Oral daily  gabapentin 100 milliGRAM(s) Oral daily  hydrALAZINE 50 milliGRAM(s) Oral every 8 hours  iron sucrose IVPB 100 milliGRAM(s) IV Intermittent <User Schedule>  isosorbide   mononitrate ER Tablet (IMDUR) 30 milliGRAM(s) Oral daily  levothyroxine 100 MICROGram(s) Oral daily  losartan 50 milliGRAM(s) Oral at bedtime  metoprolol tartrate 50 milliGRAM(s) Oral two times a day  Nephro-jeana 1 Tablet(s) Oral daily  pantoprazole  Injectable 40 milliGRAM(s) IV Push every 12 hours  piperacillin/tazobactam IVPB. 2.25 Gram(s) IV Intermittent every 12 hours  predniSONE   Tablet 40 milliGRAM(s) Oral daily  sodium chloride 0.9% lock flush 3 milliLiter(s) IV Push every 8 hours    PRN Inpatient Medications  ALPRAZolam 0.25 milliGRAM(s) Oral two times a day PRN  artificial  tears Solution 1 Drop(s) Both EYES four times a day PRN  benzonatate 100 milliGRAM(s) Oral every 8 hours PRN  guaiFENesin    Syrup 200 milliGRAM(s) Oral every 6 hours PRN  ondansetron Injectable 4 milliGRAM(s) IV Push every 6 hours PRN      REVIEW OF SYSTEMS  --------------------------------------------------------------------------------  Gen: No weight changes, fatigue, fevers/chills, weakness  Skin: No rashes  Head/Eyes/Ears/Mouth: No headache; Normal hearing; Normal vision w/o blurriness; No sinus pain/discomfort, sore throat  Respiratory: No dyspnea, cough, wheezing, hemoptysis  CV: No chest pain, PND, orthopnea  GI: No abdominal pain, diarrhea, constipation, nausea, vomiting, melena, hematochezia  : No increased frequency, dysuria, hematuria, nocturia  MSK: No joint pain/swelling; no back pain; no edema  Neuro: No dizziness/lightheadedness, weakness, seizures, numbness, tingling  Heme: No easy bruising or bleeding  Endo: No heat/cold intolerance  Psych: No significant nervousness, anxiety, stress, depression    All other systems were reviewed and are negative, except as noted.    VITALS/PHYSICAL EXAM  --------------------------------------------------------------------------------  T(C): 36.8 (01-04-19 @ 12:07), Max: 36.8 (01-03-19 @ 16:09)  HR: 52 (01-04-19 @ 12:07) (52 - 62)  BP: 143/57 (01-04-19 @ 12:07) (108/51 - 143/57)  RR: 18 (01-04-19 @ 12:07) (18 - 20)  SpO2: 96% (01-03-19 @ 23:12) (95% - 96%)  Wt(kg): --  Height (cm): 162.56 (01-03-19 @ 04:15)  Weight (kg): 54.4 (01-03-19 @ 04:15)  BMI (kg/m2): 20.6 (01-03-19 @ 04:15)  BSA (m2): 1.57 (01-03-19 @ 04:15)      01-03-19 @ 07:01  -  01-04-19 @ 07:00  --------------------------------------------------------  IN: 385 mL / OUT: 0 mL / NET: 385 mL    01-04-19 @ 07:01  -  01-04-19 @ 14:44  --------------------------------------------------------  IN: 720 mL / OUT: 0 mL / NET: 720 mL      Physical Exam:  	Gen: NAD, well-appearing  	HEENT: PERRL, supple neck, clear oropharynx  	Pulm: CTA B/L  	CV: RRR, S1S2; no rub  	Back: No spinal or CVA tenderness; no sacral edema  	Abd: +BS, soft, nontender/nondistended  	: No suprapubic tenderness  	UE: Warm, FROM, no clubbing, intact strength; no edema; no asterixis  	LE: Warm, FROM, no clubbing, intact strength; no edema  	Neuro: No focal deficits, intact gait  	Psych: Normal affect and mood  	Skin: Warm, without rashes  	Vascular access:  CVC/AVG (maturing)    LABS/STUDIES  --------------------------------------------------------------------------------              8.4    13.8  >-----------<  269      [01-04-19 @ 11:05]              27.2     136  |  96  |  57.0  ----------------------------<  87      [01-03-19 @ 08:16]  4.6   |  28.0  |  4.38        Ca     7.7     [01-03-19 @ 08:16]      Mg     2.1     [01-03-19 @ 08:16]      Phos  4.5     [01-03-19 @ 08:16]            Creatinine Trend:  SCr 4.38 [01-03 @ 08:16]  SCr 4.73 [12-31 @ 08:15]  SCr 5.51 [12-30 @ 08:28]  SCr 4.12 [12-29 @ 09:24]  SCr 4.86 [12-28 @ 03:43]    Urinalysis - [01-01-19 @ 15:02]      Color Red / Appearance Bloody / SG 1.015 / pH 6.0      Gluc Negative / Ketone Negative  / Bili Negative / Urobili Negative       Blood Large / Protein 500 / Leuk Est Trace / Nitrite Negative      RBC TNTC / WBC 3-5 / Hyaline  / Gran  / Sq Epi  / Non Sq Epi Negative / Bacteria Occasional      Iron 46, TIBC 259, %sat 18      [01-01-19 @ 09:11]  Ferritin 470      [01-01-19 @ 09:11]  TSH 7.33      [11-29-18 @ 02:31]    HBsAb <3.0      [12-05-18 @ 16:33]  HBsAg Nonreact      [12-05-18 @ 16:33]  HBcAb Nonreact      [12-05-18 @ 16:33]  HCV 0.14, Nonreact      [12-05-18 @ 16:33]

## 2019-01-04 NOTE — PROGRESS NOTE ADULT - SUBJECTIVE AND OBJECTIVE BOX
Pt seen, chart reviewed.  S/p Left Upper Extremity AV Fistula, Permacath Insertion, POD#1.  VSS.  Adequate pain control.  Resting comfortably.   On Supplemental O2.  Tolerating PO intake.  No N/V.    No anesthesia problems noted.

## 2019-01-04 NOTE — PROGRESS NOTE ADULT - ASSESSMENT
77 y/o male with PMH ESRD on Hd, HTN, Pulm HTN, [place] CAD s/p PCI who presented to [facility] with infection of recently placed RIJ permcath for HD. Pt was in consult with vascular and nephrology. Infected RIJ permacath was removed, blood cultures negative and permacath tip cultures negative. Started on Iv abx post HD to be continued until 1/7 as per ID. A right femoral Shiley was placed for immediate HD during hospital stay. On 1/3,  L AV graft and RIJ permacath were placed and R femoral shiley removed by vascular. Hospital course complicated by hematuria/scrotal swelling and hemoptysis. Consulted by urology, urine cytology studies ordered, and recommended cysto/b/l retrorade pyelograms when stable as outpatient. Also seen in consultation with CTSx for hemoptysis, repeat CT chest completed, positive for PNA.     1. ESRD (end stage renal disease)  - On 1/3, L AV graft and RIJ permacath were placed and R femoral shiley removed by vascular  - HD plan as per nephro   - renal following     2. HCAP  - CT chest reviewed  - started on zosyn  - sputum cultures pending  - trend wbc and temps    3. Right groin pain   - CT of ab/pelvis which showed stable hematoma from previous exam  - Right femoral shiley was removed by vascular  - US of the scrotum showed thickening of the scrotum but no significant findings on exam  - Urology consulted for scrotoal swelling/hematuria, urine cytology studies ordered, and recommended cysto/b/l retrorade pyelograms when stable as outpatient    4. Hematuria  - per nephrology patient with ESRD and this may be old urine  - if fever or signs of SIRS will send urine for culture  - urology consult appreciated, urine cytology studies ordered, and recommended cysto/b/l retrorade pyelograms when stable as outpatient  - monitor for now  - improving today as per pt    5. Acute hypoxic resp failure / likely sec to vol overload  - Pt is now on NC o2, will wean off o2 as pt is not o2 dependent at home  - Renal and cardio following  - HD seems to be improving respiratory status; patient no longer on high flow.  - Tapering steroids (now on PO prednisone day 2)  - CT chest completed, worsening conditions. CT surg eval, no additional recommendations.   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     6. Hypertension, unspecified type  - Cont. o/p regimen, renal/low sodium diet.     7. Anemia / Chronic blood loss anemia   - 1 unit prbc ordered 12/31; H/H improved  - GI recs appreciated (pt complained of bloody BM); will treat constipation  - Continue with PPI  - Secondary to ESRD  - Stable, monitor    8. Coronary artery disease involving native coronary artery of native heart without angina pectoris  - No CP, cont. o/p regimen.    9. HLD - statin 79 y/o male with PMH ESRD on Hd, HTN, Pulm HTN, [place] CAD s/p PCI who presented to [facility] with infection of recently placed RIJ permcath for HD. Pt was in consult with vascular and nephrology. Infected RIJ permacath was removed, blood cultures negative and permacath tip cultures negative. Started on Iv abx post HD to be continued until 1/7 as per ID. A right femoral Shiley was placed for immediate HD during hospital stay. On 1/3,  L AV graft and RIJ permacath were placed and R femoral shiley removed by vascular. Hospital course complicated by hematuria/scrotal swelling and hemoptysis. Consulted by urology, urine cytology studies ordered, and recommended cysto/b/l retrorade pyelograms when stable as outpatient. Also seen in consultation with CTSx for hemoptysis, repeat CT chest completed, positive for PNA.     1. ESRD (end stage renal disease)  - On 1/3, L AV graft and RIJ permacath were placed and R femoral shiley removed by vascular  - HD plan as per nephro   - renal following     2. HCAP  - CT chest reviewed: Since December 22, 2018, new patchy opacities in the right lung, consistent with pneumonia.  - started on zosyn  - sputum cultures pending  - trend wbc and temps    3. Right groin pain   - CT of ab/pelvis which showed stable hematoma from previous exam  - Right femoral shiley was removed by vascular  - US of the scrotum showed thickening of the scrotum but no significant findings on exam  - Urology consulted for scrotoal swelling/hematuria, urine cytology studies ordered, and recommended cysto/b/l retrorade pyelograms when stable as outpatient    4. Hematuria  - per nephrology patient with ESRD and this may be old urine  - if fever or signs of SIRS will send urine for culture  - urology consult appreciated, urine cytology studies ordered, and recommended cysto/b/l retrorade pyelograms when stable as outpatient  - monitor for now  - improving today as per pt    5. Acute hypoxic resp failure / likely sec to vol overload  - Pt is now on NC o2, will wean off o2 as pt is not o2 dependent at home  - Renal and cardio following  - HD seems to be improving respiratory status; patient no longer on high flow.  - Tapering steroids (now on PO prednisone day 2)  - CT chest completed, worsening conditions. CT surg eval, no additional recommendations.   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     6. Hypertension, unspecified type  - Cont. o/p regimen, renal/low sodium diet.     7. Anemia / Chronic blood loss anemia   - 1 unit prbc ordered 12/31; H/H improved  - GI recs appreciated (pt complained of bloody BM); will treat constipation  - Continue with PPI  - Secondary to ESRD  - Stable, monitor    8. Coronary artery disease involving native coronary artery of native heart without angina pectoris  - No CP, cont. o/p regimen.    9. HLD - statin

## 2019-01-04 NOTE — PROGRESS NOTE ADULT - SUBJECTIVE AND OBJECTIVE BOX
[id number]  KAUSHIK HOFFMAN    HPI: 78 year old male with ESRD on HD with multiple aborted permacath insertion attempts secondary to respiratory issues scheduled for OR on 1/3 for Permacath placement and AVF creation.      Interval overnight events:  No overnight events reported    Patient was seen and examined at the bedside by the Hospitalist/PA team using pacific interpreters. Family member present. Pt complains of minor cough, otherwise denies specific medical complaints, denies hemoptysis. Remainder of ROS neg. VSS    Vital Signs Last 24 Hrs  T(C): 36.8 (2019 12:07), Max: 37 (2019 14:31)  T(F): 98.3 (2019 12:07), Max: 98.6 (2019 14:31)  HR: 52 (2019 12:07) (52 - 62)  BP: 143/57 (2019 12:07) (108/51 - 143/57)  BP(mean): --  RR: 18 (2019 12:07) (18 - 20)  SpO2: 96% (2019 23:12) (95% - 97%)    PHYSICAL EXAM:  GENERAL: NAD  HEAD:  Atraumatic, Normocephalic  EYES: EOMI, PERRLA, conjunctiva and sclera clear  ENMT: No tonsillar erythema, exudates, or enlargement; Moist mucous membranes, Good dentition, No lesions  NERVOUS SYSTEM:  Alert & Oriented X3, Good concentration move all extremities   CHEST/LUNG: Clear to percussion bilaterally; crackles towards bases bilaterally; unchanged  HEART: Regular rate and rhythm; No murmurs, rubs, or gallops  ABDOMEN: Soft, Nontender, Nondistended; Bowel sounds present  EXTREMITIES:  2+ Peripheral Pulses, No clubbing, cyanosis, or edema  SKIN: left AVF graft to upper arm and right IJ permacath present. Right femoral shiley has been removed        @ : @ 07:00  --------------------------------------------------------  IN: 1080 mL / OUT: 100 mL / NET: 980 mL     @ : @ 11:27  --------------------------------------------------------  IN: 240 mL / OUT: 150 mL / NET: 90 mL                                     8.4    13.8  )-----------( 269      ( 2019 11:05 )             27.2           136  |  96<L>  |  57.0<H>  ----------------------------<  87  4.6   |  28.0  |  4.38<H>    Ca    7.7<L>      2019 08:16  Phos  4.5       Mg     2.1          Urinalysis Basic - ( 2019 15:02 )  Color: Red / Appearance: Bloody / S.015 / pH: x  Gluc: x / Ketone: Negative  / Bili: Negative / Urobili: Negative mg/dL   Blood: x / Protein: 500 mg/dL / Nitrite: Negative   Leuk Esterase: Trace / RBC: TNTC /HPF / WBC 3-5   Sq Epi: x / Non Sq Epi: Negative / Bacteria: Occasional CC: cough    Interval overnight events:  No overnight events reported    Patient was seen and examined at the bedside by the Hospitalist/PA team using pacific interpreters. Family member present. Pt complains of minor cough, otherwise denies specific medical complaints, denies hemoptysis. Remainder of ROS neg. VSS    Vital Signs Last 24 Hrs  T(C): 36.8 (2019 12:07), Max: 37 (2019 14:31)  T(F): 98.3 (2019 12:07), Max: 98.6 (2019 14:31)  HR: 52 (2019 12:07) (52 - 62)  BP: 143/57 (2019 12:07) (108/51 - 143/57)  BP(mean): --  RR: 18 (2019 12:07) (18 - 20)  SpO2: 96% (2019 23:12) (95% - 97%)    PHYSICAL EXAM:  GENERAL: NAD  HEAD:  Atraumatic, Normocephalic  EYES: EOMI, PERRLA, conjunctiva and sclera clear  ENMT: No tonsillar erythema, exudates, or enlargement; Moist mucous membranes, Good dentition, No lesions  NERVOUS SYSTEM:  Alert & Oriented X3, Good concentration move all extremities   CHEST/LUNG: Clear to percussion bilaterally; crackles towards bases bilaterally; unchanged  HEART: Regular rate and rhythm; No murmurs, rubs, or gallops  ABDOMEN: Soft, Nontender, Nondistended; Bowel sounds present  EXTREMITIES:  2+ Peripheral Pulses, No clubbing, cyanosis, or edema  SKIN: left AVF graft to upper arm and right IJ permacath present. Right femoral shiley has been removed        @ : @ 07:00  --------------------------------------------------------  IN: 1080 mL / OUT: 100 mL / NET: 980 mL     @ 07: @ 11:27  --------------------------------------------------------  IN: 240 mL / OUT: 150 mL / NET: 90 mL                                     8.4    13.8  )-----------( 269      ( 2019 11:05 )             27.2       01-03    136  |  96<L>  |  57.0<H>  ----------------------------<  87  4.6   |  28.0  |  4.38<H>    Ca    7.7<L>      2019 08:16  Phos  4.5       Mg     2.1          Urinalysis Basic - ( 2019 15:02 )  Color: Red / Appearance: Bloody / S.015 / pH: x  Gluc: x / Ketone: Negative  / Bili: Negative / Urobili: Negative mg/dL   Blood: x / Protein: 500 mg/dL / Nitrite: Negative   Leuk Esterase: Trace / RBC: TNTC /HPF / WBC 3-5   Sq Epi: x / Non Sq Epi: Negative / Bacteria: Occasional

## 2019-01-04 NOTE — PROGRESS NOTE ADULT - SUBJECTIVE AND OBJECTIVE BOX
The patient is a 78y old male who presents with a complaint of possible catheter infection.    He is scheduled for a LEFT UPPER EXTREMITY ARTERIOVENOUS FISTULA CREATION AND   PERMACATH PLACEMENT.            (03 Jan 2019 17:18)      PAST MEDICAL HISTORY:  L.V.E.F. = 60 to 65% TTE 12/3/2018 with Grade 2 diastolic dysfunction  ESRD End stage renal disease on dialysis  Coronary artery disease  Lung cancer with radiation in 2006  Bipolar disorder  High cholesterol  Hypertension  Chronic anemia  Pneumonia    PAST SURGICAL HISTORY:  CABG - Coronary artery bypass graft in 1993  Stented circumflex 90% lesion in circumflex 12/5/2018    MEDICATIONS  (STANDING):  amLODIPine   Tablet 10 milliGRAM(s) Oral daily  aspirin enteric coated 81 milliGRAM(s) Oral daily  atorvastatin 80 milliGRAM(s) Oral at bedtime  chlorhexidine 2% Cloths 1 Application(s) Topical daily  clopidogrel Tablet 75 milliGRAM(s) Oral daily  DULoxetine 20 milliGRAM(s) Oral daily  gabapentin 100 milliGRAM(s) Oral daily  hydrALAZINE 50 milliGRAM(s) Oral every 8 hours  iron sucrose IVPB 100 milliGRAM(s) IV Intermittent <User Schedule>  isosorbide   mononitrate ER Tablet (IMDUR) 30 milliGRAM(s) Oral daily  levothyroxine 100 MICROGram(s) Oral daily  losartan 50 milliGRAM(s) Oral at bedtime  metoprolol tartrate 50 milliGRAM(s) Oral two times a day  Nephro-jeana 1 Tablet(s) Oral daily  pantoprazole  Injectable 40 milliGRAM(s) IV Push every 12 hours  predniSONE   Tablet 40 milliGRAM(s) Oral daily  sodium chloride 0.9% lock flush 3 milliLiter(s) IV Push every 8 hours    MEDICATIONS  (PRN):  ALPRAZolam 0.25 milliGRAM(s) Oral two times a day PRN anxiety  artificial  tears Solution 1 Drop(s) Both EYES four times a day PRN Dry Eyes  benzonatate 100 milliGRAM(s) Oral every 8 hours PRN Cough  guaiFENesin    Syrup 200 milliGRAM(s) Oral every 6 hours PRN Cough  ondansetron Injectable 4 milliGRAM(s) IV Push every 6 hours PRN Nausea      Allergies:    No Known Drug Allergies      SOCIAL HISTORY:  He doesn't drink alcohol, smoke or use illicit drugs.                        9.3    13.3  )-----------( 876 ( 03 Jan 2019 08:14 )             29.7     PT/INR - ( 22 Dec 2018 18:10 )   PT: 11.9 sec;   INR: 1.03 ratio       PTT - ( 22 Dec 2018 18:10 )  PTT:29.4 sec    01-03    136  |  96<L>  |  57.0<H>  ----------------------------<  87  4.6   |  28.0  |  4.38<H>    Ca    7.7<L>      03 Jan 2019 08:16  Phos  4.5     01-03  Mg     2.1     01-03    Height (cm): 162.56 (01-03 @ 04:15)  Weight (kg): 54.4 (01-03 @ 04:15)  BMI (kg/m2): 20.6 (01-03 @ 04:15)    EKG:  -  12/27/2018  Sinus rhythm with frequent Premature ventricular complexes  Prolonged QT  Abnormal ECG    TT ECHO:  -  12/3/2018  Summary:   1. Left ventricular ejection fraction, by visual estimation, is 60 to        65%.   2. Normal global left ventricular systolic function.   3. Basal anteroseptal segment and basal and mid inferior wall are        abnormal as described above.   4. Spectral Doppler shows pseudo normal pattern of left ventricular        myocardial filling (Grade II diastolic dysfunction).   5. Estimated pulmonary artery systolic pressure is 53.7 mmHg assuming a        right atrial pressure of 8 mmHg, which is consistent with moderate        pulmonary hypertension.     CT CHEST  -  1/2/2019  FINDINGS:  LUNGS AND LARGE AIRWAYS: Emphysema. Bronchiectasis and consolidation in   the medial right upper lung, possibly post radiation change.. New patchy   airspace opacities in the right lung, consistent with pneumonia.   PLEURA: Small bilateral pleural effusions.  IMPRESSION: Since December 22, 2018, new patchy opacities in the right   lung, consistent with pneumonia.    ASA # = 4  Mallampati # = 3-4

## 2019-01-04 NOTE — PROGRESS NOTE ADULT - ATTENDING COMMENTS
Attending Attestation:  I personally saw and evaluated the patient and agree with the above assessment and plan  general: no acute distress  HEENT: normal oropharynx  Chest: faint crackles bilaterally  Heart: normal rate and rhythm  extremities: no edema  abdomen: normal BS, non-tender, non-distended
Pulmonary edema; had 3 day catheter holiday due to CVC infx  Femoral shiley placed today as pt could not lie flat  Plan for possible HD in AM as well depending on fluid status    d/w MICU PA; d/w Dr Kenyon
Attending Attestation:  I personally saw and evaluated the patient and agree with the above assessment and plan  patient with hemoptysis overnight. will obtain CTA  will consult CT surgery  patient with hematuria at this time not evident  will consult urology  patient NPO for graft placement and permacath placement tomorrow

## 2019-01-05 LAB
HCT VFR BLD CALC: 25.6 % — LOW (ref 42–52)
HGB BLD-MCNC: 7.8 G/DL — LOW (ref 14–18)
MCHC RBC-ENTMCNC: 28.4 PG — SIGNIFICANT CHANGE UP (ref 27–31)
MCHC RBC-ENTMCNC: 30.5 G/DL — LOW (ref 32–36)
MCV RBC AUTO: 93.1 FL — SIGNIFICANT CHANGE UP (ref 80–94)
PLATELET # BLD AUTO: 218 K/UL — SIGNIFICANT CHANGE UP (ref 150–400)
RBC # BLD: 2.75 M/UL — LOW (ref 4.6–6.2)
RBC # FLD: 14.3 % — SIGNIFICANT CHANGE UP (ref 11–15.6)
VANCOMYCIN TROUGH SERPL-MCNC: 23.9 UG/ML — HIGH (ref 10–20)
WBC # BLD: 11 K/UL — HIGH (ref 4.8–10.8)
WBC # FLD AUTO: 11 K/UL — HIGH (ref 4.8–10.8)

## 2019-01-05 PROCEDURE — 99232 SBSQ HOSP IP/OBS MODERATE 35: CPT

## 2019-01-05 PROCEDURE — 99233 SBSQ HOSP IP/OBS HIGH 50: CPT

## 2019-01-05 PROCEDURE — 93010 ELECTROCARDIOGRAM REPORT: CPT

## 2019-01-05 PROCEDURE — 93971 EXTREMITY STUDY: CPT | Mod: 26,LT

## 2019-01-05 RX ADMIN — LOSARTAN POTASSIUM 50 MILLIGRAM(S): 100 TABLET, FILM COATED ORAL at 21:43

## 2019-01-05 RX ADMIN — Medication 100 MICROGRAM(S): at 06:25

## 2019-01-05 RX ADMIN — CHLORHEXIDINE GLUCONATE 1 APPLICATION(S): 213 SOLUTION TOPICAL at 13:31

## 2019-01-05 RX ADMIN — PIPERACILLIN AND TAZOBACTAM 200 GRAM(S): 4; .5 INJECTION, POWDER, LYOPHILIZED, FOR SOLUTION INTRAVENOUS at 18:41

## 2019-01-05 RX ADMIN — SODIUM CHLORIDE 3 MILLILITER(S): 9 INJECTION INTRAMUSCULAR; INTRAVENOUS; SUBCUTANEOUS at 06:26

## 2019-01-05 RX ADMIN — Medication 50 MILLIGRAM(S): at 13:31

## 2019-01-05 RX ADMIN — PIPERACILLIN AND TAZOBACTAM 200 GRAM(S): 4; .5 INJECTION, POWDER, LYOPHILIZED, FOR SOLUTION INTRAVENOUS at 06:26

## 2019-01-05 RX ADMIN — ATORVASTATIN CALCIUM 80 MILLIGRAM(S): 80 TABLET, FILM COATED ORAL at 21:43

## 2019-01-05 RX ADMIN — DULOXETINE HYDROCHLORIDE 20 MILLIGRAM(S): 30 CAPSULE, DELAYED RELEASE ORAL at 13:29

## 2019-01-05 RX ADMIN — GABAPENTIN 100 MILLIGRAM(S): 400 CAPSULE ORAL at 13:30

## 2019-01-05 RX ADMIN — Medication 40 MILLIGRAM(S): at 06:25

## 2019-01-05 RX ADMIN — PANTOPRAZOLE SODIUM 40 MILLIGRAM(S): 20 TABLET, DELAYED RELEASE ORAL at 06:26

## 2019-01-05 RX ADMIN — CLOPIDOGREL BISULFATE 75 MILLIGRAM(S): 75 TABLET, FILM COATED ORAL at 13:30

## 2019-01-05 RX ADMIN — SODIUM CHLORIDE 3 MILLILITER(S): 9 INJECTION INTRAMUSCULAR; INTRAVENOUS; SUBCUTANEOUS at 21:42

## 2019-01-05 RX ADMIN — Medication 50 MILLIGRAM(S): at 06:26

## 2019-01-05 RX ADMIN — SODIUM CHLORIDE 3 MILLILITER(S): 9 INJECTION INTRAMUSCULAR; INTRAVENOUS; SUBCUTANEOUS at 13:42

## 2019-01-05 RX ADMIN — ISOSORBIDE MONONITRATE 30 MILLIGRAM(S): 60 TABLET, EXTENDED RELEASE ORAL at 13:30

## 2019-01-05 RX ADMIN — Medication 1 DROP(S): at 06:27

## 2019-01-05 RX ADMIN — Medication 50 MILLIGRAM(S): at 21:43

## 2019-01-05 RX ADMIN — Medication 50 MILLIGRAM(S): at 18:47

## 2019-01-05 RX ADMIN — AMLODIPINE BESYLATE 10 MILLIGRAM(S): 2.5 TABLET ORAL at 06:26

## 2019-01-05 RX ADMIN — Medication 1 TABLET(S): at 13:30

## 2019-01-05 RX ADMIN — Medication 81 MILLIGRAM(S): at 13:30

## 2019-01-05 RX ADMIN — PANTOPRAZOLE SODIUM 40 MILLIGRAM(S): 20 TABLET, DELAYED RELEASE ORAL at 18:49

## 2019-01-05 NOTE — PROGRESS NOTE ADULT - ASSESSMENT
77 y/o male with PMH ESRD on Hd, HTN, Pulm HTN, [place] CAD s/p PCI who presented to [facility] with infection of recently placed RIJ permcath for HD. Pt was in consult with vascular and nephrology. Infected RIJ permacath was removed, blood cultures negative and permacath tip cultures negative. Started on Iv abx post HD to be continued until 1/7 as per ID. A right femoral Shiley was placed for immediate HD during hospital stay. On 1/3,  L AV graft and RIJ permacath were placed and R femoral shiley removed by vascular. Hospital course complicated by hematuria/scrotal swelling and hemoptysis. Consulted by urology, urine cytology studies ordered, and recommended cysto/b/l retrorade pyelograms when stable as outpatient. Also seen in consultation with CTSx for hemoptysis, repeat CT chest completed, positive for PNA.     1. ESRD (end stage renal disease)  - On 1/3, L AV graft and RIJ permacath were placed and R femoral shiley removed by vascular; will call vascular back to assess swelling in area of graft placement  - US ordered; no DVT.  - HD plan as per nephro   - renal following     2. HCAP - likely 2/2 psuedomonas  - CT chest reviewed: Since December 22, 2018, new patchy opacities in the right lung, consistent with pneumonia.  - started on zosyn  - sputum cultures pending; so far GPR in the culture with some normal miguelina; f/u official read.  - trend wbc and temps    3. Right groin pain   - CT of ab/pelvis which showed stable hematoma from previous exam  - Right femoral shiley was removed by vascular  - US of the scrotum showed thickening of the scrotum but no significant findings on exam  - Urology consulted for scrotoal swelling/hematuria, urine cytology studies ordered, and recommended cysto/b/l retrorade pyelograms when stable as outpatient    4. Hematuria  - per nephrology patient with ESRD and this may be old urine  - if fever or signs of SIRS will send urine for culture  - urology consult appreciated, urine cytology studies ordered, and recommended cysto/b/l retrorade pyelograms when stable as outpatient  - monitor for now  - improving today as per pt    5. Acute hypoxic resp failure / likely sec to vol overload  - Pt is now on NC o2, will wean off o2 as pt is not o2 dependent at home  - Renal and cardio following  - HD seems to be improving respiratory status; patient no longer on high flow.  - Tapering steroids (now on PO prednisone day 2)  - CT chest completed, worsening conditions. CT surg eval, no additional recommendations.   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     6. Hypertension, unspecified type  - Cont. o/p regimen, renal/low sodium diet.     7. Anemia / Chronic blood loss anemia   - 1 unit prbc ordered 12/31; H/H improved  - GI recs appreciated (pt complained of bloody BM); will treat constipation  - Continue with PPI  - Secondary to ESRD  - Stable, monitor    8. Coronary artery disease involving native coronary artery of native heart without angina pectoris  - No CP, cont. o/p regimen.    9. HLD - statin     DISPO; repeat CBC in morning; titrate down FiO2; if patient hypoxia improves can discharge on PO antibiotics; will check with SW on Monday if seat still available in community.

## 2019-01-05 NOTE — PROGRESS NOTE ADULT - SUBJECTIVE AND OBJECTIVE BOX
CC: cough    Interval overnight events:  called for arm swelling overnight. chart note reviewed. patient notes swelling in the left arm where graft was placed. Patient denies shortness of breath but notes some chest discomfort. hemoptysis continues but notably less in amount; asked patient to continue to collect in a cup.    ICU Vital Signs Last 24 Hrs  T(C): 36.8 (2019 10:12), Max: 36.8 (2019 21:15)  T(F): 98.2 (2019 10:12), Max: 98.3 (2019 04:46)  HR: 69 (2019 13:28) (56 - 80)  BP: 132/68 (2019 13:28) (117/44 - 141/74)  BP(mean): --  ABP: --  ABP(mean): --  RR: 18 (2019 10:12) (18 - 20)  SpO2: 94% (2019 10:12) (93% - 94%)    PHYSICAL EXAM:  GENERAL: NAD  HEAD:  Atraumatic, Normocephalic  EYES: EOMI, PERRLA, conjunctiva and sclera clear  ENMT: No tonsillar erythema, exudates, or enlargement; Moist mucous membranes, Good dentition, No lesions  NERVOUS SYSTEM:  Alert & Oriented X3, Good concentration move all extremities   CHEST/LUNG: Clear to percussion bilaterally; crackles towards bases bilaterally; unchanged  HEART: Regular rate and rhythm; No murmurs, rubs, or gallops  ABDOMEN: Soft, Nontender, Nondistended; Bowel sounds present  EXTREMITIES:  2+ Peripheral Pulses, No clubbing, cyanosis, or edema  SKIN: left AVF graft to upper arm and right IJ permacath present. Right femoral shiley has been removed; bruising in the area of graft placement in the left arm; notable discrepancy in arm size; chord like mass felt in the arm which could represent graft otherwise no signs of compartment syndrome.       @ 07:  -   @ 07:00  --------------------------------------------------------  IN: 1080 mL / OUT: 100 mL / NET: 980 mL     @ 07:01  -   @ 11:27  --------------------------------------------------------  IN: 240 mL / OUT: 150 mL / NET: 90 mL                                   7.8    11.0  )-----------( 218      ( 2019 08:14 )             25.6        Urinalysis Basic - ( 2019 15:02 )  Color: Red / Appearance: Bloody / S.015 / pH: x  Gluc: x / Ketone: Negative  / Bili: Negative / Urobili: Negative mg/dL   Blood: x / Protein: 500 mg/dL / Nitrite: Negative   Leuk Esterase: Trace / RBC: TNTC /HPF / WBC 3-5   Sq Epi: x / Non Sq Epi: Negative / Bacteria: Occasional

## 2019-01-05 NOTE — PROGRESS NOTE ADULT - SUBJECTIVE AND OBJECTIVE BOX
[facility] DIVISION OF KIDNEY DISEASES AND HYPERTENSION -- FOLLOW UP NOTE  --------------------------------------------------------------------------------  Chief Complaint:  ESRD on HD    24 hour events/subjective:  Pt seen today  NAD  HD on Monday,    PAST HISTORY  --------------------------------------------------------------------------------  No significant changes to PMH, PSH, FHx, SHx, unless otherwise noted    ALLERGIES & MEDICATIONS  --------------------------------------------------------------------------------  Allergies    No Known Allergies    Standing Inpatient Medications  amLODIPine   Tablet 10 milliGRAM(s) Oral daily  aspirin enteric coated 81 milliGRAM(s) Oral daily  atorvastatin 80 milliGRAM(s) Oral at bedtime  chlorhexidine 2% Cloths 1 Application(s) Topical daily  clopidogrel Tablet 75 milliGRAM(s) Oral daily  DULoxetine 20 milliGRAM(s) Oral daily  gabapentin 100 milliGRAM(s) Oral daily  hydrALAZINE 50 milliGRAM(s) Oral every 8 hours  iron sucrose IVPB 100 milliGRAM(s) IV Intermittent <User Schedule>  isosorbide   mononitrate ER Tablet (IMDUR) 30 milliGRAM(s) Oral daily  levothyroxine 100 MICROGram(s) Oral daily  losartan 50 milliGRAM(s) Oral at bedtime  metoprolol tartrate 50 milliGRAM(s) Oral two times a day  Nephro-jeana 1 Tablet(s) Oral daily  pantoprazole  Injectable 40 milliGRAM(s) IV Push every 12 hours  piperacillin/tazobactam IVPB. 2.25 Gram(s) IV Intermittent every 12 hours  predniSONE   Tablet 40 milliGRAM(s) Oral daily  sodium chloride 0.9% lock flush 3 milliLiter(s) IV Push every 8 hours    REVIEW OF SYSTEMS  --------------------------------------------------------------------------------  Gen: No weight changes, fatigue, fevers/chills, weakness  Skin: No rashes  Head/Eyes/Ears/Mouth: No headache; Normal hearing; Normal vision w/o blurriness; No sinus pain/discomfort, sore throat  Respiratory: No dyspnea, cough, wheezing, hemoptysis  CV: No chest pain, PND, orthopnea  GI: No abdominal pain, diarrhea, constipation, nausea, vomiting, melena, hematochezia  : No increased frequency, dysuria, hematuria, nocturia  MSK: No joint pain/swelling; no back pain; no edema  Neuro: No dizziness/lightheadedness, weakness, seizures, numbness, tingling  Heme: No easy bruising or bleeding  Endo: No heat/cold intolerance  Psych: No significant nervousness, anxiety, stress, depression    All other systems were reviewed and are negative, except as noted.    VITALS/PHYSICAL EXAM  --------------------------------------------------------------------------------  Vital Signs Last 24 Hrs  T(C): 36.8 (05 Jan 2019 10:12), Max: 36.8 (04 Jan 2019 21:15)  T(F): 98.2 (05 Jan 2019 10:12), Max: 98.3 (05 Jan 2019 04:46)  HR: 71 (05 Jan 2019 10:12) (56 - 80)  BP: 118/49 (05 Jan 2019 10:12) (117/44 - 146/52)  BP(mean): --  RR: 18 (05 Jan 2019 10:12) (17 - 20)  SpO2: 94% (05 Jan 2019 10:12) (93% - 97%)      01-03-19 @ 07:01  -  01-04-19 @ 07:00  --------------------------------------------------------  IN: 385 mL / OUT: 0 mL / NET: 385 mL    01-04-19 @ 07:01  -  01-04-19 @ 14:44  --------------------------------------------------------  IN: 720 mL / OUT: 0 mL / NET: 720 mL    Physical Exam:  	Gen: NAD, well-appearing  	HEENT: PERRL, supple neck, clear oropharynx  	Pulm: CTA B/L  	CV: RRR, S1S2; no rub  	Back: No spinal or CVA tenderness; no sacral edema  	Abd: +BS, soft, nontender/nondistended  	: No suprapubic tenderness  	UE: Warm, FROM, no clubbing, intact strength; no edema; no asterixis  	LE: Warm, FROM, no clubbing, intact strength; no edema  	Neuro: No focal deficits, intact gait  	Psych: Normal affect and mood  	Skin: Warm, without rashes  	Vascular access:  CVC/AVG (maturing)    LABS/STUDIES  --------------------------------------------------------------------------------              8.4    13.8  >-----------<  269      [01-04-19 @ 11:05]              27.2     136  |  96  |  57.0  ----------------------------<  87      [01-03-19 @ 08:16]  4.6   |  28.0  |  4.38        Ca     7.7     [01-03-19 @ 08:16]      Mg     2.1     [01-03-19 @ 08:16]      Phos  4.5     [01-03-19 @ 08:16]    Creatinine Trend:  SCr 4.38 [01-03 @ 08:16]  SCr 4.73 [12-31 @ 08:15]  SCr 5.51 [12-30 @ 08:28]  SCr 4.12 [12-29 @ 09:24]  SCr 4.86 [12-28 @ 03:43]    Urinalysis - [01-01-19 @ 15:02]      Color Red / Appearance Bloody / SG 1.015 / pH 6.0      Gluc Negative / Ketone Negative  / Bili Negative / Urobili Negative       Blood Large / Protein 500 / Leuk Est Trace / Nitrite Negative      RBC TNTC / WBC 3-5 / Hyaline  / Gran  / Sq Epi  / Non Sq Epi Negative / Bacteria Occasional    Iron 46, TIBC 259, %sat 18      [01-01-19 @ 09:11]  Ferritin 470      [01-01-19 @ 09:11]  TSH 7.33      [11-29-18 @ 02:31]    HBsAb <3.0      [12-05-18 @ 16:33]  HBsAg Nonreact      [12-05-18 @ 16:33]  HBcAb Nonreact      [12-05-18 @ 16:33]  HCV 0.14, Nonreact      [12-05-18 @ 16:33]    1. ESRD on HD  2. HTN  3. Anemia of chronic renal disease    HD - M W F,   S/P Tunneled catheter, AVG placement  Continue current management

## 2019-01-06 LAB
-  AMIKACIN: SIGNIFICANT CHANGE UP
-  AMPICILLIN/SULBACTAM: SIGNIFICANT CHANGE UP
-  AMPICILLIN: SIGNIFICANT CHANGE UP
-  AZTREONAM: SIGNIFICANT CHANGE UP
-  CEFAZOLIN: SIGNIFICANT CHANGE UP
-  CEFEPIME: SIGNIFICANT CHANGE UP
-  CEFOXITIN: SIGNIFICANT CHANGE UP
-  CEFTRIAXONE: SIGNIFICANT CHANGE UP
-  CIPROFLOXACIN: SIGNIFICANT CHANGE UP
-  ERTAPENEM: SIGNIFICANT CHANGE UP
-  GENTAMICIN: SIGNIFICANT CHANGE UP
-  IMIPENEM: SIGNIFICANT CHANGE UP
-  LEVOFLOXACIN: SIGNIFICANT CHANGE UP
-  MEROPENEM: SIGNIFICANT CHANGE UP
-  PIPERACILLIN/TAZOBACTAM: SIGNIFICANT CHANGE UP
-  TOBRAMYCIN: SIGNIFICANT CHANGE UP
-  TRIMETHOPRIM/SULFAMETHOXAZOLE: SIGNIFICANT CHANGE UP
CULTURE RESULTS: SIGNIFICANT CHANGE UP
HCT VFR BLD CALC: 26 % — LOW (ref 42–52)
HGB BLD-MCNC: 7.9 G/DL — LOW (ref 14–18)
MCHC RBC-ENTMCNC: 28.3 PG — SIGNIFICANT CHANGE UP (ref 27–31)
MCHC RBC-ENTMCNC: 30.4 G/DL — LOW (ref 32–36)
MCV RBC AUTO: 93.2 FL — SIGNIFICANT CHANGE UP (ref 80–94)
METHOD TYPE: SIGNIFICANT CHANGE UP
ORGANISM # SPEC MICROSCOPIC CNT: SIGNIFICANT CHANGE UP
ORGANISM # SPEC MICROSCOPIC CNT: SIGNIFICANT CHANGE UP
PLATELET # BLD AUTO: 241 K/UL — SIGNIFICANT CHANGE UP (ref 150–400)
RBC # BLD: 2.79 M/UL — LOW (ref 4.6–6.2)
RBC # FLD: 14.6 % — SIGNIFICANT CHANGE UP (ref 11–15.6)
SPECIMEN SOURCE: SIGNIFICANT CHANGE UP
WBC # BLD: 11.7 K/UL — HIGH (ref 4.8–10.8)
WBC # FLD AUTO: 11.7 K/UL — HIGH (ref 4.8–10.8)

## 2019-01-06 PROCEDURE — 99233 SBSQ HOSP IP/OBS HIGH 50: CPT

## 2019-01-06 PROCEDURE — 99232 SBSQ HOSP IP/OBS MODERATE 35: CPT

## 2019-01-06 RX ORDER — CEFTRIAXONE 500 MG/1
1 INJECTION, POWDER, FOR SOLUTION INTRAMUSCULAR; INTRAVENOUS EVERY 24 HOURS
Refills: 0 | Status: DISCONTINUED | OUTPATIENT
Start: 2019-01-07 | End: 2019-01-08

## 2019-01-06 RX ORDER — PIPERACILLIN AND TAZOBACTAM 4; .5 G/20ML; G/20ML
2.25 INJECTION, POWDER, LYOPHILIZED, FOR SOLUTION INTRAVENOUS ONCE
Refills: 0 | Status: COMPLETED | OUTPATIENT
Start: 2019-01-06 | End: 2019-01-06

## 2019-01-06 RX ADMIN — AMLODIPINE BESYLATE 10 MILLIGRAM(S): 2.5 TABLET ORAL at 05:18

## 2019-01-06 RX ADMIN — Medication 50 MILLIGRAM(S): at 05:18

## 2019-01-06 RX ADMIN — Medication 50 MILLIGRAM(S): at 17:55

## 2019-01-06 RX ADMIN — DULOXETINE HYDROCHLORIDE 20 MILLIGRAM(S): 30 CAPSULE, DELAYED RELEASE ORAL at 12:45

## 2019-01-06 RX ADMIN — Medication 100 MICROGRAM(S): at 05:18

## 2019-01-06 RX ADMIN — PANTOPRAZOLE SODIUM 40 MILLIGRAM(S): 20 TABLET, DELAYED RELEASE ORAL at 17:55

## 2019-01-06 RX ADMIN — Medication 1 TABLET(S): at 12:45

## 2019-01-06 RX ADMIN — CHLORHEXIDINE GLUCONATE 1 APPLICATION(S): 213 SOLUTION TOPICAL at 12:45

## 2019-01-06 RX ADMIN — LOSARTAN POTASSIUM 50 MILLIGRAM(S): 100 TABLET, FILM COATED ORAL at 21:22

## 2019-01-06 RX ADMIN — ISOSORBIDE MONONITRATE 30 MILLIGRAM(S): 60 TABLET, EXTENDED RELEASE ORAL at 12:45

## 2019-01-06 RX ADMIN — ATORVASTATIN CALCIUM 80 MILLIGRAM(S): 80 TABLET, FILM COATED ORAL at 21:22

## 2019-01-06 RX ADMIN — PIPERACILLIN AND TAZOBACTAM 200 GRAM(S): 4; .5 INJECTION, POWDER, LYOPHILIZED, FOR SOLUTION INTRAVENOUS at 05:19

## 2019-01-06 RX ADMIN — SODIUM CHLORIDE 3 MILLILITER(S): 9 INJECTION INTRAMUSCULAR; INTRAVENOUS; SUBCUTANEOUS at 05:18

## 2019-01-06 RX ADMIN — CLOPIDOGREL BISULFATE 75 MILLIGRAM(S): 75 TABLET, FILM COATED ORAL at 12:45

## 2019-01-06 RX ADMIN — SODIUM CHLORIDE 3 MILLILITER(S): 9 INJECTION INTRAMUSCULAR; INTRAVENOUS; SUBCUTANEOUS at 21:23

## 2019-01-06 RX ADMIN — PIPERACILLIN AND TAZOBACTAM 200 GRAM(S): 4; .5 INJECTION, POWDER, LYOPHILIZED, FOR SOLUTION INTRAVENOUS at 21:23

## 2019-01-06 RX ADMIN — PANTOPRAZOLE SODIUM 40 MILLIGRAM(S): 20 TABLET, DELAYED RELEASE ORAL at 05:18

## 2019-01-06 RX ADMIN — Medication 50 MILLIGRAM(S): at 13:06

## 2019-01-06 RX ADMIN — Medication 81 MILLIGRAM(S): at 12:45

## 2019-01-06 RX ADMIN — GABAPENTIN 100 MILLIGRAM(S): 400 CAPSULE ORAL at 12:45

## 2019-01-06 RX ADMIN — Medication 50 MILLIGRAM(S): at 21:22

## 2019-01-06 RX ADMIN — SODIUM CHLORIDE 3 MILLILITER(S): 9 INJECTION INTRAMUSCULAR; INTRAVENOUS; SUBCUTANEOUS at 13:06

## 2019-01-06 RX ADMIN — Medication 40 MILLIGRAM(S): at 05:18

## 2019-01-06 NOTE — PROGRESS NOTE ADULT - SUBJECTIVE AND OBJECTIVE BOX
CC: cough    Interval overnight events: No events overnight. Patient notes swelling in the left arm where graft was placed. Hemoptysis continues but slightly less in amount; patient failed to collect in the cup. H/H is staying stable but white count still elevated. Based on CT from previous days likely cause is pneumonia. CT surgery following for hemoptysis. Patient has AV graft in left arm and permacath in right chest. For dialysis tomorrow. Still requires some O2 which may be from pneumonia vs fluid overload vs atelectasis. Will see if can dialyze more fluids tomorrow with dialysis, titrate FiO2 (on room air he is 90% at rest) and give incentive spirometer. Asked nurse to place on  as at times o2 requirement goes up. Discussed with family that patient has a dialysis seat in the community but will need to stay until oxygenation starts to improve. Patient could ultimately go home with O2 if family requests based on history of lung fibrosis from previous radiation therapy for lung cancer.    ICU Vital Signs Last 24 Hrs  T(C): 36.6 (2019 04:59), Max: 36.9 (2019 21:41)  T(F): 97.8 (2019 04:59), Max: 98.5 (2019 21:41)  HR: 64 (2019 04:59) (58 - 76)  BP: 122/58 (2019 04:59) (122/58 - 133/65)  BP(mean): --  ABP: --  ABP(mean): --  RR: 18 (2019 04:59) (18 - 19)  SpO2: 96% (2019 04:59) (94% - 96%)    PHYSICAL EXAM:  GENERAL: NAD  HEAD:  Atraumatic, Normocephalic  EYES: EOMI, PERRLA, conjunctiva and sclera clear  ENMT: No tonsillar erythema, exudates, or enlargement; Moist mucous membranes, Good dentition, No lesions  NERVOUS SYSTEM:  Alert & Oriented X3, Good concentration move all extremities   CHEST/LUNG: Clear to percussion bilaterally; crackles towards bases bilaterally; unchanged  HEART: Regular rate and rhythm; No murmurs, rubs, or gallops  ABDOMEN: Soft, Nontender, Nondistended; Bowel sounds present  EXTREMITIES:  2+ Peripheral Pulses, No clubbing, cyanosis, or edema  SKIN: left AVF graft to upper arm and right IJ permacath present. Right femoral shiley has been removed; bruising in the area of graft placement in the left arm; notable discrepancy in arm size; chord like mass felt in the arm which is likely graft otherwise no signs of compartment syndrome.       @ 07: @ 07:00  --------------------------------------------------------  IN: 1080 mL / OUT: 100 mL / NET: 980 mL     @ 07: @ 11:27  --------------------------------------------------------  IN: 240 mL / OUT: 150 mL / NET: 90 mL                                              7.9    11.7  )-----------( 241      ( 2019 07:51 )             26.0        Urinalysis Basic - ( 2019 15:02 )  Color: Red / Appearance: Bloody / S.015 / pH: x  Gluc: x / Ketone: Negative  / Bili: Negative / Urobili: Negative mg/dL   Blood: x / Protein: 500 mg/dL / Nitrite: Negative   Leuk Esterase: Trace / RBC: TNTC /HPF / WBC 3-5   Sq Epi: x / Non Sq Epi: Negative / Bacteria: Occasional                Assessment and Plan:   · Assessment		  77 y/o male with PMH ESRD on Hd, HTN, Pulm HTN, [place] CAD s/p PCI who presented to [facility] with infection of recently placed RIJ permcath for HD. Pt was in consult with vascular and nephrology. Infected RIJ permacath was removed, blood cultures negative and permacath tip cultures negative. Started on Iv abx post HD to be continued until  as per ID. A right femoral Shiley was placed for immediate HD during hospital stay. On 1/3,  L AV graft and RIJ permacath were placed and R femoral shiley removed by vascular. Hospital course complicated by hematuria/scrotal swelling and hemoptysis. Consulted by urology, urine cytology studies ordered, and recommended cysto/b/l retrorade pyelograms when stable as outpatient. Also seen in consultation with CTSx for hemoptysis, repeat CT chest completed, positive for PNA.     1. ESRD (end stage renal disease)  - On 1/3, L AV graft and RIJ permacath were placed and R femoral shiley removed by vascular; asked vascular surgery to come back to assess swelling in area of graft placement  - US ordered; no DVT.  - HD plan as per nephro   - renal following   - will dialyze tomorrow    2. HCAP with hemoptysis - likely 2/2 enterobacter  - CT chest reviewed: Since 2018, new patchy opacities in the right lung, consistent with pneumonia.  - started on zosyn on ; no florastor since permacath  - sputum cultures now growing enterobacter sensitive to ceftriaxone; will change zosyn to ceftriaxone  - trend wbc and temps    3. Right groin pain - resolved  - CT of ab/pelvis which showed stable hematoma from previous exam  - Right femoral shiley was removed by vascular  - US of the scrotum showed thickening of the scrotum but no significant findings on exam  - Urology consulted for scrotoal swelling/hematuria, urine cytology studies ordered, and recommended cysto/b/l retrorade pyelograms when stable as outpatient    4. Hematuria  - per nephrology patient with ESRD and this may be old urine  - if fever or signs of SIRS will send urine for culture  - urology consult appreciated, urine cytology studies ordered, and recommended cysto/b/l retrorade pyelograms when stable as outpatient  - monitor for now  - improving today as per pt    5. Acute hypoxic resp failure / likely sec to pneumonia with volume overload as well; originally on high flow but this improved with dialysis; patient then developed pneumonia while here as demonstrated on CT chest done for hemoptysis  - Pt is now on NC o2, will wean off o2 as pt is not o2 dependent at home; 90% on RA  - Renal and cardio following  - Tapering steroids on prednisone daily 40mg; should discontinue on   - may qualify for home O2 2/2 fibrosis if unable to titrate down completely    6. Hypertension, unspecified type  - Cont. o/p regimen, renal/low sodium diet.     7. Anemia / Chronic blood loss anemia   - 1 unit prbc ordered ; H/H improved  - GI recs appreciated (pt complained of bloody BM); will treat constipation  - Continue with PPI  - Secondary to ESRD  - Stable, monitor    8. Coronary artery disease involving native coronary artery of native heart without angina pectoris  - No CP, cont. o/p regimen.

## 2019-01-06 NOTE — PROGRESS NOTE ADULT - ASSESSMENT
77 y/o male with PMH ESRD on Hd, HTN, Pulm HTN, [place]), CAD s/p PCI who presented to [facility] with infection of recently placed RIJ permcath for HD. Pt was in consult with vascular and nephrology. Infected RIJ permacath was removed, blood cultures negative and permacath tip cultures negative. Started on Iv abx post HD to be continued until 1/7 as per ID. A right femoral Shiley was placed for immediate HD during hospital stay. On 1/3,  L AV graft and RIJ permacath were placed and R femoral shiley removed by vascular. Hospital course complicated by hematuria/scrotal swelling and hemoptysis. Consulted by urology, urine cytology studies ordered, and recommended cysto/b/l retrorade pyelograms when stable as outpatient. Also seen in consultation with CTSx for hemoptysis, repeat CT chest completed, positive for PNA.     1. ESRD (end stage renal disease)  - On 1/3, L AV graft and RIJ permacath were placed and R femoral shiley removed by vascular; asked vascular surgery to come back to assess swelling in area of graft placement  - US ordered; no DVT.  - HD plan as per nephro   - renal following   - will dialyze tomorrow    2. HCAP with hemoptysis - likely 2/2 enterobacter  - CT chest reviewed: Since December 22, 2018, new patchy opacities in the right lung, consistent with pneumonia.  - started on zosyn on 1/4; no florastor since permacath  - sputum cultures now growing enterobacter sensitive to ceftriaxone; will change zosyn to ceftriaxone  - trend wbc and temps    3. Right groin pain - resolved  - CT of ab/pelvis which showed stable hematoma from previous exam  - Right femoral shiley was removed by vascular  - US of the scrotum showed thickening of the scrotum but no significant findings on exam  - Urology consulted for scrotoal swelling/hematuria, urine cytology studies ordered, and recommended cysto/b/l retrorade pyelograms when stable as outpatient    4. Hematuria  - per nephrology patient with ESRD and this may be old urine  - if fever or signs of SIRS will send urine for culture  - urology consult appreciated, urine cytology studies ordered, and recommended cysto/b/l retrorade pyelograms when stable as outpatient  - monitor for now  - improving today as per pt    5. Acute hypoxic resp failure / likely sec to pneumonia with volume overload as well; originally on high flow but this improved with dialysis; patient then developed pneumonia while here as demonstrated on CT chest done for hemoptysis  - Pt is now on NC o2, will wean off o2 as pt is not o2 dependent at home; 90% on RA  - Renal and cardio following  - Tapering steroids on prednisone daily 40mg; should discontinue on 1/8  - may qualify for home O2 2/2 fibrosis if unable to titrate down completely    6. Hypertension, unspecified type  - Cont. o/p regimen, renal/low sodium diet.     7. Anemia / Chronic blood loss anemia   - 1 unit prbc ordered 12/31; H/H improved  - GI recs appreciated (pt complained of bloody BM); will treat constipation  - Continue with PPI  - Secondary to ESRD  - Stable, monitor    8. Coronary artery disease involving native coronary artery of native heart without angina pectoris  - No CP, cont. o/p regimen.    9. HLD - statin     DISPO; trend CBC daily; downgraded abx for enterobacter pneumonia; continue to titrate down FiO2 as we treat pneumonia and give dialysis; can go home after hypoxia improves; family aware.

## 2019-01-06 NOTE — PROGRESS NOTE ADULT - SUBJECTIVE AND OBJECTIVE BOX
CC: cough    Interval overnight events: No events overnight. Patient notes swelling in the left arm where graft was placed. Hemoptysis continues but slightly less in amount; patient failed to collect in the cup. H/H is staying stable but white count still elevated. Based on CT from previous days likely cause is pneumonia. CT surgery following for hemoptysis. Patient has AV graft in left arm and permacath in right chest. For dialysis tomorrow. Still requires some O2 which may be from pneumonia vs fluid overload vs atelectasis. Will see if can dialyze more fluids tomorrow with dialysis, titrate FiO2 (on room air he is 90% at rest) and give incentive spirometer. Asked nurse to place on  as at times o2 requirement goes up. Discussed with family that patient has a dialysis seat in the community but will need to stay until oxygenation starts to improve. Patient could ultimately go home with O2 if family requests based on history of lung fibrosis from previous radiation therapy for lung cancer.    ICU Vital Signs Last 24 Hrs  T(C): 36.6 (2019 04:59), Max: 36.9 (2019 21:41)  T(F): 97.8 (2019 04:59), Max: 98.5 (2019 21:41)  HR: 64 (2019 04:59) (58 - 76)  BP: 122/58 (2019 04:59) (122/58 - 133/65)  BP(mean): --  ABP: --  ABP(mean): --  RR: 18 (2019 04:59) (18 - 19)  SpO2: 96% (2019 04:59) (94% - 96%)    PHYSICAL EXAM:  GENERAL: NAD  HEAD:  Atraumatic, Normocephalic  EYES: EOMI, PERRLA, conjunctiva and sclera clear  ENMT: No tonsillar erythema, exudates, or enlargement; Moist mucous membranes, Good dentition, No lesions  NERVOUS SYSTEM:  Alert & Oriented X3, Good concentration move all extremities   CHEST/LUNG: Clear to percussion bilaterally; crackles towards bases bilaterally; unchanged  HEART: Regular rate and rhythm; No murmurs, rubs, or gallops  ABDOMEN: Soft, Nontender, Nondistended; Bowel sounds present  EXTREMITIES:  2+ Peripheral Pulses, No clubbing, cyanosis, or edema  SKIN: left AVF graft to upper arm and right IJ permacath present. Right femoral shiley has been removed; bruising in the area of graft placement in the left arm; notable discrepancy in arm size; chord like mass felt in the arm which is likely graft otherwise no signs of compartment syndrome.       @ 07: @ 07:00  --------------------------------------------------------  IN: 1080 mL / OUT: 100 mL / NET: 980 mL     @ 07: @ 11:27  --------------------------------------------------------  IN: 240 mL / OUT: 150 mL / NET: 90 mL                                              7.9    11.7  )-----------( 241      ( 2019 07:51 )             26.0        Urinalysis Basic - ( 2019 15:02 )  Color: Red / Appearance: Bloody / S.015 / pH: x  Gluc: x / Ketone: Negative  / Bili: Negative / Urobili: Negative mg/dL   Blood: x / Protein: 500 mg/dL / Nitrite: Negative   Leuk Esterase: Trace / RBC: TNTC /HPF / WBC 3-5   Sq Epi: x / Non Sq Epi: Negative / Bacteria: Occasional

## 2019-01-07 DIAGNOSIS — D64.9 ANEMIA, UNSPECIFIED: ICD-10-CM

## 2019-01-07 LAB
ANION GAP SERPL CALC-SCNC: 15 MMOL/L — SIGNIFICANT CHANGE UP (ref 5–17)
BLD GP AB SCN SERPL QL: SIGNIFICANT CHANGE UP
BUN SERPL-MCNC: 76 MG/DL — HIGH (ref 8–20)
CALCIUM SERPL-MCNC: 7.5 MG/DL — LOW (ref 8.6–10.2)
CHLORIDE SERPL-SCNC: 95 MMOL/L — LOW (ref 98–107)
CO2 SERPL-SCNC: 24 MMOL/L — SIGNIFICANT CHANGE UP (ref 22–29)
CREAT SERPL-MCNC: 6.42 MG/DL — HIGH (ref 0.5–1.3)
GLUCOSE SERPL-MCNC: 122 MG/DL — HIGH (ref 70–115)
POTASSIUM SERPL-MCNC: 5.3 MMOL/L — SIGNIFICANT CHANGE UP (ref 3.5–5.3)
POTASSIUM SERPL-SCNC: 5.3 MMOL/L — SIGNIFICANT CHANGE UP (ref 3.5–5.3)
SODIUM SERPL-SCNC: 134 MMOL/L — LOW (ref 135–145)
TYPE + AB SCN PNL BLD: SIGNIFICANT CHANGE UP

## 2019-01-07 PROCEDURE — 99232 SBSQ HOSP IP/OBS MODERATE 35: CPT

## 2019-01-07 PROCEDURE — 90937 HEMODIALYSIS REPEATED EVAL: CPT

## 2019-01-07 RX ADMIN — GABAPENTIN 100 MILLIGRAM(S): 400 CAPSULE ORAL at 14:16

## 2019-01-07 RX ADMIN — SODIUM CHLORIDE 3 MILLILITER(S): 9 INJECTION INTRAMUSCULAR; INTRAVENOUS; SUBCUTANEOUS at 05:25

## 2019-01-07 RX ADMIN — DULOXETINE HYDROCHLORIDE 20 MILLIGRAM(S): 30 CAPSULE, DELAYED RELEASE ORAL at 14:16

## 2019-01-07 RX ADMIN — IRON SUCROSE 210 MILLIGRAM(S): 20 INJECTION, SOLUTION INTRAVENOUS at 12:10

## 2019-01-07 RX ADMIN — Medication 50 MILLIGRAM(S): at 14:16

## 2019-01-07 RX ADMIN — Medication 100 MICROGRAM(S): at 05:24

## 2019-01-07 RX ADMIN — PANTOPRAZOLE SODIUM 40 MILLIGRAM(S): 20 TABLET, DELAYED RELEASE ORAL at 17:25

## 2019-01-07 RX ADMIN — Medication 50 MILLIGRAM(S): at 05:24

## 2019-01-07 RX ADMIN — Medication 1 TABLET(S): at 14:16

## 2019-01-07 RX ADMIN — Medication 50 MILLIGRAM(S): at 17:26

## 2019-01-07 RX ADMIN — Medication 50 MILLIGRAM(S): at 22:51

## 2019-01-07 RX ADMIN — CLOPIDOGREL BISULFATE 75 MILLIGRAM(S): 75 TABLET, FILM COATED ORAL at 14:16

## 2019-01-07 RX ADMIN — CHLORHEXIDINE GLUCONATE 1 APPLICATION(S): 213 SOLUTION TOPICAL at 14:16

## 2019-01-07 RX ADMIN — CEFTRIAXONE 100 GRAM(S): 500 INJECTION, POWDER, FOR SOLUTION INTRAMUSCULAR; INTRAVENOUS at 05:24

## 2019-01-07 RX ADMIN — ATORVASTATIN CALCIUM 80 MILLIGRAM(S): 80 TABLET, FILM COATED ORAL at 22:50

## 2019-01-07 RX ADMIN — SODIUM CHLORIDE 3 MILLILITER(S): 9 INJECTION INTRAMUSCULAR; INTRAVENOUS; SUBCUTANEOUS at 22:51

## 2019-01-07 RX ADMIN — Medication 200 MILLIGRAM(S): at 17:25

## 2019-01-07 RX ADMIN — SODIUM CHLORIDE 3 MILLILITER(S): 9 INJECTION INTRAMUSCULAR; INTRAVENOUS; SUBCUTANEOUS at 14:22

## 2019-01-07 RX ADMIN — PANTOPRAZOLE SODIUM 40 MILLIGRAM(S): 20 TABLET, DELAYED RELEASE ORAL at 05:25

## 2019-01-07 RX ADMIN — Medication 40 MILLIGRAM(S): at 05:24

## 2019-01-07 RX ADMIN — ISOSORBIDE MONONITRATE 30 MILLIGRAM(S): 60 TABLET, EXTENDED RELEASE ORAL at 14:16

## 2019-01-07 RX ADMIN — AMLODIPINE BESYLATE 10 MILLIGRAM(S): 2.5 TABLET ORAL at 05:24

## 2019-01-07 RX ADMIN — LOSARTAN POTASSIUM 50 MILLIGRAM(S): 100 TABLET, FILM COATED ORAL at 22:50

## 2019-01-07 RX ADMIN — Medication 81 MILLIGRAM(S): at 14:16

## 2019-01-07 NOTE — PROGRESS NOTE ADULT - SUBJECTIVE AND OBJECTIVE BOX
Patient was seen and evaluated on dialysis.   Patient is tolerating the procedure well.   T(C): 36.6 (01-07-19 @ 08:49), Max: 36.8 (01-06-19 @ 13:30)  HR: 56 (01-07-19 @ 08:49) (56 - 68)  BP: 121/54 (01-07-19 @ 08:49) (112/52 - 128/63)  Continue dialysis: M W F  Dialyzer: Revaclear 300         QB:  400 ml.,      QD: 500ml.,  Goal UF  2-3  over 3.5  Hours     Dialysate K + 2.0 Meq.,

## 2019-01-07 NOTE — PROGRESS NOTE ADULT - ASSESSMENT
78 yr man, ESRD recently placed on  HD, HTN, Pulm HTN, CAD s/p PCI male admitted with infection from  [facility]

## 2019-01-07 NOTE — PROGRESS NOTE ADULT - ASSESSMENT
77 y/o male with PMH ESRD on Hd, HTN, Pulm HTN, [place]), CAD s/p PCI who presented to [facility] with infection of recently placed RIJ PermCath for HD. Pt was in consult with vascular and nephrology. Infected RIJ PermCath was removed, blood cultures negative and PermCath tip cultures negative. Started on Iv abx post HD to be continued until 1/7 as per ID. A right femoral Shiley was placed for immediate HD during hospital stay. On 1/3,  L AV graft and RIJ PermCath were placed and R femoral Shiley removed by vascular. Hospital course complicated by hematuria/scrotal swelling and hemoptysis. Consulted by urology, urine cytology studies ordered, and recommended cysto/b/l retrograde pyelograms when stable as outpatient. Also seen in consultation with CTSx for hemoptysis, repeat CT chest completed, positive for PNA.     Assessment/Plan:    1. ESRD (end stage renal disease)  - On 1/3, L AV graft and RIJ PermCath were placed and R femoral shiley removed by vascular; asked vascular surgery to come back to assess swelling in area of graft placement  - US ordered- no DVT.  - HD plan as per nephro   - renal following     2. HCAP with hemoptysis - likely 2/2 enterobacter  - CT chest reviewed: Since December 22, 2018, new patchy opacities in the right lung, consistent with pneumonia.  - started on zosyn on 1/4; no florastor since permacath  - sputum cultures now growing enterobacter sensitive to ceftriaxone    3. Right groin pain - resolved  - CT of ab/pelvis which showed stable hematoma from previous exam  - Right femoral shiley was removed by vascular  - US of the scrotum showed thickening of the scrotum but no significant findings on exam  - Urology consulted for scrotoal swelling/hematuria, urine cytology studies ordered, and recommended cysto/b/l retrograde pyelograms when stable as outpatient    4. Hematuria  - urology consult appreciated, urine cytology studies ordered, and recommended cysto/b/l retrorade pyelograms when stable as outpatient    5. Acute hypoxic resp failure / likely sec to pneumonia with volume overload as well  - Pt is now on NC o2, will wean off o2 as pt is not o2 dependent at home; 90% on RA  - Renal and cardio following  - Tapering steroids on prednisone daily 40mg; should discontinue on 1/8  - may qualify for home O2 2/2 fibrosis if unable to titrate down completely    6. Hypertension,   - Cont. o/p regimen, renal/low sodium diet.     7. Anemia / Chronic blood loss anemia   - 1 unit prbc ordered 12/31; H/H improved  - GI recs appreciated (pt complained of bloody BM); will treat constipation  - Continue with PPI  - Secondary to ESRD  - Stable, monitor    8. Coronary artery disease involving native coronary artery of native heart without angina pectoris  - No CP, cont. o/p regimen.    9. HLD - statin

## 2019-01-07 NOTE — PROGRESS NOTE ADULT - PROBLEM SELECTOR PLAN 2
Cont HD  per Nephro  S/p permacath, AVF  US noted
Receiving HD  monitor - thoracentesis if still symptomatic?
for Permacath on 1/3

## 2019-01-07 NOTE — PROGRESS NOTE ADULT - PROBLEM SELECTOR PLAN 5
Patient overall comfortable.  Patient s/p permacath and AVF, continuing HD.    Patient to continue with current treatment plan  Recommend [facility]  if to go home.  Palliative Care to sign off.
Events noted today - in ICU with Bip[facility] support.  Family  was at bedside earlier. Spoke to nurse- she reports son in law with multiple questions about kidney transplant for patient.  Son in law was instructed to direct these questions to patient's doctor.   It seems family wish to continue with full interventions.  Will continue to monitor hospital course. Cont support.

## 2019-01-07 NOTE — PROGRESS NOTE ADULT - PROBLEM SELECTOR PLAN 4
Cont abx
Urgent HD today
Hospital course complicated by respiratory failure, now off high flow. CT noted with PNA.   Patient and family seeking continued interventions - previously had questions about kidney transplant.   Patient to  eventually have Permacath and AVF.   Would recommend family meeting if condition not improving

## 2019-01-07 NOTE — PROGRESS NOTE ADULT - SUBJECTIVE AND OBJECTIVE BOX
CC: Left upper extremity pain     INTERVAL HPI/OVERNIGHT EVENTS: Patient seen and examined during HD. Complaints of left arm pain and swelling- duplex was negative for dvt.       Vital Signs Last 24 Hrs  T(C): 36.7 (07 Jan 2019 12:35), Max: 36.8 (06 Jan 2019 21:20)  T(F): 98 (07 Jan 2019 12:35), Max: 98.2 (06 Jan 2019 21:20)  HR: 68 (07 Jan 2019 12:35) (56 - 68)  BP: 126/63 (07 Jan 2019 12:35) (115/52 - 128/63)  BP(mean): --  RR: 18 (07 Jan 2019 12:35) (18 - 20)  SpO2: 96% (07 Jan 2019 12:35) (86% - 96%)    PHYSICAL EXAM:    GENERAL: NAD, AOX3  HEAD:  Atraumatic, Normocephalic  CHEST/LUNG: Clear to auscultation bilaterally; No rales, rhonchi, wheezing, or rubs  HEART: Regular rate and rhythm; No murmurs, rubs, or gallops  ABDOMEN: Soft, Nontender, Nondistended; Bowel sounds present  EXTREMITIES:  2+ Peripheral Pulses, No clubbing, cyanosis, or edema  LUE- Edema tenderness         MEDICATIONS  (STANDING):  amLODIPine   Tablet 10 milliGRAM(s) Oral daily  aspirin enteric coated 81 milliGRAM(s) Oral daily  atorvastatin 80 milliGRAM(s) Oral at bedtime  cefTRIAXone   IVPB 1 Gram(s) IV Intermittent every 24 hours  chlorhexidine 2% Cloths 1 Application(s) Topical daily  clopidogrel Tablet 75 milliGRAM(s) Oral daily  DULoxetine 20 milliGRAM(s) Oral daily  gabapentin 100 milliGRAM(s) Oral daily  hydrALAZINE 50 milliGRAM(s) Oral every 8 hours  iron sucrose IVPB 100 milliGRAM(s) IV Intermittent <User Schedule>  isosorbide   mononitrate ER Tablet (IMDUR) 30 milliGRAM(s) Oral daily  levothyroxine 100 MICROGram(s) Oral daily  losartan 50 milliGRAM(s) Oral at bedtime  metoprolol tartrate 50 milliGRAM(s) Oral two times a day  Nephro-jeana 1 Tablet(s) Oral daily  pantoprazole  Injectable 40 milliGRAM(s) IV Push every 12 hours  predniSONE   Tablet 40 milliGRAM(s) Oral daily  sodium chloride 0.9% lock flush 3 milliLiter(s) IV Push every 8 hours    MEDICATIONS  (PRN):  ALPRAZolam 0.25 milliGRAM(s) Oral two times a day PRN anxiety  artificial  tears Solution 1 Drop(s) Both EYES four times a day PRN Dry Eyes  benzonatate 100 milliGRAM(s) Oral every 8 hours PRN Cough  guaiFENesin    Syrup 200 milliGRAM(s) Oral every 6 hours PRN Cough  ondansetron Injectable 4 milliGRAM(s) IV Push every 6 hours PRN Nausea      Allergies    No Known Allergies    Intolerances          LABS:                          7.9    11.7  )-----------( 241      ( 06 Jan 2019 07:51 )             26.0     01-07    134<L>  |  95<L>  |  76.0<H>  ----------------------------<  122<H>  5.3   |  24.0  |  6.42<H>    Ca    7.5<L>      07 Jan 2019 09:13            RADIOLOGY & ADDITIONAL TESTS:

## 2019-01-07 NOTE — PROGRESS NOTE ADULT - SUBJECTIVE AND OBJECTIVE BOX
CC: follow up GOC  INTERVAL HPI/OVERNIGHT EVENTS:  nursing report patient doing okay, got up to commode  PRESENT SYMPTOMS: SOURCE:  Patient/Family/Team    PAIN SCALE:  0 = none  1 = mild   2 = moderate  3 = severe    Pain: denies    Dyspnea:  [ ] YES [x ] NO  Anxiety:  [x ] YES [ ] NO  Fatigue: [x ] YES [ ] NO  Nausea: [ ] YES [ x] NO  Loss of Appetite: [ ] YES [x ] NO  Other symptoms: __________    MEDICATIONS  (STANDING):  amLODIPine   Tablet 10 milliGRAM(s) Oral daily  aspirin enteric coated 81 milliGRAM(s) Oral daily  atorvastatin 80 milliGRAM(s) Oral at bedtime  cefTRIAXone   IVPB 1 Gram(s) IV Intermittent every 24 hours  chlorhexidine 2% Cloths 1 Application(s) Topical daily  clopidogrel Tablet 75 milliGRAM(s) Oral daily  DULoxetine 20 milliGRAM(s) Oral daily  gabapentin 100 milliGRAM(s) Oral daily  hydrALAZINE 50 milliGRAM(s) Oral every 8 hours  iron sucrose IVPB 100 milliGRAM(s) IV Intermittent <User Schedule>  isosorbide   mononitrate ER Tablet (IMDUR) 30 milliGRAM(s) Oral daily  levothyroxine 100 MICROGram(s) Oral daily  losartan 50 milliGRAM(s) Oral at bedtime  metoprolol tartrate 50 milliGRAM(s) Oral two times a day  Nephro-jeana 1 Tablet(s) Oral daily  pantoprazole  Injectable 40 milliGRAM(s) IV Push every 12 hours  predniSONE   Tablet 40 milliGRAM(s) Oral daily  sodium chloride 0.9% lock flush 3 milliLiter(s) IV Push every 8 hours    MEDICATIONS  (PRN):  ALPRAZolam 0.25 milliGRAM(s) Oral two times a day PRN anxiety  artificial  tears Solution 1 Drop(s) Both EYES four times a day PRN Dry Eyes  benzonatate 100 milliGRAM(s) Oral every 8 hours PRN Cough  guaiFENesin    Syrup 200 milliGRAM(s) Oral every 6 hours PRN Cough  ondansetron Injectable 4 milliGRAM(s) IV Push every 6 hours PRN Nausea      Allergies    No Known Allergies    Intolerances    Karnofsky Performance Score/Palliative Performance Status Version 2:  30  %    Vital Signs Last 24 Hrs  T(C): 36.7 (07 Jan 2019 12:35), Max: 36.8 (06 Jan 2019 21:20)  T(F): 98 (07 Jan 2019 12:35), Max: 98.2 (06 Jan 2019 21:20)  HR: 68 (07 Jan 2019 12:35) (56 - 68)  BP: 126/63 (07 Jan 2019 12:35) (115/52 - 128/63)  BP(mean): --  RR: 18 (07 Jan 2019 12:35) (18 - 20)  SpO2: 96% (07 Jan 2019 12:35) (86% - 96%)    PHYSICAL EXAM:    General:  NAD  HEENT: [x ] normal  [ ] dry mouth  [ ] ET tube/trach    Lungs: [x ] comfortable [ ] tachypnea/labored breathing  [ ] excessive secretions    CV: [x ] normal  [ ] tachycardia    GI: [ x] normal  [ ] distended  [ ] tender  [ ] no BS               [ ] PEG/NG/OG tube    : [ ] normal  [ ] incontinent  [x ] oliguria/anuria  on HD  MSK: [ ] normal  [x ] weakness  [ ] edema             [ ] ambulatory  [ ] bedbound/wheelchair bound    Skin: [ ] normal  [ ] pressure ulcers- Stage_____  [ x] no rash    LABS:                        7.9    11.7  )-----------( 241      ( 06 Jan 2019 07:51 )             26.0     01-07    134<L>  |  95<L>  |  76.0<H>  ----------------------------<  122<H>  5.3   |  24.0  |  6.42<H>    Ca    7.5<L>      07 Jan 2019 09:13          I&O's Summary    06 Jan 2019 07:01  -  07 Jan 2019 07:00  --------------------------------------------------------  IN: 720 mL / OUT: 200 mL / NET: 520 mL    07 Jan 2019 07:01  -  07 Jan 2019 16:13  --------------------------------------------------------  IN: 1100 mL / OUT: 2600 mL / NET: -1500 mL        RADIOLOGY & ADDITIONAL STUDIES:  < from: US Duplex Venous Upper Ext Ltd, Left (01.05.19 @ 11:28) >  EXAM:  US DPLX UPR EXT VEINS LTD LT                          PROCEDURE DATE:  01/05/2019          INTERPRETATION:  CLINICAL INFORMATION: Patient is unable to communicate.   Postop. Left upper extremity swelling. Recent AV fistula placement.   Evaluate for DVT.    COMPARISON: None available.    TECHNIQUE: Duplex sonography of the LEFT UPPER extremity with color and   spectral Doppler, with and without compression.      FINDINGS:    The left internal jugular, subclavian, axillary, brachial, basilic and   cephalic veins are patent and compressible where applicable.     Doppler examination shows normal spontaneous and phasic flow.    Patency of the AV fistula outflow is demonstrated.    IMPRESSION:     No evidence of left upper extremity deep venous thrombosis.      Thank you for the opportunity to assist with the care of this patient.   [facility] [phone].

## 2019-01-08 ENCOUNTER — TRANSCRIPTION ENCOUNTER (OUTPATIENT)
Age: 79
End: 2019-01-08

## 2019-01-08 LAB
ANION GAP SERPL CALC-SCNC: 12 MMOL/L — SIGNIFICANT CHANGE UP (ref 5–17)
BUN SERPL-MCNC: 40 MG/DL — HIGH (ref 8–20)
CALCIUM SERPL-MCNC: 7.5 MG/DL — LOW (ref 8.6–10.2)
CHLORIDE SERPL-SCNC: 98 MMOL/L — SIGNIFICANT CHANGE UP (ref 98–107)
CO2 SERPL-SCNC: 29 MMOL/L — SIGNIFICANT CHANGE UP (ref 22–29)
CREAT SERPL-MCNC: 4.67 MG/DL — HIGH (ref 0.5–1.3)
GLUCOSE SERPL-MCNC: 145 MG/DL — HIGH (ref 70–115)
HBV SURFACE AB SER-ACNC: <3 MIU/ML — LOW
HBV SURFACE AG SER-ACNC: SIGNIFICANT CHANGE UP
HCT VFR BLD CALC: 31.1 % — LOW (ref 42–52)
HCV AB S/CO SERPL IA: 0.12 S/CO — SIGNIFICANT CHANGE UP
HCV AB SERPL-IMP: SIGNIFICANT CHANGE UP
HGB BLD-MCNC: 9.7 G/DL — LOW (ref 14–18)
MCHC RBC-ENTMCNC: 29 PG — SIGNIFICANT CHANGE UP (ref 27–31)
MCHC RBC-ENTMCNC: 31.2 G/DL — LOW (ref 32–36)
MCV RBC AUTO: 92.8 FL — SIGNIFICANT CHANGE UP (ref 80–94)
PLATELET # BLD AUTO: 225 K/UL — SIGNIFICANT CHANGE UP (ref 150–400)
POTASSIUM SERPL-MCNC: 4.5 MMOL/L — SIGNIFICANT CHANGE UP (ref 3.5–5.3)
POTASSIUM SERPL-SCNC: 4.5 MMOL/L — SIGNIFICANT CHANGE UP (ref 3.5–5.3)
RBC # BLD: 3.35 M/UL — LOW (ref 4.6–6.2)
RBC # FLD: 15.5 % — SIGNIFICANT CHANGE UP (ref 11–15.6)
SODIUM SERPL-SCNC: 139 MMOL/L — SIGNIFICANT CHANGE UP (ref 135–145)
WBC # BLD: 13.2 K/UL — HIGH (ref 4.8–10.8)
WBC # FLD AUTO: 13.2 K/UL — HIGH (ref 4.8–10.8)

## 2019-01-08 PROCEDURE — 99223 1ST HOSP IP/OBS HIGH 75: CPT

## 2019-01-08 PROCEDURE — 99232 SBSQ HOSP IP/OBS MODERATE 35: CPT

## 2019-01-08 PROCEDURE — 99233 SBSQ HOSP IP/OBS HIGH 50: CPT

## 2019-01-08 RX ORDER — PANTOPRAZOLE SODIUM 20 MG/1
40 TABLET, DELAYED RELEASE ORAL
Refills: 0 | Status: DISCONTINUED | OUTPATIENT
Start: 2019-01-08 | End: 2019-01-11

## 2019-01-08 RX ORDER — CEFTRIAXONE 500 MG/1
1 INJECTION, POWDER, FOR SOLUTION INTRAMUSCULAR; INTRAVENOUS EVERY 24 HOURS
Refills: 0 | Status: COMPLETED | OUTPATIENT
Start: 2019-01-09 | End: 2019-01-09

## 2019-01-08 RX ADMIN — AMLODIPINE BESYLATE 10 MILLIGRAM(S): 2.5 TABLET ORAL at 06:34

## 2019-01-08 RX ADMIN — CEFTRIAXONE 100 GRAM(S): 500 INJECTION, POWDER, FOR SOLUTION INTRAMUSCULAR; INTRAVENOUS at 06:34

## 2019-01-08 RX ADMIN — Medication 50 MILLIGRAM(S): at 21:57

## 2019-01-08 RX ADMIN — CLOPIDOGREL BISULFATE 75 MILLIGRAM(S): 75 TABLET, FILM COATED ORAL at 11:55

## 2019-01-08 RX ADMIN — CHLORHEXIDINE GLUCONATE 1 APPLICATION(S): 213 SOLUTION TOPICAL at 11:57

## 2019-01-08 RX ADMIN — Medication 50 MILLIGRAM(S): at 17:27

## 2019-01-08 RX ADMIN — SODIUM CHLORIDE 3 MILLILITER(S): 9 INJECTION INTRAMUSCULAR; INTRAVENOUS; SUBCUTANEOUS at 06:34

## 2019-01-08 RX ADMIN — Medication 100 MICROGRAM(S): at 06:33

## 2019-01-08 RX ADMIN — Medication 1 TABLET(S): at 11:55

## 2019-01-08 RX ADMIN — Medication 40 MILLIGRAM(S): at 06:33

## 2019-01-08 RX ADMIN — PANTOPRAZOLE SODIUM 40 MILLIGRAM(S): 20 TABLET, DELAYED RELEASE ORAL at 21:57

## 2019-01-08 RX ADMIN — ATORVASTATIN CALCIUM 80 MILLIGRAM(S): 80 TABLET, FILM COATED ORAL at 21:57

## 2019-01-08 RX ADMIN — Medication 50 MILLIGRAM(S): at 06:34

## 2019-01-08 RX ADMIN — LOSARTAN POTASSIUM 50 MILLIGRAM(S): 100 TABLET, FILM COATED ORAL at 21:57

## 2019-01-08 RX ADMIN — Medication 200 MILLIGRAM(S): at 11:55

## 2019-01-08 RX ADMIN — SODIUM CHLORIDE 3 MILLILITER(S): 9 INJECTION INTRAMUSCULAR; INTRAVENOUS; SUBCUTANEOUS at 22:15

## 2019-01-08 RX ADMIN — DULOXETINE HYDROCHLORIDE 20 MILLIGRAM(S): 30 CAPSULE, DELAYED RELEASE ORAL at 11:55

## 2019-01-08 RX ADMIN — ISOSORBIDE MONONITRATE 30 MILLIGRAM(S): 60 TABLET, EXTENDED RELEASE ORAL at 11:55

## 2019-01-08 RX ADMIN — GABAPENTIN 100 MILLIGRAM(S): 400 CAPSULE ORAL at 11:55

## 2019-01-08 RX ADMIN — Medication 50 MILLIGRAM(S): at 06:33

## 2019-01-08 RX ADMIN — SODIUM CHLORIDE 3 MILLILITER(S): 9 INJECTION INTRAMUSCULAR; INTRAVENOUS; SUBCUTANEOUS at 14:39

## 2019-01-08 RX ADMIN — Medication 50 MILLIGRAM(S): at 15:09

## 2019-01-08 RX ADMIN — Medication 81 MILLIGRAM(S): at 11:55

## 2019-01-08 RX ADMIN — PANTOPRAZOLE SODIUM 40 MILLIGRAM(S): 20 TABLET, DELAYED RELEASE ORAL at 06:33

## 2019-01-08 NOTE — DISCHARGE NOTE ADULT - DURABLE MEDICAL EQUIPMENT AGENCY
Formerly [facility] SURGICAL SUPPLY [phone] FOR HOME OXYGEN, ROLLING WALKER, 3-IN-1 COMMODE (DELIVERED TO BEDSIDE PRIOR TO DISCHARGE).

## 2019-01-08 NOTE — DISCHARGE NOTE ADULT - CARE PLAN
Principal Discharge DX:	Acute respiratory failure with hypoxia  Goal:	Resolution  Assessment and plan of treatment:	Completed iv antibiotics for pneumonia and steroids  Duoneb nebulizer treatment every 4 hours as needed  Follow up with your PMD in 1-2 weeks  Secondary Diagnosis:	Complication of vascular dialysis catheter, initial encounter  Assessment and plan of treatment:	Treated with iv antibiotics  Elevated LUE with gentle ace wrapping until swelling resolves  Secondary Diagnosis:	Hypertension, unspecified type  Assessment and plan of treatment:	Continue home medications  Started on PO losartan  monitor blood pressure  Secondary Diagnosis:	Pneumonia due to infectious organism, unspecified laterality, unspecified part of lung  Assessment and plan of treatment:	Treated with iv antibiotics

## 2019-01-08 NOTE — PROGRESS NOTE ADULT - ASSESSMENT
79 y/o male with PMH ESRD on Hd, HTN, Pulm HTN, [place] CAD s/p PCI who presented to [facility] with infection of recently placed RIJ PermCath for HD. Pt was in consult with vascular and nephrology. Infected RIJ PermCath was removed, blood cultures negative and PermCath tip cultures negative. Started on Iv abx post HD to be continued until 1/7 as per ID. A right femoral Shiley was placed for immediate HD during hospital stay. On 1/3,  L AV graft and RIJ PermCath were placed and R femoral Shiley removed by vascular. Hospital course complicated by hematuria/scrotal swelling and hemoptysis. Consulted by urology, urine cytology studies ordered, and recommended cysto/b/l retrograde pyelograms when stable as outpatient. Also seen in consultation with CTSx for hemoptysis, repeat CT chest completed, positive for PNA.     Assessment/Plan:    1. ESRD (end stage renal disease)  - On 1/3, L AV graft and RIJ PermCath were placed and R femoral shiley removed by vascular; asked vascular surgery to come back to assess swelling in area of graft placement  - US ordered- no DVT. Vascular surgery called to follow up   - HD plan as per nephro     2. HCAP with hemoptysis - likely 2/2 enterobacter  Spoke with ID, To complete IV antibiotics today       3. Right groin pain - resolved  - CT of ab/pelvis which showed stable hematoma from previous exam  - Right femoral shiley was removed by vascular  - US of the scrotum showed thickening of the scrotum but no significant findings on exam  - Urology consulted for scrotoal swelling/hematuria, urine cytology studies ordered, and recommended cysto/b/l retrograde pyelograms when stable as outpatient    4. Hematuria  - urology consult appreciated, urine cytology studies ordered, and recommended cysto/b/l retrorade pyelograms when stable as outpatient    5. Acute hypoxic resp failure / likely sec to pneumonia with volume overload as well; IMproved  Complete PO steroids today     6. Hypertension,   - Cont. o/p regimen, renal/low sodium diet.     7. Anemia / Chronic blood loss anemia   - 1 unit prbc ordered 12/31; H/H improved  - GI recs appreciated (pt complained of bloody BM); will treat constipation  - Continue with PPI  - Secondary to ESRD  - Stable, monitor    8. Coronary artery disease involving native coronary artery of native heart without angina pectoris  - No CP, cont. o/p regimen.    9. Radiation induced pulmonary fibrosis: Will need home oxygen on discharge  SPo2 room air at rest of 85% improved to 92% via 2 liters nasal cannula     9. HLD - statin     Plan discussed with patient, family at bedside, ID, RN and CM    PT evaluation  Discharge planning

## 2019-01-08 NOTE — DISCHARGE NOTE ADULT - HOSPITAL COURSE
79 y/o male with PMH ESRD on Hd, HTN, Pulm HTN, [place]), CAD s/p PCI who presented to [facility] with infection of recently placed RIJ PermCath for HD. Pt was in consult with vascular and nephrology. Infected RIJ PermCath was removed, blood cultures negative and PermCath tip cultures negative. Started on Iv abx post HD to be continued until 1/7 as per ID. A right femoral Shiley was placed for immediate HD during hospital stay. On 1/3,  L AV graft and RIJ PermCath were placed and R femoral Shiley removed by vascular. Hospital course complicated by hematuria/scrotal swelling and hemoptysis. Consulted by urology, urine cytology studies ordered, and recommended cysto/b/l retrograde pyelograms when stable as outpatient. Also seen in consultation with CTSx for hemoptysis, repeat CT chest completed, positive for PNA. Completed IV antibiotics for pneumonia and catheter related infection.     Discharged home with home care in stable condition. 50 mins spent coordinating care and discharge 77 y/o male with PMH ESRD on Hd, HTN, Pulm HTN, [place]), CAD s/p PCI who presented to [facility] with infection of recently placed RIJ PermCath for HD. Pt was in consult with vascular and nephrology. Infected RIJ PermCath was removed, blood cultures negative and PermCath tip cultures negative. Started on Iv abx post HD to be continued until 1/7 as per ID. A right femoral Shiley was placed for immediate HD during hospital stay. On 1/3,  L AV graft and RIJ PermCath were placed and R femoral Shiley removed by vascular. Hospital course complicated by hematuria/scrotal swelling and hemoptysis. Consulted by urology, urine cytology studies ordered, and recommended cysto/b/l retrograde pyelograms when stable as outpatient. Also seen in consultation with CTSx for hemoptysis, repeat CT chest completed, positive for PNA. Completed IV antibiotics for pneumonia and catheter related infection.  Post hemodialysis had a low grade fever of 100F, lactate was normal, mild elevation in procalcitonin. Monitored off antibiotics, afebrile x 24 hours. Discharged home in stable condition.      50 mins spent coordinating care and discharge

## 2019-01-08 NOTE — PROGRESS NOTE ADULT - ASSESSMENT
A/P: 78 year old male s/p LUE AV graft creation and RIJ permacath placement POD#5 seen with swelling of LUE with no signs of infection nor occlusion/disfunction of AV graft.    -Elevate LUE  -Light ACE wrapping of LUE up to elbow level  -continue HD per permacath  -No further vascular surgery interventions indicated at this moment. Please call us back if any issues arise.  -rest of care per primary team

## 2019-01-08 NOTE — PROGRESS NOTE ADULT - SUBJECTIVE AND OBJECTIVE BOX
CC: Follow up pneumonia    INTERVAL HPI/OVERNIGHT EVENTS: Patient seen and examined, spo2 on room air at rest of 85% improved to 92% on 2 liters via nasal cannula. + Hemoptysis. denies sob, chest pain or palpitations. Pain LUE with swelling. + Hematuria per patient.       Vital Signs Last 24 Hrs  T(C): 37.1 (07 Jan 2019 22:44), Max: 37.1 (07 Jan 2019 17:15)  T(F): 98.7 (07 Jan 2019 22:44), Max: 98.7 (07 Jan 2019 17:15)  HR: 62 (07 Jan 2019 22:44) (62 - 68)  BP: 129/58 (07 Jan 2019 22:44) (122/64 - 129/58)  BP(mean): --  RR: 18 (07 Jan 2019 22:44) (18 - 18)  SpO2: 93% (08 Jan 2019 09:00) (92% - 97%)    PHYSICAL EXAM:    GENERAL: NAD, AOX3  HEAD:  Atraumatic, Normocephalic  EYES: EOMI, PERRLA, conjunctiva and sclera clear  ENMT: Moist mucous membranes  CHEST/LUNG: Clear to auscultation bilaterally; No rales, rhonchi, wheezing, or rubs  HEART: Regular rate and rhythm; No murmurs, rubs, or gallops  ABDOMEN: Soft, Nontender, Nondistended; Bowel sounds present  EXTREMITIES:  2+ Peripheral Pulses, No clubbing, cyanosis, or edema  LUE= Swelling LUE with erythema and tenderness      MEDICATIONS  (STANDING):  amLODIPine   Tablet 10 milliGRAM(s) Oral daily  aspirin enteric coated 81 milliGRAM(s) Oral daily  atorvastatin 80 milliGRAM(s) Oral at bedtime  cefTRIAXone   IVPB 1 Gram(s) IV Intermittent every 24 hours  chlorhexidine 2% Cloths 1 Application(s) Topical daily  clopidogrel Tablet 75 milliGRAM(s) Oral daily  DULoxetine 20 milliGRAM(s) Oral daily  gabapentin 100 milliGRAM(s) Oral daily  hydrALAZINE 50 milliGRAM(s) Oral every 8 hours  iron sucrose IVPB 100 milliGRAM(s) IV Intermittent <User Schedule>  isosorbide   mononitrate ER Tablet (IMDUR) 30 milliGRAM(s) Oral daily  levothyroxine 100 MICROGram(s) Oral daily  losartan 50 milliGRAM(s) Oral at bedtime  metoprolol tartrate 50 milliGRAM(s) Oral two times a day  Nephro-jeana 1 Tablet(s) Oral daily  pantoprazole    Tablet 40 milliGRAM(s) Oral before breakfast  sodium chloride 0.9% lock flush 3 milliLiter(s) IV Push every 8 hours    MEDICATIONS  (PRN):  ALPRAZolam 0.25 milliGRAM(s) Oral two times a day PRN anxiety  artificial  tears Solution 1 Drop(s) Both EYES four times a day PRN Dry Eyes  benzonatate 100 milliGRAM(s) Oral every 8 hours PRN Cough  guaiFENesin    Syrup 200 milliGRAM(s) Oral every 6 hours PRN Cough  ondansetron Injectable 4 milliGRAM(s) IV Push every 6 hours PRN Nausea      Allergies    No Known Allergies    Intolerances          LABS:                          9.7    13.2  )-----------( 225      ( 08 Jan 2019 10:39 )             31.1     01-08    139  |  98  |  40.0<H>  ----------------------------<  145<H>  4.5   |  29.0  |  4.67<H>    Ca    7.5<L>      08 Jan 2019 10:39            RADIOLOGY & ADDITIONAL TESTS:

## 2019-01-08 NOTE — DISCHARGE NOTE ADULT - SECONDARY DIAGNOSIS.
Hypertension, unspecified type Complication of vascular dialysis catheter, initial encounter Pneumonia due to infectious organism, unspecified laterality, unspecified part of lung

## 2019-01-08 NOTE — PROGRESS NOTE ADULT - SUBJECTIVE AND OBJECTIVE BOX
CC: Follow up pneumonia    INTERVAL HPI/ OVERNIGHT EVENTS: Patient seen and examined, spo2 on room air at rest of 85% improved to 92% on 2 liters via nasal cannula. + Hemoptysis. denies sob, chest pain or palpitations. Pain LUE with swelling. + Hematuria per patient.     Vital Signs Last 24 Hrs  T(C): 37.1 (07 Jan 2019 22:44), Max: 37.1 (07 Jan 2019 17:15)  T(F): 98.7 (07 Jan 2019 22:44), Max: 98.7 (07 Jan 2019 17:15)  HR: 62 (07 Jan 2019 22:44) (62 - 68)  BP: 129/58 (07 Jan 2019 22:44) (122/64 - 129/58)  BP(mean): --  RR: 18 (07 Jan 2019 22:44) (18 - 18)  SpO2: 93% (08 Jan 2019 09:00) (92% - 97%)    PHYSICAL EXAM:    GENERAL: NAD, AOX3  HEAD:  Atraumatic, Normocephalic  EYES: EOMI, PERRLA, conjunctiva and sclera clear  ENMT: Moist mucous membranes  CHEST/LUNG: Clear to auscultation bilaterally; No rales, rhonchi, wheezing, or rubs  HEART: Regular rate and rhythm; No murmurs, rubs, or gallops  ABDOMEN: Soft, Nontender, Nondistended; Bowel sounds present  EXTREMITIES:  2+ Peripheral Pulses, No clubbing, cyanosis, or edema  LUE= Swelling LUE with erythema and tenderness      MEDICATIONS  (STANDING):  amLODIPine   Tablet 10 milliGRAM(s) Oral daily  aspirin enteric coated 81 milliGRAM(s) Oral daily  atorvastatin 80 milliGRAM(s) Oral at bedtime  cefTRIAXone   IVPB 1 Gram(s) IV Intermittent every 24 hours  chlorhexidine 2% Cloths 1 Application(s) Topical daily  clopidogrel Tablet 75 milliGRAM(s) Oral daily  DULoxetine 20 milliGRAM(s) Oral daily  gabapentin 100 milliGRAM(s) Oral daily  hydrALAZINE 50 milliGRAM(s) Oral every 8 hours  iron sucrose IVPB 100 milliGRAM(s) IV Intermittent <User Schedule>  isosorbide   mononitrate ER Tablet (IMDUR) 30 milliGRAM(s) Oral daily  levothyroxine 100 MICROGram(s) Oral daily  losartan 50 milliGRAM(s) Oral at bedtime  metoprolol tartrate 50 milliGRAM(s) Oral two times a day  Nephro-jeana 1 Tablet(s) Oral daily  pantoprazole    Tablet 40 milliGRAM(s) Oral before breakfast  sodium chloride 0.9% lock flush 3 milliLiter(s) IV Push every 8 hours    MEDICATIONS  (PRN):  ALPRAZolam 0.25 milliGRAM(s) Oral two times a day PRN anxiety  artificial  tears Solution 1 Drop(s) Both EYES four times a day PRN Dry Eyes  benzonatate 100 milliGRAM(s) Oral every 8 hours PRN Cough  guaiFENesin    Syrup 200 milliGRAM(s) Oral every 6 hours PRN Cough  ondansetron Injectable 4 milliGRAM(s) IV Push every 6 hours PRN Nausea    Allergies    No Known Allergies    LABS:                      9.7    13.2  )-----------( 225      ( 08 Jan 2019 10:39 )             31.1     01-08    139  |  98  |  40.0<H>  ----------------------------<  145<H>  4.5   |  29.0  |  4.67<H>    Ca    7.5<L>      08 Jan 2019 10:39    Assessment and Plan:     77 y/o male with PMH ESRD on Hd, HTN, Pulm HTN, [place] CAD s/p PCI who presented to [facility] with infection of recently placed RIJ PermCath for HD. Pt was in consult with vascular and nephrology. Infected RIJ PermCath was removed, blood cultures negative and PermCath tip cultures negative. Started on Iv abx post HD to be continued until 1/7 as per ID. A right femoral Shiley was placed for immediate HD during hospital stay. On 1/3,  L AV graft and RIJ PermCath were placed and R femoral Shiley removed by vascular. Hospital course complicated by hematuria/scrotal swelling and hemoptysis. Consulted by urology, urine cytology studies ordered, and recommended cysto/b/l retrograde pyelograms when stable as outpatient. Also seen in consultation with CTSx for hemoptysis, repeat CT chest completed, positive for PNA.     1. ESRD (end stage renal disease)  - On 1/3, L AV graft and RIJ PermCath were placed and R femoral shiley removed by vascular; asked vascular surgery to come back to assess swelling in area of graft placement  - US ordered- no DVT. Vascular surgery called to follow up   - HD plan as per nephro     2. HCAP with hemoptysis - likely 2/2 enterobacter  Spoke with ID, To complete IV antibiotics today       3. Right groin pain - resolved  - CT of ab/pelvis which showed stable hematoma from previous exam  - Right femoral shiley was removed by vascular  - US of the scrotum showed thickening of the scrotum but no significant findings on exam  - Urology consulted for scrotoal swelling/hematuria, urine cytology studies ordered, and recommended cysto/b/l retrograde pyelograms when stable as outpatient    4. Hematuria  - urology consult appreciated, urine cytology studies ordered, and recommended cysto/b/l retrorade pyelograms when stable as outpatient    5. Acute hypoxic resp failure / likely sec to pneumonia with volume overload as well; IMproved  Complete PO steroids today     6. Hypertension,   - Cont. o/p regimen, renal/low sodium diet.     7. Anemia / Chronic blood loss anemia   - 1 unit prbc ordered 12/31; H/H improved  - GI recs appreciated (pt complained of bloody BM); will treat constipation  - Continue with PPI  - Secondary to ESRD  - Stable, monitor    8. Coronary artery disease involving native coronary artery of native heart without angina pectoris  - No CP, cont. o/p regimen.    9. Radiation induced pulmonary fibrosis: Will need home oxygen on discharge  SPo2 room air at rest of 85% improved to 92% via 2 liters nasal cannula

## 2019-01-08 NOTE — PROGRESS NOTE ADULT - PROBLEM SELECTOR PROBLEM 1
Acute respiratory failure with hypoxia
Complication of vascular dialysis catheter, initial encounter
ESRD (end stage renal disease)
Hypoxia
PNA (pneumonia)
Complication of vascular dialysis catheter, initial encounter
Acute respiratory failure with hypoxia

## 2019-01-08 NOTE — DISCHARGE NOTE ADULT - CARE PROVIDER_API CALL
Jessee Miles), Internal Medicine; Nephrology  [address] [place] [address]  Phone: [phone]  Fax: [phone]

## 2019-01-08 NOTE — PHYSICAL THERAPY INITIAL EVALUATION ADULT - ADDITIONAL COMMENTS
Pt lives with daughter and son-in-law in a house, states there are no steps to enter or inside. Reports daughter is home all day and able to assist as needed. Owns a cane and RW.

## 2019-01-08 NOTE — PROGRESS NOTE ADULT - ASSESSMENT
77y/o  Male with h/o ESRD on HD T/Th/Sat, CAD s/p PCI on recent admission, s/p CABG HTN, Pulm HTN, [place]   Here with Permacath infection    HAP  Permacath infection  Hypoxia   ESRD on HD  CAD s/p PCI  Pulm HTN  Lung Ca    - Developed HAP and was switched from Vancomycin to Zosyn on 1/3 by primary team  - Sputum culture with Enterobacter cloacae  sensitive to zosyn and ceftriaxone  - So was switched to Ceftriaxone on 1/7 by primary team, recalled ID today to evaluate antibiotic duration  - Blood cultures no growth x 5 days  - S/P permacath removal 12/24/18  - culture tip with CoNS  - Completed about 2 weeks of Vancomycin post HD for permacath infection  - Trend fever, afebrile  - Trend leukocytosis  - Last day of antibiotics will be January 9 2019      Will sign off. Please call PRN.

## 2019-01-08 NOTE — PROGRESS NOTE ADULT - NSHPATTENDINGPLANDISCUSS_GEN_ALL_CORE
Dr Jensen
pt, family, ID
Dr Moura
Dr Silverio
pt, family, ID, nephro
pt, family
Dr Moura
pt, ID, nephro
Dr Jensen
Dr Odonnell

## 2019-01-08 NOTE — PROGRESS NOTE ADULT - PROBLEM SELECTOR PROBLEM 2
ESRD (end stage renal disease)
Pleural effusion
ESRD (end stage renal disease)

## 2019-01-08 NOTE — DISCHARGE NOTE ADULT - MEDICATION SUMMARY - MEDICATIONS TO TAKE
I will START or STAY ON the medications listed below when I get home from the hospital:    aspirin 81 mg oral tablet  -- 1 tab(s) by mouth once a day  -- Indication: For Cad     losartan 50 mg oral tablet  -- 1 tab(s) by mouth once a day (at bedtime)  -- Indication: For Hypertension, unspecified type    isosorbide mononitrate 30 mg oral tablet, extended release  -- 1 tab(s) by mouth once a day   -- Do not drink alcoholic beverages when taking this medication.  It is very important that you take or use this exactly as directed.  Do not skip doses or discontinue unless directed by your doctor.  Swallow whole.  Do not crush.    -- Indication: For Cad    gabapentin 100 mg oral capsule  -- 1 cap(s) by mouth 3 times a day x 30 days   -- It is very important that you take or use this exactly as directed.  Do not skip doses or discontinue unless directed by your doctor.  May cause drowsiness.  Alcohol may intensify this effect.  Use care when operating dangerous machinery.    -- Indication: For neuropathy    Cymbalta 20 mg oral delayed release capsule  -- 20 milligram(s) by mouth once a day  -- Indication: For neuropathy    fexofenadine 60 mg oral tablet  -- 60 milligram(s) by mouth once a day  -- Indication: For Allergy    atorvastatin 80 mg oral tablet  -- 1 tab(s) by mouth once a day (at bedtime)  -- Indication: For Cad    Plavix 75 mg oral tablet  -- 1 tab(s) by mouth once a day  -- Indication: For Cad    benzonatate 100 mg oral capsule  -- 1 cap(s) by mouth every 8 hours, As Needed -Cough - for cough   -- Indication: For Cough    metoprolol tartrate 50 mg oral tablet  -- 1 tab(s) by mouth 2 times a day  -- Indication: For Hypertension, unspecified type    ipratropium-albuterol 0.5 mg-2.5 mg/3 mLinhalation solution  -- 3 milliliter(s) by nebulizer 4 times a day, As Needed -for bronchospasm   Nebulizer Machine   -- For inhalation only.  It is very important that you take or use this exactly as directed.  Do not skip doses or discontinue unless directed by your doctor.  Obtain medical advice before taking any non-prescription drugs as some may affect the action of this medication.    -- Indication: For Pulmonary scarring    amLODIPine 10 mg oral tablet  -- 1 tab(s) by mouth once a day  -- Indication: For Hypertension, unspecified type    epoetin nguyễn  -- 6000 unit(s) injectable Tuesday, Thursday, Saturday with dialysis  -- Indication: For Anemia    omeprazole 40 mg oral delayed release capsule  -- 1 cap(s) by mouth once a day  -- Indication: For gerd    levothyroxine 100 mcg (0.1 mg) oral tablet  -- 1 tab(s) by mouth once a day  -- Indication: For Hypothyroidism    hydrALAZINE 50 mg oral tablet  -- 1 tab(s) by mouth every 8 hours  -- Indication: For Hypertension, unspecified type    Nephro-Gloria oral tablet  -- 1 tab(s) by mouth once a day   -- Indication: For Supplement

## 2019-01-08 NOTE — PROGRESS NOTE ADULT - SUBJECTIVE AND OBJECTIVE BOX
INTERVAL HPI/OVERNIGHT EVENTS:  Patient seen at bedside today. Primary team concerned since LUE was swollen and tender to palpation. LUE seen to be swollen with pitting edema, and mildly tender but not erythematous. No steal syndrome appreciated of LUE. LUE dressing were removed, no purulent discharge noted, no surround erythema near the incisions. Palpable thrill noted over AV graft site. Duplex of LUE showed no DVT and a patent AV graft with good outflow.      MEDICATIONS  (STANDING):  amLODIPine   Tablet 10 milliGRAM(s) Oral daily  aspirin enteric coated 81 milliGRAM(s) Oral daily  atorvastatin 80 milliGRAM(s) Oral at bedtime  chlorhexidine 2% Cloths 1 Application(s) Topical daily  clopidogrel Tablet 75 milliGRAM(s) Oral daily  DULoxetine 20 milliGRAM(s) Oral daily  gabapentin 100 milliGRAM(s) Oral daily  hydrALAZINE 50 milliGRAM(s) Oral every 8 hours  iron sucrose IVPB 100 milliGRAM(s) IV Intermittent <User Schedule>  isosorbide   mononitrate ER Tablet (IMDUR) 30 milliGRAM(s) Oral daily  levothyroxine 100 MICROGram(s) Oral daily  losartan 50 milliGRAM(s) Oral at bedtime  metoprolol tartrate 50 milliGRAM(s) Oral two times a day  Nephro-jeana 1 Tablet(s) Oral daily  pantoprazole    Tablet 40 milliGRAM(s) Oral before breakfast  sodium chloride 0.9% lock flush 3 milliLiter(s) IV Push every 8 hours    MEDICATIONS  (PRN):  ALPRAZolam 0.25 milliGRAM(s) Oral two times a day PRN anxiety  artificial  tears Solution 1 Drop(s) Both EYES four times a day PRN Dry Eyes  benzonatate 100 milliGRAM(s) Oral every 8 hours PRN Cough  guaiFENesin    Syrup 200 milliGRAM(s) Oral every 6 hours PRN Cough  ondansetron Injectable 4 milliGRAM(s) IV Push every 6 hours PRN Nausea      Vital Signs Last 24 Hrs  T(C): 37 (08 Jan 2019 11:37), Max: 37.1 (07 Jan 2019 17:15)  T(F): 98.6 (08 Jan 2019 11:37), Max: 98.7 (07 Jan 2019 17:15)  HR: 62 (08 Jan 2019 11:37) (62 - 68)  BP: 121/56 (08 Jan 2019 11:37) (121/56 - 129/58)  BP(mean): --  RR: 18 (08 Jan 2019 11:37) (18 - 18)  SpO2: 93% (08 Jan 2019 09:00) (92% - 97%)    PE  Gen: Not in acute distress  Pulm: Non-labored breathing  CV: RRR  Abd: Soft, nontender  Ext: LUE AV graft patent and with palpable thrill. No surrounding erythema noted around the incisions nor purulent discharge. LUE with pitting edema, no steal syndrome.  Neuro: AAOX3, no neurosensory deficits      I&O's Detail    07 Jan 2019 07:01  -  08 Jan 2019 07:00  --------------------------------------------------------  IN:    IV PiggyBack: 100 mL    Oral Fluid: 240 mL    Other: 1100 mL  Total IN: 1440 mL    OUT:    Other: 2600 mL  Total OUT: 2600 mL    Total NET: -1160 mL      08 Jan 2019 07:01  -  08 Jan 2019 12:07  --------------------------------------------------------  IN:    Oral Fluid: 360 mL  Total IN: 360 mL    OUT:  Total OUT: 0 mL    Total NET: 360 mL          LABS:                        9.7    13.2  )-----------( 225      ( 08 Jan 2019 10:39 )             31.1     01-08    139  |  98  |  40.0<H>  ----------------------------<  145<H>  4.5   |  29.0  |  4.67<H>    Ca    7.5<L>      08 Jan 2019 10:39            RADIOLOGY & ADDITIONAL STUDIES:

## 2019-01-08 NOTE — DISCHARGE NOTE ADULT - PATIENT PORTAL LINK FT
You can access the [facility] Patient Portal, offered by [facility], by registering with the following website: [email or web address]

## 2019-01-08 NOTE — PROGRESS NOTE ADULT - SUBJECTIVE AND OBJECTIVE BOX
[facility]  INFECTIOUS DISEASES AND INTERNAL MEDICINE at [place]  =======================================================  Cricket Lara MD  Diplomates American Board of Internal Medicine and Infectious Diseases  =======================================================    KAUSHIK HOFFMAN [id number]    Recalled for pneumonia and Follow up of Permacath infection    Patient Developed HAP and was switched from Vancomycin to Zosyn on 1/3 by primary team. Sputum culture with Enterobacter cloacae  sensitive to zosyn and ceftriaxone. So was switched to Ceftriaxone on 1/7 by primary team, recalled ID today to evaluate antibiotic duration    No respiratory distress  no fever  no chills    Allergies:  No Known Allergies      Antibiotics:   Ceftriaxone      REVIEW OF SYSTEMS:  CONSTITUTIONAL:  No Fever or chills  HEENT:  No diplopia or blurred vision.  No earache, sore throat or runny nose.  CARDIOVASCULAR:  No pressure, squeezing, strangling, tightness, heaviness or aching about the chest, neck, axilla or epigastrium.  RESPIRATORY:  No cough, shortness of breath  GASTROINTESTINAL:  No nausea, vomiting or diarrhea.  GENITOURINARY:  No dysuria, frequency or urgency.  MUSCULOSKELETAL:  no joint aches, no muscle pain  SKIN:  LUE swelling  NEUROLOGIC:  No paresthesias, fasciculations  PSYCHIATRIC:  No disorder of thought or mood.  ENDOCRINE:  No heat or cold intolerance  HEMATOLOGICAL:  No easy bruising or bleeding.       Physical Exam:  Vital Signs Last 24 Hrs  T(C): 37 (08 Jan 2019 11:37), Max: 37.1 (07 Jan 2019 17:15)  T(F): 98.6 (08 Jan 2019 11:37), Max: 98.7 (07 Jan 2019 17:15)  HR: 62 (08 Jan 2019 11:37) (62 - 68)  BP: 121/56 (08 Jan 2019 11:37) (121/56 - 129/58)  RR: 18 (08 Jan 2019 11:37) (18 - 18)  SpO2: 93% (08 Jan 2019 09:00) (92% - 97%)      GEN: NAD  HEENT: normocephalic and atraumatic. EOMI. PERRL.  Anicteric   NECK: Supple.   LUNGS: Coarse BS B/L  HEART: Regular rate and rhythm   ABDOMEN: Soft, nontender, and nondistended.  Positive bowel sounds.    : No CVA tenderness  EXTREMITIES: Without any edema.  MSK: No joint swelling  NEUROLOGIC: No Focal Deficits  PSYCHIATRIC: Appropriate affect   SKIN: No Rash      Labs:  01-08    139  |  98  |  40.0<H>  ----------------------------<  145<H>  4.5   |  29.0  |  4.67<H>    Ca    7.5<L>      08 Jan 2019 10:39               9.7    13.2  )-----------( 225      ( 08 Jan 2019 10:39 )             31.1       RECENT CULTURES:  01-03 @ 21:44 .Sputum Enterobacter cloacae    Moderate Enterobacter cloacae  Moderate Candida albicans  Moderate Routine respiratory miguelina present  Few White blood cells  Few Gram Positive Rods  Few Yeast like cells      01-01 @ 15:02 .Urine     No growth      12-24 @ 18:42 .Catheter Coag Negative Staphylococcus    < 15 colonies Coag Negative Staphylococcus      12-22 @ 19:42 .Blood     No growth at 5 days.      12-22 @ 18:14 .Blood     No growth at 5 days.

## 2019-01-08 NOTE — DISCHARGE NOTE ADULT - PLAN OF CARE
Completed iv antibiotics for pneumonia and steroids  Duoneb nebulizer treatment every 4 hours as needed  Follow up with your PMD in 1-2 weeks Treated with iv antibiotics Continue home medications  Started on PO losartan  monitor blood pressure Resolution Treated with iv antibiotics  Elevated LUE with gentle ace wrapping until swelling resolves

## 2019-01-08 NOTE — DISCHARGE NOTE ADULT - COMMUNITY RESOURCES
[facility] SURGICAL SUPPLY FOR HOME OXYGEN [phone] [facility] SURGICAL SUPPLY FOR HOME OXYGEN [phone]    PULMONARY FOLLOW-UP APPOINTMENT:  Tuesday, January 15, 2019 at 2:00pm  Dr. Patrick Edmond   [address] [place] [address]  Phone: [phone] PULMONARY FOLLOW-UP APPOINTMENT:  Wednesday, January 16, 2019 at 8:30am  Dr. Patrick Edmond   [address] [facility], [place] [address]  Phone: [phone]

## 2019-01-08 NOTE — DISCHARGE NOTE ADULT - HOME CARE AGENCY
[facility] [phone] [facility] [phone] FOR RN, HHA, HOME PT.

## 2019-01-08 NOTE — PHYSICAL THERAPY INITIAL EVALUATION ADULT - GENERAL OBSERVATIONS, REHAB EVAL
Pt received in bed supine, agreeable to PT. Greek  used but pt spoke English. Denies pain. (+) NC O2 and SpO2 monitor.

## 2019-01-09 LAB
HCT VFR BLD CALC: 30.3 % — LOW (ref 42–52)
HGB BLD-MCNC: 9.4 G/DL — LOW (ref 14–18)
MCHC RBC-ENTMCNC: 28.7 PG — SIGNIFICANT CHANGE UP (ref 27–31)
MCHC RBC-ENTMCNC: 31 G/DL — LOW (ref 32–36)
MCV RBC AUTO: 92.4 FL — SIGNIFICANT CHANGE UP (ref 80–94)
PLATELET # BLD AUTO: 210 K/UL — SIGNIFICANT CHANGE UP (ref 150–400)
RBC # BLD: 3.28 M/UL — LOW (ref 4.6–6.2)
RBC # FLD: 15 % — SIGNIFICANT CHANGE UP (ref 11–15.6)
WBC # BLD: 10.1 K/UL — SIGNIFICANT CHANGE UP (ref 4.8–10.8)
WBC # FLD AUTO: 10.1 K/UL — SIGNIFICANT CHANGE UP (ref 4.8–10.8)

## 2019-01-09 PROCEDURE — 90937 HEMODIALYSIS REPEATED EVAL: CPT

## 2019-01-09 PROCEDURE — 99239 HOSP IP/OBS DSCHRG MGMT >30: CPT

## 2019-01-09 RX ORDER — LOSARTAN POTASSIUM 100 MG/1
1 TABLET, FILM COATED ORAL
Qty: 30 | Refills: 0
Start: 2019-01-09 | End: 2019-02-07

## 2019-01-09 RX ORDER — IPRATROPIUM/ALBUTEROL SULFATE 18-103MCG
3 AEROSOL WITH ADAPTER (GRAM) INHALATION
Qty: 300 | Refills: 0
Start: 2019-01-09 | End: 2019-02-07

## 2019-01-09 RX ADMIN — Medication 50 MILLIGRAM(S): at 17:41

## 2019-01-09 RX ADMIN — ATORVASTATIN CALCIUM 80 MILLIGRAM(S): 80 TABLET, FILM COATED ORAL at 22:03

## 2019-01-09 RX ADMIN — Medication 50 MILLIGRAM(S): at 06:14

## 2019-01-09 RX ADMIN — SODIUM CHLORIDE 3 MILLILITER(S): 9 INJECTION INTRAMUSCULAR; INTRAVENOUS; SUBCUTANEOUS at 17:36

## 2019-01-09 RX ADMIN — SODIUM CHLORIDE 3 MILLILITER(S): 9 INJECTION INTRAMUSCULAR; INTRAVENOUS; SUBCUTANEOUS at 06:29

## 2019-01-09 RX ADMIN — Medication 81 MILLIGRAM(S): at 17:36

## 2019-01-09 RX ADMIN — IRON SUCROSE 210 MILLIGRAM(S): 20 INJECTION, SOLUTION INTRAVENOUS at 18:32

## 2019-01-09 RX ADMIN — DULOXETINE HYDROCHLORIDE 20 MILLIGRAM(S): 30 CAPSULE, DELAYED RELEASE ORAL at 17:37

## 2019-01-09 RX ADMIN — SODIUM CHLORIDE 3 MILLILITER(S): 9 INJECTION INTRAMUSCULAR; INTRAVENOUS; SUBCUTANEOUS at 22:04

## 2019-01-09 RX ADMIN — CEFTRIAXONE 100 GRAM(S): 500 INJECTION, POWDER, FOR SOLUTION INTRAMUSCULAR; INTRAVENOUS at 06:14

## 2019-01-09 RX ADMIN — GABAPENTIN 100 MILLIGRAM(S): 400 CAPSULE ORAL at 17:37

## 2019-01-09 RX ADMIN — PANTOPRAZOLE SODIUM 40 MILLIGRAM(S): 20 TABLET, DELAYED RELEASE ORAL at 06:14

## 2019-01-09 RX ADMIN — Medication 50 MILLIGRAM(S): at 22:03

## 2019-01-09 RX ADMIN — Medication 100 MICROGRAM(S): at 06:14

## 2019-01-09 RX ADMIN — AMLODIPINE BESYLATE 10 MILLIGRAM(S): 2.5 TABLET ORAL at 06:14

## 2019-01-09 RX ADMIN — LOSARTAN POTASSIUM 50 MILLIGRAM(S): 100 TABLET, FILM COATED ORAL at 22:03

## 2019-01-09 RX ADMIN — CLOPIDOGREL BISULFATE 75 MILLIGRAM(S): 75 TABLET, FILM COATED ORAL at 17:36

## 2019-01-09 RX ADMIN — CHLORHEXIDINE GLUCONATE 1 APPLICATION(S): 213 SOLUTION TOPICAL at 12:00

## 2019-01-09 RX ADMIN — Medication 1 TABLET(S): at 17:37

## 2019-01-09 NOTE — PROGRESS NOTE ADULT - SUBJECTIVE AND OBJECTIVE BOX
[facility] DIVISION OF KIDNEY DISEASES AND HYPERTENSION -- FOLLOW UP NOTE  --------------------------------------------------------------------------------  Chief Complaint:  ESRD on HD    24 hour events/subjective:  Pt seen and examined  HD today  Hypoxia - on supplemental O2  Hemoptysis        PAST HISTORY  --------------------------------------------------------------------------------  No significant changes to PMH, PSH, FHx, SHx, unless otherwise noted    ALLERGIES & MEDICATIONS  --------------------------------------------------------------------------------  Allergies    No Known Allergies    Intolerances      Standing Inpatient Medications  amLODIPine   Tablet 10 milliGRAM(s) Oral daily  aspirin enteric coated 81 milliGRAM(s) Oral daily  atorvastatin 80 milliGRAM(s) Oral at bedtime  chlorhexidine 2% Cloths 1 Application(s) Topical daily  clopidogrel Tablet 75 milliGRAM(s) Oral daily  DULoxetine 20 milliGRAM(s) Oral daily  gabapentin 100 milliGRAM(s) Oral daily  hydrALAZINE 50 milliGRAM(s) Oral every 8 hours  iron sucrose IVPB 100 milliGRAM(s) IV Intermittent <User Schedule>  isosorbide   mononitrate ER Tablet (IMDUR) 30 milliGRAM(s) Oral daily  levothyroxine 100 MICROGram(s) Oral daily  losartan 50 milliGRAM(s) Oral at bedtime  metoprolol tartrate 50 milliGRAM(s) Oral two times a day  Nephro-jeana 1 Tablet(s) Oral daily  pantoprazole    Tablet 40 milliGRAM(s) Oral before breakfast  sodium chloride 0.9% lock flush 3 milliLiter(s) IV Push every 8 hours    PRN Inpatient Medications  ALPRAZolam 0.25 milliGRAM(s) Oral two times a day PRN  artificial  tears Solution 1 Drop(s) Both EYES four times a day PRN  benzonatate 100 milliGRAM(s) Oral every 8 hours PRN  guaiFENesin    Syrup 200 milliGRAM(s) Oral every 6 hours PRN  ondansetron Injectable 4 milliGRAM(s) IV Push every 6 hours PRN      REVIEW OF SYSTEMS  --------------------------------------------------------------------------------  Gen: No weight changes, fatigue, fevers/chills, weakness  Skin: No rashes  Head/Eyes/Ears/Mouth: No headache; Normal hearing; Normal vision w/o blurriness; No sinus pain/discomfort, sore throat  Respiratory: No dyspnea, cough, wheezing, hemoptysis  CV: No chest pain, PND, orthopnea  GI: No abdominal pain, diarrhea, constipation, nausea, vomiting, melena, hematochezia  : No increased frequency, dysuria, hematuria, nocturia  MSK: No joint pain/swelling; no back pain; no edema  Neuro: No dizziness/lightheadedness, weakness, seizures, numbness, tingling  Heme: No easy bruising or bleeding  Endo: No heat/cold intolerance  Psych: No significant nervousness, anxiety, stress, depression    All other systems were reviewed and are negative, except as noted.    VITALS/PHYSICAL EXAM  --------------------------------------------------------------------------------  T(C): 36.9 (01-09-19 @ 12:06), Max: 37 (01-08-19 @ 21:06)  HR: 57 (01-09-19 @ 12:06) (57 - 69)  BP: 133/61 (01-09-19 @ 12:06) (114/53 - 146/63)  RR: 18 (01-09-19 @ 12:06) (18 - 18)  SpO2: 95% (01-09-19 @ 05:13) (95% - 95%)  Wt(kg): --        01-08-19 @ 07:01  -  01-09-19 @ 07:00  --------------------------------------------------------  IN: 360 mL / OUT: 0 mL / NET: 360 mL      Physical Exam:  	Gen: NAD, well-appearing  	HEENT: PERRL, supple neck, clear oropharynx  	Pulm: CTA B/L  	CV: RRR, S1S2; no rub  	Back: No spinal or CVA tenderness; no sacral edema  	Abd: +BS, soft, nontender/nondistended  	: No suprapubic tenderness  	UE: Warm, FROM, no clubbing, intact strength; no edema; no asterixis; LUE swelling and pain  	LE: Warm, FROM, no clubbing, intact strength; no edema  	Neuro: No focal deficits, intact gait  	Psych: Normal affect and mood  	Skin: Warm, without rashes  	Vascular access: CVC, AVG    LABS/STUDIES  --------------------------------------------------------------------------------              9.4    10.1  >-----------<  210      [01-09-19 @ 08:56]              30.3     139  |  98  |  40.0  ----------------------------<  145      [01-08-19 @ 10:39]  4.5   |  29.0  |  4.67        Ca     7.5     [01-08-19 @ 10:39]            Creatinine Trend:  SCr 4.67 [01-08 @ 10:39]  SCr 6.42 [01-07 @ 09:13]  SCr 4.38 [01-03 @ 08:16]  SCr 4.73 [12-31 @ 08:15]  SCr 5.51 [12-30 @ 08:28]    Urinalysis - [01-01-19 @ 15:02]      Color Red / Appearance Bloody / SG 1.015 / pH 6.0      Gluc Negative / Ketone Negative  / Bili Negative / Urobili Negative       Blood Large / Protein 500 / Leuk Est Trace / Nitrite Negative      RBC TNTC / WBC 3-5 / Hyaline  / Gran  / Sq Epi  / Non Sq Epi Negative / Bacteria Occasional      Iron 46, TIBC 259, %sat 18      [01-01-19 @ 09:11]  Ferritin 470      [01-01-19 @ 09:11]  TSH 7.33      [11-29-18 @ 02:31]    HBsAb <3.0      [01-08-19 @ 17:03]  HBsAg Nonreact      [01-08-19 @ 17:03]  HCV 0.12, Nonreact      [01-08-19 @ 17:03]

## 2019-01-09 NOTE — PROGRESS NOTE ADULT - SUBJECTIVE AND OBJECTIVE BOX
CC: Follow up for pna    INTERVAL HPI/OVERNIGHT EVENTS:  Patient seen and examined, laying comfortably. Small amount hemoptysis in cup at bedside, improving. Denies sob or chest pain>Afebrile.     Vital Signs Last 24 Hrs  T(C): 36.6 (09 Jan 2019 05:13), Max: 37 (08 Jan 2019 11:37)  T(F): 97.8 (09 Jan 2019 05:13), Max: 98.6 (08 Jan 2019 11:37)  HR: 65 (09 Jan 2019 05:13) (62 - 69)  BP: 146/63 (09 Jan 2019 05:13) (114/53 - 146/63)  BP(mean): --  RR: 18 (09 Jan 2019 05:13) (18 - 18)  SpO2: 95% (09 Jan 2019 05:13) (95% - 95%)    PHYSICAL EXAM:    GENERAL: NAD, AOX3  HEAD:  Atraumatic, Normocephalic  EYES: EOMI, PERRLA, conjunctiva and sclera clear  ENMT: Moist mucous membranes  CHEST/LUNG: Bilateral rhonchi   HEART: Regular rate and rhythm; No murmurs, rubs, or gallops  ABDOMEN: Soft, Nontender, Nondistended; Bowel sounds present  EXTREMITIES:  2+ Peripheral Pulses, No clubbing, cyanosis, or edema  LUE edema above AVF site        MEDICATIONS  (STANDING):  amLODIPine   Tablet 10 milliGRAM(s) Oral daily  aspirin enteric coated 81 milliGRAM(s) Oral daily  atorvastatin 80 milliGRAM(s) Oral at bedtime  chlorhexidine 2% Cloths 1 Application(s) Topical daily  clopidogrel Tablet 75 milliGRAM(s) Oral daily  DULoxetine 20 milliGRAM(s) Oral daily  gabapentin 100 milliGRAM(s) Oral daily  hydrALAZINE 50 milliGRAM(s) Oral every 8 hours  iron sucrose IVPB 100 milliGRAM(s) IV Intermittent <User Schedule>  isosorbide   mononitrate ER Tablet (IMDUR) 30 milliGRAM(s) Oral daily  levothyroxine 100 MICROGram(s) Oral daily  losartan 50 milliGRAM(s) Oral at bedtime  metoprolol tartrate 50 milliGRAM(s) Oral two times a day  Nephro-jeana 1 Tablet(s) Oral daily  pantoprazole    Tablet 40 milliGRAM(s) Oral before breakfast  sodium chloride 0.9% lock flush 3 milliLiter(s) IV Push every 8 hours    MEDICATIONS  (PRN):  ALPRAZolam 0.25 milliGRAM(s) Oral two times a day PRN anxiety  artificial  tears Solution 1 Drop(s) Both EYES four times a day PRN Dry Eyes  benzonatate 100 milliGRAM(s) Oral every 8 hours PRN Cough  guaiFENesin    Syrup 200 milliGRAM(s) Oral every 6 hours PRN Cough  ondansetron Injectable 4 milliGRAM(s) IV Push every 6 hours PRN Nausea      Allergies    No Known Allergies    Intolerances          LABS:                          9.4    10.1  )-----------( 210      ( 09 Jan 2019 08:56 )             30.3     01-08    139  |  98  |  40.0<H>  ----------------------------<  145<H>  4.5   |  29.0  |  4.67<H>    Ca    7.5<L>      08 Jan 2019 10:39            RADIOLOGY & ADDITIONAL TESTS:

## 2019-01-09 NOTE — PROGRESS NOTE ADULT - SUBJECTIVE AND OBJECTIVE BOX
139    |  98     |  40.0<H>  ----------------------------<  145<H>  Ca:7.5<L> (2019 10:39)  4.5     |  29.0   |  4.67<H>                       9.4<L>  10.1  )-----------( 210      ( 2019 08:56 )             30.3<L>    Phos: 4.5 mg/dL M.1 mg/dL PTH:-- Uric acid:-- Serum Osm:--  Ferritin:-- Iron:-- TIBC:-- Tsat:--  B12:-- TSH:-- ( @ 08:16)      Patient was seen and evaluated on dialysis.   Patient is tolerating the procedure well.   T(C): 36.9 (19 @ 12:21), Max: 37 (19 @ 21:06)  HR: 65 (19 @ 12:21) (57 - 69)  BP: 103/56 (19 @ 12:21) (103/56 - 146/63)  Continue dialysis: in AM,  Dialyzer: Revaclear 300         QB: 400 ml.,      QD: 600ml.,  Goal UF  3 L  over  3,5  Hours     UF Maximized, SaO2 Improved,     Dietary Non Compliance is a factor,    HD again in AM,

## 2019-01-09 NOTE — PROGRESS NOTE ADULT - ASSESSMENT
79 y/o male with PMH ESRD on Hd, HTN, Pulm HTN, Lung [place]), CAD s/p PCI who presented to [facility] with infection of recently placed RIJ PermCath for HD. Pt was in consult with vascular and nephrology. Infected RIJ PermCath was removed, blood cultures negative and PermCath tip cultures negative. Started on Iv abx post HD to be continued until 1/7 as per ID. A right femoral Shiley was placed for immediate HD during hospital stay. On 1/3,  L AV graft and RIJ PermCath were placed and R femoral Shiley removed by vascular. Hospital course complicated by hematuria/scrotal swelling and hemoptysis. Consulted by urology, urine cytology studies ordered, and recommended cysto/b/l retrograde pyelograms when stable as outpatient. Also seen in consultation with CTSx for hemoptysis, repeat CT chest completed, positive for PNA.     Assessment/Plan:    1. ESRD (end stage renal disease)  - On 1/3, L AV graft and RIJ PermCath were placed and R femoral shiley removed by vascular; asked vascular surgery to come back to assess swelling in area of graft placement  - US ordered- no DVT. Vascular surgery called to follow up; recommend LUE elevation   - HD today     2. HCAP secondary to enterobacter with hemoptysis - likely 2/2 enterobacter  Spoke with ID, completed course of antibiotics and steroids       3. Right groin pain - resolved  - CT of ab/pelvis which showed stable hematoma from previous exam  - Right femoral shiley was removed by vascular  - US of the scrotum showed thickening of the scrotum but no significant findings on exam  - Urology consulted for scrotoal swelling/hematuria, urine cytology studies ordered, and recommended cysto/b/l retrograde pyelograms when stable as outpatient    4. Hematuria  - urology consult appreciated, urine cytology studies ordered, and recommended cysto/b/l retrorade pyelograms when stable as outpatient    5. Acute hypoxic resp failure / likely sec to pneumonia with volume overload as well; IMproved  Complete PO steroids     6. Hypertension,   - Cont. o/p regimen, renal/low sodium diet.     7. Anemia / Chronic blood loss anemia   - 1 unit prbc ordered 12/31; H/H improved  - Continue with PPI  - Secondary to ESRD  - Stable, monitor    8. Coronary artery disease involving native coronary artery of native heart without angina pectoris  - No CP, cont. o/p regimen.    9. Radiation induced pulmonary fibrosis: Will need home oxygen on discharge  SPo2 room air at rest of 85% improved to 92% via 2 liters nasal cannula     9. HLD - statin     Plan discussed with patient, family at bedside,RN and cm     PT evaluation- home with home pt  Discharge home after hd today

## 2019-01-10 LAB
HCT VFR BLD CALC: 31.5 % — LOW (ref 42–52)
HGB BLD-MCNC: 9.5 G/DL — LOW (ref 14–18)
LACTATE SERPL-SCNC: 1.7 MMOL/L — SIGNIFICANT CHANGE UP (ref 0.5–2)
MCHC RBC-ENTMCNC: 28.4 PG — SIGNIFICANT CHANGE UP (ref 27–31)
MCHC RBC-ENTMCNC: 30.2 G/DL — LOW (ref 32–36)
MCV RBC AUTO: 94 FL — SIGNIFICANT CHANGE UP (ref 80–94)
PLATELET # BLD AUTO: 164 K/UL — SIGNIFICANT CHANGE UP (ref 150–400)
PROCALCITONIN SERPL-MCNC: 0.27 NG/ML — HIGH (ref 0–0.04)
RBC # BLD: 3.35 M/UL — LOW (ref 4.6–6.2)
RBC # FLD: 14.5 % — SIGNIFICANT CHANGE UP (ref 11–15.6)
WBC # BLD: 11.8 K/UL — HIGH (ref 4.8–10.8)
WBC # FLD AUTO: 11.8 K/UL — HIGH (ref 4.8–10.8)

## 2019-01-10 PROCEDURE — 90937 HEMODIALYSIS REPEATED EVAL: CPT

## 2019-01-10 PROCEDURE — 99232 SBSQ HOSP IP/OBS MODERATE 35: CPT

## 2019-01-10 RX ORDER — CALCIUM ACETATE 667 MG
1334 TABLET ORAL
Refills: 0 | Status: DISCONTINUED | OUTPATIENT
Start: 2019-01-10 | End: 2019-01-11

## 2019-01-10 RX ORDER — ACETAMINOPHEN 500 MG
650 TABLET ORAL EVERY 6 HOURS
Refills: 0 | Status: DISCONTINUED | OUTPATIENT
Start: 2019-01-10 | End: 2019-01-11

## 2019-01-10 RX ADMIN — LOSARTAN POTASSIUM 50 MILLIGRAM(S): 100 TABLET, FILM COATED ORAL at 22:10

## 2019-01-10 RX ADMIN — CHLORHEXIDINE GLUCONATE 1 APPLICATION(S): 213 SOLUTION TOPICAL at 13:57

## 2019-01-10 RX ADMIN — Medication 50 MILLIGRAM(S): at 17:56

## 2019-01-10 RX ADMIN — Medication 81 MILLIGRAM(S): at 13:57

## 2019-01-10 RX ADMIN — AMLODIPINE BESYLATE 10 MILLIGRAM(S): 2.5 TABLET ORAL at 05:17

## 2019-01-10 RX ADMIN — Medication 1334 MILLIGRAM(S): at 17:56

## 2019-01-10 RX ADMIN — PANTOPRAZOLE SODIUM 40 MILLIGRAM(S): 20 TABLET, DELAYED RELEASE ORAL at 05:17

## 2019-01-10 RX ADMIN — Medication 100 MICROGRAM(S): at 05:17

## 2019-01-10 RX ADMIN — ATORVASTATIN CALCIUM 80 MILLIGRAM(S): 80 TABLET, FILM COATED ORAL at 22:10

## 2019-01-10 RX ADMIN — DULOXETINE HYDROCHLORIDE 20 MILLIGRAM(S): 30 CAPSULE, DELAYED RELEASE ORAL at 13:57

## 2019-01-10 RX ADMIN — Medication 50 MILLIGRAM(S): at 22:10

## 2019-01-10 RX ADMIN — SODIUM CHLORIDE 3 MILLILITER(S): 9 INJECTION INTRAMUSCULAR; INTRAVENOUS; SUBCUTANEOUS at 17:53

## 2019-01-10 RX ADMIN — Medication 50 MILLIGRAM(S): at 05:17

## 2019-01-10 RX ADMIN — Medication 1 TABLET(S): at 13:57

## 2019-01-10 RX ADMIN — CLOPIDOGREL BISULFATE 75 MILLIGRAM(S): 75 TABLET, FILM COATED ORAL at 13:57

## 2019-01-10 RX ADMIN — ISOSORBIDE MONONITRATE 30 MILLIGRAM(S): 60 TABLET, EXTENDED RELEASE ORAL at 13:57

## 2019-01-10 RX ADMIN — GABAPENTIN 100 MILLIGRAM(S): 400 CAPSULE ORAL at 13:57

## 2019-01-10 RX ADMIN — SODIUM CHLORIDE 3 MILLILITER(S): 9 INJECTION INTRAMUSCULAR; INTRAVENOUS; SUBCUTANEOUS at 05:19

## 2019-01-10 RX ADMIN — SODIUM CHLORIDE 3 MILLILITER(S): 9 INJECTION INTRAMUSCULAR; INTRAVENOUS; SUBCUTANEOUS at 22:11

## 2019-01-10 NOTE — PROGRESS NOTE ADULT - ASSESSMENT
79 y/o male with PMH ESRD on Hd, HTN, Pulm HTN, [place] CAD s/p PCI who presented to [facility] with infection of recently placed RIJ PermCath for HD. Pt was in consult with vascular and nephrology. Infected RIJ PermCath was removed, blood cultures negative and PermCath tip cultures negative. Started on Iv abx post HD to be continued until 1/7 as per ID. A right femoral Shiley was placed for immediate HD during hospital stay. On 1/3,  L AV graft and RIJ PermCath were placed and R femoral Shiley removed by vascular. Hospital course complicated by hematuria/scrotal swelling and hemoptysis. Consulted by urology, urine cytology studies ordered, and recommended cysto/b/l retrograde pyelograms when stable as outpatient. Also seen in consultation with CTSx for hemoptysis, repeat CT chest completed, positive for PNA.     Assessment/Plan:    1. ESRD (end stage renal disease)- TTS  - On 1/3, L AV graft and RIJ PermCath were placed and R femoral shiley removed by vascular; asked vascular surgery to come back to assess swelling in area of graft placement  - US ordered- no DVT. Vascular surgery called to follow up; recommend LUE elevation   - HD today     2. HCAP secondary to enterobacter with hemoptysis - likely 2/2 enterobacter  Spoke with ID, completed course of antibiotics and steroids       3. Right groin pain - resolved  - CT of ab/pelvis which showed stable hematoma from previous exam  - Right femoral shiley was removed by vascular  - US of the scrotum showed thickening of the scrotum but no significant findings on exam  - Urology consulted for scrotoal swelling/hematuria, urine cytology studies ordered, and recommended cysto/b/l retrograde pyelograms when stable as outpatient    4. Hematuria  - urology consult appreciated, urine cytology studies ordered, and recommended cysto/b/l retrorade pyelograms when stable as outpatient    5. Acute hypoxic resp failure / likely sec to pneumonia with volume overload as well; IMproved  Complete PO steroids     6. Hypertension,   - Cont. o/p regimen, renal/low sodium diet.     7. Anemia / Chronic blood loss anemia   - 1 unit prbc ordered 12/31; H/H improved  - Continue with PPI  - Secondary to ESRD  - Stable, monitor    8. Coronary artery disease involving native coronary artery of native heart without angina pectoris  - No CP, cont. o/p regimen.    9. Radiation induced pulmonary fibrosis: Will need home oxygen on discharge  SPo2 room air at rest of 85% improved to 92% via 2 liters nasal cannula     9. HLD - statin     Plan discussed with patient, family at bedside,RN and cm     PT evaluation- home with home pt  Discharge home after hd today

## 2019-01-10 NOTE — PROGRESS NOTE ADULT - SUBJECTIVE AND OBJECTIVE BOX
[facility] DIVISION OF KIDNEY DISEASES AND HYPERTENSION -- HEMODIALYSIS NOTE  --------------------------------------------------------------------------------  Chief Complaint: ESRD/Ongoing hemodialysis requirement    PAST HISTORY  --------------------------------------------------------------------------------  No significant changes to PMH, PSH, FHx, SHx, unless otherwise noted    ALLERGIES & MEDICATIONS  --------------------------------------------------------------------------------  Allergies    No Known Allergies    Standing Inpatient Medications  amLODIPine   Tablet 10 milliGRAM(s) Oral daily  aspirin enteric coated 81 milliGRAM(s) Oral daily  atorvastatin 80 milliGRAM(s) Oral at bedtime  calcium acetate 1334 milliGRAM(s) Oral three times a day with meals  chlorhexidine 2% Cloths 1 Application(s) Topical daily  clopidogrel Tablet 75 milliGRAM(s) Oral daily  DULoxetine 20 milliGRAM(s) Oral daily  gabapentin 100 milliGRAM(s) Oral daily  hydrALAZINE 50 milliGRAM(s) Oral every 8 hours  iron sucrose IVPB 100 milliGRAM(s) IV Intermittent <User Schedule>  isosorbide   mononitrate ER Tablet (IMDUR) 30 milliGRAM(s) Oral daily  levothyroxine 100 MICROGram(s) Oral daily  losartan 50 milliGRAM(s) Oral at bedtime  metoprolol tartrate 50 milliGRAM(s) Oral two times a day  Nephro-jeana 1 Tablet(s) Oral daily  pantoprazole    Tablet 40 milliGRAM(s) Oral before breakfast  sodium chloride 0.9% lock flush 3 milliLiter(s) IV Push every 8 hours    PRN Inpatient Medications  acetaminophen   Tablet .. 650 milliGRAM(s) Oral every 6 hours PRN  ALPRAZolam 0.25 milliGRAM(s) Oral two times a day PRN  artificial  tears Solution 1 Drop(s) Both EYES four times a day PRN  benzonatate 100 milliGRAM(s) Oral every 8 hours PRN  guaiFENesin    Syrup 200 milliGRAM(s) Oral every 6 hours PRN  ondansetron Injectable 4 milliGRAM(s) IV Push every 6 hours PRN    REVIEW OF SYSTEMS  --------------------------------------------------------------------------------  Gen: + weight changes, fatigue,  No fevers/chills, weakness  Skin: No rashes  Head/Eyes/Ears/Mouth: No headache; Normal hearing; Normal vision w/o blurriness; No sinus pain/discomfort, sore throat  Respiratory: Less  dyspnea,  No cough, wheezing, hemoptysis  CV: No chest pain, PND, orthopnea  GI: No abdominal pain, diarrhea, constipation, nausea, vomiting, melena, hematochezia  : No increased frequency, dysuria,  + hematuria,  No nocturia  MSK: No joint pain/swelling; no back pain; no edema  Neuro: No dizziness/lightheadedness, weakness, seizures, numbness, tingling  Heme: No easy bruising or bleeding  Endo: No heat/cold intolerance  Psych: No significant nervousness, anxiety, stress, depression    All other systems were reviewed and are negative, except as noted.    VITALS/PHYSICAL EXAM  --------------------------------------------------------------------------------  T(C): 37.3 (01-10-19 @ 13:55), Max: 38.2 (01-10-19 @ 11:40)  HR: 66 (01-10-19 @ 13:55) (60 - 73)  BP: 117/54 (01-10-19 @ 13:55) (109/39 - 149/70)  RR: 18 (01-10-19 @ 13:55) (18 - 19)  SpO2: 94% (01-10-19 @ 11:37) (94% - 100%)    01-09-19 @ 07:01  -  01-10-19 @ 07:00  --------------------------------------------------------  IN: 0 mL / OUT: 3100 mL / NET: -3100 mL    01-10-19 @ 07:01  -  01-10-19 @ 15:09  --------------------------------------------------------  IN: 360 mL / OUT: 2000 mL / NET: -1640 mL    Physical Exam:  	Gen: NAD, Pale, ill-appearing  	HEENT: PERRL, supple neck, c  	Pulm: Rhonchi,  	CV: RRR, S1S2; no rub  	Back: No spinal or CVA tenderness; no sacral edema  	Abd: +BS, soft, nontender/nondistended  	: No suprapubic tenderness  	UE: Warm, FROM, no clubbing, intact strength; no edema; no asterixis  	LE: Warm, FROM, no clubbing, intact strength; no edema  	Neuro: No focal deficits,   	Psych: Normal affect and mood  	Skin: Warm, without rashes  	Vascular access:  AVF    LABS/STUDIES  --------------------------------------------------------------------------------              9.4    10.1  >-----------<  210      [01-09-19 @ 08:56]              30.3     Iron 46, TIBC 259, %sat 18      [01-01-19 @ 09:11]  Ferritin 470      [01-01-19 @ 09:11]  TSH 7.33      [11-29-18 @ 02:31]    HBsAb <3.0      [01-08-19 @ 17:03]  HBsAg Nonreact      [01-08-19 @ 17:03]  HCV 0.12, Nonreact      [01-08-19 @ 17:03]

## 2019-01-10 NOTE — PROGRESS NOTE ADULT - ASSESSMENT
1. ESRD (end stage renal disease)- TTS  - On 1/3, L AV graft and RIJ  CVC  were placed and R FVC  ,   - US ordered- no DVT.   - HD today     2. HCAP secondary to enterobacter with hemoptysis - 2/2 enterobacter  completed course of antibiotics and steroids       3. Hematuria, urine cytology studies ordered, and recommended cysto/b/l retrograde pyelograms when stable as OP,    4. Acute hypoxic resp failure / likely sec to pneumonia with volume overload as well; IMproved  Complete PO steroids     5. Coronary artery disease involving native coronary artery of native heart without angina pectoris  - No CP, cont. o/p regimen.    6. Radiation induced pulmonary fibrosis: Will need home oxygen on discharge  SPo2 room air at rest of 85% improved to 92% via 2 liters nasal cannula         Discharge home after HD ,     Will F/U OP HD TIW,

## 2019-01-10 NOTE — PROGRESS NOTE ADULT - SUBJECTIVE AND OBJECTIVE BOX
CC: Follow up sob    INTERVAL HPI/OVERNIGHT EVENTS: Patient seen and examined during hd this morning. sob improving. no acute events overnight.       Vital Signs Last 24 Hrs  T(C): 37.5 (10 Florentino 2019 10:30), Max: 37.5 (10 Florentino 2019 10:30)  T(F): 99.5 (10 Florentino 2019 10:30), Max: 99.5 (10 Florentino 2019 10:30)  HR: 61 (10 Florentino 2019 10:30) (57 - 71)  BP: 125/70 (10 Florentino 2019 10:30) (103/56 - 149/70)  BP(mean): --  RR: 18 (10 Florentino 2019 10:30) (18 - 19)  SpO2: 100% (10 Florentino 2019 10:30) (95% - 100%)    PHYSICAL EXAM:    GENERAL: NAD, AOX3  CHEST/LUNG: Clear to auscultation bilaterally; No rales, rhonchi, wheezing, or rubs  HEART: Regular rate and rhythm; No murmurs, rubs, or gallops  ABDOMEN: Soft, Nontender, Nondistended; Bowel sounds present  EXTREMITIES:  2+ Peripheral Pulses, No clubbing, cyanosis, or edema        MEDICATIONS  (STANDING):  amLODIPine   Tablet 10 milliGRAM(s) Oral daily  aspirin enteric coated 81 milliGRAM(s) Oral daily  atorvastatin 80 milliGRAM(s) Oral at bedtime  chlorhexidine 2% Cloths 1 Application(s) Topical daily  clopidogrel Tablet 75 milliGRAM(s) Oral daily  DULoxetine 20 milliGRAM(s) Oral daily  gabapentin 100 milliGRAM(s) Oral daily  hydrALAZINE 50 milliGRAM(s) Oral every 8 hours  iron sucrose IVPB 100 milliGRAM(s) IV Intermittent <User Schedule>  isosorbide   mononitrate ER Tablet (IMDUR) 30 milliGRAM(s) Oral daily  levothyroxine 100 MICROGram(s) Oral daily  losartan 50 milliGRAM(s) Oral at bedtime  metoprolol tartrate 50 milliGRAM(s) Oral two times a day  Nephro-jeana 1 Tablet(s) Oral daily  pantoprazole    Tablet 40 milliGRAM(s) Oral before breakfast  sodium chloride 0.9% lock flush 3 milliLiter(s) IV Push every 8 hours    MEDICATIONS  (PRN):  ALPRAZolam 0.25 milliGRAM(s) Oral two times a day PRN anxiety  artificial  tears Solution 1 Drop(s) Both EYES four times a day PRN Dry Eyes  benzonatate 100 milliGRAM(s) Oral every 8 hours PRN Cough  guaiFENesin    Syrup 200 milliGRAM(s) Oral every 6 hours PRN Cough  ondansetron Injectable 4 milliGRAM(s) IV Push every 6 hours PRN Nausea      Allergies    No Known Allergies    Intolerances          LABS:                          9.4    10.1  )-----------( 210      ( 09 Jan 2019 08:56 )             30.3                 RADIOLOGY & ADDITIONAL TESTS:

## 2019-01-11 VITALS
SYSTOLIC BLOOD PRESSURE: 114 MMHG | TEMPERATURE: 99 F | RESPIRATION RATE: 18 BRPM | HEART RATE: 64 BPM | DIASTOLIC BLOOD PRESSURE: 55 MMHG

## 2019-01-11 LAB
HCT VFR BLD CALC: 30.6 % — LOW (ref 42–52)
HGB BLD-MCNC: 9.6 G/DL — LOW (ref 14–18)
MCHC RBC-ENTMCNC: 29.4 PG — SIGNIFICANT CHANGE UP (ref 27–31)
MCHC RBC-ENTMCNC: 31.4 G/DL — LOW (ref 32–36)
MCV RBC AUTO: 93.6 FL — SIGNIFICANT CHANGE UP (ref 80–94)
PLATELET # BLD AUTO: 163 K/UL — SIGNIFICANT CHANGE UP (ref 150–400)
RBC # BLD: 3.27 M/UL — LOW (ref 4.6–6.2)
RBC # FLD: 14.5 % — SIGNIFICANT CHANGE UP (ref 11–15.6)
WBC # BLD: 8.6 K/UL — SIGNIFICANT CHANGE UP (ref 4.8–10.8)
WBC # FLD AUTO: 8.6 K/UL — SIGNIFICANT CHANGE UP (ref 4.8–10.8)

## 2019-01-11 PROCEDURE — 81001 URINALYSIS AUTO W/SCOPE: CPT

## 2019-01-11 PROCEDURE — 99285 EMERGENCY DEPT VISIT HI MDM: CPT | Mod: 25

## 2019-01-11 PROCEDURE — 71045 X-RAY EXAM CHEST 1 VIEW: CPT

## 2019-01-11 PROCEDURE — 83605 ASSAY OF LACTIC ACID: CPT

## 2019-01-11 PROCEDURE — C1769: CPT

## 2019-01-11 PROCEDURE — 99232 SBSQ HOSP IP/OBS MODERATE 35: CPT

## 2019-01-11 PROCEDURE — 36556 INSERT NON-TUNNEL CV CATH: CPT

## 2019-01-11 PROCEDURE — 94640 AIRWAY INHALATION TREATMENT: CPT

## 2019-01-11 PROCEDURE — 74176 CT ABD & PELVIS W/O CONTRAST: CPT

## 2019-01-11 PROCEDURE — 87040 BLOOD CULTURE FOR BACTERIA: CPT

## 2019-01-11 PROCEDURE — 94660 CPAP INITIATION&MGMT: CPT

## 2019-01-11 PROCEDURE — 87340 HEPATITIS B SURFACE AG IA: CPT

## 2019-01-11 PROCEDURE — 93005 ELECTROCARDIOGRAM TRACING: CPT

## 2019-01-11 PROCEDURE — 76937 US GUIDE VASCULAR ACCESS: CPT

## 2019-01-11 PROCEDURE — 82746 ASSAY OF FOLIC ACID SERUM: CPT

## 2019-01-11 PROCEDURE — P9016: CPT

## 2019-01-11 PROCEDURE — 84484 ASSAY OF TROPONIN QUANT: CPT

## 2019-01-11 PROCEDURE — 99261: CPT

## 2019-01-11 PROCEDURE — C1768: CPT

## 2019-01-11 PROCEDURE — 99233 SBSQ HOSP IP/OBS HIGH 50: CPT

## 2019-01-11 PROCEDURE — 83880 ASSAY OF NATRIURETIC PEPTIDE: CPT

## 2019-01-11 PROCEDURE — 80048 BASIC METABOLIC PNL TOTAL CA: CPT

## 2019-01-11 PROCEDURE — 86901 BLOOD TYPING SEROLOGIC RH(D): CPT

## 2019-01-11 PROCEDURE — 86900 BLOOD TYPING SEROLOGIC ABO: CPT

## 2019-01-11 PROCEDURE — 87186 SC STD MICRODIL/AGAR DIL: CPT

## 2019-01-11 PROCEDURE — 97530 THERAPEUTIC ACTIVITIES: CPT

## 2019-01-11 PROCEDURE — 97116 GAIT TRAINING THERAPY: CPT

## 2019-01-11 PROCEDURE — 36430 TRANSFUSION BLD/BLD COMPNT: CPT

## 2019-01-11 PROCEDURE — 83735 ASSAY OF MAGNESIUM: CPT

## 2019-01-11 PROCEDURE — 87086 URINE CULTURE/COLONY COUNT: CPT

## 2019-01-11 PROCEDURE — 76870 US EXAM SCROTUM: CPT

## 2019-01-11 PROCEDURE — 84466 ASSAY OF TRANSFERRIN: CPT

## 2019-01-11 PROCEDURE — 85610 PROTHROMBIN TIME: CPT

## 2019-01-11 PROCEDURE — 83550 IRON BINDING TEST: CPT

## 2019-01-11 PROCEDURE — 82728 ASSAY OF FERRITIN: CPT

## 2019-01-11 PROCEDURE — 36600 WITHDRAWAL OF ARTERIAL BLOOD: CPT

## 2019-01-11 PROCEDURE — 86850 RBC ANTIBODY SCREEN: CPT

## 2019-01-11 PROCEDURE — 84145 PROCALCITONIN (PCT): CPT

## 2019-01-11 PROCEDURE — 82607 VITAMIN B-12: CPT

## 2019-01-11 PROCEDURE — 86706 HEP B SURFACE ANTIBODY: CPT

## 2019-01-11 PROCEDURE — 87070 CULTURE OTHR SPECIMN AEROBIC: CPT

## 2019-01-11 PROCEDURE — 80053 COMPREHEN METABOLIC PANEL: CPT

## 2019-01-11 PROCEDURE — 93971 EXTREMITY STUDY: CPT

## 2019-01-11 PROCEDURE — 82803 BLOOD GASES ANY COMBINATION: CPT

## 2019-01-11 PROCEDURE — 80202 ASSAY OF VANCOMYCIN: CPT

## 2019-01-11 PROCEDURE — 86923 COMPATIBILITY TEST ELECTRIC: CPT

## 2019-01-11 PROCEDURE — C1750: CPT

## 2019-01-11 PROCEDURE — 94760 N-INVAS EAR/PLS OXIMETRY 1: CPT

## 2019-01-11 PROCEDURE — 36415 COLL VENOUS BLD VENIPUNCTURE: CPT

## 2019-01-11 PROCEDURE — 84100 ASSAY OF PHOSPHORUS: CPT

## 2019-01-11 PROCEDURE — 86803 HEPATITIS C AB TEST: CPT

## 2019-01-11 PROCEDURE — 85027 COMPLETE CBC AUTOMATED: CPT

## 2019-01-11 PROCEDURE — 83540 ASSAY OF IRON: CPT

## 2019-01-11 PROCEDURE — 76000 FLUOROSCOPY <1 HR PHYS/QHP: CPT

## 2019-01-11 PROCEDURE — 85730 THROMBOPLASTIN TIME PARTIAL: CPT

## 2019-01-11 PROCEDURE — 71250 CT THORAX DX C-: CPT

## 2019-01-11 RX ADMIN — Medication 50 MILLIGRAM(S): at 13:39

## 2019-01-11 RX ADMIN — CLOPIDOGREL BISULFATE 75 MILLIGRAM(S): 75 TABLET, FILM COATED ORAL at 13:39

## 2019-01-11 RX ADMIN — SODIUM CHLORIDE 3 MILLILITER(S): 9 INJECTION INTRAMUSCULAR; INTRAVENOUS; SUBCUTANEOUS at 06:14

## 2019-01-11 RX ADMIN — Medication 1334 MILLIGRAM(S): at 08:46

## 2019-01-11 RX ADMIN — Medication 1334 MILLIGRAM(S): at 13:39

## 2019-01-11 RX ADMIN — GABAPENTIN 100 MILLIGRAM(S): 400 CAPSULE ORAL at 13:39

## 2019-01-11 RX ADMIN — Medication 81 MILLIGRAM(S): at 13:39

## 2019-01-11 RX ADMIN — AMLODIPINE BESYLATE 10 MILLIGRAM(S): 2.5 TABLET ORAL at 06:13

## 2019-01-11 RX ADMIN — Medication 50 MILLIGRAM(S): at 06:13

## 2019-01-11 RX ADMIN — Medication 100 MICROGRAM(S): at 06:13

## 2019-01-11 RX ADMIN — CHLORHEXIDINE GLUCONATE 1 APPLICATION(S): 213 SOLUTION TOPICAL at 13:40

## 2019-01-11 RX ADMIN — PANTOPRAZOLE SODIUM 40 MILLIGRAM(S): 20 TABLET, DELAYED RELEASE ORAL at 06:13

## 2019-01-11 RX ADMIN — Medication 1 TABLET(S): at 13:39

## 2019-01-11 RX ADMIN — DULOXETINE HYDROCHLORIDE 20 MILLIGRAM(S): 30 CAPSULE, DELAYED RELEASE ORAL at 13:39

## 2019-01-11 RX ADMIN — ISOSORBIDE MONONITRATE 30 MILLIGRAM(S): 60 TABLET, EXTENDED RELEASE ORAL at 13:39

## 2019-01-11 RX ADMIN — SODIUM CHLORIDE 3 MILLILITER(S): 9 INJECTION INTRAMUSCULAR; INTRAVENOUS; SUBCUTANEOUS at 13:40

## 2019-01-11 NOTE — PROGRESS NOTE ADULT - SUBJECTIVE AND OBJECTIVE BOX
[facility] DIVISION OF KIDNEY DISEASES AND HYPERTENSION -- FOLLOW UP NOTE  --------------------------------------------------------------------------------  Chief Complaint: ESRD HD    24 hour events/subjective:  Plan for HD in AM as outpatient at [place]  Had low grade temp yesterday  WBC normalized;      PAST HISTORY  --------------------------------------------------------------------------------  No significant changes to PMH, PSH, FHx, SHx, unless otherwise noted    ALLERGIES & MEDICATIONS  --------------------------------------------------------------------------------  Allergies    No Known Allergies    Intolerances      Standing Inpatient Medications  amLODIPine   Tablet 10 milliGRAM(s) Oral daily  aspirin enteric coated 81 milliGRAM(s) Oral daily  atorvastatin 80 milliGRAM(s) Oral at bedtime  calcium acetate 1334 milliGRAM(s) Oral three times a day with meals  chlorhexidine 2% Cloths 1 Application(s) Topical daily  clopidogrel Tablet 75 milliGRAM(s) Oral daily  DULoxetine 20 milliGRAM(s) Oral daily  gabapentin 100 milliGRAM(s) Oral daily  hydrALAZINE 50 milliGRAM(s) Oral every 8 hours  iron sucrose IVPB 100 milliGRAM(s) IV Intermittent <User Schedule>  isosorbide   mononitrate ER Tablet (IMDUR) 30 milliGRAM(s) Oral daily  levothyroxine 100 MICROGram(s) Oral daily  losartan 50 milliGRAM(s) Oral at bedtime  metoprolol tartrate 50 milliGRAM(s) Oral two times a day  Nephro-jeana 1 Tablet(s) Oral daily  pantoprazole    Tablet 40 milliGRAM(s) Oral before breakfast  sodium chloride 0.9% lock flush 3 milliLiter(s) IV Push every 8 hours    PRN Inpatient Medications  acetaminophen   Tablet .. 650 milliGRAM(s) Oral every 6 hours PRN  artificial  tears Solution 1 Drop(s) Both EYES four times a day PRN  benzonatate 100 milliGRAM(s) Oral every 8 hours PRN  guaiFENesin    Syrup 200 milliGRAM(s) Oral every 6 hours PRN  ondansetron Injectable 4 milliGRAM(s) IV Push every 6 hours PRN      REVIEW OF SYSTEMS  --------------------------------------------------------------------------------  Gen: No weight changes, fatigue, fevers/chills, weakness  Skin: No rashes  Head/Eyes/Ears/Mouth: No headache; Normal hearing; Normal vision w/o blurriness; No sinus pain/discomfort, sore throat  Respiratory: No dyspnea, cough, wheezing, hemoptysis  CV: No chest pain, PND, orthopnea  GI: No abdominal pain, diarrhea, constipation, nausea, vomiting, melena, hematochezia  : No increased frequency, dysuria, hematuria, nocturia  MSK: No joint pain/swelling; no back pain; no edema  Neuro: No dizziness/lightheadedness, weakness, seizures, numbness, tingling  Heme: No easy bruising or bleeding  Endo: No heat/cold intolerance  Psych: No significant nervousness, anxiety, stress, depression    All other systems were reviewed and are negative, except as noted.    VITALS/PHYSICAL EXAM  --------------------------------------------------------------------------------  T(C): 36.9 (01-11-19 @ 11:01), Max: 37.3 (01-10-19 @ 13:55)  HR: 63 (01-11-19 @ 11:01) (63 - 77)  BP: 140/48 (01-11-19 @ 11:01) (108/51 - 153/62)  RR: 18 (01-11-19 @ 11:01) (18 - 19)  SpO2: 96% (01-10-19 @ 20:30) (96% - 96%)  Wt(kg): --        01-10-19 @ 07:01  -  01-11-19 @ 07:00  --------------------------------------------------------  IN: 360 mL / OUT: 2000 mL / NET: -1640 mL      Physical Exam:  	Gen: NAD, well-appearing  	HEENT: PERRL, supple neck, clear oropharynx  	Pulm: CTA B/L  	CV: RRR, S1S2; no rub  	Back: No spinal or CVA tenderness; no sacral edema  	Abd: +BS, soft, nontender/nondistended  	: No suprapubic tenderness  	UE: Warm, FROM, no clubbing, intact strength; no edema; no asterixis  	LE: Warm, FROM, no clubbing, intact strength; no edema  	Neuro: No focal deficits, intact gait  	Psych: Normal affect and mood  	Skin: Warm, without rashes  	Vascular access:    LABS/STUDIES  --------------------------------------------------------------------------------              9.6    8.6   >-----------<  163      [01-11-19 @ 10:27]              30.6                 Creatinine Trend:  SCr 4.67 [01-08 @ 10:39]  SCr 6.42 [01-07 @ 09:13]  SCr 4.38 [01-03 @ 08:16]  SCr 4.73 [12-31 @ 08:15]  SCr 5.51 [12-30 @ 08:28]    Urinalysis - [01-01-19 @ 15:02]      Color Red / Appearance Bloody / SG 1.015 / pH 6.0      Gluc Negative / Ketone Negative  / Bili Negative / Urobili Negative       Blood Large / Protein 500 / Leuk Est Trace / Nitrite Negative      RBC TNTC / WBC 3-5 / Hyaline  / Gran  / Sq Epi  / Non Sq Epi Negative / Bacteria Occasional      Iron 46, TIBC 259, %sat 18      [01-01-19 @ 09:11]  Ferritin 470      [01-01-19 @ 09:11]  TSH 7.33      [11-29-18 @ 02:31]    HBsAb <3.0      [01-08-19 @ 17:03]  HBsAg Nonreact      [01-08-19 @ 17:03]  HCV 0.12, Nonreact      [01-08-19 @ 17:03]

## 2019-01-11 NOTE — PROGRESS NOTE ADULT - SUBJECTIVE AND OBJECTIVE BOX
CC: Follow up fever    INTERVAL HPI/OVERNIGHT EVENTS: Patient seen and examined, discharge cancelled yesterday after rectal temperature of 100.3. Lactate was normal, mild elevation in procacitonin. Afebrile overnight. Feels well this morning.       Vital Signs Last 24 Hrs  T(C): 37.1 (11 Jan 2019 05:00), Max: 38.2 (10 Florentino 2019 11:40)  T(F): 98.7 (11 Jan 2019 05:00), Max: 100.8 (10 Florentino 2019 11:40)  HR: 67 (11 Jan 2019 05:00) (61 - 77)  BP: 108/51 (11 Jan 2019 05:00) (108/51 - 153/62)  BP(mean): --  RR: 18 (11 Jan 2019 05:00) (18 - 19)  SpO2: 96% (10 Florentino 2019 20:30) (94% - 100%)    PHYSICAL EXAM:    GENERAL: NAD, AOX3  CHEST/LUNG: Clear to auscultation bilaterally; No rales, rhonchi, wheezing, or rubs  HEART: Regular rate and rhythm; No murmurs, rubs, or gallops  ABDOMEN: Soft, Nontender, Nondistended; Bowel sounds present  EXTREMITIES:  2+ Peripheral Pulses, No clubbing, cyanosis, or edema        MEDICATIONS  (STANDING):  amLODIPine   Tablet 10 milliGRAM(s) Oral daily  aspirin enteric coated 81 milliGRAM(s) Oral daily  atorvastatin 80 milliGRAM(s) Oral at bedtime  calcium acetate 1334 milliGRAM(s) Oral three times a day with meals  chlorhexidine 2% Cloths 1 Application(s) Topical daily  clopidogrel Tablet 75 milliGRAM(s) Oral daily  DULoxetine 20 milliGRAM(s) Oral daily  gabapentin 100 milliGRAM(s) Oral daily  hydrALAZINE 50 milliGRAM(s) Oral every 8 hours  iron sucrose IVPB 100 milliGRAM(s) IV Intermittent <User Schedule>  isosorbide   mononitrate ER Tablet (IMDUR) 30 milliGRAM(s) Oral daily  levothyroxine 100 MICROGram(s) Oral daily  losartan 50 milliGRAM(s) Oral at bedtime  metoprolol tartrate 50 milliGRAM(s) Oral two times a day  Nephro-jeana 1 Tablet(s) Oral daily  pantoprazole    Tablet 40 milliGRAM(s) Oral before breakfast  sodium chloride 0.9% lock flush 3 milliLiter(s) IV Push every 8 hours    MEDICATIONS  (PRN):  acetaminophen   Tablet .. 650 milliGRAM(s) Oral every 6 hours PRN Temp greater or equal to 38C (100.4F)  artificial  tears Solution 1 Drop(s) Both EYES four times a day PRN Dry Eyes  benzonatate 100 milliGRAM(s) Oral every 8 hours PRN Cough  guaiFENesin    Syrup 200 milliGRAM(s) Oral every 6 hours PRN Cough  ondansetron Injectable 4 milliGRAM(s) IV Push every 6 hours PRN Nausea      Allergies    No Known Allergies    Intolerances          LABS:                          9.5    11.8  )-----------( 164      ( 10 Florentino 2019 15:54 )             31.5                 RADIOLOGY & ADDITIONAL TESTS: CC: Follow up fever    INTERVAL HPI/OVERNIGHT EVENTS: Patient seen and examined, discharge cancelled yesterday after rectal temperature of 100.3. Lactate was normal, mild elevation in procacitonin. Afebrile overnight. Feels well this morning. Denies sob or cough. Epistaxis this morning from dry O2      Vital Signs Last 24 Hrs  T(C): 37.1 (11 Jan 2019 05:00), Max: 38.2 (10 Florentino 2019 11:40)  T(F): 98.7 (11 Jan 2019 05:00), Max: 100.8 (10 Florentino 2019 11:40)  HR: 67 (11 Jan 2019 05:00) (61 - 77)  BP: 108/51 (11 Jan 2019 05:00) (108/51 - 153/62)  BP(mean): --  RR: 18 (11 Jan 2019 05:00) (18 - 19)  SpO2: 96% (10 Florentino 2019 20:30) (94% - 100%)    PHYSICAL EXAM:    GENERAL: NAD, AOX3  ENT: mild anterior epistaxis   CHEST/LUNG: Clear to auscultation bilaterally; No rales, rhonchi, wheezing, or rubs  HEART: Regular rate and rhythm; No murmurs, rubs, or gallops  ABDOMEN: Soft, Nontender, Nondistended; Bowel sounds present  EXTREMITIES:  2+ Peripheral Pulses, No clubbing, cyanosis, or edema        MEDICATIONS  (STANDING):  amLODIPine   Tablet 10 milliGRAM(s) Oral daily  aspirin enteric coated 81 milliGRAM(s) Oral daily  atorvastatin 80 milliGRAM(s) Oral at bedtime  calcium acetate 1334 milliGRAM(s) Oral three times a day with meals  chlorhexidine 2% Cloths 1 Application(s) Topical daily  clopidogrel Tablet 75 milliGRAM(s) Oral daily  DULoxetine 20 milliGRAM(s) Oral daily  gabapentin 100 milliGRAM(s) Oral daily  hydrALAZINE 50 milliGRAM(s) Oral every 8 hours  iron sucrose IVPB 100 milliGRAM(s) IV Intermittent <User Schedule>  isosorbide   mononitrate ER Tablet (IMDUR) 30 milliGRAM(s) Oral daily  levothyroxine 100 MICROGram(s) Oral daily  losartan 50 milliGRAM(s) Oral at bedtime  metoprolol tartrate 50 milliGRAM(s) Oral two times a day  Nephro-jeana 1 Tablet(s) Oral daily  pantoprazole    Tablet 40 milliGRAM(s) Oral before breakfast  sodium chloride 0.9% lock flush 3 milliLiter(s) IV Push every 8 hours    MEDICATIONS  (PRN):  acetaminophen   Tablet .. 650 milliGRAM(s) Oral every 6 hours PRN Temp greater or equal to 38C (100.4F)  artificial  tears Solution 1 Drop(s) Both EYES four times a day PRN Dry Eyes  benzonatate 100 milliGRAM(s) Oral every 8 hours PRN Cough  guaiFENesin    Syrup 200 milliGRAM(s) Oral every 6 hours PRN Cough  ondansetron Injectable 4 milliGRAM(s) IV Push every 6 hours PRN Nausea      Allergies    No Known Allergies    Intolerances          LABS:                          9.5    11.8  )-----------( 164      ( 10 Florentino 2019 15:54 )             31.5                 RADIOLOGY & ADDITIONAL TESTS:

## 2019-01-11 NOTE — PROGRESS NOTE ADULT - ASSESSMENT
79 y/o male with PMH ESRD on Hd, HTN, Pulm HTN, Lung [place]), CAD s/p PCI who presented to [facility] with infection of recently placed RIJ PermCath for HD. Pt was in consult with vascular and nephrology. Infected RIJ PermCath was removed, blood cultures negative and PermCath tip cultures negative. Started on Iv abx post HD to be continued until 1/7 as per ID. A right femoral Shiley was placed for immediate HD during hospital stay. On 1/3,  L AV graft and RIJ PermCath were placed and R femoral Shiley removed by vascular. Hospital course complicated by hematuria/scrotal swelling and hemoptysis. Consulted by urology, urine cytology studies ordered, and recommended cysto/b/l retrograde pyelograms when stable as outpatient. Also seen in consultation with CTSx for hemoptysis, repeat CT chest completed, positive for PNA.     Assessment/Plan:    1. ESRD (end stage renal disease)- TTS  - On 1/3, L AV graft and RIJ PermCath were placed and R femoral shiley removed by vascular; asked vascular surgery to come back to assess swelling in area of graft placement  - US ordered- no DVT. Vascular surgery called to follow up; recommend LUE elevation   - Follow up as outpatient for HD in Am     2. HCAP secondary to enterobacter with hemoptysis - likely 2/2 enterobacter  Spoke with ID, completed course of antibiotics and steroids   - Febrile yesterday 100.3 Tmax, afebrile now, lactate normal, mild elevation in procalcitonin  -CBC pending       3. Right groin pain - resolved  - CT of ab/pelvis which showed stable hematoma from previous exam  - Right femoral shiley was removed by vascular  - US of the scrotum showed thickening of the scrotum but no significant findings on exam  - Urology consulted for scrotoal swelling/hematuria, urine cytology studies ordered, and recommended cysto/b/l retrograde pyelograms when stable as outpatient    4. Hematuria  - urology consult appreciated, and recommended cysto/b/l retrorade pyelograms when stable as outpatient    5. Acute hypoxic resp failure / likely sec to pneumonia with volume overload as well; Improved  Complete PO steroids     6. Hypertension,   - Cont. o/p regimen, renal/low sodium diet.     7. Anemia / Chronic blood loss anemia   - 1 unit prbc ordered 12/31; H/H improved  - Continue with PPI  - Secondary to ESRD  - Stable, monitor    8. Coronary artery disease involving native coronary artery of native heart without angina pectoris  - No CP, cont. o/p regimen.    9. Radiation induced pulmonary fibrosis: Will need home oxygen on discharge  SPo2 room air at rest of 85% improved to 92% via 2 liters nasal cannula     9. HLD - statin     Plan discussed with patient, family at bedside,    PT evaluation- home with home pt      Discharge home with home care today pending CBC

## 2019-01-11 NOTE — PROGRESS NOTE ADULT - REASON FOR ADMISSION
SOB; swelling around CVC
Sent in for possible catheter infection

## 2019-01-11 NOTE — PROGRESS NOTE ADULT - PROVIDER SPECIALTY LIST ADULT
Hospitalist
Infectious Disease
Internal Medicine
Nephrology
Vascular Surgery
Vascular Surgery
Critical Care
Hospitalist
Infectious Disease
Infectious Disease
Internal Medicine
Nephrology
Pulmonology
Vascular Surgery
Anesthesia
Anesthesia
Hospitalist
Internal Medicine
Intervent Radiology
Nephrology
Neurology
Vascular Surgery
Hospitalist
Internal Medicine
Internal Medicine
Nephrology
Vascular Surgery
Cardiology
Hospitalist
Palliative Care
Cardiology
Hospitalist
Infectious Disease
Palliative Care
Palliative Care
Gastroenterology

## 2019-01-11 NOTE — PROGRESS NOTE ADULT - ASSESSMENT
1. ESRD (end stage renal disease)- TTS  - On 1/3, L AV graft and RIJ  CVC  were placed and R FVC  ,   - US ordered- no DVT.   - HD today     2. HCAP secondary to enterobacter with hemoptysis - 2/2 enterobacter  completed course of antibiotics and steroids       3. Hematuria, urine cytology studies ordered, and recommended cysto/b/l retrograde pyelograms when stable as OP,    4. Acute hypoxic resp failure / likely sec to pneumonia with volume overload as well; IMproved  Complete PO steroids     5. Coronary artery disease involving native coronary artery of native heart without angina pectoris  - No CP, cont. o/p regimen.    6. Radiation induced pulmonary fibrosis: Will need home oxygen on discharge  SPo2 room air at rest of 85% improved to 92% via 2 liters nasal cannula       DC planning  Dr Miles to f/u in [facility]

## 2019-01-12 RX ORDER — LEVOTHYROXINE SODIUM 125 MCG
1 TABLET ORAL
Qty: 14 | Refills: 0
Start: 2019-01-12 | End: 2019-01-25

## 2019-01-12 NOTE — CHART NOTE - NSCHARTNOTESELECT_GEN_ALL_CORE
Event Note
Nutrition Services
Nutrition Services
Event Note
Palliative/Event Note

## 2019-01-12 NOTE — CHART NOTE - NSCHARTNOTEFT_GEN_A_CORE
12/26/18 (1930) Medicine PA addendum- stat EKG was read earlier by self, but not noted-  EKG- NSR@ 65, non-specific T waves, no acute changes from 12/22/18
Called by patient requesting refill on levothyroxine 100mcg PO OD  Sent to [facility] pharmacy. Recommend follow up with pmd
Received patient on .50 vm with spo2 90% , pt fio2 changed to .60 CAM with Spo2 96%, PA aware, RN aware, BS crackles B/L, Duoneb given as ordered
Went to follow up with patient- currently in Cath lab
cc: Bloody urine, cough  INTERVAL HPI/OVERNIGHT EVENTS: Called by RN to notify provider for 2 episodes of bright red blood urine during the day. As per family at the bedside, Pt has had bloody urine for the past 2-3 days and yesterday was having urinary retention and required straight cath x1 to collect UA samples. GI note from today notes it. UA collected today shows blood, UA negative for UTI. While at bedside, Pt is receiving Neb treatment and Pt states that he had questionable dark blood tinged sputum earlier today. He complains of purulent cough and inability to expectorate sputum. Pt denies chest pain, palpitations, shortness of breath, abdominal pain, pain while urinating, urgency or frequency, nausea, vomiting, diarrhea or any other complaints.     Allergies  No Known Allergies    HEALTH ISSUES - PROBLEM Dx:  Pleural effusion: Pleural effusion  Acute diastolic congestive heart failure: Acute diastolic congestive heart failure  Acute respiratory failure with hypoxia: Acute respiratory failure with hypoxia  Encounter for palliative care: Encounter for palliative care  Insomnia: Insomnia  Lung cancer: Lung cancer  Hypoxia: Hypoxia  High cholesterol: High cholesterol  Coronary artery disease involving native coronary artery of native heart without angina pectoris: Coronary artery disease involving native coronary artery of native heart without angina pectoris  Chronic anemia: Chronic anemia  Hypertension, unspecified type: Hypertension, unspecified type  ESRD (end stage renal disease): ESRD (end stage renal disease)  Complication of vascular dialysis catheter, initial encounter: Complication of vascular dialysis catheter, initial encounter    PAST MEDICAL & SURGICAL HISTORY:  Chronic anemia  ESRD (end stage renal disease)  CAD (coronary artery disease)  Lung cancer: had yoni radiation in .  Bipolar disorder  High cholesterol  Hypertension  S/P CABG (coronary artery bypass graft): pt&#x27;s son in [facility] states h/o some stents near neck area ? carotid ? he is not sure.    VITAL SIGNS:  T(C): 36.8 (19 @ 20:34), Max: 37 (18 @ 22:24)  HR: 85 (19 @ 21:44) (58 - 85)  BP: 134/70 (19 @ 20:34) (115/58 - 137/74)  RR: 19 (19 @ 20:34) (18 - 20)  SpO2: 97% (19 @ 21:44) (92% - 98%)  Wt(kg): --Vital Signs Last 24 Hrs  T(C): 36.8 (2019 20:34), Max: 37 (31 Dec 2018 22:24)  T(F): 98.2 (2019 20:34), Max: 98.6 (31 Dec 2018 22:24)  HR: 85 (2019 21:44) (58 - 85)  BP: 134/70 (2019 20:34) (115/58 - 137/74)  BP(mean): --  RR: 19 (2019 20:34) (18 - 20)  SpO2: 97% (2019 21:44) (92% - 98%)    PHYSICAL EXAM:  GENERAL: NAD, sitting in bed comfortably, speaking in full sentences, receiving duoneb now  HEAD:  Atraumatic, Normocephalic  EYES: EOMI, PERRLA, conjunctiva and sclera clear  ENT: Moist mucous membranes. No blood, erythema or edema  NECK: Supple, No JVD  CHEST/LUNG: B/L rhonchi, B/L lower rales. No wheezing, or rubs. Respirations unlabored  HEART: Regular rate and rhythm; No murmurs, rubs, or gallops  ABDOMEN: Bowel sounds present; Soft, mildly distended, Nontender, no hepatomegally. Tympanic   EXTREMITIES:  2+ Peripheral Pulses, brisk capillary refill. No clubbing, cyanosis, or edema  NERVOUS SYSTEM:  Alert & Oriented X3, moves all 4 extremities. No deficits   SKIN: No rashes or lesions    LABS:                9.5    7.8   )-----------( 325      ( 2019 22:36 )             30.2     12    133<L>  |  93<L>  |  57.0<H>  ----------------------------<  123<H>  4.4   |  26.0  |  4.73<H>    Ca    7.5<L>      31 Dec 2018 08:15  Mg     2.2     12    Urinalysis Basic - ( 2019 15:02 )  Color: Red / Appearance: Bloody / S.015 / pH: x  Gluc: x / Ketone: Negative  / Bili: Negative / Urobili: Negative mg/dL   Blood: x / Protein: 500 mg/dL / Nitrite: Negative   Leuk Esterase: Trace / RBC: TNTC /HPF / WBC 3-5   Sq Epi: x / Non Sq Epi: Negative / Bacteria: Occasional    ASSESSMENT/ PLAN: 79y/o M with hx of lung ca, CAD, HTN, HLD, anemia admitted for PermCath infection on IV Vancomycin, ESRD on HD with fluid overload, with a hx of anemia with hematuria x2-3 days as per family. Required straight cath x1 to collect urine sample. Pt was admitted with hypoxemia as well and seen by Pulomologist inpt. Was placed on duoneb standing. CT abdomen from  to evaluate right groin pain shows diffuse interstitial infiltrates in the right middle lobe and right lower lobe with Bilateral pleural effusions. Pt O2 sat 97%, Pt is stable.  1. Hematuria: Hx of chronic anemia on aspirin and plavix. Possibly worsened by straight cath insertion trauma, Hemodynamically stable  -UA x2 show blood, RBCS TNTC, Negative nitrites  -STAT CBC showing Hgb is stable. 8.6 yesterday, now 9.5    2. Questionable hemoptysis: possible xrt induced pulm fibrosis and pulm htn  -Pulmonary infiltrates seen on CT abdomen  -Lungs are rhonchorous. Pt in NAD  -continue duoneb treatments  -added Mucomyst 1ml nebulizer x1   -educated Pt to provide sputum sample   -case d/w Dr. Brown. No leukocytosis and afebrile and recommends to cont with Vanco. CT chest in AM  -will signout to AM hospitalist   -call PA for worsening condition. will reassess prn
Asked by RN to see pt, pt POD #2 Left upper arm AV fistula. Nurse noted ecchymosis and  slight edema at site. Pt only c/o little discomfort with movement. Denies tingling, numbness , or a lot of pain.  General A+OX3 NAD VSS  Left arm + ecchymosis  upper arm , slight edema, tender to palpation               FROM, + pulses + sensory, warm to touch , soft to touch, neurovascular intact, no deficits noted, dressing dry and intact  Pt states he does not need anything for pain  Continue to monitor  Call PA if edema increases or pt c/o increase in pain level
Medicine PA- Cd. @1905 for pt. c/o CP/SOB tonight. CP is generalized/substernal, non-radiating, no palpitations. Pt. is ESRD on HD, makes urine as per renal Dr. Newberry, today pt. had fluid overload and given lasix 80mg, zaroxolyn 5mg @ 5pm but hasn't voided yet, bladder scanned w/ 146cc. VSS- 131/63- 18- 64- 98.0 - 95% on 50%venti. On exam: Pt. is anxious, sitting up in bed w/ventimask on. S1S2 ausc.  lungs- +crackles R>L  Abd- soft, non-distended Ext- no edema Pt. is due for dialysis tomorrow and is NPO tonight for permacath placement. (Had infected cath removed upon admission here) Dr. Newberry was called as per nurse and was going to order more diuretic, but at time of exam there was no new order. Duoneb tx stat, lasix 40 mg IVP, morphine 2mg IVP, continue to closely monitor urine output, call PA if status changes.
Patient planned for tunneled HD catheter placement today.  He however was unable to tolerate lying flat due to significant shortness of breath.  Right femoral vein temporary HD catheter placed for urgent HD.  Will reschedule Permacath placement when patient is more stable.
Source: Patient [ ]  Family [ x]   other [ ]    Current Diet: renal replacement    Patient reports [ ] nausea  [ ] vomiting [ ] diarrhea [ ] constipation  [ x]chewing problems [x ] swallowing issues  [ ] other: Family requesting softer foods  agreed to try mech soft    PO intake:  < 50% [x ]   50-75%  [ ]   %  [ ]  other : pe family    Source for PO intake [ ] Patient [ x] family [ ] chart [ ] staff [ ] other    Enteral /Parenteral Nutrition:     Current Weight: 140# 12/26, 132.2# 12/30 question accuracy    % Weight Change     Pertinent Medications: MEDICATIONS  (STANDING):  ALBUTerol/ipratropium for Nebulization 3 milliLiter(s) Nebulizer every 6 hours  amLODIPine   Tablet 10 milliGRAM(s) Oral daily  aspirin enteric coated 81 milliGRAM(s) Oral daily  atorvastatin 80 milliGRAM(s) Oral at bedtime  chlorhexidine 2% Cloths 1 Application(s) Topical daily  clopidogrel Tablet 75 milliGRAM(s) Oral daily  diclofenac sodium 1% Gel 4 Gram(s) Topical <User Schedule>  DULoxetine 20 milliGRAM(s) Oral daily  gabapentin 100 milliGRAM(s) Oral daily  hydrALAZINE 50 milliGRAM(s) Oral every 8 hours  isosorbide   mononitrate ER Tablet (IMDUR) 30 milliGRAM(s) Oral daily  levothyroxine 100 MICROGram(s) Oral daily  losartan 50 milliGRAM(s) Oral at bedtime  methyl salicylate 14%/menthol 6% Topical Ointment 1 Application(s) Topical two times a day  metoprolol tartrate 50 milliGRAM(s) Oral two times a day  Nephro-jeana 1 Tablet(s) Oral daily  pantoprazole  Injectable 40 milliGRAM(s) IV Push every 12 hours  predniSONE   Tablet 40 milliGRAM(s) Oral daily  sodium chloride 0.9% lock flush 3 milliLiter(s) IV Push every 8 hours    MEDICATIONS  (PRN):  ALPRAZolam 0.25 milliGRAM(s) Oral two times a day PRN anxiety  benzonatate 100 milliGRAM(s) Oral every 8 hours PRN Cough  guaiFENesin    Syrup 200 milliGRAM(s) Oral every 6 hours PRN Cough  ondansetron Injectable 4 milliGRAM(s) IV Push every 6 hours PRN Nausea    Pertinent Labs: CBC Full  -  ( 31 Dec 2018 08:15 )  WBC Count : 6.3 K/uL  Hemoglobin : 8.6 g/dL  Hematocrit : 27.3 %  Platelet Count - Automated : 269 K/uL  Mean Cell Volume : 90.1 fl  Mean Cell Hemoglobin : 28.4 pg  Mean Cell Hemoglobin Concentration : 31.5 g/dL  Auto Neutrophil # : x  Auto Lymphocyte # : x  Auto Monocyte # : x  Auto Eosinophil # : x  Auto Basophil # : x  Auto Neutrophil % : x  Auto Lymphocyte % : x  Auto Monocyte % : x  Auto Eosinophil % : x  Auto Basophil % : x      12-31 Na133 mmol/L<L> Glu 123 mg/dL<H> K+ 4.4 mmol/L Cr  4.73 mg/dL<H> BUN 57.0 mg/dL<H> Phos n/a   Alb n/a   PAB n/a           Skin:     Nutrition focused physical exam not conducted - found signs of malnutrition [ ]absent [ ]present  Pt sleeping  Subcutaneous fat loss: [ ] Orbital fat pads region, [ ]Buccal fat region, [ ]Triceps region,  [ ]Ribs region    Muscle wasting: [ ]Temples region, [ ]Clavicle region, [ ]Shoulder region, [ ]Scapula region, [ ]Interosseous region,  [ ]thigh region, [ ]Calf region    Estimated Needs:   [x ] no change since previous assessment  [ ] recalculated:     Current Nutrition Diagnosis: Pt with inadequate energy intake related to ESRD on HD, lung ca, CAD as evidenced by suboptimal po intake, difficulty chewing, swallowing.    Recommendations: Change diet to mech soft, renal replacement, halal, Nepro BID    Monitoring and Evaluation:   [x ] PO intake [x ] Tolerance to diet prescription [X] Weights  [X] Follow up per protocol [X] Labs:
Source: Patient [x ]  Family [x ]   other [ ]    Current Diet: renal replacement, halal with Nepro BID    Patient reports [ ] nausea  [ ] vomiting [ ] diarrhea [ ] constipation  [ ]chewing problems [ ] swallowing issues  [ ] other: eats food from home    PO intake:  < 50% [ ]   50-75%  [x ]   %  [ ]  other :    Source for PO intake [x ] Patient [ ] family [x ] chart [ ] staff [ ] other    Enteral /Parenteral Nutrition:     Current Weight: 12/28 66.9kg, 1/2 60kg, 1/7 60.5kg  question accuracy    % Weight Change     Pertinent Medications: MEDICATIONS  (STANDING):  amLODIPine   Tablet 10 milliGRAM(s) Oral daily  aspirin enteric coated 81 milliGRAM(s) Oral daily  atorvastatin 80 milliGRAM(s) Oral at bedtime  cefTRIAXone   IVPB 1 Gram(s) IV Intermittent every 24 hours  chlorhexidine 2% Cloths 1 Application(s) Topical daily  clopidogrel Tablet 75 milliGRAM(s) Oral daily  DULoxetine 20 milliGRAM(s) Oral daily  gabapentin 100 milliGRAM(s) Oral daily  hydrALAZINE 50 milliGRAM(s) Oral every 8 hours  iron sucrose IVPB 100 milliGRAM(s) IV Intermittent <User Schedule>  isosorbide   mononitrate ER Tablet (IMDUR) 30 milliGRAM(s) Oral daily  levothyroxine 100 MICROGram(s) Oral daily  losartan 50 milliGRAM(s) Oral at bedtime  metoprolol tartrate 50 milliGRAM(s) Oral two times a day  Nephro-jeana 1 Tablet(s) Oral daily  pantoprazole    Tablet 40 milliGRAM(s) Oral before breakfast  sodium chloride 0.9% lock flush 3 milliLiter(s) IV Push every 8 hours    MEDICATIONS  (PRN):  ALPRAZolam 0.25 milliGRAM(s) Oral two times a day PRN anxiety  artificial  tears Solution 1 Drop(s) Both EYES four times a day PRN Dry Eyes  benzonatate 100 milliGRAM(s) Oral every 8 hours PRN Cough  guaiFENesin    Syrup 200 milliGRAM(s) Oral every 6 hours PRN Cough  ondansetron Injectable 4 milliGRAM(s) IV Push every 6 hours PRN Nausea    Pertinent Labs: 01-07 Na134 mmol/L<L> Glu 122 mg/dL<H> K+ 5.3 mmol/L Cr  6.42 mg/dL<H> BUN 76.0 mg/dL<H> Phos n/a   Alb n/a   PAB n/a               Skin:     Nutrition focused physical exam not conducted - found signs of malnutrition [ ]absent [ ]present  Pt does not want to be touched  Subcutaneous fat loss: [ ] Orbital fat pads region, [ ]Buccal fat region, [ ]Triceps region,  [ ]Ribs region    Muscle wasting: [ ]Temples region, [ ]Clavicle region, [ ]Shoulder region, [ ]Scapula region, [ ]Interosseous region,  [ ]thigh region, [ ]Calf region    Estimated Needs:   [x ] no change since previous assessment  [ ] recalculated:     Current Nutrition Diagnosis: Pt with impaired nutrient utilization related to ESRD on HD, lung ca, CAD as evidenced by high bun, high creat.  Pt taking Nepro well. Eats food from home.     Recommendations: Continue diet as ordered. Continue Nepro supplement.       Monitoring and Evaluation:   [x ] PO intake [x ] Tolerance to diet prescription [X] Weights  [X] Follow up per protocol [X] Labs:

## 2019-01-16 ENCOUNTER — APPOINTMENT (OUTPATIENT)
Dept: PULMONOLOGY | Facility: CLINIC | Age: 79
End: 2019-01-16

## 2019-01-23 ENCOUNTER — APPOINTMENT (OUTPATIENT)
Dept: NEPHROLOGY | Facility: CLINIC | Age: 79
End: 2019-01-23

## 2019-01-25 ENCOUNTER — EMERGENCY (EMERGENCY)
Facility: HOSPITAL | Age: 79
LOS: 1 days | Discharge: HOME | End: 2019-01-25
Attending: STUDENT IN AN ORGANIZED HEALTH CARE EDUCATION/TRAINING PROGRAM
Payer: COMMERCIAL

## 2019-01-25 VITALS
OXYGEN SATURATION: 95 % | TEMPERATURE: 98 F | DIASTOLIC BLOOD PRESSURE: 60 MMHG | SYSTOLIC BLOOD PRESSURE: 138 MMHG | HEIGHT: 66 IN | RESPIRATION RATE: 20 BRPM | HEART RATE: 65 BPM | WEIGHT: 149.91 LBS

## 2019-01-25 VITALS — DIASTOLIC BLOOD PRESSURE: 64 MMHG | SYSTOLIC BLOOD PRESSURE: 131 MMHG

## 2019-01-25 DIAGNOSIS — Z95.1 PRESENCE OF AORTOCORONARY BYPASS GRAFT: Chronic | ICD-10-CM

## 2019-01-25 PROCEDURE — 73562 X-RAY EXAM OF KNEE 3: CPT | Mod: 26,RT

## 2019-01-25 PROCEDURE — 73562 X-RAY EXAM OF KNEE 3: CPT

## 2019-01-25 PROCEDURE — 93971 EXTREMITY STUDY: CPT | Mod: 26,RT

## 2019-01-25 PROCEDURE — 93971 EXTREMITY STUDY: CPT

## 2019-01-25 PROCEDURE — 99284 EMERGENCY DEPT VISIT MOD MDM: CPT | Mod: 25

## 2019-01-25 RX ORDER — TRAMADOL HYDROCHLORIDE 50 MG/1
50 TABLET ORAL ONCE
Refills: 0 | Status: DISCONTINUED | OUTPATIENT
Start: 2019-01-25 | End: 2019-01-25

## 2019-01-25 RX ADMIN — TRAMADOL HYDROCHLORIDE 50 MILLIGRAM(S): 50 TABLET ORAL at 02:58

## 2019-01-25 NOTE — ED PROVIDER NOTE - OBJECTIVE STATEMENT
80 y/o M, hx of CAD, anemia, ESRD, HLD, HTN, lung cancer, CABG, on dialysis T/TH/S (last session Thursday with no complications), presents to the ED c/o right knee pain, onset today.  Pt states that pain has progressively worsened and is now unbearable.  Pt states he uses a wheelchair daily.  Self medicated with 2 Tylenol with no relief.  Denies recent trauma or falls. Pt denies fevers/chills, ha, loc, focal neuro deficits, cp/sob/palp, cough, abd pain/n/v/d, urinary symptoms, recent travel and sick contacts. 78 y/o M, hx of CAD, anemia, ESRD, HLD, HTN, lung cancer, CABG, on dialysis T/TH/S (last session Thursday with no complications), presents to the ED c/o right knee pain, onset today.  Pt states that pain has progressively worsened and is now unbearable.  Pt states he uses a wheelchair daily.  Self medicated with 2 Tylenol with no relief.  Denies recent trauma or falls.  On 2L of O2 at home.  Currently on Plavix.  Pt denies fevers/chills, ha, loc, focal neuro deficits, cp/sob/palp, cough, abd pain/n/v/d, urinary symptoms, recent travel and sick contacts.

## 2019-01-25 NOTE — ED PROVIDER NOTE - MEDICAL DECISION MAKING DETAILS
80 y/o M, hx of CAD, anemia, ESRD, HLD, HTN, lung cancer, CABG, on dialysis T/TH/S (last session Thursday with no complications), presents to the ED c/o right knee pain, onset today. 78 y/o M, hx of CAD, anemia, ESRD, HLD, HTN, lung cancer, CABG, on dialysis T/TH/S (last session Thursday with no complications), presents to the ED c/o right knee pain, onset today - neg duplex and xray wnl - likely arthritic pain - pain control

## 2019-01-25 NOTE — ED ADULT TRIAGE NOTE - CHIEF COMPLAINT QUOTE
Patient states "I'm having right knee pain started yesterday getting worse today I did not fall or have injury to knee." BIBA c/o right knee pain 7/10. No bleeding or deformity noted.  patient has history of HD Tu /Th/Sat. has AV fissula LUE and RCW PermCath. is on 2-3 L NC at home SaO2 95% on 2L NC. Patient denies chest pain and SOB. Patient states "I'm having right knee pain started yesterday getting worse today I did not fall or have injury to knee." BIBA c/o right knee pain 7/10. No bleeding or deformity noted, having difficulty ambulating, patient has history of HD Tu /Th/Sat. has AV fissula LUE and RCW PermCath. is on 2-3 L NC at home SaO2 95% on 2L NC. Patient denies chest pain and SOB.

## 2019-01-25 NOTE — ED PROVIDER NOTE - MUSCULOSKELETAL, MLM
Spine appears normal, RLE: full ROM at the knee with no issues, pt states pain with ROM.  Knee nonerythematous, no effusion or warmth noted.  No signs of trauma or skin lesions noted.  No calf tenderness or popliteal tenderness on palpation

## 2019-01-25 NOTE — ED PROVIDER NOTE - CONSTITUTIONAL, MLM
Well appearing, well nourished, awake, alert, oriented to person, place, time/situation and in no apparent distress.  Pt in wheelchair at baseline normal...

## 2019-01-25 NOTE — ED ADULT NURSE NOTE - CHIEF COMPLAINT QUOTE
Patient states "I'm having right knee pain started yesterday getting worse today I did not fall or have injury to knee." BIBA c/o right knee pain 7/10. No bleeding or deformity noted, having difficulty ambulating, patient has history of HD Tu /Th/Sat. has AV fissula LUE and RCW PermCath. is on 2-3 L NC at home SaO2 95% on 2L NC. Patient denies chest pain and SOB.

## 2019-01-25 NOTE — ED PROVIDER NOTE - PROGRESS NOTE DETAILS
PT seen and reassessed.  Patient symptomatically improved.   AAOX3, NAD, VSS.  Discussed test results w/ patient, given copy of results. Patient verbalized understanding of hospital course and outpatient plans, has decisional making capacity.  Will follow-up with Primary care doctor in the next 2 days; patient will call for an appointment. Will return to the ED if there is any worsening of symptoms.  Patient at baseline ambulation status, is tolerating PO intake

## 2019-01-25 NOTE — ED ADULT NURSE NOTE - NSIMPLEMENTINTERV_GEN_ALL_ED
Implemented All Universal Safety Interventions:  [place] to call system. Call bell, personal items and telephone within reach. Instruct patient to call for assistance. Room bathroom lighting operational. Non-slip footwear when patient is off stretcher. Physically safe environment: no spills, clutter or unnecessary equipment. Stretcher in lowest position, wheels locked, appropriate side rails in place.

## 2019-01-28 ENCOUNTER — INPATIENT (INPATIENT)
Facility: HOSPITAL | Age: 79
LOS: 10 days | Discharge: HOME HEALTH | DRG: 177 | End: 2019-02-08
Attending: HOSPITALIST | Admitting: GENERAL ACUTE CARE HOSPITAL
Payer: COMMERCIAL

## 2019-01-28 VITALS
OXYGEN SATURATION: 88 % | TEMPERATURE: 98 F | HEIGHT: 69 IN | HEART RATE: 60 BPM | RESPIRATION RATE: 22 BRPM | WEIGHT: 169.98 LBS | DIASTOLIC BLOOD PRESSURE: 61 MMHG | SYSTOLIC BLOOD PRESSURE: 170 MMHG

## 2019-01-28 DIAGNOSIS — R74.8 ABNORMAL LEVELS OF OTHER SERUM ENZYMES: ICD-10-CM

## 2019-01-28 DIAGNOSIS — I10 ESSENTIAL (PRIMARY) HYPERTENSION: ICD-10-CM

## 2019-01-28 DIAGNOSIS — N18.6 END STAGE RENAL DISEASE: ICD-10-CM

## 2019-01-28 DIAGNOSIS — J18.9 PNEUMONIA, UNSPECIFIED ORGANISM: ICD-10-CM

## 2019-01-28 DIAGNOSIS — I25.708 ATHEROSCLEROSIS OF CORONARY ARTERY BYPASS GRAFT(S), UNSPECIFIED, WITH OTHER FORMS OF ANGINA PECTORIS: ICD-10-CM

## 2019-01-28 DIAGNOSIS — J90 PLEURAL EFFUSION, NOT ELSEWHERE CLASSIFIED: ICD-10-CM

## 2019-01-28 DIAGNOSIS — Z95.1 PRESENCE OF AORTOCORONARY BYPASS GRAFT: Chronic | ICD-10-CM

## 2019-01-28 DIAGNOSIS — D64.9 ANEMIA, UNSPECIFIED: ICD-10-CM

## 2019-01-28 DIAGNOSIS — R06.09 OTHER FORMS OF DYSPNEA: ICD-10-CM

## 2019-01-28 LAB
ALBUMIN SERPL ELPH-MCNC: 3.5 G/DL — SIGNIFICANT CHANGE UP (ref 3.3–5.2)
ALP SERPL-CCNC: 62 U/L — SIGNIFICANT CHANGE UP (ref 40–120)
ALT FLD-CCNC: 17 U/L — SIGNIFICANT CHANGE UP
ANION GAP SERPL CALC-SCNC: 11 MMOL/L — SIGNIFICANT CHANGE UP (ref 5–17)
AST SERPL-CCNC: 40 U/L — HIGH
BASOPHILS # BLD AUTO: 0.1 K/UL — SIGNIFICANT CHANGE UP (ref 0–0.2)
BASOPHILS NFR BLD AUTO: 1.2 % — SIGNIFICANT CHANGE UP (ref 0–2)
BILIRUB SERPL-MCNC: 0.3 MG/DL — LOW (ref 0.4–2)
BUN SERPL-MCNC: 15 MG/DL — SIGNIFICANT CHANGE UP (ref 8–20)
CALCIUM SERPL-MCNC: 8.1 MG/DL — LOW (ref 8.6–10.2)
CHLORIDE SERPL-SCNC: 95 MMOL/L — LOW (ref 98–107)
CO2 SERPL-SCNC: 30 MMOL/L — HIGH (ref 22–29)
CREAT SERPL-MCNC: 5.31 MG/DL — HIGH (ref 0.5–1.3)
EOSINOPHIL # BLD AUTO: 1.7 K/UL — HIGH (ref 0–0.5)
EOSINOPHIL NFR BLD AUTO: 17.8 % — HIGH (ref 0–5)
GLUCOSE SERPL-MCNC: 138 MG/DL — HIGH (ref 70–115)
HCT VFR BLD CALC: 30.3 % — LOW (ref 42–52)
HGB BLD-MCNC: 9.1 G/DL — LOW (ref 14–18)
LACTATE BLDV-MCNC: 1.2 MMOL/L — SIGNIFICANT CHANGE UP (ref 0.5–2)
LYMPHOCYTES # BLD AUTO: 2.6 K/UL — SIGNIFICANT CHANGE UP (ref 1–4.8)
LYMPHOCYTES # BLD AUTO: 27.7 % — SIGNIFICANT CHANGE UP (ref 20–55)
MCHC RBC-ENTMCNC: 28.7 PG — SIGNIFICANT CHANGE UP (ref 27–31)
MCHC RBC-ENTMCNC: 30 G/DL — LOW (ref 32–36)
MCV RBC AUTO: 95.6 FL — HIGH (ref 80–94)
MONOCYTES # BLD AUTO: 1.3 K/UL — HIGH (ref 0–0.8)
MONOCYTES NFR BLD AUTO: 14.1 % — HIGH (ref 3–10)
NEUTROPHILS # BLD AUTO: 3.6 K/UL — SIGNIFICANT CHANGE UP (ref 1.8–8)
NEUTROPHILS NFR BLD AUTO: 38.7 % — SIGNIFICANT CHANGE UP (ref 37–73)
NT-PROBNP SERPL-SCNC: HIGH PG/ML (ref 0–300)
PLATELET # BLD AUTO: 224 K/UL — SIGNIFICANT CHANGE UP (ref 150–400)
POTASSIUM SERPL-MCNC: 4 MMOL/L — SIGNIFICANT CHANGE UP (ref 3.5–5.3)
POTASSIUM SERPL-SCNC: 4 MMOL/L — SIGNIFICANT CHANGE UP (ref 3.5–5.3)
PROT SERPL-MCNC: 7.4 G/DL — SIGNIFICANT CHANGE UP (ref 6.6–8.7)
RBC # BLD: 3.17 M/UL — LOW (ref 4.6–6.2)
RBC # FLD: 15.7 % — HIGH (ref 11–15.6)
SODIUM SERPL-SCNC: 136 MMOL/L — SIGNIFICANT CHANGE UP (ref 135–145)
TROPONIN T SERPL-MCNC: 0.13 NG/ML — HIGH (ref 0–0.06)
WBC # BLD: 9.3 K/UL — SIGNIFICANT CHANGE UP (ref 4.8–10.8)
WBC # FLD AUTO: 9.3 K/UL — SIGNIFICANT CHANGE UP (ref 4.8–10.8)

## 2019-01-28 PROCEDURE — 71045 X-RAY EXAM CHEST 1 VIEW: CPT | Mod: 26

## 2019-01-28 PROCEDURE — 99223 1ST HOSP IP/OBS HIGH 75: CPT

## 2019-01-28 PROCEDURE — 99223 1ST HOSP IP/OBS HIGH 75: CPT | Mod: 25

## 2019-01-28 PROCEDURE — 90937 HEMODIALYSIS REPEATED EVAL: CPT

## 2019-01-28 PROCEDURE — 99285 EMERGENCY DEPT VISIT HI MDM: CPT

## 2019-01-28 PROCEDURE — 71250 CT THORAX DX C-: CPT | Mod: 26

## 2019-01-28 RX ORDER — HEPARIN SODIUM 5000 [USP'U]/ML
5000 INJECTION INTRAVENOUS; SUBCUTANEOUS EVERY 12 HOURS
Refills: 0 | Status: COMPLETED | OUTPATIENT
Start: 2019-01-28 | End: 2019-01-31

## 2019-01-28 RX ORDER — CLOPIDOGREL BISULFATE 75 MG/1
75 TABLET, FILM COATED ORAL DAILY
Refills: 0 | Status: DISCONTINUED | OUTPATIENT
Start: 2019-01-28 | End: 2019-02-08

## 2019-01-28 RX ORDER — LORATADINE 10 MG/1
10 TABLET ORAL DAILY
Refills: 0 | Status: DISCONTINUED | OUTPATIENT
Start: 2019-01-28 | End: 2019-02-08

## 2019-01-28 RX ORDER — VANCOMYCIN HCL 1 G
1000 VIAL (EA) INTRAVENOUS ONCE
Refills: 0 | Status: COMPLETED | OUTPATIENT
Start: 2019-01-28 | End: 2019-01-28

## 2019-01-28 RX ORDER — HYDRALAZINE HCL 50 MG
50 TABLET ORAL EVERY 8 HOURS
Refills: 0 | Status: DISCONTINUED | OUTPATIENT
Start: 2019-01-28 | End: 2019-02-08

## 2019-01-28 RX ORDER — PIPERACILLIN AND TAZOBACTAM 4; .5 G/20ML; G/20ML
2.25 INJECTION, POWDER, LYOPHILIZED, FOR SOLUTION INTRAVENOUS ONCE
Refills: 0 | Status: COMPLETED | OUTPATIENT
Start: 2019-01-28 | End: 2019-01-28

## 2019-01-28 RX ORDER — METOPROLOL TARTRATE 50 MG
50 TABLET ORAL
Refills: 0 | Status: DISCONTINUED | OUTPATIENT
Start: 2019-01-28 | End: 2019-02-08

## 2019-01-28 RX ORDER — ISOSORBIDE MONONITRATE 60 MG/1
30 TABLET, EXTENDED RELEASE ORAL DAILY
Refills: 0 | Status: DISCONTINUED | OUTPATIENT
Start: 2019-01-28 | End: 2019-02-08

## 2019-01-28 RX ORDER — DULOXETINE HYDROCHLORIDE 30 MG/1
20 CAPSULE, DELAYED RELEASE ORAL DAILY
Refills: 0 | Status: DISCONTINUED | OUTPATIENT
Start: 2019-01-28 | End: 2019-02-08

## 2019-01-28 RX ORDER — ERYTHROPOIETIN 10000 [IU]/ML
8000 INJECTION, SOLUTION INTRAVENOUS; SUBCUTANEOUS
Refills: 0 | Status: DISCONTINUED | OUTPATIENT
Start: 2019-01-28 | End: 2019-01-28

## 2019-01-28 RX ORDER — LEVOTHYROXINE SODIUM 125 MCG
100 TABLET ORAL DAILY
Refills: 0 | Status: DISCONTINUED | OUTPATIENT
Start: 2019-01-28 | End: 2019-02-08

## 2019-01-28 RX ORDER — ATORVASTATIN CALCIUM 80 MG/1
80 TABLET, FILM COATED ORAL AT BEDTIME
Refills: 0 | Status: DISCONTINUED | OUTPATIENT
Start: 2019-01-28 | End: 2019-02-08

## 2019-01-28 RX ORDER — HYDROXYZINE HCL 10 MG
50 TABLET ORAL
Refills: 0 | Status: DISCONTINUED | OUTPATIENT
Start: 2019-01-28 | End: 2019-02-01

## 2019-01-28 RX ORDER — IPRATROPIUM/ALBUTEROL SULFATE 18-103MCG
3 AEROSOL WITH ADAPTER (GRAM) INHALATION EVERY 6 HOURS
Refills: 0 | Status: DISCONTINUED | OUTPATIENT
Start: 2019-01-28 | End: 2019-02-07

## 2019-01-28 RX ORDER — ASPIRIN/CALCIUM CARB/MAGNESIUM 324 MG
81 TABLET ORAL DAILY
Refills: 0 | Status: DISCONTINUED | OUTPATIENT
Start: 2019-01-28 | End: 2019-02-08

## 2019-01-28 RX ORDER — GABAPENTIN 400 MG/1
100 CAPSULE ORAL THREE TIMES A DAY
Refills: 0 | Status: DISCONTINUED | OUTPATIENT
Start: 2019-01-28 | End: 2019-02-05

## 2019-01-28 RX ORDER — PIPERACILLIN AND TAZOBACTAM 4; .5 G/20ML; G/20ML
3.38 INJECTION, POWDER, LYOPHILIZED, FOR SOLUTION INTRAVENOUS EVERY 12 HOURS
Refills: 0 | Status: DISCONTINUED | OUTPATIENT
Start: 2019-01-28 | End: 2019-01-31

## 2019-01-28 RX ORDER — VANCOMYCIN HCL 1 G
1000 VIAL (EA) INTRAVENOUS
Refills: 0 | Status: DISCONTINUED | OUTPATIENT
Start: 2019-01-28 | End: 2019-01-29

## 2019-01-28 RX ORDER — LOSARTAN POTASSIUM 100 MG/1
50 TABLET, FILM COATED ORAL DAILY
Refills: 0 | Status: DISCONTINUED | OUTPATIENT
Start: 2019-01-28 | End: 2019-02-08

## 2019-01-28 RX ORDER — ERYTHROPOIETIN 10000 [IU]/ML
8000 INJECTION, SOLUTION INTRAVENOUS; SUBCUTANEOUS
Refills: 0 | Status: DISCONTINUED | OUTPATIENT
Start: 2019-01-28 | End: 2019-02-08

## 2019-01-28 RX ORDER — PANTOPRAZOLE SODIUM 20 MG/1
40 TABLET, DELAYED RELEASE ORAL
Refills: 0 | Status: DISCONTINUED | OUTPATIENT
Start: 2019-01-28 | End: 2019-02-08

## 2019-01-28 RX ORDER — AMLODIPINE BESYLATE 2.5 MG/1
10 TABLET ORAL DAILY
Refills: 0 | Status: DISCONTINUED | OUTPATIENT
Start: 2019-01-28 | End: 2019-02-05

## 2019-01-28 RX ADMIN — Medication 1000 MILLIGRAM(S): at 17:34

## 2019-01-28 RX ADMIN — PIPERACILLIN AND TAZOBACTAM 2.25 GRAM(S): 4; .5 INJECTION, POWDER, LYOPHILIZED, FOR SOLUTION INTRAVENOUS at 16:28

## 2019-01-28 RX ADMIN — ERYTHROPOIETIN 8000 UNIT(S): 10000 INJECTION, SOLUTION INTRAVENOUS; SUBCUTANEOUS at 22:53

## 2019-01-28 RX ADMIN — Medication 3 MILLILITER(S): at 20:31

## 2019-01-28 RX ADMIN — PIPERACILLIN AND TAZOBACTAM 200 GRAM(S): 4; .5 INJECTION, POWDER, LYOPHILIZED, FOR SOLUTION INTRAVENOUS at 15:57

## 2019-01-28 RX ADMIN — Medication 50 MILLIGRAM(S): at 19:03

## 2019-01-28 RX ADMIN — Medication 250 MILLIGRAM(S): at 16:27

## 2019-01-28 NOTE — ED PROVIDER NOTE - OBJECTIVE STATEMENT
79 year old male with PMH ESRD on HD, CAD s/p CABG, lung cancer s/p radiation with subsequent pulmonary fibrosis on 2L home O2 presents with SOB. Pt states that his Sx started today. He was at the vascular surgeon to have is fistula be evaluated and see if it was ready to be sued, when he was found to be hypoxic on RA to 78%. No chest pain, fever, chills, vomiting, leg swelling, but he does c/o cough.

## 2019-01-28 NOTE — H&P ADULT - NSHPPHYSICALEXAM_GEN_ALL_CORE
T(C): 37.1 (01-28-19 @ 19:34), Max: 37.1 (01-28-19 @ 19:34)  HR: 59 (01-28-19 @ 19:34) (59 - 70)  BP: 146/98 (01-28-19 @ 19:34) (146/98 - 170/61)  RR: 20 (01-28-19 @ 19:34) (20 - 22)  SpO2: 94% (01-28-19 @ 19:34) (88% - 96%)  Wt(kg): --    Physical Exam:   GENERAL: well-groomed, well-developed, NAD  HEENT: head NC/AT; EOM intact, conjunctiva & sclera clear; hearing grossly intact, moist mucous membranes  NECK: supple, no JVD  RESPIRATORY: coarse breath sounds b/l, no rales or wheezes  CARDIOVASCULAR: S1&S2, RRR, no murmurs or gallops  ABDOMEN: soft, non-tender, non-distended, + Bowel sounds x4 quadrants, no guarding, rebound or rigidity  MUSCULOSKELETAL:  no clubbing, cyanosis or edema of all 4 extremities  LYMPH: no cervical lymphadenopathy  VASCULAR: Radial pulses 2+ bilaterally, no varicose veins   SKIN: warm and dry, color normal  NEUROLOGIC: AA&O X3, CN2-12 intact w/ no focal deficits, no sensory loss, motor Strength 5/5 in UE & LE B/L  Psych: Normal mood and affect, normal behavior

## 2019-01-28 NOTE — H&P ADULT - NSHPSOCIALHISTORY_GEN_ALL_CORE
Denies fam hx of ESRD in mother and father.   Denies current use of etoh, tobacco and drug use. Was a former smoker

## 2019-01-28 NOTE — ED ADULT NURSE NOTE - NSIMPLEMENTINTERV_GEN_ALL_ED
Implemented All Universal Safety Interventions:  [place] to call system. Call bell, personal items and telephone within reach. Instruct patient to call for assistance. Room bathroom lighting operational. Non-slip footwear when patient is off stretcher. Physically safe environment: no spills, clutter or unnecessary equipment. Stretcher in lowest position, wheels locked, appropriate side rails in place.

## 2019-01-28 NOTE — H&P ADULT - HISTORY OF PRESENT ILLNESS
Pt is a 80yo M presenting w/ SOB found to have HCAP. PMH ESRD on HD, hx of lung cancer s/p radiation, HTN, Anemia, CAD s/p CABG, HLD, recent HCAP tx 3 weeks ago.   Patient has been having cough and SOB for the past 2-3 days and it has gotten progressively worse, he was ofund to be hypoxic earlier today w/ SpO2 78% and sent to the ED. He denies any fevers, chills but states he feels weak. He uses O2 at home 2-3 L NC. Earlier today he was sent in from his vas surgeons office due to the hypoxia. He denies any chest pain, palpitations, abd pain, diarrhea, constipation, melena, hematochezia. He does not produce urine.   He has no other complaints aside from feeling itchy. He denies any allergies to medications.   In ED patient was found to have RUL consolidation, RVP is pending, he also had elevated troponin and seen by cardiology. He has also been seen by nephro. Started on IV abx.

## 2019-01-28 NOTE — ED ADULT NURSE REASSESSMENT NOTE - NS ED NURSE REASSESS COMMENT FT1
spoke to dialyses and they will be getting pt later tonight, per Renal MD, procrit for dialyses RN to give

## 2019-01-28 NOTE — ED PROVIDER NOTE - CARE PLAN
Principal Discharge DX:	HCAP (healthcare-associated pneumonia)  Secondary Diagnosis:	ESRD (end stage renal disease) on dialysis  Secondary Diagnosis:	Acute on chronic respiratory failure with hypoxia

## 2019-01-28 NOTE — ED ADULT NURSE NOTE - CHIEF COMPLAINT QUOTE
80y/o male sent from vascular center for low O2Sat. Pt aox3, resp labored without O2. Fistula noted to left arm, pink band placed,  bedside for evaluation

## 2019-01-28 NOTE — PROGRESS NOTE ADULT - SUBJECTIVE AND OBJECTIVE BOX
hd for 2hrs via rt chest  CVC - uneventful,  biopatch  drsg placed, site clear & dry' uf goal 1kg met;     O2 sat 96% @ 4lpm via cannula; denies any discomfort;     Reports given to  Nasrin LESTER;     back to ED via jennifer padron/ RN escort.    Patient was seen and evaluated on dialysis.   No c/o CP SOB NV  no F/C  no swelling    T(C): 36.6 (01-29-19 @ 07:40), Max: 37.2 (01-28-19 @ 21:30)  HR: 61 (01-29-19 @ 07:40) (59 - 70)  BP: 132/64 (01-29-19 @ 07:40) (132/64 - 170/61)    PE ;  NAD  lungs - Coarse rales,  CV gr 1 murmur,  No gallop or rub  Abd : soft, NT BS +, No masses  Ext- No edema  Neuro : Grossly intact, moving extremities                         9.1    9.3   )-----------( 224      ( 28 Jan 2019 11:35 )             30.3        01-28    136  |  95<L>  |  15.0  ----------------------------<  138<H>  4.0   |  30.0<H>  |  5.31<H>    Ca    8.1<L>      28 Jan 2019 11:35    TPro  7.4  /  Alb  3.5  /  TBili  0.3<L>  /  DBili  x   /  AST  40<H>  /  ALT  17  /  AlkPhos  62  01-28    MEDICATIONS  (STANDING):  ALBUTerol/ipratropium for Nebulization  amLODIPine   Tablet  aspirin enteric coated  atorvastatin  benzonatate PRN  clopidogrel Tablet  DULoxetine  epoetin nguyễn Injectable  gabapentin  heparin  Injectable  hydrALAZINE  hydrOXYzine hydrochloride  isosorbide   mononitrate ER Tablet (IMDUR)  levothyroxine  loratadine  losartan  metoprolol tartrate  multivitamin  pantoprazole    Tablet  piperacillin/tazobactam IVPB.  vancomycin  IVPB    Patient stable  [facility] HD easily  Continue M W F,    D/W [facility],       [facility]F Needs PTA,

## 2019-01-28 NOTE — H&P ADULT - ASSESSMENT
Pt is a 80yo M presenting w/ SOB found to have HCAP. PMH ESRD on HD, hx of lung cancer s/p radiation, HTN, Anemia, CAD s/p CABG, HLD, recent HCAP tx 3 weeks ago.     -HCAP: will continue Vanco and Zosyn. F/u cultures. Monitor for fevers and WBC count. CT chest reviewed.   -Acute hypoxic resp failure 2/2 HCAP: continue O2, place on tele and pulse ox. Duonebs as ordered.   -Elevated Troponin: medical management, possibly due to microvasc ischemia. Evaluated by cardiology today, continue to monitor clinically.   -CAD s/p CABG: continue ASA, plavix, lopressor, imdur, lipitor and losartan.   -ESRD on HD: UF as tolerated as recommended by Dr. Miles  -Anemia of chronic disease: EPO Hgb 9.1 and stable.   -HLD: continue lipitor.   DVT ppx: Heparin  Patient is full code.

## 2019-01-28 NOTE — ED PROVIDER NOTE - CPE EDP NEURO NORM
Spoke with patient re: other TT. She states she was just about to send a [employer] message. About 3 days ago, patient had a migraine. At this time, patient developed a L ear \"sensitivity to noise. \" Reports there is a \"constant buzzing. \" L ear feels \"domo normal...

## 2019-01-28 NOTE — ED ADULT TRIAGE NOTE - CHIEF COMPLAINT QUOTE
78y/o male sent from vascular center for low O2Sat. Pt aox3, resp labored without O2. Fistula noted to left arm, pink band placed,  bedside for evaluation

## 2019-01-29 DIAGNOSIS — C34.90 MALIGNANT NEOPLASM OF UNSPECIFIED PART OF UNSPECIFIED BRONCHUS OR LUNG: ICD-10-CM

## 2019-01-29 DIAGNOSIS — J18.9 PNEUMONIA, UNSPECIFIED ORGANISM: ICD-10-CM

## 2019-01-29 LAB
ANION GAP SERPL CALC-SCNC: 13 MMOL/L — SIGNIFICANT CHANGE UP (ref 5–17)
BASOPHILS # BLD AUTO: 0.1 K/UL — SIGNIFICANT CHANGE UP (ref 0–0.2)
BASOPHILS NFR BLD AUTO: 1.5 % — SIGNIFICANT CHANGE UP (ref 0–2)
BUN SERPL-MCNC: 13 MG/DL — SIGNIFICANT CHANGE UP (ref 8–20)
CALCIUM SERPL-MCNC: 8.3 MG/DL — LOW (ref 8.6–10.2)
CHLORIDE SERPL-SCNC: 95 MMOL/L — LOW (ref 98–107)
CHOLEST SERPL-MCNC: 159 MG/DL — SIGNIFICANT CHANGE UP (ref 110–199)
CO2 SERPL-SCNC: 30 MMOL/L — HIGH (ref 22–29)
CREAT SERPL-MCNC: 4.87 MG/DL — HIGH (ref 0.5–1.3)
EOSINOPHIL # BLD AUTO: 1.1 K/UL — HIGH (ref 0–0.5)
EOSINOPHIL NFR BLD AUTO: 11.5 % — HIGH (ref 0–6)
FERRITIN SERPL-MCNC: 1049 NG/ML — HIGH (ref 30–400)
GLUCOSE SERPL-MCNC: 128 MG/DL — HIGH (ref 70–115)
HCT VFR BLD CALC: 31.7 % — LOW (ref 42–52)
HDLC SERPL-MCNC: 32 MG/DL — LOW
HGB BLD-MCNC: 9.2 G/DL — LOW (ref 14–18)
IRON SATN MFR SERPL: 14 % — LOW (ref 16–55)
IRON SATN MFR SERPL: 34 UG/DL — LOW (ref 59–158)
LIPID PNL WITH DIRECT LDL SERPL: 94 MG/DL — SIGNIFICANT CHANGE UP
LYMPHOCYTES # BLD AUTO: 2.7 K/UL — SIGNIFICANT CHANGE UP (ref 1–4.8)
LYMPHOCYTES # BLD AUTO: 29 % — SIGNIFICANT CHANGE UP (ref 20–55)
MAGNESIUM SERPL-MCNC: 2 MG/DL — SIGNIFICANT CHANGE UP (ref 1.6–2.6)
MCHC RBC-ENTMCNC: 28.1 PG — SIGNIFICANT CHANGE UP (ref 27–31)
MCHC RBC-ENTMCNC: 29 G/DL — LOW (ref 32–36)
MCV RBC AUTO: 96.9 FL — HIGH (ref 80–94)
MONOCYTES # BLD AUTO: 1.3 K/UL — HIGH (ref 0–0.8)
MONOCYTES NFR BLD AUTO: 14.1 % — HIGH (ref 3–10)
NEUTROPHILS # BLD AUTO: 4.1 K/UL — SIGNIFICANT CHANGE UP (ref 1.8–8)
NEUTROPHILS NFR BLD AUTO: 43.6 % — SIGNIFICANT CHANGE UP (ref 37–73)
PHOSPHATE SERPL-MCNC: 3.6 MG/DL — SIGNIFICANT CHANGE UP (ref 2.4–4.7)
PLATELET # BLD AUTO: 254 K/UL — SIGNIFICANT CHANGE UP (ref 150–400)
POTASSIUM SERPL-MCNC: 4.3 MMOL/L — SIGNIFICANT CHANGE UP (ref 3.5–5.3)
POTASSIUM SERPL-SCNC: 4.3 MMOL/L — SIGNIFICANT CHANGE UP (ref 3.5–5.3)
PROCALCITONIN SERPL-MCNC: 0.43 NG/ML — HIGH (ref 0–0.04)
RAPID RVP RESULT: SIGNIFICANT CHANGE UP
RBC # BLD: 3.27 M/UL — LOW (ref 4.6–6.2)
RBC # FLD: 15.6 % — SIGNIFICANT CHANGE UP (ref 11–15.6)
SODIUM SERPL-SCNC: 138 MMOL/L — SIGNIFICANT CHANGE UP (ref 135–145)
TIBC SERPL-MCNC: 235 UG/DL — SIGNIFICANT CHANGE UP (ref 220–430)
TOTAL CHOLESTEROL/HDL RATIO MEASUREMENT: 5 RATIO — SIGNIFICANT CHANGE UP (ref 3.4–9.6)
TRANSFERRIN SERPL-MCNC: 164 MG/DL — LOW (ref 180–329)
TRIGL SERPL-MCNC: 167 MG/DL — SIGNIFICANT CHANGE UP (ref 10–200)
TROPONIN T SERPL-MCNC: 0.13 NG/ML — HIGH (ref 0–0.06)
TSH SERPL-MCNC: 1.9 UIU/ML — SIGNIFICANT CHANGE UP (ref 0.27–4.2)
VANCOMYCIN TROUGH SERPL-MCNC: 13.8 UG/ML — SIGNIFICANT CHANGE UP (ref 10–20)
WBC # BLD: 9.4 K/UL — SIGNIFICANT CHANGE UP (ref 4.8–10.8)
WBC # FLD AUTO: 9.4 K/UL — SIGNIFICANT CHANGE UP (ref 4.8–10.8)

## 2019-01-29 PROCEDURE — 99233 SBSQ HOSP IP/OBS HIGH 50: CPT

## 2019-01-29 PROCEDURE — 93010 ELECTROCARDIOGRAM REPORT: CPT

## 2019-01-29 PROCEDURE — 99223 1ST HOSP IP/OBS HIGH 75: CPT

## 2019-01-29 RX ORDER — DOCUSATE SODIUM 100 MG
100 CAPSULE ORAL THREE TIMES A DAY
Refills: 0 | Status: DISCONTINUED | OUTPATIENT
Start: 2019-01-29 | End: 2019-02-08

## 2019-01-29 RX ORDER — VANCOMYCIN HCL 1 G
1000 VIAL (EA) INTRAVENOUS
Refills: 0 | Status: DISCONTINUED | OUTPATIENT
Start: 2019-01-29 | End: 2019-01-29

## 2019-01-29 RX ORDER — SENNA PLUS 8.6 MG/1
2 TABLET ORAL AT BEDTIME
Refills: 0 | Status: DISCONTINUED | OUTPATIENT
Start: 2019-01-29 | End: 2019-02-08

## 2019-01-29 RX ORDER — POLYETHYLENE GLYCOL 3350 17 G/17G
17 POWDER, FOR SOLUTION ORAL DAILY
Refills: 0 | Status: DISCONTINUED | OUTPATIENT
Start: 2019-01-29 | End: 2019-02-08

## 2019-01-29 RX ADMIN — Medication 3 MILLILITER(S): at 08:39

## 2019-01-29 RX ADMIN — GABAPENTIN 100 MILLIGRAM(S): 400 CAPSULE ORAL at 00:16

## 2019-01-29 RX ADMIN — GABAPENTIN 100 MILLIGRAM(S): 400 CAPSULE ORAL at 06:01

## 2019-01-29 RX ADMIN — DULOXETINE HYDROCHLORIDE 20 MILLIGRAM(S): 30 CAPSULE, DELAYED RELEASE ORAL at 11:30

## 2019-01-29 RX ADMIN — Medication 100 MICROGRAM(S): at 06:00

## 2019-01-29 RX ADMIN — Medication 81 MILLIGRAM(S): at 11:30

## 2019-01-29 RX ADMIN — GABAPENTIN 100 MILLIGRAM(S): 400 CAPSULE ORAL at 15:34

## 2019-01-29 RX ADMIN — ATORVASTATIN CALCIUM 80 MILLIGRAM(S): 80 TABLET, FILM COATED ORAL at 00:16

## 2019-01-29 RX ADMIN — PIPERACILLIN AND TAZOBACTAM 25 GRAM(S): 4; .5 INJECTION, POWDER, LYOPHILIZED, FOR SOLUTION INTRAVENOUS at 18:35

## 2019-01-29 RX ADMIN — GABAPENTIN 100 MILLIGRAM(S): 400 CAPSULE ORAL at 21:34

## 2019-01-29 RX ADMIN — Medication 50 MILLIGRAM(S): at 06:03

## 2019-01-29 RX ADMIN — Medication 50 MILLIGRAM(S): at 06:01

## 2019-01-29 RX ADMIN — AMLODIPINE BESYLATE 10 MILLIGRAM(S): 2.5 TABLET ORAL at 06:00

## 2019-01-29 RX ADMIN — ISOSORBIDE MONONITRATE 30 MILLIGRAM(S): 60 TABLET, EXTENDED RELEASE ORAL at 11:30

## 2019-01-29 RX ADMIN — HEPARIN SODIUM 5000 UNIT(S): 5000 INJECTION INTRAVENOUS; SUBCUTANEOUS at 06:03

## 2019-01-29 RX ADMIN — Medication 50 MILLIGRAM(S): at 06:00

## 2019-01-29 RX ADMIN — HEPARIN SODIUM 5000 UNIT(S): 5000 INJECTION INTRAVENOUS; SUBCUTANEOUS at 18:35

## 2019-01-29 RX ADMIN — ATORVASTATIN CALCIUM 80 MILLIGRAM(S): 80 TABLET, FILM COATED ORAL at 21:34

## 2019-01-29 RX ADMIN — LOSARTAN POTASSIUM 50 MILLIGRAM(S): 100 TABLET, FILM COATED ORAL at 06:00

## 2019-01-29 RX ADMIN — PANTOPRAZOLE SODIUM 40 MILLIGRAM(S): 20 TABLET, DELAYED RELEASE ORAL at 06:00

## 2019-01-29 RX ADMIN — LORATADINE 10 MILLIGRAM(S): 10 TABLET ORAL at 06:06

## 2019-01-29 RX ADMIN — Medication 3 MILLILITER(S): at 14:35

## 2019-01-29 RX ADMIN — Medication 50 MILLIGRAM(S): at 00:17

## 2019-01-29 RX ADMIN — Medication 50 MILLIGRAM(S): at 18:35

## 2019-01-29 RX ADMIN — Medication 50 MILLIGRAM(S): at 15:35

## 2019-01-29 RX ADMIN — Medication 100 MILLIGRAM(S): at 21:34

## 2019-01-29 RX ADMIN — Medication 100 MILLIGRAM(S): at 00:17

## 2019-01-29 RX ADMIN — Medication 1 TABLET(S): at 11:30

## 2019-01-29 RX ADMIN — CLOPIDOGREL BISULFATE 75 MILLIGRAM(S): 75 TABLET, FILM COATED ORAL at 11:30

## 2019-01-29 RX ADMIN — SENNA PLUS 2 TABLET(S): 8.6 TABLET ORAL at 21:34

## 2019-01-29 RX ADMIN — Medication 50 MILLIGRAM(S): at 21:34

## 2019-01-29 RX ADMIN — PIPERACILLIN AND TAZOBACTAM 25 GRAM(S): 4; .5 INJECTION, POWDER, LYOPHILIZED, FOR SOLUTION INTRAVENOUS at 06:01

## 2019-01-29 NOTE — PROGRESS NOTE ADULT - SUBJECTIVE AND OBJECTIVE BOX
[facility] DIVISION OF KIDNEY DISEASES AND HYPERTENSION -- FOLLOW UP NOTE  --------------------------------------------------------------------------------  Chief Complaint:  ESRD on HD    24 hour events/subjective:  Pt seen and examined today  NAD  HD yesterday      PAST HISTORY  --------------------------------------------------------------------------------  No significant changes to PMH, PSH, FHx, SHx, unless otherwise noted    ALLERGIES & MEDICATIONS  --------------------------------------------------------------------------------  Allergies    No Known Allergies    Intolerances      Standing Inpatient Medications  ALBUTerol/ipratropium for Nebulization 3 milliLiter(s) Nebulizer every 6 hours  amLODIPine   Tablet 10 milliGRAM(s) Oral daily  aspirin enteric coated 81 milliGRAM(s) Oral daily  atorvastatin 80 milliGRAM(s) Oral at bedtime  clopidogrel Tablet 75 milliGRAM(s) Oral daily  DULoxetine 20 milliGRAM(s) Oral daily  epoetin nguyễn Injectable 8000 Unit(s) IV Push <User Schedule>  gabapentin 100 milliGRAM(s) Oral three times a day  heparin  Injectable 5000 Unit(s) SubCutaneous every 12 hours  hydrALAZINE 50 milliGRAM(s) Oral every 8 hours  hydrOXYzine hydrochloride 50 milliGRAM(s) Oral two times a day  isosorbide   mononitrate ER Tablet (IMDUR) 30 milliGRAM(s) Oral daily  levothyroxine 100 MICROGram(s) Oral daily  loratadine 10 milliGRAM(s) Oral daily  losartan 50 milliGRAM(s) Oral daily  metoprolol tartrate 50 milliGRAM(s) Oral two times a day  multivitamin 1 Tablet(s) Oral daily  pantoprazole    Tablet 40 milliGRAM(s) Oral before breakfast  piperacillin/tazobactam IVPB. 3.375 Gram(s) IV Intermittent every 12 hours  vancomycin  IVPB 1000 milliGRAM(s) IV Intermittent <User Schedule>    PRN Inpatient Medications  benzonatate 100 milliGRAM(s) Oral every 8 hours PRN      REVIEW OF SYSTEMS  --------------------------------------------------------------------------------  CONSTITUTIONAL: + fever, weight loss,  fatigue  EYES: No eye pain, visual disturbances, or discharge  ENMT:  No difficulty hearing, tinnitus, vertigo; No sinus or throat pain  NECK: No pain or stiffness  RESPIRATORY: + cough, wheezing, chills , No  hemoptysis; + shortness of breath  CARDIOVASCULAR: No chest pain, palpitations, dizziness, or leg swelling  GASTROINTESTINAL: No abdominal or epigastric pain. No nausea, vomiting, or hematemesis; No diarrhea or constipation. No melena or hematochezia.  GENITOURINARY: No dysuria, frequency, hematuria, or incontinence  NEUROLOGICAL: No headaches, memory loss, loss of strength, numbness, or tremors  SKIN: + itching, No burning, rashes, or lesions   LYMPH NODES: No enlarged glands  ENDOCRINE: No heat or cold intolerance; No hair loss  MUSCULOSKELETAL: No joint pain or swelling; No muscle, back, or extremity pain  PSYCHIATRIC: No depression, anxiety, mood swings, or difficulty sleeping  HEME/LYMPH: No easy bruising, or bleeding gums  ALLERGY AND IMMUNOLOGIC: No hives or eczema    VITALS/PHYSICAL EXAM  --------------------------------------------------------------------------------  T(C): 36.6 (01-29-19 @ 11:56), Max: 37.2 (01-28-19 @ 21:30)  HR: 65 (01-29-19 @ 11:56) (59 - 70)  BP: 127/61 (01-29-19 @ 11:56) (127/61 - 166/63)  RR: 18 (01-29-19 @ 11:56) (18 - 20)  SpO2: 90% (01-29-19 @ 11:56) (90% - 96%)  Wt(kg): --  Height (cm): 175.26 (01-28-19 @ 11:04)  Weight (kg): 77.1 (01-28-19 @ 11:04)  BMI (kg/m2): 25.1 (01-28-19 @ 11:04)  BSA (m2): 1.93 (01-28-19 @ 11:04)      01-28-19 @ 07:01  -  01-29-19 @ 07:00  --------------------------------------------------------  IN: 0 mL / OUT: 1000 mL / NET: -1000 mL      Physical Exam:    	Gen: NAD, frail, debilitated, pale  	HEENT: PERRL, supple neck, clear oropharynx  	Pulm:  Dull to percussion bilaterally; + Dry  rales, rhonchi, wheezing, No rubs  	CV: RRR, S1S2; no rub  	Back: No spinal or CVA tenderness; no sacral edema  	Abd: +BS, soft, nontender/nondistended  	: No suprapubic tenderness  	UE: Warm, FROM, no clubbing, intact strength; no edema; no asterixis  	LE: Warm, FROM, no clubbing, intact strength; minimal edema  	Neuro: No focal deficits, intact gait  	Psych: Normal affect and mood  	Skin: Warm, without rashes  	Vascular access: AVF , Right CVC    LABS/STUDIES  --------------------------------------------------------------------------------              9.2    9.4   >-----------<  254      [01-29-19 @ 11:39]              31.7     136  |  95  |  15.0  ----------------------------<  138      [01-28-19 @ 11:35]  4.0   |  30.0  |  5.31        Ca     8.1     [01-28-19 @ 11:35]    TPro  7.4  /  Alb  3.5  /  TBili  0.3  /  DBili  x   /  AST  40  /  ALT  17  /  AlkPhos  62  [01-28-19 @ 11:35]        Troponin 0.13      [01-28-19 @ 11:35]    Creatinine Trend:  SCr 5.31 [01-28 @ 11:35]  SCr 4.67 [01-08 @ 10:39]  SCr 6.42 [01-07 @ 09:13]  SCr 4.38 [01-03 @ 08:16]  SCr 4.73 [12-31 @ 08:15]    Urinalysis - [01-01-19 @ 15:02]      Color Red / Appearance Bloody / SG 1.015 / pH 6.0      Gluc Negative / Ketone Negative  / Bili Negative / Urobili Negative       Blood Large / Protein 500 / Leuk Est Trace / Nitrite Negative      RBC TNTC / WBC 3-5 / Hyaline  / Gran  / Sq Epi  / Non Sq Epi Negative / Bacteria Occasional      Iron 46, TIBC 259, %sat 18      [01-01-19 @ 09:11]  Ferritin 470      [01-01-19 @ 09:11]  TSH 7.33      [11-29-18 @ 02:31]    HBsAb <3.0      [01-08-19 @ 17:03]  HBsAg Nonreact      [01-08-19 @ 17:03]  HCV 0.12, Nonreact      [01-08-19 @ 17:03]

## 2019-01-29 NOTE — PROGRESS NOTE ADULT - ASSESSMENT
1. ESRD on HD  2. RUL PN, Radiation Pneumonitis  3. COPD - O2 dependent  4. Cardiomegally - S/P CABG, Severe LV dysfunction  5. Malnutrition   6. Anemia      HD in am w. UF  Continue IV Antibiotics  On EPO  Continue nutritional support  Oxygen, drug nebs and respiratory treatments as per respiratory  Continue current management

## 2019-01-29 NOTE — PHARMACOTHERAPY INTERVENTION NOTE - COMMENTS
Vanco dose adjusted to 1g TIW on T/Th/S to be given post-hemodialysis on HD days only  Vanco levels ordered for 1/29, 1/31, 2/2 -- all levels to be drawn BEFORE hemodialysis on HD days only

## 2019-01-30 DIAGNOSIS — J96.21 ACUTE AND CHRONIC RESPIRATORY FAILURE WITH HYPOXIA: ICD-10-CM

## 2019-01-30 LAB
ANION GAP SERPL CALC-SCNC: 14 MMOL/L — SIGNIFICANT CHANGE UP (ref 5–17)
BUN SERPL-MCNC: 22 MG/DL — HIGH (ref 8–20)
CALCIUM SERPL-MCNC: 8.4 MG/DL — LOW (ref 8.6–10.2)
CHLORIDE SERPL-SCNC: 98 MMOL/L — SIGNIFICANT CHANGE UP (ref 98–107)
CO2 SERPL-SCNC: 28 MMOL/L — SIGNIFICANT CHANGE UP (ref 22–29)
CREAT SERPL-MCNC: 6.2 MG/DL — HIGH (ref 0.5–1.3)
GLUCOSE SERPL-MCNC: 91 MG/DL — SIGNIFICANT CHANGE UP (ref 70–115)
GRAM STN FLD: SIGNIFICANT CHANGE UP
HCT VFR BLD CALC: 30.7 % — LOW (ref 42–52)
HGB BLD-MCNC: 9.2 G/DL — LOW (ref 14–18)
MAGNESIUM SERPL-MCNC: 2.3 MG/DL — SIGNIFICANT CHANGE UP (ref 1.6–2.6)
MCHC RBC-ENTMCNC: 28.6 PG — SIGNIFICANT CHANGE UP (ref 27–31)
MCHC RBC-ENTMCNC: 30 G/DL — LOW (ref 32–36)
MCV RBC AUTO: 95.3 FL — HIGH (ref 80–94)
MRSA PCR RESULT.: DETECTED
PHOSPHATE SERPL-MCNC: 5 MG/DL — HIGH (ref 2.4–4.7)
PLATELET # BLD AUTO: 272 K/UL — SIGNIFICANT CHANGE UP (ref 150–400)
POTASSIUM SERPL-MCNC: 4.6 MMOL/L — SIGNIFICANT CHANGE UP (ref 3.5–5.3)
POTASSIUM SERPL-SCNC: 4.6 MMOL/L — SIGNIFICANT CHANGE UP (ref 3.5–5.3)
RBC # BLD: 3.22 M/UL — LOW (ref 4.6–6.2)
RBC # FLD: 15.7 % — HIGH (ref 11–15.6)
S AUREUS DNA NOSE QL NAA+PROBE: DETECTED
SODIUM SERPL-SCNC: 140 MMOL/L — SIGNIFICANT CHANGE UP (ref 135–145)
SPECIMEN SOURCE: SIGNIFICANT CHANGE UP
TSH SERPL-MCNC: 2.07 UIU/ML — SIGNIFICANT CHANGE UP (ref 0.27–4.2)
WBC # BLD: 9.7 K/UL — SIGNIFICANT CHANGE UP (ref 4.8–10.8)
WBC # FLD AUTO: 9.7 K/UL — SIGNIFICANT CHANGE UP (ref 4.8–10.8)

## 2019-01-30 PROCEDURE — 90937 HEMODIALYSIS REPEATED EVAL: CPT

## 2019-01-30 PROCEDURE — 99233 SBSQ HOSP IP/OBS HIGH 50: CPT

## 2019-01-30 RX ORDER — CHLORHEXIDINE GLUCONATE 213 G/1000ML
1 SOLUTION TOPICAL DAILY
Refills: 0 | Status: DISCONTINUED | OUTPATIENT
Start: 2019-01-30 | End: 2019-02-08

## 2019-01-30 RX ORDER — BENZOCAINE AND MENTHOL 5; 1 G/100ML; G/100ML
1 LIQUID ORAL
Refills: 0 | Status: DISCONTINUED | OUTPATIENT
Start: 2019-01-30 | End: 2019-02-08

## 2019-01-30 RX ORDER — ACETAMINOPHEN 500 MG
650 TABLET ORAL ONCE
Refills: 0 | Status: COMPLETED | OUTPATIENT
Start: 2019-01-30 | End: 2019-01-30

## 2019-01-30 RX ORDER — ACETYLCYSTEINE 200 MG/ML
4 VIAL (ML) MISCELLANEOUS EVERY 6 HOURS
Refills: 0 | Status: DISCONTINUED | OUTPATIENT
Start: 2019-01-30 | End: 2019-02-02

## 2019-01-30 RX ORDER — IRON SUCROSE 20 MG/ML
100 INJECTION, SOLUTION INTRAVENOUS EVERY 24 HOURS
Refills: 0 | Status: COMPLETED | OUTPATIENT
Start: 2019-01-30 | End: 2019-02-03

## 2019-01-30 RX ADMIN — Medication 1 TABLET(S): at 14:19

## 2019-01-30 RX ADMIN — GABAPENTIN 100 MILLIGRAM(S): 400 CAPSULE ORAL at 06:23

## 2019-01-30 RX ADMIN — Medication 100 MILLIGRAM(S): at 03:07

## 2019-01-30 RX ADMIN — HEPARIN SODIUM 5000 UNIT(S): 5000 INJECTION INTRAVENOUS; SUBCUTANEOUS at 17:59

## 2019-01-30 RX ADMIN — Medication 3 MILLILITER(S): at 09:50

## 2019-01-30 RX ADMIN — Medication 100 MICROGRAM(S): at 06:23

## 2019-01-30 RX ADMIN — Medication 50 MILLIGRAM(S): at 06:23

## 2019-01-30 RX ADMIN — Medication 100 MILLIGRAM(S): at 21:29

## 2019-01-30 RX ADMIN — Medication 50 MILLIGRAM(S): at 14:21

## 2019-01-30 RX ADMIN — DULOXETINE HYDROCHLORIDE 20 MILLIGRAM(S): 30 CAPSULE, DELAYED RELEASE ORAL at 17:57

## 2019-01-30 RX ADMIN — PIPERACILLIN AND TAZOBACTAM 25 GRAM(S): 4; .5 INJECTION, POWDER, LYOPHILIZED, FOR SOLUTION INTRAVENOUS at 17:59

## 2019-01-30 RX ADMIN — Medication 650 MILLIGRAM(S): at 16:23

## 2019-01-30 RX ADMIN — GABAPENTIN 100 MILLIGRAM(S): 400 CAPSULE ORAL at 14:18

## 2019-01-30 RX ADMIN — IRON SUCROSE 210 MILLIGRAM(S): 20 INJECTION, SOLUTION INTRAVENOUS at 10:42

## 2019-01-30 RX ADMIN — LORATADINE 10 MILLIGRAM(S): 10 TABLET ORAL at 14:19

## 2019-01-30 RX ADMIN — Medication 100 MILLIGRAM(S): at 06:24

## 2019-01-30 RX ADMIN — Medication 4 MILLILITER(S): at 20:55

## 2019-01-30 RX ADMIN — Medication 4 MILLILITER(S): at 15:52

## 2019-01-30 RX ADMIN — ISOSORBIDE MONONITRATE 30 MILLIGRAM(S): 60 TABLET, EXTENDED RELEASE ORAL at 14:19

## 2019-01-30 RX ADMIN — Medication 50 MILLIGRAM(S): at 17:59

## 2019-01-30 RX ADMIN — CLOPIDOGREL BISULFATE 75 MILLIGRAM(S): 75 TABLET, FILM COATED ORAL at 14:19

## 2019-01-30 RX ADMIN — Medication 3 MILLILITER(S): at 20:54

## 2019-01-30 RX ADMIN — Medication 50 MILLIGRAM(S): at 21:30

## 2019-01-30 RX ADMIN — Medication 3 MILLILITER(S): at 03:18

## 2019-01-30 RX ADMIN — HEPARIN SODIUM 5000 UNIT(S): 5000 INJECTION INTRAVENOUS; SUBCUTANEOUS at 06:24

## 2019-01-30 RX ADMIN — ATORVASTATIN CALCIUM 80 MILLIGRAM(S): 80 TABLET, FILM COATED ORAL at 21:29

## 2019-01-30 RX ADMIN — POLYETHYLENE GLYCOL 3350 17 GRAM(S): 17 POWDER, FOR SOLUTION ORAL at 14:17

## 2019-01-30 RX ADMIN — GABAPENTIN 100 MILLIGRAM(S): 400 CAPSULE ORAL at 21:29

## 2019-01-30 RX ADMIN — PIPERACILLIN AND TAZOBACTAM 25 GRAM(S): 4; .5 INJECTION, POWDER, LYOPHILIZED, FOR SOLUTION INTRAVENOUS at 06:23

## 2019-01-30 RX ADMIN — SENNA PLUS 2 TABLET(S): 8.6 TABLET ORAL at 21:29

## 2019-01-30 RX ADMIN — Medication 81 MILLIGRAM(S): at 14:19

## 2019-01-30 RX ADMIN — PANTOPRAZOLE SODIUM 40 MILLIGRAM(S): 20 TABLET, DELAYED RELEASE ORAL at 06:24

## 2019-01-30 RX ADMIN — Medication 650 MILLIGRAM(S): at 17:00

## 2019-01-30 RX ADMIN — Medication 100 MILLIGRAM(S): at 14:19

## 2019-01-30 RX ADMIN — CHLORHEXIDINE GLUCONATE 1 APPLICATION(S): 213 SOLUTION TOPICAL at 14:18

## 2019-01-30 RX ADMIN — Medication 3 MILLILITER(S): at 15:52

## 2019-01-30 RX ADMIN — AMLODIPINE BESYLATE 10 MILLIGRAM(S): 2.5 TABLET ORAL at 06:24

## 2019-01-30 RX ADMIN — ERYTHROPOIETIN 8000 UNIT(S): 10000 INJECTION, SOLUTION INTRAVENOUS; SUBCUTANEOUS at 10:41

## 2019-01-30 NOTE — PROGRESS NOTE ADULT - SUBJECTIVE AND OBJECTIVE BOX
[facility]                                                        Office: [address] [place] [address]                                                       Telephone: [phone]. Fax:[phone]      CARDIOLOGY PROGRESS NOTE   ([facility])    Subjective:    ROS: No headache, no chest pain, no SOB, no palpitations, no dizziness, no nausea, no bleeding    Chronic Conditions:     CURRENT MEDICATIONS  amLODIPine   Tablet 10 milliGRAM(s) Oral daily  hydrALAZINE 50 milliGRAM(s) Oral every 8 hours  isosorbide   mononitrate ER Tablet (IMDUR) 30 milliGRAM(s) Oral daily  losartan 50 milliGRAM(s) Oral daily  metoprolol tartrate 50 milliGRAM(s) Oral two times a day    piperacillin/tazobactam IVPB. 3.375 Gram(s) IV Intermittent every 12 hours    acetylcysteine 10%  Inhalation 4 milliLiter(s) Inhalation every 6 hours  ALBUTerol/ipratropium for Nebulization 3 milliLiter(s) Nebulizer every 6 hours  benzonatate 100 milliGRAM(s) Oral every 8 hours PRN  loratadine 10 milliGRAM(s) Oral daily    DULoxetine 20 milliGRAM(s) Oral daily  gabapentin 100 milliGRAM(s) Oral three times a day  hydrOXYzine hydrochloride 50 milliGRAM(s) Oral two times a day    docusate sodium 100 milliGRAM(s) Oral three times a day  pantoprazole    Tablet 40 milliGRAM(s) Oral before breakfast  polyethylene glycol 3350 17 Gram(s) Oral daily  senna 2 Tablet(s) Oral at bedtime    atorvastatin 80 milliGRAM(s) Oral at bedtime  levothyroxine 100 MICROGram(s) Oral daily    aspirin enteric coated 81 milliGRAM(s) Oral daily  benzocaine 15 mG/menthol 3.6 mG Lozenge 1 Lozenge Oral every 2 hours PRN  chlorhexidine 2% Cloths 1 Application(s) Topical daily  clopidogrel Tablet 75 milliGRAM(s) Oral daily  epoetin nguyễn Injectable 8000 Unit(s) IV Push <User Schedule>  heparin  Injectable 5000 Unit(s) SubCutaneous every 12 hours  iron sucrose IVPB 100 milliGRAM(s) IV Intermittent every 24 hours  multivitamin 1 Tablet(s) Oral daily    	  TELEMETRY:   Vitals:  T(C): 36.5 (01-30-19 @ 07:53), Max: 36.9 (01-29-19 @ 16:07)  HR: 63 (01-30-19 @ 05:55) (59 - 89)  BP: 135/66 (01-30-19 @ 05:55) (122/75 - 135/66)  RR: 18 (01-30-19 @ 05:55) (18 - 18)  SpO2: 92% (01-30-19 @ 07:45) (86% - 98%)  Wt(kg): --  I&O's Summary      Weight (kg): 57.9 (01-30 @ 05:55)  Daily T(C): 36.5 (01-30-19 @ 07:53), Max: 36.9 (01-29-19 @ 16:07)  HR: 63 (01-30-19 @ 05:55) (59 - 89)  BP: 135/66 (01-30-19 @ 05:55) (122/75 - 135/66)  RR: 18 (01-30-19 @ 05:55) (18 - 18)  SpO2: 92% (01-30-19 @ 07:45) (86% - 98%)  Wt(kg): --    Daily   Weight (kg): 57.9 (01-30 @ 05:55)    PHYSICAL EXAM:  Appearance: Normal	  HEENT:   Normal oral mucosa, PERRL, EOMI	  Lymphatic: No lymphadenopathy  Cardiovascular: Normal S1 S2, No JVD, No murmurs, No edema  Respiratory: Lungs clear to auscultation	  Psychiatry: A & O x 3, Mood & affect appropriate  Gastrointestinal:  Soft, Non-tender, + BS	  Skin: No rashes, No ecchymoses, No cyanosis  Neurologic: Non-focal  Extremities: Normal range of motion, No clubbing, cyanosis or edema  Vascular: Peripheral pulses palpable 2+ bilaterally, warm      ECG (tracing reviewed by me):  	    DIAGNOSTIC TESTING:  Echocardiogram (images reviewed by me):  Catheterization:  Stress Test:    CXR (image reviewed by me):  OTHER: 	    LABS:	 	  CARDIAC MARKERS:                                  9.2    9.4   )-----------( 254      ( 29 Jan 2019 11:39 )             31.7     01-29    138  |  95<L>  |  13.0  ----------------------------<  128<H>  4.3   |  30.0<H>  |  4.87<H>    Ca    8.3<L>      29 Jan 2019 11:39  Phos  3.6     01-29  Mg     2.0     01-29    TPro  7.4  /  Alb  3.5  /  TBili  0.3<L>  /  DBili  x   /  AST  40<H>  /  ALT  17  /  AlkPhos  62  01-28    BNP:   Lipid Profile:   HgA1c:   TSH: Thyroid Stimulating Hormone, Serum: 1.90 uIU/mL (01-29 @ 11:39) [facility]                                                        Office: [address] [place] [address]                                                       Telephone: [phone]. Fax:[phone]      CARDIOLOGY PROGRESS NOTE   ([facility])    Subjective:    ROS: No headache, no chest pain, no SOB, no palpitations, no dizziness, no nausea, no bleeding    Chronic Conditions:     CURRENT MEDICATIONS  amLODIPine   Tablet 10 milliGRAM(s) Oral daily  hydrALAZINE 50 milliGRAM(s) Oral every 8 hours  isosorbide   mononitrate ER Tablet (IMDUR) 30 milliGRAM(s) Oral daily  losartan 50 milliGRAM(s) Oral daily  metoprolol tartrate 50 milliGRAM(s) Oral two times a day    piperacillin/tazobactam IVPB. 3.375 Gram(s) IV Intermittent every 12 hours    acetylcysteine 10%  Inhalation 4 milliLiter(s) Inhalation every 6 hours  ALBUTerol/ipratropium for Nebulization 3 milliLiter(s) Nebulizer every 6 hours  benzonatate 100 milliGRAM(s) Oral every 8 hours PRN  loratadine 10 milliGRAM(s) Oral daily    DULoxetine 20 milliGRAM(s) Oral daily  gabapentin 100 milliGRAM(s) Oral three times a day  hydrOXYzine hydrochloride 50 milliGRAM(s) Oral two times a day    docusate sodium 100 milliGRAM(s) Oral three times a day  pantoprazole    Tablet 40 milliGRAM(s) Oral before breakfast  polyethylene glycol 3350 17 Gram(s) Oral daily  senna 2 Tablet(s) Oral at bedtime    atorvastatin 80 milliGRAM(s) Oral at bedtime  levothyroxine 100 MICROGram(s) Oral daily    aspirin enteric coated 81 milliGRAM(s) Oral daily  benzocaine 15 mG/menthol 3.6 mG Lozenge 1 Lozenge Oral every 2 hours PRN  chlorhexidine 2% Cloths 1 Application(s) Topical daily  clopidogrel Tablet 75 milliGRAM(s) Oral daily  epoetin nguyễn Injectable 8000 Unit(s) IV Push <User Schedule>  heparin  Injectable 5000 Unit(s) SubCutaneous every 12 hours  iron sucrose IVPB 100 milliGRAM(s) IV Intermittent every 24 hours  multivitamin 1 Tablet(s) Oral daily    	  TELEMETRY: n/a  Vitals:  T(C): 36.5 (01-30-19 @ 07:53), Max: 36.9 (01-29-19 @ 16:07)  HR: 63 (01-30-19 @ 05:55) (59 - 89)  BP: 135/66 (01-30-19 @ 05:55) (122/75 - 135/66)  RR: 18 (01-30-19 @ 05:55) (18 - 18)  SpO2: 92% (01-30-19 @ 07:45) (86% - 98%)  Wt(kg): --  I&O's Summary      Weight (kg): 57.9 (01-30 @ 05:55)  Daily T(C): 36.5 (01-30-19 @ 07:53), Max: 36.9 (01-29-19 @ 16:07)  HR: 63 (01-30-19 @ 05:55) (59 - 89)  BP: 135/66 (01-30-19 @ 05:55) (122/75 - 135/66)  RR: 18 (01-30-19 @ 05:55) (18 - 18)  SpO2: 92% (01-30-19 @ 07:45) (86% - 98%)  Wt(kg): --    Daily   Weight (kg): 57.9 (01-30 @ 05:55)    PHYSICAL EXAM:  Appearance: wearing ventimask, tachypneic	  HEENT:   Normal oral mucosa, PERRL, EOMI	  Lymphatic: No lymphadenopathy  Cardiovascular: Normal S1 S2, No JVD, No murmurs, No edema  Respiratory: entire right and mid to base on left with crackles  Psychiatry: A & O x 3, Mood & affect appropriate  Gastrointestinal:  Soft, Non-tender, + BS	  Skin: No rashes, No ecchymoses, No cyanosis  Neurologic: Non-focal  Extremities: Normal range of motion, No clubbing, cyanosis or edema  Vascular: Peripheral pulses palpable 2+ bilaterally, warm      ECG (tracing reviewed by me):  	SR, PVCs    DIAGNOSTIC TESTING:  Echocardiogram (images reviewed by me):  Catheterization: < from: Cardiac Cath Lab - Adult (12.05.18 @ 15:40) >  CORONARY VESSELS: The coronary circulation is right dominant.  LM:   --  Ostial LM: There was a 20 % stenosis within the stented segment.  LAD:   --  Ostial LAD: There was a 100 % stenosis.  CX:  --  Mid circumflex: There was a 90 % stenosis.  RCA:   --  Ostial RCA: There was a 100 % stenosis.  GRAFTS:   --  Graft to the LAD: The graft was a ANA. Graft angiography  showed no evidence of disease.  AORTA: Ascending aorta: Normal.  COMPLICATIONS: No complications occurred during the cath lab visit. No  complications occurred during the cath lab visit.  DIAGNOSTIC IMPRESSIONS: Patent ANA to LAD. Other grafts occluded.  Severe circumflex disease. PCI performed with 1 CHAN.  DIAGNOSTIC RECOMMENDATIONS: Aspirin and Plavix.  INTERVENTIONAL IMPRESSIONS: Patent ANA to LAD. Other grafts occluded.  Severe circumflex disease. PCI performed with 1 CHAN.  INTERVENTIONAL RECOMMENDATIONS: Aspirin and Plavix.  Prepared and signed by  Raghav Plunkett    < end of copied text >    Stress Test:    CXR (image reviewed by me):  OTHER: 	    LABS:	 	  CARDIAC MARKERS:                                  9.2    9.4   )-----------( 254      ( 29 Jan 2019 11:39 )             31.7     01-29    138  |  95<L>  |  13.0  ----------------------------<  128<H>  4.3   |  30.0<H>  |  4.87<H>    Ca    8.3<L>      29 Jan 2019 11:39  Phos  3.6     01-29  Mg     2.0     01-29    TPro  7.4  /  Alb  3.5  /  TBili  0.3<L>  /  DBili  x   /  AST  40<H>  /  ALT  17  /  AlkPhos  62  01-28    BNP:   Lipid Profile:   HgA1c:   TSH: Thyroid Stimulating Hormone, Serum: 1.90 uIU/mL (01-29 @ 11:39)    < from: CT Chest No Cont (01.28.19 @ 14:03) >  FINDINGS: No evidence of mediastinal or hilar lymphadenopathy. There is a   small right pleural effusion, unchanged since the prior examination.   There is a left pleural effusion which is larger since 1/2/2019.    Extensive emphysematous changes. Left basilar atelectasis, more   pronounced since the prior exam.    There is a region of consolidation or fibrosis in the right apex with   associated calcifications unchanged from the prior exam. There is now   consolidation in the right upper lobe which is new since 1/2/2019.   Diffuse interstitial disease in the right lung, unchanged.    Degenerative changes in the spine.      IMPRESSION:     There is a new region of consolidation in the right upper lobe which was   not seen on 1/2/2019.    Diffuse interstitial disease in the right lung, unchanged.    Bilateral pleural effusions, the right is unchanged and the left is   larger since the prior study.    Increasing atelectasis at the left lung base.    Extensive emphysematous changes, unchanged..                 BERT BARKSDALE M.D., ATTENDING RADIOLOGIST  This document has been electronically signed. Jan 28 2019  2:25PM    < end of copied text >

## 2019-01-30 NOTE — PROGRESS NOTE ADULT - SUBJECTIVE AND OBJECTIVE BOX
KAUSHIK HOFFMAN     Chief Complaint: Patient is a 79y old  Male who presents with a chief complaint of HCAP (29 Jan 2019 10:43)    HPI/OVERNIGHT EVENTS: Patient arousable, mild respiratory distress with Venti mask in place. Patient states it provides some help with breathing. endorses cough and shortness of breath over past few days. Patient sent sputum sample to lab, blood cultures pending. He is on antibiotics but there is no white count or fever. Will await cultures before discontinuing. Discussed with leilani Stafford about consulting with palliative care given patient has had 2 admissions now in past 2 months with also a decline noted as outpatient per nephrologist who know him well. He agreed to consult. I discussed that we will continue with antibiotics to treat pneumonia for now, will follow up oxygen requirements after dialysis. Cardiology recommends echo on this admission, ordered placed. possible thoracentesis for pleural effusion for tomorrow AM if continues to not improve. Will make NPO past midnight.    MEDICATIONS  (STANDING):  ALBUTerol/ipratropium for Nebulization 3 milliLiter(s) Nebulizer every 6 hours  amLODIPine   Tablet 10 milliGRAM(s) Oral daily  aspirin enteric coated 81 milliGRAM(s) Oral daily  atorvastatin 80 milliGRAM(s) Oral at bedtime  clopidogrel Tablet 75 milliGRAM(s) Oral daily  DULoxetine 20 milliGRAM(s) Oral daily  epoetin nguyễn Injectable 8000 Unit(s) IV Push <User Schedule>  gabapentin 100 milliGRAM(s) Oral three times a day  heparin  Injectable 5000 Unit(s) SubCutaneous every 12 hours  hydrALAZINE 50 milliGRAM(s) Oral every 8 hours  hydrOXYzine hydrochloride 50 milliGRAM(s) Oral two times a day  isosorbide   mononitrate ER Tablet (IMDUR) 30 milliGRAM(s) Oral daily  levothyroxine 100 MICROGram(s) Oral daily  loratadine 10 milliGRAM(s) Oral daily  losartan 50 milliGRAM(s) Oral daily  metoprolol tartrate 50 milliGRAM(s) Oral two times a day  multivitamin 1 Tablet(s) Oral daily  pantoprazole    Tablet 40 milliGRAM(s) Oral before breakfast  piperacillin/tazobactam IVPB. 3.375 Gram(s) IV Intermittent every 12 hours  vancomycin  IVPB 1000 milliGRAM(s) IV Intermittent <User Schedule>    ICU Vital Signs Last 24 Hrs  T(C): 36.4 (30 Jan 2019 09:00), Max: 36.9 (29 Jan 2019 16:07)  T(F): 97.5 (30 Jan 2019 09:00), Max: 98.4 (29 Jan 2019 16:07)  HR: 60 (30 Jan 2019 10:11) (59 - 89)  BP: 128/65 (30 Jan 2019 09:00) (122/75 - 135/66)  BP(mean): --  ABP: --  ABP(mean): --  RR: 18 (30 Jan 2019 09:00) (18 - 18)  SpO2: 95% (30 Jan 2019 10:11) (86% - 98%)    PHYSICAL EXAM:  HEENT:  normocephalic, normal oropharynx.   Neck: No LAD, No JVD  Back: No CVA tenderness  Respiratory: Permacath in place in chest wall; clean site. dressing clean. Bilateral bibasilar crackles, poor inspiratory effort. Cardiovascular: S1 and S2, RRR, no M/G/R  Gastrointestinal: BS+, soft, NT/ND  Extremities: av graft in place  Vascular: 2+ peripheral pulses  Neurological: A/O x 3, no focal deficits      LABS:                          9.2    9.7   )-----------( 272      ( 30 Jan 2019 07:49 )             30.7     01-30    140  |  98  |  22.0<H>  ----------------------------<  91  4.6   |  28.0  |  6.20<H>    Ca    8.4<L>      30 Jan 2019 07:49  Phos  5.0     01-30  Mg     2.3     01-30      RADIOLOGY & ADDITIONAL TESTS:

## 2019-01-30 NOTE — PROGRESS NOTE ADULT - SUBJECTIVE AND OBJECTIVE BOX
Patient is a 79y old  Male who presents with a chief complaint of HCAP (30 Jan 2019 11:28)  Reportedly was supposed to have procedure to L AVG at [facility] however sent with hypoxia  L AVG patent as per US and is within timeframe for initiation of use    Pt seen with Dr Bauman in HD. No complaints    PAST MEDICAL & SURGICAL HISTORY:  Chronic anemia  ESRD (end stage renal disease)  CAD (coronary artery disease)  Lung cancer: had yoni radiation in 2006.  Bipolar disorder  High cholesterol  Hypertension  S/P CABG (coronary artery bypass graft): pt&#x27;s son in [facility] h/o some stents near neck area ? carotid ? he is not sure.    MEDICATIONS  (STANDING):  acetylcysteine 10%  Inhalation 4 milliLiter(s) Inhalation every 6 hours  ALBUTerol/ipratropium for Nebulization 3 milliLiter(s) Nebulizer every 6 hours  amLODIPine   Tablet 10 milliGRAM(s) Oral daily  aspirin enteric coated 81 milliGRAM(s) Oral daily  atorvastatin 80 milliGRAM(s) Oral at bedtime  chlorhexidine 2% Cloths 1 Application(s) Topical daily  clopidogrel Tablet 75 milliGRAM(s) Oral daily  docusate sodium 100 milliGRAM(s) Oral three times a day  DULoxetine 20 milliGRAM(s) Oral daily  epoetin nguyễn Injectable 8000 Unit(s) IV Push <User Schedule>  gabapentin 100 milliGRAM(s) Oral three times a day  heparin  Injectable 5000 Unit(s) SubCutaneous every 12 hours  hydrALAZINE 50 milliGRAM(s) Oral every 8 hours  hydrOXYzine hydrochloride 50 milliGRAM(s) Oral two times a day  iron sucrose IVPB 100 milliGRAM(s) IV Intermittent every 24 hours  isosorbide   mononitrate ER Tablet (IMDUR) 30 milliGRAM(s) Oral daily  levothyroxine 100 MICROGram(s) Oral daily  loratadine 10 milliGRAM(s) Oral daily  losartan 50 milliGRAM(s) Oral daily  metoprolol tartrate 50 milliGRAM(s) Oral two times a day  multivitamin 1 Tablet(s) Oral daily  pantoprazole    Tablet 40 milliGRAM(s) Oral before breakfast  piperacillin/tazobactam IVPB. 3.375 Gram(s) IV Intermittent every 12 hours  polyethylene glycol 3350 17 Gram(s) Oral daily  senna 2 Tablet(s) Oral at bedtime    MEDICATIONS  (PRN):  benzocaine 15 mG/menthol 3.6 mG Lozenge 1 Lozenge Oral every 2 hours PRN Sore Throat  benzonatate 100 milliGRAM(s) Oral every 8 hours PRN Cough    Allergies  No Known Allergies    Vital Signs Last 24 Hrs  T(C): 36.4 (30 Jan 2019 09:00), Max: 36.9 (29 Jan 2019 16:07)  T(F): 97.5 (30 Jan 2019 09:00), Max: 98.4 (29 Jan 2019 16:07)  HR: 60 (30 Jan 2019 10:11) (59 - 89)  BP: 128/65 (30 Jan 2019 09:00) (122/75 - 135/66)  BP(mean): --  RR: 18 (30 Jan 2019 09:00) (18 - 18)  SpO2: 95% (30 Jan 2019 10:11) (86% - 98%)  I&O's Detail      Physical Exam:  General: NAD, resting comfortably in bed  Extremities: LUE WWP. L AVG accessed without difficulties with adequate flow volumes/pressures. No complaints of hand pain/numbness. R IJ permcath insitu. Site CDI      LABS:                        9.2    9.7   )-----------( 272      ( 30 Jan 2019 07:49 )             30.7     01-30    140  |  98  |  22.0<H>  ----------------------------<  91  4.6   |  28.0  |  6.20<H>    Ca    8.4<L>      30 Jan 2019 07:49  Phos  5.0     01-30  Mg     2.3     01-30        CAPILLARY BLOOD GLUCOSE        Radiology and Additional Studies:    Assessment and Plan: 79y Male pneumonia and ESRD on HD with L AVG successfully accessed  Cont to use L AVG for HD  If no issues and pt remains in house next week can remove R IJ otherwise if dc before next week can have R IJ removed at office.  Please call if any difficulties arise  Will sign off

## 2019-01-30 NOTE — CHART NOTE - NSCHARTNOTEFT_GEN_A_CORE
Called by RN for O2 order.  Pt with hx of lung CA, O2 dependent at home on 4L admitted with hypoxia, treated for HCAP.  Pt came to floor without any O2 orders.  Pt persisting he was on 4L O2 at home to be comfortable.  As per RN, pt is not in any respiratory distress on 4L.  Will order 4L O2 via nc to maintain pulse ox above 92% Will order  for now until seen by MD. Called by RN for O2 order.  Pt with hx of lung CA, O2 dependent at home on 4L admitted with hypoxia, being treated for HCAP.  Pt came to floor without any O2 orders.  Pt persisting he was on 4L O2 at home to be comfortable.  As per RN, pt is not in any respiratory distress on 4L.  Will order 4L O2 via nc to maintain pulse ox above 92% Will order  for now until seen by MD. Called by RN for O2 order.  Pt with hx of lung CA, O2 dependent at home on 4L admitted with hypoxia, being treated for HCAP.  Pt came to floor without any O2 orders.  Pt persisting he was on 4L O2 at home to be comfortable.  As per RN, pt is not in any respiratory distress on 4L.  Will order 4L O2 via nc to maintain pulse ox above 92% Will order  for now until seen by MD.    05:30 Pt is coughing, unable to bring up phlegm.  Post neb treatment, pt is sating 88-90%, placed on 50%VM to have saturation up to 95% Pt is comfortable without complaints other than cough.  On exam, + crackles in bilateral lower fields, no wheezing.  Pt is anuric.  Scheduled for HD today.  Will add mucomyst to duoneb order.  Continue to monitor and reassess prn.

## 2019-01-30 NOTE — PROGRESS NOTE ADULT - SUBJECTIVE AND OBJECTIVE BOX
[facility] DIVISION OF KIDNEY DISEASES AND HYPERTENSION -- FOLLOW UP NOTE  --------------------------------------------------------------------------------  Chief Complaint:   ESRD on HD    24 hour events/subjective:  Pt seen and examined on HD today in HD unit  On venturi mask  No respiratory distress; appears fatigued;      PAST HISTORY  --------------------------------------------------------------------------------  No significant changes to PMH, PSH, FHx, SHx, unless otherwise noted    ALLERGIES & MEDICATIONS  --------------------------------------------------------------------------------  Allergies    No Known Allergies    Intolerances      Standing Inpatient Medications  acetylcysteine 10%  Inhalation 4 milliLiter(s) Inhalation every 6 hours  ALBUTerol/ipratropium for Nebulization 3 milliLiter(s) Nebulizer every 6 hours  amLODIPine   Tablet 10 milliGRAM(s) Oral daily  aspirin enteric coated 81 milliGRAM(s) Oral daily  atorvastatin 80 milliGRAM(s) Oral at bedtime  chlorhexidine 2% Cloths 1 Application(s) Topical daily  clopidogrel Tablet 75 milliGRAM(s) Oral daily  docusate sodium 100 milliGRAM(s) Oral three times a day  DULoxetine 20 milliGRAM(s) Oral daily  epoetin nguyễn Injectable 8000 Unit(s) IV Push <User Schedule>  gabapentin 100 milliGRAM(s) Oral three times a day  heparin  Injectable 5000 Unit(s) SubCutaneous every 12 hours  hydrALAZINE 50 milliGRAM(s) Oral every 8 hours  hydrOXYzine hydrochloride 50 milliGRAM(s) Oral two times a day  iron sucrose IVPB 100 milliGRAM(s) IV Intermittent every 24 hours  isosorbide   mononitrate ER Tablet (IMDUR) 30 milliGRAM(s) Oral daily  levothyroxine 100 MICROGram(s) Oral daily  loratadine 10 milliGRAM(s) Oral daily  losartan 50 milliGRAM(s) Oral daily  metoprolol tartrate 50 milliGRAM(s) Oral two times a day  multivitamin 1 Tablet(s) Oral daily  pantoprazole    Tablet 40 milliGRAM(s) Oral before breakfast  piperacillin/tazobactam IVPB. 3.375 Gram(s) IV Intermittent every 12 hours  polyethylene glycol 3350 17 Gram(s) Oral daily  senna 2 Tablet(s) Oral at bedtime    PRN Inpatient Medications  benzocaine 15 mG/menthol 3.6 mG Lozenge 1 Lozenge Oral every 2 hours PRN  benzonatate 100 milliGRAM(s) Oral every 8 hours PRN      REVIEW OF SYSTEMS  --------------------------------------------------------------------------------  Unable to obtain    VITALS/PHYSICAL EXAM  --------------------------------------------------------------------------------  T(C): 36.4 (01-30-19 @ 09:00), Max: 36.9 (01-29-19 @ 16:07)  HR: 60 (01-30-19 @ 10:11) (59 - 89)  BP: 128/65 (01-30-19 @ 09:00) (122/75 - 135/66)  RR: 18 (01-30-19 @ 09:00) (18 - 18)  SpO2: 95% (01-30-19 @ 10:11) (86% - 98%)  Wt(kg): --  Height (cm): 175.26 (01-28-19 @ 11:04)  Weight (kg): 57.9 (01-30-19 @ 05:55)  BMI (kg/m2): 18.9 (01-30-19 @ 05:55)  BSA (m2): 1.71 (01-30-19 @ 05:55)      Physical Exam:  	Gen: NAD, frail, debilitated, pale  	HEENT: PERRL, supple neck, clear oropharynx  	Pulm:  rales BL;  	CV: RRR, S1S2; no rub  	Back: No spinal or CVA tenderness; no sacral edema  	Abd: +BS, soft, nontender/nondistended  	: No suprapubic tenderness  	UE: Warm, FROM, no clubbing, intact strength; no edema; no asterixis  	LE: Warm, FROM, no clubbing, intact strength; minimal edema  	Neuro: No focal deficits, intact gait  	Psych: Normal affect and mood  	Skin: Warm, without rashes  	Vascular access: AVF , Right CVC    LABS/STUDIES  --------------------------------------------------------------------------------              9.2    9.7   >-----------<  272      [01-30-19 @ 07:49]              30.7     140  |  98  |  22.0  ----------------------------<  91      [01-30-19 @ 07:49]  4.6   |  28.0  |  6.20        Ca     8.4     [01-30-19 @ 07:49]      Mg     2.3     [01-30-19 @ 07:49]      Phos  5.0     [01-30-19 @ 07:49]    TPro  7.4  /  Alb  3.5  /  TBili  0.3  /  DBili  x   /  AST  40  /  ALT  17  /  AlkPhos  62  [01-28-19 @ 11:35]        Troponin 0.13      [01-29-19 @ 11:39]    Creatinine Trend:  SCr 6.20 [01-30 @ 07:49]  SCr 4.87 [01-29 @ 11:39]  SCr 5.31 [01-28 @ 11:35]  SCr 4.67 [01-08 @ 10:39]  SCr 6.42 [01-07 @ 09:13]    Urinalysis - [01-01-19 @ 15:02]      Color Red / Appearance Bloody / SG 1.015 / pH 6.0      Gluc Negative / Ketone Negative  / Bili Negative / Urobili Negative       Blood Large / Protein 500 / Leuk Est Trace / Nitrite Negative      RBC TNTC / WBC 3-5 / Hyaline  / Gran  / Sq Epi  / Non Sq Epi Negative / Bacteria Occasional      Iron 34, TIBC 235, %sat 14      [01-29-19 @ 11:39]  Ferritin 1049      [01-29-19 @ 11:39]  TSH 2.07      [01-30-19 @ 07:49]  Lipid: chol 159, , HDL 32, LDL 94      [01-29-19 @ 11:39]    HBsAb <3.0      [01-08-19 @ 17:03]  HBsAg Nonreact      [01-08-19 @ 17:03]  HCV 0.12, Nonreact      [01-08-19 @ 17:03]

## 2019-01-30 NOTE — PROGRESS NOTE ADULT - PROBLEM SELECTOR PLAN 1
Continue vanco and zosyn  assess procalcitonin  continue until sputum/legionella antigen return negative  cardiology following and rec echo (last EF in Dec-3 showed 60% with diastolic dys)  will UF (1-1.5 L) today with dialysis

## 2019-01-30 NOTE — PROGRESS NOTE ADULT - ASSESSMENT
79 year old male with history of coronary artery disease s/p CABG, recent PCI CHAN to LCx 12/2018, ESRD on HD, hx of lung cancer s/p radiation therapy, pulmonary fibrosis, presented with dyspnea and hypoxia.  Found to have new RUL pneumonia on imaging.       # dyspnea/hypoxia - new pneumonia in setting of poor lung reserve (fibrosis, effusions).  On zosyn, duonebs.  May need steroids.  Keep SaO2 > 88%. If continues to have hypoxia, consider IR drainage of pleural effusion.  Continuous pulse oximetry.   # elevated troponin - in setting of ESRD, plateaud, no ECG changes to suggest ongoing ischeia.  Can check TTE to evaluate for any new regional wall motion abnormalities.  Otherwise, continue aspirin, plavix, statin.  HRs already in the 60s.  Continue imdur, BP control  # CAD s/p CABG - patent ANA to LAD on cath 12/2018, other grafts were occluded, underwent PCI with HCAN to Lcx.  No chest pain currently.  TTE pending.  Aspirin/plavix/statin/imdur.  HR in the 60s.   # hypertension - well controlled on current regimen  # ESRD - HD as scheduled  Hep SQ for DVT prophylaxis.     Thank you for allowing me to participate in care of your patient.   D/W Dr. Camacho

## 2019-01-30 NOTE — PROGRESS NOTE ADULT - ASSESSMENT
1. ESRD on HD  2. RUL PN, Radiation Pneumonitis  3. COPD - O2 dependent  4. Cardiomegally - S/P CABG, Severe LV dysfunction  5. Malnutrition   6. Anemia      HD in am w. UF  Continue IV Antibiotics  On EPO  Continue nutritional support  Oxygen, drug nebs and respiratory treatments as per respiratory  Continue current management  Palliative care consult;    bari Brandon

## 2019-01-31 LAB — VANCOMYCIN TROUGH SERPL-MCNC: 10.5 UG/ML — SIGNIFICANT CHANGE UP (ref 10–20)

## 2019-01-31 PROCEDURE — 99232 SBSQ HOSP IP/OBS MODERATE 35: CPT

## 2019-01-31 PROCEDURE — 99233 SBSQ HOSP IP/OBS HIGH 50: CPT

## 2019-01-31 PROCEDURE — 93306 TTE W/DOPPLER COMPLETE: CPT | Mod: 26

## 2019-01-31 PROCEDURE — 93970 EXTREMITY STUDY: CPT | Mod: 26

## 2019-01-31 RX ORDER — DIPHENHYDRAMINE HCL 50 MG
25 CAPSULE ORAL ONCE
Refills: 0 | Status: COMPLETED | OUTPATIENT
Start: 2019-01-31 | End: 2019-01-31

## 2019-01-31 RX ORDER — MUPIROCIN 20 MG/G
1 OINTMENT TOPICAL
Refills: 0 | Status: DISCONTINUED | OUTPATIENT
Start: 2019-01-31 | End: 2019-02-05

## 2019-01-31 RX ADMIN — HEPARIN SODIUM 5000 UNIT(S): 5000 INJECTION INTRAVENOUS; SUBCUTANEOUS at 05:48

## 2019-01-31 RX ADMIN — IRON SUCROSE 210 MILLIGRAM(S): 20 INJECTION, SOLUTION INTRAVENOUS at 12:36

## 2019-01-31 RX ADMIN — Medication 40 MILLIGRAM(S): at 17:05

## 2019-01-31 RX ADMIN — GABAPENTIN 100 MILLIGRAM(S): 400 CAPSULE ORAL at 13:22

## 2019-01-31 RX ADMIN — Medication 50 MILLIGRAM(S): at 17:05

## 2019-01-31 RX ADMIN — PIPERACILLIN AND TAZOBACTAM 25 GRAM(S): 4; .5 INJECTION, POWDER, LYOPHILIZED, FOR SOLUTION INTRAVENOUS at 05:48

## 2019-01-31 RX ADMIN — Medication 25 MILLIGRAM(S): at 21:54

## 2019-01-31 RX ADMIN — PANTOPRAZOLE SODIUM 40 MILLIGRAM(S): 20 TABLET, DELAYED RELEASE ORAL at 05:48

## 2019-01-31 RX ADMIN — Medication 1 TABLET(S): at 12:36

## 2019-01-31 RX ADMIN — AMLODIPINE BESYLATE 10 MILLIGRAM(S): 2.5 TABLET ORAL at 05:48

## 2019-01-31 RX ADMIN — Medication 40 MILLIGRAM(S): at 21:54

## 2019-01-31 RX ADMIN — DULOXETINE HYDROCHLORIDE 20 MILLIGRAM(S): 30 CAPSULE, DELAYED RELEASE ORAL at 12:36

## 2019-01-31 RX ADMIN — Medication 3 MILLILITER(S): at 20:27

## 2019-01-31 RX ADMIN — CLOPIDOGREL BISULFATE 75 MILLIGRAM(S): 75 TABLET, FILM COATED ORAL at 12:36

## 2019-01-31 RX ADMIN — Medication 4 MILLILITER(S): at 08:41

## 2019-01-31 RX ADMIN — GABAPENTIN 100 MILLIGRAM(S): 400 CAPSULE ORAL at 21:54

## 2019-01-31 RX ADMIN — Medication 100 MILLIGRAM(S): at 13:23

## 2019-01-31 RX ADMIN — LORATADINE 10 MILLIGRAM(S): 10 TABLET ORAL at 13:21

## 2019-01-31 RX ADMIN — Medication 100 MILLIGRAM(S): at 21:55

## 2019-01-31 RX ADMIN — ATORVASTATIN CALCIUM 80 MILLIGRAM(S): 80 TABLET, FILM COATED ORAL at 21:54

## 2019-01-31 RX ADMIN — CHLORHEXIDINE GLUCONATE 1 APPLICATION(S): 213 SOLUTION TOPICAL at 13:07

## 2019-01-31 RX ADMIN — Medication 50 MILLIGRAM(S): at 21:55

## 2019-01-31 RX ADMIN — Medication 3 MILLILITER(S): at 15:49

## 2019-01-31 RX ADMIN — Medication 4 MILLILITER(S): at 20:27

## 2019-01-31 RX ADMIN — Medication 100 MILLIGRAM(S): at 05:47

## 2019-01-31 RX ADMIN — Medication 81 MILLIGRAM(S): at 12:36

## 2019-01-31 RX ADMIN — Medication 3 MILLILITER(S): at 08:42

## 2019-01-31 RX ADMIN — HEPARIN SODIUM 5000 UNIT(S): 5000 INJECTION INTRAVENOUS; SUBCUTANEOUS at 17:05

## 2019-01-31 RX ADMIN — Medication 100 MICROGRAM(S): at 05:47

## 2019-01-31 RX ADMIN — Medication 4 MILLILITER(S): at 15:47

## 2019-01-31 RX ADMIN — GABAPENTIN 100 MILLIGRAM(S): 400 CAPSULE ORAL at 05:47

## 2019-01-31 RX ADMIN — Medication 3 MILLILITER(S): at 03:54

## 2019-01-31 RX ADMIN — LOSARTAN POTASSIUM 50 MILLIGRAM(S): 100 TABLET, FILM COATED ORAL at 05:49

## 2019-01-31 RX ADMIN — ISOSORBIDE MONONITRATE 30 MILLIGRAM(S): 60 TABLET, EXTENDED RELEASE ORAL at 12:36

## 2019-01-31 RX ADMIN — Medication 50 MILLIGRAM(S): at 05:47

## 2019-01-31 RX ADMIN — Medication 4 MILLILITER(S): at 03:54

## 2019-01-31 RX ADMIN — SENNA PLUS 2 TABLET(S): 8.6 TABLET ORAL at 21:54

## 2019-01-31 NOTE — PROGRESS NOTE ADULT - SUBJECTIVE AND OBJECTIVE BOX
[facility]                                                        Office: [address] [place] [address]                                                       Telephone: [phone]. Fax:[phone]      CARDIOLOGY PROGRESS NOTE   ([facility])    Subjective: Patient seen and examined earlier this morning.  On hi-flow nasal cannula, feels better.  Less SOB.  Reports thighs are itchy.     ROS: No headache, no chest pain, no palpitations, no dizziness, no nausea, no bleeding    Chronic Conditions:     CURRENT MEDICATIONS  amLODIPine   Tablet 10 milliGRAM(s) Oral daily  hydrALAZINE 50 milliGRAM(s) Oral every 8 hours  isosorbide   mononitrate ER Tablet (IMDUR) 30 milliGRAM(s) Oral daily  losartan 50 milliGRAM(s) Oral daily  metoprolol tartrate 50 milliGRAM(s) Oral two times a day      acetylcysteine 10%  Inhalation 4 milliLiter(s) Inhalation every 6 hours  ALBUTerol/ipratropium for Nebulization 3 milliLiter(s) Nebulizer every 6 hours  benzonatate 100 milliGRAM(s) Oral every 8 hours PRN  loratadine 10 milliGRAM(s) Oral daily    DULoxetine 20 milliGRAM(s) Oral daily  gabapentin 100 milliGRAM(s) Oral three times a day  hydrOXYzine hydrochloride 50 milliGRAM(s) Oral two times a day    docusate sodium 100 milliGRAM(s) Oral three times a day  pantoprazole    Tablet 40 milliGRAM(s) Oral before breakfast  polyethylene glycol 3350 17 Gram(s) Oral daily  senna 2 Tablet(s) Oral at bedtime    atorvastatin 80 milliGRAM(s) Oral at bedtime  levothyroxine 100 MICROGram(s) Oral daily  methylPREDNISolone sodium succinate Injectable 40 milliGRAM(s) IV Push every 8 hours    aspirin enteric coated 81 milliGRAM(s) Oral daily  benzocaine 15 mG/menthol 3.6 mG Lozenge 1 Lozenge Oral every 2 hours PRN  chlorhexidine 2% Cloths 1 Application(s) Topical daily  clopidogrel Tablet 75 milliGRAM(s) Oral daily  epoetin nguyễn Injectable 8000 Unit(s) IV Push <User Schedule>  iron sucrose IVPB 100 milliGRAM(s) IV Intermittent every 24 hours  multivitamin 1 Tablet(s) Oral daily  mupirocin 2% Nasal 1 Application(s) Nasal two times a day    	  TELEMETRY: n/a  Vitals:  T(C): 36.9 (01-31-19 @ 17:06), Max: 37.5 (01-30-19 @ 21:27)  HR: 65 (01-31-19 @ 17:06) (58 - 80)  BP: 109/44 (01-31-19 @ 17:06) (107/53 - 129/54)  RR: 18 (01-31-19 @ 17:06) (18 - 18)  SpO2: 91% (01-31-19 @ 17:06) (91% - 100%)  Wt(kg): --  I&O's Summary    30 Jan 2019 07:01  -  31 Jan 2019 07:00  --------------------------------------------------------  IN: 1080 mL / OUT: 2700 mL / NET: -1620 mL    31 Jan 2019 07:01  -  31 Jan 2019 18:28  --------------------------------------------------------  IN: 240 mL / OUT: 0 mL / NET: 240 mL        Daily T(C): 36.9 (01-31-19 @ 17:06), Max: 37.5 (01-30-19 @ 21:27)  HR: 65 (01-31-19 @ 17:06) (58 - 80)  BP: 109/44 (01-31-19 @ 17:06) (107/53 - 129/54)  RR: 18 (01-31-19 @ 17:06) (18 - 18)  SpO2: 91% (01-31-19 @ 17:06) (91% - 100%)  Wt(kg): --    Daily     PHYSICAL EXAM:  Appearance: NAD	  HEENT:   Normal oral mucosa, PERRL, EOMI	  Lymphatic: No lymphadenopathy  Cardiovascular: Normal S1 S2, No JVD, II/VI systolic murmur, No edema  Respiratory: decreased breath sounds throughout	  Psychiatry: A & O x 3, Mood & affect appropriate  Gastrointestinal:  Soft, Non-tender, + BS	  Skin: No rashes, No ecchymoses, No cyanosis  Neurologic: Non-focal  Extremities: Normal range of motion, No clubbing, cyanosis or edema  Vascular: Peripheral pulses palpable 2+ bilaterally, warm      ECG (tracing reviewed by me):  	    DIAGNOSTIC TESTING:  Echocardiogram (images reviewed by me): < from: TTE Echo Complete w/Doppler (01.31.19 @ 10:18) >  Summary:   1. Left ventricular ejection fraction, by visual estimation, is 60 to   65%.   2. Normal global left ventricular systolic function.   3. Basal anteroseptal segment is abnormal as described above.   4. Spectral Doppler shows pseudonormal pattern of left ventricular   myocardial filling (Grade II diastolic dysfunction).   5. There is no evidence of pericardial effusion.   6. Moderate mitral valve regurgitation.   7. Thickening of the anterior and posterior mitral valve leaflets.   8. Mild-moderate tricuspid regurgitation.   9. Sclerotic aortic valve with normal opening.  10. Trace pulmonic valve regurgitation.    [id number] Alysia Moffett MD, Electronically signed on 1/31/2019 at 11:14:24   AM       < end of copied text >                          9.2    9.7   )-----------( 272      ( 30 Jan 2019 07:49 )             30.7     01-30    140  |  98  |  22.0<H>  ----------------------------<  91  4.6   |  28.0  |  6.20<H>    Ca    8.4<L>      30 Jan 2019 07:49  Phos  5.0     01-30  Mg     2.3     01-30

## 2019-01-31 NOTE — PROGRESS NOTE ADULT - ASSESSMENT
79 year old male with history of coronary artery disease s/p CABG, recent PCI CHAN to LCx 12/2018, ESRD on HD, hx of lung cancer s/p radiation therapy, pulmonary fibrosis, presented with dyspnea and hypoxia.  Found to have new RUL pneumonia on imaging.       # dyspnea/hypoxia - new pneumonia in setting of poor lung reserve (fibrosis, effusions).  On zosyn, duonebs.  May need steroids.  Keep SaO2 > 88%. If continues to have hypoxia, consider IR drainage of pleural effusion.  Continuous pulse oximetry.   # elevated troponin - in setting of ESRD, plateaud, no ECG changes to suggest ongoing ischeia.  No new regional wall motion abnormalities on echo.  Continue aspirin, plavix, statin.  HRs already in the 60s.  Continue imdur, BP control  # CAD s/p CABG - patent ANA to LAD on cath 12/2018, other grafts were occluded, underwent PCI with CHAN to Lcx.  No chest pain currently.  Aspirin/plavix/statin/imdur.  HR in the 60s.   # hypertension - well controlled on current regimen  # ESRD - HD as scheduled  Hep SQ for DVT prophylaxis.     No active cardiac conditions, will sign off  Thank you for allowing me to participate in care of your patient.   Please call as needed.

## 2019-01-31 NOTE — PROGRESS NOTE ADULT - ASSESSMENT
1. ESRD on HD  2. RUL PN, Radiation Pneumonitis  3. COPD - O2 dependent  4. Cardiomegally - S/P CABG, Severe LV dysfunction  5. Malnutrition   6. Anemia      HD in am w. UF  Continue IV Antibiotics  On EPO  Continue nutritional support  Oxygen, drug nebs and respiratory treatments as per respiratory  Continue current management  Palliative care consult

## 2019-01-31 NOTE — PROGRESS NOTE ADULT - SUBJECTIVE AND OBJECTIVE BOX
Chart reviewed , pt is seen examined for  possible   pneumonia on admission, shortness of breath and hypoxia   no overnight events reported , pt is resting seen in am , on high flow oxygen   no distress noted , ID follow  up appreciated   pt is off abx now , afebrile       PHYSICAL EXAM:  HEENT:  normocephalic, normal oropharynx.   Neck: stable , No JVD  Back: No CVA tenderness  Respiratory: Permacath in place in chest wall; Bilateral bibasilar crackles, poor inspiratory effort.   Cardiovascular: S1 and S2, regular rate Rythm    Gastrointestinal: BS+, soft, NT/ND  Extremities: no pretibial edema       LABS:                                9.2    9.7   )-----------( 272      ( 30 Jan 2019 07:49 )             30.7     01-30    140  |  98  |  22.0<H>  ----------------------------<  91  4.6   |  28.0  |  6.20<H>    Ca    8.4<L>      30 Jan 2019 07:49  Phos  5.0     01-30  Mg     2.3     01-30      RADIOLOGY & ADDITIONAL TESTS: Chart reviewed , pt is seen examined for  possible   pneumonia on admission, shortness of breath and hypoxia   no overnight events reported , pt is resting seen in am , on high flow oxygen   no distress noted , ID follow  up appreciated   pt is off abx now , afebrile     Vital Signs Last 24 Hrs  T(C): 37.3 (31 Jan 2019 10:03), Max: 37.7 (30 Jan 2019 16:20)  T(F): 99.1 (31 Jan 2019 10:03), Max: 99.9 (30 Jan 2019 16:20)  HR: 59 (31 Jan 2019 10:03) (58 - 80)  BP: 109/56 (31 Jan 2019 10:03) (106/55 - 144/79)  BP(mean): --  RR: 18 (31 Jan 2019 10:03) (18 - 20)  SpO2: 97% (31 Jan 2019 08:42) (94% - 100%)  PHYSICAL EXAM:  HEENT:  normocephalic, normal oropharynx.   Neck: stable , No JVD  Back: No CVA tenderness  Respiratory: Permacath in place in chest wall; Bilateral bibasilar crackles, poor inspiratory effort.   Cardiovascular: S1 and S2, regular rate Rythm    Gastrointestinal: BS+, soft, NT/ND  Extremities: no pretibial edema       LABS:                                9.2    9.7   )-----------( 272      ( 30 Jan 2019 07:49 )             30.7     01-30    140  |  98  |  22.0<H>  ----------------------------<  91  4.6   |  28.0  |  6.20<H>    Ca    8.4<L>      30 Jan 2019 07:49  Phos  5.0     01-30  Mg     2.3     01-30      RADIOLOGY & ADDITIONAL TESTS:

## 2019-01-31 NOTE — PROGRESS NOTE ADULT - SUBJECTIVE AND OBJECTIVE BOX
[facility]  INFECTIOUS DISEASES AND INTERNAL MEDICINE at [place]  =======================================================  Cricket Lara MD  Diplomates American Board of Internal Medicine and Infectious Diseases  =======================================================    [id number]  [facility]   follow up for: possible right side PNA  patient seen and examined.     on high flow oxygen overnight.   no fevers  off antibiotics      ===================================================  REVIEW OF SYSTEMS:  as above  all other ROS negative    =======================================================  Allergies  No Known Allergies      Antibiotics:  NONE      ======================================================  Physical Exam:  ============  T(F): 98 (31 Jan 2019 05:40), Max: 99.9 (30 Jan 2019 16:20)  HR: 75 (31 Jan 2019 08:42)  BP: 129/54 (31 Jan 2019 05:40)  RR: 18 (31 Jan 2019 05:40)  SpO2: 97% (31 Jan 2019 08:42) (86% - 100%)    General:  No acute distress.  THIN FRAIL;   Eye: Pupils are equal, round and reactive to light, Extraocular movements are intact, Normal conjunctiva.  HENT: Normocephalic, Oral mucosa is moist, No pharyngeal erythema, No sinus tenderness.  Neck: Supple, No lymphadenopathy.  Respiratory: Lungs DIMINSHED BREATH SOUNDS AT BASES  Cardiovascular: Normal rate, Regular rhythm, No murmur, Good pulses equal in all extremities, No edema.  Gastrointestinal: Soft, Non-tender, Non-distended, Normal bowel sounds.  Genitourinary: No costovertebral angle tenderness.  Lymphatics: No lymphadenopathy neck,   Musculoskeletal: Normal range of motion, Normal strength.  Integumentary: No rash.  Neurologic: Alert, Oriented, No focal deficits, Cranial Nerves II-XII are grossly intact.  Psychiatric: Appropriate mood & affect.  =======================================================  Labs:                        9.2    9.7   )-----------( 272      ( 30 Jan 2019 07:49 )             30.7     01-30    140  |  98  |  22.0<H>  ----------------------------<  91  4.6   |  28.0  |  6.20<H>    Ca    8.4<L>      30 Jan 2019 07:49  Phos  5.0     01-30  Mg     2.3     01-30        Culture - Sputum (collected 01-30-19 @ 09:16)  Source: .Sputum  Gram Stain (01-30-19 @ 15:43):    Few White blood cells    Few Gram Positive Cocci in Clusters    Culture - Blood (collected 01-28-19 @ 11:39)  Source: .Blood    Culture - Blood (collected 01-28-19 @ 11:38)  Source: .Blood

## 2019-01-31 NOTE — PROGRESS NOTE ADULT - PROBLEM SELECTOR PLAN 5
patient s/p radiation chemotherapy with residual fibrosis from radiation  not on active treatment patient s/ p  radiation chemotherapy with residual fibrosis from radiation  not on active treatment

## 2019-01-31 NOTE — PROGRESS NOTE ADULT - ASSESSMENT
this 79 y.o. Man with CAD s/p CABG, HLD, HTN, Anemia, ESRD on HD, hx of lung cancer s/p radiation, who was recently admitted from 12/22/19 to 1/11/19, treated for Enterobacter PNA.  here for cough and found with RUL consolidation/    -suspect PNA/ HCAP  - had been on Zosyn; but no WBC elevation , no fevers  RVP negative  sputum cx in process, gram stain with GPC cluster ? contaminant    - will MONITOR OFF ANTIBIOTICS     - follow up all outstanding cultures  - trend temperature and WBC curve  - repeat cultures from blood and all sources if febrile.

## 2019-01-31 NOTE — PROGRESS NOTE ADULT - PROBLEM SELECTOR PLAN 1
off abx for pneumonia per ID   cont oxygen still on high flow   will do US of lower ext LLE r/o DVT   recent RLE  Us negative cont neb therapy and oxygen    still on high flow   will do US of lower ext r/o DVT   trial of short course steroid , if no improvement may consider thoracentesis IR   will hold heparin in am   recent RLE  Us negative

## 2019-01-31 NOTE — PROGRESS NOTE ADULT - PROBLEM SELECTOR PLAN 3
Stable, seen by cardio , TTE result reviewed Stable, seen by cardio , TTE result reviewed  cont aspirin

## 2019-01-31 NOTE — PROGRESS NOTE ADULT - SUBJECTIVE AND OBJECTIVE BOX
[facility] DIVISION OF KIDNEY DISEASES AND HYPERTENSION -- FOLLOW UP NOTE  --------------------------------------------------------------------------------  Chief Complaint:  ESRD on HD    24 hour events/subjective:  Pt seen and examined   NAD  HD yesterday  Supplemental O2  Fatigued        PAST HISTORY  --------------------------------------------------------------------------------  No significant changes to PMH, PSH, FHx, SHx, unless otherwise noted    ALLERGIES & MEDICATIONS  --------------------------------------------------------------------------------  Allergies    No Known Allergies    Intolerances      Standing Inpatient Medications  acetylcysteine 10%  Inhalation 4 milliLiter(s) Inhalation every 6 hours  ALBUTerol/ipratropium for Nebulization 3 milliLiter(s) Nebulizer every 6 hours  amLODIPine   Tablet 10 milliGRAM(s) Oral daily  aspirin enteric coated 81 milliGRAM(s) Oral daily  atorvastatin 80 milliGRAM(s) Oral at bedtime  chlorhexidine 2% Cloths 1 Application(s) Topical daily  clopidogrel Tablet 75 milliGRAM(s) Oral daily  docusate sodium 100 milliGRAM(s) Oral three times a day  DULoxetine 20 milliGRAM(s) Oral daily  epoetin nguyễn Injectable 8000 Unit(s) IV Push <User Schedule>  gabapentin 100 milliGRAM(s) Oral three times a day  heparin  Injectable 5000 Unit(s) SubCutaneous every 12 hours  hydrALAZINE 50 milliGRAM(s) Oral every 8 hours  hydrOXYzine hydrochloride 50 milliGRAM(s) Oral two times a day  iron sucrose IVPB 100 milliGRAM(s) IV Intermittent every 24 hours  isosorbide   mononitrate ER Tablet (IMDUR) 30 milliGRAM(s) Oral daily  levothyroxine 100 MICROGram(s) Oral daily  loratadine 10 milliGRAM(s) Oral daily  losartan 50 milliGRAM(s) Oral daily  metoprolol tartrate 50 milliGRAM(s) Oral two times a day  multivitamin 1 Tablet(s) Oral daily  mupirocin 2% Nasal 1 Application(s) Nasal two times a day  pantoprazole    Tablet 40 milliGRAM(s) Oral before breakfast  polyethylene glycol 3350 17 Gram(s) Oral daily  senna 2 Tablet(s) Oral at bedtime    PRN Inpatient Medications  benzocaine 15 mG/menthol 3.6 mG Lozenge 1 Lozenge Oral every 2 hours PRN  benzonatate 100 milliGRAM(s) Oral every 8 hours PRN      REVIEW OF SYSTEMS  --------------------------------------------------------------------------------  Unable to obtain.    VITALS/PHYSICAL EXAM  --------------------------------------------------------------------------------  T(C): 37.3 (01-31-19 @ 10:03), Max: 37.7 (01-30-19 @ 16:20)  HR: 59 (01-31-19 @ 10:03) (58 - 80)  BP: 109/56 (01-31-19 @ 10:03) (106/55 - 144/79)  RR: 18 (01-31-19 @ 10:03) (18 - 20)  SpO2: 97% (01-31-19 @ 08:42) (94% - 100%)  Wt(kg): --    Weight (kg): 57.9 (01-30-19 @ 05:55)      01-30-19 @ 07:01  -  01-31-19 @ 07:00  --------------------------------------------------------  IN: 1080 mL / OUT: 2700 mL / NET: -1620 mL      Physical Exam:  	Gen: NAD, frail, debilitated, pale  	HEENT: PERRL, supple neck, clear oropharynx  	Pulm:  Rales B/L  	CV: RRR, S1S2; no rub  	Back: No spinal or CVA tenderness; no sacral edema  	Abd: +BS, soft, nontender/nondistended  	: No suprapubic tenderness  	UE: Warm, FROM, no clubbing, intact strength; no edema; no asterixis  	LE: Warm, FROM, no clubbing, intact strength; minimal edema  	Neuro: No focal deficits, intact gait  	Psych: Normal affect and mood  	Skin: Warm, without rashes  	Vascular access: AVF, Right CVC    LABS/STUDIES  --------------------------------------------------------------------------------              9.2    9.7   >-----------<  272      [01-30-19 @ 07:49]              30.7     140  |  98  |  22.0  ----------------------------<  91      [01-30-19 @ 07:49]  4.6   |  28.0  |  6.20        Ca     8.4     [01-30-19 @ 07:49]      Mg     2.3     [01-30-19 @ 07:49]      Phos  5.0     [01-30-19 @ 07:49]            Creatinine Trend:  SCr 6.20 [01-30 @ 07:49]  SCr 4.87 [01-29 @ 11:39]  SCr 5.31 [01-28 @ 11:35]  SCr 4.67 [01-08 @ 10:39]  SCr 6.42 [01-07 @ 09:13]    Urinalysis - [01-01-19 @ 15:02]      Color Red / Appearance Bloody / SG 1.015 / pH 6.0      Gluc Negative / Ketone Negative  / Bili Negative / Urobili Negative       Blood Large / Protein 500 / Leuk Est Trace / Nitrite Negative      RBC TNTC / WBC 3-5 / Hyaline  / Gran  / Sq Epi  / Non Sq Epi Negative / Bacteria Occasional      Iron 34, TIBC 235, %sat 14      [01-29-19 @ 11:39]  Ferritin 1049      [01-29-19 @ 11:39]  TSH 2.07      [01-30-19 @ 07:49]  Lipid: chol 159, , HDL 32, LDL 94      [01-29-19 @ 11:39]    HBsAb <3.0      [01-08-19 @ 17:03]  HBsAg Nonreact      [01-08-19 @ 17:03]  HCV 0.12, Nonreact      [01-08-19 @ 17:03]

## 2019-01-31 NOTE — PROGRESS NOTE ADULT - ASSESSMENT
79 year old male esrd, asthma, htn, hld, hypothyroidism, admitted with multi lobar pneumonia, hypxic 79 year old male with PMH ESRD on HD, CAD s/p CABG, lung cancer s/p radiation with subsequent pulmonary fibrosis on 2L home O2 presents with SOB. Pt states that his Sx started today. He was at the vascular surgeon to have is fistula be evaluated and see if it was ready to be sued, when he was found to be hypoxic on RA to 78%. No chest pain, fever, chills, vomiting, leg swelling, but he does c/o cough. CT of chest showed upper lung consolidation started on iv abx , seen by ID and cardiology remains hypoxic , add short course of steroid h/o COPD , cont on HD per schedule

## 2019-02-01 LAB
ANION GAP SERPL CALC-SCNC: 16 MMOL/L — SIGNIFICANT CHANGE UP (ref 5–17)
BUN SERPL-MCNC: 9 MG/DL — SIGNIFICANT CHANGE UP (ref 8–20)
CALCIUM SERPL-MCNC: 8.5 MG/DL — LOW (ref 8.6–10.2)
CHLORIDE SERPL-SCNC: 97 MMOL/L — LOW (ref 98–107)
CO2 SERPL-SCNC: 26 MMOL/L — SIGNIFICANT CHANGE UP (ref 22–29)
CREAT SERPL-MCNC: 3.07 MG/DL — HIGH (ref 0.5–1.3)
GLUCOSE SERPL-MCNC: 99 MG/DL — SIGNIFICANT CHANGE UP (ref 70–115)
HCT VFR BLD CALC: 32.6 % — LOW (ref 42–52)
HGB BLD-MCNC: 9.8 G/DL — LOW (ref 14–18)
MCHC RBC-ENTMCNC: 28.9 PG — SIGNIFICANT CHANGE UP (ref 27–31)
MCHC RBC-ENTMCNC: 30.1 G/DL — LOW (ref 32–36)
MCV RBC AUTO: 96.2 FL — HIGH (ref 80–94)
PLATELET # BLD AUTO: 333 K/UL — SIGNIFICANT CHANGE UP (ref 150–400)
POTASSIUM SERPL-MCNC: 4.4 MMOL/L — SIGNIFICANT CHANGE UP (ref 3.5–5.3)
POTASSIUM SERPL-SCNC: 4.4 MMOL/L — SIGNIFICANT CHANGE UP (ref 3.5–5.3)
RBC # BLD: 3.39 M/UL — LOW (ref 4.6–6.2)
RBC # FLD: 15.9 % — HIGH (ref 11–15.6)
SODIUM SERPL-SCNC: 139 MMOL/L — SIGNIFICANT CHANGE UP (ref 135–145)
WBC # BLD: 11.7 K/UL — HIGH (ref 4.8–10.8)
WBC # FLD AUTO: 11.7 K/UL — HIGH (ref 4.8–10.8)

## 2019-02-01 PROCEDURE — 99232 SBSQ HOSP IP/OBS MODERATE 35: CPT

## 2019-02-01 PROCEDURE — 90937 HEMODIALYSIS REPEATED EVAL: CPT

## 2019-02-01 PROCEDURE — 99233 SBSQ HOSP IP/OBS HIGH 50: CPT | Mod: GC

## 2019-02-01 RX ORDER — DIPHENHYDRAMINE HCL 50 MG
25 CAPSULE ORAL EVERY 8 HOURS
Refills: 0 | Status: DISCONTINUED | OUTPATIENT
Start: 2019-02-01 | End: 2019-02-08

## 2019-02-01 RX ADMIN — Medication 100 MICROGRAM(S): at 06:33

## 2019-02-01 RX ADMIN — Medication 50 MILLIGRAM(S): at 06:34

## 2019-02-01 RX ADMIN — Medication 81 MILLIGRAM(S): at 11:03

## 2019-02-01 RX ADMIN — Medication 3 MILLILITER(S): at 03:31

## 2019-02-01 RX ADMIN — CLOPIDOGREL BISULFATE 75 MILLIGRAM(S): 75 TABLET, FILM COATED ORAL at 11:03

## 2019-02-01 RX ADMIN — Medication 4 MILLILITER(S): at 19:58

## 2019-02-01 RX ADMIN — Medication 100 MILLIGRAM(S): at 11:03

## 2019-02-01 RX ADMIN — Medication 4 MILLILITER(S): at 08:24

## 2019-02-01 RX ADMIN — Medication 100 MILLIGRAM(S): at 06:34

## 2019-02-01 RX ADMIN — ISOSORBIDE MONONITRATE 30 MILLIGRAM(S): 60 TABLET, EXTENDED RELEASE ORAL at 17:25

## 2019-02-01 RX ADMIN — PANTOPRAZOLE SODIUM 40 MILLIGRAM(S): 20 TABLET, DELAYED RELEASE ORAL at 06:34

## 2019-02-01 RX ADMIN — DULOXETINE HYDROCHLORIDE 20 MILLIGRAM(S): 30 CAPSULE, DELAYED RELEASE ORAL at 11:03

## 2019-02-01 RX ADMIN — GABAPENTIN 100 MILLIGRAM(S): 400 CAPSULE ORAL at 06:34

## 2019-02-01 RX ADMIN — Medication 50 MILLIGRAM(S): at 17:25

## 2019-02-01 RX ADMIN — MUPIROCIN 1 APPLICATION(S): 20 OINTMENT TOPICAL at 00:36

## 2019-02-01 RX ADMIN — LOSARTAN POTASSIUM 50 MILLIGRAM(S): 100 TABLET, FILM COATED ORAL at 06:33

## 2019-02-01 RX ADMIN — POLYETHYLENE GLYCOL 3350 17 GRAM(S): 17 POWDER, FOR SOLUTION ORAL at 11:04

## 2019-02-01 RX ADMIN — MUPIROCIN 1 APPLICATION(S): 20 OINTMENT TOPICAL at 10:52

## 2019-02-01 RX ADMIN — Medication 50 MILLIGRAM(S): at 06:33

## 2019-02-01 RX ADMIN — ERYTHROPOIETIN 8000 UNIT(S): 10000 INJECTION, SOLUTION INTRAVENOUS; SUBCUTANEOUS at 13:25

## 2019-02-01 RX ADMIN — GABAPENTIN 100 MILLIGRAM(S): 400 CAPSULE ORAL at 11:08

## 2019-02-01 RX ADMIN — IRON SUCROSE 210 MILLIGRAM(S): 20 INJECTION, SOLUTION INTRAVENOUS at 11:04

## 2019-02-01 RX ADMIN — GABAPENTIN 100 MILLIGRAM(S): 400 CAPSULE ORAL at 22:01

## 2019-02-01 RX ADMIN — Medication 3 MILLILITER(S): at 08:24

## 2019-02-01 RX ADMIN — MUPIROCIN 1 APPLICATION(S): 20 OINTMENT TOPICAL at 21:59

## 2019-02-01 RX ADMIN — CHLORHEXIDINE GLUCONATE 1 APPLICATION(S): 213 SOLUTION TOPICAL at 11:06

## 2019-02-01 RX ADMIN — Medication 3 MILLILITER(S): at 19:58

## 2019-02-01 RX ADMIN — Medication 50 MILLIGRAM(S): at 22:05

## 2019-02-01 RX ADMIN — Medication 40 MILLIGRAM(S): at 11:06

## 2019-02-01 RX ADMIN — Medication 100 MILLIGRAM(S): at 22:00

## 2019-02-01 RX ADMIN — Medication 1 TABLET(S): at 11:04

## 2019-02-01 RX ADMIN — ATORVASTATIN CALCIUM 80 MILLIGRAM(S): 80 TABLET, FILM COATED ORAL at 22:00

## 2019-02-01 RX ADMIN — SENNA PLUS 2 TABLET(S): 8.6 TABLET ORAL at 22:01

## 2019-02-01 RX ADMIN — LORATADINE 10 MILLIGRAM(S): 10 TABLET ORAL at 11:04

## 2019-02-01 RX ADMIN — Medication 40 MILLIGRAM(S): at 06:33

## 2019-02-01 RX ADMIN — AMLODIPINE BESYLATE 10 MILLIGRAM(S): 2.5 TABLET ORAL at 06:34

## 2019-02-01 RX ADMIN — Medication 4 MILLILITER(S): at 03:31

## 2019-02-01 RX ADMIN — BENZOCAINE AND MENTHOL 1 LOZENGE: 5; 1 LIQUID ORAL at 11:04

## 2019-02-01 NOTE — PROGRESS NOTE ADULT - SUBJECTIVE AND OBJECTIVE BOX
[facility] DIVISION OF KIDNEY DISEASES AND HYPERTENSION -- FOLLOW UP NOTE  --------------------------------------------------------------------------------  Chief Complaint:  ESRD HD  24 hour events/subjective:  Pt seen/examined  HD today  Poor respiratory status      PAST HISTORY  --------------------------------------------------------------------------------  No significant changes to PMH, PSH, FHx, SHx, unless otherwise noted    ALLERGIES & MEDICATIONS  --------------------------------------------------------------------------------  Allergies    No Known Allergies    Intolerances      Standing Inpatient Medications  acetylcysteine 10%  Inhalation 4 milliLiter(s) Inhalation every 6 hours  ALBUTerol/ipratropium for Nebulization 3 milliLiter(s) Nebulizer every 6 hours  amLODIPine   Tablet 10 milliGRAM(s) Oral daily  aspirin enteric coated 81 milliGRAM(s) Oral daily  atorvastatin 80 milliGRAM(s) Oral at bedtime  chlorhexidine 2% Cloths 1 Application(s) Topical daily  clopidogrel Tablet 75 milliGRAM(s) Oral daily  docusate sodium 100 milliGRAM(s) Oral three times a day  DULoxetine 20 milliGRAM(s) Oral daily  epoetin nguyễn Injectable 8000 Unit(s) IV Push <User Schedule>  gabapentin 100 milliGRAM(s) Oral three times a day  hydrALAZINE 50 milliGRAM(s) Oral every 8 hours  hydrOXYzine hydrochloride 50 milliGRAM(s) Oral two times a day  iron sucrose IVPB 100 milliGRAM(s) IV Intermittent every 24 hours  isosorbide   mononitrate ER Tablet (IMDUR) 30 milliGRAM(s) Oral daily  levothyroxine 100 MICROGram(s) Oral daily  loratadine 10 milliGRAM(s) Oral daily  losartan 50 milliGRAM(s) Oral daily  metoprolol tartrate 50 milliGRAM(s) Oral two times a day  multivitamin 1 Tablet(s) Oral daily  mupirocin 2% Nasal 1 Application(s) Nasal two times a day  pantoprazole    Tablet 40 milliGRAM(s) Oral before breakfast  polyethylene glycol 3350 17 Gram(s) Oral daily  senna 2 Tablet(s) Oral at bedtime    PRN Inpatient Medications  benzocaine 15 mG/menthol 3.6 mG Lozenge 1 Lozenge Oral every 2 hours PRN  benzonatate 100 milliGRAM(s) Oral every 8 hours PRN      REVIEW OF SYSTEMS  --------------------------------------------------------------------------------  Unable to obtain    VITALS/PHYSICAL EXAM  --------------------------------------------------------------------------------  T(C): 36.4 (02-01-19 @ 12:40), Max: 37.1 (01-31-19 @ 21:49)  HR: 59 (02-01-19 @ 12:40) (55 - 75)  BP: 113/53 (02-01-19 @ 12:40) (99/46 - 121/51)  RR: 18 (02-01-19 @ 12:40) (16 - 18)  SpO2: 98% (02-01-19 @ 12:40) (91% - 100%)  Wt(kg): --        01-31-19 @ 07:01  -  02-01-19 @ 07:00  --------------------------------------------------------  IN: 240 mL / OUT: 0 mL / NET: 240 mL      Physical Exam:  	Gen: NAD, frail, debilitated, pale  	HEENT: PERRL, supple neck, clear oropharynx  	Pulm:  Rales B/L  	CV: RRR, S1S2; no rub  	Back: No spinal or CVA tenderness; no sacral edema  	Abd: +BS, soft, nontender/nondistended  	: No suprapubic tenderness  	UE: Warm, FROM, no clubbing, intact strength; no edema; no asterixis  	LE: Warm, FROM, no clubbing, intact strength; minimal edema  	Neuro: No focal deficits, intact gait  	Psych: Normal affect and mood  	Skin: Warm, without rashes  	Vascular access: AVF, Right CVC    LABS/STUDIES  --------------------------------------------------------------------------------                Creatinine Trend:  SCr 6.20 [01-30 @ 07:49]  SCr 4.87 [01-29 @ 11:39]  SCr 5.31 [01-28 @ 11:35]  SCr 4.67 [01-08 @ 10:39]  SCr 6.42 [01-07 @ 09:13]        Iron 34, TIBC 235, %sat 14      [01-29-19 @ 11:39]  Ferritin 1049      [01-29-19 @ 11:39]  TSH 2.07      [01-30-19 @ 07:49]  Lipid: chol 159, , HDL 32, LDL 94      [01-29-19 @ 11:39]    HBsAb <3.0      [01-08-19 @ 17:03]  HBsAg Nonreact      [01-08-19 @ 17:03]  HCV 0.12, Nonreact      [01-08-19 @ 17:03]

## 2019-02-01 NOTE — CHART NOTE - NSCHARTNOTEFT_GEN_A_CORE
Upon Nutritional Assessment by the Registered Dietitian your patient was determined to meet criteria / has evidence of the following diagnosis/diagnoses:          [ ]  Mild Protein Calorie Malnutrition        [x ]  Moderate Protein Calorie Malnutrition  CHRONIC        [ ] Severe Protein Calorie Malnutrition        [ ] Unspecified Protein Calorie Malnutrition        [ ] Underweight / BMI <19        [ ] Morbid Obesity / BMI > 40      Findings as based on:  •  Comprehensive nutrition assessment and consultation  •  Calorie counts (nutrient intake analysis)  •  Food acceptance and intake status from observations by staff  •  Follow up  •  Patient education  •  Intervention secondary to interdisciplinary rounds  •   concerns      Treatment:    The following diet has been recommended: Rec Ming CASON      PROVIDER Section:     By signing this assessment you are acknowledging and agree with the diagnosis/diagnoses assigned by the Registered Dietitian    Comments:

## 2019-02-01 NOTE — PROGRESS NOTE ADULT - SUBJECTIVE AND OBJECTIVE BOX
KAUSHIK HOFFMAN    [id number]    79y      Male    INTERVAL HPI/ OVERNIGHT EVENTS:  No overnight events reported , pt is resting seen in bed , on high flow NC oxygen    no distress noted      HPI: Pt is a 78yo M presented w/ SOB found to have HCAP. PMH ESRD on HD, hx of lung cancer s/p radiation, HTN, Anemia, CAD s/p CABG, HLD, recent HCAP tx 3 weeks ago.   Patient had been having cough and SOB for the days and it has gotten progressively worse. He denies any fevers, chills but states he feels weak. He uses O2 at home 2-3 L NC.       PAST MEDICAL & SURGICAL HISTORY:  Chronic anemia  ESRD (end stage renal disease)  CAD (coronary artery disease)  Lung cancer: had cy. and radiation in 2006.  Bipolar disorder  High cholesterol  Hypertension  S/P CABG (coronary artery bypass graft): pt&#x27;s son in [facility] states h/o some stents near neck area ? carotid ? he is not sure.    Vital Signs Last 24 Hrs  T(C): 36.4 (01 Feb 2019 04:38), Max: 37.1 (31 Jan 2019 21:49)  T(F): 97.6 (01 Feb 2019 04:38), Max: 98.7 (31 Jan 2019 21:49)  HR: 62 (01 Feb 2019 08:41) (55 - 75)  BP: 121/51 (01 Feb 2019 04:38) (109/44 - 121/51)  BP(mean): --  RR: 18 (01 Feb 2019 04:38) (18 - 18)  SpO2: 98% (01 Feb 2019 04:38) (91% - 100%)      PHYSICAL EXAM:  HEENT:  Permacath in place  Neck: supple, No JVD  Back: No CVA tenderness  Respiratory: CTAB, on High flow NC  Cardiovascular: S1 and S2, RRR,   Gastrointestinal: BS+, soft, NT/ND  Extremities: av graft in place  Vascular: 2+ peripheral pulses  Neurological: A/O x 3, no focal deficits        MEDICATIONS  (STANDING):  acetylcysteine 10%  Inhalation 4 milliLiter(s) Inhalation every 6 hours  ALBUTerol/ipratropium for Nebulization 3 milliLiter(s) Nebulizer every 6 hours  amLODIPine   Tablet 10 milliGRAM(s) Oral daily  aspirin enteric coated 81 milliGRAM(s) Oral daily  atorvastatin 80 milliGRAM(s) Oral at bedtime  chlorhexidine 2% Cloths 1 Application(s) Topical daily  clopidogrel Tablet 75 milliGRAM(s) Oral daily  docusate sodium 100 milliGRAM(s) Oral three times a day  DULoxetine 20 milliGRAM(s) Oral daily  epoetin nguyễn Injectable 8000 Unit(s) IV Push <User Schedule>  gabapentin 100 milliGRAM(s) Oral three times a day  hydrALAZINE 50 milliGRAM(s) Oral every 8 hours  hydrOXYzine hydrochloride 50 milliGRAM(s) Oral two times a day  iron sucrose IVPB 100 milliGRAM(s) IV Intermittent every 24 hours  isosorbide   mononitrate ER Tablet (IMDUR) 30 milliGRAM(s) Oral daily  levothyroxine 100 MICROGram(s) Oral daily  loratadine 10 milliGRAM(s) Oral daily  losartan 50 milliGRAM(s) Oral daily  metoprolol tartrate 50 milliGRAM(s) Oral two times a day  multivitamin 1 Tablet(s) Oral daily  mupirocin 2% Nasal 1 Application(s) Nasal two times a day  pantoprazole    Tablet 40 milliGRAM(s) Oral before breakfast  polyethylene glycol 3350 17 Gram(s) Oral daily  senna 2 Tablet(s) Oral at bedtime    MEDICATIONS  (PRN):  benzocaine 15 mG/menthol 3.6 mG Lozenge 1 Lozenge Oral every 2 hours PRN Sore Throat  benzonatate 100 milliGRAM(s) Oral every 8 hours PRN Cough      RADIOLOGY & ADDITIONAL TESTS:  US of lower extremity - 1/31   - neg for DVT    		  .    Assessment and Plan:   · Assessment		   79 year old male with PMH ESRD on HD, CAD s/p CABG, lung cancer s/p radiation with subsequent pulmonary fibrosis on 2L home O2 presents with SOB.        Problem/Plan - 1:  ·  Problem: Acute on chronic respiratory failure with hypoxia.    Plan: cont neb therapy            con't NC oxygen high flow            US of lower ext r/o DVT done yesterday - Neg            Trial of short4 day steroid taper                 Problem/Plan - 2:  ·  Problem: HCAP (healthcare-associated pneumonia).    Plan: ID consult appreciated:          Will monitor off antibiotics           Trend temperatures - has been afebrile for last 24 hours          Trend WBC - 9.7 (1/30)          Repeat cultures if febrile          Labs ordered for AM       Problem/Plan - 3:  ·  Problem: CAD   Plan: Stable,    Cardiology consult appreciated:       con't aspirin, Plavix, and Lipitor     Problem/Plan - 4:  ·  Problem: ESRD (end stage renal disease).   Plan: Continue dialysis per schedule.      Problem/Plan - 5:  ·  Problem: Malignant neoplasm of lung, unspecified laterality, unspecified part of lung.    Plan: patient s/ p  radiation chemotherapy with residual fibrosis from radiation  not on active treatment. KAUSHIK HOFFMAN    [id number]    79y      Male    INTERVAL HPI/ OVERNIGHT EVENTS:  No overnight events reported , pt is resting seen in bed , on high flow NC oxygen    no distress noted      HPI: Pt is a 80yo M presented w/ SOB found to have HCAP. PMH ESRD on HD, hx of lung cancer s/p radiation, HTN, Anemia, CAD s/p CABG, HLD, recent HCAP tx 3 weeks ago.   Patient had been having cough and SOB for the days and it has gotten progressively worse. He denies any fevers, chills but states he feels weak. He uses O2 at home 2-3 L NC.       PAST MEDICAL & SURGICAL HISTORY:  Chronic anemia  ESRD (end stage renal disease)  CAD (coronary artery disease)  Lung cancer: had cy. and radiation in 2006.  Bipolar disorder  High cholesterol  Hypertension  S/P CABG (coronary artery bypass graft): pt&#x27;s son in [facility] states h/o some stents near neck area ? carotid ? he is not sure.    Vital Signs Last 24 Hrs  T(C): 36.4 (01 Feb 2019 04:38), Max: 37.1 (31 Jan 2019 21:49)  T(F): 97.6 (01 Feb 2019 04:38), Max: 98.7 (31 Jan 2019 21:49)  HR: 62 (01 Feb 2019 08:41) (55 - 75)  BP: 121/51 (01 Feb 2019 04:38) (109/44 - 121/51)  BP(mean): --  RR: 18 (01 Feb 2019 04:38) (18 - 18)  SpO2: 98% (01 Feb 2019 04:38) (91% - 100%)      PHYSICAL EXAM:  HEENT:  Permacath in place  Neck: supple, No JVD  Back: No CVA tenderness  Respiratory: CTAB, on High flow NC  Cardiovascular: S1 and S2, RRR,   Gastrointestinal: BS+, soft, NT/ND  Extremities: av graft in place  Vascular: 2+ peripheral pulses  Neurological: A/O x 3, no focal deficits        MEDICATIONS  (STANDING):  acetylcysteine 10%  Inhalation 4 milliLiter(s) Inhalation every 6 hours  ALBUTerol/ipratropium for Nebulization 3 milliLiter(s) Nebulizer every 6 hours  amLODIPine   Tablet 10 milliGRAM(s) Oral daily  aspirin enteric coated 81 milliGRAM(s) Oral daily  atorvastatin 80 milliGRAM(s) Oral at bedtime  chlorhexidine 2% Cloths 1 Application(s) Topical daily  clopidogrel Tablet 75 milliGRAM(s) Oral daily  docusate sodium 100 milliGRAM(s) Oral three times a day  DULoxetine 20 milliGRAM(s) Oral daily  epoetin nguyễn Injectable 8000 Unit(s) IV Push <User Schedule>  gabapentin 100 milliGRAM(s) Oral three times a day  hydrALAZINE 50 milliGRAM(s) Oral every 8 hours  hydrOXYzine hydrochloride 50 milliGRAM(s) Oral two times a day  iron sucrose IVPB 100 milliGRAM(s) IV Intermittent every 24 hours  isosorbide   mononitrate ER Tablet (IMDUR) 30 milliGRAM(s) Oral daily  levothyroxine 100 MICROGram(s) Oral daily  loratadine 10 milliGRAM(s) Oral daily  losartan 50 milliGRAM(s) Oral daily  metoprolol tartrate 50 milliGRAM(s) Oral two times a day  multivitamin 1 Tablet(s) Oral daily  mupirocin 2% Nasal 1 Application(s) Nasal two times a day  pantoprazole    Tablet 40 milliGRAM(s) Oral before breakfast  polyethylene glycol 3350 17 Gram(s) Oral daily  senna 2 Tablet(s) Oral at bedtime    MEDICATIONS  (PRN):  benzocaine 15 mG/menthol 3.6 mG Lozenge 1 Lozenge Oral every 2 hours PRN Sore Throat  benzonatate 100 milliGRAM(s) Oral every 8 hours PRN Cough      RADIOLOGY & ADDITIONAL TESTS:  US of lower extremity - 1/31   - neg for DVT    		  .    Assessment and Plan:   · Assessment		   79 year old male with PMH ESRD on HD, CAD s/p CABG, lung cancer s/p radiation with subsequent pulmonary fibrosis on 2L home O2 presents with SOB.        Problem/Plan - 1:  ·  Problem: Acute on chronic respiratory failure with hypoxia.    Plan: cont neb therapy            con't NC oxygen high flow            US of lower ext r/o DVT done yesterday - Neg            Trial of short4 day steroid taper                 Problem/Plan - 2:  ·  Problem: HCAP (healthcare-associated pneumonia).    Plan: ID consult appreciated:          Will monitor off antibiotics           Trend temperatures - has been afebrile for last 24 hours          Trend WBC - 9.7 (1/30)          Repeat cultures if febrile          Labs ordered today       Problem/Plan - 3:  ·  Problem: CAD   Plan: Stable,    Cardiology consult appreciated:       con't aspirin, Plavix, and Lipitor     Problem/Plan - 4:  ·  Problem: ESRD (end stage renal disease).   Plan: Continue dialysis per schedule.      Problem/Plan - 5:  ·  Problem: Malignant neoplasm of lung, unspecified laterality, unspecified part of lung.    Plan: patient s/ p  radiation chemotherapy with residual fibrosis from radiation  not on active treatment. KAUSHIK HOFFMAN    [id number]    79y      Male    INTERVAL HPI/ OVERNIGHT EVENTS:  No overnight events reported , pt is resting seen in bed , on high flow NC oxygen    no distress noted      HPI: Pt is a 78yo M presented w/ SOB found to have HCAP. PMH ESRD on HD, hx of lung cancer s/p radiation, HTN, Anemia, CAD s/p CABG, HLD, recent HCAP tx 3 weeks ago.   Patient had been having cough and SOB for the days and it has gotten progressively worse. He denies any fevers, chills but states he feels weak. He uses O2 at home 2-3 L NC.       PAST MEDICAL & SURGICAL HISTORY:  Chronic anemia  ESRD (end stage renal disease)  CAD (coronary artery disease)  Lung cancer: had cy. and radiation in 2006.  Bipolar disorder  High cholesterol  Hypertension  S/P CABG (coronary artery bypass graft): pt&#x27;s son in law states h/o some stents near neck area ? carotid ? he is not sure.    Vital Signs   T(C): 36.4 (01 Feb 2019 04:38), Max: 37.1 (31 Jan 2019 21:49)  T(F): 97.6 (01 Feb 2019 04:38), Max: 98.7 (31 Jan 2019 21:49)  HR: 62 (01 Feb 2019 08:41) (55 - 75)  BP: 121/51 (01 Feb 2019 04:38) (109/44 - 121/51)  RR: 18 (01 Feb 2019 04:38) (18 - 18)  SpO2: 98% (01 Feb 2019 04:38) (91% - 100%)      PHYSICAL EXAM:  HEENT:  Permacath in place  Neck: supple, No JVD  Back: No CVA tenderness  Respiratory: CTAB, on High flow NC  Cardiovascular: S1 and S2, RRR,   Gastrointestinal: BS+, soft, NT/ND  Extremities: av graft in place  Vascular: 2+ peripheral pulses  Neurological: A/O x 3, no focal deficits        MEDICATIONS  (STANDING):  acetylcysteine 10%  Inhalation 4 milliLiter(s) Inhalation every 6 hours  ALBUTerol/ipratropium for Nebulization 3 milliLiter(s) Nebulizer every 6 hours  amLODIPine   Tablet 10 milliGRAM(s) Oral daily  aspirin enteric coated 81 milliGRAM(s) Oral daily  atorvastatin 80 milliGRAM(s) Oral at bedtime  chlorhexidine 2% Cloths 1 Application(s) Topical daily  clopidogrel Tablet 75 milliGRAM(s) Oral daily  docusate sodium 100 milliGRAM(s) Oral three times a day  DULoxetine 20 milliGRAM(s) Oral daily  epoetin nguyễn Injectable 8000 Unit(s) IV Push <User Schedule>  gabapentin 100 milliGRAM(s) Oral three times a day  hydrALAZINE 50 milliGRAM(s) Oral every 8 hours  hydrOXYzine hydrochloride 50 milliGRAM(s) Oral two times a day  iron sucrose IVPB 100 milliGRAM(s) IV Intermittent every 24 hours  isosorbide   mononitrate ER Tablet (IMDUR) 30 milliGRAM(s) Oral daily  levothyroxine 100 MICROGram(s) Oral daily  loratadine 10 milliGRAM(s) Oral daily  losartan 50 milliGRAM(s) Oral daily  metoprolol tartrate 50 milliGRAM(s) Oral two times a day  multivitamin 1 Tablet(s) Oral daily  mupirocin 2% Nasal 1 Application(s) Nasal two times a day  pantoprazole    Tablet 40 milliGRAM(s) Oral before breakfast  polyethylene glycol 3350 17 Gram(s) Oral daily  senna 2 Tablet(s) Oral at bedtime    MEDICATIONS  (PRN):  benzocaine 15 mG/menthol 3.6 mG Lozenge 1 Lozenge Oral every 2 hours PRN Sore Throat  benzonatate 100 milliGRAM(s) Oral every 8 hours PRN Cough      RADIOLOGY & ADDITIONAL TESTS:  US of lower extremity - 1/31   - neg for DVT    		  .    Assessment and Plan:   · Assessment		   79 year old male with PMH ESRD on HD, CAD s/p CABG, lung cancer s/p radiation with subsequent pulmonary fibrosis on 2L home O2 presents with SOB.        Problem/Plan - 1:  ·  Problem: Acute on chronic respiratory failure with hypoxia.    Plan: cont neb therapy            con't NC oxygen high flow            US of lower ext r/o DVT done yesterday - Neg            Trial of short4 day steroid taper                 Problem/Plan - 2:  ·  Problem: HCAP (healthcare-associated pneumonia).    Plan: ID consult appreciated:          Will monitor off antibiotics           Trend temperatures - has been afebrile for last 24 hours          Trend WBC - 9.7 (1/30)          Repeat cultures if febrile          Labs ordered today       Problem/Plan - 3:  ·  Problem: CAD   Plan: Stable,    Cardiology consult appreciated:       con't aspirin, Plavix, and Lipitor     Problem/Plan - 4:  ·  Problem: ESRD (end stage renal disease).   Plan: Continue dialysis per schedule.      Problem/Plan - 5:  ·  Problem: Malignant neoplasm of lung, unspecified laterality, unspecified part of lung.    Plan: patient s/ p  radiation chemotherapy with residual fibrosis from radiation  not on active treatment.

## 2019-02-01 NOTE — PROGRESS NOTE ADULT - ASSESSMENT
1. ESRD on HD  2. RUL PN, Radiation Pneumonitis  3. COPD - O2 dependent  4. Cardiomegally - S/P CABG, Severe LV dysfunction  5. Malnutrition   6. Anemia      on HD today  Continue IV Antibiotics  On EPO  Continue nutritional support  Oxygen, drug nebs and respiratory treatments as per respiratory  Continue current management  Palliative care follow up for goals of care discussion  Poor prognosis

## 2019-02-01 NOTE — DIETITIAN INITIAL EVALUATION ADULT. - DIET TYPE
dysphagia 2, mechanical soft, thin liquids/DASH/TLC (sodium and cholesterol restricted diet)/renal replacement pts:no protein restr,no conc K & phos, low sodium

## 2019-02-01 NOTE — PROGRESS NOTE ADULT - SUBJECTIVE AND OBJECTIVE BOX
[facility]  INFECTIOUS DISEASES AND INTERNAL MEDICINE at [place]  =======================================================  Cricket Lara MD  Diplomates American Board of Internal Medicine and Infectious Diseases  =======================================================    [id number]  [facility]   follow up for: possible right side PNA  patient seen and examined.     remains on high flow oxygen overnight.   no fevers  off antibiotics, day 2    ===================================================  REVIEW OF SYSTEMS:  as above  all other ROS negative    =======================================================  Allergies  No Known Allergies      Antibiotics:  NONE    ======================================================  Physical Exam:  ============  T(F): 97.6 (01 Feb 2019 04:38), Max: 99.9 (30 Jan 2019 16:20)  HR: 66 (01 Feb 2019 04:38)  BP: 121/51 (01 Feb 2019 04:38)  RR: 18 (01 Feb 2019 04:38)  SpO2: 98% (01 Feb 2019 04:38) (91% - 100%)    General:  No acute distress.  THIN FRAIL;   Eye: Pupils are equal, round and reactive to light, Extraocular movements are intact, Normal conjunctiva.  HENT: Normocephalic, Oral mucosa is moist, No pharyngeal erythema, No sinus tenderness.  Neck: Supple, No lymphadenopathy.  Respiratory: Lungs with fair air entry on posterior exam  Cardiovascular: Normal rate, Regular rhythm, No murmur, Good pulses equal in all extremities, No edema.  Gastrointestinal: Soft, Non-tender, Non-distended, Normal bowel sounds.  Genitourinary: No costovertebral angle tenderness.  Lymphatics: No lymphadenopathy neck,   Musculoskeletal: Normal range of motion, Normal strength.  Integumentary: No rash.  Neurologic: Alert, Oriented, No focal deficits, Cranial Nerves II-XII are grossly intact.  Psychiatric: Appropriate mood & affect.  =======================================================    Labs:         Culture - Sputum (collected 01-30-19 @ 09:16)  Source: .Sputum  Gram Stain (01-30-19 @ 15:43):    Few White blood cells    Few Gram Positive Cocci in Clusters    Culture - Blood (collected 01-29-19 @ 11:39)  Source: .Blood    Culture - Blood (collected 01-28-19 @ 11:39)  Source: .Blood    Culture - Blood (collected 01-28-19 @ 11:38)  Source: .Blood

## 2019-02-01 NOTE — PROGRESS NOTE ADULT - ASSESSMENT
this 79 y.o. Man with CAD s/p CABG, HLD, HTN, Anemia, ESRD on HD, hx of lung cancer s/p radiation, who was recently admitted from 12/22/19 to 1/11/19, treated for Enterobacter PNA.  here for cough and found with RUL consolidation    - suspect PNA/ HCAP  - had been on Zosyn; but no WBC elevation , no fevers  RVP negative  sputum cx in process, gram stain with GPC cluster ? contaminant    - will MONITOR OFF ANTIBIOTICS     - follow up all outstanding cultures  - trend temperature and WBC curve  - repeat cultures from blood and all sources if febrile.

## 2019-02-01 NOTE — DIETITIAN INITIAL EVALUATION ADULT. - OTHER INFO
BMI 18.9, pt weight on last admit 12/22 was 141#, down 17.4# if current weight is accurate. Pt with poor po intake currently. Pt now on mech soft foods and with hx lung ca.

## 2019-02-02 LAB
-  AMPICILLIN/SULBACTAM: SIGNIFICANT CHANGE UP
-  CEFAZOLIN: SIGNIFICANT CHANGE UP
-  CLINDAMYCIN: SIGNIFICANT CHANGE UP
-  ERYTHROMYCIN: SIGNIFICANT CHANGE UP
-  GENTAMICIN: SIGNIFICANT CHANGE UP
-  LINEZOLID: SIGNIFICANT CHANGE UP
-  OXACILLIN: SIGNIFICANT CHANGE UP
-  PENICILLIN: SIGNIFICANT CHANGE UP
-  RIFAMPIN: SIGNIFICANT CHANGE UP
-  TETRACYCLINE: SIGNIFICANT CHANGE UP
-  TRIMETHOPRIM/SULFAMETHOXAZOLE: SIGNIFICANT CHANGE UP
-  VANCOMYCIN: SIGNIFICANT CHANGE UP
CULTURE RESULTS: SIGNIFICANT CHANGE UP
METHOD TYPE: SIGNIFICANT CHANGE UP
ORGANISM # SPEC MICROSCOPIC CNT: SIGNIFICANT CHANGE UP
ORGANISM # SPEC MICROSCOPIC CNT: SIGNIFICANT CHANGE UP
SPECIMEN SOURCE: SIGNIFICANT CHANGE UP
VANCOMYCIN TROUGH SERPL-MCNC: 7.2 UG/ML — LOW (ref 10–20)

## 2019-02-02 PROCEDURE — 99233 SBSQ HOSP IP/OBS HIGH 50: CPT

## 2019-02-02 RX ORDER — ACETAMINOPHEN 500 MG
650 TABLET ORAL ONCE
Refills: 0 | Status: COMPLETED | OUTPATIENT
Start: 2019-02-02 | End: 2019-02-02

## 2019-02-02 RX ORDER — VANCOMYCIN HCL 1 G
1000 VIAL (EA) INTRAVENOUS ONCE
Refills: 0 | Status: COMPLETED | OUTPATIENT
Start: 2019-02-02 | End: 2019-02-02

## 2019-02-02 RX ORDER — ENOXAPARIN SODIUM 100 MG/ML
30 INJECTION SUBCUTANEOUS DAILY
Refills: 0 | Status: DISCONTINUED | OUTPATIENT
Start: 2019-02-02 | End: 2019-02-04

## 2019-02-02 RX ORDER — VANCOMYCIN HCL 1 G
1000 VIAL (EA) INTRAVENOUS
Refills: 0 | Status: DISCONTINUED | OUTPATIENT
Start: 2019-02-04 | End: 2019-02-04

## 2019-02-02 RX ADMIN — Medication 50 MILLIGRAM(S): at 05:21

## 2019-02-02 RX ADMIN — Medication 1 TABLET(S): at 10:45

## 2019-02-02 RX ADMIN — Medication 50 MILLIGRAM(S): at 17:00

## 2019-02-02 RX ADMIN — DULOXETINE HYDROCHLORIDE 20 MILLIGRAM(S): 30 CAPSULE, DELAYED RELEASE ORAL at 10:44

## 2019-02-02 RX ADMIN — Medication 100 MILLIGRAM(S): at 05:22

## 2019-02-02 RX ADMIN — MUPIROCIN 1 APPLICATION(S): 20 OINTMENT TOPICAL at 22:43

## 2019-02-02 RX ADMIN — Medication 100 MILLIGRAM(S): at 13:56

## 2019-02-02 RX ADMIN — Medication 30 MILLIGRAM(S): at 10:44

## 2019-02-02 RX ADMIN — AMLODIPINE BESYLATE 10 MILLIGRAM(S): 2.5 TABLET ORAL at 05:21

## 2019-02-02 RX ADMIN — Medication 100 MILLIGRAM(S): at 22:43

## 2019-02-02 RX ADMIN — Medication 50 MILLIGRAM(S): at 13:56

## 2019-02-02 RX ADMIN — GABAPENTIN 100 MILLIGRAM(S): 400 CAPSULE ORAL at 13:56

## 2019-02-02 RX ADMIN — ENOXAPARIN SODIUM 30 MILLIGRAM(S): 100 INJECTION SUBCUTANEOUS at 17:00

## 2019-02-02 RX ADMIN — MUPIROCIN 1 APPLICATION(S): 20 OINTMENT TOPICAL at 10:43

## 2019-02-02 RX ADMIN — Medication 100 MICROGRAM(S): at 05:22

## 2019-02-02 RX ADMIN — Medication 81 MILLIGRAM(S): at 10:43

## 2019-02-02 RX ADMIN — Medication 3 MILLILITER(S): at 20:15

## 2019-02-02 RX ADMIN — Medication 3 MILLILITER(S): at 15:06

## 2019-02-02 RX ADMIN — Medication 25 MILLIGRAM(S): at 10:45

## 2019-02-02 RX ADMIN — ISOSORBIDE MONONITRATE 30 MILLIGRAM(S): 60 TABLET, EXTENDED RELEASE ORAL at 10:44

## 2019-02-02 RX ADMIN — Medication 250 MILLIGRAM(S): at 13:56

## 2019-02-02 RX ADMIN — SENNA PLUS 2 TABLET(S): 8.6 TABLET ORAL at 22:45

## 2019-02-02 RX ADMIN — POLYETHYLENE GLYCOL 3350 17 GRAM(S): 17 POWDER, FOR SOLUTION ORAL at 10:45

## 2019-02-02 RX ADMIN — CHLORHEXIDINE GLUCONATE 1 APPLICATION(S): 213 SOLUTION TOPICAL at 13:57

## 2019-02-02 RX ADMIN — GABAPENTIN 100 MILLIGRAM(S): 400 CAPSULE ORAL at 22:43

## 2019-02-02 RX ADMIN — Medication 50 MILLIGRAM(S): at 22:44

## 2019-02-02 RX ADMIN — CLOPIDOGREL BISULFATE 75 MILLIGRAM(S): 75 TABLET, FILM COATED ORAL at 10:44

## 2019-02-02 RX ADMIN — LOSARTAN POTASSIUM 50 MILLIGRAM(S): 100 TABLET, FILM COATED ORAL at 05:22

## 2019-02-02 RX ADMIN — ATORVASTATIN CALCIUM 80 MILLIGRAM(S): 80 TABLET, FILM COATED ORAL at 22:43

## 2019-02-02 RX ADMIN — PANTOPRAZOLE SODIUM 40 MILLIGRAM(S): 20 TABLET, DELAYED RELEASE ORAL at 05:22

## 2019-02-02 RX ADMIN — GABAPENTIN 100 MILLIGRAM(S): 400 CAPSULE ORAL at 05:22

## 2019-02-02 RX ADMIN — IRON SUCROSE 210 MILLIGRAM(S): 20 INJECTION, SOLUTION INTRAVENOUS at 10:43

## 2019-02-02 RX ADMIN — Medication 650 MILLIGRAM(S): at 06:49

## 2019-02-02 RX ADMIN — Medication 650 MILLIGRAM(S): at 05:35

## 2019-02-02 RX ADMIN — LORATADINE 10 MILLIGRAM(S): 10 TABLET ORAL at 10:44

## 2019-02-02 NOTE — PROGRESS NOTE ADULT - SUBJECTIVE AND OBJECTIVE BOX
[facility] DIVISION OF KIDNEY DISEASES AND HYPERTENSION -- FOLLOW UP NOTE  --------------------------------------------------------------------------------  Chief Complaint:  ESRD HD  24 hour events/subjective:  Pt seen/examined  Tolerated HD yesterday    PAST HISTORY  --------------------------------------------------------------------------------  No significant changes to PMH, PSH, FHx, SHx, unless otherwise noted    ALLERGIES & MEDICATIONS  --------------------------------------------------------------------------------  Allergies    No Known Allergies    Intolerances      Standing Inpatient Medications  ALBUTerol/ipratropium for Nebulization 3 milliLiter(s) Nebulizer every 6 hours  amLODIPine   Tablet 10 milliGRAM(s) Oral daily  aspirin enteric coated 81 milliGRAM(s) Oral daily  atorvastatin 80 milliGRAM(s) Oral at bedtime  chlorhexidine 2% Cloths 1 Application(s) Topical daily  clopidogrel Tablet 75 milliGRAM(s) Oral daily  docusate sodium 100 milliGRAM(s) Oral three times a day  DULoxetine 20 milliGRAM(s) Oral daily  epoetin nguyễn Injectable 8000 Unit(s) IV Push <User Schedule>  gabapentin 100 milliGRAM(s) Oral three times a day  hydrALAZINE 50 milliGRAM(s) Oral every 8 hours  iron sucrose IVPB 100 milliGRAM(s) IV Intermittent every 24 hours  isosorbide   mononitrate ER Tablet (IMDUR) 30 milliGRAM(s) Oral daily  levothyroxine 100 MICROGram(s) Oral daily  loratadine 10 milliGRAM(s) Oral daily  losartan 50 milliGRAM(s) Oral daily  metoprolol tartrate 50 milliGRAM(s) Oral two times a day  multivitamin 1 Tablet(s) Oral daily  mupirocin 2% Nasal 1 Application(s) Nasal two times a day  pantoprazole    Tablet 40 milliGRAM(s) Oral before breakfast  polyethylene glycol 3350 17 Gram(s) Oral daily  predniSONE   Tablet 30 milliGRAM(s) Oral once  senna 2 Tablet(s) Oral at bedtime    PRN Inpatient Medications  benzocaine 15 mG/menthol 3.6 mG Lozenge 1 Lozenge Oral every 2 hours PRN  benzonatate 100 milliGRAM(s) Oral every 8 hours PRN  diphenhydrAMINE 25 milliGRAM(s) Oral every 8 hours PRN      REVIEW OF SYSTEMS  --------------------------------------------------------------------------------  Gen: No weight changes, fatigue, fevers/chills, weakness  Skin: No rashes  Head/Eyes/Ears/Mouth: No headache; Normal hearing; Normal vision w/o blurriness; No sinus pain/discomfort, sore throat  Respiratory: No dyspnea, cough, wheezing, hemoptysis  CV: No chest pain, PND, orthopnea  GI: No abdominal pain, diarrhea, constipation, nausea, vomiting, melena, hematochezia  : No increased frequency, dysuria, hematuria, nocturia  MSK: No joint pain/swelling; no back pain; no edema  Neuro: No dizziness/lightheadedness, weakness, seizures, numbness, tingling  Heme: No easy bruising or bleeding  Endo: No heat/cold intolerance  Psych: No significant nervousness, anxiety, stress, depression    All other systems were reviewed and are negative, except as noted.    VITALS/PHYSICAL EXAM  --------------------------------------------------------------------------------  T(C): 37 (02-02-19 @ 04:16), Max: 37.1 (02-01-19 @ 20:33)  HR: 55 (02-02-19 @ 08:51) (55 - 74)  BP: 124/64 (02-02-19 @ 04:16) (99/46 - 135/62)  RR: 18 (02-02-19 @ 08:00) (16 - 18)  SpO2: 93% (02-02-19 @ 08:51) (93% - 100%)  Wt(kg): --        02-01-19 @ 07:01  -  02-02-19 @ 07:00  --------------------------------------------------------  IN: 200 mL / OUT: 1000 mL / NET: -800 mL      Physical Exam:  	Gen: NAD, frail, debilitated, pale  	HEENT: PERRL, supple neck, clear oropharynx  	Pulm:  Rales B/L  	CV: RRR, S1S2; no rub  	Back: No spinal or CVA tenderness; no sacral edema  	Abd: +BS, soft, nontender/nondistended  	: No suprapubic tenderness  	UE: Warm, FROM, no clubbing, intact strength; no edema; no asterixis  	LE: Warm, FROM, no clubbing, intact strength; minimal edema  	Neuro: No focal deficits, intact gait  	Psych: Normal affect and mood  	Skin: Warm, without rashes  	Vascular access: AVF, Right CVC    LABS/STUDIES  --------------------------------------------------------------------------------              9.8    11.7  >-----------<  333      [02-01-19 @ 17:54]              32.6     139  |  97  |  9.0  ----------------------------<  99      [02-01-19 @ 17:54]  4.4   |  26.0  |  3.07        Ca     8.5     [02-01-19 @ 17:54]            Creatinine Trend:  SCr 3.07 [02-01 @ 17:54]  SCr 6.20 [01-30 @ 07:49]  SCr 4.87 [01-29 @ 11:39]  SCr 5.31 [01-28 @ 11:35]  SCr 4.67 [01-08 @ 10:39]        Iron 34, TIBC 235, %sat 14      [01-29-19 @ 11:39]  Ferritin 1049      [01-29-19 @ 11:39]  TSH 2.07      [01-30-19 @ 07:49]  Lipid: chol 159, , HDL 32, LDL 94      [01-29-19 @ 11:39]    HBsAb <3.0      [01-08-19 @ 17:03]  HBsAg Nonreact      [01-08-19 @ 17:03]  HCV 0.12, Nonreact      [01-08-19 @ 17:03]

## 2019-02-02 NOTE — PROGRESS NOTE ADULT - ASSESSMENT
79 year old male with PMH ESRD on HD, CAD s/p CABG, lung cancer s/p radiation with subsequent pulmonary fibrosis on 2L home O2 presents with SOB.     1) Acute on chronic respiratory failure with hypoxia  - Sputum culture +ve for MRSA  - Will restart on Vancomycin and will discuss with ID  - Continue Nebs and Steroid taper  - Continue High Flow and wean off as tolerated  - Pulmonary Consult  2) History of Lung Cancer  - treated in past  - Outpatient follow up with Oncology  3) ESRD   - Continue HD on MWF  - Renal following  4) CAD, HLD and HTN  - Continue current medications  5) Hypothyroidism  - Continue Synthroid  DVT Prophylaxis -- Lovenox 30 mg

## 2019-02-02 NOTE — PROGRESS NOTE ADULT - ASSESSMENT
1. ESRD on HD  2. RUL PN, Radiation Pneumonitis  3. COPD - O2 dependent  4. Cardiomegally - S/P CABG, Severe LV dysfunction  5. Malnutrition   6. Anemia      tolerated HD yesterday  Continue IV Antibiotics  On EPO  Continue nutritional support  Oxygen, drug nebs and respiratory treatments as per respiratory  Continue current management  Palliative care follow up for goals of care discussion  Poor prognosis    bari Parker

## 2019-02-02 NOTE — PROGRESS NOTE ADULT - SUBJECTIVE AND OBJECTIVE BOX
Pneumonia      HPI:  Pt is a 78yo M presenting w/ SOB found to have HCAP. PMH ESRD on HD, hx of lung cancer s/p radiation, HTN, Anemia, CAD s/p CABG, HLD, recent HCAP tx 3 weeks ago.   Patient has been having cough and SOB for the past 2-3 days and it has gotten progressively worse, he was ofund to be hypoxic earlier today w/ SpO2 78% and sent to the ED. He denies any fevers, chills but states he feels weak. He uses O2 at home 2-3 L NC. Earlier today he was sent in from his [facility] surgeons office due to the hypoxia. He denies any chest pain, palpitations, abd pain, diarrhea, constipation, melena, hematochezia. He does not produce urine.   He has no other complaints aside from feeling itchy. He denies any allergies to medications.   In ED patient was found to have RUL consolidation, RVP is pending, he also had elevated troponin and seen by cardiology. He has also been seen by nephro. Started on IV abx. (28 Jan 2019 17:49)      Interval History:  Patient was seen and examined at bedside around 9:15 am. Feeling very weak and tired. Complaining of dry cough and pain/itching in RLE. Still requiring High Flow, unable to wean off.   Denies chest pain, palpitations, shortness of breath, headache, dizziness, visual symptoms, nausea, vomiting or abdominal pain.    ROS:  As per interval history otherwise unremarkable.    PHYSICAL EXAM:  Vital Signs  T(C): 36.6 (02 Feb 2019 11:37), Max: 37.1 (01 Feb 2019 20:33)  T(F): 97.9 (02 Feb 2019 11:37), Max: 98.7 (01 Feb 2019 20:33)  HR: 59 (02 Feb 2019 11:37) (55 - 74)  BP: 115/48 (02 Feb 2019 11:37) (106/52 - 135/62)  RR: 18 (02 Feb 2019 11:37) (18 - 18)  SpO2: 93% (02 Feb 2019 08:51) (93% - 100%)  General: Elderly male lying in bed comfortably. No acute distress  HEENT: PERRLA. EOMI. Clear conjunctivae. Moist mucus membrane  Neck: Supple. No JVD.   Chest: CTA bilaterally - no wheezing, rales or rhonchi. No chest wall tenderness. Permacath in right upper chest.   Heart: Normal S1 & S2. RRR.   Abdomen: Soft. Non-tender. Non-distended. + BS  Ext: No pedal edema. No calf tenderness. AVF in LUE.   Neuro: AAO x 3. No focal deficit. No speech disorder  Skin: Warm and Dry  Psychiatry: Normal mood and affect    I&O's Summary    01 Feb 2019 07:01  -  02 Feb 2019 07:00  --------------------------------------------------------  IN: 200 mL / OUT: 1000 mL / NET: -800 mL    MEDICATIONS  (STANDING):  ALBUTerol/ipratropium for Nebulization 3 milliLiter(s) Nebulizer every 6 hours  amLODIPine   Tablet 10 milliGRAM(s) Oral daily  aspirin enteric coated 81 milliGRAM(s) Oral daily  atorvastatin 80 milliGRAM(s) Oral at bedtime  chlorhexidine 2% Cloths 1 Application(s) Topical daily  clopidogrel Tablet 75 milliGRAM(s) Oral daily  docusate sodium 100 milliGRAM(s) Oral three times a day  DULoxetine 20 milliGRAM(s) Oral daily  epoetin nguyễn Injectable 8000 Unit(s) IV Push <User Schedule>  gabapentin 100 milliGRAM(s) Oral three times a day  hydrALAZINE 50 milliGRAM(s) Oral every 8 hours  iron sucrose IVPB 100 milliGRAM(s) IV Intermittent every 24 hours  isosorbide   mononitrate ER Tablet (IMDUR) 30 milliGRAM(s) Oral daily  levothyroxine 100 MICROGram(s) Oral daily  loratadine 10 milliGRAM(s) Oral daily  losartan 50 milliGRAM(s) Oral daily  metoprolol tartrate 50 milliGRAM(s) Oral two times a day  multivitamin 1 Tablet(s) Oral daily  mupirocin 2% Nasal 1 Application(s) Nasal two times a day  pantoprazole    Tablet 40 milliGRAM(s) Oral before breakfast  polyethylene glycol 3350 17 Gram(s) Oral daily  senna 2 Tablet(s) Oral at bedtime    MEDICATIONS  (PRN):  benzocaine 15 mG/menthol 3.6 mG Lozenge 1 Lozenge Oral every 2 hours PRN Sore Throat  benzonatate 100 milliGRAM(s) Oral every 8 hours PRN Cough  diphenhydrAMINE 25 milliGRAM(s) Oral every 8 hours PRN Rash and/or Itching      LABS:                        9.8    11.7  )-----------( 333      ( 01 Feb 2019 17:54 )             32.6     02-01    139  |  97<L>  |  9.0  ----------------------------<  99  4.4   |  26.0  |  3.07<H>    Ca    8.5<L>      01 Feb 2019 17:54      RADIOLOGY & ADDITIONAL STUDIES:  Reviewed

## 2019-02-03 LAB
CULTURE RESULTS: SIGNIFICANT CHANGE UP
SPECIMEN SOURCE: SIGNIFICANT CHANGE UP

## 2019-02-03 PROCEDURE — 99222 1ST HOSP IP/OBS MODERATE 55: CPT

## 2019-02-03 PROCEDURE — 99232 SBSQ HOSP IP/OBS MODERATE 35: CPT

## 2019-02-03 PROCEDURE — 99233 SBSQ HOSP IP/OBS HIGH 50: CPT

## 2019-02-03 RX ADMIN — Medication 3 MILLILITER(S): at 20:36

## 2019-02-03 RX ADMIN — Medication 81 MILLIGRAM(S): at 12:38

## 2019-02-03 RX ADMIN — IRON SUCROSE 210 MILLIGRAM(S): 20 INJECTION, SOLUTION INTRAVENOUS at 10:14

## 2019-02-03 RX ADMIN — Medication 100 MILLIGRAM(S): at 22:13

## 2019-02-03 RX ADMIN — Medication 1 TABLET(S): at 12:38

## 2019-02-03 RX ADMIN — GABAPENTIN 100 MILLIGRAM(S): 400 CAPSULE ORAL at 14:04

## 2019-02-03 RX ADMIN — Medication 50 MILLIGRAM(S): at 14:04

## 2019-02-03 RX ADMIN — Medication 20 MILLIGRAM(S): at 12:43

## 2019-02-03 RX ADMIN — POLYETHYLENE GLYCOL 3350 17 GRAM(S): 17 POWDER, FOR SOLUTION ORAL at 12:38

## 2019-02-03 RX ADMIN — ISOSORBIDE MONONITRATE 30 MILLIGRAM(S): 60 TABLET, EXTENDED RELEASE ORAL at 12:38

## 2019-02-03 RX ADMIN — GABAPENTIN 100 MILLIGRAM(S): 400 CAPSULE ORAL at 22:13

## 2019-02-03 RX ADMIN — ATORVASTATIN CALCIUM 80 MILLIGRAM(S): 80 TABLET, FILM COATED ORAL at 22:13

## 2019-02-03 RX ADMIN — LOSARTAN POTASSIUM 50 MILLIGRAM(S): 100 TABLET, FILM COATED ORAL at 05:51

## 2019-02-03 RX ADMIN — Medication 50 MILLIGRAM(S): at 22:13

## 2019-02-03 RX ADMIN — CLOPIDOGREL BISULFATE 75 MILLIGRAM(S): 75 TABLET, FILM COATED ORAL at 12:38

## 2019-02-03 RX ADMIN — Medication 3 MILLILITER(S): at 08:38

## 2019-02-03 RX ADMIN — Medication 50 MILLIGRAM(S): at 05:51

## 2019-02-03 RX ADMIN — Medication 50 MILLIGRAM(S): at 18:09

## 2019-02-03 RX ADMIN — Medication 100 MILLIGRAM(S): at 05:51

## 2019-02-03 RX ADMIN — AMLODIPINE BESYLATE 10 MILLIGRAM(S): 2.5 TABLET ORAL at 05:51

## 2019-02-03 RX ADMIN — ENOXAPARIN SODIUM 30 MILLIGRAM(S): 100 INJECTION SUBCUTANEOUS at 12:38

## 2019-02-03 RX ADMIN — PANTOPRAZOLE SODIUM 40 MILLIGRAM(S): 20 TABLET, DELAYED RELEASE ORAL at 05:51

## 2019-02-03 RX ADMIN — Medication 100 MILLIGRAM(S): at 14:04

## 2019-02-03 RX ADMIN — MUPIROCIN 1 APPLICATION(S): 20 OINTMENT TOPICAL at 08:22

## 2019-02-03 RX ADMIN — GABAPENTIN 100 MILLIGRAM(S): 400 CAPSULE ORAL at 05:51

## 2019-02-03 RX ADMIN — MUPIROCIN 1 APPLICATION(S): 20 OINTMENT TOPICAL at 22:12

## 2019-02-03 RX ADMIN — DULOXETINE HYDROCHLORIDE 20 MILLIGRAM(S): 30 CAPSULE, DELAYED RELEASE ORAL at 12:38

## 2019-02-03 RX ADMIN — Medication 100 MICROGRAM(S): at 05:51

## 2019-02-03 RX ADMIN — Medication 3 MILLILITER(S): at 14:34

## 2019-02-03 RX ADMIN — LORATADINE 10 MILLIGRAM(S): 10 TABLET ORAL at 12:38

## 2019-02-03 RX ADMIN — CHLORHEXIDINE GLUCONATE 1 APPLICATION(S): 213 SOLUTION TOPICAL at 12:38

## 2019-02-03 NOTE — PROGRESS NOTE ADULT - ASSESSMENT
1. ESRD on HD  2. RUL PN, Radiation Pneumonitis  3. COPD - O2 dependent  4. Cardiomegally - S/P CABG, Severe LV dysfunction  5. Malnutrition   6. Anemia      HD tomorrow  Continue IV Antibiotics  On EPO  Continue nutritional support  Oxygen, drug nebs and respiratory treatments as per respiratory  Continue current management  Palliative care follow up for goals of care discussion  Poor prognosis    bari Parker

## 2019-02-03 NOTE — PROGRESS NOTE ADULT - SUBJECTIVE AND OBJECTIVE BOX
Pneumonia      HPI:  Pt is a 80yo M presenting w/ SOB found to have HCAP. PMH ESRD on HD, hx of lung cancer s/p radiation, HTN, Anemia, CAD s/p CABG, HLD, recent HCAP tx 3 weeks ago.   Patient has been having cough and SOB for the past 2-3 days and it has gotten progressively worse, he was ofund to be hypoxic earlier today w/ SpO2 78% and sent to the ED. He denies any fevers, chills but states he feels weak. He uses O2 at home 2-3 L NC. Earlier today he was sent in from his [facility] surgeons office due to the hypoxia. He denies any chest pain, palpitations, abd pain, diarrhea, constipation, melena, hematochezia. He does not produce urine.   He has no other complaints aside from feeling itchy. He denies any allergies to medications.   In ED patient was found to have RUL consolidation, RVP is pending, he also had elevated troponin and seen by cardiology. He has also been seen by nephro. Started on IV abx. (28 Jan 2019 17:49)    Interval History:  Patient was seen and examined at bedside around 8:45 am. Feeling the same.   Still requiring High Flow, unable to wean off.   Denies chest pain, palpitations, shortness of breath, headache, dizziness, visual symptoms, nausea, vomiting or abdominal pain.    ROS:  As per interval history otherwise unremarkable.    PHYSICAL EXAM:  Vital Signs   T(C): 36.6 (03 Feb 2019 17:00), Max: 36.9 (02 Feb 2019 21:41)  T(F): 97.8 (03 Feb 2019 17:00), Max: 98.4 (02 Feb 2019 21:41)  HR: 67 (03 Feb 2019 17:00) (59 - 76)  BP: 136/63 (03 Feb 2019 17:00) (113/52 - 136/63)  RR: 18 (03 Feb 2019 17:00) (18 - 20)  SpO2: 90% (03 Feb 2019 08:17) (90% - 98%)  General: Elderly male lying in bed comfortably. No acute distress  HEENT: PERRLA. EOMI. Clear conjunctivae. Moist mucus membrane  Neck: Supple. No JVD.   Chest: CTA bilaterally - no wheezing, rales or rhonchi. No chest wall tenderness. Permacath in right upper chest.   Heart: Normal S1 & S2. RRR.   Abdomen: Soft. Non-tender. Non-distended. + BS  Ext: No pedal edema. No calf tenderness. AVF in LUE.   Neuro: AAO x 3. No focal deficit. No speech disorder  Skin: Warm and Dry  Psychiatry: Normal mood and affect    MEDICATIONS  (STANDING):  ALBUTerol/ipratropium for Nebulization 3 milliLiter(s) Nebulizer every 6 hours  amLODIPine   Tablet 10 milliGRAM(s) Oral daily  aspirin enteric coated 81 milliGRAM(s) Oral daily  atorvastatin 80 milliGRAM(s) Oral at bedtime  chlorhexidine 2% Cloths 1 Application(s) Topical daily  clopidogrel Tablet 75 milliGRAM(s) Oral daily  docusate sodium 100 milliGRAM(s) Oral three times a day  DULoxetine 20 milliGRAM(s) Oral daily  enoxaparin Injectable 30 milliGRAM(s) SubCutaneous daily  epoetin nguyễn Injectable 8000 Unit(s) IV Push <User Schedule>  gabapentin 100 milliGRAM(s) Oral three times a day  hydrALAZINE 50 milliGRAM(s) Oral every 8 hours  isosorbide   mononitrate ER Tablet (IMDUR) 30 milliGRAM(s) Oral daily  levothyroxine 100 MICROGram(s) Oral daily  loratadine 10 milliGRAM(s) Oral daily  losartan 50 milliGRAM(s) Oral daily  metoprolol tartrate 50 milliGRAM(s) Oral two times a day  multivitamin 1 Tablet(s) Oral daily  mupirocin 2% Nasal 1 Application(s) Nasal two times a day  pantoprazole    Tablet 40 milliGRAM(s) Oral before breakfast  polyethylene glycol 3350 17 Gram(s) Oral daily  senna 2 Tablet(s) Oral at bedtime    MEDICATIONS  (PRN):  benzocaine 15 mG/menthol 3.6 mG Lozenge 1 Lozenge Oral every 2 hours PRN Sore Throat  benzonatate 100 milliGRAM(s) Oral every 8 hours PRN Cough  diphenhydrAMINE 25 milliGRAM(s) Oral every 8 hours PRN Rash and/or Itching    LABS:  Reviewed    RADIOLOGY & ADDITIONAL STUDIES:  Reviewed

## 2019-02-03 NOTE — PROGRESS NOTE ADULT - SUBJECTIVE AND OBJECTIVE BOX
[facility] DIVISION OF KIDNEY DISEASES AND HYPERTENSION -- FOLLOW UP NOTE  --------------------------------------------------------------------------------  Chief Complaint:  ESRD HD    24 hour events/subjective:  Pt seen/examined  Appreciate pulm recs;  Tolerating HD    PAST HISTORY  --------------------------------------------------------------------------------  No significant changes to PMH, PSH, FHx, SHx, unless otherwise noted    ALLERGIES & MEDICATIONS  --------------------------------------------------------------------------------  Allergies    No Known Allergies    Intolerances      Standing Inpatient Medications  ALBUTerol/ipratropium for Nebulization 3 milliLiter(s) Nebulizer every 6 hours  amLODIPine   Tablet 10 milliGRAM(s) Oral daily  aspirin enteric coated 81 milliGRAM(s) Oral daily  atorvastatin 80 milliGRAM(s) Oral at bedtime  chlorhexidine 2% Cloths 1 Application(s) Topical daily  clopidogrel Tablet 75 milliGRAM(s) Oral daily  docusate sodium 100 milliGRAM(s) Oral three times a day  DULoxetine 20 milliGRAM(s) Oral daily  enoxaparin Injectable 30 milliGRAM(s) SubCutaneous daily  epoetin nguyễn Injectable 8000 Unit(s) IV Push <User Schedule>  gabapentin 100 milliGRAM(s) Oral three times a day  hydrALAZINE 50 milliGRAM(s) Oral every 8 hours  isosorbide   mononitrate ER Tablet (IMDUR) 30 milliGRAM(s) Oral daily  levothyroxine 100 MICROGram(s) Oral daily  loratadine 10 milliGRAM(s) Oral daily  losartan 50 milliGRAM(s) Oral daily  metoprolol tartrate 50 milliGRAM(s) Oral two times a day  multivitamin 1 Tablet(s) Oral daily  mupirocin 2% Nasal 1 Application(s) Nasal two times a day  pantoprazole    Tablet 40 milliGRAM(s) Oral before breakfast  polyethylene glycol 3350 17 Gram(s) Oral daily  senna 2 Tablet(s) Oral at bedtime    PRN Inpatient Medications  benzocaine 15 mG/menthol 3.6 mG Lozenge 1 Lozenge Oral every 2 hours PRN  benzonatate 100 milliGRAM(s) Oral every 8 hours PRN  diphenhydrAMINE 25 milliGRAM(s) Oral every 8 hours PRN      REVIEW OF SYSTEMS  --------------------------------------------------------------------------------  Gen: No weight changes, fatigue, fevers/chills, weakness  Skin: No rashes  Head/Eyes/Ears/Mouth: No headache; Normal hearing; Normal vision w/o blurriness; No sinus pain/discomfort, sore throat  Respiratory: SOB at times  CV: No chest pain, PND, orthopnea  GI: No abdominal pain, diarrhea, constipation, nausea, vomiting, melena, hematochezia  : No increased frequency, dysuria, hematuria, nocturia  MSK: No joint pain/swelling; no back pain; no edema  Neuro: No dizziness/lightheadedness, weakness, seizures, numbness, tingling  Heme: No easy bruising or bleeding  Endo: No heat/cold intolerance  Psych: No significant nervousness, anxiety, stress, depression    All other systems were reviewed and are negative, except as noted.    VITALS/PHYSICAL EXAM  --------------------------------------------------------------------------------  T(C): 36.9 (02-03-19 @ 08:17), Max: 36.9 (02-02-19 @ 16:06)  HR: 60 (02-03-19 @ 08:17) (58 - 76)  BP: 121/64 (02-03-19 @ 08:17) (105/42 - 129/60)  RR: 20 (02-03-19 @ 03:34) (18 - 20)  SpO2: 90% (02-03-19 @ 08:17) (90% - 98%)  Wt(kg): --        02-02-19 @ 07:01  -  02-03-19 @ 07:00  --------------------------------------------------------  IN: 720 mL / OUT: 0 mL / NET: 720 mL    02-03-19 @ 07:01  -  02-03-19 @ 12:54  --------------------------------------------------------  IN: 100 mL / OUT: 0 mL / NET: 100 mL      Physical Exam:  	Gen: NAD, frail, debilitated, pale  	HEENT: PERRL, supple neck, clear oropharynx  	Pulm:  Rales B/L  	CV: RRR, S1S2; no rub  	Back: No spinal or CVA tenderness; no sacral edema  	Abd: +BS, soft, nontender/nondistended  	: No suprapubic tenderness  	UE: Warm, FROM, no clubbing, intact strength; no edema; no asterixis  	LE: Warm, FROM, no clubbing, intact strength; minimal edema  	Neuro: No focal deficits, intact gait  	Psych: Normal affect and mood  	Skin: Warm, without rashes  	Vascular access: AVF, Right CVC    LABS/STUDIES  --------------------------------------------------------------------------------              9.8    11.7  >-----------<  333      [02-01-19 @ 17:54]              32.6     139  |  97  |  9.0  ----------------------------<  99      [02-01-19 @ 17:54]  4.4   |  26.0  |  3.07        Ca     8.5     [02-01-19 @ 17:54]            Creatinine Trend:  SCr 3.07 [02-01 @ 17:54]  SCr 6.20 [01-30 @ 07:49]  SCr 4.87 [01-29 @ 11:39]  SCr 5.31 [01-28 @ 11:35]  SCr 4.67 [01-08 @ 10:39]        Iron 34, TIBC 235, %sat 14      [01-29-19 @ 11:39]  Ferritin 1049      [01-29-19 @ 11:39]  TSH 2.07      [01-30-19 @ 07:49]  Lipid: chol 159, , HDL 32, LDL 94      [01-29-19 @ 11:39]    HBsAb <3.0      [01-08-19 @ 17:03]  HBsAg Nonreact      [01-08-19 @ 17:03]  HCV 0.12, Nonreact      [01-08-19 @ 17:03]

## 2019-02-03 NOTE — PROGRESS NOTE ADULT - ASSESSMENT
79 year old male with PMH ESRD on HD, CAD s/p CABG, lung cancer s/p radiation with subsequent pulmonary fibrosis on 2L home O2 presents with SOB.     1) Acute on chronic respiratory failure with hypoxia  - Sputum culture +ve for MRSA  - Continue Vancomycin for now . Dicussed with ID  - Continue Nebs and Steroid taper  - Continue High Flow and wean off as tolerated  - Pulmonary Consult appreciated  2) History of Lung Cancer  - treated in past  - Outpatient follow up with Oncology  3) ESRD   - Continue HD on MWF  - Renal following  4) CAD, HLD and HTN  - Continue current medications  5) Hypothyroidism  - Continue Synthroid  DVT Prophylaxis -- Lovenox 30 mg

## 2019-02-04 DIAGNOSIS — N18.6 END STAGE RENAL DISEASE: ICD-10-CM

## 2019-02-04 DIAGNOSIS — Z51.5 ENCOUNTER FOR PALLIATIVE CARE: ICD-10-CM

## 2019-02-04 LAB
ANION GAP SERPL CALC-SCNC: 16 MMOL/L — SIGNIFICANT CHANGE UP (ref 5–17)
BASOPHILS # BLD AUTO: 0 K/UL — SIGNIFICANT CHANGE UP (ref 0–0.2)
BASOPHILS NFR BLD AUTO: 0.1 % — SIGNIFICANT CHANGE UP (ref 0–2)
BUN SERPL-MCNC: 49 MG/DL — HIGH (ref 8–20)
CALCIUM SERPL-MCNC: 8 MG/DL — LOW (ref 8.6–10.2)
CHLORIDE SERPL-SCNC: 93 MMOL/L — LOW (ref 98–107)
CO2 SERPL-SCNC: 25 MMOL/L — SIGNIFICANT CHANGE UP (ref 22–29)
CREAT SERPL-MCNC: 7.3 MG/DL — HIGH (ref 0.5–1.3)
EOSINOPHIL # BLD AUTO: 0 K/UL — SIGNIFICANT CHANGE UP (ref 0–0.5)
EOSINOPHIL NFR BLD AUTO: 0.2 % — SIGNIFICANT CHANGE UP (ref 0–6)
GLUCOSE SERPL-MCNC: 110 MG/DL — SIGNIFICANT CHANGE UP (ref 70–115)
HCT VFR BLD CALC: 30 % — LOW (ref 42–52)
HGB BLD-MCNC: 8.9 G/DL — LOW (ref 14–18)
LYMPHOCYTES # BLD AUTO: 2.2 K/UL — SIGNIFICANT CHANGE UP (ref 1–4.8)
LYMPHOCYTES # BLD AUTO: 21 % — SIGNIFICANT CHANGE UP (ref 20–55)
MCHC RBC-ENTMCNC: 28.8 PG — SIGNIFICANT CHANGE UP (ref 27–31)
MCHC RBC-ENTMCNC: 29.7 G/DL — LOW (ref 32–36)
MCV RBC AUTO: 97.1 FL — HIGH (ref 80–94)
MONOCYTES # BLD AUTO: 1.1 K/UL — HIGH (ref 0–0.8)
MONOCYTES NFR BLD AUTO: 10.7 % — HIGH (ref 3–10)
NEUTROPHILS # BLD AUTO: 7.2 K/UL — SIGNIFICANT CHANGE UP (ref 1.8–8)
NEUTROPHILS NFR BLD AUTO: 67.2 % — SIGNIFICANT CHANGE UP (ref 37–73)
PLATELET # BLD AUTO: 352 K/UL — SIGNIFICANT CHANGE UP (ref 150–400)
POTASSIUM SERPL-MCNC: 5.1 MMOL/L — SIGNIFICANT CHANGE UP (ref 3.5–5.3)
POTASSIUM SERPL-SCNC: 5.1 MMOL/L — SIGNIFICANT CHANGE UP (ref 3.5–5.3)
RBC # BLD: 3.09 M/UL — LOW (ref 4.6–6.2)
RBC # FLD: 17.1 % — HIGH (ref 11–15.6)
SODIUM SERPL-SCNC: 134 MMOL/L — LOW (ref 135–145)
VANCOMYCIN TROUGH SERPL-MCNC: 15.8 UG/ML — SIGNIFICANT CHANGE UP (ref 10–20)
WBC # BLD: 10.7 K/UL — SIGNIFICANT CHANGE UP (ref 4.8–10.8)
WBC # FLD AUTO: 10.7 K/UL — SIGNIFICANT CHANGE UP (ref 4.8–10.8)

## 2019-02-04 PROCEDURE — 99232 SBSQ HOSP IP/OBS MODERATE 35: CPT

## 2019-02-04 PROCEDURE — 99223 1ST HOSP IP/OBS HIGH 75: CPT

## 2019-02-04 PROCEDURE — 71250 CT THORAX DX C-: CPT | Mod: 26

## 2019-02-04 PROCEDURE — 90937 HEMODIALYSIS REPEATED EVAL: CPT

## 2019-02-04 PROCEDURE — 99233 SBSQ HOSP IP/OBS HIGH 50: CPT | Mod: GC

## 2019-02-04 RX ORDER — HEPARIN SODIUM 5000 [USP'U]/ML
5000 INJECTION INTRAVENOUS; SUBCUTANEOUS EVERY 12 HOURS
Refills: 0 | Status: DISCONTINUED | OUTPATIENT
Start: 2019-02-04 | End: 2019-02-08

## 2019-02-04 RX ADMIN — AMLODIPINE BESYLATE 10 MILLIGRAM(S): 2.5 TABLET ORAL at 05:01

## 2019-02-04 RX ADMIN — ERYTHROPOIETIN 8000 UNIT(S): 10000 INJECTION, SOLUTION INTRAVENOUS; SUBCUTANEOUS at 15:20

## 2019-02-04 RX ADMIN — LOSARTAN POTASSIUM 50 MILLIGRAM(S): 100 TABLET, FILM COATED ORAL at 05:01

## 2019-02-04 RX ADMIN — Medication 100 MILLIGRAM(S): at 23:07

## 2019-02-04 RX ADMIN — POLYETHYLENE GLYCOL 3350 17 GRAM(S): 17 POWDER, FOR SOLUTION ORAL at 11:56

## 2019-02-04 RX ADMIN — GABAPENTIN 100 MILLIGRAM(S): 400 CAPSULE ORAL at 05:00

## 2019-02-04 RX ADMIN — Medication 100 MICROGRAM(S): at 05:01

## 2019-02-04 RX ADMIN — Medication 50 MILLIGRAM(S): at 23:08

## 2019-02-04 RX ADMIN — Medication 10 MILLIGRAM(S): at 09:51

## 2019-02-04 RX ADMIN — LORATADINE 10 MILLIGRAM(S): 10 TABLET ORAL at 11:56

## 2019-02-04 RX ADMIN — Medication 50 MILLIGRAM(S): at 13:36

## 2019-02-04 RX ADMIN — Medication 50 MILLIGRAM(S): at 05:01

## 2019-02-04 RX ADMIN — Medication 100 MILLIGRAM(S): at 13:36

## 2019-02-04 RX ADMIN — Medication 100 MILLIGRAM(S): at 12:42

## 2019-02-04 RX ADMIN — ATORVASTATIN CALCIUM 80 MILLIGRAM(S): 80 TABLET, FILM COATED ORAL at 23:07

## 2019-02-04 RX ADMIN — Medication 3 MILLILITER(S): at 09:35

## 2019-02-04 RX ADMIN — Medication 40 MILLIGRAM(S): at 23:07

## 2019-02-04 RX ADMIN — Medication 100 MILLIGRAM(S): at 18:53

## 2019-02-04 RX ADMIN — CHLORHEXIDINE GLUCONATE 1 APPLICATION(S): 213 SOLUTION TOPICAL at 11:56

## 2019-02-04 RX ADMIN — SENNA PLUS 2 TABLET(S): 8.6 TABLET ORAL at 23:07

## 2019-02-04 RX ADMIN — BENZOCAINE AND MENTHOL 1 LOZENGE: 5; 1 LIQUID ORAL at 05:02

## 2019-02-04 RX ADMIN — Medication 100 MILLIGRAM(S): at 11:58

## 2019-02-04 RX ADMIN — Medication 3 MILLILITER(S): at 03:30

## 2019-02-04 RX ADMIN — MUPIROCIN 1 APPLICATION(S): 20 OINTMENT TOPICAL at 23:07

## 2019-02-04 RX ADMIN — CLOPIDOGREL BISULFATE 75 MILLIGRAM(S): 75 TABLET, FILM COATED ORAL at 11:55

## 2019-02-04 RX ADMIN — GABAPENTIN 100 MILLIGRAM(S): 400 CAPSULE ORAL at 23:08

## 2019-02-04 RX ADMIN — Medication 3 MILLILITER(S): at 20:13

## 2019-02-04 RX ADMIN — Medication 81 MILLIGRAM(S): at 11:55

## 2019-02-04 RX ADMIN — Medication 50 MILLIGRAM(S): at 18:53

## 2019-02-04 RX ADMIN — DULOXETINE HYDROCHLORIDE 20 MILLIGRAM(S): 30 CAPSULE, DELAYED RELEASE ORAL at 11:56

## 2019-02-04 RX ADMIN — BENZOCAINE AND MENTHOL 1 LOZENGE: 5; 1 LIQUID ORAL at 11:58

## 2019-02-04 RX ADMIN — HEPARIN SODIUM 5000 UNIT(S): 5000 INJECTION INTRAVENOUS; SUBCUTANEOUS at 18:53

## 2019-02-04 RX ADMIN — GABAPENTIN 100 MILLIGRAM(S): 400 CAPSULE ORAL at 13:36

## 2019-02-04 RX ADMIN — PANTOPRAZOLE SODIUM 40 MILLIGRAM(S): 20 TABLET, DELAYED RELEASE ORAL at 05:01

## 2019-02-04 RX ADMIN — ISOSORBIDE MONONITRATE 30 MILLIGRAM(S): 60 TABLET, EXTENDED RELEASE ORAL at 11:55

## 2019-02-04 RX ADMIN — Medication 1 TABLET(S): at 11:56

## 2019-02-04 RX ADMIN — MUPIROCIN 1 APPLICATION(S): 20 OINTMENT TOPICAL at 09:51

## 2019-02-04 NOTE — PROGRESS NOTE ADULT - ASSESSMENT
1. ESRD on HD  2. RUL PN, Radiation Pneumonitis  3. COPD - O2 dependent  4. Cardiomegally - S/P CABG, Severe LV dysfunction  5. Malnutrition   6. Anemia      HD today  Continue IV Antibiotics; MRSA sputum cx  On EPO  Continue nutritional support  Oxygen, drug nebs and respiratory treatments as per respiratory; on high flow  Continue current management  Palliative care follow up for goals of care discussion  Poor prognosis

## 2019-02-04 NOTE — CONSULT NOTE ADULT - PROBLEM SELECTOR PROBLEM 2
Chronic anemia
HCAP (healthcare-associated pneumonia)
Coronary artery disease of bypass graft of native heart with stable angina pectoris

## 2019-02-04 NOTE — PROGRESS NOTE ADULT - ASSESSMENT
Assessment and Plan:    79 year old male with PMH ESRD on HD, CAD s/p CABG, lung cancer s/p radiation with subsequent pulmonary fibrosis on 2L home O2 presents with SOB.     1) Acute on chronic respiratory failure with hypoxia  - Sputum culture +ve for MRSA  - Continue Vancomycin for now . Dicussed with ID  - Continue Nebs and Steroid taper  - Continue High Flow and wean off as tolerated  - Pulmonary Consult appreciated  2) History of Lung Cancer  - treated in past  - Outpatient follow up with Oncology  3) ESRD   - Continue HD on MWF  - Renal following  4) CAD, HLD and HTN  - Continue current medications  5) Hypothyroidism  - Continue Synthroid  DVT Prophylaxis -- Lovenox 30 mg Assessment and Plan:    79 year old male with PMH ESRD on HD, CAD s/p CABG, lung cancer s/p radiation with subsequent pulmonary fibrosis on 2L home O2 presents with SOB. Found to have MRSA in sputum culture, requiring high flow oxygen. ID and pulmonary on board. Palliative consulted given multiple co morbidites and frequent re hospitalization in past month.    1) Acute on chronic type 1 respiratory failure with hypoxia  - Sputum culture +ve for MRSA  - Was initially on  Vancomycin which was switched to Doxycycline by ID for 14 doses from 2/4   - Continue Nebs and Steroid been tapered off. Today was last dose  - Titrate off High Flow oxygen.  Palliative and pulmonary follow up.  f/u ct chest results today    2) History of Lung Cancer  - treated in past  - Outpatient follow up with Oncology    3) ESRD   - Continue HD on MWF  - Renal following    4) CAD, HLD and HTN  - Continue current medications    5) Hypothyroidism  - Continue Synthroid    MRSA in nares:  ON chd and bactroban today day 5.     DVT Prophylaxis -- will change Lovenox 30 mg to heparin given his esrd status.    DISPO: once able to titrate off high flow oxygen.

## 2019-02-04 NOTE — CONSULT NOTE ADULT - CONSULT REASON
Hypoxia
GOC
HD access eval
pneumonia
SOB/Hypoxia
ESRD - HD    Access : R - CVC ,
congestive heart failure

## 2019-02-04 NOTE — CONSULT NOTE ADULT - PROVIDER SPECIALTY LIST ADULT
Infectious Disease
Cardiology
TeleHospitalist
Vascular Surgery
Pulmonology
Nephrology
Palliative Care

## 2019-02-04 NOTE — CONSULT NOTE ADULT - PROBLEM SELECTOR RECOMMENDATION 9
UF as Tolerated,
Was on HF O2, monitor
likely  micorvascualr ischemia.  stable symptoms./ will evaluate for ischemia once underlying condition normalized. can be done as outpatient.   medical management for now.

## 2019-02-04 NOTE — CONSULT NOTE ADULT - ASSESSMENT
79 yr old male with extensive medical history including but not limited to ESRD on HD, CAD (post remote CABG), Htn, hypothyroidism, lung cancer (post radiation) , post radiation pulmonary fibrosis-   HCAP- Zosyn / Vanco, Sputum culture , Blood culture   CAD- Aspirin, Plavix, BB, ACEI   Hypothyroidism-- Synthroid  Htn- Norvasc, Hydralazine
Assess    Multifactorial chronic hypoxia with acute worsening  RT fibrosis on the Right  Emphysema  Episodic volume overload due to ESRD  Resolved HCAP with residual scarring and disequilibrium      Rec    Mobilize  Incentive spirometry  FU CT  Neb and steroid  Try simply reducing FIO2 as in shunt, it may not make much difference
this 79 y.o. Man with CAD s/p CABG, HLD, HTN, Anemia, ESRD on HD, hx of lung cancer s/p radiation, who was recently admitted from 12/22/19 to 1/11/19, treated for Enterobacter PNA.  here for cough and found with RUL consolidation/    -suspect PNA/ HCAP  - continue empiric Zosyn  - stop vancomycin for now,. no evidence of MRSA in past.,   - check sputum cx   - check urine legionella.       - follow up all outstanding cultures  - trend temperature and WBC curve  - repeat cultures from blood and all sources if febrile.     regarding constipation  - trial of bowel regimen  - primary team notified.
A; Pneumonia - RUL , Radiation Pneumonitis,     COPD ( Oxygen Dependant ), Cardiomegaly, S/P CABG,    ESRD - HD ( Tunneled R - IJ CVC , LUE AVG )    Malnutrition, Anemia,    Severe LV Dysfunction w. Bilateral Pleural effusions    P : HD w. UF, Antibiotics, EPO, Nutritional support,     Oxygen, Respiratory Treatments,
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
This is 79 year old male with history of coronary artery disease s/p CABG,  EZSRD on hemodialysis , lung ca, s/p radiation and pulm fibrosis with dyspneaz.

## 2019-02-04 NOTE — PROGRESS NOTE ADULT - SUBJECTIVE AND OBJECTIVE BOX
KAUSHIK HOFFMAN    [id number]    79y      Male    CC: Pneumonia    Overnight events:  was seen and examine  HPI:  Pt is a 78yo M presenting w/ SOB found to have HCAP. PMH ESRD on HD, hx of lung cancer s/p radiation, HTN, Anemia, CAD s/p CABG, HLD, recent HCAP tx 3 weeks ago.   Patient has been having cough and SOB for the past 2-3 days and it has gotten progressively worse, he was ofund to be hypoxic earlier today w/ SpO2 78% and sent to the ED. He denies any fevers, chills but states he feels weak. He uses O2 at home 2-3 L NC. Earlier today he was sent in from his [facility] surgeons office due to the hypoxia. He denies any chest pain, palpitations, abd pain, diarrhea, constipation, melena, hematochezia. He does not produce urine.   He has no other complaints aside from feeling itchy. He denies any allergies to medications.   In ED patient was found to have RUL consolidation, RVP is pending, he also had elevated troponin and seen by cardiology. He has also been seen by nephro. Started on IV abx. (28 Jan 2019 17:49)    Interval History:  Patient was seen and examined at bedside around 8:45 am. Feeling the same.   Still requiring High Flow, unable to wean off.   Denies chest pain, palpitations, shortness of breath, headache, dizziness, visual symptoms, nausea, vomiting or abdominal pain.    ROS:  As per interval history otherwise unremarkable.    PHYSICAL EXAM:  Vital Signs   T(C): 36.6 (03 Feb 2019 17:00), Max: 36.9 (02 Feb 2019 21:41)  T(F): 97.8 (03 Feb 2019 17:00), Max: 98.4 (02 Feb 2019 21:41)  HR: 67 (03 Feb 2019 17:00) (59 - 76)  BP: 136/63 (03 Feb 2019 17:00) (113/52 - 136/63)  RR: 18 (03 Feb 2019 17:00) (18 - 20)  SpO2: 90% (03 Feb 2019 08:17) (90% - 98%)  General: Elderly male lying in bed comfortably. No acute distress  HEENT: PERRLA. EOMI. Clear conjunctivae. Moist mucus membrane  Neck: Supple. No JVD.   Chest: CTA bilaterally - no wheezing, rales or rhonchi. No chest wall tenderness. Permacath in right upper chest.   Heart: Normal S1 & S2. RRR.   Abdomen: Soft. Non-tender. Non-distended. + BS  Ext: No pedal edema. No calf tenderness. AVF in LUE.   Neuro: AAO x 3. No focal deficit. No speech disorder  Skin: Warm and Dry  Psychiatry: Normal mood and affect    INTERVAL HPI/OVERNIGHT EVENTS:    REVIEW OF SYSTEMS:    CONSTITUTIONAL: No fever, weight loss, or fatigue  RESPIRATORY: No cough, wheezing, hemoptysis; No shortness of breath  CARDIOVASCULAR: No chest pain, palpitations  GASTROINTESTINAL: No abdominal or epigastric pain. No nausea, vomiting  NEUROLOGICAL: No headaches, memory loss, loss of strength.  MISCELLANEOUS:      Vital Signs Last 24 Hrs  T(C): 36.5 (04 Feb 2019 08:03), Max: 36.6 (03 Feb 2019 17:00)  T(F): 97.7 (04 Feb 2019 08:03), Max: 97.8 (03 Feb 2019 17:00)  HR: 77 (04 Feb 2019 09:36) (49 - 80)  BP: 128/56 (04 Feb 2019 08:03) (107/60 - 136/63)  BP(mean): --  RR: 20 (04 Feb 2019 08:03) (18 - 20)  SpO2: 97% (04 Feb 2019 09:36) (91% - 97%)    PHYSICAL EXAM:    GENERAL: NAD, well-groomed  HEENT: PERRL, +EOMI  NECK: soft, Supple, No JVD,   CHEST/LUNG: Clear to auscultation bilaterally; No wheezing  HEART: S1S2+, Regular rate and rhythm; No murmurs, rubs, or gallops  ABDOMEN: Soft, Nontender, Nondistended; Bowel sounds present  EXTREMITIES:  2+ Peripheral Pulses, No clubbing, cyanosis, or edema  SKIN: No rashes or lesions  NEURO: AAOX3, no focal deficits, no motor r sensory loss  PSYCH: normal mood      LABS:                  MEDICATIONS  (STANDING):  ALBUTerol/ipratropium for Nebulization 3 milliLiter(s) Nebulizer every 6 hours  amLODIPine   Tablet 10 milliGRAM(s) Oral daily  aspirin enteric coated 81 milliGRAM(s) Oral daily  atorvastatin 80 milliGRAM(s) Oral at bedtime  chlorhexidine 2% Cloths 1 Application(s) Topical daily  clopidogrel Tablet 75 milliGRAM(s) Oral daily  docusate sodium 100 milliGRAM(s) Oral three times a day  doxycycline hyclate Capsule 100 milliGRAM(s) Oral every 12 hours  DULoxetine 20 milliGRAM(s) Oral daily  enoxaparin Injectable 30 milliGRAM(s) SubCutaneous daily  epoetin nguyễn Injectable 8000 Unit(s) IV Push <User Schedule>  gabapentin 100 milliGRAM(s) Oral three times a day  hydrALAZINE 50 milliGRAM(s) Oral every 8 hours  isosorbide   mononitrate ER Tablet (IMDUR) 30 milliGRAM(s) Oral daily  levothyroxine 100 MICROGram(s) Oral daily  loratadine 10 milliGRAM(s) Oral daily  losartan 50 milliGRAM(s) Oral daily  metoprolol tartrate 50 milliGRAM(s) Oral two times a day  multivitamin 1 Tablet(s) Oral daily  mupirocin 2% Nasal 1 Application(s) Nasal two times a day  pantoprazole    Tablet 40 milliGRAM(s) Oral before breakfast  polyethylene glycol 3350 17 Gram(s) Oral daily  senna 2 Tablet(s) Oral at bedtime    MEDICATIONS  (PRN):  benzocaine 15 mG/menthol 3.6 mG Lozenge 1 Lozenge Oral every 2 hours PRN Sore Throat  benzonatate 100 milliGRAM(s) Oral every 8 hours PRN Cough  diphenhydrAMINE 25 milliGRAM(s) Oral every 8 hours PRN Rash and/or Itching      RADIOLOGY & ADDITIONAL TESTS: KAUSHIK HOFFMAN    [id number]    79y      Male    CC: Pneumonia    Overnight events:  was seen and examined at bedside, was seen on high flow oxygen. denied active complains. scheduled for HD today    HPI:  Pt is a 78yo M presenting w/ SOB found to have HCAP. PMH ESRD on HD, hx of lung cancer s/p radiation, HTN, Anemia, CAD s/p CABG, HLD, recent HCAP tx 3 weeks ago.   Patient has been having cough and SOB for the past 2-3 days and it has gotten progressively worse, he was ofund to be hypoxic earlier today w/ SpO2 78% and sent to the ED. He denies any fevers, chills but states he feels weak. He uses O2 at home 2-3 L NC. Earlier today he was sent in from his [facility] surgeons office due to the hypoxia. He denies any chest pain, palpitations, abd pain, diarrhea, constipation, melena, hematochezia. He does not produce urine.   He has no other complaints aside from feeling itchy. He denies any allergies to medications.   In ED patient was found to have RUL consolidation, RVP is pending, he also had elevated troponin and seen by cardiology. He has also been seen by nephro. Started on IV abx. (28 Jan 2019 17:49)      ROS:  As per interval history otherwise unremarkable.    PHYSICAL EXAM:  Vital Signs Last 24 Hrs  T(C): 36.5 (04 Feb 2019 08:03), Max: 36.6 (03 Feb 2019 17:00)  T(F): 97.7 (04 Feb 2019 08:03), Max: 97.8 (03 Feb 2019 17:00)  HR: 77 (04 Feb 2019 09:36) (49 - 80)  BP: 128/56 (04 Feb 2019 08:03) (107/60 - 136/63)  BP(mean): --  RR: 20 (04 Feb 2019 08:03) (18 - 20)  SpO2: 97% (04 Feb 2019 09:36) (91% - 97%)    General: Elderly male lying in bed comfortably. No acute distress  HEENT: PERRLA. EOMI. Clear conjunctivae. Moist mucus membrane  Neck: Supple. No JVD.   Chest: CTA bilaterally - no wheezing, rales or rhonchi. No chest wall tenderness. Permacath in right upper chest.   Heart: Normal S1 & S2. RRR.   Abdomen: Soft. Non-tender. Non-distended. + BS  Ext: No pedal edema. No calf tenderness. AVF in LUE.   Neuro: AAO x 3. No focal deficit. No speech disorder  Skin: Warm and Dry  Psychiatry: Normal mood and affect        MEDICATIONS  (STANDING):  ALBUTerol/ipratropium for Nebulization 3 milliLiter(s) Nebulizer every 6 hours  amLODIPine   Tablet 10 milliGRAM(s) Oral daily  aspirin enteric coated 81 milliGRAM(s) Oral daily  atorvastatin 80 milliGRAM(s) Oral at bedtime  chlorhexidine 2% Cloths 1 Application(s) Topical daily  clopidogrel Tablet 75 milliGRAM(s) Oral daily  docusate sodium 100 milliGRAM(s) Oral three times a day  doxycycline hyclate Capsule 100 milliGRAM(s) Oral every 12 hours  DULoxetine 20 milliGRAM(s) Oral daily  enoxaparin Injectable 30 milliGRAM(s) SubCutaneous daily  epoetin nguyễn Injectable 8000 Unit(s) IV Push <User Schedule>  gabapentin 100 milliGRAM(s) Oral three times a day  hydrALAZINE 50 milliGRAM(s) Oral every 8 hours  isosorbide   mononitrate ER Tablet (IMDUR) 30 milliGRAM(s) Oral daily  levothyroxine 100 MICROGram(s) Oral daily  loratadine 10 milliGRAM(s) Oral daily  losartan 50 milliGRAM(s) Oral daily  metoprolol tartrate 50 milliGRAM(s) Oral two times a day  multivitamin 1 Tablet(s) Oral daily  mupirocin 2% Nasal 1 Application(s) Nasal two times a day  pantoprazole    Tablet 40 milliGRAM(s) Oral before breakfast  polyethylene glycol 3350 17 Gram(s) Oral daily  senna 2 Tablet(s) Oral at bedtime    MEDICATIONS  (PRN):  benzocaine 15 mG/menthol 3.6 mG Lozenge 1 Lozenge Oral every 2 hours PRN Sore Throat  benzonatate 100 milliGRAM(s) Oral every 8 hours PRN Cough  diphenhydrAMINE 25 milliGRAM(s) Oral every 8 hours PRN Rash and/or Itching      RADIOLOGY & ADDITIONAL TESTS:

## 2019-02-04 NOTE — CONSULT NOTE ADULT - PROBLEM SELECTOR RECOMMENDATION 4
non cardiac cause. malignant vs. other causes. Discuss with IR or pulmonologist for draiange to expedite symptoamtic improvement  Pneumonia: on abx.
Patient did not tolerate nasal canula, changed to venti mask.  Called leilani Stafford ( number in chart) - voice mail full.  Will need assistance from social work to obtain number.   Hoping to meet to discuss GOC

## 2019-02-04 NOTE — PROGRESS NOTE ADULT - SUBJECTIVE AND OBJECTIVE BOX
[facility] DIVISION OF KIDNEY DISEASES AND HYPERTENSION -- HEMODIALYSIS NOTE  --------------------------------------------------------------------------------  Chief Complaint: ESRD/Ongoing hemodialysis requirement    24 hour events/subjective:  HD today      PAST HISTORY  --------------------------------------------------------------------------------  No significant changes to PMH, PSH, FHx, SHx, unless otherwise noted    ALLERGIES & MEDICATIONS  --------------------------------------------------------------------------------  Allergies    No Known Allergies    Intolerances      Standing Inpatient Medications  ALBUTerol/ipratropium for Nebulization 3 milliLiter(s) Nebulizer every 6 hours  amLODIPine   Tablet 10 milliGRAM(s) Oral daily  aspirin enteric coated 81 milliGRAM(s) Oral daily  atorvastatin 80 milliGRAM(s) Oral at bedtime  chlorhexidine 2% Cloths 1 Application(s) Topical daily  clopidogrel Tablet 75 milliGRAM(s) Oral daily  docusate sodium 100 milliGRAM(s) Oral three times a day  doxycycline hyclate Capsule 100 milliGRAM(s) Oral every 12 hours  DULoxetine 20 milliGRAM(s) Oral daily  epoetin nguyễn Injectable 8000 Unit(s) IV Push <User Schedule>  gabapentin 100 milliGRAM(s) Oral three times a day  heparin  Injectable 5000 Unit(s) SubCutaneous every 12 hours  hydrALAZINE 50 milliGRAM(s) Oral every 8 hours  isosorbide   mononitrate ER Tablet (IMDUR) 30 milliGRAM(s) Oral daily  levothyroxine 100 MICROGram(s) Oral daily  loratadine 10 milliGRAM(s) Oral daily  losartan 50 milliGRAM(s) Oral daily  metoprolol tartrate 50 milliGRAM(s) Oral two times a day  multivitamin 1 Tablet(s) Oral daily  mupirocin 2% Nasal 1 Application(s) Nasal two times a day  pantoprazole    Tablet 40 milliGRAM(s) Oral before breakfast  polyethylene glycol 3350 17 Gram(s) Oral daily  senna 2 Tablet(s) Oral at bedtime    PRN Inpatient Medications  benzocaine 15 mG/menthol 3.6 mG Lozenge 1 Lozenge Oral every 2 hours PRN  benzonatate 100 milliGRAM(s) Oral every 8 hours PRN  diphenhydrAMINE 25 milliGRAM(s) Oral every 8 hours PRN      REVIEW OF SYSTEMS  --------------------------------------------------------------------------------  Gen: No weight changes, fatigue, fevers/chills, weakness  Skin: No rashes  Head/Eyes/Ears/Mouth: No headache; Normal hearing; Normal vision w/o blurriness; No sinus pain/discomfort, sore throat  Respiratory: No dyspnea, cough, wheezing, hemoptysis  CV: No chest pain, PND, orthopnea  GI: No abdominal pain, diarrhea, constipation, nausea, vomiting, melena, hematochezia  : No increased frequency, dysuria, hematuria, nocturia  MSK: No joint pain/swelling; no back pain; no edema  Neuro: No dizziness/lightheadedness, weakness, seizures, numbness, tingling  Heme: No easy bruising or bleeding  Endo: No heat/cold intolerance  Psych: No significant nervousness, anxiety, stress, depression    All other systems were reviewed and are negative, except as noted.    VITALS/PHYSICAL EXAM  --------------------------------------------------------------------------------  T(C): 36.5 (02-04-19 @ 08:03), Max: 36.6 (02-03-19 @ 17:00)  HR: 77 (02-04-19 @ 09:36) (49 - 80)  BP: 128/56 (02-04-19 @ 08:03) (107/60 - 136/63)  RR: 20 (02-04-19 @ 08:03) (18 - 20)  SpO2: 97% (02-04-19 @ 09:36) (91% - 97%)  Wt(kg): --        02-03-19 @ 07:01  -  02-04-19 @ 07:00  --------------------------------------------------------  IN: 580 mL / OUT: 400 mL / NET: 180 mL      Physical Exam:  	Gen: NAD, frail, debilitated, pale  	HEENT: PERRL, supple neck, clear oropharynx  	Pulm:  Rales B/L  	CV: RRR, S1S2; no rub  	Back: No spinal or CVA tenderness; no sacral edema  	Abd: +BS, soft, nontender/nondistended  	: No suprapubic tenderness  	UE: Warm, FROM, no clubbing, intact strength; no edema; no asterixis  	LE: Warm, FROM, no clubbing, intact strength; minimal edema  	Neuro: No focal deficits, intact gait  	Psych: Normal affect and mood  	Skin: Warm, without rashes  	Vascular access: AVF, Right CVC    LABS/STUDIES  --------------------------------------------------------------------------------                Iron 34, TIBC 235, %sat 14      [01-29-19 @ 11:39]  Ferritin 1049      [01-29-19 @ 11:39]  TSH 2.07      [01-30-19 @ 07:49]  Lipid: chol 159, , HDL 32, LDL 94      [01-29-19 @ 11:39]    HBsAb <3.0      [01-08-19 @ 17:03]  HBsAg Nonreact      [01-08-19 @ 17:03]  HCV 0.12, Nonreact      [01-08-19 @ 17:03]

## 2019-02-04 NOTE — PROGRESS NOTE ADULT - SUBJECTIVE AND OBJECTIVE BOX
PULMONARY PROGRESS NOTE      KAUSHIK HOFFMAN  MRN-[id number]    Patient is a 79y old  Male who presents with a chief complaint of HCAP (04 Feb 2019 12:31)      INTERVAL HPI/OVERNIGHT EVENTS:    Patient is s/p HD  Comfortable on O2; not requiring HFO2 for now    MEDICATIONS  (STANDING):  ALBUTerol/ipratropium for Nebulization 3 milliLiter(s) Nebulizer every 6 hours  amLODIPine   Tablet 10 milliGRAM(s) Oral daily  aspirin enteric coated 81 milliGRAM(s) Oral daily  atorvastatin 80 milliGRAM(s) Oral at bedtime  chlorhexidine 2% Cloths 1 Application(s) Topical daily  clopidogrel Tablet 75 milliGRAM(s) Oral daily  docusate sodium 100 milliGRAM(s) Oral three times a day  doxycycline hyclate Capsule 100 milliGRAM(s) Oral every 12 hours  DULoxetine 20 milliGRAM(s) Oral daily  epoetin nguyễn Injectable 8000 Unit(s) IV Push <User Schedule>  gabapentin 100 milliGRAM(s) Oral three times a day  heparin  Injectable 5000 Unit(s) SubCutaneous every 12 hours  hydrALAZINE 50 milliGRAM(s) Oral every 8 hours  isosorbide   mononitrate ER Tablet (IMDUR) 30 milliGRAM(s) Oral daily  levothyroxine 100 MICROGram(s) Oral daily  loratadine 10 milliGRAM(s) Oral daily  losartan 50 milliGRAM(s) Oral daily  metoprolol tartrate 50 milliGRAM(s) Oral two times a day  multivitamin 1 Tablet(s) Oral daily  mupirocin 2% Nasal 1 Application(s) Nasal two times a day  pantoprazole    Tablet 40 milliGRAM(s) Oral before breakfast  polyethylene glycol 3350 17 Gram(s) Oral daily  senna 2 Tablet(s) Oral at bedtime      MEDICATIONS  (PRN):  benzocaine 15 mG/menthol 3.6 mG Lozenge 1 Lozenge Oral every 2 hours PRN Sore Throat  benzonatate 100 milliGRAM(s) Oral every 8 hours PRN Cough  diphenhydrAMINE 25 milliGRAM(s) Oral every 8 hours PRN Rash and/or Itching      Allergies    No Known Allergies    Intolerances        PAST MEDICAL & SURGICAL HISTORY:  Chronic anemia  ESRD (end stage renal disease)  CAD (coronary artery disease)  Lung cancer: had yoni radiation in 2006.  Bipolar disorder  High cholesterol  Hypertension  S/P CABG (coronary artery bypass graft): pt&#x27;s son in law states h/o some stents near neck area ? carotid ? he is not sure.        Vital Signs Last 24 Hrs  T(C): 36.5 (04 Feb 2019 17:40), Max: 36.6 (03 Feb 2019 23:44)  T(F): 97.7 (04 Feb 2019 17:40), Max: 97.8 (03 Feb 2019 23:44)  HR: 79 (04 Feb 2019 17:40) (49 - 80)  BP: 138/70 (04 Feb 2019 17:40) (107/60 - 138/70)  BP(mean): --  RR: 18 (04 Feb 2019 17:40) (18 - 20)  SpO2: 98% (04 Feb 2019 17:40) (91% - 98%)    PHYSICAL EXAMINATION:    GENERAL: The patient is comfortable in no apparent distress.     HEENT: Head is normocephalic and atraumatic. Extraocular muscles are intact. Mucous membranes are moist.    NECK: Supple.    LUNGS: Clear to auscultation without wheezing, rales but mild rhonchi; respirations unlabored    HEART: Regular rate and rhythm without murmur.    ABDOMEN: Soft, nontender, and nondistended.      EXTREMITIES: Without any cyanosis, clubbing, rash, lesions or edema.    NEUROLOGIC: Grossly intact.    LABS:                        8.9    10.7  )-----------( 352      ( 04 Feb 2019 14:36 )             30.0     02-04    134<L>  |  93<L>  |  49.0<H>  ----------------------------<  110  5.1   |  25.0  |  7.30<H>    Ca    8.0<L>      04 Feb 2019 14:37          MICROBIOLOGY:    Culture - Sputum . (01.30.19 @ 09:16)    -  Gentamicin: S <=4 Should not be used as monotherapy    -  Linezolid: S 4    -  Oxacillin: R >2    -  Penicillin: R >8    -  RIF- Rifampin: S <=1 Should not be used as monotherapy    -  Tetra/Doxy: S <=4    -  Trimethoprim/Sulfamethoxazole: S <=0.5/9.5    -  Vancomycin: S 1    Gram Stain:   Few White blood cells  Few Gram Positive Cocci in Clusters    -  Ampicillin/Sulbactam: R 16/8    -  Cefazolin: R 16    -  Erythromycin: R >4    -  Clindamycin: S <=0.5    Specimen Source: .Sputum    Culture Results:   Few Methicillin resistant Staphylococcus aureus  Few Routine respiratory miguelina present  .  TYPE: (C=Critical, N=Notification, A=Abnormal) C  TESTS:  _ MRSA  DATE/TIME CALLED: _ 02/02/2019 11:58:21  CALLED TO: Fermin Farooq RN  READ BACK (2 Patient Identifiers)(Y/N): _ Y  READ BACK VALUES (Y/N): _ Y  CALLED BY: Fermin Rothman    Sent copy to  and logistics.    Organism Identification: Methicillin resistant Staphylococcus aureus    Organism: Methicillin resistant Staphylococcus aureus    Method Type: [facility]          Rapid Respiratory Viral Panel (01.29.19 @ 00:07)    Rapid RVP Result: NotDetec: This Respiratory Panel uses polymerase chain reaction (PCR) to detect for  adenovirus; coronavirus (HKU1, NL63, 229E, OC43); human metapneumovirus  (hMPV); human enterovirus/rhinovirus (Entero/RV); influenza A; influenza  A/H1; influenza A/H3; influenza A/H1-2009; influenza B; parainfluenza  viruses 1, 2, 3, 4; respiratory syncytial virus; Mycoplasma pneumoniae;  and Chlamydophila pneumoniae.      RADIOLOGY & ADDITIONAL STUDIES:       EXAM:  CT CHEST                          PROCEDURE DATE:  02/04/2019          INTERPRETATION:  CLINICAL INFORMATION: Right fibrosis on the right.   Possible cancer Emphysema. Hypoxia. Follow-up. Right upper lobe pneumonia.    COMPARISON: None.    PROCEDURE:   CT of the Chest was performed without intravenous contrast.  Sagittal and coronal reformats were performed.      FINDINGS:    CHEST:     LUNGS AND LARGE AIRWAYS:Right upper lobe opacity, not significant change   since 1/20/2019. Right lower lobe peribronchial opacities, increased   since 1/20/2019. Right paramediastinal consolidation with associated   bronchiectasis again seen, possibly post radiation change.  Calcification   again noted at the right lung apex. Compressive atelectasisin left lower   lobe secondary to the effusion. Emphysematous changes again noted.  PLEURA: Small loculated right pleural effusion, unchanged. Small to   moderate-sized left pleural effusion, increased in size. Fluid is seen   along the left major fissure.  VESSELS: Thoracic aorta normal in caliber with mild to moderate calcified   plaque. Status post CABG.  HEART: Enlarged. No pericardial effusion.  MEDIASTINUM AND ASHWIN: No pathologically enlarged lymph nodes.  CHEST WALL AND LOWER NECK: Right chest wall Mediport with the tip at the   cavoatrial junction. No pathologically enlarged axillary lymph nodes.  VISUALIZED UPPER ABDOMEN: Bilateral adrenal gland thickening. Cyst in the   upper pole of the right kidney.  Stable pericardiophrenic lymphnodes.  BONES: Status post sternotomy.    IMPRESSION:     Right upper and lower lobe opacities, unchanged in the right upper lobe   and increased in the right lower lobe since 1/28/2019; the differential   includes pneumonia; follow-up is recommended.    Small to moderate left pleural effusion, increased in size since   1/28/2019.    Loculated small right pleural effusion, unchanged.      ROMAINE RODRIGEZ   This document has been electronically signed. Feb 4 2019  3:36PM            ECHO:      Summary:   1. Left ventricular ejection fraction, by visual estimation, is 60 to   65%.   2. Normal global left ventricular systolic function.   3. Basal anteroseptal segment is abnormal as described above.   4. Spectral Doppler shows pseudonormal pattern of left ventricular   myocardial filling (Grade II diastolic dysfunction).   5. There is no evidence of pericardial effusion.   6. Moderate mitral valve regurgitation.   7. Thickening of the anterior and posterior mitral valve leaflets.   8. Mild-moderate tricuspid regurgitation.   9. Sclerotic aortic valve with normal opening.  10. Trace pulmonic valve regurgitation.    [id number] Alysia Moffett MD, Electronically signed on 1/31/2019 at 11:14:24   AM

## 2019-02-04 NOTE — PROGRESS NOTE ADULT - SUBJECTIVE AND OBJECTIVE BOX
[facility]  INFECTIOUS DISEASES AND INTERNAL MEDICINE at [place]  =======================================================  Cricket Lara MD  Diplomates American Board of Internal Medicine and Infectious Diseases  =======================================================    [id number]  [facility]   follow up for: possible right side PNA  patient seen and examined.     called back by team for patient still requiring high flow oxygen  MRSA in sputum   pt seen in HD unit    ===================================================  REVIEW OF SYSTEMS:  as above  all other ROS negative    =======================================================  Allergies  No Known Allergies    Antibiotics:  doxycycline hyclate Capsule 100 milliGRAM(s) Oral every 12 hours  ======================================================  Physical Exam:  ============  T(F): 97.4 (04 Feb 2019 14:00), Max: 98.5 (02 Feb 2019 16:06)  HR: 52 (04 Feb 2019 14:00)  BP: 115/52 (04 Feb 2019 14:00)  RR: 18 (04 Feb 2019 14:00)  SpO2: 91% (04 Feb 2019 14:00) (90% - 98%)    General:  No acute distress.  THIN FRAIL;   Eye: Pupils are equal, round and reactive to light, Extraocular movements are intact, Normal conjunctiva.  HENT: Normocephalic, Oral mucosa is moist, No pharyngeal erythema, No sinus tenderness.  Neck: Supple, No lymphadenopathy.  Respiratory: Lungs with fair air entry on posterior exam  Cardiovascular: Normal rate, Regular rhythm, No murmur, Good pulses equal in all extremities, No edema.  Gastrointestinal: Soft, Non-tender, Non-distended, Normal bowel sounds.  Genitourinary: No costovertebral angle tenderness.  Lymphatics: No lymphadenopathy neck,   Musculoskeletal: Normal range of motion, Normal strength.  Integumentary: No rash.  Neurologic: Alert, Oriented, No focal deficits, Cranial Nerves II-XII are grossly intact.  Psychiatric: Appropriate mood & affect.  =======================================================  Labs:                        8.9    10.7  )-----------( 352      ( 04 Feb 2019 14:36 )             30.0     02-04    134<L>  |  93<L>  |  49.0<H>  ----------------------------<  110  5.1   |  25.0  |  7.30<H>    Ca    8.0<L>      04 Feb 2019 14:37        Culture - Sputum (collected 01-30-19 @ 09:16)  Source: .Sputum  Gram Stain (01-30-19 @ 15:43):    Few White blood cells    Few Gram Positive Cocci in Clusters  Final Report (02-02-19 @ 12:01):    Few Methicillin resistant Staphylococcus aureus    Few Routine respiratory miguelina present    .    TYPE: (C=Critical, N=Notification, A=Abnormal) C    TESTS:  _ MRSA    DATE/TIME CALLED: _ 02/02/2019 11:58:21    CALLED TO: Fermin Farooq RN    READ BACK (2 Patient Identifiers)(Y/N): _ Y    READ BACK VALUES (Y/N): _ Y    CALLED BY: Fermin Rothman    Sent copy to IC and logistics.  Organism: Methicillin resistant Staphylococcus aureus (02-02-19 @ 12:01)  Organism: Methicillin resistant Staphylococcus aureus (02-02-19 @ 12:01)    Sensitivities:      -  Ampicillin/Sulbactam: R 16/8      -  Cefazolin: R 16      -  Clindamycin: S <=0.5      -  Erythromycin: R >4      -  Gentamicin: S <=4 Should not be used as monotherapy      -  Linezolid: S 4      -  Oxacillin: R >2      -  Penicillin: R >8      -  RIF- Rifampin: S <=1 Should not be used as monotherapy      -  Tetra/Doxy: S <=4      -  Trimethoprim/Sulfamethoxazole: S <=0.5/9.5      -  Vancomycin: S 1      Method Type: LIVIA    Culture - Blood (collected 01-29-19 @ 11:39)  Source: .Blood  Final Report (02-03-19 @ 13:01):    No growth at 5 days.    Culture - Blood (collected 01-28-19 @ 11:39)  Source: .Blood  Final Report (02-02-19 @ 13:01):    No growth at 5 days.    Culture - Blood (collected 01-28-19 @ 11:38)  Source: .Blood  Final Report (02-02-19 @ 13:01):    No growth at 5 days.

## 2019-02-04 NOTE — PROGRESS NOTE ADULT - ASSESSMENT
this 79 y.o. Man with CAD s/p CABG, HLD, HTN, Anemia, ESRD on HD, hx of lung cancer s/p radiation, who was recently admitted from 12/22/19 to 1/11/19, treated for Enterobacter PNA.  here for cough and found with RUL consolidation    - suspect PNA/ HCAP  - had been on Zosyn; but no WBC elevation , no fevers  RVP negative  sputum cx  with MRSA    restarted on Vancomycin for MRSA  - will change to Doxycycline given MRSA SS to doxycycline and Renal dysfunction    - follow up all outstanding cultures  - trend temperature and WBC curve  - repeat cultures from blood and all sources if febrile.

## 2019-02-04 NOTE — CONSULT NOTE ADULT - CONSULT REQUESTED DATE/TIME
28-Jan-2019 15:59
03-Feb-2019 10:13
28-Jan-2019 17:11
29-Jan-2019 10:59
04-Feb-2019 12:32
29-Jan-2019 15:31
28-Jan-2019 15:47

## 2019-02-04 NOTE — CONSULT NOTE ADULT - SUBJECTIVE AND OBJECTIVE BOX
Vascular Attending:  Chad       HPI:  Pt is a 80yo M presenting w/ SOB found to have HCAP. PMH ESRD on HD, hx of lung cancer s/p radiation, HTN, Anemia, CAD s/p CABG, HLD, recent HCAP tx 3 weeks ago.   Patient has been having cough and SOB for the past 2-3 days and it has gotten progressively worse, he was ofund to be hypoxic earlier today w/ SpO2 78% and sent to the ED. He denies any fevers, chills but states he feels weak. He uses O2 at home 2-3 L NC. Earlier today he was sent in from his vas surgeons office due to the hypoxia. He denies any chest pain, palpitations, abd pain, diarrhea, constipation, melena, hematochezia. He does not produce urine.   He has no other complaints aside from feeling itchy. He denies any allergies to medications.   In ED patient was found to have RUL consolidation, RVP is pending, he also had elevated troponin and seen by cardiology. He has also been seen by nephro. Started on IV abx. (28 Jan 2019 17:49)      Vascular Surgery HPI:  Above HPI reviewed.  Patient known to our service, s/p right permacath insertion and Left upper extremity AVG creation (jan3).  Hd access has been performed exclusively from the Right IJ permacath.  The left upper extremity avg has yet to be accessed.  Patient was reportedly attending an access center for Balloon assisted maturation of his left AVg, but was rerouted to the Ed due to respiratory deficits.  Evaluation of Left AVG requested.  Patient currently with  no complaint of chest or abdominal pain.  No fever or chills. No nausea, vomiting or diarrhea.    PAST MEDICAL & SURGICAL HISTORY:  Chronic anemia  ESRD (end stage renal disease)  CAD (coronary artery disease)  Lung cancer: had yoni radiation in 2006.  Bipolar disorder  High cholesterol  Hypertension  S/P CABG (coronary artery bypass graft): pt&#x27;s son in [facility] states h/o some stents near neck area ? carotid ? he is not sure.      REVIEW OF SYSTEMS  :  See HPI           MEDICATIONS  (STANDING):  ALBUTerol/ipratropium for Nebulization 3 milliLiter(s) Nebulizer every 6 hours  amLODIPine   Tablet 10 milliGRAM(s) Oral daily  aspirin enteric coated 81 milliGRAM(s) Oral daily  atorvastatin 80 milliGRAM(s) Oral at bedtime  clopidogrel Tablet 75 milliGRAM(s) Oral daily  DULoxetine 20 milliGRAM(s) Oral daily  epoetin nguyễn Injectable 8000 Unit(s) IV Push <User Schedule>  gabapentin 100 milliGRAM(s) Oral three times a day  heparin  Injectable 5000 Unit(s) SubCutaneous every 12 hours  hydrALAZINE 50 milliGRAM(s) Oral every 8 hours  hydrOXYzine hydrochloride 50 milliGRAM(s) Oral two times a day  isosorbide   mononitrate ER Tablet (IMDUR) 30 milliGRAM(s) Oral daily  levothyroxine 100 MICROGram(s) Oral daily  loratadine 10 milliGRAM(s) Oral daily  losartan 50 milliGRAM(s) Oral daily  metoprolol tartrate 50 milliGRAM(s) Oral two times a day  multivitamin 1 Tablet(s) Oral daily  pantoprazole    Tablet 40 milliGRAM(s) Oral before breakfast  piperacillin/tazobactam IVPB. 3.375 Gram(s) IV Intermittent every 12 hours  vancomycin  IVPB 1000 milliGRAM(s) IV Intermittent <User Schedule>    MEDICATIONS  (PRN):  benzonatate 100 milliGRAM(s) Oral every 8 hours PRN Cough      Allergies    No Known Allergies    Intolerances        SOCIAL HISTORY:  non contributory       Vital Signs Last 24 Hrs  T(C): 36.6 (29 Jan 2019 07:40), Max: 37.2 (28 Jan 2019 21:30)  T(F): 97.8 (29 Jan 2019 07:40), Max: 98.9 (28 Jan 2019 21:30)  HR: 61 (29 Jan 2019 07:40) (59 - 70)  BP: 132/64 (29 Jan 2019 07:40) (132/64 - 170/61)  BP(mean): --  RR: 18 (29 Jan 2019 07:40) (18 - 22)  SpO2: 94% (29 Jan 2019 07:40) (88% - 96%)    PHYSICAL EXAM:      Constitutional:  sitting upright, no acute distress.  alert and oriented     Eyes: no distress     ENMT: atraumatic     Neck: right IJ permacath present      Respiratory: coarse breath sounds , mildly labored breathing     Cardiovascular: s1/s2     Gastrointestinal: soft, non tender       Extremities: left upper extremity with mild pitting edema.  Healed surgical incisions proximal to the antecubital fossa.  palpable thrill from distal medial bicep region extending proximally.  Good strenght to the extremity.     Vascular: Palpable AVG thrill, + radial and brachial     Neurological:  no noted gross deficits to the distal aspects of the left upper extremity           LABS:                        9.1    9.3   )-----------( 224      ( 28 Jan 2019 11:35 )             30.3     01-28    136  |  95<L>  |  15.0  ----------------------------<  138<H>  4.0   |  30.0<H>  |  5.31<H>    Ca    8.1<L>      28 Jan 2019 11:35    TPro  7.4  /  Alb  3.5  /  TBili  0.3<L>  /  DBili  x   /  AST  40<H>  /  ALT  17  /  AlkPhos  62  01-28          RADIOLOGY & ADDITIONAL STUDIES    Impression and Plan:    Patient is s/p HD access creations;  right IJ permacath and left avg (AVG now POD >3weeks)  Patient admitted secondary to hypoxia, possible PNA    - HD access deemed patent on physical examiantion  - Recommend that the left AVG access be attempted during next HD session.  - If HD is unsuccessful, will then arrange a diagnostic angiogram to further access
[facility]  INFECTIOUS DISEASES AND INTERNAL MEDICINE at [place]  =======================================================  Cricket Lara MD  Diplomates American Board of Internal Medicine and Infectious Diseases  =======================================================    MRN-[id number]  KAUSHIK HOFFMAN   This  78yo M with CAD s/p CABG, HLD, HTN, Anemia, ESRD on HD, hx of lung cancer s/p radiation, who was recently admitted from 12/22/19 to 1/11/19, treated for PNA at that time. Sputum cx at that visit showed Enterobacter cloacae and patient had completed a course of antibiotic son 1/9/19.  Per admission note, patient developed  cough and SOB for the past 2-3 days and it has gotten progressively worse, he was found with hypoxia on day of admission, with SpO2 78% and sent to the ED.     He denies any fevers, chills but states he feels weak. He uses O2 at home 2-3 L NC.  He denies any chest pain, palpitations, abd pain, diarrhea, melena, hematochezia. He does not produce urine.  REPORTS CONSTIPATION X 4 DAYS.    RVP was negative.   Chest CT showed:  There is a new region of consolidation in the right upper lobe which was not seen on 1/2/2019.  Diffuse interstitial disease in the right lung, unchanged.  Bilateral pleural effusions, the right is unchanged and the left is larger since the prior study.  Increasing atelectasis at the left lung base.  Extensive emphysematous changes, unchanged..     patient reports some slight cough, not productive currently .     =======================================================  Past Medical & Surgical Hx:  =====================  PAST MEDICAL & SURGICAL HISTORY:  Chronic anemia  ESRD (end stage renal disease)  CAD (coronary artery disease)  Lung cancer: had yoni radiation in 2006.  Bipolar disorder  High cholesterol  Hypertension  S/P CABG (coronary artery bypass graft): pt&#x27;s son in [facility] states h/o some stents near neck area ? carotid ? he is not sure.    Problem List:  ==========  HEALTH ISSUES - PROBLEM Dx:  Malignant neoplasm of lung, unspecified laterality, unspecified part of lung: Malignant neoplasm of lung, unspecified laterality, unspecified part of lung  HCAP (healthcare-associated pneumonia): HCAP (healthcare-associated pneumonia)  Essential hypertension: Essential hypertension  Pleural effusion: Pleural effusion  Dyspnea on exertion: Dyspnea on exertion  Coronary artery disease of bypass graft of native heart with stable angina pectoris: Coronary artery disease of bypass graft of native heart with stable angina pectoris  Elevated troponin: Elevated troponin  Chronic anemia: Chronic anemia  ESRD (end stage renal disease): ESRD (end stage renal disease)    Social Hx:  =======  no toxic habits currently    FAMILY HISTORY:  Family history of essential hypertension  no significant family history of immunosuppressive disorders in mother or father   =======================================================  REVIEW OF SYSTEMS:  as above  all other ROS negative  =======================================================  Allergies    No Known Allergies    Intolerances    Antibiotics:  piperacillin/tazobactam IVPB. 3.375 Gram(s) IV Intermittent every 12 hours  vancomycin  IVPB 1000 milliGRAM(s) IV Intermittent <User Schedule>    Other medications:  ALBUTerol/ipratropium for Nebulization 3 milliLiter(s) Nebulizer every 6 hours  amLODIPine   Tablet 10 milliGRAM(s) Oral daily  aspirin enteric coated 81 milliGRAM(s) Oral daily  atorvastatin 80 milliGRAM(s) Oral at bedtime  clopidogrel Tablet 75 milliGRAM(s) Oral daily  DULoxetine 20 milliGRAM(s) Oral daily  epoetin nguyễn Injectable 8000 Unit(s) IV Push <User Schedule>  gabapentin 100 milliGRAM(s) Oral three times a day  heparin  Injectable 5000 Unit(s) SubCutaneous every 12 hours  hydrALAZINE 50 milliGRAM(s) Oral every 8 hours  hydrOXYzine hydrochloride 50 milliGRAM(s) Oral two times a day  isosorbide   mononitrate ER Tablet (IMDUR) 30 milliGRAM(s) Oral daily  levothyroxine 100 MICROGram(s) Oral daily  loratadine 10 milliGRAM(s) Oral daily  losartan 50 milliGRAM(s) Oral daily  metoprolol tartrate 50 milliGRAM(s) Oral two times a day  multivitamin 1 Tablet(s) Oral daily  pantoprazole    Tablet 40 milliGRAM(s) Oral before breakfast    piperacillin/tazobactam IVPB.   200 mL/Hr IV Intermittent (01-28-19 @ 15:57)   25 mL/Hr IV Intermittent (01-29-19 @ 06:01)    vancomycin  IVPB   250 mL/Hr IV Intermittent (01-28-19 @ 16:27)          piperacillin/tazobactam IVPB.   200 mL/Hr IV Intermittent (12-22-18 @ 22:05)   200 mL/Hr IV Intermittent (12-23-18 @ 06:10)   200 mL/Hr IV Intermittent (12-23-18 @ 17:33)   200 mL/Hr IV Intermittent (12-24-18 @ 05:59)   200 mL/Hr IV Intermittent (12-24-18 @ 17:40)   200 mL/Hr IV Intermittent (12-25-18 @ 05:48)   200 mL/Hr IV Intermittent (12-25-18 @ 16:50)   200 mL/Hr IV Intermittent (12-26-18 @ 06:43)   200 mL/Hr IV Intermittent (12-26-18 @ 17:55)   200 mL/Hr IV Intermittent (12-27-18 @ 06:01)  piperacillin/tazobactam IVPB.   200 mL/Hr IV Intermittent (01-03-19 @ 18:16)   200 mL/Hr IV Intermittent (01-04-19 @ 21:16)   200 mL/Hr IV Intermittent (01-05-19 @ 06:26)   200 mL/Hr IV Intermittent (01-05-19 @ 18:41)   200 mL/Hr IV Intermittent (01-06-19 @ 05:19)   200 mL/Hr IV Intermittent (01-06-19 @ 21:23)   cefTRIAXone   IVPB   100 mL/Hr IV Intermittent (01-07-19 @ 05:24)   100 mL/Hr IV Intermittent (01-08-19 @ 06:34)   100 mL/Hr IV Intermittent (01-09-19 @ 06:14)    vancomycin  IVPB   250 mL/Hr IV Intermittent (12-22-18 @ 20:12)   250 mL/Hr IV Intermittent (12-24-18 @ 20:11)   250 mL/Hr IV Intermittent (12-27-18 @ 19:07)   250 mL/Hr IV Intermittent (12-28-18 @ 19:54)   250 mL/Hr IV Intermittent (12-30-18 @ 16:32)   250 mL/Hr IV Intermittent (01-03-19 @ 18:16)      ======================================================  Physical Exam:  ============  T(F): 97.9 (29 Jan 2019 11:56), Max: 98.9 (28 Jan 2019 21:30)  HR: 65 (29 Jan 2019 11:56)  BP: 127/61 (29 Jan 2019 11:56)  RR: 18 (29 Jan 2019 11:56)  SpO2: 90% (29 Jan 2019 11:56) (88% - 96%)    General:  No acute distress.  THIN FRAIL  Eye: Pupils are equal, round and reactive to light, Extraocular movements are intact, Normal conjunctiva.  HENT: Normocephalic, Oral mucosa is moist, No pharyngeal erythema, No sinus tenderness.  Neck: Supple, No lymphadenopathy.  Respiratory: Lungs DIMINSHED BREATH SOUNDS AT BASES  Cardiovascular: Normal rate, Regular rhythm, No murmur, Good pulses equal in all extremities, No edema.  Gastrointestinal: Soft, Non-tender, Non-distended, Normal bowel sounds.  Genitourinary: No costovertebral angle tenderness.  Lymphatics: No lymphadenopathy neck,   Musculoskeletal: Normal range of motion, Normal strength.  Integumentary: No rash.  Neurologic: Alert, Oriented, No focal deficits, Cranial Nerves II-XII are grossly intact.  Psychiatric: Appropriate mood & affect.    =======================================================  Labs:                        9.2    9.4   )-----------( 254      ( 29 Jan 2019 11:39 )             31.7     01-29    138  |  95<L>  |  13.0  ----------------------------<  128<H>  4.3   |  30.0<H>  |  4.87<H>    Ca    8.3<L>      29 Jan 2019 11:39  Phos  3.6     01-29  Mg     2.0     01-29    TPro  7.4  /  Alb  3.5  /  TBili  0.3<L>  /  DBili  x   /  AST  40<H>  /  ALT  17  /  AlkPhos  62  01-28    =================     EXAM:  CT CHEST                          PROCEDURE DATE:  01/28/2019          INTERPRETATION:  HISTORY: Shortness of breath and cough.    Date and Time of Exam: 1/28/2019 1:56 PM    TECHNIQUE:  Sections were obtained from the apices to the diaphragm without intravenous contrast.    COMPARISON EXAMINATION:   1/2/2019.    FINDINGS: No evidence of mediastinal or hilar lymphadenopathy. There is a small right pleural effusion, unchanged since the prior examination. There is a left pleural effusion which is larger since 1/2/2019.    Extensive emphysematous changes. Left basilar atelectasis, more pronounced since the prior exam.    There is a region of consolidation or fibrosis in the right apex with associated calcifications unchanged from the prior exam. There is now consolidation in the right upper lobe which is new since 1/2/2019. Diffuse interstitial disease in the right lung, unchanged.    Degenerative changes in the spine.      IMPRESSION:     There is a new region of consolidation in the right upper lobe which was not seen on 1/2/2019.    Diffuse interstitial disease in the right lung, unchanged.    Bilateral pleural effusions, the right is unchanged and the left is larger since the prior study.    Increasing atelectasis at the left lung base.    Extensive emphysematous changes, unchanged..       BERT BARKSDALE M.D., ATTENDING RADIOLOGIST  This document has been electronically signed. Jan 28 2019  2:25PM
Patient is a 79y old  Male who presents with a chief complaint of     HPI:    79 y/o  M ( [place] ) with hx of ESRD on HD T/Th/Sat, CAD s/p PCI on recent admission, HTN, Pulm HTN, Lung cancer s/p XRT in 06 Was at HD yesterday evening and noted to have low grade temp, Cough & Chills. Patient also noted to have hypoxia.     Admits to subjective fever at home over past few days. No focal weakness. + Pruritus,    PAST MEDICAL & SURGICAL HISTORY:  Chronic anemia  ESRD (end stage renal disease)  CAD (coronary artery disease)  Lung cancer: had radiation in 2006.  Bipolar disorder  High cholesterol  Hypertension    S/P CABG (coronary artery bypass graft):   ? Carotid Bruits,    FAMILY HISTORY:  Family history of essential hypertension (Father)    Social History: Non Smoker,    MEDICATIONS  (STANDING):  epoetin nguyễn Injectable 8000 Unit(s) IV Push <User Schedule>  piperacillin/tazobactam IVPB. 2.25 Gram(s) IV Intermittent once  vancomycin  IVPB 1000 milliGRAM(s) IV Intermittent once    No Known Allergies    REVIEW OF SYSTEMS:    CONSTITUTIONAL: + fever, weight loss,  fatigue  EYES: No eye pain, visual disturbances, or discharge  ENMT:  No difficulty hearing, tinnitus, vertigo; No sinus or throat pain  NECK: No pain or stiffness  RESPIRATORY: + cough, wheezing, chills , No  hemoptysis; + shortness of breath  CARDIOVASCULAR: No chest pain, palpitations, dizziness, or leg swelling  GASTROINTESTINAL: No abdominal or epigastric pain. No nausea, vomiting, or hematemesis; No diarrhea or constipation. No melena or hematochezia.  GENITOURINARY: No dysuria, frequency, hematuria, or incontinence  NEUROLOGICAL: No headaches, memory loss, loss of strength, numbness, or tremors  SKIN: + itching, No burning, rashes, or lesions   LYMPH NODES: No enlarged glands  ENDOCRINE: No heat or cold intolerance; No hair loss  MUSCULOSKELETAL: No joint pain or swelling; No muscle, back, or extremity pain  PSYCHIATRIC: No depression, anxiety, mood swings, or difficulty sleeping  HEME/LYMPH: No easy bruising, or bleeding gums  ALLERGY AND IMMUNOLOGIC: No hives or eczema    Vital Signs Last 24 Hrs  T(C): 36.8 (28 Jan 2019 11:04), Max: 36.8 (28 Jan 2019 11:04)  T(F): 98.2 (28 Jan 2019 11:04), Max: 98.2 (28 Jan 2019 11:04)  HR: 60 (28 Jan 2019 11:04) (60 - 60)  BP: 170/61 (28 Jan 2019 11:04) (170/61 - 170/61)  BP(mean): --  RR: 22 (28 Jan 2019 11:04) (22 - 22)  SpO2: 88% (28 Jan 2019 11:04) (88% - 88%)    PHYSICAL EXAM:    GENERAL: NAD, Frail , Debilitated , Pale,  HEAD:  Atraumatic, Normocephalic  EYES: EOMI, PERRLA, conjunctiva and sclera clear  ENMT: Moist mucous membranes,   NECK: Supple, No JVD, Normal thyroid  NERVOUS SYSTEM:  Alert & Oriented X3, Poor concentration;  CHEST/LUNG: Dull  to percussion bilaterally; + Dry  rales, rhonchi, wheezing, No rubs  HEART: Regular rate and rhythm; No murmurs, rubs, or gallops  ABDOMEN: Soft, Nontender, Nondistended; Bowel sounds present  EXTREMITIES:  2+ Peripheral Pulses, No clubbing, cyanosis, Minimal  edema  LYMPH: No lymphadenopathy noted  SKIN: No rashes or lesions  LABS:                        9.1    9.3   )-----------( 224      ( 28 Jan 2019 11:35 )             30.3     01-28    136  |  95<L>  |  15.0  ----------------------------<  138<H>  4.0   |  30.0<H>  |  5.31<H>    Ca    8.1<L>      28 Jan 2019 11:35    TPro  7.4  /  Alb  3.5  /  TBili  0.3<L>  /  DBili  x   /  AST  40<H>  /  ALT  17  /  AlkPhos  62  01-28    RADIOLOGY & ADDITIONAL TESTS:    EXAM:  CT CHEST                          PROCEDURE DATE:  01/28/2019      INTERPRETATION:  HISTORY: Shortness of breath and cough.    Date and Time of Exam: 1/28/2019 1:56 PM    TECHNIQUE:  Sections were obtained from the apices to the diaphragm   without intravenous contrast.    COMPARISON EXAMINATION:   1/2/2019.    FINDINGS: No evidence of mediastinal or hilar lymphadenopathy. There is a   small right pleural effusion, unchanged since the prior examination.   There is a left pleural effusion which is larger since 1/2/2019.    Extensive emphysematous changes. Left basilar atelectasis, more   pronounced since the prior exam.    There is a region of consolidation or fibrosis in the right apex with   associated calcifications unchanged from the prior exam. There is now   consolidation in the right upper lobe which is new since 1/2/2019.   Diffuse interstitial disease in the right lung, unchanged.    Degenerative changes in the spine.      IMPRESSION:       There is a new region of consolidation in the right upper lobe which was   not seen on 1/2/2019.    Diffuse interstitial disease in the right lung, unchanged.    Bilateral pleural effusions, the right is unchanged and the left is   larger since the prior study.    Increasing atelectasis at the left lung base.    Extensive emphysematous changes, unchanged.    BERT BARKSDALE M.D., ATTENDING RADIOLOGIST  This document has been electronically signed. Jan 28 2019  2:25PM      EXAM:  XR CHEST AP OR PA 1V                          PROCEDURE DATE:  01/28/2019      INTERPRETATION:  Portable chest radiograph        CLINICAL INFORMATION:   Cough shortness of breath.    TECHNIQUE:  Portable  AP view of the chest was obtained.    COMPARISON: 1/4/2019 available for review.    FINDINGS:   The lungs  show diffuse pulmonary perihilar RIGHT greater than LEFT   multifocal airspace consolidations concerning for infectious pneumonia   and mild bilateral effusions.  Pulmonary edema of cardiac or noncardiac   origin should be also considered.    The  heart is enlarged in transverse diameter. No hilar mass. Trachea   midline.         Status post coronary artery bypass graft procedure.   There is an indwelling tunneled double lumen catheter with distal tip in   the SVC.         The heart and mediastinum are within normal limits.         Visualized osseous structures are intact.    IMPRESSION:   RIGHT greater than LEFT multifocal LEFT perihilar lower   lobe airspace consolidations and/or effusions.
REASON FOR Tele-evaluation    SUBJECTIVE: SOB, itching , all over    HPI: 79 yr old male with extensive medical history including but not limited to ESRD on HD, CAD (post remote CABG), Htn, hypothyroidism, lung cancer (post radiation) , post radiation pulmonary fibrosis, patient complains of itching all over and difficulty breathing worse when he is lying down, somewhat relieved with sitting up, patient was at his vascular surgeon today and was found to be hypoxic and send over for evaluation, in  ED CT showed a new infiltrate.        ROS: negative as per HPI       T(C): 36.7 (01-28-19 @ 11:40), Max: 36.8 (01-28-19 @ 11:04)  HR: 70 (01-28-19 @ 16:00) (60 - 70)  BP: 160/77 (01-28-19 @ 16:00) (160/77 - 170/61)  RR: 20 (01-28-19 @ 16:00) (20 - 22)  SpO2: 96% (01-28-19 @ 16:00) (88% - 96%)    PHYSICAL EXAM: evaluation precludes physical exam. Pertinent physical exam findings as per video conference with nurse at bedside is as follow: sitting up right answers appropriately,                              9.1    9.3   )-----------( 224      ( 28 Jan 2019 11:35 )             30.3   01-28    136  |  95<L>  |  15.0  ----------------------------<  138<H>  4.0   |  30.0<H>  |  5.31<H>    Ca    8.1<L>      28 Jan 2019 11:35    TPro  7.4  /  Alb  3.5  /  TBili  0.3<L>  /  DBili  x   /  AST  40<H>  /  ALT  17  /  AlkPhos  62  01-28        Radiology findings     < from: CT Chest No Cont (01.28.19 @ 14:03) >  IMPRESSION:     There is a new region of consolidation in the right upper lobe which was   not seen on 1/2/2019.    Diffuse interstitial disease in the right lung, unchanged.    Bilateral pleural effusions, the right is unchanged and the left is   larger since the prior study.    Increasing atelectasis at the left lung base.    Extensive emphysematous changes, unchanged..     < end of copied text >      Medication reconciliation done       Care plan discussed with Hospitalist        Survey offered to patient
Palliative Medicine Initial Consultation Note    HPI:  History from chart  Pt is a 78yo M presenting w/ SOB found to have HCAP. PMH ESRD on HD, hx of lung cancer s/p radiation, HTN, Anemia, CAD s/p CABG, HLD, recent HCAP tx 3 weeks ago.   Patient has been having cough and SOB for the past 2-3 days and it has gotten progressively worse, he was ofund to be hypoxic earlier today w/ SpO2 78% and sent to the ED. He denies any fevers, chills but states he feels weak. He uses O2 at home 2-3 L NC. Earlier today he was sent in from his [facility] surgeons office due to the hypoxia. He denies any chest pain, palpitations, abd pain, diarrhea, constipation, melena, hematochezia. He does not produce urine.   He has no other complaints aside from feeling itchy. He denies any allergies to medications.     PERTINENT PMH REVIEWED:  [x ] YES [ ] NO         Past Medical History:  Bipolar disorder    CAD (coronary artery disease)    Chronic anemia    ESRD (end stage renal disease)    High cholesterol    Hypertension    Lung cancer  had yoni radiation in 2006.     Past Surgical History:  S/P CABG (coronary artery bypass graft)  pt's son in law states h/o some stents near neck area ? carotid ? he is not sure.    SOCIAL HISTORY:  EtOH [ ] Yes  [ x] No                                    Drugs [ ] Yes [ x] No                                   [x ] smoker- former  [ ] nonsmoker                                    Admitted from: [x ] home [ ] SNF _________ [ ] [facility] ________    Surrogate/HCP/Guardian: NO HCP-   FAMILY HISTORY:  Family history of essential hypertension      Baseline ADLs (prior to admission):  Independent [ ] moderately [ ] fully   Dependent   [ x] moderately [ ]fully    MEDICATIONS  (STANDING):  ALBUTerol/ipratropium for Nebulization 3 milliLiter(s) Nebulizer every 6 hours  amLODIPine   Tablet 10 milliGRAM(s) Oral daily  aspirin enteric coated 81 milliGRAM(s) Oral daily  atorvastatin 80 milliGRAM(s) Oral at bedtime  chlorhexidine 2% Cloths 1 Application(s) Topical daily  clopidogrel Tablet 75 milliGRAM(s) Oral daily  docusate sodium 100 milliGRAM(s) Oral three times a day  doxycycline hyclate Capsule 100 milliGRAM(s) Oral every 12 hours  DULoxetine 20 milliGRAM(s) Oral daily  epoetin nguyễn Injectable 8000 Unit(s) IV Push <User Schedule>  gabapentin 100 milliGRAM(s) Oral three times a day  heparin  Injectable 5000 Unit(s) SubCutaneous every 12 hours  hydrALAZINE 50 milliGRAM(s) Oral every 8 hours  isosorbide   mononitrate ER Tablet (IMDUR) 30 milliGRAM(s) Oral daily  levothyroxine 100 MICROGram(s) Oral daily  loratadine 10 milliGRAM(s) Oral daily  losartan 50 milliGRAM(s) Oral daily  metoprolol tartrate 50 milliGRAM(s) Oral two times a day  multivitamin 1 Tablet(s) Oral daily  mupirocin 2% Nasal 1 Application(s) Nasal two times a day  pantoprazole    Tablet 40 milliGRAM(s) Oral before breakfast  polyethylene glycol 3350 17 Gram(s) Oral daily  senna 2 Tablet(s) Oral at bedtime    MEDICATIONS  (PRN):  benzocaine 15 mG/menthol 3.6 mG Lozenge 1 Lozenge Oral every 2 hours PRN Sore Throat  benzonatate 100 milliGRAM(s) Oral every 8 hours PRN Cough  diphenhydrAMINE 25 milliGRAM(s) Oral every 8 hours PRN Rash and/or Itching      Allergies    No Known Allergies    Intolerances        REVIEW OF SYSTEMS     see HPI  Rest of ROS negative      Karnofsky Performance Score/Palliative Performance Status Version 2: 30   %    Vital Signs Last 24 Hrs  T(C): 36.5 (04 Feb 2019 08:03), Max: 36.6 (03 Feb 2019 17:00)  T(F): 97.7 (04 Feb 2019 08:03), Max: 97.8 (03 Feb 2019 17:00)  HR: 77 (04 Feb 2019 09:36) (49 - 80)  BP: 128/56 (04 Feb 2019 08:03) (107/60 - 136/63)  BP(mean): --  RR: 20 (04 Feb 2019 08:03) (18 - 20)  SpO2: 97% (04 Feb 2019 09:36) (91% - 97%)    PHYSICAL EXAM:    General: Elderly man, HEENT: [ ] normal  [ ] dry mouth  [ ] ET tube/trach    Lungs: [ x] comfortable [ ] tachypnea/labored breathing  [ ] excessive secretions    CV: [ x] normal  [ ] tachycardia    GI: [ x] normal  [ ] distended  [ ] tender  [ ] no BS               [ ] PEG/NG/OG tube    : [ ] normal  [ ] incontinent  [x ] oliguria/anuria  on HD    MSK: [ ] normal  [ x] weakness  [ ] edema             [ ] ambulatory  [ ] bedbound/wheelchair bound    Skin: [ ] normal  [ ] pressure ulcers- Stage_____  [x ] no rash    L  I&O's Summary    03 Feb 2019 07:01  -  04 Feb 2019 07:00  --------------------------------------------------------  IN: 580 mL / OUT: 400 mL / NET: 180 mL        RADIOLOGY & ADDITIONAL STUDIES:  < from: CT Chest No Cont (01.28.19 @ 14:03) >   EXAM:  CT CHEST                          PROCEDURE DATE:  01/28/2019          INTERPRETATION:  HISTORY: Shortness of breath and cough.    Date and Time of Exam: 1/28/2019 1:56 PM    TECHNIQUE:  Sections were obtained from the apices to the diaphragm   without intravenous contrast.    COMPARISON EXAMINATION:   1/2/2019.    FINDINGS: No evidence of mediastinal or hilar lymphadenopathy. There is a   small right pleural effusion, unchanged since the prior examination.   There is a left pleural effusion which is larger since 1/2/2019.    Extensive emphysematous changes. Left basilar atelectasis, more   pronounced since the prior exam.    There is a region of consolidation or fibrosis in the right apex with   associated calcifications unchanged from the prior exam. There is now   consolidation in the right upper lobe which is new since 1/2/2019.   Diffuse interstitial disease in the right lung, unchanged.    Degenerative changes in the spine.      IMPRESSION:     There is a new region of consolidation in the right upper lobe which was   not seen on 1/2/2019.    Diffuse interstitial disease in the right lung, unchanged.    Bilateral pleural effusions, the right is unchanged and the left is   larger since the prior study.    Increasing atelectasis at the left lung base.    Extensive emphysematous changes, unchanged..       < end of copied text >      ADVANCE DIRECTIVES:  [ ] YES [x ] NO   DNR [ ] YES [ x] NO  Completed on:                     MOLST  [ ] YES [ x] NO   Completed on:  Living Will  [ ] YES [ x] NO   Completed on:    Thank you for the opportunity to assist with the care of this patient.   [facility] [phone].
PULMONARY CONSULT NOTE      KAUSHIK HOFFMANYOVANNY-[id number]    Patient is a 79y old  Male who presents with a chief complaint of HCAP (02 Feb 2019 14:10)      HISTORY OF PRESENT ILLNESS:  78yo M with CAD s/p CABG, HLD, HTN, Anemia, ESRD on HD, hx of lung cancer s/p radiation, who was recently admitted from 12/22/19 to 1/11/19, treated for PNA at that time. Sputum cx at that visit showed Enterobacter cloacae and patient had completed a course of antibiotic son 1/9/19.  Per admission note, patient developed  cough and SOB for the past 2-3 days and it has gotten progressively worse, he was found with hypoxia on day of admission, with SpO2 78% and sent to the ED.     He denies any fevers, chills but states he feels weak. He uses O2 at home 2-3 L NC.  He denies any chest pain, palpitations, abd pain, diarrhea, melena, hematochezia. He does not produce urine    Lung cancer was on the right  Off antibiotics at this time  No edema  Weak      MEDICATIONS  (STANDING):  ALBUTerol/ipratropium for Nebulization 3 milliLiter(s) Nebulizer every 6 hours  amLODIPine   Tablet 10 milliGRAM(s) Oral daily  aspirin enteric coated 81 milliGRAM(s) Oral daily  atorvastatin 80 milliGRAM(s) Oral at bedtime  chlorhexidine 2% Cloths 1 Application(s) Topical daily  clopidogrel Tablet 75 milliGRAM(s) Oral daily  docusate sodium 100 milliGRAM(s) Oral three times a day  DULoxetine 20 milliGRAM(s) Oral daily  enoxaparin Injectable 30 milliGRAM(s) SubCutaneous daily  epoetin nguyễn Injectable 8000 Unit(s) IV Push <User Schedule>  gabapentin 100 milliGRAM(s) Oral three times a day  hydrALAZINE 50 milliGRAM(s) Oral every 8 hours  iron sucrose IVPB 100 milliGRAM(s) IV Intermittent every 24 hours  isosorbide   mononitrate ER Tablet (IMDUR) 30 milliGRAM(s) Oral daily  levothyroxine 100 MICROGram(s) Oral daily  loratadine 10 milliGRAM(s) Oral daily  losartan 50 milliGRAM(s) Oral daily  metoprolol tartrate 50 milliGRAM(s) Oral two times a day  multivitamin 1 Tablet(s) Oral daily  mupirocin 2% Nasal 1 Application(s) Nasal two times a day  pantoprazole    Tablet 40 milliGRAM(s) Oral before breakfast  polyethylene glycol 3350 17 Gram(s) Oral daily  predniSONE   Tablet 20 milliGRAM(s) Oral once  senna 2 Tablet(s) Oral at bedtime      MEDICATIONS  (PRN):  benzocaine 15 mG/menthol 3.6 mG Lozenge 1 Lozenge Oral every 2 hours PRN Sore Throat  benzonatate 100 milliGRAM(s) Oral every 8 hours PRN Cough  diphenhydrAMINE 25 milliGRAM(s) Oral every 8 hours PRN Rash and/or Itching      Allergies    No Known Allergies    Intolerances        PAST MEDICAL & SURGICAL HISTORY:  Chronic anemia  ESRD (end stage renal disease)  CAD (coronary artery disease)  Lung cancer: had yoni radiation in 2006.  Bipolar disorder  High cholesterol  Hypertension  S/P CABG (coronary artery bypass graft): pt&#x27;s son in [facility] states h/o some stents near neck area ? carotid ? he is not sure.      FAMILY HISTORY:  Family history of essential hypertension      SOCIAL HISTORY  Smoking History:     REVIEW OF SYSTEMS:    CONSTITUTIONAL:  No fevers, chills, sweats    HEENT:  Eyes:  No diplopia or blurred vision. ENT:  No earache, sore throat or runny nose.    CARDIOVASCULAR:  No pressure, squeezing, tightness, or heaviness about the chest; no palpitations.    RESPIRATORY:  No cough, shortness of breath, PND or orthopnea. Mild SOBOE    GASTROINTESTINAL:  No abdominal pain, nausea, vomiting or diarrhea.    GENITOURINARY:  No dysuria, frequency or urgency.    NEUROLOGIC:  No paresthesias, fasciculations, seizures or weakness.    PSYCHIATRIC:  No disorder of thought or mood.    Vital Signs Last 24 Hrs  T(C): 36.9 (03 Feb 2019 08:17), Max: 36.9 (02 Feb 2019 16:06)  T(F): 98.4 (03 Feb 2019 08:17), Max: 98.5 (02 Feb 2019 16:06)  HR: 60 (03 Feb 2019 08:17) (58 - 76)  BP: 121/64 (03 Feb 2019 08:17) (105/42 - 129/60)  BP(mean): --  RR: 20 (03 Feb 2019 03:34) (18 - 20)  SpO2: 90% (03 Feb 2019 08:17) (90% - 98%)    PHYSICAL EXAMINATION:    GENERAL: The patient is a well-developed, well-nourished _____in no apparent distress.     HEENT: Head is normocephalic and atraumatic. Extraocular muscles are intact. Mucous membranes are moist.     NECK: Supple.     LUNGS: Diffuse coarse rales to auscultation without wheezing,  or rhonchi. Respirations unlabored    HEART: Irregular rate and rhythm without murmur.    ABDOMEN: Soft, nontender, and nondistended.  No hepatosplenomegaly is noted.    EXTREMITIES: Without any cyanosis, clubbing, rash, lesions or edema.    NEUROLOGIC: Grossly intact.      LABS:                        9.8    11.7  )-----------( 333      ( 01 Feb 2019 17:54 )             32.6     02-01    139  |  97<L>  |  9.0  ----------------------------<  99  4.4   |  26.0  |  3.07<H>    Ca    8.5<L>      01 Feb 2019 17:54        RADIOLOGY & ADDITIONAL STUDIES:    CT on 1/28/19 vs 1/2/19  IMPRESSION:     There is a new region of consolidation in the right upper lobe which was   not seen on 1/2/2019.    Diffuse interstitial disease in the right lung, unchanged.    Bilateral pleural effusions, the right is unchanged and the left is   larger since the prior study.    Increasing atelectasis at the left lung base.    Extensive emphysematous changes, unchanged..     BERT BARKSDALE M.D., ATTENDING RADIOLOGIST  This document has been electronically signed. Jan 28 2019  2:25PM      Leg Duplex neg on 1/31      Echo on 1/31   1. Left ventricular ejection fraction, by visual estimation, is 60 to   65%.   2. Normal global left ventricular systolic function.   3. Basal anteroseptal segment is abnormal as described above.   4. Spectral Doppler shows pseudonormal pattern of left ventricular   myocardial filling (Grade II diastolic dysfunction).   5. There is no evidence of pericardial effusion.   6. Moderate mitral valve regurgitation.   7. Thickening of the anterior and posterior mitral valve leaflets.   8. Mild-moderate tricuspid regurgitation.   9. Sclerotic aortic valve with normal opening.  10. Trace pulmonic valve regurgitation.    [id number] Alysia Moffett MD, Electronically signed on 1/31/2019 at 11:14:24   AM
[facility]                                                       [facility]                                                        Office: [address] [address]                                                       Telephone: [phone]. Fax:[phone]                                                              CARDIOLOGY CONSULTATION NOTE                                                                                             Consult requested by:  Carlos Michelle DO (ER physician)     Reason for Consultation: dyspnea and coughj     History obtained by: Patient and medical record     obtained: No    Chief complaint:    Patient is a 79y old  Male who presents with a chief complaint of dyspnea and cough    HPI:  · Chief Complaint: The patient is a 79y Male complaining of shortness of breath.	  · HPI Objective Statement: 79 year old male with PMH ESRD on HD, CAD s/p CABG, lung cancer s/p radiation with subsequent pulmonary fibrosis on 2L home O2 presents with SOB. Pt states that his Sx started today. He was at the vascular surgeon to have is fistula be evaluated and see if it was ready to be sued, when he was found to be hypoxic on RA to 78%. No chest pain, fever, chills, vomiting, leg swelling, but he does c/o cough.	  · Presenting Symptoms: CHEST CONGESTION, COUGH, DIFFICULTY BREATHING, DYSPNEA ON EXERTION	  · Negative Findings: no fever	  · Timing: constant	  · Duration: today	  · Aggravated Factors: none	  · Relieving Factors: none	    Above history reviewed.   This is a 79 year old male with history of End stage renal disease , on hemodialysis , coronary artery disease s/p CABG,  PCI in dec 2018, lung cancers /p radiation pulm fibrosism on home oxygen, with .dyspnea.  Patient state sfor the last few days has a cough. dark color, cough, with increasing dyspnea.  states the oxygen was enough for him.   Patient has been going for hemodialysis regularley. diet is regular.     REVIEW OF SYMPTOMS: Cardiovascular:  See HPI. No chest pain, +  dyspnea,  No syncope,  No palpitations, No dizziness, No Orthopnea,      +  Paroxsymal nocturnal dyspnea;  Respiratory: + Dyspnea, +cough,     Genitourinary:  No dysuria, no hematuria; Gastrointestinal:  No nausea, no vomiting. No diarrhea.  No abdominal pain. No dark color stool, no melena ; Neurological: No headache, no dizziness, no slurred speech;  Psychiatric: No agitation, no anxiety.  ALL OTHER REVIEW OF SYSTEMS ARE NEGATIVE.    ALLERGIES: Allergies    No Known Allergies    Intolerances          CURRENT MEDICATIONS:     epoetin nguyễn Injectable  vancomycin  IVPB      HOME MEDICATIONS:    PAST MEDICAL HISTORY  Chronic anemia  ESRD (end stage renal disease)  CAD (coronary artery disease)  Lung cancer  Bipolar disorder  High cholesterol  Hypertension      PAST SURGICAL HISTORY  S/P CABG (coronary artery bypass graft)      FAMILY HISTORY:  Family history of essential hypertension (Father)      SOCIAL HISTORY:  Denies smoking/alcohol/drugs    Vital Signs Last 24 Hrs  T(C): 36.8 (28 Jan 2019 11:04), Max: 36.8 (28 Jan 2019 11:04)  T(F): 98.2 (28 Jan 2019 11:04), Max: 98.2 (28 Jan 2019 11:04)  HR: 60 (28 Jan 2019 11:04) (60 - 60)  BP: 170/61 (28 Jan 2019 11:04) (170/61 - 170/61)  BP(mean): --  RR: 22 (28 Jan 2019 11:04) (22 - 22)  SpO2: 88% (28 Jan 2019 11:04) (88% - 88%)      PHYSICAL EXAM:  Constitutional: Comfortable . No acute distress.   HEENT: Atraumatic and normcephalic , neck is supple . no JVD. No carotid bruit. PEERL   CNS: A&Ox3. No focal deficits. EOMI. Cranial nerves II-IX are intact.   Lymph Nodes: Cervical : Not palpable.  Respiratory:  decrease breath sounds uin the bases.  Distant .  bilateral bronchila breath sound. Coarse crackkles. Bilateral wheezing. rhonchrouis.  Cardiovascular: S1S2 RRR. No murmur/rubs or gallop.  Gastrointestinal: Soft non-tender and non distended . +Bowel sounds. negative Kendall's sign.  Extremities: No edema.   Psychiatric: Calm . no agitation.  Skin: No skin rash/ulcers visualized to face, hands or feet.    Intake and output:     LABS:                        9.1    9.3   )-----------( 224      ( 28 Jan 2019 11:35 )             30.3     01-28    136  |  95<L>  |  15.0  ----------------------------<  138<H>  4.0   |  30.0<H>  |  5.31<H>    Ca    8.1<L>      28 Jan 2019 11:35    TPro  7.4  /  Alb  3.5  /  TBili  0.3<L>  /  DBili  x   /  AST  40<H>  /  ALT  17  /  AlkPhos  62  01-28    CARDIAC MARKERS ( 28 Jan 2019 11:35 )  x     / 0.13 ng/mL / x     / x     / x        ;p-BNP=Serum Pro-Brain Natriuretic Peptide: 26707 pg/mL (01-28 @ 11:35)          INTERPRETATION OF TELEMETRY: Reviewed by me.   ECG: Reviewed by me. < from: 12 Lead ECG (01.05.19 @ 10:12) >  Diagnosis Line Sinus rhythm with frequent Premature ventricular complexes  Possible Left atrial enlargement  Nonspecific ST abnormality    < end of copied text >      RADIOLOGY & ADDITIONAL STUDIES:   Ct scan. :  reviewed by me. < from: CT Chest No Cont (01.28.19 @ 14:03) >  IMPRESSION:     There is a new region of consolidation in the right upper lobe which was   not seen on 1/2/2019.    Diffuse interstitial disease in the right lung, unchanged.    Bilateral pleural effusions, the right is unchanged and the left is   larger since the prior study.    Increasing atelectasis at the left lung base.    Extensive emphysematous changes, unchanged..     < end of copied text >      ECHO FINDINGS: Date:     12/2018: LVEF normal.. segmental wall motion abnormalities. Grade II Diastolic dysfunction. no pericardial effusion.     STRESS  FINDINGS: Date:            ;      CATHETERIZATION FINDINGS:  Date:  < from: Cardiac Cath Lab - Adult (12.05.18 @ 15:40) >  CORONARY VESSELS: The coronary circulation is right dominant.  LM:   --  Ostial LM: There was a 20 % stenosis within the stented segment.  LAD:   --  Ostial LAD: There was a 100 % stenosis.  CX:  --  Mid circumflex: There was a 90 % stenosis.  RCA:   --  Ostial RCA: There was a 100 % stenosis.  GRAFTS:   --  Graft to the LAD: The graft was a ANA. Graft angiography showed no evidence of disease.  AORTA: Ascending aorta: Normal.  COMPLICATIONS: No complications occurred during the cath lab visit. No complications occurred during the cath lab visit.  DIAGNOSTIC IMPRESSIONS: Patent ANA to LAD. Other grafts occluded. Severe circumflex disease. PCI performed with 1 CHAN.  DIAGNOSTIC RECOMMENDATIONS: Aspirin and Plavix.  INTERVENTIONAL IMPRESSIONS: Patent ANA to LAD. Other grafts occluded. Severe circumflex disease. PCI performed with 1 CHAN.  INTERVENTIONAL RECOMMENDATIONS: Aspirin and Plavix.

## 2019-02-05 DIAGNOSIS — Z71.89 OTHER SPECIFIED COUNSELING: ICD-10-CM

## 2019-02-05 PROCEDURE — 99232 SBSQ HOSP IP/OBS MODERATE 35: CPT

## 2019-02-05 PROCEDURE — 99497 ADVNCD CARE PLAN 30 MIN: CPT | Mod: GC

## 2019-02-05 PROCEDURE — 99232 SBSQ HOSP IP/OBS MODERATE 35: CPT | Mod: GC

## 2019-02-05 PROCEDURE — 99233 SBSQ HOSP IP/OBS HIGH 50: CPT

## 2019-02-05 PROCEDURE — 99497 ADVNCD CARE PLAN 30 MIN: CPT | Mod: 25

## 2019-02-05 RX ORDER — ACETAMINOPHEN 500 MG
650 TABLET ORAL ONCE
Refills: 0 | Status: COMPLETED | OUTPATIENT
Start: 2019-02-05 | End: 2019-02-05

## 2019-02-05 RX ORDER — GABAPENTIN 400 MG/1
100 CAPSULE ORAL THREE TIMES A DAY
Refills: 0 | Status: DISCONTINUED | OUTPATIENT
Start: 2019-02-05 | End: 2019-02-06

## 2019-02-05 RX ADMIN — LOSARTAN POTASSIUM 50 MILLIGRAM(S): 100 TABLET, FILM COATED ORAL at 06:20

## 2019-02-05 RX ADMIN — DULOXETINE HYDROCHLORIDE 20 MILLIGRAM(S): 30 CAPSULE, DELAYED RELEASE ORAL at 11:49

## 2019-02-05 RX ADMIN — SENNA PLUS 2 TABLET(S): 8.6 TABLET ORAL at 21:17

## 2019-02-05 RX ADMIN — Medication 100 MILLIGRAM(S): at 06:21

## 2019-02-05 RX ADMIN — LORATADINE 10 MILLIGRAM(S): 10 TABLET ORAL at 11:49

## 2019-02-05 RX ADMIN — AMLODIPINE BESYLATE 10 MILLIGRAM(S): 2.5 TABLET ORAL at 06:21

## 2019-02-05 RX ADMIN — Medication 100 MICROGRAM(S): at 06:20

## 2019-02-05 RX ADMIN — ATORVASTATIN CALCIUM 80 MILLIGRAM(S): 80 TABLET, FILM COATED ORAL at 21:17

## 2019-02-05 RX ADMIN — ISOSORBIDE MONONITRATE 30 MILLIGRAM(S): 60 TABLET, EXTENDED RELEASE ORAL at 11:49

## 2019-02-05 RX ADMIN — GABAPENTIN 100 MILLIGRAM(S): 400 CAPSULE ORAL at 21:17

## 2019-02-05 RX ADMIN — Medication 81 MILLIGRAM(S): at 11:48

## 2019-02-05 RX ADMIN — Medication 3 MILLILITER(S): at 20:24

## 2019-02-05 RX ADMIN — CLOPIDOGREL BISULFATE 75 MILLIGRAM(S): 75 TABLET, FILM COATED ORAL at 11:49

## 2019-02-05 RX ADMIN — Medication 3 MILLILITER(S): at 15:15

## 2019-02-05 RX ADMIN — Medication 100 MILLIGRAM(S): at 21:17

## 2019-02-05 RX ADMIN — Medication 650 MILLIGRAM(S): at 21:18

## 2019-02-05 RX ADMIN — Medication 100 MILLIGRAM(S): at 13:27

## 2019-02-05 RX ADMIN — Medication 40 MILLIGRAM(S): at 06:21

## 2019-02-05 RX ADMIN — GABAPENTIN 100 MILLIGRAM(S): 400 CAPSULE ORAL at 06:20

## 2019-02-05 RX ADMIN — Medication 100 MILLIGRAM(S): at 17:39

## 2019-02-05 RX ADMIN — HEPARIN SODIUM 5000 UNIT(S): 5000 INJECTION INTRAVENOUS; SUBCUTANEOUS at 06:21

## 2019-02-05 RX ADMIN — Medication 50 MILLIGRAM(S): at 06:21

## 2019-02-05 RX ADMIN — Medication 1 TABLET(S): at 11:51

## 2019-02-05 RX ADMIN — Medication 3 MILLILITER(S): at 08:14

## 2019-02-05 RX ADMIN — POLYETHYLENE GLYCOL 3350 17 GRAM(S): 17 POWDER, FOR SOLUTION ORAL at 11:49

## 2019-02-05 RX ADMIN — CHLORHEXIDINE GLUCONATE 1 APPLICATION(S): 213 SOLUTION TOPICAL at 11:48

## 2019-02-05 RX ADMIN — Medication 40 MILLIGRAM(S): at 17:39

## 2019-02-05 RX ADMIN — Medication 3 MILLILITER(S): at 03:40

## 2019-02-05 RX ADMIN — Medication 50 MILLIGRAM(S): at 17:39

## 2019-02-05 RX ADMIN — Medication 650 MILLIGRAM(S): at 22:10

## 2019-02-05 RX ADMIN — PANTOPRAZOLE SODIUM 40 MILLIGRAM(S): 20 TABLET, DELAYED RELEASE ORAL at 06:21

## 2019-02-05 RX ADMIN — Medication 50 MILLIGRAM(S): at 21:17

## 2019-02-05 RX ADMIN — Medication 100 MILLIGRAM(S): at 06:20

## 2019-02-05 RX ADMIN — HEPARIN SODIUM 5000 UNIT(S): 5000 INJECTION INTRAVENOUS; SUBCUTANEOUS at 17:39

## 2019-02-05 NOTE — PROGRESS NOTE ADULT - PROBLEM SELECTOR PROBLEM 2
HCAP (healthcare-associated pneumonia)
HCAP (healthcare-associated pneumonia)
Chronic anemia
Coronary artery disease of bypass graft of native heart with stable angina pectoris
HCAP (healthcare-associated pneumonia)

## 2019-02-05 NOTE — PROGRESS NOTE ADULT - ASSESSMENT
Assessment and Plan:     79 year old male with PMH ESRD on HD, CAD s/p CABG, lung cancer s/p radiation with subsequent pulmonary fibrosis on 2L home O2 presents with SOB. Found to have MRSA in sputum culture, requiring high flow oxygen. ID and pulmonary on board. Palliative consulted given multiple co morbidities and frequent re hospitalization in past month.  Family decided DNR/DNI. patient was stared back on IV steroids, will taper steroids and dc planning.    1) Acute on chronic type 1 respiratory failure with hypoxia  - Sputum culture +ve for MRSA  - Was initially on  Vancomycin which was switched to Doxycycline by ID for total 14 doses from 2/4   - Continue Nebs and Steroid been tapered off initially but was placed by pulmonary IV steroid 40 mg q 8h, high flow oxygen tapered off to nasal cannula.  Will taper steroids to 40 mg q 12 h for 3 doses then 60 mg prednione  Palliative and pulmonary follow up.  CT chest results noticed.      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  family decided DNR/DNI and allow natural death.  family would like to take him home, refused rehab facility.     At baseline, patient uses wheel chair and needs assistance to move from bed to wheel chair.      DISPO: likely tomorrow or 2/7, on tapering steroids. Assessment and Plan:     79 year old male with PMH ESRD on HD, CAD s/p CABG, lung cancer s/p radiation with subsequent pulmonary fibrosis on 2L home O2 presents with SOB. Found to have MRSA in sputum culture, requiring high flow oxygen. ID and pulmonary on board. Palliative consulted given multiple co morbidities and frequent re hospitalization in past month.  Family decided DNR/DNI. patient was stared back on IV steroids, will taper steroids and dc planning.    1) Acute on chronic type 1 respiratory failure with hypoxia  - Sputum culture +ve for MRSA  - Was initially on  Vancomycin which was switched to Doxycycline by ID for total 14 doses from 2/4   - Continue Nebs and Steroid been tapered off initially but was placed by pulmonary IV steroid 40 mg q 8h, high flow oxygen tapered off to nasal cannula.  Will taper steroids to 40 mg q 12 h for 3 doses then 60 mg prednione  Palliative and pulmonary follow up.  CT chest results noticed.      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  family decided DNR/DNI and allow natural death. 20 min spent in discussing GOC with family.  family would like to take him home, refused rehab facility.     At baseline, patient uses wheel chair and needs assistance to move from bed to wheel chair.      DISPO: likely tomorrow or 2/7, on tapering steroids.

## 2019-02-05 NOTE — PHYSICAL THERAPY INITIAL EVALUATION ADULT - ADDITIONAL COMMENTS
Pt lives in a private home with his wife, daughter and son in law. Per pt he lives in the basement of his daughter's home but does not have stairs to navigate? Pt states he was independent PTA with RW. Pt owns RW only.

## 2019-02-05 NOTE — PROGRESS NOTE ADULT - PROBLEM SELECTOR PROBLEM 1
Acute on chronic respiratory failure with hypoxia
HCAP (healthcare-associated pneumonia)
ESRD (end stage renal disease)
Acute on chronic respiratory failure with hypoxia
Acute on chronic respiratory failure with hypoxia

## 2019-02-05 NOTE — PHYSICAL THERAPY INITIAL EVALUATION ADULT - CRITERIA FOR SKILLED THERAPEUTIC INTERVENTIONS
Home with RW, Home PT, 24/7 supervision/assist as needed/impairments found/anticipated equipment needs at discharge/anticipated discharge recommendation

## 2019-02-05 NOTE — CHART NOTE - NSCHARTNOTEFT_GEN_A_CORE
Source: Patient [ ]  Family [ ]   other [ ]    Current Diet:   Diet, Mechanical Soft:   DASH/TLC {Sodium & Cholesteral Restricted}  For patients receiving Renal Replacement - No Protein Restr, No Conc K, No Conc Phos, Low  Sodium (RENAL)  Halal  Supplement Feeding Modality:  Oral  Nepro Cans or Servings Per Day:  1       Frequency:  Three Times a day (02-02-19 @ 07:11)    Patient reports [ ] nausea  [ ] vomiting [ ] diarrhea [ ] constipation  [ ]chewing problems [ ] swallowing issues  [ ] other:     PO intake:  < 50% [ ]   50-75%  [X]   %  [ ]  other :    Source for PO intake [ ] Patient [ ] family [X] chart [ ] staff [ ] other    Current Weight:   (1/30)   127.6lbs  (1/30)   123.6lbs (pre-HD)  (2/1)     143.5lbs (pre-HD)   (2/4)     141lbs (pre-HD)      % Weight Change     Pertinent Medications: MEDICATIONS  (STANDING):  ALBUTerol/ipratropium for Nebulization 3 milliLiter(s) Nebulizer every 6 hours  aspirin enteric coated 81 milliGRAM(s) Oral daily  atorvastatin 80 milliGRAM(s) Oral at bedtime  chlorhexidine 2% Cloths 1 Application(s) Topical daily  clopidogrel Tablet 75 milliGRAM(s) Oral daily  docusate sodium 100 milliGRAM(s) Oral three times a day  doxycycline hyclate Capsule 100 milliGRAM(s) Oral every 12 hours  DULoxetine 20 milliGRAM(s) Oral daily  epoetin nguyễn Injectable 8000 Unit(s) IV Push <User Schedule>  heparin  Injectable 5000 Unit(s) SubCutaneous every 12 hours  hydrALAZINE 50 milliGRAM(s) Oral every 8 hours  isosorbide   mononitrate ER Tablet (IMDUR) 30 milliGRAM(s) Oral daily  levothyroxine 100 MICROGram(s) Oral daily  loratadine 10 milliGRAM(s) Oral daily  losartan 50 milliGRAM(s) Oral daily  methylPREDNISolone sodium succinate Injectable 40 milliGRAM(s) IV Push every 8 hours  metoprolol tartrate 50 milliGRAM(s) Oral two times a day  multivitamin 1 Tablet(s) Oral daily  pantoprazole    Tablet 40 milliGRAM(s) Oral before breakfast  polyethylene glycol 3350 17 Gram(s) Oral daily  senna 2 Tablet(s) Oral at bedtime    MEDICATIONS  (PRN):  benzocaine 15 mG/menthol 3.6 mG Lozenge 1 Lozenge Oral every 2 hours PRN Sore Throat  benzonatate 100 milliGRAM(s) Oral every 8 hours PRN Cough  diphenhydrAMINE 25 milliGRAM(s) Oral every 8 hours PRN Rash and/or Itching    Pertinent Labs: CBC Full  -  ( 04 Feb 2019 14:36 )  WBC Count : 10.7 K/uL  Hemoglobin : 8.9 g/dL  Hematocrit : 30.0 %  Platelet Count - Automated : 352 K/uL  Mean Cell Volume : 97.1 fl  Mean Cell Hemoglobin : 28.8 pg  Mean Cell Hemoglobin Concentration : 29.7 g/dL  Auto Neutrophil # : 7.2 K/uL  Auto Lymphocyte # : 2.2 K/uL  Auto Monocyte # : 1.1 K/uL  Auto Eosinophil # : 0.0 K/uL  Auto Basophil # : 0.0 K/uL  Auto Neutrophil % : 67.2 %  Auto Lymphocyte % : 21.0 %  Auto Monocyte % : 10.7 %  Auto Eosinophil % : 0.2 %  Auto Basophil % : 0.1 %  02-04 Na134 mmol/L<L> Glu 110 mg/dL K+ 5.1 mmol/L Cr  7.30 mg/dL<H> BUN 49.0 mg/dL<H> Phos n/a   Alb n/a   PAB n/a       Skin:     Nutrition focused physical exam conducted - found signs of malnutrition [ ]absent [ ]present    Subcutaneous fat loss: [ ] Orbital fat pads region, [ ]Buccal fat region, [ ]Triceps region,  [ ]Ribs region    Muscle wasting: [ ]Temples region, [ ]Clavicle region, [ ]Shoulder region, [ ]Scapula region, [ ]Interosseous region,  [ ]thigh region, [ ]Calf region    Estimated Needs:   [ ] no change since previous assessment  [ ] recalculated:     Current Nutrition Diagnosis:    Recommendations:     Monitoring and Evaluation:   [ ] PO intake [ ] Tolerance to diet prescription [X] Weights  [X] Follow up per protocol [X] Labs: Source: Patient [ ]  Family [ ]   other [ ]    Current Diet:   Diet, Mechanical Soft:   DASH/TLC {Sodium & Cholesteral Restricted}  For patients receiving Renal Replacement - No Protein Restr, No Conc K, No Conc Phos, Low  Sodium (RENAL)  Halal  Supplement Feeding Modality:  Oral  Nepro Cans or Servings Per Day:  1       Frequency:  Three Times a day (19 @ 07:11)    Patient reports [ ] nausea  [ ] vomiting [ ] diarrhea [ ] constipation  [ ]chewing problems [ ] swallowing issues  [ ] other:     PO intake:  < 50% [ ]   50-75%  [X]   %  [ ]  other :    Source for PO intake [ ] Patient [ ] family [X] chart [ ] staff [ ] other    Current Weight:   ()   127.6lbs stated admit weight  ()   123.6lbs (pre-HD)  ()     143.5lbs (pre-HD)   ()     141lbs (pre-HD)    % Weight Change: Question accuracy of weights, most weights are relatively stable     Pertinent Medications: MEDICATIONS  (STANDING):  ALBUTerol/ipratropium for Nebulization 3 milliLiter(s) Nebulizer every 6 hours  aspirin enteric coated 81 milliGRAM(s) Oral daily  atorvastatin 80 milliGRAM(s) Oral at bedtime  chlorhexidine 2% Cloths 1 Application(s) Topical daily  clopidogrel Tablet 75 milliGRAM(s) Oral daily  docusate sodium 100 milliGRAM(s) Oral three times a day  doxycycline hyclate Capsule 100 milliGRAM(s) Oral every 12 hours  DULoxetine 20 milliGRAM(s) Oral daily  epoetin nguyễn Injectable 8000 Unit(s) IV Push <User Schedule>  heparin  Injectable 5000 Unit(s) SubCutaneous every 12 hours  hydrALAZINE 50 milliGRAM(s) Oral every 8 hours  isosorbide   mononitrate ER Tablet (IMDUR) 30 milliGRAM(s) Oral daily  levothyroxine 100 MICROGram(s) Oral daily  loratadine 10 milliGRAM(s) Oral daily  losartan 50 milliGRAM(s) Oral daily  methylPREDNISolone sodium succinate Injectable 40 milliGRAM(s) IV Push every 8 hours  metoprolol tartrate 50 milliGRAM(s) Oral two times a day  multivitamin 1 Tablet(s) Oral daily  pantoprazole    Tablet 40 milliGRAM(s) Oral before breakfast  polyethylene glycol 3350 17 Gram(s) Oral daily  senna 2 Tablet(s) Oral at bedtime    MEDICATIONS  (PRN):  benzocaine 15 mG/menthol 3.6 mG Lozenge 1 Lozenge Oral every 2 hours PRN Sore Throat  benzonatate 100 milliGRAM(s) Oral every 8 hours PRN Cough  diphenhydrAMINE 25 milliGRAM(s) Oral every 8 hours PRN Rash and/or Itching    Pertinent Labs: CBC Full  -  ( 2019 14:36 )  WBC Count : 10.7 K/uL  Hemoglobin : 8.9 g/dL  Hematocrit : 30.0 %  Platelet Count - Automated : 352 K/uL  Mean Cell Volume : 97.1 fl  Mean Cell Hemoglobin : 28.8 pg  Mean Cell Hemoglobin Concentration : 29.7 g/dL  Auto Neutrophil # : 7.2 K/uL  Auto Lymphocyte # : 2.2 K/uL  Auto Monocyte # : 1.1 K/uL  Auto Eosinophil # : 0.0 K/uL  Auto Basophil # : 0.0 K/uL  Auto Neutrophil % : 67.2 %  Auto Lymphocyte % : 21.0 %  Auto Monocyte % : 10.7 %  Auto Eosinophil % : 0.2 %  Auto Basophil % : 0.1 %  02-04 Na134 mmol/L<L> Glu 110 mg/dL K+ 5.1 mmol/L Cr  7.30 mg/dL<H> BUN 49.0 mg/dL<H> Phos n/a   Alb n/a   PAB n/a       Skin: No skin breakdown/edema noted     Nutrition focused physical exam conducted AT INITIAL ASSESSMENT - found signs of malnutrition [ ]absent [X]present    Subcutaneous fat loss: [X] Orbital fat pads region, [ ]Buccal fat region, [ ]Triceps region,  [ ]Ribs region    Muscle wasting: [X]Temples region, [X]Clavicle region, [ ]Shoulder region, [ ]Scapula region, [ ]Interosseous region,  [ ]thigh region, [ ]Calf region    Estimated Needs:   [ ] no change since previous assessment  [X] recalculated:  recent weight 141lbs (64kg)  30-35cal/k-2240cal   1.2-1.4g protein/k-90g protein    Current Nutrition Diagnosis:  Pt remains at high nutrition risk secondary to malnutrition (moderate chronic) related to inadequate energy intake in setting of lung CA, ESRD on HD as evidenced by 17# weight loss in 1 mo, <75% intake >1 mo, mod muscle (temples, clavicles), subcutaneous fat loss (orbitals). Current diet order provides Nepro TID, per RN Pt taking 1 can Nepro daily. Question accuracy of documented weights, will continue to monitor daily for trend.     Recommendations:   1) C/w current nutrition plan   2) Change MVI to Nepro-jeana daily   3) Daily weights for trend   4) Obtain/provide food preferences daily to inc PO     Monitoring and Evaluation:   [ ] PO intake [ ] Tolerance to diet prescription [X] Weights  [X] Follow up per protocol [X] Labs: Source: Patient [X]  Family [ ]   other [ ]    Current Diet:   Diet, Mechanical Soft:   DASH/TLC {Sodium & Cholesteral Restricted}  For patients receiving Renal Replacement - No Protein Restr, No Conc K, No Conc Phos, Low  Sodium (RENAL)  Halal  Supplement Feeding Modality:  Oral  Nepro Cans or Servings Per Day:  1       Frequency:  Three Times a day (19 @ 07:11)    Patient reports [ ] nausea  [ ] vomiting [ ] diarrhea [ ] constipation  [X]chewing problems [ ] swallowing issues  [ ] other:     PO intake:  < 50% [ ]   50-75%  [X]   %  [ ]  other :    Source for PO intake [ ] Patient [ ] family [X] chart [ ] staff [ ] other    Current Weight:   ()     128lbs RD bedscale weight  ()     141lbs (pre-HD)  ()     143.5lbs (pre-HD)   ()   127.6lbs stated admit weight  ()   123.6lbs (pre-HD)      % Weight Change: Question accuracy of weights    Pertinent Medications: MEDICATIONS  (STANDING):  ALBUTerol/ipratropium for Nebulization 3 milliLiter(s) Nebulizer every 6 hours  aspirin enteric coated 81 milliGRAM(s) Oral daily  atorvastatin 80 milliGRAM(s) Oral at bedtime  chlorhexidine 2% Cloths 1 Application(s) Topical daily  clopidogrel Tablet 75 milliGRAM(s) Oral daily  docusate sodium 100 milliGRAM(s) Oral three times a day  doxycycline hyclate Capsule 100 milliGRAM(s) Oral every 12 hours  DULoxetine 20 milliGRAM(s) Oral daily  epoetin nguyễn Injectable 8000 Unit(s) IV Push <User Schedule>  heparin  Injectable 5000 Unit(s) SubCutaneous every 12 hours  hydrALAZINE 50 milliGRAM(s) Oral every 8 hours  isosorbide   mononitrate ER Tablet (IMDUR) 30 milliGRAM(s) Oral daily  levothyroxine 100 MICROGram(s) Oral daily  loratadine 10 milliGRAM(s) Oral daily  losartan 50 milliGRAM(s) Oral daily  methylPREDNISolone sodium succinate Injectable 40 milliGRAM(s) IV Push every 8 hours  metoprolol tartrate 50 milliGRAM(s) Oral two times a day  multivitamin 1 Tablet(s) Oral daily  pantoprazole    Tablet 40 milliGRAM(s) Oral before breakfast  polyethylene glycol 3350 17 Gram(s) Oral daily  senna 2 Tablet(s) Oral at bedtime    MEDICATIONS  (PRN):  benzocaine 15 mG/menthol 3.6 mG Lozenge 1 Lozenge Oral every 2 hours PRN Sore Throat  benzonatate 100 milliGRAM(s) Oral every 8 hours PRN Cough  diphenhydrAMINE 25 milliGRAM(s) Oral every 8 hours PRN Rash and/or Itching    Pertinent Labs: CBC Full  -  ( 2019 14:36 )  WBC Count : 10.7 K/uL  Hemoglobin : 8.9 g/dL  Hematocrit : 30.0 %  Platelet Count - Automated : 352 K/uL  Mean Cell Volume : 97.1 fl  Mean Cell Hemoglobin : 28.8 pg  Mean Cell Hemoglobin Concentration : 29.7 g/dL  Auto Neutrophil # : 7.2 K/uL  Auto Lymphocyte # : 2.2 K/uL  Auto Monocyte # : 1.1 K/uL  Auto Eosinophil # : 0.0 K/uL  Auto Basophil # : 0.0 K/uL  Auto Neutrophil % : 67.2 %  Auto Lymphocyte % : 21.0 %  Auto Monocyte % : 10.7 %  Auto Eosinophil % : 0.2 %  Auto Basophil % : 0.1 %  02-04 Na134 mmol/L<L> Glu 110 mg/dL K+ 5.1 mmol/L Cr  7.30 mg/dL<H> BUN 49.0 mg/dL<H> Phos n/a   Alb n/a   PAB n/a       Skin: No skin breakdown/edema noted     Nutrition focused physical exam conducted- found signs of malnutrition [ ]absent [X]present    Subcutaneous fat loss: [X] Orbital fat pads region, [ ]Buccal fat region, [ ]Triceps region,  [x]Ribs region    Muscle wasting: [X]Temples region, [X]Clavicle region, [X]Shoulder region, [ ]Scapula region, [ ]Interosseous region,  [ ]thigh region, [ ]Calf region    Estimated Needs:   [ ] no change since previous assessment  [X] recalculated:  recent weight 128lbs (58kg)  30-35cal/k-2030cal   1.2-1.4g protein/k-81g protein    Current Nutrition Diagnosis:  Pt remains at high nutrition risk secondary to malnutrition (moderate chronic) related to inadequate energy intake in setting of lung CA, ESRD on HD as evidenced by 17# weight loss in 1 mo, <75% intake >1 mo, mod muscle (temples, clavicles,), subcutaneous fat loss (orbitals). Current diet order provides Nepro TID, per RN Pt taking 1 can Nepro daily. Question accuracy of documented weights, RD bedscale weight more consistent with admit weight will continue to monitor daily for trend. Pt reports needing soft foods, while talking dentures appear to be falling out of patients mouth, consistent with physical signs of weight loss. RD to remain available.     Recommendations:   1) C/w current nutrition plan   2) Change MVI to Nepro-jeana daily   3) Daily weights for trend   4) Obtain/provide food preferences daily to inc PO     Monitoring and Evaluation:   [X] PO intake [X] Tolerance to diet prescription [X] Weights  [X] Follow up per protocol [X] Labs:

## 2019-02-05 NOTE — PHYSICAL THERAPY INITIAL EVALUATION ADULT - PERTINENT HX OF CURRENT PROBLEM, REHAB EVAL
79 y.o. Man with CAD s/p CABG, HLD, HTN, Anemia, ESRD on HD, hx of lung cancer s/p radiation, who was recently admitted from 12/22/19 to 1/11/19, treated for Enterobacteria PNA. Admitted for RUL consolidation/PNA

## 2019-02-05 NOTE — PROGRESS NOTE ADULT - ASSESSMENT
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

## 2019-02-05 NOTE — PROGRESS NOTE ADULT - PROBLEM SELECTOR PROBLEM 4
ESRD (end stage renal disease)
ESRD (end stage renal disease)
Advance care planning
Malignant neoplasm of lung, unspecified laterality, unspecified part of lung

## 2019-02-05 NOTE — PROGRESS NOTE ADULT - PROBLEM SELECTOR PLAN 4
Discussed advance directives with patient.  Dr. Mcnair present.   Patient names his daughter, then his  son in law as HCP.   Discussed CPR, risks and benefits. Limited benefit in the event of a resuscitation and intubation given comorbid medical issues and advanced age.    Patient and family agreeable DNR/I and allow for a natural death   MOLST completed
Follow up as an outpatient
Continue dialysis per schedule
Continue dialysis today; will get UF

## 2019-02-05 NOTE — PROGRESS NOTE ADULT - SUBJECTIVE AND OBJECTIVE BOX
PULMONARY PROGRESS NOTE      KAUSHIK HOFFMAN  MRN-[id number]    Patient is a 79y old  Male who presents with a chief complaint of HCAP (05 Feb 2019 14:07)      INTERVAL HPI/OVERNIGHT EVENTS:    Patient is awake and alert  More comfortable  Decreased FiO2 requirement; now on NCO2    MEDICATIONS  (STANDING):  ALBUTerol/ipratropium for Nebulization 3 milliLiter(s) Nebulizer every 6 hours  aspirin enteric coated 81 milliGRAM(s) Oral daily  atorvastatin 80 milliGRAM(s) Oral at bedtime  chlorhexidine 2% Cloths 1 Application(s) Topical daily  clopidogrel Tablet 75 milliGRAM(s) Oral daily  docusate sodium 100 milliGRAM(s) Oral three times a day  doxycycline hyclate Capsule 100 milliGRAM(s) Oral every 12 hours  DULoxetine 20 milliGRAM(s) Oral daily  epoetin nguyễn Injectable 8000 Unit(s) IV Push <User Schedule>  gabapentin 100 milliGRAM(s) Oral three times a day  heparin  Injectable 5000 Unit(s) SubCutaneous every 12 hours  hydrALAZINE 50 milliGRAM(s) Oral every 8 hours  isosorbide   mononitrate ER Tablet (IMDUR) 30 milliGRAM(s) Oral daily  levothyroxine 100 MICROGram(s) Oral daily  loratadine 10 milliGRAM(s) Oral daily  losartan 50 milliGRAM(s) Oral daily  methylPREDNISolone sodium succinate Injectable 40 milliGRAM(s) IV Push every 12 hours  metoprolol tartrate 50 milliGRAM(s) Oral two times a day  multivitamin 1 Tablet(s) Oral daily  pantoprazole    Tablet 40 milliGRAM(s) Oral before breakfast  polyethylene glycol 3350 17 Gram(s) Oral daily  senna 2 Tablet(s) Oral at bedtime      MEDICATIONS  (PRN):  benzocaine 15 mG/menthol 3.6 mG Lozenge 1 Lozenge Oral every 2 hours PRN Sore Throat  benzonatate 100 milliGRAM(s) Oral every 8 hours PRN Cough  diphenhydrAMINE 25 milliGRAM(s) Oral every 8 hours PRN Rash and/or Itching      Allergies    No Known Allergies    Intolerances        PAST MEDICAL & SURGICAL HISTORY:  Chronic anemia  ESRD (end stage renal disease)  CAD (coronary artery disease)  Lung cancer: had yoni radiation in 2006.  Bipolar disorder  High cholesterol  Hypertension  S/P CABG (coronary artery bypass graft): pt&#x27;s son in law states h/o some stents near neck area ? carotid ? he is not sure.        REVIEW OF SYSTEMS:    CONSTITUTIONAL:  No distress    HEENT:  Eyes:  No diplopia or blurred vision. ENT:  No earache, sore throat or runny nose.    CARDIOVASCULAR:  No pressure, squeezing, tightness, heaviness or aching about the chest; no palpitations.    RESPIRATORY:  No cough, mild shortness of breath, improved PND and orthopnea. + SOBOE    GASTROINTESTINAL:  No nausea, vomiting or diarrhea.    GENITOURINARY:  No dysuria, frequency or urgency.    NEUROLOGIC:  No paresthesias, fasciculations, seizures or weakness.    PSYCHIATRIC:  No disorder of thought or mood.    Vital Signs Last 24 Hrs  T(C): 36.8 (05 Feb 2019 16:33), Max: 37 (04 Feb 2019 23:44)  T(F): 98.2 (05 Feb 2019 16:33), Max: 98.6 (04 Feb 2019 23:44)  HR: 72 (05 Feb 2019 17:51) (58 - 85)  BP: 141/62 (05 Feb 2019 17:51) (132/48 - 141/62)  BP(mean): --  RR: 18 (05 Feb 2019 16:33) (18 - 20)  SpO2: 91% (05 Feb 2019 16:33) (91% - 99%)    PHYSICAL EXAMINATION:    GENERAL: The patient is awake and alert in no apparent distress.     HEENT: Head is normocephalic and atraumatic. Extraocular muscles are intact. Mucous membranes are moist.    NECK: Supple.    LUNGS: Bibasilar rales without wheeze or rhonchi; respirations unlabored    HEART: Regular rate and rhythm without murmur.    ABDOMEN: Soft, nontender, and nondistended.      EXTREMITIES: Without any cyanosis, clubbing, rash, lesions or edema.    NEUROLOGIC: Grossly intact.    LABS:                        8.9    10.7  )-----------( 352      ( 04 Feb 2019 14:36 )             30.0     02-04    134<L>  |  93<L>  |  49.0<H>  ----------------------------<  110  5.1   |  25.0  |  7.30<H>    Ca    8.0<L>      04 Feb 2019 14:37        MICROBIOLOGY:    Rapid Respiratory Viral Panel (01.29.19 @ 00:07)    Rapid RVP Result: NotDete: This Respiratory Panel uses polymerase chain reaction (PCR) to detect for  adenovirus; coronavirus (HKU1, NL63, 229E, OC43); human metapneumovirus  (hMPV); human enterovirus/rhinovirus (Entero/RV); influenza A; influenza  A/H1; influenza A/H3; influenza A/H1-2009; influenza B; parainfluenza  viruses 1, 2, 3, 4; respiratory syncytial virus; Mycoplasma pneumoniae;  and Chlamydophila pneumoniae.      Culture - Sputum . (01.30.19 @ 09:16)    -  RIF- Rifampin: S <=1 Should not be used as monotherapy    -  Penicillin: R >8    -  Oxacillin: R >2    -  Trimethoprim/Sulfamethoxazole: S <=0.5/9.5    -  Vancomycin: S 1    -  Tetra/Doxy: S <=4    -  Ampicillin/Sulbactam: R 16/8    Gram Stain:   Few White blood cells  Few Gram Positive Cocci in Clusters    -  Cefazolin: R 16    -  Clindamycin: S <=0.5    -  Erythromycin: R >4    -  Gentamicin: S <=4 Should not be used as monotherapy    -  Linezolid: S 4    Specimen Source: .Sputum    Culture Results:   Few Methicillin resistant Staphylococcus aureus  Few Routine respiratory miguelina present  .  TYPE: (C=Critical, N=Notification, A=Abnormal) C  TESTS:  _ MRSA  DATE/TIME CALLED: _ 02/02/2019 11:58:21  CALLED TO: Fermin Farooq RN  READ BACK (2 Patient Identifiers)(Y/N): _ Y  READ BACK VALUES (Y/N): _ Y  CALLED BY: Fermin Rothman    Sent copy to  and logistics.    Organism Identification: Methicillin resistant Staphylococcus aureus    Organism: Methicillin resistant Staphylococcus aureus    Method Type: [facility]      RADIOLOGY & ADDITIONAL STUDIES:       EXAM:  CT CHEST                          PROCEDURE DATE:  02/04/2019          INTERPRETATION:  CLINICAL INFORMATION: Right fibrosis on the right.   Possible cancer Emphysema. Hypoxia. Follow-up. Right upper lobe pneumonia.    COMPARISON: None.    PROCEDURE:   CT of the Chest was performed without intravenous contrast.  Sagittal and coronal reformats were performed.      FINDINGS:    CHEST:     LUNGS AND LARGE AIRWAYS:Right upper lobe opacity, not significant change   since 1/20/2019. Right lower lobe peribronchial opacities, increased   since 1/20/2019. Right paramediastinal consolidation with associated   bronchiectasis again seen, possibly post radiation change.  Calcification   again noted at the right lung apex. Compressive atelectasisin left lower   lobe secondary to the effusion. Emphysematous changes again noted.  PLEURA: Small loculated right pleural effusion, unchanged. Small to   moderate-sized left pleural effusion, increased in size. Fluid is seen   along the left major fissure.  VESSELS: Thoracic aorta normal in caliber with mild to moderate calcified   plaque. Status post CABG.  HEART: Enlarged. No pericardial effusion.  MEDIASTINUM AND ASHWIN: No pathologically enlarged lymph nodes.  CHEST WALL AND LOWER NECK: Right chest wall Mediport with the tip at the   cavoatrial junction. No pathologically enlarged axillary lymph nodes.  VISUALIZED UPPER ABDOMEN: Bilateral adrenal gland thickening. Cyst in the   upper pole of the right kidney.  Stable pericardiophrenic lymphnodes.  BONES: Status post sternotomy.    IMPRESSION:     Right upper and lower lobe opacities, unchanged in the right upper lobe   and increased in the right lower lobe since 1/28/2019; the differential   includes pneumonia; follow-up is recommended.    Small to moderate left pleural effusion, increased in size since   1/28/2019.    Loculated small right pleural effusion, unchanged.      ROMAINE RODRIGEZ   This document has been electronically signed. Feb 4 2019  3:36PM          ECHO:      Summary:   1. Left ventricular ejection fraction, by visual estimation, is 60 to   65%.   2. Normal global left ventricular systolic function.   3. Basal anteroseptal segment is abnormal as described above.   4. Spectral Doppler shows pseudonormal pattern of left ventricular   myocardial filling (Grade II diastolic dysfunction).   5. There is no evidence of pericardial effusion.   6. Moderate mitral valve regurgitation.   7. Thickening of the anterior and posterior mitral valve leaflets.   8. Mild-moderate tricuspid regurgitation.   9. Sclerotic aortic valve with normal opening.  10. Trace pulmonic valve regurgitation.    [id number] Alysia Moffett MD, Electronically signed on 1/31/2019 at 11:14:24   AM

## 2019-02-05 NOTE — PROGRESS NOTE ADULT - SUBJECTIVE AND OBJECTIVE BOX
CC:  follow up GOC  INTERVAL HPI/OVERNIGHT EVENTS:    PRESENT SYMPTOMS: SOURCE:  Patient/Family/Team    PAIN SCALE:  0 = none  1 = mild   2 = moderate  3 = severe    Pain:     Dyspnea:  [x ] YES [ ] NO  Anxiety:  [ x] YES [ ] NO  Fatigue: [ x] YES [ ] NO  Nausea: [ ] YES [ x] NO  Loss of Appetite: [ x YES [ ] NO  Other symptoms: __________    MEDICATIONS  (STANDING):  ALBUTerol/ipratropium for Nebulization 3 milliLiter(s) Nebulizer every 6 hours  aspirin enteric coated 81 milliGRAM(s) Oral daily  atorvastatin 80 milliGRAM(s) Oral at bedtime  chlorhexidine 2% Cloths 1 Application(s) Topical daily  clopidogrel Tablet 75 milliGRAM(s) Oral daily  docusate sodium 100 milliGRAM(s) Oral three times a day  doxycycline hyclate Capsule 100 milliGRAM(s) Oral every 12 hours  DULoxetine 20 milliGRAM(s) Oral daily  epoetin nguyễn Injectable 8000 Unit(s) IV Push <User Schedule>  heparin  Injectable 5000 Unit(s) SubCutaneous every 12 hours  hydrALAZINE 50 milliGRAM(s) Oral every 8 hours  isosorbide   mononitrate ER Tablet (IMDUR) 30 milliGRAM(s) Oral daily  levothyroxine 100 MICROGram(s) Oral daily  loratadine 10 milliGRAM(s) Oral daily  losartan 50 milliGRAM(s) Oral daily  methylPREDNISolone sodium succinate Injectable 40 milliGRAM(s) IV Push every 12 hours  metoprolol tartrate 50 milliGRAM(s) Oral two times a day  multivitamin 1 Tablet(s) Oral daily  pantoprazole    Tablet 40 milliGRAM(s) Oral before breakfast  polyethylene glycol 3350 17 Gram(s) Oral daily  senna 2 Tablet(s) Oral at bedtime    MEDICATIONS  (PRN):  benzocaine 15 mG/menthol 3.6 mG Lozenge 1 Lozenge Oral every 2 hours PRN Sore Throat  benzonatate 100 milliGRAM(s) Oral every 8 hours PRN Cough  diphenhydrAMINE 25 milliGRAM(s) Oral every 8 hours PRN Rash and/or Itching      Allergies    No Known Allergies    Intolerances        Karnofsky Performance Score/Palliative Performance Status Version 2:  30 %    Vital Signs Last 24 Hrs  T(C): 36.7 (05 Feb 2019 13:29), Max: 37 (04 Feb 2019 23:44)  T(F): 98 (05 Feb 2019 13:29), Max: 98.6 (04 Feb 2019 23:44)  HR: 61 (05 Feb 2019 13:29) (58 - 85)  BP: 132/48 (05 Feb 2019 13:29) (132/48 - 138/70)  BP(mean): --  RR: 20 (05 Feb 2019 13:29) (18 - 20)  SpO2: 97% (05 Feb 2019 13:29) (97% - 99%)    PHYSICAL EXAM:    General: Elderly man, AO x3 NAD  HEENT: [x ] normal  [ ] dry mouth  [ ] ET tube/trach    Lungs: [ x] comfortable [ ] tachypnea/labored breathing  [ ] excessive secretions    CV: [ x] normal  [ ] tachycardia    GI: [ x] normal  [ ] distended  [ ] tender  [ ] no BS               [ ] PEG/NG/OG tube    : [ ] normal  [ ] incontinent  [ x] oliguria/anuria  [ ] olea    MSK: [ ] normal  [ x] weakness  [ ] edema             [ ] ambulatory  [ ] bedbound/wheelchair bound    Skin: [ ] normal  [ ] pressure ulcers- Stage_____  [x ] no rash    LABS:                        8.9    10.7  )-----------( 352      ( 04 Feb 2019 14:36 )             30.0     02-04    134<L>  |  93<L>  |  49.0<H>  ----------------------------<  110  5.1   |  25.0  |  7.30<H>    Ca    8.0<L>      04 Feb 2019 14:37          I&O's Summary    04 Feb 2019 07:01  -  05 Feb 2019 07:00  --------------------------------------------------------  IN: 800 mL / OUT: 2800 mL / NET: -2000 mL        Thank you for the opportunity to assist with the care of this patient.   [facility] [phone].

## 2019-02-05 NOTE — PROGRESS NOTE ADULT - PROBLEM SELECTOR PROBLEM 5
Malignant neoplasm of lung, unspecified laterality, unspecified part of lung
Encounter for palliative care
Malignant neoplasm of lung, unspecified laterality, unspecified part of lung

## 2019-02-05 NOTE — PROGRESS NOTE ADULT - SUBJECTIVE AND OBJECTIVE BOX
KAUSHIK HOFFMAN    [id number]    79y      Male      CC: Pneumonia    Overnight events:  Was seen and examined at bedside,  Was started on IV steroids yesterday by pulmonary and able to wean off high flow oxygen and currently on 3 L nasal cannula.  Was c/o pain in right leg throbbing in nature. Denied any hx of trauma/surgery. No swelling, redness and tenderness noticed.        HPI:  Pt is a 78yo M presenting w/ SOB found to have HCAP. PMH ESRD on HD, hx of lung cancer s/p radiation, HTN, Anemia, CAD s/p CABG, HLD, recent HCAP tx 3 weeks ago.   Patient has been having cough and SOB for the past 2-3 days and it has gotten progressively worse, he was ofund to be hypoxic earlier today w/ SpO2 78% and sent to the ED. He denies any fevers, chills but states he feels weak. He uses O2 at home 2-3 L NC. Earlier today he was sent in from his [facility] surgeons office due to the hypoxia. He denies any chest pain, palpitations, abd pain, diarrhea, constipation, melena, hematochezia. He does not produce urine.   He has no other complaints aside from feeling itchy. He denies any allergies to medications.   In ED patient was found to have RUL consolidation, RVP is pending, he also had elevated troponin and seen by cardiology. He has also been seen by nephro. Started on IV abx. (28 Jan 2019 17:49)      ROS:  As per interval history otherwise unremarkable.    PHYSICAL EXAM:    Vital Signs Last 24 Hrs  T(C): 36.7 (05 Feb 2019 13:29), Max: 37 (04 Feb 2019 23:44)  T(F): 98 (05 Feb 2019 13:29), Max: 98.6 (04 Feb 2019 23:44)  HR: 61 (05 Feb 2019 13:29) (58 - 85)  BP: 132/48 (05 Feb 2019 13:29) (132/48 - 138/70)  BP(mean): --  RR: 20 (05 Feb 2019 13:29) (18 - 20)  SpO2: 97% (05 Feb 2019 13:29) (97% - 99%)    General: Elderly male lying in bed comfortably. frail and cachetic, on oxygen nasal cannula. Permacath in right upper chest.   HEENT: PERRLA. EOMI. Clear conjunctivae. Moist mucus membrane  Neck: Supple. No JVD.   Chest: CTA bilaterally - no wheezing, rales or rhonchi. No chest wall tenderness.   Heart: Normal S1 & S2. RRR.   Abdomen: Soft. Non-tender. Non-distended. + BS  Ext: No pedal edema. No calf tenderness. AVF in LUE. No knee swelling, tenderness and redness ordered.  Neuro: AAO x 3. No focal deficit. No speech disorder  Skin: Warm and Dry  Psychiatry: Normal mood and affect        LABS:                        8.9    10.7  )-----------( 352      ( 04 Feb 2019 14:36 )             30.0     02-04    134<L>  |  93<L>  |  49.0<H>  ----------------------------<  110  5.1   |  25.0  |  7.30<H>    Ca    8.0<L>      04 Feb 2019 14:37              MEDICATIONS  (STANDING):  ALBUTerol/ipratropium for Nebulization 3 milliLiter(s) Nebulizer every 6 hours  aspirin enteric coated 81 milliGRAM(s) Oral daily  atorvastatin 80 milliGRAM(s) Oral at bedtime  chlorhexidine 2% Cloths 1 Application(s) Topical daily  clopidogrel Tablet 75 milliGRAM(s) Oral daily  docusate sodium 100 milliGRAM(s) Oral three times a day  doxycycline hyclate Capsule 100 milliGRAM(s) Oral every 12 hours  DULoxetine 20 milliGRAM(s) Oral daily  epoetin nguyễn Injectable 8000 Unit(s) IV Push <User Schedule>  heparin  Injectable 5000 Unit(s) SubCutaneous every 12 hours  hydrALAZINE 50 milliGRAM(s) Oral every 8 hours  isosorbide   mononitrate ER Tablet (IMDUR) 30 milliGRAM(s) Oral daily  levothyroxine 100 MICROGram(s) Oral daily  loratadine 10 milliGRAM(s) Oral daily  losartan 50 milliGRAM(s) Oral daily  methylPREDNISolone sodium succinate Injectable 40 milliGRAM(s) IV Push every 12 hours  metoprolol tartrate 50 milliGRAM(s) Oral two times a day  multivitamin 1 Tablet(s) Oral daily  pantoprazole    Tablet 40 milliGRAM(s) Oral before breakfast  polyethylene glycol 3350 17 Gram(s) Oral daily  senna 2 Tablet(s) Oral at bedtime    MEDICATIONS  (PRN):  benzocaine 15 mG/menthol 3.6 mG Lozenge 1 Lozenge Oral every 2 hours PRN Sore Throat  benzonatate 100 milliGRAM(s) Oral every 8 hours PRN Cough  diphenhydrAMINE 25 milliGRAM(s) Oral every 8 hours PRN Rash and/or Itching      RADIOLOGY & ADDITIONAL TESTS:

## 2019-02-05 NOTE — PROGRESS NOTE ADULT - ASSESSMENT
Echo with diastolic dysfunction, mod MR  Multifactorial chronic hypoxia   RT fibrosis on the Right  ? pneumonia  Emphysema  Effusion with loculation  Episodic volume overload due to ESRD on HD  Resolved HCAP with residual scarring and disequilibrium  Hypoxia improved    Rec    Mobilize  Incentive spirometry  Complete abx  FU CT without significant change; continue to follow  Nebs   Brief steroids  On heparin and PPI for DVT and GI prophylaxis  O2 - decrease as tolerates; off HFO2 and on NCO2  HD per renal  Prognosis guarded  Pt DNR

## 2019-02-05 NOTE — PROGRESS NOTE ADULT - PROBLEM SELECTOR PLAN 5
At risk for readmission given medical issues and frailty    Rec [facility] if to go home  Would not qualify for hospice as he is on HD.

## 2019-02-05 NOTE — PHYSICAL THERAPY INITIAL EVALUATION ADULT - IMPAIRMENTS FOUND, PT EVAL
aerobic capacity/endurance/arousal, attention, and cognition/gait, locomotion, and balance/muscle strength/ROM

## 2019-02-05 NOTE — PROGRESS NOTE ADULT - SUBJECTIVE AND OBJECTIVE BOX
Vital Signs Last 24 Hrs  T(C): 37 (2019 23:44), Max: 37 (2019 23:44)  T(F): 98.6 (2019 23:44), Max: 98.6 (2019 23:44)  HR: 58 (2019 08:15) (52 - 85)  BP: 136/68 (2019 06:17) (115/52 - 138/70)  BP(mean): --  RR: 18 (2019 23:44) (18 - 18)  SpO2: 98% (2019 08:15) (91% - 99%)    134<L>  |  93<L>  |  49.0<H>  ----------------------------<  110    Ca:8.0<L> (2019 14:37)  5.1     |  25.0   |  7.30<H>      eGFR if Non : 7 <L>  eGFR if : 8 <L>                        8.9<L>  10.7  )-----------( 352      ( 2019 14:36 )             30.0<L>    Phos:5.0 mg/dL<H> M.3 mg/dL PTH:-- Uric acid:-- Serum Osm:--  Ferritin:-- Iron:-- TIBC:-- Tsat:--  B12:2.07 uIU/mL TSH:-- ( @ 07:49)    patient seen and examined.     No longer on HF oxygen.   On 3 L ,  this AM via NC.     No fever  ===================================================  REVIEW OF SYSTEMS:  as above  all other ROS negative    =======================================================  Allergies  No Known Allergies    Antibiotics:  doxycycline hyclate Capsule 100 milliGRAM(s) Oral every 12 hours  ======================================================  Physical Exam:  ============  General:  No acute distress.  THIN FRAIL;   Eye: Pupils are equal, round and reactive to light, Extraocular movements are intact, Normal conjunctiva.  HENT: Normocephalic, Oral mucosa is moist, No pharyngeal erythema, No sinus tenderness.  Neck: Supple, No lymphadenopathy.  Respiratory: Lungs with fair air entry on posterior exam; tubular breath sounds on left upper lung zone  Cardiovascular: Normal rate, Regular rhythm, No murmur, Good pulses equal in all extremities, No edema.  Gastrointestinal: Soft, Non-tender, Non-distended, Normal bowel sounds.  Genitourinary: No costovertebral angle tenderness.  Lymphatics: No lymphadenopathy neck,   Musculoskeletal: Normal range of motion, Normal strength.  Integumentary: No rash.  Neurologic: Alert, Oriented, No focal deficits, Cranial Nerves II-XII are grossly intact.  Psychiatric: Appropriate mood & affect. AVG +  =======================================================  Labs:    Culture - Sputum (collected 19 @ 09:16)  Source: .Sputum  Gram Stain (19 @ 15:43):    Few White blood cells    Few Gram Positive Cocci in Clusters  Final Report (19 @ 12:01):    Few Methicillin resistant Staphylococcus aureus    Few Routine respiratory miguelina present    .    TYPE: (C=Critical, N=Notification, A=Abnormal) C    TESTS:  _ MRSA    DATE/TIME CALLED: _ 2019 11:58:21    CALLED TO: Fermin Farooq RN    READ BACK (2 Patient Identifiers)(Y/N): _ Y    READ BACK VALUES (Y/N): _ Y    CALLED BY: Fermin Rothman    Sent copy to IC and logistics.  Organism: Methicillin resistant Staphylococcus aureus (19 @ 12:01)  Organism: Methicillin resistant Staphylococcus aureus (19 @ 12:01)    Sensitivities:      -  Ampicillin/Sulbactam: R 16/8      -  Cefazolin: R 16      -  Clindamycin: S <=0.5      -  Erythromycin: R >4      -  Gentamicin: S <=4 Should not be used as monotherapy      -  Linezolid: S 4      -  Oxacillin: R >2      -  Penicillin: R >8      -  RIF- Rifampin: S <=1 Should not be used as monotherapy      -  Tetra/Doxy: S <=4      -  Trimethoprim/Sulfamethoxazole: S <=0.5/9.5      -  Vancomycin: S 1      Method Type: LIVIA    Culture - Blood (collected 19 @ 11:39)  Source: .Blood  Final Report (19 @ 13:01):    No growth at 5 days.    Culture - Blood (collected 19 @ 11:39)  Source: .Blood  Final Report (19 @ 13:01):    No growth at 5 days.    Culture - Blood (collected 19 @ 11:38)  Source: .Blood  Final Report (19 @ 13:01):    No growth at 5 days.    Discussion : 79 y.o. Man with CAD s/p CABG, HTN, Anemia, ESRD on HD, hx of lung cancer s/p radiation, who was recently admitted from 19 to 19, treated for Enterobacter PNA.  here for cough and found with RUL consolidation    - suspect PNA / HCAP  - had been on Zosyn; but no WBC elevation , no fevers  RVP negative  sputum cx  with MRSA    - continue Doxycycline for MRSA (  1 More week - Per ID )    D/W Dr. Kemp,  Palliative care,

## 2019-02-05 NOTE — PROGRESS NOTE ADULT - SUBJECTIVE AND OBJECTIVE BOX
[facility]  INFECTIOUS DISEASES AND INTERNAL MEDICINE at [place]  =======================================================  Cricket Bae MD [facility]FALGUNI Lara MD  Diplomates American Board of Internal Medicine and Infectious Diseases  =======================================================    [id number]  [facility]   follow up for: possible right side PNA  patient seen and examined.     no longer on HF oxygen.   3 LPM this AM via NC.   ? c/o right leg pain this AM.   no fevers      ===================================================  REVIEW OF SYSTEMS:  as above  all other ROS negative    =======================================================  Allergies  No Known Allergies    Antibiotics:  doxycycline hyclate Capsule 100 milliGRAM(s) Oral every 12 hours  ======================================================  Physical Exam:  ============  T(F): 98.6 (04 Feb 2019 23:44), Max: 98.6 (04 Feb 2019 23:44)  HR: 58 (05 Feb 2019 08:15)  BP: 136/68 (05 Feb 2019 06:17)  RR: 18 (04 Feb 2019 23:44)  SpO2: 98% (05 Feb 2019 08:15) (91% - 99%)    General:  No acute distress.  THIN FRAIL;   Eye: Pupils are equal, round and reactive to light, Extraocular movements are intact, Normal conjunctiva.  HENT: Normocephalic, Oral mucosa is moist, No pharyngeal erythema, No sinus tenderness.  Neck: Supple, No lymphadenopathy.  Respiratory: Lungs with fair air entry on posterior exam; tubular breath sounds on left upper lung zone  Cardiovascular: Normal rate, Regular rhythm, No murmur, Good pulses equal in all extremities, No edema.  Gastrointestinal: Soft, Non-tender, Non-distended, Normal bowel sounds.  Genitourinary: No costovertebral angle tenderness.  Lymphatics: No lymphadenopathy neck,   Musculoskeletal: Normal range of motion, Normal strength.  Integumentary: No rash.  Neurologic: Alert, Oriented, No focal deficits, Cranial Nerves II-XII are grossly intact.  Psychiatric: Appropriate mood & affect.  =======================================================  Labs:                        8.9    10.7  )-----------( 352      ( 04 Feb 2019 14:36 )             30.0     02-04    134<L>  |  93<L>  |  49.0<H>  ----------------------------<  110  5.1   |  25.0  |  7.30<H>    Ca    8.0<L>      04 Feb 2019 14:37        Culture - Sputum (collected 01-30-19 @ 09:16)  Source: .Sputum  Gram Stain (01-30-19 @ 15:43):    Few White blood cells    Few Gram Positive Cocci in Clusters  Final Report (02-02-19 @ 12:01):    Few Methicillin resistant Staphylococcus aureus    Few Routine respiratory miguelina present    .    TYPE: (C=Critical, N=Notification, A=Abnormal) C    TESTS:  _ MRSA    DATE/TIME CALLED: _ 02/02/2019 11:58:21    CALLED TO: Fermin Farooq RN    READ BACK (2 Patient Identifiers)(Y/N): _ Y    READ BACK VALUES (Y/N): _ Y    CALLED BY: Fermin Rothman    Sent copy to  and logistics.  Organism: Methicillin resistant Staphylococcus aureus (02-02-19 @ 12:01)  Organism: Methicillin resistant Staphylococcus aureus (02-02-19 @ 12:01)    Sensitivities:      -  Ampicillin/Sulbactam: R 16/8      -  Cefazolin: R 16      -  Clindamycin: S <=0.5      -  Erythromycin: R >4      -  Gentamicin: S <=4 Should not be used as monotherapy      -  Linezolid: S 4      -  Oxacillin: R >2      -  Penicillin: R >8      -  RIF- Rifampin: S <=1 Should not be used as monotherapy      -  Tetra/Doxy: S <=4      -  Trimethoprim/Sulfamethoxazole: S <=0.5/9.5      -  Vancomycin: S 1      Method Type: LIVIA    Culture - Blood (collected 01-29-19 @ 11:39)  Source: .Blood  Final Report (02-03-19 @ 13:01):    No growth at 5 days.    Culture - Blood (collected 01-28-19 @ 11:39)  Source: .Blood  Final Report (02-02-19 @ 13:01):    No growth at 5 days.    Culture - Blood (collected 01-28-19 @ 11:38)  Source: .Blood  Final Report (02-02-19 @ 13:01):    No growth at 5 days.

## 2019-02-05 NOTE — PHYSICAL THERAPY INITIAL EVALUATION ADULT - GAIT DEVIATIONS NOTED, PT EVAL
decreased activity tolerance, SPO2 decreased to low/mid 80's on O2nc during activity, increases with verbal cues for breathing via nasal cannula/decreased china

## 2019-02-05 NOTE — PROGRESS NOTE ADULT - PROBLEM SELECTOR PROBLEM 3
Coronary artery disease of bypass graft of native heart with stable angina pectoris
ESRD (end stage renal disease) on dialysis
ESRD (end stage renal disease)
Coronary artery disease of bypass graft of native heart with stable angina pectoris

## 2019-02-05 NOTE — PROGRESS NOTE ADULT - ATTENDING COMMENTS
overall prognosis poor   palliative care consult requested
Advance Care planning - 20 min
Patient was seen and examined at bedside. Complaining of dry cough and generalized itching (more in RLE). Denies rash. Still requiring High Flow to maintain Oxygen Saturation > 92. Denies chest pain or palpitations.   Decreased breath sounds on lung bases.  Continue current treatment.   Wean off High Flow.  HD today.  Monitor off antibiotics.  Benadryl PRN for itching.
palliative care consult placed; patient remains full code for now.

## 2019-02-06 LAB
ALBUMIN SERPL ELPH-MCNC: 3.5 G/DL — SIGNIFICANT CHANGE UP (ref 3.3–5.2)
ALP SERPL-CCNC: 51 U/L — SIGNIFICANT CHANGE UP (ref 40–120)
ALT FLD-CCNC: 11 U/L — SIGNIFICANT CHANGE UP
ANION GAP SERPL CALC-SCNC: 17 MMOL/L — SIGNIFICANT CHANGE UP (ref 5–17)
AST SERPL-CCNC: 19 U/L — SIGNIFICANT CHANGE UP
BILIRUB SERPL-MCNC: 0.3 MG/DL — LOW (ref 0.4–2)
BUN SERPL-MCNC: 54 MG/DL — HIGH (ref 8–20)
CALCIUM SERPL-MCNC: 8.3 MG/DL — LOW (ref 8.6–10.2)
CHLORIDE SERPL-SCNC: 92 MMOL/L — LOW (ref 98–107)
CO2 SERPL-SCNC: 23 MMOL/L — SIGNIFICANT CHANGE UP (ref 22–29)
CREAT SERPL-MCNC: 6.3 MG/DL — HIGH (ref 0.5–1.3)
GLUCOSE SERPL-MCNC: 142 MG/DL — HIGH (ref 70–115)
HCT VFR BLD CALC: 31.1 % — LOW (ref 42–52)
HGB BLD-MCNC: 9.2 G/DL — LOW (ref 14–18)
MCHC RBC-ENTMCNC: 28.7 PG — SIGNIFICANT CHANGE UP (ref 27–31)
MCHC RBC-ENTMCNC: 29.6 G/DL — LOW (ref 32–36)
MCV RBC AUTO: 96.9 FL — HIGH (ref 80–94)
PLATELET # BLD AUTO: 355 K/UL — SIGNIFICANT CHANGE UP (ref 150–400)
POTASSIUM SERPL-MCNC: 5.2 MMOL/L — SIGNIFICANT CHANGE UP (ref 3.5–5.3)
POTASSIUM SERPL-SCNC: 5.2 MMOL/L — SIGNIFICANT CHANGE UP (ref 3.5–5.3)
PROT SERPL-MCNC: 7.2 G/DL — SIGNIFICANT CHANGE UP (ref 6.6–8.7)
RBC # BLD: 3.21 M/UL — LOW (ref 4.6–6.2)
RBC # FLD: 18.4 % — HIGH (ref 11–15.6)
SODIUM SERPL-SCNC: 132 MMOL/L — LOW (ref 135–145)
WBC # BLD: 13.3 K/UL — HIGH (ref 4.8–10.8)
WBC # FLD AUTO: 13.3 K/UL — HIGH (ref 4.8–10.8)

## 2019-02-06 PROCEDURE — 99232 SBSQ HOSP IP/OBS MODERATE 35: CPT | Mod: GC

## 2019-02-06 PROCEDURE — 90937 HEMODIALYSIS REPEATED EVAL: CPT

## 2019-02-06 PROCEDURE — 99232 SBSQ HOSP IP/OBS MODERATE 35: CPT

## 2019-02-06 RX ORDER — OXYCODONE HYDROCHLORIDE 5 MG/1
5 TABLET ORAL ONCE
Refills: 0 | Status: DISCONTINUED | OUTPATIENT
Start: 2019-02-06 | End: 2019-02-06

## 2019-02-06 RX ORDER — GABAPENTIN 400 MG/1
300 CAPSULE ORAL
Refills: 0 | Status: DISCONTINUED | OUTPATIENT
Start: 2019-02-06 | End: 2019-02-07

## 2019-02-06 RX ORDER — LANOLIN ALCOHOL/MO/W.PET/CERES
1 CREAM (GRAM) TOPICAL AT BEDTIME
Refills: 0 | Status: DISCONTINUED | OUTPATIENT
Start: 2019-02-06 | End: 2019-02-08

## 2019-02-06 RX ADMIN — Medication 100 MILLIGRAM(S): at 17:09

## 2019-02-06 RX ADMIN — Medication 50 MILLIGRAM(S): at 05:54

## 2019-02-06 RX ADMIN — OXYCODONE HYDROCHLORIDE 5 MILLIGRAM(S): 5 TABLET ORAL at 22:16

## 2019-02-06 RX ADMIN — LOSARTAN POTASSIUM 50 MILLIGRAM(S): 100 TABLET, FILM COATED ORAL at 05:54

## 2019-02-06 RX ADMIN — Medication 100 MILLIGRAM(S): at 14:31

## 2019-02-06 RX ADMIN — DULOXETINE HYDROCHLORIDE 20 MILLIGRAM(S): 30 CAPSULE, DELAYED RELEASE ORAL at 14:30

## 2019-02-06 RX ADMIN — Medication 3 MILLILITER(S): at 15:17

## 2019-02-06 RX ADMIN — Medication 1 TABLET(S): at 14:31

## 2019-02-06 RX ADMIN — POLYETHYLENE GLYCOL 3350 17 GRAM(S): 17 POWDER, FOR SOLUTION ORAL at 14:31

## 2019-02-06 RX ADMIN — ERYTHROPOIETIN 8000 UNIT(S): 10000 INJECTION, SOLUTION INTRAVENOUS; SUBCUTANEOUS at 10:11

## 2019-02-06 RX ADMIN — Medication 3 MILLILITER(S): at 03:05

## 2019-02-06 RX ADMIN — LORATADINE 10 MILLIGRAM(S): 10 TABLET ORAL at 14:31

## 2019-02-06 RX ADMIN — Medication 100 MILLIGRAM(S): at 05:54

## 2019-02-06 RX ADMIN — GABAPENTIN 100 MILLIGRAM(S): 400 CAPSULE ORAL at 14:31

## 2019-02-06 RX ADMIN — Medication 3 MILLILITER(S): at 20:32

## 2019-02-06 RX ADMIN — PANTOPRAZOLE SODIUM 40 MILLIGRAM(S): 20 TABLET, DELAYED RELEASE ORAL at 05:54

## 2019-02-06 RX ADMIN — Medication 40 MILLIGRAM(S): at 05:54

## 2019-02-06 RX ADMIN — SENNA PLUS 2 TABLET(S): 8.6 TABLET ORAL at 22:16

## 2019-02-06 RX ADMIN — Medication 50 MILLIGRAM(S): at 17:09

## 2019-02-06 RX ADMIN — Medication 50 MILLIGRAM(S): at 22:17

## 2019-02-06 RX ADMIN — ISOSORBIDE MONONITRATE 30 MILLIGRAM(S): 60 TABLET, EXTENDED RELEASE ORAL at 14:31

## 2019-02-06 RX ADMIN — Medication 1 MILLIGRAM(S): at 22:16

## 2019-02-06 RX ADMIN — CLOPIDOGREL BISULFATE 75 MILLIGRAM(S): 75 TABLET, FILM COATED ORAL at 14:24

## 2019-02-06 RX ADMIN — Medication 40 MILLIGRAM(S): at 17:09

## 2019-02-06 RX ADMIN — GABAPENTIN 300 MILLIGRAM(S): 400 CAPSULE ORAL at 19:40

## 2019-02-06 RX ADMIN — GABAPENTIN 100 MILLIGRAM(S): 400 CAPSULE ORAL at 05:54

## 2019-02-06 RX ADMIN — Medication 100 MILLIGRAM(S): at 22:16

## 2019-02-06 RX ADMIN — Medication 100 MICROGRAM(S): at 05:53

## 2019-02-06 RX ADMIN — Medication 81 MILLIGRAM(S): at 14:31

## 2019-02-06 RX ADMIN — Medication 50 MILLIGRAM(S): at 14:31

## 2019-02-06 RX ADMIN — ATORVASTATIN CALCIUM 80 MILLIGRAM(S): 80 TABLET, FILM COATED ORAL at 22:16

## 2019-02-06 RX ADMIN — HEPARIN SODIUM 5000 UNIT(S): 5000 INJECTION INTRAVENOUS; SUBCUTANEOUS at 17:11

## 2019-02-06 RX ADMIN — HEPARIN SODIUM 5000 UNIT(S): 5000 INJECTION INTRAVENOUS; SUBCUTANEOUS at 05:54

## 2019-02-06 RX ADMIN — OXYCODONE HYDROCHLORIDE 5 MILLIGRAM(S): 5 TABLET ORAL at 23:10

## 2019-02-06 RX ADMIN — CHLORHEXIDINE GLUCONATE 1 APPLICATION(S): 213 SOLUTION TOPICAL at 14:24

## 2019-02-06 NOTE — PROGRESS NOTE ADULT - ASSESSMENT
this 79 y.o. Man with CAD s/p CABG, HLD, HTN, Anemia, ESRD on HD, hx of lung cancer s/p radiation, who was recently admitted from 12/22/19 to 1/11/19, treated for Enterobacter PNA.  here for cough and found with RUL consolidation    - suspect PNA/ HCAP  RVP negative  sputum cx  with MRSA    - continue Doxycycline for MRSA   end date is 2/10/19      will sign off.

## 2019-02-06 NOTE — PROGRESS NOTE ADULT - ASSESSMENT
Assessment and Plan:     79 year old male with PMH ESRD on HD, CAD s/p CABG, lung cancer s/p radiation with subsequent pulmonary fibrosis on 2L home O2 presents with SOB. Found to have MRSA in sputum culture, requiring high flow oxygen. ID and pulmonary on board. Palliative consulted given multiple co morbidities and frequent re hospitalization in past month.  Family decided DNR/DNI. patient was stared back on IV steroids, will taper steroids and dc planning .    1) Acute on chronic type 1 respiratory failure with hypoxia  - Sputum culture +ve for MRSA  - Was initially on  Vancomycin which was switched to Doxycycline by ID for total 14 doses from 2/4   - Continue Nebs and Steroid been tapered off initially but was placed by pulmonary IV steroid 40 mg q 8h, high flow oxygen tapered off to nasal cannula.  Will taper steroids to 40 mg q 12 h for 3 doses then 60 mg prednisone  Palliative and pulmonary follow up.  CT chest results noticed.      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  family decided DNR/DNI and allow natural death. 20 min spent in discussing GOC with family.  family would like to take him home, refused rehab facility.     At baseline, patient uses wheel chair and needs assistance to move from bed to wheel chair.      DISPO: likely home on 2/7, on tapering steroids.

## 2019-02-06 NOTE — PROGRESS NOTE ADULT - SUBJECTIVE AND OBJECTIVE BOX
[facility] DIVISION OF KIDNEY DISEASES AND HYPERTENSION -- HEMODIALYSIS NOTE  --------------------------------------------------------------------------------  Chief Complaint: ESRD/Ongoing hemodialysis requirement    24 hour events/subjective:  Seen/examined  On HD today  plan for DC tomorrow;  Will be going for HD on Saturday;  Short HD tx in AM      PAST HISTORY  --------------------------------------------------------------------------------  No significant changes to PMH, PSH, FHx, SHx, unless otherwise noted    ALLERGIES & MEDICATIONS  --------------------------------------------------------------------------------  Allergies    No Known Allergies    Intolerances      Standing Inpatient Medications  ALBUTerol/ipratropium for Nebulization 3 milliLiter(s) Nebulizer every 6 hours  aspirin enteric coated 81 milliGRAM(s) Oral daily  atorvastatin 80 milliGRAM(s) Oral at bedtime  chlorhexidine 2% Cloths 1 Application(s) Topical daily  clopidogrel Tablet 75 milliGRAM(s) Oral daily  docusate sodium 100 milliGRAM(s) Oral three times a day  doxycycline hyclate Capsule 100 milliGRAM(s) Oral every 12 hours  DULoxetine 20 milliGRAM(s) Oral daily  epoetin nguyễn Injectable 8000 Unit(s) IV Push <User Schedule>  gabapentin 300 milliGRAM(s) Oral two times a day  heparin  Injectable 5000 Unit(s) SubCutaneous every 12 hours  hydrALAZINE 50 milliGRAM(s) Oral every 8 hours  isosorbide   mononitrate ER Tablet (IMDUR) 30 milliGRAM(s) Oral daily  levothyroxine 100 MICROGram(s) Oral daily  loratadine 10 milliGRAM(s) Oral daily  losartan 50 milliGRAM(s) Oral daily  methylPREDNISolone sodium succinate Injectable 40 milliGRAM(s) IV Push every 12 hours  metoprolol tartrate 50 milliGRAM(s) Oral two times a day  multivitamin 1 Tablet(s) Oral daily  pantoprazole    Tablet 40 milliGRAM(s) Oral before breakfast  polyethylene glycol 3350 17 Gram(s) Oral daily  senna 2 Tablet(s) Oral at bedtime    PRN Inpatient Medications  benzocaine 15 mG/menthol 3.6 mG Lozenge 1 Lozenge Oral every 2 hours PRN  benzonatate 100 milliGRAM(s) Oral every 8 hours PRN  diphenhydrAMINE 25 milliGRAM(s) Oral every 8 hours PRN      REVIEW OF SYSTEMS  --------------------------------------------------------------------------------  Gen: No weight changes, fatigue, fevers/chills, weakness  Skin: No rashes  Head/Eyes/Ears/Mouth: No headache; Normal hearing; Normal vision w/o blurriness; No sinus pain/discomfort, sore throat  Respiratory: No dyspnea, cough, wheezing, hemoptysis  CV: No chest pain, PND, orthopnea  GI: No abdominal pain, diarrhea, constipation, nausea, vomiting, melena, hematochezia  : No increased frequency, dysuria, hematuria, nocturia  MSK: No joint pain/swelling; no back pain; no edema  Neuro: No dizziness/lightheadedness, weakness, seizures, numbness, tingling  Heme: No easy bruising or bleeding  Endo: No heat/cold intolerance  Psych: No significant nervousness, anxiety, stress, depression    All other systems were reviewed and are negative, except as noted.    VITALS/PHYSICAL EXAM  --------------------------------------------------------------------------------  T(C): 36.8 (02-06-19 @ 11:39), Max: 36.9 (02-05-19 @ 23:58)  HR: 61 (02-06-19 @ 08:29) (56 - 72)  BP: 136/64 (02-06-19 @ 11:39) (130/49 - 153/67)  RR: 18 (02-06-19 @ 11:39) (18 - 20)  SpO2: 98% (02-06-19 @ 11:39) (90% - 99%)  Wt(kg): --        02-05-19 @ 07:01  -  02-06-19 @ 07:00  --------------------------------------------------------  IN: 474 mL / OUT: 0 mL / NET: 474 mL      Physical Exam:  	Gen: NAD, frail, debilitated, pale  	HEENT: PERRL, supple neck, clear oropharynx  	Pulm:  Rales B/L  	CV: RRR, S1S2; no rub  	Back: No spinal or CVA tenderness; no sacral edema  	Abd: +BS, soft, nontender/nondistended  	: No suprapubic tenderness  	UE: Warm, FROM, no clubbing, intact strength; no edema; no asterixis  	LE: Warm, FROM, no clubbing, intact strength; minimal edema  	Neuro: No focal deficits, intact gait  	Psych: Normal affect and mood  	Skin: Warm, without rashes  	Vascular access: AVF, Right CVC    LABS/STUDIES  --------------------------------------------------------------------------------              9.2    13.3  >-----------<  355      [02-06-19 @ 08:41]              31.1     132  |  92  |  54.0  ----------------------------<  142      [02-06-19 @ 08:41]  5.2   |  23.0  |  6.30        Ca     8.3     [02-06-19 @ 08:41]    TPro  7.2  /  Alb  3.5  /  TBili  0.3  /  DBili  x   /  AST  19  /  ALT  11  /  AlkPhos  51  [02-06-19 @ 08:41]          Iron 34, TIBC 235, %sat 14      [01-29-19 @ 11:39]  Ferritin 1049      [01-29-19 @ 11:39]  TSH 2.07      [01-30-19 @ 07:49]  Lipid: chol 159, , HDL 32, LDL 94      [01-29-19 @ 11:39]    HBsAb <3.0      [01-08-19 @ 17:03]  HBsAg Nonreact      [01-08-19 @ 17:03]  HCV 0.12, Nonreact      [01-08-19 @ 17:03]

## 2019-02-06 NOTE — PROGRESS NOTE ADULT - SUBJECTIVE AND OBJECTIVE BOX
KAUSHIK HOFFMAN    [id number]    79y      Male    CC: Pneumonia    Overnight events:  Was seen and examined at bedside. denied active complains.  patient went for HD today and will get HD tomorrow, and dc planning after that.    HPI:  Pt is a 80yo M presenting w/ SOB found to have HCAP. PMH ESRD on HD, hx of lung cancer s/p radiation, HTN, Anemia, CAD s/p CABG, HLD, recent HCAP tx 3 weeks ago.   Patient has been having cough and SOB for the past 2-3 days and it has gotten progressively worse, he was ofund to be hypoxic earlier today w/ SpO2 78% and sent to the ED. He denies any fevers, chills but states he feels weak. He uses O2 at home 2-3 L NC. Earlier today he was sent in from his [facility] surgeons office due to the hypoxia. He denies any chest pain, palpitations, abd pain, diarrhea, constipation, melena, hematochezia. He does not produce urine.   He has no other complaints aside from feeling itchy. He denies any allergies to medications.   In ED patient was found to have RUL consolidation, RVP is pending, he also had elevated troponin and seen by cardiology. He has also been seen by nephro. Started on IV abx. (28 Jan 2019 17:49)      ROS:  As per interval history otherwise unremarkable.    PHYSICAL EXAM:        Vital Signs Last 24 Hrs  T(C): 36.8 (06 Feb 2019 11:39), Max: 36.9 (05 Feb 2019 23:58)  T(F): 98.3 (06 Feb 2019 11:39), Max: 98.4 (05 Feb 2019 23:58)  HR: 61 (06 Feb 2019 08:29) (56 - 72)  BP: 136/64 (06 Feb 2019 11:39) (130/49 - 153/67)  BP(mean): --  RR: 18 (06 Feb 2019 11:39) (18 - 20)  SpO2: 98% (06 Feb 2019 11:39) (90% - 99%)    General: Elderly male lying in bed comfortably. Frail and cachetic, on oxygen nasal cannula. Permacath in right upper chest.   HEENT: PERRLA. EOMI. Clear conjunctivae. Moist mucus membrane  Neck: Supple. No JVD.   Chest: CTA bilaterally - no wheezing, rales or rhonchi. No chest wall tenderness.   Heart: Normal S1 & S2. RRR.   Abdomen: Soft. Non-tender. Non-distended. + BS  Ext: No pedal edema. No calf tenderness. AVF in LUE. No knee swelling, tenderness and redness ordered.  Neuro: AAO x 3. No focal deficit. No speech disorder  Skin: Warm and Dry  Psychiatry: Normal mood and affect          LABS:                        9.2    13.3  )-----------( 355      ( 06 Feb 2019 08:41 )             31.1     02-06    132<L>  |  92<L>  |  54.0<H>  ----------------------------<  142<H>  5.2   |  23.0  |  6.30<H>    Ca    8.3<L>      06 Feb 2019 08:41    TPro  7.2  /  Alb  3.5  /  TBili  0.3<L>  /  DBili  x   /  AST  19  /  ALT  11  /  AlkPhos  51  02-06            MEDICATIONS  (STANDING):  ALBUTerol/ipratropium for Nebulization 3 milliLiter(s) Nebulizer every 6 hours  aspirin enteric coated 81 milliGRAM(s) Oral daily  atorvastatin 80 milliGRAM(s) Oral at bedtime  chlorhexidine 2% Cloths 1 Application(s) Topical daily  clopidogrel Tablet 75 milliGRAM(s) Oral daily  docusate sodium 100 milliGRAM(s) Oral three times a day  doxycycline hyclate Capsule 100 milliGRAM(s) Oral every 12 hours  DULoxetine 20 milliGRAM(s) Oral daily  epoetin nguyễn Injectable 8000 Unit(s) IV Push <User Schedule>  gabapentin 100 milliGRAM(s) Oral three times a day  heparin  Injectable 5000 Unit(s) SubCutaneous every 12 hours  hydrALAZINE 50 milliGRAM(s) Oral every 8 hours  isosorbide   mononitrate ER Tablet (IMDUR) 30 milliGRAM(s) Oral daily  levothyroxine 100 MICROGram(s) Oral daily  loratadine 10 milliGRAM(s) Oral daily  losartan 50 milliGRAM(s) Oral daily  methylPREDNISolone sodium succinate Injectable 40 milliGRAM(s) IV Push every 12 hours  metoprolol tartrate 50 milliGRAM(s) Oral two times a day  multivitamin 1 Tablet(s) Oral daily  pantoprazole    Tablet 40 milliGRAM(s) Oral before breakfast  polyethylene glycol 3350 17 Gram(s) Oral daily  senna 2 Tablet(s) Oral at bedtime    MEDICATIONS  (PRN):  benzocaine 15 mG/menthol 3.6 mG Lozenge 1 Lozenge Oral every 2 hours PRN Sore Throat  benzonatate 100 milliGRAM(s) Oral every 8 hours PRN Cough  diphenhydrAMINE 25 milliGRAM(s) Oral every 8 hours PRN Rash and/or Itching      RADIOLOGY & ADDITIONAL TESTS:

## 2019-02-06 NOTE — PROGRESS NOTE ADULT - ASSESSMENT
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

## 2019-02-06 NOTE — PROGRESS NOTE ADULT - SUBJECTIVE AND OBJECTIVE BOX
PULMONARY PROGRESS NOTE      KAUSHIK HOFFMAN  MRN-[id number]    Patient is a 79y old  Male who presents with a chief complaint of HCAP (05 Feb 2019 14:07)      INTERVAL HPI/OVERNIGHT EVENTS:    Patient is awake and alert  More comfortable  Decreased FiO2 requirement; now on NCO2    MEDICATIONS  (STANDING):  ALBUTerol/ipratropium for Nebulization 3 milliLiter(s) Nebulizer every 6 hours  aspirin enteric coated 81 milliGRAM(s) Oral daily  atorvastatin 80 milliGRAM(s) Oral at bedtime  chlorhexidine 2% Cloths 1 Application(s) Topical daily  clopidogrel Tablet 75 milliGRAM(s) Oral daily  docusate sodium 100 milliGRAM(s) Oral three times a day  doxycycline hyclate Capsule 100 milliGRAM(s) Oral every 12 hours  DULoxetine 20 milliGRAM(s) Oral daily  epoetin nguyễn Injectable 8000 Unit(s) IV Push <User Schedule>  gabapentin 100 milliGRAM(s) Oral three times a day  heparin  Injectable 5000 Unit(s) SubCutaneous every 12 hours  hydrALAZINE 50 milliGRAM(s) Oral every 8 hours  isosorbide   mononitrate ER Tablet (IMDUR) 30 milliGRAM(s) Oral daily  levothyroxine 100 MICROGram(s) Oral daily  loratadine 10 milliGRAM(s) Oral daily  losartan 50 milliGRAM(s) Oral daily  methylPREDNISolone sodium succinate Injectable 40 milliGRAM(s) IV Push every 12 hours  metoprolol tartrate 50 milliGRAM(s) Oral two times a day  multivitamin 1 Tablet(s) Oral daily  pantoprazole    Tablet 40 milliGRAM(s) Oral before breakfast  polyethylene glycol 3350 17 Gram(s) Oral daily  senna 2 Tablet(s) Oral at bedtime      MEDICATIONS  (PRN):  benzocaine 15 mG/menthol 3.6 mG Lozenge 1 Lozenge Oral every 2 hours PRN Sore Throat  benzonatate 100 milliGRAM(s) Oral every 8 hours PRN Cough  diphenhydrAMINE 25 milliGRAM(s) Oral every 8 hours PRN Rash and/or Itching      Allergies    No Known Allergies    Intolerances        PAST MEDICAL & SURGICAL HISTORY:  Chronic anemia  ESRD (end stage renal disease)  CAD (coronary artery disease)  Lung cancer: had yoni radiation in 2006.  Bipolar disorder  High cholesterol  Hypertension  S/P CABG (coronary artery bypass graft): pt&#x27;s son in law states h/o some stents near neck area ? carotid ? he is not sure.        REVIEW OF SYSTEMS:    CONSTITUTIONAL:  No distress    HEENT:  Eyes:  No diplopia or blurred vision. ENT:  No earache, sore throat or runny nose.    CARDIOVASCULAR:  No pressure, squeezing, tightness, heaviness or aching about the chest; no palpitations.    RESPIRATORY:  No cough, mild shortness of breath, improved PND and orthopnea. + SOBOE    GASTROINTESTINAL:  No nausea, vomiting or diarrhea.    GENITOURINARY:  No dysuria, frequency or urgency.    NEUROLOGIC:  No paresthesias, fasciculations, seizures or weakness.    PSYCHIATRIC:  No disorder of thought or mood.    Vital Signs Last 24 Hrs  T(C): 36.8 (05 Feb 2019 16:33), Max: 37 (04 Feb 2019 23:44)  T(F): 98.2 (05 Feb 2019 16:33), Max: 98.6 (04 Feb 2019 23:44)  HR: 72 (05 Feb 2019 17:51) (58 - 85)  BP: 141/62 (05 Feb 2019 17:51) (132/48 - 141/62)  BP(mean): --  RR: 18 (05 Feb 2019 16:33) (18 - 20)  SpO2: 91% (05 Feb 2019 16:33) (91% - 99%)    PHYSICAL EXAMINATION:    GENERAL: The patient is awake and alert in no apparent distress.     HEENT: Head is normocephalic and atraumatic. Extraocular muscles are intact. Mucous membranes are moist.    NECK: Supple.    LUNGS: Bibasilar rales without wheeze or rhonchi; respirations unlabored    HEART: Regular rate and rhythm without murmur.    ABDOMEN: Soft, nontender, and nondistended.      EXTREMITIES: Without any cyanosis, clubbing, rash, lesions or edema.    NEUROLOGIC: Grossly intact.    LABS:                        8.9    10.7  )-----------( 352      ( 04 Feb 2019 14:36 )             30.0     02-04    134<L>  |  93<L>  |  49.0<H>  ----------------------------<  110  5.1   |  25.0  |  7.30<H>    Ca    8.0<L>      04 Feb 2019 14:37        MICROBIOLOGY:    Rapid Respiratory Viral Panel (01.29.19 @ 00:07)    Rapid RVP Result: NotDete: This Respiratory Panel uses polymerase chain reaction (PCR) to detect for  adenovirus; coronavirus (HKU1, NL63, 229E, OC43); human metapneumovirus  (hMPV); human enterovirus/rhinovirus (Entero/RV); influenza A; influenza  A/H1; influenza A/H3; influenza A/H1-2009; influenza B; parainfluenza  viruses 1, 2, 3, 4; respiratory syncytial virus; Mycoplasma pneumoniae;  and Chlamydophila pneumoniae.      Culture - Sputum . (01.30.19 @ 09:16)    -  RIF- Rifampin: S <=1 Should not be used as monotherapy    -  Penicillin: R >8    -  Oxacillin: R >2    -  Trimethoprim/Sulfamethoxazole: S <=0.5/9.5    -  Vancomycin: S 1    -  Tetra/Doxy: S <=4    -  Ampicillin/Sulbactam: R 16/8    Gram Stain:   Few White blood cells  Few Gram Positive Cocci in Clusters    -  Cefazolin: R 16    -  Clindamycin: S <=0.5    -  Erythromycin: R >4    -  Gentamicin: S <=4 Should not be used as monotherapy    -  Linezolid: S 4    Specimen Source: .Sputum    Culture Results:   Few Methicillin resistant Staphylococcus aureus  Few Routine respiratory miguelina present  .  TYPE: (C=Critical, N=Notification, A=Abnormal) C  TESTS:  _ MRSA  DATE/TIME CALLED: _ 02/02/2019 11:58:21  CALLED TO: Fermin Farooq RN  READ BACK (2 Patient Identifiers)(Y/N): _ Y  READ BACK VALUES (Y/N): _ Y  CALLED BY: Fermin Rothman    Sent copy to  and logistics.    Organism Identification: Methicillin resistant Staphylococcus aureus    Organism: Methicillin resistant Staphylococcus aureus    Method Type: [facility]      RADIOLOGY & ADDITIONAL STUDIES:       EXAM:  CT CHEST                          PROCEDURE DATE:  02/04/2019          INTERPRETATION:  CLINICAL INFORMATION: Right fibrosis on the right.   Possible cancer Emphysema. Hypoxia. Follow-up. Right upper lobe pneumonia.    COMPARISON: None.    PROCEDURE:   CT of the Chest was performed without intravenous contrast.  Sagittal and coronal reformats were performed.      FINDINGS:    CHEST:     LUNGS AND LARGE AIRWAYS:Right upper lobe opacity, not significant change   since 1/20/2019. Right lower lobe peribronchial opacities, increased   since 1/20/2019. Right paramediastinal consolidation with associated   bronchiectasis again seen, possibly post radiation change.  Calcification   again noted at the right lung apex. Compressive atelectasisin left lower   lobe secondary to the effusion. Emphysematous changes again noted.  PLEURA: Small loculated right pleural effusion, unchanged. Small to   moderate-sized left pleural effusion, increased in size. Fluid is seen   along the left major fissure.  VESSELS: Thoracic aorta normal in caliber with mild to moderate calcified   plaque. Status post CABG.  HEART: Enlarged. No pericardial effusion.  MEDIASTINUM AND ASHWIN: No pathologically enlarged lymph nodes.  CHEST WALL AND LOWER NECK: Right chest wall Mediport with the tip at the   cavoatrial junction. No pathologically enlarged axillary lymph nodes.  VISUALIZED UPPER ABDOMEN: Bilateral adrenal gland thickening. Cyst in the   upper pole of the right kidney.  Stable pericardiophrenic lymphnodes.  BONES: Status post sternotomy.    IMPRESSION:     Right upper and lower lobe opacities, unchanged in the right upper lobe   and increased in the right lower lobe since 1/28/2019; the differential   includes pneumonia; follow-up is recommended.    Small to moderate left pleural effusion, increased in size since   1/28/2019.    Loculated small right pleural effusion, unchanged.      ROMAINE RODRIGEZ   This document has been electronically signed. Feb 4 2019  3:36PM          ECHO:      Summary:   1. Left ventricular ejection fraction, by visual estimation, is 60 to   65%.   2. Normal global left ventricular systolic function.   3. Basal anteroseptal segment is abnormal as described above.   4. Spectral Doppler shows pseudonormal pattern of left ventricular   myocardial filling (Grade II diastolic dysfunction).   5. There is no evidence of pericardial effusion.   6. Moderate mitral valve regurgitation.   7. Thickening of the anterior and posterior mitral valve leaflets.   8. Mild-moderate tricuspid regurgitation.   9. Sclerotic aortic valve with normal opening.  10. Trace pulmonic valve regurgitation.    [id number] Alysia Moffett MD, Electronically signed on 1/31/2019 at 11:14:24   AM

## 2019-02-06 NOTE — PROGRESS NOTE ADULT - SUBJECTIVE AND OBJECTIVE BOX
[facility]  INFECTIOUS DISEASES AND INTERNAL MEDICINE at [place]  =======================================================  Cricket Lara MD  Diplomates American Board of Internal Medicine and Infectious Diseases  =======================================================    [id number]  [facility]   follow up for: possible right side PNA  patient seen and examined.     stable on nasal cannula   medicine notes reviewed.   pending discharge in next day  pt denies fevers.   reports breathing better.     ===================================================  REVIEW OF SYSTEMS:  as above  all other ROS negative    =======================================================  Allergies  No Known Allergies    Antibiotics:  doxycycline hyclate Capsule 100 milliGRAM(s) Oral every 12 hours  ======================================================  Physical Exam:  ============  T(F): 98.3 (06 Feb 2019 11:39), Max: 98.6 (04 Feb 2019 23:44)  HR: 61 (06 Feb 2019 08:29)  BP: 136/64 (06 Feb 2019 11:39)  RR: 18 (06 Feb 2019 11:39)  SpO2: 98% (06 Feb 2019 11:39) (90% - 99%)    General:  No acute distress.  THIN FRAIL;   Eye: Pupils are equal, round and reactive to light, Extraocular movements are intact, Normal conjunctiva.  HENT: Normocephalic, Oral mucosa is moist, No pharyngeal erythema, No sinus tenderness.  Neck: Supple, No lymphadenopathy.  Respiratory: Lungs with fair air entry on posterior exam; tubular breath sounds on left upper lung zone  Cardiovascular: Normal rate, Regular rhythm, No murmur, Good pulses equal in all extremities, No edema.  Gastrointestinal: Soft, Non-tender, Non-distended, Normal bowel sounds.  Genitourinary: No costovertebral angle tenderness.  Lymphatics: No lymphadenopathy neck,   Musculoskeletal: Normal range of motion, Normal strength.  Integumentary: No rash.  Neurologic: Alert, Oriented, No focal deficits, Cranial Nerves II-XII are grossly intact.  Psychiatric: Appropriate mood & affect.  =======================================================    Labs:                        9.2    13.3  )-----------( 355      ( 06 Feb 2019 08:41 )             31.1     02-06    132<L>  |  92<L>  |  54.0<H>  ----------------------------<  142<H>  5.2   |  23.0  |  6.30<H>    Ca    8.3<L>      06 Feb 2019 08:41    TPro  7.2  /  Alb  3.5  /  TBili  0.3<L>  /  DBili  x   /  AST  19  /  ALT  11  /  AlkPhos  51  02-06      Culture - Sputum (collected 01-30-19 @ 09:16)  Source: .Sputum  Gram Stain (01-30-19 @ 15:43):    Few White blood cells    Few Gram Positive Cocci in Clusters  Final Report (02-02-19 @ 12:01):    Few Methicillin resistant Staphylococcus aureus    Few Routine respiratory miguelina present    .    TYPE: (C=Critical, N=Notification, A=Abnormal) C    TESTS:  _ MRSA    DATE/TIME CALLED: _ 02/02/2019 11:58:21    CALLED TO: _ Esteban Farooq RN    READ BACK (2 Patient Identifiers)(Y/N): _ Y    READ BACK VALUES (Y/N): _ Y    CALLED BY: Fermin Rothman    Sent copy to IC and logistics.  Organism: Methicillin resistant Staphylococcus aureus (02-02-19 @ 12:01)  Organism: Methicillin resistant Staphylococcus aureus (02-02-19 @ 12:01)    Sensitivities:      -  Ampicillin/Sulbactam: R 16/8      -  Cefazolin: R 16      -  Clindamycin: S <=0.5      -  Erythromycin: R >4      -  Gentamicin: S <=4 Should not be used as monotherapy      -  Linezolid: S 4      -  Oxacillin: R >2      -  Penicillin: R >8      -  RIF- Rifampin: S <=1 Should not be used as monotherapy      -  Tetra/Doxy: S <=4      -  Trimethoprim/Sulfamethoxazole: S <=0.5/9.5      -  Vancomycin: S 1      Method Type: LIVIA    Culture - Blood (collected 01-29-19 @ 11:39)  Source: .Blood  Final Report (02-03-19 @ 13:01):    No growth at 5 days.    Culture - Blood (collected 01-28-19 @ 11:39)  Source: .Blood  Final Report (02-02-19 @ 13:01):    No growth at 5 days.    Culture - Blood (collected 01-28-19 @ 11:38)  Source: .Blood  Final Report (02-02-19 @ 13:01):    No growth at 5 days.

## 2019-02-07 ENCOUNTER — TRANSCRIPTION ENCOUNTER (OUTPATIENT)
Age: 79
End: 2019-02-07

## 2019-02-07 PROCEDURE — 99232 SBSQ HOSP IP/OBS MODERATE 35: CPT | Mod: GC

## 2019-02-07 PROCEDURE — 90937 HEMODIALYSIS REPEATED EVAL: CPT

## 2019-02-07 RX ORDER — GABAPENTIN 400 MG/1
300 CAPSULE ORAL THREE TIMES A DAY
Refills: 0 | Status: DISCONTINUED | OUTPATIENT
Start: 2019-02-07 | End: 2019-02-08

## 2019-02-07 RX ORDER — ACETAMINOPHEN 500 MG
650 TABLET ORAL ONCE
Refills: 0 | Status: COMPLETED | OUTPATIENT
Start: 2019-02-07 | End: 2019-02-07

## 2019-02-07 RX ORDER — SENNA PLUS 8.6 MG/1
2 TABLET ORAL
Qty: 60 | Refills: 0
Start: 2019-02-07 | End: 2019-03-08

## 2019-02-07 RX ORDER — IPRATROPIUM/ALBUTEROL SULFATE 18-103MCG
3 AEROSOL WITH ADAPTER (GRAM) INHALATION EVERY 6 HOURS
Refills: 0 | Status: DISCONTINUED | OUTPATIENT
Start: 2019-02-07 | End: 2019-02-08

## 2019-02-07 RX ORDER — DOCUSATE SODIUM 100 MG
1 CAPSULE ORAL
Qty: 90 | Refills: 0
Start: 2019-02-07 | End: 2019-03-08

## 2019-02-07 RX ADMIN — CHLORHEXIDINE GLUCONATE 1 APPLICATION(S): 213 SOLUTION TOPICAL at 14:26

## 2019-02-07 RX ADMIN — Medication 25 MILLIGRAM(S): at 15:35

## 2019-02-07 RX ADMIN — LOSARTAN POTASSIUM 50 MILLIGRAM(S): 100 TABLET, FILM COATED ORAL at 06:27

## 2019-02-07 RX ADMIN — PANTOPRAZOLE SODIUM 40 MILLIGRAM(S): 20 TABLET, DELAYED RELEASE ORAL at 06:15

## 2019-02-07 RX ADMIN — Medication 100 MILLIGRAM(S): at 06:27

## 2019-02-07 RX ADMIN — Medication 60 MILLIGRAM(S): at 06:15

## 2019-02-07 RX ADMIN — SENNA PLUS 2 TABLET(S): 8.6 TABLET ORAL at 23:07

## 2019-02-07 RX ADMIN — ISOSORBIDE MONONITRATE 30 MILLIGRAM(S): 60 TABLET, EXTENDED RELEASE ORAL at 12:53

## 2019-02-07 RX ADMIN — Medication 81 MILLIGRAM(S): at 12:53

## 2019-02-07 RX ADMIN — Medication 1 MILLIGRAM(S): at 23:07

## 2019-02-07 RX ADMIN — ATORVASTATIN CALCIUM 80 MILLIGRAM(S): 80 TABLET, FILM COATED ORAL at 23:06

## 2019-02-07 RX ADMIN — Medication 50 MILLIGRAM(S): at 06:15

## 2019-02-07 RX ADMIN — Medication 50 MILLIGRAM(S): at 14:26

## 2019-02-07 RX ADMIN — LORATADINE 10 MILLIGRAM(S): 10 TABLET ORAL at 14:27

## 2019-02-07 RX ADMIN — Medication 100 MILLIGRAM(S): at 14:26

## 2019-02-07 RX ADMIN — DULOXETINE HYDROCHLORIDE 20 MILLIGRAM(S): 30 CAPSULE, DELAYED RELEASE ORAL at 12:53

## 2019-02-07 RX ADMIN — GABAPENTIN 300 MILLIGRAM(S): 400 CAPSULE ORAL at 06:15

## 2019-02-07 RX ADMIN — CLOPIDOGREL BISULFATE 75 MILLIGRAM(S): 75 TABLET, FILM COATED ORAL at 12:53

## 2019-02-07 RX ADMIN — Medication 100 MICROGRAM(S): at 06:15

## 2019-02-07 RX ADMIN — GABAPENTIN 300 MILLIGRAM(S): 400 CAPSULE ORAL at 23:07

## 2019-02-07 RX ADMIN — POLYETHYLENE GLYCOL 3350 17 GRAM(S): 17 POWDER, FOR SOLUTION ORAL at 14:27

## 2019-02-07 RX ADMIN — Medication 100 MILLIGRAM(S): at 23:07

## 2019-02-07 RX ADMIN — Medication 3 MILLILITER(S): at 03:18

## 2019-02-07 RX ADMIN — HEPARIN SODIUM 5000 UNIT(S): 5000 INJECTION INTRAVENOUS; SUBCUTANEOUS at 06:15

## 2019-02-07 RX ADMIN — Medication 1 TABLET(S): at 12:53

## 2019-02-07 RX ADMIN — Medication 100 MILLIGRAM(S): at 06:15

## 2019-02-07 RX ADMIN — Medication 50 MILLIGRAM(S): at 23:07

## 2019-02-07 NOTE — DISCHARGE NOTE ADULT - PATIENT PORTAL LINK FT
You can access the [facility] Patient Portal, offered by [facility], by registering with the following website: [email or web address]

## 2019-02-07 NOTE — DISCHARGE NOTE ADULT - MEDICATION SUMMARY - MEDICATIONS TO TAKE
I will START or STAY ON the medications listed below when I get home from the hospital:    predniSONE 20 mg oral tablet  -- take 3  tab for 2 days, then 2 tab for 2 days then 1 tab for 2 days then half tab for 2 days then stop  -- Indication: For Acute on chronic respiratory failure with hypoxia    aspirin 81 mg oral tablet  -- 1 tab(s) by mouth once a day  -- Indication: For Cad    losartan 50 mg oral tablet  -- 1 tab(s) by mouth once a day (at bedtime)  -- Indication: For Htn    isosorbide mononitrate 30 mg oral tablet, extended release  -- 1 tab(s) by mouth once a day   -- Do not drink alcoholic beverages when taking this medication.  It is very important that you take or use this exactly as directed.  Do not skip doses or discontinue unless directed by your doctor.  Swallow whole.  Do not crush.    -- Indication: For Cad    gabapentin 100 mg oral capsule  -- 1 cap(s) by mouth 3 times a day x 30 days   -- It is very important that you take or use this exactly as directed.  Do not skip doses or discontinue unless directed by your doctor.  May cause drowsiness.  Alcohol may intensify this effect.  Use care when operating dangerous machinery.    -- Indication: For Pain    Cymbalta 20 mg oral delayed release capsule  -- 20 milligram(s) by mouth once a day  -- Indication: For Depression    fexofenadine 60 mg oral tablet  -- 60 milligram(s) by mouth once a day  -- Indication: For Antihistamines    atorvastatin 80 mg oral tablet  -- 1 tab(s) by mouth once a day (at bedtime)  -- Indication: For Hld    doxycycline monohydrate 100 mg oral capsule  -- 1 cap(s) by mouth every 12 hours  -- Indication: For Mrsa sputum    Plavix 75 mg oral tablet  -- 1 tab(s) by mouth once a day  -- Indication: For Cad    benzonatate 100 mg oral capsule  -- 1 cap(s) by mouth every 8 hours, As Needed -Cough - for cough   -- Indication: For Antitussives    metoprolol tartrate 50 mg oral tablet  -- 1 tab(s) by mouth 2 times a day  -- Indication: For Cad    ipratropium-albuterol 0.5 mg-2.5 mg/3 mLinhalation solution  -- 3 milliliter(s) by nebulizer 4 times a day, As Needed -for bronchospasm   Nebulizer Machine   -- For inhalation only.  It is very important that you take or use this exactly as directed.  Do not skip doses or discontinue unless directed by your doctor.  Obtain medical advice before taking any non-prescription drugs as some may affect the action of this medication.    -- Indication: For Acute on chronic respiratory failure with hypoxia    amLODIPine 10 mg oral tablet  -- 1 tab(s) by mouth once a day  -- Indication: For Htn    epoetin nguyễn  -- 6000 unit(s) injectable Tuesday, Thursday, Saturday with dialysis  -- Indication: For ESRD (end stage renal disease)    docusate sodium 100 mg oral capsule  -- 1 cap(s) by mouth 3 times a day  -- Indication: For Constipation    senna oral tablet  -- 2 tab(s) by mouth once a day (at bedtime)  -- Indication: For Constipation    omeprazole 40 mg oral delayed release capsule  -- 1 cap(s) by mouth once a day  -- Indication: For on steroids    levothyroxine 100 mcg (0.1 mg) oral tablet  -- 1 tab(s) by mouth once a day  -- Indication: For Hypothyroidism    hydrALAZINE 50 mg oral tablet  -- 1 tab(s) by mouth every 8 hours  -- Indication: For Htn    Nephro-Gloria oral tablet  -- 1 tab(s) by mouth once a day   -- Indication: For Malnutrion I will START or STAY ON the medications listed below when I get home from the hospital:    predniSONE 20 mg oral tablet  -- take 3  tab for 2 days, then 2 tab for 2 days then 1 tab for 2 days then half tab for 2 days then stop  -- Indication: For Acute on chronic respiratory failure with hypoxia    aspirin 81 mg oral tablet  -- 1 tab(s) by mouth once a day  -- Indication: For Cad    losartan 50 mg oral tablet  -- 1 tab(s) by mouth once a day (at bedtime)  -- Indication: For Htn    isosorbide mononitrate 30 mg oral tablet, extended release  -- 1 tab(s) by mouth once a day   -- Do not drink alcoholic beverages when taking this medication.  It is very important that you take or use this exactly as directed.  Do not skip doses or discontinue unless directed by your doctor.  Swallow whole.  Do not crush.    -- Indication: For Cad    gabapentin 300 mg oral capsule  -- 1 cap(s) by mouth 2 times a day   -- Indication: For neuropatihc pain    Cymbalta 20 mg oral delayed release capsule  -- 20 milligram(s) by mouth once a day  -- Indication: For Depression    fexofenadine 60 mg oral tablet  -- 60 milligram(s) by mouth once a day  -- Indication: For Antihistamines    atorvastatin 80 mg oral tablet  -- 1 tab(s) by mouth once a day (at bedtime)  -- Indication: For Hld    doxycycline monohydrate 100 mg oral capsule  -- 1 cap(s) by mouth every 12 hours  -- Indication: For Mrsa sputum    Plavix 75 mg oral tablet  -- 1 tab(s) by mouth once a day  -- Indication: For Cad    benzonatate 100 mg oral capsule  -- 1 cap(s) by mouth every 8 hours, As Needed -Cough - for cough   -- Indication: For Antitussives    metoprolol tartrate 50 mg oral tablet  -- 1 tab(s) by mouth 2 times a day  -- Indication: For Cad    ipratropium-albuterol 0.5 mg-2.5 mg/3 mLinhalation solution  -- 3 milliliter(s) inhaled every 6 hours, As needed, Shortness of Breath and/or Wheezing  -- Indication: For bronchospasm    amLODIPine 10 mg oral tablet  -- 1 tab(s) by mouth once a day  -- Indication: For Htn    epoetin nguyễn  -- 6000 unit(s) injectable Tuesday, Thursday, Saturday with dialysis  -- Indication: For ESRD (end stage renal disease)    docusate sodium 100 mg oral capsule  -- 1 cap(s) by mouth 3 times a day  -- Indication: For Constipation    senna oral tablet  -- 2 tab(s) by mouth once a day (at bedtime)  -- Indication: For Constipation    omeprazole 40 mg oral delayed release capsule  -- 1 cap(s) by mouth once a day  -- Indication: For on steroids    levothyroxine 100 mcg (0.1 mg) oral tablet  -- 1 tab(s) by mouth once a day  -- Indication: For Hypothyroidism    hydrALAZINE 50 mg oral tablet  -- 1 tab(s) by mouth every 8 hours  -- Indication: For Htn    Nephro-Gloria oral tablet  -- 1 tab(s) by mouth once a day   -- Indication: For Malnutrion I will START or STAY ON the medications listed below when I get home from the hospital:    predniSONE 20 mg oral tablet  -- take 3  tab for 2 days, then 2 tab for 2 days then 1 tab for 2 days then half tab for 2 days then stop  -- Indication: For Acute on chronic respiratory failure with hypoxia    aspirin 81 mg oral tablet  -- 1 tab(s) by mouth once a day  -- Indication: For Cad    losartan 50 mg oral tablet  -- 1 tab(s) by mouth once a day (at bedtime)  -- Indication: For Htn    isosorbide mononitrate 30 mg oral tablet, extended release  -- 1 tab(s) by mouth once a day   -- Do not drink alcoholic beverages when taking this medication.  It is very important that you take or use this exactly as directed.  Do not skip doses or discontinue unless directed by your doctor.  Swallow whole.  Do not crush.    -- Indication: For Cad    gabapentin 300 mg oral capsule  -- 1 cap(s) by mouth 2 times a day   -- Indication: For neuropatihc pain    Cymbalta 20 mg oral delayed release capsule  -- 20 milligram(s) by mouth once a day  -- Indication: For Depression    fexofenadine 60 mg oral tablet  -- 60 milligram(s) by mouth once a day  -- Indication: For Antihistamines    Allegra Allergy 60 mg oral tablet  -- 1 tab(s) by mouth once a day -for allergy symptoms   -- Avoid grapefruit and grapefruit juice while taking this medication.  Medication should be taken with plenty of water.  Obtain medical advice before taking any non-prescription drugs as some may affect the action of this medication.    -- Indication: For Antihistamines    atorvastatin 80 mg oral tablet  -- 1 tab(s) by mouth once a day (at bedtime)  -- Indication: For Hld    doxycycline monohydrate 100 mg oral capsule  -- 1 cap(s) by mouth every 12 hours  -- Indication: For Mrsa sputum    Plavix 75 mg oral tablet  -- 1 tab(s) by mouth once a day  -- Indication: For Cad    benzonatate 100 mg oral capsule  -- 1 cap(s) by mouth every 8 hours, As Needed -Cough - for cough   -- Indication: For Antitussives    benzonatate 100 mg oral capsule  -- 1 cap(s) by mouth every 8 hours, As Needed -for cough   -- May cause drowsiness.  Alcohol may intensify this effect.  Use care when operating dangerous machinery.  Swallow whole.  Do not crush.    -- Indication: For Pneumonia    metoprolol tartrate 50 mg oral tablet  -- 1 tab(s) by mouth 2 times a day  -- Indication: For Cad    ipratropium-albuterol 0.5 mg-2.5 mg/3 mLinhalation solution  -- 3 milliliter(s) inhaled every 6 hours, As needed, Shortness of Breath and/or Wheezing  -- Indication: For bronchospasm    amLODIPine 10 mg oral tablet  -- 1 tab(s) by mouth once a day  -- Indication: For Htn    epoetin nguyễn  -- 6000 unit(s) injectable Tuesday, Thursday, Saturday with dialysis  -- Indication: For ESRD (end stage renal disease)    docusate sodium 100 mg oral capsule  -- 1 cap(s) by mouth 3 times a day  -- Indication: For Constipation    senna oral tablet  -- 2 tab(s) by mouth once a day (at bedtime)  -- Indication: For Constipation    omeprazole 40 mg oral delayed release capsule  -- 1 cap(s) by mouth once a day  -- Indication: For on steroids    levothyroxine 100 mcg (0.1 mg) oral tablet  -- 1 tab(s) by mouth once a day  -- Indication: For Hypothyroidism    hydrALAZINE 50 mg oral tablet  -- 1 tab(s) by mouth every 8 hours  -- Indication: For Htn    Nephro-Gloria oral tablet  -- 1 tab(s) by mouth once a day   -- Indication: For Malnutrion    Nephro-Gloria oral tablet  -- 1 tab(s) by mouth once a day   -- Indication: For vitamin

## 2019-02-07 NOTE — DISCHARGE NOTE ADULT - CARE PLAN
Principal Discharge DX:	Acute on chronic respiratory failure with hypoxia  Goal:	improved  Assessment and plan of treatment:	found to to have MRSA in sputum.  was started on Doxycycline 100 mg q 12 h till 2/10  continue steroid taper and duo neb  f/u with pulmonary as an outpatient  Secondary Diagnosis:	Advance care planning  Assessment and plan of treatment:	patient and family decided DNR/DNI  Secondary Diagnosis:	Coronary artery disease with other form of angina pectoris  Assessment and plan of treatment:	COntinue asp and plavis. f/u with cardiology  Secondary Diagnosis:	Malignant neoplasm of lung, unspecified laterality, unspecified part of lung  Assessment and plan of treatment:	f/u with oncology and pulmonary as an outpatient

## 2019-02-07 NOTE — DISCHARGE NOTE ADULT - MEDICATION SUMMARY - MEDICATIONS TO CHANGE
I will SWITCH the dose or number of times a day I take the medications listed below when I get home from the hospital:  None I will SWITCH the dose or number of times a day I take the medications listed below when I get home from the hospital:    gabapentin 100 mg oral capsule  -- 1 cap(s) by mouth 3 times a day x 30 days   -- It is very important that you take or use this exactly as directed.  Do not skip doses or discontinue unless directed by your doctor.  May cause drowsiness.  Alcohol may intensify this effect.  Use care when operating dangerous machinery.

## 2019-02-07 NOTE — DISCHARGE NOTE ADULT - HOSPITAL COURSE
79 year old male with PMH ESRD on HD, CAD s/p CABG, lung cancer s/p radiation with subsequent pulmonary fibrosis on 2L home O2 presents with SOB. Found to have MRSA in sputum culture, requiring high flow oxygen. ID and pulmonary on board. Palliative consulted given multiple co morbidities and frequent re hospitalization in past month.  Family decided DNR/DNI. patient was stared back on IV steroids, will taper steroids and dc planning .  Patient will need to follow up with vascular for perma cath, Pulmonary and cardiology as an outpatient.    1) Acute on chronic type 1 respiratory failure with hypoxia  - Sputum culture +ve for MRSA  - Was initially on  Vancomycin which was switched to Doxycycline by ID for total 14 doses from 2/4   - Continue Nebs and Steroid been tapered off initially but was placed by pulmonary IV steroid 40 mg q 8h, high flow oxygen tapered off to nasal cannula.  Will taper steroids to 40 mg q 12 h for 3 doses then 60 mg prednisone and prednisone taper  Palliative and pulmonary follow up.  CT chest results noticed.      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  family decided DNR/DNI and allow natural death. 20 min spent in discussing GOC with family.  family would like to take him home, refused rehab facility.     At baseline, patient uses wheel chair and needs assistance to move from bed to wheel chair.          PHYSICAL EXAM:        Vital Signs Last 24 Hrs  T(C): 36.8 (06 Feb 2019 11:39), Max: 36.9 (05 Feb 2019 23:58)  T(F): 98.3 (06 Feb 2019 11:39), Max: 98.4 (05 Feb 2019 23:58)  HR: 61 (06 Feb 2019 08:29) (56 - 72)  BP: 136/64 (06 Feb 2019 11:39) (130/49 - 153/67)  BP(mean): --  RR: 18 (06 Feb 2019 11:39) (18 - 20)  SpO2: 98% (06 Feb 2019 11:39) (90% - 99%)    General: Elderly male lying in bed comfortably. Frail and cachetic, on oxygen nasal cannula. Permacath in right upper chest.   HEENT: PERRLA. EOMI. Clear conjunctivae. Moist mucus membrane  Neck: Supple. No JVD.   Chest: CTA bilaterally - no wheezing, rales or rhonchi. No chest wall tenderness.   Heart: Normal S1 & S2. RRR.   Abdomen: Soft. Non-tender. Non-distended. + BS  Ext: No pedal edema. No calf tenderness. AVF in LUE. No knee swelling, tenderness and redness ordered.  Neuro: AAO x 3. No focal deficit. No speech disorder  Skin: Warm and Dry  Psychiatry: Normal mood and affect        40 min spent making dc summary. 79 year old male with PMH ESRD on HD, CAD s/p CABG, lung cancer s/p radiation with subsequent pulmonary fibrosis on 2L home O2 presents with SOB. Found to have MRSA in sputum culture, requiring high flow oxygen. ID and pulmonary on board. Palliative consulted given multiple co morbidities and frequent re hospitalization in past month.  Family decided DNR/DNI. patient was stared back on IV steroids, will taper steroids and dc planning .  Patient will need to follow up with vascular for perma cath removal, Pulmonary and cardiology follow up as an outpatient.    1) Acute on chronic type 1 respiratory failure with hypoxia  - Sputum culture +ve for MRSA  - Was initially on  Vancomycin which was switched to Doxycycline by ID for total 14 doses from 2/4   - Continue Nebs and Steroid been tapered off initially but was placed by pulmonary IV steroid 40 mg q 8h, high flow oxygen tapered off to nasal cannula.  Will taper steroids to 40 mg q 12 h for 3 doses then 60 mg prednisone and prednisone taper  Palliative and pulmonary follow up.  CT chest results noticed.      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  family decided DNR/DNI and allow natural death. 20 min spent in discussing GOC with family.  family would like to take him home, refused rehab facility.     At baseline, patient uses wheel chair and needs assistance to move from bed to wheel chair.          PHYSICAL EXAM:        Vital Signs Last 24 Hrs  T(C): 36.8 (06 Feb 2019 11:39), Max: 36.9 (05 Feb 2019 23:58)  T(F): 98.3 (06 Feb 2019 11:39), Max: 98.4 (05 Feb 2019 23:58)  HR: 61 (06 Feb 2019 08:29) (56 - 72)  BP: 136/64 (06 Feb 2019 11:39) (130/49 - 153/67)  BP(mean): --  RR: 18 (06 Feb 2019 11:39) (18 - 20)  SpO2: 98% (06 Feb 2019 11:39) (90% - 99%)    General: Elderly male lying in bed comfortably. Frail and cachetic, on oxygen nasal cannula. Permacath in right upper chest.   HEENT: PERRLA. EOMI. Clear conjunctivae. Moist mucus membrane  Neck: Supple. No JVD.   Chest: CTA bilaterally - no wheezing, rales or rhonchi. No chest wall tenderness.   Heart: Normal S1 & S2. RRR.   Abdomen: Soft. Non-tender. Non-distended. + BS  Ext: No pedal edema. No calf tenderness. AVF in LUE. No knee swelling, tenderness and redness ordered.  Neuro: AAO x 3. No focal deficit. No speech disorder  Skin: Warm and Dry  Psychiatry: Normal mood and affect        40 min spent making dc summary.

## 2019-02-07 NOTE — PROGRESS NOTE ADULT - SUBJECTIVE AND OBJECTIVE BOX
HD In AM,    & may be discharged after HD,    Op HD ( [facility] On Monday )    MACIEJ MONTAGUE RN,

## 2019-02-07 NOTE — DISCHARGE NOTE ADULT - CARE PROVIDER_API CALL
Torey Sparks)  Vascular Surgery  [address], [place] [address]  Phone: [phone]  Fax: [phone]  Follow Up Time:     Kemal Johnson)  Critical Care Medicine; Pulmonary Disease; Sleep Medicine  [address], [address]  [place] [address]  Phone: [phone]  Fax: [phone]  Follow Up Time:     nephrology,   Phone: (   )    -  Fax: (   )    -  Follow Up Time: Torey Sparks)  Vascular Surgery  [address], [place] [address]  Phone: [phone]  Fax: [phone]  Follow Up Time:     Kemal Johnson)  Critical Care Medicine; Pulmonary Disease; Sleep Medicine  [address] [facility], [address]  [place] [address]  Phone: [phone]  Fax: [phone]  Follow Up Time:     nephrology,   Phone: (   )    -  Fax: (   )    -  Follow Up Time:     Melanie San (DO)  Cardiology; Internal Medicine  [address], [address]  [place] [address]  Phone: [phone]  Fax: [phone]  Follow Up Time:

## 2019-02-07 NOTE — DISCHARGE NOTE ADULT - PROVIDER TOKENS
PROVIDER:[TOKEN:[[id number]]],PROVIDER:[TOKEN:[[id number]]],FREE:[LAST:[nephrology],PHONE:[(   )    -],FAX:[(   )    -]] PROVIDER:[TOKEN:[[id number]]],PROVIDER:[TOKEN:[[id number]]],FREE:[LAST:[nephrology],PHONE:[(   )    -],FAX:[(   )    -]],PROVIDER:[TOKEN:[[id number]]]

## 2019-02-07 NOTE — DISCHARGE NOTE ADULT - CARE PROVIDERS DIRECT ADDRESSES
,[email or web address],DirectAddress_Unknown ,[email or web address],DirectAddress_Unknown,[email or web address]

## 2019-02-07 NOTE — DISCHARGE NOTE ADULT - PLAN OF CARE
found to to have MRSA in sputum.  was started on Doxycycline 100 mg q 12 h till 2/10  continue steroid taper and duo neb  f/u with pulmonary as an outpatient COntinue asp and plavis. f/u with cardiology improved f/u with oncology and pulmonary as an outpatient patient and family decided DNR/DNI

## 2019-02-07 NOTE — PROGRESS NOTE ADULT - SUBJECTIVE AND OBJECTIVE BOX
Vital Signs Last 24 Hrs  T(C): 36.6 (2019 09:07), Max: 36.6 (2019 16:45)  T(F): 97.8 (2019 09:07), Max: 97.9 (2019 00:27)  HR: 50 (2019 09:07) (50 - 74)  BP: 107/43 (2019 09:07) (107/43 - 141/58)  BP(mean): --  RR: 19 (2019 09:07) (18 - 20)  SpO2: 96% (2019 09:47) (94% - 99%)    132<L>  |  92<L>  |  54.0<H>  ----------------------------<  142<H>  Ca:8.3<L> (2019 08:41)  5.2     |  23.0   |  6.30<H>    TPro  7.2    /  Alb  3.5    /  TBili  0.3<L>  /  DBili  x      /  AST  19     /  ALT  11     /  AlkPhos  51     2019 08:41                             9.2<L>  13.3<H> )-----------( 355      ( 2019 08:41 )                  31.1<L>    Phos: 5.0 mg/dL<H> M.3 mg/dL PTH:-- Uric acid:-- Serum Osm:--  Ferritin:-- Iron:-- TIBC:-- Tsat:--  B12:2.07 uIU/mL TSH:-- ( @ 07:49)    CC: Pneumonia    Overnight events:  Was seen and examined at bedside. Denied active complains. Patient was for dc today, but as per cm patient family needs to clean the bed and make houusehold arrangement, and requesting to be dc tomorrow am.  patient was c/o neuropathic pain in right limb. Gabapentin started.    HPI:  Pt is a 80yo M presenting w/ SOB found to have HCAP. PMH ESRD on HD, hx of lung cancer s/p radiation, HTN, Anemia, CAD s/p CABG, HLD, recent HCAP tx 3 weeks ago.   Patient has been having cough and SOB for the past 2-3 days and it has gotten progressively worse, he was ofund to be hypoxic earlier today w/ SpO2 78% and sent to the ED. He denies any fevers, chills but states he feels weak. He uses O2 at home 2-3 L NC. Earlier today he was sent in from his vasc surgeons office due to the hypoxia. He denies any chest pain, palpitations, abd pain, diarrhea, constipation, melena, hematochezia. He does not produce urine.   He has no other complaints aside from feeling itchy. He denies any allergies to medications.   In ED patient was found to have RUL consolidation, RVP is pending, he also had elevated troponin and seen by cardiology. He has also been seen by nephro. Started on IV abx. (2019 17:49)    ROS:  As per interval history otherwise unremarkable.    PHYSICAL EXAM:  General: Elderly male lying in bed comfortably. Frail and cachetic, on oxygen nasal cannula. Permacath in right upper chest.   HEENT: PERRLA. EOMI. Clear conjunctivae. Moist mucus membrane  Neck: Supple. No JVD.   Chest: CTA bilaterally - no wheezing, rales or rhonchi. No chest wall tenderness.   Heart: Normal S1 & S2. RRR.   Abdomen: Soft. Non-tender. Non-distended. + BS  Ext: No pedal edema. No calf tenderness. AVF in LUE. No knee swelling, tenderness and redness ordered.  Neuro: AAO x 3. No focal deficit. No speech disorder  Skin: Warm and Dry  Psychiatry: Normal mood and affect    MEDICATIONS  (STANDING):  ALBUTerol/ipratropium for Nebulization 3 milliLiter(s) Nebulizer every 6 hours  aspirin enteric coated 81 milliGRAM(s) Oral daily  atorvastatin 80 milliGRAM(s) Oral at bedtime  chlorhexidine 2% Cloths 1 Application(s) Topical daily  clopidogrel Tablet 75 milliGRAM(s) Oral daily  docusate sodium 100 milliGRAM(s) Oral three times a day  doxycycline hyclate Capsule 100 milliGRAM(s) Oral every 12 hours  DULoxetine 20 milliGRAM(s) Oral daily  epoetin nguyễn Injectable 8000 Unit(s) IV Push <User Schedule>  gabapentin 300 milliGRAM(s) Oral two times a day  heparin  Injectable 5000 Unit(s) SubCutaneous every 12 hours  hydrALAZINE 50 milliGRAM(s) Oral every 8 hours  isosorbide   mononitrate ER Tablet (IMDUR) 30 milliGRAM(s) Oral daily  levothyroxine 100 MICROGram(s) Oral daily  loratadine 10 milliGRAM(s) Oral daily  losartan 50 milliGRAM(s) Oral daily  melatonin 1 milliGRAM(s) Oral at bedtime  metoprolol tartrate 50 milliGRAM(s) Oral two times a day  multivitamin 1 Tablet(s) Oral daily  pantoprazole    Tablet 40 milliGRAM(s) Oral before breakfast  polyethylene glycol 3350 17 Gram(s) Oral daily  predniSONE   Tablet 60 milliGRAM(s) Oral daily  senna 2 Tablet(s) Oral at bedtime    Assessment and Plan:               79 year old male with PMH ESRD on HD, CAD s/p CABG, lung cancer s/p radiation with subsequent pulmonary fibrosis on 2L home O2 presents with SOB. Found to have MRSA in sputum culture, requiring high flow oxygen. ID and pulmonary on board. Palliative consulted given multiple co morbidities and frequent re hospitalization in past month.  Family decided DNR/DNI. patient was stared back on IV steroids, will taper steroids and dc planning .  Patient will need to follow up with vascular for perma cath, Pulmonary and cardiology as an outpatient.    1) Acute on chronic type 1 respiratory failure with hypoxia  - Sputum culture +ve for MRSA  - Was initially on  Vancomycin which was switched to Doxycycline by ID for total 14 doses from    - Continue Nebs and Steroid been tapered off initially but was placed by pulmonary IV steroid 40 mg q 8h, high flow oxygen tapered off to nasal cannula.  Will taper steroids to 40 mg q 12 h for 3 doses then 60 mg prednisone and prednisone taper  Palliative and pulmonary follow up.  CT chest results noticed.    2) History of Lung Cancer  - treated in past  - Outpatient follow up with Oncology    3) ESRD   - Continue HD on MWF    4) CAD, HLD and HTN  - Continue current medications    5) Hypothyroidism  - Continue Synthroid    MRSA in nares:  completed chd, Bactroban    family decided DNR/DNI and allow natural death.   family would like to take him home, refused rehab facility.     At baseline, patient uses wheel chair and needs assistance to move from bed to wheel chair.      HD in AM,

## 2019-02-07 NOTE — PROGRESS NOTE ADULT - ASSESSMENT
79 year old male with PMH ESRD on HD, CAD s/p CABG, lung cancer s/p radiation with subsequent pulmonary fibrosis on 2L home O2 presents with SOB. Found to have MRSA in sputum culture, requiring high flow oxygen. ID and pulmonary on board. Palliative consulted given multiple co morbidities and frequent re hospitalization in past month.  Family decided DNR/DNI. patient was stared back on IV steroids, will taper steroids and dc planning .  Patient will need to follow up with vascular for perma cath, Pulmonary and cardiology as an outpatient.    1) Acute on chronic type 1 respiratory failure with hypoxia  - Sputum culture +ve for MRSA  - Was initially on  Vancomycin which was switched to Doxycycline by ID for total 14 doses from 2/4   - Continue Nebs and Steroid been tapered off initially but was placed by pulmonary IV steroid 40 mg q 8h, high flow oxygen tapered off to nasal cannula.  Will taper steroids to 40 mg q 12 h for 3 doses then 60 mg prednisone and prednisone taper  Palliative and pulmonary follow up.  CT chest results noticed.      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  family decided DNR/DNI and allow natural death. 20 min spent in discussing GOC with family.  family would like to take him home, refused rehab facility.     At baseline, patient uses wheel chair and needs assistance to move from bed to wheel chair.

## 2019-02-07 NOTE — DISCHARGE NOTE ADULT - SECONDARY DIAGNOSIS.
Coronary artery disease with other form of angina pectoris Advance care planning Malignant neoplasm of lung, unspecified laterality, unspecified part of lung

## 2019-02-07 NOTE — PROGRESS NOTE ADULT - SUBJECTIVE AND OBJECTIVE BOX
KAUSHIK HOFFMAN    [id number]    79y      Male    CC:    INTERVAL HPI/OVERNIGHT EVENTS:    REVIEW OF SYSTEMS:    CONSTITUTIONAL: No fever, weight loss, or fatigue  RESPIRATORY: No cough, wheezing, hemoptysis; No shortness of breath  CARDIOVASCULAR: No chest pain, palpitations  GASTROINTESTINAL: No abdominal or epigastric pain. No nausea, vomiting  NEUROLOGICAL: No headaches, memory loss, loss of strength.  MISCELLANEOUS:      Vital Signs Last 24 Hrs  T(C): 36.6 (07 Feb 2019 09:07), Max: 36.8 (06 Feb 2019 11:39)  T(F): 97.8 (07 Feb 2019 09:07), Max: 98.3 (06 Feb 2019 11:39)  HR: 50 (07 Feb 2019 09:07) (50 - 74)  BP: 107/43 (07 Feb 2019 09:07) (107/43 - 141/58)  BP(mean): --  RR: 19 (07 Feb 2019 09:07) (18 - 20)  SpO2: 96% (07 Feb 2019 09:47) (94% - 99%)    PHYSICAL EXAM:    GENERAL: NAD, well-groomed  HEENT: PERRL, +EOMI  NECK: soft, Supple, No JVD,   CHEST/LUNG: Clear to auscultation bilaterally; No wheezing  HEART: S1S2+, Regular rate and rhythm; No murmurs, rubs, or gallops  ABDOMEN: Soft, Nontender, Nondistended; Bowel sounds present  EXTREMITIES:  2+ Peripheral Pulses, No clubbing, cyanosis, or edema  SKIN: No rashes or lesions  NEURO: AAOX3, no focal deficits, no motor r sensory loss  PSYCH: normal mood      LABS:                        9.2    13.3  )-----------( 355      ( 06 Feb 2019 08:41 )             31.1     02-06    132<L>  |  92<L>  |  54.0<H>  ----------------------------<  142<H>  5.2   |  23.0  |  6.30<H>    Ca    8.3<L>      06 Feb 2019 08:41    TPro  7.2  /  Alb  3.5  /  TBili  0.3<L>  /  DBili  x   /  AST  19  /  ALT  11  /  AlkPhos  51  02-06            MEDICATIONS  (STANDING):  ALBUTerol/ipratropium for Nebulization 3 milliLiter(s) Nebulizer every 6 hours  aspirin enteric coated 81 milliGRAM(s) Oral daily  atorvastatin 80 milliGRAM(s) Oral at bedtime  chlorhexidine 2% Cloths 1 Application(s) Topical daily  clopidogrel Tablet 75 milliGRAM(s) Oral daily  docusate sodium 100 milliGRAM(s) Oral three times a day  doxycycline hyclate Capsule 100 milliGRAM(s) Oral every 12 hours  DULoxetine 20 milliGRAM(s) Oral daily  epoetin nguyễn Injectable 8000 Unit(s) IV Push <User Schedule>  gabapentin 300 milliGRAM(s) Oral two times a day  heparin  Injectable 5000 Unit(s) SubCutaneous every 12 hours  hydrALAZINE 50 milliGRAM(s) Oral every 8 hours  isosorbide   mononitrate ER Tablet (IMDUR) 30 milliGRAM(s) Oral daily  levothyroxine 100 MICROGram(s) Oral daily  loratadine 10 milliGRAM(s) Oral daily  losartan 50 milliGRAM(s) Oral daily  melatonin 1 milliGRAM(s) Oral at bedtime  metoprolol tartrate 50 milliGRAM(s) Oral two times a day  multivitamin 1 Tablet(s) Oral daily  pantoprazole    Tablet 40 milliGRAM(s) Oral before breakfast  polyethylene glycol 3350 17 Gram(s) Oral daily  predniSONE   Tablet 60 milliGRAM(s) Oral daily  senna 2 Tablet(s) Oral at bedtime    MEDICATIONS  (PRN):  benzocaine 15 mG/menthol 3.6 mG Lozenge 1 Lozenge Oral every 2 hours PRN Sore Throat  benzonatate 100 milliGRAM(s) Oral every 8 hours PRN Cough  diphenhydrAMINE 25 milliGRAM(s) Oral every 8 hours PRN Rash and/or Itching      RADIOLOGY & ADDITIONAL TESTS: KAUSHIK HOFFMAN    [id number]    79y      Male      CC: Pneumonia    Overnight events:  Was seen and examined at bedside. Denied active complains. Patient was for dc today, but as per cm patient family needs to clean the bed and make houusehold arrangement, and requesting to be dc tomorrow am.  patient was c/o neuropathic pain in right limb. Gabapentin started.      HPI:  Pt is a 80yo M presenting w/ SOB found to have HCAP. PMH ESRD on HD, hx of lung cancer s/p radiation, HTN, Anemia, CAD s/p CABG, HLD, recent HCAP tx 3 weeks ago.   Patient has been having cough and SOB for the past 2-3 days and it has gotten progressively worse, he was ofund to be hypoxic earlier today w/ SpO2 78% and sent to the ED. He denies any fevers, chills but states he feels weak. He uses O2 at home 2-3 L NC. Earlier today he was sent in from his vas surgeons office due to the hypoxia. He denies any chest pain, palpitations, abd pain, diarrhea, constipation, melena, hematochezia. He does not produce urine.   He has no other complaints aside from feeling itchy. He denies any allergies to medications.   In ED patient was found to have RUL consolidation, RVP is pending, he also had elevated troponin and seen by cardiology. He has also been seen by nephro. Started on IV abx. (28 Jan 2019 17:49)      ROS:  As per interval history otherwise unremarkable.            Vital Signs Last 24 Hrs  T(C): 36.6 (07 Feb 2019 09:07), Max: 36.8 (06 Feb 2019 11:39)  T(F): 97.8 (07 Feb 2019 09:07), Max: 98.3 (06 Feb 2019 11:39)  HR: 50 (07 Feb 2019 09:07) (50 - 74)  BP: 107/43 (07 Feb 2019 09:07) (107/43 - 141/58)  BP(mean): --  RR: 19 (07 Feb 2019 09:07) (18 - 20)  SpO2: 96% (07 Feb 2019 09:47) (94% - 99%)      PHYSICAL EXAM:  General: Elderly male lying in bed comfortably. Frail and cachetic, on oxygen nasal cannula. Permacath in right upper chest.   HEENT: PERRLA. EOMI. Clear conjunctivae. Moist mucus membrane  Neck: Supple. No JVD.   Chest: CTA bilaterally - no wheezing, rales or rhonchi. No chest wall tenderness.   Heart: Normal S1 & S2. RRR.   Abdomen: Soft. Non-tender. Non-distended. + BS  Ext: No pedal edema. No calf tenderness. AVF in LUE. No knee swelling, tenderness and redness ordered.  Neuro: AAO x 3. No focal deficit. No speech disorder  Skin: Warm and Dry  Psychiatry: Normal mood and affect          LABS:                        9.2    13.3  )-----------( 355      ( 06 Feb 2019 08:41 )             31.1     02-06    132<L>  |  92<L>  |  54.0<H>  ----------------------------<  142<H>  5.2   |  23.0  |  6.30<H>    Ca    8.3<L>      06 Feb 2019 08:41    TPro  7.2  /  Alb  3.5  /  TBili  0.3<L>  /  DBili  x   /  AST  19  /  ALT  11  /  AlkPhos  51  02-06            MEDICATIONS  (STANDING):  ALBUTerol/ipratropium for Nebulization 3 milliLiter(s) Nebulizer every 6 hours  aspirin enteric coated 81 milliGRAM(s) Oral daily  atorvastatin 80 milliGRAM(s) Oral at bedtime  chlorhexidine 2% Cloths 1 Application(s) Topical daily  clopidogrel Tablet 75 milliGRAM(s) Oral daily  docusate sodium 100 milliGRAM(s) Oral three times a day  doxycycline hyclate Capsule 100 milliGRAM(s) Oral every 12 hours  DULoxetine 20 milliGRAM(s) Oral daily  epoetin nguyễn Injectable 8000 Unit(s) IV Push <User Schedule>  gabapentin 300 milliGRAM(s) Oral two times a day  heparin  Injectable 5000 Unit(s) SubCutaneous every 12 hours  hydrALAZINE 50 milliGRAM(s) Oral every 8 hours  isosorbide   mononitrate ER Tablet (IMDUR) 30 milliGRAM(s) Oral daily  levothyroxine 100 MICROGram(s) Oral daily  loratadine 10 milliGRAM(s) Oral daily  losartan 50 milliGRAM(s) Oral daily  melatonin 1 milliGRAM(s) Oral at bedtime  metoprolol tartrate 50 milliGRAM(s) Oral two times a day  multivitamin 1 Tablet(s) Oral daily  pantoprazole    Tablet 40 milliGRAM(s) Oral before breakfast  polyethylene glycol 3350 17 Gram(s) Oral daily  predniSONE   Tablet 60 milliGRAM(s) Oral daily  senna 2 Tablet(s) Oral at bedtime    MEDICATIONS  (PRN):  benzocaine 15 mG/menthol 3.6 mG Lozenge 1 Lozenge Oral every 2 hours PRN Sore Throat  benzonatate 100 milliGRAM(s) Oral every 8 hours PRN Cough  diphenhydrAMINE 25 milliGRAM(s) Oral every 8 hours PRN Rash and/or Itching      RADIOLOGY & ADDITIONAL TESTS:

## 2019-02-08 VITALS
RESPIRATION RATE: 18 BRPM | DIASTOLIC BLOOD PRESSURE: 65 MMHG | SYSTOLIC BLOOD PRESSURE: 135 MMHG | HEART RATE: 62 BPM | TEMPERATURE: 98 F | OXYGEN SATURATION: 97 %

## 2019-02-08 PROCEDURE — 80048 BASIC METABOLIC PNL TOTAL CA: CPT

## 2019-02-08 PROCEDURE — 84100 ASSAY OF PHOSPHORUS: CPT

## 2019-02-08 PROCEDURE — 36415 COLL VENOUS BLD VENIPUNCTURE: CPT

## 2019-02-08 PROCEDURE — 87186 SC STD MICRODIL/AGAR DIL: CPT

## 2019-02-08 PROCEDURE — 93970 EXTREMITY STUDY: CPT

## 2019-02-08 PROCEDURE — 71250 CT THORAX DX C-: CPT

## 2019-02-08 PROCEDURE — 84466 ASSAY OF TRANSFERRIN: CPT

## 2019-02-08 PROCEDURE — 99285 EMERGENCY DEPT VISIT HI MDM: CPT | Mod: 25

## 2019-02-08 PROCEDURE — 99261: CPT

## 2019-02-08 PROCEDURE — 96365 THER/PROPH/DIAG IV INF INIT: CPT

## 2019-02-08 PROCEDURE — 87633 RESP VIRUS 12-25 TARGETS: CPT

## 2019-02-08 PROCEDURE — 84484 ASSAY OF TROPONIN QUANT: CPT

## 2019-02-08 PROCEDURE — 93005 ELECTROCARDIOGRAM TRACING: CPT

## 2019-02-08 PROCEDURE — 85027 COMPLETE CBC AUTOMATED: CPT

## 2019-02-08 PROCEDURE — 83880 ASSAY OF NATRIURETIC PEPTIDE: CPT

## 2019-02-08 PROCEDURE — 83735 ASSAY OF MAGNESIUM: CPT

## 2019-02-08 PROCEDURE — 96375 TX/PRO/DX INJ NEW DRUG ADDON: CPT

## 2019-02-08 PROCEDURE — 83550 IRON BINDING TEST: CPT

## 2019-02-08 PROCEDURE — 80053 COMPREHEN METABOLIC PANEL: CPT

## 2019-02-08 PROCEDURE — 71045 X-RAY EXAM CHEST 1 VIEW: CPT

## 2019-02-08 PROCEDURE — 94640 AIRWAY INHALATION TREATMENT: CPT

## 2019-02-08 PROCEDURE — 84145 PROCALCITONIN (PCT): CPT

## 2019-02-08 PROCEDURE — 87040 BLOOD CULTURE FOR BACTERIA: CPT

## 2019-02-08 PROCEDURE — 87070 CULTURE OTHR SPECIMN AEROBIC: CPT

## 2019-02-08 PROCEDURE — 82728 ASSAY OF FERRITIN: CPT

## 2019-02-08 PROCEDURE — 99239 HOSP IP/OBS DSCHRG MGMT >30: CPT

## 2019-02-08 PROCEDURE — 87486 CHLMYD PNEUM DNA AMP PROBE: CPT

## 2019-02-08 PROCEDURE — 87581 M.PNEUMON DNA AMP PROBE: CPT

## 2019-02-08 PROCEDURE — 94760 N-INVAS EAR/PLS OXIMETRY 1: CPT

## 2019-02-08 PROCEDURE — 87340 HEPATITIS B SURFACE AG IA: CPT

## 2019-02-08 PROCEDURE — 90937 HEMODIALYSIS REPEATED EVAL: CPT

## 2019-02-08 PROCEDURE — 87640 STAPH A DNA AMP PROBE: CPT

## 2019-02-08 PROCEDURE — 83605 ASSAY OF LACTIC ACID: CPT

## 2019-02-08 PROCEDURE — 87798 DETECT AGENT NOS DNA AMP: CPT

## 2019-02-08 PROCEDURE — 83540 ASSAY OF IRON: CPT

## 2019-02-08 PROCEDURE — 80202 ASSAY OF VANCOMYCIN: CPT

## 2019-02-08 PROCEDURE — 93306 TTE W/DOPPLER COMPLETE: CPT

## 2019-02-08 PROCEDURE — 97163 PT EVAL HIGH COMPLEX 45 MIN: CPT

## 2019-02-08 PROCEDURE — 80061 LIPID PANEL: CPT

## 2019-02-08 PROCEDURE — 84443 ASSAY THYROID STIM HORMONE: CPT

## 2019-02-08 RX ORDER — FEXOFENADINE HCL 30 MG
1 TABLET ORAL
Qty: 7 | Refills: 0
Start: 2019-02-08

## 2019-02-08 RX ORDER — IPRATROPIUM/ALBUTEROL SULFATE 18-103MCG
3 AEROSOL WITH ADAPTER (GRAM) INHALATION
Qty: 0 | Refills: 0 | DISCHARGE
Start: 2019-02-08

## 2019-02-08 RX ORDER — GABAPENTIN 400 MG/1
1 CAPSULE ORAL
Qty: 14 | Refills: 0
Start: 2019-02-08 | End: 2019-02-14

## 2019-02-08 RX ADMIN — Medication 50 MILLIGRAM(S): at 07:42

## 2019-02-08 RX ADMIN — GABAPENTIN 300 MILLIGRAM(S): 400 CAPSULE ORAL at 13:50

## 2019-02-08 RX ADMIN — DULOXETINE HYDROCHLORIDE 20 MILLIGRAM(S): 30 CAPSULE, DELAYED RELEASE ORAL at 12:43

## 2019-02-08 RX ADMIN — GABAPENTIN 300 MILLIGRAM(S): 400 CAPSULE ORAL at 07:43

## 2019-02-08 RX ADMIN — Medication 100 MILLIGRAM(S): at 07:43

## 2019-02-08 RX ADMIN — LORATADINE 10 MILLIGRAM(S): 10 TABLET ORAL at 12:42

## 2019-02-08 RX ADMIN — Medication 1 TABLET(S): at 12:43

## 2019-02-08 RX ADMIN — HEPARIN SODIUM 5000 UNIT(S): 5000 INJECTION INTRAVENOUS; SUBCUTANEOUS at 07:43

## 2019-02-08 RX ADMIN — Medication 100 MILLIGRAM(S): at 12:42

## 2019-02-08 RX ADMIN — CLOPIDOGREL BISULFATE 75 MILLIGRAM(S): 75 TABLET, FILM COATED ORAL at 12:42

## 2019-02-08 RX ADMIN — Medication 100 MICROGRAM(S): at 07:43

## 2019-02-08 RX ADMIN — ERYTHROPOIETIN 8000 UNIT(S): 10000 INJECTION, SOLUTION INTRAVENOUS; SUBCUTANEOUS at 10:37

## 2019-02-08 RX ADMIN — Medication 81 MILLIGRAM(S): at 12:45

## 2019-02-08 RX ADMIN — PANTOPRAZOLE SODIUM 40 MILLIGRAM(S): 20 TABLET, DELAYED RELEASE ORAL at 07:42

## 2019-02-08 RX ADMIN — Medication 60 MILLIGRAM(S): at 07:43

## 2019-02-08 RX ADMIN — LOSARTAN POTASSIUM 50 MILLIGRAM(S): 100 TABLET, FILM COATED ORAL at 07:42

## 2019-02-08 RX ADMIN — CHLORHEXIDINE GLUCONATE 1 APPLICATION(S): 213 SOLUTION TOPICAL at 12:39

## 2019-02-08 RX ADMIN — ISOSORBIDE MONONITRATE 30 MILLIGRAM(S): 60 TABLET, EXTENDED RELEASE ORAL at 12:43

## 2019-02-08 RX ADMIN — Medication 50 MILLIGRAM(S): at 13:50

## 2019-02-08 NOTE — PROGRESS NOTE ADULT - SUBJECTIVE AND OBJECTIVE BOX
KAUSHIK HOFFMAN    [id number]    79y      Male        CC: Pneumonia    Overnight events:  Was seen and examined at bedside. Denied active complains. Patient is for  dc today. Neuropathic pain in right limb is better controlled today. Gabapentin started.      HPI:  Pt is a 80yo M presenting w/ SOB found to have HCAP. PMH ESRD on HD, hx of lung cancer s/p radiation, HTN, Anemia, CAD s/p CABG, HLD, recent HCAP tx 3 weeks ago.   Patient has been having cough and SOB for the past 2-3 days and it has gotten progressively worse, he was ofund to be hypoxic earlier today w/ SpO2 78% and sent to the ED. He denies any fevers, chills but states he feels weak. He uses O2 at home 2-3 L NC. Earlier today he was sent in from his [facility] surgeons office due to the hypoxia. He denies any chest pain, palpitations, abd pain, diarrhea, constipation, melena, hematochezia. He does not produce urine.   He has no other complaints aside from feeling itchy. He denies any allergies to medications.   In ED patient was found to have RUL consolidation, RVP is pending, he also had elevated troponin and seen by cardiology. He has also been seen by nephro. Started on IV abx. (28 Jan 2019 17:49)      ROS:  As per interval history otherwise unremarkable.      Vital Signs Last 24 Hrs  T(C): 36.4 (08 Feb 2019 09:11), Max: 37.1 (08 Feb 2019 00:14)  T(F): 97.6 (08 Feb 2019 09:11), Max: 98.7 (08 Feb 2019 00:14)  HR: 64 (08 Feb 2019 09:11) (54 - 65)  BP: 117/56 (08 Feb 2019 09:11) (117/56 - 156/60)  BP(mean): --  RR: 18 (08 Feb 2019 09:11) (18 - 19)  SpO2: 94% (08 Feb 2019 09:11) (94% - 99%)    PHYSICAL EXAM:  General: Elderly male lying in bed comfortably. Frail and cachetic, on oxygen nasal cannula. Permacath in right upper chest.   HEENT: PERRLA. EOMI. Clear conjunctivae. Moist mucus membrane  Neck: Supple. No JVD.   Chest: CTA bilaterally - no wheezing, rales or rhonchi. No chest wall tenderness.   Heart: Normal S1 & S2. RRR.   Abdomen: Soft. Non-tender. Non-distended. + BS  Ext: No pedal edema. No calf tenderness. AVF in LUE. No knee swelling, tenderness and redness ordered.  Neuro: AAO x 3. No focal deficit. No speech disorder  Skin: Warm and Dry  Psychiatry: Normal mood and affect          LABS:                        9.2    13.3  )-----------( 355      ( 06 Feb 2019 08:41 )             31.1     02-06    132<L>  |  92<L>  |  54.0<H>  ----------------------------<  142<H>  5.2   |  23.0  |  6.30<H>    Ca    8.3<L>      06 Feb 2019 08:41    TPro  7.2  /  Alb  3.5  /  TBili  0.3<L>  /  DBili  x   /  AST  19  /  ALT  11  /  AlkPhos  51  02-06            MEDICATIONS  (STANDING):  ALBUTerol/ipratropium for Nebulization 3 milliLiter(s) Nebulizer every 6 hours  aspirin enteric coated 81 milliGRAM(s) Oral daily  atorvastatin 80 milliGRAM(s) Oral at bedtime  chlorhexidine 2% Cloths 1 Application(s) Topical daily  clopidogrel Tablet 75 milliGRAM(s) Oral daily  docusate sodium 100 milliGRAM(s) Oral three times a day  doxycycline hyclate Capsule 100 milliGRAM(s) Oral every 12 hours  DULoxetine 20 milliGRAM(s) Oral daily  epoetin nguyễn Injectable 8000 Unit(s) IV Push <User Schedule>  gabapentin 300 milliGRAM(s) Oral two times a day  heparin  Injectable 5000 Unit(s) SubCutaneous every 12 hours  hydrALAZINE 50 milliGRAM(s) Oral every 8 hours  isosorbide   mononitrate ER Tablet (IMDUR) 30 milliGRAM(s) Oral daily  levothyroxine 100 MICROGram(s) Oral daily  loratadine 10 milliGRAM(s) Oral daily  losartan 50 milliGRAM(s) Oral daily  melatonin 1 milliGRAM(s) Oral at bedtime  metoprolol tartrate 50 milliGRAM(s) Oral two times a day  multivitamin 1 Tablet(s) Oral daily  pantoprazole    Tablet 40 milliGRAM(s) Oral before breakfast  polyethylene glycol 3350 17 Gram(s) Oral daily  predniSONE   Tablet 60 milliGRAM(s) Oral daily  senna 2 Tablet(s) Oral at bedtime    MEDICATIONS  (PRN):  benzocaine 15 mG/menthol 3.6 mG Lozenge 1 Lozenge Oral every 2 hours PRN Sore Throat  benzonatate 100 milliGRAM(s) Oral every 8 hours PRN Cough  diphenhydrAMINE 25 milliGRAM(s) Oral every 8 hours PRN Rash and/or Itching                                        MEDICATIONS  (STANDING):  aspirin enteric coated 81 milliGRAM(s) Oral daily  atorvastatin 80 milliGRAM(s) Oral at bedtime  chlorhexidine 2% Cloths 1 Application(s) Topical daily  clopidogrel Tablet 75 milliGRAM(s) Oral daily  docusate sodium 100 milliGRAM(s) Oral three times a day  doxycycline hyclate Capsule 100 milliGRAM(s) Oral every 12 hours  DULoxetine 20 milliGRAM(s) Oral daily  epoetin nguyễn Injectable 8000 Unit(s) IV Push <User Schedule>  gabapentin 300 milliGRAM(s) Oral three times a day  heparin  Injectable 5000 Unit(s) SubCutaneous every 12 hours  hydrALAZINE 50 milliGRAM(s) Oral every 8 hours  isosorbide   mononitrate ER Tablet (IMDUR) 30 milliGRAM(s) Oral daily  levothyroxine 100 MICROGram(s) Oral daily  loratadine 10 milliGRAM(s) Oral daily  losartan 50 milliGRAM(s) Oral daily  melatonin 1 milliGRAM(s) Oral at bedtime  metoprolol tartrate 50 milliGRAM(s) Oral two times a day  multivitamin 1 Tablet(s) Oral daily  pantoprazole    Tablet 40 milliGRAM(s) Oral before breakfast  polyethylene glycol 3350 17 Gram(s) Oral daily  predniSONE   Tablet 60 milliGRAM(s) Oral daily  senna 2 Tablet(s) Oral at bedtime    MEDICATIONS  (PRN):  ALBUTerol/ipratropium for Nebulization 3 milliLiter(s) Nebulizer every 6 hours PRN Shortness of Breath and/or Wheezing  benzocaine 15 mG/menthol 3.6 mG Lozenge 1 Lozenge Oral every 2 hours PRN Sore Throat  benzonatate 100 milliGRAM(s) Oral every 8 hours PRN Cough  diphenhydrAMINE 25 milliGRAM(s) Oral every 8 hours PRN Rash and/or Itching      RADIOLOGY & ADDITIONAL TESTS:

## 2019-02-08 NOTE — PROGRESS NOTE ADULT - PROVIDER SPECIALTY LIST ADULT
Cardiology
Hospitalist
Infectious Disease
Nephrology
Pulmonology
Hospitalist
Hospitalist
Infectious Disease
Infectious Disease
Nephrology
Nephrology
Pulmonology
Vascular Surgery
Cardiology
Hospitalist
Hospitalist
Infectious Disease
Nephrology
Hospitalist
Infectious Disease
Nephrology
Pulmonology
Hospitalist
Hospitalist
Palliative Care

## 2019-02-08 NOTE — PROGRESS NOTE ADULT - NSHPATTENDINGPLANDISCUSS_GEN_ALL_CORE
Dr. Mcnair
medical team
patient, rn, cm and sw
medical team
medical team
Patient, ID, Pulmonary and RN
medical team
Patient and RN
patient, rn, cm and sw
Patient, Patient's Wife, NP and RN

## 2019-02-08 NOTE — PROGRESS NOTE ADULT - REASON FOR ADMISSION
HCAP

## 2019-02-08 NOTE — PROGRESS NOTE ADULT - SUBJECTIVE AND OBJECTIVE BOX
[facility] DIVISION OF KIDNEY DISEASES AND HYPERTENSION -- FOLLOW UP NOTE  --------------------------------------------------------------------------------  Chief Complaint:  ESRD/Ongoing hemodialysis requirement    24 hour events/subjective:  Seen/examined saw on HD  Lying in bed NAD      PAST HISTORY  --------------------------------------------------------------------------------  No significant changes to PMH, PSH, FHx, SHx, unless otherwise noted    ALLERGIES & MEDICATIONS  --------------------------------------------------------------------------------  Allergies    No Known Allergies    Intolerances      Standing Inpatient Medications  aspirin enteric coated 81 milliGRAM(s) Oral daily  atorvastatin 80 milliGRAM(s) Oral at bedtime  chlorhexidine 2% Cloths 1 Application(s) Topical daily  clopidogrel Tablet 75 milliGRAM(s) Oral daily  docusate sodium 100 milliGRAM(s) Oral three times a day  doxycycline hyclate Capsule 100 milliGRAM(s) Oral every 12 hours  DULoxetine 20 milliGRAM(s) Oral daily  epoetin nguyễn Injectable 8000 Unit(s) IV Push <User Schedule>  gabapentin 300 milliGRAM(s) Oral three times a day  heparin  Injectable 5000 Unit(s) SubCutaneous every 12 hours  hydrALAZINE 50 milliGRAM(s) Oral every 8 hours  isosorbide   mononitrate ER Tablet (IMDUR) 30 milliGRAM(s) Oral daily  levothyroxine 100 MICROGram(s) Oral daily  loratadine 10 milliGRAM(s) Oral daily  losartan 50 milliGRAM(s) Oral daily  melatonin 1 milliGRAM(s) Oral at bedtime  metoprolol tartrate 50 milliGRAM(s) Oral two times a day  multivitamin 1 Tablet(s) Oral daily  pantoprazole    Tablet 40 milliGRAM(s) Oral before breakfast  polyethylene glycol 3350 17 Gram(s) Oral daily  predniSONE   Tablet 60 milliGRAM(s) Oral daily  senna 2 Tablet(s) Oral at bedtime    PRN Inpatient Medications  ALBUTerol/ipratropium for Nebulization 3 milliLiter(s) Nebulizer every 6 hours PRN  benzocaine 15 mG/menthol 3.6 mG Lozenge 1 Lozenge Oral every 2 hours PRN  benzonatate 100 milliGRAM(s) Oral every 8 hours PRN  diphenhydrAMINE 25 milliGRAM(s) Oral every 8 hours PRN      REVIEW OF SYSTEMS  --------------------------------------------------------------------------------  Unable to obtain    VITALS/PHYSICAL EXAM  --------------------------------------------------------------------------------  T(C): 36.5 (02-08-19 @ 11:30), Max: 37.1 (02-08-19 @ 00:14)  HR: 60 (02-08-19 @ 11:30) (54 - 65)  BP: 143/55 (02-08-19 @ 11:30) (117/56 - 156/60)  RR: 17 (02-08-19 @ 11:30) (17 - 19)  SpO2: 100% (02-08-19 @ 11:30) (94% - 100%)  Wt(kg): --        02-08-19 @ 07:01  -  02-08-19 @ 13:44  --------------------------------------------------------  IN: 800 mL / OUT: 1300 mL / NET: -500 mL      Physical Exam:  	Gen: NAD, frail, debilitated, pale  	HEENT: PERRL, supple neck, clear oropharynx  	Pulm:  Rales B/L  	CV: RRR, S1S2; no rub  	Back: No spinal or CVA tenderness; no sacral edema  	Abd: +BS, soft, nontender/nondistended  	: No suprapubic tenderness  	UE: Warm, FROM, no clubbing, intact strength; no edema; no asterixis  	LE: Warm, FROM, no clubbing, intact strength; minimal edema  	Neuro: No focal deficits, intact gait  	Psych: Normal affect and mood  	Skin: Warm, without rashes  	Vascular access: AVF, Right CVC    LABS/STUDIES  --------------------------------------------------------------------------------                Creatinine Trend:  SCr 6.30 [02-06 @ 08:41]  SCr 7.30 [02-04 @ 14:37]  SCr 3.07 [02-01 @ 17:54]  SCr 6.20 [01-30 @ 07:49]  SCr 4.87 [01-29 @ 11:39]        Iron 34, TIBC 235, %sat 14      [01-29-19 @ 11:39]  Ferritin 1049      [01-29-19 @ 11:39]  TSH 2.07      [01-30-19 @ 07:49]  Lipid: chol 159, , HDL 32, LDL 94      [01-29-19 @ 11:39]

## 2019-02-08 NOTE — PROGRESS NOTE ADULT - ASSESSMENT
Assessment and Plan:     79 year old male with PMH ESRD on HD, CAD s/p CABG, lung cancer s/p radiation with subsequent pulmonary fibrosis on 2L home O2 presents with SOB. Found to have MRSA in sputum culture, requiring high flow oxygen. ID and pulmonary on board. Palliative consulted given multiple co morbidities and frequent re hospitalization in past month.  Family decided DNR/DNI. patient was stared back on IV steroids, will taper steroids and dc planning .  Patient will need to follow up with vascular for perma cath removal, Pulmonary and cardiology follow up as an outpatient.    1) Acute on chronic type 1 respiratory failure with hypoxia  - Sputum culture +ve for MRSA  - Was initially on  Vancomycin which was switched to Doxycycline by ID for total 14 doses from 2/4   - Continue Nebs and Steroid been tapered off initially but was placed by pulmonary IV steroid 40 mg q 8h, high flow oxygen tapered off to nasal cannula.  Will taper steroids to 40 mg q 12 h for 3 doses then 60 mg prednisone and prednisone taper  Palliative and pulmonary follow up.  CT chest results noticed.      2) History of Lung Cancer  - treated in past  - Outpatient follow up with Oncology    3) ESRD   - Continue HD on MWF  - Renal following    4) CAD, HLD and HTN  - Continue current medications    5) Hypothyroidism  - Continue Synthroid    MRSA in nares:  completed chd, bactroban    Neuropathic pain:  improved with gabapentin    DVT Prophylaxis --  heparin     GOC:  Had conversation with family members daughter Carin, son in law, wife and patient along with palliative team Dr Song.  family decided DNR/DNI and allow natural death. 20 min spent in discussing GOC with family.  family would like to take him home, refused rehab facility.     At baseline, patient uses wheel chair and needs assistance to move from bed to wheel chair.

## 2019-02-08 NOTE — PROGRESS NOTE ADULT - ASSESSMENT
1. ESRD on HD  2. RUL PN, Radiation Pneumonitis  3. COPD - O2 dependent  4. Cardiomegally - S/P CABG, Severe LV dysfunction  5. Malnutrition   6. Anemia      HD today  Continue IV Antibiotics; MRSA sputum cx  On EPO  Continue nutritional support    d/w primary team

## 2019-02-09 LAB — HBV SURFACE AG SER-ACNC: SIGNIFICANT CHANGE UP

## 2019-02-11 ENCOUNTER — INBOUND DOCUMENT (OUTPATIENT)
Age: 79
End: 2019-02-11

## 2019-02-13 ENCOUNTER — APPOINTMENT (OUTPATIENT)
Dept: PULMONOLOGY | Facility: CLINIC | Age: 79
End: 2019-02-13

## 2019-02-19 ENCOUNTER — INBOUND DOCUMENT (OUTPATIENT)
Age: 79
End: 2019-02-19

## 2019-02-20 ENCOUNTER — INBOUND DOCUMENT (OUTPATIENT)
Age: 79
End: 2019-02-20

## 2019-02-20 NOTE — CDI QUERY NOTE - NSCDIOTHERTXTBX_GEN_ALL_CORE_HH
This patient is documented with pneumonia.  Please specify type of Pneumonia known or suspected/probable/likely:    Supporting Documentation and/or Clinical Evidence:    pt adm with SOB, hypoxia found to have RUL pna  H&P:  acute on chronic hypoxic resp failure 2/2 HCAP  HCAP documented in progress notes  1/30/18 attending note: HCAP suspected pseudomonal pna  + MRSA sputum documented  ID changed abx from Vanco to doxycycline 2/4  Palliative documenting MRSA pna    SPUTUM CULTURE:  Culture - Sputum . (01.30.19 @ 09:16)    Specimen Source: .Sputum    Culture Results:   Few Methicillin resistant Staphylococcus aureus  Few Routine respiratory miguelina present  .  TYPE: (C=Critical, N=Notification, A=Abnormal) C  TESTS:  _ MRSA  DATE/TIME CALLED: _ 02/02/2019 11:58:21  CALLED TO: Fermin Farooq RN  READ BACK (2 Patient Identifiers)(Y/N): _ Y  READ BACK VALUES (Y/N): _ Y  CALLED BY: Fermin Rothman    Sent copy to IC and logistics.    Organism Identification: Methicillin resistant Staphylococcus aureus    Organism: Methicillin resistant Staphylococcus aureus    Method Type: LIVIA      ANTIBIOTICS:  doxycycline hyclate Capsule   100 milliGRAM(s) Oral (02-04-19)   100 milliGRAM(s) Oral (02-04-19)   100 milliGRAM(s) Oral (02-05-19)   100 milliGRAM(s) Oral (02-05-19)   100 milliGRAM(s) Oral (02-06-19)   100 milliGRAM(s) Oral (02-06-19)   100 milliGRAM(s) Oral (02-07-19)   100 milliGRAM(s) Oral (02-08-19)    piperacillin/tazobactam IVPB.   200 mL/Hr IV Intermittent (01-28-19)    piperacillin/tazobactam IVPB.   25 mL/Hr IV Intermittent (01-29-19)   25 mL/Hr IV Intermittent (01-29-19)   25 mL/Hr IV Intermittent (01-30-19)   25 mL/Hr IV Intermittent (01-30-19)   25 mL/Hr IV Intermittent (01-31-19)    vancomycin  IVPB   250 mL/Hr IV Intermittent (01-28-19)    vancomycin  IVPB   250 mL/Hr IV Intermittent (02-02-19)      Chest xray impression:  < from: Xray Chest 1 View AP/PA. (01.28.19 @ 13:06) >  IMPRESSION:   RIGHT greater than LEFT multifocal LEFT perihilar lower   lobe airspace consolidations and/or effusions..         CT SCAN:   < from: CT Chest No Cont (01.28.19 @ 14:03) >  IMPRESSION:     There is a new region of consolidation in the right upper lobe which was   not seen on 1/2/2019.    Diffuse interstitial disease in the right lung, unchanged.    Bilateral pleural effusions, the right is unchanged and the left is   larger since the prior study.    Increasing atelectasis at the left lung base.    Extensive emphysematous changes, unchanged..     < from: CT Chest No Cont (02.04.19 @ 11:06) >  IMPRESSION:   Right upper and lower lobe opacities, unchanged in the right upper lobe   and increased in the right lower lobe since 1/28/2019; the differential   includes pneumonia; follow-up is recommended.    Small to moderate left pleural effusion, increased in size since   1/28/2019.    Loculated small right pleural effusion, unchanged.                Please clarify, in dc summary addendum, the type of pneumonia known or suspected/probable/likely patient was treated for.  (HCAP codes to simple pna only)    Pt treated for probable - MRSA pna                                     - pseudomonas pna                                     - other pneumonia (specify type)                                     - pneumonia ruled out    Thank you.

## 2019-02-25 ENCOUNTER — APPOINTMENT (OUTPATIENT)
Dept: VASCULAR SURGERY | Facility: CLINIC | Age: 79
End: 2019-02-25

## 2019-03-01 ENCOUNTER — OUTPATIENT (OUTPATIENT)
Dept: OUTPATIENT SERVICES | Facility: HOSPITAL | Age: 79
LOS: 1 days | End: 2019-03-01
Payer: MEDICARE

## 2019-03-01 DIAGNOSIS — Z95.1 PRESENCE OF AORTOCORONARY BYPASS GRAFT: Chronic | ICD-10-CM

## 2019-03-01 PROCEDURE — G9001: CPT

## 2019-03-08 ENCOUNTER — INPATIENT (INPATIENT)
Facility: HOSPITAL | Age: 79
LOS: 5 days | Discharge: HOME | DRG: 286 | End: 2019-03-14
Attending: INTERNAL MEDICINE | Admitting: INTERNAL MEDICINE
Payer: COMMERCIAL

## 2019-03-08 VITALS
HEIGHT: 66 IN | DIASTOLIC BLOOD PRESSURE: 75 MMHG | OXYGEN SATURATION: 97 % | HEART RATE: 61 BPM | TEMPERATURE: 98 F | WEIGHT: 121.25 LBS | RESPIRATION RATE: 20 BRPM | SYSTOLIC BLOOD PRESSURE: 195 MMHG

## 2019-03-08 DIAGNOSIS — J81.0 ACUTE PULMONARY EDEMA: ICD-10-CM

## 2019-03-08 DIAGNOSIS — R51 HEADACHE: ICD-10-CM

## 2019-03-08 DIAGNOSIS — R06.00 DYSPNEA, UNSPECIFIED: ICD-10-CM

## 2019-03-08 DIAGNOSIS — I16.1 HYPERTENSIVE EMERGENCY: ICD-10-CM

## 2019-03-08 DIAGNOSIS — C34.90 MALIGNANT NEOPLASM OF UNSPECIFIED PART OF UNSPECIFIED BRONCHUS OR LUNG: ICD-10-CM

## 2019-03-08 DIAGNOSIS — J96.01 ACUTE RESPIRATORY FAILURE WITH HYPOXIA: ICD-10-CM

## 2019-03-08 DIAGNOSIS — Z95.1 PRESENCE OF AORTOCORONARY BYPASS GRAFT: Chronic | ICD-10-CM

## 2019-03-08 LAB
ALBUMIN SERPL ELPH-MCNC: 3.5 G/DL — SIGNIFICANT CHANGE UP (ref 3.3–5.2)
ALP SERPL-CCNC: 60 U/L — SIGNIFICANT CHANGE UP (ref 40–120)
ALT FLD-CCNC: 9 U/L — SIGNIFICANT CHANGE UP
ANION GAP SERPL CALC-SCNC: 12 MMOL/L — SIGNIFICANT CHANGE UP (ref 5–17)
APTT BLD: 34.6 SEC — SIGNIFICANT CHANGE UP (ref 27.5–36.3)
AST SERPL-CCNC: 28 U/L — SIGNIFICANT CHANGE UP
BASOPHILS # BLD AUTO: 0.1 K/UL — SIGNIFICANT CHANGE UP (ref 0–0.2)
BASOPHILS NFR BLD AUTO: 1 % — SIGNIFICANT CHANGE UP (ref 0–2)
BILIRUB SERPL-MCNC: 0.3 MG/DL — LOW (ref 0.4–2)
BUN SERPL-MCNC: 9 MG/DL — SIGNIFICANT CHANGE UP (ref 8–20)
CALCIUM SERPL-MCNC: 8.7 MG/DL — SIGNIFICANT CHANGE UP (ref 8.6–10.2)
CHLORIDE SERPL-SCNC: 93 MMOL/L — LOW (ref 98–107)
CO2 SERPL-SCNC: 27 MMOL/L — SIGNIFICANT CHANGE UP (ref 22–29)
CREAT SERPL-MCNC: 2.82 MG/DL — HIGH (ref 0.5–1.3)
EOSINOPHIL # BLD AUTO: 0.8 K/UL — HIGH (ref 0–0.5)
EOSINOPHIL NFR BLD AUTO: 13.7 % — HIGH (ref 0–5)
ERYTHROCYTE [SEDIMENTATION RATE] IN BLOOD: 36 MM/HR — HIGH (ref 0–20)
GAS PNL BLDA: SIGNIFICANT CHANGE UP
GLUCOSE SERPL-MCNC: 104 MG/DL — SIGNIFICANT CHANGE UP (ref 70–115)
HCT VFR BLD CALC: 39.8 % — LOW (ref 42–52)
HGB BLD-MCNC: 12.2 G/DL — LOW (ref 14–18)
INR BLD: 1.03 RATIO — SIGNIFICANT CHANGE UP (ref 0.88–1.16)
LYMPHOCYTES # BLD AUTO: 2.2 K/UL — SIGNIFICANT CHANGE UP (ref 1–4.8)
LYMPHOCYTES # BLD AUTO: 37.4 % — SIGNIFICANT CHANGE UP (ref 20–55)
MCHC RBC-ENTMCNC: 29.3 PG — SIGNIFICANT CHANGE UP (ref 27–31)
MCHC RBC-ENTMCNC: 30.7 G/DL — LOW (ref 32–36)
MCV RBC AUTO: 95.4 FL — HIGH (ref 80–94)
MONOCYTES # BLD AUTO: 0.7 K/UL — SIGNIFICANT CHANGE UP (ref 0–0.8)
MONOCYTES NFR BLD AUTO: 11.3 % — HIGH (ref 3–10)
NEUTROPHILS # BLD AUTO: 2.2 K/UL — SIGNIFICANT CHANGE UP (ref 1.8–8)
NEUTROPHILS NFR BLD AUTO: 36.6 % — LOW (ref 37–73)
PLATELET # BLD AUTO: 185 K/UL — SIGNIFICANT CHANGE UP (ref 150–400)
POTASSIUM SERPL-MCNC: 4.4 MMOL/L — SIGNIFICANT CHANGE UP (ref 3.5–5.3)
POTASSIUM SERPL-SCNC: 4.4 MMOL/L — SIGNIFICANT CHANGE UP (ref 3.5–5.3)
PROT SERPL-MCNC: 7.2 G/DL — SIGNIFICANT CHANGE UP (ref 6.6–8.7)
PROTHROM AB SERPL-ACNC: 11.8 SEC — SIGNIFICANT CHANGE UP (ref 10–12.9)
RBC # BLD: 4.17 M/UL — LOW (ref 4.6–6.2)
RBC # FLD: 17.8 % — HIGH (ref 11–15.6)
SODIUM SERPL-SCNC: 132 MMOL/L — LOW (ref 135–145)
TSH SERPL-MCNC: 6.1 UIU/ML — HIGH (ref 0.27–4.2)
WBC # BLD: 5.9 K/UL — SIGNIFICANT CHANGE UP (ref 4.8–10.8)
WBC # FLD AUTO: 5.9 K/UL — SIGNIFICANT CHANGE UP (ref 4.8–10.8)

## 2019-03-08 PROCEDURE — 99285 EMERGENCY DEPT VISIT HI MDM: CPT

## 2019-03-08 PROCEDURE — 99223 1ST HOSP IP/OBS HIGH 75: CPT

## 2019-03-08 PROCEDURE — 90937 HEMODIALYSIS REPEATED EVAL: CPT | Mod: GC

## 2019-03-08 PROCEDURE — 93010 ELECTROCARDIOGRAM REPORT: CPT

## 2019-03-08 PROCEDURE — 70450 CT HEAD/BRAIN W/O DYE: CPT | Mod: 26

## 2019-03-08 PROCEDURE — 71045 X-RAY EXAM CHEST 1 VIEW: CPT | Mod: 26

## 2019-03-08 PROCEDURE — 99223 1ST HOSP IP/OBS HIGH 75: CPT | Mod: GC,25

## 2019-03-08 RX ORDER — CLOPIDOGREL BISULFATE 75 MG/1
75 TABLET, FILM COATED ORAL DAILY
Refills: 0 | Status: DISCONTINUED | OUTPATIENT
Start: 2019-03-08 | End: 2019-03-14

## 2019-03-08 RX ORDER — METOPROLOL TARTRATE 50 MG
50 TABLET ORAL
Refills: 0 | Status: DISCONTINUED | OUTPATIENT
Start: 2019-03-08 | End: 2019-03-12

## 2019-03-08 RX ORDER — DOCUSATE SODIUM 100 MG
100 CAPSULE ORAL THREE TIMES A DAY
Refills: 0 | Status: DISCONTINUED | OUTPATIENT
Start: 2019-03-08 | End: 2019-03-14

## 2019-03-08 RX ORDER — LORATADINE 10 MG/1
10 TABLET ORAL DAILY
Refills: 0 | Status: DISCONTINUED | OUTPATIENT
Start: 2019-03-08 | End: 2019-03-14

## 2019-03-08 RX ORDER — PANTOPRAZOLE SODIUM 20 MG/1
40 TABLET, DELAYED RELEASE ORAL
Refills: 0 | Status: DISCONTINUED | OUTPATIENT
Start: 2019-03-08 | End: 2019-03-14

## 2019-03-08 RX ORDER — LOSARTAN POTASSIUM 100 MG/1
50 TABLET, FILM COATED ORAL DAILY
Refills: 0 | Status: DISCONTINUED | OUTPATIENT
Start: 2019-03-08 | End: 2019-03-09

## 2019-03-08 RX ORDER — ISOSORBIDE MONONITRATE 60 MG/1
30 TABLET, EXTENDED RELEASE ORAL DAILY
Refills: 0 | Status: DISCONTINUED | OUTPATIENT
Start: 2019-03-08 | End: 2019-03-10

## 2019-03-08 RX ORDER — HYDRALAZINE HCL 50 MG
10 TABLET ORAL ONCE
Refills: 0 | Status: COMPLETED | OUTPATIENT
Start: 2019-03-08 | End: 2019-03-08

## 2019-03-08 RX ORDER — DIPHENHYDRAMINE HCL 50 MG
25 CAPSULE ORAL ONCE
Refills: 0 | Status: COMPLETED | OUTPATIENT
Start: 2019-03-08 | End: 2019-03-08

## 2019-03-08 RX ORDER — METOCLOPRAMIDE HCL 10 MG
10 TABLET ORAL ONCE
Refills: 0 | Status: COMPLETED | OUTPATIENT
Start: 2019-03-08 | End: 2019-03-08

## 2019-03-08 RX ORDER — ATORVASTATIN CALCIUM 80 MG/1
80 TABLET, FILM COATED ORAL AT BEDTIME
Refills: 0 | Status: DISCONTINUED | OUTPATIENT
Start: 2019-03-08 | End: 2019-03-14

## 2019-03-08 RX ORDER — SENNA PLUS 8.6 MG/1
2 TABLET ORAL AT BEDTIME
Refills: 0 | Status: DISCONTINUED | OUTPATIENT
Start: 2019-03-08 | End: 2019-03-14

## 2019-03-08 RX ORDER — GABAPENTIN 400 MG/1
300 CAPSULE ORAL
Refills: 0 | Status: DISCONTINUED | OUTPATIENT
Start: 2019-03-08 | End: 2019-03-14

## 2019-03-08 RX ORDER — HYDRALAZINE HCL 50 MG
50 TABLET ORAL EVERY 8 HOURS
Refills: 0 | Status: DISCONTINUED | OUTPATIENT
Start: 2019-03-08 | End: 2019-03-09

## 2019-03-08 RX ORDER — ASPIRIN/CALCIUM CARB/MAGNESIUM 324 MG
81 TABLET ORAL DAILY
Refills: 0 | Status: DISCONTINUED | OUTPATIENT
Start: 2019-03-08 | End: 2019-03-14

## 2019-03-08 RX ORDER — AMLODIPINE BESYLATE 2.5 MG/1
10 TABLET ORAL DAILY
Refills: 0 | Status: DISCONTINUED | OUTPATIENT
Start: 2019-03-08 | End: 2019-03-09

## 2019-03-08 RX ORDER — HEPARIN SODIUM 5000 [USP'U]/ML
5000 INJECTION INTRAVENOUS; SUBCUTANEOUS EVERY 12 HOURS
Refills: 0 | Status: DISCONTINUED | OUTPATIENT
Start: 2019-03-08 | End: 2019-03-14

## 2019-03-08 RX ORDER — DULOXETINE HYDROCHLORIDE 30 MG/1
20 CAPSULE, DELAYED RELEASE ORAL DAILY
Refills: 0 | Status: DISCONTINUED | OUTPATIENT
Start: 2019-03-08 | End: 2019-03-14

## 2019-03-08 RX ORDER — LEVOTHYROXINE SODIUM 125 MCG
100 TABLET ORAL DAILY
Refills: 0 | Status: DISCONTINUED | OUTPATIENT
Start: 2019-03-08 | End: 2019-03-14

## 2019-03-08 RX ADMIN — Medication 100 MILLIGRAM(S): at 22:10

## 2019-03-08 RX ADMIN — Medication 10 MILLIGRAM(S): at 04:12

## 2019-03-08 RX ADMIN — ATORVASTATIN CALCIUM 80 MILLIGRAM(S): 80 TABLET, FILM COATED ORAL at 22:10

## 2019-03-08 RX ADMIN — PANTOPRAZOLE SODIUM 40 MILLIGRAM(S): 20 TABLET, DELAYED RELEASE ORAL at 16:24

## 2019-03-08 RX ADMIN — Medication 50 MILLIGRAM(S): at 16:24

## 2019-03-08 RX ADMIN — Medication 10 MILLIGRAM(S): at 04:47

## 2019-03-08 RX ADMIN — GABAPENTIN 300 MILLIGRAM(S): 400 CAPSULE ORAL at 16:24

## 2019-03-08 RX ADMIN — Medication 50 MILLIGRAM(S): at 22:09

## 2019-03-08 RX ADMIN — DULOXETINE HYDROCHLORIDE 20 MILLIGRAM(S): 30 CAPSULE, DELAYED RELEASE ORAL at 16:24

## 2019-03-08 RX ADMIN — Medication 81 MILLIGRAM(S): at 16:24

## 2019-03-08 RX ADMIN — AMLODIPINE BESYLATE 10 MILLIGRAM(S): 2.5 TABLET ORAL at 16:24

## 2019-03-08 RX ADMIN — ISOSORBIDE MONONITRATE 30 MILLIGRAM(S): 60 TABLET, EXTENDED RELEASE ORAL at 16:50

## 2019-03-08 RX ADMIN — Medication 1 TABLET(S): at 16:24

## 2019-03-08 RX ADMIN — HEPARIN SODIUM 5000 UNIT(S): 5000 INJECTION INTRAVENOUS; SUBCUTANEOUS at 16:24

## 2019-03-08 RX ADMIN — Medication 100 MICROGRAM(S): at 16:24

## 2019-03-08 RX ADMIN — SENNA PLUS 2 TABLET(S): 8.6 TABLET ORAL at 22:10

## 2019-03-08 RX ADMIN — Medication 104 MILLIGRAM(S): at 04:12

## 2019-03-08 RX ADMIN — Medication 25 MILLIGRAM(S): at 04:38

## 2019-03-08 RX ADMIN — CLOPIDOGREL BISULFATE 75 MILLIGRAM(S): 75 TABLET, FILM COATED ORAL at 16:24

## 2019-03-08 RX ADMIN — LORATADINE 10 MILLIGRAM(S): 10 TABLET ORAL at 16:24

## 2019-03-08 RX ADMIN — Medication 100 MILLIGRAM(S): at 16:24

## 2019-03-08 NOTE — ED ADULT NURSE REASSESSMENT NOTE - NS ED NURSE REASSESS COMMENT FT1
pt desating to 86% on nonrebreather. MD Artis and respiratory therapist called to bedside. As per MD Artis, pt placed on bipap. pt calmed down and tolerating bipap well. will continue to monitor.

## 2019-03-08 NOTE — ED ADULT NURSE NOTE - OBJECTIVE STATEMENT
assumed care of pt in room. pt a&ox3. pt denies any pain at this time. pt states he does not have a headache in this moment. pt states that he checked his blood pressure at home and it was high and he did have a headache so his son brought him in. breaths even and unlabored. received pt on 2lpm via nasal cannula that he is on at home as well. pt maintaining 99% spo2. NSR on cardiac monitor.

## 2019-03-08 NOTE — ED PROVIDER NOTE - PHYSICAL EXAMINATION
Constitutional : Appears comfortably, talking in full sentences  Head :NC AT , no swelling  Eyes :eomi, no swelling  Mouth :mm moist,  Neck : supple, trachea in midline  Chest :Daniel air entry, symm chest expansion, no distress  Heart :S1 S2 distant  Abdomen :abd soft, non tender  Musc/Skel :ext no swelling, no deformity, no spine tenderness, distal pulses present  Neuro  :AAO 3 no focal deficits Constitutional : appears weak  Head :NC AT , no swelling, no obvious swelling to forehead.  Eyes :eomi, no swelling  Mouth :mm moist, poor dentition  Neck : supple, trachea in midline  Chest :Daniel air entry, symm chest expansion, no distress,  Heart :S1 S2 distant  Abdomen :abd soft, non tender  Musc/Skel :ext no swelling, no deformity, no spine tenderness, distal pulses present, fistula to L mid arm covered in bandage.  Neuro  :alert and awake, no focal deficits

## 2019-03-08 NOTE — ED ADULT NURSE REASSESSMENT NOTE - NS ED NURSE REASSESS COMMENT FT1
pt beginning to feel anxious and short of breath. pt placed on nonrebreather o2. pt maintaining spo2 98%. MD Artis at bedside. administered 25mg benadryl as per MD Artis. pt tolerated well. pt continued to feel anxious and sob.

## 2019-03-08 NOTE — H&P ADULT - HISTORY OF PRESENT ILLNESS
The patient is a 79 year old male with a history of ESRD on HD T/TH/Sat, CAD status post CABG, hypertension, lung cancer status post chemotherapy and radiation with pulmonary fibrosis on home oxygen and hypothyroidism who presented to the ER with complaints of headache. According to the patient for the past 3 days he has had a persistent headache. His son in law took his blood pressure and noted it was elevated, gave him his blood pressure medication and repeat was still in the 190s therefore brought him to the ER for evaluation. In the ED, BP of 195/75; given IV hydralazine 10m x 1. CT head was negative. After being given IV reglan for nausea patient became anxious and hypoxic. SPo2 in the 80s on nasal cannula. Was started on Bipap and given IV benadryl x 1 with improvement. Currently at the time of my examination patient is sitting up comfortably on bipap; SBP of 179. Chest xray consistent with pulmonary edema.

## 2019-03-08 NOTE — ED PROVIDER NOTE - CARE PLAN
Principal Discharge DX:	Dyspnea  Secondary Diagnosis:	Hypertensive urgency  Secondary Diagnosis:	ESRD (end stage renal disease)  Secondary Diagnosis:	Headache

## 2019-03-08 NOTE — ED ADULT NURSE REASSESSMENT NOTE - NS ED NURSE REASSESS COMMENT FT1
MD Artis at bedside. pt tolerating bipap well. bipap taken off, 2lpm o2 via nasal cannula placed. spo2 88%, pt began to get anxious and sob again. pt stated "I felt better on the machine". pt placed back on bipap. spo2 back up to 96% on bipap. pt tolerated well, pt sleeping on stretcher, easily arousable. will continue to monitor.

## 2019-03-08 NOTE — ED PROVIDER NOTE - OBJECTIVE STATEMENT
80 y/o M pt with PMHx CAD, CABG, ESRD, anemia, HLD, HTN, lung CA, bipolar disorder, presents to the ED c/o headache.  He went to dialysis today, per son when pt began c/o HA shortly after getting home.  Pt describes diffuse head pressure.    Per son, pt has had similar HAs before, typically takes Tylenol, has never had a CT head previously.  Pt gets dialysis Tue/Thur/Sat, takes Tylenol before dialysis.  Denies .  No further acute complaints at this time. 80 y/o M pt with PMHx CAD, CABG, ESRD, anemia, HLD, HTN, lung CA, bipolar disorder, presents to the ED with son-in-law c/o headache beginning 3 days ago.  Pt reports diffuse head pressure, has not taken any pain medication for this HA.  Son-in-law notes pt's BP was elevated at home today, pt was advised to take additional dosage of losartan if found to be hypertensive, pt took this extra dosage today, his BP did not go down, and son-in-law wanted to take pt into the ED for evaluation.  Per son-in-law, pt has had similar HAs before, typically takes Tylenol, has never had a CT head previously.  Pt gets dialysis Tue/Thur/Sat, takes Tylenol before dialysis.  Denies visual changes.  No further acute complaints at this time.

## 2019-03-08 NOTE — ED PROVIDER NOTE - UNABLE TO OBTAIN
Unable to obtain pt's complete hx secondary to pt's current condition. Hx obtained from pt's son-in-law. Unable to obtain pt's complete hx secondary to pt's current condition. hx obtained from pt's son-in-law. Dementia

## 2019-03-08 NOTE — ED ADULT NURSE NOTE - INTERVENTIONS DEFINITIONS
[place] to call system/Call bell, personal items and telephone within reach/Instruct patient to call for assistance/Non-slip footwear when patient is off stretcher/Stretcher in lowest position, wheels locked, appropriate side rails in place

## 2019-03-08 NOTE — CONSULT NOTE ADULT - SUBJECTIVE AND OBJECTIVE BOX
[facility] DIVISION OF KIDNEY DISEASES AND HYPERTENSION -- INITIAL CONSULT NOTE  --------------------------------------------------------------------------------  HPI:  Pt seen and examined on HD machine; consent obtained; breathing much better; pulmonary edema; underlying pulmonary fibrosis from previous lung ca; spoke with son in law and daughter re: current condition.    Med HPI  The patient is a 79 year old male with a history of ESRD on HD T/TH/Sat, CAD status post CABG, hypertension, lung cancer status post chemotherapy and radiation with pulmonary fibrosis on home oxygen and hypothyroidism who presented to the ER with complaints of headache. According to the patient for the past 3 days he has had a persistent headache. His son in law took his blood pressure and noted it was elevated, gave him his blood pressure medication and repeat was still in the 190s therefore brought him to the ER for evaluation. In the ED, BP of 195/75; given IV hydralazine 10m x 1. CT head was negative. After being given IV reglan for nausea patient became anxious and hypoxic. SPo2 in the 80s on nasal cannula. Was started on Bipap and given IV benadryl x 1 with improvement. Currently at the time of my examination patient is sitting up comfortably on bipap; SBP of 179. Chest xray consistent with pulmonary edema.       PAST HISTORY  --------------------------------------------------------------------------------  PAST MEDICAL & SURGICAL HISTORY:  Chronic anemia  ESRD (end stage renal disease)  CAD (coronary artery disease)  Lung cancer: had yoni radiation in 2006.  Bipolar disorder  High cholesterol  Hypertension  S/P CABG (coronary artery bypass graft): pt&#x27;s son in law states h/o some stents near neck area ? carotid ? he is not sure.    FAMILY HISTORY:  Family history of essential hypertension (Father)    PAST SOCIAL HISTORY:    ALLERGIES & MEDICATIONS  --------------------------------------------------------------------------------  Allergies    No Known Allergies    Intolerances      Standing Inpatient Medications  amLODIPine   Tablet 10 milliGRAM(s) Oral daily  aspirin enteric coated 81 milliGRAM(s) Oral daily  atorvastatin 80 milliGRAM(s) Oral at bedtime  clopidogrel Tablet 75 milliGRAM(s) Oral daily  docusate sodium 100 milliGRAM(s) Oral three times a day  DULoxetine 20 milliGRAM(s) Oral daily  gabapentin 300 milliGRAM(s) Oral two times a day  heparin  Injectable 5000 Unit(s) SubCutaneous every 12 hours  hydrALAZINE 50 milliGRAM(s) Oral every 8 hours  isosorbide   mononitrate ER Tablet (IMDUR) 30 milliGRAM(s) Oral daily  levothyroxine 100 MICROGram(s) Oral daily  loratadine 10 milliGRAM(s) Oral daily  losartan 50 milliGRAM(s) Oral daily  metoprolol tartrate 50 milliGRAM(s) Oral two times a day  multivitamin 1 Tablet(s) Oral daily  pantoprazole    Tablet 40 milliGRAM(s) Oral before breakfast  senna 2 Tablet(s) Oral at bedtime    PRN Inpatient Medications      REVIEW OF SYSTEMS  --------------------------------------------------------------------------------  Gen: No weight changes, fatigue, fevers/chills, weakness  Skin: No rashes  Head/Eyes/Ears/Mouth: No headache; Normal hearing; Normal vision w/o blurriness; No sinus pain/discomfort, sore throat  Respiratory: SOB on high flow improved  CV: No chest pain, PND, orthopnea  GI: No abdominal pain, diarrhea, constipation, nausea, vomiting, melena, hematochezia  : No increased frequency, dysuria, hematuria, nocturia  MSK: No joint pain/swelling; no back pain; no edema  Neuro: No dizziness/lightheadedness, weakness, seizures, numbness, tingling  Heme: No easy bruising or bleeding  Endo: No heat/cold intolerance  Psych: No significant nervousness, anxiety, stress, depression    All other systems were reviewed and are negative, except as noted.    VITALS/PHYSICAL EXAM  --------------------------------------------------------------------------------  T(C): 36.8 (03-08-19 @ 10:47), Max: 36.8 (03-08-19 @ 01:34)  HR: 64 (03-08-19 @ 10:47) (61 - 116)  BP: 183/71 (03-08-19 @ 10:47) (132/60 - 195/75)  RR: 18 (03-08-19 @ 10:47) (18 - 36)  SpO2: 98% (03-08-19 @ 10:47) (86% - 99%)  Wt(kg): --  Height (cm): 167.64 (03-08-19 @ 01:34)  Weight (kg): 55 (03-08-19 @ 01:34)  BMI (kg/m2): 19.6 (03-08-19 @ 01:34)  BSA (m2): 1.62 (03-08-19 @ 01:34)      Physical Exam:  	Gen: NAD,   	HEENT: PERRL, supple neck, clear oropharynx  	Pulm: dec BS  	CV: RRR, S1S2; no rub  	Back: No spinal or CVA tenderness; no sacral edema  	Abd: +BS, soft, nontender/nondistended  	: No suprapubic tenderness  	UE: Warm, FROM, no clubbing, intact strength; no edema; no asterixis  	LE: Warm, FROM, no clubbing, intact strength; no edema  	Neuro: No focal deficits, intact gait  	Psych: Normal affect and mood  	Skin: Warm, without rashes  	Vascular access:    LABS/STUDIES  --------------------------------------------------------------------------------              12.2   5.9   >-----------<  185      [03-08-19 @ 03:43]              39.8     132  |  93  |  9.0  ----------------------------<  104      [03-08-19 @ 04:43]  4.4   |  27.0  |  2.82        Ca     8.7     [03-08-19 @ 04:43]    TPro  7.2  /  Alb  3.5  /  TBili  0.3  /  DBili  x   /  AST  28  /  ALT  9   /  AlkPhos  60  [03-08-19 @ 04:43]    PT/INR: PT 11.8 , INR 1.03       [03-08-19 @ 03:43]  PTT: 34.6       [03-08-19 @ 03:43]      Creatinine Trend:  SCr 2.82 [03-08 @ 04:43]    Urinalysis - [01-01-19 @ 15:02]      Color Red / Appearance Bloody / SG 1.015 / pH 6.0      Gluc Negative / Ketone Negative  / Bili Negative / Urobili Negative       Blood Large / Protein 500 / Leuk Est Trace / Nitrite Negative      RBC TNTC / WBC 3-5 / Hyaline  / Gran  / Sq Epi  / Non Sq Epi Negative / Bacteria Occasional      Iron 34, TIBC 235, %sat 14      [01-29-19 @ 11:39]  Ferritin 1049      [01-29-19 @ 11:39]  TSH 6.10      [03-08-19 @ 04:43]  Lipid: chol 159, , HDL 32, LDL 94      [01-29-19 @ 11:39]    HBsAb <3.0      [01-08-19 @ 17:03]  HBsAg Nonreact      [02-09-19 @ 00:38]  HBcAb Nonreact      [12-05-18 @ 16:33]  HCV 0.12, Nonreact      [01-08-19 @ 17:03]

## 2019-03-08 NOTE — ED ADULT NURSE REASSESSMENT NOTE - NS ED NURSE REASSESS COMMENT FT1
RT at bedside to place pt. on Bipap.  Pt. anxious and tachypnic.  Pt. to receive portable chest xray and MD Artis to reassess pt.

## 2019-03-08 NOTE — ED PROVIDER NOTE - NS ED ROS FT
no weight change, no fever or chills  no recent travel, no recent abox, no sick contacts  no recent change in medications  no rash, no bruises  no visual changes no eye discharge  no cough cold or congestion,   no sob, no chest pain  follows with cardiology  no stress test, no cath  no orthopnea, no pnd  no abd pain, no n/v/d  no endoscopy, no colonoscopy  no hematuria, no change in urinary habits  no joint pain, no deformity  + headache, no paresthesia + L hearing loss.

## 2019-03-08 NOTE — CONSULT NOTE ADULT - SUBJECTIVE AND OBJECTIVE BOX
Patient is a 79y old  Male who presents with a chief complaint of     BRIEF HOSPITAL COURSE: 80 yo male, PMHx CAD s/p CABG, ESRD on HD T/R/Sa since 01/2019, HTN, HLD, lung CA s/p chemotherapy and radiation 2006 on 2lpm NC continuous since 11/2018, who presented to ED with headache worsening over the past 3 days. Son-in-law states he took his blood pressure, which was elevated with 's, gave patient a dose of his Losartan, repeat 's so he took him to the hospital. Upon arrival to ED, was found to have /75, given Hydralazine 10mg IV. A CT head was performed, which was negative for acute intracranial hemorrhage. Patient was given Reglan, had a dystonic reaction, became anxious, and was subsequently given Benadryl. During period of agitation, patient developed shortness of breath, increased work of breathing, and hypoxia SpO2 86%. Patient was started on NIPPV 12/6/.40, and respiratory status improved. Chest xray performed with evidence of acute pulmonary edema.    Patient is debilitated from multiple comorbidities and is now bedbound at home.       PAST MEDICAL & SURGICAL HISTORY:  Chronic anemia  ESRD (end stage renal disease)  CAD (coronary artery disease)  Lung cancer: had yoni radiation in 2006.  Bipolar disorder  High cholesterol  Hypertension  S/P CABG (coronary artery bypass graft): pt&#x27;s son in law states h/o some stents near neck area ? carotid ? he is not sure.      SOCIAL HISTORY: former smoker, 2ppd, quit 2006. Lives at home with multiple extended family members.      Review of Systems: Due to lethargy, subjective information was not able to be obtained from the patient. History was obtained, to the extent possible, from review of the chart and collateral sources of information.      Medications:        ICU Vital Signs Last 24 Hrs  T(C): 36.8 (08 Mar 2019 01:34), Max: 36.8 (08 Mar 2019 01:34)  T(F): 98.2 (08 Mar 2019 01:34), Max: 98.2 (08 Mar 2019 01:34)  HR: 62 (08 Mar 2019 06:13) (61 - 116)  BP: 140/65 (08 Mar 2019 06:13) (132/60 - 195/75)  BP(mean): --  ABP: --  ABP(mean): --  RR: 18 (08 Mar 2019 06:13) (18 - 36)  SpO2: 96% (08 Mar 2019 06:13) (86% - 99%)      ABG - ( 08 Mar 2019 07:20 )  pH, Arterial: 7.44  pH, Blood: x     /  pCO2: 49    /  pO2: 66    / HCO3: 31    / Base Excess: 7.7   /  SaO2: 94            I&O's Detail        LABS:                        12.2   5.9   )-----------( 185      ( 08 Mar 2019 03:43 )             39.8     03-08    132<L>  |  93<L>  |  9.0  ----------------------------<  104  4.4   |  27.0  |  2.82<H>    Ca    8.7      08 Mar 2019 04:43    TPro  7.2  /  Alb  3.5  /  TBili  0.3<L>  /  DBili  x   /  AST  28  /  ALT  9   /  AlkPhos  60  03-08        CAPILLARY BLOOD GLUCOSE        PT/INR - ( 08 Mar 2019 03:43 )   PT: 11.8 sec;   INR: 1.03 ratio         PTT - ( 08 Mar 2019 03:43 )  PTT:34.6 sec    CULTURES:      Physical Examination:    General: No acute distress.      HEENT: Pupils equal, reactive to light.  Symmetric.    PULM: BiPAP. Rales to auscultation bilaterally, no significant sputum production    CVS: Regular rate and rhythm, no murmurs, rubs, or gallops    ABD: Soft, nondistended, nontender, normoactive bowel sounds, no masses    EXT: No edema, nontender    SKIN: Warm and well perfused, no rashes noted.    NEURO: Somnolent, arousable to verbal stimuli, able to communicate then falls back asleep        RADIOLOGY: < from: CT Head No Cont (03.08.19 @ 02:58) >    IMPRESSION:     No evidence of acute intracranial hemorrhage, midline shift or CT   evidence ofacute territorial infarct.    If the patient's symptoms persist, consider short interval follow-up head   CT or brain MRI if there are no MRI contraindications.    FLIP VEGAS M.D., ATTENDING RADIOLOGIST  This document has been electronically signed. Mar  8 2019  3:11AM        CODE STATUS: FULL      TIME SPENT: 30 minutes assessing presenting problems of acute illness, which pose high probability of life threatening deterioration or end organ damage/dysfunction, as well as medical decision making including initiating plan of care, reviewing data, reviewing radiologic exams, discussing with multidisciplinary team, non-inclusive of procedures performed, discussing goals of care with patient's son-in-law, who states we should "do everything". Case d/w eICU Attending Dr. Shepard and ICU Attending Dr. Silverio.

## 2019-03-08 NOTE — H&P ADULT - ASSESSMENT
The patient is a 79 year old male with a history of ESRD on HD T/TH/Sat, CAD status post CABG, hypertension, lung cancer status post chemotherapy and radiation with pulmonary fibrosis on home oxygen and hypothyroidism who presented to the ER with complaints of headache. According to the patient for the past 3 days he has had a persistent headache. His son in law took his blood pressure and noted it was elevated, gave him his blood pressure medication and repeat was still in the 190s therefore brought him to the ER for evaluation. In the ED, BP of 195/75; given IV hydralazine 10m x 1. CT head was negative. After being given IV Reglan for nausea patient became anxious and hypoxic. SPo2 in the 80s on nasal cannula. Was started on Bipap and given IV benadryl x 1 with improvement. Currently at the time of my examination patient is sitting up comfortably on bipap; SBP of 179. Chest xray consistent with pulmonary edema.     Assessment/plan;    1. Acute on chronic hypoxic respiratory failure secondary to hypertensive urgency resulting in acute pulmonary edema: Wean off bipap to HFNC; resume PO medications  Nephrology consulted for HD this morning    2. ESRD On HD T/TH/S- Renal consult for hd Today    3. Hypothyroidism TSH of 6; in Jan  TSH was 2- WNL  Likely elevation due to acute illness  Continue home dose of synthroid    4. History of lung CA with radiation induce pulmonary fibrosis on home oxygen    5. History of CAD status post CABG: Continue plavix, aspirin,statin, bb, arb    6. Acute on chronic diastolic CHF: Echo in Jan with normal EF grade 2 diastolic dysfunction    VTE- Heparin subcut     Attempted to reach patient's son in law to confirm DNR/DNI status from previous admission in February.

## 2019-03-08 NOTE — ED ADULT NURSE REASSESSMENT NOTE - NS ED NURSE REASSESS COMMENT FT1
Pt received A&O x's 3. On cardiac monitor in NSR, BiPap on with patient O2 sat 95%. Orders reviewed, will continue to moniotr.

## 2019-03-09 LAB
ANION GAP SERPL CALC-SCNC: 13 MMOL/L — SIGNIFICANT CHANGE UP (ref 5–17)
BUN SERPL-MCNC: 13 MG/DL — SIGNIFICANT CHANGE UP (ref 8–20)
CALCIUM SERPL-MCNC: 9.1 MG/DL — SIGNIFICANT CHANGE UP (ref 8.6–10.2)
CHLORIDE SERPL-SCNC: 96 MMOL/L — LOW (ref 98–107)
CO2 SERPL-SCNC: 32 MMOL/L — HIGH (ref 22–29)
CREAT SERPL-MCNC: 3.74 MG/DL — HIGH (ref 0.5–1.3)
GLUCOSE SERPL-MCNC: 92 MG/DL — SIGNIFICANT CHANGE UP (ref 70–115)
HCT VFR BLD CALC: 42.4 % — SIGNIFICANT CHANGE UP (ref 42–52)
HGB BLD-MCNC: 12.7 G/DL — LOW (ref 14–18)
MCHC RBC-ENTMCNC: 29 PG — SIGNIFICANT CHANGE UP (ref 27–31)
MCHC RBC-ENTMCNC: 30 G/DL — LOW (ref 32–36)
MCV RBC AUTO: 96.8 FL — HIGH (ref 80–94)
PLATELET # BLD AUTO: 205 K/UL — SIGNIFICANT CHANGE UP (ref 150–400)
POTASSIUM SERPL-MCNC: 4.4 MMOL/L — SIGNIFICANT CHANGE UP (ref 3.5–5.3)
POTASSIUM SERPL-SCNC: 4.4 MMOL/L — SIGNIFICANT CHANGE UP (ref 3.5–5.3)
RBC # BLD: 4.38 M/UL — LOW (ref 4.6–6.2)
RBC # FLD: 18.1 % — HIGH (ref 11–15.6)
SODIUM SERPL-SCNC: 141 MMOL/L — SIGNIFICANT CHANGE UP (ref 135–145)
WBC # BLD: 6.8 K/UL — SIGNIFICANT CHANGE UP (ref 4.8–10.8)
WBC # FLD AUTO: 6.8 K/UL — SIGNIFICANT CHANGE UP (ref 4.8–10.8)

## 2019-03-09 PROCEDURE — 99233 SBSQ HOSP IP/OBS HIGH 50: CPT

## 2019-03-09 RX ORDER — LOSARTAN POTASSIUM 100 MG/1
25 TABLET, FILM COATED ORAL DAILY
Refills: 0 | Status: DISCONTINUED | OUTPATIENT
Start: 2019-03-09 | End: 2019-03-10

## 2019-03-09 RX ORDER — HYDRALAZINE HCL 50 MG
25 TABLET ORAL EVERY 8 HOURS
Refills: 0 | Status: DISCONTINUED | OUTPATIENT
Start: 2019-03-09 | End: 2019-03-10

## 2019-03-09 RX ADMIN — PANTOPRAZOLE SODIUM 40 MILLIGRAM(S): 20 TABLET, DELAYED RELEASE ORAL at 07:02

## 2019-03-09 RX ADMIN — ISOSORBIDE MONONITRATE 30 MILLIGRAM(S): 60 TABLET, EXTENDED RELEASE ORAL at 14:18

## 2019-03-09 RX ADMIN — ATORVASTATIN CALCIUM 80 MILLIGRAM(S): 80 TABLET, FILM COATED ORAL at 23:47

## 2019-03-09 RX ADMIN — Medication 100 MILLIGRAM(S): at 07:02

## 2019-03-09 RX ADMIN — SENNA PLUS 2 TABLET(S): 8.6 TABLET ORAL at 23:47

## 2019-03-09 RX ADMIN — CLOPIDOGREL BISULFATE 75 MILLIGRAM(S): 75 TABLET, FILM COATED ORAL at 13:35

## 2019-03-09 RX ADMIN — Medication 1 TABLET(S): at 13:36

## 2019-03-09 RX ADMIN — Medication 50 MILLIGRAM(S): at 17:23

## 2019-03-09 RX ADMIN — GABAPENTIN 300 MILLIGRAM(S): 400 CAPSULE ORAL at 07:05

## 2019-03-09 RX ADMIN — Medication 100 MICROGRAM(S): at 07:02

## 2019-03-09 RX ADMIN — GABAPENTIN 300 MILLIGRAM(S): 400 CAPSULE ORAL at 17:23

## 2019-03-09 RX ADMIN — Medication 81 MILLIGRAM(S): at 13:35

## 2019-03-09 RX ADMIN — HEPARIN SODIUM 5000 UNIT(S): 5000 INJECTION INTRAVENOUS; SUBCUTANEOUS at 17:23

## 2019-03-09 RX ADMIN — Medication 100 MILLIGRAM(S): at 23:47

## 2019-03-09 RX ADMIN — Medication 100 MILLIGRAM(S): at 13:36

## 2019-03-09 RX ADMIN — AMLODIPINE BESYLATE 10 MILLIGRAM(S): 2.5 TABLET ORAL at 07:02

## 2019-03-09 RX ADMIN — HEPARIN SODIUM 5000 UNIT(S): 5000 INJECTION INTRAVENOUS; SUBCUTANEOUS at 07:01

## 2019-03-09 RX ADMIN — LORATADINE 10 MILLIGRAM(S): 10 TABLET ORAL at 13:36

## 2019-03-09 RX ADMIN — Medication 50 MILLIGRAM(S): at 07:02

## 2019-03-09 RX ADMIN — Medication 50 MILLIGRAM(S): at 07:04

## 2019-03-09 RX ADMIN — LOSARTAN POTASSIUM 50 MILLIGRAM(S): 100 TABLET, FILM COATED ORAL at 07:45

## 2019-03-09 NOTE — PROGRESS NOTE ADULT - SUBJECTIVE AND OBJECTIVE BOX
HEALTH ISSUES - PROBLEM Dx:    HTN urgency; Hypoxia; Pulm edema; ESRD, pulm fibrosis, lung ca    INTERVAL HPI/ OVERNIGHT EVENTS:    Vent trigeminies freq on tele, asymptomatic  BP in 100s range and Son wants all his meds reassessed and cymbalta to be held.  Pt has no complaints    REVIEW OF SYSTEMS:    hypoxia + fever - palpitations -    Vital Signs Last 24 Hrs  T(C): 36.8 (09 Mar 2019 09:02), Max: 36.8 (08 Mar 2019 21:31)  T(F): 98.2 (09 Mar 2019 09:02), Max: 98.3 (08 Mar 2019 21:31)  HR: 57 (09 Mar 2019 09:02) (57 - 67)  BP: 125/53 (09 Mar 2019 09:02) (118/58 - 170/74)  BP(mean): --  RR: 18 (09 Mar 2019 09:02) (18 - 20)  SpO2: 99% (09 Mar 2019 09:02) (95% - 99%)    PHYSICAL EXAM-  	Gen: NAD,   	HEENT: PERRL, supple neck, clear oropharynx  	Pulm: Daniel basal coarse rales + No rhonchi  	CV: RRR, S1S2; no rub  	Back: No spinal or CVA tenderness; no sacral edema  	Abd: +BS, soft, nontender/nondistended  	: No suprapubic tenderness  	UE: Warm, FROM, no clubbing, intact strength; no edema; no asterixis  	LE: Warm, FROM, no clubbing, intact strength; no edema  	Neuro: No focal deficits, intact gait  	Psych: Normal affect and mood    LABS:                        12.7   6.8   )-----------( 205      ( 09 Mar 2019 10:36 )             42.4     03-09    141  |  96<L>  |  13.0  ----------------------------<  92  4.4   |  32.0<H>  |  3.74<H>    Ca    9.1      09 Mar 2019 10:36    TPro  7.2  /  Alb  3.5  /  TBili  0.3<L>  /  DBili  x   /  AST  28  /  ALT  9   /  AlkPhos  60  03-08    PT/INR - ( 08 Mar 2019 03:43 )   PT: 11.8 sec;   INR: 1.03 ratio         PTT - ( 08 Mar 2019 03:43 )  PTT:34.6 sec    Assessment and Plan

## 2019-03-09 NOTE — PROGRESS NOTE ADULT - SUBJECTIVE AND OBJECTIVE BOX
[facility] DIVISION OF KIDNEY DISEASES AND HYPERTENSION -- FOLLOW UP NOTE  --------------------------------------------------------------------------------  Chief Complaint: ESRD HD    24 hour events/subjective:  Seen/examined  HD planned for Monday  Tolerated yesterday      PAST HISTORY  --------------------------------------------------------------------------------  No significant changes to PMH, PSH, FHx, SHx, unless otherwise noted    ALLERGIES & MEDICATIONS  --------------------------------------------------------------------------------  Allergies    No Known Allergies    Intolerances      Standing Inpatient Medications  amLODIPine   Tablet 10 milliGRAM(s) Oral daily  aspirin enteric coated 81 milliGRAM(s) Oral daily  atorvastatin 80 milliGRAM(s) Oral at bedtime  clopidogrel Tablet 75 milliGRAM(s) Oral daily  docusate sodium 100 milliGRAM(s) Oral three times a day  DULoxetine 20 milliGRAM(s) Oral daily  gabapentin 300 milliGRAM(s) Oral two times a day  heparin  Injectable 5000 Unit(s) SubCutaneous every 12 hours  hydrALAZINE 50 milliGRAM(s) Oral every 8 hours  isosorbide   mononitrate ER Tablet (IMDUR) 30 milliGRAM(s) Oral daily  levothyroxine 100 MICROGram(s) Oral daily  loratadine 10 milliGRAM(s) Oral daily  losartan 50 milliGRAM(s) Oral daily  metoprolol tartrate 50 milliGRAM(s) Oral two times a day  multivitamin 1 Tablet(s) Oral daily  pantoprazole    Tablet 40 milliGRAM(s) Oral before breakfast  senna 2 Tablet(s) Oral at bedtime    PRN Inpatient Medications      REVIEW OF SYSTEMS  --------------------------------------------------------------------------------  Gen: No weight changes, fatigue, fevers/chills, weakness  Skin: No rashes  Head/Eyes/Ears/Mouth: No headache; Normal hearing; Normal vision w/o blurriness; No sinus pain/discomfort, sore throat  Respiratory: No dyspnea, cough, wheezing, hemoptysis  CV: No chest pain, PND, orthopnea  GI: No abdominal pain, diarrhea, constipation, nausea, vomiting, melena, hematochezia  : No increased frequency, dysuria, hematuria, nocturia  MSK: No joint pain/swelling; no back pain; no edema  Neuro: No dizziness/lightheadedness, weakness, seizures, numbness, tingling  Heme: No easy bruising or bleeding  Endo: No heat/cold intolerance  Psych: No significant nervousness, anxiety, stress, depression    All other systems were reviewed and are negative, except as noted.    VITALS/PHYSICAL EXAM  --------------------------------------------------------------------------------  T(C): 36.8 (03-09-19 @ 09:02), Max: 36.9 (03-08-19 @ 16:04)  HR: 57 (03-09-19 @ 09:02) (57 - 75)  BP: 125/53 (03-09-19 @ 09:02) (118/58 - 179/75)  RR: 18 (03-09-19 @ 09:02) (18 - 20)  SpO2: 99% (03-09-19 @ 09:02) (95% - 100%)  Wt(kg): --  Height (cm): 167.64 (03-08-19 @ 01:34)  Weight (kg): 55 (03-08-19 @ 01:34)  BMI (kg/m2): 19.6 (03-08-19 @ 01:34)  BSA (m2): 1.62 (03-08-19 @ 01:34)      03-08-19 @ 07:01  -  03-09-19 @ 07:00  --------------------------------------------------------  IN: 0 mL / OUT: 2000 mL / NET: -2000 mL      Physical Exam:  	Gen: NAD,   	HEENT: PERRL, supple neck, clear oropharynx  	Pulm: dec BS  	CV: RRR, S1S2; no rub  	Back: No spinal or CVA tenderness; no sacral edema  	Abd: +BS, soft, nontender/nondistended  	: No suprapubic tenderness  	UE: Warm, FROM, no clubbing, intact strength; no edema; no asterixis  	LE: Warm, FROM, no clubbing, intact strength; no edema  	Neuro: No focal deficits, intact gait  	Psych: Normal affect and mood  	Skin: Warm, without rashes  LABS/STUDIES  --------------------------------------------------------------------------------              12.7   6.8   >-----------<  205      [03-09-19 @ 10:36]              42.4     141  |  96  |  13.0  ----------------------------<  92      [03-09-19 @ 10:36]  4.4   |  32.0  |  3.74        Ca     9.1     [03-09-19 @ 10:36]    TPro  7.2  /  Alb  3.5  /  TBili  0.3  /  DBili  x   /  AST  28  /  ALT  9   /  AlkPhos  60  [03-08-19 @ 04:43]    PT/INR: PT 11.8 , INR 1.03       [03-08-19 @ 03:43]  PTT: 34.6       [03-08-19 @ 03:43]      Creatinine Trend:  SCr 3.74 [03-09 @ 10:36]  SCr 2.82 [03-08 @ 04:43]        Iron 34, TIBC 235, %sat 14      [01-29-19 @ 11:39]  Ferritin 1049      [01-29-19 @ 11:39]  TSH 6.10      [03-08-19 @ 04:43]  Lipid: chol 159, , HDL 32, LDL 94      [01-29-19 @ 11:39]    HBsAg Nonreact      [02-09-19 @ 00:38]

## 2019-03-09 NOTE — PROGRESS NOTE ADULT - ASSESSMENT
The patient is a 79 year old male with a history of ESRD on HD T/TH/Sat, CAD status post CABG, hypertension, lung cancer status post chemotherapy and radiation with pulmonary fibrosis on home oxygen and hypothyroidism who presented to the ER with complaints of headache. According to the patient for the past 3 days he has had a persistent headache. His son in law took his blood pressure and noted it was elevated, gave him his blood pressure medication and repeat was still in the 190s therefore brought him to the ER for evaluation. In the ED, BP of 195/75; given IV hydralazine 10m x 1. CT head was negative. After being given IV Reglan for nausea patient became anxious and hypoxic. SPo2 in the 80s on nasal cannula. Was started on Bipap and given IV benadryl x 1 with improvement. Currently at the time of my examination patient is sitting up comfortably on bipap; SBP of 179. Chest xray consistent with pulmonary edema.     Assessment/plan;    1. Acute on chronic hypoxic respiratory failure secondary to hypertensive urgency resulting in acute pulmonary edema:   Weaned off bipap to NC; Home O2 user; HD done 3/8/19  BP well controlled now  Son concerned about SBP in 100s. reduced dose of hydralazine and Cozaar; d/c Norvasc  might need to change doses for HD days depending on BP trends.   Monitor    7. Ventricular Trigeminies  - asymptomatic.  - cardiology consulted to reassess meds as needs or further w/u    2. ESRD On HD T/TH/S- Cont    3. Hypothyroidism TSH of 6; in Jan  TSH was 2- WNL  Likely elevation due to acute illness  Continue home dose of synthroid    4. History of lung CA with radiation induce pulmonary fibrosis on home oxygen    5. History of CAD status post CABG: Continue plavix, aspirin, statin, bb, arb    6. Acute on chronic diastolic CHF: Echo in Jan with normal EF grade 2 diastolic dysfunction    VTE- Heparin subcut The patient is a 79 year old male with a history of ESRD on HD T/TH/Sat, CAD status post CABG, hypertension, lung cancer status post chemotherapy and radiation with pulmonary fibrosis on home oxygen and hypothyroidism who presented to the ER with complaints of headache. According to the patient for the past 3 days he has had a persistent headache. His son in law took his blood pressure and noted it was elevated, gave him his blood pressure medication and repeat was still in the 190s therefore brought him to the ER for evaluation. In the ED, BP of 195/75; given IV hydralazine 10m x 1. CT head was negative. After being given IV Reglan for nausea patient became anxious and hypoxic. SPo2 in the 80s on nasal cannula. Was started on Bipap and given IV benadryl x 1 with improvement. Currently at the time of my examination patient is sitting up comfortably on bipap; SBP of 179. Chest xray consistent with pulmonary edema.     Assessment/plan;    1. Acute on chronic hypoxic respiratory failure secondary to hypertensive urgency resulting in acute pulmonary edema:   Weaned off bipap to NC; Home O2 user; HD done 3/8/19  BP well controlled now  Son concerned about SBP in 100s. reduced dose of hydralazine and Cozaar; d/c Norvasc  might need to change doses for HD days depending on BP trends.   Monitor    7. Ventricular Trigeminies  - asymptomatic.  - cardiology consulted to reassess meds as needs or further w/u    2. ESRD On HD T/TH/S- Cont    3. Hypothyroidism TSH of 6; in Jan  TSH was 2- WNL  Likely elevation due to acute illness  Continue home dose of synthroid    4. History of lung CA with radiation induce pulmonary fibrosis on home oxygen    5. History of CAD status post CABG: Continue plavix, aspirin, statin, bb, arb    6. Acute on chronic diastolic CHF: Echo in Jan with normal EF grade 2 diastolic dysfunction    VTE- Heparin subcut     PLAN: few more aggressive HD for fluid removal; Monitor VS on HD days.

## 2019-03-10 PROCEDURE — 99223 1ST HOSP IP/OBS HIGH 75: CPT

## 2019-03-10 PROCEDURE — 99233 SBSQ HOSP IP/OBS HIGH 50: CPT

## 2019-03-10 PROCEDURE — 99232 SBSQ HOSP IP/OBS MODERATE 35: CPT

## 2019-03-10 RX ORDER — LOSARTAN POTASSIUM 100 MG/1
25 TABLET, FILM COATED ORAL
Refills: 0 | Status: DISCONTINUED | OUTPATIENT
Start: 2019-03-10 | End: 2019-03-14

## 2019-03-10 RX ORDER — ISOSORBIDE MONONITRATE 60 MG/1
30 TABLET, EXTENDED RELEASE ORAL
Refills: 0 | Status: DISCONTINUED | OUTPATIENT
Start: 2019-03-10 | End: 2019-03-14

## 2019-03-10 RX ORDER — HYDRALAZINE HCL 50 MG
10 TABLET ORAL EVERY 8 HOURS
Refills: 0 | Status: DISCONTINUED | OUTPATIENT
Start: 2019-03-10 | End: 2019-03-13

## 2019-03-10 RX ORDER — HYDRALAZINE HCL 50 MG
25 TABLET ORAL EVERY 8 HOURS
Refills: 0 | Status: DISCONTINUED | OUTPATIENT
Start: 2019-03-10 | End: 2019-03-10

## 2019-03-10 RX ADMIN — ATORVASTATIN CALCIUM 80 MILLIGRAM(S): 80 TABLET, FILM COATED ORAL at 22:01

## 2019-03-10 RX ADMIN — Medication 100 MILLIGRAM(S): at 22:01

## 2019-03-10 RX ADMIN — Medication 81 MILLIGRAM(S): at 11:08

## 2019-03-10 RX ADMIN — Medication 50 MILLIGRAM(S): at 18:00

## 2019-03-10 RX ADMIN — LORATADINE 10 MILLIGRAM(S): 10 TABLET ORAL at 11:08

## 2019-03-10 RX ADMIN — GABAPENTIN 300 MILLIGRAM(S): 400 CAPSULE ORAL at 18:00

## 2019-03-10 RX ADMIN — HEPARIN SODIUM 5000 UNIT(S): 5000 INJECTION INTRAVENOUS; SUBCUTANEOUS at 18:00

## 2019-03-10 RX ADMIN — ISOSORBIDE MONONITRATE 30 MILLIGRAM(S): 60 TABLET, EXTENDED RELEASE ORAL at 13:13

## 2019-03-10 RX ADMIN — Medication 100 MICROGRAM(S): at 06:39

## 2019-03-10 RX ADMIN — PANTOPRAZOLE SODIUM 40 MILLIGRAM(S): 20 TABLET, DELAYED RELEASE ORAL at 06:40

## 2019-03-10 RX ADMIN — Medication 50 MILLIGRAM(S): at 07:39

## 2019-03-10 RX ADMIN — CLOPIDOGREL BISULFATE 75 MILLIGRAM(S): 75 TABLET, FILM COATED ORAL at 11:08

## 2019-03-10 RX ADMIN — DULOXETINE HYDROCHLORIDE 20 MILLIGRAM(S): 30 CAPSULE, DELAYED RELEASE ORAL at 11:21

## 2019-03-10 RX ADMIN — SENNA PLUS 2 TABLET(S): 8.6 TABLET ORAL at 22:01

## 2019-03-10 RX ADMIN — Medication 100 MILLIGRAM(S): at 06:40

## 2019-03-10 RX ADMIN — HEPARIN SODIUM 5000 UNIT(S): 5000 INJECTION INTRAVENOUS; SUBCUTANEOUS at 06:40

## 2019-03-10 RX ADMIN — LOSARTAN POTASSIUM 25 MILLIGRAM(S): 100 TABLET, FILM COATED ORAL at 13:15

## 2019-03-10 RX ADMIN — Medication 10 MILLIGRAM(S): at 22:02

## 2019-03-10 RX ADMIN — Medication 10 MILLIGRAM(S): at 13:10

## 2019-03-10 RX ADMIN — Medication 1 TABLET(S): at 11:09

## 2019-03-10 RX ADMIN — GABAPENTIN 300 MILLIGRAM(S): 400 CAPSULE ORAL at 06:40

## 2019-03-10 NOTE — PROGRESS NOTE ADULT - SUBJECTIVE AND OBJECTIVE BOX
[facility] DIVISION OF KIDNEY DISEASES AND HYPERTENSION -- FOLLOW UP NOTE  --------------------------------------------------------------------------------  Chief Complaint: ESRD on HD    24 hour events/subjective:  Pt seen and examined  NAD  Overall decline past few months; worsening respiratory status; underlying ILD      PAST HISTORY  --------------------------------------------------------------------------------  No significant changes to PMH, PSH, FHx, SHx, unless otherwise noted    ALLERGIES & MEDICATIONS  --------------------------------------------------------------------------------  Allergies    No Known Allergies    Intolerances      Standing Inpatient Medications  aspirin enteric coated 81 milliGRAM(s) Oral daily  atorvastatin 80 milliGRAM(s) Oral at bedtime  clopidogrel Tablet 75 milliGRAM(s) Oral daily  docusate sodium 100 milliGRAM(s) Oral three times a day  DULoxetine 20 milliGRAM(s) Oral daily  gabapentin 300 milliGRAM(s) Oral two times a day  heparin  Injectable 5000 Unit(s) SubCutaneous every 12 hours  hydrALAZINE 10 milliGRAM(s) Oral every 8 hours  isosorbide   mononitrate ER Tablet (IMDUR) 30 milliGRAM(s) Oral <User Schedule>  levothyroxine 100 MICROGram(s) Oral daily  loratadine 10 milliGRAM(s) Oral daily  losartan 25 milliGRAM(s) Oral <User Schedule>  metoprolol tartrate 50 milliGRAM(s) Oral two times a day  multivitamin 1 Tablet(s) Oral daily  pantoprazole    Tablet 40 milliGRAM(s) Oral before breakfast  senna 2 Tablet(s) Oral at bedtime    PRN Inpatient Medications      REVIEW OF SYSTEMS  --------------------------------------------------------------------------------  Gen: No weight changes, fatigue, fevers/chills, weakness  Skin: No rashes  Head/Eyes/Ears/Mouth: No headache; Normal hearing; Normal vision w/o blurriness; No sinus pain/discomfort, sore throat  Respiratory: + dyspnea  CV: No chest pain, PND, orthopnea  GI: No abdominal pain, diarrhea, constipation, nausea, vomiting, melena, hematochezia  : No increased frequency, dysuria, hematuria, nocturia  MSK: No joint pain/swelling; no back pain; no edema  Neuro: No dizziness/lightheadedness, weakness, seizures, numbness, tingling  Heme: No easy bruising or bleeding  Endo: No heat/cold intolerance  Psych: No significant nervousness, anxiety, stress, depression    All other systems were reviewed and are negative, except as noted.    VITALS/PHYSICAL EXAM  --------------------------------------------------------------------------------  T(C): 36.5 (03-10-19 @ 08:59), Max: 36.8 (03-09-19 @ 23:25)  HR: 59 (03-10-19 @ 13:15) (56 - 62)  BP: 122/56 (03-10-19 @ 13:15) (108/50 - 146/63)  RR: 18 (03-10-19 @ 08:59) (18 - 18)  SpO2: 96% (03-10-19 @ 08:59) (96% - 98%)  Wt(kg): --        Physical Exam:  	Gen: NAD,   	HEENT: PERRL, supple neck, clear oropharynx  	Pulm: CTA B/L  	CV: RRR, S1S2; no rub  	Back: No spinal or CVA tenderness; no sacral edema  	Abd: +BS, soft, nontender/nondistended  	: No suprapubic tenderness  	UE: Warm, FROM, no clubbing, intact strength; no edema; no asterixis  	LE: Warm, FROM, no clubbing, intact strength; no edema  	Neuro: No focal deficits, intact gait  	Psych: Normal affect and mood  	Skin: Warm, without rashes  	Vascular access:    LABS/STUDIES  --------------------------------------------------------------------------------              12.7   6.8   >-----------<  205      [03-09-19 @ 10:36]              42.4     141  |  96  |  13.0  ----------------------------<  92      [03-09-19 @ 10:36]  4.4   |  32.0  |  3.74        Ca     9.1     [03-09-19 @ 10:36]            Creatinine Trend:  SCr 3.74 [03-09 @ 10:36]  SCr 2.82 [03-08 @ 04:43]        Iron 34, TIBC 235, %sat 14      [01-29-19 @ 11:39]  Ferritin 1049      [01-29-19 @ 11:39]  TSH 6.10      [03-08-19 @ 04:43]  Lipid: chol 159, , HDL 32, LDL 94      [01-29-19 @ 11:39]    HBsAg Nonreact      [02-09-19 @ 00:38]

## 2019-03-10 NOTE — PROGRESS NOTE ADULT - ASSESSMENT
The patient is a 79 year old male with a history of ESRD on HD T/TH/Sat, CAD status post CABG, hypertension, lung cancer status post chemotherapy and radiation with pulmonary fibrosis on home oxygen and hypothyroidism who presented to the ER with complaints of headache. According to the patient for the past 3 days he has had a persistent headache. His son in law took his blood pressure and noted it was elevated, gave him his blood pressure medication and repeat was still in the 190s therefore brought him to the ER for evaluation. In the ED, BP of 195/75; given IV hydralazine 10m x 1. CT head was negative. After being given IV Reglan for nausea patient became anxious and hypoxic. SPo2 in the 80s on nasal cannula. Was started on Bipap and given IV benadryl x 1 with improvement. Currently at the time of my examination patient is sitting up comfortably on bipap; SBP of 179. Chest xray consistent with pulmonary edema.     Assessment/ plan;    1. s/p Acute on chronic hypoxic respiratory failure secondary to hypertensive urgency resulting in acute pulmonary edema:   Weaned off bipap to NC; Home O2 user; HD done 3/8/19  BP well controlled now  Son concerned about SBP in 100s. reduced dose of hydralazine and Cozaar; d/c Norvasc  Monitor    7. Ventricular Trigeminies  - asymptomatic.  - cardiology consult appreciated  - wide pulse pressure noted. per cards- rpt echo, and decrease hydralazine.   - given his soft pressures will d/c hydralazine if not tolerating low dose.    2. ESRD On HD T/TH/S- Cont  - plan to dialyze in AM and plan discharge    3. Hypothyroidism TSH of 6; in Jan  TSH was 2- WNL  Likely elevation due to acute illness  Continue home dose of synthroid    4. History of lung CA with radiation induce pulmonary fibrosis on home oxygen    5. History of CAD status post CABG: Continue plavix, aspirin, statin, bb, arb    6. Acute on chronic diastolic CHF: Echo in Jan with normal EF grade 2 diastolic dysfunction    VTE- Heparin subcut     PLAN: few more aggressive HD for fluid removal; Monitor VS on HD days.

## 2019-03-10 NOTE — PROGRESS NOTE ADULT - SUBJECTIVE AND OBJECTIVE BOX
HEALTH ISSUES - PROBLEM Dx:    HTN urgency; Hypoxia; Pulm edema; ESRD, pulm fibrosis, lung ca    INTERVAL HPI/ OVERNIGHT EVENTS:    comfortable no complaints    REVIEW OF SYSTEMS:    hypoxia - fever - palpitations -    Vital Signs Last 24 Hrs  T(C): 36.5 (10 Mar 2019 08:59), Max: 36.8 (09 Mar 2019 23:25)  T(F): 97.7 (10 Mar 2019 08:59), Max: 98.2 (09 Mar 2019 23:25)  HR: 59 (10 Mar 2019 13:15) (56 - 62)  BP: 122/56 (10 Mar 2019 13:15) (108/50 - 146/63)  BP(mean): --  RR: 18 (10 Mar 2019 08:59) (18 - 18)  SpO2: 96% (10 Mar 2019 08:59) (96% - 98%)    PHYSICAL EXAM-  Gen: NAD,   	HEENT: PERRL, supple neck, clear oropharynx  	Pulm: Daniel basal coarse rales + No rhonchi  	CV: RRR, S1S2; no rub  	Back: No spinal or CVA tenderness; no sacral edema  	Abd: +BS, soft, nontender/nondistended  	: No suprapubic tenderness  	UE: Warm, FROM, no clubbing, intact strength; no edema; no asterixis  	LE: Warm, FROM, no clubbing, intact strength; no edema  	Neuro: No focal deficits, intact gait  	Psych: Normal affect and mood  LABS:                        12.7   6.8   )-----------( 205      ( 09 Mar 2019 10:36 )             42.4     03-09    141  |  96<L>  |  13.0  ----------------------------<  92  4.4   |  32.0<H>  |  3.74<H>    Ca    9.1      09 Mar 2019 10:36    Assessment and Plan

## 2019-03-10 NOTE — CONSULT NOTE ADULT - SUBJECTIVE AND OBJECTIVE BOX
Patient is a 79y old  Male who presents with a chief complaint of Headache (09 Mar 2019 16:07)    HPI:  The patient is a 79 year old male with a history of ESRD on HD T/TH/Sat, CAD status post CABG, hypertension, lung cancer status post chemotherapy and radiation with pulmonary fibrosis on home oxygen and hypothyroidism who presented to the ER with complaints of headache. According to the patient for the past 3 days he has had a persistent headache. His son in law took his blood pressure and noted it was elevated, gave him his blood pressure medication and repeat was still in the 190s therefore brought him to the ER for evaluation. In the ED, BP of 195/75; given IV hydralazine 10m x 1. CT head was negative. After being given IV reglan for nausea patient became anxious and hypoxic. SPo2 in the 80s on nasal cannula. Was started on Bipap and given IV benadryl x 1 with improvement. Currently at the time of my examination patient is sitting up comfortably on bipap; SBP of 179. Chest xray consistent with pulmonary edema. (08 Mar 2019 09:02)    Above noted, pt seen and examined. He has been treated for HTN. He has a cardiologsit in [place] and tells me that he had a stress test 1 month ago. He has no cp. SOB at baseline with pulmonary fibrosis. On home o2.   CABG x3 in 1990's/   Pt has wide pulse pressure.    TTE:  Summary:   1. Left ventricular ejection fraction, by visual estimation, is 60 to   65%.   2. Normal global left ventricular systolic function.   3. Basal anteroseptal segment is abnormal as described above.   4. Spectral Doppler shows pseudonormal pattern of left ventricular   myocardial filling (Grade II diastolic dysfunction).   5. There is no evidence of pericardial effusion.   6. Moderate mitral valve regurgitation.   7. Thickening of the anterior and posterior mitral valve leaflets.   8. Mild-moderate tricuspid regurgitation.   9. Sclerotic aortic valve with normal opening.  10. Trace pulmonic valve regurgitation.    [id number] Alysia Moffett MD, Electronically signed on 1/31/2019 at 11:14:24   AM     PAST MEDICAL & SURGICAL HISTORY:  Chronic anemia  ESRD (end stage renal disease)  CAD (coronary artery disease)  Lung cancer: had yoni radiation in 2006.  Bipolar disorder  High cholesterol  Hypertension  S/P CABG (coronary artery bypass graft): pt&#x27;s son in law states h/o some stents near neck area ? carotid ? he is not sure.    Allergies  No Known Allergies    MEDICATIONS  (STANDING):  aspirin enteric coated 81 milliGRAM(s) Oral daily  atorvastatin 80 milliGRAM(s) Oral at bedtime  clopidogrel Tablet 75 milliGRAM(s) Oral daily  docusate sodium 100 milliGRAM(s) Oral three times a day  DULoxetine 20 milliGRAM(s) Oral daily  gabapentin 300 milliGRAM(s) Oral two times a day  heparin  Injectable 5000 Unit(s) SubCutaneous every 12 hours  hydrALAZINE 25 milliGRAM(s) Oral every 8 hours  isosorbide   mononitrate ER Tablet (IMDUR) 30 milliGRAM(s) Oral <User Schedule>  levothyroxine 100 MICROGram(s) Oral daily  loratadine 10 milliGRAM(s) Oral daily  losartan 25 milliGRAM(s) Oral <User Schedule>  metoprolol tartrate 50 milliGRAM(s) Oral two times a day  multivitamin 1 Tablet(s) Oral daily  pantoprazole    Tablet 40 milliGRAM(s) Oral before breakfast  senna 2 Tablet(s) Oral at bedtime    MEDICATIONS  (PRN):    FAMILY HISTORY:  Family history of essential hypertension (Father)    SOCIAL HISTORY:  No ETOH use, Former tobacco use. No drug use    REVIEW OF SYSTEMS:  CONSTITUTIONAL: No fever, weight loss, or fatigue  EYES: No eye pain, visual disturbances, or discharge  ENMT:  No difficulty hearing, tinnitus, vertigo; No sinus or throat pain  NECK: No pain or stiffness  RESPIRATORY: See HPI, + Shortness of Breath  CARDIOVASCULAR: No chest pain, palpitations, passing out, dizziness, or leg swelling.  GASTROINTESTINAL: No abdominal or epigastric pain. No nausea, vomiting, or hematemesis; No diarrhea or constipation. No melena or hematochezia.  GENITOURINARY: No dysuria, frequency, hematuria, or incontinence  NEUROLOGICAL: No headaches, memory loss, loss of strength, numbness, or tremors  SKIN: No itching, burning, rashes, or lesions   LYMPH Nodes: No enlarged glands  ENDOCRINE: No heat or cold intolerance; No hair loss  MUSCULOSKELETAL: No joint pain or swelling; No muscle, back, or extremity pain  PSYCHIATRIC: No depression, anxiety, mood swings, or difficulty sleeping  HEME/LYMPH: No easy bruising, or bleeding gums  ALLERY AND IMMUNOLOGIC: No hives or eczema	    Vital Signs Last 24 Hrs  T(C): 36.5 (10 Mar 2019 08:59), Max: 36.8 (09 Mar 2019 23:25)  T(F): 97.7 (10 Mar 2019 08:59), Max: 98.2 (09 Mar 2019 23:25)  HR: 61 (10 Mar 2019 11:14) (56 - 62)  BP: 113/42 (10 Mar 2019 11:14) (108/50 - 146/63)  BP(mean): --  RR: 18 (10 Mar 2019 08:59) (18 - 18)  SpO2: 96% (10 Mar 2019 08:59) (96% - 98%)    PHYSICAL EXAM:  Appearance: Normal, well nourished	  HEENT:   Normal oral mucosa, PERRL, EOMI, sclera non-icteric	  Lymphatic: No cervical lymphadenopathy  Cardiovascular: Normal S1 S2, No JVD, 3/6 sm lsb, No carotid bruits, No peripheral edema  Respiratory: Lungs clear to auscultation	  Psychiatry: A & O x 3, Mood & affect appropriate  Gastrointestinal:  Soft, Non-tender, + BS, no bruits	  Skin: No rashes, No ecchymoses, No cyanosis  Neurologic: Grossly non-focal with full strength in all four extremities  Extremities:  No clubbing, cyanosis or edema  Vascular: Peripheral pulses palpable 2+ bilaterally    INTERPRETATION OF TELEMETRY: NSR, PVCs, no VT    ECG: NSR, old AS MI    LABS:                      12.7   6.8   )-----------( 205      ( 09 Mar 2019 10:36 )             42.4     03-09    141  |  96<L>  |  13.0  ----------------------------<  92  4.4   |  32.0<H>  |  3.74<H>    Ca    9.1      09 Mar 2019 10:36

## 2019-03-10 NOTE — CONSULT NOTE ADULT - ASSESSMENT
The patient is a 79 year old male with a history of ESRD on HD T/TH/Sat, CAD status post CABG, hypertension, lung cancer status post chemotherapy and radiation with pulmonary fibrosis on home oxygen and hypothyroidism who presented to the ER with complaints of headache. CT head was unrevealing for the cause. He was treated for HTN. He has a wide pulse pressure. Last TTE from January did not show significant aortic valve regurgitation Repeat TTE.   Decrease hydralazine.  Check TSH since thyroid dysfunction can cause wide pulse pressure  PVCs, no VT. On BB. He had a stress test last month. He is advised to see his cardiologist after discharge. No indication for further ischemic testing at this time.
1 ESRD ON HD  2) MBD of renal dx  3 ) Vol HTN  4) Anemia    Urgent HD; seen on machine; reassessed  On HD now;  2L UF;   Improved respiratory status;  d/w Dr Odonnell and family
80 yo male, PMHx CAD s/p CABG, ESRD on HD T/R/Sa since 01/2019, HTN, HLD, lung CA s/p chemotherapy and radiation 2006 on 2lpm NC continuous since 11/2018, who presented to ED with headache worsening over the past 3 days, found to have hypertensive emergency, developed acute hypoxemic respiratory failure secondary to acute pulmonary edema.     At this time, patient's respiratory status has stabilized with application of BiPAP and blood pressure has improved. Patient is unlikely to gain additional benefit from ICU level of care. Please reconsult should clinical condition change.    ABG obtained, 7.44/49/66/31. Recommend a trial of HFNC for hypoxemia. Would consult Nephrology Dr. Miles's group for stat hemodialysis this morning. Still makes urine, can trial dose of Lasix in the interim while awaiting HD.

## 2019-03-10 NOTE — PROGRESS NOTE ADULT - ASSESSMENT
1) ESRD on HD  2) MBD of renal dx  3) Vol HTN  4) Anemia of renal dx    Plan for HD in AM  Appreciate cardiac evaluation

## 2019-03-11 ENCOUNTER — APPOINTMENT (OUTPATIENT)
Dept: VASCULAR SURGERY | Facility: CLINIC | Age: 79
End: 2019-03-11

## 2019-03-11 DIAGNOSIS — Z71.89 OTHER SPECIFIED COUNSELING: ICD-10-CM

## 2019-03-11 LAB
ALBUMIN SERPL ELPH-MCNC: 3.3 G/DL — SIGNIFICANT CHANGE UP (ref 3.3–5.2)
ALP SERPL-CCNC: 64 U/L — SIGNIFICANT CHANGE UP (ref 40–120)
ALT FLD-CCNC: 8 U/L — SIGNIFICANT CHANGE UP
ANION GAP SERPL CALC-SCNC: 16 MMOL/L — SIGNIFICANT CHANGE UP (ref 5–17)
AST SERPL-CCNC: 17 U/L — SIGNIFICANT CHANGE UP
BILIRUB SERPL-MCNC: 0.3 MG/DL — LOW (ref 0.4–2)
BUN SERPL-MCNC: 26 MG/DL — HIGH (ref 8–20)
CALCIUM SERPL-MCNC: 8 MG/DL — LOW (ref 8.6–10.2)
CHLORIDE SERPL-SCNC: 99 MMOL/L — SIGNIFICANT CHANGE UP (ref 98–107)
CK SERPL-CCNC: 60 U/L — SIGNIFICANT CHANGE UP (ref 30–200)
CO2 SERPL-SCNC: 24 MMOL/L — SIGNIFICANT CHANGE UP (ref 22–29)
CREAT SERPL-MCNC: 6.29 MG/DL — HIGH (ref 0.5–1.3)
GLUCOSE SERPL-MCNC: 84 MG/DL — SIGNIFICANT CHANGE UP (ref 70–115)
HCT VFR BLD CALC: 37 % — LOW (ref 42–52)
HGB BLD-MCNC: 11.2 G/DL — LOW (ref 14–18)
MCHC RBC-ENTMCNC: 29.2 PG — SIGNIFICANT CHANGE UP (ref 27–31)
MCHC RBC-ENTMCNC: 30.3 G/DL — LOW (ref 32–36)
MCV RBC AUTO: 96.4 FL — HIGH (ref 80–94)
PLATELET # BLD AUTO: 209 K/UL — SIGNIFICANT CHANGE UP (ref 150–400)
POTASSIUM SERPL-MCNC: 4.2 MMOL/L — SIGNIFICANT CHANGE UP (ref 3.5–5.3)
POTASSIUM SERPL-SCNC: 4.2 MMOL/L — SIGNIFICANT CHANGE UP (ref 3.5–5.3)
PROT SERPL-MCNC: 7 G/DL — SIGNIFICANT CHANGE UP (ref 6.6–8.7)
RBC # BLD: 3.84 M/UL — LOW (ref 4.6–6.2)
RBC # FLD: 17.6 % — HIGH (ref 11–15.6)
SODIUM SERPL-SCNC: 139 MMOL/L — SIGNIFICANT CHANGE UP (ref 135–145)
TROPONIN T SERPL-MCNC: 0.07 NG/ML — HIGH (ref 0–0.06)
TSH SERPL-MCNC: 1.83 UIU/ML — SIGNIFICANT CHANGE UP (ref 0.27–4.2)
WBC # BLD: 8.3 K/UL — SIGNIFICANT CHANGE UP (ref 4.8–10.8)
WBC # FLD AUTO: 8.3 K/UL — SIGNIFICANT CHANGE UP (ref 4.8–10.8)

## 2019-03-11 PROCEDURE — 99232 SBSQ HOSP IP/OBS MODERATE 35: CPT

## 2019-03-11 PROCEDURE — 90937 HEMODIALYSIS REPEATED EVAL: CPT

## 2019-03-11 PROCEDURE — 93010 ELECTROCARDIOGRAM REPORT: CPT

## 2019-03-11 RX ORDER — CHLORHEXIDINE GLUCONATE 213 G/1000ML
1 SOLUTION TOPICAL
Refills: 0 | Status: DISCONTINUED | OUTPATIENT
Start: 2019-03-11 | End: 2019-03-14

## 2019-03-11 RX ADMIN — ATORVASTATIN CALCIUM 80 MILLIGRAM(S): 80 TABLET, FILM COATED ORAL at 21:41

## 2019-03-11 RX ADMIN — LORATADINE 10 MILLIGRAM(S): 10 TABLET ORAL at 13:24

## 2019-03-11 RX ADMIN — PANTOPRAZOLE SODIUM 40 MILLIGRAM(S): 20 TABLET, DELAYED RELEASE ORAL at 05:11

## 2019-03-11 RX ADMIN — SENNA PLUS 2 TABLET(S): 8.6 TABLET ORAL at 21:41

## 2019-03-11 RX ADMIN — HEPARIN SODIUM 5000 UNIT(S): 5000 INJECTION INTRAVENOUS; SUBCUTANEOUS at 17:14

## 2019-03-11 RX ADMIN — GABAPENTIN 300 MILLIGRAM(S): 400 CAPSULE ORAL at 05:11

## 2019-03-11 RX ADMIN — ISOSORBIDE MONONITRATE 30 MILLIGRAM(S): 60 TABLET, EXTENDED RELEASE ORAL at 13:22

## 2019-03-11 RX ADMIN — CLOPIDOGREL BISULFATE 75 MILLIGRAM(S): 75 TABLET, FILM COATED ORAL at 13:22

## 2019-03-11 RX ADMIN — CHLORHEXIDINE GLUCONATE 1 APPLICATION(S): 213 SOLUTION TOPICAL at 17:16

## 2019-03-11 RX ADMIN — Medication 81 MILLIGRAM(S): at 13:22

## 2019-03-11 RX ADMIN — GABAPENTIN 300 MILLIGRAM(S): 400 CAPSULE ORAL at 17:14

## 2019-03-11 RX ADMIN — Medication 100 MILLIGRAM(S): at 21:41

## 2019-03-11 RX ADMIN — Medication 50 MILLIGRAM(S): at 21:41

## 2019-03-11 RX ADMIN — LOSARTAN POTASSIUM 25 MILLIGRAM(S): 100 TABLET, FILM COATED ORAL at 13:24

## 2019-03-11 RX ADMIN — Medication 50 MILLIGRAM(S): at 05:35

## 2019-03-11 RX ADMIN — Medication 10 MILLIGRAM(S): at 05:35

## 2019-03-11 RX ADMIN — Medication 100 MICROGRAM(S): at 05:11

## 2019-03-11 RX ADMIN — Medication 10 MILLIGRAM(S): at 21:41

## 2019-03-11 RX ADMIN — Medication 100 MILLIGRAM(S): at 05:11

## 2019-03-11 RX ADMIN — HEPARIN SODIUM 5000 UNIT(S): 5000 INJECTION INTRAVENOUS; SUBCUTANEOUS at 05:11

## 2019-03-11 RX ADMIN — Medication 1 TABLET(S): at 13:23

## 2019-03-11 RX ADMIN — Medication 100 MILLIGRAM(S): at 13:22

## 2019-03-11 RX ADMIN — DULOXETINE HYDROCHLORIDE 20 MILLIGRAM(S): 30 CAPSULE, DELAYED RELEASE ORAL at 13:23

## 2019-03-11 NOTE — PROGRESS NOTE ADULT - ASSESSMENT
The patient is a 79 year old male with a history of ESRD on HD T/TH/Sat, CAD status post CABG, hypertension, lung cancer status post chemotherapy and radiation with pulmonary fibrosis on home oxygen and hypothyroidism who presented to the ER with complaints of headache. According to the patient for the past 3 days he has had a persistent headache. His son in law took his blood pressure and noted it was elevated, gave him his blood pressure medication and repeat was still in the 190s therefore brought him to the ER for evaluation. In the ED, BP of 195/75; given IV hydralazine 10m x 1. CT head was negative. After being given IV Reglan for nausea patient became anxious and hypoxic. SPo2 in the 80s on nasal cannula. Was started on Bipap and given IV benadryl x 1 with improvement. Currently at the time of my examination patient is sitting up comfortably on bipap; SBP of 179. Chest xray consistent with pulmonary edema.     Assessment/ plan;    1. s/p Acute on chronic hypoxic respiratory failure secondary to hypertensive urgency resulting in acute pulmonary edema:   Weaned off bipap to NC; Home O2 user; HD done 3/8/19  BP well controlled now  Son concerned about SBP in 100s. reduced dose of hydralazine and Cozaar; d/c Norvasc  Monitor    7. Ventricular Trigeminies  - resoved for > 36 hours on BB  - asymptomatic.  - cardiology consult appreciated  - wide pulse pressure noted. per cards- rpt echo, and decrease hydralazine.       2. ESRD On HD T/TH/S- Cont  - had CP x 1 today during HD and HD was terminated.  - Trop better than before.    3. Hypothyroidism TSH of 6; in Jan  TSH was 2- WNL  Likely elevation due to acute illness  Continue home dose of synthroid    4. History of lung CA with radiation induce pulmonary fibrosis on home oxygen    5. History of CAD status post CABG: Continue plavix, aspirin, statin, bb, arb    6. Acute on chronic diastolic CHF: Echo in Jan with normal EF grade 2 diastolic dysfunction    VTE- Heparin subcut The patient is a 79 year old male with a history of ESRD on HD T/TH/Sat, CAD status post CABG, hypertension, lung cancer status post chemotherapy and radiation with pulmonary fibrosis on home oxygen and hypothyroidism who presented to the ER with complaints of headache. According to the patient for the past 3 days he has had a persistent headache. His son in law took his blood pressure and noted it was elevated, gave him his blood pressure medication and repeat was still in the 190s therefore brought him to the ER for evaluation. In the ED, BP of 195/75; given IV hydralazine 10m x 1. CT head was negative. After being given IV Reglan for nausea patient became anxious and hypoxic. SPo2 in the 80s on nasal cannula. Was started on Bipap and given IV benadryl x 1 with improvement. Currently at the time of my examination patient is sitting up comfortably on bipap; SBP of 179. Chest xray consistent with pulmonary edema.     Assessment/ plan;    1. s/p Acute on chronic hypoxic respiratory failure secondary to hypertensive urgency resulting in acute pulmonary edema:   Weaned off bipap to NC; Home O2 user; HD done 3/8/19  BP well controlled now  Son concerned about SBP in 100s. reduced dose of hydralazine and Cozaar; d/c Norvasc  Monitor    7. Ventricular Trigeminies  - resoved for > 36 hours on BB  - asymptomatic.  - cardiology consult appreciated  - wide pulse pressure noted. per cards- rpt echo, and decrease hydralazine.   - pt will need a cardiac cath and trying to reach son to discuss same      2. ESRD On HD T/TH/S- Cont  - had CP x 1 today during HD and HD was terminated.  - Trop better than before.    3. Hypothyroidism TSH of 6; in Jan  TSH was 2- WNL  Likely elevation due to acute illness  Continue home dose of synthroid    4. History of lung CA with radiation induce pulmonary fibrosis on home oxygen    5. History of CAD status post CABG: Continue plavix, aspirin, statin, bb, arb    6. Acute on chronic diastolic CHF: Echo in Jan with normal EF grade 2 diastolic dysfunction    VTE- Heparin subcut

## 2019-03-11 NOTE — PROGRESS NOTE ADULT - SUBJECTIVE AND OBJECTIVE BOX
HEALTH ISSUES - PROBLEM Dx:    HTN urgency; Hypoxia; Pulm edema; ESRD, pulm fibrosis, lung ca    INTERVAL HPI/ OVERNIGHT EVENTS:    comfortable no complaints currently  however he had CP x 1 episode during HD and HD was terminated  Tele was d/michelle this AM as there were no more V trigeminies for > 36 hours  await ECHO    REVIEW OF SYSTEMS:    hypoxia - fever - palpitations - CP +    Vital Signs Last 24 Hrs  T(C): 36.6 (11 Mar 2019 11:17), Max: 37.1 (10 Mar 2019 17:55)  T(F): 97.9 (11 Mar 2019 11:17), Max: 98.7 (10 Mar 2019 17:55)  HR: 73 (11 Mar 2019 11:17) (55 - 73)  BP: 120/51 (11 Mar 2019 11:17) (115/49 - 141/58)  BP(mean): --  RR: 20 (11 Mar 2019 11:17) (18 - 20)  SpO2: 95% (11 Mar 2019 11:17) (95% - 99%)    PHYSICAL EXAM-  Gen: NAD,   	HEENT: PERRL, supple neck, clear oropharynx  	Pulm: Daniel basal coarse rales + No rhonchi  	CV: RRR, S1S2; no rub  	Back: No spinal or CVA tenderness; no sacral edema  	Abd: +BS, soft, nontender/nondistended  	: No suprapubic tenderness  	UE: Warm, FROM, no clubbing, intact strength; no edema; no asterixis  	LE: Warm, FROM, no clubbing, intact strength; no edema  	Neuro: No focal deficits, intact gait  	Psych: Normal affect and mood      LABS:                        11.2   8.3   )-----------( 209      ( 11 Mar 2019 09:45 )             37.0     03-11    139  |  99  |  26.0<H>  ----------------------------<  84  4.2   |  24.0  |  6.29<H>    Ca    8.0<L>      11 Mar 2019 09:45    TPro  7.0  /  Alb  3.3  /  TBili  0.3<L>  /  DBili  x   /  AST  17  /  ALT  8   /  AlkPhos  64  03-11    Assessment and Plan

## 2019-03-11 NOTE — PROGRESS NOTE ADULT - SUBJECTIVE AND OBJECTIVE BOX
[facility] DIVISION OF KIDNEY DISEASES AND HYPERTENSION -- FOLLOW UP NOTE  --------------------------------------------------------------------------------  Chief Complaint: ESRD HD    24 hour events/subjective:  Seen/examined on HD  Lying in bed; complaining of pleuritic pain      PAST HISTORY  --------------------------------------------------------------------------------  No significant changes to PMH, PSH, FHx, SHx, unless otherwise noted    ALLERGIES & MEDICATIONS  --------------------------------------------------------------------------------  Allergies    No Known Allergies    Intolerances      Standing Inpatient Medications  aspirin enteric coated 81 milliGRAM(s) Oral daily  atorvastatin 80 milliGRAM(s) Oral at bedtime  chlorhexidine 2% Cloths 1 Application(s) Topical <User Schedule>  clopidogrel Tablet 75 milliGRAM(s) Oral daily  docusate sodium 100 milliGRAM(s) Oral three times a day  DULoxetine 20 milliGRAM(s) Oral daily  gabapentin 300 milliGRAM(s) Oral two times a day  heparin  Injectable 5000 Unit(s) SubCutaneous every 12 hours  hydrALAZINE 10 milliGRAM(s) Oral every 8 hours  isosorbide   mononitrate ER Tablet (IMDUR) 30 milliGRAM(s) Oral <User Schedule>  levothyroxine 100 MICROGram(s) Oral daily  loratadine 10 milliGRAM(s) Oral daily  losartan 25 milliGRAM(s) Oral <User Schedule>  metoprolol tartrate 50 milliGRAM(s) Oral two times a day  multivitamin 1 Tablet(s) Oral daily  pantoprazole    Tablet 40 milliGRAM(s) Oral before breakfast  senna 2 Tablet(s) Oral at bedtime    PRN Inpatient Medications      REVIEW OF SYSTEMS  --------------------------------------------------------------------------------  Gen: No weight changes, fatigue, fevers/chills, weakness  Skin: No rashes  Head/Eyes/Ears/Mouth: No headache; Normal hearing; Normal vision w/o blurriness; No sinus pain/discomfort, sore throat  Respiratory: No dyspnea, cough, wheezing, hemoptysis  CV: No chest pain, PND, orthopnea  GI: No abdominal pain, diarrhea, constipation, nausea, vomiting, melena, hematochezia  : No increased frequency, dysuria, hematuria, nocturia  MSK: No joint pain/swelling; no back pain; no edema  Neuro: No dizziness/lightheadedness, weakness, seizures, numbness, tingling  Heme: No easy bruising or bleeding  Endo: No heat/cold intolerance  Psych: No significant nervousness, anxiety, stress, depression    All other systems were reviewed and are negative, except as noted.    VITALS/PHYSICAL EXAM  --------------------------------------------------------------------------------  T(C): 36.6 (03-11-19 @ 11:17), Max: 37.1 (03-10-19 @ 17:55)  HR: 73 (03-11-19 @ 11:17) (55 - 73)  BP: 120/51 (03-11-19 @ 11:17) (115/49 - 141/58)  RR: 20 (03-11-19 @ 11:17) (18 - 20)  SpO2: 95% (03-11-19 @ 11:17) (95% - 99%)  Wt(kg): --        03-11-19 @ 07:01  -  03-11-19 @ 13:26  --------------------------------------------------------  IN: 0 mL / OUT: 1400 mL / NET: -1400 mL      Physical Exam:  	Gen: NAD,    	HEENT: PERRL, supple neck, clear oropharynx  	Pulm: CTA B/L  	CV: RRR, S1S2; no rub  	Back: No spinal or CVA tenderness; no sacral edema  	Abd: +BS, soft, nontender/nondistended  	: No suprapubic tenderness  	UE: Warm, FROM, no clubbing, intact strength; no edema; no asterixis  	LE: Warm, FROM, no clubbing, intact strength; no edema  	Neuro: No focal deficits, intact gait  	Psych: Normal affect and mood  	Skin: Warm, without rashes  	Vascular access:    LABS/STUDIES  --------------------------------------------------------------------------------              11.2   8.3   >-----------<  209      [03-11-19 @ 09:45]              37.0     139  |  99  |  26.0  ----------------------------<  84      [03-11-19 @ 09:45]  4.2   |  24.0  |  6.29        Ca     8.0     [03-11-19 @ 09:45]    TPro  7.0  /  Alb  3.3  /  TBili  0.3  /  DBili  x   /  AST  17  /  ALT  8   /  AlkPhos  64  [03-11-19 @ 09:45]          Creatinine Trend:  SCr 6.29 [03-11 @ 09:45]  SCr 3.74 [03-09 @ 10:36]  SCr 2.82 [03-08 @ 04:43]        Iron 34, TIBC 235, %sat 14      [01-29-19 @ 11:39]  Ferritin 1049      [01-29-19 @ 11:39]  TSH 1.83      [03-11-19 @ 07:37]  Lipid: chol 159, , HDL 32, LDL 94      [01-29-19 @ 11:39]

## 2019-03-11 NOTE — PROGRESS NOTE ADULT - ASSESSMENT
1) ESRD on HD  2) MBD of renal dx  3) Vol HTN  4) Anemia of renal dx    Seen on HD  Please check phosphorus  d/w Dr Stanton

## 2019-03-11 NOTE — PROGRESS NOTE ADULT - SUBJECTIVE AND OBJECTIVE BOX
CHIEF COMPLAINT:Patient is a 79y old  Male who presents with a chief complaint of Headache (10 Mar 2019 15:14)    INTERVAL HISTORY:  Pt developed substernal cp about 2 hours into dialysis.   HD was stopped, he is cp free now. EKG and CE ordered.     Allergies:  No Known Allergies    	  MEDICATIONS:  hydrALAZINE 10 milliGRAM(s) Oral every 8 hours  isosorbide   mononitrate ER Tablet (IMDUR) 30 milliGRAM(s) Oral <User Schedule>  losartan 25 milliGRAM(s) Oral <User Schedule>  metoprolol tartrate 50 milliGRAM(s) Oral two times a day  loratadine 10 milliGRAM(s) Oral daily  DULoxetine 20 milliGRAM(s) Oral daily  gabapentin 300 milliGRAM(s) Oral two times a day  docusate sodium 100 milliGRAM(s) Oral three times a day  pantoprazole    Tablet 40 milliGRAM(s) Oral before breakfast  senna 2 Tablet(s) Oral at bedtime  atorvastatin 80 milliGRAM(s) Oral at bedtime  levothyroxine 100 MICROGram(s) Oral daily  aspirin enteric coated 81 milliGRAM(s) Oral daily  chlorhexidine 2% Cloths 1 Application(s) Topical <User Schedule>  clopidogrel Tablet 75 milliGRAM(s) Oral daily  heparin  Injectable 5000 Unit(s) SubCutaneous every 12 hours  multivitamin 1 Tablet(s) Oral daily    PHYSICAL EXAM:  T(C): 36.6 (03-11-19 @ 11:17), Max: 37.1 (03-10-19 @ 17:55)  HR: 73 (03-11-19 @ 11:17) (55 - 73)  BP: 120/51 (03-11-19 @ 11:17) (115/49 - 141/58)  RR: 20 (03-11-19 @ 11:17) (18 - 20)  SpO2: 95% (03-11-19 @ 11:17) (95% - 99%)  Wt(kg): --    I&O's Summary    11 Mar 2019 07:01  -  11 Mar 2019 12:15  --------------------------------------------------------  IN: 0 mL / OUT: 1400 mL / NET: -1400 mL    Appearance: Normal	  HEENT:   NC/AT  Eye: Pink Conjunctiva  Lungs: CTA B/L  CVS: RRR, Normal S1 and S2, No Edema  Neuro: A&O x3    LABS:	 	                      11.2   8.3   )-----------( 209      ( 11 Mar 2019 09:45 )             37.0     03-11    139  |  99  |  26.0<H>  ----------------------------<  84  4.2   |  24.0  |  6.29<H>    Ca    8.0<L>      11 Mar 2019 09:45    TPro  7.0  /  Alb  3.3  /  TBili  0.3<L>  /  DBili  x   /  AST  17  /  ALT  8   /  AlkPhos  64  03-11    TSH: Thyroid Stimulating Hormone, Serum: 1.83 uIU/mL (03-11 @ 07:37)    TTE: pending    EKG: NSR, LAE, New T changes, lateral leads.       ASSESSMENT/PLAN: 	  Pt is CP free now. He need to be monitored.  CE pending  TTE pending with wide pulse pressure  I left a message for pt's son to call me back  He will need cardiac cath.

## 2019-03-11 NOTE — PROVIDER CONTACT NOTE (MEDICATION) - SITUATION
Pt /52, 56 HR. Pt has metoprolol, hydralizine, & Losartan ordered. Which ones should I give?
Pt's manual /50, 62HR. Pt runs diego on cardiac monitor. Should I give hydralizine?
Pt /49, 64 HR should I give metoprolol 50mg & hydralizine 10mg?

## 2019-03-11 NOTE — PROVIDER CONTACT NOTE (MEDICATION) - REASON
Pt's manual /50, 62HR, hold hydralizine?
Pt /49, 64 HR should I give metoprolol 50mg & hydralizine 10mg?
Pt /52, 56 HR

## 2019-03-11 NOTE — PROVIDER CONTACT NOTE (MEDICATION) - ACTION/TREATMENT ORDERED:
Ok to give both metoprolol & hydralizine
Give Metoprolol, and push hydralazine & Losartan to 2 hours later.
Hold hydralizine.

## 2019-03-11 NOTE — PROVIDER CONTACT NOTE (OTHER) - DATE AND TIME:
08-Mar-2019 15:47
09-Mar-2019 10:24
09-Mar-2019 13:38
11-Mar-2019 14:09
09-Mar-2019 14:05
10-Mar-2019 09:00
10-Mar-2019 11:18

## 2019-03-11 NOTE — PROVIDER CONTACT NOTE (OTHER) - SITUATION
pt bp 104/44 HR 53.
pt bp 104/44 HR 53. pt family refusing Cymbalta. pt family states that it can lower bp. also pt is due for hydralazine and and imdur.
pt bp 113/42, and Heart 62. called MD regarding Bp and medications due. as per MD reschedule losartan to 12pm.
pt bp 123/49 HR 57. pt due for losartan and hydralazine.
Pt to ED last night required bipap support for resp. distress, changed to highflow n/c this am at 40% o2 pre dialysis per dr gracia.
no c/o chest pain
as per monitor tech pt had few minutes of trigmeny. pt back sinus to sinus diego on monitor.

## 2019-03-11 NOTE — PROVIDER CONTACT NOTE (OTHER) - ACTION/TREATMENT ORDERED:
as per MD reschedule losartan to 12pm.
ok to hold cymbalta due to family request. hydralazine dose, and losartan dose due to be adjusted. as per MD imdur to be give.
high flow discontinued, bipap 12/6 .40 back up rate of 12 ordered .
left message MD.
left message for MD, regarding pt bp meds and parameters for those meds.
messages left for MD. will return call shortly.
to be determined

## 2019-03-11 NOTE — PROVIDER CONTACT NOTE (OTHER) - REASON
TROPONIN .07
regarding pt bp
pt had few minutes of trigemny
regarding pt bp
pt bp 104/44 HR 53
regarding pt medication due to bp
order change oxygen therapy and bipap support

## 2019-03-12 PROCEDURE — 99233 SBSQ HOSP IP/OBS HIGH 50: CPT

## 2019-03-12 PROCEDURE — 93459 L HRT ART/GRFT ANGIO: CPT | Mod: 26

## 2019-03-12 PROCEDURE — 99232 SBSQ HOSP IP/OBS MODERATE 35: CPT

## 2019-03-12 RX ORDER — METOPROLOL TARTRATE 50 MG
25 TABLET ORAL
Refills: 0 | Status: DISCONTINUED | OUTPATIENT
Start: 2019-03-12 | End: 2019-03-13

## 2019-03-12 RX ADMIN — Medication 100 MILLIGRAM(S): at 05:20

## 2019-03-12 RX ADMIN — Medication 100 MILLIGRAM(S): at 23:21

## 2019-03-12 RX ADMIN — GABAPENTIN 300 MILLIGRAM(S): 400 CAPSULE ORAL at 05:20

## 2019-03-12 RX ADMIN — Medication 1 TABLET(S): at 11:05

## 2019-03-12 RX ADMIN — LOSARTAN POTASSIUM 25 MILLIGRAM(S): 100 TABLET, FILM COATED ORAL at 11:04

## 2019-03-12 RX ADMIN — LORATADINE 10 MILLIGRAM(S): 10 TABLET ORAL at 11:06

## 2019-03-12 RX ADMIN — SENNA PLUS 2 TABLET(S): 8.6 TABLET ORAL at 23:21

## 2019-03-12 RX ADMIN — Medication 100 MILLIGRAM(S): at 15:11

## 2019-03-12 RX ADMIN — PANTOPRAZOLE SODIUM 40 MILLIGRAM(S): 20 TABLET, DELAYED RELEASE ORAL at 05:21

## 2019-03-12 RX ADMIN — Medication 50 MILLIGRAM(S): at 05:20

## 2019-03-12 RX ADMIN — HEPARIN SODIUM 5000 UNIT(S): 5000 INJECTION INTRAVENOUS; SUBCUTANEOUS at 05:20

## 2019-03-12 RX ADMIN — DULOXETINE HYDROCHLORIDE 20 MILLIGRAM(S): 30 CAPSULE, DELAYED RELEASE ORAL at 11:06

## 2019-03-12 RX ADMIN — ATORVASTATIN CALCIUM 80 MILLIGRAM(S): 80 TABLET, FILM COATED ORAL at 23:21

## 2019-03-12 RX ADMIN — Medication 81 MILLIGRAM(S): at 11:04

## 2019-03-12 RX ADMIN — Medication 100 MICROGRAM(S): at 05:20

## 2019-03-12 RX ADMIN — CHLORHEXIDINE GLUCONATE 1 APPLICATION(S): 213 SOLUTION TOPICAL at 05:19

## 2019-03-12 RX ADMIN — ISOSORBIDE MONONITRATE 30 MILLIGRAM(S): 60 TABLET, EXTENDED RELEASE ORAL at 11:05

## 2019-03-12 RX ADMIN — GABAPENTIN 300 MILLIGRAM(S): 400 CAPSULE ORAL at 18:58

## 2019-03-12 RX ADMIN — CLOPIDOGREL BISULFATE 75 MILLIGRAM(S): 75 TABLET, FILM COATED ORAL at 11:05

## 2019-03-12 RX ADMIN — Medication 10 MILLIGRAM(S): at 05:20

## 2019-03-12 RX ADMIN — Medication 10 MILLIGRAM(S): at 15:11

## 2019-03-12 NOTE — PROGRESS NOTE ADULT - ASSESSMENT
1 ESRD ON HD  2) MBD of renal dx  3 ) Vol HTN  4) Anemia      HD today  Improved respiratory status  Continue current management

## 2019-03-12 NOTE — PROGRESS NOTE ADULT - SUBJECTIVE AND OBJECTIVE BOX
CC: Follow up for chest pain    INTERVAL HPI/OVERNIGHT EVENTS: Patient seen and examined, sitting comfortably in bed on 3 liters O2 via nasal cannula. Denies complaints of chest pain, sob or palpitations. SBP fluctuating; patient asymptomatic.       Vital Signs Last 24 Hrs  T(C): 36.5 (11 Mar 2019 21:39), Max: 36.7 (11 Mar 2019 17:06)  T(F): 97.7 (11 Mar 2019 21:39), Max: 98 (11 Mar 2019 17:06)  HR: 67 (11 Mar 2019 21:39) (62 - 67)  BP: 128/58 (11 Mar 2019 21:39) (96/50 - 128/58)  BP(mean): --  RR: 20 (11 Mar 2019 21:39) (20 - 22)  SpO2: 98% (11 Mar 2019 21:39) (95% - 98%)    PHYSICAL EXAM:    GENERAL: NAD, AOX3  ENMT: Moist mucous membranes  NECK: Supple, No JVD  CHEST/LUNG: Clear to auscultation bilaterally; No rales, rhonchi, wheezing, or rubs  HEART: Regular rate and rhythm; No murmurs, rubs, or gallops  ABDOMEN: Soft, Nontender, Nondistended; Bowel sounds present  EXTREMITIES:  2+ Peripheral Pulses, No clubbing, cyanosis, or edema        MEDICATIONS  (STANDING):  aspirin enteric coated 81 milliGRAM(s) Oral daily  atorvastatin 80 milliGRAM(s) Oral at bedtime  chlorhexidine 2% Cloths 1 Application(s) Topical <User Schedule>  clopidogrel Tablet 75 milliGRAM(s) Oral daily  docusate sodium 100 milliGRAM(s) Oral three times a day  DULoxetine 20 milliGRAM(s) Oral daily  gabapentin 300 milliGRAM(s) Oral two times a day  heparin  Injectable 5000 Unit(s) SubCutaneous every 12 hours  hydrALAZINE 10 milliGRAM(s) Oral every 8 hours  isosorbide   mononitrate ER Tablet (IMDUR) 30 milliGRAM(s) Oral <User Schedule>  levothyroxine 100 MICROGram(s) Oral daily  loratadine 10 milliGRAM(s) Oral daily  losartan 25 milliGRAM(s) Oral <User Schedule>  metoprolol tartrate 25 milliGRAM(s) Oral two times a day  multivitamin 1 Tablet(s) Oral daily  pantoprazole    Tablet 40 milliGRAM(s) Oral before breakfast  senna 2 Tablet(s) Oral at bedtime    MEDICATIONS  (PRN):      Allergies    No Known Allergies    Intolerances          LABS:                          11.2   8.3   )-----------( 209      ( 11 Mar 2019 09:45 )             37.0     03-11    139  |  99  |  26.0<H>  ----------------------------<  84  4.2   |  24.0  |  6.29<H>    Ca    8.0<L>      11 Mar 2019 09:45    TPro  7.0  /  Alb  3.3  /  TBili  0.3<L>  /  DBili  x   /  AST  17  /  ALT  8   /  AlkPhos  64  03-11          RADIOLOGY & ADDITIONAL TESTS:

## 2019-03-12 NOTE — PROGRESS NOTE ADULT - SUBJECTIVE AND OBJECTIVE BOX
Department of Cardiology                                                                  [facility]/[place] [address] [address] [facility]                                                            Telephone: [phone]. Fax:[phone]    Pre Cath Note    79y Male     Planned Procedure:    Pertinent Prior Medications:        Antiplatelet: Plavix 75 mg daily       Aspirin: 81 mg daily       Statin: Lipitor 80 mg daily       Beta Blocker: Metoprolol 50 mg BID       CCB: Amlodipine 10 mg daily       Other Antianginal: Imdur 30 mg daily       ACEI: Losartan 50 mg daily    Pre-Procedural Orders: N/A    ASA: 2  Mallampati: 2  CCS Class: 3  GFR: 10  Adjusted Bleeding Risk: 8.3 %    HPI: The patient is a 79 year old male with a history of ESRD on HD T/TH/Sat, CAD status post CABG, hypertension, lung cancer status post chemotherapy and radiation with pulmonary fibrosis on home oxygen and hypothyroidism who presented to the ER with complaints of headache. According to the patient for the past 3 days he has had a persistent headache. His son in law took his blood pressure and noted it was elevated, gave him his blood pressure medication and repeat was still in the 190s therefore brought him to the ER for evaluation. In the ED, BP of 195/75; given IV hydralazine 10m x 1. CT head was negative. After being given IV reglan for nausea patient became anxious and hypoxic. SPo2 in the 80s on nasal cannula. Was started on Bipap and given IV benadryl x 1 with improvement. Currently at the time of my examination patient is sitting up comfortably on bipap; SBP of 179. Chest xray consistent with pulmonary edema. (08 Mar 2019 09:02)    Nuclear Stress Test: N/A    [facility]: Descember 5, 2018  VENTRICLES: No LV gram was performed; however, a recent echocardiogram demonstrated an EF of 55 %.  CORONARY VESSELS: The coronary circulation is right dominant.  LM:     --  Ostial LM: There was a 20 % stenosis within the stented segment.  LAD:     --  Ostial LAD: There was a 100 % stenosis.  CX:    --  Mid circumflex: There was a 90 % stenosis which was treated with an KINJAL 3.00 X 15MM drug-eluting stent.  RCA:     --  Ostial RCA: There was a 100 % stenosis.  GRAFTS:     --  Graft to the LAD: The graft was a ANA. Graft angiography showed no evidence of disease. Other grafts occluded    Echo: January 31, 2019  Left Ventricle: The left ventricular internal cavity size is normal. Global LV systolic function was normal. Left ventricular ejection fraction, by visual estimation, is 60 to 65%. Spectral Doppler shows pseudonormal pattern of left ventricular myocardial filling (Grade II diastolic dysfunction).  LV Wall Scoring: The basal anteroseptal segment is hypokinetic.  Right Ventricle: Normal right ventricular size and function.  Left Atrium: Mildly enlarged left atrium.  Right Atrium: Mildly enlarged right atrium.  Pericardium: There is no evidence of pericardial effusion.  Mitral Valve: Thickening of the anterior and posterior mitral valve leaflets. Moderate mitral valve regurgitation is seen.  Tricuspid Valve: The tricuspid valve is normal. Mild-moderate tricuspid regurgitation is visualized.  Aortic Valve: The aortic valve is trileaflet. Sclerotic aortic valve with normal opening. No evidence of aortic valve regurgitation is seen.  Pulmonic Valve: Structurally normal pulmonic valve, with normal leaflet excursion. Trace pulmonic valve regurgitation.  Aorta: The aortic root and ascending aorta are structurally normal, with no evidence of dilitation.  Pulmonary Artery: The main pulmonary artery is normal in size.  Venous: The inferior vena cava was normal sized, with respiratory size variation greater than 50%.    Allergies: No Known Allergies    PAST MEDICAL & SURGICAL HISTORY:  Chronic anemia  ESRD (end stage renal disease)  CAD (coronary artery disease)  Lung cancer: had yoni radiation in 2006.  Bipolar disorder  High cholesterol  Hypertension  S/P CABG (coronary artery bypass graft): pt&#x27;s son in [facility] h/o some stents near neck area ? carotid ? he is not sure.    Home Medications:  Cymbalta 20 mg oral delayed release capsule: 20 milligram(s) orally once a day (08 Mar 2019 08:49)  epoetin nguyễn: 6000 unit(s) injectable Tuesday, Thursday, Saturday with dialysis (08 Mar 2019 08:49)  fexofenadine 60 mg oral tablet: 60 milligram(s) orally once a day (08 Mar 2019 08:49)  ipratropium-albuterol 0.5 mg-2.5 mg/3 mLinhalation solution: 3 milliliter(s) inhaled every 6 hours, As needed, Shortness of Breath and/or Wheezing (08 Mar 2019 08:49)  omeprazole 40 mg oral delayed release capsule: 1 cap(s) orally once a day (08 Mar 2019 08:49)                            11.2   8.3   )-----------( 209      ( 11 Mar 2019 09:45 )             37.0     03-11    139  |  99    |  26.0  -----------------------<  84  4.2   |  24.0  |  6.29    Ca    8.0      11 Mar 2019 09:45    TPro  7.0  /  Alb  3.3  /  TBili  0.3  /  DBili  x   /  AST  17  /  ALT  8   /  AlkPhos  64  03-11    CARDIAC MARKERS ( 11 Mar 2019 12:51 ): 0.07 ng/mL, 60 U/L

## 2019-03-12 NOTE — PROGRESS NOTE ADULT - SUBJECTIVE AND OBJECTIVE BOX
CHIEF COMPLAINT:Patient is a 79y old  Male who presents with a chief complaint of Headache (12 Mar 2019 11:35)    INTERVAL HISTORY:  pt with cp yesterday, anginal pain, no more pain during the night.   enzymes flat. No n/v/d.     Allergies  No Known Allergies    MEDICATIONS:  hydrALAZINE 10 milliGRAM(s) Oral every 8 hours  isosorbide   mononitrate ER Tablet (IMDUR) 30 milliGRAM(s) Oral <User Schedule>  losartan 25 milliGRAM(s) Oral <User Schedule>  metoprolol tartrate 25 milliGRAM(s) Oral two times a day  loratadine 10 milliGRAM(s) Oral daily  DULoxetine 20 milliGRAM(s) Oral daily  gabapentin 300 milliGRAM(s) Oral two times a day  docusate sodium 100 milliGRAM(s) Oral three times a day  pantoprazole    Tablet 40 milliGRAM(s) Oral before breakfast  senna 2 Tablet(s) Oral at bedtime  atorvastatin 80 milliGRAM(s) Oral at bedtime  levothyroxine 100 MICROGram(s) Oral daily  aspirin enteric coated 81 milliGRAM(s) Oral daily  chlorhexidine 2% Cloths 1 Application(s) Topical <User Schedule>  clopidogrel Tablet 75 milliGRAM(s) Oral daily  heparin  Injectable 5000 Unit(s) SubCutaneous every 12 hours  multivitamin 1 Tablet(s) Oral daily    PHYSICAL EXAM:  T(C): 36.5 (03-11-19 @ 21:39), Max: 36.7 (03-11-19 @ 17:06)  HR: 67 (03-11-19 @ 21:39) (62 - 67)  BP: 128/58 (03-11-19 @ 21:39) (96/50 - 128/58)  RR: 20 (03-11-19 @ 21:39) (20 - 22)  SpO2: 98% (03-11-19 @ 21:39) (95% - 98%)  Appearance: Normal	  HEENT:   NC/AT  Eye: Pink Conjunctiva  Lungs: CTA B/L  CVS: RRR, Normal S1 and S2,   Pulses: Normal distal pulses.  Neuro: A&O x3    LABS:	 	                         11.2   8.3   )-----------( 209      ( 11 Mar 2019 09:45 )             37.0     03-11    139  |  99  |  26.0<H>  ----------------------------<  84  4.2   |  24.0  |  6.29<H>    Ca    8.0<L>      11 Mar 2019 09:45    TPro  7.0  /  Alb  3.3  /  TBili  0.3<L>  /  DBili  x   /  AST  17  /  ALT  8   /  AlkPhos  64  03-11    proBNP:   Lipid Profile:   HgA1c:   TSH:     ASSESSMENT/PLAN:

## 2019-03-12 NOTE — PROGRESS NOTE ADULT - ASSESSMENT
The patient is a 79 year old male with a history of ESRD on HD T/TH/Sat, CAD status post CABG, hypertension, lung cancer status post chemotherapy and radiation with pulmonary fibrosis on home oxygen and hypothyroidism who presented to the ER with complaints of headache.  In the ED, BP of 195/75; given IV hydralazine 10mg x 1. CT head was negative. After being given IV Reglan for nausea patient became anxious and hypoxic. SPo2 in the 80s on nasal cannula. Was started on Bipap and given IV benadryl x 1 with improvement. Weaned off bipap to hiflow and then nasal cannula post HD. In the interim, patient had complaints of chest pain with elevated troponin and ventricular trigeminy with a wide pulse pressure. Cardiology was consulted, recommended repeat echocardiogram and possible cardiac cath. Blood pressure improved; medications were weaned for hypotension.     Assessment/ plan:    1. s/p Acute on chronic hypoxic respiratory failure secondary to hypertensive urgency resulting in acute pulmonary edema:   Resolved; tolerating nasal cannula 3-4 liters    2. Ventricular Trigeminies  Evaluated by cardiology for wide pulse pressure and episode of chest pain  Troponin elevated  Results of echocardiogram pending  Possible cardiac cath     3. Hypertensive urgency: BP improved now with episodes of hypotension  Reduce dose of metoprolol to 25mg PO BID with parameters  Monitor bp    4. ESRD On HD T/TH/S    5. Hypothyroidism TSH of 6; in Jan  TSH was 2- WNL  Likely elevation due to acute illness  Continue home dose of synthroid    6. History of lung CA with radiation induce pulmonary fibrosis on home oxygen    7. History of CAD status post CABG: Continue plavix, aspirin, statin, bb, arb    8. Acute on chronic diastolic CHF: Echo in Jan with normal EF grade 2 diastolic dysfunction: resolved     VTE- Heparin subcut

## 2019-03-12 NOTE — PROGRESS NOTE ADULT - ASSESSMENT
79y Male   Procedure: Left heart catheterization  Pre-op diagnosis: CAD of native arteries and bypass grafts of native heart with unstable angina  Post-op diagnosis: CAD of native arteries and bypass grafts of native heart    1. Remove radial band at: 6:30PM    2. HD as per nephrology    3. Follow up as an outpatient with: Dr. Nesbitt    4. Continue current medications

## 2019-03-12 NOTE — PROGRESS NOTE ADULT - SUBJECTIVE AND OBJECTIVE BOX
Department of Cardiology                                                                  [facility]/[place] [address]                                                            Telephone: [phone]. Fax:[phone]                                                                           Cardiac Catheterization Note       Subjective:  79y  Male who had a left heart catheterization which showed:  VENTRICLES: There were no left ventricular global or regional wall motion abnormalities. Analysis of regional contractile function demonstrated severe diaphragmatic hypokinesis. EF estimated was 65 %.  VALVES: AORTIC VALVE: No significant aortic valve gradient.  CORONARY VESSELS: The coronary circulation is right dominant.  LM:     --  LM: Normal. The vessel was normal sized, heavily calcified, and moderately tortuous. Angiography showed moderate atherosclerosis. There was a discrete 30 % stenosis at the site of a prior stent, at the ostium of the vessel segment. The lesion was without evidence of thrombus. There was BRUCE grade 3 flow through the vessel (brisk flow).  LAD:     --  LAD: The vessel was normal sized, heavily calcified, and moderately tortuous. Angiography showed severe atherosclerosis. There was a diffuse 100 % stenosis at the ostium of the vessel segment. The lesion was without evidence of thrombus. There was BRUCE grade 0 flow through the vessel (no flow). This lesion is a chronic total occlusion. Fills via patent ANA.  CX:     --  Circumflex: Normal. The vessel was normal sized, moderately calcified, and excessively tortuous. Angiography showed mild atherosclerosis with no flow limiting lesions. Patent stent in Prox LCx and OM1.  RCA:     --  RCA: Normal. The vessel was normal sized, moderately calcified, and moderately tortuous. Angiography showed severe atherosclerosis. There was a diffuse 100 % stenosis at the ostium of the vessel segment, just after RV marginal 1. The lesion was without evidence of thrombus. There was BRUCE grade 0 flow through the vessel (no flow). This lesion is a chronic total occlusion. The distal RCA fills from collaterals from the LCx.  GRAFTS:     --  Graft to the LAD: The graft was a normal sized ANA. Graft angiography showed no degeneration, no calcification, and moderate tortuosity.    PAST MEDICAL & SURGICAL HISTORY:  Chronic anemia  ESRD (end stage renal disease)  CAD (coronary artery disease)  Lung cancer: had yoni radiation in .  Bipolar disorder  High cholesterol  Hypertension  S/P CABG (coronary artery bypass graft)    FAMILY HISTORY:  Family history of essential hypertension (Father)    Home Medications:  Cymbalta 20 mg oral delayed release capsule: 20 milligram(s) orally once a day (08 Mar 2019 08:49)  epoetin nguyễn: 6000 unit(s) injectable Tuesday, Thursday, Saturday with dialysis (08 Mar 2019 08:49)  fexofenadine 60 mg oral tablet: 60 milligram(s) orally once a day (08 Mar 2019 08:49)  ipratropium-albuterol 0.5 mg-2.5 mg/3 mLinhalation solution: 3 milliliter(s) inhaled every 6 hours, As needed, Shortness of Breath and/or Wheezing (08 Mar 2019 08:49)  omeprazole 40 mg oral delayed release capsule: 1 cap(s) orally once a day (08 Mar 2019 08:49)    Patient is a 79y old  Male who presents with a chief complaint of Headache (12 Mar 2019 16:29)    HPI: The patient is a 79 year old male with a history of ESRD on HD T//Sat, CAD status post CABG, hypertension, lung cancer status post chemotherapy and radiation with pulmonary fibrosis on home oxygen and hypothyroidism who presented to the ER with complaints of headache. According to the patient for the past 3 days he has had a persistent headache. His son in law took his blood pressure and noted it was elevated, gave him his blood pressure medication and repeat was still in the 190s therefore brought him to the ER for evaluation. In the ED, BP of 195/75; given IV hydralazine 10m x 1. CT head was negative. After being given IV reglan for nausea patient became anxious and hypoxic. SPo2 in the 80s on nasal cannula. Was started on Bipap and given IV benadryl x 1 with improvement. Currently at the time of my examination patient is sitting up comfortably on bipap; SBP of 179. Chest xray consistent with pulmonary edema. (08 Mar 2019 09:02)    General: No fatigue, no fevers/chills  Respiratory: No dyspnea, no cough, no wheeze  CV: No chest pain, no palpitations  Abd: No nausea  Neuro: No headache, no dizziness  No Known Allergies      Objective:  Vital Signs Last 24 Hrs  T(C): 37.2 (12 Mar 2019 16:02), Max: 37.2 (12 Mar 2019 16:02)  T(F): 98.9 (12 Mar 2019 16:02), Max: 98.9 (12 Mar 2019 16:02)  HR: 60 (12 Mar 2019 16:02) (59 - 67)  BP: 117/61 (12 Mar 2019 16:02) (117/53 - 128/58)  RR: 20 (12 Mar 2019 16:02) (20 - 20)  SpO2: 98% (12 Mar 2019 16:02) (97% - 98%)    CM: SR  Neuro: A&OX3, CN 2-12 intact  HEENT: NC, AT  Lungs: CTA B/L  CV: S1, S2, no murmur, RRR  Abd: Soft  Extremity: Right radial band: no bleeding, fingers warm with good cap refil  EK.2   8.3   )-----------( 209      ( 11 Mar 2019 09:45 )             37.0     03-11    139  |  99    |  26.0  ----------------------------<  84  4.2   |  24.0  |  6.29    Ca    8.0<L>      11 Mar 2019 09:45    TPro  7.0  /  Alb  3.3  /  TBili  0.3  /  DBili  x   /  AST  17  /  ALT  8   /  AlkPhos  64  03-11

## 2019-03-12 NOTE — PROGRESS NOTE ADULT - ASSESSMENT
1. BP to goal  2. Pt with history of CAD now with cp. C advised. pt is agreeable understands R/B/A.  3. Cont with statin therapy  4. HD post cath.

## 2019-03-12 NOTE — PROGRESS NOTE ADULT - SUBJECTIVE AND OBJECTIVE BOX
[facility] DIVISION OF KIDNEY DISEASES AND HYPERTENSION -- FOLLOW UP NOTE  --------------------------------------------------------------------------------  Chief Complaint:  ESRD on HD    24 hour events/subjective:  Pt seen today  NAD  HD today      PAST HISTORY  --------------------------------------------------------------------------------  No significant changes to PMH, PSH, FHx, SHx, unless otherwise noted    ALLERGIES & MEDICATIONS  --------------------------------------------------------------------------------  Allergies    No Known Allergies    Intolerances      Standing Inpatient Medications  aspirin enteric coated 81 milliGRAM(s) Oral daily  atorvastatin 80 milliGRAM(s) Oral at bedtime  chlorhexidine 2% Cloths 1 Application(s) Topical <User Schedule>  clopidogrel Tablet 75 milliGRAM(s) Oral daily  docusate sodium 100 milliGRAM(s) Oral three times a day  DULoxetine 20 milliGRAM(s) Oral daily  gabapentin 300 milliGRAM(s) Oral two times a day  heparin  Injectable 5000 Unit(s) SubCutaneous every 12 hours  hydrALAZINE 10 milliGRAM(s) Oral every 8 hours  isosorbide   mononitrate ER Tablet (IMDUR) 30 milliGRAM(s) Oral <User Schedule>  levothyroxine 100 MICROGram(s) Oral daily  loratadine 10 milliGRAM(s) Oral daily  losartan 25 milliGRAM(s) Oral <User Schedule>  metoprolol tartrate 25 milliGRAM(s) Oral two times a day  multivitamin 1 Tablet(s) Oral daily  pantoprazole    Tablet 40 milliGRAM(s) Oral before breakfast  senna 2 Tablet(s) Oral at bedtime    PRN Inpatient Medications      REVIEW OF SYSTEMS  --------------------------------------------------------------------------------  Gen: No weight changes, fatigue, fevers/chills, weakness  Skin: No rashes  Head/Eyes/Ears/Mouth: No headache; Normal hearing; Normal vision w/o blurriness; No sinus pain/discomfort, sore throat  Respiratory: No dyspnea, cough, wheezing, hemoptysis  CV: No chest pain, PND, orthopnea  GI: No abdominal pain, diarrhea, constipation, nausea, vomiting, melena, hematochezia  : No increased frequency, dysuria, hematuria, nocturia  MSK: No joint pain/swelling; no back pain; no edema  Neuro: No dizziness/lightheadedness, weakness, seizures, numbness, tingling  Heme: No easy bruising or bleeding  Endo: No heat/cold intolerance  Psych: No significant nervousness, anxiety, stress, depression    All other systems were reviewed and are negative, except as noted.    VITALS/PHYSICAL EXAM  --------------------------------------------------------------------------------  T(C): 36.5 (03-11-19 @ 21:39), Max: 36.7 (03-11-19 @ 17:06)  HR: 67 (03-11-19 @ 21:39) (62 - 67)  BP: 128/58 (03-11-19 @ 21:39) (96/50 - 128/58)  RR: 20 (03-11-19 @ 21:39) (20 - 22)  SpO2: 98% (03-11-19 @ 21:39) (95% - 98%)  Wt(kg): --        03-11-19 @ 07:01  -  03-12-19 @ 07:00  --------------------------------------------------------  IN: 0 mL / OUT: 1400 mL / NET: -1400 mL      Physical Exam:  	Gen: NAD, well-appearing  	HEENT: PERRL, supple neck, clear oropharynx  	Pulm: decreased BS  	CV: RRR, S1S2; no rub  	Back: No spinal or CVA tenderness; no sacral edema  	Abd: +BS, soft, nontender/nondistended  	: No suprapubic tenderness  	UE: Warm, FROM, no clubbing, intact strength; no edema; no asterixis  	LE: Warm, FROM, no clubbing, intact strength; no edema  	Neuro: No focal deficits, intact gait  	Psych: Normal affect and mood  	Skin: Warm, without rashes  	Vascular access:    LABS/STUDIES  --------------------------------------------------------------------------------              11.2   8.3   >-----------<  209      [03-11-19 @ 09:45]              37.0     139  |  99  |  26.0  ----------------------------<  84      [03-11-19 @ 09:45]  4.2   |  24.0  |  6.29        Ca     8.0     [03-11-19 @ 09:45]    TPro  7.0  /  Alb  3.3  /  TBili  0.3  /  DBili  x   /  AST  17  /  ALT  8   /  AlkPhos  64  [03-11-19 @ 09:45]        Troponin 0.07      [03-11-19 @ 12:51]  CK 60      [03-11-19 @ 12:51]    Creatinine Trend:  SCr 6.29 [03-11 @ 09:45]  SCr 3.74 [03-09 @ 10:36]  SCr 2.82 [03-08 @ 04:43]        Iron 34, TIBC 235, %sat 14      [01-29-19 @ 11:39]  Ferritin 1049      [01-29-19 @ 11:39]  TSH 1.83      [03-11-19 @ 07:37]  Lipid: chol 159, , HDL 32, LDL 94      [01-29-19 @ 11:39]

## 2019-03-13 LAB
ALBUMIN SERPL ELPH-MCNC: 3.2 G/DL — LOW (ref 3.3–5.2)
ALP SERPL-CCNC: 56 U/L — SIGNIFICANT CHANGE UP (ref 40–120)
ALT FLD-CCNC: 7 U/L — SIGNIFICANT CHANGE UP
ANION GAP SERPL CALC-SCNC: 15 MMOL/L — SIGNIFICANT CHANGE UP (ref 5–17)
AST SERPL-CCNC: 14 U/L — SIGNIFICANT CHANGE UP
BILIRUB SERPL-MCNC: 0.3 MG/DL — LOW (ref 0.4–2)
BUN SERPL-MCNC: 40 MG/DL — HIGH (ref 8–20)
CALCIUM SERPL-MCNC: 8.3 MG/DL — LOW (ref 8.6–10.2)
CHLORIDE SERPL-SCNC: 95 MMOL/L — LOW (ref 98–107)
CO2 SERPL-SCNC: 25 MMOL/L — SIGNIFICANT CHANGE UP (ref 22–29)
CREAT SERPL-MCNC: 7.44 MG/DL — HIGH (ref 0.5–1.3)
GLUCOSE SERPL-MCNC: 141 MG/DL — HIGH (ref 70–115)
HCT VFR BLD CALC: 35.9 % — LOW (ref 42–52)
HGB BLD-MCNC: 10.9 G/DL — LOW (ref 14–18)
MCHC RBC-ENTMCNC: 28.8 PG — SIGNIFICANT CHANGE UP (ref 27–31)
MCHC RBC-ENTMCNC: 30.4 G/DL — LOW (ref 32–36)
MCV RBC AUTO: 95 FL — HIGH (ref 80–94)
PLATELET # BLD AUTO: 233 K/UL — SIGNIFICANT CHANGE UP (ref 150–400)
POTASSIUM SERPL-MCNC: 4.5 MMOL/L — SIGNIFICANT CHANGE UP (ref 3.5–5.3)
POTASSIUM SERPL-SCNC: 4.5 MMOL/L — SIGNIFICANT CHANGE UP (ref 3.5–5.3)
PROT SERPL-MCNC: 7 G/DL — SIGNIFICANT CHANGE UP (ref 6.6–8.7)
RBC # BLD: 3.78 M/UL — LOW (ref 4.6–6.2)
RBC # FLD: 17.4 % — HIGH (ref 11–15.6)
SODIUM SERPL-SCNC: 135 MMOL/L — SIGNIFICANT CHANGE UP (ref 135–145)
WBC # BLD: 7.3 K/UL — SIGNIFICANT CHANGE UP (ref 4.8–10.8)
WBC # FLD AUTO: 7.3 K/UL — SIGNIFICANT CHANGE UP (ref 4.8–10.8)

## 2019-03-13 PROCEDURE — 99232 SBSQ HOSP IP/OBS MODERATE 35: CPT

## 2019-03-13 PROCEDURE — 90937 HEMODIALYSIS REPEATED EVAL: CPT

## 2019-03-13 RX ORDER — METOPROLOL TARTRATE 50 MG
50 TABLET ORAL
Refills: 0 | Status: DISCONTINUED | OUTPATIENT
Start: 2019-03-13 | End: 2019-03-14

## 2019-03-13 RX ADMIN — GABAPENTIN 300 MILLIGRAM(S): 400 CAPSULE ORAL at 17:04

## 2019-03-13 RX ADMIN — Medication 100 MILLIGRAM(S): at 12:59

## 2019-03-13 RX ADMIN — LOSARTAN POTASSIUM 25 MILLIGRAM(S): 100 TABLET, FILM COATED ORAL at 13:00

## 2019-03-13 RX ADMIN — DULOXETINE HYDROCHLORIDE 20 MILLIGRAM(S): 30 CAPSULE, DELAYED RELEASE ORAL at 12:59

## 2019-03-13 RX ADMIN — Medication 1 TABLET(S): at 12:59

## 2019-03-13 RX ADMIN — GABAPENTIN 300 MILLIGRAM(S): 400 CAPSULE ORAL at 06:07

## 2019-03-13 RX ADMIN — Medication 100 MICROGRAM(S): at 06:07

## 2019-03-13 RX ADMIN — Medication 25 MILLIGRAM(S): at 12:59

## 2019-03-13 RX ADMIN — Medication 100 MILLIGRAM(S): at 06:08

## 2019-03-13 RX ADMIN — SENNA PLUS 2 TABLET(S): 8.6 TABLET ORAL at 21:44

## 2019-03-13 RX ADMIN — HEPARIN SODIUM 5000 UNIT(S): 5000 INJECTION INTRAVENOUS; SUBCUTANEOUS at 17:04

## 2019-03-13 RX ADMIN — Medication 50 MILLIGRAM(S): at 17:04

## 2019-03-13 RX ADMIN — PANTOPRAZOLE SODIUM 40 MILLIGRAM(S): 20 TABLET, DELAYED RELEASE ORAL at 06:09

## 2019-03-13 RX ADMIN — LORATADINE 10 MILLIGRAM(S): 10 TABLET ORAL at 12:59

## 2019-03-13 RX ADMIN — ATORVASTATIN CALCIUM 80 MILLIGRAM(S): 80 TABLET, FILM COATED ORAL at 21:44

## 2019-03-13 RX ADMIN — Medication 81 MILLIGRAM(S): at 12:59

## 2019-03-13 RX ADMIN — Medication 100 MILLIGRAM(S): at 21:44

## 2019-03-13 RX ADMIN — HEPARIN SODIUM 5000 UNIT(S): 5000 INJECTION INTRAVENOUS; SUBCUTANEOUS at 06:08

## 2019-03-13 RX ADMIN — CLOPIDOGREL BISULFATE 75 MILLIGRAM(S): 75 TABLET, FILM COATED ORAL at 12:59

## 2019-03-13 RX ADMIN — ISOSORBIDE MONONITRATE 30 MILLIGRAM(S): 60 TABLET, EXTENDED RELEASE ORAL at 12:59

## 2019-03-13 RX ADMIN — CHLORHEXIDINE GLUCONATE 1 APPLICATION(S): 213 SOLUTION TOPICAL at 06:06

## 2019-03-13 NOTE — DIETITIAN INITIAL EVALUATION ADULT. - PHYSICAL APPEARANCE
BMI 19.6 (WNL) Limited NFPE performed, physical findings include moderate muscle loss of clavicles and shoulders and moderate fat loss of orbitals and buccal pads

## 2019-03-13 NOTE — PROGRESS NOTE ADULT - ASSESSMENT
The patient is a 79 year old male with a history of ESRD on HD T/TH/Sat, CAD status post CABG, hypertension, lung cancer status post chemotherapy and radiation with pulmonary fibrosis on home oxygen and hypothyroidism who presented to the ER with complaints of headache. According to the patient for the past 3 days he has had a persistent headache. His son in law took his blood pressure and noted it was elevated, gave him his blood pressure medication and repeat was still in the 190s therefore brought him to the ER for evaluation. In the ED, BP of 195/75; given IV hydralazine 10m x 1. CT head was negative. After being given IV Reglan for nausea patient became anxious and hypoxic. SPo2 in the 80s on nasal cannula. Was started on Bipap and given IV benadryl x 1 with improvement. Currently at the time of my examination patient is sitting up comfortably on bipap; SBP of 179. Chest xray consistent with pulmonary edema.     Assessment/ plan;    1. s/p Acute on chronic hypoxic respiratory failure secondary to hypertensive urgency resulting in acute pulmonary edema (ac on chr diastolic chf):   Weaned off bipap to NC; Home O2 user; HD done 3/8/19  BP well controlled now  sec to wide pulse pressure his BP meds were titrated. but BB was continued at higher dose sec to V trigeminies  However on 3/12 sec to SBP in 90s, BB was decreased. Will d/c hydralazine to allow for higher dose of BB. EF is 50-55 %. Will avoid d/cing losartan and imdur as best as possible  Monitor BP    7. Ventricular Trigeminies  - resoved for > 36 hours on BB  - asymptomatic.  - cardiology consult appreciated  - wide pulse pressure noted.  - s/p cardiac cath. chronic 2 V disease with collaterals and patent grafts. no intervention done.  - medical management and cardiac rehab recommended    2. ESRD On HD T/TH/S- Cont  - Cont per schedule    3. Hypothyroidism TSH of 6; in Jan  TSH was 2- WNL  Likely elevation due to acute illness  Continue home dose of synthroid    4. History of lung CA with radiation induce pulmonary fibrosis on home oxygen    5. History of CAD status post CABG: Continue Plavix aspirin, statin, bb, arb    6. Acute on chronic diastolic CHF: Echo in Jan with normal EF grade 2 diastolic dysfunction    VTE- Heparin subcut

## 2019-03-13 NOTE — CHART NOTE - NSCHARTNOTEFT_GEN_A_CORE
Upon Nutritional Assessment by the Registered Dietitian your patient was determined to meet criteria / has evidence of the following diagnosis/diagnoses:          [ ]  Mild Protein Calorie Malnutrition        [x]  Moderate Protein Calorie Malnutrition        [ ] Severe Protein Calorie Malnutrition        [ ] Unspecified Protein Calorie Malnutrition        [ ] Underweight / BMI <19        [ ] Morbid Obesity / BMI > 40    Pt presents with malnutrition (moderate, chronic) related to inability to meet sufficient protein-energy in setting of ESRD on HD, CHF, and acute on chronic hypoxic respiratory failure as evidenced by 14.2% wt loss x ~3 months, moderate muscle loss of clavicles and shoulders, moderate fat loss of orbitals and buccal pads, and 1+ edema.       Findings as based on:  •  Comprehensive nutrition assessment and consultation  •  Calorie counts (nutrient intake analysis)  •  Food acceptance and intake status from observations by staff  •  Follow up  •  Patient education  •  Intervention secondary to interdisciplinary rounds  •   concerns      Treatment:    The following diet has been recommended:    1) Continue renal diet as tolerated. Noted pt was on a mechanical soft diet previous admission- if concerns for chewing/swallowing difficulty, recommend SLP swallow evaluation to determine appropriate diet consistency and tolerance.  2) Add Nepro TID to optimize po intake and provide an additional 425 kcal, 19.1g protein per serving.  3) Change MVI to Nephro-Gloria; Rx vitamin C 500mg daily.       PROVIDER Section:     By signing this assessment you are acknowledging and agree with the diagnosis/diagnoses assigned by the Registered Dietitian    Comments:

## 2019-03-13 NOTE — DIETITIAN INITIAL EVALUATION ADULT. - OTHER INFO
79 year old male PMH ESRD on HD, CAD s/p CABG, HTN, lung cancer s/p chemotherapy and radiation with pulmonary fibrosis, and hypothyroidism presents with c/o headache, found with pulmonary edema. Pt sleeping soundly upon interview, arousable but lethargic. Breakfast and lunch tray observed at beside, 0% consumed per plate waste. Pt previously met criteria for malnutrition in 2/2019- able to performed limited NFPE with findings consistent of moderate muscle/fat loss. Also noted with weight loss since admission in 12/2018 (-20 lbs). 79 year old male PMH ESRD on HD, CAD s/p CABG, HTN, lung cancer s/p chemotherapy and radiation with pulmonary fibrosis, and hypothyroidism presents with c/o headache, found with pulmonary edema. Pt sleeping soundly upon interview, arousable but lethargic. Breakfast and lunch tray observed at bedside, 0% consumed per plate waste. Pt previously met criteria for malnutrition in 2/2019- able to performed limited NFPE with findings consistent of moderate muscle/fat loss. Also noted with weight loss since admission in 12/2018 (-20 lbs).

## 2019-03-13 NOTE — PROGRESS NOTE ADULT - SUBJECTIVE AND OBJECTIVE BOX
Patient is a 79y old  Male who presents with a chief complaint of Headache     s/p cath with patent grafts  right radial site no ecchymosis no hematoma good pulse    MEDS    aspirin enteric coated 81 milliGRAM(s) Oral daily  atorvastatin 80 milliGRAM(s) Oral at bedtime  chlorhexidine 2% Cloths 1 Application(s) Topical <User Schedule>  clopidogrel Tablet 75 milliGRAM(s) Oral daily  docusate sodium 100 milliGRAM(s) Oral three times a day  DULoxetine 20 milliGRAM(s) Oral daily  gabapentin 300 milliGRAM(s) Oral two times a day  heparin  Injectable 5000 Unit(s) SubCutaneous every 12 hours  isosorbide   mononitrate ER Tablet (IMDUR) 30 milliGRAM(s) Oral <User Schedule>  levothyroxine 100 MICROGram(s) Oral daily  loratadine 10 milliGRAM(s) Oral daily  losartan 25 milliGRAM(s) Oral <User Schedule>  metoprolol tartrate 25 milliGRAM(s) Oral two times a day  multivitamin 1 Tablet(s) Oral daily  pantoprazole    Tablet 40 milliGRAM(s) Oral before breakfast  senna 2 Tablet(s) Oral at bedtime        03-13    135  |  95<L>  |  40.0<H>  ----------------------------<  141<H>  4.5   |  25.0  |  7.44<H>    Ca    8.3<L>      13 Mar 2019 09:02    TPro  7.0  /  Alb  3.2<L>  /  TBili  0.3<L>  /  DBili  x   /  AST  14  /  ALT  7   /  AlkPhos  56  03-13                          10.9   7.3   )-----------( 233      ( 13 Mar 2019 09:02 )             35.9     LIVER FUNCTIONS - ( 13 Mar 2019 09:02 )  Alb: 3.2 g/dL / Pro: 7.0 g/dL / ALK PHOS: 56 U/L / ALT: 7 U/L / AST: 14 U/L / GGT: x           T(C): 36.9 (03-13-19 @ 08:40), Max: 37.2 (03-12-19 @ 16:02)  HR: 63 (03-13-19 @ 08:40) (54 - 63)  BP: 141/66 (03-13-19 @ 08:40) (108/54 - 141/66)  RR: 18 (03-13-19 @ 08:40) (16 - 20)  SpO2: 96% (03-13-19 @ 08:40) (95% - 100%)      PE  Chest  Clear lung fields  Heart s1&s2 regular  Abd  soft active bowel sounds  EXT  No c/c/e  CATH SITE  right radial site no hematoma good pulse  Neuro  Alert oriented Non focal exam    A/P    ASHD  Grafts patent  Low saturated fat diet discussed  Radial site instructions given  to follow with cardio out patient [facility]    Update   s/p cath with patent grafts  right radial site no ecchymosis no hematoma good pulse    MEDS    aspirin enteric coated 81 milliGRAM(s) Oral daily  atorvastatin 80 milliGRAM(s) Oral at bedtime  chlorhexidine 2% Cloths 1 Application(s) Topical <User Schedule>  clopidogrel Tablet 75 milliGRAM(s) Oral daily  docusate sodium 100 milliGRAM(s) Oral three times a day  DULoxetine 20 milliGRAM(s) Oral daily  gabapentin 300 milliGRAM(s) Oral two times a day  heparin  Injectable 5000 Unit(s) SubCutaneous every 12 hours  isosorbide   mononitrate ER Tablet (IMDUR) 30 milliGRAM(s) Oral <User Schedule>  levothyroxine 100 MICROGram(s) Oral daily  loratadine 10 milliGRAM(s) Oral daily  losartan 25 milliGRAM(s) Oral <User Schedule>  metoprolol tartrate 25 milliGRAM(s) Oral two times a day  multivitamin 1 Tablet(s) Oral daily  pantoprazole    Tablet 40 milliGRAM(s) Oral before breakfast  senna 2 Tablet(s) Oral at bedtime        03-13    135  |  95<L>  |  40.0<H>  ----------------------------<  141<H>  4.5   |  25.0  |  7.44<H>    Ca    8.3<L>      13 Mar 2019 09:02    TPro  7.0  /  Alb  3.2<L>  /  TBili  0.3<L>  /  DBili  x   /  AST  14  /  ALT  7   /  AlkPhos  56  03-13    < from: TTE Echo w/Cont Complete (03.11.19 @ 20:35) >  1. Left ventricular ejection fraction, by visual estimation, is 50 to   55%.< from: Cardiac Cath Lab - Adult (03.12.19 @ 16:57) >  VENTRICLES: There were no left ventricular global or regional wall motion  abnormalities. Analysis of regional contractile function demonstrated  severe diaphragmatic hypokinesis. EF estimated was 65 %.  VALVES: AORTIC VALVE: No significant aortic valve gradient.  CORONARY VESSELS: The coronary circulation is right dominant.  LM:   --  LM: Normal. The vessel was normal sized, heavily calcified, and  moderately tortuous. Angiography showed moderate atherosclerosis. There  was a discrete 30 % stenosis at the site of a prior stent, at the ostium  of the vessel segment. The lesion was without evidence of thrombus. There  was BRUCE grade 3 flow through the vessel (brisk flow).  LAD:   --  LAD: The vessel was normal sized, heavily calcified, and  moderately tortuous. Angiography showed severe atherosclerosis. There was  a diffuse 100 % stenosis at the ostium of the vessel segment. The lesion  was without evidence of thrombus. There was BRUCE grade 0 flow through the  vessel (no flow). This lesion is a chronic total occlusion. Fills via  patent ANA.  CX:   --  Circumflex: Normal. The vessel was normal sized, moderately  calcified, and excessively tortuous. Angiography showed mild  atherosclerosis with no flow limiting lesions. Patent stent in Prox LCx  and OM1.  RCA:   --  RCA: Normal. The vessel was normal sized, moderately calcified,  and moderately tortuous. Angiography showed severe atherosclerosis. There  was a diffuse 100 % stenosis at the ostium of the vessel segment, just  after RV marginal 1. The lesion was without evidence of thrombus. There  was BRUCE grade 0 flow through the vessel (no flow). Thislesion is a  chronic total occlusion.  The distal RCA fills from collaterals from the LCx.  GRAFTS:   --  Graft to the LAD: The graft was a normal sized ANA. Graft  angiography showed no degeneration, no calcification, and moderate  tortuosity.  COMPLICATIONS: There were no complications. No complications occurred  during the cath lab visit.  DIAGNOSTIC IMPRESSIONS: There is significant double vessel coronary artery  disease.  Ostial NOT=188%  Ostial YHN=696%  Patent ANA to LAD. Left ventricular function is normal.  LVEF=65%  DIAGNOSTIC RECOMMENDATIONS: The patient should continue with the present  medications. Patient management should include aggressive medical therapy,  close monitoring of BUN and creatinine, resumption of all previous  activities in 2 days, and a cardiac rehabilitation program. Medical  management is recommended.    < end of copied text >     2. Low normal global left ventricular systolic function.   3. Spectral Doppler shows impaired relaxation pattern of left   ventricular myocardial filling (Grade I diastolic dysfunction).   4. There is no left ventricular hypertrophy.   5. Moderately enlarged left atrium.   6. Mild to moderate mitral annular calcification.   7. Thickening of the anterior and posterior mitral valve leaflets.   8. Sclerotic aortic valve with normal opening.                              10.9   7.3   )-----------( 233      ( 13 Mar 2019 09:02 )             35.9     LIVER FUNCTIONS - ( 13 Mar 2019 09:02 )  Alb: 3.2 g/dL / Pro: 7.0 g/dL / ALK PHOS: 56 U/L / ALT: 7 U/L / AST: 14 U/L / GGT: x           T(C): 36.9 (03-13-19 @ 08:40), Max: 37.2 (03-12-19 @ 16:02)  HR: 63 (03-13-19 @ 08:40) (54 - 63)  BP: 141/66 (03-13-19 @ 08:40) (108/54 - 141/66)  RR: 18 (03-13-19 @ 08:40) (16 - 20)  SpO2: 96% (03-13-19 @ 08:40) (95% - 100%)      PE  Chest  Clear lung fields  Heart s1&s2 regular  Abd  soft active bowel sounds  EXT  No c/c/e  CATH SITE  right radial site no hematoma good pulse  Neuro  Alert oriented Non focal exam    A/P  79 year old male with a history of ESRD on HD T/TH/Sat, CAD status post CABG, hypertension, lung cancer status post chemotherapy and radiation with pulmonary fibrosis on home oxygen and hypothyroidism who presented to the ER with complaints of headache. CT head was unrevealing for the cause. He was treated for HTN. He has a wide pulse pressure. Last TTE from January did not show significant aortic valve regurgitation Repeat TTE. normal EF sclerotic aotic valve      ASHD   c/w asa metoprolol, Lipitor and losartan  ESRD  c/w dialysis   Hypertension controlled on metoprolol and losartan

## 2019-03-13 NOTE — DIETITIAN INITIAL EVALUATION ADULT. - NS FNS SUPPLEMENTS
TID to optimize po intake and provide an additional 425 kcal, 19.1g protein per serving. Change MVI to Nephro-Gloria/Nepro® with Carb Steady

## 2019-03-13 NOTE — PROGRESS NOTE ADULT - SUBJECTIVE AND OBJECTIVE BOX
[facility] DIVISION OF KIDNEY DISEASES AND HYPERTENSION -- FOLLOW UP NOTE  --------------------------------------------------------------------------------  Chief Complaint:  ESRD on HD    24 hour events/subjective:  Pt seen today  NAD  HD today      PAST HISTORY  --------------------------------------------------------------------------------  No significant changes to PMH, PSH, FHx, SHx, unless otherwise noted    ALLERGIES & MEDICATIONS  --------------------------------------------------------------------------------  Allergies    No Known Allergies    Intolerances      Standing Inpatient Medications  aspirin enteric coated 81 milliGRAM(s) Oral daily  atorvastatin 80 milliGRAM(s) Oral at bedtime  chlorhexidine 2% Cloths 1 Application(s) Topical <User Schedule>  clopidogrel Tablet 75 milliGRAM(s) Oral daily  docusate sodium 100 milliGRAM(s) Oral three times a day  DULoxetine 20 milliGRAM(s) Oral daily  gabapentin 300 milliGRAM(s) Oral two times a day  heparin  Injectable 5000 Unit(s) SubCutaneous every 12 hours  isosorbide   mononitrate ER Tablet (IMDUR) 30 milliGRAM(s) Oral <User Schedule>  levothyroxine 100 MICROGram(s) Oral daily  loratadine 10 milliGRAM(s) Oral daily  losartan 25 milliGRAM(s) Oral <User Schedule>  metoprolol tartrate 25 milliGRAM(s) Oral two times a day  multivitamin 1 Tablet(s) Oral daily  pantoprazole    Tablet 40 milliGRAM(s) Oral before breakfast  senna 2 Tablet(s) Oral at bedtime    PRN Inpatient Medications      REVIEW OF SYSTEMS  --------------------------------------------------------------------------------  Gen: No weight changes, fatigue, fevers/chills, weakness  Skin: No rashes  Head/Eyes/Ears/Mouth: No headache; Normal hearing; Normal vision w/o blurriness; No sinus pain/discomfort, sore throat  Respiratory: No dyspnea, cough, wheezing, hemoptysis  CV: No chest pain, PND, orthopnea  GI: No abdominal pain, diarrhea, constipation, nausea, vomiting, melena, hematochezia  : No increased frequency, dysuria, hematuria, nocturia  MSK: No joint pain/swelling; no back pain; no edema  Neuro: No dizziness/lightheadedness, weakness, seizures, numbness, tingling  Heme: No easy bruising or bleeding  Endo: No heat/cold intolerance  Psych: No significant nervousness, anxiety, stress, depression    All other systems were reviewed and are negative, except as noted.    VITALS/PHYSICAL EXAM  --------------------------------------------------------------------------------  T(C): 36.4 (03-13-19 @ 11:45), Max: 37.2 (03-12-19 @ 16:02)  HR: 95 (03-13-19 @ 11:45) (54 - 95)  BP: 151/71 (03-13-19 @ 11:45) (108/54 - 151/71)  RR: 18 (03-13-19 @ 11:45) (16 - 20)  SpO2: 100% (03-13-19 @ 11:45) (95% - 100%)  Wt(kg): --        03-12-19 @ 07:01  -  03-13-19 @ 07:00  --------------------------------------------------------  IN: 0 mL / OUT: 2 mL / NET: -2 mL    03-13-19 @ 07:01  -  03-13-19 @ 13:15  --------------------------------------------------------  IN: 0 mL / OUT: 1000 mL / NET: -1000 mL      Physical Exam:              Gen: NAD, well-appearing  	HEENT: PERRL, supple neck, clear oropharynx  	Pulm: decreased BS  	CV: RRR, S1S2; no rub  	Back: No spinal or CVA tenderness; no sacral edema  	Abd: +BS, soft, nontender/nondistended  	: No suprapubic tenderness  	UE: Warm, FROM, no clubbing, intact strength; no edema; no asterixis  	LE: Warm, FROM, no clubbing, intact strength; no edema  	Neuro: No focal deficits, intact gait  	Psych: Normal affect and mood  	Skin: Warm, without rashes    LABS/STUDIES  --------------------------------------------------------------------------------              10.9   7.3   >-----------<  233      [03-13-19 @ 09:02]              35.9     135  |  95  |  40.0  ----------------------------<  141      [03-13-19 @ 09:02]  4.5   |  25.0  |  7.44        Ca     8.3     [03-13-19 @ 09:02]    TPro  7.0  /  Alb  3.2  /  TBili  0.3  /  DBili  x   /  AST  14  /  ALT  7   /  AlkPhos  56  [03-13-19 @ 09:02]          Creatinine Trend:  SCr 7.44 [03-13 @ 09:02]  SCr 6.29 [03-11 @ 09:45]  SCr 3.74 [03-09 @ 10:36]  SCr 2.82 [03-08 @ 04:43]        Iron 34, TIBC 235, %sat 14      [01-29-19 @ 11:39]  Ferritin 1049      [01-29-19 @ 11:39]  TSH 1.83      [03-11-19 @ 07:37]  Lipid: chol 159, , HDL 32, LDL 94      [01-29-19 @ 11:39]

## 2019-03-13 NOTE — DIETITIAN INITIAL EVALUATION ADULT. - NS AS NUTRI INTERV MEALS SNACK
Continue renal diet as tolerated. Noted pt was on a mechanical soft diet previous admission- if concerns for chewing/swallowing difficulty, recommend SLP swallow evaluation to determine appropriate diet consistency and tolerance

## 2019-03-13 NOTE — DIETITIAN INITIAL EVALUATION ADULT. - ETIOLOGY
related to inability to meet sufficient protein-energy in setting of ESRD on HD, CHF, and acute on chronic hypoxic respiratory failure

## 2019-03-13 NOTE — PROGRESS NOTE ADULT - SUBJECTIVE AND OBJECTIVE BOX
HEALTH ISSUES - PROBLEM Dx:    s/p HTN urgency, s/p hypoxia/ resp distress/ fluid overlaod    INTERVAL HPI/ OVERNIGHT EVENTS:    comfortable  SBP stable  uses Home O2      REVIEW OF SYSTEMS:    Cp-     Vital Signs Last 24 Hrs  T(C): 36.9 (13 Mar 2019 08:40), Max: 37.2 (12 Mar 2019 16:02)  T(F): 98.4 (13 Mar 2019 08:40), Max: 98.9 (12 Mar 2019 16:02)  HR: 63 (13 Mar 2019 08:40) (54 - 63)  BP: 141/66 (13 Mar 2019 08:40) (108/54 - 141/66)  BP(mean): --  RR: 18 (13 Mar 2019 08:40) (16 - 20)  SpO2: 96% (13 Mar 2019 08:40) (95% - 100%)    PHYSICAL EXAM-    GENERAL: NAD, AOX3  ENMT: Moist mucous membranes  NECK: Supple, No JVD  CHEST/LUNG: Clear to auscultation bilaterally; No rales, rhonchi, wheezing, or rubs  HEART: Regular rate and rhythm; No murmurs, rubs, or gallops  ABDOMEN: Soft, Nontender, Nondistended; Bowel sounds present  EXTREMITIES:  2+ Peripheral Pulses, No clubbing, cyanosis, or edema    LABS:                        10.9   7.3   )-----------( 233      ( 13 Mar 2019 09:02 )             35.9     03-13    135  |  95<L>  |  40.0<H>  ----------------------------<  141<H>  4.5   |  25.0  |  7.44<H>    Ca    8.3<L>      13 Mar 2019 09:02    TPro  7.0  /  Alb  3.2<L>  /  TBili  0.3<L>  /  DBili  x   /  AST  14  /  ALT  7   /  AlkPhos  56  03-13    Assessment and Plan

## 2019-03-13 NOTE — DIETITIAN INITIAL EVALUATION ADULT. - PERTINENT LABORATORY DATA
03-13 Na135 mmol/L Glu 141 mg/dL<H> K+ 4.5 mmol/L Cr  7.44 mg/dL<H> BUN 40.0 mg/dL<H> Phos n/a   Alb 3.2 g/dL<L> PAB n/a

## 2019-03-13 NOTE — PHYSICAL THERAPY INITIAL EVALUATION ADULT - ADDITIONAL COMMENTS
Pt lives with daughter and her family in a 1 story house with no steps.  Family can provide 24/7 assist. PTA, amb with [facility] independently.  Had assist with ADLs and self care.

## 2019-03-14 ENCOUNTER — TRANSCRIPTION ENCOUNTER (OUTPATIENT)
Age: 79
End: 2019-03-14

## 2019-03-14 ENCOUNTER — APPOINTMENT (OUTPATIENT)
Dept: PULMONOLOGY | Facility: CLINIC | Age: 79
End: 2019-03-14

## 2019-03-14 VITALS
SYSTOLIC BLOOD PRESSURE: 140 MMHG | DIASTOLIC BLOOD PRESSURE: 60 MMHG | TEMPERATURE: 98 F | RESPIRATION RATE: 16 BRPM | OXYGEN SATURATION: 96 % | HEART RATE: 64 BPM

## 2019-03-14 PROCEDURE — C1769: CPT

## 2019-03-14 PROCEDURE — C8929: CPT

## 2019-03-14 PROCEDURE — 99261: CPT

## 2019-03-14 PROCEDURE — 82435 ASSAY OF BLOOD CHLORIDE: CPT

## 2019-03-14 PROCEDURE — 71045 X-RAY EXAM CHEST 1 VIEW: CPT

## 2019-03-14 PROCEDURE — 82947 ASSAY GLUCOSE BLOOD QUANT: CPT

## 2019-03-14 PROCEDURE — 84484 ASSAY OF TROPONIN QUANT: CPT

## 2019-03-14 PROCEDURE — 83605 ASSAY OF LACTIC ACID: CPT

## 2019-03-14 PROCEDURE — 82803 BLOOD GASES ANY COMBINATION: CPT

## 2019-03-14 PROCEDURE — 84132 ASSAY OF SERUM POTASSIUM: CPT

## 2019-03-14 PROCEDURE — 85652 RBC SED RATE AUTOMATED: CPT

## 2019-03-14 PROCEDURE — 97163 PT EVAL HIGH COMPLEX 45 MIN: CPT

## 2019-03-14 PROCEDURE — 96365 THER/PROPH/DIAG IV INF INIT: CPT

## 2019-03-14 PROCEDURE — 82550 ASSAY OF CK (CPK): CPT

## 2019-03-14 PROCEDURE — 84443 ASSAY THYROID STIM HORMONE: CPT

## 2019-03-14 PROCEDURE — 93005 ELECTROCARDIOGRAM TRACING: CPT

## 2019-03-14 PROCEDURE — 85027 COMPLETE CBC AUTOMATED: CPT

## 2019-03-14 PROCEDURE — C1894: CPT

## 2019-03-14 PROCEDURE — 80053 COMPREHEN METABOLIC PANEL: CPT

## 2019-03-14 PROCEDURE — 85730 THROMBOPLASTIN TIME PARTIAL: CPT

## 2019-03-14 PROCEDURE — 97530 THERAPEUTIC ACTIVITIES: CPT

## 2019-03-14 PROCEDURE — 36415 COLL VENOUS BLD VENIPUNCTURE: CPT

## 2019-03-14 PROCEDURE — 84295 ASSAY OF SERUM SODIUM: CPT

## 2019-03-14 PROCEDURE — 85610 PROTHROMBIN TIME: CPT

## 2019-03-14 PROCEDURE — 80048 BASIC METABOLIC PNL TOTAL CA: CPT

## 2019-03-14 PROCEDURE — 82330 ASSAY OF CALCIUM: CPT

## 2019-03-14 PROCEDURE — 97116 GAIT TRAINING THERAPY: CPT

## 2019-03-14 PROCEDURE — 70450 CT HEAD/BRAIN W/O DYE: CPT

## 2019-03-14 PROCEDURE — 93459 L HRT ART/GRFT ANGIO: CPT

## 2019-03-14 PROCEDURE — 36600 WITHDRAWAL OF ARTERIAL BLOOD: CPT

## 2019-03-14 PROCEDURE — 99285 EMERGENCY DEPT VISIT HI MDM: CPT | Mod: 25

## 2019-03-14 PROCEDURE — 85014 HEMATOCRIT: CPT

## 2019-03-14 PROCEDURE — 94660 CPAP INITIATION&MGMT: CPT

## 2019-03-14 PROCEDURE — C1887: CPT

## 2019-03-14 PROCEDURE — 99233 SBSQ HOSP IP/OBS HIGH 50: CPT

## 2019-03-14 PROCEDURE — 99239 HOSP IP/OBS DSCHRG MGMT >30: CPT

## 2019-03-14 PROCEDURE — 96375 TX/PRO/DX INJ NEW DRUG ADDON: CPT

## 2019-03-14 RX ORDER — ATORVASTATIN CALCIUM 80 MG/1
1 TABLET, FILM COATED ORAL
Qty: 0 | Refills: 0 | DISCHARGE
Start: 2019-03-14

## 2019-03-14 RX ORDER — DULOXETINE HYDROCHLORIDE 30 MG/1
1 CAPSULE, DELAYED RELEASE ORAL
Qty: 0 | Refills: 0 | DISCHARGE
Start: 2019-03-14

## 2019-03-14 RX ORDER — CLOPIDOGREL BISULFATE 75 MG/1
1 TABLET, FILM COATED ORAL
Qty: 0 | Refills: 0 | DISCHARGE
Start: 2019-03-14

## 2019-03-14 RX ORDER — DOCUSATE SODIUM 100 MG
1 CAPSULE ORAL
Qty: 0 | Refills: 0 | DISCHARGE
Start: 2019-03-14

## 2019-03-14 RX ORDER — LOSARTAN POTASSIUM 100 MG/1
1 TABLET, FILM COATED ORAL
Qty: 30 | Refills: 0
Start: 2019-03-14 | End: 2019-04-12

## 2019-03-14 RX ORDER — METOPROLOL TARTRATE 50 MG
1 TABLET ORAL
Qty: 0 | Refills: 0 | DISCHARGE
Start: 2019-03-14

## 2019-03-14 RX ORDER — ASPIRIN/CALCIUM CARB/MAGNESIUM 324 MG
1 TABLET ORAL
Qty: 0 | Refills: 0 | DISCHARGE
Start: 2019-03-14

## 2019-03-14 RX ORDER — ISOSORBIDE MONONITRATE 60 MG/1
1 TABLET, EXTENDED RELEASE ORAL
Qty: 0 | Refills: 0 | DISCHARGE
Start: 2019-03-14

## 2019-03-14 RX ORDER — GABAPENTIN 400 MG/1
1 CAPSULE ORAL
Qty: 0 | Refills: 0 | DISCHARGE
Start: 2019-03-14

## 2019-03-14 RX ORDER — LEVOTHYROXINE SODIUM 125 MCG
1 TABLET ORAL
Qty: 0 | Refills: 0 | DISCHARGE
Start: 2019-03-14

## 2019-03-14 RX ORDER — SENNA PLUS 8.6 MG/1
2 TABLET ORAL
Qty: 0 | Refills: 0 | DISCHARGE
Start: 2019-03-14

## 2019-03-14 RX ADMIN — GABAPENTIN 300 MILLIGRAM(S): 400 CAPSULE ORAL at 17:13

## 2019-03-14 RX ADMIN — Medication 100 MICROGRAM(S): at 04:55

## 2019-03-14 RX ADMIN — PANTOPRAZOLE SODIUM 40 MILLIGRAM(S): 20 TABLET, DELAYED RELEASE ORAL at 07:57

## 2019-03-14 RX ADMIN — Medication 100 MILLIGRAM(S): at 20:57

## 2019-03-14 RX ADMIN — ISOSORBIDE MONONITRATE 30 MILLIGRAM(S): 60 TABLET, EXTENDED RELEASE ORAL at 11:49

## 2019-03-14 RX ADMIN — SENNA PLUS 2 TABLET(S): 8.6 TABLET ORAL at 20:57

## 2019-03-14 RX ADMIN — Medication 50 MILLIGRAM(S): at 17:13

## 2019-03-14 RX ADMIN — GABAPENTIN 300 MILLIGRAM(S): 400 CAPSULE ORAL at 04:54

## 2019-03-14 RX ADMIN — LORATADINE 10 MILLIGRAM(S): 10 TABLET ORAL at 11:48

## 2019-03-14 RX ADMIN — Medication 100 MILLIGRAM(S): at 04:55

## 2019-03-14 RX ADMIN — CHLORHEXIDINE GLUCONATE 1 APPLICATION(S): 213 SOLUTION TOPICAL at 04:56

## 2019-03-14 RX ADMIN — Medication 81 MILLIGRAM(S): at 11:48

## 2019-03-14 RX ADMIN — LOSARTAN POTASSIUM 25 MILLIGRAM(S): 100 TABLET, FILM COATED ORAL at 10:03

## 2019-03-14 RX ADMIN — ATORVASTATIN CALCIUM 80 MILLIGRAM(S): 80 TABLET, FILM COATED ORAL at 20:57

## 2019-03-14 RX ADMIN — CLOPIDOGREL BISULFATE 75 MILLIGRAM(S): 75 TABLET, FILM COATED ORAL at 11:48

## 2019-03-14 RX ADMIN — Medication 100 MILLIGRAM(S): at 11:48

## 2019-03-14 RX ADMIN — Medication 1 TABLET(S): at 11:48

## 2019-03-14 RX ADMIN — HEPARIN SODIUM 5000 UNIT(S): 5000 INJECTION INTRAVENOUS; SUBCUTANEOUS at 04:54

## 2019-03-14 RX ADMIN — DULOXETINE HYDROCHLORIDE 20 MILLIGRAM(S): 30 CAPSULE, DELAYED RELEASE ORAL at 11:48

## 2019-03-14 RX ADMIN — Medication 50 MILLIGRAM(S): at 05:54

## 2019-03-14 NOTE — DISCHARGE NOTE NURSING/CASE MANAGEMENT/SOCIAL WORK - NSDCDPATPORTLINK_GEN_ALL_CORE
You can access the [facility] Patient Portal, offered by [facility], by registering with the following website: [email or web address]

## 2019-03-14 NOTE — DISCHARGE NOTE PROVIDER - CARE PROVIDER_API CALL
Brigido Newberry (DO)  Internal Medicine  [address] [place] [address], [address] [place] [address]  Phone: [phone]  Fax: [phone]  Follow Up Time:     Melanie San (DO)  Cardiology; Internal Medicine  [address] [facility], [address]  [place] [address]  Phone: [phone]  Fax: [phone]  Follow Up Time:

## 2019-03-14 NOTE — DISCHARGE NOTE PROVIDER - HOSPITAL COURSE
The patient is a 79 year old male with a history of ESRD on HD T/TH/Sat, CAD status post CABG, hypertension, lung cancer status post chemotherapy and radiation with pulmonary fibrosis on home oxygen and hypothyroidism who presented to the ER with complaints of headache. According to the patient for the past 3 days he has had a persistent headache. His son in law took his blood pressure and noted it was elevated, gave him his blood pressure medication and repeat was still in the 190s therefore brought him to the ER for evaluation. In the ED, BP of 195/75; CT head was negative. He also became anxious and hypoxic. SPo2 in the 80s on nasal cannula. Was started on Bipap with improvement. CXR with pulmonary edema.     He was dialyzed urgently. since then maintained on NC O2 (home O2 user). His Bp well controlled. However noted V trigeminies on tele for which BB increased to 50 and responded well. noted wide pulse pressure on his VS but clinically asymptomatic. ECHo done - no valvular issues. Cath done- patent grafts, chr stable 2 V dz with collaterals and medical management and cardiac rehab recommended.     Now Bp well controlled and no significant arrythmias and no fluid overload.         Medically stable and agreeable with discharge and follow up plan. Patient was advised to return to ED if any symptoms occur or worsen.        Vital Signs Last 24 Hrs    T(C): 36.4 (03-14-19 @ 04:57), Max: 37.2 (03-13-19 @ 15:12)    T(F): 97.6 (03-14-19 @ 04:57), Max: 98.9 (03-13-19 @ 15:12)    HR: 69 (03-14-19 @ 04:57) (63 - 108)    BP: 140/50 (03-14-19 @ 04:57) (120/61 - 151/71)    BP(mean): --    RR: 16 (03-14-19 @ 04:57) (16 - 18)    SpO2: 98% (03-14-19 @ 04:57) (91% - 100%)    GENERAL: NAD, AOX3    ENMT: Moist mucous membranes    NECK: Supple, No JVD    CHEST/LUNG: Clear to auscultation bilaterally; No rales, rhonchi, wheezing, or rubs    HEART: Regular rate and rhythm; No murmurs, rubs, or gallops    ABDOMEN: Soft, Nontender, Nondistended; Bowel sounds present    EXTREMITIES:  2+ Peripheral Pulses, No clubbing, cyanosis, or edema        Time spent- 46 mins The patient is a 79 year old male with a history of ESRD on HD T/TH/Sat, CAD status post CABG, hypertension, lung cancer status post chemotherapy and radiation with pulmonary fibrosis on home oxygen and hypothyroidism who presented to the ER with complaints of headache. According to the patient for the past 3 days he has had a persistent headache. His son in law took his blood pressure and noted it was elevated, gave him his blood pressure medication and repeat was still in the 190s therefore brought him to the ER for evaluation. In the ED, BP of 195/75; CT head was negative. He also became anxious and hypoxic. SPo2 in the 80s on nasal cannula. Was started on Bipap with improvement. CXR with pulmonary edema.     He was dialyzed urgently. since then maintained on NC O2 (home O2 user). His Bp well controlled. However noted V trigeminies on tele for which BB increased to 50 and responded well. noted wide pulse pressure on his VS but clinically asymptomatic. ECHo done - no valvular issues. Cath done- patent grafts, chr stable 2 V dz with collaterals and medical management and cardiac rehab recommended.     Now Bp well controlled and no significant arrythmias and no fluid overload.         Medically stable and agreeable with discharge and follow up plan. Patient was advised to return to ED if any symptoms occur or worsen. His O/P HD is TTS. D/W Dr. Sadia neves he was dialyzed on 3/13 here, it is arlight if patient gets his next HD on Saturday O/P. same conveyed to pt and family.        Vital Signs Last 24 Hrs    T(C): 36.4 (03-14-19 @ 04:57), Max: 37.2 (03-13-19 @ 15:12)    T(F): 97.6 (03-14-19 @ 04:57), Max: 98.9 (03-13-19 @ 15:12)    HR: 69 (03-14-19 @ 04:57) (63 - 108)    BP: 140/50 (03-14-19 @ 04:57) (120/61 - 151/71)    BP(mean): --    RR: 16 (03-14-19 @ 04:57) (16 - 18)    SpO2: 98% (03-14-19 @ 04:57) (91% - 100%)    GENERAL: NAD, AOX3    ENMT: Moist mucous membranes    NECK: Supple, No JVD    CHEST/LUNG: Clear to auscultation bilaterally; No rales, rhonchi, wheezing, or rubs    HEART: Regular rate and rhythm; No murmurs, rubs, or gallops    ABDOMEN: Soft, Nontender, Nondistended; Bowel sounds present    EXTREMITIES:  2+ Peripheral Pulses, No clubbing, cyanosis, or edema        Time spent- 46 mins

## 2019-03-14 NOTE — DISCHARGE NOTE PROVIDER - CARE PROVIDERS DIRECT ADDRESSES
[email or web address]

## 2019-03-14 NOTE — DISCHARGE NOTE NURSING/CASE MANAGEMENT/SOCIAL WORK - NSDCPEPT PROEDHF_GEN_ALL_CORE
Call primary care provider for follow up after discharge/Activities as tolerated/Low salt diet/Monitor weight daily/Report signs and symptoms to primary care provider

## 2019-03-14 NOTE — DISCHARGE NOTE PROVIDER - NSDCCPCAREPLAN_GEN_ALL_CORE_FT
PRINCIPAL DISCHARGE DIAGNOSIS  Diagnosis: Acute pulmonary edema  Assessment and Plan of Treatment: Acute pulmonary edema- now resolved.      SECONDARY DISCHARGE DIAGNOSES  Diagnosis: Acute on chronic diastolic HF (heart failure)  Assessment and Plan of Treatment:     Diagnosis: Headache  Assessment and Plan of Treatment:     Diagnosis: ESRD (end stage renal disease)  Assessment and Plan of Treatment: Maintain regular HD schedule    Diagnosis: Hypertensive urgency  Assessment and Plan of Treatment: well controlled HTN now

## 2019-03-14 NOTE — PROGRESS NOTE ADULT - PROVIDER SPECIALTY LIST ADULT
Cardiology
Nephrology
Hospitalist
Nephrology
Nephrology
Internal Medicine
Nephrology
Cardiology
Internal Medicine

## 2019-03-14 NOTE — PROGRESS NOTE ADULT - SUBJECTIVE AND OBJECTIVE BOX
[facility] DIVISION OF KIDNEY DISEASES AND HYPERTENSION -- FOLLOW UP NOTE  --------------------------------------------------------------------------------  Chief Complaint:  ESRD on HD    24 hour events/subjective:  Pt seen today  NAD  HD yesterday      PAST HISTORY  --------------------------------------------------------------------------------  No significant changes to PMH, PSH, FHx, SHx, unless otherwise noted    ALLERGIES & MEDICATIONS  --------------------------------------------------------------------------------  Allergies    No Known Allergies    Intolerances      Standing Inpatient Medications  aspirin enteric coated 81 milliGRAM(s) Oral daily  atorvastatin 80 milliGRAM(s) Oral at bedtime  chlorhexidine 2% Cloths 1 Application(s) Topical <User Schedule>  clopidogrel Tablet 75 milliGRAM(s) Oral daily  docusate sodium 100 milliGRAM(s) Oral three times a day  DULoxetine 20 milliGRAM(s) Oral daily  gabapentin 300 milliGRAM(s) Oral two times a day  heparin  Injectable 5000 Unit(s) SubCutaneous every 12 hours  isosorbide   mononitrate ER Tablet (IMDUR) 30 milliGRAM(s) Oral <User Schedule>  levothyroxine 100 MICROGram(s) Oral daily  loratadine 10 milliGRAM(s) Oral daily  losartan 25 milliGRAM(s) Oral <User Schedule>  metoprolol tartrate 50 milliGRAM(s) Oral two times a day  multivitamin 1 Tablet(s) Oral daily  pantoprazole    Tablet 40 milliGRAM(s) Oral before breakfast  senna 2 Tablet(s) Oral at bedtime    PRN Inpatient Medications      REVIEW OF SYSTEMS  --------------------------------------------------------------------------------  Gen: No weight changes, fatigue, fevers/chills, weakness  Skin: No rashes  Head/Eyes/Ears/Mouth: No headache; Normal hearing; Normal vision w/o blurriness; No sinus pain/discomfort, sore throat  Respiratory: No dyspnea, cough, wheezing, hemoptysis  CV: No chest pain, PND, orthopnea  GI: No abdominal pain, diarrhea, constipation, nausea, vomiting, melena, hematochezia  : No increased frequency, dysuria, hematuria, nocturia  MSK: No joint pain/swelling; no back pain; no edema  Neuro: No dizziness/lightheadedness, weakness, seizures, numbness, tingling  Heme: No easy bruising or bleeding  Endo: No heat/cold intolerance  Psych: No significant nervousness, anxiety, stress, depression    All other systems were reviewed and are negative, except as noted.    VITALS/PHYSICAL EXAM  --------------------------------------------------------------------------------  T(C): 36.8 (03-14-19 @ 10:02), Max: 37.2 (03-13-19 @ 15:12)  HR: 60 (03-14-19 @ 10:02) (60 - 108)  BP: 140/60 (03-14-19 @ 10:02) (120/61 - 151/71)  RR: 18 (03-14-19 @ 10:02) (16 - 18)  SpO2: 96% (03-14-19 @ 10:02) (91% - 100%)  Wt(kg): --        03-13-19 @ 07:01  -  03-14-19 @ 07:00  --------------------------------------------------------  IN: 360 mL / OUT: 1002 mL / NET: -642 mL      Physical Exam:  	Gen: NAD, well-appearing  	HEENT: PERRL, supple neck, clear oropharynx  	Pulm: CTA B/L  	CV: RRR, S1S2; no rub  	Back: No spinal or CVA tenderness; no sacral edema  	Abd: +BS, soft, nontender/nondistended  	: No suprapubic tenderness  	UE: Warm, FROM, no clubbing, intact strength; no edema; no asterixis  	LE: Warm, FROM, no clubbing, intact strength; no edema  	Neuro: No focal deficits, intact gait  	Psych: Normal affect and mood  	Skin: Warm, without rashes  	Vascular access:    LABS/STUDIES  --------------------------------------------------------------------------------              10.9   7.3   >-----------<  233      [03-13-19 @ 09:02]              35.9     135  |  95  |  40.0  ----------------------------<  141      [03-13-19 @ 09:02]  4.5   |  25.0  |  7.44        Ca     8.3     [03-13-19 @ 09:02]    TPro  7.0  /  Alb  3.2  /  TBili  0.3  /  DBili  x   /  AST  14  /  ALT  7   /  AlkPhos  56  [03-13-19 @ 09:02]          Creatinine Trend:  SCr 7.44 [03-13 @ 09:02]  SCr 6.29 [03-11 @ 09:45]  SCr 3.74 [03-09 @ 10:36]  SCr 2.82 [03-08 @ 04:43]        Iron 34, TIBC 235, %sat 14      [01-29-19 @ 11:39]  Ferritin 1049      [01-29-19 @ 11:39]  TSH 1.83      [03-11-19 @ 07:37]  Lipid: chol 159, , HDL 32, LDL 94      [01-29-19 @ 11:39]

## 2019-03-14 NOTE — PROGRESS NOTE ADULT - REASON FOR ADMISSION
Headache

## 2019-03-14 NOTE — PROGRESS NOTE ADULT - ASSESSMENT
1 ESRD ON HD  2) MBD of renal dx  3 ) Vol HTN  4) Anemia      HD in am  Orders placed  Improved respiratory status  Continue current management

## 2019-03-25 ENCOUNTER — APPOINTMENT (OUTPATIENT)
Dept: VASCULAR SURGERY | Facility: CLINIC | Age: 79
End: 2019-03-25
Payer: MEDICARE

## 2019-03-25 VITALS
HEART RATE: 46 BPM | DIASTOLIC BLOOD PRESSURE: 62 MMHG | TEMPERATURE: 97.6 F | OXYGEN SATURATION: 94 % | SYSTOLIC BLOOD PRESSURE: 145 MMHG

## 2019-03-25 DIAGNOSIS — Z95.828 PRESENCE OF OTHER VASCULAR IMPLANTS AND GRAFTS: ICD-10-CM

## 2019-03-25 DIAGNOSIS — N18.6 END STAGE RENAL DISEASE: ICD-10-CM

## 2019-03-25 DIAGNOSIS — Z87.448 PERSONAL HISTORY OF OTHER DISEASES OF URINARY SYSTEM: ICD-10-CM

## 2019-03-25 DIAGNOSIS — Z86.73 PERSONAL HISTORY OF TRANSIENT ISCHEMIC ATTACK (TIA), AND CEREBRAL INFARCTION W/OUT RESIDUAL DEFICITS: ICD-10-CM

## 2019-03-25 DIAGNOSIS — Z99.2 END STAGE RENAL DISEASE: ICD-10-CM

## 2019-03-25 DIAGNOSIS — Z85.118 PERSONAL HISTORY OF OTHER MALIGNANT NEOPLASM OF BRONCHUS AND LUNG: ICD-10-CM

## 2019-03-25 PROCEDURE — 36589 REMOVAL TUNNELED CV CATH: CPT | Mod: 58

## 2019-03-25 PROCEDURE — 99024 POSTOP FOLLOW-UP VISIT: CPT

## 2019-03-26 PROBLEM — Z87.448 HISTORY OF RENAL FAILURE: Status: RESOLVED | Noted: 2019-03-25 | Resolved: 2019-03-26

## 2019-03-26 PROBLEM — Z85.118 HISTORY OF MALIGNANT NEOPLASM OF LUNG: Status: RESOLVED | Noted: 2019-03-25 | Resolved: 2019-03-26

## 2019-03-26 PROBLEM — Z95.828 ARTERIOVENOUS GRAFT FOR HEMODIALYSIS IN PLACE, PRIMARY: Status: ACTIVE | Noted: 2019-03-26

## 2019-03-26 PROBLEM — Z86.73 HISTORY OF CEREBROVASCULAR ACCIDENT: Status: RESOLVED | Noted: 2019-03-25 | Resolved: 2019-03-26

## 2019-03-26 PROBLEM — N18.6 ESRD ON HEMODIALYSIS: Status: ACTIVE | Noted: 2019-03-26

## 2019-03-26 RX ORDER — AMLODIPINE BESYLATE 5 MG/1
TABLET ORAL
Refills: 0 | Status: ACTIVE | COMMUNITY

## 2019-03-26 RX ORDER — ASPIRIN 325 MG/1
TABLET, FILM COATED ORAL
Refills: 0 | Status: ACTIVE | COMMUNITY

## 2019-03-26 RX ORDER — LOSARTAN POTASSIUM 100 MG/1
TABLET, FILM COATED ORAL
Refills: 0 | Status: ACTIVE | COMMUNITY

## 2019-03-26 NOTE — PHYSICAL EXAM
[Normal Thyroid] : the thyroid was normal [Respiratory Effort] : normal respiratory effort [Normal Rate and Rhythm] : normal rate and rhythm [2+] : left 2+ [Alert] : alert [Oriented to Person] : oriented to person [Oriented to Place] : oriented to place [Oriented to Time] : oriented to time [Calm] : calm [Carotid Bruits] : no carotid bruits [Ankle Swelling (On Exam)] : not present [Varicose Veins Of Lower Extremities] : not present [] : not present [de-identified] : NAD [de-identified] : SAMIA SHERWOOD [de-identified] : R chest permcath site unremarkable. No edema, pain, or swelling [FreeTextEntry1] : palpable thrill in LUE upper arm AVG [de-identified] : soft [de-identified] : NEEDINA rodriguez well healed surgical incisions x2, palpable thrill. Hand warm and well perfused with palpable distal radial pulse. Brisk cap refill [de-identified] : no gross focal motor or sensory deficits

## 2019-03-26 NOTE — HISTORY OF PRESENT ILLNESS
[FreeTextEntry1] : 79 year old male with ESRD on HD, currently via a LUE AVG. Initially with a R chest permcath placed in Dec 2018. Catheter got infected 3 weeks later and was removed and received line holiday, along with a groin shiley. Eventually improved and in Jan 2019, he underwent a LUE brachioaxillary AVG and placement of a new temporary tunneled R chest catheter. [de-identified] : Lost to follow up and not seen since hospitalization almost 3 months ago. In interim, he's doing well. His infectious issues are all resolved. He is now being successfully accessed via his LUE AVG without any issues or difficulties. He denies any hand pain or numbness. Denies any focal swelling, ecchymosis or pain. Here now for permcath removal.

## 2019-03-31 NOTE — PATIENT PROFILE ADULT - ABILITY TO HEAR (WITH HEARING AID OR HEARING APPLIANCE IF NORMALLY USED):
Adequate: hears normal conversation without difficulty

## 2019-04-01 ENCOUNTER — OUTPATIENT (OUTPATIENT)
Dept: OUTPATIENT SERVICES | Facility: HOSPITAL | Age: 79
LOS: 1 days | End: 2019-04-01
Payer: MEDICARE

## 2019-04-01 DIAGNOSIS — Z95.1 PRESENCE OF AORTOCORONARY BYPASS GRAFT: Chronic | ICD-10-CM

## 2019-04-01 PROCEDURE — G9001: CPT

## 2019-04-08 ENCOUNTER — INPATIENT (INPATIENT)
Facility: HOSPITAL | Age: 79
LOS: 4 days | Discharge: HOME HEALTH | DRG: 304 | End: 2019-04-13
Attending: HOSPITALIST | Admitting: HOSPITALIST
Payer: COMMERCIAL

## 2019-04-08 VITALS
WEIGHT: 179.9 LBS | DIASTOLIC BLOOD PRESSURE: 90 MMHG | SYSTOLIC BLOOD PRESSURE: 210 MMHG | HEIGHT: 67 IN | OXYGEN SATURATION: 97 % | HEART RATE: 62 BPM | TEMPERATURE: 98 F | RESPIRATION RATE: 18 BRPM

## 2019-04-08 DIAGNOSIS — Z95.1 PRESENCE OF AORTOCORONARY BYPASS GRAFT: Chronic | ICD-10-CM

## 2019-04-08 LAB
ALBUMIN SERPL ELPH-MCNC: 3.8 G/DL — SIGNIFICANT CHANGE UP (ref 3.3–5.2)
ALP SERPL-CCNC: 70 U/L — SIGNIFICANT CHANGE UP (ref 40–120)
ALT FLD-CCNC: 15 U/L — SIGNIFICANT CHANGE UP
ANION GAP SERPL CALC-SCNC: 14 MMOL/L — SIGNIFICANT CHANGE UP (ref 5–17)
APTT BLD: 27.3 SEC — LOW (ref 27.5–36.3)
AST SERPL-CCNC: 29 U/L — SIGNIFICANT CHANGE UP
BASOPHILS # BLD AUTO: 0.1 K/UL — SIGNIFICANT CHANGE UP (ref 0–0.2)
BASOPHILS NFR BLD AUTO: 0.7 % — SIGNIFICANT CHANGE UP (ref 0–2)
BILIRUB SERPL-MCNC: 0.3 MG/DL — LOW (ref 0.4–2)
BUN SERPL-MCNC: 27 MG/DL — HIGH (ref 8–20)
CALCIUM SERPL-MCNC: 9.1 MG/DL — SIGNIFICANT CHANGE UP (ref 8.6–10.2)
CHLORIDE SERPL-SCNC: 95 MMOL/L — LOW (ref 98–107)
CO2 SERPL-SCNC: 30 MMOL/L — HIGH (ref 22–29)
CREAT SERPL-MCNC: 5.05 MG/DL — HIGH (ref 0.5–1.3)
EOSINOPHIL # BLD AUTO: 1 K/UL — HIGH (ref 0–0.5)
EOSINOPHIL NFR BLD AUTO: 11 % — HIGH (ref 0–5)
GLUCOSE SERPL-MCNC: 106 MG/DL — SIGNIFICANT CHANGE UP (ref 70–115)
HCT VFR BLD CALC: 36.6 % — LOW (ref 42–52)
HGB BLD-MCNC: 11.3 G/DL — LOW (ref 14–18)
INR BLD: 0.92 RATIO — SIGNIFICANT CHANGE UP (ref 0.88–1.16)
LYMPHOCYTES # BLD AUTO: 2.5 K/UL — SIGNIFICANT CHANGE UP (ref 1–4.8)
LYMPHOCYTES # BLD AUTO: 28.2 % — SIGNIFICANT CHANGE UP (ref 20–55)
MCHC RBC-ENTMCNC: 28.2 PG — SIGNIFICANT CHANGE UP (ref 27–31)
MCHC RBC-ENTMCNC: 30.9 G/DL — LOW (ref 32–36)
MCV RBC AUTO: 91.3 FL — SIGNIFICANT CHANGE UP (ref 80–94)
MONOCYTES # BLD AUTO: 0.8 K/UL — SIGNIFICANT CHANGE UP (ref 0–0.8)
MONOCYTES NFR BLD AUTO: 8.7 % — SIGNIFICANT CHANGE UP (ref 3–10)
NEUTROPHILS # BLD AUTO: 4.5 K/UL — SIGNIFICANT CHANGE UP (ref 1.8–8)
NEUTROPHILS NFR BLD AUTO: 51.2 % — SIGNIFICANT CHANGE UP (ref 37–73)
PLATELET # BLD AUTO: 206 K/UL — SIGNIFICANT CHANGE UP (ref 150–400)
POTASSIUM SERPL-MCNC: 4.4 MMOL/L — SIGNIFICANT CHANGE UP (ref 3.5–5.3)
POTASSIUM SERPL-SCNC: 4.4 MMOL/L — SIGNIFICANT CHANGE UP (ref 3.5–5.3)
PROT SERPL-MCNC: 8.1 G/DL — SIGNIFICANT CHANGE UP (ref 6.6–8.7)
PROTHROM AB SERPL-ACNC: 10.6 SEC — SIGNIFICANT CHANGE UP (ref 10–12.9)
RBC # BLD: 4.01 M/UL — LOW (ref 4.6–6.2)
RBC # FLD: 16.6 % — HIGH (ref 11–15.6)
SODIUM SERPL-SCNC: 139 MMOL/L — SIGNIFICANT CHANGE UP (ref 135–145)
TROPONIN T SERPL-MCNC: 0.06 NG/ML — SIGNIFICANT CHANGE UP (ref 0–0.06)
WBC # BLD: 8.8 K/UL — SIGNIFICANT CHANGE UP (ref 4.8–10.8)
WBC # FLD AUTO: 8.8 K/UL — SIGNIFICANT CHANGE UP (ref 4.8–10.8)

## 2019-04-08 PROCEDURE — 71045 X-RAY EXAM CHEST 1 VIEW: CPT | Mod: 26

## 2019-04-08 PROCEDURE — 99291 CRITICAL CARE FIRST HOUR: CPT

## 2019-04-08 PROCEDURE — 93010 ELECTROCARDIOGRAM REPORT: CPT

## 2019-04-08 PROCEDURE — 70450 CT HEAD/BRAIN W/O DYE: CPT | Mod: 26

## 2019-04-08 NOTE — ED ADULT NURSE NOTE - PMH
Bipolar disorder    CAD (coronary artery disease)    Chronic anemia    ESRD (end stage renal disease)    High cholesterol    Hypertension    Lung cancer  had yoni radiation in 2006. Bipolar disorder    CAD (coronary artery disease)    Chronic anemia    Dialysis patient  Tues, Thurs, Saturday  ESRD (end stage renal disease)  right Upper arm fistula  High cholesterol    Hypertension    Lung cancer  had yoni radiation in 2006.

## 2019-04-08 NOTE — ED ADULT NURSE NOTE - OBJECTIVE STATEMENT
patient states having slight chest pain, headache vomitting all not new and came to er for elevated blood pressure that was taken at home

## 2019-04-08 NOTE — ED PROVIDER NOTE - OBJECTIVE STATEMENT
Pt. with chest pain and elevated Blood pressure. Pt. states that he had chest pain earlier this morning. CP has subsided since then. Pt. has hx of HTN/ESRD. Pt.'s last dialysis was Saturday. Pt. also c/o cough. Pt. also states that he is O2 dependent.

## 2019-04-08 NOTE — ED PROVIDER NOTE - CARE PLAN
Principal Discharge DX:	Hypertension Principal Discharge DX:	Hypertensive urgency  Secondary Diagnosis:	ESRD (end stage renal disease)

## 2019-04-09 DIAGNOSIS — E78.00 PURE HYPERCHOLESTEROLEMIA, UNSPECIFIED: ICD-10-CM

## 2019-04-09 DIAGNOSIS — I16.0 HYPERTENSIVE URGENCY: ICD-10-CM

## 2019-04-09 DIAGNOSIS — I10 ESSENTIAL (PRIMARY) HYPERTENSION: ICD-10-CM

## 2019-04-09 LAB — TROPONIN T SERPL-MCNC: 0.06 NG/ML — SIGNIFICANT CHANGE UP (ref 0–0.06)

## 2019-04-09 PROCEDURE — 99223 1ST HOSP IP/OBS HIGH 75: CPT

## 2019-04-09 PROCEDURE — 90937 HEMODIALYSIS REPEATED EVAL: CPT

## 2019-04-09 PROCEDURE — 99223 1ST HOSP IP/OBS HIGH 75: CPT | Mod: 25

## 2019-04-09 PROCEDURE — 99497 ADVNCD CARE PLAN 30 MIN: CPT | Mod: 25

## 2019-04-09 RX ORDER — HYDRALAZINE HCL 50 MG
10 TABLET ORAL ONCE
Refills: 0 | Status: COMPLETED | OUTPATIENT
Start: 2019-04-09 | End: 2019-04-09

## 2019-04-09 RX ORDER — CHLORHEXIDINE GLUCONATE 213 G/1000ML
1 SOLUTION TOPICAL
Refills: 0 | Status: DISCONTINUED | OUTPATIENT
Start: 2019-04-09 | End: 2019-04-13

## 2019-04-09 RX ORDER — METOPROLOL TARTRATE 50 MG
50 TABLET ORAL
Refills: 0 | Status: DISCONTINUED | OUTPATIENT
Start: 2019-04-09 | End: 2019-04-11

## 2019-04-09 RX ORDER — CHLORHEXIDINE GLUCONATE 213 G/1000ML
1 SOLUTION TOPICAL DAILY
Refills: 0 | Status: DISCONTINUED | OUTPATIENT
Start: 2019-04-09 | End: 2019-04-13

## 2019-04-09 RX ORDER — ATORVASTATIN CALCIUM 80 MG/1
80 TABLET, FILM COATED ORAL AT BEDTIME
Refills: 0 | Status: DISCONTINUED | OUTPATIENT
Start: 2019-04-09 | End: 2019-04-13

## 2019-04-09 RX ORDER — LOSARTAN POTASSIUM 100 MG/1
25 TABLET, FILM COATED ORAL DAILY
Refills: 0 | Status: DISCONTINUED | OUTPATIENT
Start: 2019-04-09 | End: 2019-04-11

## 2019-04-09 RX ORDER — DEXTROSE 50 % IN WATER 50 %
25 SYRINGE (ML) INTRAVENOUS ONCE
Refills: 0 | Status: DISCONTINUED | OUTPATIENT
Start: 2019-04-09 | End: 2019-04-13

## 2019-04-09 RX ORDER — SENNA PLUS 8.6 MG/1
2 TABLET ORAL AT BEDTIME
Refills: 0 | Status: DISCONTINUED | OUTPATIENT
Start: 2019-04-09 | End: 2019-04-13

## 2019-04-09 RX ORDER — ISOSORBIDE MONONITRATE 60 MG/1
30 TABLET, EXTENDED RELEASE ORAL DAILY
Refills: 0 | Status: DISCONTINUED | OUTPATIENT
Start: 2019-04-09 | End: 2019-04-11

## 2019-04-09 RX ORDER — IPRATROPIUM/ALBUTEROL SULFATE 18-103MCG
3 AEROSOL WITH ADAPTER (GRAM) INHALATION EVERY 6 HOURS
Refills: 0 | Status: DISCONTINUED | OUTPATIENT
Start: 2019-04-09 | End: 2019-04-13

## 2019-04-09 RX ORDER — PANTOPRAZOLE SODIUM 20 MG/1
40 TABLET, DELAYED RELEASE ORAL
Refills: 0 | Status: DISCONTINUED | OUTPATIENT
Start: 2019-04-09 | End: 2019-04-13

## 2019-04-09 RX ORDER — ONDANSETRON 8 MG/1
4 TABLET, FILM COATED ORAL EVERY 6 HOURS
Refills: 0 | Status: DISCONTINUED | OUTPATIENT
Start: 2019-04-09 | End: 2019-04-13

## 2019-04-09 RX ORDER — GLUCAGON INJECTION, SOLUTION 0.5 MG/.1ML
1 INJECTION, SOLUTION SUBCUTANEOUS ONCE
Refills: 0 | Status: DISCONTINUED | OUTPATIENT
Start: 2019-04-09 | End: 2019-04-13

## 2019-04-09 RX ORDER — ACETAMINOPHEN 500 MG
1000 TABLET ORAL ONCE
Refills: 0 | Status: COMPLETED | OUTPATIENT
Start: 2019-04-09 | End: 2019-04-09

## 2019-04-09 RX ORDER — HEPARIN SODIUM 5000 [USP'U]/ML
5000 INJECTION INTRAVENOUS; SUBCUTANEOUS EVERY 12 HOURS
Refills: 0 | Status: DISCONTINUED | OUTPATIENT
Start: 2019-04-09 | End: 2019-04-13

## 2019-04-09 RX ORDER — DEXTROSE 50 % IN WATER 50 %
15 SYRINGE (ML) INTRAVENOUS ONCE
Refills: 0 | Status: DISCONTINUED | OUTPATIENT
Start: 2019-04-09 | End: 2019-04-13

## 2019-04-09 RX ORDER — DOCUSATE SODIUM 100 MG
100 CAPSULE ORAL THREE TIMES A DAY
Refills: 0 | Status: DISCONTINUED | OUTPATIENT
Start: 2019-04-09 | End: 2019-04-13

## 2019-04-09 RX ORDER — DULOXETINE HYDROCHLORIDE 30 MG/1
20 CAPSULE, DELAYED RELEASE ORAL DAILY
Refills: 0 | Status: DISCONTINUED | OUTPATIENT
Start: 2019-04-09 | End: 2019-04-13

## 2019-04-09 RX ORDER — METOPROLOL TARTRATE 50 MG
50 TABLET ORAL ONCE
Refills: 0 | Status: COMPLETED | OUTPATIENT
Start: 2019-04-09 | End: 2019-04-09

## 2019-04-09 RX ORDER — DEXTROSE 50 % IN WATER 50 %
12.5 SYRINGE (ML) INTRAVENOUS ONCE
Refills: 0 | Status: DISCONTINUED | OUTPATIENT
Start: 2019-04-09 | End: 2019-04-13

## 2019-04-09 RX ORDER — GABAPENTIN 400 MG/1
300 CAPSULE ORAL
Refills: 0 | Status: DISCONTINUED | OUTPATIENT
Start: 2019-04-09 | End: 2019-04-13

## 2019-04-09 RX ORDER — HYDRALAZINE HCL 50 MG
10 TABLET ORAL EVERY 4 HOURS
Refills: 0 | Status: DISCONTINUED | OUTPATIENT
Start: 2019-04-09 | End: 2019-04-13

## 2019-04-09 RX ORDER — ASPIRIN/CALCIUM CARB/MAGNESIUM 324 MG
81 TABLET ORAL DAILY
Refills: 0 | Status: DISCONTINUED | OUTPATIENT
Start: 2019-04-09 | End: 2019-04-13

## 2019-04-09 RX ORDER — NITROGLYCERIN 6.5 MG
10 CAPSULE, EXTENDED RELEASE ORAL
Qty: 50 | Refills: 0 | Status: DISCONTINUED | OUTPATIENT
Start: 2019-04-09 | End: 2019-04-10

## 2019-04-09 RX ORDER — LEVOTHYROXINE SODIUM 125 MCG
100 TABLET ORAL DAILY
Refills: 0 | Status: DISCONTINUED | OUTPATIENT
Start: 2019-04-09 | End: 2019-04-13

## 2019-04-09 RX ORDER — CLOPIDOGREL BISULFATE 75 MG/1
75 TABLET, FILM COATED ORAL DAILY
Refills: 0 | Status: DISCONTINUED | OUTPATIENT
Start: 2019-04-09 | End: 2019-04-13

## 2019-04-09 RX ORDER — SODIUM CHLORIDE 9 MG/ML
1000 INJECTION, SOLUTION INTRAVENOUS
Refills: 0 | Status: DISCONTINUED | OUTPATIENT
Start: 2019-04-09 | End: 2019-04-13

## 2019-04-09 RX ADMIN — CLOPIDOGREL BISULFATE 75 MILLIGRAM(S): 75 TABLET, FILM COATED ORAL at 11:59

## 2019-04-09 RX ADMIN — LOSARTAN POTASSIUM 25 MILLIGRAM(S): 100 TABLET, FILM COATED ORAL at 21:40

## 2019-04-09 RX ADMIN — Medication 10 MILLIGRAM(S): at 02:36

## 2019-04-09 RX ADMIN — Medication 50 MILLIGRAM(S): at 01:12

## 2019-04-09 RX ADMIN — Medication 3 MICROGRAM(S)/MIN: at 12:01

## 2019-04-09 RX ADMIN — Medication 50 MILLIGRAM(S): at 05:18

## 2019-04-09 RX ADMIN — Medication 50 MILLIGRAM(S): at 21:41

## 2019-04-09 RX ADMIN — PANTOPRAZOLE SODIUM 40 MILLIGRAM(S): 20 TABLET, DELAYED RELEASE ORAL at 08:06

## 2019-04-09 RX ADMIN — SENNA PLUS 2 TABLET(S): 8.6 TABLET ORAL at 21:41

## 2019-04-09 RX ADMIN — Medication 100 MILLIGRAM(S): at 08:06

## 2019-04-09 RX ADMIN — HEPARIN SODIUM 5000 UNIT(S): 5000 INJECTION INTRAVENOUS; SUBCUTANEOUS at 19:45

## 2019-04-09 RX ADMIN — ATORVASTATIN CALCIUM 80 MILLIGRAM(S): 80 TABLET, FILM COATED ORAL at 21:42

## 2019-04-09 RX ADMIN — Medication 81 MILLIGRAM(S): at 11:59

## 2019-04-09 RX ADMIN — LOSARTAN POTASSIUM 25 MILLIGRAM(S): 100 TABLET, FILM COATED ORAL at 05:18

## 2019-04-09 RX ADMIN — Medication 100 MICROGRAM(S): at 08:06

## 2019-04-09 RX ADMIN — Medication 100 MILLIGRAM(S): at 21:42

## 2019-04-09 RX ADMIN — DULOXETINE HYDROCHLORIDE 20 MILLIGRAM(S): 30 CAPSULE, DELAYED RELEASE ORAL at 11:59

## 2019-04-09 RX ADMIN — Medication 1 TABLET(S): at 11:59

## 2019-04-09 RX ADMIN — Medication 1000 MILLIGRAM(S): at 21:08

## 2019-04-09 RX ADMIN — Medication 3 MICROGRAM(S)/MIN: at 02:38

## 2019-04-09 RX ADMIN — ISOSORBIDE MONONITRATE 30 MILLIGRAM(S): 60 TABLET, EXTENDED RELEASE ORAL at 05:23

## 2019-04-09 RX ADMIN — GABAPENTIN 300 MILLIGRAM(S): 400 CAPSULE ORAL at 08:05

## 2019-04-09 RX ADMIN — Medication 400 MILLIGRAM(S): at 20:15

## 2019-04-09 RX ADMIN — Medication 10 MILLIGRAM(S): at 01:12

## 2019-04-09 NOTE — PROGRESS NOTE ADULT - SUBJECTIVE AND OBJECTIVE BOX
Patient was seen and evaluated on dialysis.   No c/o CP SOB NV  no F/C  no swelling    T(C): 36.7 (04-10-19 @ 04:06), Max: 36.8 (04-09-19 @ 15:57)  HR: 67 (04-10-19 @ 06:00) (55 - 80)  BP: 159/71 (04-10-19 @ 06:00) (98/54 - 200/93)  Wt(kg): --  PE ;  NAD  lungs - CTA  CV gr 1 murmer,  No gallop or rub  Abd : soft, NT BS +, No masses  Ext- No edema  Neuro : Grossly intact, moving extremities                             11.3   8.8   )-----------( 206      ( 08 Apr 2019 19:12 )             36.6        04-08    139  |  95<L>  |  27.0<H>  ----------------------------<  106  4.4   |  30.0<H>  |  5.05<H>    Ca    9.1      08 Apr 2019 21:21    TPro  8.1  /  Alb  3.8  /  TBili  0.3<L>  /  DBili  x   /  AST  29  /  ALT  15  /  AlkPhos  70  04-08      MEDICATIONS  (STANDING):  ALBUTerol/ipratropium for Nebulization PRN  aspirin enteric coated  atorvastatin  chlorhexidine 2% Cloths  chlorhexidine 4% Liquid  clopidogrel Tablet  dextrose 40% Gel PRN  dextrose 5%.  dextrose 50% Injectable  dextrose 50% Injectable  dextrose 50% Injectable  docusate sodium  DULoxetine  gabapentin  glucagon  Injectable PRN  heparin  Injectable  hydrALAZINE Injectable PRN  isosorbide   mononitrate ER Tablet (IMDUR)  levothyroxine  losartan  metoprolol tartrate  multivitamin  ondansetron Injectable PRN  pantoprazole    Tablet  senna      Patient stable  [place] HD easily  Continue

## 2019-04-09 NOTE — CONSULT NOTE ADULT - SUBJECTIVE AND OBJECTIVE BOX
[facility]                                                        Office: [address] [address]                                                       Telephone: [phone]. Fax:[phone]                                                              CARDIOLOGY CONSULTATION NOTE                                                                                           obtained: No    Chief complaint:  Shortness of breath, chest pain     HPI:  Patient is a 78 yo M arrived from home after experiencing SOB, chest pain and headache.  As per grandson, patient was fine last night, went to bed and this morning woke up with 'very very high' blood pressure, HA and SOB despite being on his home O2 regimen.  Patient had HD on Saturday and follows a strict diet and takes his medications regularly and denies dietary indiscretions.  HD is T-Th-S with Dr. Newberry.  Patient on multiple HTN meds for optimal BP control, currently on NTG 10mg gtt after several doses of hydralazine and metoprolol without noticeable improvement in BP or respiratory status.  Patient sitting up in bed and currently denies chest pain, palpitations, shortness of breath, blurry vision, HA, fever, cough, leg swelling or paresthesias.  Currently on BIPAP and admitted to ICU.      REVIEW OF SYMPTOMS:   General: + weakness  Cardiovascular:  See HPI. + chest pain, + dyspnea, No syncope, No palpitations, No dizziness, + Orthopnea,      + Paroxsymal nocturnal dyspnea;    Respiratory:  + Dyspnea, No cough, fever or chills    Genitourinary: No dysuria, no hematuria;   Gastrointestinal: No nausea, no vomiting, No diarrhea, No abdominal pain;   Neurological: + headache, no dizziness, no slurred speech;    Psychiatric: No agitation, no anxiety.  ALL OTHER REVIEW OF SYSTEMS ARE NEGATIVE.    ALLERGIES: No Known Allergies    CURRENT MEDICATIONS:  isosorbide   mononitrate ER Tablet (IMDUR) 30 milliGRAM(s) Oral daily  losartan 25 milliGRAM(s) Oral daily  metoprolol tartrate 50 milliGRAM(s) Oral two times a day  nitroglycerin  Infusion 10 MICROgram(s)/Min IV Continuous <Continuous>  DULoxetine  gabapentin  docusate sodium  pantoprazole    Tablet  senna  aspirin enteric coated  atorvastatin  clopidogrel Tablet  levothyroxine  multivitamin    PAST MEDICAL HISTORY  Dialysis patient  Chronic anemia  ESRD (end stage renal disease)  CAD (coronary artery disease)  Lung cancer  Bipolar disorder  High cholesterol  Hypertension    PAST SURGICAL HISTORY  S/P CABG (coronary artery bypass graft)    FAMILY HISTORY:  Family history of essential hypertension    SOCIAL HISTORY:  Denies smoking/alcohol/drugs    Vital Signs Last 24 Hrs  T(C): 36.8 (09 Apr 2019 04:19), Max: 36.8 (08 Apr 2019 18:01)  T(F): 98.2 (09 Apr 2019 04:19), Max: 98.3 (08 Apr 2019 18:01)  HR: 69 (09 Apr 2019 05:24) (62 - 85)  BP: 193/86 (09 Apr 2019 05:24) (144/66 - 217/102)  BP(mean): --  RR: 24 (09 Apr 2019 05:24) (18 - 24)  SpO2: 96% (09 Apr 2019 05:24) (93% - 98%)    PHYSICAL EXAM:  Constitutional: + mild resp distress, sitting up in bed. cachectic male   HEENT: Atraumatic, EOMI, neck is supple, no JVD   CNS: A & O x3, No focal deficits  Respiratory: poor inspiratory effort, with decreased BS b/l lower lung fields  Cardiovascular: S1S2 RRR, No murmur  Gastrointestinal: Soft non-tender and non distended, +Bowel sounds  Extremities: No edema   Skin: + pale but no rash/ulcers visualized to face, hands or feet.    Intake and output:     LABS:                        11.3   8.8   )-----------( 206      ( 08 Apr 2019 19:12 )             36.6     04-08    139  |  95<L>  |  27.0<H>  ----------------------------<  106  4.4   |  30.0<H>  |  5.05<H>    Ca    9.1      08 Apr 2019 21:21    TPro  8.1  /  Alb  3.8  /  TBili  0.3<L>  /  DBili  x   /  AST  29  /  ALT  15  /  AlkPhos  70  04-08    CARDIAC MARKERS ( 09 Apr 2019 00:22 )  x     / 0.06 ng/mL / x     / x     / x      CARDIAC MARKERS ( 08 Apr 2019 21:21 )  x     / 0.06 ng/mL / x     / x     / x        PT: 10.6 sec;   INR: 0.92 ratio  PTT:27.3 sec    INTERPRETATION OF TELEMETRY: Reviewed by me.   ECG:  NSR @65 bpm with no acute T or ST changes    RADIOLOGY & ADDITIONAL STUDIES:   X-ray:   Head CT scan: No acute bleed    ECHO FINDINGS: Date:     Summary:   1. Left ventricular ejection fraction, by visual estimation, is 50 to   55%.   2. Low normal global left ventricular systolic function.   3. Spectral Doppler shows impaired relaxation pattern of left   ventricular myocardial filling (Grade I diastolic dysfunction).   4. There is no left ventricular hypertrophy.   5. Moderately enlarged left atrium.   6. Mild to moderate mitral annular calcification.   7. Thickening of the anterior and posterior mitral valve leaflets.   8. Sclerotic aortic valve with normal opening.    [id number] Alex Pastrana MD, Electronically signed on 3/12/2019              CATHETERIZATION FINDINGS:  Date:

## 2019-04-09 NOTE — PROVIDER CONTACT NOTE (EICU) - SITUATION
79 male with ESRD on HD, CAD s/p CABG, lung CA s/p chemo/RT, emphysema, fibrotic ILD, hypothyroidism, presented with hypertensive crisis requiring nitroglycerine drip, dyspnea/acute respiratory failure requiring bipap.

## 2019-04-09 NOTE — ED ADULT NURSE REASSESSMENT NOTE - NS ED NURSE REASSESS COMMENT FT1
Pt resting in stretcher still on BiPAP, states he is comfortable and in no visible signs of distress. Pt informed of pending transport to unit. MICU TAYLER Hearn will follow up with dialysis when the pt is on the unit.

## 2019-04-09 NOTE — H&P ADULT - HISTORY OF PRESENT ILLNESS
Patient is a 79 year old male with a history of ESRD on HD T/TH/Sat, CAD status post CABG, hypertension, lung cancer status post chemotherapy and radiation with pulmonary fibrosis on home oxygen and hypothyroidism who presented to the ER with complaints of headache after noting his blood pressure at home to be 'very very high'. Pt states he has been compliant with HD and has been taking all of his cardiac medications regularly and without any dietary indiscretions. HD is TTS with Dr. Newberry. Pt is currently on NTG 10mg gtt after having received several doses of hydralazine and metoprolol without improvement. ICU and Cardiology PA consulted. Pt denies any current chest pain, shortness of breath, HA, blurry vision, palpitations, cough, leg swelling.

## 2019-04-09 NOTE — H&P ADULT - NSICDXPASTMEDICALHX_GEN_ALL_CORE_FT
PAST MEDICAL HISTORY:  Bipolar disorder     CAD (coronary artery disease)     Chronic anemia     Dialysis patient Tues, Thurs, Saturday    ESRD (end stage renal disease) right Upper arm fistula    High cholesterol     Hypertension     Lung cancer had yoni radiation in 2006.

## 2019-04-09 NOTE — CONSULT NOTE ADULT - ATTENDING COMMENTS
Pt seen and evaluated by [facility] PA early this morning, I have seen and evaluated this patient independently and agree with the above findings except as follows: Offered translation of Kiswahili language- pt deferred to family at bedside, pt is 78 yo m pmhx ESRD on HD (T/Th/S; began 01/2019), CAD s/p CABG, HTN, HLD, Lung Ca s/p Chemo/RT on 2L NC at baseline who presented with hypertensive urgency, SOB, started on nitro gtts and BIPAP and admitted to MICU. Pt is due for HD today which is scheduled. Will titrate off nitroglycerin and BIPAP as able with the help of off loading with HD, will re-eval post HD.
pt seen and examined.   He is hypertensive and needs HF with volume overload.  Last month ROBERT with normal LVEF and no significant valvular heart disease.  I agree with use of IV nitrate and resumption of home medications.   Plan HD today and possibly tomorrow.  I reviewed the above and agree with a/p.

## 2019-04-09 NOTE — H&P ADULT - ASSESSMENT
The patient is a 79 year old male with a history of ESRD on HD T/TH/Sat, CAD status post CABG, hypertension, lung cancer status post chemotherapy and radiation with pulmonary fibrosis on home oxygen and hypothyroidism who is admitted for HTN urgency in setting of fluid overload as evidenced by chest x ray.     1) HTN urgency   admit to SDU  titrate NTG gtt down to 5mg, ICU PA consulted   first dose of losartan , metoprolol, isosorbide dinitrate given now  EKG prn chest pain  serial troponin  TTE recently performed, [facility] cardiology PA consulted    2) ESRD  Dr. Alvarado consulted  Will need HD today    3) CAD s/p CABG  DASH/TLC diet  Lipitor cont    4) Hypothyroidism  cont synthroid

## 2019-04-09 NOTE — ED ADULT NURSE REASSESSMENT NOTE - NS ED NURSE REASSESS COMMENT FT1
Pt resting comfortbaly in stretcher. A&O x4, currently on BiPAP and nitro drip still running. IV site clean, dry, without redness or swelling. Pt awaiting his son to return to the bedside, plan of care for the day discussed with pt.

## 2019-04-09 NOTE — CONSULT NOTE ADULT - SUBJECTIVE AND OBJECTIVE BOX
78 yo m pmhx ESRD on HD (T/Th/S; began 01/2019), CAD s/p CABG, HTN, HLD, Lung Ca s/p Chemo/RXT on 2L NC with recent hospitalization for HTN emergency, hypoxic respiratory failure in setting of pulmonary edema 2/2 volume overload requiring fluid removal with HD biba from home with complaints of headache.  As per patient's grandson at bedside, the patient began complaining of headache with associated vomiting prompting 911 call.  In ED patient found to be hypertensive to , patient given metoprolol and losartan, started on nitro gtt and admitted to hospitalist service.  Called by Dr. Hartley due to nitro gtt unable to be titrated off.  Patient seen in ED, tachypneic with some belly breathing and endorsing sob. While at bedside patient began to tripod and desatted to high 80s on 4L NC.  NC increased up, respiratory called for BiPAP, nitro gtt tirated up and home dose imdur ordered, metorpolol ordered.  Patient endorses headache, sob, chest tightness (consistent with previous presentation) and asking for more oxygen.       PAST MEDICAL & SURGICAL HISTORY:  Dialysis patient: Tues, Thurs, Saturday  Chronic anemia  ESRD (end stage renal disease): right Upper arm fistula  CAD (coronary artery disease)  Lung cancer: had yoni radiation in 2006.  Bipolar disorder  High cholesterol  Hypertension  S/P CABG (coronary artery bypass graft): pt&#x27;s son in [facility] h/o some stents near neck area ? carotid ? he is not sure.      Allergies  No Known Allergies      FAMILY HISTORY:  Family history of essential hypertension      Social History:   From home.  Lives with daughter.       Review of Systems:  All ROS negative except as appreciated above.       Vitals During Exam:   HR:   BP:   RR:  sPO2:     Physical Examination:    General: No acute distress.      HEENT: Pupils equal, reactive to light.  Symmetric.    PULM: Clear to auscultation bilaterally, no significant sputum production    CVS: Regular rate and rhythm, no murmurs, rubs, or gallops    ABD: Soft, nondistended, nontender, normoactive bowel sounds, no masses    EXT: No edema, nontender    SKIN: Warm and well perfused, no rashes noted.    NEURO: Alert, oriented, interactive, nonfocal      Medications:    isosorbide   mononitrate ER Tablet (IMDUR) 30 milliGRAM(s) Oral daily  losartan 25 milliGRAM(s) Oral daily  metoprolol tartrate 50 milliGRAM(s) Oral two times a day  nitroglycerin  Infusion 10 MICROgram(s)/Min IV Continuous <Continuous>    ALBUTerol/ipratropium for Nebulization 3 milliLiter(s) Nebulizer every 6 hours PRN    DULoxetine 20 milliGRAM(s) Oral daily  gabapentin 300 milliGRAM(s) Oral two times a day  ondansetron Injectable 4 milliGRAM(s) IV Push every 6 hours PRN      aspirin enteric coated 81 milliGRAM(s) Oral daily  clopidogrel Tablet 75 milliGRAM(s) Oral daily    docusate sodium 100 milliGRAM(s) Oral three times a day  pantoprazole    Tablet 40 milliGRAM(s) Oral before breakfast  senna 2 Tablet(s) Oral at bedtime      atorvastatin 80 milliGRAM(s) Oral at bedtime  levothyroxine 100 MICROGram(s) Oral daily    multivitamin 1 Tablet(s) Oral daily                ICU Vital Signs Last 24 Hrs  T(C): 36.8 (09 Apr 2019 04:19), Max: 36.8 (08 Apr 2019 18:01)  T(F): 98.2 (09 Apr 2019 04:19), Max: 98.3 (08 Apr 2019 18:01)  HR: 74 (09 Apr 2019 04:19) (62 - 85)  BP: 195/87 (09 Apr 2019 04:19) (144/66 - 217/102)  BP(mean): --  ABP: --  ABP(mean): --  RR: 20 (09 Apr 2019 04:19) (18 - 22)  SpO2: 93% (09 Apr 2019 04:19) (93% - 98%)    Vital Signs Last 24 Hrs  T(C): 36.8 (09 Apr 2019 04:19), Max: 36.8 (08 Apr 2019 18:01)  T(F): 98.2 (09 Apr 2019 04:19), Max: 98.3 (08 Apr 2019 18:01)  HR: 74 (09 Apr 2019 04:19) (62 - 85)  BP: 195/87 (09 Apr 2019 04:19) (144/66 - 217/102)  BP(mean): --  RR: 20 (09 Apr 2019 04:19) (18 - 22)  SpO2: 93% (09 Apr 2019 04:19) (93% - 98%)        I&O's Detail        LABS:                        11.3   8.8   )-----------( 206      ( 08 Apr 2019 19:12 )             36.6     04-08    139  |  95<L>  |  27.0<H>  ----------------------------<  106  4.4   |  30.0<H>  |  5.05<H>    Ca    9.1      08 Apr 2019 21:21    TPro  8.1  /  Alb  3.8  /  TBili  0.3<L>  /  DBili  x   /  AST  29  /  ALT  15  /  AlkPhos  70  04-08      CARDIAC MARKERS ( 09 Apr 2019 00:22 )  x     / 0.06 ng/mL / x     / x     / x      CARDIAC MARKERS ( 08 Apr 2019 21:21 )  x     / 0.06 ng/mL / x     / x     / x          CAPILLARY BLOOD GLUCOSE        PT/INR - ( 08 Apr 2019 19:12 )   PT: 10.6 sec;   INR: 0.92 ratio         PTT - ( 08 Apr 2019 19:12 )  PTT:27.3 sec    CULTURES:        RADIOLOGY: ***      SUPPLEMENTAL O2:   LINES:  LYNN:   LAILAx:   CONTACT: 78 yo m pmhx ESRD on HD (T/Th/S; began 01/2019), CAD s/p CABG, HTN, HLD, Lung Ca s/p Chemo/RXT on 2L NC with recent hospitalization for HTN emergency, hypoxic respiratory failure in setting of pulmonary edema 2/2 volume overload requiring fluid removal with HD biba from home with complaints of headache.  As per patient's grandson at bedside, the patient began complaining of headache with associated vomiting prompting 911 call.  In ED patient found to be hypertensive to , patient given metoprolol and losartan, started on nitro gtt and admitted to hospitalist service.  Called by Dr. Hartley due to nitro gtt unable to be titrated off.  Patient seen in ED, tachypneic with some belly breathing and endorsing sob. While at bedside patient began to tripod and desatted to high 80s on 4L NC.  NC increased up, respiratory called for BiPAP, nitro gtt tirated up and home dose imdur ordered, metorpolol ordered.  Patient endorses headache, sob, chest tightness (consistent with previous presentation) and asking for more oxygen.       PAST MEDICAL & SURGICAL HISTORY:  Dialysis patient: Tues, Thurs, Saturday  Chronic anemia  ESRD (end stage renal disease): right Upper arm fistula  CAD (coronary artery disease)  Lung cancer: had yoni radiation in 2006.  Bipolar disorder  High cholesterol  Hypertension  S/P CABG (coronary artery bypass graft): pt&#x27;s son in [facility] states h/o some stents near neck area ? carotid ? he is not sure.      Allergies  No Known Allergies      FAMILY HISTORY:  Family history of essential hypertension      Social History:   From home.  Lives with daughter.       Review of Systems:  All ROS negative except as appreciated above.       Vitals During Exam:   HR: 74  BP: 195/87 mmhg  RR: 20  sPO2: 91% on 6L NC    Physical Examination:    General: Elderly, frail male, sitting on stretcher, appears uncomfortable     HEENT: Pupils equal, reactive to light.  Symmetric. NC in place.     PULM: +tachypneic, Symmetrical thorax expansion upon respiration.  Clear to auscultation bilaterally with bibasilar crackles, no significant sputum production appreciated    CVS: Regular rate and rhythm, no murmurs, rubs, or gallops appreciated    ABD: Soft, nondistended, nontender, normoactive bowel sounds, no masses appreicated    EXT: No edema, nontender, DP pulses symmetrical     SKIN: Warm and well perfused, no rashes noted.    NEURO: Alert, oriented, interactive, nonfocal      Medications:  isosorbide   mononitrate ER Tablet (IMDUR) 30 milliGRAM(s) Oral daily  losartan 25 milliGRAM(s) Oral daily  metoprolol tartrate 50 milliGRAM(s) Oral two times a day  nitroglycerin  Infusion 10 MICROgram(s)/Min IV Continuous <Continuous>  ALBUTerol/ipratropium for Nebulization 3 milliLiter(s) Nebulizer every 6 hours PRN  DULoxetine 20 milliGRAM(s) Oral daily  gabapentin 300 milliGRAM(s) Oral two times a day  ondansetron Injectable 4 milliGRAM(s) IV Push every 6 hours PRN  aspirin enteric coated 81 milliGRAM(s) Oral daily  clopidogrel Tablet 75 milliGRAM(s) Oral daily  docusate sodium 100 milliGRAM(s) Oral three times a day  pantoprazole    Tablet 40 milliGRAM(s) Oral before breakfast  senna 2 Tablet(s) Oral at bedtime  atorvastatin 80 milliGRAM(s) Oral at bedtime  levothyroxine 100 MICROGram(s) Oral daily  multivitamin 1 Tablet(s) Oral daily      ICU Vital Signs Last 24 Hrs  T(C): 36.8 (09 Apr 2019 04:19), Max: 36.8 (08 Apr 2019 18:01)  T(F): 98.2 (09 Apr 2019 04:19), Max: 98.3 (08 Apr 2019 18:01)  HR: 74 (09 Apr 2019 04:19) (62 - 85)  BP: 195/87 (09 Apr 2019 04:19) (144/66 - 217/102)  BP(mean): --  ABP: --  ABP(mean): --  RR: 20 (09 Apr 2019 04:19) (18 - 22)  SpO2: 93% (09 Apr 2019 04:19) (93% - 98%)    Vital Signs Last 24 Hrs  T(C): 36.8 (09 Apr 2019 04:19), Max: 36.8 (08 Apr 2019 18:01)  T(F): 98.2 (09 Apr 2019 04:19), Max: 98.3 (08 Apr 2019 18:01)  HR: 74 (09 Apr 2019 04:19) (62 - 85)  BP: 195/87 (09 Apr 2019 04:19) (144/66 - 217/102)  BP(mean): --  RR: 20 (09 Apr 2019 04:19) (18 - 22)  SpO2: 93% (09 Apr 2019 04:19) (93% - 98%)      LABS:                        11.3   8.8   )-----------( 206      ( 08 Apr 2019 19:12 )             36.6     04-08    139  |  95<L>  |  27.0<H>  ----------------------------<  106  4.4   |  30.0<H>  |  5.05<H>    Ca    9.1      08 Apr 2019 21:21    TPro  8.1  /  Alb  3.8  /  TBili  0.3<L>  /  DBili  x   /  AST  29  /  ALT  15  /  AlkPhos  70  04-08      CARDIAC MARKERS ( 09 Apr 2019 00:22 )  x     / 0.06 ng/mL / x     / x     / x      CARDIAC MARKERS ( 08 Apr 2019 21:21 )  x     / 0.06 ng/mL / x     / x     / x          PT/INR - ( 08 Apr 2019 19:12 )   PT: 10.6 sec;   INR: 0.92 ratio    PTT - ( 08 Apr 2019 19:12 )  PTT:27.3 sec      RADIOLOGY:   < from: CT Head No Cont (04.08.19 @ 22:20) >   EXAM:  CT BRAIN                          PROCEDURE DATE:  04/08/2019      INTERPRETATION:      CLINICAL INFORMATION:   History of hypertension and headaches     TECHNIQUE: Contiguous non contrast axial images of brain from skull base   to vertex sagittal and coronal reformations.   This scan was performed   using automatic exposure control (radiation dose reduction software) to   obtain a diagnostic image quality scan with patient dose as low as   reasonably achievable.      COMPARISON: 3/8/2019    FINDINGS:       There is prominence of the ventricles and cortical sulci. Midline   structures are intact. There are patchy hypodensities in the brainstem,   periventricular and deep white matter distribution consistent with   chronic small vessel ischemic changes. There is no CT evidence of acute   territorial infarction.  There is no hemorrhage, contusion or extraaxial   collection. There is no acute hydrocephalus. The visualized posterior   fossa structures are otherwise intact. Thereare scattered intracranial   arterial calcification.  There is mild scattered chronic mucosal   thickening of the visualized paranasal sinuses, no air fluid levels.    IMPRESSION:       No parenchymal contusion, hemorrhage or extra-axial collection.    No CT evidence of an acute territorial infarction.    Advanced chronic small vessel ischemic changes.    ROBYN LI M.D., ATTENDING RADIOLOGIST  This document has been electronically signed. Apr 8 2019 10:39PM    < end of copied text >      SUPPLEMENTAL O2: NC, BiPAP prn  LINES: Peripheral   BAILEY: N  PPx: Heparin   CONTACT: N

## 2019-04-09 NOTE — CONSULT NOTE ADULT - SUBJECTIVE AND OBJECTIVE BOX
[facility] DIVISION OF KIDNEY DISEASES AND HYPERTENSION -- INITIAL CONSULT NOTE  --------------------------------------------------------------------------------  HPI:  78 yo m pmhx ESRD on HD (T/Th/S; began 01/2019), CAD s/p CABG, HTN, HLD, Lung Ca s/p Chemo/RXT on 2L NC with recent hospitalization for HTN emergency, hypoxic respiratory failure in setting of pulmonary edema 2/2 volume overload requiring fluid removal with HD biba from home with complaints of headache.  As per patient's grandson at bedside, the patient began complaining of headache with associated vomiting prompting 911 call.  In ED patient found to be hypertensive to , patient given metoprolol and losartan, started on nitro gtt and admitted to hospitalist service.  Called by Dr. Hartley due to nitro gtt unable to be titrated off.  Patient seen in ED, tachypneic with some belly breathing and endorsing sob. While at bedside patient began to tripod and desatted to high 80s on 4L NC.  NC increased up, respiratory called for BiPAP, nitro gtt tirated up and home dose imdur ordered, metorpolol ordered.  Patient endorses headache, sob, chest tightness (consistent with previous presentation) and asking for more oxygen.     PAST HISTORY  --------------------------------------------------------------------------------  PAST MEDICAL & SURGICAL HISTORY:  Dialysis patient: Tues, Thurs, Saturday  Chronic anemia  ESRD (end stage renal disease): right Upper arm fistula  CAD (coronary artery disease)  Lung cancer: had yoni radiation in 2006.  Bipolar disorder  High cholesterol  Hypertension  S/P CABG (coronary artery bypass graft): pt&#x27;s son in [facility] states h/o some stents near neck area ? carotid ? he is not sure.    FAMILY HISTORY:  Family history of essential hypertension    PAST SOCIAL HISTORY:    ALLERGIES & MEDICATIONS  --------------------------------------------------------------------------------  Allergies    No Known Allergies    Standing Inpatient Medications  aspirin enteric coated 81 milliGRAM(s) Oral daily  atorvastatin 80 milliGRAM(s) Oral at bedtime  chlorhexidine 2% Cloths 1 Application(s) Topical daily  chlorhexidine 4% Liquid 1 Application(s) Topical <User Schedule>  clopidogrel Tablet 75 milliGRAM(s) Oral daily  dextrose 5%. 1000 milliLiter(s) IV Continuous <Continuous>  dextrose 50% Injectable 12.5 Gram(s) IV Push once  dextrose 50% Injectable 25 Gram(s) IV Push once  dextrose 50% Injectable 25 Gram(s) IV Push once  docusate sodium 100 milliGRAM(s) Oral three times a day  DULoxetine 20 milliGRAM(s) Oral daily  gabapentin 300 milliGRAM(s) Oral two times a day  heparin  Injectable 5000 Unit(s) SubCutaneous every 12 hours  isosorbide   mononitrate ER Tablet (IMDUR) 30 milliGRAM(s) Oral daily  levothyroxine 100 MICROGram(s) Oral daily  losartan 25 milliGRAM(s) Oral daily  metoprolol tartrate 50 milliGRAM(s) Oral two times a day  multivitamin 1 Tablet(s) Oral daily  nitroglycerin  Infusion 10 MICROgram(s)/Min IV Continuous <Continuous>  pantoprazole    Tablet 40 milliGRAM(s) Oral before breakfast  senna 2 Tablet(s) Oral at bedtime    PRN Inpatient Medications  ALBUTerol/ipratropium for Nebulization 3 milliLiter(s) Nebulizer every 6 hours PRN  dextrose 40% Gel 15 Gram(s) Oral once PRN  glucagon  Injectable 1 milliGRAM(s) IntraMuscular once PRN  ondansetron Injectable 4 milliGRAM(s) IV Push every 6 hours PRN      REVIEW OF SYSTEMS  --------------------------------------------------------------------------------  Gen: No weight changes, fatigue, fevers/chills, weakness  Skin: No rashes  Head/Eyes/Ears/Mouth: No headache; Normal hearing; Normal vision w/o blurriness; No sinus pain/discomfort, sore throat  Respiratory: No dyspnea, cough, wheezing, hemoptysis  CV: No chest pain, PND, orthopnea  GI: No abdominal pain, diarrhea, constipation, nausea, vomiting, melena, hematochezia  : No increased frequency, dysuria, hematuria, nocturia  MSK: No joint pain/swelling; no back pain; no edema  Neuro: No dizziness/lightheadedness, weakness, seizures, numbness, tingling  Heme: No easy bruising or bleeding  Endo: No heat/cold intolerance  Psych: No significant nervousness, anxiety, stress, depression    All other systems were reviewed and are negative, except as noted.    VITALS/PHYSICAL EXAM  --------------------------------------------------------------------------------  T(C): 36.8 (04-09-19 @ 04:19), Max: 36.8 (04-08-19 @ 18:01)  HR: 60 (04-09-19 @ 14:08) (55 - 85)  BP: 195/88 (04-09-19 @ 14:08) (144/66 - 217/102)  RR: 22 (04-09-19 @ 14:08) (18 - 24)  SpO2: 97% (04-09-19 @ 14:08) (93% - 98%)  Wt(kg): --  Height (cm): 170.18 (04-08-19 @ 18:01)  Weight (kg): 81.6 (04-08-19 @ 18:01)  BMI (kg/m2): 28.2 (04-08-19 @ 18:01)  BSA (m2): 1.93 (04-08-19 @ 18:01)    Physical Examination:    General: Elderly, frail male, sitting on stretcher, appears uncomfortable     HEENT: Pupils equal, reactive to light.  Symmetric. NC in place.     PULM: +tachypneic, Symmetrical thorax expansion upon respiration.  Clear to auscultation bilaterally with bibasilar crackles, no significant sputum production appreciated    CVS: Regular rate and rhythm, no murmurs, rubs, or gallops appreciated    ABD: Soft, nondistended, nontender, normoactive bowel sounds, no masses appreicated    EXT: No edema, nontender, DP pulses symmetrical     SKIN: Warm and well perfused, no rashes noted.    NEURO: Alert, oriented, interactive, nonfocal,    	Vascular access: AVG,    LABS/STUDIES  --------------------------------------------------------------------------------              11.3   8.8   >-----------<  206      [04-08-19 @ 19:12]              36.6     139  |  95  |  27.0  ----------------------------<  106      [04-08-19 @ 21:21]  4.4   |  30.0  |  5.05        Ca     9.1     [04-08-19 @ 21:21]    TPro  8.1  /  Alb  3.8  /  TBili  0.3  /  DBili  x   /  AST  29  /  ALT  15  /  AlkPhos  70  [04-08-19 @ 21:21]    PT/INR: PT 10.6 , INR 0.92       [04-08-19 @ 19:12]  PTT: 27.3       [04-08-19 @ 19:12]    Troponin 0.06      [04-09-19 @ 00:22]    Creatinine Trend:  SCr 5.05 [04-08 @ 21:21]  SCr 7.44 [03-13 @ 09:02]  SCr 6.29 [03-11 @ 09:45]    Urinalysis - [01-01-19 @ 15:02]      Color Red / Appearance Bloody / SG 1.015 / pH 6.0      Gluc Negative / Ketone Negative  / Bili Negative / Urobili Negative       Blood Large / Protein 500 / Leuk Est Trace / Nitrite Negative      RBC TNTC / WBC 3-5 / Hyaline  / Gran  / Sq Epi  / Non Sq Epi Negative / Bacteria Occasional      Iron 34, TIBC 235, %sat 14      [01-29-19 @ 11:39]  Ferritin 1049      [01-29-19 @ 11:39]  TSH 1.83      [03-11-19 @ 07:37]  Lipid: chol 159, , HDL 32, LDL 94      [01-29-19 @ 11:39]    HBsAb <3.0      [01-08-19 @ 17:03]  HBsAg Nonreact      [02-09-19 @ 00:38]  HBcAb Nonreact      [12-05-18 @ 16:33]  HCV 0.12, Nonreact      [01-08-19 @ 17:03]    CARDIAC MARKERS ( 09 Apr 2019 00:22 )  x     / 0.06 ng/mL / x     / x     / x      CARDIAC MARKERS ( 08 Apr 2019 21:21 )  x     / 0.06 ng/mL / x     / x     / x        PT/INR - ( 08 Apr 2019 19:12 )   PT: 10.6 sec;   INR: 0.92 ratio    PTT - ( 08 Apr 2019 19:12 )  PTT:27.3 sec    RADIOLOGY:     EXAM:  CT BRAIN                          PROCEDURE DATE:  04/08/2019      INTERPRETATION:      CLINICAL INFORMATION:   History of hypertension and headaches     TECHNIQUE: Contiguous non contrast axial images of brain from skull base   to vertex sagittal and coronal reformations.   This scan was performed   using automatic exposure control (radiation dose reduction software) to   obtain a diagnostic image quality scan with patient dose as low as   reasonably achievable.      COMPARISON: 3/8/2019    FINDINGS:       There is prominence of the ventricles and cortical sulci. Midline   structures are intact. There are patchy hypodensities in the brainstem,   periventricular and deep white matter distribution consistent with   chronic small vessel ischemic changes. There is no CT evidence of acute   territorial infarction.  There is no hemorrhage, contusion or extraaxial   collection. There is no acute hydrocephalus. The visualized posterior   fossa structures are otherwise intact. Thereare scattered intracranial   arterial calcification.  There is mild scattered chronic mucosal   thickening of the visualized paranasal sinuses, no air fluid levels.    IMPRESSION:       No parenchymal contusion, hemorrhage or extra-axial collection.    No CT evidence of an acute territorial infarction.    Advanced chronic small vessel ischemic changes.    ROBYN LI M.D., ATTENDING RADIOLOGIST  This document has been electronically signed. Apr 8 2019 10:39PM      SUPPLEMENTAL O2: NC, BiPAP prn  LINES: Peripheral   BAILEY: N  PPx: Heparin   CONTACT: N    Assessment and Recommendation:     78 yo m pmhx ESRD on HD (T/Th/S; began 01/2019), CAD s/p CABG, HTN, HLD, Lung Ca s/p Chemo/RXT on 2L NC with recent hospitalization for HTN emergency, hypoxic respiratory failure in setting of pulmonary edema 2/2 volume overload requiring fluid removal with HD admitted with HTN emergency, hypoxic respiratory failure 2/2 pulmonary edema in setting of volume overload.  Admit to ICU, plan discussed with eICU attending Dr. Coronado.     NEURO: No active issues.    CV: Hypertensive emergency on nitro gtt, tirate as per ICU protocol for -180.  Patient on home dose metoprolol, losartan and imdur.  CAD on aspirin and plavix HLD on statin therapy   RESP: Acute hypoxic respiratory failure 2/2 pulmonary edema in setting of volume overload. 12/5 fio2 40%, titrate settings for FiO2 >92%.  Keep HOB >30 degrees.  Aspiration precautions.   RENAL: ESRD on HD, seen by Dr. Marquez on previous admission, consult left with answering service.  Monitor electrolytes   GI: NPO while on BiPAP  ENDO: POCT q6H while NPO  ID: No active infections, monitor off abx  HEME: Heparin for chemical VTE ppx.     Discussion : 78 yo m pmhx ESRD on HD (T/Th/S; began 01/2019), CAD s/p CABG, HTN, HLD, Lung Ca s/p Chemo/RT on 2L NC at baseline who presented with hypertensive urgency, SOB, started on nitro gtts and BIPAP and admitted to MICU.     Pt is due for HD today which is scheduled.     Will titrate off nitroglycerin and BIPAP as able with the help of off loading with HD, will re-eval post HD.

## 2019-04-09 NOTE — CONSULT NOTE ADULT - PROBLEM SELECTOR RECOMMENDATION 9
On nitro gtt, titrate as per ICU protocol for -180.  Resume home meds Metoprolol, ARB and Imdur. HD today, likely etiology of pulmonary edema/respiratory distress is fluid overload  HD as per Dr. Newberry.

## 2019-04-09 NOTE — H&P ADULT - NSICDXPASTSURGICALHX_GEN_ALL_CORE_FT
PAST SURGICAL HISTORY:  S/P CABG (coronary artery bypass graft) pt's son in law states h/o some stents near neck area ? carotid ? he is not sure.

## 2019-04-09 NOTE — CONSULT NOTE ADULT - ASSESSMENT
79 year old male with ESRD on HD T-TH-Sat, CAD status post CABG, hypertension, lung cancer status post chemotherapy and radiation with pulmonary fibrosis on home oxygen presented to [facility] ER with c/o headache, SOB and chest pain, currently asymptomatic.  CT head negative, placed on Nitro gtt for hypertensive urgency and BIPAP for hypoxic respiratory failure in ER and admitted to ICU for further management.  Last TTE from March did not show significant AR, grossly normal EF with sclerotic aortic valve
78 yo m pmhx ESRD on HD (T/Th/S; began 01/2019), CAD s/p CABG, HTN, HLD, Lung Ca s/p Chemo/RXT on 2L NC with recent hospitalization for HTN emergency, hypoxic respiratory failure in setting of pulmonary edema 2/2 volume overload requiring fluid removal with HD admitted with HTN emergency, hypoxic respiratory failure 2/2 pulmonary edema in setting of volume overload.  Admit to ICU, plan discussed with eICU attending Dr. Coronado.     NEURO: No active issues.    CV: Hypertensive emergency on nitro gtt, tirate as per ICU protocol for -180.  Patient on home dose metoprolol, losartan and imdur.  CAD on aspirin and plavix HLD on statin therapy   RESP: Acute hypoxic respiratory failure 2/2 pulmonary edema in setting of volume overload. 12/5 fio2 40%, titrate settings for FiO2 >92%.  Keep HOB >30 degrees.  Aspiration precautions.   RENAL: ESRD on HD, seen by Dr. Marquez on previous admission, consult left with answering service.  Monitor electrolytes   GI: NPO while on BiPAP  ENDO: POCT q6H while NPO  ID: No active infections, monitor off abx  HEME: Heparin for chemical VTE ppx.   DISPO: Full code.  Palliative care consult place.

## 2019-04-09 NOTE — ED ADULT NURSE REASSESSMENT NOTE - NS ED NURSE REASSESS COMMENT FT1
patient evaluated by icu pa and accepted. patient given po meds as per DR Hartley and then patient place on bipap with setting of 12/5 rate of 12 at 40% and niro drip increased again tyo 10mcg/min

## 2019-04-10 DIAGNOSIS — I50.30 UNSPECIFIED DIASTOLIC (CONGESTIVE) HEART FAILURE: ICD-10-CM

## 2019-04-10 DIAGNOSIS — I16.9 HYPERTENSIVE CRISIS, UNSPECIFIED: ICD-10-CM

## 2019-04-10 DIAGNOSIS — N18.6 END STAGE RENAL DISEASE: ICD-10-CM

## 2019-04-10 DIAGNOSIS — Z71.89 OTHER SPECIFIED COUNSELING: ICD-10-CM

## 2019-04-10 LAB
ALBUMIN SERPL ELPH-MCNC: 3.6 G/DL — SIGNIFICANT CHANGE UP (ref 3.3–5.2)
ALP SERPL-CCNC: 66 U/L — SIGNIFICANT CHANGE UP (ref 40–120)
ALT FLD-CCNC: 14 U/L — SIGNIFICANT CHANGE UP
ANION GAP SERPL CALC-SCNC: 15 MMOL/L — SIGNIFICANT CHANGE UP (ref 5–17)
AST SERPL-CCNC: 28 U/L — SIGNIFICANT CHANGE UP
BILIRUB SERPL-MCNC: 0.4 MG/DL — SIGNIFICANT CHANGE UP (ref 0.4–2)
BUN SERPL-MCNC: 24 MG/DL — HIGH (ref 8–20)
CALCIUM SERPL-MCNC: 9.3 MG/DL — SIGNIFICANT CHANGE UP (ref 8.6–10.2)
CHLORIDE SERPL-SCNC: 95 MMOL/L — LOW (ref 98–107)
CO2 SERPL-SCNC: 28 MMOL/L — SIGNIFICANT CHANGE UP (ref 22–29)
CREAT SERPL-MCNC: 4.03 MG/DL — HIGH (ref 0.5–1.3)
GLUCOSE SERPL-MCNC: 89 MG/DL — SIGNIFICANT CHANGE UP (ref 70–115)
HBV SURFACE AG SER-ACNC: SIGNIFICANT CHANGE UP
HCT VFR BLD CALC: 38.1 % — LOW (ref 42–52)
HGB BLD-MCNC: 11.8 G/DL — LOW (ref 14–18)
MAGNESIUM SERPL-MCNC: 2.1 MG/DL — SIGNIFICANT CHANGE UP (ref 1.6–2.6)
MCHC RBC-ENTMCNC: 28.2 PG — SIGNIFICANT CHANGE UP (ref 27–31)
MCHC RBC-ENTMCNC: 31 G/DL — LOW (ref 32–36)
MCV RBC AUTO: 91.1 FL — SIGNIFICANT CHANGE UP (ref 80–94)
PHOSPHATE SERPL-MCNC: 4.3 MG/DL — SIGNIFICANT CHANGE UP (ref 2.4–4.7)
PLATELET # BLD AUTO: 193 K/UL — SIGNIFICANT CHANGE UP (ref 150–400)
POTASSIUM SERPL-MCNC: 4.2 MMOL/L — SIGNIFICANT CHANGE UP (ref 3.5–5.3)
POTASSIUM SERPL-SCNC: 4.2 MMOL/L — SIGNIFICANT CHANGE UP (ref 3.5–5.3)
PROT SERPL-MCNC: 7.9 G/DL — SIGNIFICANT CHANGE UP (ref 6.6–8.7)
RBC # BLD: 4.18 M/UL — LOW (ref 4.6–6.2)
RBC # FLD: 16.8 % — HIGH (ref 11–15.6)
SODIUM SERPL-SCNC: 138 MMOL/L — SIGNIFICANT CHANGE UP (ref 135–145)
WBC # BLD: 9.1 K/UL — SIGNIFICANT CHANGE UP (ref 4.8–10.8)
WBC # FLD AUTO: 9.1 K/UL — SIGNIFICANT CHANGE UP (ref 4.8–10.8)

## 2019-04-10 PROCEDURE — 99232 SBSQ HOSP IP/OBS MODERATE 35: CPT

## 2019-04-10 PROCEDURE — 99233 SBSQ HOSP IP/OBS HIGH 50: CPT

## 2019-04-10 RX ORDER — ACETAMINOPHEN 500 MG
650 TABLET ORAL ONCE
Refills: 0 | Status: COMPLETED | OUTPATIENT
Start: 2019-04-10 | End: 2019-04-10

## 2019-04-10 RX ADMIN — ISOSORBIDE MONONITRATE 30 MILLIGRAM(S): 60 TABLET, EXTENDED RELEASE ORAL at 13:06

## 2019-04-10 RX ADMIN — CHLORHEXIDINE GLUCONATE 1 APPLICATION(S): 213 SOLUTION TOPICAL at 05:55

## 2019-04-10 RX ADMIN — CHLORHEXIDINE GLUCONATE 1 APPLICATION(S): 213 SOLUTION TOPICAL at 14:00

## 2019-04-10 RX ADMIN — PANTOPRAZOLE SODIUM 40 MILLIGRAM(S): 20 TABLET, DELAYED RELEASE ORAL at 05:54

## 2019-04-10 RX ADMIN — DULOXETINE HYDROCHLORIDE 20 MILLIGRAM(S): 30 CAPSULE, DELAYED RELEASE ORAL at 13:06

## 2019-04-10 RX ADMIN — Medication 50 MILLIGRAM(S): at 05:55

## 2019-04-10 RX ADMIN — GABAPENTIN 300 MILLIGRAM(S): 400 CAPSULE ORAL at 18:02

## 2019-04-10 RX ADMIN — Medication 81 MILLIGRAM(S): at 13:06

## 2019-04-10 RX ADMIN — CLOPIDOGREL BISULFATE 75 MILLIGRAM(S): 75 TABLET, FILM COATED ORAL at 13:06

## 2019-04-10 RX ADMIN — Medication 10 MILLIGRAM(S): at 00:10

## 2019-04-10 RX ADMIN — ATORVASTATIN CALCIUM 80 MILLIGRAM(S): 80 TABLET, FILM COATED ORAL at 22:57

## 2019-04-10 RX ADMIN — Medication 100 MILLIGRAM(S): at 05:54

## 2019-04-10 RX ADMIN — HEPARIN SODIUM 5000 UNIT(S): 5000 INJECTION INTRAVENOUS; SUBCUTANEOUS at 18:02

## 2019-04-10 RX ADMIN — GABAPENTIN 300 MILLIGRAM(S): 400 CAPSULE ORAL at 05:54

## 2019-04-10 RX ADMIN — Medication 1 TABLET(S): at 13:06

## 2019-04-10 RX ADMIN — Medication 650 MILLIGRAM(S): at 23:15

## 2019-04-10 RX ADMIN — LOSARTAN POTASSIUM 25 MILLIGRAM(S): 100 TABLET, FILM COATED ORAL at 07:24

## 2019-04-10 RX ADMIN — Medication 50 MILLIGRAM(S): at 18:02

## 2019-04-10 RX ADMIN — HEPARIN SODIUM 5000 UNIT(S): 5000 INJECTION INTRAVENOUS; SUBCUTANEOUS at 05:54

## 2019-04-10 RX ADMIN — Medication 100 MICROGRAM(S): at 05:55

## 2019-04-10 RX ADMIN — Medication 10 MILLIGRAM(S): at 13:07

## 2019-04-10 NOTE — PROGRESS NOTE ADULT - SUBJECTIVE AND OBJECTIVE BOX
CC: Follow up CHF    INTERVAL HPI/OVERNIGHT EVENTS: Patient seen and examined, sitting comfortably in a chair. denies sob, chest pain or palpitaitons.       Vital Signs Last 24 Hrs  T(C): 37.1 (10 Apr 2019 07:38), Max: 37.1 (10 Apr 2019 07:38)  T(F): 98.8 (10 Apr 2019 07:38), Max: 98.8 (10 Apr 2019 07:38)  HR: 55 (10 Apr 2019 09:00) (51 - 80)  BP: 83/57 (10 Apr 2019 09:00) (83/57 - 200/93)  BP(mean): 66 (10 Apr 2019 09:00) (66 - 126)  RR: 25 (10 Apr 2019 09:00) (19 - 35)  SpO2: 95% (10 Apr 2019 09:43) (93% - 100%)    PHYSICAL EXAM:    GENERAL: NAD, AOX3  NECK: Supple, No JVD  CHEST/LUNG: Bilateral rhonchi   HEART: Regular rate and rhythm; No murmurs, rubs, or gallops  ABDOMEN: Soft, Nontender, Nondistended; Bowel sounds present  EXTREMITIES:  2+ Peripheral Pulses, No clubbing, cyanosis, or edema        MEDICATIONS  (STANDING):  acetaminophen   Tablet .. 650 milliGRAM(s) Oral once  aspirin enteric coated 81 milliGRAM(s) Oral daily  atorvastatin 80 milliGRAM(s) Oral at bedtime  chlorhexidine 2% Cloths 1 Application(s) Topical daily  chlorhexidine 4% Liquid 1 Application(s) Topical <User Schedule>  clopidogrel Tablet 75 milliGRAM(s) Oral daily  dextrose 5%. 1000 milliLiter(s) (50 mL/Hr) IV Continuous <Continuous>  dextrose 50% Injectable 12.5 Gram(s) IV Push once  dextrose 50% Injectable 25 Gram(s) IV Push once  dextrose 50% Injectable 25 Gram(s) IV Push once  docusate sodium 100 milliGRAM(s) Oral three times a day  DULoxetine 20 milliGRAM(s) Oral daily  gabapentin 300 milliGRAM(s) Oral two times a day  heparin  Injectable 5000 Unit(s) SubCutaneous every 12 hours  isosorbide   mononitrate ER Tablet (IMDUR) 30 milliGRAM(s) Oral daily  levothyroxine 100 MICROGram(s) Oral daily  losartan 25 milliGRAM(s) Oral daily  metoprolol tartrate 50 milliGRAM(s) Oral two times a day  multivitamin 1 Tablet(s) Oral daily  pantoprazole    Tablet 40 milliGRAM(s) Oral before breakfast  senna 2 Tablet(s) Oral at bedtime    MEDICATIONS  (PRN):  ALBUTerol/ipratropium for Nebulization 3 milliLiter(s) Nebulizer every 6 hours PRN Shortness of Breath and/or Wheezing  dextrose 40% Gel 15 Gram(s) Oral once PRN Blood Glucose LESS THAN 70 milliGRAM(s)/deciLiter  glucagon  Injectable 1 milliGRAM(s) IntraMuscular once PRN Glucose <70 milliGRAM(s)/deciLiter  hydrALAZINE Injectable 10 milliGRAM(s) IV Push every 4 hours PRN SBP > 160  ondansetron Injectable 4 milliGRAM(s) IV Push every 6 hours PRN Nausea and/or Vomiting      Allergies    No Known Allergies    Intolerances          LABS:                          11.8   9.1   )-----------( 193      ( 10 Apr 2019 06:55 )             38.1     04-10    138  |  95<L>  |  24.0<H>  ----------------------------<  89  4.2   |  28.0  |  4.03<H>    Ca    9.3      10 Apr 2019 06:55  Phos  4.3     04-10  Mg     2.1     04-10    TPro  7.9  /  Alb  3.6  /  TBili  0.4  /  DBili  x   /  AST  28  /  ALT  14  /  AlkPhos  66  04-10    PT/INR - ( 08 Apr 2019 19:12 )   PT: 10.6 sec;   INR: 0.92 ratio         PTT - ( 08 Apr 2019 19:12 )  PTT:27.3 sec      RADIOLOGY & ADDITIONAL TESTS:

## 2019-04-10 NOTE — PROGRESS NOTE ADULT - SUBJECTIVE AND OBJECTIVE BOX
[facility] DIVISION OF KIDNEY DISEASES AND HYPERTENSION -- FOLLOW UP NOTE  --------------------------------------------------------------------------------  Chief Complaint: ESRD HD    24 hour events/subjective:  Seen/examined in MICU  No complaints  Tolerated HD yesterday      PAST HISTORY  --------------------------------------------------------------------------------  No significant changes to PMH, PSH, FHx, SHx, unless otherwise noted    ALLERGIES & MEDICATIONS  --------------------------------------------------------------------------------  Allergies    No Known Allergies    Intolerances      Standing Inpatient Medications  acetaminophen   Tablet .. 650 milliGRAM(s) Oral once  aspirin enteric coated 81 milliGRAM(s) Oral daily  atorvastatin 80 milliGRAM(s) Oral at bedtime  chlorhexidine 2% Cloths 1 Application(s) Topical daily  chlorhexidine 4% Liquid 1 Application(s) Topical <User Schedule>  clopidogrel Tablet 75 milliGRAM(s) Oral daily  dextrose 5%. 1000 milliLiter(s) IV Continuous <Continuous>  dextrose 50% Injectable 12.5 Gram(s) IV Push once  dextrose 50% Injectable 25 Gram(s) IV Push once  dextrose 50% Injectable 25 Gram(s) IV Push once  docusate sodium 100 milliGRAM(s) Oral three times a day  DULoxetine 20 milliGRAM(s) Oral daily  gabapentin 300 milliGRAM(s) Oral two times a day  heparin  Injectable 5000 Unit(s) SubCutaneous every 12 hours  isosorbide   mononitrate ER Tablet (IMDUR) 30 milliGRAM(s) Oral daily  levothyroxine 100 MICROGram(s) Oral daily  losartan 25 milliGRAM(s) Oral daily  metoprolol tartrate 50 milliGRAM(s) Oral two times a day  multivitamin 1 Tablet(s) Oral daily  pantoprazole    Tablet 40 milliGRAM(s) Oral before breakfast  senna 2 Tablet(s) Oral at bedtime    PRN Inpatient Medications  ALBUTerol/ipratropium for Nebulization 3 milliLiter(s) Nebulizer every 6 hours PRN  dextrose 40% Gel 15 Gram(s) Oral once PRN  glucagon  Injectable 1 milliGRAM(s) IntraMuscular once PRN  hydrALAZINE Injectable 10 milliGRAM(s) IV Push every 4 hours PRN  ondansetron Injectable 4 milliGRAM(s) IV Push every 6 hours PRN      REVIEW OF SYSTEMS  --------------------------------------------------------------------------------  Gen: No weight changes, fatigue, fevers/chills, weakness  Skin: No rashes  Head/Eyes/Ears/Mouth: No headache; Normal hearing; Normal vision w/o blurriness; No sinus pain/discomfort, sore throat  Respiratory: No dyspnea, cough, wheezing, hemoptysis  CV: No chest pain, PND, orthopnea  GI: No abdominal pain, diarrhea, constipation, nausea, vomiting, melena, hematochezia  : No increased frequency, dysuria, hematuria, nocturia  MSK: No joint pain/swelling; no back pain; no edema  Neuro: No dizziness/lightheadedness, weakness, seizures, numbness, tingling  Heme: No easy bruising or bleeding  Endo: No heat/cold intolerance  Psych: No significant nervousness, anxiety, stress, depression    All other systems were reviewed and are negative, except as noted.    VITALS/PHYSICAL EXAM  --------------------------------------------------------------------------------  T(C): 36.5 (04-10-19 @ 11:21), Max: 37.1 (04-10-19 @ 07:38)  HR: 55 (04-10-19 @ 09:00) (51 - 80)  BP: 83/57 (04-10-19 @ 09:00) (83/57 - 200/93)  RR: 25 (04-10-19 @ 09:00) (19 - 35)  SpO2: 95% (04-10-19 @ 09:43) (93% - 100%)  Wt(kg): --  Height (cm): 170.18 (04-08-19 @ 18:01)  Weight (kg): 81.6 (04-08-19 @ 18:01)  BMI (kg/m2): 28.2 (04-08-19 @ 18:01)  BSA (m2): 1.93 (04-08-19 @ 18:01)      04-09-19 @ 07:01  -  04-10-19 @ 07:00  --------------------------------------------------------  IN: 1427 mL / OUT: 2700 mL / NET: -1273 mL      Physical Exam:  PE ;  NAD  lungs - CTA  CV gr 1 murmer,  No gallop or rub  Abd : soft, NT BS +, No masses  Ext- No edema  Neuro : Grossly intact, moving extremities     LABS/STUDIES  --------------------------------------------------------------------------------              11.8   9.1   >-----------<  193      [04-10-19 @ 06:55]              38.1     138  |  95  |  24.0  ----------------------------<  89      [04-10-19 @ 06:55]  4.2   |  28.0  |  4.03        Ca     9.3     [04-10-19 @ 06:55]      Mg     2.1     [04-10-19 @ 06:55]      Phos  4.3     [04-10-19 @ 06:55]    TPro  7.9  /  Alb  3.6  /  TBili  0.4  /  DBili  x   /  AST  28  /  ALT  14  /  AlkPhos  66  [04-10-19 @ 06:55]    PT/INR: PT 10.6 , INR 0.92       [04-08-19 @ 19:12]  PTT: 27.3       [04-08-19 @ 19:12]    Troponin 0.06      [04-09-19 @ 00:22]    Creatinine Trend:  SCr 4.03 [04-10 @ 06:55]  SCr 5.05 [04-08 @ 21:21]  SCr 7.44 [03-13 @ 09:02]        Iron 34, TIBC 235, %sat 14      [01-29-19 @ 11:39]  Ferritin 1049      [01-29-19 @ 11:39]  TSH 1.83      [03-11-19 @ 07:37]  Lipid: chol 159, , HDL 32, LDL 94      [01-29-19 @ 11:39]

## 2019-04-10 NOTE — PROGRESS NOTE ADULT - SUBJECTIVE AND OBJECTIVE BOX
INTERVAL HPI/OVERNIGHT EVENTS: underwent HD yesterday, now off NTG drip, feels well, on minimal bipap    On Admission  04-09-19 (1d)  HPI:  Patient is a 79 year old male with a history of ESRD on HD T/TH/Sat, CAD status post CABG, hypertension, lung cancer status post chemotherapy and radiation with pulmonary fibrosis on home oxygen and hypothyroidism who presented to the ER with complaints of headache after noting his blood pressure at home to be 'very very high'. Pt states he has been compliant with HD and has been taking all of his cardiac medications regularly and without any dietary indiscretions. HD is TTS with Dr. Newberry. Pt is currently on NTG 10mg gtt after having received several doses of hydralazine and metoprolol without improvement. ICU and Cardiology PA consulted. Pt denies any current chest pain, shortness of breath, HA, blurry vision, palpitations, cough, leg swelling. (09 Apr 2019 04:24)    PAST MEDICAL & SURGICAL HISTORY:  Dialysis patient: Tues, Thurs, Saturday  Chronic anemia  ESRD (end stage renal disease): right Upper arm fistula  CAD (coronary artery disease)  Lung cancer: had yoni radiation in 2006.  Bipolar disorder  High cholesterol  Hypertension  S/P CABG (coronary artery bypass graft): pt&#x27;s son in law states h/o some stents near neck area ? carotid ? he is not sure.      Antimicrobial:    Cardiovascular:  hydrALAZINE Injectable 10 milliGRAM(s) IV Push every 4 hours PRN  isosorbide   mononitrate ER Tablet (IMDUR) 30 milliGRAM(s) Oral daily  losartan 25 milliGRAM(s) Oral daily  metoprolol tartrate 50 milliGRAM(s) Oral two times a day    Pulmonary:  ALBUTerol/ipratropium for Nebulization 3 milliLiter(s) Nebulizer every 6 hours PRN    Hematalogic:  aspirin enteric coated 81 milliGRAM(s) Oral daily  clopidogrel Tablet 75 milliGRAM(s) Oral daily  heparin  Injectable 5000 Unit(s) SubCutaneous every 12 hours    Other:  atorvastatin 80 milliGRAM(s) Oral at bedtime  chlorhexidine 2% Cloths 1 Application(s) Topical daily  chlorhexidine 4% Liquid 1 Application(s) Topical <User Schedule>  dextrose 40% Gel 15 Gram(s) Oral once PRN  dextrose 5%. 1000 milliLiter(s) IV Continuous <Continuous>  dextrose 50% Injectable 12.5 Gram(s) IV Push once  dextrose 50% Injectable 25 Gram(s) IV Push once  dextrose 50% Injectable 25 Gram(s) IV Push once  docusate sodium 100 milliGRAM(s) Oral three times a day  DULoxetine 20 milliGRAM(s) Oral daily  gabapentin 300 milliGRAM(s) Oral two times a day  glucagon  Injectable 1 milliGRAM(s) IntraMuscular once PRN  levothyroxine 100 MICROGram(s) Oral daily  multivitamin 1 Tablet(s) Oral daily  ondansetron Injectable 4 milliGRAM(s) IV Push every 6 hours PRN  pantoprazole    Tablet 40 milliGRAM(s) Oral before breakfast  senna 2 Tablet(s) Oral at bedtime      Drug Dosing Weight  Height (cm): 170.18 (08 Apr 2019 18:01)  Weight (kg): 81.6 (08 Apr 2019 18:01)  BMI (kg/m2): 28.2 (08 Apr 2019 18:01)  BSA (m2): 1.93 (08 Apr 2019 18:01)    T(C): 36.7 (04-10-19 @ 04:06), Max: 36.8 (04-09-19 @ 04:19)  HR: 62 (04-10-19 @ 03:55)  BP: 145/65 (04-10-19 @ 03:00)  BP(mean): 94 (04-10-19 @ 03:00)  ABP: --  ABP(mean): --  RR: 19 (04-10-19 @ 03:00)  SpO2: 100% (04-10-19 @ 03:55)                  PHYSICAL EXAM:    GENERAL: NAD, grandson at bedside  HEAD:  Atraumatic, normocephalic  EYES: EOMI, PERRL,  ENMT:  MMM, No oropharyngeal erythema or exudates  NECK:  Supple, normal appearance, trachea midline, no JVD noted  NERVOUS SYSTEM:  Alert & Oriented, Symmetric strength in all extremities    CHEST/LUNG: Bilateral air entry, no chest deformity, currently on bipap 12/5  HEART: Regular rate, regular rhythm;   ABDOMEN: Soft, Nontender, Nondistended;   EXTREMITIES:  trace edema, no clubbing, no cyanosis.  LYMPH:  No lymphadenopathy noted  SKIN:  No visible rashes or skin lesions, good capillary refill      LABS:  CBC Full  -  ( 08 Apr 2019 19:12 )  WBC Count : 8.8 K/uL  RBC Count : 4.01 M/uL  Hemoglobin : 11.3 g/dL  Hematocrit : 36.6 %  Platelet Count - Automated : 206 K/uL  Mean Cell Volume : 91.3 fl  Mean Cell Hemoglobin : 28.2 pg  Mean Cell Hemoglobin Concentration : 30.9 g/dL  Auto Neutrophil # : 4.5 K/uL  Auto Lymphocyte # : 2.5 K/uL  Auto Monocyte # : 0.8 K/uL  Auto Eosinophil # : 1.0 K/uL  Auto Basophil # : 0.1 K/uL  Auto Neutrophil % : 51.2 %  Auto Lymphocyte % : 28.2 %  Auto Monocyte % : 8.7 %  Auto Eosinophil % : 11.0 %  Auto Basophil % : 0.7 %    04-08    139  |  95<L>  |  27.0<H>  ----------------------------<  106  4.4   |  30.0<H>  |  5.05<H>    Ca    9.1      08 Apr 2019 21:21    TPro  8.1  /  Alb  3.8  /  TBili  0.3<L>  /  DBili  x   /  AST  29  /  ALT  15  /  AlkPhos  70  04-08    PT/INR - ( 08 Apr 2019 19:12 )   PT: 10.6 sec;   INR: 0.92 ratio         PTT - ( 08 Apr 2019 19:12 )  PTT:27.3 sec        RADIOLOGY personally reviewed- bilat interstitial opacities and cardiomegaly on cxr

## 2019-04-11 LAB
ANION GAP SERPL CALC-SCNC: 15 MMOL/L — SIGNIFICANT CHANGE UP (ref 5–17)
BUN SERPL-MCNC: 36 MG/DL — HIGH (ref 8–20)
CALCIUM SERPL-MCNC: 9.2 MG/DL — SIGNIFICANT CHANGE UP (ref 8.6–10.2)
CHLORIDE SERPL-SCNC: 96 MMOL/L — LOW (ref 98–107)
CO2 SERPL-SCNC: 26 MMOL/L — SIGNIFICANT CHANGE UP (ref 22–29)
CREAT SERPL-MCNC: 5.53 MG/DL — HIGH (ref 0.5–1.3)
GLUCOSE SERPL-MCNC: 119 MG/DL — HIGH (ref 70–115)
HCT VFR BLD CALC: 37 % — LOW (ref 42–52)
HGB BLD-MCNC: 11.5 G/DL — LOW (ref 14–18)
MAGNESIUM SERPL-MCNC: 2.2 MG/DL — SIGNIFICANT CHANGE UP (ref 1.8–2.6)
MCHC RBC-ENTMCNC: 28.3 PG — SIGNIFICANT CHANGE UP (ref 27–31)
MCHC RBC-ENTMCNC: 31.1 G/DL — LOW (ref 32–36)
MCV RBC AUTO: 91.1 FL — SIGNIFICANT CHANGE UP (ref 80–94)
MRSA PCR RESULT.: SIGNIFICANT CHANGE UP
PHOSPHATE SERPL-MCNC: 5.2 MG/DL — HIGH (ref 2.4–4.7)
PLATELET # BLD AUTO: 219 K/UL — SIGNIFICANT CHANGE UP (ref 150–400)
POTASSIUM SERPL-MCNC: 4 MMOL/L — SIGNIFICANT CHANGE UP (ref 3.5–5.3)
POTASSIUM SERPL-SCNC: 4 MMOL/L — SIGNIFICANT CHANGE UP (ref 3.5–5.3)
RBC # BLD: 4.06 M/UL — LOW (ref 4.6–6.2)
RBC # FLD: 16.6 % — HIGH (ref 11–15.6)
S AUREUS DNA NOSE QL NAA+PROBE: DETECTED
SODIUM SERPL-SCNC: 137 MMOL/L — SIGNIFICANT CHANGE UP (ref 135–145)
WBC # BLD: 8.3 K/UL — SIGNIFICANT CHANGE UP (ref 4.8–10.8)
WBC # FLD AUTO: 8.3 K/UL — SIGNIFICANT CHANGE UP (ref 4.8–10.8)

## 2019-04-11 PROCEDURE — 90937 HEMODIALYSIS REPEATED EVAL: CPT

## 2019-04-11 PROCEDURE — 99233 SBSQ HOSP IP/OBS HIGH 50: CPT

## 2019-04-11 PROCEDURE — 99232 SBSQ HOSP IP/OBS MODERATE 35: CPT

## 2019-04-11 RX ORDER — ACETAMINOPHEN 500 MG
650 TABLET ORAL EVERY 6 HOURS
Refills: 0 | Status: DISCONTINUED | OUTPATIENT
Start: 2019-04-11 | End: 2019-04-13

## 2019-04-11 RX ORDER — METOPROLOL TARTRATE 50 MG
100 TABLET ORAL
Refills: 0 | Status: DISCONTINUED | OUTPATIENT
Start: 2019-04-11 | End: 2019-04-13

## 2019-04-11 RX ORDER — METOPROLOL TARTRATE 50 MG
50 TABLET ORAL ONCE
Refills: 0 | Status: COMPLETED | OUTPATIENT
Start: 2019-04-11 | End: 2019-04-11

## 2019-04-11 RX ORDER — LOSARTAN POTASSIUM 100 MG/1
100 TABLET, FILM COATED ORAL DAILY
Refills: 0 | Status: DISCONTINUED | OUTPATIENT
Start: 2019-04-11 | End: 2019-04-13

## 2019-04-11 RX ORDER — DOXAZOSIN MESYLATE 4 MG
4 TABLET ORAL AT BEDTIME
Refills: 0 | Status: DISCONTINUED | OUTPATIENT
Start: 2019-04-11 | End: 2019-04-12

## 2019-04-11 RX ORDER — NIFEDIPINE 30 MG
60 TABLET, EXTENDED RELEASE 24 HR ORAL EVERY 12 HOURS
Refills: 0 | Status: DISCONTINUED | OUTPATIENT
Start: 2019-04-11 | End: 2019-04-13

## 2019-04-11 RX ORDER — ISOSORBIDE MONONITRATE 60 MG/1
60 TABLET, EXTENDED RELEASE ORAL DAILY
Refills: 0 | Status: DISCONTINUED | OUTPATIENT
Start: 2019-04-11 | End: 2019-04-13

## 2019-04-11 RX ADMIN — Medication 1 TABLET(S): at 13:31

## 2019-04-11 RX ADMIN — Medication 100 MILLIGRAM(S): at 06:17

## 2019-04-11 RX ADMIN — SENNA PLUS 2 TABLET(S): 8.6 TABLET ORAL at 22:29

## 2019-04-11 RX ADMIN — Medication 100 MILLIGRAM(S): at 17:10

## 2019-04-11 RX ADMIN — GABAPENTIN 300 MILLIGRAM(S): 400 CAPSULE ORAL at 17:10

## 2019-04-11 RX ADMIN — HEPARIN SODIUM 5000 UNIT(S): 5000 INJECTION INTRAVENOUS; SUBCUTANEOUS at 17:10

## 2019-04-11 RX ADMIN — CHLORHEXIDINE GLUCONATE 1 APPLICATION(S): 213 SOLUTION TOPICAL at 06:17

## 2019-04-11 RX ADMIN — Medication 60 MILLIGRAM(S): at 11:00

## 2019-04-11 RX ADMIN — LOSARTAN POTASSIUM 100 MILLIGRAM(S): 100 TABLET, FILM COATED ORAL at 12:08

## 2019-04-11 RX ADMIN — Medication 100 MICROGRAM(S): at 06:16

## 2019-04-11 RX ADMIN — Medication 4 MILLIGRAM(S): at 22:28

## 2019-04-11 RX ADMIN — ISOSORBIDE MONONITRATE 60 MILLIGRAM(S): 60 TABLET, EXTENDED RELEASE ORAL at 12:07

## 2019-04-11 RX ADMIN — PANTOPRAZOLE SODIUM 40 MILLIGRAM(S): 20 TABLET, DELAYED RELEASE ORAL at 06:18

## 2019-04-11 RX ADMIN — DULOXETINE HYDROCHLORIDE 20 MILLIGRAM(S): 30 CAPSULE, DELAYED RELEASE ORAL at 13:31

## 2019-04-11 RX ADMIN — HEPARIN SODIUM 5000 UNIT(S): 5000 INJECTION INTRAVENOUS; SUBCUTANEOUS at 06:17

## 2019-04-11 RX ADMIN — Medication 100 MILLIGRAM(S): at 22:28

## 2019-04-11 RX ADMIN — ATORVASTATIN CALCIUM 80 MILLIGRAM(S): 80 TABLET, FILM COATED ORAL at 22:28

## 2019-04-11 RX ADMIN — Medication 10 MILLIGRAM(S): at 13:32

## 2019-04-11 RX ADMIN — CLOPIDOGREL BISULFATE 75 MILLIGRAM(S): 75 TABLET, FILM COATED ORAL at 13:31

## 2019-04-11 RX ADMIN — GABAPENTIN 300 MILLIGRAM(S): 400 CAPSULE ORAL at 06:16

## 2019-04-11 RX ADMIN — Medication 81 MILLIGRAM(S): at 13:31

## 2019-04-11 RX ADMIN — Medication 50 MILLIGRAM(S): at 11:00

## 2019-04-11 RX ADMIN — Medication 100 MILLIGRAM(S): at 13:31

## 2019-04-11 RX ADMIN — CHLORHEXIDINE GLUCONATE 1 APPLICATION(S): 213 SOLUTION TOPICAL at 13:00

## 2019-04-11 NOTE — PROGRESS NOTE ADULT - SUBJECTIVE AND OBJECTIVE BOX
CHIEF COMPLAINT:Patient is a 79y old  Male who presents with a chief complaint of shortness of breath, chest pain (10 Apr 2019 13:09)    INTERVAL HISTORY:  pt is off bipap, off tridil drip.   Coughing with sputum production  no cp, n/v.    Allergies  No Known Allergies    	  MEDICATIONS:  hydrALAZINE Injectable 10 milliGRAM(s) IV Push every 4 hours PRN  isosorbide   mononitrate ER Tablet (IMDUR) 30 milliGRAM(s) Oral daily  losartan 25 milliGRAM(s) Oral daily  metoprolol tartrate 50 milliGRAM(s) Oral two times a day  DULoxetine 20 milliGRAM(s) Oral daily  gabapentin 300 milliGRAM(s) Oral two times a day  ondansetron Injectable 4 milliGRAM(s) IV Push every 6 hours PRN  docusate sodium 100 milliGRAM(s) Oral three times a day  pantoprazole    Tablet 40 milliGRAM(s) Oral before breakfast  senna 2 Tablet(s) Oral at bedtime  atorvastatin 80 milliGRAM(s) Oral at bedtime  dextrose 40% Gel 15 Gram(s) Oral once PRN  dextrose 50% Injectable 12.5 Gram(s) IV Push once  dextrose 50% Injectable 25 Gram(s) IV Push once  dextrose 50% Injectable 25 Gram(s) IV Push once  glucagon  Injectable 1 milliGRAM(s) IntraMuscular once PRN  levothyroxine 100 MICROGram(s) Oral daily  aspirin enteric coated 81 milliGRAM(s) Oral daily  chlorhexidine 2% Cloths 1 Application(s) Topical daily  chlorhexidine 4% Liquid 1 Application(s) Topical <User Schedule>  clopidogrel Tablet 75 milliGRAM(s) Oral daily  dextrose 5%. 1000 milliLiter(s) IV Continuous <Continuous>  heparin  Injectable 5000 Unit(s) SubCutaneous every 12 hours  multivitamin 1 Tablet(s) Oral daily    Cath: 3/2019  DIAGNOSTIC IMPRESSIONS: There is significant double vessel coronary artery  disease.  Ostial HKL=406%  Ostial BNS=832%  Patent ANA to LAD. Left ventricular function is normal.  LVEF=65%  PHYSICAL EXAM:    T(C): 36.7 (04-11-19 @ 00:33), Max: 36.7 (04-11-19 @ 00:33)  HR: 70 (04-11-19 @ 06:12) (51 - 70)  BP: 168/80 (04-11-19 @ 06:12) (83/57 - 198/77)  RR: 20 (04-11-19 @ 00:33) (17 - 37)  SpO2: 97% (04-10-19 @ 18:00) (83% - 100%)  Appearance: Normal	  HEENT:   NC/AT  Eye: Pink Conjunctiva  Lungs: CTA B/L  CVS: RRR, Normal S1 and S2, No Edema  Neuro: A&O x3    TELEMETRY: apcs with compensatory pause, 	      LABS:	 	                       11.8   9.1   )-----------( 193      ( 10 Apr 2019 06:55 )             38.1     04-10    138  |  95<L>  |  24.0<H>  ----------------------------<  89  4.2   |  28.0  |  4.03<H>    Ca    9.3      10 Apr 2019 06:55  Phos  4.3     04-10  Mg     2.1     04-10    TPro  7.9  /  Alb  3.6  /  TBili  0.4  /  DBili  x   /  AST  28  /  ALT  14  /  AlkPhos  66  04-10    ASSESSMENT/PLAN: 	  1. Hypertensive crisis, now with better BP control but not to goal  2 Increase Imdur and losartan  3. Cont with asa and plavix with CAD, CABG and PCI  4. HFpEF, SOB due to HTN and ESRD  5. HD today.   6. Cont with metoprolol.  DC tele.

## 2019-04-11 NOTE — PROGRESS NOTE ADULT - SUBJECTIVE AND OBJECTIVE BOX
Patient was seen and evaluated on dialysis.   No c/o CP SOB NV  no F/C  no swelling    T(C): 36.8 (04-11-19 @ 16:00), Max: 36.8 (04-11-19 @ 16:00)  HR: 70 (04-11-19 @ 16:00) (60 - 70)  BP: 119/57 (04-11-19 @ 16:00) (117/42 - 196/74)  Wt(kg): --  PE ;  NAD  lungs - CTA  CV gr 1 murmer,  No gallop or rub  Abd : soft, NT BS +, No masses  Ext- No edema  Neuro : Grossly intact, moving extremities                             11.5   8.3   )-----------( 219      ( 11 Apr 2019 10:22 )             37.0        04-11    137  |  96<L>  |  36.0<H>  ----------------------------<  119<H>  4.0   |  26.0  |  5.53<H>    Ca    9.2      11 Apr 2019 10:22  Phos  5.2     04-11  Mg     2.2     04-11    TPro  7.9  /  Alb  3.6  /  TBili  0.4  /  DBili  x   /  AST  28  /  ALT  14  /  AlkPhos  66  04-10      MEDICATIONS  (STANDING):  acetaminophen   Tablet .. PRN  ALBUTerol/ipratropium for Nebulization PRN  aspirin enteric coated  atorvastatin  chlorhexidine 2% Cloths  chlorhexidine 4% Liquid  clopidogrel Tablet  dextrose 40% Gel PRN  dextrose 5%.  dextrose 50% Injectable  dextrose 50% Injectable  dextrose 50% Injectable  docusate sodium  doxazosin  DULoxetine  gabapentin  glucagon  Injectable PRN  heparin  Injectable  hydrALAZINE Injectable PRN  isosorbide   mononitrate ER Tablet (IMDUR)  levothyroxine  losartan  metoprolol tartrate  multivitamin  NIFEdipine XL  ondansetron Injectable PRN  pantoprazole    Tablet  senna      Patient stable  [place] HD easily  Continue

## 2019-04-12 ENCOUNTER — APPOINTMENT (OUTPATIENT)
Dept: NEPHROLOGY | Facility: CLINIC | Age: 79
End: 2019-04-12

## 2019-04-12 PROCEDURE — 99233 SBSQ HOSP IP/OBS HIGH 50: CPT

## 2019-04-12 RX ORDER — MUPIROCIN 20 MG/G
1 OINTMENT TOPICAL EVERY 12 HOURS
Refills: 0 | Status: DISCONTINUED | OUTPATIENT
Start: 2019-04-12 | End: 2019-04-13

## 2019-04-12 RX ADMIN — Medication 1 TABLET(S): at 11:46

## 2019-04-12 RX ADMIN — LOSARTAN POTASSIUM 100 MILLIGRAM(S): 100 TABLET, FILM COATED ORAL at 06:55

## 2019-04-12 RX ADMIN — GABAPENTIN 300 MILLIGRAM(S): 400 CAPSULE ORAL at 06:54

## 2019-04-12 RX ADMIN — ISOSORBIDE MONONITRATE 60 MILLIGRAM(S): 60 TABLET, EXTENDED RELEASE ORAL at 11:46

## 2019-04-12 RX ADMIN — Medication 100 MILLIGRAM(S): at 21:25

## 2019-04-12 RX ADMIN — GABAPENTIN 300 MILLIGRAM(S): 400 CAPSULE ORAL at 17:44

## 2019-04-12 RX ADMIN — Medication 81 MILLIGRAM(S): at 11:46

## 2019-04-12 RX ADMIN — Medication 100 MILLIGRAM(S): at 11:46

## 2019-04-12 RX ADMIN — Medication 100 MILLIGRAM(S): at 06:54

## 2019-04-12 RX ADMIN — HEPARIN SODIUM 5000 UNIT(S): 5000 INJECTION INTRAVENOUS; SUBCUTANEOUS at 17:44

## 2019-04-12 RX ADMIN — CLOPIDOGREL BISULFATE 75 MILLIGRAM(S): 75 TABLET, FILM COATED ORAL at 11:46

## 2019-04-12 RX ADMIN — ATORVASTATIN CALCIUM 80 MILLIGRAM(S): 80 TABLET, FILM COATED ORAL at 21:25

## 2019-04-12 RX ADMIN — MUPIROCIN 1 APPLICATION(S): 20 OINTMENT TOPICAL at 21:47

## 2019-04-12 RX ADMIN — PANTOPRAZOLE SODIUM 40 MILLIGRAM(S): 20 TABLET, DELAYED RELEASE ORAL at 06:55

## 2019-04-12 RX ADMIN — CHLORHEXIDINE GLUCONATE 1 APPLICATION(S): 213 SOLUTION TOPICAL at 06:54

## 2019-04-12 RX ADMIN — Medication 100 MICROGRAM(S): at 06:55

## 2019-04-12 RX ADMIN — CHLORHEXIDINE GLUCONATE 1 APPLICATION(S): 213 SOLUTION TOPICAL at 11:47

## 2019-04-12 RX ADMIN — DULOXETINE HYDROCHLORIDE 20 MILLIGRAM(S): 30 CAPSULE, DELAYED RELEASE ORAL at 11:46

## 2019-04-12 RX ADMIN — SENNA PLUS 2 TABLET(S): 8.6 TABLET ORAL at 21:25

## 2019-04-12 RX ADMIN — HEPARIN SODIUM 5000 UNIT(S): 5000 INJECTION INTRAVENOUS; SUBCUTANEOUS at 06:57

## 2019-04-12 NOTE — PROGRESS NOTE ADULT - SUBJECTIVE AND OBJECTIVE BOX
[facility] DIVISION OF KIDNEY DISEASES AND HYPERTENSION -- FOLLOW UP NOTE  --------------------------------------------------------------------------------  Chief Complaint: ESRD on HD    24 hour events/subjective:  Seen/examined  HD planned for AM  Go discussed by hospitalist      PAST HISTORY  --------------------------------------------------------------------------------  No significant changes to PMH, PSH, FHx, SHx, unless otherwise noted    ALLERGIES & MEDICATIONS  --------------------------------------------------------------------------------  Allergies    No Known Allergies    Intolerances      Standing Inpatient Medications  aspirin enteric coated 81 milliGRAM(s) Oral daily  atorvastatin 80 milliGRAM(s) Oral at bedtime  chlorhexidine 2% Cloths 1 Application(s) Topical daily  chlorhexidine 4% Liquid 1 Application(s) Topical <User Schedule>  clopidogrel Tablet 75 milliGRAM(s) Oral daily  dextrose 5%. 1000 milliLiter(s) IV Continuous <Continuous>  dextrose 50% Injectable 12.5 Gram(s) IV Push once  dextrose 50% Injectable 25 Gram(s) IV Push once  dextrose 50% Injectable 25 Gram(s) IV Push once  docusate sodium 100 milliGRAM(s) Oral three times a day  DULoxetine 20 milliGRAM(s) Oral daily  gabapentin 300 milliGRAM(s) Oral two times a day  heparin  Injectable 5000 Unit(s) SubCutaneous every 12 hours  isosorbide   mononitrate ER Tablet (IMDUR) 60 milliGRAM(s) Oral daily  levothyroxine 100 MICROGram(s) Oral daily  losartan 100 milliGRAM(s) Oral daily  metoprolol tartrate 100 milliGRAM(s) Oral two times a day  multivitamin 1 Tablet(s) Oral daily  mupirocin 2% Nasal 1 Application(s) Nasal every 12 hours  NIFEdipine XL 60 milliGRAM(s) Oral every 12 hours  pantoprazole    Tablet 40 milliGRAM(s) Oral before breakfast  senna 2 Tablet(s) Oral at bedtime    PRN Inpatient Medications  acetaminophen   Tablet .. 650 milliGRAM(s) Oral every 6 hours PRN  ALBUTerol/ipratropium for Nebulization 3 milliLiter(s) Nebulizer every 6 hours PRN  dextrose 40% Gel 15 Gram(s) Oral once PRN  glucagon  Injectable 1 milliGRAM(s) IntraMuscular once PRN  hydrALAZINE Injectable 10 milliGRAM(s) IV Push every 4 hours PRN  ondansetron Injectable 4 milliGRAM(s) IV Push every 6 hours PRN      REVIEW OF SYSTEMS  --------------------------------------------------------------------------------  Gen: No weight changes, fatigue, fevers/chills, weakness  Skin: No rashes  Head/Eyes/Ears/Mouth: No headache; Normal hearing; Normal vision w/o blurriness; No sinus pain/discomfort, sore throat  Respiratory: No dyspnea, cough, wheezing, hemoptysis  CV: No chest pain, PND, orthopnea  GI: No abdominal pain, diarrhea, constipation, nausea, vomiting, melena, hematochezia  : No increased frequency, dysuria, hematuria, nocturia  MSK: No joint pain/swelling; no back pain; no edema  Neuro: No dizziness/lightheadedness, weakness, seizures, numbness, tingling  Heme: No easy bruising or bleeding  Endo: No heat/cold intolerance  Psych: No significant nervousness, anxiety, stress, depression    All other systems were reviewed and are negative, except as noted.    VITALS/PHYSICAL EXAM  --------------------------------------------------------------------------------  T(C): 36.1 (04-12-19 @ 16:17), Max: 36.8 (04-11-19 @ 23:05)  HR: 57 (04-12-19 @ 16:17) (50 - 61)  BP: 106/48 (04-12-19 @ 16:17) (104/48 - 120/49)  RR: 17 (04-12-19 @ 16:17) (17 - 18)  SpO2: 93% (04-12-19 @ 16:17) (91% - 93%)  Wt(kg): --        Physical Exam:  	Gen: NAD; chronically ill appearing  	HEENT: PERRL, supple neck, clear oropharynx  	Pulm: CTA B/L  	CV: RRR, S1S2; no rub  	Back: No spinal or CVA tenderness; no sacral edema  	Abd: +BS, soft, nontender/nondistended  	: No suprapubic tenderness  	UE: Warm, FROM, no clubbing, intact strength; no edema; no asterixis  	LE: Warm, FROM, no clubbing, intact strength; no edema  	Neuro: No focal deficits, intact gait  	Psych: Normal affect and mood  	Skin: Warm, without rashes  	Vascular access:    LABS/STUDIES  --------------------------------------------------------------------------------              11.5   8.3   >-----------<  219      [04-11-19 @ 10:22]              37.0     137  |  96  |  36.0  ----------------------------<  119      [04-11-19 @ 10:22]  4.0   |  26.0  |  5.53        Ca     9.2     [04-11-19 @ 10:22]      Mg     2.2     [04-11-19 @ 10:22]      Phos  5.2     [04-11-19 @ 10:22]            Creatinine Trend:  SCr 5.53 [04-11 @ 10:22]  SCr 4.03 [04-10 @ 06:55]  SCr 5.05 [04-08 @ 21:21]        Iron 34, TIBC 235, %sat 14      [01-29-19 @ 11:39]  Ferritin 1049      [01-29-19 @ 11:39]  TSH 1.83      [03-11-19 @ 07:37]  Lipid: chol 159, , HDL 32, LDL 94      [01-29-19 @ 11:39]    HBsAg Nonreact      [04-10-19 @ 17:37]

## 2019-04-12 NOTE — PROGRESS NOTE ADULT - SUBJECTIVE AND OBJECTIVE BOX
is a chronically ill 78 y/o male w/PMhx of pulmonary fibrosis, acute on chronic diastolic heart failure, and ESRD on HD. He has hypertensive urgency and needs BP control. I have uptitrated metoprolol to 100mg bid and he has had 1.5 L fluid removal in HD yesterday, on 04/16. I added doxazosin at bedtime and his systolics are in the early 100s today. I've d/cd his bedtime doxazosin for tonight.    He had a PT consult, he can be d/cd home with PT tomm after HD. I also had a family meeting with his son Rivera, two daughters, his other son and grandson. He was made a DNR/DNI back in February with MOLST form in the chart. I've placed the DNR/DNI order in the computer and informed nursing. He can be d/cd home tomm after HD.     I also explained to his family including Rivera on the phone, that he has triple organ system failure and is at high risk for sudden death. They understand his grave prognosis and he is a DNR/DNI.    Summary:   REVIEW OF SYSTEMS    General:	unobtainable, sleeping    Skin/Breast:  	  Ophthalmologic:  	  ENMT:	    Respiratory and Thorax:  	  Cardiovascular:	    Gastrointestinal:	    Genitourinary:	    Musculoskeletal:	    Neurological:	    Psychiatric:	    Hematology/Lymphatics:	    Endocrine:	    Allergic/Immunologic:	  Vital Signs Last 24 Hrs  T(C): 36.7 (12 Apr 2019 07:35), Max: 36.8 (11 Apr 2019 16:00)  T(F): 98.1 (12 Apr 2019 07:35), Max: 98.2 (11 Apr 2019 16:00)  HR: 61 (12 Apr 2019 07:35) (50 - 70)  BP: 112/47 (12 Apr 2019 07:35) (104/48 - 166/61)  BP(mean): --  RR: 18 (12 Apr 2019 07:35) (18 - 18)  SpO2: 93% (12 Apr 2019 07:35) (91% - 98%)  PHYSICAL EXAM:  GENERAL: NAD, AOX3  NECK: Supple, No JVD  CHEST/LUNG: Bilateral rhonchi   HEART: Regular rate and rhythm; No murmurs, rubs, or gallops  ABDOMEN: Soft, Nontender, Nondistended; Bowel sounds present  EXTREMITIES:  2+ Peripheral Pulses, No clubbing, cyanosis, or edema                        11.5   8.3   )-----------( 219      ( 11 Apr 2019 10:22 )             37.0     04-11    137  |  96<L>  |  36.0<H>  ----------------------------<  119<H>  4.0   |  26.0  |  5.53<H>    Ca    9.2      11 Apr 2019 10:22  Phos  5.2     04-11  Mg     2.2     04-11    Radiology:     MEDICATIONS  (STANDING):  aspirin enteric coated 81 milliGRAM(s) Oral daily  atorvastatin 80 milliGRAM(s) Oral at bedtime  chlorhexidine 2% Cloths 1 Application(s) Topical daily  chlorhexidine 4% Liquid 1 Application(s) Topical <User Schedule>  clopidogrel Tablet 75 milliGRAM(s) Oral daily  dextrose 5%. 1000 milliLiter(s) (50 mL/Hr) IV Continuous <Continuous>  dextrose 50% Injectable 12.5 Gram(s) IV Push once  dextrose 50% Injectable 25 Gram(s) IV Push once  dextrose 50% Injectable 25 Gram(s) IV Push once  docusate sodium 100 milliGRAM(s) Oral three times a day  DULoxetine 20 milliGRAM(s) Oral daily  gabapentin 300 milliGRAM(s) Oral two times a day  heparin  Injectable 5000 Unit(s) SubCutaneous every 12 hours  isosorbide   mononitrate ER Tablet (IMDUR) 60 milliGRAM(s) Oral daily  levothyroxine 100 MICROGram(s) Oral daily  losartan 100 milliGRAM(s) Oral daily  metoprolol tartrate 100 milliGRAM(s) Oral two times a day  multivitamin 1 Tablet(s) Oral daily  mupirocin 2% Nasal 1 Application(s) Nasal every 12 hours  NIFEdipine XL 60 milliGRAM(s) Oral every 12 hours  pantoprazole    Tablet 40 milliGRAM(s) Oral before breakfast  senna 2 Tablet(s) Oral at bedtime    MEDICATIONS  (PRN):  acetaminophen   Tablet .. 650 milliGRAM(s) Oral every 6 hours PRN Mild Pain (1 - 3)  ALBUTerol/ipratropium for Nebulization 3 milliLiter(s) Nebulizer every 6 hours PRN Shortness of Breath and/or Wheezing  dextrose 40% Gel 15 Gram(s) Oral once PRN Blood Glucose LESS THAN 70 milliGRAM(s)/deciLiter  glucagon  Injectable 1 milliGRAM(s) IntraMuscular once PRN Glucose <70 milliGRAM(s)/deciLiter  hydrALAZINE Injectable 10 milliGRAM(s) IV Push every 4 hours PRN SBP > 160  ondansetron Injectable 4 milliGRAM(s) IV Push every 6 hours PRN Nausea and/or Vomiting

## 2019-04-12 NOTE — PHYSICAL THERAPY INITIAL EVALUATION ADULT - GAIT PATTERN USED, PT EVAL
unsteadiness throughout requiring contact assist for safety, decreased gait velocity and activity tolerance, verbal cues for energy conservation

## 2019-04-12 NOTE — PROGRESS NOTE ADULT - ASSESSMENT
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
 has:
The patient is a 79 year old male with a past medical history of ESRD on HD (T/Th/S; began 01/2019), CAD s/p CABG, HTN, HLD, Lung Ca s/p Chemo/RXT on 2L NC with radiation induced pulmonary fibrosis recently hospitalized for hypertensive emergency, hypoxic respiratory failure in setting of pulmonary edema 2/2 volume overload requiring fluid removal with HD biba from home with complaints of headache.    In ED patient found to be hypertensive to , patient given metoprolol and losartan, started on nitro gtt and admitted to hospitalist service. He developed acute on chronic hypoxic respiratory failure requiring bipap and increased IV nitro therefore was admitted to the MICU. Status post HD yesterday, IV nitro was weaned off and oral blood pressure medications continued with improvement.     Assessment/Plan:    1. Acute on chronic hypoxic respiratory failure now weaned off bipap  On nasal cannula    2. Acute on chronic diastolic CHF secondary to fluid overload: Improved with HD yesterday.   HD today    3. Hypertensive emergency: BP improved  Titrated off IV nitro  Continue PO meds  Monitor Bp    3. History of lung CA with pulmonary fibrosis on home oxygen    4. ESRD on HD Tues/Thurs/SAT; For HD today     5. History of CAD; on aspirin/plavix/bb/arb/statin    VTE- heparin subcut
1 ESRD ON HD  2) MBD of renal dx  3 ) Vol HTN  4) Anemia      HD in am  Orders placed  Improved respiratory status  Continue current management    dw MICU PA
1) ESRD on HD  2) Anemia of renal dx  3) MBD of renal dx  4) HTN    Plan for HD in [facility] Dr Abhi Molina
Patient recieved from ER on bipap.  Patient appears comfortable with no respiratory distress at this time.  Hemodialysis in progress at this time.    3 Hr HD Completed,    HTN - Intra dialysis,     Venous chamber clotted,    HD Again on Th, Hgb., Heparin , BP Management,
The patient is a 79 year old male with a past medical history of ESRD on HD (T/Th/S; began 01/2019), CAD s/p CABG, HTN, HLD, Lung Ca s/p Chemo/RXT on 2L NC with radiation induced pulmonary fibrosis recently hospitalized for hypertensive emergency, hypoxic respiratory failure in setting of pulmonary edema 2/2 volume overload requiring fluid removal with HD biba from home with complaints of headache.    In ED patient found to be hypertensive to , patient given metoprolol and losartan, started on nitro gtt and admitted to hospitalist service. He developed acute on chronic hypoxic respiratory failure requiring bipap and increased IV nitro therefore was admitted to the MICU. Status post HD yesterday, IV nitro was weaned off and oral blood pressure medications continued with improvement.     Assessment/Plan:    1. Acute on chronic hypoxic respiratory failure now weaned off bipap  On nasal cannula    2. Acute on chronic diastolic CHF secondary to fluid overload: Improved with HD yesterday.   HD today    3. Hypertensive emergency: BP improved  increased metoprolol, cont imdur, cont losartan, added doxazosin 4mg qhs    3. History of lung CA with pulmonary fibrosis on home oxygen    4. ESRD on HD Tues/Thurs/SAT; For HD today     5. History of CAD; on aspirin/plavix/bb/arb/statin    VTE- heparin subcut

## 2019-04-12 NOTE — PROGRESS NOTE ADULT - PROBLEM SELECTOR PLAN 2
Will take off bipap in AM
-HD TU/TH/SAT  -family understands grave prognosis, he's DNR/DNI and they understand he's at high risk for sudden death

## 2019-04-12 NOTE — PROGRESS NOTE ADULT - PROBLEM SELECTOR PLAN 1
-BPs lower today am, cont higher dose of metoprolol [id number] bid but d/cd doxazosin at bedtime  -HD tomm and then can be d/cd home
BP now well on controlled on PO meds, off nitro drip.  Currently on Imdur, losartan, metoprolol, PRN hydralazine

## 2019-04-12 NOTE — PHYSICAL THERAPY INITIAL EVALUATION ADULT - MANUAL MUSCLE TESTING RESULTS, REHAB EVAL
no strength deficits were identified except kimi LE: grossly 4-/5 throughout/no strength deficits were identified

## 2019-04-12 NOTE — PHYSICAL THERAPY INITIAL EVALUATION ADULT - ADDITIONAL COMMENTS
Pt lives with daughter and her family in a 1 story house with no steps.  Family can provide 24/7 assist. PTA, amb with RW independently.  Had assist with ADLs and self care. has home O2

## 2019-04-12 NOTE — PHYSICAL THERAPY INITIAL EVALUATION ADULT - PERTINENT HX OF CURRENT PROBLEM, REHAB EVAL
pt presents to [facility] due to acute on chronic CHF, headache, hypertension

## 2019-04-13 ENCOUNTER — TRANSCRIPTION ENCOUNTER (OUTPATIENT)
Age: 79
End: 2019-04-13

## 2019-04-13 VITALS
SYSTOLIC BLOOD PRESSURE: 116 MMHG | RESPIRATION RATE: 18 BRPM | DIASTOLIC BLOOD PRESSURE: 60 MMHG | WEIGHT: 116.84 LBS | HEART RATE: 63 BPM | OXYGEN SATURATION: 98 % | TEMPERATURE: 98 F

## 2019-04-13 PROCEDURE — 80053 COMPREHEN METABOLIC PANEL: CPT

## 2019-04-13 PROCEDURE — 94760 N-INVAS EAR/PLS OXIMETRY 1: CPT

## 2019-04-13 PROCEDURE — 96374 THER/PROPH/DIAG INJ IV PUSH: CPT

## 2019-04-13 PROCEDURE — 70450 CT HEAD/BRAIN W/O DYE: CPT

## 2019-04-13 PROCEDURE — 99291 CRITICAL CARE FIRST HOUR: CPT | Mod: 25

## 2019-04-13 PROCEDURE — 80048 BASIC METABOLIC PNL TOTAL CA: CPT

## 2019-04-13 PROCEDURE — 71045 X-RAY EXAM CHEST 1 VIEW: CPT

## 2019-04-13 PROCEDURE — 99261: CPT

## 2019-04-13 PROCEDURE — 99239 HOSP IP/OBS DSCHRG MGMT >30: CPT

## 2019-04-13 PROCEDURE — 85610 PROTHROMBIN TIME: CPT

## 2019-04-13 PROCEDURE — 97163 PT EVAL HIGH COMPLEX 45 MIN: CPT

## 2019-04-13 PROCEDURE — 83735 ASSAY OF MAGNESIUM: CPT

## 2019-04-13 PROCEDURE — 84100 ASSAY OF PHOSPHORUS: CPT

## 2019-04-13 PROCEDURE — 94660 CPAP INITIATION&MGMT: CPT

## 2019-04-13 PROCEDURE — 90937 HEMODIALYSIS REPEATED EVAL: CPT

## 2019-04-13 PROCEDURE — 36415 COLL VENOUS BLD VENIPUNCTURE: CPT

## 2019-04-13 PROCEDURE — 85730 THROMBOPLASTIN TIME PARTIAL: CPT

## 2019-04-13 PROCEDURE — 96376 TX/PRO/DX INJ SAME DRUG ADON: CPT

## 2019-04-13 PROCEDURE — 85027 COMPLETE CBC AUTOMATED: CPT

## 2019-04-13 PROCEDURE — 93005 ELECTROCARDIOGRAM TRACING: CPT

## 2019-04-13 PROCEDURE — 84484 ASSAY OF TROPONIN QUANT: CPT

## 2019-04-13 PROCEDURE — 87640 STAPH A DNA AMP PROBE: CPT

## 2019-04-13 PROCEDURE — 87340 HEPATITIS B SURFACE AG IA: CPT

## 2019-04-13 PROCEDURE — 96375 TX/PRO/DX INJ NEW DRUG ADDON: CPT

## 2019-04-13 RX ORDER — METOPROLOL TARTRATE 50 MG
1 TABLET ORAL
Qty: 60 | Refills: 0
Start: 2019-04-13 | End: 2019-05-12

## 2019-04-13 RX ORDER — ACETAMINOPHEN 500 MG
2 TABLET ORAL
Qty: 0 | Refills: 0 | DISCHARGE
Start: 2019-04-13

## 2019-04-13 RX ORDER — ISOSORBIDE MONONITRATE 60 MG/1
1 TABLET, EXTENDED RELEASE ORAL
Qty: 30 | Refills: 0
Start: 2019-04-13 | End: 2019-05-12

## 2019-04-13 RX ORDER — NIFEDIPINE 30 MG
1 TABLET, EXTENDED RELEASE 24 HR ORAL
Qty: 60 | Refills: 0
Start: 2019-04-13 | End: 2019-05-12

## 2019-04-13 RX ADMIN — MUPIROCIN 1 APPLICATION(S): 20 OINTMENT TOPICAL at 05:30

## 2019-04-13 RX ADMIN — Medication 100 MILLIGRAM(S): at 16:47

## 2019-04-13 RX ADMIN — GABAPENTIN 300 MILLIGRAM(S): 400 CAPSULE ORAL at 05:29

## 2019-04-13 RX ADMIN — ISOSORBIDE MONONITRATE 60 MILLIGRAM(S): 60 TABLET, EXTENDED RELEASE ORAL at 16:47

## 2019-04-13 RX ADMIN — Medication 100 MICROGRAM(S): at 05:30

## 2019-04-13 RX ADMIN — DULOXETINE HYDROCHLORIDE 20 MILLIGRAM(S): 30 CAPSULE, DELAYED RELEASE ORAL at 11:09

## 2019-04-13 RX ADMIN — Medication 100 MILLIGRAM(S): at 05:29

## 2019-04-13 RX ADMIN — LOSARTAN POTASSIUM 100 MILLIGRAM(S): 100 TABLET, FILM COATED ORAL at 05:29

## 2019-04-13 RX ADMIN — CHLORHEXIDINE GLUCONATE 1 APPLICATION(S): 213 SOLUTION TOPICAL at 11:48

## 2019-04-13 RX ADMIN — HEPARIN SODIUM 5000 UNIT(S): 5000 INJECTION INTRAVENOUS; SUBCUTANEOUS at 05:29

## 2019-04-13 RX ADMIN — Medication 100 MILLIGRAM(S): at 05:30

## 2019-04-13 RX ADMIN — Medication 1 TABLET(S): at 11:08

## 2019-04-13 RX ADMIN — CHLORHEXIDINE GLUCONATE 1 APPLICATION(S): 213 SOLUTION TOPICAL at 08:17

## 2019-04-13 RX ADMIN — PANTOPRAZOLE SODIUM 40 MILLIGRAM(S): 20 TABLET, DELAYED RELEASE ORAL at 05:30

## 2019-04-13 RX ADMIN — Medication 81 MILLIGRAM(S): at 11:08

## 2019-04-13 RX ADMIN — Medication 650 MILLIGRAM(S): at 14:07

## 2019-04-13 RX ADMIN — Medication 650 MILLIGRAM(S): at 13:07

## 2019-04-13 RX ADMIN — CLOPIDOGREL BISULFATE 75 MILLIGRAM(S): 75 TABLET, FILM COATED ORAL at 11:08

## 2019-04-13 NOTE — DISCHARGE NOTE NURSING/CASE MANAGEMENT/SOCIAL WORK - NSDCDPATPORTLINK_GEN_ALL_CORE
You can access the [facility] Patient Portal, offered by [facility], by registering with the following website: [email or web address]

## 2019-04-13 NOTE — DISCHARGE NOTE PROVIDER - CARE PROVIDER_API CALL
Brigido Newberry (DO)  Internal Medicine  [address] [place] [address], [address] [place] [address]  Phone: [phone]  Fax: [phone]  Follow Up Time:

## 2019-04-13 NOTE — PROGRESS NOTE ADULT - PROVIDER SPECIALTY LIST ADULT
Hospitalist
Nephrology
Hospitalist
Nephrology
Nephrology
Cardiology
Critical Care
Hospitalist

## 2019-04-13 NOTE — DISCHARGE NOTE PROVIDER - HOSPITAL COURSE
is a chronically ill 78 y/o male w/PMhx of pulmonary fibrosis, acute on chronic diastolic heart failure, and ESRD on HD. He has hypertensive urgency and needs BP control. I have uptitrated metoprolol to 100mg bid and he has had 1.5 L fluid removal in HD on 04/16.         He had a PT consult, he can be d/cd home today, PT states that he's at his baseline. I also had a family meeting with his son Rivera, two daughters, his other son and grandson. He was made a DNR/DNI back in February with MOLST form in the chart. I've placed the DNR/DNI order in the computer and informed nursing. His family understands that he is critically ill and that his prognosis is grave. They are aware of his pulmonary fibrosis, his acute on chronic diastolic heart failure and his renal failure. I explained to them at length that triple organ system failure has very high morbidity and mortality, especially sudden death. His son, including Rivera, verbalized undertanding.  He can be d/cd home tomm after HD.         His SBPs are btw 130s and 140s and he can be discharged home after HD.

## 2019-04-13 NOTE — PROGRESS NOTE ADULT - REASON FOR ADMISSION
shortness of breath, chest pain
shortness of breath
shortness of breath, chest pain

## 2019-04-13 NOTE — DISCHARGE NOTE PROVIDER - NSDCCPCAREPLAN_GEN_ALL_CORE_FT
PRINCIPAL DISCHARGE DIAGNOSIS  Diagnosis: Hypertensive urgency  Assessment and Plan of Treatment:       SECONDARY DISCHARGE DIAGNOSES  Diagnosis: ESRD (end stage renal disease)  Assessment and Plan of Treatment: right Upper arm fistula

## 2019-04-13 NOTE — PROGRESS NOTE ADULT - SUBJECTIVE AND OBJECTIVE BOX
Patient was seen and evaluated on dialysis.   No c/o CP SOB NV  no F/C  no swelling    T(C): 36.7 (04-13-19 @ 11:50), Max: 36.7 (04-12-19 @ 23:20)  HR: 56 (04-13-19 @ 11:50) (52 - 76)  BP: 102/50 (04-13-19 @ 11:50) (100/47 - 145/60)    PE ;  NAD  lungs - CTA  CV gr 1 murmur,  No gallop or rub  Abd : soft, NT BS +, No masses  Ext- No edema  Neuro : Grossly intact, moving extremities     MEDICATIONS  (STANDING):    aspirin enteric coated  atorvastatin  clopidogrel Tablet  docusate sodium  DULoxetine  gabapentin  heparin  Injectable  isosorbide   mononitrate ER Tablet (IMDUR)  levothyroxine  losartan  metoprolol tartrate  multivitamin  NIFEdipine XL  RN  pantoprazole    Tablet  senna    Patient stable  [place] HD easily  Continue HD TIW,     Cleared for discharge, OP HD On T Th S ,

## 2019-04-19 ENCOUNTER — INPATIENT (INPATIENT)
Facility: HOSPITAL | Age: 79
LOS: 6 days | Discharge: HOME HEALTH | DRG: 291 | End: 2019-04-26
Attending: HOSPITALIST | Admitting: INTERNAL MEDICINE
Payer: COMMERCIAL

## 2019-04-19 VITALS
DIASTOLIC BLOOD PRESSURE: 69 MMHG | TEMPERATURE: 98 F | OXYGEN SATURATION: 96 % | RESPIRATION RATE: 20 BRPM | SYSTOLIC BLOOD PRESSURE: 158 MMHG | HEIGHT: 66 IN | WEIGHT: 121.92 LBS | HEART RATE: 52 BPM

## 2019-04-19 DIAGNOSIS — I50.9 HEART FAILURE, UNSPECIFIED: ICD-10-CM

## 2019-04-19 DIAGNOSIS — R07.9 CHEST PAIN, UNSPECIFIED: ICD-10-CM

## 2019-04-19 DIAGNOSIS — I10 ESSENTIAL (PRIMARY) HYPERTENSION: ICD-10-CM

## 2019-04-19 DIAGNOSIS — R74.8 ABNORMAL LEVELS OF OTHER SERUM ENZYMES: ICD-10-CM

## 2019-04-19 DIAGNOSIS — Z95.1 PRESENCE OF AORTOCORONARY BYPASS GRAFT: Chronic | ICD-10-CM

## 2019-04-19 LAB
ALBUMIN SERPL ELPH-MCNC: 3.4 G/DL — SIGNIFICANT CHANGE UP (ref 3.3–5.2)
ALP SERPL-CCNC: 55 U/L — SIGNIFICANT CHANGE UP (ref 40–120)
ALT FLD-CCNC: 41 U/L — HIGH
ANION GAP SERPL CALC-SCNC: 13 MMOL/L — SIGNIFICANT CHANGE UP (ref 5–17)
APTT BLD: 30.7 SEC — SIGNIFICANT CHANGE UP (ref 27.5–36.3)
AST SERPL-CCNC: 100 U/L — HIGH
BASOPHILS # BLD AUTO: 0.1 K/UL — SIGNIFICANT CHANGE UP (ref 0–0.2)
BASOPHILS NFR BLD AUTO: 0.8 % — SIGNIFICANT CHANGE UP (ref 0–2)
BILIRUB SERPL-MCNC: 0.3 MG/DL — LOW (ref 0.4–2)
BUN SERPL-MCNC: 14 MG/DL — SIGNIFICANT CHANGE UP (ref 8–20)
CALCIUM SERPL-MCNC: 9 MG/DL — SIGNIFICANT CHANGE UP (ref 8.6–10.2)
CHLORIDE SERPL-SCNC: 90 MMOL/L — LOW (ref 98–107)
CK MB CFR SERPL CALC: 6.6 NG/ML — SIGNIFICANT CHANGE UP (ref 0–6.7)
CK SERPL-CCNC: 1353 U/L — HIGH (ref 30–200)
CO2 SERPL-SCNC: 29 MMOL/L — SIGNIFICANT CHANGE UP (ref 22–29)
CREAT SERPL-MCNC: 3.4 MG/DL — HIGH (ref 0.5–1.3)
EOSINOPHIL # BLD AUTO: 0.4 K/UL — SIGNIFICANT CHANGE UP (ref 0–0.5)
EOSINOPHIL NFR BLD AUTO: 5 % — SIGNIFICANT CHANGE UP (ref 0–5)
GLUCOSE SERPL-MCNC: 101 MG/DL — SIGNIFICANT CHANGE UP (ref 70–115)
HCT VFR BLD CALC: 34.1 % — LOW (ref 42–52)
HGB BLD-MCNC: 10.5 G/DL — LOW (ref 14–18)
INR BLD: 0.96 RATIO — SIGNIFICANT CHANGE UP (ref 0.88–1.16)
LYMPHOCYTES # BLD AUTO: 1.8 K/UL — SIGNIFICANT CHANGE UP (ref 1–4.8)
LYMPHOCYTES # BLD AUTO: 20.3 % — SIGNIFICANT CHANGE UP (ref 20–55)
MCHC RBC-ENTMCNC: 27.8 PG — SIGNIFICANT CHANGE UP (ref 27–31)
MCHC RBC-ENTMCNC: 30.8 G/DL — LOW (ref 32–36)
MCV RBC AUTO: 90.2 FL — SIGNIFICANT CHANGE UP (ref 80–94)
MONOCYTES # BLD AUTO: 1 K/UL — HIGH (ref 0–0.8)
MONOCYTES NFR BLD AUTO: 10.7 % — HIGH (ref 3–10)
NEUTROPHILS # BLD AUTO: 5.7 K/UL — SIGNIFICANT CHANGE UP (ref 1.8–8)
NEUTROPHILS NFR BLD AUTO: 62.9 % — SIGNIFICANT CHANGE UP (ref 37–73)
NT-PROBNP SERPL-SCNC: HIGH PG/ML (ref 0–300)
NT-PROBNP SERPL-SCNC: HIGH PG/ML (ref 0–300)
PLATELET # BLD AUTO: 242 K/UL — SIGNIFICANT CHANGE UP (ref 150–400)
POTASSIUM SERPL-MCNC: 4.2 MMOL/L — SIGNIFICANT CHANGE UP (ref 3.5–5.3)
POTASSIUM SERPL-SCNC: 4.2 MMOL/L — SIGNIFICANT CHANGE UP (ref 3.5–5.3)
PROT SERPL-MCNC: 7.5 G/DL — SIGNIFICANT CHANGE UP (ref 6.6–8.7)
PROTHROM AB SERPL-ACNC: 11 SEC — SIGNIFICANT CHANGE UP (ref 10–12.9)
RAPID RVP RESULT: SIGNIFICANT CHANGE UP
RBC # BLD: 3.78 M/UL — LOW (ref 4.6–6.2)
RBC # FLD: 16.4 % — HIGH (ref 11–15.6)
SODIUM SERPL-SCNC: 132 MMOL/L — LOW (ref 135–145)
TROPONIN T SERPL-MCNC: 0.3 NG/ML — HIGH (ref 0–0.06)
TROPONIN T SERPL-MCNC: 0.32 NG/ML — HIGH (ref 0–0.06)
WBC # BLD: 9 K/UL — SIGNIFICANT CHANGE UP (ref 4.8–10.8)
WBC # FLD AUTO: 9 K/UL — SIGNIFICANT CHANGE UP (ref 4.8–10.8)

## 2019-04-19 PROCEDURE — 99285 EMERGENCY DEPT VISIT HI MDM: CPT

## 2019-04-19 PROCEDURE — 99233 SBSQ HOSP IP/OBS HIGH 50: CPT

## 2019-04-19 PROCEDURE — 99223 1ST HOSP IP/OBS HIGH 75: CPT

## 2019-04-19 PROCEDURE — 71045 X-RAY EXAM CHEST 1 VIEW: CPT | Mod: 26

## 2019-04-19 RX ORDER — METOPROLOL TARTRATE 50 MG
100 TABLET ORAL
Refills: 0 | Status: DISCONTINUED | OUTPATIENT
Start: 2019-04-19 | End: 2019-04-25

## 2019-04-19 RX ORDER — CLOPIDOGREL BISULFATE 75 MG/1
75 TABLET, FILM COATED ORAL DAILY
Refills: 0 | Status: DISCONTINUED | OUTPATIENT
Start: 2019-04-19 | End: 2019-04-22

## 2019-04-19 RX ORDER — ASPIRIN/CALCIUM CARB/MAGNESIUM 324 MG
324 TABLET ORAL DAILY
Refills: 0 | Status: DISCONTINUED | OUTPATIENT
Start: 2019-04-19 | End: 2019-04-26

## 2019-04-19 RX ORDER — GABAPENTIN 400 MG/1
300 CAPSULE ORAL
Refills: 0 | Status: DISCONTINUED | OUTPATIENT
Start: 2019-04-19 | End: 2019-04-26

## 2019-04-19 RX ORDER — PIPERACILLIN AND TAZOBACTAM 4; .5 G/20ML; G/20ML
3.38 INJECTION, POWDER, LYOPHILIZED, FOR SOLUTION INTRAVENOUS ONCE
Refills: 0 | Status: COMPLETED | OUTPATIENT
Start: 2019-04-19 | End: 2019-04-20

## 2019-04-19 RX ORDER — LOSARTAN POTASSIUM 100 MG/1
50 TABLET, FILM COATED ORAL DAILY
Refills: 0 | Status: DISCONTINUED | OUTPATIENT
Start: 2019-04-19 | End: 2019-04-23

## 2019-04-19 RX ORDER — PANTOPRAZOLE SODIUM 20 MG/1
40 TABLET, DELAYED RELEASE ORAL
Refills: 0 | Status: DISCONTINUED | OUTPATIENT
Start: 2019-04-19 | End: 2019-04-26

## 2019-04-19 RX ORDER — DULOXETINE HYDROCHLORIDE 30 MG/1
20 CAPSULE, DELAYED RELEASE ORAL DAILY
Refills: 0 | Status: DISCONTINUED | OUTPATIENT
Start: 2019-04-19 | End: 2019-04-26

## 2019-04-19 RX ORDER — DOCUSATE SODIUM 100 MG
100 CAPSULE ORAL THREE TIMES A DAY
Refills: 0 | Status: DISCONTINUED | OUTPATIENT
Start: 2019-04-19 | End: 2019-04-26

## 2019-04-19 RX ORDER — ALBUTEROL 90 UG/1
1 AEROSOL, METERED ORAL EVERY 4 HOURS
Refills: 0 | Status: DISCONTINUED | OUTPATIENT
Start: 2019-04-19 | End: 2019-04-26

## 2019-04-19 RX ORDER — IPRATROPIUM/ALBUTEROL SULFATE 18-103MCG
3 AEROSOL WITH ADAPTER (GRAM) INHALATION EVERY 6 HOURS
Refills: 0 | Status: DISCONTINUED | OUTPATIENT
Start: 2019-04-19 | End: 2019-04-26

## 2019-04-19 RX ORDER — NIFEDIPINE 30 MG
60 TABLET, EXTENDED RELEASE 24 HR ORAL DAILY
Refills: 0 | Status: DISCONTINUED | OUTPATIENT
Start: 2019-04-19 | End: 2019-04-24

## 2019-04-19 RX ORDER — LEVOTHYROXINE SODIUM 125 MCG
100 TABLET ORAL DAILY
Refills: 0 | Status: DISCONTINUED | OUTPATIENT
Start: 2019-04-19 | End: 2019-04-26

## 2019-04-19 RX ORDER — ATORVASTATIN CALCIUM 80 MG/1
80 TABLET, FILM COATED ORAL AT BEDTIME
Refills: 0 | Status: DISCONTINUED | OUTPATIENT
Start: 2019-04-19 | End: 2019-04-26

## 2019-04-19 RX ORDER — TIOTROPIUM BROMIDE 18 UG/1
1 CAPSULE ORAL; RESPIRATORY (INHALATION) DAILY
Refills: 0 | Status: DISCONTINUED | OUTPATIENT
Start: 2019-04-19 | End: 2019-04-26

## 2019-04-19 RX ORDER — MAGNESIUM SULFATE 500 MG/ML
2 VIAL (ML) INJECTION ONCE
Refills: 0 | Status: COMPLETED | OUTPATIENT
Start: 2019-04-19 | End: 2019-04-19

## 2019-04-19 RX ORDER — ISOSORBIDE MONONITRATE 60 MG/1
120 TABLET, EXTENDED RELEASE ORAL DAILY
Refills: 0 | Status: DISCONTINUED | OUTPATIENT
Start: 2019-04-19 | End: 2019-04-24

## 2019-04-19 RX ORDER — IPRATROPIUM/ALBUTEROL SULFATE 18-103MCG
3 AEROSOL WITH ADAPTER (GRAM) INHALATION ONCE
Refills: 0 | Status: COMPLETED | OUTPATIENT
Start: 2019-04-19 | End: 2019-04-19

## 2019-04-19 RX ORDER — SENNA PLUS 8.6 MG/1
2 TABLET ORAL AT BEDTIME
Refills: 0 | Status: DISCONTINUED | OUTPATIENT
Start: 2019-04-19 | End: 2019-04-26

## 2019-04-19 RX ORDER — VANCOMYCIN HCL 1 G
1000 VIAL (EA) INTRAVENOUS ONCE
Refills: 0 | Status: COMPLETED | OUTPATIENT
Start: 2019-04-19 | End: 2019-04-20

## 2019-04-19 RX ORDER — BUMETANIDE 0.25 MG/ML
2 INJECTION INTRAMUSCULAR; INTRAVENOUS ONCE
Refills: 0 | Status: COMPLETED | OUTPATIENT
Start: 2019-04-19 | End: 2019-04-19

## 2019-04-19 RX ADMIN — Medication 2 GRAM(S): at 15:23

## 2019-04-19 RX ADMIN — Medication 50 GRAM(S): at 14:36

## 2019-04-19 RX ADMIN — Medication 3 MILLILITER(S): at 14:37

## 2019-04-19 RX ADMIN — BUMETANIDE 2 MILLIGRAM(S): 0.25 INJECTION INTRAMUSCULAR; INTRAVENOUS at 16:50

## 2019-04-19 RX ADMIN — Medication 3 MILLILITER(S): at 12:06

## 2019-04-19 RX ADMIN — Medication 125 MILLIGRAM(S): at 14:36

## 2019-04-19 RX ADMIN — Medication 324 MILLIGRAM(S): at 14:36

## 2019-04-19 RX ADMIN — Medication 3 MILLILITER(S): at 21:09

## 2019-04-19 RX ADMIN — LOSARTAN POTASSIUM 50 MILLIGRAM(S): 100 TABLET, FILM COATED ORAL at 16:50

## 2019-04-19 NOTE — H&P ADULT - NSHPPHYSICALEXAM_GEN_ALL_CORE
Vital Signs Last 24 Hrs  T(C): 36.6 (19 Apr 2019 10:42), Max: 36.6 (19 Apr 2019 10:42)  T(F): 97.8 (19 Apr 2019 10:42), Max: 97.8 (19 Apr 2019 10:42)  HR: 58 (19 Apr 2019 16:15) (52 - 58)  BP: 165/78 (19 Apr 2019 16:15) (158/69 - 177/74)  BP(mean): --  RR: 22 (19 Apr 2019 16:15) (20 - 22)  SpO2: 98% (19 Apr 2019 16:15) (96% - 98%)    PHYSICAL EXAM:  Constitutional: tachypneic, in mod resp distress   ENMT: PERRBL   Neck: mild jvd noted  Respiratory: b/l fine crackles to mid lungs   Cardiovascular: s 1, s2 rrr  Gastrointestinal: soft, nt, bs nl , no rebound tenderness   Extremities: no lower ext edema   Vascular: DP pulses present   Neurological: non focal , generalized weakness   Skin: no rash   Lymph Nodes: not palpable   Musculoskeletal: no deformity   Psychiatric: mood appropriate

## 2019-04-19 NOTE — CONSULT NOTE ADULT - ATTENDING COMMENTS
HD now, pt consented.
pt seen and examined. Hx of PCI and ESRD.   Pt has no cp but is volume overloaded with severe HTN.  Elevated troponin maybe related to HF and ESRD. Recycle enzymes. Trial of Bumex x 1 and HD today.   I spoke to pt and family. He is compliant with medications and diet. DC home on Bumex 2mg daily. pt still makes some urine.   I agree with a/p.

## 2019-04-19 NOTE — ED ADULT NURSE REASSESSMENT NOTE - NS ED NURSE REASSESS COMMENT FT1
pt with no complaints, appears more comfortable at this time. pt seen by renal dr Coughlin and consent for dialysis signed. family aware of plan for admission and dialysis today. pt repeat troponin sent. will medicate as ordered.

## 2019-04-19 NOTE — ED ADULT NURSE REASSESSMENT NOTE - NS ED NURSE REASSESS COMMENT FT1
bed assignment in 4B received, pt remains awake and alert family at bedside. pt able to complete dialysis up in unit as per logistics. family and pt aware. pt remains on nasal cannula, 02 at 4LPM for increased SOB. pt with no resp distress, skin warm dry and intact.

## 2019-04-19 NOTE — ED PROVIDER NOTE - OBJECTIVE STATEMENT
78 yo M hx of CHF, lung CA, CAD s/p CABG, ESRD on dialysis (TTS), on home 2L O2, p/w sob and orthopnea x 3 days. He also report intermittent left sided chest pain. No fever, no cough,  no abdominal pain. Patient last dialyzed yesterday.     Cards: Mary ([place])

## 2019-04-19 NOTE — CONSULT NOTE ADULT - SUBJECTIVE AND OBJECTIVE BOX
REASON FOR Tele-evaluation    SUBJECTIVE: sent to ED by PCP Dr. Go    Cardiology HPI:  80yo male PMH CHF, lung CA, CAD s/p CABG, ESRD on HD (T, Th, Sa) via LAVF, on home 2L O2, presents to ED with c/o worsening sob, cough with blood tinged brown sputum, and orthopnea x 3 days.  Family at bedside, patient a&ox3 though has difficulty providing clear detailed history.  patient has also been c/o 2-3 left sided "faint" intermittent pain.  Per daughter he has had a decrease in PO intake and continues to c/o chronic vomiting 2-3x/day most days. Denies palpitations, irregular and/or rapid heart beat, syncope/near syncope, dizziness, edema, n/v/d, hematuria, or hematochezia.    Above HPI reviewed and noted: patient family at bedside, history obtained mostly from them as patient is having difficulty speaking due to dyspnea. patient confirms above and add that over the past 2-3 days patient required an increased amount of oxygen at home 5-6L from 2L, unable to lay supine due to difficulty breathing, with associated chills, productive cough (noted above) and insomnia due to symptoms.  patient and family does confirm ongoing nausea and vomiting which is chronic and has not changed. no fever, abdominal pain, diarrhea.       OBJECTIVE  ROS:  all other ROS negative except as documented above.     PHYSICAL EXAM: Tele-evaluation precludes physical exam.   Vital Signs Last 24 Hrs  T(C): 36.6 (19 Apr 2019 10:42), Max: 36.6 (19 Apr 2019 10:42)  T(F): 97.8 (19 Apr 2019 10:42), Max: 97.8 (19 Apr 2019 10:42)  HR: 58 (19 Apr 2019 16:15) (52 - 58)  BP: 165/78 (19 Apr 2019 16:15) (158/69 - 177/74)  BP(mean): --  RR: 22 (19 Apr 2019 16:15) (20 - 22)  SpO2: 98% (19 Apr 2019 16:15) (96% - 98%)                            10.5   9.0   )-----------( 242      ( 19 Apr 2019 11:32 )             34.1     04-19    132<L>  |  90<L>  |  14.0  ----------------------------<  101  4.2   |  29.0  |  3.40<H>    Ca    9.0      19 Apr 2019 11:32    TPro  7.5  /  Alb  3.4  /  TBili  0.3<L>  /  DBili  x   /  AST  100<H>  /  ALT  41<H>  /  AlkPhos  55  04-19    CARDIAC MARKERS ( 19 Apr 2019 11:32 )  x     / 0.32 ng/mL / x     / x     / x              PT/INR - ( 19 Apr 2019 11:32 )   PT: 11.0 sec;   INR: 0.96 ratio         PTT - ( 19 Apr 2019 11:32 )  PTT:30.7 sec    CXR:   Findings:  Diffuse interstitial and alveolar lung disease bilaterally with more   confluent changes when compared with the prior study. Persistent   cardiomegaly. No hilar or mediastinal abnormality. Evidence of prior   cardiac surgery. Small bilateral pleural effusions..    Impression:  Worsening interstitial and alveolar lung disease since 4/8/2019..        ASSESSMENT AND PLAN:   8yo male PMH CHF, lung CA, CAD s/p CABG, ESRD on HD (T, Th, Sa) via LAVF, on home 2L O2, presents to ED with c/o worsening sob, cough with blood tinged brown sputum, and orthopnea x 3 days.   - Admit to step down  - Supplemental oxygen to maintain Oxygen saturation >90%  - Consider BIPAP if HD is delayed.     Acute on chronic decompensated HFpEF exacerbation with demand ischemia noted in elevated troponin    - s/p Bumex 2mg IVP x1  - As per cardiology usually exacerbated by HTN  - agree with cardiology recommendation for dialysis today.   - will place pt in step down unit until stabilized   - Advance directives discussed: FULL code, family at bedside, agree with plan  Suspected HCAP  - vancomycin/Zosyn IV antibiotics started  - Duonebs  - unable to complete CT chest due to patient inability to lay supine at this time   Intermittent Chest pain.    -  Cardiology Recommendation is to increased imdur to 120mg OD, since pt recent had a cardiac cath 3/12, no new blockages  - increase Imdur to 120mg QD.   H/O Hypertension  - increase Losartan to 50mg QD  - c/w nifedipine 60mg QD  ESRD on hemodialysis  - nephrology consulted by ED attending, management as per nephrology team.   resume home medications.   DVT prophylaxis  Care plan discussed with Dr. Russell REASON FOR Tele-evaluation    SUBJECTIVE: sent to ED by PCP Dr. Go    Cardiology HPI:  78yo male PMH CHF, lung CA, CAD s/p CABG, ESRD on HD (T, Th, Sa) via LAVF, on home 2L O2, presents to ED with c/o worsening sob, cough with blood tinged brown sputum, and orthopnea x 3 days.  Family at bedside, patient a&ox3 though has difficulty providing clear detailed history.  patient has also been c/o 2-3 left sided "faint" intermittent pain.  Per daughter he has had a decrease in PO intake and continues to c/o chronic vomiting 2-3x/day most days. Denies palpitations, irregular and/or rapid heart beat, syncope/near syncope, dizziness, edema, n/v/d, hematuria, or hematochezia.    Above HPI reviewed and noted: patient family at bedside, history obtained mostly from them as patient is having difficulty speaking due to dyspnea. patient confirms above and add that over the past 2-3 days patient required an increased amount of oxygen at home 5-6L from 2L, unable to lay supine due to difficulty breathing, with associated chills, productive cough (noted above) and insomnia due to symptoms.  patient and family does confirm ongoing nausea and vomiting which is chronic and has not changed. no fever, abdominal pain, diarrhea.       OBJECTIVE  ROS:  all other ROS negative except as documented above.     PHYSICAL EXAM: Tele-evaluation precludes physical exam.   Vital Signs Last 24 Hrs  T(C): 36.6 (19 Apr 2019 10:42), Max: 36.6 (19 Apr 2019 10:42)  T(F): 97.8 (19 Apr 2019 10:42), Max: 97.8 (19 Apr 2019 10:42)  HR: 58 (19 Apr 2019 16:15) (52 - 58)  BP: 165/78 (19 Apr 2019 16:15) (158/69 - 177/74)  BP(mean): --  RR: 22 (19 Apr 2019 16:15) (20 - 22)  SpO2: 98% (19 Apr 2019 16:15) (96% - 98%)                            10.5   9.0   )-----------( 242      ( 19 Apr 2019 11:32 )             34.1     04-19    132<L>  |  90<L>  |  14.0  ----------------------------<  101  4.2   |  29.0  |  3.40<H>    Ca    9.0      19 Apr 2019 11:32    TPro  7.5  /  Alb  3.4  /  TBili  0.3<L>  /  DBili  x   /  AST  100<H>  /  ALT  41<H>  /  AlkPhos  55  04-19    CARDIAC MARKERS ( 19 Apr 2019 11:32 )  x     / 0.32 ng/mL / x     / x     / x              PT/INR - ( 19 Apr 2019 11:32 )   PT: 11.0 sec;   INR: 0.96 ratio         PTT - ( 19 Apr 2019 11:32 )  PTT:30.7 sec    CXR:   Findings:  Diffuse interstitial and alveolar lung disease bilaterally with more   confluent changes when compared with the prior study. Persistent   cardiomegaly. No hilar or mediastinal abnormality. Evidence of prior   cardiac surgery. Small bilateral pleural effusions..    Impression:  Worsening interstitial and alveolar lung disease since 4/8/2019..        ASSESSMENT AND PLAN:   8yo male PMH CHF, lung CA, CAD s/p CABG, ESRD on HD (T, Th, Sa) via LAVF, on home 2L O2, presents to ED with c/o worsening sob, cough with blood tinged brown sputum, and orthopnea x 3 days.   - Admit to step down  - Supplemental oxygen to maintain Oxygen saturation >90%  - Consider BIPAP if HD is delayed.     Acute on chronic decompensated HFpEF exacerbation with demand ischemia noted in elevated troponin    - s/p Bumex 2mg IVP x1  - As per cardiology usually exacerbated by HTN  - agree with cardiology recommendation for dialysis today.   - will place pt in step down unit until stabilized   - Advance directives discussed: FULL code, family at bedside, agree with plan  Suspected HCAP  - stat single dose vancomycin/Zosyn IV antibiotics given, pls re-evaluate once clinical eval is completed  - Duonebs  - unable to complete CT chest due to patient inability to lay supine at this time   Intermittent Chest pain.    -  Cardiology Recommendation is to increased imdur to 120mg OD, since pt recent had a cardiac cath 3/12, no new blockages  - increase Imdur to 120mg QD.   H/O Hypertension  - increase Losartan to 50mg QD  - c/w nifedipine 60mg QD  ESRD on hemodialysis  - nephrology consulted by ED attending, management as per nephrology team.   resume home medications.   DVT prophylaxis  Care plan discussed with Dr. Russell

## 2019-04-19 NOTE — H&P ADULT - NSHPSOCIALHISTORY_GEN_ALL_CORE
lives at home with family   requiring 3 people for transfers   uses 4 L nc o2 24 /7   deneis etoh use   ex tobacco use       family hx :   positive for HTN

## 2019-04-19 NOTE — ED PROVIDER NOTE - INTERPRETATION
PAC, PVC/normal sinus rhythm, Normal axis, Normal AL interval and QRS complex. There are no acute ischemic ST or T-wave changes.

## 2019-04-19 NOTE — H&P ADULT - ASSESSMENT
78 yo M hx of chronic diastolic CHF, lung CA, pulmonary fibrosis CAD s/p CABG, ESRD on dialysis (TTS), on home 2L O2, p/w sob and orthopnea x 3 days. Has been wearing 4 L nc o2 all the time. denies lower ext swelling. He also report intermittent left sided chest pain, intermittently radiating to left upper chest but now improved at tiem of hx , c/o productive cough with mild blood tinged yellowish brown phlegm, also had episode of chills and vomiting with nausea yesterday. denies any blood in vomitus ,  no abdominal pain/ vomiting  has been chronic 2-3x/day most days , has low appetite .Patient last dialyzed yesterday. Denies any sick contacts .Family two daughters  at bedside, patient a&ox3, states that he has not been able to ambulate much at home, has been requiring 2-3 people assist for transfers.  patient. has.  Patient was DNR/DNI at last admission , today he wishes to be full code.     > acute on chronci hypoxic resp failure: likely sec to acute on chronic diastolic chf   -admit to tele   -monitor i/os   -urgent HD as per nephro   -cycle trops   -cardio and nephro consult called by er   -meds adjusted as per cardio     >Possible RML underlying consolidation / recent hospitalization / possible complex pneumonia / gram positive  and/ or  gram neg pna   -sputum cxs 80 yo M hx of chronic diastolic CHF, lung CA, pulmonary fibrosis CAD s/p CABG, ESRD on dialysis (TTS), on home 2L O2, p/w sob and orthopnea x 3 days. Has been wearing 4 L nc o2 all the time. denies lower ext swelling. He also report intermittent left sided chest pain, intermittently radiating to left upper chest but now improved at tiem of hx , c/o productive cough with mild blood tinged yellowish brown phlegm, also had episode of chills and vomiting with nausea yesterday. denies any blood in vomitus ,  no abdominal pain/ vomiting  has been chronic 2-3x/day most days , has low appetite .Patient last dialyzed yesterday. Denies any sick contacts .Family two daughters  at bedside, patient a&ox3, states that he has not been able to ambulate much at home, has been requiring 2-3 people assist for transfers.  patient. has.  Patient was DNR/DNI at last admission , today he wishes to be full code.     > acute on chronci hypoxic resp failure: likely sec to acute on chronic diastolic chf   -admit to tele   -monitor i/os   -urgent HD as per nephro   -cycle trops   -cardio and nephro consult called by er   -given bumex in er x 1 dose , meds adjusted as per cardio       >Chest pain/ possible demand ischemia   -trend trops   -c/w aspirin and plavix   -c/w ARBs and bb     >Possible RML underlying consolidation / recent hospitalization / vs aspiration pna / c/o chronic n/v   -possible complex pneumonia / gram positive  and/ or  gram neg pna   -Mild blood tinged sputum / hemoptysis likely sec to pna   -sputum cxs , bcxs   -RVP pannel   -c/w broad sp abxs / taper down     > Hypertension/ uncontrolled   - increase Losartan to 50mg QD  - c/w nifedipine 60mg QD    > ESRD on hemodialysis  - nephrology consulted by ED attending, management as per nephrology team.   - resume home medications.     >anemia of chronic disease    -stable h/h   -monitor     > likely severe protein teddy malnutrition   -nutrition consult     > dvt ppx: scds for no w, monitor if any worsening hemoptysis     >GOC: was DNR/ DNI during last admission. wishes to be full code today. will get palliative care cosnult

## 2019-04-19 NOTE — PATIENT PROFILE ADULT - NSTRANSFERBELONGINGSRESP_GEN_A_NUR
89 yo SHANE kirby/ [facility] ... 87 yo F w/ pmh HTN (metoprolol 50, losartan 100) p/w lightheadedness, weakness x 2 days, woke up with it worse at 7:30am today so came to ED. Took bp at home and was 200/100 this morning; has been SBP 180s last few days. Was taking reduced dose metoprolol 25mg last few days bc felt like it, took 100mg yesterday, took normal dose today. Has been taking losartan consistently. No other meds, last med change was March 2018: aten 25 changed to metop 50. Denies fever, sob, chest pain, dysuria, trauma, syncope, falls, recent travel. Describes walking imbalance.    Pt english speaking, hard of hearing, family at bedside helping to talk and translate occasionally. yes

## 2019-04-19 NOTE — ED PROVIDER NOTE - PROGRESS NOTE DETAILS
patient seen by cardiology, will give bumex 2 mg IV, patient was recently catherized with stenosis that can't be stent, recommend medical management and dialysis.

## 2019-04-19 NOTE — ED PROVIDER NOTE - PMH
Bipolar disorder    CAD (coronary artery disease)    Chronic anemia    Dialysis patient  Tues, Thurs, Saturday  ESRD (end stage renal disease)  right Upper arm fistula  High cholesterol    Hypertension    Lung cancer  had yoni radiation in 2006.

## 2019-04-19 NOTE — ED PROVIDER NOTE - NS ED ROS FT
Review of Systems  •	CONSTITUTIONAL - no  fever, no diaphoresis, no weight change  •	SKIN - no rash  •	HEMATOLOGIC - no bleeding, no bruising  •	EYES - no eye pain, no blurred vision  •	ENT - no change in hearing, no pain  •	RESPIRATORY - (+) shortness of breath, no cough  •	CARDIAC - (+)  chest pain, no palpitations  •	GI - no abd pain, no nausea, no vomiting, no diarrhea, no constipation, no bleeding  •	GENITO-URINARY - no discharge, no dysuria; no hematuria,   •	ENDO - no polydypsia, no polyurea, no heat/no cold intolerance  •	MUSCULOSKELETAL - no joint pain, no swelling, no redness  •	NEUROLOGIC - no weakness, no headache, no anesthesia, no paresthesias  •	PSYCH - no anxiety, non suicidal, non homicidal, no hallucination, no depression

## 2019-04-19 NOTE — ED PROVIDER NOTE - PHYSICAL EXAMINATION
VITAL SIGNS: I have reviewed nursing notes and confirm.  CONSTITUTIONAL: Well-developed; well-nourished; in no acute distress.  SKIN: Skin exam is warm and dry, no acute rash.  HEAD: Normocephalic; atraumatic.  EYES: PERRL, EOM intact; conjunctiva and sclera clear.  ENT: No nasal discharge; airway clear. Throat clear.  NECK: Supple; non tender.    CARD: S1, S2 normal; no murmurs, gallops, or rubs. Regular rate and rhythm.  RESP: (+) diffuse wheezes,  (+) crackle at the b/l base   ABD:  soft; non-distended; non-tender;   EXT: Normal ROM. No clubbing, cyanosis or edema. (+) left arm fistula with good thrill   NEURO: Alert, oriented. Grossly unremarkable. No focal deficits. no facial droop, moves all extremities,   PSYCH: Cooperative, appropriate.

## 2019-04-19 NOTE — H&P ADULT - NSHPLABSRESULTS_GEN_ALL_CORE
10.5   9.0   )-----------( 242      ( 19 Apr 2019 11:32 )             34.1   04-19    132<L>  |  90<L>  |  14.0  ----------------------------<  101  4.2   |  29.0  |  3.40<H>    Ca    9.0      19 Apr 2019 11:32    TPro  7.5  /  Alb  3.4  /  TBili  0.3<L>  /  DBili  x   /  AST  100<H>  /  ALT  41<H>  /  AlkPhos  55  04-19    < from: Xray Chest 1 View-PORTABLE IMMEDIATE (04.19.19 @ 11:20) >    mpression:  Worsening interstitial and alveolar lung disease since 4/8/2019..    < end of copied text >

## 2019-04-19 NOTE — ED ADULT NURSE REASSESSMENT NOTE - NS ED NURSE REASSESS COMMENT FT1
pt remains in no distress, family now at bedside aware of plan of care. subsequent neb txment ordered, awaiting cardio consult at this time. IV site patent, no complaints at this time. skin warm dry and intact.

## 2019-04-19 NOTE — CONSULT NOTE ADULT - SUBJECTIVE AND OBJECTIVE BOX
Patient is a 79y old  Male who presents with a chief complaint of shortness of breath.    HPI:  78yo male PMH CHF, lung CA, CAD s/p CABG, ESRD on HD (T, Th, Sa) via LAVF, on home 2L O2, presents to ED with c/o worsening sob, cough with blood tinged brown sputum, and orthopnea x 3 days.  Family at bedside, patient a&ox3 though has difficulty providing clear detailed history.  patient has also been c/o 2-3 left sided "faint" intermittent pain.  Per daughter he has had a decrease in PO intake and continues to c/o chronic vomiting 2-3x/day most days. Denies palpitations, irregular and/or rapid heart beat, syncope/near syncope, dizziness, edema, n/v/d, hematuria, or hematochezia.      PAST MEDICAL & SURGICAL HISTORY:  Dialysis patient: Tues, Thurs, Saturday  Chronic anemia  ESRD (end stage renal disease): right Upper arm fistula  CAD (coronary artery disease)  Lung cancer: had yoni radiation in 2006.  Bipolar disorder  High cholesterol  Hypertension  S/P CABG (coronary artery bypass graft): pt&#x27;s son in [facility] h/o some stents near neck area ? carotid ? he is not sure.      PREVIOUS DIAGNOSTIC TESTING:      ECHO  FINDINGS:  < from: TTE Echo w/Cont Complete (03.11.19 @ 20:35) >  Summary:   1. Left ventricular ejection fraction, by visual estimation, is 50 to   55%.   2. Low normal global left ventricular systolic function.   3. Spectral Doppler shows impaired relaxation pattern of left   ventricular myocardial filling (Grade I diastolic dysfunction).   4. There is no left ventricular hypertrophy.   5. Moderately enlarged left atrium.   6. Mild to moderate mitral annular calcification.   7. Thickening of the anterior and posterior mitral valve leaflets.   8. Sclerotic aortic valve with normal opening.    < end of copied text >      CATHETERIZATION  FINDINGS:  < from: Cardiac Cath Lab - Adult (03.12.19 @ 16:57) >  VENTRICLES: There were no left ventricular global or regional wall motion  abnormalities. Analysis of regional contractile function demonstrated  severe diaphragmatic hypokinesis. EF estimated was 65 %.  VALVES: AORTIC VALVE: No significant aortic valve gradient.  CORONARY VESSELS: The coronary circulation is right dominant.  LM:   --  LM: Normal. The vessel was normal sized, heavily calcified, and  moderately tortuous. Angiography showed moderate atherosclerosis. There  was a discrete 30 % stenosis at the site of a prior stent, at the ostium  of the vessel segment. The lesion was without evidence of thrombus. There  was BRUCE grade 3 flow through the vessel (brisk flow).  LAD:   --  LAD: The vessel was normal sized, heavily calcified, and  moderately tortuous. Angiography showed severe atherosclerosis. There was  a diffuse 100 % stenosis at the ostium of the vessel segment. The lesion  was without evidence of thrombus. There was BRUCE grade 0 flow through the  vessel (no flow). This lesion is a chronic total occlusion. Fills via  patent ANA.  CX:   --  Circumflex: Normal. The vessel was normal sized, moderately  calcified, and excessively tortuous. Angiography showed mild  atherosclerosis with no flow limiting lesions. Patent stent in Prox LCx  and OM1.  RCA:   --  RCA: Normal. The vessel was normal sized, moderately calcified,  and moderately tortuous. Angiography showed severe atherosclerosis. There  was a diffuse 100 % stenosis at the ostium of the vessel segment, just  after RV marginal 1. The lesion was without evidence of thrombus. There  was BRUCE grade 0 flow through the vessel (no flow). Thislesion is a  chronic total occlusion.  The distal RCA fills from collaterals from the LCx.  GRAFTS:   --  Graft to the LAD: The graft was a normal sized ANA. Graft  angiography showed no degeneration, no calcification, and moderate  tortuosity.  COMPLICATIONS: There were no complications. No complications occurred  during the cath lab visit.  DIAGNOSTIC IMPRESSIONS: There is significant double vessel coronary artery  disease.  Ostial ZDI=986%  Ostial MOW=265%  Patent ANA to LAD. Left ventricular function is normal.  LVEF=65%  DIAGNOSTIC RECOMMENDATIONS: The patient should continue with the present  medications. Patient management should include aggressive medical therapy,  close monitoring of BUN and creatinine, resumption of all previous  activities in 2 days, and a cardiac rehabilitation program. Medical  management is recommended.    < end of copied text >      Allergies    No Known Allergies    Intolerances        MEDICATIONS  (STANDING):  aspirin  chewable 324 milliGRAM(s) Oral daily  buMETAnide Injectable 2 milliGRAM(s) IV Push Once  isosorbide   mononitrate ER Tablet (IMDUR) 120 milliGRAM(s) Oral daily  losartan 50 milliGRAM(s) Oral daily  NIFEdipine XL 60 milliGRAM(s) Oral daily    MEDICATIONS  (PRN):      FAMILY HISTORY:  Family history of essential hypertension      SOCIAL HISTORY:    CIGARETTES:    ALCOHOL:    REVIEW OF SYSTEMS:  CONSTITUTIONAL: No fever, weight loss, or fatigue  EYES: No eye pain, visual disturbances, or discharge  ENMT:  No difficulty hearing, tinnitus, vertigo; No sinus or throat pain  NECK: No pain or stiffness  RESPIRATORY: as per HPI  CARDIOVASCULAR: as per HPI  GASTROINTESTINAL: No abdominal or epigastric pain. No nausea, vomiting, or hematemesis; No diarrhea or constipation. No melena or hematochezia.  GENITOURINARY: No dysuria, frequency, hematuria, or incontinence  NEUROLOGICAL: No headaches, memory loss, loss of strength, numbness, or tremors  SKIN: No itching, burning, rashes, or lesions   LYMPH Nodes: No enlarged glands  ENDOCRINE: No heat or cold intolerance; No hair loss  MUSCULOSKELETAL: No joint pain or swelling; No muscle, back, or extremity pain  PSYCHIATRIC: No depression, anxiety, mood swings, or difficulty sleeping    ALL OTHER ROS NEGATIVE	    Vital Signs Last 24 Hrs  T(C): 36.6 (19 Apr 2019 10:42), Max: 36.6 (19 Apr 2019 10:42)  T(F): 97.8 (19 Apr 2019 10:42), Max: 97.8 (19 Apr 2019 10:42)  HR: 55 (19 Apr 2019 12:50) (52 - 55)  BP: 177/74 (19 Apr 2019 12:50) (158/69 - 177/74)  BP(mean): --  RR: 20 (19 Apr 2019 12:50) (20 - 20)  SpO2: 96% (19 Apr 2019 12:50) (96% - 96%)    Daily Height in cm: 167.64 (19 Apr 2019 10:42)    Daily     I&O's Detail      PHYSICAL EXAM:  Appearance: Normal, well nourished	  HEENT:   Normal oral mucosa, PERRL, EOMI, sclera non-icteric	  Lymphatic: No cervical lymphadenopathy  Cardiovascular: Normal S1 S2, No JVD, No cardiac murmurs, No carotid bruits, No peripheral edema  Respiratory: diffuse rhonchi and expiratory wheeze  Psychiatry: A & O to place and self, Mood & affect appropriate  Gastrointestinal:  Soft, Non-tender, + BS, no bruits	  Skin: No rashes, No ecchymoses, No cyanosis  Neurologic: Grossly non-focal with full strength in all four extremities  Extremities: Normal range of motion, No clubbing, cyanosis or edema  Vascular: Peripheral pulses palpable 2+ bilaterally      INTERPRETATION OF TELEMETRY: sinus bradycardia 58    ECG: sinus bradycardia at 58bpm, PVC. APC with Aberrancy anteroseptal and anterior wall MI, age undetermined.      LABS:                        10.5   9.0   )-----------( 242      ( 19 Apr 2019 11:32 )             34.1     04-19    132<L>  |  90<L>  |  14.0  ----------------------------<  101  4.2   |  29.0  |  3.40<H>    Ca    9.0      19 Apr 2019 11:32    TPro  7.5  /  Alb  3.4  /  TBili  0.3<L>  /  DBili  x   /  AST  100<H>  /  ALT  41<H>  /  AlkPhos  55  04-19    CARDIAC MARKERS ( 19 Apr 2019 11:32 )  x     / 0.32 ng/mL / x     / x     / x          PT/INR - ( 19 Apr 2019 11:32 )   PT: 11.0 sec;   INR: 0.96 ratio         PTT - ( 19 Apr 2019 11:32 )  PTT:30.7 sec    I&O's Summary    BNPSerum Pro-Brain Natriuretic Peptide: 85444 pg/mL (04-19 @ 11:32)    Serum Pro-Brain Natriuretic Peptide: 85722 pg/mL (04-19-19 @ 11:32)    RADIOLOGY & ADDITIONAL STUDIES:  < from: Xray Chest 1 View-PORTABLE IMMEDIATE (04.19.19 @ 11:20) >  EXAM:  XR CHEST PORTABLE IMMED 1V                          PROCEDURE DATE:  04/19/2019          INTERPRETATION:  CHEST AP PORTABLE:    History: sob.     Date and time of exam: 4/19/2019 11:12 AM.    Technique: A single AP view of the chest was obtained.    Comparison exam: 4/8/2019.    Findings:  Diffuse interstitial and alveolar lung disease bilaterally with more   confluent changes when compared with the prior study. Persistent   cardiomegaly. No hilar or mediastinal abnormality. Evidence ofprior   cardiac surgery. Small bilateral pleural effusions..    Impression:  Worsening interstitial and alveolar lung disease since 4/8/2019..    < end of copied text >

## 2019-04-19 NOTE — ED ADULT NURSE NOTE - OBJECTIVE STATEMENT
pt comes to ED with reports of SOB x few days, with cough. brown discharge noted on production of phlegm. pt is AOX3, afebrile, denies fevers. pt with use of home 02 @ 2 LPM, hx COPD and lung ca. pt with no resp distress, afebrile on arrival to ED. skin warm dry and intact. MORALES with strength and purpose. AV fistula to left arm. receives dialysis T TH SAT, due tomorrow.

## 2019-04-19 NOTE — H&P ADULT - HISTORY OF PRESENT ILLNESS
78 yo M hx of chronic diastolic CHF, lung CA, pulmonary fibrosis CAD s/p CABG, ESRD on dialysis (TTS), on home 2L O2, p/w sob and orthopnea x 3 days. Has been wearing 4 L nc o2 all the time. denies lower ext swelling. He also report intermittent left sided chest pain, intermittently radiating to left upper chest but now improved at tiem of hx , c/o productive cough with mild blood tinged yellowish brown phlegm, also had episode of chills and vomiting with nausea yesterday. denies any blood in vomitus ,  no abdominal pain/ vomiting  has been chronic 2-3x/day most days , has low appetite .  Patient last dialyzed yesterday. Denies any sick contacts   Family two daughters  at bedside, patient a&ox3, states that he has not been able to ambulate much at home, has been requiring 2-3 people assist for transfers.  patient. has.    Denies palpitations, irregular and/or rapid heart beat, syncope/near syncope, dizziness, edema, n/v/d, hematuria, or hematochezia.    Patient was DNR/DNI at last admission , today he wishes to be full code.     seen by cardio and nephro in er. plan for urgent HD

## 2019-04-19 NOTE — ED PROVIDER NOTE - CLINICAL SUMMARY MEDICAL DECISION MAKING FREE TEXT BOX
78 yo M p/w sob and chest pain. He has significant coronary disease that was not able to be stented based on catherization done 2 weeks prior. , blood work showed worsening CHF, worsening trop, worsening interstitial lung disease. He received asa, duoneb, magnesium, solumedrol. He will need medical management for CHF and chest pain, will need dialysis.

## 2019-04-19 NOTE — ED ADULT TRIAGE NOTE - CHIEF COMPLAINT QUOTE
Patient arrived via EMS, awake alert, and oriented times 3, breathing unlabored at present time.  patient complaining of intermittent SOB for 2 days.  Patient states unable to lay flat for 2 days.  No edema noted to extremities.  History of lung CA and CHF.  Home O2 of 2LPM.  No CP.

## 2019-04-20 DIAGNOSIS — N18.6 END STAGE RENAL DISEASE: ICD-10-CM

## 2019-04-20 LAB
ANION GAP SERPL CALC-SCNC: 14 MMOL/L — SIGNIFICANT CHANGE UP (ref 5–17)
BUN SERPL-MCNC: 9 MG/DL — SIGNIFICANT CHANGE UP (ref 8–20)
CALCIUM SERPL-MCNC: 8.6 MG/DL — SIGNIFICANT CHANGE UP (ref 8.6–10.2)
CHLORIDE SERPL-SCNC: 93 MMOL/L — LOW (ref 98–107)
CO2 SERPL-SCNC: 26 MMOL/L — SIGNIFICANT CHANGE UP (ref 22–29)
CREAT SERPL-MCNC: 2.36 MG/DL — HIGH (ref 0.5–1.3)
GLUCOSE BLDC GLUCOMTR-MCNC: 148 MG/DL — HIGH (ref 70–99)
GLUCOSE BLDC GLUCOMTR-MCNC: 96 MG/DL — SIGNIFICANT CHANGE UP (ref 70–99)
GLUCOSE SERPL-MCNC: 137 MG/DL — HIGH (ref 70–115)
GRAM STN FLD: SIGNIFICANT CHANGE UP
HCT VFR BLD CALC: 33.4 % — LOW (ref 42–52)
HGB BLD-MCNC: 10.6 G/DL — LOW (ref 14–18)
MCHC RBC-ENTMCNC: 28.5 PG — SIGNIFICANT CHANGE UP (ref 27–31)
MCHC RBC-ENTMCNC: 31.7 G/DL — LOW (ref 32–36)
MCV RBC AUTO: 89.8 FL — SIGNIFICANT CHANGE UP (ref 80–94)
PLATELET # BLD AUTO: 230 K/UL — SIGNIFICANT CHANGE UP (ref 150–400)
POTASSIUM SERPL-MCNC: 4 MMOL/L — SIGNIFICANT CHANGE UP (ref 3.5–5.3)
POTASSIUM SERPL-SCNC: 4 MMOL/L — SIGNIFICANT CHANGE UP (ref 3.5–5.3)
RBC # BLD: 3.72 M/UL — LOW (ref 4.6–6.2)
RBC # FLD: 16.3 % — HIGH (ref 11–15.6)
SODIUM SERPL-SCNC: 133 MMOL/L — LOW (ref 135–145)
SPECIMEN SOURCE: SIGNIFICANT CHANGE UP
WBC # BLD: 5.9 K/UL — SIGNIFICANT CHANGE UP (ref 4.8–10.8)
WBC # FLD AUTO: 5.9 K/UL — SIGNIFICANT CHANGE UP (ref 4.8–10.8)

## 2019-04-20 PROCEDURE — 99233 SBSQ HOSP IP/OBS HIGH 50: CPT

## 2019-04-20 PROCEDURE — 71045 X-RAY EXAM CHEST 1 VIEW: CPT | Mod: 26

## 2019-04-20 RX ORDER — PIPERACILLIN AND TAZOBACTAM 4; .5 G/20ML; G/20ML
3.38 INJECTION, POWDER, LYOPHILIZED, FOR SOLUTION INTRAVENOUS EVERY 12 HOURS
Refills: 0 | Status: DISCONTINUED | OUTPATIENT
Start: 2019-04-20 | End: 2019-04-21

## 2019-04-20 RX ORDER — HYDRALAZINE HCL 50 MG
5 TABLET ORAL ONCE
Refills: 0 | Status: COMPLETED | OUTPATIENT
Start: 2019-04-20 | End: 2019-04-20

## 2019-04-20 RX ORDER — HEPARIN SODIUM 5000 [USP'U]/ML
5000 INJECTION INTRAVENOUS; SUBCUTANEOUS EVERY 12 HOURS
Refills: 0 | Status: DISCONTINUED | OUTPATIENT
Start: 2019-04-20 | End: 2019-04-26

## 2019-04-20 RX ORDER — METOPROLOL TARTRATE 50 MG
5 TABLET ORAL EVERY 6 HOURS
Refills: 0 | Status: DISCONTINUED | OUTPATIENT
Start: 2019-04-20 | End: 2019-04-24

## 2019-04-20 RX ADMIN — HEPARIN SODIUM 5000 UNIT(S): 5000 INJECTION INTRAVENOUS; SUBCUTANEOUS at 18:58

## 2019-04-20 RX ADMIN — Medication 250 MILLIGRAM(S): at 02:41

## 2019-04-20 RX ADMIN — PIPERACILLIN AND TAZOBACTAM 200 GRAM(S): 4; .5 INJECTION, POWDER, LYOPHILIZED, FOR SOLUTION INTRAVENOUS at 06:25

## 2019-04-20 RX ADMIN — Medication 3 MILLILITER(S): at 04:54

## 2019-04-20 RX ADMIN — Medication 3 MILLILITER(S): at 20:41

## 2019-04-20 RX ADMIN — Medication 3 MILLILITER(S): at 09:10

## 2019-04-20 RX ADMIN — Medication 5 MILLIGRAM(S): at 21:22

## 2019-04-20 RX ADMIN — Medication 5 MILLIGRAM(S): at 06:25

## 2019-04-20 RX ADMIN — Medication 3 MILLILITER(S): at 14:11

## 2019-04-20 NOTE — DIETITIAN INITIAL EVALUATION ADULT. - FACTORS AFF FOOD INTAKE
difficulty chewing/difficulty feeding self/difficulty swallowing/persistent lack of appetite/vomiting

## 2019-04-20 NOTE — SWALLOW BEDSIDE ASSESSMENT ADULT - SWALLOW EVAL: DIAGNOSIS
Oral stage WFL for puree/thin however, suspected pharyngeal dysphagia noted with puree and thin (limited PO given) as pt demonstrating multiple swallowing and throat clearing on small amount of PO given by

## 2019-04-20 NOTE — SWALLOW BEDSIDE ASSESSMENT ADULT - DIET PRIOR TO ADMI
softer foods with thin per daughter softer foods with thin per daughter but limited intake as it is difficult for pt to swallow

## 2019-04-20 NOTE — SWALLOW BEDSIDE ASSESSMENT ADULT - PHARYNGEAL PHASE
5-6 swallow per small PO bolus, pt reports sensation of food stuck in his throat/Delayed throat clear post oral intake/Multiple swallows 5-6 swallow per small PO bolus, pt reports sensation of food stuck in his throat/Throat clear post oral intake/Multiple swallows

## 2019-04-20 NOTE — SWALLOW BEDSIDE ASSESSMENT ADULT - ASR SWALLOW LINGUAL MOBILITY
mild impairment/impaired protrusion/impaired anterior elevation/impaired left lateral movement/impaired right lateral movement

## 2019-04-20 NOTE — SWALLOW BEDSIDE ASSESSMENT ADULT - COMMENTS
Pt has been seen by nutrition dept during past admissions- Malnutrition continues  poor  Pt with N/V and poor appetite PTA  difficulty chewing; difficulty feeding self; difficulty swallowing; persistent lack of appetite; vomiting  minimal assistance Pt has been seen by nutrition dept during past admissions- Malnutrition continues  poor . Pt has not been seen by ST via chart review of last admissions.  Pt with N/V and poor appetite PTA  difficulty chewing; difficulty feeding self; difficulty swallowing; persistent lack of appetite; vomiting per chart.     80 yo M hx of chronic diastolic CHF, lung CA, pulmonary fibrosis CAD s/p CABG, ESRD on dialysis (TTS), on home 2L O2, p/w sob and orthopnea x 3 days. Has been wearing 4 L nc o2 all the time. denies lower ext swelling. He also report intermittent left sided chest pain, intermittently radiating to left upper chest but now improved at tiem of hx , c/o productive cough with mild blood tinged yellowish brown phlegm, also had episode of chills and vomiting with nausea yesterday. denies any blood in vomitus ,  no abdominal pain/ vomiting  has been chronic 2-3x/day most days , has low appetite. Reports food is getting stuck in throat. +weight loss per daughter.

## 2019-04-20 NOTE — PROGRESS NOTE ADULT - SUBJECTIVE AND OBJECTIVE BOX
CC:  Patient is a 79y old  Male who presents with a chief complaint of sob (20 Apr 2019 11:55)      HPI:  80 yo M hx of chronic diastolic CHF, lung CA, pulmonary fibrosis CAD s/p CABG, ESRD on dialysis (TTS), on home 2L O2, p/w sob and orthopnea x 3 days. Has been wearing 4 L nc o2 all the time. denies lower ext swelling. He also report intermittent left sided chest pain, intermittently radiating to left upper chest but now improved at tiem of hx , c/o productive cough with mild blood tinged yellowish brown phlegm, also had episode of chills and vomiting with nausea yesterday. denies any blood in vomitus ,  no abdominal pain/ vomiting  has been chronic 2-3x/day most days , has low appetite .  Patient last dialyzed yesterday. Denies any sick contacts   Family two daughters  at bedside, patient a&ox3, states that he has not been able to ambulate much at home, has been requiring 2-3 people assist for transfers.  patient. has.    Denies palpitations, irregular and/or rapid heart beat, syncope/near syncope, dizziness, edema, n/v/d, hematuria, or hematochezia.    Patient was DNR/DNI at last admission , today he wishes to be full code.     seen by cardio and nephro in er. plan for urgent HD (19 Apr 2019 16:18)      ROS: All review of systems negative unless indicated otherwise above.     Lab Results:  CBC  CBC Full  -  ( 20 Apr 2019 04:45 )  WBC Count : 5.9 K/uL  RBC Count : 3.72 M/uL  Hemoglobin : 10.6 g/dL  Hematocrit : 33.4 %  Platelet Count - Automated : 230 K/uL  Mean Cell Volume : 89.8 fl  Mean Cell Hemoglobin : 28.5 pg  Mean Cell Hemoglobin Concentration : 31.7 g/dL  Auto Neutrophil # : x  Auto Lymphocyte # : x  Auto Monocyte # : x  Auto Eosinophil # : x  Auto Basophil # : x  Auto Neutrophil % : x  Auto Lymphocyte % : x  Auto Monocyte % : x  Auto Eosinophil % : x  Auto Basophil % : x    .		Differential:	[] Automated		[] Manual  Chemistry                        10.6   5.9   )-----------( 230      ( 20 Apr 2019 04:45 )             33.4     04-20    133<L>  |  93<L>  |  9.0  ----------------------------<  137<H>  4.0   |  26.0  |  2.36<H>    Ca    8.6      20 Apr 2019 04:45    TPro  7.5  /  Alb  3.4  /  TBili  0.3<L>  /  DBili  x   /  AST  100<H>  /  ALT  41<H>  /  AlkPhos  55  04-19    LIVER FUNCTIONS - ( 19 Apr 2019 11:32 )  Alb: 3.4 g/dL / Pro: 7.5 g/dL / ALK PHOS: 55 U/L / ALT: 41 U/L / AST: 100 U/L / GGT: x           PT/INR - ( 19 Apr 2019 11:32 )   PT: 11.0 sec;   INR: 0.96 ratio    PTT - ( 19 Apr 2019 11:32 )  PTT:30.7 sec    RADIOLOGY RESULTS: Personally visualized and reviewed  < from: Xray Chest 1 View-PORTABLE IMMEDIATE (04.19.19 @ 11:20) >  Impression:  Worsening interstitial and alveolar lung disease since 4/8/2019..    < end of copied text >      CARDIAC MARKERS ( 19 Apr 2019 16:17 )  x     / 0.30 ng/mL / 1353 U/L / x     / 6.6 ng/mL  CARDIAC MARKERS ( 19 Apr 2019 11:32 )  x     / 0.32 ng/mL / x     / x     / x        MEDICATIONS  (STANDING):  ALBUTerol    90 MICROgram(s) HFA Inhaler 1 Puff(s) Inhalation every 4 hours  ALBUTerol/ipratropium for Nebulization 3 milliLiter(s) Nebulizer every 6 hours  aspirin  chewable 324 milliGRAM(s) Oral daily  atorvastatin 80 milliGRAM(s) Oral at bedtime  clopidogrel Tablet 75 milliGRAM(s) Oral daily  docusate sodium 100 milliGRAM(s) Oral three times a day  DULoxetine 20 milliGRAM(s) Oral daily  gabapentin 300 milliGRAM(s) Oral two times a day  heparin  Injectable 5000 Unit(s) SubCutaneous every 12 hours  isosorbide   mononitrate ER Tablet (IMDUR) 120 milliGRAM(s) Oral daily  levothyroxine 100 MICROGram(s) Oral daily  losartan 50 milliGRAM(s) Oral daily  metoprolol tartrate 100 milliGRAM(s) Oral two times a day  Nephro-jeana 1 Tablet(s) Oral daily  NIFEdipine XL 60 milliGRAM(s) Oral daily  pantoprazole    Tablet 40 milliGRAM(s) Oral before breakfast  senna 2 Tablet(s) Oral at bedtime  tiotropium 18 MICROgram(s) Capsule 1 Capsule(s) Inhalation daily    MEDICATIONS  (PRN):  metoprolol tartrate Injectable 5 milliGRAM(s) IV Push every 6 hours PRN for HR > 100, hold for HR < 50 or SBP < 100      PHYSICAL EXAM:  Vital Signs Last 24 Hrs  T(C): 36.8 (20 Apr 2019 16:13), Max: 37.1 (19 Apr 2019 21:45)  T(F): 98.2 (20 Apr 2019 16:13), Max: 98.8 (19 Apr 2019 21:45)  HR: 71 (20 Apr 2019 16:00) (58 - 88)  BP: 161/57 (20 Apr 2019 16:00) (144/58 - 182/86)  BP(mean): 92 (20 Apr 2019 12:03) (91 - 99)  RR: 20 (20 Apr 2019 16:00) (10 - 23)  SpO2: 96% (20 Apr 2019 16:00) (94% - 100%)    GENERAL: NAD, well-groomed, well-developed  HEAD:  Atraumatic, Normocephalic  NECK: Supple, no JVD noted.  NERVOUS SYSTEM:  Alert & Oriented X3, Good concentration; Motor Strength 5/5 B/L upper and lower extremities, sensation intact  CHEST/LUNG: Coarse crackles bilaterally in all lung fields consistent w/ volume overload. poor inspiratory effort. No wheezes.  HEART: Regular rate and rhythm; s1 and s2 auscultated, No murmurs, rubs, or gallops  ABDOMEN: Soft, Nontender, Nondistended; Bowel sounds present and normoactive.   EXTREMITIES:  2+ Peripheral Pulses, No clubbing, cyanosis, or edema  SKIN: No rashes or lesions

## 2019-04-20 NOTE — PROGRESS NOTE ADULT - SUBJECTIVE AND OBJECTIVE BOX
CHIEF COMPLAINT/INTERVAL HISTORY:    Patient is a 79y old  Male who presents with a chief complaint of sob (20 Apr 2019 17:37)      HPI:  78 yo M hx of chronic diastolic CHF, lung CA, pulmonary fibrosis CAD s/p CABG, ESRD on dialysis (TTS), on home 2L O2, p/w sob and orthopnea x 3 days. Has been wearing 4 L nc o2 all the time. denies lower ext swelling. He also report intermittent left sided chest pain, intermittently radiating to left upper chest but now improved at tiem of hx , c/o productive cough with mild blood tinged yellowish brown phlegm, also had episode of chills and vomiting with nausea yesterday. denies any blood in vomitus ,  no abdominal pain/ vomiting  has been chronic 2-3x/day most days , has low appetite .  Patient last dialyzed yesterday. Denies any sick contacts   Family two daughters  at bedside, patient a&ox3, states that he has not been able to ambulate much at home, has been requiring 2-3 people assist for transfers.  patient. has.    Denies palpitations, irregular and/or rapid heart beat, syncope/near syncope, dizziness, edema, n/v/d, hematuria, or hematochezia.    Patient was DNR/DNI at last admission , today he wishes to be full code.     seen by cardio and nephro in er. plan for urgent HD (19 Apr 2019 16:18)      SUBJECTIVE & OBJECTIVE/ ROS: Pt seen and examined at bedside. Pt was SOB this am with hypoxia & was placed on a VM. Pt appears uncomfortable currently. c/o mild left sided chest pain. No palpitations,  light headedness/dizziness, fevers/chills, abdominal pain, n/v, diarrhea/constipation    ICU Vital Signs Last 24 Hrs  T(C): 36.8 (20 Apr 2019 16:13), Max: 37.1 (19 Apr 2019 21:45)  T(F): 98.2 (20 Apr 2019 16:13), Max: 98.8 (19 Apr 2019 21:45)  HR: 71 (20 Apr 2019 16:00) (58 - 88)  BP: 161/57 (20 Apr 2019 16:00) (144/58 - 182/86)  BP(mean): 92 (20 Apr 2019 12:03) (91 - 99)  ABP: --  ABP(mean): --  RR: 20 (20 Apr 2019 16:00) (10 - 23)  SpO2: 96% (20 Apr 2019 16:00) (94% - 100%)        MEDICATIONS  (STANDING):  ALBUTerol    90 MICROgram(s) HFA Inhaler 1 Puff(s) Inhalation every 4 hours  ALBUTerol/ipratropium for Nebulization 3 milliLiter(s) Nebulizer every 6 hours  aspirin  chewable 324 milliGRAM(s) Oral daily  atorvastatin 80 milliGRAM(s) Oral at bedtime  clopidogrel Tablet 75 milliGRAM(s) Oral daily  docusate sodium 100 milliGRAM(s) Oral three times a day  DULoxetine 20 milliGRAM(s) Oral daily  gabapentin 300 milliGRAM(s) Oral two times a day  heparin  Injectable 5000 Unit(s) SubCutaneous every 12 hours  isosorbide   mononitrate ER Tablet (IMDUR) 120 milliGRAM(s) Oral daily  levothyroxine 100 MICROGram(s) Oral daily  losartan 50 milliGRAM(s) Oral daily  metoprolol tartrate 100 milliGRAM(s) Oral two times a day  Nephro-jeana 1 Tablet(s) Oral daily  NIFEdipine XL 60 milliGRAM(s) Oral daily  pantoprazole    Tablet 40 milliGRAM(s) Oral before breakfast  senna 2 Tablet(s) Oral at bedtime  tiotropium 18 MICROgram(s) Capsule 1 Capsule(s) Inhalation daily    MEDICATIONS  (PRN):  metoprolol tartrate Injectable 5 milliGRAM(s) IV Push every 6 hours PRN for HR > 100, hold for HR < 50 or SBP < 100      LABS:                        10.6   5.9   )-----------( 230      ( 20 Apr 2019 04:45 )             33.4     04-20    133<L>  |  93<L>  |  9.0  ----------------------------<  137<H>  4.0   |  26.0  |  2.36<H>    Ca    8.6      20 Apr 2019 04:45    TPro  7.5  /  Alb  3.4  /  TBili  0.3<L>  /  DBili  x   /  AST  100<H>  /  ALT  41<H>  /  AlkPhos  55  04-19    PT/INR - ( 19 Apr 2019 11:32 )   PT: 11.0 sec;   INR: 0.96 ratio         PTT - ( 19 Apr 2019 11:32 )  PTT:30.7 sec      CAPILLARY BLOOD GLUCOSE      POCT Blood Glucose.: 148 mg/dL (20 Apr 2019 17:31)  POCT Blood Glucose.: 96 mg/dL (20 Apr 2019 00:31)      RECENT CULTURES:      RADIOLOGY & ADDITIONAL TESTS:      PHYSICAL EXAM:    GENERAL: mild resp distress  HEAD:  Atraumatic, Normocephalic  EYES: EOMI, PERRLA, conjunctiva and sclera clear  NECK: Supple, No JVD  NERVOUS SYSTEM:  Alert & Oriented X3, Motor Strength 5/5 B/L upper and lower extremities; DTRs 2+ intact and symmetric  CHEST/LUNG: bibasilar crackles  HEART: Regular rate and rhythm; No murmurs, rubs, or gallops  ABDOMEN: Soft, Nontender, Nondistended; Bowel sounds present  EXTREMITIES:  2+ Peripheral Pulses, No clubbing, cyanosis, or edema  SKIN: No rashes or lesions

## 2019-04-20 NOTE — PROGRESS NOTE ADULT - ASSESSMENT
1) ESRD on HD  2) Anemia of renal dx  3) MBD of renal dx  4) Vol/HTN    SOB much related to underlying lung dx;  Was dialyzed yesterday  Plan for HD Monday  Consider pulmonary evaluation;  Will follow    d/w Dr Miles

## 2019-04-20 NOTE — DIETITIAN INITIAL EVALUATION ADULT. - OTHER INFO
Pt admitted for CHF, ESRD on HD. Awaiting SLP evaluation for appropriate diet-- Full liquid diet being held right now.

## 2019-04-20 NOTE — DIETITIAN INITIAL EVALUATION ADULT. - PHYSICAL APPEARANCE
BMI 19 NFPE indicates severe fat/ muscle wasting (Clavicles, Shoulders, Orbitals, Buccal)/underweight

## 2019-04-20 NOTE — CHART NOTE - NSCHARTNOTEFT_GEN_A_CORE
Upon Nutritional Assessment by the Registered Dietitian your patient was determined to meet criteria / has evidence of the following diagnosis/diagnoses:          [x ] Severe Protein Calorie Malnutrition--- Pt has been seen by nutrition dept during past admissions- Malnutrition continues           · Oral Intake PTA	poor  Pt with N/V and poor appetite PTA  · Factors Affecting Food Intake	difficulty chewing; difficulty feeding self; difficulty swallowing; persistent lack of appetite; vomiting  · Physical Appearance	underweight; BMI 19 NFPE indicates severe fat/ muscle wasting (Clavicles, Shoulders, Orbitals, Buccal)    Malnutrition; severe chronic  related to inadequate protein energy intake in the setting of CHF, dysphagia  as evidenced by 20lb (14%) wt loss x6m, severe fat/muscle wasting, <50% PO intake >1m        Findings as based on:  •  Comprehensive nutrition assessment and consultation  •  Calorie counts (nutrient intake analysis)  •  Food acceptance and intake status from observations by staff  •  Follow up  •  Patient education  •  Intervention secondary to interdisciplinary rounds  •   concerns      Treatment:    The following diet has been recommended:     DIET ORDER PER SLP EVALUATION--- IF DIET PERMITS, PLEASE ORDER NEPRO TID        PROVIDER Section:     By signing this assessment you are acknowledging and agree with the diagnosis/diagnoses assigned by the Registered Dietitian    Comments:

## 2019-04-20 NOTE — PROGRESS NOTE ADULT - ASSESSMENT
80 yo M hx of chronic diastolic CHF, lung CA, pulmonary fibrosis CAD s/p CABG, ESRD on dialysis (TTS), on home 2L O2, p/w sob and orthopnea x 3 days. Has been wearing 4 L nc o2 all the time. denies lower ext swelling. He also report intermittent left sided chest pain, intermittently radiating to left upper chest but now improved at tiem of hx , c/o productive cough with mild blood tinged yellowish brown phlegm, also had episode of chills and vomiting with nausea yesterday. denies any blood in vomitus ,  no abdominal pain/ vomiting  has been chronic 2-3x/day most days , has low appetite .Patient last dialyzed yesterday. Denies any sick contacts .Family two daughters  at bedside, patient a&ox3, states that he has not been able to ambulate much at home, has been requiring 2-3 people assist for transfers.  patient. has.  Patient was DNR/DNI at last admission , today he wishes to be full code.     > acute on chronci hypoxic resp failure: likely sec to acute on chronic diastolic chf   -monitor i/os   -urgent HD done yesterday. Discussed with nephro, pt to go for another HD session today  -TnI elevation attributed to poor renal clearance as per cardio  -cardio and nephro appreciated  -given bumex in er x 1 dose with no improvement as pt is oligouric    >Dysphagia   Pt c/o food stuck in throat while swallowing. Evaluated by S & [facility] eval, NPO for now with re-eval in am    >Chest pain/ possible demand ischemia   EKG no changes, telemetry no changes  -TnI elevation attributed to poor renal clearance as per cardio  -c/w aspirin and plavix when able to take PO  -c/w ARBs and bb (IV lopressor added until able to take PO)        >Worsening Interstitial & alveolar lung disease  ?infectious etiology with hx of pulmonary fibrosis  c/w IV abx  -sputum cxs , bcxs   -RVP pannel -ve  -c/w broad sp abxs / taper down   Pulm consult called    > Hypertension/ uncontrolled   - increase Losartan to 50mg QD  - c/w nifedipine 60mg QD    > ESRD on hemodialysis  -urgent HD done yesterday. Discussed with nephro, pt to go for another HD session today.   - resume home medications.     >anemia of chronic disease    -stable h/h   -monitor     > likely severe protein teddy malnutrition   -nutrition consult     > dvt ppx: scds for no w, monitor if any worsening hemoptysis     >GOC: was DNR/ DNI during last admission. wishes to be full code today. will get palliative care cosnult

## 2019-04-20 NOTE — SWALLOW BEDSIDE ASSESSMENT ADULT - SLP PERTINENT HISTORY OF CURRENT PROBLEM
patient; family/significant other patient; family/significant other report weight loss 30 pounds across last few month, pt and daughter report difficulty swallowing

## 2019-04-20 NOTE — PROGRESS NOTE ADULT - ASSESSMENT
80yo male PMH CHF, lung CA, CAD s/p CABG, ESRD on HD (T, Th, Sa) via LAVF, on home 2L O2, presents to ED with c/o worsening sob, cough with blood tinged brown sputum, and orthopnea x 3 days.  Family at bedside, patient a&ox3 though has difficulty providing clear detailed history.  patient has also been c/o 2-3 left sided "faint" intermittent pain.  Per daughter he has had a decrease in PO intake and continues to c/o chronic vomiting 2-3x/day most days.

## 2019-04-20 NOTE — PROGRESS NOTE ADULT - SUBJECTIVE AND OBJECTIVE BOX
Nephrology Progress Note    Worsening shortness of breath;   Placed on venturi mask by Dr Miles  Plan for ultrafiltration 2 hours 2kg stat;  d/w HD RN

## 2019-04-20 NOTE — PROGRESS NOTE ADULT - SUBJECTIVE AND OBJECTIVE BOX
[facility] DIVISION OF KIDNEY DISEASES AND HYPERTENSION -- FOLLOW UP NOTE  --------------------------------------------------------------------------------  Chief Complaint: ESRD HD    24 hour events/subjective:  Pt is our HD patient; seen/dialyzed by Dr. Coughlin yesterday for emergent HD;  Plan for HD Monday      PAST HISTORY  --------------------------------------------------------------------------------  No significant changes to PMH, PSH, FHx, SHx, unless otherwise noted    ALLERGIES & MEDICATIONS  --------------------------------------------------------------------------------  Allergies    No Known Allergies    Intolerances      Standing Inpatient Medications  ALBUTerol    90 MICROgram(s) HFA Inhaler 1 Puff(s) Inhalation every 4 hours  ALBUTerol/ipratropium for Nebulization 3 milliLiter(s) Nebulizer every 6 hours  aspirin  chewable 324 milliGRAM(s) Oral daily  atorvastatin 80 milliGRAM(s) Oral at bedtime  clopidogrel Tablet 75 milliGRAM(s) Oral daily  docusate sodium 100 milliGRAM(s) Oral three times a day  DULoxetine 20 milliGRAM(s) Oral daily  gabapentin 300 milliGRAM(s) Oral two times a day  isosorbide   mononitrate ER Tablet (IMDUR) 120 milliGRAM(s) Oral daily  levothyroxine 100 MICROGram(s) Oral daily  losartan 50 milliGRAM(s) Oral daily  metoprolol tartrate 100 milliGRAM(s) Oral two times a day  Nephro-jeana 1 Tablet(s) Oral daily  NIFEdipine XL 60 milliGRAM(s) Oral daily  pantoprazole    Tablet 40 milliGRAM(s) Oral before breakfast  senna 2 Tablet(s) Oral at bedtime  tiotropium 18 MICROgram(s) Capsule 1 Capsule(s) Inhalation daily    PRN Inpatient Medications      REVIEW OF SYSTEMS  --------------------------------------------------------------------------------  Gen: No weight changes, fatigue, fevers/chills, weakness  Skin: No rashes  Head/Eyes/Ears/Mouth: No headache; Normal hearing; Normal vision w/o blurriness; No sinus pain/discomfort, sore throat  Respiratory: No dyspnea, cough, wheezing, hemoptysis  CV: No chest pain, PND, orthopnea  GI: No abdominal pain, diarrhea, constipation, nausea, vomiting, melena, hematochezia  : No increased frequency, dysuria, hematuria, nocturia  MSK: No joint pain/swelling; no back pain; no edema  Neuro: No dizziness/lightheadedness, weakness, seizures, numbness, tingling  Heme: No easy bruising or bleeding  Endo: No heat/cold intolerance  Psych: No significant nervousness, anxiety, stress, depression    All other systems were reviewed and are negative, except as noted.    VITALS/PHYSICAL EXAM  --------------------------------------------------------------------------------  T(C): 37.1 (04-20-19 @ 05:32), Max: 37.1 (04-19-19 @ 21:45)  HR: 68 (04-20-19 @ 10:00) (55 - 75)  BP: 144/58 (04-20-19 @ 10:00) (144/58 - 182/86)  RR: 10 (04-20-19 @ 10:00) (10 - 23)  SpO2: 100% (04-20-19 @ 10:00) (94% - 100%)  Wt(kg): --  Height (cm): 167.64 (04-19-19 @ 10:42)  Weight (kg): 55.3 (04-19-19 @ 10:42)  BMI (kg/m2): 19.7 (04-19-19 @ 10:42)  BSA (m2): 1.62 (04-19-19 @ 10:42)      04-19-19 @ 07:01  -  04-20-19 @ 07:00  --------------------------------------------------------  IN: 0 mL / OUT: 2500 mL / NET: -2500 mL      Physical Exam:  	Gen: NAD   	HEENT: PERRL, supple neck, clear oropharynx  	Pulm: dec BS  	CV: RRR, S1S2; no rub  	Back: No spinal or CVA tenderness; no sacral edema  	Abd: +BS, soft, nontender/nondistended  	: No suprapubic tenderness  	UE: Warm, FROM, no clubbing, intact strength; no edema; no asterixis  	LE: Warm, FROM, no clubbing, intact strength; no edema  	Neuro: No focal deficits, intact gait  	Psych: Normal affect and mood  	Skin: Warm, without rashes  	Vascular access:    LABS/STUDIES  --------------------------------------------------------------------------------              10.6   5.9   >-----------<  230      [04-20-19 @ 04:45]              33.4     133  |  93  |  9.0  ----------------------------<  137      [04-20-19 @ 04:45]  4.0   |  26.0  |  2.36        Ca     8.6     [04-20-19 @ 04:45]    TPro  7.5  /  Alb  3.4  /  TBili  0.3  /  DBili  x   /  AST  100  /  ALT  41  /  AlkPhos  55  [04-19-19 @ 11:32]    PT/INR: PT 11.0 , INR 0.96       [04-19-19 @ 11:32]  PTT: 30.7       [04-19-19 @ 11:32]    Troponin 0.30      [04-19-19 @ 16:17]  CK 1353      [04-19-19 @ 16:17]    Creatinine Trend:  SCr 2.36 [04-20 @ 04:45]  SCr 3.40 [04-19 @ 11:32]  SCr 5.53 [04-11 @ 10:22]  SCr 4.03 [04-10 @ 06:55]  SCr 5.05 [04-08 @ 21:21]        Iron 34, TIBC 235, %sat 14      [01-29-19 @ 11:39]  Ferritin 1049      [01-29-19 @ 11:39]  TSH 1.83      [03-11-19 @ 07:37]  Lipid: chol 159, , HDL 32, LDL 94      [01-29-19 @ 11:39]    HBsAg Nonreact      [04-10-19 @ 17:37]

## 2019-04-20 NOTE — PROGRESS NOTE ADULT - PROBLEM SELECTOR PLAN 1
Pt with hx of HFpEF 50-55%, and CAD w recent cath in march with patent stents.   This admission pt presents with volume overload  BNP 40,000, troponin 0.32. EKG no changes, telemetry no changes  Was given trial of bumex without urine output.  Presentation does not appear to have cardiac etiology  Likely is exacerbation of ESRD and need more HD.   Nephrology following pt.   No chest pain s/p imdur, BP better controlled s/p losartan increased  will sign off - plan discussed with Ginger BEGUM Pt with hx of HFpEF 50-55%, and CAD w recent cath in march with patent stents in CX and OM and patent LIMA to LAD with  of RCA  This admission pt presents with volume overload  BNP 40,000, troponin 0.32. EKG no changes, telemetry no changes  Was given trial of bumex without urine output.  Presentation does not appear to have cardiac etiology  Likely is exacerbation of ESRD and need more HD.   Nephrology following pt.   No chest pain s/p imdur, BP better controlled s/p losartan increased  will sign off - plan discussed with Ginger BEGUM

## 2019-04-20 NOTE — SWALLOW BEDSIDE ASSESSMENT ADULT - SPECIFY REASON(S)
r/o dysphagia, weight loss, pt and daughter report difficulty swallow across last few months. ST has not been consulted to now per chart review.

## 2019-04-21 DIAGNOSIS — C34.90 MALIGNANT NEOPLASM OF UNSPECIFIED PART OF UNSPECIFIED BRONCHUS OR LUNG: ICD-10-CM

## 2019-04-21 LAB
-  COAGULASE NEGATIVE STAPHYLOCOCCUS: SIGNIFICANT CHANGE UP
ANION GAP SERPL CALC-SCNC: 16 MMOL/L — SIGNIFICANT CHANGE UP (ref 5–17)
BASOPHILS # BLD AUTO: 0 K/UL — SIGNIFICANT CHANGE UP (ref 0–0.2)
BASOPHILS NFR BLD AUTO: 0.5 % — SIGNIFICANT CHANGE UP (ref 0–2)
BUN SERPL-MCNC: 24 MG/DL — HIGH (ref 8–20)
CALCIUM SERPL-MCNC: 9.3 MG/DL — SIGNIFICANT CHANGE UP (ref 8.6–10.2)
CHLORIDE SERPL-SCNC: 92 MMOL/L — LOW (ref 98–107)
CO2 SERPL-SCNC: 28 MMOL/L — SIGNIFICANT CHANGE UP (ref 22–29)
CREAT SERPL-MCNC: 4.63 MG/DL — HIGH (ref 0.5–1.3)
EOSINOPHIL # BLD AUTO: 0.6 K/UL — HIGH (ref 0–0.5)
EOSINOPHIL NFR BLD AUTO: 5.9 % — HIGH (ref 0–5)
GLUCOSE BLDC GLUCOMTR-MCNC: 101 MG/DL — HIGH (ref 70–99)
GLUCOSE SERPL-MCNC: 109 MG/DL — SIGNIFICANT CHANGE UP (ref 70–115)
HCT VFR BLD CALC: 38.4 % — LOW (ref 42–52)
HGB BLD-MCNC: 12.1 G/DL — LOW (ref 14–18)
LYMPHOCYTES # BLD AUTO: 1.7 K/UL — SIGNIFICANT CHANGE UP (ref 1–4.8)
LYMPHOCYTES # BLD AUTO: 17.9 % — LOW (ref 20–55)
MAGNESIUM SERPL-MCNC: 2.9 MG/DL — HIGH (ref 1.6–2.6)
MCHC RBC-ENTMCNC: 28.5 PG — SIGNIFICANT CHANGE UP (ref 27–31)
MCHC RBC-ENTMCNC: 31.5 G/DL — LOW (ref 32–36)
MCV RBC AUTO: 90.6 FL — SIGNIFICANT CHANGE UP (ref 80–94)
METHOD TYPE: SIGNIFICANT CHANGE UP
MONOCYTES # BLD AUTO: 1.2 K/UL — HIGH (ref 0–0.8)
MONOCYTES NFR BLD AUTO: 12.6 % — HIGH (ref 3–10)
NEUTROPHILS # BLD AUTO: 6 K/UL — SIGNIFICANT CHANGE UP (ref 1.8–8)
NEUTROPHILS NFR BLD AUTO: 62.9 % — SIGNIFICANT CHANGE UP (ref 37–73)
PHOSPHATE SERPL-MCNC: 4.8 MG/DL — HIGH (ref 2.4–4.7)
PLATELET # BLD AUTO: 253 K/UL — SIGNIFICANT CHANGE UP (ref 150–400)
POTASSIUM SERPL-MCNC: 5.2 MMOL/L — SIGNIFICANT CHANGE UP (ref 3.5–5.3)
POTASSIUM SERPL-SCNC: 5.2 MMOL/L — SIGNIFICANT CHANGE UP (ref 3.5–5.3)
RBC # BLD: 4.24 M/UL — LOW (ref 4.6–6.2)
RBC # FLD: 16.7 % — HIGH (ref 11–15.6)
SODIUM SERPL-SCNC: 136 MMOL/L — SIGNIFICANT CHANGE UP (ref 135–145)
WBC # BLD: 9.5 K/UL — SIGNIFICANT CHANGE UP (ref 4.8–10.8)
WBC # FLD AUTO: 9.5 K/UL — SIGNIFICANT CHANGE UP (ref 4.8–10.8)

## 2019-04-21 PROCEDURE — 99223 1ST HOSP IP/OBS HIGH 75: CPT

## 2019-04-21 PROCEDURE — 90937 HEMODIALYSIS REPEATED EVAL: CPT

## 2019-04-21 PROCEDURE — 99232 SBSQ HOSP IP/OBS MODERATE 35: CPT

## 2019-04-21 RX ORDER — VANCOMYCIN HCL 1 G
1000 VIAL (EA) INTRAVENOUS ONCE
Refills: 0 | Status: COMPLETED | OUTPATIENT
Start: 2019-04-21 | End: 2019-04-21

## 2019-04-21 RX ORDER — HYDRALAZINE HCL 50 MG
10 TABLET ORAL EVERY 6 HOURS
Refills: 0 | Status: DISCONTINUED | OUTPATIENT
Start: 2019-04-21 | End: 2019-04-26

## 2019-04-21 RX ADMIN — PIPERACILLIN AND TAZOBACTAM 25 GRAM(S): 4; .5 INJECTION, POWDER, LYOPHILIZED, FOR SOLUTION INTRAVENOUS at 11:04

## 2019-04-21 RX ADMIN — Medication 3 MILLILITER(S): at 02:45

## 2019-04-21 RX ADMIN — Medication 250 MILLIGRAM(S): at 14:24

## 2019-04-21 RX ADMIN — Medication 3 MILLILITER(S): at 15:13

## 2019-04-21 RX ADMIN — PIPERACILLIN AND TAZOBACTAM 25 GRAM(S): 4; .5 INJECTION, POWDER, LYOPHILIZED, FOR SOLUTION INTRAVENOUS at 00:03

## 2019-04-21 RX ADMIN — Medication 10 MILLIGRAM(S): at 20:36

## 2019-04-21 RX ADMIN — HEPARIN SODIUM 5000 UNIT(S): 5000 INJECTION INTRAVENOUS; SUBCUTANEOUS at 05:58

## 2019-04-21 RX ADMIN — Medication 3 MILLILITER(S): at 20:17

## 2019-04-21 RX ADMIN — Medication 3 MILLILITER(S): at 08:41

## 2019-04-21 RX ADMIN — Medication 10 MILLIGRAM(S): at 11:08

## 2019-04-21 RX ADMIN — HEPARIN SODIUM 5000 UNIT(S): 5000 INJECTION INTRAVENOUS; SUBCUTANEOUS at 18:02

## 2019-04-21 NOTE — PROGRESS NOTE ADULT - SUBJECTIVE AND OBJECTIVE BOX
[facility] DIVISION OF KIDNEY DISEASES AND HYPERTENSION -- FOLLOW UP NOTE  --------------------------------------------------------------------------------  Chief Complaint: ESRD HD    24 hour events/subjective:  Seen/examined;  NPO; failed swallowing eval twice; complaining of hunger;  Pt states he feels cold;  Breathing much improved this AM; had UF overnight;      PAST HISTORY  --------------------------------------------------------------------------------  No significant changes to PMH, PSH, FHx, SHx, unless otherwise noted    ALLERGIES & MEDICATIONS  --------------------------------------------------------------------------------  Allergies    No Known Allergies    Intolerances      Standing Inpatient Medications  ALBUTerol    90 MICROgram(s) HFA Inhaler 1 Puff(s) Inhalation every 4 hours  ALBUTerol/ipratropium for Nebulization 3 milliLiter(s) Nebulizer every 6 hours  aspirin  chewable 324 milliGRAM(s) Oral daily  atorvastatin 80 milliGRAM(s) Oral at bedtime  clopidogrel Tablet 75 milliGRAM(s) Oral daily  docusate sodium 100 milliGRAM(s) Oral three times a day  DULoxetine 20 milliGRAM(s) Oral daily  gabapentin 300 milliGRAM(s) Oral two times a day  heparin  Injectable 5000 Unit(s) SubCutaneous every 12 hours  isosorbide   mononitrate ER Tablet (IMDUR) 120 milliGRAM(s) Oral daily  levothyroxine 100 MICROGram(s) Oral daily  losartan 50 milliGRAM(s) Oral daily  metoprolol tartrate 100 milliGRAM(s) Oral two times a day  Nephro-jeana 1 Tablet(s) Oral daily  NIFEdipine XL 60 milliGRAM(s) Oral daily  pantoprazole    Tablet 40 milliGRAM(s) Oral before breakfast  piperacillin/tazobactam IVPB. 3.375 Gram(s) IV Intermittent every 12 hours  senna 2 Tablet(s) Oral at bedtime  tiotropium 18 MICROgram(s) Capsule 1 Capsule(s) Inhalation daily  vancomycin  IVPB 1000 milliGRAM(s) IV Intermittent once    PRN Inpatient Medications  hydrALAZINE Injectable 10 milliGRAM(s) IV Push every 6 hours PRN  metoprolol tartrate Injectable 5 milliGRAM(s) IV Push every 6 hours PRN      REVIEW OF SYSTEMS  --------------------------------------------------------------------------------  Gen: No weight changes, fatigue, fevers/chills, weakness  Skin: No rashes  Head/Eyes/Ears/Mouth: No headache; Normal hearing; Normal vision w/o blurriness; No sinus pain/discomfort, sore throat  Respiratory: No dyspnea, cough, wheezing, hemoptysis  CV: No chest pain, PND, orthopnea  GI: No abdominal pain, diarrhea, constipation, nausea, vomiting, melena, hematochezia  : No increased frequency, dysuria, hematuria, nocturia  MSK: No joint pain/swelling; no back pain; no edema  Neuro: No dizziness/lightheadedness, weakness, seizures, numbness, tingling  Heme: No easy bruising or bleeding  Endo: No heat/cold intolerance  Psych: No significant nervousness, anxiety, stress, depression    All other systems were reviewed and are negative, except as noted.    VITALS/PHYSICAL EXAM  --------------------------------------------------------------------------------  T(C): 36.7 (04-21-19 @ 05:04), Max: 37.1 (04-20-19 @ 19:25)  HR: 70 (04-21-19 @ 08:41) (60 - 88)  BP: 197/76 (04-21-19 @ 07:48) (149/58 - 197/76)  RR: 16 (04-21-19 @ 07:48) (14 - 23)  SpO2: 93% (04-21-19 @ 08:41) (93% - 100%)  Wt(kg): --        04-20-19 @ 07:01  -  04-21-19 @ 07:00  --------------------------------------------------------  IN: 500 mL / OUT: 2400 mL / NET: -1900 mL      Physical Exam:  	Gen: NAD   	HEENT: PERRL, supple neck, clear oropharynx  	Pulm: dec BS  	CV: RRR, S1S2; no rub  	Back: No spinal or CVA tenderness; no sacral edema  	Abd: +BS, soft, nontender/nondistended  	: No suprapubic tenderness  	UE: Warm, FROM, no clubbing, intact strength; no edema; no asterixis  	LE: Warm, FROM, no clubbing, intact strength; no edema  	Neuro: No focal deficits, intact gait  	Psych: Normal affect and mood  	Skin: Warm, without rashes    LABS/STUDIES  --------------------------------------------------------------------------------              12.1   9.5   >-----------<  253      [04-21-19 @ 11:53]              38.4     136  |  92  |  24.0  ----------------------------<  109      [04-21-19 @ 11:53]  5.2   |  28.0  |  4.63        Ca     9.3     [04-21-19 @ 11:53]      Mg     2.9     [04-21-19 @ 11:53]      Phos  4.8     [04-21-19 @ 11:53]          Troponin 0.30      [04-19-19 @ 16:17]  CK 1353      [04-19-19 @ 16:17]    Creatinine Trend:  SCr 4.63 [04-21 @ 11:53]  SCr 2.36 [04-20 @ 04:45]  SCr 3.40 [04-19 @ 11:32]  SCr 5.53 [04-11 @ 10:22]  SCr 4.03 [04-10 @ 06:55]        Iron 34, TIBC 235, %sat 14      [01-29-19 @ 11:39]  Ferritin 1049      [01-29-19 @ 11:39]  TSH 1.83      [03-11-19 @ 07:37]  Lipid: chol 159, , HDL 32, LDL 94      [01-29-19 @ 11:39]    HBsAg Nonreact      [04-10-19 @ 17:37]

## 2019-04-21 NOTE — PROGRESS NOTE ADULT - ASSESSMENT
1) ESRD on HD  2) Anemia of renal dx  3) MBD of renal dx  4) Vol/HTN    Got UF overnight;  Breathing much improved;  Palliative care to discuss goals of care; pt rescinded DNR/DNI;  Failed swallow eval; will likely need PEG or NGT; however would discuss goals of care;  Plan for HD in AM    dw Dr Nair 1) ESRD on HD  2) Anemia of renal dx  3) MBD of renal dx  4) Vol/HTN    Got UF overnight;  Breathing much improved;  Palliative care to discuss goals of care; pt rescinded DNR/DNI;  Failed swallow eval; will likely need PEG or NGT; however would discuss goals of care;  Plan for HD in AM    bari Miles

## 2019-04-21 NOTE — CONSULT NOTE ADULT - SUBJECTIVE AND OBJECTIVE BOX
PULMONARY CONSULT NOTE      KAUSHIK HOFFMANYOVANNY-[id number]    Patient is a 79y old  Male who presents with a chief complaint of sob (20 Apr 2019 18:05)  78 yo M hx of chronic diastolic CHF, lung CA, pulmonary fibrosis CAD s/p CABG, ESRD on dialysis (TTS), on home 2L O2, p/w sob and orthopnea x 3 days. Has been wearing 4 L nc o2 all the time. denies lower ext swelling. He also report intermittent left sided chest pain, intermittently radiating to left upper chest but now improved at tiem of hx , c/o productive cough with mild blood tinged yellowish brown phlegm, also had episode of chills and vomiting with nausea yesterday. denies any blood in vomitus ,  no abdominal pain/ vomiting  has been chronic 2-3x/day most days , has low appetite .  Patient last dialyzed yesterday. Denies any sick contacts     INTERVAL HPI/OVERNIGHT EVENTS:    MEDICATIONS  (STANDING):  ALBUTerol    90 MICROgram(s) HFA Inhaler 1 Puff(s) Inhalation every 4 hours  ALBUTerol/ipratropium for Nebulization 3 milliLiter(s) Nebulizer every 6 hours  aspirin  chewable 324 milliGRAM(s) Oral daily  atorvastatin 80 milliGRAM(s) Oral at bedtime  clopidogrel Tablet 75 milliGRAM(s) Oral daily  docusate sodium 100 milliGRAM(s) Oral three times a day  DULoxetine 20 milliGRAM(s) Oral daily  gabapentin 300 milliGRAM(s) Oral two times a day  heparin  Injectable 5000 Unit(s) SubCutaneous every 12 hours  isosorbide   mononitrate ER Tablet (IMDUR) 120 milliGRAM(s) Oral daily  levothyroxine 100 MICROGram(s) Oral daily  losartan 50 milliGRAM(s) Oral daily  metoprolol tartrate 100 milliGRAM(s) Oral two times a day  Nephro-jeana 1 Tablet(s) Oral daily  NIFEdipine XL 60 milliGRAM(s) Oral daily  pantoprazole    Tablet 40 milliGRAM(s) Oral before breakfast  piperacillin/tazobactam IVPB. 3.375 Gram(s) IV Intermittent every 12 hours  senna 2 Tablet(s) Oral at bedtime  tiotropium 18 MICROgram(s) Capsule 1 Capsule(s) Inhalation daily  vancomycin  IVPB 1000 milliGRAM(s) IV Intermittent once      MEDICATIONS  (PRN):  hydrALAZINE Injectable 10 milliGRAM(s) IV Push every 6 hours PRN for SBP > 150  metoprolol tartrate Injectable 5 milliGRAM(s) IV Push every 6 hours PRN for HR > 100, hold for HR < 50 or SBP < 100      Allergies    No Known Allergies    Intolerances        PAST MEDICAL & SURGICAL HISTORY:  Dialysis patient: Tues, Thurs, Saturday  Chronic anemia  ESRD (end stage renal disease): right Upper arm fistula  CAD (coronary artery disease)  Lung cancer: had yoni radiation in 2006.  Bipolar disorder  High cholesterol  Hypertension  S/P CABG (coronary artery bypass graft): pt&#x27;s son in [facility] states h/o some stents near neck area ? carotid ? he is not sure.      FAMILY HISTORY:  Family history of essential hypertension      SOCIAL HISTORY  Smoking History:     REVIEW OF SYSTEMS:    CONSTITUTIONAL:  As per HPI.    HEENT:  Eyes:  No diplopia or blurred vision. ENT:  No earache, sore throat or runny nose.    CARDIOVASCULAR:  No pressure, squeezing, tightness, heaviness or aching about the chest; no palpitations.    RESPIRATORY:  No cough, shortness of breath, PND or orthopnea. Mild SOBOE    GASTROINTESTINAL:  No nausea, vomiting or diarrhea.    GENITOURINARY:  No dysuria, frequency or urgency.    MUSCULOSKELETAL:  No joint pains    SKIN:  No new lesions.    NEUROLOGIC:  No paresthesias, fasciculations, seizures or weakness.    PSYCHIATRIC:  No disorder of thought or mood.    ENDOCRINE:  No heat or cold intolerance, polyuria or polydipsia.    HEMATOLOGICAL:  No easy bruising or bleeding.     Vital Signs Last 24 Hrs  T(C): 36.7 (21 Apr 2019 05:04), Max: 37.1 (20 Apr 2019 19:25)  T(F): 98.1 (21 Apr 2019 05:04), Max: 98.7 (20 Apr 2019 19:25)  HR: 70 (21 Apr 2019 08:41) (60 - 88)  BP: 197/76 (21 Apr 2019 07:48) (149/58 - 197/76)  BP(mean): 92 (21 Apr 2019 05:00) (83 - 92)  RR: 16 (21 Apr 2019 07:48) (14 - 23)  SpO2: 93% (21 Apr 2019 08:41) (93% - 100%)    PHYSICAL EXAMINATION:    GENERAL: The patient is a well-developed, well-nourished male wearing nasal oxygen_____in no apparent distress   He appears weak however    HEENT: Head is normocephalic and atraumatic. Extraocular muscles are intact. Mucous membranes are moist.     NECK: Supple.     LUNGS Rales up to midportion of right lung    HEART: Regular rate and rhythm without murmur.    ABDOMEN: Soft, nontender, and nondistended.  No hepatosplenomegaly is noted.    EXTREMITIES: Without any cyanosis, clubbing, rash, lesions or edema.    NEUROLOGIC: Grossly intact.    SKIN: No ulceration or induration present.      LABS:                        10.6   5.9   )-----------( 230      ( 20 Apr 2019 04:45 )             33.4     04-20    133<L>  |  93<L>  |  9.0  ----------------------------<  137<H>  4.0   |  26.0  |  2.36<H>    Ca    8.6      20 Apr 2019 04:45            CARDIAC MARKERS ( 19 Apr 2019 16:17 )  x     / 0.30 ng/mL / 1353 U/L / x     / 6.6 ng/mL        Serum Pro-Brain Natriuretic Peptide: 87474 pg/mL (04-19-19 @ 19:38)  Serum Pro-Brain Natriuretic Peptide: 72533 pg/mL (04-19-19 @ 11:32)          MICROBIOLOGY:    RADIOLOGY & ADDITIONAL STUDIES:< from: Xray Chest 1 View-PORTABLE IMMEDIATE (04.20.19 @ 19:15) >    INTERPRETATION:  Portable chest radiograph        CLINICAL INFORMATION:   Premature ventricular contractions. Diffuse   infiltrates. Follow-up.    TECHNIQUE:  Portable  AP view of the chest was obtained.    COMPARISON: 4/19/2019 and/or a chronic interstitial lung disease which   appears similar to prior 4/8/2019 exam. available for review.    FINDINGS:   The lungs  a similar diffuse multifocal M airspace consolidations         The  heart is enlarged in transverse diameter. No hilar mass. Trachea   midline.       . Status post coronary artery bypass graft procedure. Visualized osseous   structures are intact.        IMPRESSION:   Cardiomegaly. Chronic a pulmonary interstitial nodular   opacities are concerning for chronic interstitial lung disease . and/or   superimposed infectious pneumonia..              < end of copied text >

## 2019-04-21 NOTE — PROGRESS NOTE ADULT - ASSESSMENT
78 yo M hx of chronic diastolic CHF, lung CA, pulmonary fibrosis CAD s/p CABG, ESRD on dialysis (TTS), on home 2L O2, p/w sob and orthopnea x 3 days. Has been wearing 4 L nc o2 all the time. denies lower ext swelling. He also report intermittent left sided chest pain, intermittently radiating to left upper chest but now improved at tiem of hx , c/o productive cough with mild blood tinged yellowish brown phlegm, also had episode of chills and vomiting with nausea yesterday. denies any blood in vomitus ,  no abdominal pain/ vomiting  has been chronic 2-3x/day most days , has low appetite .Patient last dialyzed yesterday. Denies any sick contacts .Family two daughters  at bedside, patient a&ox3, states that he has not been able to ambulate much at home, has been requiring 2-3 people assist for transfers.  patient. has.  Patient was DNR/DNI at last admission , today he wishes to be full code.       >Positive blood cultures for Gram + cocci  Vanco x 1 dose given  Repeat Blood cx ordered  ID consult called    > acute on chronic hypoxic resp failure: likely sec to acute on chronic diastolic chf   -monitor i/os   -urgent HD done on 4/19 & 4/20. Further HD tomorrow  -TnI elevation attributed to poor renal clearance as per cardio  -cardio and nephro appreciated  -given bumex in er x 1 dose with no improvement as pt is oligouric    >Dysphagia-  As per pt he has trouble swallowing since the past month where food gets stuck in his throat. Reports 25 lbs unintentional weight loss in 2-3 months. GI consult called.   Evaluated by S & S eval, Oral stage WFL for puree/thin however, suspected pharyngeal dysphagia noted with puree and thin (limited PO given) as pt demonstrating multiple swallowing and throat clearing on small amount of PO given by ST. NPO for now with re-eval in am as per ST    >Chest pain/ possible demand ischemia   EKG no changes, telemetry no changes  -TnI elevation attributed to poor renal clearance as per cardio  -c/w aspirin and plavix when able to take PO  -c/w ARBs and bb (IV lopressor added until able to take PO)        >Worsening Interstitial & alveolar lung disease  ?infectious etiology vs pneumonitis vs worsening of pulmonary fibrosis  c/w IV zosyn  -sputum cxs , bcxs   -RVP panel -ve  -c/w broad sp abxs / taper down   Pulm consult appreciated    > Hypertension/ uncontrolled   - increase Losartan to 50mg QD  - c/w nifedipine 60mg QD    > ESRD on hemodialysis  -urgent HD done on 4/19 & 4/20.  Breathing much improved; Further HD tomorrow.  - resume home medications.     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 wishes to be full code today. will get palliative care cosnult 80 yo M hx of chronic diastolic CHF, lung CA, pulmonary fibrosis CAD s/p CABG, ESRD on dialysis (TTS), on home 2L O2, p/w sob and orthopnea x 3 days. Has been wearing 4 L nc o2 all the time. denies lower ext swelling. He also report intermittent left sided chest pain, intermittently radiating to left upper chest but now improved at tiem of hx , c/o productive cough with mild blood tinged yellowish brown phlegm, also had episode of chills and vomiting with nausea yesterday. denies any blood in vomitus ,  no abdominal pain/ vomiting  has been chronic 2-3x/day most days , has low appetite .Patient last dialyzed yesterday. Denies any sick contacts .Family two daughters  at bedside, patient a&ox3, states that he has not been able to ambulate much at home, has been requiring 2-3 people assist for transfers.  patient. has.  Patient was DNR/DNI at last admission , today he wishes to be full code.       >Positive blood cultures for Gram + cocci  Vanco x 1 dose given  Repeat Blood cx ordered  ID consult called    > acute on chronic hypoxic resp failure: likely sec to acute on chronic diastolic chf   -monitor i/os   -urgent HD done on 4/19 & 4/20. Further HD tomorrow  -TnI elevation attributed to poor renal clearance as per cardio  -cardio and nephro appreciated  -given bumex in er x 1 dose with no improvement as pt is oligouric    >Dysphagia-  As per pt he has trouble swallowing since the past month where food gets stuck in his throat. Reports 25 lbs unintentional weight loss in 2-3 months. GI consult called.   Evaluated by S & S eval, Oral stage WFL for puree/thin however, suspected pharyngeal dysphagia noted with puree and thin (limited PO given) as pt demonstrating multiple swallowing and throat clearing on small amount of PO given by ST. NPO for now with re-eval in am as per ST      >Chest pain/ possible demand ischemia   EKG no changes, telemetry no changes  -TnI elevation attributed to poor renal clearance as per cardio  -c/w aspirin and plavix when able to take PO  -c/w ARBs and bb (IV lopressor added until able to take PO)        >Worsening Interstitial & alveolar lung disease  ?infectious etiology vs pneumonitis vs worsening of pulmonary fibrosis  c/w IV zosyn  -sputum cxs , bcxs   -RVP panel -ve  -c/w broad sp abxs / taper down   Pulm consult appreciated    > Hypertension/ uncontrolled   - Losartan to 50mg QD & nifedipine 60mg QD when able to take PO  Hydralazine 10 IVP q6 prn fow now    > ESRD on hemodialysis  -urgent HD done on 4/19 & 4/20.  Breathing much improved; Further HD tomorrow.  - resume home medications.     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 wishes to be full code today. will get palliative care cosnult 78 yo M hx of chronic diastolic CHF, lung CA, pulmonary fibrosis CAD s/p CABG, ESRD on dialysis (TTS), on home 2L O2, p/w sob and orthopnea x 3 days. Has been wearing 4 L nc o2 all the time. denies lower ext swelling. He also report intermittent left sided chest pain, intermittently radiating to left upper chest but now improved at tiem of hx , c/o productive cough with mild blood tinged yellowish brown phlegm, also had episode of chills and vomiting with nausea yesterday. denies any blood in vomitus ,  no abdominal pain/ vomiting  has been chronic 2-3x/day most days , has low appetite .Patient last dialyzed yesterday. Denies any sick contacts .Family two daughters  at bedside, patient a&ox3, states that he has not been able to ambulate much at home, has been requiring 2-3 people assist for transfers.  patient. has.  Patient was DNR/DNI at last admission , today he wishes to be full code.  Positive blood cultures for Gram + cocci. Vanco x 1 dose given. Repeat Blood cx ordered. ID consult called. Pts symptoms attributed to  acute on chronic hypoxic resp failure: likely sec to acute on chronic diastolic chf. urgent HD done on 4/19 & 4/20. Further HD tomorrow. Pt also with dysphagia & weight loss being evaluated by S & S lana. GI called          >Positive blood cultures for Gram + cocci  Vanco x 1 dose given  Repeat Blood cx ordered  ID consult called    > acute on chronic hypoxic resp failure: likely sec to acute on chronic diastolic chf   -monitor i/os   -urgent HD done on 4/19 & 4/20. Further HD tomorrow  -TnI elevation attributed to poor renal clearance as per cardio  -cardio and nephro appreciated  -given bumex in er x 1 dose with no improvement as pt is oligouric    >Dysphagia-  As per pt he has trouble swallowing since the past month where food gets stuck in his throat. Reports 25 lbs unintentional weight loss in 2-3 months. GI consult called.   Evaluated by S & S eval, Oral stage WFL for puree/thin however, suspected pharyngeal dysphagia noted with puree and thin (limited PO given) as pt demonstrating multiple swallowing and throat clearing on small amount of PO given by ST. NPO for now with re-eval in am as per ST      >Chest pain/ possible demand ischemia   EKG no changes, telemetry no changes  -TnI elevation attributed to poor renal clearance as per cardio  -c/w aspirin and plavix when able to take PO  -c/w ARBs and bb (IV lopressor added until able to take PO)        >Worsening Interstitial & alveolar lung disease  ?infectious etiology vs pneumonitis vs worsening of pulmonary fibrosis  c/w IV zosyn  -sputum cxs , bcxs   -RVP panel -ve  -c/w broad sp abxs / taper down   Pulm consult appreciated    > Hypertension/ uncontrolled   - Losartan to 50mg QD & nifedipine 60mg QD when able to take PO  Hydralazine 10 IVP q6 prn fow now    > ESRD on hemodialysis  -urgent HD done on 4/19 & 4/20.  Breathing much improved; Further HD tomorrow.  - resume home medications.     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 wishes to be full code today. will get palliative care cosnult

## 2019-04-21 NOTE — CONSULT NOTE ADULT - SUBJECTIVE AND OBJECTIVE BOX
[facility]  INFECTIOUS DISEASES AND INTERNAL MEDICINE at [place]  =======================================================  Cricket Lara MD  Diplomates American Board of Internal Medicine and Infectious Diseases  =======================================================      [id number]  KAUSHIK RAMDELIA      CC: Patient is a 79y old  Male who presents with a chief complaint of sob (21 Apr 2019 16:14)    80y/o  Male with h/o chronic diastolic CHF, lung CA, pulmonary fibrosis CAD s/p CABG, ESRD on dialysis (TTS), on home 2L O2. Patient presented with sob and orthopnea x 3 days prior to admission. He was using increased O2. He also report intermittent left sided chest pain, intermittently radiating to left upper chest at that time. In the ER he was afebrile no leukocytosis. Admitted with Acute hypoxemia respiratory failure due to Acute diastolic CHF. Patient underwent HD. SOB improved. He was also started on Zosyn. Blood culture with CoNS. ID input requested.       Past Medical & Surgical Hx:  Dialysis patient: Tues, Thurs, Saturday  Chronic anemia  ESRD (end stage renal disease): right Upper arm fistula  CAD (coronary artery disease)  Lung cancer: had yoni radiation in 2006.  Bipolar disorder  High cholesterol  Hypertension  S/P CABG (coronary artery bypass graft): pt&#x27;s son in [facility] states h/o some stents near neck area ? carotid ? he is not sure.        Social Hx:  Former smoker      FAMILY HISTORY:  Family history of essential hypertension      Allergies  No Known Allergies      Antibiotics:  piperacillin/tazobactam IVPB. 3.375 Gram(s) IV Intermittent every 12 hours  Vancomycin       REVIEW OF SYSTEMS:  CONSTITUTIONAL:  No Fever or chills  HEENT:  No diplopia or blurred vision.  No earache, sore throat or runny nose.  CARDIOVASCULAR:  No pressure, squeezing, strangling, tightness, heaviness or aching about the chest, neck, axilla or epigastrium.  RESPIRATORY:  No cough. + shortness of breath improved  GASTROINTESTINAL:  No nausea, vomiting or diarrhea.  GENITOURINARY:  No flank pain  MUSCULOSKELETAL:  no joint aches, no muscle pain  SKIN:  No change in skin, hair or nails.  NEUROLOGIC:  No Headahce, seizures  PSYCHIATRIC:  No disorder of thought or mood.  ENDOCRINE:  No heat or cold intolerance  HEMATOLOGICAL:  No easy bruising or bleeding.       Physical Exam:  Vital Signs Last 24 Hrs  T(C): 36.6 (21 Apr 2019 21:14), Max: 36.7 (21 Apr 2019 05:04)  T(F): 97.9 (21 Apr 2019 21:14), Max: 98.1 (21 Apr 2019 05:04)  HR: 82 (21 Apr 2019 20:19) (65 - 88)  BP: 165/55 (21 Apr 2019 20:00) (138/79 - 197/76)  BP(mean): 88 (21 Apr 2019 20:00) (83 - 92)  RR: 19 (21 Apr 2019 20:00) (14 - 23)  SpO2: 97% (21 Apr 2019 20:19) (93% - 100%)      GEN: NAD, pleasant  HEENT: normocephalic and atraumatic. EOMI. PERRL.  Anicteric  NECK: Supple.   LUNGS: Decreased Basal BS B/L  HEART: Regular rate and rhythm   ABDOMEN: Soft, nontender, and nondistended.  Positive bowel sounds.    : No CVA tenderness  EXTREMITIES: Without any edema.  MSK: No joint swelling  NEUROLOGIC: No Focal Deficits  PSYCHIATRIC: Appropriate affect .  SKIN: No Rash. + AVF        Labs:  04-21    136  |  92<L>  |  24.0<H>  ----------------------------<  109  5.2   |  28.0  |  4.63<H>    Ca    9.3      21 Apr 2019 11:53  Phos  4.8     04-21  Mg     2.9     04-21               12.1   9.5   )-----------( 253      ( 21 Apr 2019 11:53 )             38.4       RECENT CULTURES:  04-20 @ 14:35 .Sputum     Numerous Routine respiratory miguelina present  Culture in progress  Few White blood cells  Few Gram Positive Cocci in Pairs and Chains  Few Gram Positive Rods  Few Gram Negative Rods      04-19 @ 20:14    RVP  NotDetec      04-19 @ 19:38 .Blood Blood Culture PCR    Growth in aerobic and anaerobic bottles: Gram Positive Cocci in Clusters  Aerobic Bottle: 23:58 Hours to positivity  Anaerobic Bottle: 1 day; 04:59 Hours to positivity  ***Blood Panel PCR results on this specimen are available  approximately 3 hours after the Gram stain result.***  Gram stain, PCR, and/or culture results may not always  correspond due to difference in methodologies.  ************************************************************  This PCR assay was performed using [employer].  The following targets are tested for: Enterococcus,  vancomycin resistant enterococci, Listeria monocytogenes,  coagulase negative staphylococci, S. aureus,  methicillin resistant S. aureus, Streptococcus agalactiae  (Group B), S. pneumoniae, S. pyogenes (Group A),  Acinetobacter baumannii, Enterobacter cloacae, E. coli,  Klebsiella oxytoca, K. pneumoniae, Proteus sp.,  Serratia marcescens, Haemophilus influenzae,  Neisseria meningitidis, Pseudomonas aeruginosa, Candida  albicans, C. glabrata, C krusei, C parapsilosis,  C. tropicalis and the KPC resistance gene.  "Due to technical problems, Proteus sp. will Not be reported as part of  the BCID panel until further notice"        EXAM:  XR CHEST PORTABLE IMMED 1V                        PROCEDURE DATE:  04/20/2019    INTERPRETATION:  Portable chest radiograph      CLINICAL INFORMATION:   Premature ventricular contractions. Diffuse   infiltrates. Follow-up.  TECHNIQUE:  Portable  AP view of the chest was obtained.  COMPARISON: 4/19/2019 and/or a chronic interstitial lung disease which   appears similar to prior 4/8/2019 exam. available for review.  FINDINGS:   The lungs  a similar diffuse multifocal M airspace consolidations       The  heart is enlarged in transverse diameter. No hilar mass. Trachea midline.       . Status post coronary artery bypass graft procedure. Visualized osseous   structures are intact.  IMPRESSION:   Cardiomegaly. Chronic a pulmonary interstitial nodular   opacities are concerning for chronic interstitial lung disease . and/or   superimposed infectious pneumonia..

## 2019-04-22 ENCOUNTER — MOBILE ON CALL (OUTPATIENT)
Age: 79
End: 2019-04-22

## 2019-04-22 DIAGNOSIS — R13.10 DYSPHAGIA, UNSPECIFIED: ICD-10-CM

## 2019-04-22 LAB
-  AMPICILLIN/SULBACTAM: SIGNIFICANT CHANGE UP
-  CEFAZOLIN: SIGNIFICANT CHANGE UP
-  CLINDAMYCIN: SIGNIFICANT CHANGE UP
-  ERYTHROMYCIN: SIGNIFICANT CHANGE UP
-  GENTAMICIN: SIGNIFICANT CHANGE UP
-  OXACILLIN: SIGNIFICANT CHANGE UP
-  PENICILLIN: SIGNIFICANT CHANGE UP
-  RIFAMPIN: SIGNIFICANT CHANGE UP
-  TETRACYCLINE: SIGNIFICANT CHANGE UP
-  TRIMETHOPRIM/SULFAMETHOXAZOLE: SIGNIFICANT CHANGE UP
-  VANCOMYCIN: SIGNIFICANT CHANGE UP
ANION GAP SERPL CALC-SCNC: 21 MMOL/L — HIGH (ref 5–17)
BASOPHILS # BLD AUTO: 0.1 K/UL — SIGNIFICANT CHANGE UP (ref 0–0.2)
BASOPHILS NFR BLD AUTO: 0.7 % — SIGNIFICANT CHANGE UP (ref 0–2)
BUN SERPL-MCNC: 33 MG/DL — HIGH (ref 8–20)
CALCIUM SERPL-MCNC: 9.7 MG/DL — SIGNIFICANT CHANGE UP (ref 8.6–10.2)
CHLORIDE SERPL-SCNC: 91 MMOL/L — LOW (ref 98–107)
CO2 SERPL-SCNC: 26 MMOL/L — SIGNIFICANT CHANGE UP (ref 22–29)
CREAT SERPL-MCNC: 6.03 MG/DL — HIGH (ref 0.5–1.3)
CULTURE RESULTS: SIGNIFICANT CHANGE UP
EOSINOPHIL # BLD AUTO: 1 K/UL — HIGH (ref 0–0.5)
EOSINOPHIL NFR BLD AUTO: 11.2 % — HIGH (ref 0–5)
GLUCOSE BLDC GLUCOMTR-MCNC: 127 MG/DL — HIGH (ref 70–99)
GLUCOSE BLDC GLUCOMTR-MCNC: 70 MG/DL — SIGNIFICANT CHANGE UP (ref 70–99)
GLUCOSE BLDC GLUCOMTR-MCNC: 93 MG/DL — SIGNIFICANT CHANGE UP (ref 70–99)
GLUCOSE SERPL-MCNC: 79 MG/DL — SIGNIFICANT CHANGE UP (ref 70–115)
HCT VFR BLD CALC: 41.2 % — LOW (ref 42–52)
HGB BLD-MCNC: 13.1 G/DL — LOW (ref 14–18)
LYMPHOCYTES # BLD AUTO: 1.6 K/UL — SIGNIFICANT CHANGE UP (ref 1–4.8)
LYMPHOCYTES # BLD AUTO: 17 % — LOW (ref 20–55)
MAGNESIUM SERPL-MCNC: 3.1 MG/DL — HIGH (ref 1.6–2.6)
MCHC RBC-ENTMCNC: 28.9 PG — SIGNIFICANT CHANGE UP (ref 27–31)
MCHC RBC-ENTMCNC: 31.8 G/DL — LOW (ref 32–36)
MCV RBC AUTO: 90.9 FL — SIGNIFICANT CHANGE UP (ref 80–94)
METHOD TYPE: SIGNIFICANT CHANGE UP
MONOCYTES # BLD AUTO: 1 K/UL — HIGH (ref 0–0.8)
MONOCYTES NFR BLD AUTO: 10.3 % — HIGH (ref 3–10)
NEUTROPHILS # BLD AUTO: 5.6 K/UL — SIGNIFICANT CHANGE UP (ref 1.8–8)
NEUTROPHILS NFR BLD AUTO: 60.7 % — SIGNIFICANT CHANGE UP (ref 37–73)
ORGANISM # SPEC MICROSCOPIC CNT: SIGNIFICANT CHANGE UP
PHOSPHATE SERPL-MCNC: 4.6 MG/DL — SIGNIFICANT CHANGE UP (ref 2.4–4.7)
PLATELET # BLD AUTO: 311 K/UL — SIGNIFICANT CHANGE UP (ref 150–400)
POTASSIUM SERPL-MCNC: 5 MMOL/L — SIGNIFICANT CHANGE UP (ref 3.5–5.3)
POTASSIUM SERPL-SCNC: 5 MMOL/L — SIGNIFICANT CHANGE UP (ref 3.5–5.3)
RBC # BLD: 4.53 M/UL — LOW (ref 4.6–6.2)
RBC # FLD: 16.7 % — HIGH (ref 11–15.6)
SODIUM SERPL-SCNC: 138 MMOL/L — SIGNIFICANT CHANGE UP (ref 135–145)
SPECIMEN SOURCE: SIGNIFICANT CHANGE UP
VANCOMYCIN FLD-MCNC: 36.3 UG/ML
WBC # BLD: 9.2 K/UL — SIGNIFICANT CHANGE UP (ref 4.8–10.8)
WBC # FLD AUTO: 9.2 K/UL — SIGNIFICANT CHANGE UP (ref 4.8–10.8)

## 2019-04-22 PROCEDURE — 93010 ELECTROCARDIOGRAM REPORT: CPT

## 2019-04-22 PROCEDURE — 99233 SBSQ HOSP IP/OBS HIGH 50: CPT

## 2019-04-22 PROCEDURE — 90937 HEMODIALYSIS REPEATED EVAL: CPT

## 2019-04-22 PROCEDURE — 99232 SBSQ HOSP IP/OBS MODERATE 35: CPT

## 2019-04-22 PROCEDURE — 99223 1ST HOSP IP/OBS HIGH 75: CPT

## 2019-04-22 PROCEDURE — 71045 X-RAY EXAM CHEST 1 VIEW: CPT | Mod: 26

## 2019-04-22 RX ORDER — CLOPIDOGREL BISULFATE 75 MG/1
300 TABLET, FILM COATED ORAL ONCE
Refills: 0 | Status: COMPLETED | OUTPATIENT
Start: 2019-04-22 | End: 2019-04-22

## 2019-04-22 RX ORDER — BUMETANIDE 0.25 MG/ML
2 INJECTION INTRAMUSCULAR; INTRAVENOUS DAILY
Refills: 0 | Status: DISCONTINUED | OUTPATIENT
Start: 2019-04-22 | End: 2019-04-24

## 2019-04-22 RX ORDER — CLOPIDOGREL BISULFATE 75 MG/1
75 TABLET, FILM COATED ORAL DAILY
Refills: 0 | Status: DISCONTINUED | OUTPATIENT
Start: 2019-04-23 | End: 2019-04-26

## 2019-04-22 RX ORDER — DEXTROSE 50 % IN WATER 50 %
50 SYRINGE (ML) INTRAVENOUS ONCE
Refills: 0 | Status: COMPLETED | OUTPATIENT
Start: 2019-04-22 | End: 2019-04-22

## 2019-04-22 RX ADMIN — DULOXETINE HYDROCHLORIDE 20 MILLIGRAM(S): 30 CAPSULE, DELAYED RELEASE ORAL at 16:16

## 2019-04-22 RX ADMIN — SENNA PLUS 2 TABLET(S): 8.6 TABLET ORAL at 21:54

## 2019-04-22 RX ADMIN — BUMETANIDE 116 MILLIGRAM(S): 0.25 INJECTION INTRAMUSCULAR; INTRAVENOUS at 10:34

## 2019-04-22 RX ADMIN — LOSARTAN POTASSIUM 50 MILLIGRAM(S): 100 TABLET, FILM COATED ORAL at 16:18

## 2019-04-22 RX ADMIN — HEPARIN SODIUM 5000 UNIT(S): 5000 INJECTION INTRAVENOUS; SUBCUTANEOUS at 17:17

## 2019-04-22 RX ADMIN — Medication 50 MILLILITER(S): at 17:10

## 2019-04-22 RX ADMIN — ATORVASTATIN CALCIUM 80 MILLIGRAM(S): 80 TABLET, FILM COATED ORAL at 21:54

## 2019-04-22 RX ADMIN — Medication 5 MILLIGRAM(S): at 05:07

## 2019-04-22 RX ADMIN — Medication 3 MILLILITER(S): at 20:30

## 2019-04-22 RX ADMIN — Medication 100 MILLIGRAM(S): at 16:15

## 2019-04-22 RX ADMIN — HEPARIN SODIUM 5000 UNIT(S): 5000 INJECTION INTRAVENOUS; SUBCUTANEOUS at 05:06

## 2019-04-22 RX ADMIN — ISOSORBIDE MONONITRATE 120 MILLIGRAM(S): 60 TABLET, EXTENDED RELEASE ORAL at 16:14

## 2019-04-22 RX ADMIN — GABAPENTIN 300 MILLIGRAM(S): 400 CAPSULE ORAL at 16:33

## 2019-04-22 RX ADMIN — Medication 3 MILLILITER(S): at 02:39

## 2019-04-22 RX ADMIN — Medication 324 MILLIGRAM(S): at 16:19

## 2019-04-22 RX ADMIN — Medication 100 MICROGRAM(S): at 16:17

## 2019-04-22 RX ADMIN — Medication 100 MILLIGRAM(S): at 21:55

## 2019-04-22 RX ADMIN — Medication 3 MILLILITER(S): at 08:54

## 2019-04-22 RX ADMIN — CLOPIDOGREL BISULFATE 300 MILLIGRAM(S): 75 TABLET, FILM COATED ORAL at 16:13

## 2019-04-22 RX ADMIN — Medication 1 TABLET(S): at 16:13

## 2019-04-22 NOTE — PROGRESS NOTE ADULT - SUBJECTIVE AND OBJECTIVE BOX
KAUSHIK HOFFMAN Patient is a 79y old  Male who presents with a chief complaint of sob (22 Apr 2019 13:23)     HPI:  78 yo M hx of chronic diastolic CHF, lung CA, pulmonary fibrosis CAD s/p CABG, ESRD on dialysis (TTS), on home 2L O2, p/w sob and orthopnea x 3 days. Has been wearing 4 L nc o2 all the time. denies lower ext swelling. He also report intermittent left sided chest pain, intermittently radiating to left upper chest but now improved at tiem of hx , c/o productive cough with mild blood tinged yellowish brown phlegm, also had episode of chills and vomiting with nausea yesterday. denies any blood in vomitus ,  no abdominal pain/ vomiting  has been chronic 2-3x/day most days , has low appetite .  Patient last dialyzed yesterday. Denies any sick contacts   Family two daughters  at bedside, patient a&ox3, states that he has not been able to ambulate much at home, has been requiring 2-3 people assist for transfers.  patient. has.    Denies palpitations, irregular and/or rapid heart beat, syncope/near syncope, dizziness, edema, n/v/d, hematuria, or hematochezia.    Patient was DNR/DNI at last admission , today he wishes to be full code.     seen by cardio and nephro in er. plan for urgent HD (19 Apr 2019 16:18)    The patient was seen and evaluated discussed with patient need for NGT with failing swallo eval with PA at bedside   The patient is in no acute distress.  Denied any fever chest pain, palpitations, shortness of breath, abdominal pain, fever, dysuria, edema       I&O's Summary    21 Apr 2019 07:01  -  22 Apr 2019 07:00  --------------------------------------------------------  IN: 350 mL / OUT: 200 mL / NET: 150 mL    22 Apr 2019 07:01  -  22 Apr 2019 15:54  --------------------------------------------------------  IN: 50 mL / OUT: 0 mL / NET: 50 mL      Allergies    No Known Allergies    Intolerances      HEALTH ISSUES - PROBLEM Dx:  Dysphagia, unspecified type: Dysphagia, unspecified type  Lung cancer: Lung cancer  ESRD (end stage renal disease): ESRD (end stage renal disease)  Hypertension: Hypertension  Chest pain: Chest pain  CHF (congestive heart failure): CHF (congestive heart failure)  Elevated troponin: Elevated troponin        PAST MEDICAL & SURGICAL HISTORY:  Dialysis patient: Tues, Thurs, Saturday  Chronic anemia  ESRD (end stage renal disease): right Upper arm fistula  CAD (coronary artery disease)  Lung cancer: had yoni radiation in 2006.  Bipolar disorder  High cholesterol  Hypertension  S/P CABG (coronary artery bypass graft): pt&#x27;s son in [facility] h/o some stents near neck area ? carotid ? he is not sure.          Vital Signs Last 24 Hrs  T(C): 36.9 (22 Apr 2019 13:20), Max: 37.2 (21 Apr 2019 23:15)  T(F): 98.4 (22 Apr 2019 13:20), Max: 98.9 (21 Apr 2019 23:15)  HR: 79 (22 Apr 2019 11:20) (66 - 101)  BP: 139/63 (22 Apr 2019 11:20) (131/71 - 166/75)  BP(mean): 70 (22 Apr 2019 06:01) (70 - 100)  RR: 21 (22 Apr 2019 11:20) (19 - 27)  SpO2: 94% (22 Apr 2019 11:20) (94% - 99%)T(C): 36.9 (04-22-19 @ 13:20), Max: 37.2 (04-21-19 @ 23:15)  HR: 79 (04-22-19 @ 11:20) (66 - 101)  BP: 139/63 (04-22-19 @ 11:20) (131/71 - 166/75)  RR: 21 (04-22-19 @ 11:20) (19 - 27)  SpO2: 94% (04-22-19 @ 11:20) (94% - 99%)  Wt(kg): --    PHYSICAL EXAM:    GENERAL: NAD, elderly ill appearing   HEAD:  Atraumatic, Normocephalic  EYES: EOMI, PERRL  NERVOUS SYSTEM:  Alert & Oriented X2, in bed can Move extremities; CNS-II-XII  CHEST/LUNG: Clear to auscultation bilaterally; No rales, rhonchi, wheezing,   HEART: Regular rate and rhythm; No murmurs,   ABDOMEN: Soft, Nontender, Nondistended; Bowel sounds present  EXTREMITIES:  Peripheral Pulses, No  cyanosis, or edema  psychiatry- mood and affect approprite    ALBUTerol    90 MICROgram(s) HFA Inhaler 1 Puff(s) Inhalation every 4 hours  ALBUTerol/ipratropium for Nebulization 3 milliLiter(s) Nebulizer every 6 hours  aspirin  chewable 324 milliGRAM(s) Oral daily  atorvastatin 80 milliGRAM(s) Oral at bedtime  buMETAnide IVPB 2 milliGRAM(s) IV Intermittent daily  clopidogrel Tablet 300 milliGRAM(s) Oral once  docusate sodium 100 milliGRAM(s) Oral three times a day  DULoxetine 20 milliGRAM(s) Oral daily  gabapentin 300 milliGRAM(s) Oral two times a day  heparin  Injectable 5000 Unit(s) SubCutaneous every 12 hours  hydrALAZINE Injectable 10 milliGRAM(s) IV Push every 6 hours PRN  isosorbide   mononitrate ER Tablet (IMDUR) 120 milliGRAM(s) Oral daily  levothyroxine 100 MICROGram(s) Oral daily  losartan 50 milliGRAM(s) Oral daily  metoprolol tartrate 100 milliGRAM(s) Oral two times a day  metoprolol tartrate Injectable 5 milliGRAM(s) IV Push every 6 hours PRN  Nephro-jeana 1 Tablet(s) Oral daily  NIFEdipine XL 60 milliGRAM(s) Oral daily  pantoprazole    Tablet 40 milliGRAM(s) Oral before breakfast  senna 2 Tablet(s) Oral at bedtime  tiotropium 18 MICROgram(s) Capsule 1 Capsule(s) Inhalation daily      LABS:                          13.1   9.2   )-----------( 311      ( 22 Apr 2019 07:10 )             41.2     04-22    138  |  91<L>  |  33.0<H>  ----------------------------<  79  5.0   |  26.0  |  6.03<H>    Ca    9.7      22 Apr 2019 07:10  Phos  4.6     04-22  Mg     3.1     04-22                CAPILLARY BLOOD GLUCOSE      POCT Blood Glucose.: 93 mg/dL (22 Apr 2019 11:15)      RADIOLOGY & ADDITIONAL TESTS:      Consultant notes reviewed    Case discussed with consultant/provider/ family /patient

## 2019-04-22 NOTE — PROGRESS NOTE ADULT - ASSESSMENT
1) ESRD on HD  2) Anemia of renal dx  3) MBD of renal dx  4) Vol/HTN       Palliative care to discuss goals of care; pt rescinded DNR/DNI;  Failed swallow eval; will likely need PEG or NGT; however would discuss goals of care;  Plan for HD today; 0-0.5L UF; minimal given poor po status;    dw Dr Arechiga this AM

## 2019-04-22 NOTE — PROGRESS NOTE ADULT - SUBJECTIVE AND OBJECTIVE BOX
CHIEF COMPLAINT:Patient is a 79y old  Male who presents with a chief complaint of sob (21 Apr 2019 21:49)    INTERVAL HISTORY:  pt is NPO, failed swallowing eval x2.  He is npo and has not received plavix since admission.   GI evaluation for possible peg placement.   Enterococcus bacteremia, on vanco  SOB improved. On HD     Allergies    No Known Allergies  	  MEDICATIONS:  hydrALAZINE Injectable 10 milliGRAM(s) IV Push every 6 hours PRN  isosorbide   mononitrate ER Tablet (IMDUR) 120 milliGRAM(s) Oral daily  losartan 50 milliGRAM(s) Oral daily  metoprolol tartrate 100 milliGRAM(s) Oral two times a day  metoprolol tartrate Injectable 5 milliGRAM(s) IV Push every 6 hours PRN  NIFEdipine XL 60 milliGRAM(s) Oral daily  ALBUTerol    90 MICROgram(s) HFA Inhaler 1 Puff(s) Inhalation every 4 hours  ALBUTerol/ipratropium for Nebulization 3 milliLiter(s) Nebulizer every 6 hours  tiotropium 18 MICROgram(s) Capsule 1 Capsule(s) Inhalation daily  DULoxetine 20 milliGRAM(s) Oral daily  gabapentin 300 milliGRAM(s) Oral two times a day  docusate sodium 100 milliGRAM(s) Oral three times a day  pantoprazole    Tablet 40 milliGRAM(s) Oral before breakfast  senna 2 Tablet(s) Oral at bedtime  atorvastatin 80 milliGRAM(s) Oral at bedtime  levothyroxine 100 MICROGram(s) Oral daily  aspirin  chewable 324 milliGRAM(s) Oral daily  clopidogrel Tablet 75 milliGRAM(s) Oral daily  heparin  Injectable 5000 Unit(s) SubCutaneous every 12 hours  Nephro-jeana 1 Tablet(s) Oral daily    PHYSICAL EXAM:  T(C): 36.7 (04-22-19 @ 08:31), Max: 37.2 (04-21-19 @ 23:15)  HR: 66 (04-22-19 @ 06:01) (66 - 101)  BP: 139/42 (04-22-19 @ 06:01) (131/71 - 166/75)  RR: 25 (04-22-19 @ 04:00) (18 - 27)  SpO2: 98% (04-22-19 @ 04:00) (93% - 98%)  I&O's Summary    21 Apr 2019 07:01  -  22 Apr 2019 07:00  --------------------------------------------------------  IN: 350 mL / OUT: 200 mL / NET: 150 mL  Appearance: Normal	  HEENT:   NC/AT  Eye: Pink Conjunctiva  Lungs: CTA B/L  CVS: RRR, Normal S1 and S2, No Edema  Pulses: Normal distal pulses.  Neuro: A&O x3    CXR:  IMPRESSION:   Cardiomegaly. Chronic a pulmonary interstitial nodular   opacities are concerning for chronic interstitial lung disease . and/or   superimposed infectious pneumonia..            LABS:	 	                        13.1   9.2   )-----------( 311      ( 22 Apr 2019 07:10 )             41.2     04-22    138  |  91<L>  |  33.0<H>  ----------------------------<  79  5.0   |  26.0  |  6.03<H>    Ca    9.7      22 Apr 2019 07:10  Phos  4.6     04-22  Mg     3.1     04-22      ASSESSMENT/PLAN:

## 2019-04-22 NOTE — CONSULT NOTE ADULT - SUBJECTIVE AND OBJECTIVE BOX
Patient is a 79y old  Male who presents with a chief complaint of sob (21 Apr 2019 21:49)      HPI:  78 yo M hx of chronic diastolic CHF, lung CA, pulmonary fibrosis CAD s/p CABG, ESRD on dialysis (TTS), on home 2L O2, p/w sob and orthopnea x 3 days. Has been wearing 4 L nc o2 all the time. denies lower ext swelling. He also report intermittent left sided chest pain, intermittently radiating to left upper chest but now improved at tiem of hx , c/o productive cough with mild blood tinged yellowish brown phlegm, also had episode of chills and vomiting with nausea yesterday. denies any blood in vomitus ,  no abdominal pain/ vomiting  has been chronic 2-3x/day most days , has low appetite .Patient last dialyzed two days ago. Denies any sick contacts Family two daughters  at bedside, patient a&ox3, states that he has not been able to ambulate much at home, has been requiring 2-3 people assist for transfers. GI evaluation requested for progressive dysphagia for solids which started at home and has worsened while here. He has a h/o ESRD on HD Tues, Thurs. and Saturday. To be dialyzed later today. he failed a bedside swallow evaluation over the weekend.   Denies palpitations, irregular and/or rapid heart beat, syncope/near syncope, dizziness, edema, n/v/d, hematuria, or hematochezia.  Patient was DNR/DNI at last admission , today he wishes to be full code.   He appears to have MRSA in the sputum.      REVIEW OF SYSTEMS:  Constitutional: No fever, weight loss or fatigue  ENMT:  No difficulty hearing, tinnitus, vertigo; No sinus or throat pain  Respiratory: No cough, wheezing, chills or hemoptysis  Cardiovascular: No chest pain, palpitations, dizziness or leg swelling  Gastrointestinal: As  per HPI. No abdominal or epigastric pain. No nausea, vomiting or hematemesis; No diarrhea or constipation. No melena or hematochezia.  Skin: No itching, burning, rashes or lesions   Musculoskeletal: No joint pain or swelling; No muscle, back or extremity pain    PAST MEDICAL & SURGICAL HISTORY:  Dialysis patient: Tues, Thurs, Saturday  Chronic anemia  ESRD (end stage renal disease): right Upper arm fistula  CAD (coronary artery disease)  Lung cancer: had yoni radiation in 2006.  Bipolar disorder  High cholesterol  Hypertension  S/P CABG (coronary artery bypass graft): pt&#x27;s son in [facility] states h/o some stents near neck area ? carotid ? he is not sure.      FAMILY HISTORY:  Family history of essential hypertension      SOCIAL HISTORY:  Smoking Status: [ ] Current, [ ] Former, [ x] Never  Pack Years: N/A. No ETOH or drug abuse history.    MEDICATIONS:  MEDICATIONS  (STANDING):  ALBUTerol    90 MICROgram(s) HFA Inhaler 1 Puff(s) Inhalation every 4 hours  ALBUTerol/ipratropium for Nebulization 3 milliLiter(s) Nebulizer every 6 hours  aspirin  chewable 324 milliGRAM(s) Oral daily  atorvastatin 80 milliGRAM(s) Oral at bedtime  clopidogrel Tablet 75 milliGRAM(s) Oral daily  docusate sodium 100 milliGRAM(s) Oral three times a day  DULoxetine 20 milliGRAM(s) Oral daily  gabapentin 300 milliGRAM(s) Oral two times a day  heparin  Injectable 5000 Unit(s) SubCutaneous every 12 hours  isosorbide   mononitrate ER Tablet (IMDUR) 120 milliGRAM(s) Oral daily  levothyroxine 100 MICROGram(s) Oral daily  losartan 50 milliGRAM(s) Oral daily  metoprolol tartrate 100 milliGRAM(s) Oral two times a day  Nephro-jeana 1 Tablet(s) Oral daily  NIFEdipine XL 60 milliGRAM(s) Oral daily  pantoprazole    Tablet 40 milliGRAM(s) Oral before breakfast  senna 2 Tablet(s) Oral at bedtime  tiotropium 18 MICROgram(s) Capsule 1 Capsule(s) Inhalation daily    MEDICATIONS  (PRN):  hydrALAZINE Injectable 10 milliGRAM(s) IV Push every 6 hours PRN for SBP > 150  metoprolol tartrate Injectable 5 milliGRAM(s) IV Push every 6 hours PRN for HR > 100, hold for HR < 50 or SBP < 100      Allergies    No Known Allergies    Intolerances        Vital Signs Last 24 Hrs  T(C): 36.7 (22 Apr 2019 08:31), Max: 37.2 (21 Apr 2019 23:15)  T(F): 98 (22 Apr 2019 08:31), Max: 98.9 (21 Apr 2019 23:15)  HR: 66 (22 Apr 2019 06:01) (66 - 101)  BP: 139/42 (22 Apr 2019 06:01) (131/71 - 166/75)  BP(mean): 70 (22 Apr 2019 06:01) (70 - 100)  RR: 25 (22 Apr 2019 04:00) (18 - 27)  SpO2: 98% (22 Apr 2019 04:00) (93% - 98%)    04-21 @ 07:01  -  04-22 @ 07:00  --------------------------------------------------------  IN: 350 mL / OUT: 200 mL / NET: 150 mL          PHYSICAL EXAM:    General: Well developed; well nourished; in no acute distress  HEENT: MMM, conjunctiva pink and sclera anicteric.  Lungs: Decreased breath sounds bilaterally  Cor: RRR S1, S2 only.  Gastrointestinal: Abdomen: Soft, non-tender non-distended; Normal bowel sounds; No rebound or guarding or HSM.  NORM: Pt. refused.  Extremities: Normal range of motion, No clubbing, cyanosis or edema  Neurological: Alert and oriented x3  Skin: Warm and dry. No obvious rash      LABS:                        13.1   9.2   )-----------( 311      ( 22 Apr 2019 07:10 )             41.2     04-22    138  |  91<L>  |  33.0<H>  ----------------------------<  79  5.0   |  26.0  |  6.03<H>    Ca    9.7      22 Apr 2019 07:10  Phos  4.6     04-22  Mg     3.1     04-22            RADIOLOGY & ADDITIONAL STUDIES:

## 2019-04-22 NOTE — PROGRESS NOTE ADULT - ASSESSMENT
Mr. Lawson had recent PCI and need DAPT. He is NPO. If he fails swaloowing eval today, he needs other means to get his meds until we can use PEG.  Place NGT today, Plavix 300 mg x1 today, then 75 mg from tomorrow  Add po diuretics.  Cont with ABX  ROBERT later this week once more stable.  BP is better controlled.

## 2019-04-22 NOTE — PROGRESS NOTE ADULT - SUBJECTIVE AND OBJECTIVE BOX
[facility]  INFECTIOUS DISEASES AND INTERNAL MEDICINE at [place]  =======================================================  Cricket Lara MD  Diplomates American Board of Internal Medicine and Infectious Diseases  =======================================================    KAUSHIK HOFFMAN [id number]    Follow up:    Allergies:  No Known Allergies      Medications:  ALBUTerol    90 MICROgram(s) HFA Inhaler 1 Puff(s) Inhalation every 4 hours  ALBUTerol/ipratropium for Nebulization 3 milliLiter(s) Nebulizer every 6 hours  aspirin  chewable 324 milliGRAM(s) Oral daily  atorvastatin 80 milliGRAM(s) Oral at bedtime  buMETAnide IVPB 2 milliGRAM(s) IV Intermittent daily  clopidogrel Tablet 75 milliGRAM(s) Oral daily  docusate sodium 100 milliGRAM(s) Oral three times a day  DULoxetine 20 milliGRAM(s) Oral daily  gabapentin 300 milliGRAM(s) Oral two times a day  heparin  Injectable 5000 Unit(s) SubCutaneous every 12 hours  hydrALAZINE Injectable 10 milliGRAM(s) IV Push every 6 hours PRN  isosorbide   mononitrate ER Tablet (IMDUR) 120 milliGRAM(s) Oral daily  levothyroxine 100 MICROGram(s) Oral daily  losartan 50 milliGRAM(s) Oral daily  metoprolol tartrate 100 milliGRAM(s) Oral two times a day  metoprolol tartrate Injectable 5 milliGRAM(s) IV Push every 6 hours PRN  Nephro-jeana 1 Tablet(s) Oral daily  NIFEdipine XL 60 milliGRAM(s) Oral daily  pantoprazole    Tablet 40 milliGRAM(s) Oral before breakfast  senna 2 Tablet(s) Oral at bedtime  tiotropium 18 MICROgram(s) Capsule 1 Capsule(s) Inhalation daily            REVIEW OF SYSTEMS:  CONSTITUTIONAL:  No Fever or chills  HEENT:   No diplopia or blurred vision.  No earache, sore throat or runny nose.  CARDIOVASCULAR:  No pressure, squeezing, strangling, tightness, heaviness or aching about the chest, neck, axilla or epigastrium.  RESPIRATORY:  No cough, shortness of breath  GASTROINTESTINAL:  No nausea, vomiting or diarrhea.  GENITOURINARY:  No dysuria, frequency or urgency. No Blood in urine  MUSCULOSKELETAL:  no joint aches, no muscle pain  SKIN:  No change in skin, hair or nails.  NEUROLOGIC:  No paresthesias, fasciculations, seizures or weakness.  PSYCHIATRIC:  No disorder of thought or mood.  ENDOCRINE:  No heat or cold intolerance  HEMATOLOGICAL:  No easy bruising or bleeding.            Physical Exam:  ICU Vital Signs Last 24 Hrs  T(C): 36.7 (22 Apr 2019 08:31), Max: 37.2 (21 Apr 2019 23:15)  T(F): 98 (22 Apr 2019 08:31), Max: 98.9 (21 Apr 2019 23:15)  HR: 79 (22 Apr 2019 11:20) (66 - 101)  BP: 139/63 (22 Apr 2019 11:20) (131/71 - 166/75)  BP(mean): 70 (22 Apr 2019 06:01) (70 - 100)  RR: 21 (22 Apr 2019 11:20) (19 - 27)  SpO2: 94% (22 Apr 2019 11:20) (94% - 99%)      GEN: NAD, pleasant  HEENT: normocephalic and atraumatic. EOMI. PERRL.  Anicteric  NECK: Supple.   LUNGS: Decreased Basal BS B/L  HEART: Regular rate and rhythm   ABDOMEN: Soft, nontender, and nondistended.  Positive bowel sounds.    : No CVA tenderness  EXTREMITIES: Without any edema.  MSK: No joint swelling  NEUROLOGIC: No Focal Deficits  PSYCHIATRIC: Appropriate affect .  SKIN: No Rash. + AVF      Labs:  04-22    138  |  91<L>  |  33.0<H>  ----------------------------<  79  5.0   |  26.0  |  6.03<H>    Ca    9.7      22 Apr 2019 07:10  Phos  4.6     04-22  Mg     3.1     04-22               13.1   9.2   )-----------( 311      ( 22 Apr 2019 07:10 )             41.2     RECENT CULTURES:  04-20 @ 14:35 .Sputum     Numerous Staphylococcus species Identification and susceptibility to follow.  Numerous Routine respiratory miguelina present  Culture in progress  Few White blood cells  Few Gram Positive Cocci in Pairs and Chains  Few Gram Positive Rods  Few Gram Negative Rods      04-19 @ 20:14    [facility]      04-19 @ 19:38 .Blood Coag Negative Staphylococcus  Blood Culture PCR    Growth in aerobic and anaerobic bottles: Coag Negative Staphylococcus  Aerobic Bottle: 23:58 Hours to positivity  Anaerobic Bottle: 1 day; 04:59 Hours to positivity  ***Blood Panel PCR results on this specimen are available  approximately 3 hours after the Gram stain result.***  Gram stain, PCR, and/or culture results may not always  correspond due to difference in methodologies.  ************************************************************  This PCR assay was performed using [employer].  The following targets are tested for: Enterococcus,  vancomycin resistant enterococci, Listeria monocytogenes,  coagulase negative staphylococci, S. aureus,  methicillin resistant S. aureus, Streptococcus agalactiae  (Group B), S. pneumoniae, S. pyogenes (Group A),  Acinetobacter baumannii, Enterobacter cloacae, E. coli,  Klebsiella oxytoca, K. pneumoniae, Proteus sp.,  Serratia marcescens, Haemophilus influenzae,  Neisseria meningitidis, Pseudomonas aeruginosa, Candida  albicans, C. glabrata, C krusei, C parapsilosis,  C. tropicalis and the KPC resistance gene.  "Due to technical problems, Proteus sp. will Not be reported as part of  the BCID panel until further notice" [facility]  INFECTIOUS DISEASES AND INTERNAL MEDICINE at [place]  =======================================================  Cricket Lara MD  Diplomates American Board of Internal Medicine and Infectious Diseases  =======================================================    KAUSHIK HOFFMAN [id number]    Follow up:     Allergies:  No Known Allergies      Antibiotics:  Vancomycin        REVIEW OF SYSTEMS:  CONSTITUTIONAL:  No Fever or chills  HEENT:  No diplopia or blurred vision.  No earache, sore throat or runny nose.  CARDIOVASCULAR:  No pressure, squeezing, strangling, tightness, heaviness or aching about the chest, neck, axilla or epigastrium.  RESPIRATORY:  No cough. + shortness of breath improved  GASTROINTESTINAL:  No nausea, vomiting or diarrhea.  GENITOURINARY:  No flank pain  MUSCULOSKELETAL:  no joint aches, no muscle pain  SKIN:  No change in skin, hair or nails.  NEUROLOGIC:  No Headahce, seizures  PSYCHIATRIC:  No disorder of thought or mood.  ENDOCRINE:  No heat or cold intolerance  HEMATOLOGICAL:  No easy bruising or bleeding.       Physical Exam:  Vital Signs Last 24 Hrs  T(C): 36.7 (22 Apr 2019 08:31), Max: 37.2 (21 Apr 2019 23:15)  T(F): 98 (22 Apr 2019 08:31), Max: 98.9 (21 Apr 2019 23:15)  HR: 79 (22 Apr 2019 11:20) (66 - 101)  BP: 139/63 (22 Apr 2019 11:20) (131/71 - 166/75)  BP(mean): 70 (22 Apr 2019 06:01) (70 - 100)  RR: 21 (22 Apr 2019 11:20) (19 - 27)  SpO2: 94% (22 Apr 2019 11:20) (94% - 99%)      GEN: NAD, pleasant  HEENT: normocephalic and atraumatic. EOMI. PERRL.  Anicteric  NECK: Supple.   LUNGS: Decreased Basal BS B/L  HEART: Regular rate and rhythm   ABDOMEN: Soft, nontender, and nondistended.  Positive bowel sounds.    : No CVA tenderness  EXTREMITIES: Without any edema.  MSK: No joint swelling  NEUROLOGIC: No Focal Deficits  PSYCHIATRIC: Appropriate affect .  SKIN: No Rash. + AVF      Labs:  04-22    138  |  91<L>  |  33.0<H>  ----------------------------<  79  5.0   |  26.0  |  6.03<H>    Ca    9.7      22 Apr 2019 07:10  Phos  4.6     04-22  Mg     3.1     04-22               13.1   9.2   )-----------( 311      ( 22 Apr 2019 07:10 )             41.2       RECENT CULTURES:  04-20 @ 14:35 .Sputum     Numerous Staphylococcus species Identification and susceptibility to follow.  Numerous Routine respiratory miguelina present  Culture in progress  Few White blood cells  Few Gram Positive Cocci in Pairs and Chains  Few Gram Positive Rods  Few Gram Negative Rods      04-19 @ 20:14    RVP  NotDetec      04-19 @ 19:38 .Blood Coag Negative Staphylococcus  Blood Culture PCR    Growth in aerobic and anaerobic bottles: Coag Negative Staphylococcus  Aerobic Bottle: 23:58 Hours to positivity  Anaerobic Bottle: 1 day; 04:59 Hours to positivity  ***Blood Panel PCR results on this specimen are available  approximately 3 hours after the Gram stain result.***  Gram stain, PCR, and/or culture results may not always  correspond due to difference in methodologies.  ************************************************************  This PCR assay was performed using [employer].  The following targets are tested for: Enterococcus,  vancomycin resistant enterococci, Listeria monocytogenes,  coagulase negative staphylococci, S. aureus,  methicillin resistant S. aureus, Streptococcus agalactiae  (Group B), S. pneumoniae, S. pyogenes (Group A),  Acinetobacter baumannii, Enterobacter cloacae, E. coli,  Klebsiella oxytoca, K. pneumoniae, Proteus sp.,  Serratia marcescens, Haemophilus influenzae,  Neisseria meningitidis, Pseudomonas aeruginosa, Candida  albicans, C. glabrata, C krusei, C parapsilosis,  C. tropicalis and the KPC resistance gene.  "Due to technical problems, Proteus sp. will Not be reported as part of  the BCID panel until further notice"

## 2019-04-22 NOTE — PROGRESS NOTE ADULT - ASSESSMENT
Imp--ILD, ESRD.  ?CNS bacteremia.  Staph in sputum as well.  Currently comfortable on NCO2, desat with activity.  Plan--HD today  abx per ID

## 2019-04-22 NOTE — PROGRESS NOTE ADULT - SUBJECTIVE AND OBJECTIVE BOX
PULMONARY PROGRESS NOTE      KAUSHIK HOFFMANYOVANNY-[id number]    Patient is a 79y old  Male who presents with a chief complaint of sob (22 Apr 2019 08:35)      INTERVAL HPI/OVERNIGHT EVENTS:Comfortable on NCO2.  For HD today    MEDICATIONS  (STANDING):  ALBUTerol    90 MICROgram(s) HFA Inhaler 1 Puff(s) Inhalation every 4 hours  ALBUTerol/ipratropium for Nebulization 3 milliLiter(s) Nebulizer every 6 hours  aspirin  chewable 324 milliGRAM(s) Oral daily  atorvastatin 80 milliGRAM(s) Oral at bedtime  buMETAnide IVPB 2 milliGRAM(s) IV Intermittent daily  clopidogrel Tablet 75 milliGRAM(s) Oral daily  docusate sodium 100 milliGRAM(s) Oral three times a day  DULoxetine 20 milliGRAM(s) Oral daily  gabapentin 300 milliGRAM(s) Oral two times a day  heparin  Injectable 5000 Unit(s) SubCutaneous every 12 hours  isosorbide   mononitrate ER Tablet (IMDUR) 120 milliGRAM(s) Oral daily  levothyroxine 100 MICROGram(s) Oral daily  losartan 50 milliGRAM(s) Oral daily  metoprolol tartrate 100 milliGRAM(s) Oral two times a day  Nephro-jeana 1 Tablet(s) Oral daily  NIFEdipine XL 60 milliGRAM(s) Oral daily  pantoprazole    Tablet 40 milliGRAM(s) Oral before breakfast  senna 2 Tablet(s) Oral at bedtime  tiotropium 18 MICROgram(s) Capsule 1 Capsule(s) Inhalation daily      MEDICATIONS  (PRN):  hydrALAZINE Injectable 10 milliGRAM(s) IV Push every 6 hours PRN for SBP > 150  metoprolol tartrate Injectable 5 milliGRAM(s) IV Push every 6 hours PRN for HR > 100, hold for HR < 50 or SBP < 100      Allergies    No Known Allergies    Intolerances        PAST MEDICAL & SURGICAL HISTORY:  Dialysis patient: Tues, Thurs, Saturday  Chronic anemia  ESRD (end stage renal disease): right Upper arm fistula  CAD (coronary artery disease)  Lung cancer: had yoni radiation in 2006.  Bipolar disorder  High cholesterol  Hypertension  S/P CABG (coronary artery bypass graft): pt&#x27;s son in [facility] states h/o some stents near neck area ? carotid ? he is not sure.      SOCIAL HISTORY  Smoking History:       REVIEW OF SYSTEMS:  unable.     Vital Signs Last 24 Hrs  T(C): 36.7 (22 Apr 2019 08:31), Max: 37.2 (21 Apr 2019 23:15)  T(F): 98 (22 Apr 2019 08:31), Max: 98.9 (21 Apr 2019 23:15)  HR: 79 (22 Apr 2019 11:20) (66 - 101)  BP: 139/63 (22 Apr 2019 11:20) (131/71 - 166/75)  BP(mean): 70 (22 Apr 2019 06:01) (70 - 100)  RR: 21 (22 Apr 2019 11:20) (18 - 27)  SpO2: 94% (22 Apr 2019 11:20) (94% - 99%)    PHYSICAL EXAMINATION:    GENERAL: The patient is awake and alert in no apparent distress.     HEENT: Head is normocephalic and atraumatic. Extraocular muscles are intact. Mucous membranes are moist.    NECK: Supple.    LUNGS: scattered crackles.    HEART: Regular rate and rhythm without murmur.    ABDOMEN: Soft, nontender, and nondistended.      EXTREMITIES: Without any cyanosis, clubbing, rash, lesions or edema.    NEUROLOGIC: Grossly intact.    SKIN: No ulceration or induration present.      LABS:                        13.1   9.2   )-----------( 311      ( 22 Apr 2019 07:10 )             41.2     04-22    138  |  91<L>  |  33.0<H>  ----------------------------<  79  5.0   |  26.0  |  6.03<H>    Ca    9.7      22 Apr 2019 07:10  Phos  4.6     04-22  Mg     3.1     04-22                  Serum Pro-Brain Natriuretic Peptide: [id number] pg/mL (04-19-19 @ 19:38)          MICROBIOLOGY:Culture - Blood (04.19.19 @ 19:38)    -  Ampicillin/Sulbactam: R <=8/4    -  Clindamycin: R >4    -  Erythromycin: R >4    -  Cefazolin: R <=4    -  Coagulase negative Staphylococcus: Detec    -  Trimethoprim/Sulfamethoxazole: R >2/38    -  Vancomycin: S 2    -  Tetra/Doxy: S <=4    -  Oxacillin: R >2    -  Penicillin: R 8    -  RIF- Rifampin: S <=1 Should not be used as monotherapy    -  Gentamicin: S <=4 Should not be used as monotherapy    Specimen Source: .Blood    Organism: Coag Negative Staphylococcus    Organism: Blood Culture PCR    Culture Results:   Growth in aerobic and anaerobic bottles: Coag Negative Staphylococcus  Aerobic Bottle: 23:58 Hours to positivity  Anaerobic Bottle: 1 day; 04:59 Hours to positivity  .  ***Blood Panel PCR results on this specimen are available  approximately 3 hours after the Gram stain result.***  Gram stain, PCR, and/or culture results may not always  correspond due to difference in methodologies.  ************************************************************  This PCR assay was performed using [employer].  The following targets are tested for: Enterococcus,  vancomycin resistant enterococci, Listeria monocytogenes,  coagulase negative staphylococci, S. aureus,  methicillin resistant S. aureus, Streptococcus agalactiae  (Group B), S. pneumoniae, S. pyogenes (Group A),  Acinetobacter baumannii, Enterobacter cloacae, E. coli,  Klebsiella oxytoca, K. pneumoniae, Proteus sp.,  Serratia marcescens, Haemophilus influenzae,  Neisseria meningitidis, Pseudomonas aeruginosa, Candida  albicans, C. glabrata, C krusei, C parapsilosis,  C. tropicalis and the KPC resistance gene.  "Due to technical problems, Proteus sp. will Not be reported as part of  the BCID panel until further notice"  .  TYPE: (C=Critical, N=Notification, A=Abnormal) C  TESTS:  _ Positive Blood GS  DATE/TIME CALLED: _ 04/21/2019 10:50  CALLED TO: Fermin ROMERO: Sarah Ding RN  READ BACK (2 Patient Identifiers)(Y/N): _ Y  READ BACK VALUES (Y/N): _ Y  CALLED BY: Fermin elaine    Organism Identification: Coag Negative Staphylococcus  Blood Culture PCR    Method Type: PCR    Method Type: LIVIA        RADIOLOGY & ADDITIONAL STUDIES:< from: Xray Chest 1 View-PORTABLE IMMEDIATE (04.20.19 @ 19:15) >    IMPRESSION:   Cardiomegaly. Chronic a pulmonary interstitial nodular   opacities are concerning for chronic interstitial lung disease . and/or   superimposed infectious pneumonia..          < end of copied text >

## 2019-04-22 NOTE — PROGRESS NOTE ADULT - SUBJECTIVE AND OBJECTIVE BOX
[facility] DIVISION OF KIDNEY DISEASES AND HYPERTENSION -- HEMODIALYSIS NOTE  --------------------------------------------------------------------------------  Chief Complaint: ESRD/Ongoing hemodialysis requirement    24 hour events/subjective:  Pt seen; examined this AM  Plan for NGT+      PAST HISTORY  --------------------------------------------------------------------------------  No significant changes to PMH, PSH, FHx, SHx, unless otherwise noted    ALLERGIES & MEDICATIONS  --------------------------------------------------------------------------------  Allergies    No Known Allergies    Intolerances      Standing Inpatient Medications  ALBUTerol    90 MICROgram(s) HFA Inhaler 1 Puff(s) Inhalation every 4 hours  ALBUTerol/ipratropium for Nebulization 3 milliLiter(s) Nebulizer every 6 hours  aspirin  chewable 324 milliGRAM(s) Oral daily  atorvastatin 80 milliGRAM(s) Oral at bedtime  buMETAnide IVPB 2 milliGRAM(s) IV Intermittent daily  clopidogrel Tablet 75 milliGRAM(s) Oral daily  docusate sodium 100 milliGRAM(s) Oral three times a day  DULoxetine 20 milliGRAM(s) Oral daily  gabapentin 300 milliGRAM(s) Oral two times a day  heparin  Injectable 5000 Unit(s) SubCutaneous every 12 hours  isosorbide   mononitrate ER Tablet (IMDUR) 120 milliGRAM(s) Oral daily  levothyroxine 100 MICROGram(s) Oral daily  losartan 50 milliGRAM(s) Oral daily  metoprolol tartrate 100 milliGRAM(s) Oral two times a day  Nephro-jeana 1 Tablet(s) Oral daily  NIFEdipine XL 60 milliGRAM(s) Oral daily  pantoprazole    Tablet 40 milliGRAM(s) Oral before breakfast  senna 2 Tablet(s) Oral at bedtime  tiotropium 18 MICROgram(s) Capsule 1 Capsule(s) Inhalation daily    PRN Inpatient Medications  hydrALAZINE Injectable 10 milliGRAM(s) IV Push every 6 hours PRN  metoprolol tartrate Injectable 5 milliGRAM(s) IV Push every 6 hours PRN      REVIEW OF SYSTEMS  --------------------------------------------------------------------------------  Gen: No weight changes, fatigue, fevers/chills, weakness  Skin: No rashes  Head/Eyes/Ears/Mouth: No headache; Normal hearing; Normal vision w/o blurriness; No sinus pain/discomfort, sore throat  Respiratory: No dyspnea, cough, wheezing, hemoptysis  CV: No chest pain, PND, orthopnea  GI: No abdominal pain, diarrhea, constipation, nausea, vomiting, melena, hematochezia  : No increased frequency, dysuria, hematuria, nocturia  MSK: No joint pain/swelling; no back pain; no edema  Neuro: No dizziness/lightheadedness, weakness, seizures, numbness, tingling  Heme: No easy bruising or bleeding  Endo: No heat/cold intolerance  Psych: No significant nervousness, anxiety, stress, depression    All other systems were reviewed and are negative, except as noted.    VITALS/PHYSICAL EXAM  --------------------------------------------------------------------------------  T(C): 36.7 (04-22-19 @ 08:31), Max: 37.2 (04-21-19 @ 23:15)  HR: 79 (04-22-19 @ 11:20) (66 - 101)  BP: 139/63 (04-22-19 @ 11:20) (131/71 - 166/75)  RR: 21 (04-22-19 @ 11:20) (19 - 27)  SpO2: 94% (04-22-19 @ 11:20) (94% - 99%)  Wt(kg): --        04-21-19 @ 07:01  -  04-22-19 @ 07:00  --------------------------------------------------------  IN: 350 mL / OUT: 200 mL / NET: 150 mL    04-22-19 @ 07:01  -  04-22-19 @ 13:23  --------------------------------------------------------  IN: 50 mL / OUT: 0 mL / NET: 50 mL      Physical Exam:  	Gen: NAD   	HEENT: PERRL, supple neck, clear oropharynx  	Pulm: CTA B/L  	CV: RRR, S1S2; no rub  	Back: No spinal or CVA tenderness; no sacral edema  	Abd: +BS, soft, nontender/nondistended  	: No suprapubic tenderness  	UE: Warm, FROM, no clubbing, intact strength; no edema; no asterixis  	LE: Warm, FROM, no clubbing, intact strength; no edema  	Neuro: No focal deficits, intact gait  	Psych: Normal affect and mood  	Skin: Warm, without rashes  	Vascular access:    LABS/STUDIES  --------------------------------------------------------------------------------              13.1   9.2   >-----------<  311      [04-22-19 @ 07:10]              41.2     138  |  91  |  33.0  ----------------------------<  79      [04-22-19 @ 07:10]  5.0   |  26.0  |  6.03        Ca     9.7     [04-22-19 @ 07:10]      Mg     3.1     [04-22-19 @ 07:10]      Phos  4.6     [04-22-19 @ 07:10]            Iron 34, TIBC 235, %sat 14      [01-29-19 @ 11:39]  Ferritin 1049      [01-29-19 @ 11:39]  TSH 1.83      [03-11-19 @ 07:37]  Lipid: chol 159, , HDL 32, LDL 94      [01-29-19 @ 11:39]    HBsAg Nonreact      [04-10-19 @ 17:37]

## 2019-04-22 NOTE — PROGRESS NOTE ADULT - ASSESSMENT
78y/o  Male with h/o chronic diastolic CHF, lung CA, pulmonary fibrosis CAD s/p CABG, ESRD on dialysis (TTS), on home 2L O2.   Admitted with Acute hypoxemia respiratory failure due to Acute diastolic CHF.    Blood culture with CoNS.      CoNS in Blood cultures  Acute hypoxemia respiratory failure due to Acute diastolic CHF      - Blood cultures 2 of 2 bottles with CoNS  - Only 1 set was drawn  - Will Continue Vancomycin post HD  - Check Vancomycin level in AM  - Hold Vancomycin for today  - Repeat blood cultures pending  - Trend Fever  - Trend Leukocytosis      Will Follow

## 2019-04-22 NOTE — PROGRESS NOTE ADULT - ASSESSMENT
80 yo M hx of chronic diastolic CHF, lung CA, pulmonary fibrosis CAD s/p CABG, ESRD on dialysis (TTS), on home 2L O2, p/w sob and orthopnea x 3 days. Has been wearing 4 L nc o2 all the time. denies lower ext swelling. He also report intermittent left sided chest pain, intermittently radiating to left upper chest but now improved at tiem of hx , c/o productive cough with mild blood tinged yellowish brown phlegm, also had episode of chills and vomiting with nausea yesterday. denies any blood in vomitus ,  no abdominal pain/ vomiting  has been chronic 2-3x/day most days , has low appetite .Patient last dialyzed yesterday. Denies any sick contacts .Family two daughters  at bedside, patient a&ox3, states that he has not been able to ambulate much at home, has been requiring 2-3 people assist for transfers.  patient. has.  Patient was DNR/DNI at last admission , today he wishes to be full code.  Positive blood cultures for Gram + cocci. Vanco x 1 dose given. Repeat Blood cx ordered. ID consult called. Pts symptoms attributed to  acute on chronic hypoxic resp failure: likely sec to acute on chronic diastolic chf. urgent HD done on 4/19 & 4/20. Further HD tomorrow. Pt also with dysphagia & weight loss being evaluated by S & S lana. GI called    >Positive blood cultures for Gram + cocci- continue vanco post HD - eventual ROBERT later this week when more satble   Vanco x 1 dose given  Repeat Blood cx ordered  ID consult called    > acute on chronic hypoxic resp failure: likely sec to acute on chronic diastolic chf -  -urgent HD done on 4/19 & 4/20. Further HD today discussed with renal- NGT to get the plavix and other cardiac meds   -TnI elevation attributed to poor renal clearance as per cardio  -cardio and nephro following   -given bumex in er x 1 dose with no improvement as pt is oligouric    >Dysphagia-  As per pt he has trouble swallowing since the past month where food gets stuck in his throat. Reports 25 lbs unintentional weight loss in 2-3 months. GI consult called.   Evaluated by S & S lana, Oral stage WFL for puree/thin however, suspected pharyngeal dysphagia noted with puree and thin (limited PO given) as pt demonstrating multiple swallowing and throat clearing on small amount of PO given by ST. NPO for now with re-eval in am as per ST      >Chest pain/ possible demand ischemia   EKG no changes, telemetry no changes  -TnI elevation attributed to poor renal clearance as per cardio  -c/w aspirin and plavix when able to take PO  -c/w ARBs and bb (IV lopressor added until able to take PO)    >Worsening Interstitial & alveolar lung disease  ?infectious etiology vs pneumonitis vs worsening of pulmonary fibrosis  c/w IV zosyn  -sputum cxs , bcxs   -RVP panel -ve  -c/w broad sp abxs / taper down   Pulm consult appreciated    > Hypertension/ uncontrolled   - Losartan to 50mg QD & nifedipine 60mg QD when able to take PO  Hydralazine 10 IVP q6 prn fow now    > ESRD on hemodialysis  -urgent HD done on 4/19 & 4/20.  Breathing much improved; Further HD tomorrow.  - resume home medications.     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 wishes to be full code today. will get palliative care cosnult

## 2019-04-23 DIAGNOSIS — I50.33 ACUTE ON CHRONIC DIASTOLIC (CONGESTIVE) HEART FAILURE: ICD-10-CM

## 2019-04-23 DIAGNOSIS — Z51.5 ENCOUNTER FOR PALLIATIVE CARE: ICD-10-CM

## 2019-04-23 DIAGNOSIS — I25.10 ATHEROSCLEROTIC HEART DISEASE OF NATIVE CORONARY ARTERY WITHOUT ANGINA PECTORIS: ICD-10-CM

## 2019-04-23 LAB
-  AMPICILLIN/SULBACTAM: SIGNIFICANT CHANGE UP
-  CEFAZOLIN: SIGNIFICANT CHANGE UP
-  CLINDAMYCIN: SIGNIFICANT CHANGE UP
-  ERYTHROMYCIN: SIGNIFICANT CHANGE UP
-  GENTAMICIN: SIGNIFICANT CHANGE UP
-  OXACILLIN: SIGNIFICANT CHANGE UP
-  PENICILLIN: SIGNIFICANT CHANGE UP
-  RIFAMPIN: SIGNIFICANT CHANGE UP
-  TETRACYCLINE: SIGNIFICANT CHANGE UP
-  TRIMETHOPRIM/SULFAMETHOXAZOLE: SIGNIFICANT CHANGE UP
-  VANCOMYCIN: SIGNIFICANT CHANGE UP
ANION GAP SERPL CALC-SCNC: 12 MMOL/L — SIGNIFICANT CHANGE UP (ref 5–17)
ANION GAP SERPL CALC-SCNC: 15 MMOL/L — SIGNIFICANT CHANGE UP (ref 5–17)
BASOPHILS # BLD AUTO: 0.1 K/UL — SIGNIFICANT CHANGE UP (ref 0–0.2)
BASOPHILS NFR BLD AUTO: 0.8 % — SIGNIFICANT CHANGE UP (ref 0–2)
BUN SERPL-MCNC: 15 MG/DL — SIGNIFICANT CHANGE UP (ref 8–20)
BUN SERPL-MCNC: 19 MG/DL — SIGNIFICANT CHANGE UP (ref 8–20)
CALCIUM SERPL-MCNC: 9.3 MG/DL — SIGNIFICANT CHANGE UP (ref 8.6–10.2)
CALCIUM SERPL-MCNC: 9.4 MG/DL — SIGNIFICANT CHANGE UP (ref 8.6–10.2)
CHLORIDE SERPL-SCNC: 94 MMOL/L — LOW (ref 98–107)
CHLORIDE SERPL-SCNC: 96 MMOL/L — LOW (ref 98–107)
CK MB CFR SERPL CALC: 5.2 NG/ML — SIGNIFICANT CHANGE UP (ref 0–6.7)
CK SERPL-CCNC: 600 U/L — HIGH (ref 30–200)
CO2 SERPL-SCNC: 25 MMOL/L — SIGNIFICANT CHANGE UP (ref 22–29)
CO2 SERPL-SCNC: 30 MMOL/L — HIGH (ref 22–29)
CREAT SERPL-MCNC: 3.49 MG/DL — HIGH (ref 0.5–1.3)
CREAT SERPL-MCNC: 4.17 MG/DL — HIGH (ref 0.5–1.3)
CULTURE RESULTS: SIGNIFICANT CHANGE UP
EOSINOPHIL # BLD AUTO: 0.8 K/UL — HIGH (ref 0–0.5)
EOSINOPHIL NFR BLD AUTO: 10.7 % — HIGH (ref 0–5)
GLUCOSE BLDC GLUCOMTR-MCNC: 104 MG/DL — HIGH (ref 70–99)
GLUCOSE BLDC GLUCOMTR-MCNC: 107 MG/DL — HIGH (ref 70–99)
GLUCOSE BLDC GLUCOMTR-MCNC: 125 MG/DL — HIGH (ref 70–99)
GLUCOSE BLDC GLUCOMTR-MCNC: 63 MG/DL — LOW (ref 70–99)
GLUCOSE BLDC GLUCOMTR-MCNC: 76 MG/DL — SIGNIFICANT CHANGE UP (ref 70–99)
GLUCOSE BLDC GLUCOMTR-MCNC: 79 MG/DL — SIGNIFICANT CHANGE UP (ref 70–99)
GLUCOSE BLDC GLUCOMTR-MCNC: 89 MG/DL — SIGNIFICANT CHANGE UP (ref 70–99)
GLUCOSE BLDC GLUCOMTR-MCNC: 94 MG/DL — SIGNIFICANT CHANGE UP (ref 70–99)
GLUCOSE SERPL-MCNC: 143 MG/DL — HIGH (ref 70–115)
GLUCOSE SERPL-MCNC: 81 MG/DL — SIGNIFICANT CHANGE UP (ref 70–115)
HCT VFR BLD CALC: 35.6 % — LOW (ref 42–52)
HGB BLD-MCNC: 11.2 G/DL — LOW (ref 14–18)
LYMPHOCYTES # BLD AUTO: 1.8 K/UL — SIGNIFICANT CHANGE UP (ref 1–4.8)
LYMPHOCYTES # BLD AUTO: 23 % — SIGNIFICANT CHANGE UP (ref 20–55)
MAGNESIUM SERPL-MCNC: 2.4 MG/DL — SIGNIFICANT CHANGE UP (ref 1.6–2.6)
MAGNESIUM SERPL-MCNC: 2.4 MG/DL — SIGNIFICANT CHANGE UP (ref 1.6–2.6)
MCHC RBC-ENTMCNC: 28.5 PG — SIGNIFICANT CHANGE UP (ref 27–31)
MCHC RBC-ENTMCNC: 31.5 G/DL — LOW (ref 32–36)
MCV RBC AUTO: 90.6 FL — SIGNIFICANT CHANGE UP (ref 80–94)
METHOD TYPE: SIGNIFICANT CHANGE UP
MONOCYTES # BLD AUTO: 1 K/UL — HIGH (ref 0–0.8)
MONOCYTES NFR BLD AUTO: 13 % — HIGH (ref 3–10)
NEUTROPHILS # BLD AUTO: 4.1 K/UL — SIGNIFICANT CHANGE UP (ref 1.8–8)
NEUTROPHILS NFR BLD AUTO: 52.4 % — SIGNIFICANT CHANGE UP (ref 37–73)
ORGANISM # SPEC MICROSCOPIC CNT: SIGNIFICANT CHANGE UP
ORGANISM # SPEC MICROSCOPIC CNT: SIGNIFICANT CHANGE UP
PHOSPHATE SERPL-MCNC: 3.6 MG/DL — SIGNIFICANT CHANGE UP (ref 2.4–4.7)
PLATELET # BLD AUTO: 236 K/UL — SIGNIFICANT CHANGE UP (ref 150–400)
POTASSIUM SERPL-MCNC: 4.3 MMOL/L — SIGNIFICANT CHANGE UP (ref 3.5–5.3)
POTASSIUM SERPL-MCNC: 4.3 MMOL/L — SIGNIFICANT CHANGE UP (ref 3.5–5.3)
POTASSIUM SERPL-SCNC: 4.3 MMOL/L — SIGNIFICANT CHANGE UP (ref 3.5–5.3)
POTASSIUM SERPL-SCNC: 4.3 MMOL/L — SIGNIFICANT CHANGE UP (ref 3.5–5.3)
RBC # BLD: 3.93 M/UL — LOW (ref 4.6–6.2)
RBC # FLD: 16.6 % — HIGH (ref 11–15.6)
SODIUM SERPL-SCNC: 136 MMOL/L — SIGNIFICANT CHANGE UP (ref 135–145)
SODIUM SERPL-SCNC: 136 MMOL/L — SIGNIFICANT CHANGE UP (ref 135–145)
SPECIMEN SOURCE: SIGNIFICANT CHANGE UP
TROPONIN T SERPL-MCNC: 0.21 NG/ML — HIGH (ref 0–0.06)
VANCOMYCIN FLD-MCNC: 20.6 UG/ML — SIGNIFICANT CHANGE UP
WBC # BLD: 7.8 K/UL — SIGNIFICANT CHANGE UP (ref 4.8–10.8)
WBC # FLD AUTO: 7.8 K/UL — SIGNIFICANT CHANGE UP (ref 4.8–10.8)

## 2019-04-23 PROCEDURE — 99232 SBSQ HOSP IP/OBS MODERATE 35: CPT

## 2019-04-23 PROCEDURE — 99233 SBSQ HOSP IP/OBS HIGH 50: CPT

## 2019-04-23 PROCEDURE — 74230 X-RAY XM SWLNG FUNCJ C+: CPT | Mod: 26

## 2019-04-23 PROCEDURE — 99223 1ST HOSP IP/OBS HIGH 75: CPT

## 2019-04-23 RX ORDER — DEXTROSE 50 % IN WATER 50 %
12.5 SYRINGE (ML) INTRAVENOUS ONCE
Refills: 0 | Status: COMPLETED | OUTPATIENT
Start: 2019-04-23 | End: 2019-04-23

## 2019-04-23 RX ORDER — CHLORHEXIDINE GLUCONATE 213 G/1000ML
1 SOLUTION TOPICAL
Refills: 0 | Status: DISCONTINUED | OUTPATIENT
Start: 2019-04-23 | End: 2019-04-26

## 2019-04-23 RX ORDER — MUPIROCIN 20 MG/G
1 OINTMENT TOPICAL
Refills: 0 | Status: DISCONTINUED | OUTPATIENT
Start: 2019-04-23 | End: 2019-04-26

## 2019-04-23 RX ORDER — GLUCAGON INJECTION, SOLUTION 0.5 MG/.1ML
1 INJECTION, SOLUTION SUBCUTANEOUS ONCE
Refills: 0 | Status: DISCONTINUED | OUTPATIENT
Start: 2019-04-23 | End: 2019-04-26

## 2019-04-23 RX ORDER — LANOLIN ALCOHOL/MO/W.PET/CERES
3 CREAM (GRAM) TOPICAL AT BEDTIME
Refills: 0 | Status: DISCONTINUED | OUTPATIENT
Start: 2019-04-23 | End: 2019-04-26

## 2019-04-23 RX ORDER — SODIUM CHLORIDE 9 MG/ML
1000 INJECTION, SOLUTION INTRAVENOUS
Refills: 0 | Status: DISCONTINUED | OUTPATIENT
Start: 2019-04-23 | End: 2019-04-26

## 2019-04-23 RX ORDER — DEXTROSE 50 % IN WATER 50 %
25 SYRINGE (ML) INTRAVENOUS ONCE
Refills: 0 | Status: DISCONTINUED | OUTPATIENT
Start: 2019-04-23 | End: 2019-04-26

## 2019-04-23 RX ORDER — DEXTROSE 50 % IN WATER 50 %
12.5 SYRINGE (ML) INTRAVENOUS ONCE
Refills: 0 | Status: DISCONTINUED | OUTPATIENT
Start: 2019-04-23 | End: 2019-04-26

## 2019-04-23 RX ORDER — VANCOMYCIN HCL 1 G
1000 VIAL (EA) INTRAVENOUS ONCE
Refills: 0 | Status: DISCONTINUED | OUTPATIENT
Start: 2019-04-23 | End: 2019-04-23

## 2019-04-23 RX ORDER — DEXTROSE 50 % IN WATER 50 %
15 SYRINGE (ML) INTRAVENOUS ONCE
Refills: 0 | Status: DISCONTINUED | OUTPATIENT
Start: 2019-04-23 | End: 2019-04-26

## 2019-04-23 RX ORDER — VANCOMYCIN HCL 1 G
1000 VIAL (EA) INTRAVENOUS ONCE
Refills: 0 | Status: DISCONTINUED | OUTPATIENT
Start: 2019-04-24 | End: 2019-04-24

## 2019-04-23 RX ORDER — LOSARTAN POTASSIUM 100 MG/1
100 TABLET, FILM COATED ORAL DAILY
Refills: 0 | Status: DISCONTINUED | OUTPATIENT
Start: 2019-04-23 | End: 2019-04-24

## 2019-04-23 RX ADMIN — GABAPENTIN 300 MILLIGRAM(S): 400 CAPSULE ORAL at 15:24

## 2019-04-23 RX ADMIN — MUPIROCIN 1 APPLICATION(S): 20 OINTMENT TOPICAL at 22:48

## 2019-04-23 RX ADMIN — Medication 3 MILLILITER(S): at 20:22

## 2019-04-23 RX ADMIN — PANTOPRAZOLE SODIUM 40 MILLIGRAM(S): 20 TABLET, DELAYED RELEASE ORAL at 06:25

## 2019-04-23 RX ADMIN — Medication 12.5 GRAM(S): at 00:38

## 2019-04-23 RX ADMIN — BUMETANIDE 116 MILLIGRAM(S): 0.25 INJECTION INTRAMUSCULAR; INTRAVENOUS at 05:22

## 2019-04-23 RX ADMIN — Medication 100 MICROGRAM(S): at 05:22

## 2019-04-23 RX ADMIN — Medication 100 MILLIGRAM(S): at 15:22

## 2019-04-23 RX ADMIN — CLOPIDOGREL BISULFATE 75 MILLIGRAM(S): 75 TABLET, FILM COATED ORAL at 12:30

## 2019-04-23 RX ADMIN — Medication 100 MILLIGRAM(S): at 22:49

## 2019-04-23 RX ADMIN — MUPIROCIN 1 APPLICATION(S): 20 OINTMENT TOPICAL at 12:36

## 2019-04-23 RX ADMIN — LOSARTAN POTASSIUM 50 MILLIGRAM(S): 100 TABLET, FILM COATED ORAL at 05:22

## 2019-04-23 RX ADMIN — Medication 324 MILLIGRAM(S): at 12:30

## 2019-04-23 RX ADMIN — CHLORHEXIDINE GLUCONATE 1 APPLICATION(S): 213 SOLUTION TOPICAL at 12:30

## 2019-04-23 RX ADMIN — ATORVASTATIN CALCIUM 80 MILLIGRAM(S): 80 TABLET, FILM COATED ORAL at 22:49

## 2019-04-23 RX ADMIN — HEPARIN SODIUM 5000 UNIT(S): 5000 INJECTION INTRAVENOUS; SUBCUTANEOUS at 05:22

## 2019-04-23 RX ADMIN — SENNA PLUS 2 TABLET(S): 8.6 TABLET ORAL at 22:49

## 2019-04-23 RX ADMIN — HEPARIN SODIUM 5000 UNIT(S): 5000 INJECTION INTRAVENOUS; SUBCUTANEOUS at 17:48

## 2019-04-23 RX ADMIN — Medication 3 MILLILITER(S): at 15:32

## 2019-04-23 RX ADMIN — ISOSORBIDE MONONITRATE 120 MILLIGRAM(S): 60 TABLET, EXTENDED RELEASE ORAL at 12:31

## 2019-04-23 RX ADMIN — DULOXETINE HYDROCHLORIDE 20 MILLIGRAM(S): 30 CAPSULE, DELAYED RELEASE ORAL at 12:31

## 2019-04-23 RX ADMIN — Medication 3 MILLILITER(S): at 03:30

## 2019-04-23 RX ADMIN — Medication 1 TABLET(S): at 15:22

## 2019-04-23 RX ADMIN — Medication 3 MILLIGRAM(S): at 02:13

## 2019-04-23 NOTE — SWALLOW VFSS/MBS ASSESSMENT ADULT - RECOMMENDED FEEDING/EATING TECHNIQUES
alternate food with liquid/maintain upright posture during/after eating for 30 mins/oral hygiene/position upright (90 degrees)/small sips/bites

## 2019-04-23 NOTE — CONSULT NOTE ADULT - PROBLEM SELECTOR RECOMMENDATION 4
- increase Losartan to 50mg QD  - c/w nifedipine 60mg QD    - will continue with medical management at this time. Plan discussed with Dr. Murray
Spoke with pt at bedside.  a & o x3, answering questions appropriately but explains that he is tired and wants to go to sleep.  He asked that I contact his dtr.  I called [facility] and set up a FM for tomorrow at 3 pm at pts bedside to discuss GOC.

## 2019-04-23 NOTE — PROGRESS NOTE ADULT - SUBJECTIVE AND OBJECTIVE BOX
CHIEF COMPLAINT:Patient is a 79y old  Male who presents with a chief complaint of sob (23 Apr 2019 11:34)    INTERVAL HISTORY:  pt s/p swallowing eval.   Multiple episodes of SVT, PVCs and NSVT (7 beats).   He still has NGT. Tolerated food intake.   CHACORTA. WNL.  Pt is s/p HD yesterday.     Allergies  No Known Allergies    MEDICATIONS:  buMETAnide IVPB 2 milliGRAM(s) IV Intermittent daily  hydrALAZINE Injectable 10 milliGRAM(s) IV Push every 6 hours PRN  isosorbide   mononitrate ER Tablet (IMDUR) 120 milliGRAM(s) Oral daily  losartan 50 milliGRAM(s) Oral daily  metoprolol tartrate 100 milliGRAM(s) Oral two times a day  metoprolol tartrate Injectable 5 milliGRAM(s) IV Push every 6 hours PRN  NIFEdipine XL 60 milliGRAM(s) Oral daily  ALBUTerol    90 MICROgram(s) HFA Inhaler 1 Puff(s) Inhalation every 4 hours  ALBUTerol/ipratropium for Nebulization 3 milliLiter(s) Nebulizer every 6 hours  tiotropium 18 MICROgram(s) Capsule 1 Capsule(s) Inhalation daily  DULoxetine 20 milliGRAM(s) Oral daily  gabapentin 300 milliGRAM(s) Oral two times a day  melatonin 3 milliGRAM(s) Oral at bedtime  docusate sodium 100 milliGRAM(s) Oral three times a day  pantoprazole    Tablet 40 milliGRAM(s) Oral before breakfast  senna 2 Tablet(s) Oral at bedtime  atorvastatin 80 milliGRAM(s) Oral at bedtime  dextrose 40% Gel 15 Gram(s) Oral once PRN  dextrose 50% Injectable 12.5 Gram(s) IV Push once  dextrose 50% Injectable 25 Gram(s) IV Push once  dextrose 50% Injectable 25 Gram(s) IV Push once  glucagon  Injectable 1 milliGRAM(s) IntraMuscular once PRN  levothyroxine 100 MICROGram(s) Oral daily  aspirin  chewable 324 milliGRAM(s) Oral daily  chlorhexidine 2% Cloths 1 Application(s) Topical <User Schedule>  clopidogrel Tablet 75 milliGRAM(s) Oral daily  dextrose 5%. 1000 milliLiter(s) IV Continuous <Continuous>  heparin  Injectable 5000 Unit(s) SubCutaneous every 12 hours  mupirocin 2% Nasal 1 Application(s) Nasal two times a day  Nephro-jeana 1 Tablet(s) Oral daily    PHYSICAL EXAM:  T(C): 36.7 (04-23-19 @ 08:00), Max: 37.4 (04-23-19 @ 00:10)  HR: 59 (04-23-19 @ 09:43) (55 - 109)  BP: 138/43 (04-23-19 @ 09:43) (107/48 - 161/76)  RR: 20 (04-23-19 @ 09:43) (17 - 22)  SpO2: 100% (04-23-19 @ 09:43) (96% - 100%)  I&O's Summary    22 Apr 2019 07:01  -  23 Apr 2019 07:00  --------------------------------------------------------  IN: 50 mL / OUT: 1000 mL / NET: -950 mL  Appearance: Normal	  HEENT:   NC/AT  Eye: Pink Conjunctiva  Lungs: CTA B/L  CVS: RRR, Normal S1 and S2,   Neuro: A&O x3               11.2   7.8   )-----------( 236      ( 23 Apr 2019 07:32 )             35.6     04-23    136  |  96<L>  |  19.0  ----------------------------<  81  4.3   |  25.0  |  4.17<H>    Ca    9.3      23 Apr 2019 05:23  Phos  3.6     04-23  Mg     2.4     04-23      ASSESSMENT/PLAN: 	  1. DC NGT, tolerating po intake  2. Cont with asa and plavix  3. Pt was off plavix for a few day with recent stent. NSVT. plan stress test in am. electrolytes were normal. He is on BB. plan for stress test in am.   4. Increase losartan for BP control

## 2019-04-23 NOTE — PROGRESS NOTE ADULT - SUBJECTIVE AND OBJECTIVE BOX
Chief Complaint: This is a 79y old Male patient being seen for follow-up consultation for dysphagia.    HPI / 24H events:  Patient underwent MBSS this morning and reportedly "passed" per family member.  Is supposed to have feeding trial this morning.  Patient has no new complaints and feels fine.    ROS: A 14-point review of systems was reviewed and was otherwise negative save what was reported in the HPI.    PAST MEDICAL/SURGICAL HISTORY:  Dialysis patient: Tues, Thurs, Saturday  Chronic anemia  ESRD (end stage renal disease): right Upper arm fistula  CAD (coronary artery disease)  Lung cancer: had yoni radiation in 2006.  Bipolar disorder  High cholesterol  Hypertension  S/P CABG (coronary artery bypass graft): pt&#x27;s son in [facility] h/o some stents near neck area ? carotid ? he is not sure.    MEDICATIONS  (STANDING):  ALBUTerol    90 MICROgram(s) HFA Inhaler 1 Puff(s) Inhalation every 4 hours  ALBUTerol/ipratropium for Nebulization 3 milliLiter(s) Nebulizer every 6 hours  aspirin  chewable 324 milliGRAM(s) Oral daily  atorvastatin 80 milliGRAM(s) Oral at bedtime  buMETAnide IVPB 2 milliGRAM(s) IV Intermittent daily  chlorhexidine 2% Cloths 1 Application(s) Topical <User Schedule>  clopidogrel Tablet 75 milliGRAM(s) Oral daily  dextrose 5%. 1000 milliLiter(s) (50 mL/Hr) IV Continuous <Continuous>  dextrose 50% Injectable 12.5 Gram(s) IV Push once  dextrose 50% Injectable 25 Gram(s) IV Push once  dextrose 50% Injectable 25 Gram(s) IV Push once  docusate sodium 100 milliGRAM(s) Oral three times a day  DULoxetine 20 milliGRAM(s) Oral daily  gabapentin 300 milliGRAM(s) Oral two times a day  heparin  Injectable 5000 Unit(s) SubCutaneous every 12 hours  isosorbide   mononitrate ER Tablet (IMDUR) 120 milliGRAM(s) Oral daily  levothyroxine 100 MICROGram(s) Oral daily  losartan 50 milliGRAM(s) Oral daily  melatonin 3 milliGRAM(s) Oral at bedtime  metoprolol tartrate 100 milliGRAM(s) Oral two times a day  mupirocin 2% Nasal 1 Application(s) Nasal two times a day  Nephro-jeana 1 Tablet(s) Oral daily  NIFEdipine XL 60 milliGRAM(s) Oral daily  pantoprazole    Tablet 40 milliGRAM(s) Oral before breakfast  senna 2 Tablet(s) Oral at bedtime  tiotropium 18 MICROgram(s) Capsule 1 Capsule(s) Inhalation daily    MEDICATIONS  (PRN):  dextrose 40% Gel 15 Gram(s) Oral once PRN Blood Glucose LESS THAN 70 milliGRAM(s)/deciLiter  glucagon  Injectable 1 milliGRAM(s) IntraMuscular once PRN Glucose <70 milliGRAM(s)/deciLiter  hydrALAZINE Injectable 10 milliGRAM(s) IV Push every 6 hours PRN for SBP > 150  metoprolol tartrate Injectable 5 milliGRAM(s) IV Push every 6 hours PRN for HR > 100, hold for HR < 50 or SBP < 100    VITAL SIGNS LAST 24 HOURS:  T(C): 36.7 (23 Apr 2019 08:00), Max: 37.4 (23 Apr 2019 00:10)  T(F): 98 (23 Apr 2019 08:00), Max: 99.3 (23 Apr 2019 00:10)  HR: 59 (23 Apr 2019 09:43) (55 - 109)  BP: 138/43 (23 Apr 2019 09:43) (107/48 - 161/76)  BP(mean): 62 (23 Apr 2019 05:20) (62 - 104)  RR: 20 (23 Apr 2019 09:43) (17 - 22)  SpO2: 100% (23 Apr 2019 09:43) (94% - 100%)      04-22-19 @ 07:01  -  04-23-19 @ 07:00  --------------------------------------------------------  IN: 50 mL / OUT: 1000 mL / NET: -950 mL      PHYSICAL EXAM:  Constitutional: Chronically ill-appearing elderly man in no apparent distress  Eyes: Sclerae anicteric, conjunctivae normal  ENMT: NG tube in place  Neck: No thyroid nodules appreciated, no significant cervical or supraclavicular lymphadenopathy  Respiratory: Breathing nonlabored; clear to auscultation  Cardiovascular: Irregular irregular rate and rhythm  Gastrointestinal: Soft, nontender, nondistended, normoactive bowel sounds; no hepatosplenomegaly appreciated; no rebound tenderness or involuntary guarding  Extremities: No clubbing, cyanosis or edema  Neurological: Alert and oriented to person, place and time; no asterixis  Skin: No jaundice  Lymph Nodes: No significant lymphadenopathy  Musculoskeletal: No significant peripheral atrophy  Psychiatric: Affect and mood appropriate                            11.2   7.8   )-----------( 236      ( 23 Apr 2019 07:32 )             35.6     04-23    136  |  96<L>  |  19.0  ----------------------------<  81  4.3   |  25.0  |  4.17<H>    Ca    9.3      23 Apr 2019 05:23  Phos  3.6     04-23  Mg     2.4     04-23    Culture - Sputum (collected 20 Apr 2019 14:35)  Source: .Sputum  Gram Stain (20 Apr 2019 16:00):    Few White blood cells    Few Gram Positive Cocci in Pairs and Chains    Few Gram Positive Rods    Few Gram Negative Rods  Final Report (23 Apr 2019 09:18):    Numerous Staphylococcus aureus    Numerous Routine respiratory miguelina present  Organism: Staphylococcus aureus (23 Apr 2019 09:18)  Organism: Staphylococcus aureus (23 Apr 2019 09:18)

## 2019-04-23 NOTE — CONSULT NOTE ADULT - SUBJECTIVE AND OBJECTIVE BOX
HPI:  78 yo M hx of chronic diastolic CHF, lung CA, pulmonary fibrosis CAD s/p CABG, ESRD on dialysis (TTS), on home 2L O2, p/w sob and orthopnea x 3 days. Has been wearing 4 L nc o2 all the time. He also report intermittent left sided chest pain, intermittently radiating to left upper chest but now improved at tiem of hx , c/o productive cough with mild blood tinged yellowish brown phlegm, also had episode of chills and vomiting with nausea yesterday. denies any blood in vomitus ,  no abdominal pain/ vomiting  has been chronic 2-3x/day most days , has low appetite .  Patient last dialyzed yesterday. Denies any sick contacts   Family two daughters  at bedside, patient a&ox3, states that he has not been able to ambulate much at home, has been requiring 2-3 people assist for transfers.  patient. has.    Denies palpitations, irregular and/or rapid heart beat, syncope/near syncope, dizziness, edema, n/v/d, hematuria, or hematochezia.    Patient was DNR/DNI at last admission , today he wishes to be full code.     seen by cardio and nephro in er. plan for urgent HD (19 Apr 2019 16:18)    PERTINENT PM/SXH:   Dialysis patient  Chronic anemia  ESRD (end stage renal disease)  CAD (coronary artery disease)  Lung cancer  Bipolar disorder  High cholesterol  Hypertension    S/P CABG (coronary artery bypass graft)    FAMILY HISTORY:  Family history of essential hypertension    ITEMS NOT CHECKED ARE NOT PRESENT    SOCIAL HISTORY:   Significant other/partner:  [x]  Children:  [ ]  Congregational/Spirituality: Hindu  Substance hx:  [ ]   Tobacco hx:  [ ]   Alcohol hx: [ ]   Home Opioid hx:  [ ] I-Stop Reference No:  Living Situation: [x] Home  with dtr[ ]Long term care  [ ]Rehab [ ]Other    ADVANCE DIRECTIVES:    DNR  no  MOLST  [ ]  Living Will  [ ]   DECISION MAKER(s):  [ ] Health Care Proxy(s)  [ ] Surrogate(s)  [ ] Guardian           Name(s): Phone Number(s): neva [phone] / marlena [phone]    BASELINE (I)ADL(s) (prior to admission):  [place]: [ ]Total  [x ] Moderate [ ]Dependent    Allergies  No Known Allergies    Intolerances    MEDICATIONS  (STANDING):  ALBUTerol    90 MICROgram(s) HFA Inhaler 1 Puff(s) Inhalation every 4 hours  ALBUTerol/ipratropium for Nebulization 3 milliLiter(s) Nebulizer every 6 hours  aspirin  chewable 324 milliGRAM(s) Oral daily  atorvastatin 80 milliGRAM(s) Oral at bedtime  buMETAnide IVPB 2 milliGRAM(s) IV Intermittent daily  chlorhexidine 2% Cloths 1 Application(s) Topical <User Schedule>  clopidogrel Tablet 75 milliGRAM(s) Oral daily  dextrose 5%. 1000 milliLiter(s) (50 mL/Hr) IV Continuous <Continuous>  dextrose 50% Injectable 12.5 Gram(s) IV Push once  dextrose 50% Injectable 25 Gram(s) IV Push once  dextrose 50% Injectable 25 Gram(s) IV Push once  docusate sodium 100 milliGRAM(s) Oral three times a day  DULoxetine 20 milliGRAM(s) Oral daily  gabapentin 300 milliGRAM(s) Oral two times a day  heparin  Injectable 5000 Unit(s) SubCutaneous every 12 hours  isosorbide   mononitrate ER Tablet (IMDUR) 120 milliGRAM(s) Oral daily  levothyroxine 100 MICROGram(s) Oral daily  losartan 100 milliGRAM(s) Oral daily  melatonin 3 milliGRAM(s) Oral at bedtime  metoprolol tartrate 100 milliGRAM(s) Oral two times a day  mupirocin 2% Nasal 1 Application(s) Nasal two times a day  Nephro-jeana 1 Tablet(s) Oral daily  NIFEdipine XL 60 milliGRAM(s) Oral daily  pantoprazole    Tablet 40 milliGRAM(s) Oral before breakfast  senna 2 Tablet(s) Oral at bedtime  tiotropium 18 MICROgram(s) Capsule 1 Capsule(s) Inhalation daily    MEDICATIONS  (PRN):  dextrose 40% Gel 15 Gram(s) Oral once PRN Blood Glucose LESS THAN 70 milliGRAM(s)/deciLiter  glucagon  Injectable 1 milliGRAM(s) IntraMuscular once PRN Glucose <70 milliGRAM(s)/deciLiter  hydrALAZINE Injectable 10 milliGRAM(s) IV Push every 6 hours PRN for SBP > 150  metoprolol tartrate Injectable 5 milliGRAM(s) IV Push every 6 hours PRN for HR > 100, hold for HR < 50 or SBP < 100    PRESENT SYMPTOMS: [ ]Unable to obtain due to poor mentation   Source if other than patient:  [ ]Family   [ ]Team     Pain (Impact on QOL):  0  Location -         Minimal acceptable level (0-10 scale):                    Aggravating factors -  Quality -  Radiation -  Severity (0-10 scale) -    Timing -    PAIN AD Score:     [email or web address] (press ctrl +  left click to view)    Dyspnea:                           [ ]Mild [ ]Moderate [ ]Severe  Anxiety:                             [ ]Mild [ ]Moderate [ ]Severe  Fatigue:                             [ ]Mild [x ]Moderate [ ]Severe  Nausea:                             [ ]Mild [ ]Moderate [ ]Severe  Loss of appetite:               [ ]Mild [ ]Moderate [ ]Severe  Constipation:                    [ ]Mild [ ]Moderate [ ]Severe    Other Symptoms:  [x ]All other review of systems negative     Karnofsky Performance Score/Palliative Performance Status Version 2:      40-50   %    http://palliative.info/resource_material/[email or web address]    PHYSICAL EXAM:  Vital Signs Last 24 Hrs  T(C): 36.7 (23 Apr 2019 08:00), Max: 37.4 (23 Apr 2019 00:10)  T(F): 98 (23 Apr 2019 08:00), Max: 99.3 (23 Apr 2019 00:10)  HR: 63 (23 Apr 2019 12:33) (55 - 109)  BP: 140/58 (23 Apr 2019 12:33) (107/48 - 161/76)  BP(mean): 62 (23 Apr 2019 05:20) (62 - 104)  RR: 17 (23 Apr 2019 12:33) (17 - 22)  SpO2: 100% (23 Apr 2019 12:33) (96% - 100%) I&O's Summary    22 Apr 2019 07:01  -  23 Apr 2019 07:00  --------------------------------------------------------  IN: 50 mL / OUT: 1000 mL / NET: -950 mL    GENERAL:  [x ]Alert  [x]Oriented x  3 [ ]Lethargic  [x ]Cachexia  [ ]Unarousable  [x ]Verbal  [ ]Non-Verbal    Behavioral:   [ ] Anxiety  [ ] Delirium [ ] Agitation [ ] Other    HEENT:  [ ]Normal   [x ]Dry mouth   [ ]ET Tube/Trach  [ ]Oral lesions    PULMONARY:   [x ]Clear [ ]Tachypnea  [ ]Audible excessive secretions   [ ]Rhonchi        [ ]Right [ ]Left [ ]Bilateral  [ ]Crackles        [ ]Right [ ]Left [ ]Bilateral  [ ]Wheezing     [ ]Right [ ]Left [ ]Bilateral    CARDIOVASCULAR:    [ x]Regular [ ]Irregular [ ]Tachy  [ ]Alvaro [ ]Murmur [ ]Other    GASTROINTESTINAL:  [ ]Soft  [ ]Distended   [ ]+BS  [ ]Non tender [ ]Tender  [ ]PEG [ ]OGT/ NGT  Last BM:   GENITOURINARY:  [ ]Normal [ ] Incontinent   [ ]Oliguria/Anuria   [ ]Nicholson    MUSCULOSKELETAL:   [ ]Normal   [x ]Weakness  [ ]Bed/Wheelchair bound [ ]Edema    NEUROLOGIC:   [x ]No focal deficits  [ ] Cognitive impairment  [ ] Dysphagia [ ]Dysarthria [ ] Paresis [ ]Other     SKIN:   [x ]Normal   [ ]Pressure ulcer(s)  [ ]Rash    CRITICAL CARE:  [ ] Shock Present  [ ] Septic [ ]Cardiogenic [ ]Neurologic [ ]Hypovolemic  [ ]  Vasopressors [ ]  Inotropes   [ ] Respiratory failure present  [ ] Acute  [ ] Chronic [ ] Hypoxic  [ ] Hypercarbic [ ] Other  [ ] Other organ failure     LABS:                        11.2   7.8   )-----------( 236      ( 23 Apr 2019 07:32 )             35.6   04-23    136  |  96<L>  |  19.0  ----------------------------<  81  4.3   |  25.0  |  4.17<H>    Ca    9.3      23 Apr 2019 05:23  Phos  3.6     04-23  Mg     2.4     04-23          RADIOLOGY & ADDITIONAL STUDIES:    PROTEIN CALORIE MALNUTRITION PRESENT: [ ] Yes [ ] No  [ ] PPSV2 < or = to 30% [ ] significant weight loss  [ ] poor nutritional intake [ ] catabolic state [ ] anasarca     Albumin, Serum: 3.4 g/dL (04-19-19 @ 11:32)  Artificial Nutrition [ ]     REFERRALS:   [ ]Chaplaincy  [ ] Hospice  [ ]Child Life  [ ]Social Work  [ ]Case management [ ]Holistic Therapy   Goals of Care Discussion Document:

## 2019-04-23 NOTE — PROGRESS NOTE ADULT - SUBJECTIVE AND OBJECTIVE BOX
PULMONARY PROGRESS NOTE      KAUSHIK HOFFMANYOVANNY-[id number]    Patient is a 79y old  Male who presents with a chief complaint of sob (23 Apr 2019 12:15)      INTERVAL HPI/OVERNIGHT EVENTS:    MEDICATIONS  (STANDING):  ALBUTerol    90 MICROgram(s) HFA Inhaler 1 Puff(s) Inhalation every 4 hours  ALBUTerol/ipratropium for Nebulization 3 milliLiter(s) Nebulizer every 6 hours  aspirin  chewable 324 milliGRAM(s) Oral daily  atorvastatin 80 milliGRAM(s) Oral at bedtime  buMETAnide IVPB 2 milliGRAM(s) IV Intermittent daily  chlorhexidine 2% Cloths 1 Application(s) Topical <User Schedule>  clopidogrel Tablet 75 milliGRAM(s) Oral daily  dextrose 5%. 1000 milliLiter(s) (50 mL/Hr) IV Continuous <Continuous>  dextrose 50% Injectable 12.5 Gram(s) IV Push once  dextrose 50% Injectable 25 Gram(s) IV Push once  dextrose 50% Injectable 25 Gram(s) IV Push once  docusate sodium 100 milliGRAM(s) Oral three times a day  DULoxetine 20 milliGRAM(s) Oral daily  gabapentin 300 milliGRAM(s) Oral two times a day  heparin  Injectable 5000 Unit(s) SubCutaneous every 12 hours  isosorbide   mononitrate ER Tablet (IMDUR) 120 milliGRAM(s) Oral daily  levothyroxine 100 MICROGram(s) Oral daily  losartan 100 milliGRAM(s) Oral daily  melatonin 3 milliGRAM(s) Oral at bedtime  metoprolol tartrate 100 milliGRAM(s) Oral two times a day  mupirocin 2% Nasal 1 Application(s) Nasal two times a day  Nephro-jeana 1 Tablet(s) Oral daily  NIFEdipine XL 60 milliGRAM(s) Oral daily  pantoprazole    Tablet 40 milliGRAM(s) Oral before breakfast  senna 2 Tablet(s) Oral at bedtime  tiotropium 18 MICROgram(s) Capsule 1 Capsule(s) Inhalation daily      MEDICATIONS  (PRN):  dextrose 40% Gel 15 Gram(s) Oral once PRN Blood Glucose LESS THAN 70 milliGRAM(s)/deciLiter  glucagon  Injectable 1 milliGRAM(s) IntraMuscular once PRN Glucose <70 milliGRAM(s)/deciLiter  hydrALAZINE Injectable 10 milliGRAM(s) IV Push every 6 hours PRN for SBP > 150  metoprolol tartrate Injectable 5 milliGRAM(s) IV Push every 6 hours PRN for HR > 100, hold for HR < 50 or SBP < 100      Allergies    No Known Allergies    Intolerances        PAST MEDICAL & SURGICAL HISTORY:  Dialysis patient: Tues, Thurs, Saturday  Chronic anemia  ESRD (end stage renal disease): right Upper arm fistula  CAD (coronary artery disease)  Lung cancer: had yoni radiation in 2006.  Bipolar disorder  High cholesterol  Hypertension  S/P CABG (coronary artery bypass graft): pt&#x27;s son in [facility] states h/o some stents near neck area ? carotid ? he is not sure.      SOCIAL HISTORY  Smoking History:       REVIEW OF SYSTEMS:    CONSTITUTIONAL:  No distress    HEENT:  Eyes:  No diplopia or blurred vision. ENT:  No earache, sore throat or runny nose.    CARDIOVASCULAR:  No pressure, squeezing, tightness, heaviness or aching about the chest; no palpitations.    RESPIRATORY:  No cough, shortness of breath, PND or orthopnea. Mild SOBOE    GASTROINTESTINAL:  No nausea, vomiting or diarrhea.    GENITOURINARY:  No dysuria, frequency or urgency.    MUSCULOSKELETAL:  No joint pain    SKIN:  No new lesions.    NEUROLOGIC:  No paresthesias, fasciculations, seizures or weakness.    PSYCHIATRIC:  No disorder of thought or mood.    ENDOCRINE:  No heat or cold intolerance, polyuria or polydipsia.    HEMATOLOGICAL:  No easy bruising or bleeding.     Vital Signs Last 24 Hrs  T(C): 36.7 (23 Apr 2019 08:00), Max: 37.4 (23 Apr 2019 00:10)  T(F): 98 (23 Apr 2019 08:00), Max: 99.3 (23 Apr 2019 00:10)  HR: 63 (23 Apr 2019 12:33) (55 - 109)  BP: 140/58 (23 Apr 2019 12:33) (107/48 - 161/76)  BP(mean): 62 (23 Apr 2019 05:20) (62 - 104)  RR: 17 (23 Apr 2019 12:33) (17 - 22)  SpO2: 100% (23 Apr 2019 12:33) (96% - 100%)    PHYSICAL EXAMINATION:    GENERAL: The patient is awake and alert in no apparent distress.     HEENT: Head is normocephalic and atraumatic. Extraocular muscles are intact. Mucous membranes are moist.    NECK: Supple.    LUNGS: Clear to auscultation without wheezing, rales or rhonchi; respirations unlabored    HEART: Regular rate and rhythm without murmur.    ABDOMEN: Soft, nontender, and nondistended.      EXTREMITIES: Without any cyanosis, clubbing, rash, lesions or edema.    NEUROLOGIC: Grossly intact.    SKIN: No ulceration or induration present.      LABS:                        11.2   7.8   )-----------( 236      ( 23 Apr 2019 07:32 )             35.6     04-23    136  |  96<L>  |  19.0  ----------------------------<  81  4.3   |  25.0  |  4.17<H>    Ca    9.3      23 Apr 2019 05:23  Phos  3.6     04-23  Mg     2.4     04-23            CARDIAC MARKERS ( 23 Apr 2019 01:17 )  x     / 0.21 ng/mL / 600 U/L / x     / 5.2 ng/mL                MICROBIOLOGY:    RADIOLOGY & ADDITIONAL STUDIES:< from: Xray Chest 1 View- PORTABLE-Urgent (04.22.19 @ 13:02) >  INTERPRETATION:  Chest portable semierect single AP view:  Clinical history:  chronic diastolic CHF, lung CA, pulmonary fibrosis CAD s/p CABG, ESRD on  dialysis  NG tube placement.    Findings:    NG tube tip in the stomach. Cardiac size appears normal, coronary bypass   surgery, median sternotomy, pulmonary fibrosis, partial resolution of   interstitial pulmonary edema, persisting bilateral smallpleural   effusions.    Impression:    NG tube tip in the stomach.        < end of copied text >

## 2019-04-23 NOTE — PROGRESS NOTE ADULT - SUBJECTIVE AND OBJECTIVE BOX
Interval/Overnight: Noted w multiple episodes of SVT, PVCs and NSVT (7 beats) on monitor.   Noted with  No acute events. Chart reviewed. Pt seen/examined by Attending and PA. Pt is noted as maintaining oxygenation on 2-3L nasal cannula.        Vital Signs Last 24 Hrs  T(C): 36.8 (23 Apr 2019 14:26), Max: 37.4 (23 Apr 2019 00:10)  T(F): 98.2 (23 Apr 2019 14:26), Max: 99.3 (23 Apr 2019 00:10)  HR: 63 (23 Apr 2019 12:33) (55 - 109)  BP: 140/58 (23 Apr 2019 12:33) (107/48 - 161/76)  BP(mean): 62 (23 Apr 2019 05:20) (62 - 104)  RR: 17 (23 Apr 2019 12:33) (17 - 22)  SpO2: 100% (23 Apr 2019 12:33) (96% - 100%)I&O's Summary    22 Apr 2019 07:01  -  23 Apr 2019 07:00  --------------------------------------------------------  IN: 50 mL / OUT: 1000 mL / NET: -950 mL    CAPILLARY BLOOD GLUCOSE    PHYSICAL EXAM:  GENERAL: supine, lying flat, elderly male, very thin, NAD  HEENT: NC/AT  NECK: Supple, No JVD   CHEST/LUNG: diminished at bases ; Normal effort  HEART: S1S2 Normal intensity, no murmurs, gallops or rubs noted  ABDOMEN: Soft, BS Normoactive, NT, ND, no HSM noted  EXTREMITIES: no sig edema  SKIN: No rashes or lesions noted  NEURO: grossly intact  PSYCH: normal mood and affect; insight/judgement appropriate  LABS:                        11.2   7.8   )-----------( 236      ( 23 Apr 2019 07:32 )             35.6     04-23    136  |  96<L>  |  19.0  ----------------------------<  81  4.3   |  25.0  |  4.17<H>    Ca    9.3      23 Apr 2019 05:23  Phos  3.6     04-23  Mg     2.4     04-23          RADIOLOGY & ADDITIONAL TESTS:    CXR 4/8/19  LUNGS: Small bilateral effusions. Right apical opacity and interstitial   infiltrates bilaterally. Right dialysis catheter is no longer seen        CXR 4/20/19  IMPRESSION:   Cardiomegaly. Chronic a pulmonary interstitial nodular   opacities are concerning for chronic interstitial lung disease . and/or   superimposed infectious pneumonia..      CXR 4/22/19     NG tube tip in the stomach. Cardiac size appears normal, coronary bypass   surgery, median sternotomy, pulmonary fibrosis, partial resolution of   interstitial pulmonary edema, persisting bilateral smallpleural   effusions.          MEDICATIONS:  MEDICATIONS  (STANDING):  ALBUTerol    90 MICROgram(s) HFA Inhaler 1 Puff(s) Inhalation every 4 hours  ALBUTerol/ipratropium for Nebulization 3 milliLiter(s) Nebulizer every 6 hours  aspirin  chewable 324 milliGRAM(s) Oral daily  atorvastatin 80 milliGRAM(s) Oral at bedtime  buMETAnide IVPB 2 milliGRAM(s) IV Intermittent daily  chlorhexidine 2% Cloths 1 Application(s) Topical <User Schedule>  clopidogrel Tablet 75 milliGRAM(s) Oral daily  dextrose 5%. 1000 milliLiter(s) (50 mL/Hr) IV Continuous <Continuous>  dextrose 50% Injectable 12.5 Gram(s) IV Push once  dextrose 50% Injectable 25 Gram(s) IV Push once  dextrose 50% Injectable 25 Gram(s) IV Push once  docusate sodium 100 milliGRAM(s) Oral three times a day  DULoxetine 20 milliGRAM(s) Oral daily  gabapentin 300 milliGRAM(s) Oral two times a day  heparin  Injectable 5000 Unit(s) SubCutaneous every 12 hours  isosorbide   mononitrate ER Tablet (IMDUR) 120 milliGRAM(s) Oral daily  levothyroxine 100 MICROGram(s) Oral daily  losartan 100 milliGRAM(s) Oral daily  melatonin 3 milliGRAM(s) Oral at bedtime  metoprolol tartrate 100 milliGRAM(s) Oral two times a day  mupirocin 2% Nasal 1 Application(s) Nasal two times a day  Nephro-jeana 1 Tablet(s) Oral daily  NIFEdipine XL 60 milliGRAM(s) Oral daily  pantoprazole    Tablet 40 milliGRAM(s) Oral before breakfast  senna 2 Tablet(s) Oral at bedtime  tiotropium 18 MICROgram(s) Capsule 1 Capsule(s) Inhalation daily    MEDICATIONS  (PRN):  dextrose 40% Gel 15 Gram(s) Oral once PRN Blood Glucose LESS THAN 70 milliGRAM(s)/deciLiter  glucagon  Injectable 1 milliGRAM(s) IntraMuscular once PRN Glucose <70 milliGRAM(s)/deciLiter  hydrALAZINE Injectable 10 milliGRAM(s) IV Push every 6 hours PRN for SBP > 150  metoprolol tartrate Injectable 5 milliGRAM(s) IV Push every 6 hours PRN for HR > 100, hold for HR < 50 or SBP < 100

## 2019-04-23 NOTE — PROGRESS NOTE ADULT - ASSESSMENT
1) ESRD on HD  2) Anemia of renal dx  3) MBD of renal dx  4) Vol/HTN     Palliative care to discuss goals of care; pt rescinded DNR/DNI;  Failed swallow eval; will likely need PEG ,    Plan for HD today; 0-0.5L UF; minimal given poor po status;    Prognosis Poor,

## 2019-04-23 NOTE — CONSULT NOTE ADULT - PROBLEM SELECTOR PROBLEM 1
ESRD (end stage renal disease)
Acute on chronic diastolic congestive heart failure
Elevated troponin
Dysphagia, unspecified type

## 2019-04-23 NOTE — SWALLOW VFSS/MBS ASSESSMENT ADULT - DIAGNOSTIC IMPRESSIONS
Trace-mild oropharyngeal dysphagia marked by intermittently reduced lingual coordination, reduced tongue base retraction, reduced hyolaryngeal excursion, suspect pharyngeal stasis further impacted by NGT.  No penetration/no aspiration observed.  Pt continues with inconsistent c/o globus sensation despite clearance of stasis.  Pt may benefit from GI follow up for further testing due to results & continued pt c/o.  Lastly, suspect osteophyte at ~C6-C7, however, no functional impact on swallow profile.

## 2019-04-23 NOTE — PROGRESS NOTE ADULT - SUBJECTIVE AND OBJECTIVE BOX
[facility]  INFECTIOUS DISEASES AND INTERNAL MEDICINE at [place]  =======================================================  Cricket Lara MD  Diplomates American Board of Internal Medicine and Infectious Diseases  =======================================================    YASMIN KAUSHIK [id number]    Follow up: coag neg staph    Allergies:  No Known Allergies      Antibiotics:  Vancomycin        REVIEW OF SYSTEMS:  CONSTITUTIONAL:  No Fever or chills  HEENT:  No diplopia or blurred vision.  No earache, sore throat or runny nose.  CARDIOVASCULAR:  No pressure, squeezing, strangling, tightness, heaviness or aching about the chest, neck, axilla or epigastrium.  RESPIRATORY:  No cough. + shortness of breath improved  GASTROINTESTINAL:  No nausea, vomiting or diarrhea.  GENITOURINARY:  No flank pain  MUSCULOSKELETAL:  no joint aches, no muscle pain  SKIN:  No change in skin, hair or nails.  NEUROLOGIC:  No Headahce, seizures  PSYCHIATRIC:  No disorder of thought or mood.  ENDOCRINE:  No heat or cold intolerance  HEMATOLOGICAL:  No easy bruising or bleeding.       Physical Exam:  Vital Signs Last 24 Hrs  T(C): 36.7 (22 Apr 2019 08:31), Max: 37.2 (21 Apr 2019 23:15)  T(F): 98 (22 Apr 2019 08:31), Max: 98.9 (21 Apr 2019 23:15)  HR: 79 (22 Apr 2019 11:20) (66 - 101)  BP: 139/63 (22 Apr 2019 11:20) (131/71 - 166/75)  BP(mean): 70 (22 Apr 2019 06:01) (70 - 100)  RR: 21 (22 Apr 2019 11:20) (19 - 27)  SpO2: 94% (22 Apr 2019 11:20) (94% - 99%)      GEN: NAD, pleasant  HEENT: normocephalic and atraumatic. EOMI. PERRL.  Anicteric  NECK: Supple.   LUNGS: Decreased Basal BS B/L  HEART: Regular rate and rhythm   ABDOMEN: Soft, nontender, and nondistended.  Positive bowel sounds.    : No CVA tenderness  EXTREMITIES: Without any edema.  MSK: No joint swelling  NEUROLOGIC: No Focal Deficits  PSYCHIATRIC: Appropriate affect .  SKIN: No Rash. + AVF      Labs:  04-22    138  |  91<L>  |  33.0<H>  ----------------------------<  79  5.0   |  26.0  |  6.03<H>    Ca    9.7      22 Apr 2019 07:10  Phos  4.6     04-22  Mg     3.1     04-22               13.1   9.2   )-----------( 311      ( 22 Apr 2019 07:10 )             41.2       RECENT CULTURES:  04-20 @ 14:35 .Sputum     Numerous Staphylococcus species Identification and susceptibility to follow.  Numerous Routine respiratory miguelina present  Culture in progress  Few White blood cells  Few Gram Positive Cocci in Pairs and Chains  Few Gram Positive Rods  Few Gram Negative Rods      04-19 @ 20:14    RVP  NotDetec      04-19 @ 19:38 .Blood Coag Negative Staphylococcus  Blood Culture PCR    Growth in aerobic and anaerobic bottles: Coag Negative Staphylococcus  Aerobic Bottle: 23:58 Hours to positivity  Anaerobic Bottle: 1 day; 04:59 Hours to positivity  ***Blood Panel PCR results on this specimen are available  approximately 3 hours after the Gram stain result.***  Gram stain, PCR, and/or culture results may not always  correspond due to difference in methodologies.  ************************************************************  This PCR assay was performed using [employer].  The following targets are tested for: Enterococcus,  vancomycin resistant enterococci, Listeria monocytogenes,  coagulase negative staphylococci, S. aureus,  methicillin resistant S. aureus, Streptococcus agalactiae  (Group B), S. pneumoniae, S. pyogenes (Group A),  Acinetobacter baumannii, Enterobacter cloacae, E. coli,  Klebsiella oxytoca, K. pneumoniae, Proteus sp.,  Serratia marcescens, Haemophilus influenzae,  Neisseria meningitidis, Pseudomonas aeruginosa, Candida  albicans, C. glabrata, C krusei, C parapsilosis,  C. tropicalis and the KPC resistance gene.  "Due to technical problems, Proteus sp. will Not be reported as part of  the BCID panel until further notice"

## 2019-04-23 NOTE — CONSULT NOTE ADULT - CONSULT REQUESTED DATE/TIME
19-Apr-2019 16:15
19-Apr-2019 16:16
22-Apr-2019 08:35
23-Apr-2019 13:24
21-Apr-2019 21:49
21-Apr-2019 11:53
19-Apr-2019 14:54

## 2019-04-23 NOTE — CONSULT NOTE ADULT - PROBLEM SELECTOR RECOMMENDATION 2
MBS completed.  S&S recommended soft diet and thin liquids.  Reji feed removed today  pt tolerating apple sauce and small pills
- Bumex 2mg IVP x1  - per Dr. Nesbitt, usually exacerbated by HTN  - recommend dialysis today

## 2019-04-23 NOTE — PROGRESS NOTE ADULT - ASSESSMENT
80y/o  Male with h/o chronic diastolic CHF, lung CA, pulmonary fibrosis CAD s/p CABG, ESRD on dialysis (TTS), on home 2L O2.   Admitted with Acute hypoxemia respiratory failure due to Acute diastolic CHF.    Blood culture with CoNS.      CoNS in Blood cultures  Acute hypoxemia respiratory failure due to Acute diastolic CHF      - Blood cultures 2 of 2 bottles with CoNS  - Only 1 set was drawn  - Will Continue Vancomycin post HD  - IF REPEAT CX NEG D/C VANCO  D/W HOSPITALIST      Will Follow 78y/o  Male with h/o chronic diastolic CHF, lung CA, pulmonary fibrosis CAD s/p CABG, ESRD on dialysis (TTS), on home 2L O2.   Admitted with Acute hypoxemia respiratory failure due to Acute diastolic CHF.    Blood culture with CoNS.      CoNS in Blood cultures  Acute hypoxemia respiratory failure due to Acute diastolic CHF      - Blood cultures 2 of 2 bottles with CoNS  - Only 1 set was drawn  - Will Continue Vancomycin post HD  - IF REPEAT CX NEG D/C VANCO  D/W HOSPITALIST      Will Follow UP AS NEEDED PLEASE CALL IF QUESTIONS

## 2019-04-23 NOTE — PROGRESS NOTE ADULT - ASSESSMENT
The patient's dysphagia may be secondary to his multiple medical comorbidities.  He underwent a repeat MBSS that he "passed".  If his food trial is successful, patient will not likely need PEG.  He should be planned for an eventual EGD, though there is no urgency.    Will follow.  Thank you for the consult.  Please do not hesitate to call with any questions or concerns.    SALLY Scott MD  [facility]  Division of Gastroenterology  Tel [phone]  Fax [phone]  04-23-19 @ 11:07

## 2019-04-23 NOTE — PROGRESS NOTE ADULT - SUBJECTIVE AND OBJECTIVE BOX
Vital Signs Last 24 Hrs  T(C): 36.7 (2019 08:00), Max: 37.4 (2019 00:10)  T(F): 98 (2019 08:00), Max: 99.3 (2019 00:10)  HR: 59 (2019 09:43) (55 - 109)  BP: 138/43 (2019 09:43) (107/48 - 161/76)  BP(mean): 62 (2019 05:20) (62 - 104)  RR: 20 (2019 09:43) (17 - 22)  SpO2: 100% (2019 09:43) (96% - 100%)    136    |  96<L>  |  19.0   ----------------------------<  81     Ca:9.3   (2019 05:23)  4.3     |  25.0   |  4.17<H>      eGFR if Non : 13 <L>  eGFR if : 15 <L>                       11.2<L>  7.8   )-----------( 236      ( 2019 07:32 )             35.6<L>    Phos:3.6 mg/dL M.4 mg/dL.KAUSHIK is a 79y old  Male who presents with a chief complaint of sob (2019 13:23)     HPI:    80 yo M hx of chronic diastolic CHF, lung CA, pulmonary fibrosis CAD s/p CABG, ESRD on dialysis (TTS), on home 2L O2, p/w sob and orthopnea x 3 days. Has been wearing 4 L nc o2 all the time. denies lower ext swelling. He also report intermittent left sided chest pain, intermittently radiating to left upper chest but now improved at tiem of hx , c/o productive cough with mild blood tinged yellowish brown phlegm, also had episode of chills and vomiting with nausea yesterday. denies any blood in vomitus ,  no abdominal pain/ vomiting  has been chronic 2-3x/day most days , has low appetite .  Patient last dialyzed yesterday. Denies any sick contacts   Family two daughters  at bedside, patient a&ox3, states that he has not been able to ambulate much at home, has been requiring 2-3 people assist for transfers.  patient. has.    Denies palpitations, irregular and/or rapid heart beat, syncope/near syncope, dizziness, edema, n/v/d, hematuria, or hematochezia.    S/P urgent HD (2019 16:18)    The patient is in no acute distress.  Denied any fever chest pain, palpitations, shortness of breath, abdominal pain, fever, dysuria, edema     I&O's Summary    2019 07:  -  2019 07:00  --------------------------------------------------------  IN: 350 mL / OUT: 200 mL / NET: 150 mL    2019 07:  -  2019 15:54  --------------------------------------------------------  IN: 50 mL / OUT: 0 mL / NET: 50 mL    Allergies    No Known Allergies    HEALTH ISSUES - PROBLEM Dx:  Dysphagia, unspecified type: Dysphagia, unspecified type  Lung cancer: Lung cancer  ESRD (end stage renal disease): ESRD (end stage renal disease)  Hypertension: Hypertension  Chest pain: Chest pain  CHF (congestive heart failure): CHF (congestive heart failure)  Elevated troponin: Elevated troponin    PAST MEDICAL & SURGICAL HISTORY:  Dialysis patient: Tues, Thurs, Saturday  Chronic anemia  ESRD (end stage renal disease): right Upper arm fistula  CAD (coronary artery disease)  Lung cancer: had yoni radiation in .  Bipolar disorder  High cholesterol  Hypertension  S/P CABG (coronary artery bypass graft): pt&#x27;s son in [facility] states h/o some stents near neck area ? carotid ? he is not sure.    Vital Signs Last 24 Hrs  T(C): 36.9 (2019 13:20), Max: 37.2 (2019 23:15)  T(F): 98.4 (2019 13:20), Max: 98.9 (2019 23:15)  HR: 79 (2019 11:20) (66 - 101)  BP: 139/63 (2019 11:20) (131/71 - 166/75)  BP(mean): 70 (2019 06:01) (70 - 100)  RR: 21 (2019 11:20) (19 - 27)  SpO2: 94% (2019 11:20) (94% - 99%)T(C): 36.9 (19 @ 13:20), Max: 37.2 (19 @ 23:15)  HR: 79 (19 @ 11:20) (66 - 101)  BP: 139/63 (19 @ 11:20) (131/71 - 166/75)  RR: 21 (19 @ 11:20) (19 - 27)  SpO2: 94% (19 @ 11:20) (94% - 99%)  Wt(kg): --    PHYSICAL EXAM:    GENERAL: NAD, elderly ill appearing   HEAD:  Atraumatic, Normocephalic  EYES: EOMI, PERRL  NERVOUS SYSTEM:  Alert & Oriented X2, in bed can Move extremities; CNS-II-XII  CHEST/LUNG: Clear to auscultation bilaterally; No rales, rhonchi, wheezing,   HEART: Regular rate and rhythm; No murmurs,   ABDOMEN: Soft, Nontender, Nondistended; Bowel sounds present  EXTREMITIES:  Peripheral Pulses, No  cyanosis, or edema  psychiatry- mood and affect approprite    ALBUTerol    90 MICROgram(s) HFA Inhaler 1 Puff(s) Inhalation every 4 hours  ALBUTerol/ipratropium for Nebulization 3 milliLiter(s) Nebulizer every 6 hours  aspirin  chewable 324 milliGRAM(s) Oral daily  atorvastatin 80 milliGRAM(s) Oral at bedtime  buMETAnide IVPB 2 milliGRAM(s) IV Intermittent daily  clopidogrel Tablet 300 milliGRAM(s) Oral once  docusate sodium 100 milliGRAM(s) Oral three times a day  DULoxetine 20 milliGRAM(s) Oral daily  gabapentin 300 milliGRAM(s) Oral two times a day  heparin  Injectable 5000 Unit(s) SubCutaneous every 12 hours  hydrALAZINE Injectable 10 milliGRAM(s) IV Push every 6 hours PRN  isosorbide   mononitrate ER Tablet (IMDUR) 120 milliGRAM(s) Oral daily  levothyroxine 100 MICROGram(s) Oral daily  losartan 50 milliGRAM(s) Oral daily  metoprolol tartrate 100 milliGRAM(s) Oral two times a day  metoprolol tartrate Injectable 5 milliGRAM(s) IV Push every 6 hours PRN  Nephro-jeana 1 Tablet(s) Oral daily  NIFEdipine XL 60 milliGRAM(s) Oral daily  pantoprazole    Tablet 40 milliGRAM(s) Oral before breakfast  senna 2 Tablet(s) Oral at bedtime  tiotropium 18 MICROgram(s) Capsule 1 Capsule(s) Inhalation daily      LABS:                          13.1   9.2   )-----------( 311      ( 2019 07:10 )             41.2     04-22    138  |  91<L>  |  33.0<H>  ----------------------------<  79  5.0   |  26.0  |  6.03<H>    Ca    9.7      2019 07:10  Phos  4.6     04-  Mg     3.1     -    CAPILLARY BLOOD GLUCOSE      POCT Blood Glucose.: 93 mg/dL (2019 11:15)        Assessment and Plan:     80 yo M hx of chronic diastolic CHF, lung CA, pulmonary fibrosis CAD s/p CABG, ESRD on dialysis (TTS), on home 2L O2, p/w sob and orthopnea x 3 days. Has been wearing 4 L nc o2 all the time. denies lower ext swelling. He also report intermittent left sided chest pain, intermittently radiating to left upper chest but now improved at tiem of hx , c/o productive cough with mild blood tinged yellowish brown phlegm, also had episode of chills and vomiting with nausea yesterday. denies any blood in vomitus ,  no abdominal pain/ vomiting  has been chronic 2-3x/day most days , has low appetite .Patient last dialyzed yesterday. Denies any sick contacts .Family two daughters  at bedside, patient a&ox3, states that he has not been able to ambulate much at home, has been requiring 2-3 people assist for transfers.  patient. has.  Patient was DNR/DNI at last admission , today he wishes to be full code.  Positive blood cultures for Gram + cocci. Vanco x 1 dose given. Repeat Blood cx ordered. ID consult called. Pts symptoms attributed to  acute on chronic hypoxic resp failure: likely sec to acute on chronic diastolic chf. urgent HD done on  & . Further HD tomorrow. Pt also with dysphagia & weight loss being evaluated by S & S lana. GI called    >Positive blood cultures for Gram + cocci- continue vanco post HD - eventual ROBERT later this week when more satble   Vanco x 1 dose given  Repeat Blood cx ordered  ID consult called    > acute on chronic hypoxic resp failure: likely sec to acute on chronic diastolic chf -  -urgent HD done on  & . Further HD today discussed with renal- NGT to get the Plavix and other cardiac meds   -TNi elevation attributed to poor renal clearance,     >Dysphagia-  As per pt he has trouble swallowing since the past month where food gets stuck in his throat. Reports 25 lbs unintentional weight loss in 2-3 months. GI consult called.   Evaluated by S & S lana, Oral stage WFL for puree/thin however, suspected pharyngeal dysphagia noted with puree and thin (limited PO given) as pt demonstrating multiple swallowing and throat clearing on small amount of PO given by ST. NPO for now with re-eval in am as per ST    >Chest pain/ possible demand ischemia   EKG no changes, telemetry no changes  -TnI elevation attributed to poor renal clearance as per cardio  -c/w aspirin and plavix when able to take PO  -c/w ARBs and bb (IV lopressor added until able to take PO)    >Worsening Interstitial & alveolar lung disease  ?infectious etiology vs pneumonitis vs worsening of pulmonary fibrosis  c/w IV zosyn  -sputum cxs , bcxs   -RVP panel -ve  -c/w broad sp abxs / taper down   Pulm consult appreciated    > Hypertension/ uncontrolled   - Losartan to 50mg QD & nifedipine 60mg QD when able to take PO  Hydralazine 10 IVP q6 prn fow now    > ESRD on hemodialysis  -urgent HD done on  & .  Breathing much improved; Further HD tomorrow.  - resume home medications.     >anemia of chronic disease    -stable h/h   -monitor , On ANUJ,    > likely severe protein teddy malnutrition   -nutrition consult

## 2019-04-23 NOTE — PROGRESS NOTE ADULT - ASSESSMENT
78 yo gentleman with pulmon fibrosis and CAD, renal failure    CXR is MUCH better after withdrawal of fluid  He does have residual scarring but significant change from admission    Lying flat, no dyspnea and wearing nasal oxygen.

## 2019-04-23 NOTE — SWALLOW VFSS/MBS ASSESSMENT ADULT - SLP PERTINENT HISTORY OF CURRENT PROBLEM
Pharyngeal dysphagia suspected after bedside assessments due to c/o globus sensation, +multiple swallows and overt s/s aspiration

## 2019-04-23 NOTE — SWALLOW VFSS/MBS ASSESSMENT ADULT - ORAL PHASE
Within functional limits +Piecemeal deglutition/Uncontrolled bolus / spillover in maged-pharynx/Uncontrolled bolus / spillover in hypopharynx Uncontrolled bolus / spillover in maged-pharynx within functional limits

## 2019-04-23 NOTE — CONSULT NOTE ADULT - PROBLEM SELECTOR RECOMMENDATION 9
medical management as per primary team
May be secondary to multiple medical co-morbidities. he is scheduled for a repeat bedside swallow evaluation today. He states that he has been having dysphagia for solids at home and may need a dedicated esophagram depending upon today's bedside swallow evaluation. Continue to hold Plavix as pt. may require eventual EGD w/ PEG placement. Cardiology evaluation and clearance for possible future EGD with PEG placement. IV Pantoprazole for GI mucosal cytoprotection.
- EKG essentially unchanged from prior  - Troponin 0.32 in the face of CHF exacerbation  - repeat troponin, add CK

## 2019-04-23 NOTE — SWALLOW VFSS/MBS ASSESSMENT ADULT - RESIDUE IN PYRIFORM SINUSES
cleared with subsequent swallows/Trace Cleared with subsequent swallow and liquid wash/Mild Cleared with liquid wash/Mild

## 2019-04-23 NOTE — CONSULT NOTE ADULT - PROVIDER SPECIALTY LIST ADULT
Infectious Disease
Pulmonology
TeleHospitalist
Palliative Care
Nephrology
Gastroenterology
Cardiology

## 2019-04-23 NOTE — PROGRESS NOTE ADULT - ASSESSMENT
80 yo M hx of chronic diastolic CHF, lung CA, pulmonary fibrosis CAD s/p CABG, ESRD on dialysis (TTS), on home 2L O2, p/w sob and orthopnea x 3 days. Has been wearing 4 L nc o2 all the time. denies lower ext swelling. He also report intermittent left sided chest pain, intermittently radiating to left upper chest but now improved at tiem of hx , c/o productive cough with mild blood tinged yellowish brown phlegm, also had episode of chills and vomiting with nausea yesterday. denies any blood in vomitus ,  no abdominal pain/ vomiting  has been chronic 2-3x/day most days , has low appetite .Patient last dialyzed yesterday. Denies any sick contacts .Family two daughters  at bedside, patient a&ox3, states that he has not been able to ambulate much at home, has been requiring 2-3 people assist for transfers.  patient. has.  Patient was DNR/DNI at last admission , today he wishes to be full code.  Positive blood cultures for Gram + cocci. Vanco x 1 dose given. Repeat Blood cx ordered. ID consult called. Pts symptoms attributed to  acute on chronic hypoxic resp failure: likely sec to acute on chronic diastolic chf. urgent HD done on 4/19 & 4/20. Further HD tomorrow. GI consulted due to reported dysphagia and weight loss.     >Positive blood cultures for Gram + cocci- continue vanco post HD - eventual ROBERT later this week when more stable  ID consult appreciated  Repeat Blood cx ordered  Can d/c Vanco once repeat cx negative    > acute on chronic hypoxic resp failure: likely sec to acute on chronic diastolic chf -  -urgent HD done on 4/19 & 4/20. Further HD tomorrow 4/24   -TnI elevation attributed to poor renal clearance as per cardio  -cardio, pulmonary and nephro following   -great improvement clinically and on CXR s/p Bumex, cont Bumex 2 mg qd for now  -residual scarring noted but overall great improvement after fluid removal    >Dysphagia-  As per pt he has trouble swallowing since the past month where food gets stuck in his throat. Reports 25 lbs unintentional weight loss in 2-3 months. GI consult called.   Pt passed MBS this morning and is now tolerating soft diet w thins. As per GI, will eventually need outpatient EGD to evaluate pt feeling globulus sensation.     >Chest pain/ possible demand ischemia   EKG no changes, telemetry noted w multiple episodes of SVT, PVCs and NSVT (7 beats) on monitor.  -TnI elevation attributed to poor renal clearance as per cardio  - restart aspirin and plavix now that pt tolerating po  - restart ARB and bb   -plan for ROBERT later in admission  -NST tomorrow  -NPO after midnight     >Worsening Interstitial & alveolar lung disease  ?infectious etiology vs pneumonitis vs worsening of pulmonary fibrosis, vs CHF  -c/w IV vanco post HD   -blood cx w CoNS  -d/c vanco when repeat cx negative   -RVP panel -ve  - Pulm consult appreciated    > Hypertension/ uncontrolled   - Losartan to 50mg QD & nifedipine 60mg QD now that pt tolerating po  Hydralazine 10 IVP prn q6     > ESRD on hemodialysis  -urgent HD done on 4/19 & 4/20.  Breathing much improved; CXR much improved  -further HD tomorrow.  -resume home medications.     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 wishes to be full code today. Palliative consult appreciated. Family meeting scheduled for tomorrow 4/24/19 at 3 pm.   DOWNGRADE TO TELEMETRY UNIT, pending NST/ROBERT. NGT removed, now tolerating po. 78 yo M hx of chronic diastolic CHF, lung CA, pulmonary fibrosis CAD s/p CABG, ESRD on dialysis (TTS), on home 2L O2, p/w sob and orthopnea x 3 days. Has been wearing 4 L nc o2 all the time. denies lower ext swelling. He also report intermittent left sided chest pain, intermittently radiating to left upper chest but now improved at tiem of hx , c/o productive cough with mild blood tinged yellowish brown phlegm, also had episode of chills and vomiting with nausea yesterday. denies any blood in vomitus ,  no abdominal pain/ vomiting  has been chronic 2-3x/day most days , has low appetite .Patient last dialyzed yesterday. Denies any sick contacts .Family two daughters  at bedside, patient a&ox3, states that he has not been able to ambulate much at home, has been requiring 2-3 people assist for transfers.  patient. has.  Patient was DNR/DNI at last admission , today he wishes to be full code.  Positive blood cultures for Gram + cocci. Vanco x 1 dose given. Repeat Blood cx ordered. ID consult called. Pts symptoms attributed to  acute on chronic hypoxic resp failure: likely sec to acute on chronic diastolic chf. urgent HD done on 4/19 & 4/20. Further HD tomorrow. GI consulted due to reported dysphagia and weight loss.     >Positive blood cultures for Gram + cocci- continue vanco post HD -   Blood cultures 2 of 2 bottles with CoNS  - Only 1 set was drawn  - Will Continue Vancomycin post HD  - Repeat Blood cx ordered. Can d/c Vanco once repeat cx negative  ID consult appreciated      > acute on chronic hypoxic resp failure: likely sec to acute on chronic diastolic chf -  -urgent HD done on 4/19 & 4/20. Further HD tomorrow 4/24   -TnI elevation attributed to poor renal clearance as per cardio  -cardio, pulmonary and nephro following   -great improvement clinically and on CXR s/p Bumex, cont Bumex 2 mg qd for now along with HD for fluid removal. Pt saturating 100% on 3L NC. Clinically improved.   -residual scarring noted but overall great improvement after fluid removal    >Dysphagia-  As per pt he has trouble swallowing since the past month where food gets stuck in his throat. Reports 25 lbs unintentional weight loss in 2-3 months. GI consult called.   Pt passed MBS this morning and is now tolerating soft diet w thins. As per GI, will eventually need outpatient EGD to evaluate pt feeling globulus sensation.     >Chest pain/ possible demand ischemia   EKG no changes, telemetry noted w multiple episodes of SVT, PVCs and NSVT (7 beats) on monitor.   -TnI elevation attributed to poor renal clearance as per cardio  - restart aspirin and plavix now that pt tolerating po  - restart ARB and bb   -NST tomorrow  -NPO after midnight     >Hx of pulmonary fibrosis- stable  -RVP panel -ve  - Pulm consult appreciated    > Hypertension/ uncontrolled   - Losartan to 50mg QD & nifedipine 60mg QD now that pt tolerating po  Hydralazine 10 IVP prn q6     > ESRD on hemodialysis  -urgent HD done on 4/19 & 4/20.  Breathing much improved; CXR much improved  -further HD tomorrow.  -resume home medications.     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 wishes to be full code today. Palliative consult appreciated. Family meeting scheduled for tomorrow 4/24/19 at 3 pm.   DOWNGRADE TO TELEMETRY UNIT, pending NST. NGT removed, now tolerating po.

## 2019-04-24 LAB
ANION GAP SERPL CALC-SCNC: 18 MMOL/L — HIGH (ref 5–17)
BASOPHILS # BLD AUTO: 0 K/UL — SIGNIFICANT CHANGE UP (ref 0–0.2)
BASOPHILS NFR BLD AUTO: 0.6 % — SIGNIFICANT CHANGE UP (ref 0–2)
BUN SERPL-MCNC: 30 MG/DL — HIGH (ref 8–20)
CALCIUM SERPL-MCNC: 9.3 MG/DL — SIGNIFICANT CHANGE UP (ref 8.6–10.2)
CHLORIDE SERPL-SCNC: 92 MMOL/L — LOW (ref 98–107)
CO2 SERPL-SCNC: 26 MMOL/L — SIGNIFICANT CHANGE UP (ref 22–29)
CREAT SERPL-MCNC: 5.91 MG/DL — HIGH (ref 0.5–1.3)
EOSINOPHIL # BLD AUTO: 1 K/UL — HIGH (ref 0–0.5)
EOSINOPHIL NFR BLD AUTO: 15.4 % — HIGH (ref 0–5)
GLUCOSE SERPL-MCNC: 88 MG/DL — SIGNIFICANT CHANGE UP (ref 70–115)
HCT VFR BLD CALC: 37.1 % — LOW (ref 42–52)
HGB BLD-MCNC: 11.4 G/DL — LOW (ref 14–18)
LYMPHOCYTES # BLD AUTO: 1.7 K/UL — SIGNIFICANT CHANGE UP (ref 1–4.8)
LYMPHOCYTES # BLD AUTO: 26.7 % — SIGNIFICANT CHANGE UP (ref 20–55)
MAGNESIUM SERPL-MCNC: 2.9 MG/DL — HIGH (ref 1.8–2.6)
MCHC RBC-ENTMCNC: 28.4 PG — SIGNIFICANT CHANGE UP (ref 27–31)
MCHC RBC-ENTMCNC: 30.7 G/DL — LOW (ref 32–36)
MCV RBC AUTO: 92.3 FL — SIGNIFICANT CHANGE UP (ref 80–94)
MONOCYTES # BLD AUTO: 0.9 K/UL — HIGH (ref 0–0.8)
MONOCYTES NFR BLD AUTO: 14.4 % — HIGH (ref 3–10)
NEUTROPHILS # BLD AUTO: 2.7 K/UL — SIGNIFICANT CHANGE UP (ref 1.8–8)
NEUTROPHILS NFR BLD AUTO: 42.9 % — SIGNIFICANT CHANGE UP (ref 37–73)
PHOSPHATE SERPL-MCNC: 5.7 MG/DL — HIGH (ref 2.4–4.7)
PLATELET # BLD AUTO: 249 K/UL — SIGNIFICANT CHANGE UP (ref 150–400)
POTASSIUM SERPL-MCNC: 4.5 MMOL/L — SIGNIFICANT CHANGE UP (ref 3.5–5.3)
POTASSIUM SERPL-SCNC: 4.5 MMOL/L — SIGNIFICANT CHANGE UP (ref 3.5–5.3)
RBC # BLD: 4.02 M/UL — LOW (ref 4.6–6.2)
RBC # FLD: 16.6 % — HIGH (ref 11–15.6)
SODIUM SERPL-SCNC: 136 MMOL/L — SIGNIFICANT CHANGE UP (ref 135–145)
VANCOMYCIN TROUGH SERPL-MCNC: 19.4 UG/ML — SIGNIFICANT CHANGE UP (ref 10–20)
WBC # BLD: 6.3 K/UL — SIGNIFICANT CHANGE UP (ref 4.8–10.8)
WBC # FLD AUTO: 6.3 K/UL — SIGNIFICANT CHANGE UP (ref 4.8–10.8)

## 2019-04-24 PROCEDURE — 99232 SBSQ HOSP IP/OBS MODERATE 35: CPT

## 2019-04-24 PROCEDURE — 90937 HEMODIALYSIS REPEATED EVAL: CPT

## 2019-04-24 PROCEDURE — 93018 CV STRESS TEST I&R ONLY: CPT

## 2019-04-24 PROCEDURE — 99497 ADVNCD CARE PLAN 30 MIN: CPT

## 2019-04-24 PROCEDURE — 78452 HT MUSCLE IMAGE SPECT MULT: CPT | Mod: 26

## 2019-04-24 PROCEDURE — 93016 CV STRESS TEST SUPVJ ONLY: CPT

## 2019-04-24 RX ORDER — ISOSORBIDE MONONITRATE 60 MG/1
120 TABLET, EXTENDED RELEASE ORAL DAILY
Refills: 0 | Status: DISCONTINUED | OUTPATIENT
Start: 2019-04-24 | End: 2019-04-26

## 2019-04-24 RX ORDER — NIFEDIPINE 30 MG
60 TABLET, EXTENDED RELEASE 24 HR ORAL DAILY
Refills: 0 | Status: DISCONTINUED | OUTPATIENT
Start: 2019-04-24 | End: 2019-04-24

## 2019-04-24 RX ORDER — BUMETANIDE 0.25 MG/ML
2 INJECTION INTRAMUSCULAR; INTRAVENOUS DAILY
Refills: 0 | Status: DISCONTINUED | OUTPATIENT
Start: 2019-04-24 | End: 2019-04-26

## 2019-04-24 RX ORDER — LOSARTAN POTASSIUM 100 MG/1
100 TABLET, FILM COATED ORAL DAILY
Refills: 0 | Status: DISCONTINUED | OUTPATIENT
Start: 2019-04-24 | End: 2019-04-26

## 2019-04-24 RX ADMIN — GABAPENTIN 300 MILLIGRAM(S): 400 CAPSULE ORAL at 17:16

## 2019-04-24 RX ADMIN — CLOPIDOGREL BISULFATE 75 MILLIGRAM(S): 75 TABLET, FILM COATED ORAL at 12:31

## 2019-04-24 RX ADMIN — PANTOPRAZOLE SODIUM 40 MILLIGRAM(S): 20 TABLET, DELAYED RELEASE ORAL at 05:14

## 2019-04-24 RX ADMIN — HEPARIN SODIUM 5000 UNIT(S): 5000 INJECTION INTRAVENOUS; SUBCUTANEOUS at 09:44

## 2019-04-24 RX ADMIN — MUPIROCIN 1 APPLICATION(S): 20 OINTMENT TOPICAL at 23:50

## 2019-04-24 RX ADMIN — Medication 3 MILLIGRAM(S): at 23:49

## 2019-04-24 RX ADMIN — Medication 100 MILLIGRAM(S): at 05:14

## 2019-04-24 RX ADMIN — GABAPENTIN 300 MILLIGRAM(S): 400 CAPSULE ORAL at 05:14

## 2019-04-24 RX ADMIN — Medication 3 MILLILITER(S): at 02:35

## 2019-04-24 RX ADMIN — BUMETANIDE 116 MILLIGRAM(S): 0.25 INJECTION INTRAMUSCULAR; INTRAVENOUS at 05:14

## 2019-04-24 RX ADMIN — DULOXETINE HYDROCHLORIDE 20 MILLIGRAM(S): 30 CAPSULE, DELAYED RELEASE ORAL at 12:30

## 2019-04-24 RX ADMIN — Medication 100 MILLIGRAM(S): at 23:50

## 2019-04-24 RX ADMIN — MUPIROCIN 1 APPLICATION(S): 20 OINTMENT TOPICAL at 12:30

## 2019-04-24 RX ADMIN — CHLORHEXIDINE GLUCONATE 1 APPLICATION(S): 213 SOLUTION TOPICAL at 08:22

## 2019-04-24 RX ADMIN — Medication 100 MICROGRAM(S): at 05:15

## 2019-04-24 RX ADMIN — Medication 60 MILLIGRAM(S): at 05:31

## 2019-04-24 RX ADMIN — Medication 324 MILLIGRAM(S): at 12:30

## 2019-04-24 RX ADMIN — LOSARTAN POTASSIUM 100 MILLIGRAM(S): 100 TABLET, FILM COATED ORAL at 05:31

## 2019-04-24 RX ADMIN — Medication 1 TABLET(S): at 12:31

## 2019-04-24 RX ADMIN — ATORVASTATIN CALCIUM 80 MILLIGRAM(S): 80 TABLET, FILM COATED ORAL at 23:49

## 2019-04-24 RX ADMIN — HEPARIN SODIUM 5000 UNIT(S): 5000 INJECTION INTRAVENOUS; SUBCUTANEOUS at 23:49

## 2019-04-24 RX ADMIN — SENNA PLUS 2 TABLET(S): 8.6 TABLET ORAL at 23:51

## 2019-04-24 NOTE — PROGRESS NOTE ADULT - SUBJECTIVE AND OBJECTIVE BOX
Pt seen and examined f/u for possible dysphagia    This morning he is somnolent and denies any further dysphagia. Apparently tolerating dysphagia soft 3 diet with thin liquids. Had episodes of SVT and NSVT.    REVIEW OF SYSTEMS:    CONSTITUTIONAL: No fever, weight loss, or fatigue  EYES: No eye pain, visual disturbances, or discharge  ENMT:  No difficulty hearing, tinnitus, vertigo; No sinus or throat pain  RESPIRATORY: No cough, wheezing, chills or hemoptysis; No shortness of breath  CARDIOVASCULAR: No chest pain, palpitations, dizziness, or leg swelling  GASTROINTESTINAL: No abdominal or epigastric pain. No nausea, vomiting, or hematemesis; No diarrhea or constipation. No melena or hematochezia.    MEDICATIONS:  MEDICATIONS  (STANDING):  ALBUTerol    90 MICROgram(s) HFA Inhaler 1 Puff(s) Inhalation every 4 hours  ALBUTerol/ipratropium for Nebulization 3 milliLiter(s) Nebulizer every 6 hours  aspirin  chewable 324 milliGRAM(s) Oral daily  atorvastatin 80 milliGRAM(s) Oral at bedtime  buMETAnide IVPB 2 milliGRAM(s) IV Intermittent daily  chlorhexidine 2% Cloths 1 Application(s) Topical <User Schedule>  clopidogrel Tablet 75 milliGRAM(s) Oral daily  dextrose 5%. 1000 milliLiter(s) (50 mL/Hr) IV Continuous <Continuous>  dextrose 50% Injectable 12.5 Gram(s) IV Push once  dextrose 50% Injectable 25 Gram(s) IV Push once  dextrose 50% Injectable 25 Gram(s) IV Push once  docusate sodium 100 milliGRAM(s) Oral three times a day  DULoxetine 20 milliGRAM(s) Oral daily  gabapentin 300 milliGRAM(s) Oral two times a day  heparin  Injectable 5000 Unit(s) SubCutaneous every 12 hours  isosorbide   mononitrate ER Tablet (IMDUR) 120 milliGRAM(s) Oral daily  levothyroxine 100 MICROGram(s) Oral daily  losartan 100 milliGRAM(s) Oral daily  melatonin 3 milliGRAM(s) Oral at bedtime  metoprolol tartrate 100 milliGRAM(s) Oral two times a day  mupirocin 2% Nasal 1 Application(s) Nasal two times a day  Nephro-jeana 1 Tablet(s) Oral daily  NIFEdipine XL 60 milliGRAM(s) Oral daily  pantoprazole    Tablet 40 milliGRAM(s) Oral before breakfast  senna 2 Tablet(s) Oral at bedtime  tiotropium 18 MICROgram(s) Capsule 1 Capsule(s) Inhalation daily  vancomycin  IVPB 1000 milliGRAM(s) IV Intermittent once    MEDICATIONS  (PRN):  dextrose 40% Gel 15 Gram(s) Oral once PRN Blood Glucose LESS THAN 70 milliGRAM(s)/deciLiter  glucagon  Injectable 1 milliGRAM(s) IntraMuscular once PRN Glucose <70 milliGRAM(s)/deciLiter  hydrALAZINE Injectable 10 milliGRAM(s) IV Push every 6 hours PRN for SBP > 150  metoprolol tartrate Injectable 5 milliGRAM(s) IV Push every 6 hours PRN for HR > 100, hold for HR < 50 or SBP < 100      Allergies    No Known Allergies    Intolerances        Vital Signs Last 24 Hrs  T(C): 36.6 (24 Apr 2019 05:07), Max: 36.8 (23 Apr 2019 14:26)  T(F): 97.9 (24 Apr 2019 05:07), Max: 98.2 (23 Apr 2019 14:26)  HR: 50 (24 Apr 2019 05:07) (46 - 89)  BP: 110/50 (24 Apr 2019 05:07) (102/50 - 158/69)  BP(mean): 94 (23 Apr 2019 20:08) (94 - 94)  RR: 18 (24 Apr 2019 05:07) (10 - 20)  SpO2: 96% (24 Apr 2019 05:07) (96% - 100%)    04-23 @ 07:01  -  04-24 @ 07:00  --------------------------------------------------------  IN: 240 mL / OUT: 0 mL / NET: 240 mL        PHYSICAL EXAM:    General: Well developed; well nourished; in no acute distress  HEENT: MMM, conjunctiva and sclera clear  Gastrointestinal:Abdomen: Soft non-tender non-distended; Normal bowel sounds; No hepatosplenomegaly  Extremities: no cyanosis, clubbing or edema.  Skin: Warm and dry. No obvious rash    LABS:      CBC Full  -  ( 24 Apr 2019 06:37 )  WBC Count : 6.3 K/uL  RBC Count : 4.02 M/uL  Hemoglobin : 11.4 g/dL  Hematocrit : 37.1 %  Platelet Count - Automated : 249 K/uL  Mean Cell Volume : 92.3 fl  Mean Cell Hemoglobin : 28.4 pg  Mean Cell Hemoglobin Concentration : 30.7 g/dL  Auto Neutrophil # : 2.7 K/uL  Auto Lymphocyte # : 1.7 K/uL  Auto Monocyte # : 0.9 K/uL  Auto Eosinophil # : 1.0 K/uL  Auto Basophil # : 0.0 K/uL  Auto Neutrophil % : 42.9 %  Auto Lymphocyte % : 26.7 %  Auto Monocyte % : 14.4 %  Auto Eosinophil % : 15.4 %  Auto Basophil % : 0.6 %    04-23    136  |  96<L>  |  19.0  ----------------------------<  81  4.3   |  25.0  |  4.17<H>    Ca    9.3      23 Apr 2019 05:23  Phos  3.6     04-23  Mg     2.4     04-23

## 2019-04-24 NOTE — PROGRESS NOTE ADULT - SUBJECTIVE AND OBJECTIVE BOX
[facility] DIVISION OF KIDNEY DISEASES AND HYPERTENSION -- FOLLOW UP NOTE  --------------------------------------------------------------------------------  Chief Complaint:  ESRD on HD    24 hour events/subjective:  Pt seen and examined  NAD  HD today        PAST HISTORY  --------------------------------------------------------------------------------  No significant changes to PMH, PSH, FHx, SHx, unless otherwise noted    ALLERGIES & MEDICATIONS  --------------------------------------------------------------------------------  Allergies    No Known Allergies    Intolerances      Standing Inpatient Medications  ALBUTerol    90 MICROgram(s) HFA Inhaler 1 Puff(s) Inhalation every 4 hours  ALBUTerol/ipratropium for Nebulization 3 milliLiter(s) Nebulizer every 6 hours  aspirin  chewable 324 milliGRAM(s) Oral daily  atorvastatin 80 milliGRAM(s) Oral at bedtime  buMETAnide IVPB 2 milliGRAM(s) IV Intermittent daily  chlorhexidine 2% Cloths 1 Application(s) Topical <User Schedule>  clopidogrel Tablet 75 milliGRAM(s) Oral daily  dextrose 5%. 1000 milliLiter(s) IV Continuous <Continuous>  dextrose 50% Injectable 12.5 Gram(s) IV Push once  dextrose 50% Injectable 25 Gram(s) IV Push once  dextrose 50% Injectable 25 Gram(s) IV Push once  docusate sodium 100 milliGRAM(s) Oral three times a day  DULoxetine 20 milliGRAM(s) Oral daily  gabapentin 300 milliGRAM(s) Oral two times a day  heparin  Injectable 5000 Unit(s) SubCutaneous every 12 hours  isosorbide   mononitrate ER Tablet (IMDUR) 120 milliGRAM(s) Oral daily  levothyroxine 100 MICROGram(s) Oral daily  losartan 100 milliGRAM(s) Oral daily  melatonin 3 milliGRAM(s) Oral at bedtime  metoprolol tartrate 100 milliGRAM(s) Oral two times a day  mupirocin 2% Nasal 1 Application(s) Nasal two times a day  Nephro-jeana 1 Tablet(s) Oral daily  NIFEdipine XL 60 milliGRAM(s) Oral daily  pantoprazole    Tablet 40 milliGRAM(s) Oral before breakfast  senna 2 Tablet(s) Oral at bedtime  tiotropium 18 MICROgram(s) Capsule 1 Capsule(s) Inhalation daily  vancomycin  IVPB 1000 milliGRAM(s) IV Intermittent once    PRN Inpatient Medications  dextrose 40% Gel 15 Gram(s) Oral once PRN  glucagon  Injectable 1 milliGRAM(s) IntraMuscular once PRN  hydrALAZINE Injectable 10 milliGRAM(s) IV Push every 6 hours PRN  metoprolol tartrate Injectable 5 milliGRAM(s) IV Push every 6 hours PRN      REVIEW OF SYSTEMS  --------------------------------------------------------------------------------  Gen: No weight changes, fatigue, + fevers/chills, weakness  Skin: No rashes  Head/Eyes/Ears/Mouth: No headache; Normal hearing; Normal vision w/o blurriness; No sinus pain/discomfort, sore throat  Respiratory: + SOB, + productive cough, + home O2, mild episode of hemoptysis, no wheeze  CV: + intermittent chest pain, PND, orthopnea  GI: No abdominal pain, no diarrhea, no constipation, + nausea, + vomiting, no melena, no hematochezia  : No increased frequency, dysuria, hematuria, nocturia  MSK: No joint pain/swelling; no back pain; no significant edema  Neuro: No dizziness/lightheadedness, weakness, seizures, numbness, tingling  Heme: No easy bruising or bleeding  Endo: No heat/cold intolerance  Psych: No significant nervousness, anxiety, stress, depression    All other systems were reviewed and are negative, except as noted.    VITALS/PHYSICAL EXAM  --------------------------------------------------------------------------------  T(C): 36.7 (04-24-19 @ 09:49), Max: 36.8 (04-23-19 @ 14:26)  HR: 52 (04-24-19 @ 09:47) (46 - 89)  BP: 92/54 (04-24-19 @ 09:47) (92/54 - 158/69)  RR: 18 (04-24-19 @ 05:07) (10 - 18)  SpO2: 96% (04-24-19 @ 05:07) (96% - 100%)  Wt(kg): --        04-23-19 @ 07:01  -  04-24-19 @ 07:00  --------------------------------------------------------  IN: 240 mL / OUT: 0 mL / NET: 240 mL      Physical Exam:  	Gen: NAD, ill-appearing, elderly, frail, pale  	HEENT: PERRL, supple neck, clear oropharynx  	Pulm: Diminished at bases bilaterally  	CV: RRR, S1S2; no rub  	Back: No spinal or CVA tenderness; no sacral edema  	Abd: +BS, soft, nontender/nondistended  	: No suprapubic tenderness  	UE: Warm, FROM, no clubbing, intact strength; no significant edema; no asterixis  	LE: Warm, FROM, no clubbing, intact strength; no significant edema  	Neuro: No focal deficits, intact gait  	Psych: Normal affect and mood  	Skin: Warm, without rashes  	Vascular access:  AVF    LABS/STUDIES  --------------------------------------------------------------------------------              11.4   6.3   >-----------<  249      [04-24-19 @ 06:37]              37.1     136  |  92  |  30.0  ----------------------------<  88      [04-24-19 @ 06:37]  4.5   |  26.0  |  5.91        Ca     9.3     [04-24-19 @ 06:37]      Mg     2.9     [04-24-19 @ 06:37]      Phos  5.7     [04-24-19 @ 06:37]          Troponin 0.21      [04-23-19 @ 01:17]        [04-23-19 @ 01:17]    Creatinine Trend:  SCr 5.91 [04-24 @ 06:37]  SCr 4.17 [04-23 @ 05:23]  SCr 3.49 [04-23 @ 01:17]  SCr 6.03 [04-22 @ 07:10]  SCr 4.63 [04-21 @ 11:53]        Iron 34, TIBC 235, %sat 14      [01-29-19 @ 11:39]  Ferritin 1049      [01-29-19 @ 11:39]  TSH 1.83      [03-11-19 @ 07:37]  Lipid: chol 159, , HDL 32, LDL 94      [01-29-19 @ 11:39]    HBsAg Nonreact      [04-10-19 @ 17:37]

## 2019-04-24 NOTE — CHART NOTE - NSCHARTNOTEFT_GEN_A_CORE
80 yo M hx of chronic diastolic CHF, lung CA, pulmonary fibrosis CAD s/p CABG, ESRD on dialysis (TTS), on home 2L O2, p/w sob and orthopnea x 3 days.    Source: Patient [X]  Family [ ]   other [X] EMR (pt would fall asleep during interview)    Current Diet: NPO    Patient reports [X] nausea  [X] vomiting [ ] diarrhea [ ] constipation  [ ]chewing problems [ ] swallowing issues  [ ] other:     PO intake:  < 50% [X]   50-75%  [ ]   %  [ ]  other :    Source for PO intake [ ] Patient [ ] family [X] chart [ ] staff [ ] other    Current Weight: (4/24) 55.2 kg  (4/20) 55.7 kg  Usual weight PTA: 141 lbs    % Weight Change 14% x 6 months    Pertinent Medications: MEDICATIONS  (STANDING):  ALBUTerol    90 MICROgram(s) HFA Inhaler 1 Puff(s) Inhalation every 4 hours  ALBUTerol/ipratropium for Nebulization 3 milliLiter(s) Nebulizer every 6 hours  aspirin  chewable 324 milliGRAM(s) Oral daily  atorvastatin 80 milliGRAM(s) Oral at bedtime  buMETAnide IVPB 2 milliGRAM(s) IV Intermittent daily  chlorhexidine 2% Cloths 1 Application(s) Topical <User Schedule>  clopidogrel Tablet 75 milliGRAM(s) Oral daily  dextrose 5%. 1000 milliLiter(s) (50 mL/Hr) IV Continuous <Continuous>  dextrose 50% Injectable 12.5 Gram(s) IV Push once  dextrose 50% Injectable 25 Gram(s) IV Push once  dextrose 50% Injectable 25 Gram(s) IV Push once  docusate sodium 100 milliGRAM(s) Oral three times a day  DULoxetine 20 milliGRAM(s) Oral daily  gabapentin 300 milliGRAM(s) Oral two times a day  heparin  Injectable 5000 Unit(s) SubCutaneous every 12 hours  isosorbide   mononitrate ER Tablet (IMDUR) 120 milliGRAM(s) Oral daily  levothyroxine 100 MICROGram(s) Oral daily  losartan 100 milliGRAM(s) Oral daily  melatonin 3 milliGRAM(s) Oral at bedtime  metoprolol tartrate 100 milliGRAM(s) Oral two times a day  mupirocin 2% Nasal 1 Application(s) Nasal two times a day  Nephro-jeana 1 Tablet(s) Oral daily  NIFEdipine XL 60 milliGRAM(s) Oral daily  pantoprazole    Tablet 40 milliGRAM(s) Oral before breakfast  senna 2 Tablet(s) Oral at bedtime  tiotropium 18 MICROgram(s) Capsule 1 Capsule(s) Inhalation daily  vancomycin  IVPB 1000 milliGRAM(s) IV Intermittent once    MEDICATIONS  (PRN):  dextrose 40% Gel 15 Gram(s) Oral once PRN Blood Glucose LESS THAN 70 milliGRAM(s)/deciLiter  glucagon  Injectable 1 milliGRAM(s) IntraMuscular once PRN Glucose <70 milliGRAM(s)/deciLiter  hydrALAZINE Injectable 10 milliGRAM(s) IV Push every 6 hours PRN for SBP > 150  metoprolol tartrate Injectable 5 milliGRAM(s) IV Push every 6 hours PRN for HR > 100, hold for HR < 50 or SBP < 100    Pertinent Labs: CBC Full  -  ( 24 Apr 2019 06:37 )  WBC Count : 6.3 K/uL  RBC Count : 4.02 M/uL  Hemoglobin : 11.4 g/dL  Hematocrit : 37.1 %  Platelet Count - Automated : 249 K/uL  Mean Cell Volume : 92.3 fl  Mean Cell Hemoglobin : 28.4 pg  Mean Cell Hemoglobin Concentration : 30.7 g/dL  Auto Neutrophil # : 2.7 K/uL  Auto Lymphocyte # : 1.7 K/uL  Auto Monocyte # : 0.9 K/uL  Auto Eosinophil # : 1.0 K/uL  Auto Basophil # : 0.0 K/uL  Auto Neutrophil % : 42.9 %  Auto Lymphocyte % : 26.7 %  Auto Monocyte % : 14.4 %  Auto Eosinophil % : 15.4 %  Auto Basophil % : 0.6 %    Skin: Intact.    Nutrition focused physical exam previously conducted - found signs of malnutrition [ ]absent [X]present    Subcutaneous fat loss: SEVERE [X] Orbital fat pads region, [X]Buccal fat region, [ ]Triceps region,  [ ]Ribs region    Muscle wasting: SEVERE [ ]Temples region, [X]Clavicle region, [X]Shoulder region, [ ]Scapula region, [ ]Interosseous region,  [ ]thigh region, [ ]Calf region    Estimated Needs:   [X] no change since previous assessment  [ ] recalculated:     Current Nutrition Diagnosis: Pt with severe chronic malnutrition related to inadequate protein energy intake in the setting of CHF, dysphagia evidenced by 20lb (14%) wt loss x6m, severe fat/muscle wasting, <50% PO intake >1m. Per gastroenterology note, pt is tolerating dysphagia 3 diet order. Pt reports he still does not have an appetite.    Recommendations:  Rx continue Dysphagia 3 diet.  Rx adding Nepro TID to provide 1275 calories and 57.3 grams protein daily     Monitoring and Evaluation:   [X] PO intake [ ] Tolerance to diet prescription [X] Weights  [X] Follow up per protocol [X] Labs: 78 yo M hx of chronic diastolic CHF, lung CA, pulmonary fibrosis CAD s/p CABG, ESRD on dialysis (TTS), on home 2L O2, p/w sob and orthopnea x 3 days.    Source: Patient [ ]  Family [ ]   other [X] EMR (Attempted to interview twice; pt sleeping both times)    Current Diet: NPO    Patient reports [ ] nausea  [ ] vomiting [ ] diarrhea [ ] constipation  [ ]chewing problems [ ] swallowing issues  [ ] other:     PO intake:  < 50% [X]   50-75%  [ ]   %  [ ]  other :    Source for PO intake [ ] Patient [ ] family [X] chart [ ] staff [ ] other    Current Weight: (4/24) 55.2 kg  (4/20) 55.7 kg  Usual weight PTA: 141 lbs    % Weight Change 14% x 6 months    Pertinent Medications: MEDICATIONS  (STANDING):  ALBUTerol    90 MICROgram(s) HFA Inhaler 1 Puff(s) Inhalation every 4 hours  ALBUTerol/ipratropium for Nebulization 3 milliLiter(s) Nebulizer every 6 hours  aspirin  chewable 324 milliGRAM(s) Oral daily  atorvastatin 80 milliGRAM(s) Oral at bedtime  buMETAnide IVPB 2 milliGRAM(s) IV Intermittent daily  chlorhexidine 2% Cloths 1 Application(s) Topical <User Schedule>  clopidogrel Tablet 75 milliGRAM(s) Oral daily  dextrose 5%. 1000 milliLiter(s) (50 mL/Hr) IV Continuous <Continuous>  dextrose 50% Injectable 12.5 Gram(s) IV Push once  dextrose 50% Injectable 25 Gram(s) IV Push once  dextrose 50% Injectable 25 Gram(s) IV Push once  docusate sodium 100 milliGRAM(s) Oral three times a day  DULoxetine 20 milliGRAM(s) Oral daily  gabapentin 300 milliGRAM(s) Oral two times a day  heparin  Injectable 5000 Unit(s) SubCutaneous every 12 hours  isosorbide   mononitrate ER Tablet (IMDUR) 120 milliGRAM(s) Oral daily  levothyroxine 100 MICROGram(s) Oral daily  losartan 100 milliGRAM(s) Oral daily  melatonin 3 milliGRAM(s) Oral at bedtime  metoprolol tartrate 100 milliGRAM(s) Oral two times a day  mupirocin 2% Nasal 1 Application(s) Nasal two times a day  Nephro-jeana 1 Tablet(s) Oral daily  NIFEdipine XL 60 milliGRAM(s) Oral daily  pantoprazole    Tablet 40 milliGRAM(s) Oral before breakfast  senna 2 Tablet(s) Oral at bedtime  tiotropium 18 MICROgram(s) Capsule 1 Capsule(s) Inhalation daily  vancomycin  IVPB 1000 milliGRAM(s) IV Intermittent once    MEDICATIONS  (PRN):  dextrose 40% Gel 15 Gram(s) Oral once PRN Blood Glucose LESS THAN 70 milliGRAM(s)/deciLiter  glucagon  Injectable 1 milliGRAM(s) IntraMuscular once PRN Glucose <70 milliGRAM(s)/deciLiter  hydrALAZINE Injectable 10 milliGRAM(s) IV Push every 6 hours PRN for SBP > 150  metoprolol tartrate Injectable 5 milliGRAM(s) IV Push every 6 hours PRN for HR > 100, hold for HR < 50 or SBP < 100    Pertinent Labs: CBC Full  -  ( 24 Apr 2019 06:37 )  WBC Count : 6.3 K/uL  RBC Count : 4.02 M/uL  Hemoglobin : 11.4 g/dL  Hematocrit : 37.1 %  Platelet Count - Automated : 249 K/uL  Mean Cell Volume : 92.3 fl  Mean Cell Hemoglobin : 28.4 pg  Mean Cell Hemoglobin Concentration : 30.7 g/dL  Auto Neutrophil # : 2.7 K/uL  Auto Lymphocyte # : 1.7 K/uL  Auto Monocyte # : 0.9 K/uL  Auto Eosinophil # : 1.0 K/uL  Auto Basophil # : 0.0 K/uL  Auto Neutrophil % : 42.9 %  Auto Lymphocyte % : 26.7 %  Auto Monocyte % : 14.4 %  Auto Eosinophil % : 15.4 %  Auto Basophil % : 0.6 %    Skin: Intact.    Nutrition focused physical exam previously conducted - found signs of malnutrition [ ]absent [X]present    Subcutaneous fat loss: SEVERE [X] Orbital fat pads region, [X]Buccal fat region, [ ]Triceps region,  [ ]Ribs region    Muscle wasting: SEVERE [ ]Temples region, [X]Clavicle region, [X]Shoulder region, [ ]Scapula region, [ ]Interosseous region,  [ ]thigh region, [ ]Calf region    Estimated Needs:   [X] no change since previous assessment  [ ] recalculated:     Current Nutrition Diagnosis: Pt with severe chronic malnutrition related to inadequate protein energy intake in the setting of CHF, dysphagia evidenced by 20lb (14%) wt loss x6m, severe fat/muscle wasting, <50% PO intake >1m. Per gastroenterology note, pt is tolerating dysphagia 3 diet order. Per chart, pt consuming <50% of meals. Unable to obtain information from pt due to him sleeping.    Recommendations:  Rx continue Dysphagia 3 diet.  Rx adding Nepro TID to provide 1275 calories and 57.3 grams protein daily     Monitoring and Evaluation:   [X] PO intake [ ] Tolerance to diet prescription [X] Weights  [X] Follow up per protocol [X] Labs: 78 yo M hx of chronic diastolic CHF, lung CA, pulmonary fibrosis CAD s/p CABG, ESRD on dialysis (TTS), on home 2L O2, p/w sob and orthopnea x 3 days.    Source: Patient [ ]  Family [ ]   other [X] EMR (Attempted to interview twice; pt sleeping both times)    Current Diet: NPO    Patient reports [ ] nausea  [ ] vomiting [ ] diarrhea [ ] constipation  [ ]chewing problems [ ] swallowing issues  [ ] other:     PO intake:  < 50% [X]   50-75%  [ ]   %  [ ]  other :    Source for PO intake [ ] Patient [ ] family [X] chart [ ] staff [ ] other    Current Weight: (4/24) 55.2 kg  (4/20) 55.7 kg  Usual weight PTA: 141 lbs    % Weight Change 14% x 6 months    Pertinent Medications: MEDICATIONS  (STANDING):  ALBUTerol    90 MICROgram(s) HFA Inhaler 1 Puff(s) Inhalation every 4 hours  ALBUTerol/ipratropium for Nebulization 3 milliLiter(s) Nebulizer every 6 hours  aspirin  chewable 324 milliGRAM(s) Oral daily  atorvastatin 80 milliGRAM(s) Oral at bedtime  buMETAnide IVPB 2 milliGRAM(s) IV Intermittent daily  chlorhexidine 2% Cloths 1 Application(s) Topical <User Schedule>  clopidogrel Tablet 75 milliGRAM(s) Oral daily  dextrose 5%. 1000 milliLiter(s) (50 mL/Hr) IV Continuous <Continuous>  dextrose 50% Injectable 12.5 Gram(s) IV Push once  dextrose 50% Injectable 25 Gram(s) IV Push once  dextrose 50% Injectable 25 Gram(s) IV Push once  docusate sodium 100 milliGRAM(s) Oral three times a day  DULoxetine 20 milliGRAM(s) Oral daily  gabapentin 300 milliGRAM(s) Oral two times a day  heparin  Injectable 5000 Unit(s) SubCutaneous every 12 hours  isosorbide   mononitrate ER Tablet (IMDUR) 120 milliGRAM(s) Oral daily  levothyroxine 100 MICROGram(s) Oral daily  losartan 100 milliGRAM(s) Oral daily  melatonin 3 milliGRAM(s) Oral at bedtime  metoprolol tartrate 100 milliGRAM(s) Oral two times a day  mupirocin 2% Nasal 1 Application(s) Nasal two times a day  Nephro-jeana 1 Tablet(s) Oral daily  NIFEdipine XL 60 milliGRAM(s) Oral daily  pantoprazole    Tablet 40 milliGRAM(s) Oral before breakfast  senna 2 Tablet(s) Oral at bedtime  tiotropium 18 MICROgram(s) Capsule 1 Capsule(s) Inhalation daily  vancomycin  IVPB 1000 milliGRAM(s) IV Intermittent once    MEDICATIONS  (PRN):  dextrose 40% Gel 15 Gram(s) Oral once PRN Blood Glucose LESS THAN 70 milliGRAM(s)/deciLiter  glucagon  Injectable 1 milliGRAM(s) IntraMuscular once PRN Glucose <70 milliGRAM(s)/deciLiter  hydrALAZINE Injectable 10 milliGRAM(s) IV Push every 6 hours PRN for SBP > 150  metoprolol tartrate Injectable 5 milliGRAM(s) IV Push every 6 hours PRN for HR > 100, hold for HR < 50 or SBP < 100    Pertinent Labs: CBC Full  -  ( 24 Apr 2019 06:37 )  WBC Count : 6.3 K/uL  RBC Count : 4.02 M/uL  Hemoglobin : 11.4 g/dL  Hematocrit : 37.1 %  Platelet Count - Automated : 249 K/uL  Mean Cell Volume : 92.3 fl  Mean Cell Hemoglobin : 28.4 pg  Mean Cell Hemoglobin Concentration : 30.7 g/dL  Auto Neutrophil # : 2.7 K/uL  Auto Lymphocyte # : 1.7 K/uL  Auto Monocyte # : 0.9 K/uL  Auto Eosinophil # : 1.0 K/uL  Auto Basophil # : 0.0 K/uL  Auto Neutrophil % : 42.9 %  Auto Lymphocyte % : 26.7 %  Auto Monocyte % : 14.4 %  Auto Eosinophil % : 15.4 %  Auto Basophil % : 0.6 %    Skin: Intact per documentation     Nutrition focused physical exam previously conducted - found signs of malnutrition [ ]absent [X]present    Subcutaneous fat loss: SEVERE [X] Orbital fat pads region, [X]Buccal fat region, [ ]Triceps region,  [ ]Ribs region    Muscle wasting: SEVERE [ ]Temples region, [X]Clavicle region, [X]Shoulder region, [ ]Scapula region, [ ]Interosseous region,  [ ]thigh region, [ ]Calf region    Estimated Needs:   [X] no change since previous assessment  [ ] recalculated:     Current Nutrition Diagnosis: Pt with severe chronic malnutrition related to inadequate protein energy intake in the setting of CHF, dysphagia, and ESRD on HD as evidenced by 20lb (14%) wt loss x 6 months, severe muscle loss of clavicles and shoulders, severe fat loss of orbitals and buccal pads, and <50% PO intake >1 month. Seen by SLP 4/23 with recommendations for a soft diet with thin liquids s/p MBS. Per gastroenterology note, pt is tolerating diet. Per chart, pt consuming <50% of meals. Unable to obtain information from pt due to him sleeping.    Recommendations:  Continue diet as tolerated.   Add Nepro TID to provide 1275 calories and 57.3 grams protein daily.    Monitoring and Evaluation:   [X] PO intake [x] Tolerance to diet prescription [X] Weights  [X] Follow up per protocol [X] Labs

## 2019-04-24 NOTE — PROGRESS NOTE ADULT - SUBJECTIVE AND OBJECTIVE BOX
CHIEF COMPLAINT/INTERVAL HISTORY:    Patient is a 79y old  Male who presents with a chief complaint of sob (24 Apr 2019 10:15)      HPI:  80 yo M hx of chronic diastolic CHF, lung CA, pulmonary fibrosis CAD s/p CABG, ESRD on dialysis (TTS), on home 2L O2, p/w sob and orthopnea x 3 days. Has been wearing 4 L nc o2 all the time. denies lower ext swelling. He also report intermittent left sided chest pain, intermittently radiating to left upper chest but now improved at tiem of hx , c/o productive cough with mild blood tinged yellowish brown phlegm, also had episode of chills and vomiting with nausea yesterday. denies any blood in vomitus ,  no abdominal pain/ vomiting  has been chronic 2-3x/day most days , has low appetite .  Patient last dialyzed yesterday. Denies any sick contacts   Family two daughters  at bedside, patient a&ox3, states that he has not been able to ambulate much at home, has been requiring 2-3 people assist for transfers.  patient. has.    Denies palpitations, irregular and/or rapid heart beat, syncope/near syncope, dizziness, edema, n/v/d, hematuria, or hematochezia.    Patient was DNR/DNI at last admission , today he wishes to be full code.     seen by cardio and nephro in er. plan for urgent HD (19 Apr 2019 16:18)      SUBJECTIVE & OBJECTIVE/ ROS: Pt seen and examined at bedside. Noted w multiple episodes of SVT, PVCs and NSVT (7 beats) on monitor.   Noted with  No acute events. Pt is noted as maintaining oxygenation on 2-3L nasal cannula. Pt's SBP on 90's. NST only half way done. Rest to be done in am. No chest pain, palpitations, light headedness/dizziness, fevers/chills, abdominal pain, n/v, diarrhea/constipation, dysuria or increased urinary frequency.     ICU Vital Signs Last 24 Hrs  T(C): 37.2 (24 Apr 2019 15:47), Max: 37.2 (24 Apr 2019 15:47)  T(F): 98.9 (24 Apr 2019 15:47), Max: 98.9 (24 Apr 2019 15:47)  HR: 106 (24 Apr 2019 15:47) (46 - 106)  BP: 97/57 (24 Apr 2019 15:47) (86/50 - 158/69)  BP(mean): 94 (23 Apr 2019 20:08) (94 - 94)  ABP: --  ABP(mean): --  RR: 18 (24 Apr 2019 15:47) (10 - 18)  SpO2: 98% (24 Apr 2019 15:47) (96% - 100%)        MEDICATIONS  (STANDING):  ALBUTerol    90 MICROgram(s) HFA Inhaler 1 Puff(s) Inhalation every 4 hours  ALBUTerol/ipratropium for Nebulization 3 milliLiter(s) Nebulizer every 6 hours  aspirin  chewable 324 milliGRAM(s) Oral daily  atorvastatin 80 milliGRAM(s) Oral at bedtime  buMETAnide 2 milliGRAM(s) Oral daily  chlorhexidine 2% Cloths 1 Application(s) Topical <User Schedule>  clopidogrel Tablet 75 milliGRAM(s) Oral daily  dextrose 5%. 1000 milliLiter(s) (50 mL/Hr) IV Continuous <Continuous>  dextrose 50% Injectable 12.5 Gram(s) IV Push once  dextrose 50% Injectable 25 Gram(s) IV Push once  dextrose 50% Injectable 25 Gram(s) IV Push once  docusate sodium 100 milliGRAM(s) Oral three times a day  DULoxetine 20 milliGRAM(s) Oral daily  gabapentin 300 milliGRAM(s) Oral two times a day  heparin  Injectable 5000 Unit(s) SubCutaneous every 12 hours  isosorbide   mononitrate ER Tablet (IMDUR) 120 milliGRAM(s) Oral daily  levothyroxine 100 MICROGram(s) Oral daily  losartan 100 milliGRAM(s) Oral daily  melatonin 3 milliGRAM(s) Oral at bedtime  metoprolol tartrate 100 milliGRAM(s) Oral two times a day  mupirocin 2% Nasal 1 Application(s) Nasal two times a day  Nephro-jeana 1 Tablet(s) Oral daily  pantoprazole    Tablet 40 milliGRAM(s) Oral before breakfast  senna 2 Tablet(s) Oral at bedtime  tiotropium 18 MICROgram(s) Capsule 1 Capsule(s) Inhalation daily    MEDICATIONS  (PRN):  dextrose 40% Gel 15 Gram(s) Oral once PRN Blood Glucose LESS THAN 70 milliGRAM(s)/deciLiter  glucagon  Injectable 1 milliGRAM(s) IntraMuscular once PRN Glucose <70 milliGRAM(s)/deciLiter  hydrALAZINE Injectable 10 milliGRAM(s) IV Push every 6 hours PRN for SBP > 150      LABS:                        11.4   6.3   )-----------( 249      ( 24 Apr 2019 06:37 )             37.1     04-24    136  |  92<L>  |  30.0<H>  ----------------------------<  88  4.5   |  26.0  |  5.91<H>    Ca    9.3      24 Apr 2019 06:37  Phos  5.7     04-24  Mg     2.9     04-24            CAPILLARY BLOOD GLUCOSE      POCT Blood Glucose.: 104 mg/dL (23 Apr 2019 17:50)      RECENT CULTURES:      RADIOLOGY & ADDITIONAL TESTS:      PHYSICAL EXAM:    GENERAL: supine, lying flat, elderly male, very thin, NAD  HEENT: NC/AT  NECK: Supple, No JVD   CHEST/LUNG: diminished at bases ; Normal effort  HEART: S1S2 Normal intensity, no murmurs, gallops or rubs noted  ABDOMEN: Soft, BS Normoactive, NT, ND, no HSM noted  EXTREMITIES: no sig edema  SKIN: No rashes or lesions noted  NEURO: grossly intact  PSYCH: normal mood and affect; insight/judgement appropriate

## 2019-04-24 NOTE — PROGRESS NOTE ADULT - ASSESSMENT
80 yo M hx of chronic diastolic CHF, lung CA, pulmonary fibrosis CAD s/p CABG, ESRD on dialysis (TTS), on home 2L O2, p/w sob and orthopnea x 3 days. Has been wearing 4 L nc o2 all the time. denies lower ext swelling. He also report intermittent left sided chest pain, intermittently radiating to left upper chest but now improved at tiem of hx , c/o productive cough with mild blood tinged yellowish brown phlegm, also had episode of chills and vomiting with nausea yesterday. denies any blood in vomitus ,  no abdominal pain/ vomiting  has been chronic 2-3x/day most days , has low appetite .Patient last dialyzed yesterday. Denies any sick contacts .Family two daughters  at bedside, patient a&ox3, states that he has not been able to ambulate much at home, has been requiring 2-3 people assist for transfers.  patient. has.  Patient was DNR/DNI at last admission , today he wishes to be full code.  Positive blood cultures for Gram + cocci. Vanco x 1 dose given. Repeat Blood cx ordered. ID consult called. Pts symptoms attributed to  acute on chronic hypoxic resp failure: likely sec to acute on chronic diastolic chf. urgent HD done on 4/19 & 4/20. Further HD tomorrow. GI consulted due to reported dysphagia and weight loss.       >Positive blood cultures for Gram + cocci- continue vanco post HD -   Blood cultures 2 of 2 bottles with CoNS  - Repeat Blood cx negative. d/michelle Vanco as per ID        > acute on chronic hypoxic resp failure: likely sec to acute on chronic diastolic chf -  -urgent HD done on 4/19 & 4/20. Further HD tomorrow 4/24   -TnI elevation attributed to poor renal clearance as per cardio  -cardio, pulmonary and nephro following   -great improvement clinically and on CXR s/p Bumex, change IV Bumex 2 mg to PO as discussed with Dr. Nesbitt for now along with HD for fluid removal. Pt saturating 100% on 3L NC. Clinically improved.   -residual scarring noted but overall great improvement after fluid removal    >Dysphagia-  As per pt he has trouble swallowing since the past month where food gets stuck in his throat. Reports 25 lbs unintentional weight loss in 2-3 months. GI consult called.   Pt passed MBS this morning and is now tolerating soft diet w thins. Barium esophagogram ordered per GI. As per GI, will eventually need outpatient EGD to evaluate pt feeling globulus sensation.     >Chest pain/ possible demand ischemia   EKG no changes, telemetry noted w multiple episodes of SVT, PVCs and NSVT (7 beats) on monitor.   -TnI elevation attributed to poor renal clearance as per cardio  - restart aspirin and plavix now that pt tolerating po  - restart ARB and bb  Pt's SBP in 90's. NST only half way done. Rest to be done in am   -NPO after midnight     >Hx of pulmonary fibrosis- stable  -RVP panel -ve  - Pulm consult appreciated    > Hypertension/ uncontrolled   - Losartan to 50mg QD. D/michelle nifedipine 60mg QD as per cardio  Hydralazine 10 IVP prn q6   Today with hypotension with SBP's in 90's. Improved SBP to 110 currently. Pt is asymptomatic.     > ESRD on hemodialysis  -urgent HD done on 4/19 & 4/20.  Breathing much improved; CXR much improved  -further HD tomorrow.  -resume home medications.     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 wishes to be full code today. Palliative consult appreciated. Family meeting scheduled for today 4/24/19

## 2019-04-24 NOTE — PROGRESS NOTE ADULT - ASSESSMENT
1) ESRD on HD  2) Anemia of renal dx  3) MBD of renal dx  4) Vol/HTN    Palliative care to discuss goals of care  Patient rescinded DNR/DNI  Failed swallow eval  Will likely need PEG   Plan for HD today; 0-0.5L UF; minimal given poor po status  Prognosis Poor  Continue current management

## 2019-04-24 NOTE — GOALS OF CARE CONVERSATION - PERSONAL ADVANCE DIRECTIVE - CONVERSATION DETAILS
met with patient and daughter and granddaughter- pt speaks [place] dialect and preferred to use his family to translate even though  services were offered. Discussed recent recurrent admissions to hospital for respiratory failure, hypertensive issues and now aspiration. They collectively recalled that he has had serious bouts of illness requiring ICU and life supportive measures and at some point on prior admission had been DNR/I but now that they see he has been able to rally and improve enough to be discharged again and again they do not want to "aaliyah him of the chance" to get better. Explained that as a pt continues to return to hospital with issues such as pt's, they can become weaker each time and that the trajectory of pt with lung/cardiac/renal disease is that they may come in close to death have this undulating pattern of exacerbations that may bring them to the brink of death but then pt makes a recovery again -each time urging the discussions for GOC but never being able to reliably predict when that exacerbation will be his final time/dying process. They expressed understanding and wish to speak further about it as a family. A copy of MOLST form was given.

## 2019-04-25 ENCOUNTER — TRANSCRIPTION ENCOUNTER (OUTPATIENT)
Age: 79
End: 2019-04-25

## 2019-04-25 LAB
ANION GAP SERPL CALC-SCNC: 13 MMOL/L — SIGNIFICANT CHANGE UP (ref 5–17)
BASOPHILS # BLD AUTO: 0 K/UL — SIGNIFICANT CHANGE UP (ref 0–0.2)
BASOPHILS NFR BLD AUTO: 0.4 % — SIGNIFICANT CHANGE UP (ref 0–2)
BUN SERPL-MCNC: 15 MG/DL — SIGNIFICANT CHANGE UP (ref 8–20)
CALCIUM SERPL-MCNC: 8.4 MG/DL — LOW (ref 8.6–10.2)
CHLORIDE SERPL-SCNC: 94 MMOL/L — LOW (ref 98–107)
CO2 SERPL-SCNC: 28 MMOL/L — SIGNIFICANT CHANGE UP (ref 22–29)
CREAT SERPL-MCNC: 3.68 MG/DL — HIGH (ref 0.5–1.3)
EOSINOPHIL # BLD AUTO: 0.9 K/UL — HIGH (ref 0–0.5)
EOSINOPHIL NFR BLD AUTO: 10.9 % — HIGH (ref 0–5)
GLUCOSE SERPL-MCNC: 124 MG/DL — HIGH (ref 70–115)
HCT VFR BLD CALC: 37.2 % — LOW (ref 42–52)
HGB BLD-MCNC: 11.5 G/DL — LOW (ref 14–18)
LYMPHOCYTES # BLD AUTO: 1.6 K/UL — SIGNIFICANT CHANGE UP (ref 1–4.8)
LYMPHOCYTES # BLD AUTO: 19.5 % — LOW (ref 20–55)
MAGNESIUM SERPL-MCNC: 2.2 MG/DL — SIGNIFICANT CHANGE UP (ref 1.6–2.6)
MCHC RBC-ENTMCNC: 28.5 PG — SIGNIFICANT CHANGE UP (ref 27–31)
MCHC RBC-ENTMCNC: 30.9 G/DL — LOW (ref 32–36)
MCV RBC AUTO: 92.3 FL — SIGNIFICANT CHANGE UP (ref 80–94)
MONOCYTES # BLD AUTO: 1 K/UL — HIGH (ref 0–0.8)
MONOCYTES NFR BLD AUTO: 11.8 % — HIGH (ref 3–10)
NEUTROPHILS # BLD AUTO: 4.8 K/UL — SIGNIFICANT CHANGE UP (ref 1.8–8)
NEUTROPHILS NFR BLD AUTO: 57 % — SIGNIFICANT CHANGE UP (ref 37–73)
PHOSPHATE SERPL-MCNC: 3.4 MG/DL — SIGNIFICANT CHANGE UP (ref 2.4–4.7)
PLATELET # BLD AUTO: 212 K/UL — SIGNIFICANT CHANGE UP (ref 150–400)
POTASSIUM SERPL-MCNC: 3.5 MMOL/L — SIGNIFICANT CHANGE UP (ref 3.5–5.3)
POTASSIUM SERPL-SCNC: 3.5 MMOL/L — SIGNIFICANT CHANGE UP (ref 3.5–5.3)
RBC # BLD: 4.03 M/UL — LOW (ref 4.6–6.2)
RBC # FLD: 16.2 % — HIGH (ref 11–15.6)
SODIUM SERPL-SCNC: 135 MMOL/L — SIGNIFICANT CHANGE UP (ref 135–145)
WBC # BLD: 8.5 K/UL — SIGNIFICANT CHANGE UP (ref 4.8–10.8)
WBC # FLD AUTO: 8.5 K/UL — SIGNIFICANT CHANGE UP (ref 4.8–10.8)

## 2019-04-25 PROCEDURE — 99233 SBSQ HOSP IP/OBS HIGH 50: CPT

## 2019-04-25 PROCEDURE — 99232 SBSQ HOSP IP/OBS MODERATE 35: CPT

## 2019-04-25 RX ORDER — METOPROLOL TARTRATE 50 MG
50 TABLET ORAL EVERY 8 HOURS
Refills: 0 | Status: DISCONTINUED | OUTPATIENT
Start: 2019-04-25 | End: 2019-04-26

## 2019-04-25 RX ADMIN — Medication 100 MILLIGRAM(S): at 05:13

## 2019-04-25 RX ADMIN — ATORVASTATIN CALCIUM 80 MILLIGRAM(S): 80 TABLET, FILM COATED ORAL at 22:55

## 2019-04-25 RX ADMIN — MUPIROCIN 1 APPLICATION(S): 20 OINTMENT TOPICAL at 22:55

## 2019-04-25 RX ADMIN — Medication 324 MILLIGRAM(S): at 10:42

## 2019-04-25 RX ADMIN — Medication 3 MILLILITER(S): at 02:53

## 2019-04-25 RX ADMIN — GABAPENTIN 300 MILLIGRAM(S): 400 CAPSULE ORAL at 06:55

## 2019-04-25 RX ADMIN — Medication 50 MILLIGRAM(S): at 22:55

## 2019-04-25 RX ADMIN — LOSARTAN POTASSIUM 100 MILLIGRAM(S): 100 TABLET, FILM COATED ORAL at 06:55

## 2019-04-25 RX ADMIN — HEPARIN SODIUM 5000 UNIT(S): 5000 INJECTION INTRAVENOUS; SUBCUTANEOUS at 22:55

## 2019-04-25 RX ADMIN — MUPIROCIN 1 APPLICATION(S): 20 OINTMENT TOPICAL at 10:43

## 2019-04-25 RX ADMIN — Medication 100 MILLIGRAM(S): at 14:57

## 2019-04-25 RX ADMIN — Medication 3 MILLILITER(S): at 09:13

## 2019-04-25 RX ADMIN — Medication 1 TABLET(S): at 10:39

## 2019-04-25 RX ADMIN — PANTOPRAZOLE SODIUM 40 MILLIGRAM(S): 20 TABLET, DELAYED RELEASE ORAL at 06:55

## 2019-04-25 RX ADMIN — SENNA PLUS 2 TABLET(S): 8.6 TABLET ORAL at 22:55

## 2019-04-25 RX ADMIN — Medication 3 MILLIGRAM(S): at 22:55

## 2019-04-25 RX ADMIN — DULOXETINE HYDROCHLORIDE 20 MILLIGRAM(S): 30 CAPSULE, DELAYED RELEASE ORAL at 10:43

## 2019-04-25 RX ADMIN — Medication 100 MILLIGRAM(S): at 10:42

## 2019-04-25 RX ADMIN — CHLORHEXIDINE GLUCONATE 1 APPLICATION(S): 213 SOLUTION TOPICAL at 06:51

## 2019-04-25 RX ADMIN — BUMETANIDE 2 MILLIGRAM(S): 0.25 INJECTION INTRAMUSCULAR; INTRAVENOUS at 06:55

## 2019-04-25 RX ADMIN — Medication 100 MICROGRAM(S): at 06:55

## 2019-04-25 RX ADMIN — HEPARIN SODIUM 5000 UNIT(S): 5000 INJECTION INTRAVENOUS; SUBCUTANEOUS at 10:43

## 2019-04-25 RX ADMIN — CLOPIDOGREL BISULFATE 75 MILLIGRAM(S): 75 TABLET, FILM COATED ORAL at 10:39

## 2019-04-25 RX ADMIN — GABAPENTIN 300 MILLIGRAM(S): 400 CAPSULE ORAL at 17:55

## 2019-04-25 NOTE — DISCHARGE NOTE PROVIDER - CARE PROVIDERS DIRECT ADDRESSES
[email or web address]

## 2019-04-25 NOTE — PROGRESS NOTE ADULT - SUBJECTIVE AND OBJECTIVE BOX
PULMONARY PROGRESS NOTE      KAUSHIK HOFFMANYOVANNY-[id number]    Patient is a 79y old  Male who presents with a chief complaint of sob (25 Apr 2019 11:07)      INTERVAL HPI/OVERNIGHT EVENTS:    MEDICATIONS  (STANDING):  ALBUTerol    90 MICROgram(s) HFA Inhaler 1 Puff(s) Inhalation every 4 hours  ALBUTerol/ipratropium for Nebulization 3 milliLiter(s) Nebulizer every 6 hours  aspirin  chewable 324 milliGRAM(s) Oral daily  atorvastatin 80 milliGRAM(s) Oral at bedtime  buMETAnide 2 milliGRAM(s) Oral daily  chlorhexidine 2% Cloths 1 Application(s) Topical <User Schedule>  clopidogrel Tablet 75 milliGRAM(s) Oral daily  dextrose 5%. 1000 milliLiter(s) (50 mL/Hr) IV Continuous <Continuous>  dextrose 50% Injectable 12.5 Gram(s) IV Push once  dextrose 50% Injectable 25 Gram(s) IV Push once  dextrose 50% Injectable 25 Gram(s) IV Push once  docusate sodium 100 milliGRAM(s) Oral three times a day  DULoxetine 20 milliGRAM(s) Oral daily  gabapentin 300 milliGRAM(s) Oral two times a day  heparin  Injectable 5000 Unit(s) SubCutaneous every 12 hours  isosorbide   mononitrate ER Tablet (IMDUR) 120 milliGRAM(s) Oral daily  levothyroxine 100 MICROGram(s) Oral daily  losartan 100 milliGRAM(s) Oral daily  melatonin 3 milliGRAM(s) Oral at bedtime  metoprolol tartrate 100 milliGRAM(s) Oral two times a day  mupirocin 2% Nasal 1 Application(s) Nasal two times a day  Nephro-jeana 1 Tablet(s) Oral daily  pantoprazole    Tablet 40 milliGRAM(s) Oral before breakfast  senna 2 Tablet(s) Oral at bedtime  tiotropium 18 MICROgram(s) Capsule 1 Capsule(s) Inhalation daily      MEDICATIONS  (PRN):  dextrose 40% Gel 15 Gram(s) Oral once PRN Blood Glucose LESS THAN 70 milliGRAM(s)/deciLiter  glucagon  Injectable 1 milliGRAM(s) IntraMuscular once PRN Glucose <70 milliGRAM(s)/deciLiter  hydrALAZINE Injectable 10 milliGRAM(s) IV Push every 6 hours PRN for SBP > 150      Allergies    No Known Allergies    Intolerances        PAST MEDICAL & SURGICAL HISTORY:  Dialysis patient: Tues, Thurs, Saturday  Chronic anemia  ESRD (end stage renal disease): right Upper arm fistula  CAD (coronary artery disease)  Lung cancer: had yoni radiation in 2006.  Bipolar disorder  High cholesterol  Hypertension  S/P CABG (coronary artery bypass graft): pt&#x27;s son in [facility] states h/o some stents near neck area ? carotid ? he is not sure.      SOCIAL HISTORY  Smoking History:       REVIEW OF SYSTEMS:    CONSTITUTIONAL:  No distress    HEENT:  Eyes:  No diplopia or blurred vision. ENT:  No earache, sore throat or runny nose.    CARDIOVASCULAR:  No pressure, squeezing, tightness, heaviness or aching about the chest; no palpitations.    RESPIRATORY:  No cough, shortness of breath, PND or orthopnea. Mild SOBOE    GASTROINTESTINAL:  No nausea, vomiting or diarrhea.    GENITOURINARY:  No dysuria, frequency or urgency.    NEUROLOGIC:  No paresthesias, fasciculations, seizures or weakness.    PSYCHIATRIC:  No disorder of thought or mood.    Vital Signs Last 24 Hrs  T(C): 36.9 (25 Apr 2019 10:09), Max: 37.2 (24 Apr 2019 15:47)  T(F): 98.4 (25 Apr 2019 10:09), Max: 98.9 (24 Apr 2019 15:47)  HR: 72 (25 Apr 2019 12:17) (40 - 106)  BP: 156/56 (25 Apr 2019 12:17) (86/50 - 156/56)  BP(mean): --  RR: 18 (25 Apr 2019 10:09) (16 - 18)  SpO2: 100% (25 Apr 2019 10:09) (95% - 100%)    PHYSICAL EXAMINATION:    GENERAL: The patient is awake and alert in no apparent distress.     HEENT: Head is normocephalic and atraumatic. Extraocular muscles are intact. Mucous membranes are moist.    NECK: Supple.    LUNGS: Clear to auscultation without wheezing, rales or rhonchi; respirations unlabored    HEART: Regular rate and rhythm without murmur.    ABDOMEN: Soft, nontender, and nondistended.      EXTREMITIES: Without any cyanosis, clubbing, rash, lesions or edema.    NEUROLOGIC: Grossly intact.    LABS:                        11.5   8.5   )-----------( 212      ( 25 Apr 2019 06:10 )             37.2     04-25    135  |  94<L>  |  15.0  ----------------------------<  124<H>  3.5   |  28.0  |  3.68<H>    Ca    8.4<L>      25 Apr 2019 06:10  Phos  3.4     04-25  Mg     2.2     04-25                          MICROBIOLOGY:    RADIOLOGY & ADDITIONAL STUDIES:  CXR s and previous CT scan reviewed

## 2019-04-25 NOTE — DISCHARGE NOTE PROVIDER - NSDCCPCAREPLAN_GEN_ALL_CORE_FT
PRINCIPAL DISCHARGE DIAGNOSIS  Diagnosis: CHF (congestive heart failure)  Assessment and Plan of Treatment: Continue with meds as directed. Follow up with your primary doctor & cardiologist within 1 week of discharge      SECONDARY DISCHARGE DIAGNOSES  Diagnosis: Chest pain  Assessment and Plan of Treatment: Continue with meds as directed. Follow up with your primary doctor & cardiologist within 1 week of discharge

## 2019-04-25 NOTE — CHART NOTE - NSCHARTNOTEFT_GEN_A_CORE
Pt with recurrent hospitalizations for respiratory issues and BP issues related to his ESRD, CHF and lung Ca diagnoses. This admission complicated by dysphagia. Family meeting held yesterday, [facility] discussed, pt had prior DNR/I now the family and patient want to pursue all measures possible to preserve life. See C note on chart. Pt is full code, care goals established pt not actively having symptoms. Palliative care will no longer actively follow, please reconsult should condition or goals change.

## 2019-04-25 NOTE — DISCHARGE NOTE PROVIDER - HOSPITAL COURSE
78 yo M hx of chronic diastolic CHF, lung CA, pulmonary fibrosis CAD s/p CABG, ESRD on dialysis (TTS), on home 2L O2, p/w sob and orthopnea x 3 days. Has been wearing 4 L nc o2 all the time. denies lower ext swelling. He also report intermittent left sided chest pain, intermittently radiating to left upper chest but now improved at tiem of hx , c/o productive cough with mild blood tinged yellowish brown phlegm, also had episode of chills and vomiting with nausea yesterday. denies any blood in vomitus ,  no abdominal pain/ vomiting  has been chronic 2-3x/day most days , has low appetite .Patient last dialyzed yesterday. Denies any sick contacts .Family two daughters  at bedside, patient a&ox3, states that he has not been able to ambulate much at home, has been requiring 2-3 people assist for transfers.  patient. has.  Patient was DNR/DNI at last admission , today he wishes to be full code.  Positive blood cultures for Gram + cocci. Vanco x 1 dose given. Repeat Blood cx ordered. ID consult called. Pts symptoms attributed to  acute on chronic hypoxic resp failure: likely sec to acute on chronic diastolic chf. urgent HD done on 4/19 & 4/20. Further HD tomorrow. GI consulted due to reported dysphagia and weight loss.             >Positive blood cultures for Gram + cocci- continue vanco post HD -     Blood cultures 2 of 2 bottles with CoNS    - Repeat Blood cx negative. d/michelle Vanco as per ID                > acute on chronic hypoxic resp failure: likely sec to acute on chronic diastolic chf -    -urgent HD done on 4/19 & 4/20. Further HD tomorrow 4/24     -TnI elevation attributed to poor renal clearance as per cardio    -cardio, pulmonary and nephro following     -great improvement clinically and on CXR s/p Bumex, change IV Bumex 2 mg to PO as discussed with Dr. Nesbitt for now along with HD for fluid removal. Pt saturating 100% on 3L NC. Clinically improved.     -residual scarring noted but overall great improvement after fluid removal        >Dysphagia-    As per pt he has trouble swallowing since the past month where food gets stuck in his throat. Reports 25 lbs unintentional weight loss in 2-3 months. GI consult called.     Pt passed MBS this morning and is now tolerating soft diet w thins. f/u Barium esophagogram ordered per GI. To be done today. As per GI, will eventually need outpatient EGD to evaluate pt feeling globulus sensation.         >Chest pain/ possible demand ischemia     EKG no changes, telemetry noted w multiple episodes of SVT, PVCs and NSVT (7 beats) on monitor.     -TnI elevation attributed to poor renal clearance as per cardio    - restart aspirin and plavix now that pt tolerating po    - restart ARB and bb    NST done yesterday shows mild ischemia. Medical management as per Dr. Nesbitt            >Tachy-Alvaro syndrome-     telemetry on 4/24 noted w multiple episodes of SVT, PVCs and NSVT (7 beats) on monitor.     Tele on 4/25 Sinus alvaro to 40's-50's. Pt is asymptomatic    Disccussed with Dr. Nesbitt, decreased metoprolol from 100 bid to 50 q8, No further events on tele.     NST done yesterday shows mild ischemia. Medical management as per Dr. Nesbitt    Cleared for d/c today as discussed with Dr. Nesbitt                >Hx of pulmonary fibrosis- stable    -RVP panel -ve    - Pulm consult appreciated        > Hypertension/ uncontrolled     - Losartan to 50mg QD. D/michelle nifedipine 60mg QD as per cardio    Hydralazine 10 IVP prn q6     Hypotension resolved        > ESRD on hemodialysis    -urgent HD done on 4/19 & 4/20.  Breathing much improved; CXR much improved    -HD per renal. HD done today    -resume home medications.         >anemia of chronic disease      -stable h/h     -monitor         > likely severe protein teddy malnutrition     -nutrition consult         > dvt ppx: lovenox         >GOC: was DNR/ DNI during last admission. wishes to be full code today. Palliative consult appreciated        Dispo: Disccussed with pt's daughter & granddaughter [phone]. Pt has home O2 2L NC. He lives with his family who take care of him. They do not have any home aides but someone is always home. Pt does not ambulate at home. Disccussed with JUDY Escamilla who recommends PT eval. Pt's family does not want [facility], they will rather take him home. PT eval ordered. Pt can d/c home after esophagogram done/ resulted & home care set up by JUDY

## 2019-04-25 NOTE — DISCHARGE NOTE PROVIDER - PROVIDER TOKENS
PROVIDER:[TOKEN:[[id number]]],PROVIDER:[TOKEN:[[id number]]],PROVIDER:[TOKEN:[[id number]]],PROVIDER:[TOKEN:[[id number]]]

## 2019-04-25 NOTE — PROGRESS NOTE ADULT - ASSESSMENT
1) ESRD on HD  2) Anemia of renal dx  3) MBD of renal dx  4) Vol/HTN    Palliative care to discuss goals of care  Patient rescinded DNR/DNI  Failed swallow eval  NGT for feedings  Plan for HD in am; 0-0.5L UF; minimal given poor po status  Prognosis Poor  Continue current management

## 2019-04-25 NOTE — DISCHARGE NOTE PROVIDER - CARE PROVIDER_API CALL
Bipin Galarza ()  Critical Care Medicine; Internal Medicine; Pulmonary Disease  [address], [address]  [place] [address]  Phone: [phone]  Fax: [phone]  Follow Up Time:     Enrique Nesbitt)  Cardiovascular Disease  [address], [address]  [place] [address]  Phone: [phone]  Fax: [phone]  Follow Up Time:     Ulices Worthy)  Gastroenterology; Internal Medicine  [address] [facility] [facility] [address], [address] [place] [address]  Phone: [phone]  Fax: [phone]  Follow Up Time:     Jessee Miles)  Internal Medicine; Nephrology  [address] [place] [address]  Phone: [phone]  Fax: [phone]  Follow Up Time:

## 2019-04-25 NOTE — PROGRESS NOTE ADULT - SUBJECTIVE AND OBJECTIVE BOX
[facility] DIVISION OF KIDNEY DISEASES AND HYPERTENSION -- FOLLOW UP NOTE  --------------------------------------------------------------------------------  Chief Complaint:  ESRD on HD    24 hour events/subjective:  Pt seen and examined today  NAD  HD yesterday      PAST HISTORY  --------------------------------------------------------------------------------  No significant changes to PMH, PSH, FHx, SHx, unless otherwise noted    ALLERGIES & MEDICATIONS  --------------------------------------------------------------------------------  Allergies    No Known Allergies    Intolerances      Standing Inpatient Medications  ALBUTerol    90 MICROgram(s) HFA Inhaler 1 Puff(s) Inhalation every 4 hours  ALBUTerol/ipratropium for Nebulization 3 milliLiter(s) Nebulizer every 6 hours  aspirin  chewable 324 milliGRAM(s) Oral daily  atorvastatin 80 milliGRAM(s) Oral at bedtime  buMETAnide 2 milliGRAM(s) Oral daily  chlorhexidine 2% Cloths 1 Application(s) Topical <User Schedule>  clopidogrel Tablet 75 milliGRAM(s) Oral daily  dextrose 5%. 1000 milliLiter(s) IV Continuous <Continuous>  dextrose 50% Injectable 12.5 Gram(s) IV Push once  dextrose 50% Injectable 25 Gram(s) IV Push once  dextrose 50% Injectable 25 Gram(s) IV Push once  docusate sodium 100 milliGRAM(s) Oral three times a day  DULoxetine 20 milliGRAM(s) Oral daily  gabapentin 300 milliGRAM(s) Oral two times a day  heparin  Injectable 5000 Unit(s) SubCutaneous every 12 hours  isosorbide   mononitrate ER Tablet (IMDUR) 120 milliGRAM(s) Oral daily  levothyroxine 100 MICROGram(s) Oral daily  losartan 100 milliGRAM(s) Oral daily  melatonin 3 milliGRAM(s) Oral at bedtime  metoprolol tartrate 100 milliGRAM(s) Oral two times a day  mupirocin 2% Nasal 1 Application(s) Nasal two times a day  Nephro-jeana 1 Tablet(s) Oral daily  pantoprazole    Tablet 40 milliGRAM(s) Oral before breakfast  senna 2 Tablet(s) Oral at bedtime  tiotropium 18 MICROgram(s) Capsule 1 Capsule(s) Inhalation daily    PRN Inpatient Medications  dextrose 40% Gel 15 Gram(s) Oral once PRN  glucagon  Injectable 1 milliGRAM(s) IntraMuscular once PRN  hydrALAZINE Injectable 10 milliGRAM(s) IV Push every 6 hours PRN      REVIEW OF SYSTEMS  --------------------------------------------------------------------------------  Gen: No weight changes, fatigue, + fevers/chills, weakness  Skin: No rashes  Head/Eyes/Ears/Mouth: No headache; Normal hearing; Normal vision w/o blurriness; No sinus pain/discomfort, sore throat  Respiratory: + SOB, + productive cough, + home O2, mild episode of hemoptysis, no wheeze  CV: + intermittent chest pain, PND, orthopnea  GI: No abdominal pain, no diarrhea, no constipation, + nausea, + vomiting, no melena, no hematochezia  : No increased frequency, dysuria, hematuria, nocturia  MSK: No joint pain/swelling; no back pain; no significant edema  Neuro: No dizziness/lightheadedness, weakness, seizures, numbness, tingling  Heme: No easy bruising or bleeding  Endo: No heat/cold intolerance  Psych: No significant nervousness, anxiety, stress, depression    All other systems were reviewed and are negative, except as noted.    VITALS/PHYSICAL EXAM  --------------------------------------------------------------------------------  T(C): 36.9 (04-25-19 @ 10:09), Max: 37.2 (04-24-19 @ 15:47)  HR: 70 (04-25-19 @ 10:09) (40 - 106)  BP: 124/49 (04-25-19 @ 10:09) (86/50 - 132/42)  RR: 18 (04-25-19 @ 10:09) (16 - 18)  SpO2: 100% (04-25-19 @ 10:09) (95% - 100%)  Wt(kg): --        04-24-19 @ 07:01  -  04-25-19 @ 07:00  --------------------------------------------------------  IN: 920 mL / OUT: 800 mL / NET: 120 mL      Physical Exam:  	Gen: NAD, ill-appearing, elderly, frail, pale  	HEENT: PERRL, supple neck, clear oropharynx  	Pulm: Diminished at bases bilaterally  	CV: RRR, S1S2; no rub  	Back: No spinal or CVA tenderness; no sacral edema  	Abd: +BS, soft, nontender/nondistended  	: No suprapubic tenderness  	UE: Warm, FROM, no clubbing, intact strength; no significant edema; no asterixis  	LE: Warm, FROM, no clubbing, intact strength; no significant edema  	Neuro: No focal deficits, intact gait  	Psych: Normal affect and mood  	Skin: Warm, without rashes  	Vascular access:  AVF    LABS/STUDIES  --------------------------------------------------------------------------------              11.5   8.5   >-----------<  212      [04-25-19 @ 06:10]              37.2     135  |  94  |  15.0  ----------------------------<  124      [04-25-19 @ 06:10]  3.5   |  28.0  |  3.68        Ca     8.4     [04-25-19 @ 06:10]      Mg     2.2     [04-25-19 @ 06:10]      Phos  3.4     [04-25-19 @ 06:10]            Creatinine Trend:  SCr 3.68 [04-25 @ 06:10]  SCr 5.91 [04-24 @ 06:37]  SCr 4.17 [04-23 @ 05:23]  SCr 3.49 [04-23 @ 01:17]  SCr 6.03 [04-22 @ 07:10]        Iron 34, TIBC 235, %sat 14      [01-29-19 @ 11:39]  Ferritin 1049      [01-29-19 @ 11:39]  TSH 1.83      [03-11-19 @ 07:37]  Lipid: chol 159, , HDL 32, LDL 94      [01-29-19 @ 11:39]    HBsAg Nonreact      [04-10-19 @ 17:37]

## 2019-04-25 NOTE — PROGRESS NOTE ADULT - SUBJECTIVE AND OBJECTIVE BOX
Pt seen and examined f/u dysphagia    This morning he feels well with no complaints. denies any further dysphagia. Tolerating diet without difficulty. Esophagram pending.    REVIEW OF SYSTEMS:    CONSTITUTIONAL: No fever, weight loss, or fatigue  EYES: No eye pain, visual disturbances, or discharge  ENMT:  No difficulty hearing, tinnitus, vertigo; No sinus or throat pain  RESPIRATORY: No cough, wheezing, chills or hemoptysis; No shortness of breath  CARDIOVASCULAR: No chest pain, palpitations, dizziness, or leg swelling  GASTROINTESTINAL: No abdominal or epigastric pain. No nausea, vomiting, or hematemesis; No diarrhea or constipation. No melena or hematochezia.    MEDICATIONS:  MEDICATIONS  (STANDING):  ALBUTerol    90 MICROgram(s) HFA Inhaler 1 Puff(s) Inhalation every 4 hours  ALBUTerol/ipratropium for Nebulization 3 milliLiter(s) Nebulizer every 6 hours  aspirin  chewable 324 milliGRAM(s) Oral daily  atorvastatin 80 milliGRAM(s) Oral at bedtime  buMETAnide 2 milliGRAM(s) Oral daily  chlorhexidine 2% Cloths 1 Application(s) Topical <User Schedule>  clopidogrel Tablet 75 milliGRAM(s) Oral daily  dextrose 5%. 1000 milliLiter(s) (50 mL/Hr) IV Continuous <Continuous>  dextrose 50% Injectable 12.5 Gram(s) IV Push once  dextrose 50% Injectable 25 Gram(s) IV Push once  dextrose 50% Injectable 25 Gram(s) IV Push once  docusate sodium 100 milliGRAM(s) Oral three times a day  DULoxetine 20 milliGRAM(s) Oral daily  gabapentin 300 milliGRAM(s) Oral two times a day  heparin  Injectable 5000 Unit(s) SubCutaneous every 12 hours  isosorbide   mononitrate ER Tablet (IMDUR) 120 milliGRAM(s) Oral daily  levothyroxine 100 MICROGram(s) Oral daily  losartan 100 milliGRAM(s) Oral daily  melatonin 3 milliGRAM(s) Oral at bedtime  metoprolol tartrate 100 milliGRAM(s) Oral two times a day  mupirocin 2% Nasal 1 Application(s) Nasal two times a day  Nephro-jeana 1 Tablet(s) Oral daily  pantoprazole    Tablet 40 milliGRAM(s) Oral before breakfast  senna 2 Tablet(s) Oral at bedtime  tiotropium 18 MICROgram(s) Capsule 1 Capsule(s) Inhalation daily    MEDICATIONS  (PRN):  dextrose 40% Gel 15 Gram(s) Oral once PRN Blood Glucose LESS THAN 70 milliGRAM(s)/deciLiter  glucagon  Injectable 1 milliGRAM(s) IntraMuscular once PRN Glucose <70 milliGRAM(s)/deciLiter  hydrALAZINE Injectable 10 milliGRAM(s) IV Push every 6 hours PRN for SBP > 150      Allergies    No Known Allergies    Intolerances        Vital Signs Last 24 Hrs  T(C): 36.7 (25 Apr 2019 06:11), Max: 37.2 (24 Apr 2019 15:47)  T(F): 98.1 (25 Apr 2019 06:11), Max: 98.9 (24 Apr 2019 15:47)  HR: 76 (25 Apr 2019 06:11) (40 - 106)  BP: 132/42 (25 Apr 2019 06:11) (86/50 - 132/42)  BP(mean): --  RR: 16 (25 Apr 2019 06:11) (16 - 18)  SpO2: 100% (25 Apr 2019 06:11) (95% - 100%)    04-24 @ 07:01  -  04-25 @ 07:00  --------------------------------------------------------  IN: 920 mL / OUT: 800 mL / NET: 120 mL        PHYSICAL EXAM:    General: Well developed; well nourished; in no acute distress  HEENT: MMM, conjunctiva and sclera clear, poor dentition  Gastrointestinal:Abdomen: Soft non-tender non-distended; Normal bowel sounds; No hepatosplenomegaly  Extremities: no cyanosis, clubbing or edema.  Skin: Warm and dry. No obvious rash    LABS:      CBC Full  -  ( 25 Apr 2019 06:10 )  WBC Count : 8.5 K/uL  RBC Count : 4.03 M/uL  Hemoglobin : 11.5 g/dL  Hematocrit : 37.2 %  Platelet Count - Automated : 212 K/uL  Mean Cell Volume : 92.3 fl  Mean Cell Hemoglobin : 28.5 pg  Mean Cell Hemoglobin Concentration : 30.9 g/dL  Auto Neutrophil # : 4.8 K/uL  Auto Lymphocyte # : 1.6 K/uL  Auto Monocyte # : 1.0 K/uL  Auto Eosinophil # : 0.9 K/uL  Auto Basophil # : 0.0 K/uL  Auto Neutrophil % : 57.0 %  Auto Lymphocyte % : 19.5 %  Auto Monocyte % : 11.8 %  Auto Eosinophil % : 10.9 %  Auto Basophil % : 0.4 %    04-25    135  |  94<L>  |  15.0  ----------------------------<  124<H>  3.5   |  28.0  |  3.68<H>    Ca    8.4<L>      25 Apr 2019 06:10  Phos  3.4     04-25  Mg     2.2     04-25 Pt seen and examined f/u dysphagia    This morning he feels well with no complaints. denies any further dysphagia. Tolerating diet without difficulty. Esophagram pending. Recent episodes of SVT and nonsustained V tach as well.    REVIEW OF SYSTEMS:    CONSTITUTIONAL: No fever, weight loss, or fatigue  EYES: No eye pain, visual disturbances, or discharge  ENMT:  No difficulty hearing, tinnitus, vertigo; No sinus or throat pain  RESPIRATORY: No cough, wheezing, chills or hemoptysis; No shortness of breath  CARDIOVASCULAR: No chest pain, palpitations, dizziness, or leg swelling  GASTROINTESTINAL: No abdominal or epigastric pain. No nausea, vomiting, or hematemesis; No diarrhea or constipation. No melena or hematochezia.    MEDICATIONS:  MEDICATIONS  (STANDING):  ALBUTerol    90 MICROgram(s) HFA Inhaler 1 Puff(s) Inhalation every 4 hours  ALBUTerol/ipratropium for Nebulization 3 milliLiter(s) Nebulizer every 6 hours  aspirin  chewable 324 milliGRAM(s) Oral daily  atorvastatin 80 milliGRAM(s) Oral at bedtime  buMETAnide 2 milliGRAM(s) Oral daily  chlorhexidine 2% Cloths 1 Application(s) Topical <User Schedule>  clopidogrel Tablet 75 milliGRAM(s) Oral daily  dextrose 5%. 1000 milliLiter(s) (50 mL/Hr) IV Continuous <Continuous>  dextrose 50% Injectable 12.5 Gram(s) IV Push once  dextrose 50% Injectable 25 Gram(s) IV Push once  dextrose 50% Injectable 25 Gram(s) IV Push once  docusate sodium 100 milliGRAM(s) Oral three times a day  DULoxetine 20 milliGRAM(s) Oral daily  gabapentin 300 milliGRAM(s) Oral two times a day  heparin  Injectable 5000 Unit(s) SubCutaneous every 12 hours  isosorbide   mononitrate ER Tablet (IMDUR) 120 milliGRAM(s) Oral daily  levothyroxine 100 MICROGram(s) Oral daily  losartan 100 milliGRAM(s) Oral daily  melatonin 3 milliGRAM(s) Oral at bedtime  metoprolol tartrate 100 milliGRAM(s) Oral two times a day  mupirocin 2% Nasal 1 Application(s) Nasal two times a day  Nephro-jeana 1 Tablet(s) Oral daily  pantoprazole    Tablet 40 milliGRAM(s) Oral before breakfast  senna 2 Tablet(s) Oral at bedtime  tiotropium 18 MICROgram(s) Capsule 1 Capsule(s) Inhalation daily    MEDICATIONS  (PRN):  dextrose 40% Gel 15 Gram(s) Oral once PRN Blood Glucose LESS THAN 70 milliGRAM(s)/deciLiter  glucagon  Injectable 1 milliGRAM(s) IntraMuscular once PRN Glucose <70 milliGRAM(s)/deciLiter  hydrALAZINE Injectable 10 milliGRAM(s) IV Push every 6 hours PRN for SBP > 150      Allergies    No Known Allergies    Intolerances        Vital Signs Last 24 Hrs  T(C): 36.7 (25 Apr 2019 06:11), Max: 37.2 (24 Apr 2019 15:47)  T(F): 98.1 (25 Apr 2019 06:11), Max: 98.9 (24 Apr 2019 15:47)  HR: 76 (25 Apr 2019 06:11) (40 - 106)  BP: 132/42 (25 Apr 2019 06:11) (86/50 - 132/42)  BP(mean): --  RR: 16 (25 Apr 2019 06:11) (16 - 18)  SpO2: 100% (25 Apr 2019 06:11) (95% - 100%)    04-24 @ 07:01  -  04-25 @ 07:00  --------------------------------------------------------  IN: 920 mL / OUT: 800 mL / NET: 120 mL        PHYSICAL EXAM:    General: Well developed; well nourished; in no acute distress  HEENT: MMM, conjunctiva and sclera clear, poor dentition  Gastrointestinal:Abdomen: Soft non-tender non-distended; Normal bowel sounds; No hepatosplenomegaly  Extremities: no cyanosis, clubbing or edema.  Skin: Warm and dry. No obvious rash    LABS:      CBC Full  -  ( 25 Apr 2019 06:10 )  WBC Count : 8.5 K/uL  RBC Count : 4.03 M/uL  Hemoglobin : 11.5 g/dL  Hematocrit : 37.2 %  Platelet Count - Automated : 212 K/uL  Mean Cell Volume : 92.3 fl  Mean Cell Hemoglobin : 28.5 pg  Mean Cell Hemoglobin Concentration : 30.9 g/dL  Auto Neutrophil # : 4.8 K/uL  Auto Lymphocyte # : 1.6 K/uL  Auto Monocyte # : 1.0 K/uL  Auto Eosinophil # : 0.9 K/uL  Auto Basophil # : 0.0 K/uL  Auto Neutrophil % : 57.0 %  Auto Lymphocyte % : 19.5 %  Auto Monocyte % : 11.8 %  Auto Eosinophil % : 10.9 %  Auto Basophil % : 0.4 %    04-25    135  |  94<L>  |  15.0  ----------------------------<  124<H>  3.5   |  28.0  |  3.68<H>    Ca    8.4<L>      25 Apr 2019 06:10  Phos  3.4     04-25  Mg     2.2     04-25

## 2019-04-25 NOTE — PROGRESS NOTE ADULT - PROBLEM SELECTOR PLAN 1
by history but question reliability. Now doing well with no complaints. Await results of esophagram which is pending. Continue current diet.

## 2019-04-25 NOTE — PROGRESS NOTE ADULT - ATTENDING COMMENTS
I have personally seen and examined patient.  Above note reviewed and discussed with PA, modified where appropriate. c/w Vanco. Repeat Blood cx. NST in am.
PT eval called
Disccussed with pt's daughter [phone] in details. Agreed with plan
80 y/o male with history of HFpEF, CAD s/p CABG with cath last month showing patent ANA to LAD and patent stent in CX And Om1 and occluded RCA presented with worsening SOB, volume overloaded s/p dialysis yesterday  still volume overloaded on clinical exam, may need dialysis today   discussed with Dr. Gardner  continue current cardiac meds including DAPT ,statins, imdur, losartan, BB and nifedipine XL   will sign off. please reconsult if needed.

## 2019-04-25 NOTE — PROGRESS NOTE ADULT - ASSESSMENT
78 yo M hx of chronic diastolic CHF, lung CA, pulmonary fibrosis CAD s/p CABG, ESRD on dialysis (TTS), on home 2L O2, p/w sob and orthopnea x 3 days. Has been wearing 4 L nc o2 all the time. denies lower ext swelling. He also report intermittent left sided chest pain, intermittently radiating to left upper chest but now improved at tiem of hx , c/o productive cough with mild blood tinged yellowish brown phlegm, also had episode of chills and vomiting with nausea yesterday. denies any blood in vomitus ,  no abdominal pain/ vomiting  has been chronic 2-3x/day most days , has low appetite .Patient last dialyzed yesterday. Denies any sick contacts .Family two daughters  at bedside, patient a&ox3, states that he has not been able to ambulate much at home, has been requiring 2-3 people assist for transfers.  patient. has.  Patient was DNR/DNI at last admission , today he wishes to be full code.  Positive blood cultures for Gram + cocci. Vanco x 1 dose given. Repeat Blood cx ordered. ID consult called. Pts symptoms attributed to  acute on chronic hypoxic resp failure: likely sec to acute on chronic diastolic chf. urgent HD done on 4/19 & 4/20. Further HD tomorrow. GI consulted due to reported dysphagia and weight loss.       >Positive blood cultures for Gram + cocci- continue vanco post HD -   Blood cultures 2 of 2 bottles with CoNS  - Repeat Blood cx negative. d/michelle Vanco as per ID        > acute on chronic hypoxic resp failure: likely sec to acute on chronic diastolic chf -  -urgent HD done on 4/19 & 4/20. Further HD tomorrow 4/24   -TnI elevation attributed to poor renal clearance as per cardio  -cardio, pulmonary and nephro following   -great improvement clinically and on CXR s/p Bumex, change IV Bumex 2 mg to PO as discussed with Dr. Nesbitt for now along with HD for fluid removal. Pt saturating 100% on 3L NC. Clinically improved.   -residual scarring noted but overall great improvement after fluid removal    >Dysphagia-  As per pt he has trouble swallowing since the past month where food gets stuck in his throat. Reports 25 lbs unintentional weight loss in 2-3 months. GI consult called.   Pt passed MBS this morning and is now tolerating soft diet w thins. f/u Barium esophagogram ordered per GI. As per GI, will eventually need outpatient EGD to evaluate pt feeling globulus sensation.     >Chest pain/ possible demand ischemia   EKG no changes, telemetry noted w multiple episodes of SVT, PVCs and NSVT (7 beats) on monitor.   -TnI elevation attributed to poor renal clearance as per cardio  - restart aspirin and plavix now that pt tolerating po  - restart ARB and bb  NST done today      >Tachy-Diego syndrome-   telemetry on 4/24 noted w multiple episodes of SVT, PVCs and NSVT (7 beats) on monitor.   Tele on 4/25 Sinus diego to 40's-50's. Pt is asymptomatic  Disccussed with Dr. Nesbitt, will decrease metoprolol from 100 bid to 50 q8, c/w tele  NST done today      >Hx of pulmonary fibrosis- stable  -RVP panel -ve  - Pulm consult appreciated    > Hypertension/ uncontrolled   - Losartan to 50mg QD. D/michelle nifedipine 60mg QD as per cardio  Hydralazine 10 IVP prn q6   Hypotension resolved    > ESRD on hemodialysis  -urgent HD done on 4/19 & 4/20.  Breathing much improved; CXR much improved  -further HD tomorrow.  -resume home medications.     >anemia of chronic disease    -stable h/h   -monitor     > likely severe protein teddy malnutrition   -nutrition consult     > dvt ppx: lovenox     >GOC: was DNR/ DNI during last admission. wishes to be full code today. Palliative consult appreciated

## 2019-04-25 NOTE — PROGRESS NOTE ADULT - SUBJECTIVE AND OBJECTIVE BOX
CHIEF COMPLAINT/INTERVAL HISTORY:    Patient is a 79y old  Male who presents with a chief complaint of sob (24 Apr 2019 10:15)      HPI:  80 yo M hx of chronic diastolic CHF, lung CA, pulmonary fibrosis CAD s/p CABG, ESRD on dialysis (TTS), on home 2L O2, p/w sob and orthopnea x 3 days. Has been wearing 4 L nc o2 all the time. denies lower ext swelling. He also report intermittent left sided chest pain, intermittently radiating to left upper chest but now improved at tiem of hx , c/o productive cough with mild blood tinged yellowish brown phlegm, also had episode of chills and vomiting with nausea yesterday. denies any blood in vomitus ,  no abdominal pain/ vomiting  has been chronic 2-3x/day most days , has low appetite .  Patient last dialyzed yesterday. Denies any sick contacts   Family two daughters  at bedside, patient a&ox3, states that he has not been able to ambulate much at home, has been requiring 2-3 people assist for transfers.  patient. has.    Denies palpitations, irregular and/or rapid heart beat, syncope/near syncope, dizziness, edema, n/v/d, hematuria, or hematochezia.    Patient was DNR/DNI at last admission , today he wishes to be full code.     seen by cardio and nephro in er. plan for urgent HD (19 Apr 2019 16:18)      SUBJECTIVE & OBJECTIVE/ ROS: Pt seen and examined at bedside. Sinus diego to 40's-50's on tele. Pt is asymptomatic. No acute events. No chest pain, palpitations, light headedness/dizziness, fevers/chills, abdominal pain, n/v, diarrhea/constipation, dysuria or increased urinary frequency.     ICU Vital Signs Last 24 Hrs  T(C): 37.2 (24 Apr 2019 15:47), Max: 37.2 (24 Apr 2019 15:47)  T(F): 98.9 (24 Apr 2019 15:47), Max: 98.9 (24 Apr 2019 15:47)  HR: 106 (24 Apr 2019 15:47) (46 - 106)  BP: 97/57 (24 Apr 2019 15:47) (86/50 - 158/69)  BP(mean): 94 (23 Apr 2019 20:08) (94 - 94)  ABP: --  ABP(mean): --  RR: 18 (24 Apr 2019 15:47) (10 - 18)  SpO2: 98% (24 Apr 2019 15:47) (96% - 100%)        MEDICATIONS  (STANDING):  ALBUTerol    90 MICROgram(s) HFA Inhaler 1 Puff(s) Inhalation every 4 hours  ALBUTerol/ipratropium for Nebulization 3 milliLiter(s) Nebulizer every 6 hours  aspirin  chewable 324 milliGRAM(s) Oral daily  atorvastatin 80 milliGRAM(s) Oral at bedtime  buMETAnide 2 milliGRAM(s) Oral daily  chlorhexidine 2% Cloths 1 Application(s) Topical <User Schedule>  clopidogrel Tablet 75 milliGRAM(s) Oral daily  dextrose 5%. 1000 milliLiter(s) (50 mL/Hr) IV Continuous <Continuous>  dextrose 50% Injectable 12.5 Gram(s) IV Push once  dextrose 50% Injectable 25 Gram(s) IV Push once  dextrose 50% Injectable 25 Gram(s) IV Push once  docusate sodium 100 milliGRAM(s) Oral three times a day  DULoxetine 20 milliGRAM(s) Oral daily  gabapentin 300 milliGRAM(s) Oral two times a day  heparin  Injectable 5000 Unit(s) SubCutaneous every 12 hours  isosorbide   mononitrate ER Tablet (IMDUR) 120 milliGRAM(s) Oral daily  levothyroxine 100 MICROGram(s) Oral daily  losartan 100 milliGRAM(s) Oral daily  melatonin 3 milliGRAM(s) Oral at bedtime  metoprolol tartrate 100 milliGRAM(s) Oral two times a day  mupirocin 2% Nasal 1 Application(s) Nasal two times a day  Nephro-jeana 1 Tablet(s) Oral daily  pantoprazole    Tablet 40 milliGRAM(s) Oral before breakfast  senna 2 Tablet(s) Oral at bedtime  tiotropium 18 MICROgram(s) Capsule 1 Capsule(s) Inhalation daily    MEDICATIONS  (PRN):  dextrose 40% Gel 15 Gram(s) Oral once PRN Blood Glucose LESS THAN 70 milliGRAM(s)/deciLiter  glucagon  Injectable 1 milliGRAM(s) IntraMuscular once PRN Glucose <70 milliGRAM(s)/deciLiter  hydrALAZINE Injectable 10 milliGRAM(s) IV Push every 6 hours PRN for SBP > 150      LABS:                        11.4   6.3   )-----------( 249      ( 24 Apr 2019 06:37 )             37.1     04-24    136  |  92<L>  |  30.0<H>  ----------------------------<  88  4.5   |  26.0  |  5.91<H>    Ca    9.3      24 Apr 2019 06:37  Phos  5.7     04-24  Mg     2.9     04-24            CAPILLARY BLOOD GLUCOSE      POCT Blood Glucose.: 104 mg/dL (23 Apr 2019 17:50)      RECENT CULTURES:      RADIOLOGY & ADDITIONAL TESTS:      PHYSICAL EXAM:    GENERAL: supine, lying flat, elderly male, very thin, NAD  HEENT: NC/AT  NECK: Supple, No JVD   CHEST/LUNG: diminished at bases ; Normal effort  HEART: S1S2 Normal intensity, no murmurs, gallops or rubs noted  ABDOMEN: Soft, BS Normoactive, NT, ND, no HSM noted  EXTREMITIES: no sig edema  SKIN: No rashes or lesions noted  NEURO: grossly intact  PSYCH: normal mood and affect; insight/judgement appropriate

## 2019-04-26 ENCOUNTER — INBOUND DOCUMENT (OUTPATIENT)
Age: 79
End: 2019-04-26

## 2019-04-26 ENCOUNTER — TRANSCRIPTION ENCOUNTER (OUTPATIENT)
Age: 79
End: 2019-04-26

## 2019-04-26 VITALS — DIASTOLIC BLOOD PRESSURE: 45 MMHG | HEART RATE: 82 BPM | SYSTOLIC BLOOD PRESSURE: 116 MMHG

## 2019-04-26 LAB
ANION GAP SERPL CALC-SCNC: 13 MMOL/L — SIGNIFICANT CHANGE UP (ref 5–17)
BASOPHILS # BLD AUTO: 0 K/UL — SIGNIFICANT CHANGE UP (ref 0–0.2)
BASOPHILS NFR BLD AUTO: 0.5 % — SIGNIFICANT CHANGE UP (ref 0–2)
BUN SERPL-MCNC: 25 MG/DL — HIGH (ref 8–20)
CALCIUM SERPL-MCNC: 8.7 MG/DL — SIGNIFICANT CHANGE UP (ref 8.6–10.2)
CHLORIDE SERPL-SCNC: 97 MMOL/L — LOW (ref 98–107)
CO2 SERPL-SCNC: 28 MMOL/L — SIGNIFICANT CHANGE UP (ref 22–29)
CREAT SERPL-MCNC: 5.13 MG/DL — HIGH (ref 0.5–1.3)
EOSINOPHIL # BLD AUTO: 1.1 K/UL — HIGH (ref 0–0.5)
EOSINOPHIL NFR BLD AUTO: 10.9 % — HIGH (ref 0–5)
GLUCOSE SERPL-MCNC: 94 MG/DL — SIGNIFICANT CHANGE UP (ref 70–115)
HCT VFR BLD CALC: 35.9 % — LOW (ref 42–52)
HGB BLD-MCNC: 11 G/DL — LOW (ref 14–18)
LYMPHOCYTES # BLD AUTO: 2.6 K/UL — SIGNIFICANT CHANGE UP (ref 1–4.8)
LYMPHOCYTES # BLD AUTO: 26.5 % — SIGNIFICANT CHANGE UP (ref 20–55)
MAGNESIUM SERPL-MCNC: 2.2 MG/DL — SIGNIFICANT CHANGE UP (ref 1.6–2.6)
MCHC RBC-ENTMCNC: 28.3 PG — SIGNIFICANT CHANGE UP (ref 27–31)
MCHC RBC-ENTMCNC: 30.6 G/DL — LOW (ref 32–36)
MCV RBC AUTO: 92.3 FL — SIGNIFICANT CHANGE UP (ref 80–94)
MONOCYTES # BLD AUTO: 1 K/UL — HIGH (ref 0–0.8)
MONOCYTES NFR BLD AUTO: 10.7 % — HIGH (ref 3–10)
NEUTROPHILS # BLD AUTO: 5 K/UL — SIGNIFICANT CHANGE UP (ref 1.8–8)
NEUTROPHILS NFR BLD AUTO: 51.2 % — SIGNIFICANT CHANGE UP (ref 37–73)
PHOSPHATE SERPL-MCNC: 4.1 MG/DL — SIGNIFICANT CHANGE UP (ref 2.4–4.7)
PLATELET # BLD AUTO: 210 K/UL — SIGNIFICANT CHANGE UP (ref 150–400)
POTASSIUM SERPL-MCNC: 4.4 MMOL/L — SIGNIFICANT CHANGE UP (ref 3.5–5.3)
POTASSIUM SERPL-SCNC: 4.4 MMOL/L — SIGNIFICANT CHANGE UP (ref 3.5–5.3)
RBC # BLD: 3.89 M/UL — LOW (ref 4.6–6.2)
RBC # FLD: 16.3 % — HIGH (ref 11–15.6)
SODIUM SERPL-SCNC: 138 MMOL/L — SIGNIFICANT CHANGE UP (ref 135–145)
WBC # BLD: 9.8 K/UL — SIGNIFICANT CHANGE UP (ref 4.8–10.8)
WBC # FLD AUTO: 9.8 K/UL — SIGNIFICANT CHANGE UP (ref 4.8–10.8)

## 2019-04-26 PROCEDURE — 87150 DNA/RNA AMPLIFIED PROBE: CPT

## 2019-04-26 PROCEDURE — 93017 CV STRESS TEST TRACING ONLY: CPT

## 2019-04-26 PROCEDURE — 96365 THER/PROPH/DIAG IV INF INIT: CPT

## 2019-04-26 PROCEDURE — 84484 ASSAY OF TROPONIN QUANT: CPT

## 2019-04-26 PROCEDURE — 87486 CHLMYD PNEUM DNA AMP PROBE: CPT

## 2019-04-26 PROCEDURE — 82962 GLUCOSE BLOOD TEST: CPT

## 2019-04-26 PROCEDURE — 93005 ELECTROCARDIOGRAM TRACING: CPT

## 2019-04-26 PROCEDURE — 80202 ASSAY OF VANCOMYCIN: CPT

## 2019-04-26 PROCEDURE — 87070 CULTURE OTHR SPECIMN AEROBIC: CPT

## 2019-04-26 PROCEDURE — 92611 MOTION FLUOROSCOPY/SWALLOW: CPT

## 2019-04-26 PROCEDURE — 90937 HEMODIALYSIS REPEATED EVAL: CPT

## 2019-04-26 PROCEDURE — 83735 ASSAY OF MAGNESIUM: CPT

## 2019-04-26 PROCEDURE — 78451 HT MUSCLE IMAGE SPECT SING: CPT

## 2019-04-26 PROCEDURE — 87186 SC STD MICRODIL/AGAR DIL: CPT

## 2019-04-26 PROCEDURE — 92526 ORAL FUNCTION THERAPY: CPT

## 2019-04-26 PROCEDURE — 80048 BASIC METABOLIC PNL TOTAL CA: CPT

## 2019-04-26 PROCEDURE — 87633 RESP VIRUS 12-25 TARGETS: CPT

## 2019-04-26 PROCEDURE — 85730 THROMBOPLASTIN TIME PARTIAL: CPT

## 2019-04-26 PROCEDURE — 74230 X-RAY XM SWLNG FUNCJ C+: CPT

## 2019-04-26 PROCEDURE — 84100 ASSAY OF PHOSPHORUS: CPT

## 2019-04-26 PROCEDURE — 85027 COMPLETE CBC AUTOMATED: CPT

## 2019-04-26 PROCEDURE — 71045 X-RAY EXAM CHEST 1 VIEW: CPT

## 2019-04-26 PROCEDURE — 80053 COMPREHEN METABOLIC PANEL: CPT

## 2019-04-26 PROCEDURE — 87581 M.PNEUMON DNA AMP PROBE: CPT

## 2019-04-26 PROCEDURE — 94640 AIRWAY INHALATION TREATMENT: CPT

## 2019-04-26 PROCEDURE — 82550 ASSAY OF CK (CPK): CPT

## 2019-04-26 PROCEDURE — 96375 TX/PRO/DX INJ NEW DRUG ADDON: CPT

## 2019-04-26 PROCEDURE — 85610 PROTHROMBIN TIME: CPT

## 2019-04-26 PROCEDURE — 83880 ASSAY OF NATRIURETIC PEPTIDE: CPT

## 2019-04-26 PROCEDURE — 36415 COLL VENOUS BLD VENIPUNCTURE: CPT

## 2019-04-26 PROCEDURE — A9500: CPT

## 2019-04-26 PROCEDURE — 82553 CREATINE MB FRACTION: CPT

## 2019-04-26 PROCEDURE — 99285 EMERGENCY DEPT VISIT HI MDM: CPT | Mod: 25

## 2019-04-26 PROCEDURE — 94760 N-INVAS EAR/PLS OXIMETRY 1: CPT

## 2019-04-26 PROCEDURE — 99261: CPT

## 2019-04-26 PROCEDURE — 92610 EVALUATE SWALLOWING FUNCTION: CPT

## 2019-04-26 PROCEDURE — 99239 HOSP IP/OBS DSCHRG MGMT >30: CPT

## 2019-04-26 PROCEDURE — 87798 DETECT AGENT NOS DNA AMP: CPT

## 2019-04-26 PROCEDURE — 99232 SBSQ HOSP IP/OBS MODERATE 35: CPT

## 2019-04-26 PROCEDURE — 87040 BLOOD CULTURE FOR BACTERIA: CPT

## 2019-04-26 RX ORDER — ISOSORBIDE MONONITRATE 60 MG/1
2 TABLET, EXTENDED RELEASE ORAL
Qty: 60 | Refills: 0
Start: 2019-04-26 | End: 2019-05-25

## 2019-04-26 RX ORDER — TIOTROPIUM BROMIDE 18 UG/1
1 CAPSULE ORAL; RESPIRATORY (INHALATION)
Qty: 1 | Refills: 0
Start: 2019-04-26 | End: 2019-05-25

## 2019-04-26 RX ORDER — LOSARTAN POTASSIUM 100 MG/1
1 TABLET, FILM COATED ORAL
Qty: 30 | Refills: 0
Start: 2019-04-26 | End: 2019-05-25

## 2019-04-26 RX ORDER — CLOPIDOGREL BISULFATE 75 MG/1
1 TABLET, FILM COATED ORAL
Qty: 30 | Refills: 0
Start: 2019-04-26 | End: 2019-05-25

## 2019-04-26 RX ORDER — BUMETANIDE 0.25 MG/ML
1 INJECTION INTRAMUSCULAR; INTRAVENOUS
Qty: 30 | Refills: 0
Start: 2019-04-26 | End: 2019-05-25

## 2019-04-26 RX ORDER — GABAPENTIN 400 MG/1
1 CAPSULE ORAL
Qty: 60 | Refills: 0
Start: 2019-04-26 | End: 2019-05-25

## 2019-04-26 RX ORDER — ASPIRIN/CALCIUM CARB/MAGNESIUM 324 MG
4 TABLET ORAL
Qty: 120 | Refills: 0
Start: 2019-04-26 | End: 2019-05-25

## 2019-04-26 RX ORDER — METOPROLOL TARTRATE 50 MG
1 TABLET ORAL
Qty: 90 | Refills: 0
Start: 2019-04-26 | End: 2019-05-25

## 2019-04-26 RX ORDER — ASPIRIN/CALCIUM CARB/MAGNESIUM 324 MG
1 TABLET ORAL
Qty: 0 | Refills: 0 | DISCHARGE
Start: 2019-04-26 | End: 2019-05-25

## 2019-04-26 RX ORDER — IPRATROPIUM/ALBUTEROL SULFATE 18-103MCG
3 AEROSOL WITH ADAPTER (GRAM) INHALATION
Qty: 1000 | Refills: 0
Start: 2019-04-26 | End: 2019-05-25

## 2019-04-26 RX ORDER — DULOXETINE HYDROCHLORIDE 30 MG/1
1 CAPSULE, DELAYED RELEASE ORAL
Qty: 30 | Refills: 0
Start: 2019-04-26 | End: 2019-05-25

## 2019-04-26 RX ORDER — ATORVASTATIN CALCIUM 80 MG/1
1 TABLET, FILM COATED ORAL
Qty: 30 | Refills: 0
Start: 2019-04-26 | End: 2019-05-25

## 2019-04-26 RX ORDER — LEVOTHYROXINE SODIUM 125 MCG
1 TABLET ORAL
Qty: 30 | Refills: 0
Start: 2019-04-26 | End: 2019-05-25

## 2019-04-26 RX ORDER — OMEPRAZOLE 10 MG/1
1 CAPSULE, DELAYED RELEASE ORAL
Qty: 30 | Refills: 0
Start: 2019-04-26 | End: 2019-05-25

## 2019-04-26 RX ADMIN — CLOPIDOGREL BISULFATE 75 MILLIGRAM(S): 75 TABLET, FILM COATED ORAL at 13:46

## 2019-04-26 RX ADMIN — PANTOPRAZOLE SODIUM 40 MILLIGRAM(S): 20 TABLET, DELAYED RELEASE ORAL at 07:01

## 2019-04-26 RX ADMIN — DULOXETINE HYDROCHLORIDE 20 MILLIGRAM(S): 30 CAPSULE, DELAYED RELEASE ORAL at 13:47

## 2019-04-26 RX ADMIN — Medication 100 MILLIGRAM(S): at 07:01

## 2019-04-26 RX ADMIN — LOSARTAN POTASSIUM 100 MILLIGRAM(S): 100 TABLET, FILM COATED ORAL at 07:01

## 2019-04-26 RX ADMIN — Medication 3 MILLILITER(S): at 03:35

## 2019-04-26 RX ADMIN — CHLORHEXIDINE GLUCONATE 1 APPLICATION(S): 213 SOLUTION TOPICAL at 06:58

## 2019-04-26 RX ADMIN — Medication 1 TABLET(S): at 13:46

## 2019-04-26 RX ADMIN — GABAPENTIN 300 MILLIGRAM(S): 400 CAPSULE ORAL at 18:02

## 2019-04-26 RX ADMIN — Medication 50 MILLIGRAM(S): at 13:47

## 2019-04-26 RX ADMIN — Medication 50 MILLIGRAM(S): at 07:02

## 2019-04-26 RX ADMIN — Medication 100 MILLIGRAM(S): at 13:47

## 2019-04-26 RX ADMIN — BUMETANIDE 2 MILLIGRAM(S): 0.25 INJECTION INTRAMUSCULAR; INTRAVENOUS at 07:01

## 2019-04-26 RX ADMIN — Medication 100 MICROGRAM(S): at 07:01

## 2019-04-26 RX ADMIN — GABAPENTIN 300 MILLIGRAM(S): 400 CAPSULE ORAL at 07:01

## 2019-04-26 RX ADMIN — Medication 3 MILLILITER(S): at 14:37

## 2019-04-26 RX ADMIN — Medication 324 MILLIGRAM(S): at 13:46

## 2019-04-26 NOTE — PROGRESS NOTE ADULT - REASON FOR ADMISSION
sob

## 2019-04-26 NOTE — PROGRESS NOTE ADULT - NSHPATTENDINGPLANDISCUSS_GEN_ALL_CORE
pt, rn, cardio
pt, rn,  ID
patient, nephro, cardio, rn
patient, nephro, rn
pt, rn, cardio, CM
Dr Arechiga
PA on 4 brkt
pt, rn, med PA, ID

## 2019-04-26 NOTE — PROGRESS NOTE ADULT - SUBJECTIVE AND OBJECTIVE BOX
Pt seen and examined f/u dysphagia    This morning he has no new complaints and apparently tolerating diet although he is poor historian. This morning he is somewhat somnolent. Esophagram still not done.    REVIEW OF SYSTEMS:    CONSTITUTIONAL: No fever, weight loss, or fatigue  EYES: No eye pain, visual disturbances, or discharge  ENMT:  No difficulty hearing, tinnitus, vertigo; No sinus or throat pain  RESPIRATORY: No cough, wheezing, chills or hemoptysis; No shortness of breath  CARDIOVASCULAR: No chest pain, palpitations, dizziness, or leg swelling  GASTROINTESTINAL: No abdominal or epigastric pain. No nausea, vomiting, or hematemesis; No diarrhea or constipation. No melena or hematochezia.    MEDICATIONS:  MEDICATIONS  (STANDING):  ALBUTerol    90 MICROgram(s) HFA Inhaler 1 Puff(s) Inhalation every 4 hours  ALBUTerol/ipratropium for Nebulization 3 milliLiter(s) Nebulizer every 6 hours  aspirin  chewable 324 milliGRAM(s) Oral daily  atorvastatin 80 milliGRAM(s) Oral at bedtime  buMETAnide 2 milliGRAM(s) Oral daily  chlorhexidine 2% Cloths 1 Application(s) Topical <User Schedule>  clopidogrel Tablet 75 milliGRAM(s) Oral daily  dextrose 5%. 1000 milliLiter(s) (50 mL/Hr) IV Continuous <Continuous>  dextrose 50% Injectable 12.5 Gram(s) IV Push once  dextrose 50% Injectable 25 Gram(s) IV Push once  dextrose 50% Injectable 25 Gram(s) IV Push once  docusate sodium 100 milliGRAM(s) Oral three times a day  DULoxetine 20 milliGRAM(s) Oral daily  gabapentin 300 milliGRAM(s) Oral two times a day  heparin  Injectable 5000 Unit(s) SubCutaneous every 12 hours  isosorbide   mononitrate ER Tablet (IMDUR) 120 milliGRAM(s) Oral daily  levothyroxine 100 MICROGram(s) Oral daily  losartan 100 milliGRAM(s) Oral daily  melatonin 3 milliGRAM(s) Oral at bedtime  metoprolol tartrate 50 milliGRAM(s) Oral every 8 hours  mupirocin 2% Nasal 1 Application(s) Nasal two times a day  Nephro-jeana 1 Tablet(s) Oral daily  pantoprazole    Tablet 40 milliGRAM(s) Oral before breakfast  senna 2 Tablet(s) Oral at bedtime  tiotropium 18 MICROgram(s) Capsule 1 Capsule(s) Inhalation daily    MEDICATIONS  (PRN):  dextrose 40% Gel 15 Gram(s) Oral once PRN Blood Glucose LESS THAN 70 milliGRAM(s)/deciLiter  glucagon  Injectable 1 milliGRAM(s) IntraMuscular once PRN Glucose <70 milliGRAM(s)/deciLiter  hydrALAZINE Injectable 10 milliGRAM(s) IV Push every 6 hours PRN for SBP > 150      Allergies    No Known Allergies    Intolerances        Vital Signs Last 24 Hrs  T(C): 36.8 (26 Apr 2019 06:59), Max: 37 (25 Apr 2019 22:45)  T(F): 98.2 (26 Apr 2019 06:59), Max: 98.6 (25 Apr 2019 22:45)  HR: 64 (26 Apr 2019 06:59) (46 - 72)  BP: 134/48 (26 Apr 2019 06:59) (98/52 - 156/56)  BP(mean): --  RR: 16 (26 Apr 2019 06:59) (16 - 18)  SpO2: 97% (26 Apr 2019 06:59) (97% - 100%)    04-25 @ 07:01  -  04-26 @ 07:00  --------------------------------------------------------  IN: 120 mL / OUT: 100 mL / NET: 20 mL        PHYSICAL EXAM:    General: Well developed; well nourished; in no acute distress  HEENT: MMM, conjunctiva and sclera clear, very poor dentition  Gastrointestinal:Abdomen: Soft non-tender non-distended; Normal bowel sounds; No hepatosplenomegaly  Extremities: no cyanosis, clubbing or edema.  Skin: Warm and dry. No obvious rash    LABS:      CBC Full  -  ( 26 Apr 2019 06:17 )  WBC Count : 9.8 K/uL  RBC Count : 3.89 M/uL  Hemoglobin : 11.0 g/dL  Hematocrit : 35.9 %  Platelet Count - Automated : 210 K/uL  Mean Cell Volume : 92.3 fl  Mean Cell Hemoglobin : 28.3 pg  Mean Cell Hemoglobin Concentration : 30.6 g/dL  Auto Neutrophil # : 5.0 K/uL  Auto Lymphocyte # : 2.6 K/uL  Auto Monocyte # : 1.0 K/uL  Auto Eosinophil # : 1.1 K/uL  Auto Basophil # : 0.0 K/uL  Auto Neutrophil % : 51.2 %  Auto Lymphocyte % : 26.5 %  Auto Monocyte % : 10.7 %  Auto Eosinophil % : 10.9 %  Auto Basophil % : 0.5 %    04-26    138  |  97<L>  |  25.0<H>  ----------------------------<  94  4.4   |  28.0  |  5.13<H>    Ca    8.7      26 Apr 2019 06:17  Phos  4.1     04-26  Mg     2.2     04-26 Pt seen and examined f/u dysphagia    This morning he has no new complaints and apparently tolerating diet although he is poor historian. This morning he is somewhat somnolent. Esophagram still not done.  Bc positive for Gram positive cocci and telemetry shows episodes of bradycardia as well as prior SVT and nonsustained Vtach-poor candidate for EGD.    REVIEW OF SYSTEMS:    CONSTITUTIONAL: No fever, weight loss, or fatigue  EYES: No eye pain, visual disturbances, or discharge  ENMT:  No difficulty hearing, tinnitus, vertigo; No sinus or throat pain  RESPIRATORY: No cough, wheezing, chills or hemoptysis; No shortness of breath  CARDIOVASCULAR: No chest pain, palpitations, dizziness, or leg swelling  GASTROINTESTINAL: No abdominal or epigastric pain. No nausea, vomiting, or hematemesis; No diarrhea or constipation. No melena or hematochezia.    MEDICATIONS:  MEDICATIONS  (STANDING):  ALBUTerol    90 MICROgram(s) HFA Inhaler 1 Puff(s) Inhalation every 4 hours  ALBUTerol/ipratropium for Nebulization 3 milliLiter(s) Nebulizer every 6 hours  aspirin  chewable 324 milliGRAM(s) Oral daily  atorvastatin 80 milliGRAM(s) Oral at bedtime  buMETAnide 2 milliGRAM(s) Oral daily  chlorhexidine 2% Cloths 1 Application(s) Topical <User Schedule>  clopidogrel Tablet 75 milliGRAM(s) Oral daily  dextrose 5%. 1000 milliLiter(s) (50 mL/Hr) IV Continuous <Continuous>  dextrose 50% Injectable 12.5 Gram(s) IV Push once  dextrose 50% Injectable 25 Gram(s) IV Push once  dextrose 50% Injectable 25 Gram(s) IV Push once  docusate sodium 100 milliGRAM(s) Oral three times a day  DULoxetine 20 milliGRAM(s) Oral daily  gabapentin 300 milliGRAM(s) Oral two times a day  heparin  Injectable 5000 Unit(s) SubCutaneous every 12 hours  isosorbide   mononitrate ER Tablet (IMDUR) 120 milliGRAM(s) Oral daily  levothyroxine 100 MICROGram(s) Oral daily  losartan 100 milliGRAM(s) Oral daily  melatonin 3 milliGRAM(s) Oral at bedtime  metoprolol tartrate 50 milliGRAM(s) Oral every 8 hours  mupirocin 2% Nasal 1 Application(s) Nasal two times a day  Nephro-jeana 1 Tablet(s) Oral daily  pantoprazole    Tablet 40 milliGRAM(s) Oral before breakfast  senna 2 Tablet(s) Oral at bedtime  tiotropium 18 MICROgram(s) Capsule 1 Capsule(s) Inhalation daily    MEDICATIONS  (PRN):  dextrose 40% Gel 15 Gram(s) Oral once PRN Blood Glucose LESS THAN 70 milliGRAM(s)/deciLiter  glucagon  Injectable 1 milliGRAM(s) IntraMuscular once PRN Glucose <70 milliGRAM(s)/deciLiter  hydrALAZINE Injectable 10 milliGRAM(s) IV Push every 6 hours PRN for SBP > 150      Allergies    No Known Allergies    Intolerances        Vital Signs Last 24 Hrs  T(C): 36.8 (26 Apr 2019 06:59), Max: 37 (25 Apr 2019 22:45)  T(F): 98.2 (26 Apr 2019 06:59), Max: 98.6 (25 Apr 2019 22:45)  HR: 64 (26 Apr 2019 06:59) (46 - 72)  BP: 134/48 (26 Apr 2019 06:59) (98/52 - 156/56)  BP(mean): --  RR: 16 (26 Apr 2019 06:59) (16 - 18)  SpO2: 97% (26 Apr 2019 06:59) (97% - 100%)    04-25 @ 07:01  -  04-26 @ 07:00  --------------------------------------------------------  IN: 120 mL / OUT: 100 mL / NET: 20 mL        PHYSICAL EXAM:    General: Well developed; well nourished; in no acute distress  HEENT: MMM, conjunctiva and sclera clear, very poor dentition  Gastrointestinal:Abdomen: Soft non-tender non-distended; Normal bowel sounds; No hepatosplenomegaly  Extremities: no cyanosis, clubbing or edema.  Skin: Warm and dry. No obvious rash    LABS:      CBC Full  -  ( 26 Apr 2019 06:17 )  WBC Count : 9.8 K/uL  RBC Count : 3.89 M/uL  Hemoglobin : 11.0 g/dL  Hematocrit : 35.9 %  Platelet Count - Automated : 210 K/uL  Mean Cell Volume : 92.3 fl  Mean Cell Hemoglobin : 28.3 pg  Mean Cell Hemoglobin Concentration : 30.6 g/dL  Auto Neutrophil # : 5.0 K/uL  Auto Lymphocyte # : 2.6 K/uL  Auto Monocyte # : 1.0 K/uL  Auto Eosinophil # : 1.1 K/uL  Auto Basophil # : 0.0 K/uL  Auto Neutrophil % : 51.2 %  Auto Lymphocyte % : 26.5 %  Auto Monocyte % : 10.7 %  Auto Eosinophil % : 10.9 %  Auto Basophil % : 0.5 %    04-26    138  |  97<L>  |  25.0<H>  ----------------------------<  94  4.4   |  28.0  |  5.13<H>    Ca    8.7      26 Apr 2019 06:17  Phos  4.1     04-26  Mg     2.2     04-26

## 2019-04-26 NOTE — PROGRESS NOTE ADULT - ASSESSMENT
1) ESRD on HD  2) Anemia of renal dx  3) MBD of renal dx  4) Vol/HTN    Palliative care to discuss goals of care  Patient rescinded DNR/DNI  Failed swallow eval  NGT for feedings  Plan for HD today; 0-0.5L UF; minimal given poor po status  Prognosis Poor  Continue current management

## 2019-04-26 NOTE — PROGRESS NOTE ADULT - ASSESSMENT
78 yo M hx of chronic diastolic CHF, lung CA, pulmonary fibrosis CAD s/p CABG, ESRD on dialysis (TTS), on home 2L O2, p/w sob and orthopnea x 3 days. Has been wearing 4 L nc o2 all the time. denies lower ext swelling. He also report intermittent left sided chest pain, intermittently radiating to left upper chest but now improved at tiem of hx , c/o productive cough with mild blood tinged yellowish brown phlegm, also had episode of chills and vomiting with nausea yesterday. denies any blood in vomitus ,  no abdominal pain/ vomiting  has been chronic 2-3x/day most days , has low appetite .Patient last dialyzed yesterday. Denies any sick contacts .Family two daughters  at bedside, patient a&ox3, states that he has not been able to ambulate much at home, has been requiring 2-3 people assist for transfers.  patient. has.  Patient was DNR/DNI at last admission , today he wishes to be full code.  Positive blood cultures for Gram + cocci. Vanco x 1 dose given. Repeat Blood cx ordered. ID consult called. Pts symptoms attributed to  acute on chronic hypoxic resp failure: likely sec to acute on chronic diastolic chf. urgent HD done on 4/19 & 4/20. Further HD tomorrow. GI consulted due to reported dysphagia and weight loss.       >Positive blood cultures for Gram + cocci- continue vanco post HD -   Blood cultures 2 of 2 bottles with CoNS  - Repeat Blood cx negative. d/michelle Vanco as per ID        > acute on chronic hypoxic resp failure: likely sec to acute on chronic diastolic chf -  -urgent HD done on 4/19 & 4/20. Further HD tomorrow 4/24   -TnI elevation attributed to poor renal clearance as per cardio  -cardio, pulmonary and nephro following   -great improvement clinically and on CXR s/p Bumex, change IV Bumex 2 mg to PO as discussed with Dr. Nesbitt for now along with HD for fluid removal. Pt saturating 100% on 3L NC. Clinically improved.   -residual scarring noted but overall great improvement after fluid removal    >Dysphagia-  As per pt he has trouble swallowing since the past month where food gets stuck in his throat. Reports 25 lbs unintentional weight loss in 2-3 months. GI consult called.   Pt passed MBS this morning and is now tolerating soft diet w thins. f/u Barium esophagogram ordered per GI. To be done today. As per GI, will eventually need outpatient EGD to evaluate pt feeling globulus sensation.     >Chest pain/ possible demand ischemia   EKG no changes, telemetry noted w multiple episodes of SVT, PVCs and NSVT (7 beats) on monitor.   -TnI elevation attributed to poor renal clearance as per cardio  - restart aspirin and plavix now that pt tolerating po  - restart ARB and bb  NST done yesterday shows mild ischemia. Medical management as per Dr. Nesbitt      >Tachy-Alvaro syndrome-   telemetry on 4/24 noted w multiple episodes of SVT, PVCs and NSVT (7 beats) on monitor.   Tele on 4/25 Sinus alvaro to 40's-50's. Pt is asymptomatic  Disccussed with Dr. Nesbitt, decreased metoprolol from 100 bid to 50 q8, No further events on tele.   NST done yesterday shows mild ischemia. Medical management as per Dr. Nesbitt  Cleared for d/c today as discussed with Dr. Nesbitt        >Hx of pulmonary fibrosis- stable  -RVP panel -ve  - Pulm consult appreciated    > Hypertension/ uncontrolled   - Losartan to 50mg QD. D/michelle nifedipine 60mg QD as per cardio  Hydralazine 10 IVP prn q6   Hypotension resolved    > ESRD on hemodialysis  -urgent HD done on 4/19 & 4/20.  Breathing much improved; CXR much improved  -HD per renal. HD done today  -resume home medications.     >anemia of chronic disease    -stable h/h   -monitor     > likely severe protein teddy malnutrition   -nutrition consult     > dvt ppx: lovenox     >GOC: was DNR/ DNI during last admission. wishes to be full code today. Palliative consult appreciated    Dispo: Disccussed with pt's daughter & granddaughter [phone]. Pt has home O2 2L NC. He lives with his family who take care of him. They do not have any home aides but someone is always home. Pt does not ambulate even at home. Disccussed with JUDY Escamilla who recommends PT eval. Pt's family does not want [facility], they will rather take him home. PT eval ordered. Pt can d/c home after esophagogram done/ resulted & home care set up by JUDY

## 2019-04-26 NOTE — DISCHARGE NOTE NURSING/CASE MANAGEMENT/SOCIAL WORK - NSDCDPATPORTLINK_GEN_ALL_CORE
You can access the [facility] Patient Portal, offered by [facility], by registering with the following website: [email or web address]

## 2019-04-26 NOTE — PROGRESS NOTE ADULT - SUBJECTIVE AND OBJECTIVE BOX
[facility] DIVISION OF KIDNEY DISEASES AND HYPERTENSION -- FOLLOW UP NOTE  --------------------------------------------------------------------------------  Chief Complaint:  ESRD on HD    24 hour events/subjective:  Pt seen and examined   NAD  HD today      PAST HISTORY  --------------------------------------------------------------------------------  No significant changes to PMH, PSH, FHx, SHx, unless otherwise noted    ALLERGIES & MEDICATIONS  --------------------------------------------------------------------------------  Allergies    No Known Allergies    Intolerances      Standing Inpatient Medications  ALBUTerol    90 MICROgram(s) HFA Inhaler 1 Puff(s) Inhalation every 4 hours  ALBUTerol/ipratropium for Nebulization 3 milliLiter(s) Nebulizer every 6 hours  aspirin  chewable 324 milliGRAM(s) Oral daily  atorvastatin 80 milliGRAM(s) Oral at bedtime  buMETAnide 2 milliGRAM(s) Oral daily  chlorhexidine 2% Cloths 1 Application(s) Topical <User Schedule>  clopidogrel Tablet 75 milliGRAM(s) Oral daily  dextrose 5%. 1000 milliLiter(s) IV Continuous <Continuous>  dextrose 50% Injectable 12.5 Gram(s) IV Push once  dextrose 50% Injectable 25 Gram(s) IV Push once  dextrose 50% Injectable 25 Gram(s) IV Push once  docusate sodium 100 milliGRAM(s) Oral three times a day  DULoxetine 20 milliGRAM(s) Oral daily  gabapentin 300 milliGRAM(s) Oral two times a day  heparin  Injectable 5000 Unit(s) SubCutaneous every 12 hours  isosorbide   mononitrate ER Tablet (IMDUR) 120 milliGRAM(s) Oral daily  levothyroxine 100 MICROGram(s) Oral daily  losartan 100 milliGRAM(s) Oral daily  melatonin 3 milliGRAM(s) Oral at bedtime  metoprolol tartrate 50 milliGRAM(s) Oral every 8 hours  mupirocin 2% Nasal 1 Application(s) Nasal two times a day  Nephro-jeana 1 Tablet(s) Oral daily  pantoprazole    Tablet 40 milliGRAM(s) Oral before breakfast  senna 2 Tablet(s) Oral at bedtime  tiotropium 18 MICROgram(s) Capsule 1 Capsule(s) Inhalation daily    PRN Inpatient Medications  dextrose 40% Gel 15 Gram(s) Oral once PRN  glucagon  Injectable 1 milliGRAM(s) IntraMuscular once PRN  hydrALAZINE Injectable 10 milliGRAM(s) IV Push every 6 hours PRN      REVIEW OF SYSTEMS  --------------------------------------------------------------------------------  Gen: No weight changes, fatigue, + fevers/chills, weakness  Skin: No rashes  Head/Eyes/Ears/Mouth: No headache; Normal hearing; Normal vision w/o blurriness; No sinus pain/discomfort, sore throat  Respiratory: + SOB, + productive cough, + home O2, mild episode of hemoptysis, no wheeze  CV: + intermittent chest pain, PND, orthopnea  GI: No abdominal pain, no diarrhea, no constipation, + nausea, + vomiting, no melena, no hematochezia  : No increased frequency, dysuria, hematuria, nocturia  MSK: No joint pain/swelling; no back pain; no significant edema  Neuro: No dizziness/lightheadedness, weakness, seizures, numbness, tingling  Heme: No easy bruising or bleeding  Endo: No heat/cold intolerance  Psych: No significant nervousness, anxiety, stress, depression    All other systems were reviewed and are negative, except as noted.      VITALS/PHYSICAL EXAM  --------------------------------------------------------------------------------  T(C): 36.7 (04-26-19 @ 08:58), Max: 37 (04-25-19 @ 22:45)  HR: 53 (04-26-19 @ 08:58) (46 - 71)  BP: 124/38 (04-26-19 @ 08:58) (98/52 - 134/48)  RR: 18 (04-26-19 @ 08:58) (16 - 18)  SpO2: 96% (04-26-19 @ 08:58) (96% - 97%)  Wt(kg): --        04-25-19 @ 07:01  -  04-26-19 @ 07:00  --------------------------------------------------------  IN: 120 mL / OUT: 100 mL / NET: 20 mL      Physical Exam:  	Gen: NAD, ill-appearing, elderly, frail, pale  	HEENT: PERRL, supple neck, clear oropharynx  	Pulm: Diminished at bases bilaterally  	CV: RRR, S1S2; no rub  	Back: No spinal or CVA tenderness; no sacral edema  	Abd: +BS, soft, nontender/nondistended  	: No suprapubic tenderness  	UE: Warm, FROM, no clubbing, intact strength; no significant edema; no asterixis  	LE: Warm, FROM, no clubbing, intact strength; no significant edema  	Neuro: No focal deficits, intact gait  	Psych: Normal affect and mood  	Skin: Warm, without rashes  	Vascular access:  AVF    LABS/STUDIES  --------------------------------------------------------------------------------              11.0   9.8   >-----------<  210      [04-26-19 @ 06:17]              35.9     138  |  97  |  25.0  ----------------------------<  94      [04-26-19 @ 06:17]  4.4   |  28.0  |  5.13        Ca     8.7     [04-26-19 @ 06:17]      Mg     2.2     [04-26-19 @ 06:17]      Phos  4.1     [04-26-19 @ 06:17]            Creatinine Trend:  SCr 5.13 [04-26 @ 06:17]  SCr 3.68 [04-25 @ 06:10]  SCr 5.91 [04-24 @ 06:37]  SCr 4.17 [04-23 @ 05:23]  SCr 3.49 [04-23 @ 01:17]        Iron 34, TIBC 235, %sat 14      [01-29-19 @ 11:39]  Ferritin 1049      [01-29-19 @ 11:39]  TSH 1.83      [03-11-19 @ 07:37]  Lipid: chol 159, , HDL 32, LDL 94      [01-29-19 @ 11:39]    HBsAg Nonreact      [04-10-19 @ 17:37]

## 2019-04-26 NOTE — PROGRESS NOTE ADULT - PROBLEM SELECTOR PROBLEM 1
Dysphagia, unspecified type
ESRD (end stage renal disease)
Dysphagia, unspecified type
Dysphagia, unspecified type
ESRD (end stage renal disease)

## 2019-04-26 NOTE — PROGRESS NOTE ADULT - PROVIDER SPECIALTY LIST ADULT
Cardiology
Gastroenterology
Gastroenterology
Hospitalist
Hospitalist
Nephrology
Cardiology
Cardiology
Gastroenterology
Infectious Disease
Infectious Disease
Nephrology
Nephrology
Pulmonology
Pulmonology
Gastroenterology
Hospitalist
Nephrology
Pulmonology
Hospitalist

## 2019-04-26 NOTE — PROGRESS NOTE ADULT - PROBLEM SELECTOR PLAN 1
of questionable significance as he is no onger experiencing any dysphagia. nevertheless he should at least undergo and esophagram prior to discharge and stay on pantoprazole. Will reorder the exam.

## 2019-04-26 NOTE — PROGRESS NOTE ADULT - SUBJECTIVE AND OBJECTIVE BOX
Vital Signs Last 24 Hrs,    T(C): 36.7 (2019 16:44), Max: 37 (2019 22:45)  T(F): 98.1 (2019 16:44), Max: 98.6 (2019 22:45)  HR: 88 (2019 16:44) (53 - 88)  BP: 113/42 (2019 16:44) (110/40 - 134/48)  BP(mean): --  RR: 18 (2019 16:44) (16 - 18)  SpO2: 98% (2019 16:44) (96% - 98%)    138    |  97<L>  |  25.0<H>  ----------------------------<  94     Ca:8.7   (2019 06:17)  4.4     |  28.0   |  5.13<H>      eGFR if Non : 10 <L>  eGFR if : 12 <L>                      11.0<L>  9.8   )-----------( 210      ( 2019 06:17 )             35.9<L>    Phos:4.1 mg/dL M.2 mg/dL PTH:-- Uric acid:-- Serum Osm:--  Ferritin:-- Iron:-- TIBC:-- Tsat:--  B12:-- TSH:-- ( @ 06:17)    Pt completed HD for 3 hours.     Tolerated Tx well with stable vital signs.     No c/o presented post HD.     Awake and alert.     Verbal report given to TAYLER Oliva.     Post access care done.     Back to room with transporter ,

## 2019-04-27 LAB
CULTURE RESULTS: SIGNIFICANT CHANGE UP
SPECIMEN SOURCE: SIGNIFICANT CHANGE UP

## 2019-04-30 ENCOUNTER — APPOINTMENT (OUTPATIENT)
Dept: PULMONOLOGY | Facility: CLINIC | Age: 79
End: 2019-04-30

## 2019-05-02 ENCOUNTER — INPATIENT (INPATIENT)
Facility: HOSPITAL | Age: 79
LOS: 4 days | Discharge: HOME HEALTH | DRG: 291 | End: 2019-05-07
Attending: HOSPITALIST
Payer: COMMERCIAL

## 2019-05-02 VITALS
TEMPERATURE: 98 F | SYSTOLIC BLOOD PRESSURE: 160 MMHG | HEART RATE: 54 BPM | DIASTOLIC BLOOD PRESSURE: 67 MMHG | RESPIRATION RATE: 22 BRPM | OXYGEN SATURATION: 90 %

## 2019-05-02 DIAGNOSIS — R06.02 SHORTNESS OF BREATH: ICD-10-CM

## 2019-05-02 DIAGNOSIS — I10 ESSENTIAL (PRIMARY) HYPERTENSION: ICD-10-CM

## 2019-05-02 DIAGNOSIS — N18.6 END STAGE RENAL DISEASE: ICD-10-CM

## 2019-05-02 DIAGNOSIS — Z95.1 PRESENCE OF AORTOCORONARY BYPASS GRAFT: Chronic | ICD-10-CM

## 2019-05-02 DIAGNOSIS — E78.00 PURE HYPERCHOLESTEROLEMIA, UNSPECIFIED: ICD-10-CM

## 2019-05-02 DIAGNOSIS — I25.10 ATHEROSCLEROTIC HEART DISEASE OF NATIVE CORONARY ARTERY WITHOUT ANGINA PECTORIS: ICD-10-CM

## 2019-05-02 DIAGNOSIS — Z71.89 OTHER SPECIFIED COUNSELING: ICD-10-CM

## 2019-05-02 LAB
ALBUMIN SERPL ELPH-MCNC: 3.2 G/DL — LOW (ref 3.3–5.2)
ALP SERPL-CCNC: 47 U/L — SIGNIFICANT CHANGE UP (ref 40–120)
ALT FLD-CCNC: 20 U/L — SIGNIFICANT CHANGE UP
ANION GAP SERPL CALC-SCNC: 13 MMOL/L — SIGNIFICANT CHANGE UP (ref 5–17)
AST SERPL-CCNC: 25 U/L — SIGNIFICANT CHANGE UP
BASOPHILS # BLD AUTO: 0 K/UL — SIGNIFICANT CHANGE UP (ref 0–0.2)
BASOPHILS NFR BLD AUTO: 0.5 % — SIGNIFICANT CHANGE UP (ref 0–2)
BILIRUB SERPL-MCNC: 0.2 MG/DL — LOW (ref 0.4–2)
BUN SERPL-MCNC: 19 MG/DL — SIGNIFICANT CHANGE UP (ref 8–20)
CALCIUM SERPL-MCNC: 8.9 MG/DL — SIGNIFICANT CHANGE UP (ref 8.6–10.2)
CHLORIDE SERPL-SCNC: 91 MMOL/L — LOW (ref 98–107)
CO2 SERPL-SCNC: 31 MMOL/L — HIGH (ref 22–29)
CREAT SERPL-MCNC: 4.54 MG/DL — HIGH (ref 0.5–1.3)
EOSINOPHIL # BLD AUTO: 1.1 K/UL — HIGH (ref 0–0.5)
EOSINOPHIL NFR BLD AUTO: 13.9 % — HIGH (ref 0–5)
GLUCOSE SERPL-MCNC: 106 MG/DL — SIGNIFICANT CHANGE UP (ref 70–115)
HCT VFR BLD CALC: 30.8 % — LOW (ref 42–52)
HGB BLD-MCNC: 9.3 G/DL — LOW (ref 14–18)
LYMPHOCYTES # BLD AUTO: 2.1 K/UL — SIGNIFICANT CHANGE UP (ref 1–4.8)
LYMPHOCYTES # BLD AUTO: 25.7 % — SIGNIFICANT CHANGE UP (ref 20–55)
MCHC RBC-ENTMCNC: 27.9 PG — SIGNIFICANT CHANGE UP (ref 27–31)
MCHC RBC-ENTMCNC: 30.2 G/DL — LOW (ref 32–36)
MCV RBC AUTO: 92.5 FL — SIGNIFICANT CHANGE UP (ref 80–94)
MONOCYTES # BLD AUTO: 1.2 K/UL — HIGH (ref 0–0.8)
MONOCYTES NFR BLD AUTO: 14.3 % — HIGH (ref 3–10)
NEUTROPHILS # BLD AUTO: 3.7 K/UL — SIGNIFICANT CHANGE UP (ref 1.8–8)
NEUTROPHILS NFR BLD AUTO: 45.4 % — SIGNIFICANT CHANGE UP (ref 37–73)
NT-PROBNP SERPL-SCNC: HIGH PG/ML (ref 0–300)
PLATELET # BLD AUTO: 225 K/UL — SIGNIFICANT CHANGE UP (ref 150–400)
POTASSIUM SERPL-MCNC: 4.1 MMOL/L — SIGNIFICANT CHANGE UP (ref 3.5–5.3)
POTASSIUM SERPL-SCNC: 4.1 MMOL/L — SIGNIFICANT CHANGE UP (ref 3.5–5.3)
PROT SERPL-MCNC: 7.2 G/DL — SIGNIFICANT CHANGE UP (ref 6.6–8.7)
RBC # BLD: 3.33 M/UL — LOW (ref 4.6–6.2)
RBC # FLD: 16.7 % — HIGH (ref 11–15.6)
SODIUM SERPL-SCNC: 135 MMOL/L — SIGNIFICANT CHANGE UP (ref 135–145)
WBC # BLD: 8.2 K/UL — SIGNIFICANT CHANGE UP (ref 4.8–10.8)
WBC # FLD AUTO: 8.2 K/UL — SIGNIFICANT CHANGE UP (ref 4.8–10.8)

## 2019-05-02 PROCEDURE — 99223 1ST HOSP IP/OBS HIGH 75: CPT

## 2019-05-02 PROCEDURE — 99285 EMERGENCY DEPT VISIT HI MDM: CPT

## 2019-05-02 PROCEDURE — 93010 ELECTROCARDIOGRAM REPORT: CPT

## 2019-05-02 PROCEDURE — 71046 X-RAY EXAM CHEST 2 VIEWS: CPT | Mod: 26

## 2019-05-02 RX ORDER — LOSARTAN POTASSIUM 100 MG/1
100 TABLET, FILM COATED ORAL DAILY
Refills: 0 | Status: DISCONTINUED | OUTPATIENT
Start: 2019-05-02 | End: 2019-05-07

## 2019-05-02 RX ORDER — GABAPENTIN 400 MG/1
300 CAPSULE ORAL
Refills: 0 | Status: DISCONTINUED | OUTPATIENT
Start: 2019-05-02 | End: 2019-05-07

## 2019-05-02 RX ORDER — METOPROLOL TARTRATE 50 MG
25 TABLET ORAL
Refills: 0 | Status: DISCONTINUED | OUTPATIENT
Start: 2019-05-02 | End: 2019-05-07

## 2019-05-02 RX ORDER — ISOSORBIDE MONONITRATE 60 MG/1
60 TABLET, EXTENDED RELEASE ORAL DAILY
Refills: 0 | Status: DISCONTINUED | OUTPATIENT
Start: 2019-05-02 | End: 2019-05-02

## 2019-05-02 RX ORDER — LEVOTHYROXINE SODIUM 125 MCG
100 TABLET ORAL DAILY
Refills: 0 | Status: DISCONTINUED | OUTPATIENT
Start: 2019-05-02 | End: 2019-05-07

## 2019-05-02 RX ORDER — DOCUSATE SODIUM 100 MG
100 CAPSULE ORAL THREE TIMES A DAY
Refills: 0 | Status: DISCONTINUED | OUTPATIENT
Start: 2019-05-02 | End: 2019-05-07

## 2019-05-02 RX ORDER — SENNA PLUS 8.6 MG/1
2 TABLET ORAL AT BEDTIME
Refills: 0 | Status: DISCONTINUED | OUTPATIENT
Start: 2019-05-02 | End: 2019-05-07

## 2019-05-02 RX ORDER — ACETAMINOPHEN 500 MG
650 TABLET ORAL EVERY 6 HOURS
Refills: 0 | Status: DISCONTINUED | OUTPATIENT
Start: 2019-05-02 | End: 2019-05-07

## 2019-05-02 RX ORDER — TIOTROPIUM BROMIDE 18 UG/1
1 CAPSULE ORAL; RESPIRATORY (INHALATION) DAILY
Refills: 0 | Status: DISCONTINUED | OUTPATIENT
Start: 2019-05-02 | End: 2019-05-03

## 2019-05-02 RX ORDER — ASPIRIN/CALCIUM CARB/MAGNESIUM 324 MG
81 TABLET ORAL DAILY
Refills: 0 | Status: DISCONTINUED | OUTPATIENT
Start: 2019-05-02 | End: 2019-05-07

## 2019-05-02 RX ORDER — ISOSORBIDE MONONITRATE 60 MG/1
120 TABLET, EXTENDED RELEASE ORAL DAILY
Refills: 0 | Status: DISCONTINUED | OUTPATIENT
Start: 2019-05-02 | End: 2019-05-07

## 2019-05-02 RX ORDER — PANTOPRAZOLE SODIUM 20 MG/1
40 TABLET, DELAYED RELEASE ORAL
Refills: 0 | Status: DISCONTINUED | OUTPATIENT
Start: 2019-05-02 | End: 2019-05-07

## 2019-05-02 RX ORDER — BUMETANIDE 0.25 MG/ML
2 INJECTION INTRAMUSCULAR; INTRAVENOUS DAILY
Refills: 0 | Status: DISCONTINUED | OUTPATIENT
Start: 2019-05-02 | End: 2019-05-07

## 2019-05-02 RX ORDER — NITROGLYCERIN 6.5 MG
0.4 CAPSULE, EXTENDED RELEASE ORAL ONCE
Refills: 0 | Status: COMPLETED | OUTPATIENT
Start: 2019-05-02 | End: 2019-05-02

## 2019-05-02 RX ORDER — DULOXETINE HYDROCHLORIDE 30 MG/1
20 CAPSULE, DELAYED RELEASE ORAL DAILY
Refills: 0 | Status: DISCONTINUED | OUTPATIENT
Start: 2019-05-02 | End: 2019-05-07

## 2019-05-02 RX ORDER — METOPROLOL TARTRATE 50 MG
50 TABLET ORAL ONCE
Refills: 0 | Status: COMPLETED | OUTPATIENT
Start: 2019-05-02 | End: 2019-05-02

## 2019-05-02 RX ORDER — HYDRALAZINE HCL 50 MG
25 TABLET ORAL EVERY 8 HOURS
Refills: 0 | Status: DISCONTINUED | OUTPATIENT
Start: 2019-05-02 | End: 2019-05-03

## 2019-05-02 RX ORDER — ATORVASTATIN CALCIUM 80 MG/1
80 TABLET, FILM COATED ORAL AT BEDTIME
Refills: 0 | Status: DISCONTINUED | OUTPATIENT
Start: 2019-05-02 | End: 2019-05-07

## 2019-05-02 RX ORDER — IPRATROPIUM/ALBUTEROL SULFATE 18-103MCG
3 AEROSOL WITH ADAPTER (GRAM) INHALATION EVERY 6 HOURS
Refills: 0 | Status: DISCONTINUED | OUTPATIENT
Start: 2019-05-02 | End: 2019-05-07

## 2019-05-02 RX ORDER — CLOPIDOGREL BISULFATE 75 MG/1
75 TABLET, FILM COATED ORAL DAILY
Refills: 0 | Status: DISCONTINUED | OUTPATIENT
Start: 2019-05-02 | End: 2019-05-07

## 2019-05-02 RX ORDER — FUROSEMIDE 40 MG
40 TABLET ORAL ONCE
Refills: 0 | Status: COMPLETED | OUTPATIENT
Start: 2019-05-02 | End: 2019-05-02

## 2019-05-02 RX ORDER — HEPARIN SODIUM 5000 [USP'U]/ML
5000 INJECTION INTRAVENOUS; SUBCUTANEOUS EVERY 12 HOURS
Refills: 0 | Status: DISCONTINUED | OUTPATIENT
Start: 2019-05-02 | End: 2019-05-07

## 2019-05-02 RX ADMIN — Medication 25 MILLIGRAM(S): at 21:09

## 2019-05-02 RX ADMIN — Medication 40 MILLIGRAM(S): at 12:07

## 2019-05-02 RX ADMIN — Medication 0.4 MILLIGRAM(S): at 12:07

## 2019-05-02 RX ADMIN — SENNA PLUS 2 TABLET(S): 8.6 TABLET ORAL at 22:46

## 2019-05-02 RX ADMIN — GABAPENTIN 300 MILLIGRAM(S): 400 CAPSULE ORAL at 21:09

## 2019-05-02 RX ADMIN — ATORVASTATIN CALCIUM 80 MILLIGRAM(S): 80 TABLET, FILM COATED ORAL at 23:52

## 2019-05-02 RX ADMIN — LOSARTAN POTASSIUM 100 MILLIGRAM(S): 100 TABLET, FILM COATED ORAL at 12:07

## 2019-05-02 RX ADMIN — Medication 100 MILLIGRAM(S): at 22:46

## 2019-05-02 RX ADMIN — Medication 3 MILLILITER(S): at 21:13

## 2019-05-02 RX ADMIN — Medication 50 MILLIGRAM(S): at 12:07

## 2019-05-02 RX ADMIN — Medication 25 MILLIGRAM(S): at 22:46

## 2019-05-02 RX ADMIN — HEPARIN SODIUM 5000 UNIT(S): 5000 INJECTION INTRAVENOUS; SUBCUTANEOUS at 21:08

## 2019-05-02 NOTE — ED PROVIDER NOTE - MUSCULOSKELETAL, MLM
Spine appears normal, range of motion is not limited, no muscle or joint tenderness.  + fistula with palpable thrill left arm

## 2019-05-02 NOTE — CONSULT NOTE ADULT - SUBJECTIVE AND OBJECTIVE BOX
Patient is a 79y old  Male who presents with a chief complaint of     HPI: Cough & Dyspnea,    PAST MEDICAL & SURGICAL HISTORY:    Chronic anemia  ESRD (end stage renal disease): right Upper arm fistula ( HD On T Th S )   CAD (coronary artery disease)  Lung cancer: S/P radiation in 2006.  Bipolar disorder  Hypertension    S/P CABG (coronary artery bypass graft): pt&#x27;s son in [facility] states h/o some stents near neck area ? carotid ? he is not sure.    FAMILY HISTORY:  Family history of essential hypertension    Social History: Non Smoker,     MEDICATIONS  (STANDING):  losartan 100 milliGRAM(s) Oral daily    Allergies    No Known Allergies    REVIEW OF SYSTEMS:    CONSTITUTIONAL: +  fever, weight loss, or fatigue  EYES: No eye pain, visual disturbances, or discharge  ENMT:  No difficulty hearing, tinnitus, vertigo; No sinus or throat pain  NECK: No pain or stiffness  RESPIRATORY:  +  cough, wheezing, chills ,  hemoptysis; + shortness of breath  CARDIOVASCULAR: No chest pain, palpitations, dizziness, or leg swelling  GASTROINTESTINAL: No abdominal or epigastric pain. No nausea, vomiting, or hematemesis; No diarrhea or constipation. No melena or hematochezia.  GENITOURINARY: No dysuria, frequency, hematuria, or incontinence  NEUROLOGICAL: No headaches, memory loss, loss of strength, numbness, or tremors  SKIN: No itching, burning, rashes, or lesions   LYMPH NODES: No enlarged glands  ENDOCRINE: No heat or cold intolerance; No hair loss  MUSCULOSKELETAL: No joint pain or swelling; No muscle, back, or extremity pain  PSYCHIATRIC: No depression, anxiety, mood swings, or difficulty sleeping  HEME/LYMPH: No easy bruising, or bleeding gums  ALLERGY AND IMMUNOLOGIC: No hives or eczema    Vital Signs Last 24 Hrs  T(C): 36.8 (02 May 2019 08:29), Max: 36.8 (02 May 2019 08:29)  T(F): 98.2 (02 May 2019 08:29), Max: 98.2 (02 May 2019 08:29)  HR: 58 (02 May 2019 12:05) (54 - 59)  BP: 174/74 (02 May 2019 12:05) (160/67 - 193/79)  BP(mean): --  RR: 17 (02 May 2019 12:05) (3 - 22)  SpO2: 96% (02 May 2019 12:05) (90% - 99%)    PHYSICAL EXAM:    GENERAL: In Acute Distress, Pale, Sallow,   HEAD:  Atraumatic, Normocephalic  EYES: EOMI, PERRLA, conjunctiva and sclera clear  ENMT: Moist mucous membranes,   NECK: Supple, + JVD, Normal thyroid  NERVOUS SYSTEM:  Lethargic,   CHEST/LUNG: Dull  to percussion bilaterally; +  rales, rhonchi, wheezing, No  rubs  HEART: Regular rate and rhythm; No murmurs, rubs, or gallops  ABDOMEN: Soft, Nontender, Nondistended; Bowel sounds present  EXTREMITIES:  2+ Peripheral Pulses, No clubbing, cyanosis, or edema  LYMPH: No lymphadenopathy noted  SKIN: No rashes or lesions,    AVF +,     LABS:                        9.3    8.2   )-----------( 225      ( 02 May 2019 09:17 )             30.8     05-02    135  |  91<L>  |  19.0  ----------------------------<  106  4.1   |  31.0<H>  |  4.54<H>    Ca    8.9      02 May 2019 09:17    TPro  7.2  /  Alb  3.2<L>  /  TBili  0.2<L>  /  DBili  x   /  AST  25  /  ALT  20  /  AlkPhos  47  05-02    RADIOLOGY & ADDITIONAL TESTS:    Xray Chest 2 Views PA/Lat (05.02.19 @ 09:46)     PROCEDURE DATE:  05/02/2019      INTERPRETATION:  CLINICAL INFORMATION: Shortness of Breath.     EXAM: PA and lateral chest    COMPARISON: Chest radiograph 4/22/2019    TECHNIQUE:    FINDINGS:  Increased patchy opacities in the bilateral lungs, suggesting pulmonary   edema versus pneumonia superimposed on pulmonary fibrosis.  Small bilateral pleural effusions are increased in size.  No pneumothorax.  The cardiomediastinal silhouette is enlarged. Status post median   sternotomy and CABG.    IMPRESSION:   Increased patchy opacities in the bilateral lungs, suggesting pulmonary   edema versus pneumonia superimposed on pulmonary fibrosis.  Small bilateral pleural effusions are increased in size.

## 2019-05-02 NOTE — H&P ADULT - PROBLEM SELECTOR PLAN 3
Continue aspirin and Plavix. Reviewed prior admission, he had an abnormal stress test, was advised medical management by cardiology.

## 2019-05-02 NOTE — ED ADULT NURSE NOTE - ED STAT RN HANDOFF DETAILS
Handoff to PEGGY Rodriguez RN. Patient is alert and orient x4, VSS, patient in no acute distress, safety maintained.

## 2019-05-02 NOTE — ED PROVIDER NOTE - OBJECTIVE STATEMENT
This patient is a 79 year old man hx of CHF and ESRD on dialysis (T/Th/Sat) who presents to the ER c/o SOB today.  Patient is a poor historian.  Additional history obtained by daughter via phone.  She states that the patient was discharged with instructions to use 2L O2 via nasal canula when he complained of SOB it was increased to 4 but patient had no relief.  The O2 was increased further and the patient continued to complain of SOB.  He denies chest pain.

## 2019-05-02 NOTE — CONSULT NOTE ADULT - ATTENDING COMMENTS
pt seen and examined. Pt with volume overload. He will need longer HD and better BP control  I agree with the above a/p.

## 2019-05-02 NOTE — H&P ADULT - PROBLEM SELECTOR PLAN 6
Spoke with daughter at bedside, that she would wish a natural death, LON present with daughter, reviewed, patient is DNR/DNI. This was verified by ED physician as well on arrival. Will consult palliative care for ongoing goals of care discussion, ?? hospice evaluation given recent readmissions and overall guarded prognosis.

## 2019-05-02 NOTE — H&P ADULT - HISTORY OF PRESENT ILLNESS
79 yr old male with hypertension, hyperlipidemia, ESRD on HD, CAD, lung cancer admitted with complaints of shortness of breath. Patient with multiple readmissions for fluid overload. He is a poor historian, recently discharged. Daughter states he was doing 'ok', until last night when his oxygen requirements increased. Daughter noted him to be congested but unable to bring up any phlegm. He did not have a fever or chest pain. Patient usually bed bound per daughter. No nausea, vomiting, abdominal pain, leg swelling. No chills. No falls. His last HD was on Tuesday. Compliant with medications.

## 2019-05-02 NOTE — H&P ADULT - ASSESSMENT
79 yr old male with hypertension, hyperlipidemia, ESRD on HD, CAD, lung cancer admitted with complaints of shortness of breath and increasing oxygen requirements. Noted to be in fluid overload, labs and imaging noted. EKG sinus bradycardia. Evaluated by renal in ED.

## 2019-05-02 NOTE — ED ADULT NURSE NOTE - NSIMPLEMENTINTERV_GEN_ALL_ED
Implemented All Fall with Harm Risk Interventions:  [place] to call system. Call bell, personal items and telephone within reach. Instruct patient to call for assistance. Room bathroom lighting operational. Non-slip footwear when patient is off stretcher. Physically safe environment: no spills, clutter or unnecessary equipment. Stretcher in lowest position, wheels locked, appropriate side rails in place. Provide visual cue, wrist band, yellow gown, etc. Monitor gait and stability. Monitor for mental status changes and reorient to person, place, and time. Review medications for side effects contributing to fall risk. Reinforce activity limits and safety measures with patient and family. Provide visual clues: red socks.

## 2019-05-02 NOTE — ED PROVIDER NOTE - FAMILY DETAILS FREE TEXT FOR MDM ADDL HISTORY OBTAINED FROM QUESTION
Patient is DNR/DNI  Daughter reports SOB despite increased oxygen administration.  Patient's nephrologist Dr. Bender

## 2019-05-02 NOTE — H&P ADULT - PROBLEM SELECTOR PLAN 1
Acute on chronic hypoxic respiratory failure sec acute on chronic heart failure, systolic. Plan for urgent dialysis. Supplemental oxygen, on Bumex.

## 2019-05-02 NOTE — ED PROVIDER NOTE - PROGRESS NOTE DETAILS
An attempt was made to call emergency contact.  No answer.  I called the patient's daughter to discuss the confusion about the MOLST form stating DNR/DNI but last admission as per EMR  patient full code but not updated on form.  She states that she is not sure and will come to the ER to discuss. Patient's son in law contacted by nurse.  He is the health care proxy until the patient's son returns from [facility].  Patient is DNR/DNI. Renal paged.

## 2019-05-02 NOTE — ED PROVIDER NOTE - CLINICAL SUMMARY MEDICAL DECISION MAKING FREE TEXT BOX
79 year old hx of ESRD on dialysis and CHF on home O2 c/o SOB hypoxic but no increased work of breathing at this time.  CXR consistent with vascular congestion/edema he was given lasix.  Nephrology called and patient admitted will need dialysis today.

## 2019-05-02 NOTE — CONSULT NOTE ADULT - SUBJECTIVE AND OBJECTIVE BOX
Patient is a 79y old  Male who presents with a chief complaint of shortness of breath (02 May 2019 14:26)      HPI: 80 y/o M PMHx of HFpEF (EF 50-55%), CAD s/p CABG (NST 04/19 mild ischemia, medically managed), ESRD on HD (T, Th, Sat) via L AVF, Lung Ca on home oxygen who presented to the ED with worsening dyspnea and oxygen requirements. Patient is a poor historian, no family present at time of interview, most history obtained from the chart. Patient was discharged on 04/26/19, and was at his baseline, mostly bedbound and on oxygen until last night when he began having increasing oxygen requirements. Patient's daughter thought he was congested, but not bringing up any sputum. Last HD was on Tuesday. Patient states he is still feeling short of breath right now, but denies any chest pain. Patient currently on oxygen, confused, unable to obtain detailed ROS.     PAST MEDICAL & SURGICAL HISTORY:  Dialysis patient: Tues, Thurs, Saturday  Chronic anemia  ESRD (end stage renal disease): right Upper arm fistula  CAD (coronary artery disease)  Lung cancer: had yoni radiation in 2006.  Bipolar disorder  High cholesterol  Hypertension  S/P CABG (coronary artery bypass graft): pt&#x27;s son in law states h/o some stents near neck area ? carotid ? he is not sure.      PREVIOUS DIAGNOSTIC TESTING:      ECHO  FINDINGS:  < from: TTE Echo w/Cont Complete (03.11.19 @ 20:35) >  PHYSICIAN INTERPRETATION:  Left Ventricle: The left ventricular internal cavity size is normal.  Global LV systolic function was low normal. Left ventricular ejection   fraction, by visual estimation, is 50 to 55%. Spectral Doppler shows   impaired relaxation pattern of left ventricular myocardial filling (Grade   I diastolic dysfunction).  Right Ventricle: The right ventricular size is normal. RV systolic   function is normal.  Left Atrium: Moderately enlarged left atrium.  Pericardium: There is no evidence of pericardial effusion.  Mitral Valve: Thickening of the anterior and posterior mitral valve   leaflets. There is mild to moderate mitral annular calcification. Mild   mitral valve regurgitation is seen.  Tricuspid Valve: The tricuspid valve is normal.  Aortic Valve: Sclerotic aortic valve with normal opening. No evidence of   aortic valve regurgitation is seen.  Pulmonic Valve: The pulmonic valve was not well visualized.  Aorta: The aortic root is normal in size and structure.  Venous: The inferior vena cava is normal. The inferior vena cava was   normal sized, with respiratory size variation greater than 50%.       Summary:   1. Left ventricular ejection fraction, by visual estimation, is 50 to   55%.   2. Low normal global left ventricular systolic function.   3. Spectral Doppler shows impaired relaxation pattern of left   ventricular myocardial filling (Grade I diastolic dysfunction).   4. There is no left ventricular hypertrophy.   5. Moderately enlarged left atrium.   6. Mild to moderate mitral annular calcification.   7. Thickening of the anterior and posterior mitral valve leaflets.   8. Sclerotic aortic valve with normal opening.    [id number] Alex Pastrana MD, Electronically signed on 3/12/2019 at 6:33:43 PM    < end of copied text >      STRESS  FINDINGS:  < from: Nuclear Stress Test-Pharmacologic (04.24.19 @ 12:21) >  GATED ANALYSIS:  Gated wall motion analysis is performed, and shows  paradoxical spetal motion with post stress LVEF of 54%.  ------------------------------------------------------------------------      LV/RV OBSERVATION:  Normal LV ejection fraction.  ------------------------------------------------------------------------    IMPRESSIONS:  * There is a small, mild to moderate defectin apical  wall that is partially reversible suggestive of very mild  ischemia.  * Gated wall motion analysis is performed, and shows  paradoxical spetal motion with post stress LVEF of 54%.  * Chest Pain: No chest pain with administration of  Regadenoson.  * Symptom: No Symptom.  * HR Response: Appropriate.  * BP Response: Appropriate.  * Heart Rhythm: Normal Sinus Rhythm.  * ECG Abnormalities: There were no diagnostic changes.  * Arrhythmia: None.  * post infusion 05:50 complaints of abdominal pain vomited  gave 50mg Aminophyllin, with continued nausea and vomitine  another 25 mg given 10:42    ------------------------------------------------------------------------      ------------------------------------------------------------------------    Confirmed on  4/25/2019 - 18:35:27 by Enrique Nesbitt MD  Cardiology Fellow: Shelby Giraldo NP    < end of copied text >      CATHETERIZATION  FINDINGS:  < from: Cardiac Cath Lab - Adult (03.12.19 @ 16:57) >  VENTRICLES: There were no left ventricular global or regional wall motion  abnormalities. Analysis of regional contractile function demonstrated  severe diaphragmatic hypokinesis. EF estimated was 65 %.  VALVES: AORTIC VALVE: No significant aortic valve gradient.  CORONARY VESSELS: The coronary circulation is right dominant.  LM:   --  LM: Normal. The vessel was normal sized, heavily calcified, and  moderately tortuous. Angiography showed moderate atherosclerosis. There  was a discrete 30 % stenosis at the site of a prior stent, at the ostium  of the vessel segment. The lesion was without evidence of thrombus. There  was BRUCE grade 3 flow through the vessel (brisk flow).  LAD:   --  LAD: The vessel was normal sized, heavily calcified, and  moderately tortuous. Angiography showed severe atherosclerosis. There was  a diffuse 100 % stenosis at the ostium of the vessel segment. The lesion  was without evidence of thrombus. There was BRUCE grade 0 flow through the  vessel (no flow). This lesion is a chronic total occlusion. Fills via  patent ANA.  CX:   --  Circumflex: Normal. The vessel was normal sized, moderately  calcified, and excessively tortuous. Angiography showed mild  atherosclerosis with no flow limiting lesions. Patent stent in Prox LCx  and OM1.  RCA:   --  RCA: Normal. The vessel was normal sized, moderately calcified,  and moderately tortuous. Angiography showed severe atherosclerosis. There  was a diffuse 100 % stenosis at the ostium of the vessel segment, just  after RV marginal 1. The lesion was without evidence of thrombus. There  was BRUCE grade 0 flow through the vessel (no flow). Thislesion is a  chronic total occlusion.  The distal RCA fills from collaterals from the LCx.  GRAFTS:   --  Graft to the LAD: The graft was a normal sized ANA. Graft  angiography showed no degeneration, no calcification, and moderate  tortuosity.  COMPLICATIONS: There were no complications. No complications occurred  during the cath lab visit.  DIAGNOSTIC IMPRESSIONS: There is significant double vessel coronary artery  disease.  Ostial XUX=868%  Ostial BCH=083%  Patent ANA to LAD. Left ventricular function is normal.  LVEF=65%  DIAGNOSTIC RECOMMENDATIONS: The patient should continue with the present  medications. Patient management should include aggressive medical therapy,  close monitoring of BUN and creatinine, resumption of all previous  activities in 2 days, and a cardiac rehabilitation program. Medical  management is recommended.  Prepared and signed by  Job Galvan MD  Signed 03/12/2019 17:50:14    < end of copied text >    Allergies    No Known Allergies    Intolerances    MEDICATIONS  (STANDING):  ALBUTerol/ipratropium for Nebulization 3 milliLiter(s) Nebulizer every 6 hours  aspirin  chewable 81 milliGRAM(s) Oral daily  atorvastatin 80 milliGRAM(s) Oral at bedtime  buMETAnide 2 milliGRAM(s) Oral daily  clopidogrel Tablet 75 milliGRAM(s) Oral daily  docusate sodium 100 milliGRAM(s) Oral three times a day  DULoxetine 20 milliGRAM(s) Oral daily  gabapentin 300 milliGRAM(s) Oral two times a day  heparin  Injectable 5000 Unit(s) SubCutaneous every 12 hours  isosorbide   mononitrate ER Tablet (IMDUR) 120 milliGRAM(s) Oral daily  levothyroxine 100 MICROGram(s) Oral daily  losartan 100 milliGRAM(s) Oral daily  metoprolol tartrate 25 milliGRAM(s) Oral two times a day  multivitamin 1 Tablet(s) Oral daily  pantoprazole    Tablet 40 milliGRAM(s) Oral before breakfast  senna 2 Tablet(s) Oral at bedtime  tiotropium 18 MICROgram(s) Capsule 1 Capsule(s) Inhalation daily    MEDICATIONS  (PRN):  acetaminophen   Tablet .. 650 milliGRAM(s) Oral every 6 hours PRN Temp greater or equal to 38C (100.4F), Mild Pain (1 - 3)    Home Medications:  docusate sodium 100 mg oral capsule: 1 cap(s) orally 3 times a day (14 Mar 2019 08:46)  Multiple Vitamins oral tablet: 1 tab(s) orally once a day (14 Mar 2019 08:46)  senna oral tablet: 2 tab(s) orally once a day (at bedtime) (14 Mar 2019 08:46)  ASA 81mg PO daily   Lipitor 80mg PO QHS  Bumex 2mg PO qday  Plavix 75mg PO daily   Imdur 60mg 2 tabs PO daily  Losartan 100mg Po daily   Metoprolol 50mg PO q8      FAMILY HISTORY:  No pertinent family history in first degree relatives      SOCIAL HISTORY: Lives at home with his daughter. Former smoker. Denies alcohol or drug use.     REVIEW OF SYSTEMS: AS PER HPI, unable to obtain more detailed ROS    Vital Signs Last 24 Hrs  T(C): 36.8 (02 May 2019 08:29), Max: 36.8 (02 May 2019 08:29)  T(F): 98.2 (02 May 2019 08:29), Max: 98.2 (02 May 2019 08:29)  HR: 51 (02 May 2019 14:31) (51 - 59)  BP: 172/74 (02 May 2019 14:31) (160/67 - 193/79)  RR: 23 (02 May 2019 14:31) (3 - 23)  SpO2: 98% (02 May 2019 14:31) (90% - 99%)      PHYSICAL EXAM:  Appearance: Normal, well nourished	  HEENT:   Normal oral mucosa, PERRL, EOMI, sclera non-icteric	  Lymphatic: No cervical lymphadenopathy  Cardiovascular: Bradycardic S1 S2, + JVD, No cardiac murmurs, No carotid bruits, No peripheral edema  Respiratory: Diffuse rales bilaterally  Psychiatry: A & O x 3, Mood & affect appropriate  Gastrointestinal:  Soft, Non-tender, + BS, no bruits	  Skin: No rashes, No ecchymoses, No cyanosis  Neurologic: Grossly non-focal with full strength in all four extremities  Extremities: Normal range of motion, No clubbing, cyanosis or edema  Vascular: Peripheral pulses palpable 2+ bilaterally      INTERPRETATION OF TELEMETRY: SB    ECG: SB, PVCs, anteroseptal infarct, NSST/T wave abnormality    LABS:                        9.3    8.2   )-----------( 225      ( 02 May 2019 09:17 )             30.8     05-02    135  |  91<L>  |  19.0  ----------------------------<  106  4.1   |  31.0<H>  |  4.54<H>    Ca    8.9      02 May 2019 09:17    TPro  7.2  /  Alb  3.2<L>  /  TBili  0.2<L>  /  DBili  x   /  AST  25  /  ALT  20  /  AlkPhos  47  05-02            I&O's Summary    BNPSerum Pro-Brain Natriuretic Peptide: 28489 pg/mL (05-02 @ 09:17)    Serum Pro-Brain Natriuretic Peptide: 75158 pg/mL (05-02-19 @ 09:17)    RADIOLOGY & ADDITIONAL STUDIES:  < from: Xray Chest 2 Views PA/Lat (05.02.19 @ 09:46) >   EXAM:  XR CHEST PA LAT 2V                          PROCEDURE DATE:  05/02/2019          INTERPRETATION:  CLINICAL INFORMATION: Shortness of Breath.     EXAM: PA and lateral chest    COMPARISON: Chest radiograph 4/22/2019    TECHNIQUE:    FINDINGS:  Increased patchy opacities in the bilateral lungs, suggesting pulmonary   edema versus pneumonia superimposed on pulmonary fibrosis.  Small bilateral pleural effusions are increased in size.  No pneumothorax.  The cardiomediastinal silhouette is enlarged. Status post median   sternotomy and CABG.    IMPRESSION:   Increased patchy opacities in the bilateral lungs, suggesting pulmonary   edema versus pneumonia superimposed on pulmonary fibrosis.  Small bilateral pleural effusions are increased in size.    < end of copied text > Patient is a 79y old  Male who presents with a chief complaint of shortness of breath (02 May 2019 14:26)      HPI: 80 y/o M PMHx of HFpEF (EF 50-55%), CAD s/p CABG (NST 04/19 mild ischemia, medically managed), ESRD on HD (T, Th, Sat) via L AVF, Lung Ca on home oxygen who presented to the ED with worsening dyspnea and oxygen requirements. Patient is a poor historian, no family present at time of interview, most history obtained from the chart. Patient was discharged on 04/26/19, and was at his baseline, mostly bedbound and on oxygen until last night when he began having increasing oxygen requirements. Patient's daughter thought he was congested, but not bringing up any sputum. Last HD was on Tuesday. Patient states he is still feeling short of breath right now, but denies any chest pain. Patient currently on oxygen, confused, unable to obtain detailed ROS.     PAST MEDICAL & SURGICAL HISTORY:  Dialysis patient: Tues, Thurs, Saturday  Chronic anemia  ESRD (end stage renal disease): right Upper arm fistula  CAD (coronary artery disease)  Lung cancer: had yoni radiation in 2006.  Bipolar disorder  High cholesterol  Hypertension  S/P CABG (coronary artery bypass graft): pt&#x27;s son in law states h/o some stents near neck area ? carotid ? he is not sure.      PREVIOUS DIAGNOSTIC TESTING:      ECHO  FINDINGS:  < from: TTE Echo w/Cont Complete (03.11.19 @ 20:35) >  PHYSICIAN INTERPRETATION:  Left Ventricle: The left ventricular internal cavity size is normal.  Global LV systolic function was low normal. Left ventricular ejection   fraction, by visual estimation, is 50 to 55%. Spectral Doppler shows   impaired relaxation pattern of left ventricular myocardial filling (Grade   I diastolic dysfunction).  Right Ventricle: The right ventricular size is normal. RV systolic   function is normal.  Left Atrium: Moderately enlarged left atrium.  Pericardium: There is no evidence of pericardial effusion.  Mitral Valve: Thickening of the anterior and posterior mitral valve   leaflets. There is mild to moderate mitral annular calcification. Mild   mitral valve regurgitation is seen.  Tricuspid Valve: The tricuspid valve is normal.  Aortic Valve: Sclerotic aortic valve with normal opening. No evidence of   aortic valve regurgitation is seen.  Pulmonic Valve: The pulmonic valve was not well visualized.  Aorta: The aortic root is normal in size and structure.  Venous: The inferior vena cava is normal. The inferior vena cava was   normal sized, with respiratory size variation greater than 50%.       Summary:   1. Left ventricular ejection fraction, by visual estimation, is 50 to   55%.   2. Low normal global left ventricular systolic function.   3. Spectral Doppler shows impaired relaxation pattern of left   ventricular myocardial filling (Grade I diastolic dysfunction).   4. There is no left ventricular hypertrophy.   5. Moderately enlarged left atrium.   6. Mild to moderate mitral annular calcification.   7. Thickening of the anterior and posterior mitral valve leaflets.   8. Sclerotic aortic valve with normal opening.    [id number] Alex Pastrana MD, Electronically signed on 3/12/2019 at 6:33:43 PM    < end of copied text >      STRESS  FINDINGS:  < from: Nuclear Stress Test-Pharmacologic (04.24.19 @ 12:21) >  GATED ANALYSIS:  Gated wall motion analysis is performed, and shows  paradoxical spetal motion with post stress LVEF of 54%.  ------------------------------------------------------------------------      LV/RV OBSERVATION:  Normal LV ejection fraction.  ------------------------------------------------------------------------    IMPRESSIONS:  * There is a small, mild to moderate defectin apical  wall that is partially reversible suggestive of very mild  ischemia.  * Gated wall motion analysis is performed, and shows  paradoxical spetal motion with post stress LVEF of 54%.  * Chest Pain: No chest pain with administration of  Regadenoson.  * Symptom: No Symptom.  * HR Response: Appropriate.  * BP Response: Appropriate.  * Heart Rhythm: Normal Sinus Rhythm.  * ECG Abnormalities: There were no diagnostic changes.  * Arrhythmia: None.  * post infusion 05:50 complaints of abdominal pain vomited  gave 50mg Aminophyllin, with continued nausea and vomitine  another 25 mg given 10:42    ------------------------------------------------------------------------      ------------------------------------------------------------------------    Confirmed on  4/25/2019 - 18:35:27 by Enrique Nesbitt MD  Cardiology Fellow: Shelby Giraldo NP    < end of copied text >      CATHETERIZATION  FINDINGS:  < from: Cardiac Cath Lab - Adult (03.12.19 @ 16:57) >  VENTRICLES: There were no left ventricular global or regional wall motion  abnormalities. Analysis of regional contractile function demonstrated  severe diaphragmatic hypokinesis. EF estimated was 65 %.  VALVES: AORTIC VALVE: No significant aortic valve gradient.  CORONARY VESSELS: The coronary circulation is right dominant.  LM:   --  LM: Normal. The vessel was normal sized, heavily calcified, and  moderately tortuous. Angiography showed moderate atherosclerosis. There  was a discrete 30 % stenosis at the site of a prior stent, at the ostium  of the vessel segment. The lesion was without evidence of thrombus. There  was BRUCE grade 3 flow through the vessel (brisk flow).  LAD:   --  LAD: The vessel was normal sized, heavily calcified, and  moderately tortuous. Angiography showed severe atherosclerosis. There was  a diffuse 100 % stenosis at the ostium of the vessel segment. The lesion  was without evidence of thrombus. There was BRUCE grade 0 flow through the  vessel (no flow). This lesion is a chronic total occlusion. Fills via  patent ANA.  CX:   --  Circumflex: Normal. The vessel was normal sized, moderately  calcified, and excessively tortuous. Angiography showed mild  atherosclerosis with no flow limiting lesions. Patent stent in Prox LCx  and OM1.  RCA:   --  RCA: Normal. The vessel was normal sized, moderately calcified,  and moderately tortuous. Angiography showed severe atherosclerosis. There  was a diffuse 100 % stenosis at the ostium of the vessel segment, just  after RV marginal 1. The lesion was without evidence of thrombus. There  was BRUCE grade 0 flow through the vessel (no flow). Thislesion is a  chronic total occlusion.  The distal RCA fills from collaterals from the LCx.  GRAFTS:   --  Graft to the LAD: The graft was a normal sized ANA. Graft  angiography showed no degeneration, no calcification, and moderate  tortuosity.  COMPLICATIONS: There were no complications. No complications occurred  during the cath lab visit.  DIAGNOSTIC IMPRESSIONS: There is significant double vessel coronary artery  disease.  Ostial SHW=677%  Ostial POF=758%  Patent ANA to LAD. Left ventricular function is normal.  LVEF=65%  DIAGNOSTIC RECOMMENDATIONS: The patient should continue with the present  medications. Patient management should include aggressive medical therapy,  close monitoring of BUN and creatinine, resumption of all previous  activities in 2 days, and a cardiac rehabilitation program. Medical  management is recommended.  Prepared and signed by  Job Galvan MD  Signed 03/12/2019 17:50:14    < end of copied text >    Allergies    No Known Allergies    Intolerances    MEDICATIONS  (STANDING):  ALBUTerol/ipratropium for Nebulization 3 milliLiter(s) Nebulizer every 6 hours  aspirin  chewable 81 milliGRAM(s) Oral daily  atorvastatin 80 milliGRAM(s) Oral at bedtime  buMETAnide 2 milliGRAM(s) Oral daily  clopidogrel Tablet 75 milliGRAM(s) Oral daily  docusate sodium 100 milliGRAM(s) Oral three times a day  DULoxetine 20 milliGRAM(s) Oral daily  gabapentin 300 milliGRAM(s) Oral two times a day  heparin  Injectable 5000 Unit(s) SubCutaneous every 12 hours  isosorbide   mononitrate ER Tablet (IMDUR) 120 milliGRAM(s) Oral daily  levothyroxine 100 MICROGram(s) Oral daily  losartan 100 milliGRAM(s) Oral daily  metoprolol tartrate 25 milliGRAM(s) Oral two times a day  multivitamin 1 Tablet(s) Oral daily  pantoprazole    Tablet 40 milliGRAM(s) Oral before breakfast  senna 2 Tablet(s) Oral at bedtime  tiotropium 18 MICROgram(s) Capsule 1 Capsule(s) Inhalation daily    MEDICATIONS  (PRN):  acetaminophen   Tablet .. 650 milliGRAM(s) Oral every 6 hours PRN Temp greater or equal to 38C (100.4F), Mild Pain (1 - 3)    Home Medications:  docusate sodium 100 mg oral capsule: 1 cap(s) orally 3 times a day (14 Mar 2019 08:46)  Multiple Vitamins oral tablet: 1 tab(s) orally once a day (14 Mar 2019 08:46)  senna oral tablet: 2 tab(s) orally once a day (at bedtime) (14 Mar 2019 08:46)  ASA 81mg PO daily   Lipitor 80mg PO QHS  Bumex 2mg PO qday  Plavix 75mg PO daily   Imdur 60mg 2 tabs PO daily  Losartan 100mg Po daily   Metoprolol 50mg PO q8      FAMILY HISTORY:  No pertinent family history in first degree relatives      SOCIAL HISTORY: Lives at home with his daughter. Former smoker. Denies alcohol or drug use.     REVIEW OF SYSTEMS: AS PER HPI, unable to obtain more detailed ROS    Vital Signs Last 24 Hrs  T(C): 36.8 (02 May 2019 08:29), Max: 36.8 (02 May 2019 08:29)  T(F): 98.2 (02 May 2019 08:29), Max: 98.2 (02 May 2019 08:29)  HR: 51 (02 May 2019 14:31) (51 - 59)  BP: 172/74 (02 May 2019 14:31) (160/67 - 193/79)  RR: 23 (02 May 2019 14:31) (3 - 23)  SpO2: 98% (02 May 2019 14:31) (90% - 99%)      PHYSICAL EXAM:  Appearance: Normal, well nourished	  HEENT:   Normal oral mucosa, PERRL, EOMI, sclera non-icteric	  Lymphatic: No cervical lymphadenopathy  Cardiovascular: Bradycardic S1 S2, + JVD, No cardiac murmurs, No carotid bruits, No peripheral edema  Respiratory: Diffuse rales bilaterally  Psychiatry: A & O x 3, Mood & affect appropriate  Gastrointestinal:  Soft, Non-tender, + BS, no bruits	  Skin: No rashes, No ecchymoses, No cyanosis  Neurologic: Grossly non-focal with full strength in all four extremities  Extremities: Normal range of motion, No clubbing, cyanosis or edema  Vascular: Peripheral pulses palpable 2+ bilaterally      INTERPRETATION OF TELEMETRY: SB    ECG: SB, PVCs, anteroseptal infarct, NSST/T wave abnormality    LABS:                        9.3    8.2   )-----------( 225      ( 02 May 2019 09:17 )             30.8     05-02    135  |  91<L>  |  19.0  ----------------------------<  106  4.1   |  31.0<H>  |  4.54<H>    Ca    8.9      02 May 2019 09:17    TPro  7.2  /  Alb  3.2<L>  /  TBili  0.2<L>  /  DBili  x   /  AST  25  /  ALT  20  /  AlkPhos  47  05-02        I&O's Summary    BNPSerum Pro-Brain Natriuretic Peptide: 17996 pg/mL (05-02 @ 09:17)    Serum Pro-Brain Natriuretic Peptide: 10279 pg/mL (05-02-19 @ 09:17)    RADIOLOGY & ADDITIONAL STUDIES:  < from: Xray Chest 2 Views PA/Lat (05.02.19 @ 09:46) >   EXAM:  XR CHEST PA LAT 2V                          PROCEDURE DATE:  05/02/2019          INTERPRETATION:  CLINICAL INFORMATION: Shortness of Breath.     EXAM: PA and lateral chest    COMPARISON: Chest radiograph 4/22/2019    TECHNIQUE:    FINDINGS:  Increased patchy opacities in the bilateral lungs, suggesting pulmonary   edema versus pneumonia superimposed on pulmonary fibrosis.  Small bilateral pleural effusions are increased in size.  No pneumothorax.  The cardiomediastinal silhouette is enlarged. Status post median   sternotomy and CABG.    IMPRESSION:   Increased patchy opacities in the bilateral lungs, suggesting pulmonary   edema versus pneumonia superimposed on pulmonary fibrosis.  Small bilateral pleural effusions are increased in size.    < end of copied text >

## 2019-05-02 NOTE — ED ADULT TRIAGE NOTE - CHIEF COMPLAINT QUOTE
Patient BIBA, from home, difficulty breathing, discharged 4/26, hx of CHF and lung CA, as per EMS patient on 4LPM NC at home, o2 saturation in the mid 80s, currently on 6LPM o2 saturation 90%, patient sent to critical care for further eval, MOLST form with patient, DNR/DNI

## 2019-05-02 NOTE — ED ADULT NURSE NOTE - OBJECTIVE STATEMENT
Pt c/o of SOB, states he was told to stop his neb treatement a week ago. Pt scheduled for dialysis today. Pt place on 6L NC sats 94-98%. MD Duron at bedside to evaluate.

## 2019-05-02 NOTE — ED ADULT NURSE REASSESSMENT NOTE - NSIMPLEMENTINTERV_GEN_ALL_ED
Implemented All Universal Safety Interventions:  [place] to call system. Call bell, personal items and telephone within reach. Instruct patient to call for assistance. Room bathroom lighting operational. Non-slip footwear when patient is off stretcher. Physically safe environment: no spills, clutter or unnecessary equipment. Stretcher in lowest position, wheels locked, appropriate side rails in place.

## 2019-05-03 LAB
ANION GAP SERPL CALC-SCNC: 10 MMOL/L — SIGNIFICANT CHANGE UP (ref 5–17)
BASOPHILS # BLD AUTO: 0 K/UL — SIGNIFICANT CHANGE UP (ref 0–0.2)
BASOPHILS NFR BLD AUTO: 0.7 % — SIGNIFICANT CHANGE UP (ref 0–2)
BUN SERPL-MCNC: 12 MG/DL — SIGNIFICANT CHANGE UP (ref 8–20)
CALCIUM SERPL-MCNC: 9 MG/DL — SIGNIFICANT CHANGE UP (ref 8.6–10.2)
CHLORIDE SERPL-SCNC: 94 MMOL/L — LOW (ref 98–107)
CO2 SERPL-SCNC: 31 MMOL/L — HIGH (ref 22–29)
CREAT SERPL-MCNC: 3.37 MG/DL — HIGH (ref 0.5–1.3)
EOSINOPHIL # BLD AUTO: 0.8 K/UL — HIGH (ref 0–0.5)
EOSINOPHIL NFR BLD AUTO: 11.3 % — HIGH (ref 0–5)
GLUCOSE SERPL-MCNC: 83 MG/DL — SIGNIFICANT CHANGE UP (ref 70–115)
HCT VFR BLD CALC: 34.3 % — LOW (ref 42–52)
HGB BLD-MCNC: 10.2 G/DL — LOW (ref 14–18)
INR BLD: 0.97 RATIO — SIGNIFICANT CHANGE UP (ref 0.88–1.16)
LYMPHOCYTES # BLD AUTO: 1.8 K/UL — SIGNIFICANT CHANGE UP (ref 1–4.8)
LYMPHOCYTES # BLD AUTO: 25 % — SIGNIFICANT CHANGE UP (ref 20–55)
MAGNESIUM SERPL-MCNC: 1.9 MG/DL — SIGNIFICANT CHANGE UP (ref 1.6–2.6)
MCHC RBC-ENTMCNC: 27.8 PG — SIGNIFICANT CHANGE UP (ref 27–31)
MCHC RBC-ENTMCNC: 29.7 G/DL — LOW (ref 32–36)
MCV RBC AUTO: 93.5 FL — SIGNIFICANT CHANGE UP (ref 80–94)
MONOCYTES # BLD AUTO: 0.9 K/UL — HIGH (ref 0–0.8)
MONOCYTES NFR BLD AUTO: 12.6 % — HIGH (ref 3–10)
MRSA PCR RESULT.: SIGNIFICANT CHANGE UP
NEUTROPHILS # BLD AUTO: 3.5 K/UL — SIGNIFICANT CHANGE UP (ref 1.8–8)
NEUTROPHILS NFR BLD AUTO: 50.3 % — SIGNIFICANT CHANGE UP (ref 37–73)
PHOSPHATE SERPL-MCNC: 3.4 MG/DL — SIGNIFICANT CHANGE UP (ref 2.4–4.7)
PLATELET # BLD AUTO: 241 K/UL — SIGNIFICANT CHANGE UP (ref 150–400)
POTASSIUM SERPL-MCNC: 4.5 MMOL/L — SIGNIFICANT CHANGE UP (ref 3.5–5.3)
POTASSIUM SERPL-SCNC: 4.5 MMOL/L — SIGNIFICANT CHANGE UP (ref 3.5–5.3)
PROTHROM AB SERPL-ACNC: 11.1 SEC — SIGNIFICANT CHANGE UP (ref 10–12.9)
RBC # BLD: 3.67 M/UL — LOW (ref 4.6–6.2)
RBC # FLD: 16.6 % — HIGH (ref 11–15.6)
S AUREUS DNA NOSE QL NAA+PROBE: SIGNIFICANT CHANGE UP
SODIUM SERPL-SCNC: 135 MMOL/L — SIGNIFICANT CHANGE UP (ref 135–145)
WBC # BLD: 7 K/UL — SIGNIFICANT CHANGE UP (ref 4.8–10.8)
WBC # FLD AUTO: 7 K/UL — SIGNIFICANT CHANGE UP (ref 4.8–10.8)

## 2019-05-03 PROCEDURE — 99233 SBSQ HOSP IP/OBS HIGH 50: CPT

## 2019-05-03 PROCEDURE — 90937 HEMODIALYSIS REPEATED EVAL: CPT

## 2019-05-03 PROCEDURE — 99232 SBSQ HOSP IP/OBS MODERATE 35: CPT

## 2019-05-03 PROCEDURE — 71045 X-RAY EXAM CHEST 1 VIEW: CPT | Mod: 26

## 2019-05-03 RX ORDER — HYDRALAZINE HCL 50 MG
25 TABLET ORAL EVERY 12 HOURS
Refills: 0 | Status: DISCONTINUED | OUTPATIENT
Start: 2019-05-03 | End: 2019-05-07

## 2019-05-03 RX ORDER — CHLORHEXIDINE GLUCONATE 213 G/1000ML
1 SOLUTION TOPICAL
Refills: 0 | Status: DISCONTINUED | OUTPATIENT
Start: 2019-05-03 | End: 2019-05-07

## 2019-05-03 RX ADMIN — Medication 3 MILLILITER(S): at 21:48

## 2019-05-03 RX ADMIN — SENNA PLUS 2 TABLET(S): 8.6 TABLET ORAL at 21:44

## 2019-05-03 RX ADMIN — CHLORHEXIDINE GLUCONATE 1 APPLICATION(S): 213 SOLUTION TOPICAL at 17:58

## 2019-05-03 RX ADMIN — DULOXETINE HYDROCHLORIDE 20 MILLIGRAM(S): 30 CAPSULE, DELAYED RELEASE ORAL at 13:27

## 2019-05-03 RX ADMIN — Medication 81 MILLIGRAM(S): at 13:27

## 2019-05-03 RX ADMIN — Medication 1 TABLET(S): at 13:27

## 2019-05-03 RX ADMIN — CLOPIDOGREL BISULFATE 75 MILLIGRAM(S): 75 TABLET, FILM COATED ORAL at 13:27

## 2019-05-03 RX ADMIN — GABAPENTIN 300 MILLIGRAM(S): 400 CAPSULE ORAL at 17:57

## 2019-05-03 RX ADMIN — Medication 100 MILLIGRAM(S): at 06:45

## 2019-05-03 RX ADMIN — PANTOPRAZOLE SODIUM 40 MILLIGRAM(S): 20 TABLET, DELAYED RELEASE ORAL at 06:45

## 2019-05-03 RX ADMIN — LOSARTAN POTASSIUM 100 MILLIGRAM(S): 100 TABLET, FILM COATED ORAL at 06:44

## 2019-05-03 RX ADMIN — HEPARIN SODIUM 5000 UNIT(S): 5000 INJECTION INTRAVENOUS; SUBCUTANEOUS at 17:57

## 2019-05-03 RX ADMIN — Medication 3 MILLILITER(S): at 04:22

## 2019-05-03 RX ADMIN — Medication 3 MILLILITER(S): at 15:26

## 2019-05-03 RX ADMIN — Medication 100 MILLIGRAM(S): at 15:23

## 2019-05-03 RX ADMIN — Medication 3 MILLILITER(S): at 09:31

## 2019-05-03 RX ADMIN — HEPARIN SODIUM 5000 UNIT(S): 5000 INJECTION INTRAVENOUS; SUBCUTANEOUS at 06:44

## 2019-05-03 RX ADMIN — Medication 100 MICROGRAM(S): at 06:45

## 2019-05-03 RX ADMIN — ATORVASTATIN CALCIUM 80 MILLIGRAM(S): 80 TABLET, FILM COATED ORAL at 21:44

## 2019-05-03 RX ADMIN — Medication 100 MILLIGRAM(S): at 21:44

## 2019-05-03 RX ADMIN — GABAPENTIN 300 MILLIGRAM(S): 400 CAPSULE ORAL at 06:45

## 2019-05-03 RX ADMIN — ISOSORBIDE MONONITRATE 120 MILLIGRAM(S): 60 TABLET, EXTENDED RELEASE ORAL at 13:27

## 2019-05-03 NOTE — PROGRESS NOTE ADULT - ATTENDING COMMENTS
pt seen and examined. Plan of care d/w PA.  Spoke to Dr. Bender to do more frequent HD.   I agree with a/p.

## 2019-05-03 NOTE — PATIENT PROFILE ADULT - NSPROGENSOURCEINFO_GEN_A_NUR
Solomon Gauthier is a 45 year old male presenting with chest pain all day non radiating. States it is in middle of chest and feels very heavy. No cardiac hx. Pain was worse earlier but has been constant all day. Patient has been under a lot of stress lately. Custody brownlee with ex-wife.   patient/family

## 2019-05-04 PROCEDURE — 99232 SBSQ HOSP IP/OBS MODERATE 35: CPT

## 2019-05-04 PROCEDURE — 99233 SBSQ HOSP IP/OBS HIGH 50: CPT

## 2019-05-04 PROCEDURE — 90937 HEMODIALYSIS REPEATED EVAL: CPT

## 2019-05-04 RX ADMIN — CLOPIDOGREL BISULFATE 75 MILLIGRAM(S): 75 TABLET, FILM COATED ORAL at 12:14

## 2019-05-04 RX ADMIN — DULOXETINE HYDROCHLORIDE 20 MILLIGRAM(S): 30 CAPSULE, DELAYED RELEASE ORAL at 12:14

## 2019-05-04 RX ADMIN — ATORVASTATIN CALCIUM 80 MILLIGRAM(S): 80 TABLET, FILM COATED ORAL at 21:08

## 2019-05-04 RX ADMIN — Medication 3 MILLILITER(S): at 09:04

## 2019-05-04 RX ADMIN — Medication 100 MILLIGRAM(S): at 12:15

## 2019-05-04 RX ADMIN — BUMETANIDE 2 MILLIGRAM(S): 0.25 INJECTION INTRAMUSCULAR; INTRAVENOUS at 05:39

## 2019-05-04 RX ADMIN — Medication 25 MILLIGRAM(S): at 05:40

## 2019-05-04 RX ADMIN — LOSARTAN POTASSIUM 100 MILLIGRAM(S): 100 TABLET, FILM COATED ORAL at 05:40

## 2019-05-04 RX ADMIN — PANTOPRAZOLE SODIUM 40 MILLIGRAM(S): 20 TABLET, DELAYED RELEASE ORAL at 05:39

## 2019-05-04 RX ADMIN — Medication 1 TABLET(S): at 12:15

## 2019-05-04 RX ADMIN — Medication 25 MILLIGRAM(S): at 18:49

## 2019-05-04 RX ADMIN — Medication 25 MILLIGRAM(S): at 05:38

## 2019-05-04 RX ADMIN — GABAPENTIN 300 MILLIGRAM(S): 400 CAPSULE ORAL at 18:48

## 2019-05-04 RX ADMIN — Medication 100 MICROGRAM(S): at 05:39

## 2019-05-04 RX ADMIN — Medication 81 MILLIGRAM(S): at 12:14

## 2019-05-04 RX ADMIN — Medication 3 MILLILITER(S): at 04:10

## 2019-05-04 RX ADMIN — Medication 100 MILLIGRAM(S): at 21:07

## 2019-05-04 RX ADMIN — SENNA PLUS 2 TABLET(S): 8.6 TABLET ORAL at 21:07

## 2019-05-04 RX ADMIN — GABAPENTIN 300 MILLIGRAM(S): 400 CAPSULE ORAL at 05:38

## 2019-05-04 RX ADMIN — HEPARIN SODIUM 5000 UNIT(S): 5000 INJECTION INTRAVENOUS; SUBCUTANEOUS at 05:39

## 2019-05-04 RX ADMIN — CHLORHEXIDINE GLUCONATE 1 APPLICATION(S): 213 SOLUTION TOPICAL at 05:38

## 2019-05-04 RX ADMIN — HEPARIN SODIUM 5000 UNIT(S): 5000 INJECTION INTRAVENOUS; SUBCUTANEOUS at 18:49

## 2019-05-04 RX ADMIN — Medication 3 MILLILITER(S): at 20:49

## 2019-05-04 RX ADMIN — Medication 100 MILLIGRAM(S): at 05:39

## 2019-05-05 PROCEDURE — 99233 SBSQ HOSP IP/OBS HIGH 50: CPT

## 2019-05-05 RX ADMIN — CHLORHEXIDINE GLUCONATE 1 APPLICATION(S): 213 SOLUTION TOPICAL at 05:18

## 2019-05-05 RX ADMIN — HEPARIN SODIUM 5000 UNIT(S): 5000 INJECTION INTRAVENOUS; SUBCUTANEOUS at 17:41

## 2019-05-05 RX ADMIN — CLOPIDOGREL BISULFATE 75 MILLIGRAM(S): 75 TABLET, FILM COATED ORAL at 12:39

## 2019-05-05 RX ADMIN — Medication 100 MILLIGRAM(S): at 12:39

## 2019-05-05 RX ADMIN — Medication 3 MILLILITER(S): at 09:31

## 2019-05-05 RX ADMIN — Medication 25 MILLIGRAM(S): at 17:40

## 2019-05-05 RX ADMIN — Medication 3 MILLILITER(S): at 15:45

## 2019-05-05 RX ADMIN — PANTOPRAZOLE SODIUM 40 MILLIGRAM(S): 20 TABLET, DELAYED RELEASE ORAL at 05:17

## 2019-05-05 RX ADMIN — BUMETANIDE 2 MILLIGRAM(S): 0.25 INJECTION INTRAMUSCULAR; INTRAVENOUS at 05:17

## 2019-05-05 RX ADMIN — Medication 100 MICROGRAM(S): at 05:19

## 2019-05-05 RX ADMIN — Medication 1 TABLET(S): at 12:39

## 2019-05-05 RX ADMIN — ATORVASTATIN CALCIUM 80 MILLIGRAM(S): 80 TABLET, FILM COATED ORAL at 21:20

## 2019-05-05 RX ADMIN — Medication 3 MILLILITER(S): at 04:02

## 2019-05-05 RX ADMIN — Medication 100 MILLIGRAM(S): at 05:17

## 2019-05-05 RX ADMIN — GABAPENTIN 300 MILLIGRAM(S): 400 CAPSULE ORAL at 05:17

## 2019-05-05 RX ADMIN — DULOXETINE HYDROCHLORIDE 20 MILLIGRAM(S): 30 CAPSULE, DELAYED RELEASE ORAL at 12:39

## 2019-05-05 RX ADMIN — GABAPENTIN 300 MILLIGRAM(S): 400 CAPSULE ORAL at 17:40

## 2019-05-05 RX ADMIN — ISOSORBIDE MONONITRATE 120 MILLIGRAM(S): 60 TABLET, EXTENDED RELEASE ORAL at 12:39

## 2019-05-05 RX ADMIN — Medication 100 MILLIGRAM(S): at 21:20

## 2019-05-05 RX ADMIN — HEPARIN SODIUM 5000 UNIT(S): 5000 INJECTION INTRAVENOUS; SUBCUTANEOUS at 05:17

## 2019-05-05 RX ADMIN — LOSARTAN POTASSIUM 100 MILLIGRAM(S): 100 TABLET, FILM COATED ORAL at 05:17

## 2019-05-05 RX ADMIN — Medication 3 MILLILITER(S): at 20:13

## 2019-05-05 RX ADMIN — SENNA PLUS 2 TABLET(S): 8.6 TABLET ORAL at 21:20

## 2019-05-05 RX ADMIN — Medication 81 MILLIGRAM(S): at 12:39

## 2019-05-05 RX ADMIN — Medication 25 MILLIGRAM(S): at 17:41

## 2019-05-06 DIAGNOSIS — Z51.5 ENCOUNTER FOR PALLIATIVE CARE: ICD-10-CM

## 2019-05-06 DIAGNOSIS — I50.9 HEART FAILURE, UNSPECIFIED: ICD-10-CM

## 2019-05-06 LAB
ANION GAP SERPL CALC-SCNC: 14 MMOL/L — SIGNIFICANT CHANGE UP (ref 5–17)
BUN SERPL-MCNC: 21 MG/DL — HIGH (ref 8–20)
CALCIUM SERPL-MCNC: 8.9 MG/DL — SIGNIFICANT CHANGE UP (ref 8.6–10.2)
CHLORIDE SERPL-SCNC: 95 MMOL/L — LOW (ref 98–107)
CO2 SERPL-SCNC: 28 MMOL/L — SIGNIFICANT CHANGE UP (ref 22–29)
CREAT SERPL-MCNC: 5.27 MG/DL — HIGH (ref 0.5–1.3)
GLUCOSE SERPL-MCNC: 81 MG/DL — SIGNIFICANT CHANGE UP (ref 70–115)
HCT VFR BLD CALC: 32.1 % — LOW (ref 42–52)
HGB BLD-MCNC: 9.7 G/DL — LOW (ref 14–18)
MCHC RBC-ENTMCNC: 28 PG — SIGNIFICANT CHANGE UP (ref 27–31)
MCHC RBC-ENTMCNC: 30.2 G/DL — LOW (ref 32–36)
MCV RBC AUTO: 92.5 FL — SIGNIFICANT CHANGE UP (ref 80–94)
PLATELET # BLD AUTO: 243 K/UL — SIGNIFICANT CHANGE UP (ref 150–400)
POTASSIUM SERPL-MCNC: 4.4 MMOL/L — SIGNIFICANT CHANGE UP (ref 3.5–5.3)
POTASSIUM SERPL-SCNC: 4.4 MMOL/L — SIGNIFICANT CHANGE UP (ref 3.5–5.3)
RBC # BLD: 3.47 M/UL — LOW (ref 4.6–6.2)
RBC # FLD: 16.6 % — HIGH (ref 11–15.6)
SODIUM SERPL-SCNC: 137 MMOL/L — SIGNIFICANT CHANGE UP (ref 135–145)
WBC # BLD: 6.4 K/UL — SIGNIFICANT CHANGE UP (ref 4.8–10.8)
WBC # FLD AUTO: 6.4 K/UL — SIGNIFICANT CHANGE UP (ref 4.8–10.8)

## 2019-05-06 PROCEDURE — 99233 SBSQ HOSP IP/OBS HIGH 50: CPT

## 2019-05-06 PROCEDURE — 99223 1ST HOSP IP/OBS HIGH 75: CPT

## 2019-05-06 RX ORDER — ERYTHROPOIETIN 10000 [IU]/ML
4000 INJECTION, SOLUTION INTRAVENOUS; SUBCUTANEOUS
Refills: 0 | Status: DISCONTINUED | OUTPATIENT
Start: 2019-05-06 | End: 2019-05-07

## 2019-05-06 RX ADMIN — Medication 81 MILLIGRAM(S): at 11:55

## 2019-05-06 RX ADMIN — Medication 25 MILLIGRAM(S): at 18:36

## 2019-05-06 RX ADMIN — Medication 1 TABLET(S): at 11:55

## 2019-05-06 RX ADMIN — CLOPIDOGREL BISULFATE 75 MILLIGRAM(S): 75 TABLET, FILM COATED ORAL at 11:55

## 2019-05-06 RX ADMIN — GABAPENTIN 300 MILLIGRAM(S): 400 CAPSULE ORAL at 05:51

## 2019-05-06 RX ADMIN — Medication 100 MICROGRAM(S): at 05:50

## 2019-05-06 RX ADMIN — BUMETANIDE 2 MILLIGRAM(S): 0.25 INJECTION INTRAMUSCULAR; INTRAVENOUS at 05:51

## 2019-05-06 RX ADMIN — ATORVASTATIN CALCIUM 80 MILLIGRAM(S): 80 TABLET, FILM COATED ORAL at 22:17

## 2019-05-06 RX ADMIN — Medication 100 MILLIGRAM(S): at 11:56

## 2019-05-06 RX ADMIN — SENNA PLUS 2 TABLET(S): 8.6 TABLET ORAL at 22:17

## 2019-05-06 RX ADMIN — PANTOPRAZOLE SODIUM 40 MILLIGRAM(S): 20 TABLET, DELAYED RELEASE ORAL at 05:51

## 2019-05-06 RX ADMIN — Medication 3 MILLILITER(S): at 20:33

## 2019-05-06 RX ADMIN — GABAPENTIN 300 MILLIGRAM(S): 400 CAPSULE ORAL at 18:36

## 2019-05-06 RX ADMIN — Medication 25 MILLIGRAM(S): at 05:51

## 2019-05-06 RX ADMIN — HEPARIN SODIUM 5000 UNIT(S): 5000 INJECTION INTRAVENOUS; SUBCUTANEOUS at 18:36

## 2019-05-06 RX ADMIN — LOSARTAN POTASSIUM 100 MILLIGRAM(S): 100 TABLET, FILM COATED ORAL at 05:51

## 2019-05-06 RX ADMIN — ISOSORBIDE MONONITRATE 120 MILLIGRAM(S): 60 TABLET, EXTENDED RELEASE ORAL at 11:55

## 2019-05-06 RX ADMIN — DULOXETINE HYDROCHLORIDE 20 MILLIGRAM(S): 30 CAPSULE, DELAYED RELEASE ORAL at 11:55

## 2019-05-06 RX ADMIN — Medication 100 MILLIGRAM(S): at 22:17

## 2019-05-06 RX ADMIN — Medication 3 MILLILITER(S): at 09:19

## 2019-05-06 RX ADMIN — CHLORHEXIDINE GLUCONATE 1 APPLICATION(S): 213 SOLUTION TOPICAL at 05:52

## 2019-05-06 RX ADMIN — HEPARIN SODIUM 5000 UNIT(S): 5000 INJECTION INTRAVENOUS; SUBCUTANEOUS at 05:50

## 2019-05-06 RX ADMIN — Medication 3 MILLILITER(S): at 02:38

## 2019-05-06 RX ADMIN — Medication 100 MILLIGRAM(S): at 05:51

## 2019-05-06 NOTE — CONSULT NOTE ADULT - SUBJECTIVE AND OBJECTIVE BOX
Palliative Medicine Initial Consultation Note    HPI:  79 yr old male with hypertension, hyperlipidemia, ESRD on HD, CAD, lung cancer admitted with complaints of shortness of breath. Patient with multiple readmissions for fluid overload. He is a poor historian, recently discharged. Daughter states he was doing 'ok', until last night when his oxygen requirements increased. Daughter noted him to be congested but unable to bring up any phlegm. He did not have a fever or chest pain. Patient usually bed bound per daughter. No nausea, vomiting, abdominal pain, leg swelling. No chills. No falls. His last HD was on Tuesday. Compliant with medications.     PERTINENT PMH REVIEWED:  [ ] YES [ ] NO         Bipolar disorder     CAD (coronary artery disease)     Chronic anemia     Dialysis patient Tues, Thurs, Saturday    ESRD (end stage renal disease) right Upper arm fistula    High cholesterol     Hypertension     Lung cancer had yoni radiation in 2006.     PAST SURGICAL HISTORY:  S/P CABG (coronary artery bypass graft  pt's son in law states h/o some stents near neck area ?    SOCIAL HISTORY:  EtOH [ ] Yes  [x ] No                                    Drugs [ ] Yes [x ] No                                   x ] former smoker [ ] nonsmoker                                    Admitted from: [ ] home [ ] SNF _________ [ ] [facility] ________    Surrogate/HCP/Guardian: No HCP in chart  FAMILY HISTORY:  No pertinent family history in first degree relatives      Baseline ADLs (prior to admission):  Independent [ ] moderately [ ] fully   Dependent   [x ] moderately [ ]fully    MEDICATIONS  (STANDING):  ALBUTerol/ipratropium for Nebulization 3 milliLiter(s) Nebulizer every 6 hours  aspirin  chewable 81 milliGRAM(s) Oral daily  atorvastatin 80 milliGRAM(s) Oral at bedtime  buMETAnide 2 milliGRAM(s) Oral daily  chlorhexidine 2% Cloths 1 Application(s) Topical <User Schedule>  clopidogrel Tablet 75 milliGRAM(s) Oral daily  docusate sodium 100 milliGRAM(s) Oral three times a day  DULoxetine 20 milliGRAM(s) Oral daily  epoetin nguyễn Injectable 4000 Unit(s) IV Push <User Schedule>  gabapentin 300 milliGRAM(s) Oral two times a day  heparin  Injectable 5000 Unit(s) SubCutaneous every 12 hours  hydrALAZINE 25 milliGRAM(s) Oral every 12 hours  isosorbide   mononitrate ER Tablet (IMDUR) 120 milliGRAM(s) Oral daily  levothyroxine 100 MICROGram(s) Oral daily  losartan 100 milliGRAM(s) Oral daily  metoprolol tartrate 25 milliGRAM(s) Oral two times a day  multivitamin 1 Tablet(s) Oral daily  pantoprazole    Tablet 40 milliGRAM(s) Oral before breakfast  senna 2 Tablet(s) Oral at bedtime    MEDICATIONS  (PRN):  acetaminophen   Tablet .. 650 milliGRAM(s) Oral every 6 hours PRN Temp greater or equal to 38C (100.4F), Mild Pain (1 - 3)      Allergies    No Known Allergies    Intolerances        REVIEW OF SYSTEMS     see HPI - poor historian  [x ] Unable to obtain due to poor mentation   Karnofsky Performance Score/Palliative Performance Status Version 2:   40      %    Vital Signs Last 24 Hrs  T(C): 36.7 (06 May 2019 11:23), Max: 36.7 (06 May 2019 11:23)  T(F): 98 (06 May 2019 11:23), Max: 98 (06 May 2019 11:23)  HR: 63 (06 May 2019 11:23) (55 - 89)  BP: 137/53 (06 May 2019 11:23) (114/39 - 140/53)  BP(mean): --  RR: 18 (06 May 2019 11:23) (17 - 18)  SpO2: 93% (06 May 2019 11:23) (93% - 100%)    PHYSICAL EXAM:    General: Elderly man NAD     HEENT: [x ] normal  [ ] dry mouth  [ ] ET tube/trach    Lungs: [x ] comfortable [ ] tachypnea/labored breathing  [ ] excessive secretions    CV: [x ] normal  [ ] tachycardia    GI: [x ] normal  [ ] distended  [ ] tender  [ ] no BS               [ ] PEG/NG/OG tube    : [ ] normal  [ ] incontinent  [ x] oliguria/anuria  [ ] olea    MSK: [ ] normal  [x ] weakness  [ ] edema             [ ] ambulatory  [ ] bedbound/wheelchair bound    Skin: [ ] normal  [ ] pressure ulcers- Stage_____  [ x] no rash    LABS:                        9.7    6.4   )-----------( 243      ( 06 May 2019 07:24 )             32.1     05-06    137  |  95<L>  |  21.0<H>  ----------------------------<  81  4.4   |  28.0  |  5.27<H>    Ca    8.9      06 May 2019 07:24          I&O's Summary    06 May 2019 07:01  -  06 May 2019 16:06  --------------------------------------------------------  IN: 480 mL / OUT: 2 mL / NET: 478 mL        RADIOLOGY & ADDITIONAL STUDIES:  < from: Xray Chest 1 View- PORTABLE-Urgent (05.03.19 @ 09:56) >   EXAM:  XR CHEST PORTABLE URGENT 1V                          PROCEDURE DATE:  05/03/2019          INTERPRETATION:  XR CHEST URGENT    History: SOB, hypoxia    Technique: Single AP view of the chest.    Comparison: Chest x-ray 5/2/2019 and CT chest 2/4/2019    Findings:    Lungs/Pleura:  Patchy opacities in the lungs, decreased since 5/2/2019.   Bilateral pleural effusions again seen.    Heart/Mediastinum:  The cardiomediastinal silhouette stable in appearance.    Bones:  Status post sternotomy.    Impression:    Patchy bilateral lung opacities, improved since 5/2/2019, either   pulmonary edema or pneumonia.    Bilateral pleural effusions.    < end of copied text >      ADVANCE DIRECTIVES:  [x ] YES [ ] NO   DNR [ x] YES [ ] NO  Completed on:                     MOLST  [x ] YES [ ] NO   Completed on:  Living Will  [ ] YES [x ] NO   Completed on:    Thank you for the opportunity to assist with the care of this patient.   [facility] [phone].

## 2019-05-06 NOTE — SWALLOW BEDSIDE ASSESSMENT ADULT - ORAL PHASE
incomplete dentition/Decreased anterior-posterior movement of the bolus/Delayed oral transit time Within functional limits

## 2019-05-06 NOTE — SWALLOW BEDSIDE ASSESSMENT ADULT - SWALLOW EVAL: RECOMMENDED FEEDING/EATING TECHNIQUES
crush medication (when feasible)/maintain upright posture during/after eating for 30 mins/oral hygiene/position upright (90 degrees)/small sips/bites

## 2019-05-06 NOTE — SWALLOW BEDSIDE ASSESSMENT ADULT - SLP PERTINENT HISTORY OF CURRENT PROBLEM
As per MD note : male with hypertension, hyperlipidemia, ESRD on HD, CAD, lung cancer admitted with complaints of shortness of breath. Patient with multiple readmissions for fluid overload. He is a poor historian, recently discharged. Daughter states he was doing 'ok', until last night when his oxygen requirements increased. Daughter noted him to be congested but unable to bring up any phlegm. He did not have a fever or chest pain. Patient usually bed bound per daughter

## 2019-05-06 NOTE — CONSULT NOTE ADULT - ASSESSMENT
A/P: 80 y/o M PMHx of HFpEF (EF 50-55%), CAD s/p CABG (NST 04/19 mild ischemia, medically managed), ESRD on HD (T, Th, Sat) via L AVF, Lung Ca on home oxygen who presented to the ED with worsening dyspnea and oxygen requirements. Patient is a poor historian, no family present at time of interview, most history obtained from the chart. Patient was discharged on 04/26/19, and was at his baseline, mostly bedbound and on oxygen until last night when he began having increasing oxygen requirements. Patient's daughter thought he was congested, but not bringing up any sputum. Last HD was on Tuesday. Patient states he is still feeling short of breath right now, but denies any chest pain. Patient currently on oxygen, confused, unable to obtain detailed ROS.   BNP 92795  CXR with pulmonary edema    1. HFpEF   - EF   - Exacerbation usually caused by HTN  - Continue bumex 2mg PO qday  - Nephro following, sent for urgent dialysis  - Cont supplement Oxygen  - Continue HD    2. CAD  -  Recent cardiac cath 3/12, no new blockages  - Nuclear stress test 04/19 with mild ischemia, medically managed  - No chest pain  - Continue ASA, statin, metoprolol, losartan, and Imdur    3. Bradycardia  - Tachy-diego noted on last hospital admission  - Currently SB in 40s, decrease metoprolol to 25mg PO BID  - Continue telemetry monitoring      4. HTN  - Continue Losartan 100mg and Imdur 120mg PO daily  - Add Hydralazine 25mg PO q8  - Continue to monitor Bp
80 yo M hx of chronic diastolic CHF, lung CA, pulmonary fibrosis CAD s/p CABG, ESRD on dialysis (TTS), on home 2L O2, p/w sob and orthopnea x 3 days. Has been wearing 4 L nc o2 all the time. denies lower ext swelling. He also report intermittent left sided chest pain, intermittently radiating to left upper chest but now improved at tiem of hx , c/o productive cough with mild blood tinged yellowish brown phlegm, also had episode of chills and vomiting with nausea yesterday. denies any blood in vomitus ,  no abdominal pain/ vomiting  has been chronic 2-3x/day most days , has low appetite .Patient last dialyzed yesterday. Denies any sick contacts .Family two daughters  at bedside, patient a&ox3, states that he has not been able to ambulate much at home, has been requiring 2-3 people assist for transfers.  patient. has.  Patient was DNR/ DNI at last admission , today he wishes to be full code.     > acute on chronic  hypoxic respiratory  failure: likely sec to acute on chronic diastolic CHF ,     -urgent HD,    >Chest pain/ possible demand ischemia   -c/w aspirin and plavix   -c/w ARBs and bb     >Possible RML underlying consolidation / recent hospitalization / vs aspiration Pneumonia,   -possible complex pneumonia / gram positive  and/ or  gram neg pna   -Mild blood tinged sputum / hemoptysis likely sec to pna   -sputum cxs , bcxs   -RVP pannel   -c/w broad sp abxs / taper down     > Hypertension/ uncontrolled   - increase Losartan to 50mg QD  - c/w nifedipine 60mg QD    > ESRD on hemodialysis  - resume home medications.     >anemia of chronic disease    -stable h/h   -monitor
79 yr old man  with ESRD on HD, CAD, past hx  lung cancer, pulmonary fibrosis adimitted with HF

## 2019-05-06 NOTE — CONSULT NOTE ADULT - PROBLEM SELECTOR RECOMMENDATION 4
Patient is debilitated ,O2 dependent, bedbound with multiple hospitalizations. Though patient could  benefit from hospice services given his frail cardiopulmonary status, patient is receiving HD which could NOT be provided by hospice.  Current plan is to continue HD.

## 2019-05-06 NOTE — PROGRESS NOTE ADULT - ASSESSMENT
1) ESRD on HD  2) MBD of renal dx  3) Anemia of renal dx  4) HTN    HD Tuesday  On bumex in between  HD sessions;  Continue with anti hypertensives  Please check daily phosphorus; not currently on binders;    d/w Dr Nichole
1) ESRD on HD  2) MBD of renal dx  3) Anemia of renal dx  4) HTN    HD Tuesday  d/w Dr Nichole
1) ESRD on HD  2) MBD of renal dx  3) Anemia of renal dx  4) HTN    Plan for ultrafiltration today; 2L in 2 hours  Discussed with Dr Muhammad
79 yr old male with hypertension, hyperlipidemia, ESRD on HD, CAD, lung cancer admitted with complaints of shortness of breath and increasing oxygen requirements. Noted to be in fluid overload, labs and imaging noted. EKG sinus bradycardia. Evaluated by renal.    ·  Problem: Acute on chronic hypoxic respiratory failure sec acute on chronic heart failure, systolic. -  fluid overload, and lung cancer contributing to dyspnea. Advanced directives with the patient / family - made aware of limited prognosis if patient were to be intubated or resuscitated, in consideration of age and comorbid conditions. daughter Carin and son in law at [phone].  Confirmed he is DNR / DNI.  MOLST scanned in "[facility]" chart from prior hospitalization.    ·  Problem: ESRD on dialysis.  Plan: HD .     ·  Problem: CAD (coronary artery disease).  Plan: Continue aspirin and Plavix. Reviewed prior admission, he had an abnormal stress test, was advised medical management by cardiology.     ·  Problem: High cholesterol.  Plan: Continue statin.     ·  Problem: Hypertension.  Plan: Continue Imdur, Losartan, Metoprolol dose adjusted for bradycardia.     ·  Problem: Advanced Lung Cancer - pt aware of his disease, expresses desire for conservative management.  palliative .    supportive care.
79 yr old male with hypertension, hyperlipidemia, ESRD on HD, CAD, lung cancer admitted with complaints of shortness of breath and increasing oxygen requirements. Noted to be in fluid overload, labs and imaging noted. EKG sinus bradycardia. Evaluated by renal.    ·  Problem: Acute on chronic hypoxic respiratory failure sec acute on chronic heart failure, systolic. -  fluid overload, and lung cancer contributing to dyspnea. Advanced directives with the patient / family - made aware of limited prognosis if patient were to be intubated or resuscitated, in consideration of age and comorbid conditions. daughter Carin and son in law at [phone].  Confirmed he is DNR / DNI.  MOLST scanned in "[facility]" chart from prior hospitalization.    ·  Problem: ESRD on dialysis.  Plan: HD .     ·  Problem: CAD (coronary artery disease).  Plan: Continue aspirin and Plavix. Reviewed prior admission, he had an abnormal stress test, was advised medical management by cardiology.     ·  Problem: High cholesterol.  Plan: Continue statin.     ·  Problem: Hypertension.  Plan: Continue Imdur, Losartan, Metoprolol dose adjusted for bradycardia.     ·  Problem: Advanced Lung Cancer - pt aware of his disease, expresses desire for conservative management.  palliative care .
79 yr old male with hypertension, hyperlipidemia, ESRD on HD, CAD, lung cancer admitted with complaints of shortness of breath and increasing oxygen requirements. Noted to be in fluid overload, labs and imaging noted. EKG sinus bradycardia. Evaluated by renal.    ·  Problem: Acute on chronic hypoxic respiratory failure sec acute on chronic heart failure, systolic. -  fluid overload, and lung cancer contributing to dyspnea. Advanced directives with the patient / family - made aware of limited prognosis if patient were to be intubated or resuscitated, in consideration of age and comorbid conditions. daughter Carin and son in law at [phone].  Confirmed he is DNR / DNI.  MOLST scanned in "[facility]" chart from prior hospitalization.    ·  Problem: ESRD on dialysis.  Plan: HD .     ·  Problem: CAD (coronary artery disease).  Plan: Continue aspirin and Plavix. Reviewed prior admission, he had an abnormal stress test, was advised medical management by cardiology.     ·  Problem: High cholesterol.  Plan: Continue statin.     ·  Problem: Hypertension.  Plan: Continue Imdur, Losartan, Metoprolol dose adjusted for bradycardia.     ·  Problem: Advanced Lung Cancer - pt aware of his disease, expresses desire for conservative management.  Palliative care .    Disposition: Discharge planning as patient is tolerating oxygen via nasal cannula.
A/P: 80 y/o M PMHx of HFpEF (EF 50-55%), CAD s/p CABG (NST 04/19 mild ischemia, medically managed), ESRD on HD (T, Th, Sat) via L AVF, Lung Ca on home oxygen who presented to the ED with worsening dyspnea and oxygen requirements. Patient is a poor historian, no family present at time of interview, most history obtained from the chart. Patient was discharged on 04/26/19, and was at his baseline, mostly bedbound and on oxygen until last night when he began having increasing oxygen requirements. Patient's daughter thought he was congested, but not bringing up any sputum. Last HD was on Tuesday. Patient states he is still feeling short of breath right now, but denies any chest pain. Patient currently on oxygen, confused, unable to obtain detailed ROS.   BNP 90859  CXR with pulmonary edema    1. HFpEF   - EF 50-55%   - Exacerbation usually caused by HTN  - Continue bumex 2mg PO qday  - Nephro following,  may have to increase frequency or length of HD  - Cont supplement Oxygen    2. CAD  -  Recent cardiac cath 3/12, no new blockages  - Nuclear stress test 04/19 with mild ischemia, medically managed  - No chest pain  - Continue ASA, statin, metoprolol, losartan, and Imdur    3. Bradycardia  - Tachy-diego noted on last hospital admission  - Bradycardia improved, continue metoprolol 25mg PO BID  - Continue telemetry monitoring      4. HTN  - Continue Losartan 100mg and Imdur 120mg PO daily  - BP a little low today  - Decrease to hydralazine 25mg PO BID  - Continue to monitor Bp
1) ESRD on HD  2) MBD of renal dx  3) Anemia of renal dx  4) HTN    Tolerated 2L UF yesterday  HD today  d/w Dr Nichole
79 yr old male with hypertension, hyperlipidemia, ESRD on HD, CAD, lung cancer admitted with complaints of shortness of breath and increasing oxygen requirements. Noted to be in fluid overload, labs and imaging noted. EKG sinus bradycardia. Evaluated by renal.    ·  Problem: Acute on chronic hypoxic respiratory failure sec acute on chronic heart failure, systolic. - s/p HD on 5/2.  Appears to remain in fluid overload.  Repeat HD today, discussed with Renal.  I discussed advanced directives with the patient / family - made aware of limited prognosis if patient were to be intubated or resuscitated, in consideration of age and comorbid conditions.  I contacted pt's daughter Carin and son in law at [phone].  Confirmed he is DNR / DNI.  Please refer to MOL scanned in "[facility]" chart from prior hospitalization.  ·  Problem: ESRD on dialysis.  Plan: As above, renal evaluation done, HD today.   ·  Problem: CAD (coronary artery disease).  Plan: Continue aspirin and Plavix. Reviewed prior admission, he had an abnormal stress test, was advised medical management by cardiology.   ·  Problem: High cholesterol.  Plan: Continue statin.   ·  Problem: Hypertension.  Plan: Continue Imdur, Losartan, Metoprolol dose adjusted for bradycardia.   ·  Problem: Advanced Lung Cancer - pt aware of his disease, expresses desire for conservative management.  Awaiting palliative care evaluation.  > DVT prophylaxis - subcutaneous Heparin  The Hospitalist Service will assume care of this patient beginning tomorrow.

## 2019-05-07 ENCOUNTER — TRANSCRIPTION ENCOUNTER (OUTPATIENT)
Age: 79
End: 2019-05-07

## 2019-05-07 VITALS — OXYGEN SATURATION: 95 %

## 2019-05-07 PROCEDURE — 85610 PROTHROMBIN TIME: CPT

## 2019-05-07 PROCEDURE — 92610 EVALUATE SWALLOWING FUNCTION: CPT

## 2019-05-07 PROCEDURE — 87640 STAPH A DNA AMP PROBE: CPT

## 2019-05-07 PROCEDURE — 83605 ASSAY OF LACTIC ACID: CPT

## 2019-05-07 PROCEDURE — 94640 AIRWAY INHALATION TREATMENT: CPT

## 2019-05-07 PROCEDURE — 71046 X-RAY EXAM CHEST 2 VIEWS: CPT

## 2019-05-07 PROCEDURE — 99239 HOSP IP/OBS DSCHRG MGMT >30: CPT

## 2019-05-07 PROCEDURE — 93005 ELECTROCARDIOGRAM TRACING: CPT

## 2019-05-07 PROCEDURE — 83735 ASSAY OF MAGNESIUM: CPT

## 2019-05-07 PROCEDURE — 84100 ASSAY OF PHOSPHORUS: CPT

## 2019-05-07 PROCEDURE — 99285 EMERGENCY DEPT VISIT HI MDM: CPT | Mod: 25

## 2019-05-07 PROCEDURE — 83880 ASSAY OF NATRIURETIC PEPTIDE: CPT

## 2019-05-07 PROCEDURE — 85027 COMPLETE CBC AUTOMATED: CPT

## 2019-05-07 PROCEDURE — 96374 THER/PROPH/DIAG INJ IV PUSH: CPT

## 2019-05-07 PROCEDURE — 71045 X-RAY EXAM CHEST 1 VIEW: CPT

## 2019-05-07 PROCEDURE — 80048 BASIC METABOLIC PNL TOTAL CA: CPT

## 2019-05-07 PROCEDURE — 36415 COLL VENOUS BLD VENIPUNCTURE: CPT

## 2019-05-07 PROCEDURE — 90937 HEMODIALYSIS REPEATED EVAL: CPT

## 2019-05-07 PROCEDURE — 80053 COMPREHEN METABOLIC PANEL: CPT

## 2019-05-07 RX ORDER — HYDRALAZINE HCL 50 MG
1 TABLET ORAL
Qty: 60 | Refills: 0
Start: 2019-05-07 | End: 2019-06-05

## 2019-05-07 RX ADMIN — PANTOPRAZOLE SODIUM 40 MILLIGRAM(S): 20 TABLET, DELAYED RELEASE ORAL at 05:57

## 2019-05-07 RX ADMIN — CLOPIDOGREL BISULFATE 75 MILLIGRAM(S): 75 TABLET, FILM COATED ORAL at 11:09

## 2019-05-07 RX ADMIN — Medication 3 MILLILITER(S): at 19:56

## 2019-05-07 RX ADMIN — GABAPENTIN 300 MILLIGRAM(S): 400 CAPSULE ORAL at 05:56

## 2019-05-07 RX ADMIN — DULOXETINE HYDROCHLORIDE 20 MILLIGRAM(S): 30 CAPSULE, DELAYED RELEASE ORAL at 11:08

## 2019-05-07 RX ADMIN — Medication 3 MILLILITER(S): at 09:36

## 2019-05-07 RX ADMIN — Medication 25 MILLIGRAM(S): at 05:56

## 2019-05-07 RX ADMIN — Medication 100 MILLIGRAM(S): at 11:09

## 2019-05-07 RX ADMIN — Medication 100 MILLIGRAM(S): at 05:56

## 2019-05-07 RX ADMIN — CHLORHEXIDINE GLUCONATE 1 APPLICATION(S): 213 SOLUTION TOPICAL at 06:00

## 2019-05-07 RX ADMIN — Medication 1 TABLET(S): at 11:09

## 2019-05-07 RX ADMIN — Medication 3 MILLILITER(S): at 03:39

## 2019-05-07 RX ADMIN — LOSARTAN POTASSIUM 100 MILLIGRAM(S): 100 TABLET, FILM COATED ORAL at 05:57

## 2019-05-07 RX ADMIN — Medication 3 MILLILITER(S): at 15:57

## 2019-05-07 RX ADMIN — BUMETANIDE 2 MILLIGRAM(S): 0.25 INJECTION INTRAMUSCULAR; INTRAVENOUS at 05:56

## 2019-05-07 RX ADMIN — ERYTHROPOIETIN 4000 UNIT(S): 10000 INJECTION, SOLUTION INTRAVENOUS; SUBCUTANEOUS at 12:14

## 2019-05-07 RX ADMIN — HEPARIN SODIUM 5000 UNIT(S): 5000 INJECTION INTRAVENOUS; SUBCUTANEOUS at 17:34

## 2019-05-07 RX ADMIN — HEPARIN SODIUM 5000 UNIT(S): 5000 INJECTION INTRAVENOUS; SUBCUTANEOUS at 05:56

## 2019-05-07 RX ADMIN — GABAPENTIN 300 MILLIGRAM(S): 400 CAPSULE ORAL at 17:34

## 2019-05-07 RX ADMIN — Medication 100 MICROGRAM(S): at 05:56

## 2019-05-07 RX ADMIN — Medication 81 MILLIGRAM(S): at 11:09

## 2019-05-07 NOTE — CHART NOTE - NSCHARTNOTEFT_GEN_A_CORE
Upon Nutritional Assessment by the Registered Dietitian your patient was determined to meet criteria / has evidence of the following diagnosis/diagnoses:          [ ]  Mild Protein Calorie Malnutrition        [ ]  Moderate Protein Calorie Malnutrition        [ x] Severe Protein Calorie Malnutrition  CHRONIC        [ ] Unspecified Protein Calorie Malnutrition        [ ] Underweight / BMI <19        [ ] Morbid Obesity / BMI > 40      Findings as based on:  •  Comprehensive nutrition assessment and consultation  •  Calorie counts (nutrient intake analysis)  •  Food acceptance and intake status from observations by staff  •  Follow up  •  Patient education  •  Intervention secondary to interdisciplinary rounds  •   concerns      Treatment:    The following diet has been recommended:  Add renal replacement and Nepro TID      PROVIDER Section:     By signing this assessment you are acknowledging and agree with the diagnosis/diagnoses assigned by the Registered Dietitian    Comments:

## 2019-05-07 NOTE — DIETITIAN INITIAL EVALUATION ADULT. - SIGNS/SYMPTOMS
as evidenced by pt meeting <50% est energy needs, moderate muscle/fat wasting, 6.6% wt loss as evidenced by pt meeting <50% est energy needs, moderate muscle/fat wasting, 20.56% wt loss

## 2019-05-07 NOTE — DISCHARGE NOTE PROVIDER - CARE PROVIDER_API CALL
Jessee Miles)  Internal Medicine; Nephrology  [address] [place] [address]  Phone: [phone]  Fax: [phone]  Follow Up Time:     Enrique Nesbitt)  Cardiovascular Disease  [address] [facility], [place] [address]  Phone: [phone]  Fax: [phone]  Follow Up Time:

## 2019-05-07 NOTE — DISCHARGE NOTE PROVIDER - NSDCCPCAREPLAN_GEN_ALL_CORE_FT
PRINCIPAL DISCHARGE DIAGNOSIS  Diagnosis: SOB (shortness of breath)  Assessment and Plan of Treatment:       SECONDARY DISCHARGE DIAGNOSES  Diagnosis: CHF, chronic  Assessment and Plan of Treatment:     Diagnosis: HLD (hyperlipidemia)  Assessment and Plan of Treatment:     Diagnosis: Hypertension  Assessment and Plan of Treatment:     Diagnosis: CAD in native artery  Assessment and Plan of Treatment:     Diagnosis: ESRD on hemodialysis  Assessment and Plan of Treatment:     Diagnosis: ESRD on dialysis  Assessment and Plan of Treatment: ESRD on HD

## 2019-05-07 NOTE — DIETITIAN INITIAL EVALUATION ADULT. - OTHER INFO
Pt admitted with SOB. Pt h/o ESRD on HD. Met with pt at bedside, pt was confused and a poor historian. Pt has been seen by nutrition department during past admissions. Pt remains with malnutrition. Pt reports his UBW is 120#, and his current body weight is 112.4#, pt unsure if he lost weight. Pt reports a fair appetite and could not recall how much he ate for breakfast this AM. Pt states his daughter will bring him food sometimes and that his daughter will be in to see him today this afternoon. Pt states he has hemodialysis today. Pt seen by SLP on 5/6 and currently on dysphagia 2- mech soft with thin liquids. Pt had no questions for me at this time.

## 2019-05-07 NOTE — DIETITIAN INITIAL EVALUATION ADULT. - NUTRITIONGOAL OUTCOME1
Pt will meet >50% of estimated energy needs and consume 1-3 nutrition supplements daily as tolerated

## 2019-05-07 NOTE — PROGRESS NOTE ADULT - PROVIDER SPECIALTY LIST ADULT
Hospitalist
Cardiology
Hospitalist
Nephrology
Hospitalist
Nephrology
Hospitalist

## 2019-05-07 NOTE — DISCHARGE NOTE NURSING/CASE MANAGEMENT/SOCIAL WORK - NSDCDPATPORTLINK_GEN_ALL_CORE
You can access the [facility], offered by [facility], by registering with the following website: [email or web address]

## 2019-05-07 NOTE — PROGRESS NOTE ADULT - SUBJECTIVE AND OBJECTIVE BOX
Consent from the daughter obtained,    Patient was seen and evaluated on dialysis.   Patient is tolerating the procedure well.   T(C): 36.9 (05-05-19 @ 08:50), Max: 37 (05-05-19 @ 05:21)  HR: 60 (05-05-19 @ 09:32) (57 - 68)  BP: 114/70 (05-05-19 @ 08:50) (105/60 - 145/66)  Continue dialysis: TIW,  Dialyzer:  Revaclear  300        QB:  400 ml.,      QD: 500ml.,  Goal UF 2-3  over 3 Hours     D/W Dr. Nesbitt,    Daily HD for now, Optimize HTN control & UF
[facility] DIVISION OF KIDNEY DISEASES AND HYPERTENSION -- FOLLOW UP NOTE  --------------------------------------------------------------------------------  Chief Complaint: ESRD HD    24 hour events/subjective:  Seen/examined  On HD today      PAST HISTORY  --------------------------------------------------------------------------------  No significant changes to PMH, PSH, FHx, SHx, unless otherwise noted    ALLERGIES & MEDICATIONS  --------------------------------------------------------------------------------  Allergies    No Known Allergies    Intolerances      Standing Inpatient Medications  ALBUTerol/ipratropium for Nebulization 3 milliLiter(s) Nebulizer every 6 hours  aspirin  chewable 81 milliGRAM(s) Oral daily  atorvastatin 80 milliGRAM(s) Oral at bedtime  buMETAnide 2 milliGRAM(s) Oral daily  chlorhexidine 2% Cloths 1 Application(s) Topical <User Schedule>  clopidogrel Tablet 75 milliGRAM(s) Oral daily  docusate sodium 100 milliGRAM(s) Oral three times a day  DULoxetine 20 milliGRAM(s) Oral daily  gabapentin 300 milliGRAM(s) Oral two times a day  heparin  Injectable 5000 Unit(s) SubCutaneous every 12 hours  hydrALAZINE 25 milliGRAM(s) Oral every 12 hours  isosorbide   mononitrate ER Tablet (IMDUR) 120 milliGRAM(s) Oral daily  levothyroxine 100 MICROGram(s) Oral daily  losartan 100 milliGRAM(s) Oral daily  metoprolol tartrate 25 milliGRAM(s) Oral two times a day  multivitamin 1 Tablet(s) Oral daily  pantoprazole    Tablet 40 milliGRAM(s) Oral before breakfast  senna 2 Tablet(s) Oral at bedtime    PRN Inpatient Medications  acetaminophen   Tablet .. 650 milliGRAM(s) Oral every 6 hours PRN      REVIEW OF SYSTEMS  --------------------------------------------------------------------------------  Gen: No weight changes, fatigue, fevers/chills, weakness  Skin: No rashes  Head/Eyes/Ears/Mouth: No headache; Normal hearing; Normal vision w/o blurriness; No sinus pain/discomfort, sore throat  Respiratory: No dyspnea, cough, wheezing, hemoptysis  CV: No chest pain, PND, orthopnea  GI: No abdominal pain, diarrhea, constipation, nausea, vomiting, melena, hematochezia  : No increased frequency, dysuria, hematuria, nocturia  MSK: No joint pain/swelling; no back pain; no edema  Neuro: No dizziness/lightheadedness, weakness, seizures, numbness, tingling  Heme: No easy bruising or bleeding  Endo: No heat/cold intolerance  Psych: No significant nervousness, anxiety, stress, depression    All other systems were reviewed and are negative, except as noted.    VITALS/PHYSICAL EXAM  --------------------------------------------------------------------------------  T(C): 36.5 (05-04-19 @ 09:50), Max: 36.9 (05-04-19 @ 04:50)  HR: 62 (05-04-19 @ 12:09) (56 - 77)  BP: 105/60 (05-04-19 @ 12:09) (100/50 - 135/58)  RR: 18 (05-04-19 @ 11:00) (18 - 22)  SpO2: 95% (05-04-19 @ 11:00) (94% - 100%)  Wt(kg): --        05-03-19 @ 07:01  -  05-04-19 @ 07:00  --------------------------------------------------------  IN: 0 mL / OUT: 1500 mL / NET: -1500 mL      Physical Exam:  	Gen: NAD   	HEENT: PERRL, supple neck, clear oropharynx  	Pulm: CTA B/L  	CV: RRR, S1S2; no rub  	Back: No spinal or CVA tenderness; no sacral edema  	Abd: +BS, soft, nontender/nondistended  	: No suprapubic tenderness  	UE: Warm, FROM, no clubbing, intact strength; no edema; no asterixis  	LE: Warm, FROM, no clubbing, intact strength; no edema  	Neuro: No focal deficits, intact gait  	Psych: Normal affect and mood  	Skin: Warm, without rashes  	Vascular access:    LABS/STUDIES  --------------------------------------------------------------------------------              10.2   7.0   >-----------<  241      [05-03-19 @ 06:36]              34.3     135  |  94  |  12.0  ----------------------------<  83      [05-03-19 @ 06:36]  4.5   |  31.0  |  3.37        Ca     9.0     [05-03-19 @ 06:36]      Mg     1.9     [05-03-19 @ 06:36]      Phos  3.4     [05-03-19 @ 06:36]      PT/INR: PT 11.1 , INR 0.97       [05-03-19 @ 06:36]      Creatinine Trend:  SCr 3.37 [05-03 @ 06:36]  SCr 4.54 [05-02 @ 09:17]  SCr 5.13 [04-26 @ 06:17]  SCr 3.68 [04-25 @ 06:10]  SCr 5.91 [04-24 @ 06:37]        Iron 34, TIBC 235, %sat 14      [01-29-19 @ 11:39]  Ferritin 1049      [01-29-19 @ 11:39]  TSH 1.83      [03-11-19 @ 07:37]  Lipid: chol 159, , HDL 32, LDL 94      [01-29-19 @ 11:39]    HBsAg Nonreact      [04-10-19 @ 17:37]
Patient was seen and evaluated on dialysis.   No c/o CP SOB NV  no F/C  no swelling    T(C): 36.8 (05-07-19 @ 11:30), Max: 36.8 (05-07-19 @ 11:30)  HR: 70 (05-07-19 @ 11:30) (60 - 80)  BP: 123/46 (05-07-19 @ 11:30) (111/53 - 152/72)    PE ; Pale,   NAD  lungs - CTA  CV gr 1 murmur,  No gallop or rub  Abd : soft, NT BS +, No masses  Ext- No edema  Neuro : Grossly intact, moving extremities                        9.7    6.4   )-----------( 243      ( 06 May 2019 07:24 )             32.1      05-06    137  |  95<L>  |  21.0<H>  ----------------------------<  81  4.4   |  28.0  |  5.27<H>    Ca    8.9      06 May 2019 07:24    MEDICATIONS  (STANDING):  acetaminophen   Tablet .. PRN  ALBUTerol/ipratropium for Nebulization  aspirin  chewable  atorvastatin  buMETAnide  chlorhexidine 2% Cloths  clopidogrel Tablet  docusate sodium  DULoxetine  epoetin nguyễn Injectable  gabapentin  heparin  Injectable  hydrALAZINE  isosorbide   mononitrate ER Tablet (IMDUR)  levothyroxine  losartan  metoprolol tartrate  multivitamin  pantoprazole    Tablet  senna    Patient stable  [facility] HD easily  Continue    Cleared for discharge,
[facility] DIVISION OF KIDNEY DISEASES AND HYPERTENSION -- HEMODIALYSIS NOTE  --------------------------------------------------------------------------------  Chief Complaint: ESRD/Ongoing hemodialysis requirement    24 hour events/subjective:  Seen/examined  Plan for HD in AM      PAST HISTORY  --------------------------------------------------------------------------------  No significant changes to PMH, PSH, FHx, SHx, unless otherwise noted    ALLERGIES & MEDICATIONS  --------------------------------------------------------------------------------  Allergies    No Known Allergies    Intolerances      Standing Inpatient Medications  ALBUTerol/ipratropium for Nebulization 3 milliLiter(s) Nebulizer every 6 hours  aspirin  chewable 81 milliGRAM(s) Oral daily  atorvastatin 80 milliGRAM(s) Oral at bedtime  buMETAnide 2 milliGRAM(s) Oral daily  chlorhexidine 2% Cloths 1 Application(s) Topical <User Schedule>  clopidogrel Tablet 75 milliGRAM(s) Oral daily  docusate sodium 100 milliGRAM(s) Oral three times a day  DULoxetine 20 milliGRAM(s) Oral daily  gabapentin 300 milliGRAM(s) Oral two times a day  heparin  Injectable 5000 Unit(s) SubCutaneous every 12 hours  hydrALAZINE 25 milliGRAM(s) Oral every 12 hours  isosorbide   mononitrate ER Tablet (IMDUR) 120 milliGRAM(s) Oral daily  levothyroxine 100 MICROGram(s) Oral daily  losartan 100 milliGRAM(s) Oral daily  metoprolol tartrate 25 milliGRAM(s) Oral two times a day  multivitamin 1 Tablet(s) Oral daily  pantoprazole    Tablet 40 milliGRAM(s) Oral before breakfast  senna 2 Tablet(s) Oral at bedtime    PRN Inpatient Medications  acetaminophen   Tablet .. 650 milliGRAM(s) Oral every 6 hours PRN      REVIEW OF SYSTEMS  --------------------------------------------------------------------------------  Gen: No weight changes, fatigue, fevers/chills, weakness  Skin: No rashes  Head/Eyes/Ears/Mouth: No headache; Normal hearing; Normal vision w/o blurriness; No sinus pain/discomfort, sore throat  Respiratory: No dyspnea, cough, wheezing, hemoptysis  CV: No chest pain, PND, orthopnea  GI: No abdominal pain, diarrhea, constipation, nausea, vomiting, melena, hematochezia  : No increased frequency, dysuria, hematuria, nocturia  MSK: No joint pain/swelling; no back pain; no edema  Neuro: No dizziness/lightheadedness, weakness, seizures, numbness, tingling  Heme: No easy bruising or bleeding  Endo: No heat/cold intolerance  Psych: No significant nervousness, anxiety, stress, depression    All other systems were reviewed and are negative, except as noted.    VITALS/PHYSICAL EXAM  --------------------------------------------------------------------------------  T(C): 36.7 (05-06-19 @ 11:23), Max: 36.7 (05-06-19 @ 11:23)  HR: 63 (05-06-19 @ 11:23) (55 - 89)  BP: 137/53 (05-06-19 @ 11:23) (114/39 - 140/53)  RR: 18 (05-06-19 @ 11:23) (17 - 18)  SpO2: 93% (05-06-19 @ 11:23) (93% - 100%)  Wt(kg): --        Physical Exam:  	Gen: NAD   	HEENT: PERRL, supple neck, clear oropharynx  	Pulm: CTA B/L  	CV: RRR, S1S2; no rub  	Back: No spinal or CVA tenderness; no sacral edema  	Abd: +BS, soft, nontender/nondistended  	: No suprapubic tenderness  	UE: Warm, FROM, no clubbing, intact strength; no edema; no asterixis  	LE: Warm, FROM, no clubbing, intact strength; no edema  	Neuro: No focal deficits, intact gait  	Psych: Normal affect and mood  	Skin: Warm, without rashes  	Vascular access:    LABS/STUDIES  --------------------------------------------------------------------------------              9.7    6.4   >-----------<  243      [05-06-19 @ 07:24]              32.1     137  |  95  |  21.0  ----------------------------<  81      [05-06-19 @ 07:24]  4.4   |  28.0  |  5.27        Ca     8.9     [05-06-19 @ 07:24]            Iron 34, TIBC 235, %sat 14      [01-29-19 @ 11:39]  Ferritin 1049      [01-29-19 @ 11:39]  TSH 1.83      [03-11-19 @ 07:37]  Lipid: chol 159, , HDL 32, LDL 94      [01-29-19 @ 11:39]    HBsAg Nonreact      [04-10-19 @ 17:37]
[facility] DIVISION OF KIDNEY DISEASES AND HYPERTENSION -- FOLLOW UP NOTE  --------------------------------------------------------------------------------  Chief Complaint: ESRD HD    24 hour events/subjective:    Seen/examined   Plan for 2L UF today;    PAST HISTORY  --------------------------------------------------------------------------------  No significant changes to PMH, PSH, FHx, SHx, unless otherwise noted    ALLERGIES & MEDICATIONS  --------------------------------------------------------------------------------  Allergies    No Known Allergies    Intolerances      Standing Inpatient Medications  ALBUTerol/ipratropium for Nebulization 3 milliLiter(s) Nebulizer every 6 hours  aspirin  chewable 81 milliGRAM(s) Oral daily  atorvastatin 80 milliGRAM(s) Oral at bedtime  buMETAnide 2 milliGRAM(s) Oral daily  chlorhexidine 2% Cloths 1 Application(s) Topical <User Schedule>  clopidogrel Tablet 75 milliGRAM(s) Oral daily  docusate sodium 100 milliGRAM(s) Oral three times a day  DULoxetine 20 milliGRAM(s) Oral daily  gabapentin 300 milliGRAM(s) Oral two times a day  heparin  Injectable 5000 Unit(s) SubCutaneous every 12 hours  hydrALAZINE 25 milliGRAM(s) Oral every 12 hours  isosorbide   mononitrate ER Tablet (IMDUR) 120 milliGRAM(s) Oral daily  levothyroxine 100 MICROGram(s) Oral daily  losartan 100 milliGRAM(s) Oral daily  metoprolol tartrate 25 milliGRAM(s) Oral two times a day  multivitamin 1 Tablet(s) Oral daily  pantoprazole    Tablet 40 milliGRAM(s) Oral before breakfast  senna 2 Tablet(s) Oral at bedtime    PRN Inpatient Medications  acetaminophen   Tablet .. 650 milliGRAM(s) Oral every 6 hours PRN      REVIEW OF SYSTEMS  --------------------------------------------------------------------------------  Gen: No weight changes, fatigue, fevers/chills, weakness  Skin: No rashes  Head/Eyes/Ears/Mouth: No headache; Normal hearing; Normal vision w/o blurriness; No sinus pain/discomfort, sore throat  Respiratory: No dyspnea, cough, wheezing, hemoptysis  CV: No chest pain, PND, orthopnea  GI: No abdominal pain, diarrhea, constipation, nausea, vomiting, melena, hematochezia  : No increased frequency, dysuria, hematuria, nocturia  MSK: No joint pain/swelling; no back pain; no edema  Neuro: No dizziness/lightheadedness, weakness, seizures, numbness, tingling  Heme: No easy bruising or bleeding  Endo: No heat/cold intolerance  Psych: No significant nervousness, anxiety, stress, depression    All other systems were reviewed and are negative, except as noted.    VITALS/PHYSICAL EXAM  --------------------------------------------------------------------------------  T(C): 36.6 (05-03-19 @ 10:25), Max: 36.9 (05-03-19 @ 05:17)  HR: 96 (05-03-19 @ 10:25) (49 - 96)  BP: 126/52 (05-03-19 @ 10:25) (100/50 - 172/74)  RR: 18 (05-03-19 @ 10:25) (18 - 23)  SpO2: 96% (05-03-19 @ 10:25) (91% - 100%)  Wt(kg): --        Physical Exam:  	Gen: NAD,    	HEENT: PERRL, supple neck, clear oropharynx  	Pulm: dec BS  	CV: RRR, S1S2; no rub  	Back: No spinal or CVA tenderness; no sacral edema  	Abd: +BS, soft, nontender/nondistended  	: No suprapubic tenderness  	UE: Warm, FROM, no clubbing, intact strength; no edema; no asterixis  	LE: Warm, FROM, no clubbing, intact strength; no edema  	Neuro: No focal deficits, intact gait  	Psych: Normal affect and mood  	Skin: Warm, without rashes  	Vascular access:    LABS/STUDIES  --------------------------------------------------------------------------------              10.2   7.0   >-----------<  241      [05-03-19 @ 06:36]              34.3     135  |  94  |  12.0  ----------------------------<  83      [05-03-19 @ 06:36]  4.5   |  31.0  |  3.37        Ca     9.0     [05-03-19 @ 06:36]      Mg     1.9     [05-03-19 @ 06:36]      Phos  3.4     [05-03-19 @ 06:36]    TPro  7.2  /  Alb  3.2  /  TBili  0.2  /  DBili  x   /  AST  25  /  ALT  20  /  AlkPhos  47  [05-02-19 @ 09:17]    PT/INR: PT 11.1 , INR 0.97       [05-03-19 @ 06:36]      Creatinine Trend:  SCr 3.37 [05-03 @ 06:36]  SCr 4.54 [05-02 @ 09:17]  SCr 5.13 [04-26 @ 06:17]  SCr 3.68 [04-25 @ 06:10]  SCr 5.91 [04-24 @ 06:37]        Iron 34, TIBC 235, %sat 14      [01-29-19 @ 11:39]  Ferritin 1049      [01-29-19 @ 11:39]  TSH 1.83      [03-11-19 @ 07:37]  Lipid: chol 159, , HDL 32, LDL 94      [01-29-19 @ 11:39]    HBsAg Nonreact      [04-10-19 @ 17:37]
CC: ESRD on HD. Lung cancer.  Chronic resp failure on Hi[employer]lo oxygen .   HPI:  79 yr old male with hypertension, hyperlipidemia, ESRD on HD, CAD, lung cancer admitted with complaints of shortness of breath. Patient with multiple readmissions for fluid overload. He is a poor historian, recently discharged. Daughter states he was doing 'ok', until last night when his oxygen requirements increased. Daughter noted him to be congested but unable to bring up any phlegm. He did not have a fever or chest pain. Patient usually bed bound per daughter. No nausea, vomiting, abdominal pain, leg swelling. No chills. No falls. His last HD was on Tuesday. Compliant with medications. (02 May 2019 14:26)    REVIEW OF SYSTEMS:    Patient denied fever, chills, abdominal pain, nausea, vomiting, cough, shortness of breath, chest pain or palpitations    Vital Signs Last 24 Hrs  T(C): 36.9 (05 May 2019 08:50), Max: 37 (05 May 2019 05:21)  T(F): 98.4 (05 May 2019 08:50), Max: 98.6 (05 May 2019 05:21)  HR: 62 (05 May 2019 15:45) (57 - 68)  BP: 114/70 (05 May 2019 08:50) (107/65 - 130/47)  BP(mean): --  RR: 18 (04 May 2019 20:36) (18 - 18)  SpO2: 96% (05 May 2019 15:45) (95% - 100%)I&O's Summary    04 May 2019 07:01  -  05 May 2019 07:00  --------------------------------------------------------  IN: 0 mL / OUT: 1500 mL / NET: -1500 mL      PHYSICAL EXAM:  GENERAL: Cachexia  HEENT: PERRL, +EOMI, anicteric, no [place]  NECK: Supple, No JVD   CHEST/LUNG:  bilateral rale   HEART: S1S2 Normal intensity, no murmurs, gallops or rubs noted  ABDOMEN: Soft, BS Normoactive, NT, ND, no HSM noted  EXTREMITIES:  2+ radial and DP pulses noted, no clubbing, cyanosis, or edema noted, Limited mobility   SKIN: No rashes or lesions noted  NEURO: A&O, no focal deficits noted, CN II-XII intact  PSYCH: Depressed mood and affect; insight/judgement appropriate  LABS:              RADIOLOGY & ADDITIONAL TESTS:    MEDICATIONS:  MEDICATIONS  (STANDING):  ALBUTerol/ipratropium for Nebulization 3 milliLiter(s) Nebulizer every 6 hours  aspirin  chewable 81 milliGRAM(s) Oral daily  atorvastatin 80 milliGRAM(s) Oral at bedtime  buMETAnide 2 milliGRAM(s) Oral daily  chlorhexidine 2% Cloths 1 Application(s) Topical <User Schedule>  clopidogrel Tablet 75 milliGRAM(s) Oral daily  docusate sodium 100 milliGRAM(s) Oral three times a day  DULoxetine 20 milliGRAM(s) Oral daily  gabapentin 300 milliGRAM(s) Oral two times a day  heparin  Injectable 5000 Unit(s) SubCutaneous every 12 hours  hydrALAZINE 25 milliGRAM(s) Oral every 12 hours  isosorbide   mononitrate ER Tablet (IMDUR) 120 milliGRAM(s) Oral daily  levothyroxine 100 MICROGram(s) Oral daily  losartan 100 milliGRAM(s) Oral daily  metoprolol tartrate 25 milliGRAM(s) Oral two times a day  multivitamin 1 Tablet(s) Oral daily  pantoprazole    Tablet 40 milliGRAM(s) Oral before breakfast  senna 2 Tablet(s) Oral at bedtime    MEDICATIONS  (PRN):  acetaminophen   Tablet .. 650 milliGRAM(s) Oral every 6 hours PRN Temp greater or equal to 38C (100.4F), Mild Pain (1 - 3)
Patient was seen and evaluated on dialysis.   No c/o CP SOB NV  no F/C  no swelling    T(C): 36.9 (05-03-19 @ 05:17), Max: 36.9 (05-03-19 @ 05:17)  HR: 58 (05-03-19 @ 05:17) (49 - 72)  BP: 100/50 (05-03-19 @ 05:17) (100/50 - 193/79)  Wt(kg): --  PE ;  NAD  lungs - CTA  CV gr 1 murmer,  No gallop or rub  Abd : soft, NT BS +, No masses  Ext- No edema  Neuro : Grossly intact, moving extremities                             9.3    8.2   )-----------( 225      ( 02 May 2019 09:17 )             30.8        05-02    135  |  91<L>  |  19.0  ----------------------------<  106  4.1   |  31.0<H>  |  4.54<H>    Ca    8.9      02 May 2019 09:17    TPro  7.2  /  Alb  3.2<L>  /  TBili  0.2<L>  /  DBili  x   /  AST  25  /  ALT  20  /  AlkPhos  47  05-02    MEDICATIONS  (STANDING):  acetaminophen   Tablet .. PRN  ALBUTerol/ipratropium for Nebulization  aspirin  chewable  atorvastatin  buMETAnide  clopidogrel Tablet  docusate sodium  DULoxetine  gabapentin  heparin  Injectable  hydrALAZINE  isosorbide   mononitrate ER Tablet (IMDUR)  levothyroxine  losartan  metoprolol tartrate  multivitamin  pantoprazole    Tablet  senna  tiotropium 18 MICROgram(s) Capsule      Patient stable  [place] HD easily  Continue
Patient: KAUSHIK HOFFMAN [id number] 79y Male                           Internal Medicine Hospitalist Progress Note    Initial HPI:  79 yr old male with hypertension, hyperlipidemia, ESRD on HD, CAD, lung cancer admitted with complaints of shortness of breath, attributed to fluid overload.  He has had multiple readmissions for similar complaints and was recently discharged from [facility].  Seen by renal, underwent HD on 5/2 with some improvement in symptoms.    Interval History:  Seen today with Telugu speaking RN.  Pt reports continued SOB, SaO2 ~ 85% on 4Lnc.  Some improvement sitting upright.  No chest pain / palpitations.  He reports his SOB has been improving after HD. No additional complaints. No pain    ____________________PHYSICAL EXAM:  Vitals reviewed as indicated below  GENERAL:  NAD Alert and Oriented x 3   HEENT: NCAT  CARDIOVASCULAR:  S1, S2  LUNGS: b/l crackles to mid lung fields.   ABDOMEN:  soft, (-) tenderness, (-) distension, (+) bowel sounds, (-) guarding, (-) rebound (-) rigidity  EXTREMITIES:  no cyanosis / clubbing / edema.   ____________________      BACKGROUND:  HEALTH ISSUES - PROBLEM Dx:  Goals of care, counseling/discussion: Goals of care, counseling/discussion  Hypertension: Hypertension  High cholesterol: High cholesterol  CAD (coronary artery disease): CAD (coronary artery disease)  ESRD on dialysis: ESRD on dialysis  SOB (shortness of breath): SOB (shortness of breath)        Allergies    No Known Allergies    Intolerances      PAST MEDICAL & SURGICAL HISTORY:  Dialysis patient: Tues, Thurs, Saturday  Chronic anemia  ESRD (end stage renal disease): right Upper arm fistula  CAD (coronary artery disease)  Lung cancer: had yoni radiation in 2006.  Bipolar disorder  High cholesterol  Hypertension  S/P CABG (coronary artery bypass graft): pt&#x27;s son in law states h/o some stents near neck area ? carotid ? he is not sure.        VITALS:  Vital Signs Last 24 Hrs  T(C): 36.9 (03 May 2019 05:17), Max: 36.9 (03 May 2019 05:17)  T(F): 98.4 (03 May 2019 05:17), Max: 98.4 (03 May 2019 05:17)  HR: 58 (03 May 2019 05:17) (49 - 72)  BP: 100/50 (03 May 2019 05:17) (100/50 - 193/79)  BP(mean): --  RR: 18 (03 May 2019 05:17) (17 - 23)  SpO2: 93% (03 May 2019 05:17) (91% - 100%) Daily     Daily   CAPILLARY BLOOD GLUCOSE        I&O's Summary        LABS:                        10.2   7.0   )-----------( 241      ( 03 May 2019 06:36 )             34.3     05-03    135  |  94<L>  |  12.0  ----------------------------<  83  4.5   |  31.0<H>  |  3.37<H>    Ca    9.0      03 May 2019 06:36  Phos  3.4     05-03  Mg     1.9     05-03    TPro  7.2  /  Alb  3.2<L>  /  TBili  0.2<L>  /  DBili  x   /  AST  25  /  ALT  20  /  AlkPhos  47  05-02    PT/INR - ( 03 May 2019 06:36 )   PT: 11.1 sec;   INR: 0.97 ratio           LIVER FUNCTIONS - ( 02 May 2019 09:17 )  Alb: 3.2 g/dL / Pro: 7.2 g/dL / ALK PHOS: 47 U/L / ALT: 20 U/L / AST: 25 U/L / GGT: x                     MEDICATIONS:  MEDICATIONS  (STANDING):  ALBUTerol/ipratropium for Nebulization 3 milliLiter(s) Nebulizer every 6 hours  aspirin  chewable 81 milliGRAM(s) Oral daily  atorvastatin 80 milliGRAM(s) Oral at bedtime  buMETAnide 2 milliGRAM(s) Oral daily  clopidogrel Tablet 75 milliGRAM(s) Oral daily  docusate sodium 100 milliGRAM(s) Oral three times a day  DULoxetine 20 milliGRAM(s) Oral daily  gabapentin 300 milliGRAM(s) Oral two times a day  heparin  Injectable 5000 Unit(s) SubCutaneous every 12 hours  hydrALAZINE 25 milliGRAM(s) Oral every 8 hours  isosorbide   mononitrate ER Tablet (IMDUR) 120 milliGRAM(s) Oral daily  levothyroxine 100 MICROGram(s) Oral daily  losartan 100 milliGRAM(s) Oral daily  metoprolol tartrate 25 milliGRAM(s) Oral two times a day  multivitamin 1 Tablet(s) Oral daily  pantoprazole    Tablet 40 milliGRAM(s) Oral before breakfast  senna 2 Tablet(s) Oral at bedtime  tiotropium 18 MICROgram(s) Capsule 1 Capsule(s) Inhalation daily    MEDICATIONS  (PRN):  acetaminophen   Tablet .. 650 milliGRAM(s) Oral every 6 hours PRN Temp greater or equal to 38C (100.4F), Mild Pain (1 - 3)
Vital Signs Last 24 Hrs  T(C): 36.6 (07 May 2019 05:52), Max: 36.7 (06 May 2019 11:23)  T(F): 97.8 (07 May 2019 05:52), Max: 98 (06 May 2019 11:23)  HR: 80 (07 May 2019 09:50) (60 - 80)  BP: 127/71 (07 May 2019 05:52) (111/53 - 137/53)  BP(mean): --  RR: 18 (06 May 2019 17:00) (18 - 18)  SpO2: 90% (07 May 2019 05:52) (90% - 100%)    137    |  95<L>  |  21.0<H>  ----------------------------<  81     Ca:8.9   (06 May 2019 07:24)  4.4     |  28.0   |  5.27<H>                        9.7<L>  6.4   )-----------( 243      ( 06 May 2019 07:24 )             32.1<L>    Phos:3.4 mg/dL M.9 mg/dL PTH:-- Uric acid:-- Serum Osm:--  Ferritin:-- Iron:-- TIBC:-- Tsat:--  B12:-- TSH:-- ( @ 06:36)    HPI:  79 yr old male with hypertension, hyperlipidemia, ESRD on HD, CAD, lung cancer admitted with complaints of shortness of breath. Patient with multiple readmissions for fluid overload. He is a poor historian, recently discharged. Daughter states he was doing 'ok', until last night when his oxygen requirements increased. Daughter noted him to be congested but unable to bring up any phlegm. He did not have a fever or chest pain. Patient usually bed bound per daughter. No nausea, vomiting, abdominal pain, leg swelling. No chills. No falls. His last HD was on Tuesday. Compliant with medications.     PERTINENT PMH REVIEWED :        Bipolar disorder     CAD (coronary artery disease)     Chronic anemia     Dialysis patient Tues, Thurs, Saturday    ESRD (end stage renal disease) right Upper arm fistula    High cholesterol     Hypertension     Lung cancer had  radiation in .     PAST SURGICAL HISTORY:    S/P CABG (coronary artery bypass graft  pt's son in law states h/o some stents near neck area ?    SOCIAL HISTORY:  EtOH [ ] Yes  [x ] No                                    Drugs [ ] Yes [x ] No                                   x ] former smoker [ ] nonsmoker                                    Admitted from: [ ] home [ ] SNF _________ [ ] [facility] ________    FAMILY HISTORY:  No pertinent family history in first degree relatives    Baseline ADLs (prior to admission):    Dependent   [x ] moderately [ ]fully    MEDICATIONS  (STANDING):  ALBUTerol/ipratropium for Nebulization 3 milliLiter(s) Nebulizer every 6 hours  aspirin  chewable 81 milliGRAM(s) Oral daily  atorvastatin 80 milliGRAM(s) Oral at bedtime  buMETAnide 2 milliGRAM(s) Oral daily  chlorhexidine 2% Cloths 1 Application(s) Topical <User Schedule>  clopidogrel Tablet 75 milliGRAM(s) Oral daily  docusate sodium 100 milliGRAM(s) Oral three times a day  DULoxetine 20 milliGRAM(s) Oral daily  epoetin nguyễn Injectable 4000 Unit(s) IV Push <User Schedule>  gabapentin 300 milliGRAM(s) Oral two times a day  heparin  Injectable 5000 Unit(s) SubCutaneous every 12 hours  hydrALAZINE 25 milliGRAM(s) Oral every 12 hours  isosorbide   mononitrate ER Tablet (IMDUR) 120 milliGRAM(s) Oral daily  levothyroxine 100 MICROGram(s) Oral daily  losartan 100 milliGRAM(s) Oral daily  metoprolol tartrate 25 milliGRAM(s) Oral two times a day  multivitamin 1 Tablet(s) Oral daily  pantoprazole    Tablet 40 milliGRAM(s) Oral before breakfast  senna 2 Tablet(s) Oral at bedtime    MEDICATIONS  (PRN):  acetaminophen   Tablet .. 650 milliGRAM(s) Oral every 6 hours PRN Temp greater or equal to 38C (100.4F), Mild Pain (1 - 3)    Allergies    No Known Allergies    REVIEW OF SYSTEMS     see HPI - poor historian  [x ] Unable to obtain due to poor mentation   Karnofsky Performance Score/Palliative Performance Status Version 2:   40      %    Vital Signs Last 24 Hrs  T(C): 36.7 (06 May 2019 11:23), Max: 36.7 (06 May 2019 11:23)  T(F): 98 (06 May 2019 11:23), Max: 98 (06 May 2019 11:23)  HR: 63 (06 May 2019 11:23) (55 - 89)  BP: 137/53 (06 May 2019 11:23) (114/39 - 140/53)  BP(mean): --  RR: 18 (06 May 2019 11:23) (17 - 18)  SpO2: 93% (06 May 2019 11:23) (93% - 100%)    PHYSICAL EXAM:    General: Elderly man NAD     HEENT: [x ] normal  [ ] dry mouth  [ ] ET tube/trach    Lungs: [x ] comfortable,     CV: [x ] normal  [ ] tachycardia    GI: [x ] normal  [ ] distended  [ ] tender  [ ] no BS        : [ ] normal  [ ] incontinent  [ x] oliguria/anuria  [ ] olea    MSK: [ ] normal  [x ] weakness  [ ] edema             [ ] ambulatory  [ ] bedbound/wheelchair bound    Skin: [ ] normal  [ ] pressure ulcers- Stage_____  [ x] no rash    LABS:                        9.7    6.4   )-----------( 243      ( 06 May 2019 07:24 )             32.1         137  |  95<L>  |  21.0<H>  ----------------------------<  81  4.4   |  28.0  |  5.27<H>    Ca    8.9      06 May 2019 07:24    I&O's Summary    06 May 2019 07:01  -  06 May 2019 16:06  --------------------------------------------------------  IN: 480 mL / OUT: 2 mL / NET: 478 mL    RADIOLOGY & ADDITIONAL STUDIES:    EXAM:  XR CHEST PORTABLE URGENT 1V                          PROCEDURE DATE:  2019      INTERPRETATION:  XR CHEST URGENT    History: SOB, hypoxia    Technique: Single AP view of the chest.    Comparison: Chest x-ray 2019 and CT chest 2019    Findings:    Lungs/Pleura:  Patchy opacities in the lungs, decreased since 2019.   Bilateral pleural effusions again seen.    Heart/Mediastinum:  The cardiomediastinal silhouette stable in appearance.    Bones:  Status post sternotomy.    Impression:    Patchy bilateral lung opacities, improved since 2019, either   pulmonary edema or pneumonia.    Bilateral pleural effusions.    ADVANCE DIRECTIVES:  [x ] YES [ ] NO   DNR [ x] YES [ ] NO  Completed on:                     MOLST  [x ] YES [ ] NO   Completed on:  Living Will  [ ] YES [x ] NO   Completed on:      79 yr old man  with ESRD on HD, CAD, past hx  lung cancer, pulmonary fibrosis admitted with HF      Problem: Heart failure.     Recommendation: Plan per cardio- Bumex 2mg., On Non HD Days,         ·  Problem: CAD (coronary artery disease).   Cont meds- statin, BB, ASA.       ·  Problem: Patient is debilitated ,O2 dependent, bedbound with multiple hospitalizations. Though patient could  benefit from hospice services given his frail cardiopulmonary status, patient is receiving HD which could NOT be provided by hospice.    Current plan is to continue HD.
CC: ESRD on HD. Lung cancer. Hypoxemia requiring hiFlo oxygen.   HPI:  79 yr old male with hypertension, hyperlipidemia, ESRD on HD, CAD, lung cancer admitted with complaints of shortness of breath. Patient with multiple readmissions for fluid overload. He is a poor historian, recently discharged. Daughter states he was doing 'ok', until last night when his oxygen requirements increased. Daughter noted him to be congested but unable to bring up any phlegm. He did not have a fever or chest pain. Patient usually bed bound per daughter. No nausea, vomiting, abdominal pain, leg swelling. No chills. No falls. His last HD was on Tuesday. Compliant with medications. (02 May 2019 14:26)    REVIEW OF SYSTEMS:    Patient denied fever, chills, abdominal pain, nausea, vomiting, cough, shortness of breath, chest pain or palpitations    Vital Signs Last 24 Hrs  T(C): 36.5 (04 May 2019 09:50), Max: 36.9 (04 May 2019 04:50)  T(F): 97.7 (04 May 2019 09:50), Max: 98.4 (04 May 2019 04:50)  HR: 62 (04 May 2019 12:09) (57 - 77)  BP: 105/60 (04 May 2019 12:09) (100/50 - 135/58)  BP(mean): --  RR: 19 (04 May 2019 09:50) (18 - 22)  SpO2: 94% (04 May 2019 09:50) (94% - 100%)I&O's Summary    03 May 2019 07:01  -  04 May 2019 07:00  --------------------------------------------------------  IN: 0 mL / OUT: 1500 mL / NET: -1500 mL      PHYSICAL EXAM:  GENERAL: NAD, well-groomed  HEENT: PERRL, +EOMI, anicteric, no [place]  NECK: Supple, No JVD   CHEST/LUNG:  bilateral rales   HEART: S1S2 Normal intensity, no murmurs, gallops or rubs noted  ABDOMEN: Soft, BS Normoactive, NT, ND, no HSM noted  EXTREMITIES:  2+ radial and DP pulses noted, no clubbing, cyanosis, or edema noted, Limited mobility   SKIN: No rashes or lesions noted  NEURO: Lethargy , no focal deficits noted, CN II-XII intact  PSYCH: Depressed mood and affect; insight/judgement inappropriate  LABS:                        10.2   7.0   )-----------( 241      ( 03 May 2019 06:36 )             34.3     05-03    135  |  94<L>  |  12.0  ----------------------------<  83  4.5   |  31.0<H>  |  3.37<H>    Ca    9.0      03 May 2019 06:36  Phos  3.4     05-03  Mg     1.9     05-03      PT/INR - ( 03 May 2019 06:36 )   PT: 11.1 sec;   INR: 0.97 ratio             RADIOLOGY & ADDITIONAL TESTS:    MEDICATIONS:  MEDICATIONS  (STANDING):  ALBUTerol/ipratropium for Nebulization 3 milliLiter(s) Nebulizer every 6 hours  aspirin  chewable 81 milliGRAM(s) Oral daily  atorvastatin 80 milliGRAM(s) Oral at bedtime  buMETAnide 2 milliGRAM(s) Oral daily  chlorhexidine 2% Cloths 1 Application(s) Topical <User Schedule>  clopidogrel Tablet 75 milliGRAM(s) Oral daily  docusate sodium 100 milliGRAM(s) Oral three times a day  DULoxetine 20 milliGRAM(s) Oral daily  gabapentin 300 milliGRAM(s) Oral two times a day  heparin  Injectable 5000 Unit(s) SubCutaneous every 12 hours  hydrALAZINE 25 milliGRAM(s) Oral every 12 hours  isosorbide   mononitrate ER Tablet (IMDUR) 120 milliGRAM(s) Oral daily  levothyroxine 100 MICROGram(s) Oral daily  losartan 100 milliGRAM(s) Oral daily  metoprolol tartrate 25 milliGRAM(s) Oral two times a day  multivitamin 1 Tablet(s) Oral daily  pantoprazole    Tablet 40 milliGRAM(s) Oral before breakfast  senna 2 Tablet(s) Oral at bedtime    MEDICATIONS  (PRN):  acetaminophen   Tablet .. 650 milliGRAM(s) Oral every 6 hours PRN Temp greater or equal to 38C (100.4F), Mild Pain (1 - 3)
CC: Resp failure from Lung cancer, ESRD fluid overload.  Weaning off HiFlo and tolerating nasal cannula  HPI:  79 yr old male with hypertension, hyperlipidemia, ESRD on HD, CAD, lung cancer admitted with complaints of shortness of breath. Patient with multiple readmissions for fluid overload. He is a poor historian, recently discharged. Daughter states he was doing 'ok', until last night when his oxygen requirements increased. Daughter noted him to be congested but unable to bring up any phlegm. He did not have a fever or chest pain. Patient usually bed bound per daughter. No nausea, vomiting, abdominal pain, leg swelling. No chills. No falls. His last HD was on Tuesday. Compliant with medications. (02 May 2019 14:26)    REVIEW OF SYSTEMS:    Patient denied fever, chills, abdominal pain, nausea, vomiting, cough, shortness of breath, chest pain or palpitations    Vital Signs Last 24 Hrs  T(C): 36.7 (06 May 2019 11:23), Max: 36.7 (06 May 2019 11:23)  T(F): 98 (06 May 2019 11:23), Max: 98 (06 May 2019 11:23)  HR: 63 (06 May 2019 11:23) (55 - 89)  BP: 137/53 (06 May 2019 11:23) (114/39 - 140/53)  BP(mean): --  RR: 18 (06 May 2019 11:23) (17 - 18)  SpO2: 93% (06 May 2019 11:23) (93% - 100%)I&O's Summary    PHYSICAL EXAM:  GENERAL: NAD, cachexia.    HEENT: PERRL, +EOMI, anicteric, no [place]  NECK: Supple, No JVD   CHEST/LUNG: bilateral rales   HEART: S1S2 Normal intensity, no murmurs, gallops or rubs noted  ABDOMEN: Soft, BS Normoactive, NT, ND, no HSM noted  EXTREMITIES:  2+ radial and DP pulses noted, no clubbing, cyanosis, or edema noted, Limited mobility   SKIN: No rashes or lesions noted  NEURO: A&O, no focal deficits noted, CN II-XII intact  PSYCH: Depressed mood and affect; insight/judgement appropriate  LABS:                        9.7    6.4   )-----------( 243      ( 06 May 2019 07:24 )             32.1     05-06    137  |  95<L>  |  21.0<H>  ----------------------------<  81  4.4   |  28.0  |  5.27<H>    Ca    8.9      06 May 2019 07:24          RADIOLOGY & ADDITIONAL TESTS:    MEDICATIONS:  MEDICATIONS  (STANDING):  ALBUTerol/ipratropium for Nebulization 3 milliLiter(s) Nebulizer every 6 hours  aspirin  chewable 81 milliGRAM(s) Oral daily  atorvastatin 80 milliGRAM(s) Oral at bedtime  buMETAnide 2 milliGRAM(s) Oral daily  chlorhexidine 2% Cloths 1 Application(s) Topical <User Schedule>  clopidogrel Tablet 75 milliGRAM(s) Oral daily  docusate sodium 100 milliGRAM(s) Oral three times a day  DULoxetine 20 milliGRAM(s) Oral daily  gabapentin 300 milliGRAM(s) Oral two times a day  heparin  Injectable 5000 Unit(s) SubCutaneous every 12 hours  hydrALAZINE 25 milliGRAM(s) Oral every 12 hours  isosorbide   mononitrate ER Tablet (IMDUR) 120 milliGRAM(s) Oral daily  levothyroxine 100 MICROGram(s) Oral daily  losartan 100 milliGRAM(s) Oral daily  metoprolol tartrate 25 milliGRAM(s) Oral two times a day  multivitamin 1 Tablet(s) Oral daily  pantoprazole    Tablet 40 milliGRAM(s) Oral before breakfast  senna 2 Tablet(s) Oral at bedtime    MEDICATIONS  (PRN):  acetaminophen   Tablet .. 650 milliGRAM(s) Oral every 6 hours PRN Temp greater or equal to 38C (100.4F), Mild Pain (1 - 3)
CHIEF COMPLAINT:Patient is a 79y old  Male who presents with a chief complaint of shortness of breath (03 May 2019 10:00)      INTERVAL HISTORY: Patient received HD yesterday, still desaturating and short of breath on hi-flow oxygen. Patient may require longer HD sessions or more frequent sessions.       Allergies    No Known Allergies    Intolerances    	  MEDICATIONS:  buMETAnide 2 milliGRAM(s) Oral daily  hydrALAZINE 25 milliGRAM(s) Oral every 8 hours  isosorbide   mononitrate ER Tablet (IMDUR) 120 milliGRAM(s) Oral daily  losartan 100 milliGRAM(s) Oral daily  metoprolol tartrate 25 milliGRAM(s) Oral two times a day      ALBUTerol/ipratropium for Nebulization 3 milliLiter(s) Nebulizer every 6 hours    acetaminophen   Tablet .. 650 milliGRAM(s) Oral every 6 hours PRN  DULoxetine 20 milliGRAM(s) Oral daily  gabapentin 300 milliGRAM(s) Oral two times a day    docusate sodium 100 milliGRAM(s) Oral three times a day  pantoprazole    Tablet 40 milliGRAM(s) Oral before breakfast  senna 2 Tablet(s) Oral at bedtime    atorvastatin 80 milliGRAM(s) Oral at bedtime  levothyroxine 100 MICROGram(s) Oral daily    aspirin  chewable 81 milliGRAM(s) Oral daily  chlorhexidine 2% Cloths 1 Application(s) Topical <User Schedule>  clopidogrel Tablet 75 milliGRAM(s) Oral daily  heparin  Injectable 5000 Unit(s) SubCutaneous every 12 hours  multivitamin 1 Tablet(s) Oral daily        PHYSICAL EXAM:    T(C): 36.6 (05-03-19 @ 10:25), Max: 36.9 (05-03-19 @ 05:17)  HR: 96 (05-03-19 @ 10:25) (49 - 96)  BP: 126/52 (05-03-19 @ 10:25) (100/50 - 193/79)  RR: 18 (05-03-19 @ 10:25) (17 - 23)  SpO2: 96% (05-03-19 @ 10:25) (91% - 100%)  Wt(kg): --      PHYSICAL EXAM:  Appearance: Normal, well nourished	  HEENT:   Normal oral mucosa, PERRL, EOMI, sclera non-icteric	  Lymphatic: No cervical lymphadenopathy  Cardiovascular: Bradycardic S1 S2, + JVD, No cardiac murmurs, No carotid bruits, No peripheral edema  Respiratory: Diffuse rales bilaterally  Psychiatry: A & O x 3, Mood & affect appropriate  Gastrointestinal:  Soft, Non-tender, + BS, no bruits	  Skin: No rashes, No ecchymoses, No cyanosis  Neurologic: Grossly non-focal with full strength in all four extremities  Extremities: Normal range of motion, No clubbing, cyanosis or edema  Vascular: Peripheral pulses palpable 2+ bilaterally    TELEMETRY: 	  SR/SB  ECG:  	SB, PVCs, anteroseptal infarct, NSST/T wave abnormality    RADIOLOGY:   < from: Xray Chest 1 View- PORTABLE-Urgent (05.03.19 @ 09:56) >     EXAM:  XR CHEST PORTABLE URGENT 1V                          PROCEDURE DATE:  05/03/2019          INTERPRETATION:  XR CHEST URGENT    History: SOB, hypoxia    Technique: Single AP view of the chest.    Comparison: Chest x-ray 5/2/2019 and CT chest 2/4/2019    Findings:    Lungs/Pleura:  Patchy opacities in the lungs, decreased since 5/2/2019.   Bilateral pleural effusions again seen.    Heart/Mediastinum:  The cardiomediastinal silhouette stable in appearance.    Bones:  Status post sternotomy.    Impression:    Patchy bilateral lung opacities, improved since 5/2/2019, either   pulmonary edema or pneumonia.    Bilateral pleural effusions.    < end of copied text >        LABS:	 	    CARDIAC MARKERS:                            10.2   7.0   )-----------( 241      ( 03 May 2019 06:36 )             34.3     05-03    135  |  94<L>  |  12.0  ----------------------------<  83  4.5   |  31.0<H>  |  3.37<H>    Ca    9.0      03 May 2019 06:36  Phos  3.4     05-03  Mg     1.9     05-03    TPro  7.2  /  Alb  3.2<L>  /  TBili  0.2<L>  /  DBili  x   /  AST  25  /  ALT  20  /  AlkPhos  47  05-02    proBNP:   Lipid Profile:   HgA1c:   TSH:
[facility] DIVISION OF KIDNEY DISEASES AND HYPERTENSION -- FOLLOW UP NOTE  --------------------------------------------------------------------------------  Chief Complaint: ESRD HD    24 hour events/subjective:  Seen/examined  plan for HD Tuesday  Tolerated yesterday      PAST HISTORY  --------------------------------------------------------------------------------  No significant changes to PMH, PSH, FHx, SHx, unless otherwise noted    ALLERGIES & MEDICATIONS  --------------------------------------------------------------------------------  Allergies    No Known Allergies    Intolerances      Standing Inpatient Medications  ALBUTerol/ipratropium for Nebulization 3 milliLiter(s) Nebulizer every 6 hours  aspirin  chewable 81 milliGRAM(s) Oral daily  atorvastatin 80 milliGRAM(s) Oral at bedtime  buMETAnide 2 milliGRAM(s) Oral daily  chlorhexidine 2% Cloths 1 Application(s) Topical <User Schedule>  clopidogrel Tablet 75 milliGRAM(s) Oral daily  docusate sodium 100 milliGRAM(s) Oral three times a day  DULoxetine 20 milliGRAM(s) Oral daily  gabapentin 300 milliGRAM(s) Oral two times a day  heparin  Injectable 5000 Unit(s) SubCutaneous every 12 hours  hydrALAZINE 25 milliGRAM(s) Oral every 12 hours  isosorbide   mononitrate ER Tablet (IMDUR) 120 milliGRAM(s) Oral daily  levothyroxine 100 MICROGram(s) Oral daily  losartan 100 milliGRAM(s) Oral daily  metoprolol tartrate 25 milliGRAM(s) Oral two times a day  multivitamin 1 Tablet(s) Oral daily  pantoprazole    Tablet 40 milliGRAM(s) Oral before breakfast  senna 2 Tablet(s) Oral at bedtime    PRN Inpatient Medications  acetaminophen   Tablet .. 650 milliGRAM(s) Oral every 6 hours PRN      REVIEW OF SYSTEMS  --------------------------------------------------------------------------------  Gen: No weight changes, fatigue, fevers/chills, weakness  Skin: No rashes  Head/Eyes/Ears/Mouth: No headache; Normal hearing; Normal vision w/o blurriness; No sinus pain/discomfort, sore throat  Respiratory: No dyspnea, cough, wheezing, hemoptysis  CV: No chest pain, PND, orthopnea  GI: No abdominal pain, diarrhea, constipation, nausea, vomiting, melena, hematochezia  : No increased frequency, dysuria, hematuria, nocturia  MSK: No joint pain/swelling; no back pain; no edema  Neuro: No dizziness/lightheadedness, weakness, seizures, numbness, tingling  Heme: No easy bruising or bleeding  Endo: No heat/cold intolerance  Psych: No significant nervousness, anxiety, stress, depression    All other systems were reviewed and are negative, except as noted.    VITALS/PHYSICAL EXAM  --------------------------------------------------------------------------------  T(C): 36.9 (05-05-19 @ 08:50), Max: 37 (05-05-19 @ 05:21)  HR: 60 (05-05-19 @ 09:32) (57 - 68)  BP: 114/70 (05-05-19 @ 08:50) (107/65 - 145/66)  RR: 18 (05-04-19 @ 20:36) (18 - 18)  SpO2: 95% (05-05-19 @ 09:32) (95% - 100%)  Wt(kg): --        05-04-19 @ 07:01  -  05-05-19 @ 07:00  --------------------------------------------------------  IN: 0 mL / OUT: 1500 mL / NET: -1500 mL      Physical Exam:  	Gen: NAD,   	HEENT: PERRL, supple neck, clear oropharynx  	Pulm: dec BS  	CV: RRR, S1S2; no rub  	Back: No spinal or CVA tenderness; no sacral edema  	Abd: +BS, soft, nontender/nondistended  	: No suprapubic tenderness  	UE: Warm, FROM, no clubbing, intact strength; no edema; no asterixis  	LE: Warm, FROM, no clubbing, intact strength; no edema  	Neuro: No focal deficits, intact gait  	Psych: Normal affect and mood  	Skin: Warm, without rashes  	Vascular access:    LABS/STUDIES  --------------------------------------------------------------------------------                Creatinine Trend:  SCr 3.37 [05-03 @ 06:36]  SCr 4.54 [05-02 @ 09:17]  SCr 5.13 [04-26 @ 06:17]  SCr 3.68 [04-25 @ 06:10]  SCr 5.91 [04-24 @ 06:37]        Iron 34, TIBC 235, %sat 14      [01-29-19 @ 11:39]  Ferritin 1049      [01-29-19 @ 11:39]  TSH 1.83      [03-11-19 @ 07:37]  Lipid: chol 159, , HDL 32, LDL 94      [01-29-19 @ 11:39]    HBsAg Nonreact      [04-10-19 @ 17:37]

## 2019-05-07 NOTE — DISCHARGE NOTE PROVIDER - CARE PROVIDERS DIRECT ADDRESSES
[email or web address]

## 2019-05-07 NOTE — DIETITIAN INITIAL EVALUATION ADULT. - PHYSICAL APPEARANCE
BMI 18.1; NFPE conducted- moderate muscle loss- clavicles, shoulders; moderate subcutaneous fat loss- orbitals, buccals; severe subcutaneous fat loss: triceps.

## 2019-05-23 ENCOUNTER — INPATIENT (INPATIENT)
Facility: HOSPITAL | Age: 79
LOS: 2 days | Discharge: HOME HEALTH | DRG: 291 | End: 2019-05-26
Attending: INTERNAL MEDICINE | Admitting: HOSPITALIST
Payer: COMMERCIAL

## 2019-05-23 VITALS
DIASTOLIC BLOOD PRESSURE: 63 MMHG | RESPIRATION RATE: 20 BRPM | SYSTOLIC BLOOD PRESSURE: 170 MMHG | HEART RATE: 54 BPM | WEIGHT: 130.07 LBS | TEMPERATURE: 98 F | OXYGEN SATURATION: 100 % | HEIGHT: 66 IN

## 2019-05-23 DIAGNOSIS — I10 ESSENTIAL (PRIMARY) HYPERTENSION: ICD-10-CM

## 2019-05-23 DIAGNOSIS — J81.0 ACUTE PULMONARY EDEMA: ICD-10-CM

## 2019-05-23 DIAGNOSIS — I50.9 HEART FAILURE, UNSPECIFIED: ICD-10-CM

## 2019-05-23 DIAGNOSIS — R07.9 CHEST PAIN, UNSPECIFIED: ICD-10-CM

## 2019-05-23 DIAGNOSIS — E03.9 HYPOTHYROIDISM, UNSPECIFIED: ICD-10-CM

## 2019-05-23 DIAGNOSIS — I25.10 ATHEROSCLEROTIC HEART DISEASE OF NATIVE CORONARY ARTERY WITHOUT ANGINA PECTORIS: ICD-10-CM

## 2019-05-23 DIAGNOSIS — N18.6 END STAGE RENAL DISEASE: ICD-10-CM

## 2019-05-23 DIAGNOSIS — Z95.1 PRESENCE OF AORTOCORONARY BYPASS GRAFT: Chronic | ICD-10-CM

## 2019-05-23 LAB
ALBUMIN SERPL ELPH-MCNC: 3.5 G/DL — SIGNIFICANT CHANGE UP (ref 3.3–5.2)
ALP SERPL-CCNC: 52 U/L — SIGNIFICANT CHANGE UP (ref 40–120)
ALT FLD-CCNC: 10 U/L — SIGNIFICANT CHANGE UP
ANION GAP SERPL CALC-SCNC: 11 MMOL/L — SIGNIFICANT CHANGE UP (ref 5–17)
APTT BLD: 33.3 SEC — SIGNIFICANT CHANGE UP (ref 27.5–36.3)
AST SERPL-CCNC: 23 U/L — SIGNIFICANT CHANGE UP
BILIRUB SERPL-MCNC: 0.3 MG/DL — LOW (ref 0.4–2)
BLD GP AB SCN SERPL QL: SIGNIFICANT CHANGE UP
BUN SERPL-MCNC: 17 MG/DL — SIGNIFICANT CHANGE UP (ref 8–20)
CALCIUM SERPL-MCNC: 9 MG/DL — SIGNIFICANT CHANGE UP (ref 8.6–10.2)
CHLORIDE SERPL-SCNC: 94 MMOL/L — LOW (ref 98–107)
CO2 SERPL-SCNC: 33 MMOL/L — HIGH (ref 22–29)
CREAT SERPL-MCNC: 4.94 MG/DL — HIGH (ref 0.5–1.3)
GLUCOSE BLDC GLUCOMTR-MCNC: 82 MG/DL — SIGNIFICANT CHANGE UP (ref 70–99)
GLUCOSE SERPL-MCNC: 103 MG/DL — SIGNIFICANT CHANGE UP (ref 70–115)
HCT VFR BLD CALC: 32.5 % — LOW (ref 42–52)
HGB BLD-MCNC: 9.7 G/DL — LOW (ref 14–18)
INR BLD: 0.95 RATIO — SIGNIFICANT CHANGE UP (ref 0.88–1.16)
MCHC RBC-ENTMCNC: 28 PG — SIGNIFICANT CHANGE UP (ref 27–31)
MCHC RBC-ENTMCNC: 29.8 G/DL — LOW (ref 32–36)
MCV RBC AUTO: 93.9 FL — SIGNIFICANT CHANGE UP (ref 80–94)
NT-PROBNP SERPL-SCNC: HIGH PG/ML (ref 0–300)
PLATELET # BLD AUTO: 195 K/UL — SIGNIFICANT CHANGE UP (ref 150–400)
POTASSIUM SERPL-MCNC: 4.6 MMOL/L — SIGNIFICANT CHANGE UP (ref 3.5–5.3)
POTASSIUM SERPL-SCNC: 4.6 MMOL/L — SIGNIFICANT CHANGE UP (ref 3.5–5.3)
PROT SERPL-MCNC: 7.5 G/DL — SIGNIFICANT CHANGE UP (ref 6.6–8.7)
PROTHROM AB SERPL-ACNC: 10.9 SEC — SIGNIFICANT CHANGE UP (ref 10–12.9)
RBC # BLD: 3.46 M/UL — LOW (ref 4.6–6.2)
RBC # FLD: 17.1 % — HIGH (ref 11–15.6)
SODIUM SERPL-SCNC: 138 MMOL/L — SIGNIFICANT CHANGE UP (ref 135–145)
TSH SERPL-MCNC: 0.89 UIU/ML — SIGNIFICANT CHANGE UP (ref 0.27–4.2)
TYPE + AB SCN PNL BLD: SIGNIFICANT CHANGE UP
WBC # BLD: 7.6 K/UL — SIGNIFICANT CHANGE UP (ref 4.8–10.8)
WBC # FLD AUTO: 7.6 K/UL — SIGNIFICANT CHANGE UP (ref 4.8–10.8)

## 2019-05-23 PROCEDURE — 99223 1ST HOSP IP/OBS HIGH 75: CPT

## 2019-05-23 PROCEDURE — 93010 ELECTROCARDIOGRAM REPORT: CPT

## 2019-05-23 PROCEDURE — 71045 X-RAY EXAM CHEST 1 VIEW: CPT | Mod: 26

## 2019-05-23 PROCEDURE — 99285 EMERGENCY DEPT VISIT HI MDM: CPT | Mod: 25

## 2019-05-23 RX ORDER — LANOLIN ALCOHOL/MO/W.PET/CERES
3 CREAM (GRAM) TOPICAL ONCE
Refills: 0 | Status: COMPLETED | OUTPATIENT
Start: 2019-05-23 | End: 2019-05-24

## 2019-05-23 RX ORDER — FAMOTIDINE 10 MG/ML
20 INJECTION INTRAVENOUS DAILY
Refills: 0 | Status: DISCONTINUED | OUTPATIENT
Start: 2019-05-23 | End: 2019-05-26

## 2019-05-23 RX ORDER — ATORVASTATIN CALCIUM 80 MG/1
80 TABLET, FILM COATED ORAL AT BEDTIME
Refills: 0 | Status: DISCONTINUED | OUTPATIENT
Start: 2019-05-23 | End: 2019-05-26

## 2019-05-23 RX ORDER — HYDRALAZINE HCL 50 MG
50 TABLET ORAL EVERY 8 HOURS
Refills: 0 | Status: DISCONTINUED | OUTPATIENT
Start: 2019-05-23 | End: 2019-05-26

## 2019-05-23 RX ORDER — ASPIRIN/CALCIUM CARB/MAGNESIUM 324 MG
81 TABLET ORAL DAILY
Refills: 0 | Status: DISCONTINUED | OUTPATIENT
Start: 2019-05-23 | End: 2019-05-26

## 2019-05-23 RX ORDER — CLOPIDOGREL BISULFATE 75 MG/1
75 TABLET, FILM COATED ORAL DAILY
Refills: 0 | Status: DISCONTINUED | OUTPATIENT
Start: 2019-05-23 | End: 2019-05-26

## 2019-05-23 RX ORDER — ISOSORBIDE MONONITRATE 60 MG/1
30 TABLET, EXTENDED RELEASE ORAL DAILY
Refills: 0 | Status: DISCONTINUED | OUTPATIENT
Start: 2019-05-23 | End: 2019-05-23

## 2019-05-23 RX ORDER — IPRATROPIUM/ALBUTEROL SULFATE 18-103MCG
3 AEROSOL WITH ADAPTER (GRAM) INHALATION EVERY 6 HOURS
Refills: 0 | Status: DISCONTINUED | OUTPATIENT
Start: 2019-05-23 | End: 2019-05-26

## 2019-05-23 RX ORDER — GABAPENTIN 400 MG/1
100 CAPSULE ORAL
Refills: 0 | Status: DISCONTINUED | OUTPATIENT
Start: 2019-05-23 | End: 2019-05-26

## 2019-05-23 RX ORDER — NITROGLYCERIN 6.5 MG
0.5 CAPSULE, EXTENDED RELEASE ORAL EVERY 12 HOURS
Refills: 0 | Status: DISCONTINUED | OUTPATIENT
Start: 2019-05-23 | End: 2019-05-26

## 2019-05-23 RX ORDER — METOPROLOL TARTRATE 50 MG
50 TABLET ORAL EVERY 8 HOURS
Refills: 0 | Status: DISCONTINUED | OUTPATIENT
Start: 2019-05-23 | End: 2019-05-26

## 2019-05-23 RX ORDER — DULOXETINE HYDROCHLORIDE 30 MG/1
20 CAPSULE, DELAYED RELEASE ORAL DAILY
Refills: 0 | Status: DISCONTINUED | OUTPATIENT
Start: 2019-05-23 | End: 2019-05-26

## 2019-05-23 RX ORDER — LOSARTAN POTASSIUM 100 MG/1
100 TABLET, FILM COATED ORAL DAILY
Refills: 0 | Status: DISCONTINUED | OUTPATIENT
Start: 2019-05-23 | End: 2019-05-26

## 2019-05-23 RX ORDER — LEVOTHYROXINE SODIUM 125 MCG
100 TABLET ORAL DAILY
Refills: 0 | Status: DISCONTINUED | OUTPATIENT
Start: 2019-05-23 | End: 2019-05-26

## 2019-05-23 RX ORDER — HYDRALAZINE HCL 50 MG
25 TABLET ORAL EVERY 12 HOURS
Refills: 0 | Status: DISCONTINUED | OUTPATIENT
Start: 2019-05-23 | End: 2019-05-23

## 2019-05-23 RX ORDER — BUMETANIDE 0.25 MG/ML
2 INJECTION INTRAMUSCULAR; INTRAVENOUS DAILY
Refills: 0 | Status: DISCONTINUED | OUTPATIENT
Start: 2019-05-23 | End: 2019-05-26

## 2019-05-23 RX ORDER — ISOSORBIDE MONONITRATE 60 MG/1
120 TABLET, EXTENDED RELEASE ORAL DAILY
Refills: 0 | Status: DISCONTINUED | OUTPATIENT
Start: 2019-05-23 | End: 2019-05-26

## 2019-05-23 RX ADMIN — Medication 1 TABLET(S): at 15:25

## 2019-05-23 RX ADMIN — Medication 3 MILLILITER(S): at 14:28

## 2019-05-23 RX ADMIN — CLOPIDOGREL BISULFATE 75 MILLIGRAM(S): 75 TABLET, FILM COATED ORAL at 15:25

## 2019-05-23 RX ADMIN — Medication 3 MILLILITER(S): at 21:45

## 2019-05-23 RX ADMIN — Medication 50 MILLIGRAM(S): at 17:25

## 2019-05-23 RX ADMIN — GABAPENTIN 100 MILLIGRAM(S): 400 CAPSULE ORAL at 15:26

## 2019-05-23 RX ADMIN — Medication 50 MILLIGRAM(S): at 13:05

## 2019-05-23 RX ADMIN — ISOSORBIDE MONONITRATE 120 MILLIGRAM(S): 60 TABLET, EXTENDED RELEASE ORAL at 17:25

## 2019-05-23 RX ADMIN — LOSARTAN POTASSIUM 100 MILLIGRAM(S): 100 TABLET, FILM COATED ORAL at 15:25

## 2019-05-23 RX ADMIN — DULOXETINE HYDROCHLORIDE 20 MILLIGRAM(S): 30 CAPSULE, DELAYED RELEASE ORAL at 15:25

## 2019-05-23 RX ADMIN — ATORVASTATIN CALCIUM 80 MILLIGRAM(S): 80 TABLET, FILM COATED ORAL at 21:37

## 2019-05-23 RX ADMIN — Medication 81 MILLIGRAM(S): at 15:25

## 2019-05-23 RX ADMIN — Medication 0.5 INCH(S): at 11:06

## 2019-05-23 RX ADMIN — Medication 0.5 INCH(S): at 23:30

## 2019-05-23 RX ADMIN — FAMOTIDINE 20 MILLIGRAM(S): 10 INJECTION INTRAVENOUS at 15:25

## 2019-05-23 RX ADMIN — Medication 50 MILLIGRAM(S): at 21:38

## 2019-05-23 NOTE — CONSULT NOTE ADULT - SUBJECTIVE AND OBJECTIVE BOX
[facility] DIVISION OF KIDNEY DISEASES AND HYPERTENSION -- INITIAL CONSULT NOTE  --------------------------------------------------------------------------------  HPI: Pt is a 78 y/o male under the present care of this nephrology service with a PMHx: [facility]D [facility] HD [facility] [place]), CHF, HTN, CAD, Lung cancer, was brought in to the ER because of SOB. No LE edema present. BP was 170/60. Patient added that he feels left sided chest pain on and off since last night. Pain described as non-radiating. Patient feels nauseous and epigastric discomfort and although feels hungry he feels full after eating few pieces of food. BNP: 89439. Nephrology consult called to manage ESRD and HD for actively treating patient.           PAST HISTORY  --------------------------------------------------------------------------------  PAST MEDICAL & SURGICAL HISTORY:  Dialysis patient: Tues, Thurs, Saturday  Chronic anemia  ESRD (end stage renal disease): right Upper arm fistula  CAD (coronary artery disease)  Lung cancer: had yoni radiation in 2006.  Bipolar disorder  High cholesterol  Hypertension  S/P CABG (coronary artery bypass graft): pt&#x27;s son in [facility] h/o some stents near neck area ? carotid ? he is not sure.    FAMILY HISTORY:  No pertinent family history in first degree relatives    PAST SOCIAL HISTORY:    ALLERGIES & MEDICATIONS  --------------------------------------------------------------------------------  Allergies    No Known Allergies    Intolerances      Standing Inpatient Medications  ALBUTerol/ipratropium for Nebulization 3 milliLiter(s) Nebulizer every 6 hours  aspirin  chewable 81 milliGRAM(s) Oral daily  atorvastatin 80 milliGRAM(s) Oral at bedtime  buMETAnide 2 milliGRAM(s) Oral daily  clopidogrel Tablet 75 milliGRAM(s) Oral daily  DULoxetine 20 milliGRAM(s) Oral daily  famotidine    Tablet 20 milliGRAM(s) Oral daily  gabapentin 100 milliGRAM(s) Oral two times a day  hydrALAZINE 25 milliGRAM(s) Oral every 12 hours  isosorbide   mononitrate ER Tablet (IMDUR) 120 milliGRAM(s) Oral daily  levothyroxine 100 MICROGram(s) Oral daily  losartan 100 milliGRAM(s) Oral daily  metoprolol tartrate 50 milliGRAM(s) Oral every 8 hours  multivitamin 1 Tablet(s) Oral daily    PRN Inpatient Medications  nitroglycerin    2% Ointment 0.5 Inch(s) Transdermal every 12 hours PRN      REVIEW OF SYSTEMS  --------------------------------------------------------------------------------  Gen: No weight changes, fatigue, fevers/chills, weakness  Skin: No rashes  Head/Eyes/Ears/Mouth: No headache; Normal hearing; Normal vision w/o blurriness; No sinus pain/discomfort, sore throat  Respiratory: Mild dyspnea, cough, wheezing, hemoptysis, O2 dependent  CV: No chest pain, PND, orthopnea  GI: No abdominal pain, diarrhea, constipation, nausea, vomiting, melena, hematochezia  : No increased frequency, dysuria, hematuria, nocturia  MSK: No joint pain/swelling; no back pain; no edema  Neuro: No dizziness/lightheadedness, weakness, seizures, numbness, tingling  Heme: No easy bruising or bleeding  Endo: No heat/cold intolerance  Psych: No significant nervousness, anxiety, stress, depression    All other systems were reviewed and are negative, except as noted.    VITALS/PHYSICAL EXAM  --------------------------------------------------------------------------------  T(C): 36.9 (05-23-19 @ 12:55), Max: 36.9 (05-23-19 @ 09:45)  HR: 66 (05-23-19 @ 12:55) (54 - 81)  BP: 157/70 (05-23-19 @ 12:55) (157/70 - 170/84)  RR: 18 (05-23-19 @ 12:55) (18 - 20)  SpO2: 95% (05-23-19 @ 12:55) (92% - 100%)  Wt(kg): --  Height (cm): 167.64 (05-23-19 @ 06:05)  Weight (kg): 59 (05-23-19 @ 06:05)  BMI (kg/m2): 21 (05-23-19 @ 06:05)  BSA (m2): 1.67 (05-23-19 @ 06:05)      Physical Exam:  	Gen: NAD, chronically ill-appearing  	HEENT: PERRL, supple neck, clear oropharynx  	Pulm: CTA B/L, O2 dependent, mild dyspnea  	CV: RRR, S1S2; no rub  	Back: No spinal or CVA tenderness; no sacral edema  	Abd: +BS, soft, nontender/nondistended  	: No suprapubic tenderness  	UE: Warm, FROM, no clubbing, intact strength; no edema; no asterixis  	LE: Warm, FROM, no clubbing, intact strength; no edema  	Neuro: No focal deficits, intact gait  	Psych: Normal affect and mood  	Skin: Warm, without rashes  	Vascular access: AVG    LABS/STUDIES  --------------------------------------------------------------------------------              9.7    7.6   >-----------<  195      [05-23-19 @ 06:28]              32.5     138  |  94  |  17.0  ----------------------------<  103      [05-23-19 @ 06:28]  4.6   |  33.0  |  4.94        Ca     9.0     [05-23-19 @ 06:28]    TPro  7.5  /  Alb  3.5  /  TBili  0.3  /  DBili  x   /  AST  23  /  ALT  10  /  AlkPhos  52  [05-23-19 @ 06:28]    PT/INR: PT 10.9 , INR 0.95       [05-23-19 @ 06:28]  PTT: 33.3       [05-23-19 @ 06:28]      Creatinine Trend:  SCr 4.94 [05-23 @ 06:28]  SCr 5.27 [05-06 @ 07:24]  SCr 3.37 [05-03 @ 06:36]  SCr 4.54 [05-02 @ 09:17]  SCr 5.13 [04-26 @ 06:17]    Urinalysis - [01-01-19 @ 15:02]      Color Red / Appearance Bloody / SG 1.015 / pH 6.0      Gluc Negative / Ketone Negative  / Bili Negative / Urobili Negative       Blood Large / Protein 500 / Leuk Est Trace / Nitrite Negative      RBC TNTC / WBC 3-5 / Hyaline  / Gran  / Sq Epi  / Non Sq Epi Negative / Bacteria Occasional      Iron 34, TIBC 235, %sat 14      [01-29-19 @ 11:39]  Ferritin 1049      [01-29-19 @ 11:39]  TSH 0.89      [05-23-19 @ 06:28]  Lipid: chol 159, , HDL 32, LDL 94      [01-29-19 @ 11:39]    HBsAb <3.0      [01-08-19 @ 17:03]  HBsAg Nonreact      [04-10-19 @ 17:37]  HBcAb Nonreact      [12-05-18 @ 16:33]  HCV 0.12, Nonreact      [01-08-19 @ 17:03]

## 2019-05-23 NOTE — CONSULT NOTE ADULT - ASSESSMENT
1) ESRD on HD  2) MBD of renal dx  3) Anemia of renal dx  4) HTN    HD today - Consent obtained  Tolerated HD well today  Continue home meds  Daily Bumex  Please check daily phosphorus; not currently on binders  Will follow

## 2019-05-23 NOTE — CONSULT NOTE ADULT - ATTENDING COMMENTS
78 y/o male with HFpEF , ERRD , s/p CABG with ANA to LAD patent and patent CX stents with collaterals to RCA on recent cath presenting with recurrent chest pain , and SOB   Medical therapy for mild ischemia on stress test , no need further testing   dialysed today, will likely need dialysis tomorrow ( still congested on exam)   continue bumex as well   BP uncontrolled on current regimen, will reevaluate change of meds during this hospitalization.

## 2019-05-23 NOTE — ED PROVIDER NOTE - CLINICAL SUMMARY MEDICAL DECISION MAKING FREE TEXT BOX
labs and imaging reviewed. Pt with acute pulmonary edema. Dr. Miles to arrange for dialysis. Dr. Brown to admit patient.

## 2019-05-23 NOTE — ED PROVIDER NOTE - PHYSICAL EXAMINATION
Constitutional - well-developed; cachectic. Head - NCAT. Airway patent. Eyes - PERRL. CV - RRR. no murmur. no edema. Pulm - +bibasilar rales Abd - soft, nt. no rebound. no guarding. Neuro - A&Ox3. strength 5/5 x4. sensation intact x4. normal gait. Skin - No rash. MSK - normal ROM.

## 2019-05-23 NOTE — ED PROVIDER NOTE - OBJECTIVE STATEMENT
Pt is a 80 yo M co shortness of breath.  PMHx significant for htn, cad, lung CA, ESRD on HD (T, Th, Sat).  Pt states that last night he developed worsening shortness of breath and had to increase his oxygen use at home. sob worse with exertion. no cp. Pt was admitted here recently for the same symptoms and needed dialysis. no other complaints.

## 2019-05-23 NOTE — ED ADULT NURSE NOTE - OBJECTIVE STATEMENT
Assumed pt. care at 0900. Pt is a 79y male complaining of increased short of breath. Pt. is in no apparent distress at this time.  Pt states he is on 5 liters of O2 at home. Pt. is due for dialysis today. Pt. has a fistula in left upper extremity, thrill heard and bruit felt. Pt lung sounds are clear. Pt in NSR with missed beats and multiple PVCs.

## 2019-05-23 NOTE — ED ADULT TRIAGE NOTE - CHIEF COMPLAINT QUOTE
Pt A&Ox4 states "I'm not good having trouble breathing and chest pain."  BIBA from home c/o worsening SOB normally on 3L NC at home currently on 4L NC. Patient awake, calm and cooperative, having SOB breathing even unlabored, LS are diminished bilat. Having left sided chest pain. EKG completed

## 2019-05-23 NOTE — PROGRESS NOTE ADULT - SUBJECTIVE AND OBJECTIVE BOX
Vital Signs Last 24 Hrs  T(C): 36.9 (23 May 2019 09:45), Max: 36.9 (23 May 2019 09:45)  T(F): 98.4 (23 May 2019 09:45), Max: 98.4 (23 May 2019 09:45)  HR: 81 (23 May 2019 09:45) (54 - 81)  BP: 170/84 (23 May 2019 09:45) (170/63 - 170/84)  BP(mean): --  RR: 18 (23 May 2019 09:45) (18 - 20)  SpO2: 92% (23 May 2019 09:45) (92% - 100%)    138    |  94<L>  |  17.0   ----------------------------<  103    Ca:9.0   (23 May 2019 06:28)  4.6     |  33.0<H>  |  4.94<H>    eGFR if Non : 10 <L>  eGFR if : 12 <L>    TPro  7.5    /  Alb  3.5    /  TBili  0.3<L>  /  DBili  x      /  AST  23     /  ALT  10     /  AlkPhos  52     23 May 2019 06:28                          9.7<L>  7.6   )-----------( 195      ( 23 May 2019 06:28 )             32.5<L>    Phos:-- Mg:-- PTH:-- Uric acid:-- Serum Osm:--  Ferritin:-- Iron:-- TIBC:-- Tsat:--  B12:0.89 uIU/mL TSH:-- (05-23 @ 06:28)    Patient was seen and evaluated on dialysis.   Patient is tolerating the procedure well.   T(C): 36.9 (05-23-19 @ 09:45), Max: 36.9 (05-23-19 @ 09:45)  HR: 81 (05-23-19 @ 09:45) (54 - 81)  BP: 170/84 (05-23-19 @ 09:45) (170/63 - 170/84)  Continue dialysis: TIW,   Dialyzer: Revaclear 300   QB: 450 ml.,  QD: 500ml.,  Goal UF  1.5 L  over 3 Hours

## 2019-05-23 NOTE — PROGRESS NOTE ADULT - SUBJECTIVE AND OBJECTIVE BOX
D/W Cardiology,    Much improved after HD,    If stable  over the next 24 Hrs., cleared for discharge, to resume OP HD,     TY,

## 2019-05-23 NOTE — CONSULT NOTE ADULT - ASSESSMENT
A/P: 80 y/o M with a PMHx of HFpEF (EF 50-55%), CAD s/p CABG (ANA-LAD, stents to LCx) with recent NST (04/19 mild ischemia, medically managed), ESRD on HD (T, Th, Sat) via L AVF, Lung Ca s/p radiation on home oxygen who presents to the ED with worsening dyspnea and chest pain. Patient states that he has this mild left sided chest pain on and off from last night, non-radiating, with some associated nausea. Patient states that the dyspnea was worsening despite going to HD that he came to the ED. Patient is currently resting comfortably, breathing has improved after 1.5 L of fluid removed during HD. Patient denies any fevers, chills, palpiations, syncope, near sycnope, LE edema, V/D, headache, or dizziness.   BNP 10524    1. HFpEF   - EF 50-55%  - Exacerbation usually caused by HTN  - Continue bumex 2mg PO qday  - Nephro following, sent for urgent dialysis, symptoms already resolving.   - Cont supplement Oxygen  - Continue HD    2. CAD  -  Recent cardiac cath 3/12, no new blockages  - Nuclear stress test 04/19 with mild ischemia, medically managed  - No   - Continue ASA, statin, metoprolol, losartan, and Imdur    3. Bradycardia  - Tachy-diego noted on last hospital admission  - Currently SB in 40s, decrease metoprolol to 25mg PO BID  - Continue telemetry monitoring      4. HTN  - Continue Losartan 100mg and Imdur 120mg PO daily  - Add Hydralazine 25mg PO q8  - Continue to monitor Bp A/P: 80 y/o M with a PMHx of HFpEF (EF 50-55%), CAD s/p CABG (ANA-LAD, stents to LCx) with recent NST (04/19 mild ischemia, medically managed), ESRD on HD (T, Th, Sat) via L AVF, Lung Ca s/p radiation on home oxygen who presents to the ED with worsening dyspnea and chest pain. Patient states that he has this mild left sided chest pain on and off from last night, non-radiating, with some associated nausea. Patient states that the dyspnea was worsening despite going to HD that he came to the ED. Patient is currently resting comfortably, breathing has improved after 1.5 L of fluid removed during HD. Patient denies any fevers, chills, palpiations, syncope, near sycnope, LE edema, V/D, headache, or dizziness.   BNP 42450    1. HFpEF   - EF 50-55%  - Exacerbation usually caused by HTN  - Continue bumex 2mg PO qday  - Nephro following, sent for urgent dialysis, symptoms already resolving.   - Cont supplement Oxygen  - Continue HD    2. CAD  -  Recent cardiac cath 3/12, no new blockages  - Nuclear stress test 04/19 with mild ischemia, medically managed  - No further interventions planned at this time.   - Continue ASA, statin, metoprolol, losartan, and Imdur     3. HTN  - Most of pt's acute heart failure exacerbations tend to be secondary to hypertension due to poor medication adherence  - BP well controlled when on full regimen, often hypotension noted on previous admissions  - Continue losartan, Imdur, hydralazine, and metoprolol at current doses  - Continue to monitor Bp

## 2019-05-23 NOTE — ED ADULT NURSE NOTE - NSIMPLEMENTINTERV_GEN_ALL_ED
Implemented All Fall with Harm Risk Interventions:  [place] to call system. Call bell, personal items and telephone within reach. Instruct patient to call for assistance. Room bathroom lighting operational. Non-slip footwear when patient is off stretcher. Physically safe environment: no spills, clutter or unnecessary equipment. Stretcher in lowest position, wheels locked, appropriate side rails in place. Provide visual cue, wrist band, yellow gown, etc. Monitor gait and stability. Monitor for mental status changes and reorient to person, place, and time. Review medications for side effects contributing to fall risk. Reinforce activity limits and safety measures with patient and family. Provide visual clues: red socks.

## 2019-05-23 NOTE — CONSULT NOTE ADULT - SUBJECTIVE AND OBJECTIVE BOX
Patient is a 79y old  Male who presents with a chief complaint of SOB (23 May 2019 12:38)      HPI:   80 y/o male with ESRD on HD, CHF, HTN, CAD, Lung cancer, was brought in to the Er because of SOB. no LE edema. BP was 170/60. patient added that he feels left side chest pain on and off since last night. pain is non-radiating. patient feels nausea and epigastric discomfort and although feels hungry he feels full after eating few pieces of food. BNP: 61350. (23 May 2019 11:13)    80 y/o M PMHx of HFpEF (EF 50-55%), CAD s/p CABG (NST 04/19 mild ischemia, medically managed), ESRD on HD (T, Th, Sat) via L AVF, Lung Ca on home oxygen who presented to the ED with worsening dyspnea and oxygen requirements. Patient is a poor historian, no family present at time of interview, most history obtained from the chart. Patient was discharged on 04/26/19, and was at his baseline, mostly bedbound and on oxygen until last night when he began having increasing oxygen requirements. Patient's daughter thought he was congested, but not bringing up any sputum. Last HD was on Tuesday. Patient states he is still feeling short of breath right now, but denies any chest pain. Patient currently on oxygen, confused, unable to obtain detailed ROS.           PAST MEDICAL & SURGICAL HISTORY:  Dialysis patient: Tues, Thurs, Saturday  Chronic anemia  ESRD (end stage renal disease): right Upper arm fistula  CAD (coronary artery disease)  Lung cancer: had yoni radiation in 2006.  Bipolar disorder  High cholesterol  Hypertension  S/P CABG (coronary artery bypass graft): pt&#x27;s son in law states h/o some stents near neck area ? carotid ? he is not sure.      PREVIOUS DIAGNOSTIC TESTING:      ECHO  FINDINGS:  < from: TTE Echo w/Cont Complete (03.11.19 @ 20:35) >  PHYSICIAN INTERPRETATION:  Left Ventricle: The left ventricular internal cavity size is normal.  Global LV systolic function was low normal. Left ventricular ejection   fraction, by visual estimation, is 50 to 55%. Spectral Doppler shows   impaired relaxation pattern of left ventricular myocardial filling (Grade   I diastolic dysfunction).  Right Ventricle: The right ventricular size is normal. RV systolic   function is normal.  Left Atrium: Moderately enlarged left atrium.  Pericardium: There is no evidence of pericardial effusion.  Mitral Valve: Thickening of the anterior and posterior mitral valve   leaflets. There is mild to moderate mitral annular calcification. Mild   mitral valve regurgitation is seen.  Tricuspid Valve: The tricuspid valve is normal.  Aortic Valve: Sclerotic aortic valve with normal opening. No evidence of   aortic valve regurgitation is seen.  Pulmonic Valve: The pulmonic valve was not well visualized.  Aorta: The aortic root is normal in size and structure.  Venous: The inferior vena cava is normal. The inferior vena cava was   normal sized, with respiratory size variation greater than 50%.       Summary:   1. Left ventricular ejection fraction, by visual estimation, is 50 to   55%.   2. Low normal global left ventricular systolic function.   3. Spectral Doppler shows impaired relaxation pattern of left   ventricular myocardial filling (Grade I diastolic dysfunction).   4. There is no left ventricular hypertrophy.   5. Moderately enlarged left atrium.   6. Mild to moderate mitral annular calcification.   7. Thickening of the anterior and posterior mitral valve leaflets.   8. Sclerotic aortic valve with normal opening.    [id number] Alex Pastrana MD, Electronically signed on 3/12/2019 at 6:33:43 PM    < end of copied text >      STRESS  FINDINGS:  < from: Nuclear Stress Test-Pharmacologic (04.24.19 @ 12:21) >  NUCLEAR FINDINGS:  There is a small, mild to moderate defect in apical  wall  that is partially reversiblesuggestive of very mild  ischemia.  ------------------------------------------------------------------------      GATED ANALYSIS:  Gated wall motion analysis is performed, and shows  paradoxical spetal motion with post stress LVEF of 54%.  ------------------------------------------------------------------------      LV/RV OBSERVATION:  Normal LV ejection fraction.  ------------------------------------------------------------------------    IMPRESSIONS:  * There is a small, mild to moderate defectin apical  wall that is partially reversible suggestive of very mild  ischemia.  * Gated wall motion analysis is performed, and shows  paradoxical spetal motion with post stress LVEF of 54%.  * Chest Pain: No chest pain with administration of  Regadenoson.  * Symptom: No Symptom.  * HR Response: Appropriate.  * BP Response: Appropriate.  * Heart Rhythm: Normal Sinus Rhythm.  * ECG Abnormalities: There were no diagnostic changes.  * Arrhythmia: None.  * post infusion 05:50 complaints of abdominal pain vomited  gave 50mg Aminophyllin, with continued nausea and vomitine  another 25 mg given 10:42    ------------------------------------------------------------------------      ------------------------------------------------------------------------    Confirmed on  4/25/2019 - 18:35:27 by Enrique Nesbitt MD  Cardiology Fellow: Shelby Giraldo NP  ------------------------------------------------------------------------    < end of copied text >      CATHETERIZATION  FINDINGS:  < from: Cardiac Cath Lab - Adult (03.12.19 @ 16:57) >  VENTRICLES: There were no left ventricular global or regional wall motion  abnormalities. Analysis of regional contractile function demonstrated  severe diaphragmatic hypokinesis. EF estimated was 65 %.  VALVES: AORTIC VALVE: No significant aortic valve gradient.  CORONARY VESSELS: The coronary circulation is right dominant.  LM:   --  LM: Normal. The vessel was normal sized, heavily calcified, and  moderately tortuous. Angiography showed moderate atherosclerosis. There  was a discrete 30 % stenosis at the site of a prior stent, at the ostium  of the vessel segment. The lesion was without evidence of thrombus. There  was BRUCE grade 3 flow through the vessel (brisk flow).  LAD:   --  LAD: The vessel was normal sized, heavily calcified, and  moderately tortuous. Angiography showed severe atherosclerosis. There was  a diffuse 100 % stenosis at the ostium of the vessel segment. The lesion  was without evidence of thrombus. There was BRUCE grade 0 flow through the  vessel (no flow). This lesion is a chronic total occlusion. Fills via  patent ANA.  CX:   --  Circumflex: Normal. The vessel was normal sized, moderately  calcified, and excessively tortuous. Angiography showed mild  atherosclerosis with no flow limiting lesions. Patent stent in Prox LCx  and OM1.  RCA:   --  RCA: Normal. The vessel was normal sized, moderately calcified,  and moderately tortuous. Angiography showed severe atherosclerosis. There  was a diffuse 100 % stenosis at the ostium of the vessel segment, just  after RV marginal 1. The lesion was without evidence of thrombus. There  was BRUCE grade 0 flow through the vessel (no flow). Thislesion is a  chronic total occlusion.  The distal RCA fills from collaterals from the LCx.  GRAFTS:   --  Graft to the LAD: The graft was a normal sized ANA. Graft  angiography showed no degeneration, no calcification, and moderate  tortuosity.  COMPLICATIONS: There were no complications. No complications occurred  during the cath lab visit.  DIAGNOSTIC IMPRESSIONS: There is significant double vessel coronary artery  disease.  Ostial UII=466%  Ostial EAA=129%  Patent ANA to LAD. Left ventricular function is normal.  LVEF=65%  DIAGNOSTIC RECOMMENDATIONS: The patient should continue with the present  medications. Patient management should include aggressive medical therapy,  close monitoring of BUN and creatinine, resumption of all previous  activities in 2 days, and a cardiac rehabilitation program. Medical  management is recommended.  Prepared and signed by  Job Galvan MD  Signed 03/12/2019 17:50:14    < end of copied text >        Allergies    No Known Allergies    Intolerances        MEDICATIONS  (STANDING):  ALBUTerol/ipratropium for Nebulization 3 milliLiter(s) Nebulizer every 6 hours  aspirin  chewable 81 milliGRAM(s) Oral daily  atorvastatin 80 milliGRAM(s) Oral at bedtime  buMETAnide 2 milliGRAM(s) Oral daily  clopidogrel Tablet 75 milliGRAM(s) Oral daily  DULoxetine 20 milliGRAM(s) Oral daily  famotidine    Tablet 20 milliGRAM(s) Oral daily  gabapentin 100 milliGRAM(s) Oral two times a day  hydrALAZINE 25 milliGRAM(s) Oral every 12 hours  isosorbide   mononitrate ER Tablet (IMDUR) 120 milliGRAM(s) Oral daily  levothyroxine 100 MICROGram(s) Oral daily  losartan 100 milliGRAM(s) Oral daily  metoprolol tartrate 50 milliGRAM(s) Oral every 8 hours  multivitamin 1 Tablet(s) Oral daily    MEDICATIONS  (PRN):  nitroglycerin    2% Ointment 0.5 Inch(s) Transdermal every 12 hours PRN chest pain      FAMILY HISTORY:  No pertinent family history in first degree relatives      SOCIAL HISTORY:    CIGARETTES:    ALCOHOL:    REVIEW OF SYSTEMS:  CONSTITUTIONAL: No fever, weight loss, or fatigue  EYES: No eye pain, visual disturbances, or discharge  ENMT:  No difficulty hearing, tinnitus, vertigo; No sinus or throat pain  NECK: No pain or stiffness  RESPIRATORY: No cough, wheezing, chills or hemoptysis; No Shortness of Breath  CARDIOVASCULAR: No chest pain, palpitations, passing out, dizziness, or leg swelling  GASTROINTESTINAL: No abdominal or epigastric pain. No nausea, vomiting, or hematemesis; No diarrhea or constipation. No melena or hematochezia.  GENITOURINARY: No dysuria, frequency, hematuria, or incontinence  NEUROLOGICAL: No headaches, memory loss, loss of strength, numbness, or tremors  SKIN: No itching, burning, rashes, or lesions   LYMPH Nodes: No enlarged glands  ENDOCRINE: No heat or cold intolerance; No hair loss  MUSCULOSKELETAL: No joint pain or swelling; No muscle, back, or extremity pain  PSYCHIATRIC: No depression, anxiety, mood swings, or difficulty sleeping  HEME/LYMPH: No easy bruising, or bleeding gums  ALLERY AND IMMUNOLOGIC: No hives or eczema	      REVIEW OF SYMPTOMS: Cardiovascular:     chest pain,  dyspnea,  syncope,    palpitaitons,      dizziness,    Orthopnea,      Paroxsymal nocturnal dyspnea;  Respiratory:    Dyspnea,   cough,   ;   Genitourinary:  No dysuria, no hematuria; Gastrointestinal:   No dark color stool, no melena, no diarrhea, no constipation, no abdominal pain; Neurological: No headache, no dizziness, no slurred speech;  Psychiatric: No agitation, no anxiety.  ALL OTHER REVIEW OF SYSTEMS ARE NEGATIVE.    Vital Signs Last 24 Hrs  T(C): 36.9 (23 May 2019 09:45), Max: 36.9 (23 May 2019 09:45)  T(F): 98.4 (23 May 2019 09:45), Max: 98.4 (23 May 2019 09:45)  HR: 81 (23 May 2019 09:45) (54 - 81)  BP: 170/84 (23 May 2019 09:45) (170/63 - 170/84)  BP(mean): --  RR: 18 (23 May 2019 09:45) (18 - 20)  SpO2: 92% (23 May 2019 09:45) (92% - 100%)    Daily Height in cm: 167.64 (23 May 2019 06:05)    Daily     I&O's Detail      PHYSICAL EXAM:  Appearance: Normal, well nourished	  HEENT:   Normal oral mucosa, PERRL, EOMI, sclera non-icteric	  Lymphatic: No cervical lymphadenopathy  Cardiovascular: Normal S1 S2, No JVD, No cardiac murmurs, No carotid bruits, No peripheral edema  Respiratory: Lungs clear to auscultation	  Psychiatry: A & O x 3, Mood & affect appropriate  Gastrointestinal:  Soft, Non-tender, + BS, no bruits	  Skin: No rashes, No ecchymoses, No cyanosis  Neurologic: Grossly non-focal with full strength in all four extremities  Extremities: Normal range of motion, No clubbing, cyanosis or edema  Vascular: Peripheral pulses palpable 2+ bilaterally      INTERPRETATION OF TELEMETRY:    ECG:    LABS:                        9.7    7.6   )-----------( 195      ( 23 May 2019 06:28 )             32.5     05-23    138  |  94<L>  |  17.0  ----------------------------<  103  4.6   |  33.0<H>  |  4.94<H>    Ca    9.0      23 May 2019 06:28    TPro  7.5  /  Alb  3.5  /  TBili  0.3<L>  /  DBili  x   /  AST  23  /  ALT  10  /  AlkPhos  52  05-23        PT/INR - ( 23 May 2019 06:28 )   PT: 10.9 sec;   INR: 0.95 ratio         PTT - ( 23 May 2019 06:28 )  PTT:33.3 sec    I&O's Summary    BNPSerum Pro-Brain Natriuretic Peptide: 99039 pg/mL (05-23 @ 06:28)    Serum Pro-Brain Natriuretic Peptide: 73934 pg/mL (05-23-19 @ 06:28)    RADIOLOGY & ADDITIONAL STUDIES: Patient is a 79y old  Male who presents with a chief complaint of SOB (23 May 2019 12:38)      HPI: 78 y/o M with a PMHx of HFpEF (EF 50-55%), CAD s/p CABG (ANA-LAD, stents to LCx) with recent NST (04/19 mild ischemia, medically managed), ESRD on HD (T, Th, Sat) via L AVF, Lung Ca s/p radiation on home oxygen who presents to the ED with worsening dyspnea and chest pain. Patient states that he has this mild left sided chest pain on and off from last night, non-radiating, with some associated nausea. Patient states that the dyspnea was worsening despite going to HD that he came to the ED. Patient is currently resting comfortably, breathing has improved after 1.5 L of fluid removed during HD. Patient denies any fevers, chills, palpiations, syncope, near sycnope, LE edema, V/D, headache, or dizziness.     PAST MEDICAL & SURGICAL HISTORY:  Dialysis patient: Tues, Thurs, Saturday  Chronic anemia  ESRD (end stage renal disease): right Upper arm fistula  CAD (coronary artery disease)  Lung cancer: had yoni radiation in 2006.  Bipolar disorder  High cholesterol  Hypertension  S/P CABG (coronary artery bypass graft): pt&#x27;s son in law states h/o some stents near neck area ? carotid ? he is not sure.      PREVIOUS DIAGNOSTIC TESTING:      ECHO  FINDINGS:  < from: TTE Echo w/Cont Complete (03.11.19 @ 20:35) >  PHYSICIAN INTERPRETATION:  Left Ventricle: The left ventricular internal cavity size is normal.  Global LV systolic function was low normal. Left ventricular ejection   fraction, by visual estimation, is 50 to 55%. Spectral Doppler shows   impaired relaxation pattern of left ventricular myocardial filling (Grade   I diastolic dysfunction).  Right Ventricle: The right ventricular size is normal. RV systolic   function is normal.  Left Atrium: Moderately enlarged left atrium.  Pericardium: There is no evidence of pericardial effusion.  Mitral Valve: Thickening of the anterior and posterior mitral valve   leaflets. There is mild to moderate mitral annular calcification. Mild   mitral valve regurgitation is seen.  Tricuspid Valve: The tricuspid valve is normal.  Aortic Valve: Sclerotic aortic valve with normal opening. No evidence of   aortic valve regurgitation is seen.  Pulmonic Valve: The pulmonic valve was not well visualized.  Aorta: The aortic root is normal in size and structure.  Venous: The inferior vena cava is normal. The inferior vena cava was   normal sized, with respiratory size variation greater than 50%.       Summary:   1. Left ventricular ejection fraction, by visual estimation, is 50 to   55%.   2. Low normal global left ventricular systolic function.   3. Spectral Doppler shows impaired relaxation pattern of left   ventricular myocardial filling (Grade I diastolic dysfunction).   4. There is no left ventricular hypertrophy.   5. Moderately enlarged left atrium.   6. Mild to moderate mitral annular calcification.   7. Thickening of the anterior and posterior mitral valve leaflets.   8. Sclerotic aortic valve with normal opening.    [id number] Alex Pastrana MD, Electronically signed on 3/12/2019 at 6:33:43 PM    < end of copied text >      STRESS  FINDINGS:  < from: Nuclear Stress Test-Pharmacologic (04.24.19 @ 12:21) >  NUCLEAR FINDINGS:  There is a small, mild to moderate defect in apical  wall  that is partially reversiblesuggestive of very mild  ischemia.  ------------------------------------------------------------------------      GATED ANALYSIS:  Gated wall motion analysis is performed, and shows  paradoxical spetal motion with post stress LVEF of 54%.  ------------------------------------------------------------------------      LV/RV OBSERVATION:  Normal LV ejection fraction.  ------------------------------------------------------------------------    IMPRESSIONS:  * There is a small, mild to moderate defectin apical  wall that is partially reversible suggestive of very mild  ischemia.  * Gated wall motion analysis is performed, and shows  paradoxical spetal motion with post stress LVEF of 54%.  * Chest Pain: No chest pain with administration of  Regadenoson.  * Symptom: No Symptom.  * HR Response: Appropriate.  * BP Response: Appropriate.  * Heart Rhythm: Normal Sinus Rhythm.  * ECG Abnormalities: There were no diagnostic changes.  * Arrhythmia: None.  * post infusion 05:50 complaints of abdominal pain vomited  gave 50mg Aminophyllin, with continued nausea and vomitine  another 25 mg given 10:42    ------------------------------------------------------------------------      ------------------------------------------------------------------------    Confirmed on  4/25/2019 - 18:35:27 by Enrique Nesbitt MD  Cardiology Fellow: Shelby Giraldo NP  ------------------------------------------------------------------------    < end of copied text >      CATHETERIZATION  FINDINGS:  < from: Cardiac Cath Lab - Adult (03.12.19 @ 16:57) >  VENTRICLES: There were no left ventricular global or regional wall motion  abnormalities. Analysis of regional contractile function demonstrated  severe diaphragmatic hypokinesis. EF estimated was 65 %.  VALVES: AORTIC VALVE: No significant aortic valve gradient.  CORONARY VESSELS: The coronary circulation is right dominant.  LM:   --  LM: Normal. The vessel was normal sized, heavily calcified, and  moderately tortuous. Angiography showed moderate atherosclerosis. There  was a discrete 30 % stenosis at the site of a prior stent, at the ostium  of the vessel segment. The lesion was without evidence of thrombus. There  was BRUCE grade 3 flow through the vessel (brisk flow).  LAD:   --  LAD: The vessel was normal sized, heavily calcified, and  moderately tortuous. Angiography showed severe atherosclerosis. There was  a diffuse 100 % stenosis at the ostium of the vessel segment. The lesion  was without evidence of thrombus. There was BRUCE grade 0 flow through the  vessel (no flow). This lesion is a chronic total occlusion. Fills via  patent ANA.  CX:   --  Circumflex: Normal. The vessel was normal sized, moderately  calcified, and excessively tortuous. Angiography showed mild  atherosclerosis with no flow limiting lesions. Patent stent in Prox LCx  and OM1.  RCA:   --  RCA: Normal. The vessel was normal sized, moderately calcified,  and moderately tortuous. Angiography showed severe atherosclerosis. There  was a diffuse 100 % stenosis at the ostium of the vessel segment, just  after RV marginal 1. The lesion was without evidence of thrombus. There  was BRUCE grade 0 flow through the vessel (no flow). Thislesion is a  chronic total occlusion.  The distal RCA fills from collaterals from the LCx.  GRAFTS:   --  Graft to the LAD: The graft was a normal sized ANA. Graft  angiography showed no degeneration, no calcification, and moderate  tortuosity.  COMPLICATIONS: There were no complications. No complications occurred  during the cath lab visit.  DIAGNOSTIC IMPRESSIONS: There is significant double vessel coronary artery  disease.  Ostial FQD=892%  Ostial WFH=994%  Patent ANA to LAD. Left ventricular function is normal.  LVEF=65%  DIAGNOSTIC RECOMMENDATIONS: The patient should continue with the present  medications. Patient management should include aggressive medical therapy,  close monitoring of BUN and creatinine, resumption of all previous  activities in 2 days, and a cardiac rehabilitation program. Medical  management is recommended.  Prepared and signed by  Job Galvan MD  Signed 03/12/2019 17:50:14    < end of copied text >        Allergies    No Known Allergies    Intolerances        MEDICATIONS  (STANDING):  ALBUTerol/ipratropium for Nebulization 3 milliLiter(s) Nebulizer every 6 hours  aspirin  chewable 81 milliGRAM(s) Oral daily  atorvastatin 80 milliGRAM(s) Oral at bedtime  buMETAnide 2 milliGRAM(s) Oral daily  clopidogrel Tablet 75 milliGRAM(s) Oral daily  DULoxetine 20 milliGRAM(s) Oral daily  famotidine    Tablet 20 milliGRAM(s) Oral daily  gabapentin 100 milliGRAM(s) Oral two times a day  hydrALAZINE 25 milliGRAM(s) Oral every 12 hours  isosorbide   mononitrate ER Tablet (IMDUR) 120 milliGRAM(s) Oral daily  levothyroxine 100 MICROGram(s) Oral daily  losartan 100 milliGRAM(s) Oral daily  metoprolol tartrate 50 milliGRAM(s) Oral every 8 hours  multivitamin 1 Tablet(s) Oral daily    MEDICATIONS  (PRN):  nitroglycerin    2% Ointment 0.5 Inch(s) Transdermal every 12 hours PRN chest pain      FAMILY HISTORY:  No pertinent family history in first degree relatives      SOCIAL HISTORY: Lives at home with his daughter. Former smoker. Denies alcohol or drug use.     REVIEW OF SYSTEMS:  CONSTITUTIONAL: No fever, weight loss, or fatigue  Cardiovascular:  AS PER HPI  Respiratory: AS PER HPI  Genitourinary:  No dysuria, no hematuria;   Gastrointestinal:   No dark color stool, no melena, no diarrhea, no constipation, no abdominal pain;   Neurological: No headache, no dizziness, no slurred speech;    Psychiatric: No agitation, no anxiety.    ALL OTHER REVIEW OF SYSTEMS ARE NEGATIVE.    Vital Signs Last 24 Hrs  T(C): 36.9 (23 May 2019 09:45), Max: 36.9 (23 May 2019 09:45)  T(F): 98.4 (23 May 2019 09:45), Max: 98.4 (23 May 2019 09:45)  HR: 81 (23 May 2019 09:45) (54 - 81)  BP: 170/84 (23 May 2019 09:45) (170/63 - 170/84)  BP(mean): --  RR: 18 (23 May 2019 09:45) (18 - 20)  SpO2: 92% (23 May 2019 09:45) (92% - 100%)    Daily Height in cm: 167.64 (23 May 2019 06:05)    Daily     I&O's Detail      PHYSICAL EXAM:  Appearance: Normal, well nourished	  HEENT:   Normal oral mucosa, PERRL, EOMI, sclera non-icteric	  Lymphatic: No cervical lymphadenopathy  Cardiovascular: Normal S1 S2, No JVD, No cardiac murmurs, No carotid bruits, No peripheral edema  Respiratory: Lungs clear to auscultation	  Psychiatry: A & O x 3, Mood & affect appropriate  Gastrointestinal:  Soft, Non-tender, + BS, no bruits	  Skin: No rashes, No ecchymoses, No cyanosis  Neurologic: Grossly non-focal with full strength in all four extremities  Extremities: Normal range of motion, No clubbing, cyanosis or edema  Vascular: Peripheral pulses palpable 2+ bilaterally      INTERPRETATION OF TELEMETRY:    ECG: SR, PVC with junctional escape beat    LABS:                        9.7    7.6   )-----------( 195      ( 23 May 2019 06:28 )             32.5     05-23    138  |  94<L>  |  17.0  ----------------------------<  103  4.6   |  33.0<H>  |  4.94<H>    Ca    9.0      23 May 2019 06:28    TPro  7.5  /  Alb  3.5  /  TBili  0.3<L>  /  DBili  x   /  AST  23  /  ALT  10  /  AlkPhos  52  05-23        PT/INR - ( 23 May 2019 06:28 )   PT: 10.9 sec;   INR: 0.95 ratio         PTT - ( 23 May 2019 06:28 )  PTT:33.3 sec    I&O's Summary    BNPSerum Pro-Brain Natriuretic Peptide: 35594 pg/mL (05-23 @ 06:28)    Serum Pro-Brain Natriuretic Peptide: 00313 pg/mL (05-23-19 @ 06:28)    RADIOLOGY & ADDITIONAL STUDIES:  < from: Xray Chest 1 View- PORTABLE-Urgent (05.23.19 @ 07:53) >  EXAM:  XR CHEST PORTABLE URGENT 1V                          PROCEDURE DATE:  05/23/2019          INTERPRETATION:  Portable chest radiograph        CLINICAL INFORMATION:   Short of breath.    TECHNIQUE:  Portable  AP view of the chest was obtained.    COMPARISON: 12/1/2018 and 5/3/2019 chest radiograph. available for review.    FINDINGS:   The lungs  show chronic interstitial lung disease with blunting of the   costophrenic angles indicating pleural effusions. There is cardiomegaly   with changes from prior coronary artery bypass graft procedure.      Visualized osseous structures are intact.        IMPRESSION:   Diffuse chronic interstitial lung disease. Bilateral   effusions..      < end of copied text >

## 2019-05-23 NOTE — H&P ADULT - HISTORY OF PRESENT ILLNESS
78 y/o male with ESRD on HD, CHF, HTN, CAD, Lung cancer, was brought in to the Er because of SOB. no LE edema. BP was 170/60. patient added that he feels left side chest pain on and off since last night. pain is non-radiating. patient feels nausea and epigastric discomfort and although feels hungry he feels full after eating few pieces of food. BNP: [id number].

## 2019-05-24 ENCOUNTER — TRANSCRIPTION ENCOUNTER (OUTPATIENT)
Age: 79
End: 2019-05-24

## 2019-05-24 LAB
ALBUMIN SERPL ELPH-MCNC: 3.4 G/DL — SIGNIFICANT CHANGE UP (ref 3.3–5.2)
ALP SERPL-CCNC: 53 U/L — SIGNIFICANT CHANGE UP (ref 40–120)
ALT FLD-CCNC: 10 U/L — SIGNIFICANT CHANGE UP
ANION GAP SERPL CALC-SCNC: 13 MMOL/L — SIGNIFICANT CHANGE UP (ref 5–17)
AST SERPL-CCNC: 25 U/L — SIGNIFICANT CHANGE UP
BASOPHILS # BLD AUTO: 0.1 K/UL — SIGNIFICANT CHANGE UP (ref 0–0.2)
BASOPHILS NFR BLD AUTO: 1.5 % — SIGNIFICANT CHANGE UP (ref 0–2)
BILIRUB SERPL-MCNC: 0.3 MG/DL — LOW (ref 0.4–2)
BUN SERPL-MCNC: 13 MG/DL — SIGNIFICANT CHANGE UP (ref 8–20)
CALCIUM SERPL-MCNC: 9.2 MG/DL — SIGNIFICANT CHANGE UP (ref 8.6–10.2)
CHLORIDE SERPL-SCNC: 94 MMOL/L — LOW (ref 98–107)
CO2 SERPL-SCNC: 28 MMOL/L — SIGNIFICANT CHANGE UP (ref 22–29)
CREAT SERPL-MCNC: 3.98 MG/DL — HIGH (ref 0.5–1.3)
EOSINOPHIL # BLD AUTO: 1 K/UL — HIGH (ref 0–0.5)
EOSINOPHIL NFR BLD AUTO: 13 % — HIGH (ref 0–5)
GLUCOSE SERPL-MCNC: 103 MG/DL — SIGNIFICANT CHANGE UP (ref 70–115)
HCT VFR BLD CALC: 32.2 % — LOW (ref 42–52)
HGB BLD-MCNC: 9.6 G/DL — LOW (ref 14–18)
LYMPHOCYTES # BLD AUTO: 2.4 K/UL — SIGNIFICANT CHANGE UP (ref 1–4.8)
LYMPHOCYTES # BLD AUTO: 32.7 % — SIGNIFICANT CHANGE UP (ref 20–55)
MCHC RBC-ENTMCNC: 27.8 PG — SIGNIFICANT CHANGE UP (ref 27–31)
MCHC RBC-ENTMCNC: 29.8 G/DL — LOW (ref 32–36)
MCV RBC AUTO: 93.3 FL — SIGNIFICANT CHANGE UP (ref 80–94)
MONOCYTES # BLD AUTO: 1 K/UL — HIGH (ref 0–0.8)
MONOCYTES NFR BLD AUTO: 13.1 % — HIGH (ref 3–10)
NEUTROPHILS # BLD AUTO: 3 K/UL — SIGNIFICANT CHANGE UP (ref 1.8–8)
NEUTROPHILS NFR BLD AUTO: 39.6 % — SIGNIFICANT CHANGE UP (ref 37–73)
PLATELET # BLD AUTO: 210 K/UL — SIGNIFICANT CHANGE UP (ref 150–400)
POTASSIUM SERPL-MCNC: 5 MMOL/L — SIGNIFICANT CHANGE UP (ref 3.5–5.3)
POTASSIUM SERPL-SCNC: 5 MMOL/L — SIGNIFICANT CHANGE UP (ref 3.5–5.3)
PROT SERPL-MCNC: 7.4 G/DL — SIGNIFICANT CHANGE UP (ref 6.6–8.7)
RBC # BLD: 3.45 M/UL — LOW (ref 4.6–6.2)
RBC # FLD: 17 % — HIGH (ref 11–15.6)
SODIUM SERPL-SCNC: 135 MMOL/L — SIGNIFICANT CHANGE UP (ref 135–145)
WBC # BLD: 7.5 K/UL — SIGNIFICANT CHANGE UP (ref 4.8–10.8)
WBC # FLD AUTO: 7.5 K/UL — SIGNIFICANT CHANGE UP (ref 4.8–10.8)

## 2019-05-24 PROCEDURE — 99233 SBSQ HOSP IP/OBS HIGH 50: CPT

## 2019-05-24 PROCEDURE — 71045 X-RAY EXAM CHEST 1 VIEW: CPT | Mod: 26

## 2019-05-24 RX ORDER — HEPARIN SODIUM 5000 [USP'U]/ML
5000 INJECTION INTRAVENOUS; SUBCUTANEOUS EVERY 12 HOURS
Refills: 0 | Status: DISCONTINUED | OUTPATIENT
Start: 2019-05-24 | End: 2019-05-26

## 2019-05-24 RX ADMIN — Medication 1 TABLET(S): at 11:07

## 2019-05-24 RX ADMIN — Medication 81 MILLIGRAM(S): at 11:07

## 2019-05-24 RX ADMIN — FAMOTIDINE 20 MILLIGRAM(S): 10 INJECTION INTRAVENOUS at 11:07

## 2019-05-24 RX ADMIN — Medication 3 MILLILITER(S): at 03:54

## 2019-05-24 RX ADMIN — DULOXETINE HYDROCHLORIDE 20 MILLIGRAM(S): 30 CAPSULE, DELAYED RELEASE ORAL at 11:07

## 2019-05-24 RX ADMIN — Medication 100 MICROGRAM(S): at 05:48

## 2019-05-24 RX ADMIN — CLOPIDOGREL BISULFATE 75 MILLIGRAM(S): 75 TABLET, FILM COATED ORAL at 11:07

## 2019-05-24 RX ADMIN — ATORVASTATIN CALCIUM 80 MILLIGRAM(S): 80 TABLET, FILM COATED ORAL at 22:50

## 2019-05-24 RX ADMIN — BUMETANIDE 2 MILLIGRAM(S): 0.25 INJECTION INTRAMUSCULAR; INTRAVENOUS at 11:07

## 2019-05-24 RX ADMIN — Medication 3 MILLILITER(S): at 15:57

## 2019-05-24 RX ADMIN — GABAPENTIN 100 MILLIGRAM(S): 400 CAPSULE ORAL at 05:48

## 2019-05-24 RX ADMIN — Medication 3 MILLIGRAM(S): at 00:02

## 2019-05-24 RX ADMIN — Medication 50 MILLIGRAM(S): at 05:48

## 2019-05-24 RX ADMIN — GABAPENTIN 100 MILLIGRAM(S): 400 CAPSULE ORAL at 17:00

## 2019-05-24 RX ADMIN — ISOSORBIDE MONONITRATE 120 MILLIGRAM(S): 60 TABLET, EXTENDED RELEASE ORAL at 11:07

## 2019-05-24 RX ADMIN — Medication 3 MILLILITER(S): at 19:38

## 2019-05-24 RX ADMIN — HEPARIN SODIUM 5000 UNIT(S): 5000 INJECTION INTRAVENOUS; SUBCUTANEOUS at 17:01

## 2019-05-24 RX ADMIN — Medication 125 MILLIGRAM(S): at 17:01

## 2019-05-24 RX ADMIN — Medication 50 MILLIGRAM(S): at 17:03

## 2019-05-24 RX ADMIN — Medication 50 MILLIGRAM(S): at 17:01

## 2019-05-24 RX ADMIN — LOSARTAN POTASSIUM 100 MILLIGRAM(S): 100 TABLET, FILM COATED ORAL at 05:48

## 2019-05-24 RX ADMIN — Medication 50 MILLIGRAM(S): at 22:50

## 2019-05-24 RX ADMIN — Medication 50 MILLIGRAM(S): at 23:11

## 2019-05-24 NOTE — PROGRESS NOTE ADULT - SUBJECTIVE AND OBJECTIVE BOX
[facility] DIVISION OF KIDNEY DISEASES AND HYPERTENSION -- FOLLOW UP NOTE  --------------------------------------------------------------------------------  Chief Complaint:  ESRD on HD  24 hour events/subjective:  Seen/examined this AM  Comfortable; reporting SOB on exertion;      PAST HISTORY  --------------------------------------------------------------------------------  No significant changes to PMH, PSH, FHx, SHx, unless otherwise noted    ALLERGIES & MEDICATIONS  --------------------------------------------------------------------------------  Allergies    No Known Allergies    Intolerances      Standing Inpatient Medications  ALBUTerol/ipratropium for Nebulization 3 milliLiter(s) Nebulizer every 6 hours  aspirin  chewable 81 milliGRAM(s) Oral daily  atorvastatin 80 milliGRAM(s) Oral at bedtime  buMETAnide 2 milliGRAM(s) Oral daily  clopidogrel Tablet 75 milliGRAM(s) Oral daily  DULoxetine 20 milliGRAM(s) Oral daily  famotidine    Tablet 20 milliGRAM(s) Oral daily  gabapentin 100 milliGRAM(s) Oral two times a day  heparin  Injectable 5000 Unit(s) SubCutaneous every 12 hours  hydrALAZINE 50 milliGRAM(s) Oral every 8 hours  isosorbide   mononitrate ER Tablet (IMDUR) 120 milliGRAM(s) Oral daily  levothyroxine 100 MICROGram(s) Oral daily  losartan 100 milliGRAM(s) Oral daily  metoprolol tartrate 50 milliGRAM(s) Oral every 8 hours  multivitamin 1 Tablet(s) Oral daily    PRN Inpatient Medications  nitroglycerin    2% Ointment 0.5 Inch(s) Transdermal every 12 hours PRN      REVIEW OF SYSTEMS  --------------------------------------------------------------------------------  Gen: No weight changes, fatigue, fevers/chills, weakness  Skin: No rashes  Head/Eyes/Ears/Mouth: No headache; Normal hearing; Normal vision w/o blurriness; No sinus pain/discomfort, sore throat  Respiratory: No dyspnea, cough, wheezing, hemoptysis  CV: No chest pain, PND, orthopnea  GI: No abdominal pain, diarrhea, constipation, nausea, vomiting, melena, hematochezia  : No increased frequency, dysuria, hematuria, nocturia  MSK: No joint pain/swelling; no back pain; no edema  Neuro: No dizziness/lightheadedness, weakness, seizures, numbness, tingling  Heme: No easy bruising or bleeding  Endo: No heat/cold intolerance  Psych: No significant nervousness, anxiety, stress, depression    All other systems were reviewed and are negative, except as noted.    VITALS/PHYSICAL EXAM  --------------------------------------------------------------------------------  T(C): 36.6 (05-24-19 @ 20:37), Max: 36.6 (05-24-19 @ 17:48)  HR: 65 (05-24-19 @ 23:10) (52 - 75)  BP: 130/53 (05-24-19 @ 23:10) (106/51 - 157/71)  RR: 18 (05-24-19 @ 20:37) (17 - 19)  SpO2: 93% (05-24-19 @ 20:37) (93% - 99%)  Wt(kg): --  Height (cm): 167.64 (05-23-19 @ 06:05)  Weight (kg): 59 (05-23-19 @ 06:05)  BMI (kg/m2): 21 (05-23-19 @ 06:05)  BSA (m2): 1.67 (05-23-19 @ 06:05)      05-23-19 @ 07:01  -  05-24-19 @ 07:00  --------------------------------------------------------  IN: 0 mL / OUT: 1500 mL / NET: -1500 mL      Physical Exam:  	Gen: NAD  	HEENT: PERRL, supple neck, clear oropharynx  	Pulm: CTA B/L  	CV: RRR, S1S2; no rub  	Back: No spinal or CVA tenderness; no sacral edema  	Abd: +BS, soft, nontender/nondistended  	: No suprapubic tenderness  	UE: Warm, FROM, no clubbing, intact strength; no edema; no asterixis  	LE: Warm, FROM, no clubbing, intact strength; no edema  	Neuro: No focal deficits, intact gait  	Psych: Normal affect and mood  	Skin: Warm, without rashes  	Vascular access:    LABS/STUDIES  --------------------------------------------------------------------------------              9.6    7.5   >-----------<  210      [05-24-19 @ 06:32]              32.2     135  |  94  |  13.0  ----------------------------<  103      [05-24-19 @ 06:32]  5.0   |  28.0  |  3.98        Ca     9.2     [05-24-19 @ 06:32]    TPro  7.4  /  Alb  3.4  /  TBili  0.3  /  DBili  x   /  AST  25  /  ALT  10  /  AlkPhos  53  [05-24-19 @ 06:32]    PT/INR: PT 10.9 , INR 0.95       [05-23-19 @ 06:28]  PTT: 33.3       [05-23-19 @ 06:28]      Creatinine Trend:  SCr 3.98 [05-24 @ 06:32]  SCr 4.94 [05-23 @ 06:28]  SCr 5.27 [05-06 @ 07:24]  SCr 3.37 [05-03 @ 06:36]  SCr 4.54 [05-02 @ 09:17]        Iron 34, TIBC 235, %sat 14      [01-29-19 @ 11:39]  Ferritin 1049      [01-29-19 @ 11:39]  TSH 0.89      [05-23-19 @ 06:28]  Lipid: chol 159, , HDL 32, LDL 94      [01-29-19 @ 11:39]

## 2019-05-24 NOTE — PROGRESS NOTE ADULT - ASSESSMENT
1) ESRD on HD  2) MBD of renal dx  3) Anemia of renal dx  4) Vol HTN    Plan for HD in AM;  Exertional SOB likely attributed to underlying lung dx;  Repeat CXR to rule out any pleural effusions;   d/w Dr Stanton

## 2019-05-24 NOTE — DISCHARGE NOTE PROVIDER - NSDCHHHOMEBOUND_GEN_ALL_CORE
Shortness of breath with minimal ambulation/Chest pain/weakness during/after ambulation   greater than 20 feet Ataxic gait/Shortness of breath with minimal ambulation/Chest pain/weakness during/after ambulation   greater than 20 feet/Fall risk

## 2019-05-24 NOTE — PROGRESS NOTE ADULT - SUBJECTIVE AND OBJECTIVE BOX
HEALTH ISSUES - PROBLEM Dx:    SOB ? fluid overload; malignant HTN    HPI: 80 y/o M with a PMHx of HFpEF (EF 50-55%), CAD s/p CABG (ANA-LAD, stents to LCx) with recent NST (04/19 mild ischemia, medically managed), ESRD on HD (T, Th, Sat) via L AVF, Lung Ca s/p radiation on home oxygen who presents to the ED with worsening dyspnea and chest pain. Patient states that he has this mild left sided chest pain on and off from last night, non-radiating, with some associated nausea. Patient states that the dyspnea was worsening despite going to HD that he came to the ED.    INTERVAL HPI/ OVERNIGHT EVENTS:    states he felt better yesterday after HD and today he is SOB again  his BP is fairly well controlled now  no other complaints  states he is compliant with Rx and meds at home    REVIEW OF SYSTEMS:    Patient denies any fevers, chills, palpiations, syncope, near sycnope, LE edema, V/D, headache, or dizziness.   SOB +      Vital Signs Last 24 Hrs  T(C): 36.5 (24 May 2019 11:06), Max: 36.5 (23 May 2019 21:13)  T(F): 97.7 (24 May 2019 11:06), Max: 97.7 (23 May 2019 21:13)  HR: 64 (24 May 2019 11:06) (50 - 64)  BP: 157/71 (24 May 2019 11:06) (110/58 - 190/68)  BP(mean): 90 (24 May 2019 05:04) (90 - 90)  RR: 18 (24 May 2019 11:06) (18 - 20)  SpO2: 99% (24 May 2019 11:06) (95% - 99%)    PHYSICAL EXAM-  Appearance: Normal, well nourished	  HEENT:   Normal oral mucosa, PERRL, EOMI, sclera non-icteric	  Lymphatic: No cervical lymphadenopathy  Cardiovascular: Normal S1 S2, No JVD, No cardiac murmurs, No carotid bruits, No peripheral edema  Respiratory: Lungs clear to auscultation	diminished breath sounds basal  Psychiatry: A & O x 3, Mood & affect appropriate  Gastrointestinal:  Soft, Non-tender, + BS, no bruits	  Skin: No rashes, No ecchymoses, No cyanosis  Neurologic: Grossly non-focal with full strength in all four extremities  Extremities: Normal range of motion, No clubbing, cyanosis or edema  Vascular: Peripheral pulses palpable 2+ bilaterally    LABS:                        9.6    7.5   )-----------( 210      ( 24 May 2019 06:32 )             32.2     05-24    135  |  94<L>  |  13.0  ----------------------------<  103  5.0   |  28.0  |  3.98<H>    Ca    9.2      24 May 2019 06:32    TPro  7.4  /  Alb  3.4  /  TBili  0.3<L>  /  DBili  x   /  AST  25  /  ALT  10  /  AlkPhos  53  05-24    PT/INR - ( 23 May 2019 06:28 )   PT: 10.9 sec;   INR: 0.95 ratio         PTT - ( 23 May 2019 06:28 )  PTT:33.3 sec    Assessment and Plan

## 2019-05-24 NOTE — DISCHARGE NOTE PROVIDER - HOSPITAL COURSE
admitted with SOB. felt better with urgent HD. Assessment and Plan:     · Assessment        78 y/o male with acute on chronic CHF, chest pain, hypertension, CAD, ESRD on HD, Hypothyroidism        Dyspnea- likely sec to acute exacerbation of diastolic CHF- - EF 50-55%    - feels better with HD.    - Exacerbation usually caused by uncontrolled HTN    - Continue bumex 2mg PO qday    - Nephro following, cardio following    - Cont supplement Oxygen    - Continue HD as per renal        Chest pain, reportedly chronic, intermittent. h/o CAD    -  Recent cardiac cath 3/12, no new blockages    - Nuclear stress test 04/19 with mild ischemia, medically managed    - No further interventions planned at this time.     - Continue ASA, statin, metoprolol, losartan, and Imdur    - cardio cx appreciated            Malignant HTN/ uncontrolled HTN    - BP currently better controlled    - noted fluctuating BP here    - Monitor further and if more episodes of hypotension during HD noted, then plan on starting a regimen of holding BP meds on the day of HD and resume post HD for next day of HD.        ESRD, CAD, Hypothyroid    - cont home meds as is.        Heparin        Dispo: stable for DC today. seen by PT. home with [facility] home PT. ambulatory sat 93% with 4L. fall precautions advised        plan of care discussed with patient, return precautions discussed. patient verbalized understanding.         TIme spent 40 min

## 2019-05-24 NOTE — PROGRESS NOTE ADULT - ASSESSMENT
A/P: A/P: 80 y/o M with a PMHx of HFpEF (EF 50-55%), CAD s/p CABG (ANA-LAD, stents to LCx) with recent NST (04/19 mild ischemia, medically managed), ESRD on HD (T, Th, Sat) via L AVF, Lung Ca s/p radiation on home oxygen who presents to the ED with worsening dyspnea and chest pain. Patient states that he has this mild left sided chest pain on and off from last night, non-radiating, with some associated nausea. Patient states that the dyspnea was worsening despite going to HD that he came to the ED. Patient is currently resting comfortably, breathing has improved after 1.5 L of fluid removed during HD. Patient denies any fevers, chills, palpiations, syncope, near sycnope, LE edema, V/D, headache, or dizziness.   BNP [id number]    1. HFpEF   - EF 50-55%  - Exacerbation usually caused by HTN  - Continue bumex 2mg PO qday  - Nephro following, would consider additional HD today as patient still symptomatic   - Cont supplement Oxygen  - Continue HD    2. CAD  -  Recent cardiac cath 3/12, no new blockages  - Nuclear stress test 04/19 with mild ischemia, medically managed  - No further interventions planned at this time.   - Continue ASA, statin, metoprolol, losartan, and Imdur     3. HTN  - Most of pt's acute heart failure exacerbations tend to be secondary to hypertension due to poor medication adherence  - BP well controlled when on full regimen, often hypotension noted on previous admissions  - BP well controlled at this time.   - Continue losartan, Imdur, hydralazine, and metoprolol at current doses  - Continue to monitor Bp

## 2019-05-24 NOTE — PROGRESS NOTE ADULT - ASSESSMENT
78 y/o male with acute on chronic CHF, chest pain, hypertension, CAD, ESRD on HD, Hypothyroidism    1. Dyspnea- likely sec to acute exacerbation of diastolic CHF- given that he felt better after HD yesterday. appreciate renal and cards. no further cards intervention. cont cardiac meds. EF good and grd 1 diastolic dysfxn.  - D/W- able to remove 1.5 L yesterday when pt started to drop BP on HD. Will reassess today if rpt HD required today. reports of drop in BP at HD outpt. possible inadequate HD outpatient. ? requires BP med regimen adjustments? will rpt CXR STAT to assess and compare fluid status  - Also possible given his pulmonary history, added component of acute exacerbation of pulm fibrosis. Noted not given steroids since admission. giving medrol 125 x 1 dose. reassess in AM if further dosing required. Add duonebs. is home O2 user. using same here.    2. ESRD, HTN, CAD, Hypothyroid  - cont home meds as is.    Heparin 78 y/o male with acute on chronic CHF, chest pain, hypertension, CAD, ESRD on HD, Hypothyroidism    1. Dyspnea- likely sec to acute exacerbation of diastolic CHF- given that he felt better after HD yesterday. appreciate renal and cards. no further cards intervention. cont cardiac meds. EF good and grd 1 diastolic dysfxn.  - D/W- able to remove 1.5 L yesterday when pt started to drop BP on HD. Will reassess today if rpt HD required today. reports of drop in BP at HD outpt. possible inadequate HD outpatient. ? requires BP med regimen adjustments? will rpt CXR STAT to assess and compare fluid status  - Also possible given his pulmonary history, added component of acute exacerbation of pulm fibrosis. Noted not given steroids since admission. giving medrol 125 x 1 dose. reassess in AM if further dosing required. Add duonebs. is home O2 user. using same here.    2. Malignant HTN/ uncontrolled HTN  - BP meds increased in dosage  - noted fluctuating BP here  - Monitor further and if more episodes of hypotension during HD noted, then plan on starting a regimen of holding BP meds on the day of HD and resume post HD for next day of HD.    3. ESRD, CAD, Hypothyroid  - cont home meds as is.    Heparin

## 2019-05-24 NOTE — DISCHARGE NOTE PROVIDER - NSDCCPCAREPLAN_GEN_ALL_CORE_FT
PRINCIPAL DISCHARGE DIAGNOSIS  Diagnosis: Acute pulmonary edema  Assessment and Plan of Treatment:       SECONDARY DISCHARGE DIAGNOSES  Diagnosis: Lung cancer  Assessment and Plan of Treatment:     Diagnosis: Pulmonary fibrosis  Assessment and Plan of Treatment: PRINCIPAL DISCHARGE DIAGNOSIS  Diagnosis: Acute pulmonary edema  Assessment and Plan of Treatment: continue dialysis and meds. see renal adn cardiology in 1 week. see primary MD in 2-3 days. call for appointments      SECONDARY DISCHARGE DIAGNOSES  Diagnosis: Debility  Assessment and Plan of Treatment: continue home physical therapy. needs assisstance at home. use walker. fall prevention    Diagnosis: ESRD on dialysis  Assessment and Plan of Treatment: continue dialysis as scheduled. do not miss. see kidney doctor    Diagnosis: Malignant hypertension  Assessment and Plan of Treatment: - BP currently better controlled  - noted fluctuating BP here  - Monitor further and if more episodes of hypotension during HD noted, then plan on starting a regimen of holding BP meds on the day of HD and resume post HD for next day . discuss further with your primary MD and kidney doctor. continue meds.      Diagnosis: Chest pain, localized  Assessment and Plan of Treatment: -  Recent cardiac cath 3/12, no new blockages  - Nuclear stress test 04/19 with mild ischemia, medically managed  - No further interventions planned at this time.   - Continue ASA, statin, metoprolol, losartan, and Imdur  - f/u cardiology in 1-2 weeks. call for appoinment      Diagnosis: Lung cancer  Assessment and Plan of Treatment: see lung doctor. avoid smoking    Diagnosis: Pulmonary fibrosis  Assessment and Plan of Treatment: see lung doctor. avoid smoking

## 2019-05-24 NOTE — DISCHARGE NOTE PROVIDER - CARE PROVIDER_API CALL
Jessee Miles)  Internal Medicine; Nephrology  [address] [place] [address]  Phone: [phone]  Fax: [phone]  Follow Up Time:     Beck Hunt)  Cardiology; Cardiovascular Disease; Interventional Cardiology  [address] [facility], [address]  [place], [place] [id number]  Phone: [phone]  Fax: [phone]  Follow Up Time:

## 2019-05-24 NOTE — PROGRESS NOTE ADULT - SUBJECTIVE AND OBJECTIVE BOX
[facility]                                                       [facility]                                                        Office: [address] [address]                                                       Telephone: [phone]. Fax:[phone]                                                                             PROGRESS NOTE   Reason for follow up: HFpEF                              Overnight: No new events.   Update: Patient states that he is still experiencing shortness of breath.     Subjective: "I'm still feeling short of breath."   Complains of: Dyspnea  Review of symptoms: Cardiac:  No chest pain. + dyspnea. No palpitations.  Respiratory: no cough. +dyspnea  Gastrointestinal: No diarrhea. No abdominal pain. No bleeding.     Past medical history: No updates.   Chronic conditions:  Hypertension: controlled. CHF: Stable. CAD: Stable ischemic heart disease.   	  Vitals:  T(C): 36.5 (05-24-19 @ 07:19), Max: 36.9 (05-23-19 @ 09:45)  HR: 62 (05-24-19 @ 07:19) (50 - 81)  BP: 110/58 (05-24-19 @ 07:19) (110/58 - 190/68)  RR: 18 (05-24-19 @ 07:19) (18 - 20)  SpO2: 97% (05-24-19 @ 07:19) (92% - 98%)  Wt(kg): --  I&O's Summary    23 May 2019 07:01  -  24 May 2019 07:00  --------------------------------------------------------  IN: 0 mL / OUT: 1500 mL / NET: -1500 mL      Weight (kg): 59 (05-23 @ 06:05)    PHYSICAL EXAM:  Appearance: Normal, well nourished	  HEENT:   Normal oral mucosa, PERRL, EOMI, sclera non-icteric	  Lymphatic: No cervical lymphadenopathy  Cardiovascular: Normal S1 S2, No JVD, No cardiac murmurs, No carotid bruits, No peripheral edema  Respiratory: Lungs clear to auscultation	  Psychiatry: A & O x 3, Mood & affect appropriate  Gastrointestinal:  Soft, Non-tender, + BS, no bruits	  Skin: No rashes, No ecchymoses, No cyanosis  Neurologic: Grossly non-focal with full strength in all four extremities  Extremities: Normal range of motion, No clubbing, cyanosis or edema  Vascular: Peripheral pulses palpable 2+ bilaterally      CURRENT MEDICATIONS:  buMETAnide 2 milliGRAM(s) Oral daily  hydrALAZINE 50 milliGRAM(s) Oral every 8 hours  isosorbide   mononitrate ER Tablet (IMDUR) 120 milliGRAM(s) Oral daily  losartan 100 milliGRAM(s) Oral daily  metoprolol tartrate 50 milliGRAM(s) Oral every 8 hours  nitroglycerin    2% Ointment 0.5 Inch(s) Transdermal every 12 hours PRN    ALBUTerol/ipratropium for Nebulization  DULoxetine  gabapentin  famotidine    Tablet  atorvastatin  levothyroxine  aspirin  chewable  clopidogrel Tablet  multivitamin      LABS:	 	  CARDIAC MARKERS ( 23 May 2019 06:28 )  x     / x     / x     / x     / x      p-BNP 23 May 2019 06:28: 29345 pg/mL                            9.6    7.5   )-----------( 210      ( 24 May 2019 06:32 )             32.2     05-24    135  |  94<L>  |  13.0  ----------------------------<  103  5.0   |  28.0  |  3.98<H>    Ca    9.2      24 May 2019 06:32    TPro  7.4  /  Alb  3.4  /  TBili  0.3<L>  /  DBili  x   /  AST  25  /  ALT  10  /  AlkPhos  53  05-24    proBNP: Serum Pro-Brain Natriuretic Peptide: 02979 pg/mL (05-23 @ 06:28)    Lipid Profile:   HgA1c: TSH: Thyroid Stimulating Hormone, Serum: 0.89 uIU/mL      TELEMETRY: Reviewed  SB/SR, PVC, blocked APCs  ECG:  Reviewed by me. 	    DIAGNOSTIC TESTING:  [ ] Echocardiogram:   < from: TTE Echo w/Cont Complete (03.11.19 @ 20:35) >  PHYSICIAN INTERPRETATION:  Left Ventricle: The left ventricular internal cavity size is normal.  Global LV systolic function was low normal. Left ventricular ejection   fraction, by visual estimation, is 50 to 55%. Spectral Doppler shows   impaired relaxation pattern of left ventricular myocardial filling (Grade   I diastolic dysfunction).  Right Ventricle: The right ventricular size is normal. RV systolic   function is normal.  Left Atrium: Moderately enlarged left atrium.  Pericardium: There is no evidence of pericardial effusion.  Mitral Valve: Thickening of the anterior and posterior mitral valve   leaflets. There is mild to moderate mitral annular calcification. Mild   mitral valve regurgitation is seen.  Tricuspid Valve: The tricuspid valve is normal.  Aortic Valve: Sclerotic aortic valve with normal opening. No evidence of   aortic valve regurgitation is seen.  Pulmonic Valve: The pulmonic valve was not well visualized.  Aorta: The aortic root is normal in size and structure.  Venous: The inferior vena cava is normal. The inferior vena cava was   normal sized, with respiratory size variation greater than 50%.       Summary:   1. Left ventricular ejection fraction, by visual estimation, is 50 to   55%.   2. Low normal global left ventricular systolic function.   3. Spectral Doppler shows impaired relaxation pattern of left   ventricular myocardial filling (Grade I diastolic dysfunction).   4. There is no left ventricular hypertrophy.   5. Moderately enlarged left atrium.   6. Mild to moderate mitral annular calcification.   7. Thickening of the anterior and posterior mitral valve leaflets.   8. Sclerotic aortic valve with normal opening.    [id number] Alex Pastrana MD, Electronically signed on 3/12/2019 at 6:33:43 PM       < end of copied text >    [ ]  Catheterization:  < from: Cardiac Cath Lab - Adult (03.12.19 @ 16:57) >  VENTRICLES: There were no left ventricular global or regional wall motion  abnormalities. Analysis of regional contractile function demonstrated  severe diaphragmatic hypokinesis. EF estimated was 65 %.  VALVES: AORTIC VALVE: No significant aortic valve gradient.  CORONARY VESSELS: The coronary circulation is right dominant.  LM:   --  LM: Normal. The vessel was normal sized, heavily calcified, and  moderately tortuous. Angiography showed moderate atherosclerosis. There  was a discrete 30 % stenosis at the site of a prior stent, at the ostium  of the vessel segment. The lesion was without evidence of thrombus. There  was BRUCE grade 3 flow through the vessel (brisk flow).  LAD:   --  LAD: The vessel was normal sized, heavily calcified, and  moderately tortuous. Angiography showed severe atherosclerosis. There was  a diffuse 100 % stenosis at the ostium of the vessel segment. The lesion  was without evidence of thrombus. There was BRUCE grade 0 flow through the  vessel (no flow). This lesion is a chronic total occlusion. Fills via  patent ANA.  CX:   --  Circumflex: Normal. The vessel was normal sized, moderately  calcified, and excessively tortuous. Angiography showed mild  atherosclerosis with no flow limiting lesions. Patent stent in Prox LCx  and OM1.  RCA:   --  RCA: Normal. The vessel was normal sized, moderately calcified,  and moderately tortuous. Angiography showed severe atherosclerosis. There  was a diffuse 100 % stenosis at the ostium of the vessel segment, just  after RV marginal 1. The lesion was without evidence of thrombus. There  was BRUCE grade 0 flow through the vessel (no flow). Thislesion is a  chronic total occlusion.  The distal RCA fills from collaterals from the LCx.  GRAFTS:   --  Graft to the LAD: The graft was a normal sized ANA. Graft  angiography showed no degeneration, no calcification, and moderate  tortuosity.  COMPLICATIONS: There were no complications. No complications occurred  during the cath lab visit.  DIAGNOSTIC IMPRESSIONS: There is significant double vessel coronary artery  disease.  Ostial KRN=326%  Ostial GNA=044%  Patent ANA to LAD. Left ventricular function is normal.  LVEF=65%  DIAGNOSTIC RECOMMENDATIONS: The patient should continue with the present  medications. Patient management should include aggressive medical therapy,  close monitoring of BUN and creatinine, resumption of all previous  activities in 2 days, and a cardiac rehabilitation program. Medical  management is recommended.  Prepared and signed by  Job Galvan MD    < end of copied text >    [ ] Stress Test:    < from: Nuclear Stress Test-Pharmacologic (04.24.19 @ 12:21) >  NUCLEAR FINDINGS:  There is a small, mild to moderate defect in apical  wall  that is partially reversiblesuggestive of very mild  ischemia.  ------------------------------------------------------------------------      GATED ANALYSIS:  Gated wall motion analysis is performed, and shows  paradoxical spetal motion with post stress LVEF of 54%.  ------------------------------------------------------------------------      LV/RV OBSERVATION:  Normal LV ejection fraction.  ------------------------------------------------------------------------    IMPRESSIONS:  * There is a small, mild to moderate defectin apical  wall that is partially reversible suggestive of very mild  ischemia.  * Gated wall motion analysis is performed, and shows  paradoxical spetal motion with post stress LVEF of 54%.  * Chest Pain: No chest pain with administration of  Regadenoson.  * Symptom: No Symptom.  * HR Response: Appropriate.  * BP Response: Appropriate.  * Heart Rhythm: Normal Sinus Rhythm.  * ECG Abnormalities: There were no diagnostic changes.  * Arrhythmia: None.  * post infusion 05:50 complaints of abdominal pain vomited  gave 50mg Aminophyllin, with continued nausea and vomitine  another 25 mg given 10:42    ------------------------------------------------------------------------      ------------------------------------------------------------------------    Confirmed on  4/25/2019 - 18:35:27 by Enrique Nesbitt MD  Cardiology Fellow: Shelby Giraldo NP  ------------------------------------------------------------------------    < end of copied text >    OTHER:

## 2019-05-25 PROCEDURE — 99233 SBSQ HOSP IP/OBS HIGH 50: CPT

## 2019-05-25 RX ADMIN — HEPARIN SODIUM 5000 UNIT(S): 5000 INJECTION INTRAVENOUS; SUBCUTANEOUS at 05:47

## 2019-05-25 RX ADMIN — ISOSORBIDE MONONITRATE 120 MILLIGRAM(S): 60 TABLET, EXTENDED RELEASE ORAL at 17:14

## 2019-05-25 RX ADMIN — HEPARIN SODIUM 5000 UNIT(S): 5000 INJECTION INTRAVENOUS; SUBCUTANEOUS at 17:14

## 2019-05-25 RX ADMIN — Medication 50 MILLIGRAM(S): at 05:48

## 2019-05-25 RX ADMIN — Medication 50 MILLIGRAM(S): at 17:15

## 2019-05-25 RX ADMIN — Medication 3 MILLILITER(S): at 03:44

## 2019-05-25 RX ADMIN — Medication 100 MICROGRAM(S): at 05:48

## 2019-05-25 RX ADMIN — GABAPENTIN 100 MILLIGRAM(S): 400 CAPSULE ORAL at 17:14

## 2019-05-25 RX ADMIN — CLOPIDOGREL BISULFATE 75 MILLIGRAM(S): 75 TABLET, FILM COATED ORAL at 12:27

## 2019-05-25 RX ADMIN — GABAPENTIN 100 MILLIGRAM(S): 400 CAPSULE ORAL at 05:48

## 2019-05-25 RX ADMIN — ATORVASTATIN CALCIUM 80 MILLIGRAM(S): 80 TABLET, FILM COATED ORAL at 21:21

## 2019-05-25 RX ADMIN — Medication 50 MILLIGRAM(S): at 05:50

## 2019-05-25 RX ADMIN — LOSARTAN POTASSIUM 100 MILLIGRAM(S): 100 TABLET, FILM COATED ORAL at 05:48

## 2019-05-25 RX ADMIN — Medication 3 MILLILITER(S): at 20:16

## 2019-05-25 RX ADMIN — DULOXETINE HYDROCHLORIDE 20 MILLIGRAM(S): 30 CAPSULE, DELAYED RELEASE ORAL at 12:27

## 2019-05-25 RX ADMIN — BUMETANIDE 2 MILLIGRAM(S): 0.25 INJECTION INTRAMUSCULAR; INTRAVENOUS at 05:48

## 2019-05-25 RX ADMIN — FAMOTIDINE 20 MILLIGRAM(S): 10 INJECTION INTRAVENOUS at 12:27

## 2019-05-25 RX ADMIN — Medication 1 TABLET(S): at 12:27

## 2019-05-25 RX ADMIN — Medication 50 MILLIGRAM(S): at 21:21

## 2019-05-25 RX ADMIN — Medication 3 MILLILITER(S): at 08:16

## 2019-05-25 RX ADMIN — Medication 81 MILLIGRAM(S): at 12:27

## 2019-05-25 NOTE — PROGRESS NOTE ADULT - SUBJECTIVE AND OBJECTIVE BOX
Patient was seen and evaluated on dialysis.   No c/o CP SOB NV, Anxious,   no F/C  no swelling    T(C): 36.4 (05-25-19 @ 09:04), Max: 36.6 (05-24-19 @ 17:48)  HR: 56 (05-25-19 @ 09:04) (52 - 78)  BP: 150/58 (05-25-19 @ 09:04) (106/51 - 150/58)    PE ;  NAD  lungs - Dry rales & Rhonchi,   CV gr 3 murmur,  No gallop or rub  Abd : soft, NT BS +, No masses  Ext- No edema  Neuro : Grossly intact, moving extremities                         9.6    7.5   )-----------( 210      ( 24 May 2019 06:32 )             32.2     05-24    135  |  94<L>  |  13.0  ----------------------------<  103  5.0   |  28.0  |  3.98<H>    Ca    9.2      24 May 2019 06:32    TPro  7.4  /  Alb  3.4  /  TBili  0.3<L>  /  DBili  x   /  AST  25  /  ALT  10  /  AlkPhos  53  05-24    MEDICATIONS  (STANDING):    ALBUTerol/ipratropium for Nebulization  aspirin  chewable  atorvastatin  buMETAnide  clopidogrel Tablet  DULoxetine  famotidine    Tablet  gabapentin  heparin  Injectable  hydrALAZINE  isosorbide   mononitrate ER Tablet (IMDUR)  levothyroxine  losartan  metoprolol tartrate  multivitamin  nitroglycerin    2% Ointment PRN    Patient stable  [place] HD easily  Continue HD TIW,

## 2019-05-25 NOTE — PROGRESS NOTE ADULT - ASSESSMENT
80 y/o male with acute on chronic CHF, chest pain, hypertension, CAD, ESRD on HD, Hypothyroidism    1. Dyspnea- likely sec to acute exacerbation of diastolic CHF- given that he felt better after HD yesterday. appreciate renal and cards. no further cards intervention. cont cardiac meds. EF good and grd 1 diastolic dysfxn.  - D/W- able to remove 1.5 L yesterday when pt started to drop BP on HD. Will reassess today if rpt HD required today. reports of drop in BP at HD outpt. possible inadequate HD outpatient. ? requires BP med regimen adjustments? will rpt CXR STAT to assess and compare fluid status  - Also possible given his pulmonary history, added component of acute exacerbation of pulm fibrosis. Noted not given steroids since admission. giving medrol 125 x 1 dose. reassess in AM if further dosing required. Add duonebs. is home O2 user. using same here.    2. Malignant HTN/ uncontrolled HTN  - BP meds increased in dosage  - noted fluctuating BP here  - Monitor further and if more episodes of hypotension during HD noted, then plan on starting a regimen of holding BP meds on the day of HD and resume post HD for next day of HD.    3. ESRD, CAD, Hypothyroid  - cont home meds as is.    Heparin 78 y/o male with acute on chronic CHF, chest pain, hypertension, CAD, ESRD on HD, Hypothyroidism    Dyspnea- likely sec to acute exacerbation of diastolic CHF- - EF 50-55%  - feels better with HD.  - Exacerbation usually caused by uncontrolled HTN  - Continue bumex 2mg PO qday  - Nephro following, cardio following  - Cont supplement Oxygen  - Continue HD as per renal    Chest pain, reportedly chronic, intermittent. h/o CAD  -  Recent cardiac cath 3/12, no new blockages  - Nuclear stress test 04/19 with mild ischemia, medically managed  - No further interventions planned at this time.   - Continue ASA, statin, metoprolol, losartan, and Imdur  - cardio cx appreciated      Malignant HTN/ uncontrolled HTN  - BP currently better controlled  - noted fluctuating BP here  - Monitor further and if more episodes of hypotension during HD noted, then plan on starting a regimen of holding BP meds on the day of HD and resume post HD for next day of HD.    ESRD, CAD, Hypothyroid  - cont home meds as is.    Heparin    Dispo: anticipates DC in 24-48 hrs. needs renal clearance for DC. needs PT eval for safe DC if not done yet

## 2019-05-25 NOTE — PROGRESS NOTE ADULT - SUBJECTIVE AND OBJECTIVE BOX
HEALTH ISSUES - PROBLEM Dx:    SOB ? fluid overload; malignant HTN    HPI: 78 y/o M with a PMHx of HFpEF (EF 50-55%), CAD s/p CABG (ANA-LAD, stents to LCx) with recent NST (04/19 mild ischemia, medically managed), ESRD on HD (T, Th, Sat) via L AVF, Lung Ca s/p radiation on home oxygen who presents to the ED with worsening dyspnea and chest pain. Patient states that he has this mild left sided chest pain on and off from last night, non-radiating, with some associated nausea. Patient states that the dyspnea was worsening despite going to HD that he came to the ED.    INTERVAL HPI/ OVERNIGHT EVENTS:  seen at hD. chart reviewed.   denied complaints except one episode CP this am lasted 10 min, not new.   SOB better. use home oxygen for SOB    REVIEW OF SYSTEMS:    Patient denies any fevers, chills, palpiations, syncope, near sycnope, LE edema, V/D, headache, or dizziness.     Vital Signs Last 24 Hrs  Vital Signs Last 24 Hrs  T(C): 36.7 (25 May 2019 15:55), Max: 36.8 (25 May 2019 12:40)  T(F): 98.1 (25 May 2019 15:55), Max: 98.3 (25 May 2019 12:40)  HR: 78 (25 May 2019 15:55) (52 - 78)  BP: 150/76 (25 May 2019 15:55) (106/51 - 155/65)  BP(mean): --  RR: 18 (25 May 2019 15:55) (17 - 18)  SpO2: 100% (25 May 2019 15:55) (92% - 100%)    PHYSICAL EXAM-  Appearance: Normal, well nourished	  HEENT:   Normal oral mucosa, PERRL, EOMI, sclera non-icteric	  Lymphatic: No cervical lymphadenopathy  Cardiovascular: Normal S1 S2, No JVD, No cardiac murmurs, No carotid bruits, No peripheral edema  Respiratory: Lungs clear to auscultation	diminished breath sounds basal  Psychiatry: A & O x 3, Mood & affect appropriate  Gastrointestinal:  Soft, Non-tender, + BS, no bruits	  Skin: No rashes, No ecchymoses, No cyanosis  Neurologic: Grossly non-focal with full strength in all four extremities  Extremities: Normal range of motion, No clubbing, cyanosis or edema  Vascular: Peripheral pulses palpable 2+ bilaterally    LABS:                        9.6    7.5   )-----------( 210      ( 24 May 2019 06:32 )             32.2     05-24    135  |  94<L>  |  13.0  ----------------------------<  103  5.0   |  28.0  |  3.98<H>    Ca    9.2      24 May 2019 06:32    TPro  7.4  /  Alb  3.4  /  TBili  0.3<L>  /  DBili  x   /  AST  25  /  ALT  10  /  AlkPhos  53  05-24    PT/INR - ( 23 May 2019 06:28 )   PT: 10.9 sec;   INR: 0.95 ratio         PTT - ( 23 May 2019 06:28 )  PTT:33.3 sec    Assessment and Plan HEALTH ISSUES - PROBLEM Dx:    SOB ? fluid overload; malignant HTN    HPI: 78 y/o M with a PMHx of HFpEF (EF 50-55%), CAD s/p CABG (ANA-LAD, stents to LCx) with recent NST (04/19 mild ischemia, medically managed), ESRD on HD (T, Th, Sat) via L AVF, Lung Ca s/p radiation on home oxygen who presents to the ED with worsening dyspnea and chest pain. Patient states that he has this mild left sided chest pain on and off from last night, non-radiating, with some associated nausea. Patient states that the dyspnea was worsening despite going to HD that he came to the ED.    INTERVAL HPI/ OVERNIGHT EVENTS:  seen at hD. chart reviewed.   denied complaints except one episode CP this am lasted 10 min, not new.   SOB better. use home oxygen for SOB    REVIEW OF SYSTEMS:    Patient denies any fevers, chills, palpiations, syncope, near sycnope, LE edema, V/D, headache, or dizziness.     Vital Signs Last 24 Hrs  Vital Signs Last 24 Hrs  T(C): 36.7 (25 May 2019 15:55), Max: 36.8 (25 May 2019 12:40)  T(F): 98.1 (25 May 2019 15:55), Max: 98.3 (25 May 2019 12:40)  HR: 78 (25 May 2019 15:55) (52 - 78)  BP: 150/76 (25 May 2019 15:55) (106/51 - 155/65)  BP(mean): --  RR: 18 (25 May 2019 15:55) (17 - 18)  SpO2: 100% (25 May 2019 15:55) (92% - 100%)    PHYSICAL EXAM-  GENERAL: Not in distress. Alert    HEENT:  Normocephalic and atraumatic. PEARLA,EOMI  NECK: Supple.  No JVD.    CARDIOVASCULAR: RRR S1, S2. No murmur/rubs/gallop  LUNGS: BLAE reduced, no rales, no wheezing, no rhonchi.    ABDOMEN: ND. Soft,  NT, no guarding / rebound / rigidity.   EXTREMITIES: no cyanosis, no clubbing, no edema. no leg or calf TP  SKIN: no rash.   NEUROLOGIC: AAO*3.strength is symmetric, sensation intact, speech fluent.    PSYCHIATRIC: Calm.  No agitation.      LABS:                                    9.6    7.5   )-----------( 210      ( 24 May 2019 06:32 )             32.2       05-24    135  |  94<L>  |  13.0  ----------------------------<  103  5.0   |  28.0  |  3.98<H>    Ca    9.2      24 May 2019 06:32    TPro  7.4  /  Alb  3.4  /  TBili  0.3<L>  /  DBili  x   /  AST  25  /  ALT  10  /  AlkPhos  53  05-24

## 2019-05-26 ENCOUNTER — TRANSCRIPTION ENCOUNTER (OUTPATIENT)
Age: 79
End: 2019-05-26

## 2019-05-26 VITALS
HEART RATE: 52 BPM | TEMPERATURE: 98 F | OXYGEN SATURATION: 96 % | SYSTOLIC BLOOD PRESSURE: 108 MMHG | DIASTOLIC BLOOD PRESSURE: 47 MMHG | RESPIRATION RATE: 20 BRPM

## 2019-05-26 LAB
ANION GAP SERPL CALC-SCNC: 13 MMOL/L — SIGNIFICANT CHANGE UP (ref 5–17)
BUN SERPL-MCNC: 18 MG/DL — SIGNIFICANT CHANGE UP (ref 8–20)
CALCIUM SERPL-MCNC: 8.7 MG/DL — SIGNIFICANT CHANGE UP (ref 8.6–10.2)
CHLORIDE SERPL-SCNC: 96 MMOL/L — LOW (ref 98–107)
CO2 SERPL-SCNC: 29 MMOL/L — SIGNIFICANT CHANGE UP (ref 22–29)
CREAT SERPL-MCNC: 4.19 MG/DL — HIGH (ref 0.5–1.3)
GLUCOSE SERPL-MCNC: 101 MG/DL — SIGNIFICANT CHANGE UP (ref 70–115)
HCT VFR BLD CALC: 33.2 % — LOW (ref 42–52)
HGB BLD-MCNC: 10.1 G/DL — LOW (ref 14–18)
MCHC RBC-ENTMCNC: 29 PG — SIGNIFICANT CHANGE UP (ref 27–31)
MCHC RBC-ENTMCNC: 30.4 G/DL — LOW (ref 32–36)
MCV RBC AUTO: 95.4 FL — HIGH (ref 80–94)
NT-PROBNP SERPL-SCNC: HIGH PG/ML (ref 0–300)
PLATELET # BLD AUTO: 241 K/UL — SIGNIFICANT CHANGE UP (ref 150–400)
POTASSIUM SERPL-MCNC: 4.3 MMOL/L — SIGNIFICANT CHANGE UP (ref 3.5–5.3)
POTASSIUM SERPL-SCNC: 4.3 MMOL/L — SIGNIFICANT CHANGE UP (ref 3.5–5.3)
RBC # BLD: 3.48 M/UL — LOW (ref 4.6–6.2)
RBC # FLD: 17.1 % — HIGH (ref 11–15.6)
SODIUM SERPL-SCNC: 138 MMOL/L — SIGNIFICANT CHANGE UP (ref 135–145)
WBC # BLD: 8.1 K/UL — SIGNIFICANT CHANGE UP (ref 4.8–10.8)
WBC # FLD AUTO: 8.1 K/UL — SIGNIFICANT CHANGE UP (ref 4.8–10.8)

## 2019-05-26 PROCEDURE — 86850 RBC ANTIBODY SCREEN: CPT

## 2019-05-26 PROCEDURE — 85610 PROTHROMBIN TIME: CPT

## 2019-05-26 PROCEDURE — 80048 BASIC METABOLIC PNL TOTAL CA: CPT

## 2019-05-26 PROCEDURE — 80053 COMPREHEN METABOLIC PANEL: CPT

## 2019-05-26 PROCEDURE — 99239 HOSP IP/OBS DSCHRG MGMT >30: CPT

## 2019-05-26 PROCEDURE — 84443 ASSAY THYROID STIM HORMONE: CPT

## 2019-05-26 PROCEDURE — 86900 BLOOD TYPING SEROLOGIC ABO: CPT

## 2019-05-26 PROCEDURE — 97163 PT EVAL HIGH COMPLEX 45 MIN: CPT

## 2019-05-26 PROCEDURE — 82962 GLUCOSE BLOOD TEST: CPT

## 2019-05-26 PROCEDURE — 86901 BLOOD TYPING SEROLOGIC RH(D): CPT

## 2019-05-26 PROCEDURE — 93005 ELECTROCARDIOGRAM TRACING: CPT

## 2019-05-26 PROCEDURE — 99261: CPT

## 2019-05-26 PROCEDURE — 94640 AIRWAY INHALATION TREATMENT: CPT

## 2019-05-26 PROCEDURE — 36415 COLL VENOUS BLD VENIPUNCTURE: CPT

## 2019-05-26 PROCEDURE — 85027 COMPLETE CBC AUTOMATED: CPT

## 2019-05-26 PROCEDURE — 71045 X-RAY EXAM CHEST 1 VIEW: CPT

## 2019-05-26 PROCEDURE — 99285 EMERGENCY DEPT VISIT HI MDM: CPT

## 2019-05-26 PROCEDURE — 85730 THROMBOPLASTIN TIME PARTIAL: CPT

## 2019-05-26 PROCEDURE — 83880 ASSAY OF NATRIURETIC PEPTIDE: CPT

## 2019-05-26 RX ORDER — ISOSORBIDE MONONITRATE 60 MG/1
1 TABLET, EXTENDED RELEASE ORAL
Qty: 30 | Refills: 0
Start: 2019-05-26 | End: 2019-06-24

## 2019-05-26 RX ORDER — BUMETANIDE 0.25 MG/ML
1 INJECTION INTRAMUSCULAR; INTRAVENOUS
Qty: 30 | Refills: 0
Start: 2019-05-26 | End: 2019-06-24

## 2019-05-26 RX ORDER — LOSARTAN POTASSIUM 100 MG/1
1 TABLET, FILM COATED ORAL
Qty: 30 | Refills: 0
Start: 2019-05-26 | End: 2019-06-24

## 2019-05-26 RX ORDER — SENNA PLUS 8.6 MG/1
2 TABLET ORAL
Qty: 60 | Refills: 0
Start: 2019-05-26 | End: 2019-06-24

## 2019-05-26 RX ORDER — GABAPENTIN 400 MG/1
1 CAPSULE ORAL
Qty: 60 | Refills: 0
Start: 2019-05-26 | End: 2019-06-24

## 2019-05-26 RX ORDER — METOPROLOL TARTRATE 50 MG
1 TABLET ORAL
Qty: 90 | Refills: 0
Start: 2019-05-26 | End: 2019-06-24

## 2019-05-26 RX ORDER — HYDRALAZINE HCL 50 MG
1 TABLET ORAL
Qty: 90 | Refills: 0
Start: 2019-05-26 | End: 2019-06-24

## 2019-05-26 RX ORDER — FAMOTIDINE 10 MG/ML
1 INJECTION INTRAVENOUS
Qty: 30 | Refills: 0
Start: 2019-05-26 | End: 2019-06-24

## 2019-05-26 RX ORDER — METOPROLOL TARTRATE 50 MG
1 TABLET ORAL
Qty: 0 | Refills: 0 | DISCHARGE
Start: 2019-05-26 | End: 2019-06-24

## 2019-05-26 RX ORDER — NITROGLYCERIN 6.5 MG
1 CAPSULE, EXTENDED RELEASE ORAL
Qty: 30 | Refills: 0
Start: 2019-05-26

## 2019-05-26 RX ADMIN — Medication 3 MILLILITER(S): at 08:55

## 2019-05-26 RX ADMIN — LOSARTAN POTASSIUM 100 MILLIGRAM(S): 100 TABLET, FILM COATED ORAL at 06:02

## 2019-05-26 RX ADMIN — HEPARIN SODIUM 5000 UNIT(S): 5000 INJECTION INTRAVENOUS; SUBCUTANEOUS at 06:02

## 2019-05-26 RX ADMIN — Medication 50 MILLIGRAM(S): at 11:59

## 2019-05-26 RX ADMIN — CLOPIDOGREL BISULFATE 75 MILLIGRAM(S): 75 TABLET, FILM COATED ORAL at 11:59

## 2019-05-26 RX ADMIN — ISOSORBIDE MONONITRATE 120 MILLIGRAM(S): 60 TABLET, EXTENDED RELEASE ORAL at 11:59

## 2019-05-26 RX ADMIN — HEPARIN SODIUM 5000 UNIT(S): 5000 INJECTION INTRAVENOUS; SUBCUTANEOUS at 17:43

## 2019-05-26 RX ADMIN — Medication 100 MICROGRAM(S): at 06:02

## 2019-05-26 RX ADMIN — GABAPENTIN 100 MILLIGRAM(S): 400 CAPSULE ORAL at 17:43

## 2019-05-26 RX ADMIN — Medication 3 MILLILITER(S): at 15:35

## 2019-05-26 RX ADMIN — BUMETANIDE 2 MILLIGRAM(S): 0.25 INJECTION INTRAMUSCULAR; INTRAVENOUS at 06:02

## 2019-05-26 RX ADMIN — Medication 50 MILLIGRAM(S): at 06:05

## 2019-05-26 RX ADMIN — Medication 1 TABLET(S): at 11:59

## 2019-05-26 RX ADMIN — FAMOTIDINE 20 MILLIGRAM(S): 10 INJECTION INTRAVENOUS at 11:59

## 2019-05-26 RX ADMIN — DULOXETINE HYDROCHLORIDE 20 MILLIGRAM(S): 30 CAPSULE, DELAYED RELEASE ORAL at 11:59

## 2019-05-26 RX ADMIN — Medication 81 MILLIGRAM(S): at 11:59

## 2019-05-26 RX ADMIN — GABAPENTIN 100 MILLIGRAM(S): 400 CAPSULE ORAL at 06:02

## 2019-05-26 RX ADMIN — Medication 3 MILLILITER(S): at 03:59

## 2019-05-26 NOTE — PROGRESS NOTE ADULT - SUBJECTIVE AND OBJECTIVE BOX
HEALTH ISSUES - PROBLEM Dx:    SOB ? fluid overload; malignant HTN    INTERVAL HPI/ OVERNIGHT EVENTS:  seen at bedside. chart reviewed.   denied complaints. no CP. no SOB. use home oxygen for SOB. maintains sat with 4 L of oxygen at rest and during ambulation sat drops to 70s on RA  stable to DC home today with home care    REVIEW OF SYSTEMS:    Patient denies any fevers, chills, palpiations, syncope, near sycnope, LE edema, V/D, headache, or dizziness.     Vital Signs Last 24 Hrs  T(C): 36.4 (26 May 2019 09:43), Max: 36.7 (25 May 2019 15:55)  T(F): 97.6 (26 May 2019 09:43), Max: 98.1 (25 May 2019 15:55)  HR: 66 (26 May 2019 11:54) (42 - 78)  BP: 140/55 (26 May 2019 11:54) (110/43 - 150/76)  BP(mean): --  RR: 18 (26 May 2019 11:16) (18 - 21)  SpO2: 98% (26 May 2019 11:16) (74% - 100%)    PHYSICAL EXAM-  GENERAL: Not in distress. Alert    HEENT:  Normocephalic and atraumatic.  NECK: Supple.  No JVD.    CARDIOVASCULAR: RRR S1, S2. No murmur/rubs/gallop  LUNGS: BLAE reduced, no rales, no wheezing, no rhonchi.    ABDOMEN: ND. Soft,  NT, no guarding / rebound / rigidity.   EXTREMITIES: no cyanosis, no clubbing, no edema. no leg or calf TP  SKIN: no rash.   NEUROLOGIC: AAO*3. grsoo  PSYCHIATRIC: Calm.  No agitation.      LABS:                                   10.1   8.1   )-----------( 241      ( 26 May 2019 08:11 )             33.2       05-26    138  |  96<L>  |  18.0  ----------------------------<  101  4.3   |  29.0  |  4.19<H>    Ca    8.7      26 May 2019 08:11

## 2019-05-26 NOTE — PROGRESS NOTE ADULT - ASSESSMENT
78 y/o male with acute on chronic CHF, chest pain, hypertension, CAD, ESRD on HD, Hypothyroidism    Dyspnea- likely sec to acute exacerbation of diastolic CHF- - EF 50-55%  - feels better with HD.  - Exacerbation usually caused by uncontrolled HTN  - Continue bumex 2mg PO qday  - Nephro following, cardio following  - Cont supplement Oxygen  - Continue HD as per renal    Chest pain, reportedly chronic, intermittent. h/o CAD  -  Recent cardiac cath 3/12, no new blockages  - Nuclear stress test 04/19 with mild ischemia, medically managed  - No further interventions planned at this time.   - Continue ASA, statin, metoprolol, losartan, and Imdur  - cardio cx appreciated      Malignant HTN/ uncontrolled HTN  - BP currently better controlled  - noted fluctuating BP here  - Monitor further and if more episodes of hypotension during HD noted, then plan on starting a regimen of holding BP meds on the day of HD and resume post HD for next day of HD.    ESRD, CAD, Hypothyroid  - cont home meds as is.    Heparin    Dispo: anticipates DC in 24-48 hrs. needs renal clearance for DC. needs PT eval for safe DC if not done yet

## 2019-05-26 NOTE — PROGRESS NOTE ADULT - PROVIDER SPECIALTY LIST ADULT
Cardiology
Hospitalist
Nephrology
Internal Medicine
Nephrology
Hospitalist

## 2019-05-26 NOTE — PHYSICAL THERAPY INITIAL EVALUATION ADULT - PERTINENT HX OF CURRENT PROBLEM, REHAB EVAL
78 y/o M with a PMHx of HFpEF (EF 50-55%), CAD s/p CABG (ANA-LAD, stents to LCx) with recent NST (04/19 mild ischemia, medically managed), ESRD on HD (T, Th, Sat) via L AVF, Lung Ca s/p radiation on home oxygen who presented to the ED with worsening dyspnea and chest pain.

## 2019-05-26 NOTE — DISCHARGE NOTE NURSING/CASE MANAGEMENT/SOCIAL WORK - NSDCDPATPORTLINK_GEN_ALL_CORE
You can access the [facility] Patient Portal, offered by [facility], by registering with the following website: [email or web address]

## 2019-05-26 NOTE — PHYSICAL THERAPY INITIAL EVALUATION ADULT - ADDITIONAL COMMENTS
Pt. reports he lives in a house with his family with no stairs to enter and none inside. Pt. reports his family is at home at all times to assist him as needed. Pt. reports he was modified independent vs requiring assistance from his family PTA. Pt. owns a RW and no other DME. Wears home O2.

## 2019-05-26 NOTE — PROGRESS NOTE ADULT - SUBJECTIVE AND OBJECTIVE BOX
Vital Signs Last 24 Hrs,    T(C): 36.4 (26 May 2019 09:43), Max: 36.8 (25 May 2019 12:40)  T(F): 97.6 (26 May 2019 09:43), Max: 98.3 (25 May 2019 12:40)  HR: 57 (26 May 2019 09:43) (42 - 78)  BP: 144/55 (26 May 2019 09:43) (110/43 - 155/65)  BP(mean): --  RR: 18 (26 May 2019 09:43) (18 - 18)  SpO2: 98% (26 May 2019 09:43) (91% - 100%)    138    |  96<L>  |  18.0   ----------------------------<  101    Ca:8.7   (26 May 2019 08:11)  4.3     |  29.0   |  4.19<H>      eGFR if Non : 13 <L>  eGFR if : 15 <L>    TPro  7.4    /  Alb  3.4    /  TBili  0.3<L>  /  DBili  x      /  AST  25     /  ALT  10     /  AlkPhos  53     24 May 2019 06:32                        10.1<L>  8.1   )-----------( 241      ( 26 May 2019 08:11 )             33.2<L>    Cleared for discharge.    D/W Dr. Ahuja,

## 2019-05-31 NOTE — H&P ADULT - CONSTITUTIONAL COMMENTS
Non-Graft Cartilage Fenestration Text: The cartilage was fenestrated with a 2mm punch biopsy to help facilitate healing. aox 3 nad on bipap

## 2019-06-01 ENCOUNTER — OUTPATIENT (OUTPATIENT)
Dept: OUTPATIENT SERVICES | Facility: HOSPITAL | Age: 79
LOS: 1 days | End: 2019-06-01

## 2019-06-01 DIAGNOSIS — Z95.1 PRESENCE OF AORTOCORONARY BYPASS GRAFT: Chronic | ICD-10-CM

## 2019-06-17 ENCOUNTER — INPATIENT (INPATIENT)
Facility: HOSPITAL | Age: 79
LOS: 2 days | Discharge: HOME HEALTH | DRG: 291 | End: 2019-06-20
Attending: HOSPITALIST | Admitting: INTERNAL MEDICINE
Payer: COMMERCIAL

## 2019-06-17 VITALS
OXYGEN SATURATION: 99 % | HEART RATE: 58 BPM | HEIGHT: 65 IN | SYSTOLIC BLOOD PRESSURE: 118 MMHG | DIASTOLIC BLOOD PRESSURE: 52 MMHG | RESPIRATION RATE: 20 BRPM | WEIGHT: 145.06 LBS | TEMPERATURE: 98 F

## 2019-06-17 DIAGNOSIS — Z95.1 PRESENCE OF AORTOCORONARY BYPASS GRAFT: Chronic | ICD-10-CM

## 2019-06-17 PROCEDURE — 99285 EMERGENCY DEPT VISIT HI MDM: CPT

## 2019-06-17 PROCEDURE — 93010 ELECTROCARDIOGRAM REPORT: CPT

## 2019-06-17 RX ORDER — IPRATROPIUM/ALBUTEROL SULFATE 18-103MCG
3 AEROSOL WITH ADAPTER (GRAM) INHALATION
Refills: 0 | Status: COMPLETED | OUTPATIENT
Start: 2019-06-17 | End: 2019-06-17

## 2019-06-17 RX ADMIN — Medication 125 MILLIGRAM(S): at 23:53

## 2019-06-17 RX ADMIN — Medication 3 MILLILITER(S): at 23:53

## 2019-06-17 NOTE — ED ADULT NURSE NOTE - OBJECTIVE STATEMENT
pt alert and oriented x4 came in for SOB wearing 3 L NC at home. pt states he was SOB and family increased 02 to 5L NC. respirations even labored diminished. pt denies pain but reports cough. family translating at bedside. pt speaks Malay

## 2019-06-17 NOTE — ED PROVIDER NOTE - CONSTITUTIONAL, MLM
normal... Well appearing, frail, awake, alert, oriented to person, place, time/situation and in no apparent distress.

## 2019-06-17 NOTE — ED PROVIDER NOTE - CLINICAL SUMMARY MEDICAL DECISION MAKING FREE TEXT BOX
COPD vs CHF vs Dialysis, will give nebs, obtain labs and re-assess. COPD vs CHF vs Dialysis, will give nebs, obtain labs and re-assess.    Sats remain good after nebs and steroids, +chronic pleural effusions on xray; would benefit from nebs, steroids, and dialysis for volume reduction

## 2019-06-17 NOTE — ED ADULT NURSE NOTE - NSIMPLEMENTINTERV_GEN_ALL_ED
Implemented All Fall Risk Interventions:  [place] to call system. Call bell, personal items and telephone within reach. Instruct patient to call for assistance. Room bathroom lighting operational. Non-slip footwear when patient is off stretcher. Physically safe environment: no spills, clutter or unnecessary equipment. Stretcher in lowest position, wheels locked, appropriate side rails in place. Provide visual cue, wrist band, yellow gown, etc. Monitor gait and stability. Monitor for mental status changes and reorient to person, place, and time. Review medications for side effects contributing to fall risk. Reinforce activity limits and safety measures with patient and family.

## 2019-06-17 NOTE — ED PROVIDER NOTE - OBJECTIVE STATEMENT
78 y/o M pt with significant PMHx of Bipolar disorder, CAD, Chronic anemia, COPD, ESRD (on Dialysis), High cholesterol, Hypertension, Lung cancer and PNA presents to the ED c/o worsening shortness of breath onset a few days ago. Reports itching all over body and cough. Pt is O2 dependent. He increased his home O2 which provided minimal to no relief. Clarice notes that the pt goes has these similar symptoms every 2 weeks. Pt was seen and DC home from [facility] four days ago for similar symptoms. He takes Prednisone. Denies fevers, chills, HA, LOC, focal neuro deficits, CP/palpitations, abd pain, n/v/d, back pain, rash, urinary f/u/d, recent trauma, recent travel and sick contacts. No allergies. No PSHx. No other complaints at this time.

## 2019-06-17 NOTE — ED PROVIDER NOTE - CHPI ED SYMPTOMS NEG
HA, LOC, focal neuro deficits, CP/palpitations, abd pain, n/v/d, back pain, rash, or urinary f/u/d/no fever/no chills

## 2019-06-18 DIAGNOSIS — J44.1 CHRONIC OBSTRUCTIVE PULMONARY DISEASE WITH (ACUTE) EXACERBATION: ICD-10-CM

## 2019-06-18 LAB
ALBUMIN SERPL ELPH-MCNC: 3.4 G/DL — SIGNIFICANT CHANGE UP (ref 3.3–5.2)
ALP SERPL-CCNC: 95 U/L — SIGNIFICANT CHANGE UP (ref 40–120)
ALT FLD-CCNC: 25 U/L — SIGNIFICANT CHANGE UP
ANION GAP SERPL CALC-SCNC: 16 MMOL/L — SIGNIFICANT CHANGE UP (ref 5–17)
ANISOCYTOSIS BLD QL: SLIGHT — SIGNIFICANT CHANGE UP
APTT BLD: 28.5 SEC — SIGNIFICANT CHANGE UP (ref 27.5–36.3)
AST SERPL-CCNC: 30 U/L — SIGNIFICANT CHANGE UP
BASE EXCESS BLDA CALC-SCNC: 6.9 MMOL/L — HIGH (ref -2–2)
BILIRUB SERPL-MCNC: 0.3 MG/DL — LOW (ref 0.4–2)
BLD GP AB SCN SERPL QL: SIGNIFICANT CHANGE UP
BLOOD GAS COMMENTS ARTERIAL: SIGNIFICANT CHANGE UP
BUN SERPL-MCNC: 53 MG/DL — HIGH (ref 8–20)
CALCIUM SERPL-MCNC: 8.3 MG/DL — LOW (ref 8.6–10.2)
CHLORIDE SERPL-SCNC: 91 MMOL/L — LOW (ref 98–107)
CO2 SERPL-SCNC: 26 MMOL/L — SIGNIFICANT CHANGE UP (ref 22–29)
CREAT SERPL-MCNC: 5.43 MG/DL — HIGH (ref 0.5–1.3)
EOSINOPHIL # BLD AUTO: 0 K/UL — SIGNIFICANT CHANGE UP (ref 0–0.5)
EOSINOPHIL NFR BLD AUTO: 0.2 % — SIGNIFICANT CHANGE UP (ref 0–5)
GAS PNL BLDA: SIGNIFICANT CHANGE UP
GLUCOSE SERPL-MCNC: 117 MG/DL — HIGH (ref 70–115)
HCO3 BLDA-SCNC: 31 MMOL/L — HIGH (ref 20–26)
HCT VFR BLD CALC: 26.5 % — LOW (ref 42–52)
HGB BLD-MCNC: 7.9 G/DL — LOW (ref 14–18)
HOROWITZ INDEX BLDA+IHG-RTO: 3 — SIGNIFICANT CHANGE UP
INR BLD: 0.88 RATIO — SIGNIFICANT CHANGE UP (ref 0.88–1.16)
LYMPHOCYTES # BLD AUTO: 0.7 K/UL — LOW (ref 1–4.8)
LYMPHOCYTES # BLD AUTO: 9.1 % — LOW (ref 20–55)
MACROCYTES BLD QL: SLIGHT — SIGNIFICANT CHANGE UP
MCHC RBC-ENTMCNC: 27.9 PG — SIGNIFICANT CHANGE UP (ref 27–31)
MCHC RBC-ENTMCNC: 29.8 G/DL — LOW (ref 32–36)
MCV RBC AUTO: 93.6 FL — SIGNIFICANT CHANGE UP (ref 80–94)
MICROCYTES BLD QL: SLIGHT — SIGNIFICANT CHANGE UP
MONOCYTES # BLD AUTO: 0.8 K/UL — SIGNIFICANT CHANGE UP (ref 0–0.8)
MONOCYTES NFR BLD AUTO: 8.8 % — SIGNIFICANT CHANGE UP (ref 3–10)
NEUTROPHILS # BLD AUTO: 7.2 K/UL — SIGNIFICANT CHANGE UP (ref 1.8–8)
NEUTROPHILS NFR BLD AUTO: 82.2 % — HIGH (ref 37–73)
NT-PROBNP SERPL-SCNC: HIGH PG/ML (ref 0–300)
OVALOCYTES BLD QL SMEAR: SLIGHT — SIGNIFICANT CHANGE UP
PCO2 BLDA: 48 MMHG — HIGH (ref 35–45)
PH BLDA: 7.43 — SIGNIFICANT CHANGE UP (ref 7.35–7.45)
PLAT MORPH BLD: NORMAL — SIGNIFICANT CHANGE UP
PLATELET # BLD AUTO: 236 K/UL — SIGNIFICANT CHANGE UP (ref 150–400)
PO2 BLDA: 136 MMHG — HIGH (ref 83–108)
POIKILOCYTOSIS BLD QL AUTO: SLIGHT — SIGNIFICANT CHANGE UP
POTASSIUM SERPL-MCNC: 4.3 MMOL/L — SIGNIFICANT CHANGE UP (ref 3.5–5.3)
POTASSIUM SERPL-SCNC: 4.3 MMOL/L — SIGNIFICANT CHANGE UP (ref 3.5–5.3)
PROT SERPL-MCNC: 7.1 G/DL — SIGNIFICANT CHANGE UP (ref 6.6–8.7)
PROTHROM AB SERPL-ACNC: 10.1 SEC — SIGNIFICANT CHANGE UP (ref 10–12.9)
RBC # BLD: 2.83 M/UL — LOW (ref 4.6–6.2)
RBC # FLD: 17.7 % — HIGH (ref 11–15.6)
RBC BLD AUTO: ABNORMAL
SAO2 % BLDA: 99 % — SIGNIFICANT CHANGE UP (ref 95–99)
SODIUM SERPL-SCNC: 133 MMOL/L — LOW (ref 135–145)
TROPONIN T SERPL-MCNC: 0.08 NG/ML — HIGH (ref 0–0.06)
TYPE + AB SCN PNL BLD: SIGNIFICANT CHANGE UP
WBC # BLD: 8.8 K/UL — SIGNIFICANT CHANGE UP (ref 4.8–10.8)
WBC # FLD AUTO: 8.8 K/UL — SIGNIFICANT CHANGE UP (ref 4.8–10.8)

## 2019-06-18 PROCEDURE — 99223 1ST HOSP IP/OBS HIGH 75: CPT | Mod: GC

## 2019-06-18 PROCEDURE — 71046 X-RAY EXAM CHEST 2 VIEWS: CPT | Mod: 26

## 2019-06-18 PROCEDURE — 99223 1ST HOSP IP/OBS HIGH 75: CPT | Mod: 25

## 2019-06-18 PROCEDURE — 71250 CT THORAX DX C-: CPT | Mod: 26

## 2019-06-18 PROCEDURE — 99222 1ST HOSP IP/OBS MODERATE 55: CPT

## 2019-06-18 PROCEDURE — 90937 HEMODIALYSIS REPEATED EVAL: CPT

## 2019-06-18 RX ORDER — ATORVASTATIN CALCIUM 80 MG/1
80 TABLET, FILM COATED ORAL AT BEDTIME
Refills: 0 | Status: DISCONTINUED | OUTPATIENT
Start: 2019-06-18 | End: 2019-06-20

## 2019-06-18 RX ORDER — ASPIRIN/CALCIUM CARB/MAGNESIUM 324 MG
81 TABLET ORAL DAILY
Refills: 0 | Status: DISCONTINUED | OUTPATIENT
Start: 2019-06-18 | End: 2019-06-20

## 2019-06-18 RX ORDER — SEVELAMER CARBONATE 2400 MG/1
800 POWDER, FOR SUSPENSION ORAL
Refills: 0 | Status: DISCONTINUED | OUTPATIENT
Start: 2019-06-18 | End: 2019-06-20

## 2019-06-18 RX ORDER — CLOPIDOGREL BISULFATE 75 MG/1
75 TABLET, FILM COATED ORAL DAILY
Refills: 0 | Status: DISCONTINUED | OUTPATIENT
Start: 2019-06-18 | End: 2019-06-20

## 2019-06-18 RX ORDER — HEPARIN SODIUM 5000 [USP'U]/ML
5000 INJECTION INTRAVENOUS; SUBCUTANEOUS EVERY 12 HOURS
Refills: 0 | Status: DISCONTINUED | OUTPATIENT
Start: 2019-06-18 | End: 2019-06-20

## 2019-06-18 RX ORDER — AMLODIPINE BESYLATE 2.5 MG/1
10 TABLET ORAL DAILY
Refills: 0 | Status: DISCONTINUED | OUTPATIENT
Start: 2019-06-18 | End: 2019-06-20

## 2019-06-18 RX ORDER — PANTOPRAZOLE SODIUM 20 MG/1
40 TABLET, DELAYED RELEASE ORAL
Refills: 0 | Status: DISCONTINUED | OUTPATIENT
Start: 2019-06-18 | End: 2019-06-20

## 2019-06-18 RX ORDER — METOPROLOL TARTRATE 50 MG
50 TABLET ORAL
Refills: 0 | Status: DISCONTINUED | OUTPATIENT
Start: 2019-06-18 | End: 2019-06-20

## 2019-06-18 RX ORDER — DIPHENHYDRAMINE HCL 50 MG
25 CAPSULE ORAL EVERY 4 HOURS
Refills: 0 | Status: DISCONTINUED | OUTPATIENT
Start: 2019-06-18 | End: 2019-06-20

## 2019-06-18 RX ORDER — ALBUTEROL 90 UG/1
2.5 AEROSOL, METERED ORAL EVERY 6 HOURS
Refills: 0 | Status: DISCONTINUED | OUTPATIENT
Start: 2019-06-18 | End: 2019-06-20

## 2019-06-18 RX ORDER — ISOSORBIDE DINITRATE 5 MG/1
30 TABLET ORAL
Refills: 0 | Status: DISCONTINUED | OUTPATIENT
Start: 2019-06-18 | End: 2019-06-20

## 2019-06-18 RX ORDER — HYDRALAZINE HCL 50 MG
50 TABLET ORAL THREE TIMES A DAY
Refills: 0 | Status: DISCONTINUED | OUTPATIENT
Start: 2019-06-18 | End: 2019-06-20

## 2019-06-18 RX ORDER — LEVOTHYROXINE SODIUM 125 MCG
100 TABLET ORAL DAILY
Refills: 0 | Status: DISCONTINUED | OUTPATIENT
Start: 2019-06-18 | End: 2019-06-20

## 2019-06-18 RX ADMIN — ATORVASTATIN CALCIUM 80 MILLIGRAM(S): 80 TABLET, FILM COATED ORAL at 22:20

## 2019-06-18 RX ADMIN — Medication 50 MILLIGRAM(S): at 18:59

## 2019-06-18 RX ADMIN — ISOSORBIDE DINITRATE 30 MILLIGRAM(S): 5 TABLET ORAL at 18:59

## 2019-06-18 RX ADMIN — ISOSORBIDE DINITRATE 30 MILLIGRAM(S): 5 TABLET ORAL at 22:21

## 2019-06-18 RX ADMIN — SEVELAMER CARBONATE 800 MILLIGRAM(S): 2400 POWDER, FOR SUSPENSION ORAL at 14:32

## 2019-06-18 RX ADMIN — Medication 50 MILLIGRAM(S): at 22:20

## 2019-06-18 RX ADMIN — Medication 81 MILLIGRAM(S): at 18:58

## 2019-06-18 RX ADMIN — AMLODIPINE BESYLATE 10 MILLIGRAM(S): 2.5 TABLET ORAL at 18:59

## 2019-06-18 RX ADMIN — HEPARIN SODIUM 5000 UNIT(S): 5000 INJECTION INTRAVENOUS; SUBCUTANEOUS at 18:59

## 2019-06-18 RX ADMIN — Medication 20 MILLIGRAM(S): at 18:59

## 2019-06-18 RX ADMIN — Medication 100 MICROGRAM(S): at 18:59

## 2019-06-18 RX ADMIN — SEVELAMER CARBONATE 800 MILLIGRAM(S): 2400 POWDER, FOR SUSPENSION ORAL at 18:58

## 2019-06-18 RX ADMIN — CLOPIDOGREL BISULFATE 75 MILLIGRAM(S): 75 TABLET, FILM COATED ORAL at 18:59

## 2019-06-18 NOTE — H&P ADULT - NSHPPHYSICALEXAM_GEN_ALL_CORE
Vital Signs Last 24 Hrs  T(C): 36.4 (18 Jun 2019 01:49), Max: 36.8 (17 Jun 2019 21:25)  T(F): 97.6 (18 Jun 2019 01:49), Max: 98.2 (17 Jun 2019 21:25)  HR: 52 (18 Jun 2019 01:49) (52 - 58)  BP: 129/58 (18 Jun 2019 01:49) (118/52 - 129/58)  RR: 20 (18 Jun 2019 01:49) (20 - 20)  SpO2: 99% (18 Jun 2019 01:49) (99% - 99%)    GENERAL: Patient is an elderly [place]  male found resting comfortably on stretcher in mild respiratory distress, pleasant and cooperative.  HEENT: Normocephalic, atraumatic; EOMI, PEERL; Dry mucous membranes.  PULMONARY: Mildly decreased air entry bilaterally, with mild bibasilar crackles, no wheezing, rales or rhonchi.  CARDIAC: Regular rate and rhythm, normal S1 and S2, no murmurs, rubs or gallop.  GI: Abdomen is soft, nontender, nondistended. Normal bowel sounds.  EXTREMITIES: No clubbing, cyanosis or edema. Warm to touch with capillary refill <2s.  NEUROLOGIC: Patient is alert and oriented x 4, no gross motor or sensory deficits.  PSYCHIATRIC: Normal speech and affect, good eye contact.

## 2019-06-18 NOTE — CONSULT NOTE ADULT - SUBJECTIVE AND OBJECTIVE BOX
Patient is a 79y old  Male who presents with a chief complaint of Shortness of breath (18 Jun 2019 15:53)    HPI:  Patient is a 79-year-old male with ESRD (On HD Tue-Thu-Sat), HFpEF (EF 65% by cath as of 03/2019) , HTN, CAD s/p CABG (ANA-LAD) and Stents (to LCx) and Lung Ca (s/p radiation, on home O2) who presents today to the ED with several days of worsening shortness of breath, radiation not pertinent, didn't improve when increasing his jerson O2, no aggravating factors, and accompanied by generalized body itching but no rashes or skin lesions.   Patient denies any other complaints, no chest pain, palpitations, LE Edema, nausea/vomiting, diarrhea, fever or other symptoms. Patient's son was interviewed over the phone and the only other symptom he is able to recall is worsening cough but no changes in color of sputum or increase in its amount.   Both patient and his son report good compliance with medications and HD sessions and deny any dietary indiscretions.    Patient was recently discharged from [facility] for similar complaints and as per son, several of the patient's medications were changed upon discharge. (18 Jun 2019 04:31)    PAST MEDICAL & SURGICAL HISTORY:  Chronic anemia  ESRD (end stage renal disease): on HD (Tue-Thu-Sat) through RUE fistula  CAD (coronary artery disease): s/p remote CABG and multiple stents  Lung cancer: S/P radiation in 2006.  Bipolar disorder  Hypertension    S/P CABG (coronary artery bypass graft)    FAMILY HISTORY:  Family history of leukemia (Child)  Family history of diabetes mellitus (Child)    Social History: Resides w Family, Debilitated , WC Bound, On Home Oxygen,    MEDICATIONS  (STANDING):  amLODIPine   Tablet 10 milliGRAM(s) Oral daily  aspirin enteric coated 81 milliGRAM(s) Oral daily  atorvastatin 80 milliGRAM(s) Oral at bedtime  clopidogrel Tablet 75 milliGRAM(s) Oral daily  heparin  Injectable 5000 Unit(s) SubCutaneous every 12 hours  hydrALAZINE 50 milliGRAM(s) Oral three times a day  isosorbide   dinitrate Tablet (ISORDIL) 30 milliGRAM(s) Oral four times a day  levothyroxine 100 MICROGram(s) Oral daily  metoprolol tartrate 50 milliGRAM(s) Oral two times a day  pantoprazole    Tablet 40 milliGRAM(s) Oral before breakfast  predniSONE   Tablet 20 milliGRAM(s) Oral daily  sevelamer carbonate 800 milliGRAM(s) Oral three times a day with meals    MEDICATIONS  (PRN):  ALBUTerol    0.083% 2.5 milliGRAM(s) Nebulizer every 6 hours PRN Shortness of Breath and/or Wheezing  diphenhydrAMINE 25 milliGRAM(s) Oral every 4 hours PRN Rash and/or Itching    Allergies    No Known Allergies    REVIEW OF SYSTEMS:    CONSTITUTIONAL: No fever, weight loss, + fatigue  EYES: No eye pain, visual disturbances, or discharge  ENMT:  No difficulty hearing, tinnitus, vertigo; No sinus or throat pain  NECK: No pain or stiffness  RESPIRATORY: No cough, wheezing, chills or hemoptysis; +  shortness of breath  CARDIOVASCULAR: No chest pain, palpitations, dizziness, + leg swelling  GASTROINTESTINAL: No abdominal or epigastric pain. No nausea, vomiting, or hematemesis; No diarrhea or constipation. No melena or hematochezia.  GENITOURINARY: No dysuria, frequency, hematuria, or incontinence  NEUROLOGICAL: No headaches, memory loss, loss of strength, numbness, or tremors  SKIN: No itching, burning, rashes, or lesions   LYMPH NODES: No enlarged glands  ENDOCRINE: No heat or cold intolerance; No hair loss  MUSCULOSKELETAL: No joint pain or swelling; No muscle, back, or extremity pain  PSYCHIATRIC: No depression, anxiety, mood swings, or difficulty sleeping  HEME/LYMPH: No easy bruising, or bleeding gums  ALLERGY AND IMMUNOLOGIC: No hives or eczema    Vital Signs Last 24 Hrs  T(C): 36.8 (18 Jun 2019 11:55), Max: 36.8 (17 Jun 2019 21:25)  T(F): 98.3 (18 Jun 2019 11:55), Max: 98.3 (18 Jun 2019 11:55)  HR: 55 (18 Jun 2019 11:55) (52 - 61)  BP: 139/56 (18 Jun 2019 11:55) (118/52 - 140/74)  BP(mean): --  RR: 18 (18 Jun 2019 11:55) (18 - 20)  SpO2: 99% (18 Jun 2019 11:55) (98% - 99%)    PHYSICAL EXAM:    GENERAL: Frail, Elderly, Pale,   HEAD:  Atraumatic, Normocephalic, Pale,  EYES: EOMI, PERRLA, conjunctiva and sclera clear  ENMT: Dry mucous membranes,   NECK: Supple, No JVD,   NERVOUS SYSTEM: Lethargic  & Oriented X3,   CHEST/LUNG: Dull  to percussion bilaterally; No rales, + rhonchi,  No wheezing, or rubs  HEART: Regular rate and rhythm; No murmurs, rubs, or gallops  ABDOMEN: Soft, Nontender, Nondistended; Bowel sounds present  EXTREMITIES:  2+ Peripheral Pulses, No clubbing, cyanosis, + edema  LYMPH: No lymphadenopathy noted  SKIN: No rashes or lesions      LABS:                        7.9    8.8   )-----------( 236      ( 17 Jun 2019 23:53 )             26.5     06-17    133<L>  |  91<L>  |  53.0<H>  ----------------------------<  117<H>  4.3   |  26.0  |  5.43<H>    Ca    8.3<L>      17 Jun 2019 23:53  Phos  3.0     06-17  Mg     2.1     06-17    TPro  7.1  /  Alb  3.4  /  TBili  0.3<L>  /  DBili  x   /  AST  30  /  ALT  25  /  AlkPhos  95  06-17    PT/INR - ( 17 Jun 2019 23:53 )   PT: 10.1 sec;   INR: 0.88 ratio         PTT - ( 17 Jun 2019 23:53 )  PTT:28.5 sec    Magnesium, Serum: 2.1 mg/dL (06-17 @ 23:53)  Phosphorus Level, Serum: 3.0 mg/dL (06-17 @ 23:53)    RADIOLOGY & ADDITIONAL TESTS:    Xray Chest 2 Views PA/Lat (06.18.19 @ 00:02)     EXAM:  XR CHEST PA LAT 2V                          PROCEDURE DATE:  06/18/2019      INTERPRETATION:  PA and lateral chest radiographs     COMPARISON:  NONE.    CLINICAL INFORMATION: Dyspnea, shortness of breath, follow-up..    FINDINGS:    There is no significant change.  THE RIGHT APEX OF LUNG IS OPACIFIED.  There is a diffuse interstitial lung reticular opacities in the perihilar   lung bases and bilateral mild effusions unchanged from prior exam. There   is cardiomegaly with changes from prior coronary artery bypass graft   procedure.    IMPRESSION: No interval change.
PULMONARY CONSULT NOTE      KAUSHIK [id number]    Patient is a 79y old  Male who presents with a chief complaint of Shortness of breath (18 Jun 2019 14:12)      HISTORY OF PRESENT ILLNESS:    Patient is a 79-year-old male with ESRD (On HD Tue-Thu-Sat), HFpEF (EF 65% by cath as of 03/2019) , HTN, CAD s/p CABG (ANA-LAD) and Stents (to LCx) and Lung Ca (s/p radiation, on home O2) who presents today to the ED with several days of worsening shortness of breath, radiation not pertinent, didn't improve when increasing his jerson O2, no aggravating factors, and accompanied by generalized body itching but no rashes or skin lesions.   Patient denies any other complaints, no chest pain, palpitations, LE Edema, nausea/vomiting, diarrhea, fever or other symptoms. Patient's son was interviewed over the phone and the only other symptom he is able to recall is worsening cough but no changes in color of sputum or increase in its amount.   Both patient and his son report good compliance with medications and HD sessions and deny any dietary indiscretions.  Patient was recently discharged from [facility] for similar complaints and as per son, several of the patient's medications were changed upon discharge.   NEVER SEEN BY US  COMFORTABLE POST HD    MEDICATIONS  (STANDING):  amLODIPine   Tablet 10 milliGRAM(s) Oral daily  aspirin enteric coated 81 milliGRAM(s) Oral daily  atorvastatin 80 milliGRAM(s) Oral at bedtime  clopidogrel Tablet 75 milliGRAM(s) Oral daily  heparin  Injectable 5000 Unit(s) SubCutaneous every 12 hours  hydrALAZINE 50 milliGRAM(s) Oral three times a day  isosorbide   dinitrate Tablet (ISORDIL) 30 milliGRAM(s) Oral four times a day  levothyroxine 100 MICROGram(s) Oral daily  metoprolol tartrate 50 milliGRAM(s) Oral two times a day  pantoprazole    Tablet 40 milliGRAM(s) Oral before breakfast  predniSONE   Tablet 20 milliGRAM(s) Oral daily  sevelamer carbonate 800 milliGRAM(s) Oral three times a day with meals      MEDICATIONS  (PRN):  ALBUTerol    0.083% 2.5 milliGRAM(s) Nebulizer every 6 hours PRN Shortness of Breath and/or Wheezing  diphenhydrAMINE 25 milliGRAM(s) Oral every 4 hours PRN Rash and/or Itching      Allergies    No Known Allergies    Intolerances        PAST MEDICAL & SURGICAL HISTORY:  Chronic anemia  ESRD (end stage renal disease): on HD (Tue-Thu-Sat) through RUE fistula  CAD (coronary artery disease): s/p remote CABG and multiple stents  Lung cancer: had yoni radiation in 2006.  Bipolar disorder  High cholesterol  Hypertension  S/P CABG (coronary artery bypass graft)      FAMILY HISTORY:  Family history of leukemia (Child)  Family history of diabetes mellitus (Child)      SOCIAL HISTORY  Smoking History:     REVIEW OF SYSTEMS:    CONSTITUTIONAL:  No fevers, chills, sweats    HEENT:  Eyes:  No diplopia or blurred vision. ENT:  No earache, sore throat or runny nose.    CARDIOVASCULAR:  No pressure, squeezing, tightness, or heaviness about the chest; no palpitations.    RESPIRATORY:  No cough, shortness of breath, PND or orthopnea. Mild SOBOE    GASTROINTESTINAL:  No abdominal pain, nausea, vomiting or diarrhea.    GENITOURINARY:  No dysuria, frequency or urgency.    NEUROLOGIC:  No paresthesias, fasciculations, seizures or weakness.    PSYCHIATRIC:  No disorder of thought or mood.    Vital Signs Last 24 Hrs  T(C): 36.8 (18 Jun 2019 11:55), Max: 36.8 (17 Jun 2019 21:25)  T(F): 98.3 (18 Jun 2019 11:55), Max: 98.3 (18 Jun 2019 11:55)  HR: 55 (18 Jun 2019 11:55) (52 - 61)  BP: 139/56 (18 Jun 2019 11:55) (118/52 - 140/74)  BP(mean): --  RR: 18 (18 Jun 2019 11:55) (18 - 20)  SpO2: 99% (18 Jun 2019 11:55) (98% - 99%)    PHYSICAL EXAMINATION:    GENERAL: The patient is a well-developed, well-nourished _____in no apparent distress.     HEENT: Head is normocephalic and atraumatic. Extraocular muscles are intact. Mucous membranes are moist.     NECK: Supple.     LUNGS: Clear to auscultation without wheezing, rales, or rhonchi. Respirations unlabored    HEART: Regular rate and rhythm without murmur.    ABDOMEN: Soft, nontender, and nondistended.  No hepatosplenomegaly is noted.    EXTREMITIES: Without any cyanosis, clubbing, rash, lesions or edema.    NEUROLOGIC: Grossly intact.      LABS:                        7.9    8.8   )-----------( 236      ( 17 Jun 2019 23:53 )             26.5     06-17    133<L>  |  91<L>  |  53.0<H>  ----------------------------<  117<H>  4.3   |  26.0  |  5.43<H>    Ca    8.3<L>      17 Jun 2019 23:53  Phos  3.0     06-17  Mg     2.1     06-17    TPro  7.1  /  Alb  3.4  /  TBili  0.3<L>  /  DBili  x   /  AST  30  /  ALT  25  /  AlkPhos  95  06-17    PT/INR - ( 17 Jun 2019 23:53 )   PT: 10.1 sec;   INR: 0.88 ratio         PTT - ( 17 Jun 2019 23:53 )  PTT:28.5 sec      CARDIAC MARKERS ( 17 Jun 2019 23:53 )  x     / 0.08 ng/mL / x     / x     / x            Serum Pro-Brain Natriuretic Peptide: [id number] pg/mL (06-17-19 @ 23:53)        RADIOLOGY & ADDITIONAL STUDIES:   EXAM:  XR CHEST PA LAT 2V                          PROCEDURE DATE:  06/18/2019          INTERPRETATION:  PA and lateral chest radiographs     COMPARISON:  NONE.    CLINICAL INFORMATION: Dyspnea, shortness of breath, follow-up..    FINDINGS:  Thereis no significant change.  THE RIGHT APEX OF LUNG IS OPACIFIED.  There is a diffuse interstitial lung reticular opacities in the perihilar   lung bases and bilateral mild effusions unchanged from prior exam. There   is cardiomegaly with changes from prior coronary artery bypass graft   procedure.    IMPRESSION: No interval change.                GALLITO DE SANTIAGO M.D., ATTENDING RADIOLOGIST  This document has been electronically signed. Jun 18 2019  7:40AM

## 2019-06-18 NOTE — H&P ADULT - NSHPSOCIALHISTORY_GEN_ALL_CORE
Patient lives with his wife, daughter and son-in-law.  He requires assistance with most of his ADLs, he is able to eat on his own.  Family doesn't have any aid at home.

## 2019-06-18 NOTE — CONSULT NOTE ADULT - ASSESSMENT
Dx :  Cardiomegaly, Radiation Pneumonitis,    ESRD, Malnutrition, S/P CABG, Bi Polar Disorder,     Anemia ( hyporesponsive to ANUJ )    Will Resume HD,    ANUJ,    PRBC Transfusion, Maintain Hgb > 8 gms.,    May be discharged on Wednesday after  HD & Transfusion,
Assess    Volume overload  Prior lung Ca  Possible mild fibrosis    Rec    02  Neb  HD per renal  OP FU with Ex Ox and PFT

## 2019-06-18 NOTE — ED ADULT NURSE REASSESSMENT NOTE - NS ED NURSE REASSESS COMMENT FT1
Report received from off going RN, care of pt assumed, pt sleeping on stretcher, arousable to voice, a+0x 2-3, cm sb 60's no ectopy noted. bilateral crackles at bases L >R , oxygen 4 l nc in place  ( poor effort). L arm AV fistula + thrill + bruit pink arm band placed on pt, abd soft nontender, no pedal edema. c/o itchiness all over. denies any c/o chest pain or sob skin warm and dry. pt waiting for admission to hospital, update do n plan of care.

## 2019-06-18 NOTE — H&P ADULT - NSHPOUTPATIENTPROVIDERS_GEN_ALL_CORE
Nora Euceda MD (Internal Medicine [PCP @ [facility]])  Jessee Miles (Nephrology)  [facility]

## 2019-06-18 NOTE — CHART NOTE - NSCHARTNOTEFT_GEN_A_CORE
Patient seen and examined at bedside. Seen in HD. c/o SOB & cough since few days & generalized itching. Voices no other compliants. Benadryl for itching ordered.  Recent cardiac cath 3/12, no new blockages. Nuclear stress test 04/19 with mild ischemia, medically managed. No further interventions planned at that time.  ABG & CT chest ordered. Pulm eval called        GENERAL:  mild SOB at rest  HEENT: Normocephalic, atraumatic; EOMI, PEERL; Dry mucous membranes.  PULMONARY: decreased air entry bilaterally, no wheezing, rales or rhonchi.  CARDIAC: Regular rate and rhythm, normal S1 and S2, no murmurs, rubs or gallop.  Abdomen: soft, nontender, nondistended. Normal bowel sounds.  EXTREMITIES: No clubbing, cyanosis or edema.   NEUROLOGIC: alert and oriented x 4, no gross motor or sensory deficits.  PSYCHIATRIC: Normal speech and affect, good eye contact.  Skin: no rash

## 2019-06-18 NOTE — H&P ADULT - HISTORY OF PRESENT ILLNESS
Patient is a 79-year-old male with ESRD (On HD Tue-Thu-Sat), HFpEF (EF 65% by cath as of 03/2019) , HTN, CAD s/p CABG (ANA-LAD) and Stents (to LCx) and Lung Ca (s/p radiation, on home O2) who presents today to the ED with shortness of breath Patient is a 79-year-old male with ESRD (On HD Tue-Thu-Sat), HFpEF (EF 65% by cath as of 03/2019) , HTN, CAD s/p CABG (ANA-LAD) and Stents (to LCx) and Lung Ca (s/p radiation, on home O2) who presents today to the ED with several days of worsening shortness of breath, radiation not pertinent, didn't improve when increasing his jerson O2, no aggravating factors, and accompanied by generalized body itching but no rashes or skin lesions.   Patient denies any other complaints, no chest pain, palpitations, LE Edema, nausea/vomiting, diarrhea, fever or other symptoms. Patient's son was interviewed over the phone and the only other symptom he is able to recall is worsening cough but no changes in color of sputum or increase in its amount.   Both patient and his son report good compliance with medications and HD sessions and deny any dietary indiscretions.  Patient was recently discharged from [facility] for similar complaints. Patient is a 79-year-old male with ESRD (On HD Tue-Thu-Sat), HFpEF (EF 65% by cath as of 03/2019) , HTN, CAD s/p CABG (ANA-LAD) and Stents (to LCx) and Lung Ca (s/p radiation, on home O2) who presents today to the ED with several days of worsening shortness of breath, radiation not pertinent, didn't improve when increasing his jerson O2, no aggravating factors, and accompanied by generalized body itching but no rashes or skin lesions.   Patient denies any other complaints, no chest pain, palpitations, LE Edema, nausea/vomiting, diarrhea, fever or other symptoms. Patient's son was interviewed over the phone and the only other symptom he is able to recall is worsening cough but no changes in color of sputum or increase in its amount.   Both patient and his son report good compliance with medications and HD sessions and deny any dietary indiscretions.  Patient was recently discharged from [facility] for similar complaints and as per son, several of the patient's medications were changed upon discharge.

## 2019-06-18 NOTE — H&P ADULT - NSHPLABSRESULTS_GEN_ALL_CORE
7.9    8.8   )-----------( 236      ( 17 Jun 2019 23:53 )             26.5     06-17    133<L>  |  91<L>  |  53.0<H>  ----------------------------<  117<H>  4.3   |  26.0  |  5.43<H>    Ca    8.3<L>      17 Jun 2019 23:53  Phos  3.0     06-17  Mg     2.1     06-17    TPro  7.1  /  Alb  3.4  /  TBili  0.3<L>  /  DBili  x   /  AST  30  /  ALT  25  /  AlkPhos  95  06-17    Chest X-Ray showing blunting of costophrenic angles consistent with pleural effusion which is unchanged from prior study on 05/24/2019. Chronic pulmonary interstitial disease again noted.

## 2019-06-18 NOTE — H&P ADULT - NSICDXFAMILYHX_GEN_ALL_CORE_FT
FAMILY HISTORY:  Child  Still living? Yes, Estimated age: Age Unknown  Family history of diabetes mellitus, Age at diagnosis: Age Unknown  Family history of leukemia, Age at diagnosis: Age Unknown

## 2019-06-18 NOTE — H&P ADULT - ASSESSMENT
Patient is a 79-year-old male with ESRD (On HD Tue-Thu-Sat), HFpEF (EF 65% by cath as of 03/2019) , HTN, CAD s/p CABG (ANA-LAD) and Stents (to LCx) and Lung Ca (s/p radiation, on home O2) with worsening shortness of breath.    Admit to Medicine service.  Cardiac monitor and .  Bed rest  DASH/TLC low sodium/low fat diet.    Worsening shortness of breath.  Likely multifactorial, patient s/p radiation therapy for Lung Ca, possibly a component of restrictive disease from pulmonary fibrosis in addition to fluid overload from acutely exacerbated CHF or ESRD although less likely given that patient without LE edema and with dry mucous membranes clinically not grossly overloaded with fluid. No leukocytosis, no absolute neutrophilia, patient afebrile, possibility of underlying lung infection is remote.    ESRD  As per patient and verified with son, he hasn't been missing his HD sessions.  Monitor I&O.  Likely the cause of mildly elevated troponins, lower than in prior admissions and in the absence of chest pain, will monitor.  F/u nephrology consultation.  HD likely today.     Heart failure with preserved ejection fraction.  Patient with sBNP elevated compared to prior admissions, however not clinically overloaded.  Will continue his home medications.    Normocytic hypochromic anemia.  Likely multifactorial with anemia of chronic inflammation from ESRD in addition to iron deficiency anemia from poor PO intake.  Drop in Hb compared to last admission, however no reports of bleeding (no hematuria, melena, hematochezia, hematemesis).    Essential hypertension  Currently stable.  Will continue home medications  DASH/TLC diet.    Prophylactic measures   DVT prophylaxis: Heparin 5000 U BID    Advance directives:  Patient's healthcare proxy is his grandson Mr. Alfreda Schroeder   Previously MOLTS form with DNR/DNI status was completed and scanned in eMR, however patient's family surrendered it during one of his hospital admissions and it was misplaced.  Patient's son-in-law Mr. Rivera Schroeder was updated by resident team over the phone.

## 2019-06-19 DIAGNOSIS — Z71.89 OTHER SPECIFIED COUNSELING: ICD-10-CM

## 2019-06-19 LAB
ANION GAP SERPL CALC-SCNC: 15 MMOL/L — SIGNIFICANT CHANGE UP (ref 5–17)
BUN SERPL-MCNC: 42 MG/DL — HIGH (ref 8–20)
CALCIUM SERPL-MCNC: 8.5 MG/DL — LOW (ref 8.6–10.2)
CHLORIDE SERPL-SCNC: 92 MMOL/L — LOW (ref 98–107)
CO2 SERPL-SCNC: 29 MMOL/L — SIGNIFICANT CHANGE UP (ref 22–29)
CREAT SERPL-MCNC: 4.44 MG/DL — HIGH (ref 0.5–1.3)
EOSINOPHIL # BLD AUTO: 0 K/UL — SIGNIFICANT CHANGE UP (ref 0–0.5)
EOSINOPHIL NFR BLD AUTO: 0 % — SIGNIFICANT CHANGE UP (ref 0–5)
FERRITIN SERPL-MCNC: 812 NG/ML — HIGH (ref 30–400)
FOLATE SERPL-MCNC: 17.1 NG/ML — SIGNIFICANT CHANGE UP
GLUCOSE SERPL-MCNC: 141 MG/DL — HIGH (ref 70–115)
HAPTOGLOB SERPL-MCNC: 189 MG/DL — SIGNIFICANT CHANGE UP (ref 34–200)
HCT VFR BLD CALC: 25.6 % — LOW (ref 42–52)
HGB BLD-MCNC: 8.1 G/DL — LOW (ref 14–18)
IRON SATN MFR SERPL: 23 % — SIGNIFICANT CHANGE UP (ref 16–55)
IRON SATN MFR SERPL: 58 UG/DL — LOW (ref 59–158)
LDH SERPL L TO P-CCNC: 213 U/L — HIGH (ref 98–192)
LYMPHOCYTES # BLD AUTO: 0.6 K/UL — LOW (ref 1–4.8)
LYMPHOCYTES # BLD AUTO: 7.4 % — LOW (ref 20–55)
MAGNESIUM SERPL-MCNC: 2 MG/DL — SIGNIFICANT CHANGE UP (ref 1.6–2.6)
MCHC RBC-ENTMCNC: 29.9 PG — SIGNIFICANT CHANGE UP (ref 27–31)
MCHC RBC-ENTMCNC: 31.6 G/DL — LOW (ref 32–36)
MCV RBC AUTO: 94.5 FL — HIGH (ref 80–94)
MONOCYTES # BLD AUTO: 0.7 K/UL — SIGNIFICANT CHANGE UP (ref 0–0.8)
MONOCYTES NFR BLD AUTO: 8.5 % — SIGNIFICANT CHANGE UP (ref 3–10)
NEUTROPHILS # BLD AUTO: 7.1 K/UL — SIGNIFICANT CHANGE UP (ref 1.8–8)
NEUTROPHILS NFR BLD AUTO: 83.9 % — HIGH (ref 37–73)
PHOSPHATE SERPL-MCNC: 3.8 MG/DL — SIGNIFICANT CHANGE UP (ref 2.4–4.7)
PLATELET # BLD AUTO: 246 K/UL — SIGNIFICANT CHANGE UP (ref 150–400)
POTASSIUM SERPL-MCNC: 4.2 MMOL/L — SIGNIFICANT CHANGE UP (ref 3.5–5.3)
POTASSIUM SERPL-SCNC: 4.2 MMOL/L — SIGNIFICANT CHANGE UP (ref 3.5–5.3)
RBC # BLD: 2.71 M/UL — LOW (ref 4.6–6.2)
RBC # BLD: 2.71 M/UL — LOW (ref 4.6–6.2)
RBC # FLD: 17.5 % — HIGH (ref 11–15.6)
RETICS #: 7.1 K/UL — LOW (ref 25–125)
RETICS/RBC NFR: 2.6 % — HIGH (ref 0.5–2.5)
SODIUM SERPL-SCNC: 136 MMOL/L — SIGNIFICANT CHANGE UP (ref 135–145)
TIBC SERPL-MCNC: 256 UG/DL — SIGNIFICANT CHANGE UP (ref 220–430)
TRANSFERRIN SERPL-MCNC: 179 MG/DL — LOW (ref 180–329)
VIT B12 SERPL-MCNC: 1030 PG/ML — SIGNIFICANT CHANGE UP (ref 232–1245)
WBC # BLD: 8.5 K/UL — SIGNIFICANT CHANGE UP (ref 4.8–10.8)
WBC # FLD AUTO: 8.5 K/UL — SIGNIFICANT CHANGE UP (ref 4.8–10.8)

## 2019-06-19 PROCEDURE — 99232 SBSQ HOSP IP/OBS MODERATE 35: CPT

## 2019-06-19 PROCEDURE — 99233 SBSQ HOSP IP/OBS HIGH 50: CPT

## 2019-06-19 RX ORDER — PETROLATUM,WHITE
1 JELLY (GRAM) TOPICAL THREE TIMES A DAY
Refills: 0 | Status: DISCONTINUED | OUTPATIENT
Start: 2019-06-19 | End: 2019-06-20

## 2019-06-19 RX ADMIN — SEVELAMER CARBONATE 800 MILLIGRAM(S): 2400 POWDER, FOR SUSPENSION ORAL at 12:42

## 2019-06-19 RX ADMIN — PANTOPRAZOLE SODIUM 40 MILLIGRAM(S): 20 TABLET, DELAYED RELEASE ORAL at 05:35

## 2019-06-19 RX ADMIN — ALBUTEROL 2.5 MILLIGRAM(S): 90 AEROSOL, METERED ORAL at 18:52

## 2019-06-19 RX ADMIN — Medication 100 MICROGRAM(S): at 05:35

## 2019-06-19 RX ADMIN — Medication 1 APPLICATION(S): at 21:35

## 2019-06-19 RX ADMIN — Medication 81 MILLIGRAM(S): at 12:42

## 2019-06-19 RX ADMIN — ISOSORBIDE DINITRATE 30 MILLIGRAM(S): 5 TABLET ORAL at 18:00

## 2019-06-19 RX ADMIN — Medication 50 MILLIGRAM(S): at 18:00

## 2019-06-19 RX ADMIN — ATORVASTATIN CALCIUM 80 MILLIGRAM(S): 80 TABLET, FILM COATED ORAL at 21:35

## 2019-06-19 RX ADMIN — SEVELAMER CARBONATE 800 MILLIGRAM(S): 2400 POWDER, FOR SUSPENSION ORAL at 17:58

## 2019-06-19 RX ADMIN — CLOPIDOGREL BISULFATE 75 MILLIGRAM(S): 75 TABLET, FILM COATED ORAL at 12:42

## 2019-06-19 RX ADMIN — HEPARIN SODIUM 5000 UNIT(S): 5000 INJECTION INTRAVENOUS; SUBCUTANEOUS at 17:59

## 2019-06-19 RX ADMIN — HEPARIN SODIUM 5000 UNIT(S): 5000 INJECTION INTRAVENOUS; SUBCUTANEOUS at 05:34

## 2019-06-19 RX ADMIN — Medication 10 MILLIGRAM(S): at 05:35

## 2019-06-19 RX ADMIN — ISOSORBIDE DINITRATE 30 MILLIGRAM(S): 5 TABLET ORAL at 05:35

## 2019-06-19 RX ADMIN — Medication 50 MILLIGRAM(S): at 22:23

## 2019-06-19 NOTE — PROGRESS NOTE ADULT - ASSESSMENT
Patient is a 79-year-old male with ESRD (On HD Tue-Thu-Sat), HFpEF (EF 65% by cath as of 03/2019) , HTN, CAD s/p CABG (ANA-LAD) and Stents (to LCx) and Lung Ca (s/p radiation, on home O2) with worsening shortness of breath.        Worsening shortness of breath-improved  Likely multifactorial, patient s/p radiation therapy for Lung Ca, possibly a component of restrictive disease from pulmonary fibrosis in addition to fluid overload from acutely exacerbated CHF or ESRD although less likely given that patient without LE edema and with dry mucous membranes clinically not grossly overloaded with fluid. No leukocytosis, no absolute neutrophilia, patient afebrile, possibility of underlying lung infection is remote      ESRD-  Monitor I&O.  Likely the cause of mildly elevated troponins, lower than in prior admissions and in the absence of chest pain, will monitor.  HD per renal  D/c tomorrow after HD, unable to set up HD with center today       Heart failure with preserved ejection fraction.  Patient with sBNP elevated compared to prior admissions, however not clinically overloaded.  Will continue his home medications.    Normocytic hypochromic anemia-  Likely multifactorial with anemia of chronic inflammation from ESRD in addition to iron deficiency anemia from poor PO intake.  Drop in Hb compared to last admission, however no reports of bleeding (no hematuria, melena, hematochezia, hematemesis).    Essential hypertension  Currently stable.  Will continue home medications  DASH/TLC diet.    Prophylactic measures   DVT prophylaxis: Heparin 5000 U BID    Generalized itching- no rash observed, likely from xerostomia. Added emmollient & benadryl prn

## 2019-06-19 NOTE — PROGRESS NOTE ADULT - ASSESSMENT
Dx :  Cardiomegaly, Radiation Pneumonitis,    ESRD, Malnutrition, S/P CABG, Bi Polar Disorder,     Anemia ( hyporesponsive to ANUJ )    ANUJ,    PRBC Transfusion, Maintain Hgb > 8 gms.,    Plan for HD in AM Dx :  Cardiomegaly, Radiation Pneumonitis,    ESRD, Malnutrition, S/P CABG, Bi Polar Disorder,     Anemia ( hyporesponsive to ANUJ )    ANUJ,    PRBC Transfusion, Maintain Hgb > 8 gms.,    Plan for HD in AM    bari Miles

## 2019-06-20 ENCOUNTER — TRANSCRIPTION ENCOUNTER (OUTPATIENT)
Age: 79
End: 2019-06-20

## 2019-06-20 ENCOUNTER — INBOUND DOCUMENT (OUTPATIENT)
Age: 79
End: 2019-06-20

## 2019-06-20 VITALS
DIASTOLIC BLOOD PRESSURE: 58 MMHG | SYSTOLIC BLOOD PRESSURE: 131 MMHG | RESPIRATION RATE: 18 BRPM | TEMPERATURE: 98 F | OXYGEN SATURATION: 99 % | HEART RATE: 54 BPM

## 2019-06-20 LAB
ANION GAP SERPL CALC-SCNC: 19 MMOL/L — HIGH (ref 5–17)
BUN SERPL-MCNC: 69 MG/DL — HIGH (ref 8–20)
CALCIUM SERPL-MCNC: 8.6 MG/DL — SIGNIFICANT CHANGE UP (ref 8.6–10.2)
CHLORIDE SERPL-SCNC: 92 MMOL/L — LOW (ref 98–107)
CO2 SERPL-SCNC: 25 MMOL/L — SIGNIFICANT CHANGE UP (ref 22–29)
CREAT SERPL-MCNC: 6.61 MG/DL — HIGH (ref 0.5–1.3)
EOSINOPHIL # BLD AUTO: 0.1 K/UL — SIGNIFICANT CHANGE UP (ref 0–0.5)
EOSINOPHIL NFR BLD AUTO: 1.2 % — SIGNIFICANT CHANGE UP (ref 0–5)
GLUCOSE SERPL-MCNC: 80 MG/DL — SIGNIFICANT CHANGE UP (ref 70–115)
HCT VFR BLD CALC: 28.2 % — LOW (ref 42–52)
HGB BLD-MCNC: 8.5 G/DL — LOW (ref 14–18)
LYMPHOCYTES # BLD AUTO: 1 K/UL — SIGNIFICANT CHANGE UP (ref 1–4.8)
LYMPHOCYTES # BLD AUTO: 10 % — LOW (ref 20–55)
MAGNESIUM SERPL-MCNC: 2.1 MG/DL — SIGNIFICANT CHANGE UP (ref 1.8–2.6)
MCHC RBC-ENTMCNC: 28.3 PG — SIGNIFICANT CHANGE UP (ref 27–31)
MCHC RBC-ENTMCNC: 30.1 G/DL — LOW (ref 32–36)
MCV RBC AUTO: 94 FL — SIGNIFICANT CHANGE UP (ref 80–94)
MONOCYTES # BLD AUTO: 0.6 K/UL — SIGNIFICANT CHANGE UP (ref 0–0.8)
MONOCYTES NFR BLD AUTO: 6.4 % — SIGNIFICANT CHANGE UP (ref 3–10)
NEUTROPHILS # BLD AUTO: 8.2 K/UL — HIGH (ref 1.8–8)
NEUTROPHILS NFR BLD AUTO: 82.2 % — HIGH (ref 37–73)
PHOSPHATE SERPL-MCNC: 4.2 MG/DL — SIGNIFICANT CHANGE UP (ref 2.4–4.7)
PLATELET # BLD AUTO: 273 K/UL — SIGNIFICANT CHANGE UP (ref 150–400)
POTASSIUM SERPL-MCNC: 4.8 MMOL/L — SIGNIFICANT CHANGE UP (ref 3.5–5.3)
POTASSIUM SERPL-SCNC: 4.8 MMOL/L — SIGNIFICANT CHANGE UP (ref 3.5–5.3)
RBC # BLD: 3 M/UL — LOW (ref 4.6–6.2)
RBC # FLD: 17.4 % — HIGH (ref 11–15.6)
SODIUM SERPL-SCNC: 136 MMOL/L — SIGNIFICANT CHANGE UP (ref 135–145)
WBC # BLD: 10 K/UL — SIGNIFICANT CHANGE UP (ref 4.8–10.8)
WBC # FLD AUTO: 10 K/UL — SIGNIFICANT CHANGE UP (ref 4.8–10.8)

## 2019-06-20 PROCEDURE — 36415 COLL VENOUS BLD VENIPUNCTURE: CPT

## 2019-06-20 PROCEDURE — 86900 BLOOD TYPING SEROLOGIC ABO: CPT

## 2019-06-20 PROCEDURE — 94640 AIRWAY INHALATION TREATMENT: CPT

## 2019-06-20 PROCEDURE — 84466 ASSAY OF TRANSFERRIN: CPT

## 2019-06-20 PROCEDURE — 82607 VITAMIN B-12: CPT

## 2019-06-20 PROCEDURE — 96374 THER/PROPH/DIAG INJ IV PUSH: CPT

## 2019-06-20 PROCEDURE — 83010 ASSAY OF HAPTOGLOBIN QUANT: CPT

## 2019-06-20 PROCEDURE — 71250 CT THORAX DX C-: CPT

## 2019-06-20 PROCEDURE — 84484 ASSAY OF TROPONIN QUANT: CPT

## 2019-06-20 PROCEDURE — 83735 ASSAY OF MAGNESIUM: CPT

## 2019-06-20 PROCEDURE — 83880 ASSAY OF NATRIURETIC PEPTIDE: CPT

## 2019-06-20 PROCEDURE — 99239 HOSP IP/OBS DSCHRG MGMT >30: CPT

## 2019-06-20 PROCEDURE — 93005 ELECTROCARDIOGRAM TRACING: CPT

## 2019-06-20 PROCEDURE — 86901 BLOOD TYPING SEROLOGIC RH(D): CPT

## 2019-06-20 PROCEDURE — 85045 AUTOMATED RETICULOCYTE COUNT: CPT

## 2019-06-20 PROCEDURE — 80048 BASIC METABOLIC PNL TOTAL CA: CPT

## 2019-06-20 PROCEDURE — 85730 THROMBOPLASTIN TIME PARTIAL: CPT

## 2019-06-20 PROCEDURE — 83615 LACTATE (LD) (LDH) ENZYME: CPT

## 2019-06-20 PROCEDURE — 85610 PROTHROMBIN TIME: CPT

## 2019-06-20 PROCEDURE — 85027 COMPLETE CBC AUTOMATED: CPT

## 2019-06-20 PROCEDURE — 86923 COMPATIBILITY TEST ELECTRIC: CPT

## 2019-06-20 PROCEDURE — 83540 ASSAY OF IRON: CPT

## 2019-06-20 PROCEDURE — 84100 ASSAY OF PHOSPHORUS: CPT

## 2019-06-20 PROCEDURE — 86850 RBC ANTIBODY SCREEN: CPT

## 2019-06-20 PROCEDURE — 82803 BLOOD GASES ANY COMBINATION: CPT

## 2019-06-20 PROCEDURE — 71046 X-RAY EXAM CHEST 2 VIEWS: CPT

## 2019-06-20 PROCEDURE — 99261: CPT

## 2019-06-20 PROCEDURE — 90937 HEMODIALYSIS REPEATED EVAL: CPT

## 2019-06-20 PROCEDURE — 82746 ASSAY OF FOLIC ACID SERUM: CPT

## 2019-06-20 PROCEDURE — 82728 ASSAY OF FERRITIN: CPT

## 2019-06-20 PROCEDURE — 80053 COMPREHEN METABOLIC PANEL: CPT

## 2019-06-20 PROCEDURE — 99285 EMERGENCY DEPT VISIT HI MDM: CPT | Mod: 25

## 2019-06-20 PROCEDURE — 83550 IRON BINDING TEST: CPT

## 2019-06-20 RX ORDER — DIPHENHYDRAMINE HCL 50 MG
1 CAPSULE ORAL
Qty: 0 | Refills: 0 | DISCHARGE
Start: 2019-06-20

## 2019-06-20 RX ORDER — DOCUSATE SODIUM 100 MG
1 CAPSULE ORAL
Qty: 0 | Refills: 0 | DISCHARGE
Start: 2019-06-20

## 2019-06-20 RX ORDER — POLYETHYLENE GLYCOL 3350 17 G/17G
17 POWDER, FOR SOLUTION ORAL
Qty: 0 | Refills: 0 | DISCHARGE
Start: 2019-06-20

## 2019-06-20 RX ORDER — DOCUSATE SODIUM 100 MG
100 CAPSULE ORAL
Refills: 0 | Status: DISCONTINUED | OUTPATIENT
Start: 2019-06-20 | End: 2019-06-20

## 2019-06-20 RX ORDER — POLYETHYLENE GLYCOL 3350 17 G/17G
17 POWDER, FOR SOLUTION ORAL DAILY
Refills: 0 | Status: DISCONTINUED | OUTPATIENT
Start: 2019-06-20 | End: 2019-06-20

## 2019-06-20 RX ORDER — LACTULOSE 10 G/15ML
20 SOLUTION ORAL ONCE
Refills: 0 | Status: DISCONTINUED | OUTPATIENT
Start: 2019-06-20 | End: 2019-06-20

## 2019-06-20 RX ORDER — PETROLATUM,WHITE
1 JELLY (GRAM) TOPICAL
Qty: 0 | Refills: 0 | DISCHARGE
Start: 2019-06-20

## 2019-06-20 RX ADMIN — ISOSORBIDE DINITRATE 30 MILLIGRAM(S): 5 TABLET ORAL at 02:20

## 2019-06-20 RX ADMIN — HEPARIN SODIUM 5000 UNIT(S): 5000 INJECTION INTRAVENOUS; SUBCUTANEOUS at 05:34

## 2019-06-20 RX ADMIN — HEPARIN SODIUM 5000 UNIT(S): 5000 INJECTION INTRAVENOUS; SUBCUTANEOUS at 17:11

## 2019-06-20 RX ADMIN — Medication 100 MICROGRAM(S): at 05:34

## 2019-06-20 RX ADMIN — Medication 1 APPLICATION(S): at 05:35

## 2019-06-20 RX ADMIN — Medication 25 MILLIGRAM(S): at 12:41

## 2019-06-20 RX ADMIN — CLOPIDOGREL BISULFATE 75 MILLIGRAM(S): 75 TABLET, FILM COATED ORAL at 11:34

## 2019-06-20 RX ADMIN — Medication 100 MILLIGRAM(S): at 17:12

## 2019-06-20 RX ADMIN — Medication 50 MILLIGRAM(S): at 17:12

## 2019-06-20 RX ADMIN — PANTOPRAZOLE SODIUM 40 MILLIGRAM(S): 20 TABLET, DELAYED RELEASE ORAL at 05:34

## 2019-06-20 RX ADMIN — Medication 10 MILLIGRAM(S): at 05:34

## 2019-06-20 RX ADMIN — Medication 81 MILLIGRAM(S): at 11:34

## 2019-06-20 RX ADMIN — Medication 10 MILLIGRAM(S): at 17:10

## 2019-06-20 RX ADMIN — SEVELAMER CARBONATE 800 MILLIGRAM(S): 2400 POWDER, FOR SUSPENSION ORAL at 11:33

## 2019-06-20 RX ADMIN — SEVELAMER CARBONATE 800 MILLIGRAM(S): 2400 POWDER, FOR SUSPENSION ORAL at 08:54

## 2019-06-20 RX ADMIN — POLYETHYLENE GLYCOL 3350 17 GRAM(S): 17 POWDER, FOR SOLUTION ORAL at 11:32

## 2019-06-20 RX ADMIN — SEVELAMER CARBONATE 800 MILLIGRAM(S): 2400 POWDER, FOR SUSPENSION ORAL at 17:11

## 2019-06-20 NOTE — DISCHARGE NOTE PROVIDER - NSDCCPCAREPLAN_GEN_ALL_CORE_FT
PRINCIPAL DISCHARGE DIAGNOSIS  Diagnosis: COPD exacerbation  Assessment and Plan of Treatment: Continue Nebulizer as needed  steroid taper  follow up with Pulmonary 5-7 days, sooner if needed      SECONDARY DISCHARGE DIAGNOSES  Diagnosis: Volume overload  Assessment and Plan of Treatment: Continue HD schedule  Follow up with Nephrology

## 2019-06-20 NOTE — DISCHARGE NOTE NURSING/CASE MANAGEMENT/SOCIAL WORK - NSDCDPATPORTLINK_GEN_ALL_CORE
You can access the [facility] Patient Portal, offered by [facility], by registering with the following website: [email or web address]

## 2019-06-20 NOTE — PROGRESS NOTE ADULT - SUBJECTIVE AND OBJECTIVE BOX
Patient was seen and evaluated on dialysis.   No c/o CP, +  SOB , No NV  no F/C , On Oxygen,   no swelling    T(C): 36.3 (06-20-19 @ 12:08), Max: 36.7 (06-19-19 @ 15:20)  HR: 48 (06-20-19 @ 12:08) (45 - 62)  BP: 142/49 (06-20-19 @ 12:08) (109/48 - 145/53)    PE ;  NAD, Pale,   lungs - Poor Excursion, Rhonchi,   CV gr 1 murmur,  No gallop or rub  Abd : soft, NT BS +, No masses  Ext- 1+ edema  Neuro : Grossly intact, moving extremities                       8.5    10.0  )-----------( 273      ( 20 Jun 2019 08:30 )             28.2        06-20    136  |  92<L>  |  69.0<H>  ----------------------------<  80  4.8   |  25.0  |  6.61<H>    Ca    8.6      20 Jun 2019 08:30  Phos  4.2     06-20  Mg     2.1     06-20    MEDICATIONS  (STANDING):  ALBUTerol    0.083% PRN  amLODIPine   Tablet  AQUAPHOR (petrolatum Ointment)  aspirin enteric coated  atorvastatin  clopidogrel Tablet  diphenhydrAMINE PRN  docusate sodium  heparin  Injectable  hydrALAZINE  isosorbide   dinitrate Tablet (ISORDIL)  lactulose Syrup PRN  levothyroxine  metoprolol tartrate  pantoprazole    Tablet  polyethylene glycol 3350  predniSONE   Tablet  sevelamer carbonate    Patient stable  [place] HD easily  Continue OP HD as Before,
[facility] DIVISION OF KIDNEY DISEASES AND HYPERTENSION -- HEMODIALYSIS NOTE  --------------------------------------------------------------------------------  Chief Complaint: ESRD/Ongoing hemodialysis requirement    24 hour events/subjective:  Pt seen/examined  Due for HD in AM      PAST HISTORY  --------------------------------------------------------------------------------  No significant changes to PMH, PSH, FHx, SHx, unless otherwise noted    ALLERGIES & MEDICATIONS  --------------------------------------------------------------------------------  Allergies    No Known Allergies    Intolerances      Standing Inpatient Medications  amLODIPine   Tablet 10 milliGRAM(s) Oral daily  aspirin enteric coated 81 milliGRAM(s) Oral daily  atorvastatin 80 milliGRAM(s) Oral at bedtime  clopidogrel Tablet 75 milliGRAM(s) Oral daily  heparin  Injectable 5000 Unit(s) SubCutaneous every 12 hours  hydrALAZINE 50 milliGRAM(s) Oral three times a day  isosorbide   dinitrate Tablet (ISORDIL) 30 milliGRAM(s) Oral four times a day  levothyroxine 100 MICROGram(s) Oral daily  metoprolol tartrate 50 milliGRAM(s) Oral two times a day  pantoprazole    Tablet 40 milliGRAM(s) Oral before breakfast  predniSONE   Tablet 10 milliGRAM(s) Oral daily  sevelamer carbonate 800 milliGRAM(s) Oral three times a day with meals    PRN Inpatient Medications  ALBUTerol    0.083% 2.5 milliGRAM(s) Nebulizer every 6 hours PRN  diphenhydrAMINE 25 milliGRAM(s) Oral every 4 hours PRN      REVIEW OF SYSTEMS  --------------------------------------------------------------------------------  Gen: No weight changes, fatigue, fevers/chills, weakness  Skin: No rashes  Head/Eyes/Ears/Mouth: No headache; Normal hearing; Normal vision w/o blurriness; No sinus pain/discomfort, sore throat  Respiratory: No dyspnea, cough, wheezing, hemoptysis  CV: No chest pain, PND, orthopnea  GI: No abdominal pain, diarrhea, constipation, nausea, vomiting, melena, hematochezia  : No increased frequency, dysuria, hematuria, nocturia  MSK: No joint pain/swelling; no back pain; no edema  Neuro: No dizziness/lightheadedness, weakness, seizures, numbness, tingling  Heme: No easy bruising or bleeding  Endo: No heat/cold intolerance  Psych: No significant nervousness, anxiety, stress, depression    All other systems were reviewed and are negative, except as noted.    VITALS/PHYSICAL EXAM  --------------------------------------------------------------------------------  T(C): 36.4 (06-19-19 @ 11:13), Max: 37 (06-18-19 @ 20:29)  HR: 55 (06-19-19 @ 11:13) (51 - 69)  BP: 121/62 (06-19-19 @ 11:13) (92/48 - 152/57)  RR: 19 (06-19-19 @ 11:13) (18 - 19)  SpO2: 98% (06-19-19 @ 11:13) (94% - 99%)  Wt(kg): --  Height (cm): 165.1 (06-17-19 @ 21:25)  Weight (kg): 65.8 (06-17-19 @ 21:25)  BMI (kg/m2): 24.1 (06-17-19 @ 21:25)  BSA (m2): 1.73 (06-17-19 @ 21:25)      06-18-19 @ 07:01  -  06-19-19 @ 07:00  --------------------------------------------------------  IN: 0 mL / OUT: 2000 mL / NET: -2000 mL      Physical Exam:    GENERAL: Frail, Elderly, Pale,   HEAD:  Atraumatic, Normocephalic, Pale,  EYES: EOMI, PERRLA, conjunctiva and sclera clear  ENMT: Dry mucous membranes,   NECK: Supple, No JVD,   NERVOUS SYSTEM: Lethargic  & Oriented X3,   CHEST/LUNG: Dull  to percussion bilaterally; No rales, + rhonchi,  No wheezing, or rubs  HEART: Regular rate and rhythm; No murmurs, rubs, or gallops  ABDOMEN: Soft, Nontender, Nondistended; Bowel sounds present  EXTREMITIES:  2+ Peripheral Pulses, No clubbing, cyanosis, + edema  LYMPH: No lymphadenopathy noted  SKIN: No rashes or lesions    LABS/STUDIES  --------------------------------------------------------------------------------              8.1    8.5   >-----------<  246      [06-19-19 @ 08:42]              25.6     136  |  92  |  42.0  ----------------------------<  141      [06-19-19 @ 08:42]  4.2   |  29.0  |  4.44        Ca     8.5     [06-19-19 @ 08:42]      Mg     2.0     [06-19-19 @ 08:42]      Phos  3.8     [06-19-19 @ 08:42]    TPro  7.1  /  Alb  3.4  /  TBili  0.3  /  DBili  x   /  AST  30  /  ALT  25  /  AlkPhos  95  [06-17-19 @ 23:53]    PT/INR: PT 10.1 , INR 0.88       [06-17-19 @ 23:53]  PTT: 28.5       [06-17-19 @ 23:53]    Troponin 0.08      [06-17-19 @ 23:53]        [06-19-19 @ 08:42]    Iron 58, TIBC 256, %sat 23      [06-19-19 @ 08:42]  Ferritin 812      [06-19-19 @ 08:42]  TSH 0.89      [05-23-19 @ 06:28]  Lipid: chol 159, , HDL 32, LDL 94      [01-29-19 @ 11:39]
Patient was seen and evaluated on dialysis.   Patient is tolerating the procedure well.   T(C): 36.2 (19 @ 11:22), Max: 36.8 (19 @ 21:25)  HR: 58 (19 @ 11:22) (52 - 61)  BP: 137/54 (19 @ 11:22) (118/52 - 140/74)    133<L>  |  91<L>  |  53.0<H>  ----------------------------<  117<H>  Ca:8.3<L> (2019 23:53)  4.3     |  26.0   |  5.43<H>      eGFR if Non : 9 <L>  eGFR if : 11 <L>    TPro  7.1    /  Alb  3.4    /  TBili  0.3<L>  /  DBili  x      /  AST  30     /  ALT  25     /  AlkPhos  95     2019 23:53                        7.9<L>  8.8   )-----------( 236      ( 2019 23:53 )             26.5<L>    Phos:3.0 mg/dL M.1 mg/dL PTH:-- Uric acid:-- Serum Osm:--  Ferritin:-- Iron:-- TIBC:-- Tsat:--  B12:-- TSH:-- ( @ 23:53)    PRBC Transfusion,    HD ,
CHIEF COMPLAINT/INTERVAL HISTORY:    Patient is a 79y old  Male who presents with a chief complaint of Shortness of breath (19 Jun 2019 11:43)      HPI:  Patient is a 79-year-old male with ESRD (On HD Tue-Thu-Sat), HFpEF (EF 65% by cath as of 03/2019) , HTN, CAD s/p CABG (ANA-LAD) and Stents (to LCx) and Lung Ca (s/p radiation, on home O2) who presents today to the ED with several days of worsening shortness of breath, radiation not pertinent, didn't improve when increasing his jerson O2, no aggravating factors, and accompanied by generalized body itching but no rashes or skin lesions.   Patient denies any other complaints, no chest pain, palpitations, LE Edema, nausea/vomiting, diarrhea, fever or other symptoms. Patient's son was interviewed over the phone and the only other symptom he is able to recall is worsening cough but no changes in color of sputum or increase in its amount.   Both patient and his son report good compliance with medications and HD sessions and deny any dietary indiscretions.  Patient was recently discharged from [facility] for similar complaints and as per son, several of the patient's medications were changed upon discharge. (18 Jun 2019 04:31)      SUBJECTIVE & OBJECTIVE/ ROS: Pt seen and examined at bedside. Itching continues. SOB resolved. No chest pain, palpitations, sob, light headedness/dizziness, difficulty breathing/cough, fevers/chills, abdominal pain, n/v, diarrhea/constipation, dysuria or increased urinary frequency.     ICU Vital Signs Last 24 Hrs  T(C): 36.7 (19 Jun 2019 15:20), Max: 37 (18 Jun 2019 20:29)  T(F): 98.1 (19 Jun 2019 15:20), Max: 98.6 (18 Jun 2019 20:29)  HR: 56 (19 Jun 2019 15:20) (51 - 57)  BP: 123/49 (19 Jun 2019 15:20) (92/48 - 126/45)  BP(mean): 67 (19 Jun 2019 08:07) (67 - 67)  ABP: --  ABP(mean): --  RR: 19 (19 Jun 2019 15:20) (18 - 19)  SpO2: 97% (19 Jun 2019 15:20) (94% - 98%)        MEDICATIONS  (STANDING):  amLODIPine   Tablet 10 milliGRAM(s) Oral daily  aspirin enteric coated 81 milliGRAM(s) Oral daily  atorvastatin 80 milliGRAM(s) Oral at bedtime  clopidogrel Tablet 75 milliGRAM(s) Oral daily  heparin  Injectable 5000 Unit(s) SubCutaneous every 12 hours  hydrALAZINE 50 milliGRAM(s) Oral three times a day  isosorbide   dinitrate Tablet (ISORDIL) 30 milliGRAM(s) Oral four times a day  levothyroxine 100 MICROGram(s) Oral daily  metoprolol tartrate 50 milliGRAM(s) Oral two times a day  pantoprazole    Tablet 40 milliGRAM(s) Oral before breakfast  predniSONE   Tablet 10 milliGRAM(s) Oral daily  sevelamer carbonate 800 milliGRAM(s) Oral three times a day with meals    MEDICATIONS  (PRN):  ALBUTerol    0.083% 2.5 milliGRAM(s) Nebulizer every 6 hours PRN Shortness of Breath and/or Wheezing  diphenhydrAMINE 25 milliGRAM(s) Oral every 4 hours PRN Rash and/or Itching      LABS:                        8.1    8.5   )-----------( 246      ( 19 Jun 2019 08:42 )             25.6     06-19    136  |  92<L>  |  42.0<H>  ----------------------------<  141<H>  4.2   |  29.0  |  4.44<H>    Ca    8.5<L>      19 Jun 2019 08:42  Phos  3.8     06-19  Mg     2.0     06-19    TPro  7.1  /  Alb  3.4  /  TBili  0.3<L>  /  DBili  x   /  AST  30  /  ALT  25  /  AlkPhos  95  06-17    PT/INR - ( 17 Jun 2019 23:53 )   PT: 10.1 sec;   INR: 0.88 ratio         PTT - ( 17 Jun 2019 23:53 )  PTT:28.5 sec      CAPILLARY BLOOD GLUCOSE          RECENT CULTURES:      RADIOLOGY & ADDITIONAL TESTS:      PHYSICAL EXAM:    GENERAL:  mild SOB at rest  HEENT: Normocephalic, atraumatic; EOMI, PEERL; Dry mucous membranes.  PULMONARY: decreased air entry bilaterally, no wheezing, rales or rhonchi.  CARDIAC: Regular rate and rhythm, normal S1 and S2, no murmurs, rubs or gallop.  Abdomen: soft, nontender, nondistended. Normal bowel sounds.  EXTREMITIES: No clubbing, cyanosis or edema.   NEUROLOGIC: alert and oriented x 4, no gross motor or sensory deficits.  PSYCHIATRIC: Normal speech and affect, good eye contact.  Skin: no rash.

## 2019-06-20 NOTE — DISCHARGE NOTE PROVIDER - PROVIDER TOKENS
PROVIDER:[TOKEN:[[id number]]],PROVIDER:[TOKEN:[[id number]]],PROVIDER:[TOKEN:[[id number]]],PROVIDER:[TOKEN:[[id number]]]

## 2019-06-20 NOTE — DISCHARGE NOTE PROVIDER - CARE PROVIDER_API CALL
Jessee Miles)  Internal Medicine; Nephrology  [address] [place] [address]  Phone: [phone]  Fax: [phone]  Follow Up Time:     Beck Hunt)  Cardiology; Cardiovascular Disease; Interventional Cardiology  [address] [facility], [facility] [address]  [place], [place] [id number]  Phone: [phone]  Fax: [phone]  Follow Up Time:     Nora Euceda)  Internal Medicine  [address] [place] [address]  Phone: [phone]  Fax: [phone]  Follow Up Time:     Cole Champion)  Critical Care Medicine; Internal Medicine; Pulmonary Disease; Sleep Medicine  [address] [facility], [address]  [place] [address]  Phone: [phone]  Fax: [phone]  Follow Up Time:

## 2019-06-20 NOTE — DISCHARGE NOTE PROVIDER - CARE PROVIDERS DIRECT ADDRESSES
[email or web address]

## 2019-06-20 NOTE — DISCHARGE NOTE PROVIDER - HOSPITAL COURSE
Patient is a 79-year-old male with ESRD (On HD Tue-Thu-Sat), HFpEF (EF 65% by cath as of 03/2019) , HTN, CAD s/p CABG (ANA-LAD) and Stents (to LCx) and Lung Ca (s/p radiation, on home O2) with worsening shortness of breath. Patient admitted. SOB is likely multifactorial, patient s/p radiation therapy for Lung Ca, possibly a component of restrictive disease from pulmonary fibrosis in addition to fluid overload from acutely exacerbated CHF or ESRD although less likely given that patient without LE edema and with dry mucous membranes clinically not grossly overloaded with fluid. No leukocytosis, no absolute neutrophilia, patient afebrile, possibility of underlying lung infection is remote. Patient received HD and is stable for discharge.     Vital Signs Last 24 Hrs    T(C): 36.4 (20 Jun 2019 07:22), Max: 36.7 (19 Jun 2019 15:20)    T(F): 97.6 (20 Jun 2019 07:22), Max: 98.1 (19 Jun 2019 15:20)    HR: 50 (20 Jun 2019 07:22) (45 - 62)    BP: 145/53 (20 Jun 2019 07:22) (109/48 - 145/53)    BP(mean): --    RR: 18 (20 Jun 2019 07:22) (18 - 19)    SpO2: 94% (20 Jun 2019 07:22) (94% - 99%)                                    8.5      10.0  )-----------( 273      ( 20 Jun 2019 08:30 )               28.2         20 Jun 2019 08:30        136    |  92     |  69.0     ----------------------------<  80       4.8     |  25.0   |  6.61         Ca    8.6        20 Jun 2019 08:30    Phos  4.2       20 Jun 2019 08:30    Mg     2.1       20 Jun 2019 08:30                CAPILLARY BLOOD GLUCOSE        PHYSICAL EXAM:        GENERAL: NAD    HEENT: Normocephalic, atraumatic; EOMI, PEERL; Dry mucous membranes.    PULMONARY: decreased air entry bilaterally, no wheezing, rales or rhonchi.    CARDIAC: Regular rate and rhythm, normal S1 and S2, no murmurs, rubs or gallop.    Abdomen: soft, nontender, nondistended. Normal bowel sounds.    EXTREMITIES: No clubbing, cyanosis or edema.     NEUROLOGIC: alert and oriented x 4, no gross motor or sensory deficits.    PSYCHIATRIC: Normal speech and affect, good eye contact.    Skin: no rash.

## 2019-06-24 ENCOUNTER — APPOINTMENT (OUTPATIENT)
Age: 79
End: 2019-06-24
Payer: MEDICARE

## 2019-06-24 VITALS
OXYGEN SATURATION: 98 % | SYSTOLIC BLOOD PRESSURE: 112 MMHG | RESPIRATION RATE: 16 BRPM | HEART RATE: 43 BPM | DIASTOLIC BLOOD PRESSURE: 54 MMHG

## 2019-06-24 DIAGNOSIS — R00.1 BRADYCARDIA, UNSPECIFIED: ICD-10-CM

## 2019-06-24 DIAGNOSIS — L29.9 PRURITUS, UNSPECIFIED: ICD-10-CM

## 2019-06-24 DIAGNOSIS — Z85.118 PERSONAL HISTORY OF OTHER MALIGNANT NEOPLASM OF BRONCHUS AND LUNG: ICD-10-CM

## 2019-06-24 DIAGNOSIS — K21.9 GASTRO-ESOPHAGEAL REFLUX DISEASE W/OUT ESOPHAGITIS: ICD-10-CM

## 2019-06-24 DIAGNOSIS — N18.6 END STAGE RENAL DISEASE: ICD-10-CM

## 2019-06-24 DIAGNOSIS — I50.30 UNSPECIFIED DIASTOLIC (CONGESTIVE) HEART FAILURE: ICD-10-CM

## 2019-06-24 DIAGNOSIS — Z99.2 END STAGE RENAL DISEASE: ICD-10-CM

## 2019-06-24 DIAGNOSIS — Z99.81 DEPENDENCE ON SUPPLEMENTAL OXYGEN: ICD-10-CM

## 2019-06-24 DIAGNOSIS — I25.10 ATHEROSCLEROTIC HEART DISEASE OF NATIVE CORONARY ARTERY W/OUT ANGINA PECTORIS: ICD-10-CM

## 2019-06-24 DIAGNOSIS — I10 ESSENTIAL (PRIMARY) HYPERTENSION: ICD-10-CM

## 2019-06-24 DIAGNOSIS — J44.1 CHRONIC OBSTRUCTIVE PULMONARY DISEASE WITH (ACUTE) EXACERBATION: ICD-10-CM

## 2019-06-24 DIAGNOSIS — E03.9 HYPOTHYROIDISM, UNSPECIFIED: ICD-10-CM

## 2019-06-24 PROCEDURE — 99496 TRANSJ CARE MGMT HIGH F2F 7D: CPT

## 2019-06-25 PROBLEM — K21.9 GERD (GASTROESOPHAGEAL REFLUX DISEASE): Status: ACTIVE | Noted: 2019-06-25

## 2019-06-25 PROBLEM — Z99.81 ON HOME OXYGEN THERAPY: Status: ACTIVE | Noted: 2019-06-25

## 2019-06-25 PROBLEM — J44.1 COPD EXACERBATION: Status: ACTIVE | Noted: 2019-06-25

## 2019-06-25 PROBLEM — E03.9 HYPOTHYROIDISM: Status: ACTIVE | Noted: 2019-06-25

## 2019-06-25 PROBLEM — R00.1 BRADYCARDIA: Status: ACTIVE | Noted: 2019-06-25

## 2019-06-25 PROBLEM — L29.9 PRURITUS: Status: ACTIVE | Noted: 2019-06-25

## 2019-06-25 PROBLEM — I50.30 (HFPEF) HEART FAILURE WITH PRESERVED EJECTION FRACTION: Status: ACTIVE | Noted: 2019-06-25

## 2019-06-25 PROBLEM — I10 BENIGN ESSENTIAL HTN: Status: ACTIVE | Noted: 2019-06-25

## 2019-06-25 PROBLEM — N18.6 ESRD ON HEMODIALYSIS: Status: ACTIVE | Noted: 2019-06-25

## 2019-06-25 PROBLEM — I25.10 CAD (CORONARY ARTERY DISEASE): Status: ACTIVE | Noted: 2019-06-25

## 2019-06-25 PROBLEM — Z85.118 HISTORY OF MALIGNANT NEOPLASM OF LUNG: Status: RESOLVED | Noted: 2019-06-25 | Resolved: 2019-06-25

## 2019-06-25 RX ORDER — TAMSULOSIN HYDROCHLORIDE 0.4 MG/1
0.4 CAPSULE ORAL
Qty: 30 | Refills: 0 | Status: DISCONTINUED | COMMUNITY
Start: 2018-11-01

## 2019-06-25 RX ORDER — AMLODIPINE BESYLATE 10 MG/1
10 TABLET ORAL DAILY
Qty: 90 | Refills: 3 | Status: ACTIVE | COMMUNITY
Start: 2019-06-25

## 2019-06-25 RX ORDER — ALBUTEROL SULFATE 2.5 MG/3ML
(2.5 MG/3ML) SOLUTION RESPIRATORY (INHALATION)
Refills: 0 | Status: ACTIVE | COMMUNITY
Start: 2019-06-25

## 2019-06-25 RX ORDER — HYDRALAZINE HYDROCHLORIDE 50 MG/1
50 TABLET ORAL
Refills: 0 | Status: ACTIVE | COMMUNITY
Start: 2019-05-26

## 2019-06-25 RX ORDER — PANTOPRAZOLE 40 MG/1
40 TABLET, DELAYED RELEASE ORAL DAILY
Qty: 30 | Refills: 0 | Status: ACTIVE | COMMUNITY
Start: 2019-06-25

## 2019-06-25 RX ORDER — DIPHENHYDRAMINE HYDROCHLORIDE 25 MG/1
25 CAPSULE ORAL EVERY 6 HOURS
Refills: 0 | Status: ACTIVE | COMMUNITY
Start: 2019-06-25

## 2019-06-25 RX ORDER — GABAPENTIN 100 MG/1
100 CAPSULE ORAL
Qty: 60 | Refills: 0 | Status: DISCONTINUED | COMMUNITY
Start: 2019-05-26

## 2019-06-25 RX ORDER — PREDNISONE 10 MG/1
10 TABLET ORAL DAILY
Qty: 90 | Refills: 1 | Status: ACTIVE | COMMUNITY
Start: 2019-06-25

## 2019-06-25 RX ORDER — LEVOTHYROXINE SODIUM 0.1 MG/1
100 TABLET ORAL
Refills: 0 | Status: ACTIVE | COMMUNITY
Start: 2019-03-21

## 2019-06-25 RX ORDER — SEVELAMER CARBONATE 800 MG/1
800 TABLET, FILM COATED ORAL
Refills: 0 | Status: ACTIVE | COMMUNITY
Start: 2019-06-12

## 2019-06-25 RX ORDER — NITROGLYCERIN 0.4 MG/1
0.4 TABLET SUBLINGUAL
Qty: 25 | Refills: 0 | Status: DISCONTINUED | COMMUNITY
Start: 2019-05-26

## 2019-06-25 RX ORDER — FENOFIBRATE 54 MG/1
54 TABLET ORAL
Qty: 30 | Refills: 0 | Status: DISCONTINUED | COMMUNITY
Start: 2018-11-01

## 2019-06-25 RX ORDER — CLOPIDOGREL BISULFATE 75 MG/1
75 TABLET, FILM COATED ORAL
Refills: 0 | Status: ACTIVE | COMMUNITY
Start: 2018-11-01

## 2019-06-25 RX ORDER — ATORVASTATIN CALCIUM 80 MG/1
80 TABLET, FILM COATED ORAL
Qty: 30 | Refills: 0 | Status: ACTIVE | COMMUNITY
Start: 2019-02-20

## 2019-06-25 RX ORDER — LOSARTAN POTASSIUM 100 MG/1
100 TABLET, FILM COATED ORAL
Qty: 30 | Refills: 0 | Status: DISCONTINUED | COMMUNITY
Start: 2019-05-26

## 2019-06-25 RX ORDER — FUROSEMIDE 20 MG/1
20 TABLET ORAL
Qty: 30 | Refills: 0 | Status: DISCONTINUED | COMMUNITY
Start: 2019-02-20

## 2019-06-25 RX ORDER — SIMVASTATIN 20 MG/1
20 TABLET, FILM COATED ORAL
Qty: 30 | Refills: 0 | Status: DISCONTINUED | COMMUNITY
Start: 2018-11-01

## 2019-06-25 RX ORDER — ISOSORBIDE DINITRATE 30 MG/1
30 TABLET ORAL EVERY 6 HOURS
Refills: 0 | Status: ACTIVE | COMMUNITY
Start: 2019-06-25

## 2019-06-25 RX ORDER — ISOSORBIDE MONONITRATE 120 MG/1
120 TABLET, EXTENDED RELEASE ORAL
Qty: 30 | Refills: 0 | Status: DISCONTINUED | COMMUNITY
Start: 2019-05-26

## 2019-06-25 RX ORDER — FAMOTIDINE 20 MG/1
20 TABLET, FILM COATED ORAL
Qty: 30 | Refills: 0 | Status: DISCONTINUED | COMMUNITY
Start: 2019-05-26

## 2019-06-25 RX ORDER — AMLODIPINE BESYLATE 5 MG/1
5 TABLET ORAL
Qty: 30 | Refills: 0 | Status: DISCONTINUED | COMMUNITY
Start: 2018-11-01

## 2019-06-25 RX ORDER — METOPROLOL TARTRATE 50 MG/1
50 TABLET, FILM COATED ORAL TWICE DAILY
Refills: 0 | Status: ACTIVE | COMMUNITY
Start: 2019-06-25

## 2019-06-25 NOTE — HEALTH RISK ASSESSMENT
[] : No [No] : No [0] : 2) Feeling down, depressed, or hopeless: Not at all (0) [de-identified] : minimal [de-identified] : little meal

## 2019-06-25 NOTE — COUNSELING
[Fall prevention counseling provided] : fall prevention  [Low Salt Diet] : Low salt diet [Adequate lighting] : Adequate lighting [No throw rugs] : No throw rugs [Use proper foot wear] : Use proper foot wear [Use recommended devices] : Use recommended devices [None] : None [Good understanding] : Patient has a good understanding of lifestyle changes and the steps needed to achieve self management goals [de-identified] : - Low sodium diet with 1L fluid restriction\par - Home Oxygen therapy safety; no smoking, no open flame near O2, \par - Safe ambulation\par

## 2019-06-25 NOTE — PHYSICAL EXAM
[Well-Appearing] : well-appearing [Ill-Appearing] : ill-appearing [Cachectic] : cachexia was observed [Normal Sclera/Conjunctiva] : normal sclera/conjunctiva [PERRL] : pupils equal round and reactive to light [EOMI] : extraocular movements intact [Normal Outer Ear/Nose] : the outer ears and nose were normal in appearance [Normal Oropharynx] : the oropharynx was normal [No JVD] : no jugular venous distention [Supple] : supple [No Lymphadenopathy] : no lymphadenopathy [Thyroid Normal, No Nodules] : the thyroid was normal and there were no nodules present [No Respiratory Distress] : no respiratory distress  [No Accessory Muscle Use] : no accessory muscle use [Normal Rate] : normal rate  [Regular Rhythm] : with a regular rhythm [Normal S1, S2] : normal S1 and S2 [No Murmur] : no murmur heard [No Carotid Bruits] : no carotid bruits [No Abdominal Bruit] : a ~M bruit was not heard ~T in the abdomen [No Varicosities] : no varicosities [Pedal Pulses Present] : the pedal pulses are present [No Edema] : there was no peripheral edema [No Extremity Clubbing/Cyanosis] : no extremity clubbing/cyanosis [No Palpable Aorta] : no palpable aorta [Soft] : abdomen soft [Non Tender] : non-tender [Non-distended] : non-distended [No Masses] : no abdominal mass palpated [No HSM] : no HSM [Normal Bowel Sounds] : normal bowel sounds [Normal Posterior Cervical Nodes] : no posterior cervical lymphadenopathy [Normal Anterior Cervical Nodes] : no anterior cervical lymphadenopathy [No CVA Tenderness] : no CVA  tenderness [No Spinal Tenderness] : no spinal tenderness [No Joint Swelling] : no joint swelling [Grossly Normal Strength/Tone] : grossly normal strength/tone [No Rash] : no rash [Normal Gait] : normal gait [Coordination Grossly Intact] : coordination grossly intact [No Focal Deficits] : no focal deficits [Deep Tendon Reflexes (DTR)] : deep tendon reflexes were 2+ and symmetric [Speech Grossly Normal] : speech grossly normal [Memory Grossly Normal] : memory grossly normal [Normal Affect] : the affect was normal [Alert and Oriented x3] : oriented to person, place, and time [Normal Mood] : the mood was normal [Normal Insight/Judgement] : insight and judgment were intact [de-identified] : Bibasilar crackles, otherwise CTA [de-identified] : Bradycardia in 40s

## 2019-06-25 NOTE — ASSESSMENT
[FreeTextEntry1] : Mr. Lawson found to have bradycardia in 40s, feeling weak and  'heavy in head'. All meds given by son-in-law, Rivera who also is CDPAP. lungs CTA except slight crackles at base bilaterally. deep breathing exercise reinforced.\par [facility] RN who made a visit earlier today, BP was 124/68, HR 68,  looking cachectic, no issues reported. \[facility], unable to reach MD, message left with office staff and Metoprolol tartrate dose changed from 50mg to 25mg maintaining BID.\helder Stafford instructed to recheck BP and HR 2hrs after Metoprolol is given.

## 2019-06-25 NOTE — REVIEW OF SYSTEMS
[Fever] : no fever [Chills] : no chills [Fatigue] : fatigue [Chest Pain] : no chest pain [Palpitations] : no palpitations [Lower Ext Edema] : no lower extremity edema [Orthopena] : orthopnea [Paroysmal Nocturnal Dyspnea] : no paroysmal nocturnal dyspnea [Shortness Of Breath] : shortness of breath [Wheezing] : no wheezing [Dyspnea on Exertion] : dyspnea on exertion [Abdominal Pain] : no abdominal pain [Nausea] : no nausea [Constipation] : no constipation [Diarrhea] : no diarrhea [Heartburn] : no heartburn [Melena] : no melena [Hematuria] : no hematuria [Dizziness] : dizziness [Anxiety] : no anxiety [Depression] : no depression [FreeTextEntry2] : 'feeling weak and head feels heavy [Negative] : Heme/Lymph [FreeTextEntry7] : last BM today

## 2019-06-26 ENCOUNTER — INPATIENT (INPATIENT)
Facility: HOSPITAL | Age: 79
LOS: 13 days | Discharge: HOME | DRG: 180 | End: 2019-07-10
Attending: HOSPITALIST | Admitting: HOSPITALIST
Payer: COMMERCIAL

## 2019-06-26 VITALS
OXYGEN SATURATION: 97 % | WEIGHT: 132.28 LBS | RESPIRATION RATE: 16 BRPM | HEART RATE: 48 BPM | DIASTOLIC BLOOD PRESSURE: 42 MMHG | SYSTOLIC BLOOD PRESSURE: 107 MMHG | TEMPERATURE: 98 F

## 2019-06-26 DIAGNOSIS — Z95.1 PRESENCE OF AORTOCORONARY BYPASS GRAFT: Chronic | ICD-10-CM

## 2019-06-26 DIAGNOSIS — R06.02 SHORTNESS OF BREATH: ICD-10-CM

## 2019-06-26 LAB
ALBUMIN SERPL ELPH-MCNC: 3.5 G/DL — SIGNIFICANT CHANGE UP (ref 3.3–5.2)
ALP SERPL-CCNC: 78 U/L — SIGNIFICANT CHANGE UP (ref 40–120)
ALT FLD-CCNC: 28 U/L — SIGNIFICANT CHANGE UP
ANION GAP SERPL CALC-SCNC: 13 MMOL/L — SIGNIFICANT CHANGE UP (ref 5–17)
APTT BLD: 29.3 SEC — SIGNIFICANT CHANGE UP (ref 27.5–36.3)
AST SERPL-CCNC: 30 U/L — SIGNIFICANT CHANGE UP
BASOPHILS # BLD AUTO: 0.03 K/UL — SIGNIFICANT CHANGE UP (ref 0–0.2)
BASOPHILS NFR BLD AUTO: 0.3 % — SIGNIFICANT CHANGE UP (ref 0–2)
BILIRUB SERPL-MCNC: 0.3 MG/DL — LOW (ref 0.4–2)
BLD GP AB SCN SERPL QL: SIGNIFICANT CHANGE UP
BUN SERPL-MCNC: 24 MG/DL — HIGH (ref 8–20)
CALCIUM SERPL-MCNC: 7.8 MG/DL — LOW (ref 8.6–10.2)
CHLORIDE SERPL-SCNC: 93 MMOL/L — LOW (ref 98–107)
CO2 SERPL-SCNC: 31 MMOL/L — HIGH (ref 22–29)
CREAT SERPL-MCNC: 3.35 MG/DL — HIGH (ref 0.5–1.3)
EOSINOPHIL # BLD AUTO: 0.74 K/UL — HIGH (ref 0–0.5)
EOSINOPHIL NFR BLD AUTO: 6.5 % — HIGH (ref 0–6)
GLUCOSE SERPL-MCNC: 99 MG/DL — SIGNIFICANT CHANGE UP (ref 70–115)
HCT VFR BLD CALC: 26.4 % — LOW (ref 39–50)
HGB BLD-MCNC: 7.8 G/DL — LOW (ref 13–17)
IMM GRANULOCYTES NFR BLD AUTO: 0.5 % — SIGNIFICANT CHANGE UP (ref 0–1.5)
INR BLD: 0.92 RATIO — SIGNIFICANT CHANGE UP (ref 0.88–1.16)
LYMPHOCYTES # BLD AUTO: 1.28 K/UL — SIGNIFICANT CHANGE UP (ref 1–3.3)
LYMPHOCYTES # BLD AUTO: 11.2 % — LOW (ref 13–44)
MCHC RBC-ENTMCNC: 29 PG — SIGNIFICANT CHANGE UP (ref 27–34)
MCHC RBC-ENTMCNC: 29.5 GM/DL — LOW (ref 32–36)
MCV RBC AUTO: 98.1 FL — SIGNIFICANT CHANGE UP (ref 80–100)
MONOCYTES # BLD AUTO: 0.75 K/UL — SIGNIFICANT CHANGE UP (ref 0–0.9)
MONOCYTES NFR BLD AUTO: 6.6 % — SIGNIFICANT CHANGE UP (ref 2–14)
NEUTROPHILS # BLD AUTO: 8.55 K/UL — HIGH (ref 1.8–7.4)
NEUTROPHILS NFR BLD AUTO: 74.9 % — SIGNIFICANT CHANGE UP (ref 43–77)
NT-PROBNP SERPL-SCNC: HIGH PG/ML (ref 0–300)
PLATELET # BLD AUTO: 172 K/UL — SIGNIFICANT CHANGE UP (ref 150–400)
POTASSIUM SERPL-MCNC: 3.9 MMOL/L — SIGNIFICANT CHANGE UP (ref 3.5–5.3)
POTASSIUM SERPL-SCNC: 3.9 MMOL/L — SIGNIFICANT CHANGE UP (ref 3.5–5.3)
PROT SERPL-MCNC: 6.4 G/DL — LOW (ref 6.6–8.7)
PROTHROM AB SERPL-ACNC: 10.5 SEC — SIGNIFICANT CHANGE UP (ref 10–12.9)
RBC # BLD: 2.69 M/UL — LOW (ref 4.2–5.8)
RBC # FLD: 17.4 % — HIGH (ref 10.3–14.5)
SODIUM SERPL-SCNC: 137 MMOL/L — SIGNIFICANT CHANGE UP (ref 135–145)
TROPONIN T SERPL-MCNC: 0.07 NG/ML — HIGH (ref 0–0.06)
TYPE + AB SCN PNL BLD: SIGNIFICANT CHANGE UP
WBC # BLD: 11.41 K/UL — HIGH (ref 3.8–10.5)
WBC # FLD AUTO: 11.41 K/UL — HIGH (ref 3.8–10.5)

## 2019-06-26 PROCEDURE — 99223 1ST HOSP IP/OBS HIGH 75: CPT | Mod: GC,25

## 2019-06-26 PROCEDURE — 93010 ELECTROCARDIOGRAM REPORT: CPT

## 2019-06-26 PROCEDURE — 99285 EMERGENCY DEPT VISIT HI MDM: CPT

## 2019-06-26 PROCEDURE — 90937 HEMODIALYSIS REPEATED EVAL: CPT

## 2019-06-26 PROCEDURE — 99222 1ST HOSP IP/OBS MODERATE 55: CPT

## 2019-06-26 PROCEDURE — 99223 1ST HOSP IP/OBS HIGH 75: CPT

## 2019-06-26 RX ORDER — DOCUSATE SODIUM 100 MG
100 CAPSULE ORAL
Refills: 0 | Status: DISCONTINUED | OUTPATIENT
Start: 2019-06-26 | End: 2019-07-10

## 2019-06-26 RX ORDER — SEVELAMER CARBONATE 2400 MG/1
800 POWDER, FOR SUSPENSION ORAL
Refills: 0 | Status: DISCONTINUED | OUTPATIENT
Start: 2019-06-26 | End: 2019-07-10

## 2019-06-26 RX ORDER — ISOSORBIDE DINITRATE 5 MG/1
20 TABLET ORAL THREE TIMES A DAY
Refills: 0 | Status: DISCONTINUED | OUTPATIENT
Start: 2019-06-26 | End: 2019-06-29

## 2019-06-26 RX ORDER — ATORVASTATIN CALCIUM 80 MG/1
80 TABLET, FILM COATED ORAL AT BEDTIME
Refills: 0 | Status: DISCONTINUED | OUTPATIENT
Start: 2019-06-26 | End: 2019-07-10

## 2019-06-26 RX ORDER — IPRATROPIUM/ALBUTEROL SULFATE 18-103MCG
3 AEROSOL WITH ADAPTER (GRAM) INHALATION
Refills: 0 | Status: COMPLETED | OUTPATIENT
Start: 2019-06-26 | End: 2019-06-26

## 2019-06-26 RX ORDER — CLOPIDOGREL BISULFATE 75 MG/1
75 TABLET, FILM COATED ORAL DAILY
Refills: 0 | Status: DISCONTINUED | OUTPATIENT
Start: 2019-06-26 | End: 2019-07-10

## 2019-06-26 RX ORDER — HYDRALAZINE HCL 50 MG
50 TABLET ORAL EVERY 8 HOURS
Refills: 0 | Status: DISCONTINUED | OUTPATIENT
Start: 2019-06-26 | End: 2019-06-29

## 2019-06-26 RX ORDER — LEVOTHYROXINE SODIUM 125 MCG
100 TABLET ORAL DAILY
Refills: 0 | Status: DISCONTINUED | OUTPATIENT
Start: 2019-06-26 | End: 2019-07-10

## 2019-06-26 RX ORDER — PANTOPRAZOLE SODIUM 20 MG/1
40 TABLET, DELAYED RELEASE ORAL
Refills: 0 | Status: DISCONTINUED | OUTPATIENT
Start: 2019-06-26 | End: 2019-07-10

## 2019-06-26 RX ORDER — AMLODIPINE BESYLATE 2.5 MG/1
10 TABLET ORAL DAILY
Refills: 0 | Status: DISCONTINUED | OUTPATIENT
Start: 2019-06-26 | End: 2019-06-29

## 2019-06-26 RX ORDER — ASPIRIN/CALCIUM CARB/MAGNESIUM 324 MG
81 TABLET ORAL DAILY
Refills: 0 | Status: DISCONTINUED | OUTPATIENT
Start: 2019-06-26 | End: 2019-07-10

## 2019-06-26 RX ORDER — METOPROLOL TARTRATE 50 MG
50 TABLET ORAL
Refills: 0 | Status: DISCONTINUED | OUTPATIENT
Start: 2019-06-26 | End: 2019-06-29

## 2019-06-26 RX ADMIN — Medication 3 MILLILITER(S): at 11:30

## 2019-06-26 RX ADMIN — Medication 3 MILLILITER(S): at 11:52

## 2019-06-26 RX ADMIN — Medication 40 MILLIGRAM(S): at 11:30

## 2019-06-26 RX ADMIN — Medication 3 MILLILITER(S): at 12:22

## 2019-06-26 RX ADMIN — CLOPIDOGREL BISULFATE 75 MILLIGRAM(S): 75 TABLET, FILM COATED ORAL at 17:35

## 2019-06-26 RX ADMIN — Medication 10 MILLIGRAM(S): at 17:35

## 2019-06-26 RX ADMIN — Medication 50 MILLIGRAM(S): at 17:35

## 2019-06-26 RX ADMIN — Medication 100 MILLIGRAM(S): at 17:35

## 2019-06-26 RX ADMIN — SEVELAMER CARBONATE 800 MILLIGRAM(S): 2400 POWDER, FOR SUSPENSION ORAL at 17:34

## 2019-06-26 NOTE — ED PROVIDER NOTE - CARE PLAN
Home Principal Discharge DX:	Shortness of breath  Secondary Diagnosis:	ESRD (end stage renal disease)  Secondary Diagnosis:	Bradycardia

## 2019-06-26 NOTE — H&P ADULT - HISTORY OF PRESENT ILLNESS
79M presented with increasing dyspnea. He denied any associated chest pain or palpitations. He did not have a productive cough or fevers at home. The patient noted that he is on supplemental oxygen at home and has been compliant with his medications. He has also had his scheduled hemodialysis sessions without issue. The patient reported that the symptoms are similar to what he felt on prior admission and most recently last week when he was admitted to the hospital. He is unaware of any aggravating or relieving factors except that the dyspnea is worse with exertion.

## 2019-06-26 NOTE — ED ADULT NURSE NOTE - PMH
Bipolar disorder    CAD (coronary artery disease)  s/p remote CABG and multiple stents  Chronic anemia    ESRD (end stage renal disease)  on HD (Tue-Thu-Sat) through RUE fistula  High cholesterol    Hypertension    Lung cancer  had yoni radiation in 2006.

## 2019-06-26 NOTE — H&P ADULT - NSHPPHYSICALEXAM_GEN_ALL_CORE
Vital Signs Last 24 Hrs  T(C): 36.7 (26 Jun 2019 08:32), Max: 36.7 (26 Jun 2019 08:32)  T(F): 98.1 (26 Jun 2019 08:32), Max: 98.1 (26 Jun 2019 08:32)  HR: 44 (26 Jun 2019 10:02) (44 - 48)  BP: 120/57 (26 Jun 2019 10:02) (107/42 - 120/57)  BP(mean): --  RR: 20 (26 Jun 2019 10:02) (16 - 20)  SpO2: 100% (26 Jun 2019 10:02) (97% - 100%)    General appearance: No acute distress, Awake, Alert  HEENT: Normocephalic, Atraumatic, Conjunctiva clear, EOMI  Neck: Supple, No JVD, No tenderness  Lungs: Breath sound equal bilaterally, No wheezes, Bilateral rales  Cardiovascular: S1S2, Regular rhythm  Abdomen: Soft, Nontender, Nondistended, No guarding/rebound, Positive bowel sounds  Extremities: No clubbing, No cyanosis, No edema, No calf tenderness  Neuro: Strength equal bilaterally, No tremors  Psychiatric: Appropriate mood, Normal affect

## 2019-06-26 NOTE — ED ADULT NURSE REASSESSMENT NOTE - NS ED NURSE REASSESS COMMENT FT1
no acute distress pt a&ox 3, on supplemental oxygen, hr goes down 33-45, provider aware, close monitoring, safety precautions in place.

## 2019-06-26 NOTE — H&P ADULT - ASSESSMENT
79M with a history of ESRD, HTN, CAD, and lung cancer presenting with increased dyspnea.    Dyspnea - Most likely multifactorial in nature given history of lung cancer, heart failure, pulmonary fibrosis.    Chronic diastolic heart failure - On isosorbide. Planned for hemodialysis today.    CAD - Elevated troponin in the setting of renal failure. Cardiology consultation noted. Prior ischemic evaluation was without significant findings. On aspirin and clopidogrel.    ESRD - Nephrology consultation noted, for hemodialysis.    Lung cancer - Supplemental oxygen for hypoxia and prednisone. 79M with a history of ESRD, HTN, CAD, and lung cancer presenting with increased dyspnea.    Dyspnea - Most likely multifactorial in nature given history of lung cancer, heart failure, pulmonary fibrosis.    Chronic diastolic heart failure - On isosorbide. Planned for hemodialysis today.    CAD - Elevated troponin in the setting of renal failure. Cardiology consultation noted. Prior ischemic evaluation was without significant findings. On aspirin and clopidogrel.    ESRD - Nephrology consultation noted, for hemodialysis.    Lung cancer - Supplemental oxygen for hypoxia and prednisone.    Hypothyroidism - On levothyroxine.

## 2019-06-26 NOTE — ED CLERICAL - NS ED CLERK NOTE PRE-ARRIVAL INFORMATION; ADDITIONAL PRE-ARRIVAL INFORMATION
This patient is enrolled in the [facility] Care Transitions program and has active care navigation. This patient can be followed up by the care navigation team within 24 hours. To arrange close follow-up or to obtain additional clinical information about this patient, please call the contact number above.

## 2019-06-26 NOTE — ED PROVIDER NOTE - PROGRESS NOTE DETAILS
all results discussed with pt, pt seen by nephrology and to get HD today, pt to be admitted, pt aware and agreeable with plan

## 2019-06-26 NOTE — ED PROVIDER NOTE - CLINICAL SUMMARY MEDICAL DECISION MAKING FREE TEXT BOX
78y/o M with c/o SOB, likely multifactorial due to  hx Lung Ca s/p radiation therapy, possible fluid overload from acutely exacerbated CHF or ESRD although less likely given that patient without LE edema, labs and CXR pending, bradycardia noted HR I n 30's at times with intermittent 2:1 AV block and junction escape 80y/o M with c/o SOB, likely multifactorial due to  hx Lung Ca s/p radiation therapy, possible fluid overload from acutely exacerbated CHF or ESRD although less likely given that patient without LE edema, labs and CXR pending, bradycardia noted HR In 30's at times with intermittent 2:1 AV block and junction escape

## 2019-06-26 NOTE — ED ADULT TRIAGE NOTE - CHIEF COMPLAINT QUOTE
pt comes to ed for shortness of breath. hx of copd. on 5l nasal cannula continuously. ems reports patient lives in basement; high humidity in basement and significantly improved after taking patient out of basement. patient in no acute respiratory distress. left arm av fistula dialysis t/th/sat. received full dialysis tx yesterday.

## 2019-06-26 NOTE — ED PROVIDER NOTE - OBJECTIVE STATEMENT
79-year-old male with PMH ESRD (On HD Tue-Thu-Sat), HTN, CAD s/p CABG and Stents, Lung Ca (s/p radiation, on home O2) presents to the ED with worsening shortness of breath. Patient was seen here 5 days ago for same and admitted. Patient reports compliance with all medications and is due for HD tomorrow. Patient noted to be bradycardic in 30's at times.  Patient denies chest pain, palpitations, dizziness, edema, fever, chills. Reports non productive dry cough.

## 2019-06-26 NOTE — ED PROVIDER NOTE - ATTENDING CONTRIBUTION TO CARE
seen with acp: elderly male known to me from prior visits, multiple medical problems and chronic SOB p/w worsening SOB; generally attributed to multiple causes included pulm fibrosis from lung ca and radiation treatments; diastolic chf, and esrd on dialysis; on exam, NAD, no icterus, lungs rhonchorous mild in all fields, warm and well perfused, no pedal edema; d/w cards and nephro who know him well, will plan for additional HD as this typically improves symptoms; d/w hospitalist

## 2019-06-26 NOTE — H&P ADULT - NSICDXPASTMEDICALHX_GEN_ALL_CORE_FT
PAST MEDICAL HISTORY:  Bipolar disorder     CAD (coronary artery disease) s/p remote CABG and multiple stents    Chronic anemia     ESRD (end stage renal disease) on HD (Tue-Thu-Sat) through RUE fistula    High cholesterol     Hypertension     Lung cancer had yoni radiation in 2006.

## 2019-06-27 LAB
ANION GAP SERPL CALC-SCNC: 12 MMOL/L — SIGNIFICANT CHANGE UP (ref 5–17)
BUN SERPL-MCNC: 31 MG/DL — HIGH (ref 8–20)
CALCIUM SERPL-MCNC: 8.3 MG/DL — LOW (ref 8.6–10.2)
CHLORIDE SERPL-SCNC: 97 MMOL/L — LOW (ref 98–107)
CO2 SERPL-SCNC: 29 MMOL/L — SIGNIFICANT CHANGE UP (ref 22–29)
CREAT SERPL-MCNC: 4.45 MG/DL — HIGH (ref 0.5–1.3)
GLUCOSE SERPL-MCNC: 99 MG/DL — SIGNIFICANT CHANGE UP (ref 70–115)
HCT VFR BLD CALC: 28.7 % — LOW (ref 39–50)
HGB BLD-MCNC: 8.3 G/DL — LOW (ref 13–17)
MCHC RBC-ENTMCNC: 28.9 GM/DL — LOW (ref 32–36)
MCHC RBC-ENTMCNC: 29.3 PG — SIGNIFICANT CHANGE UP (ref 27–34)
MCV RBC AUTO: 101.4 FL — HIGH (ref 80–100)
PLATELET # BLD AUTO: 175 K/UL — SIGNIFICANT CHANGE UP (ref 150–400)
POTASSIUM SERPL-MCNC: 4.1 MMOL/L — SIGNIFICANT CHANGE UP (ref 3.5–5.3)
POTASSIUM SERPL-SCNC: 4.1 MMOL/L — SIGNIFICANT CHANGE UP (ref 3.5–5.3)
RBC # BLD: 2.83 M/UL — LOW (ref 4.2–5.8)
RBC # FLD: 18.6 % — HIGH (ref 10.3–14.5)
SODIUM SERPL-SCNC: 138 MMOL/L — SIGNIFICANT CHANGE UP (ref 135–145)
TROPONIN T SERPL-MCNC: 0.06 NG/ML — SIGNIFICANT CHANGE UP (ref 0–0.06)
WBC # BLD: 8.91 K/UL — SIGNIFICANT CHANGE UP (ref 3.8–10.5)
WBC # FLD AUTO: 8.91 K/UL — SIGNIFICANT CHANGE UP (ref 3.8–10.5)

## 2019-06-27 PROCEDURE — 93010 ELECTROCARDIOGRAM REPORT: CPT

## 2019-06-27 PROCEDURE — 99232 SBSQ HOSP IP/OBS MODERATE 35: CPT

## 2019-06-27 PROCEDURE — 99233 SBSQ HOSP IP/OBS HIGH 50: CPT

## 2019-06-27 RX ORDER — DIPHENHYDRAMINE HCL 50 MG
25 CAPSULE ORAL EVERY 6 HOURS
Refills: 0 | Status: DISCONTINUED | OUTPATIENT
Start: 2019-06-27 | End: 2019-06-30

## 2019-06-27 RX ORDER — FLUTICASONE PROPIONATE 50 MCG
1 SPRAY, SUSPENSION NASAL
Refills: 0 | Status: DISCONTINUED | OUTPATIENT
Start: 2019-06-27 | End: 2019-07-10

## 2019-06-27 RX ORDER — MORPHINE SULFATE 50 MG/1
1 CAPSULE, EXTENDED RELEASE ORAL ONCE
Refills: 0 | Status: DISCONTINUED | OUTPATIENT
Start: 2019-06-27 | End: 2019-06-27

## 2019-06-27 RX ORDER — CHLORHEXIDINE GLUCONATE 213 G/1000ML
1 SOLUTION TOPICAL DAILY
Refills: 0 | Status: DISCONTINUED | OUTPATIENT
Start: 2019-06-27 | End: 2019-07-10

## 2019-06-27 RX ORDER — IPRATROPIUM/ALBUTEROL SULFATE 18-103MCG
3 AEROSOL WITH ADAPTER (GRAM) INHALATION EVERY 6 HOURS
Refills: 0 | Status: DISCONTINUED | OUTPATIENT
Start: 2019-06-27 | End: 2019-07-08

## 2019-06-27 RX ADMIN — ISOSORBIDE DINITRATE 20 MILLIGRAM(S): 5 TABLET ORAL at 00:10

## 2019-06-27 RX ADMIN — AMLODIPINE BESYLATE 10 MILLIGRAM(S): 2.5 TABLET ORAL at 05:46

## 2019-06-27 RX ADMIN — Medication 100 MICROGRAM(S): at 05:46

## 2019-06-27 RX ADMIN — Medication 25 MILLIGRAM(S): at 21:52

## 2019-06-27 RX ADMIN — Medication 50 MILLIGRAM(S): at 00:11

## 2019-06-27 RX ADMIN — ATORVASTATIN CALCIUM 80 MILLIGRAM(S): 80 TABLET, FILM COATED ORAL at 00:11

## 2019-06-27 RX ADMIN — SEVELAMER CARBONATE 800 MILLIGRAM(S): 2400 POWDER, FOR SUSPENSION ORAL at 14:13

## 2019-06-27 RX ADMIN — Medication 50 MILLIGRAM(S): at 14:13

## 2019-06-27 RX ADMIN — Medication 100 MILLIGRAM(S): at 05:46

## 2019-06-27 RX ADMIN — SEVELAMER CARBONATE 800 MILLIGRAM(S): 2400 POWDER, FOR SUSPENSION ORAL at 08:10

## 2019-06-27 RX ADMIN — Medication 3 MILLILITER(S): at 12:18

## 2019-06-27 RX ADMIN — Medication 50 MILLIGRAM(S): at 17:43

## 2019-06-27 RX ADMIN — Medication 81 MILLIGRAM(S): at 08:10

## 2019-06-27 RX ADMIN — Medication 100 MILLIGRAM(S): at 17:41

## 2019-06-27 RX ADMIN — Medication 50 MILLIGRAM(S): at 08:10

## 2019-06-27 RX ADMIN — ISOSORBIDE DINITRATE 20 MILLIGRAM(S): 5 TABLET ORAL at 14:13

## 2019-06-27 RX ADMIN — ISOSORBIDE DINITRATE 20 MILLIGRAM(S): 5 TABLET ORAL at 08:10

## 2019-06-27 RX ADMIN — CLOPIDOGREL BISULFATE 75 MILLIGRAM(S): 75 TABLET, FILM COATED ORAL at 08:10

## 2019-06-27 RX ADMIN — ISOSORBIDE DINITRATE 20 MILLIGRAM(S): 5 TABLET ORAL at 23:17

## 2019-06-27 RX ADMIN — ATORVASTATIN CALCIUM 80 MILLIGRAM(S): 80 TABLET, FILM COATED ORAL at 23:16

## 2019-06-27 RX ADMIN — Medication 50 MILLIGRAM(S): at 23:17

## 2019-06-27 RX ADMIN — Medication 3 MILLILITER(S): at 22:59

## 2019-06-27 RX ADMIN — PANTOPRAZOLE SODIUM 40 MILLIGRAM(S): 20 TABLET, DELAYED RELEASE ORAL at 08:10

## 2019-06-27 RX ADMIN — SEVELAMER CARBONATE 800 MILLIGRAM(S): 2400 POWDER, FOR SUSPENSION ORAL at 17:41

## 2019-06-27 RX ADMIN — Medication 1 SPRAY(S): at 17:41

## 2019-06-27 RX ADMIN — Medication 10 MILLIGRAM(S): at 05:46

## 2019-06-27 NOTE — PROGRESS NOTE ADULT - ASSESSMENT
1)ESRD on HD  2) MBD of renal dx  3) Anemia of renal dx  4) Vol HTN    HD in am - orders placed  Check phosphorus; may require binder  Restart epo 10k units TIW; anemia not at goal  1kg UF  Continue current management

## 2019-06-27 NOTE — PROGRESS NOTE ADULT - SUBJECTIVE AND OBJECTIVE BOX
KAUSHIK HOFFMAN  ----------------------------------------  The patient was seen and evaluated for dyspnea.  The patient is in no acute distress.  Denied any chest pain, palpitations, or abdominal pain.  Reports episodes of dyspnea and cough.    Vital Signs Last 24 Hrs  T(C): 36.3 (27 Jun 2019 10:46), Max: 37.1 (26 Jun 2019 20:20)  T(F): 97.4 (27 Jun 2019 10:46), Max: 98.7 (26 Jun 2019 20:20)  HR: 52 (27 Jun 2019 10:46) (52 - 59)  BP: 124/55 (27 Jun 2019 10:46) (104/50 - 144/66)  BP(mean): --  RR: 20 (27 Jun 2019 10:46) (18 - 20)  SpO2: 99% (27 Jun 2019 10:46) (96% - 99%)    PHYSICAL EXAMINATION:  ----------------------------------------  General appearance: No acute distress, Awake, Alert  HEENT: Normocephalic, Atraumatic, Conjunctiva clear, EOMI  Neck: Supple, No JVD, No tenderness  Lungs: Breath sound equal bilaterally, No wheezes, Bilateral rales  Cardiovascular: S1S2, Regular rhythm  Abdomen: Soft, Nontender, Nondistended, No guarding/rebound, Positive bowel sounds  Extremities: No clubbing, No cyanosis, No edema, No calf tenderness  Neuro: Strength equal bilaterally, No tremors  Psychiatric: Appropriate mood, Normal affect    LABORATORY STUDIES:  ----------------------------------------             7.8    11.41 )-----------( 172      ( 26 Jun 2019 09:29 )             26.4     06-26    137  |  93<L>  |  24.0<H>  ----------------------------<  99  3.9   |  31.0<H>  |  3.35<H>    Ca    7.8<L>      26 Jun 2019 09:29    TPro  6.4<L>  /  Alb  3.5  /  TBili  0.3<L>  /  DBili  x   /  AST  30  /  ALT  28  /  AlkPhos  78  06-26    LIVER FUNCTIONS - ( 26 Jun 2019 09:29 )  Alb: 3.5 g/dL / Pro: 6.4 g/dL / ALK PHOS: 78 U/L / ALT: 28 U/L / AST: 30 U/L / GGT: x           PT/INR - ( 26 Jun 2019 09:29 )   PT: 10.5 sec;   INR: 0.92 ratio    PTT - ( 26 Jun 2019 09:29 )  PTT:29.3 sec    CARDIAC MARKERS ( 26 Jun 2019 09:29 )  x     / 0.07 ng/mL / x     / x     / x        MEDICATIONS  (STANDING):  amLODIPine   Tablet 10 milliGRAM(s) Oral daily  aspirin  chewable 81 milliGRAM(s) Oral daily  atorvastatin 80 milliGRAM(s) Oral at bedtime  clopidogrel Tablet 75 milliGRAM(s) Oral daily  docusate sodium 100 milliGRAM(s) Oral two times a day  hydrALAZINE 50 milliGRAM(s) Oral every 8 hours  isosorbide   dinitrate Tablet (ISORDIL) 20 milliGRAM(s) Oral three times a day  levothyroxine 100 MICROGram(s) Oral daily  metoprolol tartrate 50 milliGRAM(s) Oral two times a day  pantoprazole    Tablet 40 milliGRAM(s) Oral before breakfast  predniSONE   Tablet 10 milliGRAM(s) Oral daily  sevelamer carbonate 800 milliGRAM(s) Oral three times a day with meals    MEDICATIONS  (PRN):  ALBUTerol/ipratropium for Nebulization 3 milliLiter(s) Nebulizer every 6 hours PRN Shortness of Breath and/or Wheezing      ASSESSMENT / PLAN:  ----------------------------------------  79M with a history of ESRD, HTN, CAD, and lung cancer presenting with increased dyspnea.    Dyspnea - Most likely multifactorial in nature given history of lung cancer, heart failure, pulmonary fibrosis. On supplemental oxygen.    Chronic diastolic heart failure - On isosorbide. Cardiology to follow up.    CAD - Elevated troponin in the setting of renal failure. Prior ischemic evaluation was without significant findings. On aspirin and clopidogrel.    ESRD - Nephrology consultation noted, for hemodialysis.    Lung cancer - Supplemental oxygen for hypoxia and prednisone.    Hypothyroidism - On levothyroxine. KAUSHIK HOFFMAN  ----------------------------------------  The patient was seen and evaluated for dyspnea.  The patient is in no acute distress.  Denied any chest pain, palpitations, or abdominal pain.  Reports episodes of dyspnea and cough.    Vital Signs Last 24 Hrs  T(C): 36.3 (27 Jun 2019 10:46), Max: 37.1 (26 Jun 2019 20:20)  T(F): 97.4 (27 Jun 2019 10:46), Max: 98.7 (26 Jun 2019 20:20)  HR: 52 (27 Jun 2019 10:46) (52 - 59)  BP: 124/55 (27 Jun 2019 10:46) (104/50 - 144/66)  BP(mean): --  RR: 20 (27 Jun 2019 10:46) (18 - 20)  SpO2: 99% (27 Jun 2019 10:46) (96% - 99%)    PHYSICAL EXAMINATION:  ----------------------------------------  General appearance: No acute distress, Awake, Alert  HEENT: Normocephalic, Atraumatic, Conjunctiva clear, EOMI  Neck: Supple, No JVD, No tenderness  Lungs: Breath sound equal bilaterally, No wheezes, Bilateral rales  Cardiovascular: S1S2, Regular rhythm  Abdomen: Soft, Nontender, Nondistended, No guarding/rebound, Positive bowel sounds  Extremities: No clubbing, No cyanosis, No edema, No calf tenderness  Neuro: Strength equal bilaterally, No tremors  Psychiatric: Appropriate mood, Normal affect    LABORATORY STUDIES:  ----------------------------------------             7.8    11.41 )-----------( 172      ( 26 Jun 2019 09:29 )             26.4     06-26    137  |  93<L>  |  24.0<H>  ----------------------------<  99  3.9   |  31.0<H>  |  3.35<H>    Ca    7.8<L>      26 Jun 2019 09:29    TPro  6.4<L>  /  Alb  3.5  /  TBili  0.3<L>  /  DBili  x   /  AST  30  /  ALT  28  /  AlkPhos  78  06-26    LIVER FUNCTIONS - ( 26 Jun 2019 09:29 )  Alb: 3.5 g/dL / Pro: 6.4 g/dL / ALK PHOS: 78 U/L / ALT: 28 U/L / AST: 30 U/L / GGT: x           PT/INR - ( 26 Jun 2019 09:29 )   PT: 10.5 sec;   INR: 0.92 ratio    PTT - ( 26 Jun 2019 09:29 )  PTT:29.3 sec    CARDIAC MARKERS ( 26 Jun 2019 09:29 )  x     / 0.07 ng/mL / x     / x     / x        MEDICATIONS  (STANDING):  amLODIPine   Tablet 10 milliGRAM(s) Oral daily  aspirin  chewable 81 milliGRAM(s) Oral daily  atorvastatin 80 milliGRAM(s) Oral at bedtime  clopidogrel Tablet 75 milliGRAM(s) Oral daily  docusate sodium 100 milliGRAM(s) Oral two times a day  hydrALAZINE 50 milliGRAM(s) Oral every 8 hours  isosorbide   dinitrate Tablet (ISORDIL) 20 milliGRAM(s) Oral three times a day  levothyroxine 100 MICROGram(s) Oral daily  metoprolol tartrate 50 milliGRAM(s) Oral two times a day  pantoprazole    Tablet 40 milliGRAM(s) Oral before breakfast  predniSONE   Tablet 10 milliGRAM(s) Oral daily  sevelamer carbonate 800 milliGRAM(s) Oral three times a day with meals    MEDICATIONS  (PRN):  ALBUTerol/ipratropium for Nebulization 3 milliLiter(s) Nebulizer every 6 hours PRN Shortness of Breath and/or Wheezing      ASSESSMENT / PLAN:  ----------------------------------------  79M with a history of ESRD, HTN, CAD, and lung cancer presenting with increased dyspnea.    Dyspnea - Most likely multifactorial in nature given history of lung cancer, heart failure, pulmonary fibrosis. On supplemental oxygen.    Chronic diastolic heart failure - On isosorbide. Cardiology to follow up.    CAD - Elevated troponin in the setting of renal failure. Prior ischemic evaluation was without significant findings. On aspirin and clopidogrel.    ESRD - Nephrology consultation noted, for hemodialysis.    Lung cancer - On prednisone. Supplemental oxygen for hypoxia.    Hypothyroidism - On levothyroxine.

## 2019-06-27 NOTE — PROGRESS NOTE ADULT - SUBJECTIVE AND OBJECTIVE BOX
[facility] DIVISION OF KIDNEY DISEASES AND HYPERTENSION -- FOLLOW UP NOTE  --------------------------------------------------------------------------------  Chief Complaint:  ESRD on HD    24 hour events/subjective:  Pt seen and examined in ED  NAD  Nasal O2  Family at bedside  HD yesterday      PAST HISTORY  --------------------------------------------------------------------------------  No significant changes to PMH, PSH, FHx, SHx, unless otherwise noted    ALLERGIES & MEDICATIONS  --------------------------------------------------------------------------------  Allergies    No Known Allergies    Intolerances      Standing Inpatient Medications  amLODIPine   Tablet 10 milliGRAM(s) Oral daily  aspirin  chewable 81 milliGRAM(s) Oral daily  atorvastatin 80 milliGRAM(s) Oral at bedtime  clopidogrel Tablet 75 milliGRAM(s) Oral daily  docusate sodium 100 milliGRAM(s) Oral two times a day  hydrALAZINE 50 milliGRAM(s) Oral every 8 hours  isosorbide   dinitrate Tablet (ISORDIL) 20 milliGRAM(s) Oral three times a day  levothyroxine 100 MICROGram(s) Oral daily  metoprolol tartrate 50 milliGRAM(s) Oral two times a day  pantoprazole    Tablet 40 milliGRAM(s) Oral before breakfast  predniSONE   Tablet 10 milliGRAM(s) Oral daily  sevelamer carbonate 800 milliGRAM(s) Oral three times a day with meals    PRN Inpatient Medications  ALBUTerol/ipratropium for Nebulization 3 milliLiter(s) Nebulizer every 6 hours PRN      REVIEW OF SYSTEMS  --------------------------------------------------------------------------------  Gen: No weight changes, fatigue, fevers/chills, weakness  Skin: No rashes  Head/Eyes/Ears/Mouth: No headache; Normal hearing; Normal vision w/o blurriness; No sinus pain/discomfort, sore throat  Respiratory: +RODRIGUEZ, +SOB  CV: No chest pain, PND, orthopnea  GI: No abdominal pain, diarrhea, constipation, nausea, vomiting, melena, hematochezia  : No increased frequency, dysuria, hematuria, nocturia  MSK: No joint pain/swelling; no back pain; no edema  Neuro: No dizziness/lightheadedness, weakness, seizures, numbness, tingling  Heme: No easy bruising or bleeding  Endo: No heat/cold intolerance  Psych: No significant nervousness, anxiety, stress, depression    All other systems were reviewed and are negative, except as noted.    VITALS/PHYSICAL EXAM  --------------------------------------------------------------------------------  T(C): 36.3 (06-27-19 @ 10:46), Max: 37.1 (06-26-19 @ 20:20)  HR: 52 (06-27-19 @ 10:46) (52 - 59)  BP: 124/55 (06-27-19 @ 10:46) (104/50 - 144/66)  RR: 20 (06-27-19 @ 10:46) (18 - 20)  SpO2: 99% (06-27-19 @ 10:46) (96% - 99%)  Wt(kg): --    Weight (kg): 60 (06-26-19 @ 08:32)      06-26-19 @ 07:01  -  06-27-19 @ 07:00  --------------------------------------------------------  IN: 0 mL / OUT: 1300 mL / NET: -1300 mL      Physical Exam:  	Gen: NAD,    	HEENT: venturi mask  	Pulm: dec BS; fine crackles BL  	CV: RRR, S1S2; no rub  	Back: No spinal or CVA tenderness; no sacral edema  	Abd: +BS, soft, nontender/nondistended  	: No suprapubic tenderness  	UE: Warm, FROM, no clubbing, intact strength; no edema; no asterixis  	LE: Warm, FROM, no clubbing, intact strength; + pedal edema  	Neuro: No focal deficits, intact gait  	Psych: Normal affect and mood  	Skin: Warm, without rashes  	Vascular access:  CVC    LABS/STUDIES  --------------------------------------------------------------------------------              7.8    11.41 >-----------<  172      [06-26-19 @ 09:29]              26.4     137  |  93  |  24.0  ----------------------------<  99      [06-26-19 @ 09:29]  3.9   |  31.0  |  3.35        Ca     7.8     [06-26-19 @ 09:29]    TPro  6.4  /  Alb  3.5  /  TBili  0.3  /  DBili  x   /  AST  30  /  ALT  28  /  AlkPhos  78  [06-26-19 @ 09:29]    PT/INR: PT 10.5 , INR 0.92       [06-26-19 @ 09:29]  PTT: 29.3       [06-26-19 @ 09:29]    Troponin 0.07      [06-26-19 @ 09:29]    Creatinine Trend:  SCr 3.35 [06-26 @ 09:29]  SCr 6.61 [06-20 @ 08:30]  SCr 4.44 [06-19 @ 08:42]  SCr 5.43 [06-17 @ 23:53]        Iron 58, TIBC 256, %sat 23      [06-19-19 @ 08:42]  Ferritin 812      [06-19-19 @ 08:42]  TSH 0.89      [05-23-19 @ 06:28]  Lipid: chol 159, , HDL 32, LDL 94      [01-29-19 @ 11:39]

## 2019-06-27 NOTE — CHART NOTE - NSCHARTNOTEFT_GEN_A_CORE
PA NOTE    Called by RN due to Pt C/O chest tightness X now  Pt states he started experiencing chest tightness Left upper chest which moved to Mid Upper Chest this afternoon and is now feeling chest pressure throughout entire upper chest   Pt also states slight increase in SOB  Denies: radiation dizziness nausea vomiting  Pt is 79M who presented to ED  with increasing dyspnea  with a history of ESRD,Chronic diastolic heart failure  HFpEF (EF 50-55%), CAD s/p CABG (ANA-LAD, stents to LCx) with recent NST (04/19 mild ischemia, medically managed) Hypothyroidism  HTN ,pulmonary fibrosis. and lung cancer  The patient is on supplemental oxygen at home      T(C): 36.4 (27 Jun 2019 21:42), Max: 36.8 (27 Jun 2019 04:16)  T(F): 97.5 (27 Jun 2019 21:42), Max: 98.3 (27 Jun 2019 04:16)  HR: 49 (27 Jun 2019 21:42) (49 - 82)  BP: 121/63 (27 Jun 2019 21:42) (114/49 - 144/66)  RR: 18 (27 Jun 2019 21:42) (18 - 20)  SpO2: 92% (27 Jun 2019 21:42) (92% - 100%) on 4 L    General: WDWN Male sl anxious able to speak in full sentences no WOB  No diaphoresis    HEENT NC/AT  Cardiac: Bradycardiac reg   Lungs: SL Crackles mid-base ,no rhonchi no wheezing B/L  ABD: ND/NT + BS  EXT: no C/C/E X 4  Integ: good color warm/dry    A/P Eval Pt C/O Chest tightness with slight increase of SOB  Stat EKG  STAT Troponins  stat CBC    EKG- no acute changes from previous  Will follow labs  Monitor  RN to Call if change in PTs status

## 2019-06-28 LAB
ANION GAP SERPL CALC-SCNC: 16 MMOL/L — SIGNIFICANT CHANGE UP (ref 5–17)
BUN SERPL-MCNC: 34 MG/DL — HIGH (ref 8–20)
CALCIUM SERPL-MCNC: 8.7 MG/DL — SIGNIFICANT CHANGE UP (ref 8.6–10.2)
CHLORIDE SERPL-SCNC: 96 MMOL/L — LOW (ref 98–107)
CO2 SERPL-SCNC: 27 MMOL/L — SIGNIFICANT CHANGE UP (ref 22–29)
CREAT SERPL-MCNC: 4.79 MG/DL — HIGH (ref 0.5–1.3)
GLUCOSE SERPL-MCNC: 89 MG/DL — SIGNIFICANT CHANGE UP (ref 70–115)
HBV CORE AB SER-ACNC: SIGNIFICANT CHANGE UP
HBV SURFACE AB SER-ACNC: <3 MIU/ML — LOW
HBV SURFACE AB SER-ACNC: SIGNIFICANT CHANGE UP
HBV SURFACE AG SER-ACNC: REACTIVE
HCT VFR BLD CALC: 32.1 % — LOW (ref 39–50)
HCV AB S/CO SERPL IA: 0.1 S/CO — SIGNIFICANT CHANGE UP (ref 0–0.99)
HCV AB SERPL-IMP: SIGNIFICANT CHANGE UP
HGB BLD-MCNC: 9.5 G/DL — LOW (ref 13–17)
MCHC RBC-ENTMCNC: 29.6 GM/DL — LOW (ref 32–36)
MCHC RBC-ENTMCNC: 29.6 PG — SIGNIFICANT CHANGE UP (ref 27–34)
MCV RBC AUTO: 100 FL — SIGNIFICANT CHANGE UP (ref 80–100)
MRSA PCR RESULT.: SIGNIFICANT CHANGE UP
PLATELET # BLD AUTO: 205 K/UL — SIGNIFICANT CHANGE UP (ref 150–400)
POTASSIUM SERPL-MCNC: 4 MMOL/L — SIGNIFICANT CHANGE UP (ref 3.5–5.3)
POTASSIUM SERPL-SCNC: 4 MMOL/L — SIGNIFICANT CHANGE UP (ref 3.5–5.3)
RBC # BLD: 3.21 M/UL — LOW (ref 4.2–5.8)
RBC # FLD: 18.7 % — HIGH (ref 10.3–14.5)
S AUREUS DNA NOSE QL NAA+PROBE: DETECTED
SODIUM SERPL-SCNC: 139 MMOL/L — SIGNIFICANT CHANGE UP (ref 135–145)
TROPONIN T SERPL-MCNC: 0.07 NG/ML — HIGH (ref 0–0.06)
WBC # BLD: 11.2 K/UL — HIGH (ref 3.8–10.5)
WBC # FLD AUTO: 11.2 K/UL — HIGH (ref 3.8–10.5)

## 2019-06-28 PROCEDURE — 71045 X-RAY EXAM CHEST 1 VIEW: CPT | Mod: 26

## 2019-06-28 PROCEDURE — 93010 ELECTROCARDIOGRAM REPORT: CPT

## 2019-06-28 PROCEDURE — 90937 HEMODIALYSIS REPEATED EVAL: CPT

## 2019-06-28 PROCEDURE — 99232 SBSQ HOSP IP/OBS MODERATE 35: CPT

## 2019-06-28 RX ORDER — SENNA PLUS 8.6 MG/1
2 TABLET ORAL AT BEDTIME
Refills: 0 | Status: DISCONTINUED | OUTPATIENT
Start: 2019-06-28 | End: 2019-07-10

## 2019-06-28 RX ORDER — NITROGLYCERIN 6.5 MG
0.4 CAPSULE, EXTENDED RELEASE ORAL ONCE
Refills: 0 | Status: COMPLETED | OUTPATIENT
Start: 2019-06-28 | End: 2019-06-28

## 2019-06-28 RX ORDER — LACTULOSE 10 G/15ML
10 SOLUTION ORAL ONCE
Refills: 0 | Status: COMPLETED | OUTPATIENT
Start: 2019-06-28 | End: 2019-06-28

## 2019-06-28 RX ORDER — HYDROMORPHONE HYDROCHLORIDE 2 MG/ML
0.5 INJECTION INTRAMUSCULAR; INTRAVENOUS; SUBCUTANEOUS
Refills: 0 | Status: DISCONTINUED | OUTPATIENT
Start: 2019-06-28 | End: 2019-07-05

## 2019-06-28 RX ADMIN — Medication 50 MILLIGRAM(S): at 14:30

## 2019-06-28 RX ADMIN — Medication 30 MILLILITER(S): at 02:28

## 2019-06-28 RX ADMIN — Medication 100 MILLIGRAM(S): at 17:05

## 2019-06-28 RX ADMIN — Medication 81 MILLIGRAM(S): at 13:42

## 2019-06-28 RX ADMIN — Medication 50 MILLIGRAM(S): at 21:08

## 2019-06-28 RX ADMIN — AMLODIPINE BESYLATE 10 MILLIGRAM(S): 2.5 TABLET ORAL at 06:24

## 2019-06-28 RX ADMIN — Medication 10 MILLIGRAM(S): at 06:24

## 2019-06-28 RX ADMIN — Medication 100 MILLIGRAM(S): at 06:24

## 2019-06-28 RX ADMIN — SEVELAMER CARBONATE 800 MILLIGRAM(S): 2400 POWDER, FOR SUSPENSION ORAL at 17:05

## 2019-06-28 RX ADMIN — MORPHINE SULFATE 1 MILLIGRAM(S): 50 CAPSULE, EXTENDED RELEASE ORAL at 00:07

## 2019-06-28 RX ADMIN — Medication 1 SPRAY(S): at 06:25

## 2019-06-28 RX ADMIN — CLOPIDOGREL BISULFATE 75 MILLIGRAM(S): 75 TABLET, FILM COATED ORAL at 13:42

## 2019-06-28 RX ADMIN — Medication 0.4 MILLIGRAM(S): at 02:28

## 2019-06-28 RX ADMIN — SENNA PLUS 2 TABLET(S): 8.6 TABLET ORAL at 21:06

## 2019-06-28 RX ADMIN — ISOSORBIDE DINITRATE 20 MILLIGRAM(S): 5 TABLET ORAL at 06:23

## 2019-06-28 RX ADMIN — ISOSORBIDE DINITRATE 20 MILLIGRAM(S): 5 TABLET ORAL at 14:30

## 2019-06-28 RX ADMIN — LACTULOSE 10 GRAM(S): 10 SOLUTION ORAL at 21:05

## 2019-06-28 RX ADMIN — Medication 1 SPRAY(S): at 21:06

## 2019-06-28 RX ADMIN — CHLORHEXIDINE GLUCONATE 1 APPLICATION(S): 213 SOLUTION TOPICAL at 13:42

## 2019-06-28 RX ADMIN — PANTOPRAZOLE SODIUM 40 MILLIGRAM(S): 20 TABLET, DELAYED RELEASE ORAL at 06:24

## 2019-06-28 RX ADMIN — ATORVASTATIN CALCIUM 80 MILLIGRAM(S): 80 TABLET, FILM COATED ORAL at 21:07

## 2019-06-28 RX ADMIN — Medication 25 MILLIGRAM(S): at 06:46

## 2019-06-28 RX ADMIN — Medication 100 MICROGRAM(S): at 06:23

## 2019-06-28 RX ADMIN — Medication 50 MILLIGRAM(S): at 06:24

## 2019-06-28 RX ADMIN — ISOSORBIDE DINITRATE 20 MILLIGRAM(S): 5 TABLET ORAL at 21:08

## 2019-06-28 RX ADMIN — Medication 50 MILLIGRAM(S): at 17:06

## 2019-06-28 RX ADMIN — MORPHINE SULFATE 1 MILLIGRAM(S): 50 CAPSULE, EXTENDED RELEASE ORAL at 00:22

## 2019-06-28 NOTE — PROGRESS NOTE ADULT - SUBJECTIVE AND OBJECTIVE BOX
[facility] DIVISION OF KIDNEY DISEASES AND HYPERTENSION -- FOLLOW UP NOTE  --------------------------------------------------------------------------------  Chief Complaint:  ESRD on HD    24 hour events/subjective:  Pt seen and examined  NAD  HD today      PAST HISTORY  --------------------------------------------------------------------------------  No significant changes to PMH, PSH, FHx, SHx, unless otherwise noted    ALLERGIES & MEDICATIONS  --------------------------------------------------------------------------------  Allergies    No Known Allergies    Intolerances      Standing Inpatient Medications  amLODIPine   Tablet 10 milliGRAM(s) Oral daily  aspirin  chewable 81 milliGRAM(s) Oral daily  atorvastatin 80 milliGRAM(s) Oral at bedtime  chlorhexidine 2% Cloths 1 Application(s) Topical daily  clopidogrel Tablet 75 milliGRAM(s) Oral daily  docusate sodium 100 milliGRAM(s) Oral two times a day  fluticasone propionate 50 MICROgram(s)/spray Nasal Spray 1 Spray(s) Both Nostrils two times a day  hydrALAZINE 50 milliGRAM(s) Oral every 8 hours  isosorbide   dinitrate Tablet (ISORDIL) 20 milliGRAM(s) Oral three times a day  levothyroxine 100 MICROGram(s) Oral daily  metoprolol tartrate 50 milliGRAM(s) Oral two times a day  pantoprazole    Tablet 40 milliGRAM(s) Oral before breakfast  predniSONE   Tablet 10 milliGRAM(s) Oral daily  sevelamer carbonate 800 milliGRAM(s) Oral three times a day with meals    PRN Inpatient Medications  ALBUTerol/ipratropium for Nebulization 3 milliLiter(s) Nebulizer every 6 hours PRN  diphenhydrAMINE 25 milliGRAM(s) Oral every 6 hours PRN  HYDROmorphone  Injectable 0.5 milliGRAM(s) IV Push four times a day PRN      REVIEW OF SYSTEMS  --------------------------------------------------------------------------------  Gen: No weight changes, fatigue, fevers/chills, weakness  Skin: No rashes  Head/Eyes/Ears/Mouth: No headache; Normal hearing; Normal vision w/o blurriness; No sinus pain/discomfort, sore throat  Respiratory: + RODRIGUEZ, + SOB  CV: No chest pain, PND, orthopnea  GI: No abdominal pain, diarrhea, constipation, nausea, vomiting, melena, hematochezia  : No increased frequency, dysuria, hematuria, nocturia  MSK: No joint pain/swelling; no back pain; no edema  Neuro: No dizziness/lightheadedness, weakness, seizures, numbness, tingling  Heme: No easy bruising or bleeding  Endo: No heat/cold intolerance  Psych: No significant nervousness, anxiety, stress, depression    All other systems were reviewed and are negative, except as noted.    VITALS/PHYSICAL EXAM  --------------------------------------------------------------------------------  T(C): 36.4 (06-28-19 @ 11:55), Max: 36.8 (06-27-19 @ 15:36)  HR: 54 (06-28-19 @ 11:55) (48 - 82)  BP: 127/41 (06-28-19 @ 11:55) (110/50 - 131/50)  RR: 18 (06-28-19 @ 11:55) (18 - 24)  SpO2: 97% (06-28-19 @ 11:55) (91% - 100%)  Wt(kg): --        Physical Exam:  	Gen: NAD, frail, pale    	HEENT: EOMI, neck supple, no JVD  	Pulm: dec BS; fine crackles BL, nasal O2  	CV: RRR, S1S2; no rub  	Back: No spinal or CVA tenderness; no sacral edema  	Abd: +BS, soft, nontender/nondistended  	: No suprapubic tenderness  	UE: Warm, FROM, no clubbing, intact strength; no edema; no asterixis  	LE: Warm, FROM, no clubbing, intact strength; + pedal edema  	Neuro: No focal deficits, intact gait  	Psych: Normal affect and mood  	Skin: Warm, without rashes  	Vascular access:  CVC    LABS/STUDIES  --------------------------------------------------------------------------------              9.5    11.20 >-----------<  205      [06-28-19 @ 05:45]              32.1     139  |  96  |  34.0  ----------------------------<  89      [06-28-19 @ 05:45]  4.0   |  27.0  |  4.79        Ca     8.7     [06-28-19 @ 05:45]          Troponin 0.07      [06-28-19 @ 05:45]    Creatinine Trend:  SCr 4.79 [06-28 @ 05:45]  SCr 4.45 [06-27 @ 22:59]  SCr 3.35 [06-26 @ 09:29]  SCr 6.61 [06-20 @ 08:30]  SCr 4.44 [06-19 @ 08:42]        Iron 58, TIBC 256, %sat 23      [06-19-19 @ 08:42]  Ferritin 812      [06-19-19 @ 08:42]  TSH 0.89      [05-23-19 @ 06:28]  Lipid: chol 159, , HDL 32, LDL 94      [01-29-19 @ 11:39]    HBsAb <3.0      [06-27-19 @ 19:39]  HBsAb Nonreact      [06-27-19 @ 19:39]  HBsAg Reactive      [06-27-19 @ 19:39]  HBcAb Nonreact      [06-27-19 @ 19:39]  HCV 0.10, Nonreact      [06-27-19 @ 19:39]

## 2019-06-28 NOTE — PROGRESS NOTE ADULT - ASSESSMENT
1)ESRD on HD  2) MBD of renal dx  3) Anemia of renal dx  4) Vol HTN    HD today  Check phosphorus; may require binder  Restart epo 10k units TIW; anemia not at goal  1kg UF  Continue current management

## 2019-06-28 NOTE — PROGRESS NOTE ADULT - SUBJECTIVE AND OBJECTIVE BOX
KAUSHIK HOFFMAN  ----------------------------------------  The patient was seen and evaluated for dyspnea.  The patient is in no acute distress.  Denied any chest pain, palpitations, or abdominal pain.  Reported episodes of dyspnea.    Vital Signs Last 24 Hrs  T(C): 36.4 (28 Jun 2019 08:34), Max: 36.8 (27 Jun 2019 15:36)  T(F): 97.6 (28 Jun 2019 08:34), Max: 98.2 (27 Jun 2019 15:36)  HR: 48 (28 Jun 2019 08:34) (48 - 82)  BP: 122/39 (28 Jun 2019 08:34) (110/50 - 131/50)  BP(mean): --  RR: 18 (28 Jun 2019 08:34) (18 - 24)  SpO2: 94% (28 Jun 2019 08:34) (91% - 100%)    PHYSICAL EXAMINATION:  ----------------------------------------  General appearance: No acute distress, Awake, Alert  HEENT: Normocephalic, Atraumatic, Conjunctiva clear, EOMI  Neck: Supple, No JVD, No tenderness  Lungs: Breath sound equal bilaterally, No wheezes, Bilateral rales  Cardiovascular: S1S2, Regular rhythm  Abdomen: Soft, Nontender, Nondistended, No guarding/rebound, Positive bowel sounds  Extremities: No clubbing, No cyanosis, No edema, No calf tenderness  Neuro: Strength equal bilaterally, No tremors  Psychiatric: Appropriate mood, Normal affect    LABORATORY STUDIES:  ----------------------------------------             9.5    11.20 )-----------( 205      ( 28 Jun 2019 05:45 )             32.1     06-28    139  |  96<L>  |  34.0<H>  ----------------------------<  89  4.0   |  27.0  |  4.79<H>    Ca    8.7      28 Jun 2019 05:45    CARDIAC MARKERS ( 28 Jun 2019 05:45 )  x     / 0.07 ng/mL / x     / x     / x      CARDIAC MARKERS ( 27 Jun 2019 22:59 )  x     / 0.06 ng/mL / x     / x     / x        MEDICATIONS  (STANDING):  amLODIPine   Tablet 10 milliGRAM(s) Oral daily  aspirin  chewable 81 milliGRAM(s) Oral daily  atorvastatin 80 milliGRAM(s) Oral at bedtime  chlorhexidine 2% Cloths 1 Application(s) Topical daily  clopidogrel Tablet 75 milliGRAM(s) Oral daily  docusate sodium 100 milliGRAM(s) Oral two times a day  fluticasone propionate 50 MICROgram(s)/spray Nasal Spray 1 Spray(s) Both Nostrils two times a day  hydrALAZINE 50 milliGRAM(s) Oral every 8 hours  isosorbide   dinitrate Tablet (ISORDIL) 20 milliGRAM(s) Oral three times a day  levothyroxine 100 MICROGram(s) Oral daily  metoprolol tartrate 50 milliGRAM(s) Oral two times a day  pantoprazole    Tablet 40 milliGRAM(s) Oral before breakfast  predniSONE   Tablet 10 milliGRAM(s) Oral daily  sevelamer carbonate 800 milliGRAM(s) Oral three times a day with meals    MEDICATIONS  (PRN):  ALBUTerol/ipratropium for Nebulization 3 milliLiter(s) Nebulizer every 6 hours PRN Shortness of Breath and/or Wheezing  diphenhydrAMINE 25 milliGRAM(s) Oral every 6 hours PRN Rash and/or Itching      ASSESSMENT / PLAN:  ----------------------------------------  79M with a history of ESRD, HTN, CAD, and lung cancer presenting with increased dyspnea.    Dyspnea - Most likely multifactorial in nature given history of lung cancer, heart failure, pulmonary fibrosis. On supplemental oxygen. Palliative Care consultation for symptom management. Not a candidate for Hospice due to current hemodialysis.    Chronic diastolic heart failure - On isosorbide and metoprolol.    CAD - Elevated troponin in the setting of renal failure. Prior ischemic evaluation was without significant findings. On aspirin and clopidogrel.    ESRD - Nephrology consultation noted, for hemodialysis.    Lung cancer / Lung fibrosis - On prednisone. Supplemental oxygen for hypoxia.    Hypothyroidism - On levothyroxine. KAUSHIK HOFFMAN  ----------------------------------------  The patient was seen and evaluated for dyspnea.  The patient is in no acute distress.  Denied any chest pain, palpitations, or abdominal pain.  Reported episodes of dyspnea.    Vital Signs Last 24 Hrs  T(C): 36.4 (28 Jun 2019 08:34), Max: 36.8 (27 Jun 2019 15:36)  T(F): 97.6 (28 Jun 2019 08:34), Max: 98.2 (27 Jun 2019 15:36)  HR: 48 (28 Jun 2019 08:34) (48 - 82)  BP: 122/39 (28 Jun 2019 08:34) (110/50 - 131/50)  BP(mean): --  RR: 18 (28 Jun 2019 08:34) (18 - 24)  SpO2: 94% (28 Jun 2019 08:34) (91% - 100%)    PHYSICAL EXAMINATION:  ----------------------------------------  General appearance: No acute distress, Awake, Alert  HEENT: Normocephalic, Atraumatic, Conjunctiva clear, EOMI  Neck: Supple, No JVD, No tenderness  Lungs: Breath sound equal bilaterally, No wheezes, Bilateral rales  Cardiovascular: S1S2, Regular rhythm  Abdomen: Soft, Nontender, Nondistended, No guarding/rebound, Positive bowel sounds  Extremities: No clubbing, No cyanosis, No edema, No calf tenderness  Neuro: Strength equal bilaterally, No tremors  Psychiatric: Appropriate mood, Normal affect    LABORATORY STUDIES:  ----------------------------------------             9.5    11.20 )-----------( 205      ( 28 Jun 2019 05:45 )             32.1     06-28    139  |  96<L>  |  34.0<H>  ----------------------------<  89  4.0   |  27.0  |  4.79<H>    Ca    8.7      28 Jun 2019 05:45    CARDIAC MARKERS ( 28 Jun 2019 05:45 )  x     / 0.07 ng/mL / x     / x     / x      CARDIAC MARKERS ( 27 Jun 2019 22:59 )  x     / 0.06 ng/mL / x     / x     / x        MEDICATIONS  (STANDING):  amLODIPine   Tablet 10 milliGRAM(s) Oral daily  aspirin  chewable 81 milliGRAM(s) Oral daily  atorvastatin 80 milliGRAM(s) Oral at bedtime  chlorhexidine 2% Cloths 1 Application(s) Topical daily  clopidogrel Tablet 75 milliGRAM(s) Oral daily  docusate sodium 100 milliGRAM(s) Oral two times a day  fluticasone propionate 50 MICROgram(s)/spray Nasal Spray 1 Spray(s) Both Nostrils two times a day  hydrALAZINE 50 milliGRAM(s) Oral every 8 hours  isosorbide   dinitrate Tablet (ISORDIL) 20 milliGRAM(s) Oral three times a day  levothyroxine 100 MICROGram(s) Oral daily  metoprolol tartrate 50 milliGRAM(s) Oral two times a day  pantoprazole    Tablet 40 milliGRAM(s) Oral before breakfast  predniSONE   Tablet 10 milliGRAM(s) Oral daily  sevelamer carbonate 800 milliGRAM(s) Oral three times a day with meals    MEDICATIONS  (PRN):  ALBUTerol/ipratropium for Nebulization 3 milliLiter(s) Nebulizer every 6 hours PRN Shortness of Breath and/or Wheezing  diphenhydrAMINE 25 milliGRAM(s) Oral every 6 hours PRN Rash and/or Itching      ASSESSMENT / PLAN:  ----------------------------------------  79M with a history of ESRD, HTN, CAD, and lung cancer presenting with increased dyspnea.    Dyspnea - Most likely multifactorial in nature given history of lung cancer, heart failure, pulmonary fibrosis. On supplemental oxygen. Palliative Care consultation for symptom management. Not a candidate for Hospice due to current hemodialysis. Trial of Dilaudid for air hunger.    Chronic diastolic heart failure - On isosorbide and metoprolol.    CAD - Elevated troponin in the setting of renal failure. Prior ischemic evaluation was without significant findings. On aspirin and clopidogrel.    ESRD - Nephrology consultation noted, for hemodialysis.    Lung cancer / Lung fibrosis - On prednisone. Supplemental oxygen for hypoxia.    Hypothyroidism - On levothyroxine.

## 2019-06-29 PROCEDURE — 99233 SBSQ HOSP IP/OBS HIGH 50: CPT

## 2019-06-29 RX ORDER — METOPROLOL TARTRATE 50 MG
25 TABLET ORAL
Refills: 0 | Status: DISCONTINUED | OUTPATIENT
Start: 2019-06-29 | End: 2019-07-09

## 2019-06-29 RX ORDER — ISOSORBIDE DINITRATE 5 MG/1
20 TABLET ORAL THREE TIMES A DAY
Refills: 0 | Status: DISCONTINUED | OUTPATIENT
Start: 2019-06-29 | End: 2019-07-10

## 2019-06-29 RX ORDER — AMLODIPINE BESYLATE 2.5 MG/1
5 TABLET ORAL DAILY
Refills: 0 | Status: DISCONTINUED | OUTPATIENT
Start: 2019-06-30 | End: 2019-07-09

## 2019-06-29 RX ORDER — HYDRALAZINE HCL 50 MG
50 TABLET ORAL EVERY 8 HOURS
Refills: 0 | Status: DISCONTINUED | OUTPATIENT
Start: 2019-06-29 | End: 2019-07-10

## 2019-06-29 RX ADMIN — Medication 10 MILLIGRAM(S): at 05:59

## 2019-06-29 RX ADMIN — Medication 50 MILLIGRAM(S): at 21:34

## 2019-06-29 RX ADMIN — Medication 81 MILLIGRAM(S): at 13:48

## 2019-06-29 RX ADMIN — Medication 100 MILLIGRAM(S): at 05:27

## 2019-06-29 RX ADMIN — SEVELAMER CARBONATE 800 MILLIGRAM(S): 2400 POWDER, FOR SUSPENSION ORAL at 13:48

## 2019-06-29 RX ADMIN — Medication 10 MILLIGRAM(S): at 05:27

## 2019-06-29 RX ADMIN — SENNA PLUS 2 TABLET(S): 8.6 TABLET ORAL at 21:33

## 2019-06-29 RX ADMIN — Medication 100 MICROGRAM(S): at 05:27

## 2019-06-29 RX ADMIN — Medication 50 MILLIGRAM(S): at 05:30

## 2019-06-29 RX ADMIN — AMLODIPINE BESYLATE 10 MILLIGRAM(S): 2.5 TABLET ORAL at 05:28

## 2019-06-29 RX ADMIN — CHLORHEXIDINE GLUCONATE 1 APPLICATION(S): 213 SOLUTION TOPICAL at 13:48

## 2019-06-29 RX ADMIN — ISOSORBIDE DINITRATE 20 MILLIGRAM(S): 5 TABLET ORAL at 21:33

## 2019-06-29 RX ADMIN — ATORVASTATIN CALCIUM 80 MILLIGRAM(S): 80 TABLET, FILM COATED ORAL at 21:34

## 2019-06-29 RX ADMIN — SEVELAMER CARBONATE 800 MILLIGRAM(S): 2400 POWDER, FOR SUSPENSION ORAL at 08:54

## 2019-06-29 RX ADMIN — PANTOPRAZOLE SODIUM 40 MILLIGRAM(S): 20 TABLET, DELAYED RELEASE ORAL at 05:27

## 2019-06-29 RX ADMIN — Medication 1 SPRAY(S): at 05:27

## 2019-06-29 RX ADMIN — SEVELAMER CARBONATE 800 MILLIGRAM(S): 2400 POWDER, FOR SUSPENSION ORAL at 17:32

## 2019-06-29 RX ADMIN — CLOPIDOGREL BISULFATE 75 MILLIGRAM(S): 75 TABLET, FILM COATED ORAL at 13:48

## 2019-06-29 RX ADMIN — ISOSORBIDE DINITRATE 20 MILLIGRAM(S): 5 TABLET ORAL at 05:28

## 2019-06-29 RX ADMIN — Medication 1 SPRAY(S): at 17:31

## 2019-06-29 RX ADMIN — Medication 50 MILLIGRAM(S): at 05:28

## 2019-06-29 NOTE — PROGRESS NOTE ADULT - ASSESSMENT
ASSESSMENT / PLAN:  ----------------------------------------  79M with a history of ESRD, HTN, CAD, and lung cancer presenting with increased dyspnea.    Dyspnea - Most likely multifactorial in nature given history of lung cancer, heart failure, pulmonary fibrosis. On supplemental oxygen. Palliative Care consultation for symptom management. Not a candidate for Hospice due to current hemodialysis.   - Trial of Dilaudid for air hunger started on 6/28 - did not receive as yet - appeared comfortable from respiratory perspective this morning    Hypotension - resolved  - amlodipine and metoprolol dosing and added hold parameters to all BP meds    Chronic diastolic heart failure - Stable  - on isosorbide and metoprolol.    CAD - Stable   - elevated troponin in the setting of renal failure. Prior ischemic evaluation was without significant findings. On aspirin and clopidogrel.    ESRD - Nephrology consultation noted, for hemodialysis.    Lung cancer / Lung fibrosis - On prednisone. Supplemental oxygen for hypoxia.    Hypothyroidism - On levothyroxine. VIEW ALL

## 2019-06-29 NOTE — PROGRESS NOTE ADULT - SUBJECTIVE AND OBJECTIVE BOX
[facility] DIVISION OF KIDNEY DISEASES AND HYPERTENSION -- FOLLOW UP NOTE  --------------------------------------------------------------------------------  Chief Complaint:  ESRD on HD    24 hour events/subjective:  Pt seen and examined  NAD , In Bed, On Oxygen,     PAST HISTORY  --------------------------------------------------------------------------------  No significant changes to PMH, PSH, FHx, SHx, unless otherwise noted    ALLERGIES & MEDICATIONS  --------------------------------------------------------------------------------  Allergies    No Known Allergies    Standing Inpatient Medications  amLODIPine   Tablet 10 milliGRAM(s) Oral daily  aspirin  chewable 81 milliGRAM(s) Oral daily  atorvastatin 80 milliGRAM(s) Oral at bedtime  chlorhexidine 2% Cloths 1 Application(s) Topical daily  clopidogrel Tablet 75 milliGRAM(s) Oral daily  docusate sodium 100 milliGRAM(s) Oral two times a day  fluticasone propionate 50 MICROgram(s)/spray Nasal Spray 1 Spray(s) Both Nostrils two times a day  hydrALAZINE 50 milliGRAM(s) Oral every 8 hours  isosorbide   dinitrate Tablet (ISORDIL) 20 milliGRAM(s) Oral three times a day  levothyroxine 100 MICROGram(s) Oral daily  metoprolol tartrate 50 milliGRAM(s) Oral two times a day  pantoprazole    Tablet 40 milliGRAM(s) Oral before breakfast  predniSONE   Tablet 10 milliGRAM(s) Oral daily  sevelamer carbonate 800 milliGRAM(s) Oral three times a day with meals    PRN Inpatient Medications  ALBUTerol/ipratropium for Nebulization 3 milliLiter(s) Nebulizer every 6 hours PRN  diphenhydrAMINE 25 milliGRAM(s) Oral every 6 hours PRN  HYDROmorphone  Injectable 0.5 milliGRAM(s) IV Push four times a day PRN    REVIEW OF SYSTEMS  --------------------------------------------------------------------------------  Gen: +  weight changes, fatigue, No  fevers/chills, weakness  Skin: No rashes  Head/Eyes/Ears/Mouth: No headache; Normal hearing; Normal vision w/o blurriness; No sinus pain/discomfort, sore throat  Respiratory: + RODRIGUEZ, + SOB  CV: No chest pain, PND, orthopnea  GI: No abdominal pain, diarrhea, constipation, nausea, vomiting, melena, hematochezia  : Anuric,   MSK: No joint pain/swelling; no back pain; no edema  Neuro: No dizziness/lightheadedness, weakness, seizures, numbness, tingling  Heme: No easy bruising or bleeding  Endo: No heat/cold intolerance  Psych: No significant nervousness, anxiety, stress, depression    All other systems were reviewed and are negative, except as noted.    VITALS/PHYSICAL EXAM  --------------------------------------------------------------------------------  T(C): 36.4 (06-28-19 @ 11:55), Max: 36.8 (06-27-19 @ 15:36)  HR: 54 (06-28-19 @ 11:55) (48 - 82)  BP: 127/41 (06-28-19 @ 11:55) (110/50 - 131/50)  RR: 18 (06-28-19 @ 11:55) (18 - 24)  SpO2: 97% (06-28-19 @ 11:55) (91% - 100%)    Physical Exam:  	Gen: NAD, frail, pale    	HEENT: EOMI, neck supple, no JVD  	Pulm: dec BS; fine crackles BL, nasal O2  	CV: RRR, S1S2; no rub  	Back: No spinal or CVA tenderness; no sacral edema  	Abd: +BS, soft, nontender/nondistended  	: No suprapubic tenderness  	UE: Warm, FROM, no clubbing, intact strength; no edema; no asterixis  	LE: Warm, FROM, no clubbing, intact strength; + pedal edema  	Neuro: No focal deficits,   	Psych: Flat  affect and mood  	Skin: Warm, without rashes  	Vascular access:  CVC    LABS/STUDIES  --------------------------------------------------------------------------------              9.5    11.20 >-----------<  205      [06-28-19 @ 05:45]              32.1     139  |  96  |  34.0  ----------------------------<  89      [06-28-19 @ 05:45]  4.0   |  27.0  |  4.79        Ca     8.7     [06-28-19 @ 05:45]    Troponin 0.07      [06-28-19 @ 05:45]    Creatinine Trend:  SCr 4.79 [06-28 @ 05:45]  SCr 4.45 [06-27 @ 22:59]  SCr 3.35 [06-26 @ 09:29]  SCr 6.61 [06-20 @ 08:30]  SCr 4.44 [06-19 @ 08:42]    Iron 58, TIBC 256, %sat 23      [06-19-19 @ 08:42]  Ferritin 812      [06-19-19 @ 08:42]  TSH 0.89      [05-23-19 @ 06:28]  Lipid: chol 159, , HDL 32, LDL 94      [01-29-19 @ 11:39]    HBsAb <3.0      [06-27-19 @ 19:39]  HBsAb Nonreact      [06-27-19 @ 19:39]  HBsAg Reactive      [06-27-19 @ 19:39]  HBcAb Nonreact      [06-27-19 @ 19:39]  HCV 0.10, Nonreact      [06-27-19 @ 19:39]    Assessment and Plan:   1)ESRD on HD  2) MBD of renal dx  3) Anemia of renal dx  4) Vol HTN    Continue current management    HD on Monday,     Poor Prognosis,

## 2019-06-29 NOTE — PROGRESS NOTE ADULT - SUBJECTIVE AND OBJECTIVE BOX
CC:   HPI:  79M presented with increasing dyspnea. He denied any associated chest pain or palpitations. He did not have a productive cough or fevers at home. The patient noted that he is on supplemental oxygen at home and has been compliant with his medications. He has also had his scheduled hemodialysis sessions without issue. The patient reported that the symptoms are similar to what he felt on prior admission and most recently last week when he was admitted to the hospital. He is unaware of any aggravating or relieving factors except that the dyspnea is worse with exertion. (26 Jun 2019 15:50)    REVIEW OF SYSTEMS:    Patient denied fever, chills, abdominal pain, nausea, vomiting, cough, shortness of breath, chest pain or palpitations    Vital Signs Last 24 Hrs  T(C): 36.5 (29 Jun 2019 09:24), Max: 36.6 (28 Jun 2019 21:14)  T(F): 97.7 (29 Jun 2019 09:24), Max: 97.8 (28 Jun 2019 21:14)  HR: 48 (29 Jun 2019 09:24) (48 - 60)  BP: 97/54 (29 Jun 2019 13:46) (94/34 - 133/57)  BP(mean): --  RR: 18 (29 Jun 2019 09:24) (18 - 18)  SpO2: 95% (29 Jun 2019 09:24) (95% - 98%)I&O's Summary    28 Jun 2019 07:01  -  29 Jun 2019 07:00  --------------------------------------------------------  IN: 800 mL / OUT: 2300 mL / NET: -1500 mL    CAPILLARY BLOOD GLUCOSE    PHYSICAL EXAM:  GENERAL: NAD, well-groomed  HEENT: PERRL, +EOMI, anicteric, no [place]  NECK: Supple, No JVD   CHEST/LUNG: CTA bilaterally; Normal effort  HEART: S1S2 Normal intensity, no murmurs, gallops or rubs noted  ABDOMEN: Soft, BS Normoactive, NT, ND, no HSM noted  EXTREMITIES:  2+ radial and DP pulses noted, no clubbing, cyanosis, or edema noted, FROM x 4  SKIN: No rashes or lesions noted  NEURO: A&Ox3, no focal deficits noted, CN II-XII intact  PSYCH: normal mood and affect; insight/judgement appropriate  LABS:                        9.5    11.20 )-----------( 205      ( 28 Jun 2019 05:45 )             32.1     06-28    139  |  96<L>  |  34.0<H>  ----------------------------<  89  4.0   |  27.0  |  4.79<H>    Ca    8.7      28 Jun 2019 05:45          RADIOLOGY & ADDITIONAL TESTS:    MEDICATIONS:  MEDICATIONS  (STANDING):  aspirin  chewable 81 milliGRAM(s) Oral daily  atorvastatin 80 milliGRAM(s) Oral at bedtime  chlorhexidine 2% Cloths 1 Application(s) Topical daily  clopidogrel Tablet 75 milliGRAM(s) Oral daily  docusate sodium 100 milliGRAM(s) Oral two times a day  fluticasone propionate 50 MICROgram(s)/spray Nasal Spray 1 Spray(s) Both Nostrils two times a day  hydrALAZINE 50 milliGRAM(s) Oral every 8 hours  isosorbide   dinitrate Tablet (ISORDIL) 20 milliGRAM(s) Oral three times a day  levothyroxine 100 MICROGram(s) Oral daily  metoprolol tartrate 25 milliGRAM(s) Oral two times a day  pantoprazole    Tablet 40 milliGRAM(s) Oral before breakfast  predniSONE   Tablet 10 milliGRAM(s) Oral daily  senna 2 Tablet(s) Oral at bedtime  sevelamer carbonate 800 milliGRAM(s) Oral three times a day with meals    MEDICATIONS  (PRN):  ALBUTerol/ipratropium for Nebulization 3 milliLiter(s) Nebulizer every 6 hours PRN Shortness of Breath and/or Wheezing  bisacodyl Suppository 10 milliGRAM(s) Rectal daily PRN Constipation  diphenhydrAMINE 25 milliGRAM(s) Oral every 6 hours PRN Rash and/or Itching  HYDROmorphone  Injectable 0.5 milliGRAM(s) IV Push four times a day PRN Dyspnea CC: Patient is lethargic, unable to provide history    HPI:  79M presented with increasing dyspnea. He denied any associated chest pain or palpitations. He did not have a productive cough or fevers at home. The patient noted that he is on supplemental oxygen at home and has been compliant with his medications. He has also had his scheduled hemodialysis sessions without issue. The patient reported that the symptoms are similar to what he felt on prior admission and most recently last week when he was admitted to the hospital. He is unaware of any aggravating or relieving factors except that the dyspnea is worse with exertion. (26 Jun 2019 15:50)    REVIEW OF SYSTEMS:    Unable    Vital Signs Last 24 Hrs  T(C): 36.5 (29 Jun 2019 09:24), Max: 36.6 (28 Jun 2019 21:14)  T(F): 97.7 (29 Jun 2019 09:24), Max: 97.8 (28 Jun 2019 21:14)  HR: 48 (29 Jun 2019 09:24) (48 - 60)  BP: 97/54 (29 Jun 2019 13:46) (94/34 - 133/57)  BP(mean): --  RR: 18 (29 Jun 2019 09:24) (18 - 18)  SpO2: 95% (29 Jun 2019 09:24) (95% - 98%)I&O's Summary    28 Jun 2019 07:01  -  29 Jun 2019 07:00  --------------------------------------------------------  IN: 800 mL / OUT: 2300 mL / NET: -1500 mL    CAPILLARY BLOOD GLUCOSE    PHYSICAL EXAM:  GENERAL: NAD  HEENT: +EOMI, anicteric  NECK: No JVD   CHEST/LUNG: diminished breath sounds bilaterally; + diffuse crackles bilaterally; Normal effort  HEART: S1S2 decreasedintensity, no murmurs, gallops or rubs noted  ABDOMEN: Soft, BS Normoactive, NT, ND, no HSM noted  EXTREMITIES:  diminished pulses noted, no clubbing, cyanosis, or edema noted, limited ROM   SKIN: No rashes or lesions noted  NEURO: Opens eyes and nods head yes or no, but not accurately - unreliable  PSYCH: flat affect; insight/judgement - unable  LABS:                        9.5    11.20 )-----------( 205      ( 28 Jun 2019 05:45 )             32.1     06-28    139  |  96<L>  |  34.0<H>  ----------------------------<  89  4.0   |  27.0  |  4.79<H>    Ca    8.7      28 Jun 2019 05:45          RADIOLOGY & ADDITIONAL TESTS:    MEDICATIONS:  MEDICATIONS  (STANDING):  aspirin  chewable 81 milliGRAM(s) Oral daily  atorvastatin 80 milliGRAM(s) Oral at bedtime  chlorhexidine 2% Cloths 1 Application(s) Topical daily  clopidogrel Tablet 75 milliGRAM(s) Oral daily  docusate sodium 100 milliGRAM(s) Oral two times a day  fluticasone propionate 50 MICROgram(s)/spray Nasal Spray 1 Spray(s) Both Nostrils two times a day  hydrALAZINE 50 milliGRAM(s) Oral every 8 hours  isosorbide   dinitrate Tablet (ISORDIL) 20 milliGRAM(s) Oral three times a day  levothyroxine 100 MICROGram(s) Oral daily  metoprolol tartrate 25 milliGRAM(s) Oral two times a day  pantoprazole    Tablet 40 milliGRAM(s) Oral before breakfast  predniSONE   Tablet 10 milliGRAM(s) Oral daily  senna 2 Tablet(s) Oral at bedtime  sevelamer carbonate 800 milliGRAM(s) Oral three times a day with meals    MEDICATIONS  (PRN):  ALBUTerol/ipratropium for Nebulization 3 milliLiter(s) Nebulizer every 6 hours PRN Shortness of Breath and/or Wheezing  bisacodyl Suppository 10 milliGRAM(s) Rectal daily PRN Constipation  diphenhydrAMINE 25 milliGRAM(s) Oral every 6 hours PRN Rash and/or Itching  HYDROmorphone  Injectable 0.5 milliGRAM(s) IV Push four times a day PRN Dyspnea

## 2019-06-30 LAB
ALBUMIN SERPL ELPH-MCNC: 3.7 G/DL — SIGNIFICANT CHANGE UP (ref 3.3–5.2)
ANION GAP SERPL CALC-SCNC: 14 MMOL/L — SIGNIFICANT CHANGE UP (ref 5–17)
BUN SERPL-MCNC: 48 MG/DL — HIGH (ref 8–20)
CALCIUM SERPL-MCNC: 8.7 MG/DL — SIGNIFICANT CHANGE UP (ref 8.6–10.2)
CHLORIDE SERPL-SCNC: 100 MMOL/L — SIGNIFICANT CHANGE UP (ref 98–107)
CO2 SERPL-SCNC: 25 MMOL/L — SIGNIFICANT CHANGE UP (ref 22–29)
CREAT SERPL-MCNC: 6.68 MG/DL — HIGH (ref 0.5–1.3)
GLUCOSE SERPL-MCNC: 119 MG/DL — HIGH (ref 70–115)
PHOSPHATE SERPL-MCNC: 4.3 MG/DL — SIGNIFICANT CHANGE UP (ref 2.4–4.7)
POTASSIUM SERPL-MCNC: 4.9 MMOL/L — SIGNIFICANT CHANGE UP (ref 3.5–5.3)
POTASSIUM SERPL-SCNC: 4.9 MMOL/L — SIGNIFICANT CHANGE UP (ref 3.5–5.3)
SODIUM SERPL-SCNC: 139 MMOL/L — SIGNIFICANT CHANGE UP (ref 135–145)

## 2019-06-30 PROCEDURE — 99233 SBSQ HOSP IP/OBS HIGH 50: CPT

## 2019-06-30 PROCEDURE — 99232 SBSQ HOSP IP/OBS MODERATE 35: CPT

## 2019-06-30 RX ORDER — HYDROXYZINE HCL 10 MG
25 TABLET ORAL EVERY 6 HOURS
Refills: 0 | Status: DISCONTINUED | OUTPATIENT
Start: 2019-06-30 | End: 2019-07-10

## 2019-06-30 RX ADMIN — SEVELAMER CARBONATE 800 MILLIGRAM(S): 2400 POWDER, FOR SUSPENSION ORAL at 16:58

## 2019-06-30 RX ADMIN — Medication 3 MILLILITER(S): at 15:25

## 2019-06-30 RX ADMIN — Medication 1 SPRAY(S): at 17:00

## 2019-06-30 RX ADMIN — ISOSORBIDE DINITRATE 20 MILLIGRAM(S): 5 TABLET ORAL at 21:44

## 2019-06-30 RX ADMIN — Medication 1 SPRAY(S): at 06:34

## 2019-06-30 RX ADMIN — SEVELAMER CARBONATE 800 MILLIGRAM(S): 2400 POWDER, FOR SUSPENSION ORAL at 11:32

## 2019-06-30 RX ADMIN — HYDROMORPHONE HYDROCHLORIDE 0.5 MILLIGRAM(S): 2 INJECTION INTRAMUSCULAR; INTRAVENOUS; SUBCUTANEOUS at 05:05

## 2019-06-30 RX ADMIN — Medication 100 MILLIGRAM(S): at 06:34

## 2019-06-30 RX ADMIN — PANTOPRAZOLE SODIUM 40 MILLIGRAM(S): 20 TABLET, DELAYED RELEASE ORAL at 06:34

## 2019-06-30 RX ADMIN — Medication 81 MILLIGRAM(S): at 11:34

## 2019-06-30 RX ADMIN — Medication 50 MILLIGRAM(S): at 06:35

## 2019-06-30 RX ADMIN — Medication 50 MILLIGRAM(S): at 21:44

## 2019-06-30 RX ADMIN — Medication 100 MICROGRAM(S): at 06:35

## 2019-06-30 RX ADMIN — ISOSORBIDE DINITRATE 20 MILLIGRAM(S): 5 TABLET ORAL at 06:34

## 2019-06-30 RX ADMIN — Medication 100 MILLIGRAM(S): at 17:01

## 2019-06-30 RX ADMIN — SEVELAMER CARBONATE 800 MILLIGRAM(S): 2400 POWDER, FOR SUSPENSION ORAL at 11:34

## 2019-06-30 RX ADMIN — CLOPIDOGREL BISULFATE 75 MILLIGRAM(S): 75 TABLET, FILM COATED ORAL at 11:33

## 2019-06-30 RX ADMIN — SENNA PLUS 2 TABLET(S): 8.6 TABLET ORAL at 21:44

## 2019-06-30 RX ADMIN — CHLORHEXIDINE GLUCONATE 1 APPLICATION(S): 213 SOLUTION TOPICAL at 11:34

## 2019-06-30 RX ADMIN — Medication 3 MILLILITER(S): at 02:40

## 2019-06-30 RX ADMIN — AMLODIPINE BESYLATE 5 MILLIGRAM(S): 2.5 TABLET ORAL at 06:36

## 2019-06-30 RX ADMIN — HYDROMORPHONE HYDROCHLORIDE 0.5 MILLIGRAM(S): 2 INJECTION INTRAMUSCULAR; INTRAVENOUS; SUBCUTANEOUS at 04:47

## 2019-06-30 RX ADMIN — Medication 25 MILLIGRAM(S): at 17:01

## 2019-06-30 RX ADMIN — Medication 25 MILLIGRAM(S): at 01:11

## 2019-06-30 RX ADMIN — ATORVASTATIN CALCIUM 80 MILLIGRAM(S): 80 TABLET, FILM COATED ORAL at 21:44

## 2019-06-30 RX ADMIN — Medication 10 MILLIGRAM(S): at 06:35

## 2019-06-30 NOTE — PROGRESS NOTE ADULT - SUBJECTIVE AND OBJECTIVE BOX
CC: Patient more awake today.  Noted that his breathing is the same as before with difficulty breathing on a continuous basis    HPI:  79M presented with increasing dyspnea. He denied any associated chest pain or palpitations. He did not have a productive cough or fevers at home. The patient noted that he is on supplemental oxygen at home and has been compliant with his medications. He has also had his scheduled hemodialysis sessions without issue. The patient reported that the symptoms are similar to what he felt on prior admission and most recently last week when he was admitted to the hospital. He is unaware of any aggravating or relieving factors except that the dyspnea is worse with exertion. (26 Jun 2019 15:50)    REVIEW OF SYSTEMS:    Patient denied fever, chills, abdominal pain, nausea, vomiting, chest pain or palpitations    Vital Signs Last 24 Hrs  T(C): 36.7 (30 Jun 2019 09:44), Max: 37.1 (29 Jun 2019 16:35)  T(F): 98 (30 Jun 2019 09:44), Max: 98.7 (29 Jun 2019 16:35)  HR: 57 (30 Jun 2019 09:44) (52 - 71)  BP: 138/54 (30 Jun 2019 09:44) (97/54 - 141/61)  BP(mean): --  RR: 18 (30 Jun 2019 09:44) (18 - 18)  SpO2: 98% (30 Jun 2019 09:44) (94% - 98%)I&O's Summary    29 Jun 2019 07:01  -  30 Jun 2019 07:00  --------------------------------------------------------  IN: 200 mL / OUT: 0 mL / NET: 200 mL    CAPILLARY BLOOD GLUCOSE    PHYSICAL EXAM:  GENERAL: NAD, well-groomed  HEENT: PERRL, +EOMI, anicteric, no [place]  NECK: Supple, No JVD   CHEST/LUNG: diffuse rhonchi bilaterally; increased effort  HEART: S1S2 decreased intensity, no murmurs, gallops or rubs noted  ABDOMEN: Soft, BS Normoactive, NT, ND, no HSM noted  EXTREMITIES:  decreased pulses noted, no clubbing, cyanosis, or edema noted, FROM x 4  SKIN: No rashes or lesions noted  NEURO: A&Ox3, no focal deficits noted, CN II-XII intact  PSYCH: anxious mood and flat affect; insight/judgement fair    LABS:     RADIOLOGY & ADDITIONAL TESTS:    MEDICATIONS:  MEDICATIONS  (STANDING):  amLODIPine   Tablet 5 milliGRAM(s) Oral daily  aspirin  chewable 81 milliGRAM(s) Oral daily  atorvastatin 80 milliGRAM(s) Oral at bedtime  chlorhexidine 2% Cloths 1 Application(s) Topical daily  clopidogrel Tablet 75 milliGRAM(s) Oral daily  docusate sodium 100 milliGRAM(s) Oral two times a day  fluticasone propionate 50 MICROgram(s)/spray Nasal Spray 1 Spray(s) Both Nostrils two times a day  hydrALAZINE 50 milliGRAM(s) Oral every 8 hours  isosorbide   dinitrate Tablet (ISORDIL) 20 milliGRAM(s) Oral three times a day  levothyroxine 100 MICROGram(s) Oral daily  metoprolol tartrate 25 milliGRAM(s) Oral two times a day  pantoprazole    Tablet 40 milliGRAM(s) Oral before breakfast  predniSONE   Tablet 10 milliGRAM(s) Oral daily  senna 2 Tablet(s) Oral at bedtime  sevelamer carbonate 800 milliGRAM(s) Oral three times a day with meals    MEDICATIONS  (PRN):  ALBUTerol/ipratropium for Nebulization 3 milliLiter(s) Nebulizer every 6 hours PRN Shortness of Breath and/or Wheezing  bisacodyl Suppository 10 milliGRAM(s) Rectal daily PRN Constipation  HYDROmorphone  Injectable 0.5 milliGRAM(s) IV Push four times a day PRN Dyspnea  hydrOXYzine hydrochloride 25 milliGRAM(s) Oral every 6 hours PRN Itching

## 2019-06-30 NOTE — PROGRESS NOTE ADULT - ASSESSMENT
ASSESSMENT / PLAN:  ----------------------------------------  79M with a history of ESRD, HTN, CAD, and lung cancer presenting with increased dyspnea.    Dyspnea - Most likely multifactorial in nature given history of lung cancer, heart failure, pulmonary fibrosis. On supplemental oxygen.    - Trial of Dilaudid for air hunger started on 6/28 - received one dose overnight with some symptom improvement  - appeared uncomfortable from respiratory perspective this morning  - Palliative Care consultation for symptom management - pending.   - Not a candidate for Hospice due to current hemodialysis.    Hypotension - resolved  - amlodipine and metoprolol dosing and added hold parameters to all BP meds    Chronic diastolic heart failure - Stable  - on isosorbide and metoprolol.    CAD - Stable   - elevated troponin in the setting of renal failure. Prior ischemic evaluation was without significant findings. On aspirin and clopidogrel.    ESRD - Nephrology consultation noted, for hemodialysis.    Lung cancer / Lung fibrosis - On prednisone. Supplemental oxygen for hypoxia.    Hypothyroidism - On levothyroxine. ASSESSMENT / PLAN:  ----------------------------------------  79M with a history of ESRD, HTN, CAD, and lung cancer presenting with increased dyspnea.    Dyspnea - Most likely multifactorial in nature given history of lung cancer, heart failure, pulmonary fibrosis. On supplemental oxygen.    - Trial of Dilaudid for air hunger started on 6/28 - received one dose overnight with some symptom improvement  - appeared uncomfortable from respiratory perspective this morning  - Palliative Care consultation for symptom management - pending.   - Not a candidate for Hospice due to current hemodialysis.  - prognosis poor    Hypotension - resolved  - amlodipine and metoprolol dosing and added hold parameters to all BP meds    Chronic diastolic heart failure - Stable  - on isosorbide and metoprolol.    CAD - Stable   - elevated troponin in the setting of renal failure. Prior ischemic evaluation was without significant findings. On aspirin and clopidogrel.    ESRD - Nephrology consultation noted, for hemodialysis.    Lung cancer / Lung fibrosis - On prednisone. Supplemental oxygen for hypoxia.    Hypothyroidism - On levothyroxine. ASSESSMENT / PLAN:  ----------------------------------------  79M with a history of ESRD, HTN, CAD, and lung cancer presenting with increased dyspnea.    Dyspnea - Most likely multifactorial in nature given history of lung cancer, heart failure, pulmonary fibrosis. On supplemental oxygen.    - Trial of Dilaudid for air hunger started on 6/28 - received one dose overnight with some symptom improvement  - appeared uncomfortable from respiratory perspective this morning  - Palliative Care consultation for symptom management - pending.   - Not a candidate for Hospice due to current hemodialysis.  - prognosis poor    Hypotension - resolved  - amlodipine and metoprolol dosing and added hold parameters to all BP meds    Chronic diastolic heart failure - Stable  - on isosorbide and metoprolol.    CAD - Stable   - elevated troponin in the setting of renal failure. Prior ischemic evaluation was without significant findings. On aspirin and clopidogrel.    ESRD - Nephrology consultation noted, for hemodialysis.    Lung cancer / Lung fibrosis - On prednisone. Supplemental oxygen for hypoxia.    Hypothyroidism - On levothyroxine.    DC Planning - this week with assistance from inpatient palliative care as well as outpatient pall care follow up

## 2019-06-30 NOTE — PROGRESS NOTE ADULT - SUBJECTIVE AND OBJECTIVE BOX
Progress Note:     CC: Patient more awake today.  Noted that his breathing is the same as before with difficulty breathing on a continuous basis    HPI:  79M presented with increasing dyspnea. He denied any associated chest pain or palpitations. He did not have a productive cough or fevers at home. The patient noted that he is on supplemental oxygen at home and has been compliant with his medications. He has also had his scheduled hemodialysis sessions without issue. The patient reported that the symptoms are similar to what he felt on prior admission and most recently last week when he was admitted to the hospital. He is unaware of any aggravating or relieving factors except that the dyspnea is worse with exertion. (26 Jun 2019 15:50)    REVIEW OF SYSTEMS:    Patient denied fever, chills, abdominal pain, nausea, vomiting, chest pain or palpitations    Vital Signs Last 24 Hrs  T(C): 36.7 (30 Jun 2019 09:44), Max: 37.1 (29 Jun 2019 16:35)  T(F): 98 (30 Jun 2019 09:44), Max: 98.7 (29 Jun 2019 16:35)  HR: 57 (30 Jun 2019 09:44) (52 - 71)  BP: 138/54 (30 Jun 2019 09:44) (97/54 - 141/61)  BP(mean): --  RR: 18 (30 Jun 2019 09:44) (18 - 18)  SpO2: 98% (30 Jun 2019 09:44) (94% - 98%)I&O's Summary    29 Jun 2019 07:01  -  30 Jun 2019 07:00  --------------------------------------------------------  IN: 200 mL / OUT: 0 mL / NET: 200 mL    CAPILLARY BLOOD GLUCOSE    PHYSICAL EXAM:  GENERAL: NAD, well-groomed  HEENT: PERRL, +EOMI, anicteric, no [place]  NECK: Supple, No JVD   CHEST/LUNG: diffuse rhonchi bilaterally; increased effort  HEART: S1S2 decreased intensity, no murmurs, gallops or rubs noted  ABDOMEN: Soft, BS Normoactive, NT, ND, no HSM noted  EXTREMITIES:  decreased pulses noted, no clubbing, cyanosis, or edema noted, FROM x 4  SKIN: No rashes or lesions noted  NEURO: A&Ox3, no focal deficits noted, CN II-XII intact  PSYCH: anxious mood and flat affect; insight/judgement fair    MEDICATIONS  (STANDING):  amLODIPine   Tablet 5 milliGRAM(s) Oral daily  aspirin  chewable 81 milliGRAM(s) Oral daily  atorvastatin 80 milliGRAM(s) Oral at bedtime  chlorhexidine 2% Cloths 1 Application(s) Topical daily  clopidogrel Tablet 75 milliGRAM(s) Oral daily  docusate sodium 100 milliGRAM(s) Oral two times a day  fluticasone propionate 50 MICROgram(s)/spray Nasal Spray 1 Spray(s) Both Nostrils two times a day  hydrALAZINE 50 milliGRAM(s) Oral every 8 hours  isosorbide   dinitrate Tablet (ISORDIL) 20 milliGRAM(s) Oral three times a day  levothyroxine 100 MICROGram(s) Oral daily  metoprolol tartrate 25 milliGRAM(s) Oral two times a day  pantoprazole    Tablet 40 milliGRAM(s) Oral before breakfast  predniSONE   Tablet 10 milliGRAM(s) Oral daily  senna 2 Tablet(s) Oral at bedtime  sevelamer carbonate 800 milliGRAM(s) Oral three times a day with meals    MEDICATIONS  (PRN):  ALBUTerol/ipratropium for Nebulization 3 milliLiter(s) Nebulizer every 6 hours PRN Shortness of Breath and/or Wheezing  bisacodyl Suppository 10 milliGRAM(s) Rectal daily PRN Constipation  HYDROmorphone  Injectable 0.5 milliGRAM(s) IV Push four times a day PRN Dyspnea  hydrOXYzine hydrochloride 25 milliGRAM(s) Oral every 6 hours PRN Itching    ASSESSMENT / PLAN:  ----------------------------------------  79M with a history of ESRD, HTN, CAD, and lung cancer presenting with increased dyspnea.    Dyspnea - Most likely multifactorial in nature given history of lung cancer, heart failure, pulmonary fibrosis. On supplemental oxygen.    - Trial of Dilaudid for air hunger started on 6/28 - received one dose overnight with some symptom improvement  - appeared uncomfortable from respiratory perspective this morning  - Palliative Care consultation for symptom management - pending.   - Not a candidate for Hospice due to current hemodialysis.  - prognosis poor    Hypotension - resolved  - amlodipine and metoprolol dosing and added hold parameters to all BP meds    Chronic diastolic heart failure - Stable  - on isosorbide and metoprolol.    CAD - Stable   - elevated troponin in the setting of renal failure. Prior ischemic evaluation was without significant findings. On aspirin and clopidogrel.    ESRD - Hemodialysis.    Lung cancer / Lung fibrosis - On prednisone. Supplemental oxygen for hypoxia.    Hypothyroidism - On levothyroxine.    HD in AM,

## 2019-07-01 ENCOUNTER — TRANSCRIPTION ENCOUNTER (OUTPATIENT)
Age: 79
End: 2019-07-01

## 2019-07-01 LAB
ANION GAP SERPL CALC-SCNC: 15 MMOL/L — SIGNIFICANT CHANGE UP (ref 5–17)
BUN SERPL-MCNC: 57 MG/DL — HIGH (ref 8–20)
CALCIUM SERPL-MCNC: 8.1 MG/DL — LOW (ref 8.4–10.5)
CALCIUM SERPL-MCNC: 8.6 MG/DL — SIGNIFICANT CHANGE UP (ref 8.6–10.2)
CHLORIDE SERPL-SCNC: 99 MMOL/L — SIGNIFICANT CHANGE UP (ref 98–107)
CO2 SERPL-SCNC: 25 MMOL/L — SIGNIFICANT CHANGE UP (ref 22–29)
CREAT SERPL-MCNC: 7.76 MG/DL — HIGH (ref 0.5–1.3)
GLUCOSE SERPL-MCNC: 132 MG/DL — HIGH (ref 70–115)
HCT VFR BLD CALC: 30 % — LOW (ref 39–50)
HGB BLD-MCNC: 8.7 G/DL — LOW (ref 13–17)
MCHC RBC-ENTMCNC: 29 GM/DL — LOW (ref 32–36)
MCHC RBC-ENTMCNC: 29.7 PG — SIGNIFICANT CHANGE UP (ref 27–34)
MCV RBC AUTO: 102.4 FL — HIGH (ref 80–100)
PLATELET # BLD AUTO: 166 K/UL — SIGNIFICANT CHANGE UP (ref 150–400)
POTASSIUM SERPL-MCNC: 5 MMOL/L — SIGNIFICANT CHANGE UP (ref 3.5–5.3)
POTASSIUM SERPL-SCNC: 5 MMOL/L — SIGNIFICANT CHANGE UP (ref 3.5–5.3)
PTH-INTACT FLD-MCNC: 83 PG/ML — HIGH (ref 15–65)
RBC # BLD: 2.93 M/UL — LOW (ref 4.2–5.8)
RBC # FLD: 19.7 % — HIGH (ref 10.3–14.5)
SODIUM SERPL-SCNC: 139 MMOL/L — SIGNIFICANT CHANGE UP (ref 135–145)
WBC # BLD: 7.61 K/UL — SIGNIFICANT CHANGE UP (ref 3.8–10.5)
WBC # FLD AUTO: 7.61 K/UL — SIGNIFICANT CHANGE UP (ref 3.8–10.5)

## 2019-07-01 PROCEDURE — 99223 1ST HOSP IP/OBS HIGH 75: CPT

## 2019-07-01 PROCEDURE — 99232 SBSQ HOSP IP/OBS MODERATE 35: CPT

## 2019-07-01 PROCEDURE — 90937 HEMODIALYSIS REPEATED EVAL: CPT

## 2019-07-01 RX ADMIN — ISOSORBIDE DINITRATE 20 MILLIGRAM(S): 5 TABLET ORAL at 21:00

## 2019-07-01 RX ADMIN — CHLORHEXIDINE GLUCONATE 1 APPLICATION(S): 213 SOLUTION TOPICAL at 12:58

## 2019-07-01 RX ADMIN — AMLODIPINE BESYLATE 5 MILLIGRAM(S): 2.5 TABLET ORAL at 05:04

## 2019-07-01 RX ADMIN — Medication 100 MILLIGRAM(S): at 17:08

## 2019-07-01 RX ADMIN — SEVELAMER CARBONATE 800 MILLIGRAM(S): 2400 POWDER, FOR SUSPENSION ORAL at 12:57

## 2019-07-01 RX ADMIN — Medication 1 SPRAY(S): at 17:08

## 2019-07-01 RX ADMIN — SENNA PLUS 2 TABLET(S): 8.6 TABLET ORAL at 21:00

## 2019-07-01 RX ADMIN — ATORVASTATIN CALCIUM 80 MILLIGRAM(S): 80 TABLET, FILM COATED ORAL at 21:00

## 2019-07-01 RX ADMIN — PANTOPRAZOLE SODIUM 40 MILLIGRAM(S): 20 TABLET, DELAYED RELEASE ORAL at 05:05

## 2019-07-01 RX ADMIN — CLOPIDOGREL BISULFATE 75 MILLIGRAM(S): 75 TABLET, FILM COATED ORAL at 12:58

## 2019-07-01 RX ADMIN — Medication 100 MILLIGRAM(S): at 05:03

## 2019-07-01 RX ADMIN — Medication 100 MICROGRAM(S): at 06:56

## 2019-07-01 RX ADMIN — Medication 50 MILLIGRAM(S): at 21:00

## 2019-07-01 RX ADMIN — ISOSORBIDE DINITRATE 20 MILLIGRAM(S): 5 TABLET ORAL at 05:04

## 2019-07-01 RX ADMIN — HYDROMORPHONE HYDROCHLORIDE 0.5 MILLIGRAM(S): 2 INJECTION INTRAMUSCULAR; INTRAVENOUS; SUBCUTANEOUS at 05:06

## 2019-07-01 RX ADMIN — SEVELAMER CARBONATE 800 MILLIGRAM(S): 2400 POWDER, FOR SUSPENSION ORAL at 08:22

## 2019-07-01 RX ADMIN — HYDROMORPHONE HYDROCHLORIDE 0.5 MILLIGRAM(S): 2 INJECTION INTRAMUSCULAR; INTRAVENOUS; SUBCUTANEOUS at 00:04

## 2019-07-01 RX ADMIN — SEVELAMER CARBONATE 800 MILLIGRAM(S): 2400 POWDER, FOR SUSPENSION ORAL at 17:08

## 2019-07-01 RX ADMIN — HYDROMORPHONE HYDROCHLORIDE 0.5 MILLIGRAM(S): 2 INJECTION INTRAMUSCULAR; INTRAVENOUS; SUBCUTANEOUS at 23:10

## 2019-07-01 RX ADMIN — Medication 3 MILLILITER(S): at 20:12

## 2019-07-01 RX ADMIN — HYDROMORPHONE HYDROCHLORIDE 0.5 MILLIGRAM(S): 2 INJECTION INTRAMUSCULAR; INTRAVENOUS; SUBCUTANEOUS at 05:25

## 2019-07-01 RX ADMIN — Medication 25 MILLIGRAM(S): at 05:04

## 2019-07-01 RX ADMIN — HYDROMORPHONE HYDROCHLORIDE 0.5 MILLIGRAM(S): 2 INJECTION INTRAMUSCULAR; INTRAVENOUS; SUBCUTANEOUS at 00:20

## 2019-07-01 RX ADMIN — HYDROMORPHONE HYDROCHLORIDE 0.5 MILLIGRAM(S): 2 INJECTION INTRAMUSCULAR; INTRAVENOUS; SUBCUTANEOUS at 21:37

## 2019-07-01 RX ADMIN — Medication 50 MILLIGRAM(S): at 05:04

## 2019-07-01 RX ADMIN — Medication 1 SPRAY(S): at 05:03

## 2019-07-01 RX ADMIN — Medication 25 MILLIGRAM(S): at 17:09

## 2019-07-01 RX ADMIN — Medication 10 MILLIGRAM(S): at 05:04

## 2019-07-01 RX ADMIN — Medication 81 MILLIGRAM(S): at 12:58

## 2019-07-01 NOTE — DISCHARGE NOTE PROVIDER - HOSPITAL COURSE
80 y/o M with a PMHx of HFpEF (EF 50-55%), CAD s/p CABG (ANA-LAD, stents to LCx) with recent NST (04/19 mild ischemia, medically managed), ESRD on HD (T, Th, Sat) via L AVF, Lung Ca s/p radiation on home oxygen who presents to the ED with worsening dyspnea. Patient was just discharged from the hospital 5 days ago for similar symptoms. Patient states that he has been taking his medications as prescribed, and had HD yesterday. The patient reported that the symptoms are similar to what he felt on prior admission and most recently last week when he was admitted to the hospital. He is unaware of any aggravating or relieving factors except that the dyspnea is worse with exertion.  Patient was admitted with diagnosis of progressive multifactorial dyspnea and acute on chronic respiratory failure.      He was seen by cardio and renal during admission who noted mild fluid overload on admission which has since resolved.    He was started on dilaudid for dyspnea management and is currently pending palliative care eval for second opinion on dyspnea-symptom management and recommendations for discharge planning and outpatient palliative care follow up. 78 y/o M with a PMHx of HFpEF (EF 50-55%), CAD s/p CABG (ANA-LAD, stents to LCx) with recent NST (04/19 mild ischemia, medically managed), ESRD on HD (T, Th, Sat) via L AVF, Lung Ca s/p radiation on home oxygen who presents to the ED with worsening dyspnea. Patient was just discharged from the hospital 5 days ago for similar symptoms. Patient states that he has been taking his medications as prescribed, and had HD yesterday. The patient reported that the symptoms are similar to what he felt on prior admission and most recently last week when he was admitted to the hospital. He is unaware of any aggravating or relieving factors except that the dyspnea is worse with exertion.  Patient was admitted with diagnosis of progressive multifactorial dyspnea and acute on chronic respiratory failure.      He was seen by cardio and renal during admission who noted mild fluid overload on admission which has since resolved.    He was started on dilaudid for dyspnea management and is currently pending palliative care eval for second opinion on dyspnea-symptom management and recommendations for discharge planning and outpatient palliative care follow up.        patients family decided against hospice because they did not want to stop hd. Patient will be discharged home. Patient is on 5liters chrnically at home.         patient is high risk for readmission        time spent on dc 34 minutes 80 y/o M with a PMHx of HFpEF (EF 50-55%), CAD s/p CABG (ANA-LAD, stents to LCx) with recent NST (04/19 mild ischemia, medically managed), ESRD on HD (T, Th, Sat) via L AVF, Lung Ca s/p radiation on home oxygen who presents to the ED with worsening dyspnea. Patient was just discharged from the hospital 5 days ago for similar symptoms. Patient states that he has been taking his medications as prescribed, and had HD yesterday. The patient reported that the symptoms are similar to what he felt on prior admission and most recently last week when he was admitted to the hospital. He is unaware of any aggravating or relieving factors except that the dyspnea is worse with exertion.  Patient was admitted with diagnosis of progressive multifactorial dyspnea and acute on chronic respiratory failure.      He was seen by cardio and renal during admission who noted mild fluid overload on admission which has since resolved.    He was started on dilaudid for dyspnea management and seen by palliative care eval for second opinion on dyspnea-symptom management. patients family decided against hospice because they did not want to stop hd. Patient will be discharged home. Patient is on 5liters chronically at home.         patient is high risk for readmission        time spent on dc 34 minutes 78 y/o M with a PMHx of HFpEF (EF 50-55%), CAD s/p CABG (ANA-LAD, stents to LCx) with recent NST (04/19 mild ischemia, medically managed), ESRD on HD (T, Th, Sat) via L AVF, Lung Ca s/p radiation on home oxygen who presents to the ED with worsening dyspnea. Patient was just discharged from the hospital 5 days ago for similar symptoms. Patient states that he has been taking his medications as prescribed, and had HD yesterday. The patient reported that the symptoms are similar to what he felt on prior admission and most recently last week when he was admitted to the hospital. He is unaware of any aggravating or relieving factors except that the dyspnea is worse with exertion.  Patient was admitted with diagnosis of progressive multifactorial dyspnea and acute on chronic respiratory failure.      He was seen by cardio and renal during admission who noted mild fluid overload on admission which has since resolved.    He was started on dilaudid for dyspnea management and seen by palliative care eval for second opinion on dyspnea-symptom management. patients family decided against hospice because they did not want to stop hd. Patient will be discharged home. Patient is on 5liters chronically at home.         patient is high risk for readmission        (Addendum 7/8/19)    The patient remained stable and continued on hemodialysis sessions as scheduled. He continued to have baseline dyspnea and required supplemental oxygen. The patient had constipation which was relieved with a combination of stool softeners. He remained in the hospital while awaiting transfer to a rehabilitation facility.            38 minutes total time 80 y/o M with a PMHx of HFpEF (EF 50-55%), CAD s/p CABG (ANA-LAD, stents to LCx) with recent NST (04/19 mild ischemia, medically managed), ESRD on HD (T, Th, Sat) via L AVF, Lung Ca s/p radiation on home oxygen who presents to the ED with worsening dyspnea. Patient was just discharged from the hospital 5 days ago for similar symptoms. Patient states that he has been taking his medications as prescribed, and had HD yesterday. The patient reported that the symptoms are similar to what he felt on prior admission and most recently last week when he was admitted to the hospital. He is unaware of any aggravating or relieving factors except that the dyspnea is worse with exertion.  Patient was admitted with diagnosis of progressive multifactorial dyspnea and acute on chronic respiratory failure.      He was seen by cardio and renal during admission who noted mild fluid overload on admission which has since resolved.    He was started on dilaudid for dyspnea management and seen by palliative care eval for second opinion on dyspnea-symptom management. patients family decided against hospice because they did not want to stop hd. Patient will be discharged home. Patient is on 5liters chronically at home.         patient is high risk for readmission        (Addendum 7/9/19)    The patient remained stable and continued on hemodialysis sessions as scheduled. He continued to have baseline dyspnea and required supplemental oxygen. The patient had constipation which was relieved with a combination of stool softeners. He remained in the hospital while awaiting transfer to a rehabilitation facility.            38 minutes total time 78 y/o M with a PMHx of HFpEF (EF 50-55%), CAD s/p CABG (ANA-LAD, stents to LCx) with recent NST (04/19 mild ischemia, medically managed), ESRD on HD (T, Th, Sat) via L AVF, Lung Ca s/p radiation on home oxygen who presents to the ED with worsening dyspnea. Patient was just discharged from the hospital 5 days ago for similar symptoms. Patient states that he has been taking his medications as prescribed, and had HD yesterday. The patient reported that the symptoms are similar to what he felt on prior admission and most recently last week when he was admitted to the hospital. He is unaware of any aggravating or relieving factors except that the dyspnea is worse with exertion.  Patient was admitted with diagnosis of progressive multifactorial dyspnea and acute on chronic respiratory failure.      He was seen by cardio and renal during admission who noted mild fluid overload on admission which has since resolved.    He was started on dilaudid for dyspnea management and seen by palliative care eval for second opinion on dyspnea-symptom management. patients family decided against hospice because they did not want to stop hd. Patient will be discharged home. Patient is on 5liters chronically at home.         patient is high risk for readmission        (Addendum 7/9/19)    The patient remained stable and continued on hemodialysis sessions as scheduled. He continued to have baseline dyspnea and required supplemental oxygen. The patient had constipation which was relieved with a combination of stool softeners. The dose of prednisone was increased in an attempt to improve the dyspnea. He remained in the hospital while awaiting transfer to a rehabilitation facility.            38 minutes total time

## 2019-07-01 NOTE — DISCHARGE NOTE PROVIDER - NSDCCPCAREPLAN_GEN_ALL_CORE_FT
PRINCIPAL DISCHARGE DIAGNOSIS  Diagnosis: Dyspnea due to congestive heart failure  Assessment and Plan of Treatment:       SECONDARY DISCHARGE DIAGNOSES  Diagnosis: Bradycardia  Assessment and Plan of Treatment:     Diagnosis: ESRD (end stage renal disease)  Assessment and Plan of Treatment: on HD (Tue-Thu-Sat) through RUE fistula PRINCIPAL DISCHARGE DIAGNOSIS  Diagnosis: Dyspnea  Assessment and Plan of Treatment: Multifactorial in nature with lung cancer, pulmonary fibrosis, and heart failure. On inhaled bronchodilator therapy and supplemental oxygen.      SECONDARY DISCHARGE DIAGNOSES  Diagnosis: ESRD (end stage renal disease)  Assessment and Plan of Treatment: on HD (Tue-Thu-Sat) through RUE fistula

## 2019-07-01 NOTE — PROGRESS NOTE ADULT - ASSESSMENT
1)ESRD on HD  2) MBD of renal dx  3) Anemia of renal dx  4) Vol HTN    Continue current management    On HD today    Poor Prognosis,

## 2019-07-01 NOTE — PROGRESS NOTE ADULT - SUBJECTIVE AND OBJECTIVE BOX
CC: Patient more awake today.  Noted that his breathing is the same as before with difficulty breathing on a continuous basis    HPI: 79 y.o. Male presented with increasing dyspnea. He denied any associated chest pain or palpitations. He did not have a productive cough or fevers at home. The patient noted that he is on supplemental oxygen at home and has been compliant with his medications. He has also had his scheduled hemodialysis sessions without issue. The patient reported that the symptoms are similar to what he felt on prior admission and most recently last week when he was admitted to the hospital. He is unaware of any aggravating or relieving factors except that the dyspnea is worse with exertion. (26 Jun 2019 15:50)    REVIEW OF SYSTEMS:  + SOB at rest, periodically lethargic, + pruritus  Patient denied fever, chills, abdominal pain, nausea, vomiting, chest pain or palpitations    Vital Signs Last 24 Hrs  T(C): 36.5 (01 Jul 2019 08:00), Max: 36.5 (01 Jul 2019 08:00)  T(F): 97.7 (01 Jul 2019 08:00), Max: 97.7 (01 Jul 2019 08:00)  HR: 55 (01 Jul 2019 08:01) (55 - 88)  BP: 139/66 (01 Jul 2019 08:00) (133/60 - 150/60)  RR: 18 (01 Jul 2019 08:00) (18 - 18)  SpO2: 93% (01 Jul 2019 10:23) (91% - 97%)    30 Jun 2019 16:43    139    |  100    |  48.0   ----------------------------<  119    4.9     |  25.0   |  6.68     Ca    8.7        30 Jun 2019 16:43  Phos  4.3       30 Jun 2019 16:43    TPro  x      /  Alb  3.7    /  TBili  x      /  DBili  x      /  AST  x      /  ALT  x      /  AlkPhos  x      30 Jun 2019 16:43    LIVER FUNCTIONS - ( 30 Jun 2019 16:43 )  Alb: 3.7 g/dL / Pro: x     / ALK PHOS: x     / ALT: x     / AST: x     / GGT: x           MEDICATIONS  (STANDING):  amLODIPine   Tablet 5 milliGRAM(s) Oral daily  aspirin  chewable 81 milliGRAM(s) Oral daily  atorvastatin 80 milliGRAM(s) Oral at bedtime  chlorhexidine 2% Cloths 1 Application(s) Topical daily  clopidogrel Tablet 75 milliGRAM(s) Oral daily  docusate sodium 100 milliGRAM(s) Oral two times a day  fluticasone propionate 50 MICROgram(s)/spray Nasal Spray 1 Spray(s) Both Nostrils two times a day  hydrALAZINE 50 milliGRAM(s) Oral every 8 hours  isosorbide   dinitrate Tablet (ISORDIL) 20 milliGRAM(s) Oral three times a day  levothyroxine 100 MICROGram(s) Oral daily  metoprolol tartrate 25 milliGRAM(s) Oral two times a day  pantoprazole    Tablet 40 milliGRAM(s) Oral before breakfast  predniSONE   Tablet 10 milliGRAM(s) Oral daily  senna 2 Tablet(s) Oral at bedtime  sevelamer carbonate 800 milliGRAM(s) Oral three times a day with meals    MEDICATIONS  (PRN):  ALBUTerol/ipratropium for Nebulization 3 milliLiter(s) Nebulizer every 6 hours PRN Shortness of Breath and/or Wheezing  bisacodyl Suppository 10 milliGRAM(s) Rectal daily PRN Constipation  HYDROmorphone  Injectable 0.5 milliGRAM(s) IV Push four times a day PRN Dyspnea  hydrOXYzine hydrochloride 25 milliGRAM(s) Oral every 6 hours PRN Itching    RADIOLOGY & ADDITIONAL TESTS: personally visualized    PHYSICAL EXAM:    General: elderly male in moderate respiratory distress  Eyes: PERRLA, EOMI; conjunctiva and sclera clear  Head: Normocephalic; atraumatic  ENMT: No nasal discharge; airway clear  Neck: Supple; non tender; no masses  Respiratory: No wheezes, rales or rhonchi, + diffuse crackles  Cardiovascular: S1, S2 bradycardic  Gastrointestinal: Soft abd, NT, + BS  Genitourinary: No costovertebral angle tenderness  Extremities:  No clubbing, cyanosis or edema  Vascular: Peripheral pulses palpable 2+ bilaterally  Neurological: Alert   Skin: Warm and dry. Pruritic  Musculoskeletal: Normal tone, without deformities

## 2019-07-01 NOTE — DISCHARGE NOTE PROVIDER - CARE PROVIDERS DIRECT ADDRESSES
[email or web address]

## 2019-07-01 NOTE — PROGRESS NOTE ADULT - ASSESSMENT
ASSESSMENT / PLAN:  ----------------------------------------  79 y.o. Male with a history of ESRD, HTN, CAD, and lung cancer presenting with increased dyspnea.    Dyspnea - Most likely multifactorial in nature given history of lung cancer, heart failure, pulmonary fibrosis. On supplemental oxygen.    - Trial of Dilaudid for air hunger started on 6/28 - received one dose overnight with some symptom improvement  - appeared uncomfortable from respiratory perspective this morning  - Palliative Care consultation for symptom management - pt needs comfort care as no reasonable recovery anticipated   - Not a candidate for Hospice due to current hemodialysis.  - prognosis poor    Hypotension - resolved  - amlodipine and metoprolol dosing and added hold parameters to all BP meds    Chronic diastolic heart failure - Stable  - on isosorbide and metoprolol.    CAD - Stable   - elevated troponin in the setting of renal failure. Prior ischemic evaluation was without significant findings. On aspirin and clopidogrel.    ESRD - Nephrology consultation noted, for hemodialysis.    Lung cancer / Lung fibrosis - On prednisone. Supplemental oxygen for hypoxia.    Hypothyroidism - On levothyroxine.    DC Planning - this week with assistance from inpatient palliative care as well as outpatient pall care follow up

## 2019-07-01 NOTE — PROGRESS NOTE ADULT - SUBJECTIVE AND OBJECTIVE BOX
[facility] DIVISION OF KIDNEY DISEASES AND HYPERTENSION -- HEMODIALYSIS NOTE  --------------------------------------------------------------------------------  Chief Complaint: ESRD/Ongoing hemodialysis requirement    24 hour events/subjective:  Pt seen and examined  On HD today      PAST HISTORY  --------------------------------------------------------------------------------  No significant changes to PMH, PSH, FHx, SHx, unless otherwise noted    ALLERGIES & MEDICATIONS  --------------------------------------------------------------------------------  Allergies    No Known Allergies    Intolerances      Standing Inpatient Medications  amLODIPine   Tablet 5 milliGRAM(s) Oral daily  aspirin  chewable 81 milliGRAM(s) Oral daily  atorvastatin 80 milliGRAM(s) Oral at bedtime  chlorhexidine 2% Cloths 1 Application(s) Topical daily  clopidogrel Tablet 75 milliGRAM(s) Oral daily  docusate sodium 100 milliGRAM(s) Oral two times a day  fluticasone propionate 50 MICROgram(s)/spray Nasal Spray 1 Spray(s) Both Nostrils two times a day  hydrALAZINE 50 milliGRAM(s) Oral every 8 hours  isosorbide   dinitrate Tablet (ISORDIL) 20 milliGRAM(s) Oral three times a day  levothyroxine 100 MICROGram(s) Oral daily  metoprolol tartrate 25 milliGRAM(s) Oral two times a day  pantoprazole    Tablet 40 milliGRAM(s) Oral before breakfast  predniSONE   Tablet 10 milliGRAM(s) Oral daily  senna 2 Tablet(s) Oral at bedtime  sevelamer carbonate 800 milliGRAM(s) Oral three times a day with meals    PRN Inpatient Medications  ALBUTerol/ipratropium for Nebulization 3 milliLiter(s) Nebulizer every 6 hours PRN  bisacodyl Suppository 10 milliGRAM(s) Rectal daily PRN  HYDROmorphone  Injectable 0.5 milliGRAM(s) IV Push four times a day PRN  hydrOXYzine hydrochloride 25 milliGRAM(s) Oral every 6 hours PRN      REVIEW OF SYSTEMS  --------------------------------------------------------------------------------  Gen: No weight changes, fatigue, fevers/chills, weakness  Skin: No rashes  Head/Eyes/Ears/Mouth: No headache; Normal hearing; Normal vision w/o blurriness; No sinus pain/discomfort, sore throat  Respiratory: No dyspnea, cough, wheezing, hemoptysis  CV: No chest pain, PND, orthopnea  GI: No abdominal pain, diarrhea, constipation, nausea, vomiting, melena, hematochezia  : No increased frequency, dysuria, hematuria, nocturia  MSK: No joint pain/swelling; no back pain; no edema  Neuro: No dizziness/lightheadedness, weakness, seizures, numbness, tingling  Heme: No easy bruising or bleeding  Endo: No heat/cold intolerance  Psych: No significant nervousness, anxiety, stress, depression    All other systems were reviewed and are negative, except as noted.    VITALS/PHYSICAL EXAM  --------------------------------------------------------------------------------  T(C): 36.5 (07-01-19 @ 08:00), Max: 36.5 (07-01-19 @ 08:00)  HR: 55 (07-01-19 @ 08:01) (55 - 88)  BP: 139/66 (07-01-19 @ 08:00) (108/50 - 150/60)  RR: 18 (07-01-19 @ 08:00) (18 - 18)  SpO2: 93% (07-01-19 @ 10:23) (91% - 97%)  Wt(kg): --        Physical Exam:  	Gen: NAD, frail, pale    	HEENT: EOMI, neck supple, no JVD  	Pulm: dec BS; fine crackles BL, nasal O2  	CV: RRR, S1S2; no rub  	Back: No spinal or CVA tenderness; no sacral edema  	Abd: +BS, soft, nontender/nondistended  	: No suprapubic tenderness  	UE: Warm, FROM, no clubbing, intact strength; no edema; no asterixis  	LE: Warm, FROM, no clubbing, intact strength; + pedal edema  	Neuro: No focal deficits,   	Psych: Flat  affect and mood  	Skin: Warm, without rashes  	Vascular access:  CVC    LABS/STUDIES  --------------------------------------------------------------------------------    139  |  100  |  48.0  ----------------------------<  119      [06-30-19 @ 16:43]  4.9   |  25.0  |  6.68        Ca     8.7     [06-30-19 @ 16:43]      Phos  4.3     [06-30-19 @ 16:43]    TPro  x   /  Alb  3.7  /  TBili  x   /  DBili  x   /  AST  x   /  ALT  x   /  AlkPhos  x   [06-30-19 @ 16:43]          Iron 58, TIBC 256, %sat 23      [06-19-19 @ 08:42]  Ferritin 812      [06-19-19 @ 08:42]  TSH 0.89      [05-23-19 @ 06:28]  Lipid: chol 159, , HDL 32, LDL 94      [01-29-19 @ 11:39]    HBsAb <3.0      [06-27-19 @ 19:39]  HBsAb Nonreact      [06-27-19 @ 19:39]  HBsAg Reactive      [06-27-19 @ 19:39]  HBcAb Nonreact      [06-27-19 @ 19:39]  HCV 0.10, Nonreact      [06-27-19 @ 19:39]

## 2019-07-01 NOTE — CONSULT NOTE ADULT - ATTENDING COMMENTS
D/W pt, family, RN, hospitalist Dr. Bird, grandson via phone
pt seen and examined in ED.  I spoke to NP in length. He appears euvolemic and best he has looked in months. He will need O2 to carry while outside the house. Flonase for nasal congestion.

## 2019-07-01 NOTE — DISCHARGE NOTE PROVIDER - CARE PROVIDER_API CALL
Enrique Nesbitt)  Cardiovascular Disease  [address] [facility] [address], [address]  [place] [address]  Phone: [phone]  Fax: [phone]  Follow Up Time:     Brigido Newebrry ()  Internal Medicine  [address] [place] [address], [address] [place] [address]  Phone: [phone]  Fax: [phone]  Follow Up Time:

## 2019-07-02 LAB
HBV DNA # SERPL NAA+PROBE: SIGNIFICANT CHANGE UP
HBV DNA SERPL NAA+PROBE-LOG#: SIGNIFICANT CHANGE UP LOGIU/ML

## 2019-07-02 PROCEDURE — 99232 SBSQ HOSP IP/OBS MODERATE 35: CPT

## 2019-07-02 PROCEDURE — 99233 SBSQ HOSP IP/OBS HIGH 50: CPT

## 2019-07-02 RX ADMIN — ISOSORBIDE DINITRATE 20 MILLIGRAM(S): 5 TABLET ORAL at 16:46

## 2019-07-02 RX ADMIN — AMLODIPINE BESYLATE 5 MILLIGRAM(S): 2.5 TABLET ORAL at 05:02

## 2019-07-02 RX ADMIN — SENNA PLUS 2 TABLET(S): 8.6 TABLET ORAL at 21:02

## 2019-07-02 RX ADMIN — Medication 50 MILLIGRAM(S): at 21:03

## 2019-07-02 RX ADMIN — ATORVASTATIN CALCIUM 80 MILLIGRAM(S): 80 TABLET, FILM COATED ORAL at 21:03

## 2019-07-02 RX ADMIN — PANTOPRAZOLE SODIUM 40 MILLIGRAM(S): 20 TABLET, DELAYED RELEASE ORAL at 05:02

## 2019-07-02 RX ADMIN — Medication 3 MILLILITER(S): at 05:44

## 2019-07-02 RX ADMIN — Medication 81 MILLIGRAM(S): at 11:59

## 2019-07-02 RX ADMIN — Medication 50 MILLIGRAM(S): at 16:45

## 2019-07-02 RX ADMIN — SEVELAMER CARBONATE 800 MILLIGRAM(S): 2400 POWDER, FOR SUSPENSION ORAL at 11:59

## 2019-07-02 RX ADMIN — Medication 25 MILLIGRAM(S): at 16:50

## 2019-07-02 RX ADMIN — Medication 100 MILLIGRAM(S): at 05:02

## 2019-07-02 RX ADMIN — CLOPIDOGREL BISULFATE 75 MILLIGRAM(S): 75 TABLET, FILM COATED ORAL at 11:59

## 2019-07-02 RX ADMIN — HYDROMORPHONE HYDROCHLORIDE 0.5 MILLIGRAM(S): 2 INJECTION INTRAMUSCULAR; INTRAVENOUS; SUBCUTANEOUS at 06:39

## 2019-07-02 RX ADMIN — HYDROMORPHONE HYDROCHLORIDE 0.5 MILLIGRAM(S): 2 INJECTION INTRAMUSCULAR; INTRAVENOUS; SUBCUTANEOUS at 12:41

## 2019-07-02 RX ADMIN — Medication 100 MICROGRAM(S): at 05:01

## 2019-07-02 RX ADMIN — CHLORHEXIDINE GLUCONATE 1 APPLICATION(S): 213 SOLUTION TOPICAL at 12:01

## 2019-07-02 RX ADMIN — SEVELAMER CARBONATE 800 MILLIGRAM(S): 2400 POWDER, FOR SUSPENSION ORAL at 08:33

## 2019-07-02 RX ADMIN — Medication 1 SPRAY(S): at 05:01

## 2019-07-02 RX ADMIN — HYDROMORPHONE HYDROCHLORIDE 0.5 MILLIGRAM(S): 2 INJECTION INTRAMUSCULAR; INTRAVENOUS; SUBCUTANEOUS at 06:19

## 2019-07-02 RX ADMIN — HYDROMORPHONE HYDROCHLORIDE 0.5 MILLIGRAM(S): 2 INJECTION INTRAMUSCULAR; INTRAVENOUS; SUBCUTANEOUS at 22:40

## 2019-07-02 RX ADMIN — SEVELAMER CARBONATE 800 MILLIGRAM(S): 2400 POWDER, FOR SUSPENSION ORAL at 16:50

## 2019-07-02 RX ADMIN — ISOSORBIDE DINITRATE 20 MILLIGRAM(S): 5 TABLET ORAL at 21:03

## 2019-07-02 RX ADMIN — Medication 10 MILLIGRAM(S): at 05:02

## 2019-07-02 RX ADMIN — HYDROMORPHONE HYDROCHLORIDE 0.5 MILLIGRAM(S): 2 INJECTION INTRAMUSCULAR; INTRAVENOUS; SUBCUTANEOUS at 11:59

## 2019-07-02 NOTE — PROGRESS NOTE ADULT - ASSESSMENT
79M with PMHx of ESRD, CAD, h/o lung cancer with RT pulmonary fibrosis/ILD, HFpEF, HTN, HLD, bipolar disorder, recurrent hospitalization admitted for progressive dyspnea. CXR with chronic diffuse ILD, bilateral pleural effusions.      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 Pt is more awake today, defer decision making to dtr Carin and HOSSEIN [place]. He did express desire to continue HD during our conversation which would preclude him from being eligible for hospice services. Called Carin [phone] left VM and then Boris [phone]; plan to have dtr/HOSSEIN/yobany Daniels here tomorrow 7/3 at 3PM.

## 2019-07-02 NOTE — CHART NOTE - NSCHARTNOTEFT_GEN_A_CORE
Upon Nutritional Assessment by the Registered Dietitian your patient was determined to meet criteria / has evidence of the following diagnosis/diagnoses:          [ ]  Mild Protein Calorie Malnutrition        [ ]  Moderate Protein Calorie Malnutrition        [x ] Severe Protein Calorie Malnutrition  CHRONIC        [ ] Unspecified Protein Calorie Malnutrition        [ ] Underweight / BMI <19        [ ] Morbid Obesity / BMI > 40      Findings as based on:  •  Comprehensive nutrition assessment and consultation  •  Calorie counts (nutrient intake analysis)  •  Food acceptance and intake status from observations by staff  •  Follow up  •  Patient education  •  Intervention secondary to interdisciplinary rounds  •   concerns      Treatment:    The following diet has been recommended: Rec Nepro BID      PROVIDER Section:     By signing this assessment you are acknowledging and agree with the diagnosis/diagnoses assigned by the Registered Dietitian    Comments: Upon Nutritional Assessment by the Registered Dietitian your patient was determined to meet criteria / has evidence of the following diagnosis/diagnoses:          [ ]  Mild Protein Calorie Malnutrition        [ ]  Moderate Protein Calorie Malnutrition        [x ] Severe Protein Calorie Malnutrition  CHRONIC        [ ] Unspecified Protein Calorie Malnutrition        [ ] Underweight / BMI <19        [ ] Morbid Obesity / BMI > 40      Findings as based on:  •  Comprehensive nutrition assessment and consultation  •  Calorie counts (nutrient intake analysis)  •  Food acceptance and intake status from observations by staff  •  Follow up  •  Patient education  •  Intervention secondary to interdisciplinary rounds  •   concerns      Treatment:    The following diet has been recommended: Rec Nepro BID      PROVIDER Section:     By signing this assessment you are acknowledging and agree with the diagnosis/diagnoses assigned by the Registered Dietitian    Comments: Agreed with assessment

## 2019-07-02 NOTE — PROGRESS NOTE ADULT - SUBJECTIVE AND OBJECTIVE BOX
FOLLOW UP VISIT    INTERVAL HPI/OVERNIGHT EVENTS:  Pt is more awake and alert today. He report feeling "much better today," able to sleep well. He offered no acute complaints     Present Symptoms:   Dyspnea: Yes , comfortable at present on O2NC 5L   Nausea/Vomiting:  No  Anxiety:   No  Fatigue: No  Loss of appetite: Yes   Pain: No    Review of Systems: Reviewed, All others negative    MEDICATIONS  (STANDING):  amLODIPine   Tablet 5 milliGRAM(s) Oral daily  aspirin  chewable 81 milliGRAM(s) Oral daily  atorvastatin 80 milliGRAM(s) Oral at bedtime  chlorhexidine 2% Cloths 1 Application(s) Topical daily  clopidogrel Tablet 75 milliGRAM(s) Oral daily  docusate sodium 100 milliGRAM(s) Oral two times a day  fluticasone propionate 50 MICROgram(s)/spray Nasal Spray 1 Spray(s) Both Nostrils two times a day  hydrALAZINE 50 milliGRAM(s) Oral every 8 hours  isosorbide   dinitrate Tablet (ISORDIL) 20 milliGRAM(s) Oral three times a day  levothyroxine 100 MICROGram(s) Oral daily  metoprolol tartrate 25 milliGRAM(s) Oral two times a day  pantoprazole    Tablet 40 milliGRAM(s) Oral before breakfast  predniSONE   Tablet 10 milliGRAM(s) Oral daily  senna 2 Tablet(s) Oral at bedtime  sevelamer carbonate 800 milliGRAM(s) Oral three times a day with meals    MEDICATIONS  (PRN):  ALBUTerol/ipratropium for Nebulization 3 milliLiter(s) Nebulizer every 6 hours PRN Shortness of Breath and/or Wheezing  bisacodyl Suppository 10 milliGRAM(s) Rectal daily PRN Constipation  HYDROmorphone  Injectable 0.5 milliGRAM(s) IV Push four times a day PRN Dyspnea  hydrOXYzine hydrochloride 25 milliGRAM(s) Oral every 6 hours PRN Itching      PHYSICAL EXAM:    Vital Signs Last 24 Hrs  T(C): 36.7 (02 Jul 2019 07:40), Max: 37.1 (01 Jul 2019 13:15)  T(F): 98 (02 Jul 2019 07:40), Max: 98.7 (01 Jul 2019 13:15)  HR: 65 (02 Jul 2019 07:40) (50 - 86)  BP: 94/56 (02 Jul 2019 07:40) (94/56 - 143/53)  BP(mean): --  RR: 18 (02 Jul 2019 07:40) (18 - 18)  SpO2: 98% (02 Jul 2019 05:44) (90% - 100%)    General: elderly. Resting comfortably. No acute distress. cachexia.  HEENT: mucous membrane moist. missing dentition  Lungs: diminished breath sounds at bases. non-labored. O2NC  CV: +s1/s2. Regular rate and rhythm.    GI: +bowel sound. abdomen soft, non-tender, non-distended   MSK: Moves all 4 extremities. No cyanosis or edema. weakness.   Neuro: Nonfocal. Awake and alert, oriented x4. Interactive  Skin: warm and dry.     LABS:                          8.7    7.61  )-----------( 166      ( 01 Jul 2019 13:39 )             30.0     07-01    139  |  99  |  57.0<H>  ----------------------------<  132<H>  5.0   |  25.0  |  7.76<H>    Ca    8.6      01 Jul 2019 13:39  Phos  4.3     06-30    TPro  x   /  Alb  3.7  /  TBili  x   /  DBili  x   /  AST  x   /  ALT  x   /  AlkPhos  x   06-30    I&O's Summary    01 Jul 2019 07:01  -  02 Jul 2019 07:00  --------------------------------------------------------  IN: 240 mL / OUT: 1600 mL / NET: -1360 mL    RADIOLOGY & ADDITIONAL STUDIES: Reviewed, no new recent studies    ADVANCE DIRECTIVES:   DNR YES NO  Completed on:                     MOLST  YES NO   Completed on:  Living Will  YES NO   Completed on:

## 2019-07-02 NOTE — DIETITIAN INITIAL EVALUATION ADULT. - PERTINENT LABORATORY DATA
07-01 Na139 mmol/L Glu 132 mg/dL<H> K+ 5.0 mmol/L Cr  7.76 mg/dL<H> BUN 57.0 mg/dL<H> Phos n/a   Alb n/a   PAB n/a   high bun, high creat, high glu

## 2019-07-02 NOTE — PROGRESS NOTE ADULT - ASSESSMENT
ASSESSMENT / PLAN:  ----------------------------------------  79 y.o. Male with a history of ESRD, HTN, CAD, and lung cancer presenting with increased dyspnea.    Dyspnea - chronic hypoxic respiratory failure Most likely multifactorial in nature given history of lung cancer, heart failure, pulmonary fibrosis. On supplemental oxygen.    - Trial of Dilaudid for air hunger started on 6/28 - received one dose overnight with some symptom improvement  - Palliative Care consultation for symptom management - pt needs comfort care as no reasonable recovery anticipated   - Not a candidate for Hospice due to current hemodialysis - discussion planned for tomorrow for decision about it.  - prognosis poor    Hypotension on and off - resolved  - amlodipine and metoprolol dosing and added hold parameters to all BP meds    Chronic diastolic heart failure - Stable  - on isosorbide and metoprolol.    CAD - Stable   - elevated troponin in the setting of renal failure. Prior ischemic evaluation was without significant findings. On aspirin and clopidogrel.    ESRD - Nephrology consultation noted, for hemodialysis.    Lung cancer / Lung fibrosis - On prednisone. Supplemental oxygen for hypoxia.    Hypothyroidism - On levothyroxine.    DC Planning - this week with assistance from inpatient palliative care as well as outpatient pall care follow up

## 2019-07-02 NOTE — PROGRESS NOTE ADULT - SUBJECTIVE AND OBJECTIVE BOX
CC: Patient more awake today.  Noted that his breathing is the same as before with difficulty breathing on a continuous basis    HPI: 79 y.o. Male presented with increasing dyspnea. He denied any associated chest pain or palpitations. He did not have a productive cough or fevers at home. The patient noted that he is on supplemental oxygen at home and has been compliant with his medications. He has also had his scheduled hemodialysis sessions without issue. The patient reported that the symptoms are similar to what he felt on prior admission and most recently last week when he was admitted to the hospital. He is unaware of any aggravating or relieving factors except that the dyspnea is worse with exertion. (26 Jun 2019 15:50)    REVIEW OF SYSTEMS:  + SOB at rest, periodically lethargic, + pruritus - feels better today  Patient denied fever, chills, abdominal pain, nausea, vomiting, chest pain or palpitations    Vital Signs Last 24 Hrs  T(C): 36.7 (02 Jul 2019 07:40), Max: 37.1 (01 Jul 2019 13:15)  T(F): 98 (02 Jul 2019 07:40), Max: 98.7 (01 Jul 2019 13:15)  HR: 65 (02 Jul 2019 07:40) (50 - 86)  BP: 94/56 (02 Jul 2019 07:40) (94/56 - 143/53)  RR: 18 (02 Jul 2019 07:40) (18 - 18)  SpO2: 98% (02 Jul 2019 05:44) (90% - 100%)                      8.7    7.61  )-----------( 166      ( 01 Jul 2019 13:39 )             30.0     01 Jul 2019 13:39    139    |  99     |  57.0   ----------------------------<  132    5.0     |  25.0   |  7.76     Ca    8.6        01 Jul 2019 13:39  Phos  4.3       30 Jun 2019 16:43    TPro  x      /  Alb  3.7    /  TBili  x      /  DBili  x      /  AST  x      /  ALT  x      /  AlkPhos  x      30 Jun 2019 16:43    LIVER FUNCTIONS - ( 30 Jun 2019 16:43 )  Alb: 3.7 g/dL / Pro: x     / ALK PHOS: x     / ALT: x     / AST: x     / GGT: x           MEDICATIONS  (STANDING):  amLODIPine   Tablet 5 milliGRAM(s) Oral daily  aspirin  chewable 81 milliGRAM(s) Oral daily  atorvastatin 80 milliGRAM(s) Oral at bedtime  chlorhexidine 2% Cloths 1 Application(s) Topical daily  clopidogrel Tablet 75 milliGRAM(s) Oral daily  docusate sodium 100 milliGRAM(s) Oral two times a day  fluticasone propionate 50 MICROgram(s)/spray Nasal Spray 1 Spray(s) Both Nostrils two times a day  hydrALAZINE 50 milliGRAM(s) Oral every 8 hours  isosorbide   dinitrate Tablet (ISORDIL) 20 milliGRAM(s) Oral three times a day  levothyroxine 100 MICROGram(s) Oral daily  metoprolol tartrate 25 milliGRAM(s) Oral two times a day  pantoprazole    Tablet 40 milliGRAM(s) Oral before breakfast  predniSONE   Tablet 10 milliGRAM(s) Oral daily  senna 2 Tablet(s) Oral at bedtime  sevelamer carbonate 800 milliGRAM(s) Oral three times a day with meals    MEDICATIONS  (PRN):  ALBUTerol/ipratropium for Nebulization 3 milliLiter(s) Nebulizer every 6 hours PRN Shortness of Breath and/or Wheezing  bisacodyl Suppository 10 milliGRAM(s) Rectal daily PRN Constipation  HYDROmorphone  Injectable 0.5 milliGRAM(s) IV Push four times a day PRN Dyspnea  hydrOXYzine hydrochloride 25 milliGRAM(s) Oral every 6 hours PRN Itching    RADIOLOGY & ADDITIONAL TESTS: personally visualized    PHYSICAL EXAM:    General: elderly male in no acute distress  Eyes: PERRLA, EOMI; conjunctiva and sclera clear  Head: Normocephalic; atraumatic  ENMT: No nasal discharge; airway clear  Neck: Supple; non tender; no masses  Respiratory: No wheezes, rales or rhonchi, + diffuse crackles  Cardiovascular: S1, S2 bradycardic  Gastrointestinal: Soft abd, NT, + BS  Genitourinary: No costovertebral angle tenderness  Extremities:  No clubbing, cyanosis or edema  Vascular: Peripheral pulses palpable 2+ bilaterally  Neurological: Alert   Skin: Warm and dry. Pruritic  Musculoskeletal: Normal tone, without deformities

## 2019-07-02 NOTE — DIETITIAN INITIAL EVALUATION ADULT. - OTHER INFO
79 y.o. Male with a history of ESRD on HD, HTN, CAD, and lung cancer presenting with increased dyspnea. Palliative following. Po intake poor to fair. Pt seen on past admissions for multiple hospitalizations and remains malnourished.

## 2019-07-03 PROCEDURE — 99358 PROLONG SERVICE W/O CONTACT: CPT

## 2019-07-03 PROCEDURE — 90937 HEMODIALYSIS REPEATED EVAL: CPT

## 2019-07-03 PROCEDURE — 99222 1ST HOSP IP/OBS MODERATE 55: CPT

## 2019-07-03 PROCEDURE — 99232 SBSQ HOSP IP/OBS MODERATE 35: CPT

## 2019-07-03 PROCEDURE — 71045 X-RAY EXAM CHEST 1 VIEW: CPT | Mod: 26

## 2019-07-03 PROCEDURE — 99497 ADVNCD CARE PLAN 30 MIN: CPT | Mod: 25

## 2019-07-03 RX ORDER — BUDESONIDE AND FORMOTEROL FUMARATE DIHYDRATE 160; 4.5 UG/1; UG/1
2 AEROSOL RESPIRATORY (INHALATION)
Refills: 0 | Status: DISCONTINUED | OUTPATIENT
Start: 2019-07-03 | End: 2019-07-10

## 2019-07-03 RX ADMIN — CLOPIDOGREL BISULFATE 75 MILLIGRAM(S): 75 TABLET, FILM COATED ORAL at 13:26

## 2019-07-03 RX ADMIN — Medication 50 MILLIGRAM(S): at 13:27

## 2019-07-03 RX ADMIN — Medication 50 MILLIGRAM(S): at 22:01

## 2019-07-03 RX ADMIN — HYDROMORPHONE HYDROCHLORIDE 0.5 MILLIGRAM(S): 2 INJECTION INTRAMUSCULAR; INTRAVENOUS; SUBCUTANEOUS at 00:00

## 2019-07-03 RX ADMIN — HYDROMORPHONE HYDROCHLORIDE 0.5 MILLIGRAM(S): 2 INJECTION INTRAMUSCULAR; INTRAVENOUS; SUBCUTANEOUS at 22:40

## 2019-07-03 RX ADMIN — HYDROMORPHONE HYDROCHLORIDE 0.5 MILLIGRAM(S): 2 INJECTION INTRAMUSCULAR; INTRAVENOUS; SUBCUTANEOUS at 00:25

## 2019-07-03 RX ADMIN — Medication 3 MILLILITER(S): at 20:26

## 2019-07-03 RX ADMIN — SEVELAMER CARBONATE 800 MILLIGRAM(S): 2400 POWDER, FOR SUSPENSION ORAL at 17:04

## 2019-07-03 RX ADMIN — ISOSORBIDE DINITRATE 20 MILLIGRAM(S): 5 TABLET ORAL at 13:27

## 2019-07-03 RX ADMIN — PANTOPRAZOLE SODIUM 40 MILLIGRAM(S): 20 TABLET, DELAYED RELEASE ORAL at 05:10

## 2019-07-03 RX ADMIN — ISOSORBIDE DINITRATE 20 MILLIGRAM(S): 5 TABLET ORAL at 22:01

## 2019-07-03 RX ADMIN — HYDROMORPHONE HYDROCHLORIDE 0.5 MILLIGRAM(S): 2 INJECTION INTRAMUSCULAR; INTRAVENOUS; SUBCUTANEOUS at 22:10

## 2019-07-03 RX ADMIN — ISOSORBIDE DINITRATE 20 MILLIGRAM(S): 5 TABLET ORAL at 05:09

## 2019-07-03 RX ADMIN — Medication 100 MICROGRAM(S): at 05:09

## 2019-07-03 RX ADMIN — Medication 1 SPRAY(S): at 17:04

## 2019-07-03 RX ADMIN — Medication 3 MILLILITER(S): at 00:17

## 2019-07-03 RX ADMIN — SEVELAMER CARBONATE 800 MILLIGRAM(S): 2400 POWDER, FOR SUSPENSION ORAL at 13:26

## 2019-07-03 RX ADMIN — HYDROMORPHONE HYDROCHLORIDE 0.5 MILLIGRAM(S): 2 INJECTION INTRAMUSCULAR; INTRAVENOUS; SUBCUTANEOUS at 06:02

## 2019-07-03 RX ADMIN — Medication 81 MILLIGRAM(S): at 13:26

## 2019-07-03 RX ADMIN — AMLODIPINE BESYLATE 5 MILLIGRAM(S): 2.5 TABLET ORAL at 05:09

## 2019-07-03 RX ADMIN — Medication 100 MILLIGRAM(S): at 05:09

## 2019-07-03 RX ADMIN — HYDROMORPHONE HYDROCHLORIDE 0.5 MILLIGRAM(S): 2 INJECTION INTRAMUSCULAR; INTRAVENOUS; SUBCUTANEOUS at 13:25

## 2019-07-03 RX ADMIN — SEVELAMER CARBONATE 800 MILLIGRAM(S): 2400 POWDER, FOR SUSPENSION ORAL at 09:50

## 2019-07-03 RX ADMIN — SENNA PLUS 2 TABLET(S): 8.6 TABLET ORAL at 22:01

## 2019-07-03 RX ADMIN — HYDROMORPHONE HYDROCHLORIDE 0.5 MILLIGRAM(S): 2 INJECTION INTRAMUSCULAR; INTRAVENOUS; SUBCUTANEOUS at 06:22

## 2019-07-03 RX ADMIN — ATORVASTATIN CALCIUM 80 MILLIGRAM(S): 80 TABLET, FILM COATED ORAL at 22:01

## 2019-07-03 RX ADMIN — Medication 10 MILLIGRAM(S): at 05:09

## 2019-07-03 RX ADMIN — Medication 1 SPRAY(S): at 05:10

## 2019-07-03 RX ADMIN — Medication 25 MILLIGRAM(S): at 17:04

## 2019-07-03 RX ADMIN — CHLORHEXIDINE GLUCONATE 1 APPLICATION(S): 213 SOLUTION TOPICAL at 13:27

## 2019-07-03 RX ADMIN — Medication 50 MILLIGRAM(S): at 05:09

## 2019-07-03 RX ADMIN — Medication 100 MILLIGRAM(S): at 17:04

## 2019-07-03 NOTE — PROGRESS NOTE ADULT - SUBJECTIVE AND OBJECTIVE BOX
KAUSHIK HOFFMAN Male 79y [id number]    Patient is a 79y old  Male who presents with a chief complaint of confusion (02 Jul 2019 12:53)      Subjective/objective:  Pt seen/ examined in HD unit and charts reviewed, no over night event reported by night staff. Pt is awake/ alert, report baseline SOB- not improving, also pruritis. Has no other complaints.     Review of system:  No fever, chills, nausea, vomiting, headache, dizziness or palpitation.      PHYSICAL EXAM:    Vital Signs Last 24 Hrs  T(C): 36.6 (03 Jul 2019 11:18), Max: 36.8 (02 Jul 2019 15:12)  T(F): 97.9 (03 Jul 2019 11:18), Max: 98.2 (02 Jul 2019 15:12)  HR: 64 (03 Jul 2019 11:18) (50 - 88)  BP: 122/60 (03 Jul 2019 11:18) (113/54 - 158/66)  BP(mean): --  RR: 20 (03 Jul 2019 11:18) (18 - 20)  SpO2: 100% (03 Jul 2019 11:18) (91% - 100%)    GENERAL: Pt lying comfortably, NAD. Elderly male.   CHEST/LUNG: Crackles+, no wheezing, good air entry.   HEART: S1S2+, Regular rate and rhythm; No murmurs.  ABDOMEN: Soft, Nontender, Nondistended; Bowel sounds present.  Extremities: No LE edema, pulses+  NEURO: AAOX3, no focal deficits, no motor r sensory loss.  PSYCH: normal mood.          MEDICATIONS  (STANDING):  amLODIPine   Tablet 5 milliGRAM(s) Oral daily  aspirin  chewable 81 milliGRAM(s) Oral daily  atorvastatin 80 milliGRAM(s) Oral at bedtime  chlorhexidine 2% Cloths 1 Application(s) Topical daily  clopidogrel Tablet 75 milliGRAM(s) Oral daily  docusate sodium 100 milliGRAM(s) Oral two times a day  fluticasone propionate 50 MICROgram(s)/spray Nasal Spray 1 Spray(s) Both Nostrils two times a day  hydrALAZINE 50 milliGRAM(s) Oral every 8 hours  isosorbide   dinitrate Tablet (ISORDIL) 20 milliGRAM(s) Oral three times a day  levothyroxine 100 MICROGram(s) Oral daily  metoprolol tartrate 25 milliGRAM(s) Oral two times a day  pantoprazole    Tablet 40 milliGRAM(s) Oral before breakfast  predniSONE   Tablet 10 milliGRAM(s) Oral daily  senna 2 Tablet(s) Oral at bedtime  sevelamer carbonate 800 milliGRAM(s) Oral three times a day with meals    MEDICATIONS  (PRN):  ALBUTerol/ipratropium for Nebulization 3 milliLiter(s) Nebulizer every 6 hours PRN Shortness of Breath and/or Wheezing  bisacodyl Suppository 10 milliGRAM(s) Rectal daily PRN Constipation  HYDROmorphone  Injectable 0.5 milliGRAM(s) IV Push four times a day PRN Dyspnea  hydrOXYzine hydrochloride 25 milliGRAM(s) Oral every 6 hours PRN Itching        Labs:  LABS:                        8.7    7.61  )-----------( 166      ( 01 Jul 2019 13:39 )             30.0     07-01    139  |  99  |  57.0<H>  ----------------------------<  132<H>  5.0   |  25.0  |  7.76<H>    Ca    8.6      01 Jul 2019 13:39

## 2019-07-03 NOTE — CONSULT NOTE ADULT - ASSESSMENT
79M with PMHx of ESRD, CAD, h/o lung cancer with RT pulmonary fibrosis/ILD, HFpEF, HTN, HLD, bipolar disorder, recurrent hospitalization admitted for progressive dyspnea. CXR with chronic diffuse ILD, bilateral pleural effusions. Palliative consulted for goc.     PLAN    Dyspnea  -  multifactorial due to ILD/pulm fibrosis, pleural effusion, CHF, ?component of anxiety  - c/w prednisone and IV hydromorphone PRN (started by primary team on 6/28), nebs/bronchodilators    Pulmonary Fibrosis (likely RT induced)/Chronic ILD  - seen by pulmonary in past admissions  - may benefit from pulm consult/input on respiratory status to assist with ongoing goc  - c/w O2 supplement    CAD s/p CABG/stents/HTN/HLD  - c/w aspirin, plavix, statin, amlodipine, hydralazine, isosorbide dinitrate, metoprolol    ESRD on HD  - plan per nephrology    Palliative Care Encounter  Pt very tired, deferred to his family (spouse, dtr, HOSSEIN) were present. Pt's overall prognosis is guarded in setting of multiple comorbidities, frailty, rehospitalizations. He had not been hospice appropriate as goals in past remain to continue dialysis, unfortunately this precludes him from services. Unable to assess adequately assess pt's medical decision making capacity at present time due to drowsiness. HOSSIEN indicated that HCPs are grandson Frances [phone] and alternate is Selma [phone]. HOSSEIN also informed me that a MOLST was filled out in past indicating pt's DNR/DNI status. A copy of MOLST from 2/8/19 was located in alpha but will need to confirm directives with pt/HCP as encounter in 4/2019 by my colleague, pt had rescinded at that time.  I called and spoke with Frances to coordinate family meeting to address GOC and confirm advance directives; he plans to call me back regarding his availability.
A/P: 78 y/o M with a PMHx of HFpEF (EF 50-55%), CAD s/p CABG (ANA-LAD, stents to LCx) with recent NST (04/19 mild ischemia, medically managed), ESRD on HD (T, Th, Sat) via L AVF, Lung Ca s/p radiation on home oxygen who presents to the ED with worsening dyspnea. Patient was just discharged from the hospital 5 days ago for similar symptoms. Patient states that he has been taking his medications as prescribed, and had HD yesterday. Patient states that this morning he began feeling like he couldn't breathe. Patient is scheduled for dialysis today. Patient denies fevers, chills, CP, palpitations, abdominal pain, N/V/D, headache, or dizziness.     1. HFpEF   - EF 50-55%  - Continue bumex 2mg PO qday  - Nephro following, plan for dialysis today  - Symptoms already improving  - Patient requesting portable oxygen, states he only has at home and at dialysis but not if he goes home. Social work eval  - Cont supplement Oxygen  - Continue HD    2. CAD  - No chest pain today.  -  Recent cardiac cath 3/12, no new blockages  - Nuclear stress test 04/19 with mild ischemia, medically managed  - No further interventions planned at this time.   - Troponin elevated secondary to ESRD, no further work up  - Continue ASA, statin, metoprolol, losartan, and Imdur     3. Bradycardia  - HR in the 50s, reading as 30s on telemetry due to PVCs  - Episodes of Tachy/Alvaro on telemetry during previous admissions   - Continue metoprolol 50mg PO BID
1)ESRD on HD  2) MBD of renal dx  3) Anemia of renal dx  4) Vol HTN    Plan for HD today  Check phosphorus; may require binder  Restart epo 10k units TIW; anemia not at goal  1kg UF  Consented for HD ; in chart;    d/w Dr Coughlin
Assess    COPD  Stable chronic pulmonary fibrosis - mostly on right.  Right apical post RT change suspect  ESRD  CAD  Probable GERD  Poor outlook - family wishes to continue HD (I don't disagree)    Rec    LABA/ICS and LAMA  Truncal elevation at night  Avoid food within 90 minutes of hs  02 as needed  Palliative measures  O/P FU

## 2019-07-03 NOTE — PROGRESS NOTE ADULT - ASSESSMENT
Pt is 78 y/o Male with a history of ESRD on HD, HTN, CAD, and lung cancer presenting with increased dyspnea, likely multifactorial with underlying lung cancer, ILD/ pulmonary fibrosis and volume overload with CHF/ ESRD    >Dyspnea: Chronic hypoxic respiratory failure.  - Most likely multifactorial in nature given history of lung cancer, heart failure, pulmonary fibrosis. On supplemental oxygen.    - C/w Duonebs.   - HD for volume removal.   - on Prn Dilaudid by palliative   - Repeat CXR.   - Will get pulmonary input on respiratory status to assist with ongoing GOC as per Palliative.  - palliative following.        >Hx HTN- Had Hypotension on and off - Resolved  - C/w amlodipine/ BB/ hydralazine with holding parameters.     >Chronic diastolic heart failure:  - Not in exacerbation at this time, not volume overload.   - C/w HD for volume removal.   - on isosorbide and metoprolol.    >CAD - Stable   - elevated troponin in the setting of renal failure. Prior ischemic evaluation was without significant findings. On aspirin/ clopidogrel/ BB/ Statins.    >ESRD - Nephrology on board, c/w HD per schedule.     >Lung cancer / Lung fibrosis - On prednisone. Supplemental oxygen for hypoxia.    >Hypothyroidism - On levothyroxine.    >GOC- Palliative on board to assists in GOC. Pt is 80 y/o Male with a history of ESRD on HD, HTN, HFpEF, CAD s/p CABG, and lung cancer s/p radiation on Home Oxygen, presenting with increased dyspnea, likely multifactorial with underlying lung cancer, ILD/ pulmonary fibrosis and volume overload with CHF/ ESRD    >Dyspnea: Chronic hypoxic respiratory failure.  - Most likely multifactorial in nature given history of lung cancer, heart failure, pulmonary fibrosis.   - On home supplemental oxygen.    - C/w Duonebs.   - HD for volume removal.   - on Prn Dilaudid by palliative   - Repeat CXR.   - Will get pulmonary input on respiratory status to assist with ongoing GOC as per Palliative.  - palliative following.        >Hx HTN- Had Hypotension on and off - Resolved  - C/w amlodipine/ BB/ hydralazine with holding parameters.     >Chronic diastolic heart failure:  - Not in exacerbation at this time, not volume overload.   - C/w HD for volume removal.   - on isosorbide and metoprolol.  - Was seen by cardiology yon admission.     >CAD - Stable   - elevated troponin in the setting of renal failure. Prior ischemic evaluation was without significant findings. On aspirin/ clopidogrel/ BB/ Statins.    >ESRD - Nephrology on board, c/w HD per schedule.     >Lung cancer / Lung fibrosis - On prednisone. Supplemental oxygen for hypoxia. S/p radiation. Pulmonary consult requested.     >Hypothyroidism - On levothyroxine.    >GOC- Palliative on board to assists in GOC.

## 2019-07-03 NOTE — PROGRESS NOTE ADULT - ASSESSMENT
79M with PMHx of ESRD, CAD, h/o lung cancer, pulmonary fibrosis/ILD, HFpEF, HTN, HLD, bipolar disorder, recurrent hospitalization admitted for progressive dyspnea. CXR with chronic diffuse ILD, bilateral pleural effusions.      PLAN    Dyspnea  -  multifactorial due to ILD/pulm fibrosis, pleural effusion, CHF, anxiety  - c/w prednisone and IV hydromorphone PRN (started by primary team on 6/28), nebs/bronchodilators    Pulmonary Fibrosis/Chronic ILD/hx of Lung Ca with RT/chemo  - pulm noted, appreciate input  - c/w O2 supplement    CAD s/p CABG/stents/HTN/HLD  - c/w aspirin, plavix, statin, amlodipine, hydralazine, isosorbide dinitrate, metoprolol    ESRD on HD  - plan per nephrology    Palliative Care Encounter  Family meeting today with pt, grandson/HCP Frances, HARESH, dtr, spouse; refer to goc for full details. Pt/family with good understanding of pt's overall guarded prognosis and high risk of rehospitalization. Explored options including continued all medical interventions including HD with followup of community physicians vs comfort measure approach with hospice services. Pt would be a good candidate for home with hospice however dialysis precludes him from eligibility. Pt/family to further discuss with rest of family.   - Advance directives reviewed, pt remains full code.  - HCP form provided by family, designated to yobany Daniels  - Plan of care: Continue current medical interventions at present time  - SW/CM to follow for ongoing discharge planning

## 2019-07-03 NOTE — GOALS OF CARE CONVERSATION - PERSONAL ADVANCE DIRECTIVE - CONVERSATION DETAILS
Family meeting today at 3PM with pt, spouse, dtr Carin, HOSSEIN Escalante, grandson/HCP Frances.    We reviewed pt's underlying comorbidities including COPD, increasing O2 dependence (6L presently, at home 3-4 L), ILD, Pulmonary fibrosis, ESRD on HD, multiple recurrent hospitalizations. Explained that prognosis is guarded and high risk of rehospitalization. We explored goc and what is most important to pt. Pt shared that his overall quality of life has worsened over the past several months, between coming in and out of hospital due to his breathing worsened by anxiety at home, feels tired after HD. Family also express same concerns. Explored tx options including 1) continued medical interventions including HD, needs close follow up with pulmonary OP, oncology OP to re-eval status of lung cancer (last seen by onc over 2 yrs ago in [place] per pt), rest of his treating physicians or 2) focus on quality of life, staying out of hospital, comfort measure approach with no further aggressive/invasive interventions including HD as it would not cure his underlying issues, allow natural disease progression while optimizing sx control and hospice services.     Pt participated in some parts of discussion but frequently deferred to his family in particular HOSSEIN Boris and grandson Frances in helping to make decisions. Pt very Protestant and repeated referencing to leaving it in the hands of spiritual sara.     Reviewed Advance directives. Pt in past in 2/2019 completed a MOLST for DNR/DNI however in 4/2019 hospitalization had rescinded.  Family feel DNR/DNI is appropriate as they feel it would impart more pain and suffering should he undergo CPR/intubation. Pt does have good insight and medical decision making capacity, desires to remain full code at this time.      Pt/family to speak further with rest of family on the topics discussed above. At present time, to continue current medical interventions.

## 2019-07-03 NOTE — PROGRESS NOTE ADULT - SUBJECTIVE AND OBJECTIVE BOX
[facility] DIVISION OF KIDNEY DISEASES AND HYPERTENSION -- HEMODIALYSIS NOTE  --------------------------------------------------------------------------------  Chief Complaint: ESRD/Ongoing hemodialysis requirement    24 hour events/subjective:  Seen/examined on HD today      PAST HISTORY  --------------------------------------------------------------------------------  No significant changes to PMH, PSH, FHx, SHx, unless otherwise noted    ALLERGIES & MEDICATIONS  --------------------------------------------------------------------------------  Allergies    No Known Allergies    Intolerances      Standing Inpatient Medications  amLODIPine   Tablet 5 milliGRAM(s) Oral daily  aspirin  chewable 81 milliGRAM(s) Oral daily  atorvastatin 80 milliGRAM(s) Oral at bedtime  chlorhexidine 2% Cloths 1 Application(s) Topical daily  clopidogrel Tablet 75 milliGRAM(s) Oral daily  docusate sodium 100 milliGRAM(s) Oral two times a day  fluticasone propionate 50 MICROgram(s)/spray Nasal Spray 1 Spray(s) Both Nostrils two times a day  hydrALAZINE 50 milliGRAM(s) Oral every 8 hours  isosorbide   dinitrate Tablet (ISORDIL) 20 milliGRAM(s) Oral three times a day  levothyroxine 100 MICROGram(s) Oral daily  metoprolol tartrate 25 milliGRAM(s) Oral two times a day  pantoprazole    Tablet 40 milliGRAM(s) Oral before breakfast  predniSONE   Tablet 10 milliGRAM(s) Oral daily  senna 2 Tablet(s) Oral at bedtime  sevelamer carbonate 800 milliGRAM(s) Oral three times a day with meals    PRN Inpatient Medications  ALBUTerol/ipratropium for Nebulization 3 milliLiter(s) Nebulizer every 6 hours PRN  bisacodyl Suppository 10 milliGRAM(s) Rectal daily PRN  HYDROmorphone  Injectable 0.5 milliGRAM(s) IV Push four times a day PRN  hydrOXYzine hydrochloride 25 milliGRAM(s) Oral every 6 hours PRN      REVIEW OF SYSTEMS  --------------------------------------------------------------------------------  Gen: No weight changes, fatigue, fevers/chills, weakness  Skin: No rashes  Head/Eyes/Ears/Mouth: No headache; Normal hearing; Normal vision w/o blurriness; No sinus pain/discomfort, sore throat  Respiratory: No dyspnea, cough, wheezing, hemoptysis  CV: No chest pain, PND, orthopnea  GI: No abdominal pain, diarrhea, constipation, nausea, vomiting, melena, hematochezia  : No increased frequency, dysuria, hematuria, nocturia  MSK: No joint pain/swelling; no back pain; no edema  Neuro: No dizziness/lightheadedness, weakness, seizures, numbness, tingling  Heme: No easy bruising or bleeding  Endo: No heat/cold intolerance  Psych: No significant nervousness, anxiety, stress, depression    All other systems were reviewed and are negative, except as noted.    VITALS/PHYSICAL EXAM  --------------------------------------------------------------------------------  T(C): 36.6 (07-03-19 @ 11:18), Max: 36.8 (07-02-19 @ 15:12)  HR: 64 (07-03-19 @ 11:18) (50 - 88)  BP: 122/60 (07-03-19 @ 11:18) (113/54 - 158/66)  RR: 20 (07-03-19 @ 11:18) (18 - 20)  SpO2: 100% (07-03-19 @ 11:18) (91% - 100%)  Wt(kg): --  Height (cm): 167.64 (07-01-19 @ 20:53)      07-02-19 @ 07:01  -  07-03-19 @ 07:00  --------------------------------------------------------  IN: 240 mL / OUT: 0 mL / NET: 240 mL    07-03-19 @ 07:01  -  07-03-19 @ 12:54  --------------------------------------------------------  IN: 0 mL / OUT: 2000 mL / NET: -2000 mL      Physical Exam:  	Gen: NAD, frail, pale    	HEENT: EOMI, neck supple, no JVD  	Pulm: dec BS; fine crackles BL, nasal O2  	CV: RRR, S1S2; no rub  	Back: No spinal or CVA tenderness; no sacral edema  	Abd: +BS, soft, nontender/nondistended  	: No suprapubic tenderness  	UE: Warm, FROM, no clubbing, intact strength; no edema; no asterixis  	LE: Warm, FROM, no clubbing, intact strength; + pedal edema  	Neuro: No focal deficits,   	Psych: Flat  affect and mood  	Skin: Warm, without rashes  	Vascular access:  CVC    LABS/STUDIES  --------------------------------------------------------------------------------              8.7    7.61  >-----------<  166      [07-01-19 @ 13:39]              30.0     139  |  99  |  57.0  ----------------------------<  132      [07-01-19 @ 13:39]  5.0   |  25.0  |  7.76        Ca     8.6     [07-01-19 @ 13:39]            Iron 58, TIBC 256, %sat 23      [06-19-19 @ 08:42]  Ferritin 812      [06-19-19 @ 08:42]  PTH -- (Ca 8.1)      [07-01-19 @ 13:24]   83  TSH 0.89      [05-23-19 @ 06:28]  Lipid: chol 159, , HDL 32, LDL 94      [01-29-19 @ 11:39]    HBsAb <3.0      [06-27-19 @ 19:39]  HBsAb Nonreact      [06-27-19 @ 19:39]  HBsAg Reactive      [06-27-19 @ 19:39]  HBcAb Nonreact      [06-27-19 @ 19:39]  HCV 0.10, Nonreact      [06-27-19 @ 19:39]

## 2019-07-03 NOTE — CONSULT NOTE ADULT - SUBJECTIVE AND OBJECTIVE BOX
[facility] DIVISION OF KIDNEY DISEASES AND HYPERTENSION -- INITIAL CONSULT NOTE  --------------------------------------------------------------------------------  HPI:    79-year-old male with PMH ESRD (On HD Tue-Thu-Sat), HTN, CAD s/p CABG and Stents, Lung Ca (s/p radiation, on home O2) presents to the ED with worsening shortness of breath. Patient was seen here 5 days ago for same and admitted. Patient reports compliance with all medications and is due for HD tomorrow. Patient noted to be bradycardic in 30's at times.  Patient denies chest pain, palpitations, dizziness, edema, fever, chills. Reports non productive dry cough.  Seen/examined; complaining of worsening SOB; on home oxygen. Will dialyze today; pt consented.    PAST HISTORY  --------------------------------------------------------------------------------  PAST MEDICAL & SURGICAL HISTORY:  Chronic anemia  ESRD (end stage renal disease): on HD (Tue-Thu-Sat) through RUE fistula  CAD (coronary artery disease): s/p remote CABG and multiple stents  Lung cancer: had yoni radiation in 2006.  Bipolar disorder  High cholesterol  Hypertension  S/P CABG (coronary artery bypass graft)    FAMILY HISTORY:  Family history of leukemia (Child)  Family history of diabetes mellitus (Child)    PAST SOCIAL HISTORY:    ALLERGIES & MEDICATIONS  --------------------------------------------------------------------------------  Allergies    No Known Allergies    Intolerances      Standing Inpatient Medications    PRN Inpatient Medications      REVIEW OF SYSTEMS  --------------------------------------------------------------------------------  Gen: No weight changes, fatigue, fevers/chills, weakness  Skin: No rashes  Head/Eyes/Ears/Mouth: No headache; Normal hearing; Normal vision w/o blurriness; No sinus pain/discomfort, sore throat  Respiratory: SOB; RODRIGUEZ  CV: No chest pain, PND, orthopnea  GI: No abdominal pain, diarrhea, constipation, nausea, vomiting, melena, hematochezia  : No increased frequency, dysuria, hematuria, nocturia  MSK: No joint pain/swelling; no back pain; no edema  Neuro: No dizziness/lightheadedness, weakness, seizures, numbness, tingling  Heme: No easy bruising or bleeding  Endo: No heat/cold intolerance  Psych: No significant nervousness, anxiety, stress, depression    All other systems were reviewed and are negative, except as noted.    VITALS/PHYSICAL EXAM  --------------------------------------------------------------------------------  T(C): 36.7 (06-26-19 @ 08:32), Max: 36.7 (06-26-19 @ 08:32)  HR: 44 (06-26-19 @ 10:02) (44 - 48)  BP: 120/57 (06-26-19 @ 10:02) (107/42 - 120/57)  RR: 20 (06-26-19 @ 10:02) (16 - 20)  SpO2: 100% (06-26-19 @ 10:02) (97% - 100%)  Wt(kg): --    Weight (kg): 60 (06-26-19 @ 08:32)      Physical Exam:  	Gen: NAD,    	HEENT: venturi mask  	Pulm: dec BS; fine crackles BL  	CV: RRR, S1S2; no rub  	Back: No spinal or CVA tenderness; no sacral edema  	Abd: +BS, soft, nontender/nondistended  	: No suprapubic tenderness  	UE: Warm, FROM, no clubbing, intact strength; no edema; no asterixis  	LE: Warm, FROM, no clubbing, intact strength; + pedal edema  	Neuro: No focal deficits, intact gait  	Psych: Normal affect and mood  	Skin: Warm, without rashes  	Vascular access:    LABS/STUDIES  --------------------------------------------------------------------------------              7.8    11.41 >-----------<  172      [06-26-19 @ 09:29]              26.4     137  |  93  |  24.0  ----------------------------<  99      [06-26-19 @ 09:29]  3.9   |  31.0  |  3.35        Ca     7.8     [06-26-19 @ 09:29]    TPro  6.4  /  Alb  3.5  /  TBili  0.3  /  DBili  x   /  AST  30  /  ALT  28  /  AlkPhos  78  [06-26-19 @ 09:29]    PT/INR: PT 10.5 , INR 0.92       [06-26-19 @ 09:29]  PTT: 29.3       [06-26-19 @ 09:29]    Troponin 0.07      [06-26-19 @ 09:29]    Creatinine Trend:  SCr 3.35 [06-26 @ 09:29]  SCr 6.61 [06-20 @ 08:30]  SCr 4.44 [06-19 @ 08:42]  SCr 5.43 [06-17 @ 23:53]    Urinalysis - [01-01-19 @ 15:02]      Color Red / Appearance Bloody / SG 1.015 / pH 6.0      Gluc Negative / Ketone Negative  / Bili Negative / Urobili Negative       Blood Large / Protein 500 / Leuk Est Trace / Nitrite Negative      RBC TNTC / WBC 3-5 / Hyaline  / Gran  / Sq Epi  / Non Sq Epi Negative / Bacteria Occasional      Iron 58, TIBC 256, %sat 23      [06-19-19 @ 08:42]  Ferritin 812      [06-19-19 @ 08:42]  TSH 0.89      [05-23-19 @ 06:28]  Lipid: chol 159, , HDL 32, LDL 94      [01-29-19 @ 11:39]    HBsAb <3.0      [01-08-19 @ 17:03]  HBsAg Nonreact      [04-10-19 @ 17:37]  HBcAb Nonreact      [12-05-18 @ 16:33]  HCV 0.12, Nonreact      [01-08-19 @ 17:03]
PULMONARY CONSULT NOTE      KAUSHIK [id number]    Patient is a 79y old  Male who presents with a chief complaint of ESRD HD (03 Jul 2019 12:54)      HISTORY OF PRESENT ILLNESS:    Mr. Hoffman is a 79 year old male with PMHx of ESRD on HD, CAD s/p CABG and stents, h/o Lung cancer s/p chemo and RT (oncologist in [place] and no subsequent follow up for past 2 years per family), pulmonary fibrosis, O2 dependent (3-4L), HFpEF, HTN, HLD, bipolar admitted for worsening dyspnea.  Never seen in our office   Frequent admissions - I last saw him here in June - was at [facility] shortly before that  Palliative has seen him several times - issue is family wants to continue HD so can't be home hospice  Little to suggest active ILD      MEDICATIONS  (STANDING):  amLODIPine   Tablet 5 milliGRAM(s) Oral daily  aspirin  chewable 81 milliGRAM(s) Oral daily  atorvastatin 80 milliGRAM(s) Oral at bedtime  chlorhexidine 2% Cloths 1 Application(s) Topical daily  clopidogrel Tablet 75 milliGRAM(s) Oral daily  docusate sodium 100 milliGRAM(s) Oral two times a day  fluticasone propionate 50 MICROgram(s)/spray Nasal Spray 1 Spray(s) Both Nostrils two times a day  hydrALAZINE 50 milliGRAM(s) Oral every 8 hours  isosorbide   dinitrate Tablet (ISORDIL) 20 milliGRAM(s) Oral three times a day  levothyroxine 100 MICROGram(s) Oral daily  metoprolol tartrate 25 milliGRAM(s) Oral two times a day  pantoprazole    Tablet 40 milliGRAM(s) Oral before breakfast  predniSONE   Tablet 10 milliGRAM(s) Oral daily  senna 2 Tablet(s) Oral at bedtime  sevelamer carbonate 800 milliGRAM(s) Oral three times a day with meals      MEDICATIONS  (PRN):  ALBUTerol/ipratropium for Nebulization 3 milliLiter(s) Nebulizer every 6 hours PRN Shortness of Breath and/or Wheezing  bisacodyl Suppository 10 milliGRAM(s) Rectal daily PRN Constipation  HYDROmorphone  Injectable 0.5 milliGRAM(s) IV Push four times a day PRN Dyspnea  hydrOXYzine hydrochloride 25 milliGRAM(s) Oral every 6 hours PRN Itching      Allergies    No Known Allergies    Intolerances        PAST MEDICAL & SURGICAL HISTORY:  Chronic anemia  ESRD (end stage renal disease): on HD (Tue-Thu-Sat) through RUE fistula  CAD (coronary artery disease): s/p remote CABG and multiple stents  Lung cancer: had yoni radiation in 2006.  Bipolar disorder  High cholesterol  Hypertension  S/P CABG (coronary artery bypass graft)      FAMILY HISTORY:  Family history of leukemia (Child)  Family history of diabetes mellitus (Child)      SOCIAL HISTORY  Smoking History:     REVIEW OF SYSTEMS:    CONSTITUTIONAL:  No fevers, chills, sweats    HEENT:  Eyes:  No diplopia or blurred vision. ENT:  No earache, sore throat or runny nose.    CARDIOVASCULAR:  No pressure, squeezing, tightness, or heaviness about the chest; no palpitations.    RESPIRATORY:  OK post HD    GASTROINTESTINAL:  No abdominal pain, nausea, vomiting or diarrhea.    GENITOURINARY:  No dysuria, frequency or urgency.    NEUROLOGIC:  No paresthesias, fasciculations, seizures or weakness.    PSYCHIATRIC:  No disorder of thought or mood.    Vital Signs Last 24 Hrs  T(C): 36.6 (03 Jul 2019 11:18), Max: 36.8 (02 Jul 2019 15:12)  T(F): 97.9 (03 Jul 2019 11:18), Max: 98.2 (02 Jul 2019 15:12)  HR: 64 (03 Jul 2019 11:18) (50 - 88)  BP: 122/60 (03 Jul 2019 11:18) (113/54 - 158/66)  BP(mean): --  RR: 20 (03 Jul 2019 11:18) (18 - 20)  SpO2: 100% (03 Jul 2019 11:18) (91% - 100%)    PHYSICAL EXAMINATION:    GENERAL: The patient is a well-developed, well-nourished _____in no apparent distress.     HEENT: Head is normocephalic and atraumatic. Extraocular muscles are intact. Mucous membranes are moist.     NECK: Supple.     LUNGS: Basilar creps to auscultation without wheezing,  or rhonchi. Respirations unlabored    HEART: Regular rate and rhythm without murmur.    ABDOMEN: Soft, nontender, and nondistended.  No hepatosplenomegaly is noted.    EXTREMITIES: Without any cyanosis, clubbing, rash, lesions or edema.    NEUROLOGIC: Grossly intact.      LABS:                        8.7    7.61  )-----------( 166      ( 01 Jul 2019 13:39 )             30.0     07-01    139  |  99  |  57.0<H>  ----------------------------<  132<H>  5.0   |  25.0  |  7.76<H>    Ca    8.6      01 Jul 2019 13:39          RADIOLOGY & ADDITIONAL STUDIES:    CT on 6/18/19  IMPRESSION:     Emphysematous changes.    Left pleural effusion with left basilar compressive atelectasis unchanged   since 2/4/2019.    Right apical fibrosis with interstitial changes at the right lung base   and bronchiectasis in the right lower lobe, unchanged. Cannot exclude   superimposed inflammatory disease in the right lower lobe.    Cardiomegaly.    BERT BARKSDALE M.D., ATTENDING RADIOLOGIST  This document has been electronically signed. Jun 18 2019  8:55PM
Patient is a 79y old  Male who presents with a chief complaint of SOB (26 Jun 2019 13:10)      HPI:  78 y/o M with a PMHx of HFpEF (EF 50-55%), CAD s/p CABG (ANA-LAD, stents to LCx) with recent NST (04/19 mild ischemia, medically managed), ESRD on HD (T, Th, Sat) via L AVF, Lung Ca s/p radiation on home oxygen who presents to the ED with worsening dyspnea. Patient was just discharged from the hospital 5 days ago for similar symptoms. Patient states that he has been taking his medications as prescribed, and had HD yesterday. Patient states that this morning he began feeling like he couldn't breathe. Patient is scheduled for dialysis today. Patient denies fevers, chills, CP, palpitations, abdominal pain, N/V/D, headache, or dizziness.       PAST MEDICAL & SURGICAL HISTORY:  Chronic anemia  ESRD (end stage renal disease): on HD (Tue-Thu-Sat) through RUE fistula  CAD (coronary artery disease): s/p remote CABG and multiple stents  Lung cancer: had yoni radiation in 2006.  Bipolar disorder  High cholesterol  Hypertension  S/P CABG (coronary artery bypass graft)      PREVIOUS DIAGNOSTIC TESTING:      ECHO  FINDINGS:  < from: TTE Echo w/Cont Complete (03.11.19 @ 20:35) >  PHYSICIAN INTERPRETATION:  Left Ventricle: The left ventricular internal cavity size is normal.  Global LV systolic function was low normal. Left ventricular ejection   fraction, by visual estimation, is 50 to 55%. Spectral Doppler shows   impaired relaxation pattern of left ventricular myocardial filling (Grade   I diastolic dysfunction).  Right Ventricle: The right ventricular size is normal. RV systolic   function is normal.  Left Atrium: Moderately enlarged left atrium.  Pericardium: There is no evidence of pericardial effusion.  Mitral Valve: Thickening of the anterior and posterior mitral valve   leaflets. There is mild to moderate mitral annular calcification. Mild   mitral valve regurgitation is seen.  Tricuspid Valve: The tricuspid valve is normal.  Aortic Valve: Sclerotic aortic valve with normal opening. No evidence of   aortic valve regurgitation is seen.  Pulmonic Valve: The pulmonic valve was not well visualized.  Aorta: The aortic root is normal in size and structure.  Venous: The inferior vena cava is normal. The inferior vena cava was   normal sized, with respiratory size variation greater than 50%.       Summary:   1. Left ventricular ejection fraction, by visual estimation, is 50 to   55%.   2. Low normal global left ventricular systolic function.   3. Spectral Doppler shows impaired relaxation pattern of left   ventricular myocardial filling (Grade I diastolic dysfunction).   4. There is no left ventricular hypertrophy.   5. Moderately enlarged left atrium.   6. Mild to moderate mitral annular calcification.   7. Thickening of the anterior and posterior mitral valve leaflets.   8. Sclerotic aortic valve with normal opening.    [id number] Alex Pastrana MD, Electronically signed on 3/12/2019 at 6:33:43 PM    < end of copied text >      STRESS  FINDINGS:  < from: Nuclear Stress Test-Pharmacologic (04.24.19 @ 12:21) >  NUCLEAR FINDINGS:  There is a small, mild to moderate defect in apical  wall  that is partially reversiblesuggestive of very mild  ischemia.  ------------------------------------------------------------------------      GATED ANALYSIS:  Gated wall motion analysis is performed, and shows  paradoxical spetal motion with post stress LVEF of 54%.  ------------------------------------------------------------------------      LV/RV OBSERVATION:  Normal LV ejection fraction.  ------------------------------------------------------------------------    IMPRESSIONS:  * There is a small, mild to moderate defectin apical  wall that is partially reversible suggestive of very mild  ischemia.  * Gated wall motion analysis is performed, and shows  paradoxical spetal motion with post stress LVEF of 54%.  * Chest Pain: No chest pain with administration of  Regadenoson.  * Symptom: No Symptom.  * HR Response: Appropriate.  * BP Response: Appropriate.  * Heart Rhythm: Normal Sinus Rhythm.  * ECG Abnormalities: There were no diagnostic changes.  * Arrhythmia: None.  * post infusion 05:50 complaints of abdominal pain vomited  gave 50mg Aminophyllin, with continued nausea and vomitine  another 25 mg given 10:42    ------------------------------------------------------------------------      ------------------------------------------------------------------------    Confirmed on  4/25/2019 - 18:35:27 by Enrique Nesbitt MD  Cardiology Fellow: Shelby Giraldo NP  ------------------------------------------------------------------------    < end of copied text >      CATHETERIZATION  FINDINGS:  < from: Cardiac Cath Lab - Adult (03.12.19 @ 16:57) >  VENTRICLES: There were no left ventricular global or regional wall motion  abnormalities. Analysis of regional contractile function demonstrated  severe diaphragmatic hypokinesis. EF estimated was 65 %.  VALVES: AORTIC VALVE: No significant aortic valve gradient.  CORONARY VESSELS: The coronary circulation is right dominant.  LM:   --  LM: Normal. The vessel was normal sized, heavily calcified, and  moderately tortuous. Angiography showed moderate atherosclerosis. There  was a discrete 30 % stenosis at the site of a prior stent, at the ostium  of the vessel segment. The lesion was without evidence of thrombus. There  was BRUCE grade 3 flow through the vessel (brisk flow).  LAD:   --  LAD: The vessel was normal sized, heavily calcified, and  moderately tortuous. Angiography showed severe atherosclerosis. There was  a diffuse 100 % stenosis at the ostium of the vessel segment. The lesion  was without evidence of thrombus. There was BRUCE grade 0 flow through the  vessel (no flow). This lesion is a chronic total occlusion. Fills via  patent ANA.  CX:   --  Circumflex: Normal. The vessel was normal sized, moderately  calcified, and excessively tortuous. Angiography showed mild  atherosclerosis with no flow limiting lesions. Patent stent in Prox LCx  and OM1.  RCA:   --  RCA: Normal. The vessel was normal sized, moderately calcified,  and moderately tortuous. Angiography showed severe atherosclerosis. There  was a diffuse 100 % stenosis at the ostium of the vessel segment, just  after RV marginal 1. The lesion was without evidence of thrombus. There  was BRUCE grade 0 flow through the vessel (no flow). Thislesion is a  chronic total occlusion.  The distal RCA fills from collaterals from the LCx.  GRAFTS:   --  Graft to the LAD: The graft was a normal sized ANA. Graft  angiography showed no degeneration, no calcification, and moderate  tortuosity.  COMPLICATIONS: There were no complications. No complications occurred  during the cath lab visit.  DIAGNOSTIC IMPRESSIONS: There is significant double vessel coronary artery  disease.  Ostial OWF=249%  Ostial NBI=465%  Patent ANA to LAD. Left ventricular function is normal.  LVEF=65%  DIAGNOSTIC RECOMMENDATIONS: The patient should continue with the present  medications. Patient management should include aggressive medical therapy,  close monitoring of BUN and creatinine, resumption of all previous  activities in 2 days, and a cardiac rehabilitation program. Medical  management is recommended.  Prepared and signed by  Job Galvan MD  Signed 03/12/2019 17:50:14    < end of copied text >      Allergies    No Known Allergies    Intolerances    MEDICATIONS  (STANDING):    Home Medications:  albuterol 2.5 mg/3 mL (0.083%) inhalation solution: 3 milliliter(s) inhaled every 6 hours, As Needed for wheezing (18 Jun 2019 06:31)  amLODIPine 10 mg oral tablet: 1 tab(s) orally once a day (18 Jun 2019 06:31)  aspirin 81 mg oral tablet, chewable: 1 tab(s) orally once a day (18 Jun 2019 06:31)  diphenhydrAMINE 25 mg oral capsule: 1 cap(s) orally every 4 hours, As needed, Rash and/or Itching (20 Jun 2019 11:46)  docusate sodium 100 mg oral capsule: 1 cap(s) orally 2 times a day (20 Jun 2019 11:46)  isosorbide dinitrate 30 mg oral tablet: 1 tab(s) orally 4 times a day (18 Jun 2019 06:31)  metoprolol tartrate 50 mg oral tablet: 1 tab(s) orally 2 times a day (18 Jun 2019 06:31)  pantoprazole 40 mg oral delayed release tablet: 1 tab(s) orally once a day (18 Jun 2019 06:31)  petrolatum topical ointment: 1 application topically 3 times a day (20 Jun 2019 11:46)  polyethylene glycol 3350 oral powder for reconstitution: 17 gram(s) orally once a day (20 Jun 2019 11:46)  sevelamer carbonate 800 mg oral tablet: 1 tab(s) orally 3 times a day (with meals) (18 Jun 2019 06:31)        FAMILY HISTORY:  Family history of leukemia (Child)  Family history of diabetes mellitus (Child)      SOCIAL HISTORY: Lives at home with his daughter. Former smoker. Denies alcohol or drug use.    REVIEW OF SYSTEMS:  CONSTITUTIONAL: No fever, weight loss, or fatigue  Cardiovascular:   AS PER HPI  Respiratory: AS PER HPI  Genitourinary:  No dysuria, no hematuria;   Gastrointestinal:   No dark color stool, no melena, no diarrhea, no constipation, no abdominal pain;   Neurological: No headache, no dizziness, no slurred speech;    Psychiatric: No agitation, no anxiety.    ALL OTHER REVIEW OF SYSTEMS ARE NEGATIVE.    Vital Signs Last 24 Hrs  T(C): 36.7 (26 Jun 2019 08:32), Max: 36.7 (26 Jun 2019 08:32)  T(F): 98.1 (26 Jun 2019 08:32), Max: 98.1 (26 Jun 2019 08:32)  HR: 44 (26 Jun 2019 10:02) (44 - 48)  BP: 120/57 (26 Jun 2019 10:02) (107/42 - 120/57)  BP(mean): --  RR: 20 (26 Jun 2019 10:02) (16 - 20)  SpO2: 100% (26 Jun 2019 10:02) (97% - 100%)      PHYSICAL EXAM:  Appearance: Normal, well nourished	  HEENT:   Normal oral mucosa, PERRL, EOMI, sclera non-icteric	  Lymphatic: No cervical lymphadenopathy  Cardiovascular: Bradycardic S1 S2, + JVD, II/VI systolic murmur, No carotid bruits, No peripheral edema  Respiratory: Rales at b/l bases  Psychiatry: A & O x 3, Mood & affect appropriate  Gastrointestinal:  Soft, Non-tender, + BS, no bruits	  Skin: No rashes, No ecchymoses, No cyanosis  Neurologic: Grossly non-focal with full strength in all four extremities  Extremities: Normal range of motion, No clubbing, cyanosis or edema  Vascular: Peripheral pulses palpable 2+ bilaterally      INTERPRETATION OF TELEMETRY: SR/SB, PVCs    ECG: SR, PVC with junctional escape beat    LABS:                        7.8    11.41 )-----------( 172      ( 26 Jun 2019 09:29 )             26.4     06-26    137  |  93<L>  |  24.0<H>  ----------------------------<  99  3.9   |  31.0<H>  |  3.35<H>    Ca    7.8<L>      26 Jun 2019 09:29    TPro  6.4<L>  /  Alb  3.5  /  TBili  0.3<L>  /  DBili  x   /  AST  30  /  ALT  28  /  AlkPhos  78  06-26    CARDIAC MARKERS ( 26 Jun 2019 09:29 )  x     / 0.07 ng/mL / x     / x     / x          PT/INR - ( 26 Jun 2019 09:29 )   PT: 10.5 sec;   INR: 0.92 ratio         PTT - ( 26 Jun 2019 09:29 )  PTT:29.3 sec    I&O's Summary    BNPSerum Pro-Brain Natriuretic Peptide: 07469 pg/mL (06-26 @ 09:29)    Serum Pro-Brain Natriuretic Peptide: 38472 pg/mL (06-26-19 @ 09:29)    RADIOLOGY & ADDITIONAL STUDIES:
PALLIATIVE CONSULT    CC: Patient is a 79y old  Male who presents with a chief complaint of ESRD HD (01 Jul 2019 11:57)    HPI:  Mr. Lawson is a 79 year old male with PMHx of ESRD on HD, CAD s/p CABG and stents, h/o Lung cancer s/p chemo and RT (oncologist in [place] and no subsequent follow up for past 2 years per family), pulmonary fibrosis, O2 dependent (3-4L), HFpEF, HTN, HLD, bipolar admitted for worsening dyspnea. Pt is a poor historian, very drowsy this AM, falling asleep, poor concentration requiring frequent reorientation during my visit. Per RN, pt did not sleep overnight. Info from chart review, family at bedside and medical staff. CXR with diffuse chronic ILD and pleural effusion. Palliative consulted for GOC given comorbidities and recurrent hospitalization (currently 10th admission since Nov 2018). Pt is well known to our service seen in past admission.    Pt seen and examined at bedside. He verbalized feeling tired. Currently on 6L O2NC. Scheduled for HD later this afternoon. Family (spouse, daughter, HOSSEIN) present.    Present Symptoms:   Dyspnea: Yes    Nausea/Vomiting: Yes    Fatigue: Yes    Pain: No          Review of Systems: As per HPI.  All others negative    PERTINENT PMH REVIEWED: Yes     PAST MEDICAL & SURGICAL HISTORY:  Chronic anemia  ESRD (end stage renal disease): on HD (Tue-Thu-Sat) through RUE fistula  CAD (coronary artery disease): s/p remote CABG and multiple stents  Lung cancer: had yoni radiation in 2006.  Bipolar disorder  High cholesterol  Hypertension  S/P CABG (coronary artery bypass graft)      SOCIAL HISTORY:    Admitted from:  home    - lives with   - children:    Baseline ADLs (prior to admission):  Independent/ Dependent   [place]:  %    Surrogate/HCP/Guardian:   - per family, HCPs are grandson Frances [phone] and alternate is granddaughter Selma [phone]; to bring in a copy for our records    ADVANCE DIRECTIVES:   DNR YES NO  Completed on:     2/8/19                MOLST  YES NO   Completed on: 2/8/19  Living Will  YES NO   Completed on:    **Attempted confirm advance directives as current however, pt unable to stay awake/poor concentration. HOSSEIN confirms past filling out DNR/DNI MOLST but will confirm with HCPs as well.     FAMILY HISTORY:  Family history of leukemia (Child)  Family history of diabetes mellitus (Child)       Allergies    No Known Allergies    Intolerances        MEDICATIONS  (STANDING):  amLODIPine   Tablet 5 milliGRAM(s) Oral daily  aspirin  chewable 81 milliGRAM(s) Oral daily  atorvastatin 80 milliGRAM(s) Oral at bedtime  chlorhexidine 2% Cloths 1 Application(s) Topical daily  clopidogrel Tablet 75 milliGRAM(s) Oral daily  docusate sodium 100 milliGRAM(s) Oral two times a day  fluticasone propionate 50 MICROgram(s)/spray Nasal Spray 1 Spray(s) Both Nostrils two times a day  hydrALAZINE 50 milliGRAM(s) Oral every 8 hours  isosorbide   dinitrate Tablet (ISORDIL) 20 milliGRAM(s) Oral three times a day  levothyroxine 100 MICROGram(s) Oral daily  metoprolol tartrate 25 milliGRAM(s) Oral two times a day  pantoprazole    Tablet 40 milliGRAM(s) Oral before breakfast  predniSONE   Tablet 10 milliGRAM(s) Oral daily  senna 2 Tablet(s) Oral at bedtime  sevelamer carbonate 800 milliGRAM(s) Oral three times a day with meals    MEDICATIONS  (PRN):  ALBUTerol/ipratropium for Nebulization 3 milliLiter(s) Nebulizer every 6 hours PRN Shortness of Breath and/or Wheezing  bisacodyl Suppository 10 milliGRAM(s) Rectal daily PRN Constipation  HYDROmorphone  Injectable 0.5 milliGRAM(s) IV Push four times a day PRN Dyspnea  hydrOXYzine hydrochloride 25 milliGRAM(s) Oral every 6 hours PRN Itching      PHYSICAL EXAM:    Vital Signs Last 24 Hrs  T(C): 36.5 (01 Jul 2019 08:00), Max: 36.5 (01 Jul 2019 08:00)  T(F): 97.7 (01 Jul 2019 08:00), Max: 97.7 (01 Jul 2019 08:00)  HR: 55 (01 Jul 2019 08:01) (55 - 88)  BP: 139/66 (01 Jul 2019 08:00) (108/50 - 150/60)  BP(mean): --  RR: 18 (01 Jul 2019 08:00) (18 - 18)  SpO2: 93% (01 Jul 2019 10:23) (91% - 97%)    General: elderly. cachetic. Resting comfortably. No acute distress.   HEENT: MMM   Lungs: diminished breath sounds at bases.   non-labored. O2NC 6L  CV: +s1/s2. RRR.    GI:+ bowel sound. abdomen soft, non-tender, non-distended.  MSK: Moves all 4 extremities.  No cyanosis or edema. weakness.   Neuro: Awake and alert, orientedx2. Poor concentration. Drowsy   Skin: warm and dry.      LABS:    06-30    139  |  100  |  48.0<H>  ----------------------------<  119<H>  4.9   |  25.0  |  6.68<H>    Ca    8.7      30 Jun 2019 16:43  Phos  4.3     06-30    TPro  x   /  Alb  3.7  /  TBili  x   /  DBili  x   /  AST  x   /  ALT  x   /  AlkPhos  x   06-30    RADIOLOGY & ADDITIONAL STUDIES:     CXR 5/23/19  Diffuse chronic interstitial lung disease. Bilateral effusions..    CXR 5/24/19  FINDINGS:    Single frontal view of the chest demonstrates moderate to severe diffuse   bilateral airspace disease suggesting CHF. Small bilateral pleural   effusions. Status post CABG procedure. The cardiomediastinal silhouette   is enlarged. No acute osseous abnormalities. Overlying EKG leads and   wires are noted. Consider chest CT as clinically warranted.    IMPRESSION: No interval change.      Thank you for the opportunity to assist with the care of this patient.   [facility] [phone].

## 2019-07-03 NOTE — PROGRESS NOTE ADULT - SUBJECTIVE AND OBJECTIVE BOX
FOLLOW UP VISIT    INTERVAL HPI/OVERNIGHT EVENTS:  No acute events overnight. Had HD this morning. Pt is eager to go home.     Present Symptoms:   Dyspnea: Yes , comfortable at present on O2NC 6L   Nausea/Vomiting:  No  Anxiety:   No  Fatigue: No  Loss of appetite: Yes   Pain: No    Review of Systems: Reviewed, All others negative    MEDICATIONS  (STANDING):  amLODIPine   Tablet 5 milliGRAM(s) Oral daily  aspirin  chewable 81 milliGRAM(s) Oral daily  atorvastatin 80 milliGRAM(s) Oral at bedtime  buDESOnide  80 MICROgram(s)/formoterol 4.5 MICROgram(s) Inhaler 2 Puff(s) Inhalation two times a day  chlorhexidine 2% Cloths 1 Application(s) Topical daily  clopidogrel Tablet 75 milliGRAM(s) Oral daily  docusate sodium 100 milliGRAM(s) Oral two times a day  fluticasone propionate 50 MICROgram(s)/spray Nasal Spray 1 Spray(s) Both Nostrils two times a day  hydrALAZINE 50 milliGRAM(s) Oral every 8 hours  isosorbide   dinitrate Tablet (ISORDIL) 20 milliGRAM(s) Oral three times a day  levothyroxine 100 MICROGram(s) Oral daily  metoprolol tartrate 25 milliGRAM(s) Oral two times a day  pantoprazole    Tablet 40 milliGRAM(s) Oral before breakfast  predniSONE   Tablet 10 milliGRAM(s) Oral daily  senna 2 Tablet(s) Oral at bedtime  sevelamer carbonate 800 milliGRAM(s) Oral three times a day with meals    MEDICATIONS  (PRN):  ALBUTerol/ipratropium for Nebulization 3 milliLiter(s) Nebulizer every 6 hours PRN Shortness of Breath and/or Wheezing  bisacodyl Suppository 10 milliGRAM(s) Rectal daily PRN Constipation  HYDROmorphone  Injectable 0.5 milliGRAM(s) IV Push four times a day PRN Dyspnea  hydrOXYzine hydrochloride 25 milliGRAM(s) Oral every 6 hours PRN Itching    PHYSICAL EXAM:  Vital Signs Last 24 Hrs  T(C): 36.7 (03 Jul 2019 16:09), Max: 36.7 (03 Jul 2019 07:25)  T(F): 98 (03 Jul 2019 16:09), Max: 98.1 (03 Jul 2019 07:25)  HR: 52 (03 Jul 2019 16:09) (50 - 88)  BP: 116/41 (03 Jul 2019 16:09) (111/64 - 140/51)  BP(mean): --  RR: 20 (03 Jul 2019 16:09) (18 - 20)  SpO2: 99% (03 Jul 2019 16:09) (91% - 100%)    General: Resting comfortably. No acute distress. cachexia.  HEENT: mucous membrane moist.   Lungs: diminished breath sounds at bases. non-labored. O2NC  CV: +s1/s2. Regular rate and rhythm.    GI: +bowel sound. abdomen soft, non-tender, non-distended   MSK: Moves all 4 extremities. No cyanosis or edema. weakness.   Neuro: Nonfocal. Awake and alert, oriented x4. Interactive  Skin: warm and dry.     LABS: reviewed, no new labs  RADIOLOGY & ADDITIONAL STUDIES:     CXR 7/3/19  INTERPRETATION:  AP chest on July 3, 2019 at 12:47 PM.  Heart is likely enlarged. Sternotomy again noted.  Right apical thickening again noted.  Diffuse increased interstitial pattern with slight basilar effusions again noted.  Small irregular density right upper outer lung field again seen.  Chest is similar to June 28.  IMPRESSION: Unchanged chest as above.      ADVANCE DIRECTIVES: FULL CODE  DNR YES NO  Completed on:                     MOLST  YES NO   Completed on:  Living Will  YES NO   Completed on:

## 2019-07-04 LAB
ANION GAP SERPL CALC-SCNC: 12 MMOL/L — SIGNIFICANT CHANGE UP (ref 5–17)
BUN SERPL-MCNC: 26 MG/DL — HIGH (ref 8–20)
CALCIUM SERPL-MCNC: 8 MG/DL — LOW (ref 8.6–10.2)
CHLORIDE SERPL-SCNC: 97 MMOL/L — LOW (ref 98–107)
CO2 SERPL-SCNC: 31 MMOL/L — HIGH (ref 22–29)
CREAT SERPL-MCNC: 4.29 MG/DL — HIGH (ref 0.5–1.3)
GLUCOSE SERPL-MCNC: 86 MG/DL — SIGNIFICANT CHANGE UP (ref 70–115)
HCT VFR BLD CALC: 31.8 % — LOW (ref 39–50)
HGB BLD-MCNC: 9.1 G/DL — LOW (ref 13–17)
MCHC RBC-ENTMCNC: 28.6 GM/DL — LOW (ref 32–36)
MCHC RBC-ENTMCNC: 29.6 PG — SIGNIFICANT CHANGE UP (ref 27–34)
MCV RBC AUTO: 103.6 FL — HIGH (ref 80–100)
PLATELET # BLD AUTO: 163 K/UL — SIGNIFICANT CHANGE UP (ref 150–400)
POTASSIUM SERPL-MCNC: 3.9 MMOL/L — SIGNIFICANT CHANGE UP (ref 3.5–5.3)
POTASSIUM SERPL-SCNC: 3.9 MMOL/L — SIGNIFICANT CHANGE UP (ref 3.5–5.3)
RBC # BLD: 3.07 M/UL — LOW (ref 4.2–5.8)
RBC # FLD: 19.3 % — HIGH (ref 10.3–14.5)
SODIUM SERPL-SCNC: 140 MMOL/L — SIGNIFICANT CHANGE UP (ref 135–145)
WBC # BLD: 6.95 K/UL — SIGNIFICANT CHANGE UP (ref 3.8–10.5)
WBC # FLD AUTO: 6.95 K/UL — SIGNIFICANT CHANGE UP (ref 3.8–10.5)

## 2019-07-04 PROCEDURE — 99233 SBSQ HOSP IP/OBS HIGH 50: CPT

## 2019-07-04 PROCEDURE — 99232 SBSQ HOSP IP/OBS MODERATE 35: CPT

## 2019-07-04 RX ADMIN — SEVELAMER CARBONATE 800 MILLIGRAM(S): 2400 POWDER, FOR SUSPENSION ORAL at 07:47

## 2019-07-04 RX ADMIN — HYDROMORPHONE HYDROCHLORIDE 0.5 MILLIGRAM(S): 2 INJECTION INTRAMUSCULAR; INTRAVENOUS; SUBCUTANEOUS at 05:51

## 2019-07-04 RX ADMIN — Medication 1 SPRAY(S): at 17:16

## 2019-07-04 RX ADMIN — CHLORHEXIDINE GLUCONATE 1 APPLICATION(S): 213 SOLUTION TOPICAL at 13:30

## 2019-07-04 RX ADMIN — Medication 1 SPRAY(S): at 05:47

## 2019-07-04 RX ADMIN — Medication 81 MILLIGRAM(S): at 13:30

## 2019-07-04 RX ADMIN — SEVELAMER CARBONATE 800 MILLIGRAM(S): 2400 POWDER, FOR SUSPENSION ORAL at 17:16

## 2019-07-04 RX ADMIN — BUDESONIDE AND FORMOTEROL FUMARATE DIHYDRATE 2 PUFF(S): 160; 4.5 AEROSOL RESPIRATORY (INHALATION) at 09:10

## 2019-07-04 RX ADMIN — AMLODIPINE BESYLATE 5 MILLIGRAM(S): 2.5 TABLET ORAL at 05:45

## 2019-07-04 RX ADMIN — BUDESONIDE AND FORMOTEROL FUMARATE DIHYDRATE 2 PUFF(S): 160; 4.5 AEROSOL RESPIRATORY (INHALATION) at 20:36

## 2019-07-04 RX ADMIN — Medication 100 MILLIGRAM(S): at 05:45

## 2019-07-04 RX ADMIN — HYDROMORPHONE HYDROCHLORIDE 0.5 MILLIGRAM(S): 2 INJECTION INTRAMUSCULAR; INTRAVENOUS; SUBCUTANEOUS at 06:24

## 2019-07-04 RX ADMIN — Medication 25 MILLIGRAM(S): at 05:45

## 2019-07-04 RX ADMIN — ISOSORBIDE DINITRATE 20 MILLIGRAM(S): 5 TABLET ORAL at 21:07

## 2019-07-04 RX ADMIN — SENNA PLUS 2 TABLET(S): 8.6 TABLET ORAL at 21:07

## 2019-07-04 RX ADMIN — Medication 10 MILLIGRAM(S): at 05:46

## 2019-07-04 RX ADMIN — Medication 50 MILLIGRAM(S): at 05:45

## 2019-07-04 RX ADMIN — HYDROMORPHONE HYDROCHLORIDE 0.5 MILLIGRAM(S): 2 INJECTION INTRAMUSCULAR; INTRAVENOUS; SUBCUTANEOUS at 16:45

## 2019-07-04 RX ADMIN — Medication 100 MILLIGRAM(S): at 17:16

## 2019-07-04 RX ADMIN — CLOPIDOGREL BISULFATE 75 MILLIGRAM(S): 75 TABLET, FILM COATED ORAL at 13:30

## 2019-07-04 RX ADMIN — Medication 3 MILLILITER(S): at 15:43

## 2019-07-04 RX ADMIN — HYDROMORPHONE HYDROCHLORIDE 0.5 MILLIGRAM(S): 2 INJECTION INTRAMUSCULAR; INTRAVENOUS; SUBCUTANEOUS at 22:02

## 2019-07-04 RX ADMIN — Medication 100 MICROGRAM(S): at 05:45

## 2019-07-04 RX ADMIN — ISOSORBIDE DINITRATE 20 MILLIGRAM(S): 5 TABLET ORAL at 05:44

## 2019-07-04 RX ADMIN — HYDROMORPHONE HYDROCHLORIDE 0.5 MILLIGRAM(S): 2 INJECTION INTRAMUSCULAR; INTRAVENOUS; SUBCUTANEOUS at 22:30

## 2019-07-04 RX ADMIN — SEVELAMER CARBONATE 800 MILLIGRAM(S): 2400 POWDER, FOR SUSPENSION ORAL at 13:30

## 2019-07-04 RX ADMIN — PANTOPRAZOLE SODIUM 40 MILLIGRAM(S): 20 TABLET, DELAYED RELEASE ORAL at 05:46

## 2019-07-04 RX ADMIN — ATORVASTATIN CALCIUM 80 MILLIGRAM(S): 80 TABLET, FILM COATED ORAL at 21:07

## 2019-07-04 RX ADMIN — HYDROMORPHONE HYDROCHLORIDE 0.5 MILLIGRAM(S): 2 INJECTION INTRAMUSCULAR; INTRAVENOUS; SUBCUTANEOUS at 16:27

## 2019-07-04 NOTE — PROGRESS NOTE ADULT - ASSESSMENT
Pt is 80 y/o Male with a history of ESRD on HD, HTN, HFpEF, CAD s/p CABG, and lung cancer s/p radiation on Home Oxygen, presenting with increased dyspnea, likely multifactorial with underlying lung cancer, ILD/ pulmonary fibrosis and volume overload with CHF/ ESRD    >Dyspnea: Chronic hypoxic respiratory failure.  - Most likely multifactorial in nature given history of lung cancer, heart failure, pulmonary fibrosis.   - On home supplemental oxygen.    - C/w Duonebs.   - HD for volume removal.   - on Prn Dilaudid by palliative   - palliative following.        >Hx HTN- Had Hypotension on and off - Resolved  - C/w amlodipine/ BB/ hydralazine with holding parameters.     >Chronic diastolic heart failure:  - Not in exacerbation at this time, not volume overload.   - C/w HD for volume removal.   - on isosorbide and metoprolol.    >CAD - Stable   - elevated troponin in the setting of renal failure. Prior ischemic evaluation was without significant findings. On aspirin/ clopidogrel/ BB/ Statins.    >ESRD - Nephrology on board, c/w HD tomorrow    >Lung cancer / Lung fibrosis - On prednisone. Supplemental oxygen for hypoxia. S/p radiation.    >Hypothyroidism - On levothyroxine.    >GOC- Palliative on board to assists in GOC.   lulú mccall home tomorrow

## 2019-07-04 NOTE — PROGRESS NOTE ADULT - ASSESSMENT
1)ESRD on HD  2) MBD of renal dx  3) Anemia of renal dx  4) Vol HTN    Continue current management    On HD tomorrow    Poor Prognosis,

## 2019-07-04 NOTE — PROGRESS NOTE ADULT - SUBJECTIVE AND OBJECTIVE BOX
[facility] DIVISION OF KIDNEY DISEASES AND HYPERTENSION -- FOLLOW UP NOTE  --------------------------------------------------------------------------------  Chief Complaint: ESRD HD    24 hour events/subjective:  Seen/examined  Plan for HD in AM      PAST HISTORY  --------------------------------------------------------------------------------  No significant changes to PMH, PSH, FHx, SHx, unless otherwise noted    ALLERGIES & MEDICATIONS  --------------------------------------------------------------------------------  Allergies    No Known Allergies    Intolerances      Standing Inpatient Medications  amLODIPine   Tablet 5 milliGRAM(s) Oral daily  aspirin  chewable 81 milliGRAM(s) Oral daily  atorvastatin 80 milliGRAM(s) Oral at bedtime  buDESOnide  80 MICROgram(s)/formoterol 4.5 MICROgram(s) Inhaler 2 Puff(s) Inhalation two times a day  chlorhexidine 2% Cloths 1 Application(s) Topical daily  clopidogrel Tablet 75 milliGRAM(s) Oral daily  docusate sodium 100 milliGRAM(s) Oral two times a day  fluticasone propionate 50 MICROgram(s)/spray Nasal Spray 1 Spray(s) Both Nostrils two times a day  hydrALAZINE 50 milliGRAM(s) Oral every 8 hours  isosorbide   dinitrate Tablet (ISORDIL) 20 milliGRAM(s) Oral three times a day  levothyroxine 100 MICROGram(s) Oral daily  metoprolol tartrate 25 milliGRAM(s) Oral two times a day  pantoprazole    Tablet 40 milliGRAM(s) Oral before breakfast  predniSONE   Tablet 10 milliGRAM(s) Oral daily  senna 2 Tablet(s) Oral at bedtime  sevelamer carbonate 800 milliGRAM(s) Oral three times a day with meals    PRN Inpatient Medications  ALBUTerol/ipratropium for Nebulization 3 milliLiter(s) Nebulizer every 6 hours PRN  bisacodyl Suppository 10 milliGRAM(s) Rectal daily PRN  HYDROmorphone  Injectable 0.5 milliGRAM(s) IV Push four times a day PRN  hydrOXYzine hydrochloride 25 milliGRAM(s) Oral every 6 hours PRN      REVIEW OF SYSTEMS  --------------------------------------------------------------------------------  Gen: No weight changes, fatigue, fevers/chills, weakness  Skin: No rashes  Head/Eyes/Ears/Mouth: No headache; Normal hearing; Normal vision w/o blurriness; No sinus pain/discomfort, sore throat  Respiratory: No dyspnea, cough, wheezing, hemoptysis  CV: No chest pain, PND, orthopnea  GI: No abdominal pain, diarrhea, constipation, nausea, vomiting, melena, hematochezia  : No increased frequency, dysuria, hematuria, nocturia  MSK: No joint pain/swelling; no back pain; no edema  Neuro: No dizziness/lightheadedness, weakness, seizures, numbness, tingling  Heme: No easy bruising or bleeding  Endo: No heat/cold intolerance  Psych: No significant nervousness, anxiety, stress, depression    All other systems were reviewed and are negative, except as noted.    VITALS/PHYSICAL EXAM  --------------------------------------------------------------------------------  T(C): 37.1 (07-04-19 @ 08:57), Max: 37.1 (07-04-19 @ 08:57)  HR: 55 (07-04-19 @ 11:00) (49 - 88)  BP: 100/46 (07-04-19 @ 08:57) (97/44 - 124/62)  RR: 18 (07-04-19 @ 11:00) (18 - 20)  SpO2: 98% (07-04-19 @ 11:00) (97% - 100%)  Wt(kg): --        07-03-19 @ 07:01  -  07-04-19 @ 07:00  --------------------------------------------------------  IN: 720 mL / OUT: 2550 mL / NET: -1830 mL      Physical Exam:  	Gen: NAD,    	HEENT: PERRL, supple neck, clear oropharynx  	Pulm: CTA B/L  	CV: RRR, S1S2; no rub  	Back: No spinal or CVA tenderness; no sacral edema  	Abd: +BS, soft, nontender/nondistended  	: No suprapubic tenderness  	UE: Warm, FROM, no clubbing, intact strength; no edema; no asterixis  	LE: Warm, FROM, no clubbing, intact strength; no edema  	Neuro: No focal deficits, intact gait  	Psych: Normal affect and mood  	Skin: Warm, without rashes  	Vascular access:    LABS/STUDIES  --------------------------------------------------------------------------------              9.1    6.95  >-----------<  163      [07-04-19 @ 08:19]              31.8     140  |  97  |  26.0  ----------------------------<  86      [07-04-19 @ 08:19]  3.9   |  31.0  |  4.29        Ca     8.0     [07-04-19 @ 08:19]            Creatinine Trend:  SCr 4.29 [07-04 @ 08:19]  SCr 7.76 [07-01 @ 13:39]  SCr 6.68 [06-30 @ 16:43]  SCr 4.79 [06-28 @ 05:45]  SCr 4.45 [06-27 @ 22:59]        Iron 58, TIBC 256, %sat 23      [06-19-19 @ 08:42]  Ferritin 812      [06-19-19 @ 08:42]  PTH -- (Ca 8.1)      [07-01-19 @ 13:24]   83  TSH 0.89      [05-23-19 @ 06:28]  Lipid: chol 159, , HDL 32, LDL 94      [01-29-19 @ 11:39]    HBsAb <3.0      [06-27-19 @ 19:39]  HBsAb Nonreact      [06-27-19 @ 19:39]  HBsAg Reactive      [06-27-19 @ 19:39]  HBcAb Nonreact      [06-27-19 @ 19:39]  HCV 0.10, Nonreact      [06-27-19 @ 19:39]

## 2019-07-04 NOTE — PROGRESS NOTE ADULT - SUBJECTIVE AND OBJECTIVE BOX
KAUSHIK HOFFMAN    [id number]    79y      Male    INTERVAL HPI/OVERNIGHT EVENTS:    patient being seen for confusion. patient states feeling slighty sob     REVIEW OF SYSTEMS:    CONSTITUTIONAL: No fever, weight loss, or fatigue  RESPIRATORY: sob  CARDIOVASCULAR: No chest pain, palpitations  GASTROINTESTINAL: No abdominal or epigastric pain. No nausea, vomiting  NEUROLOGICAL: No headaches, memory loss, loss of strength.  MISCELLANEOUS:      Vital Signs Last 24 Hrs  T(C): 36.6 (05 Jul 2019 06:09), Max: 37.1 (04 Jul 2019 08:57)  T(F): 97.8 (05 Jul 2019 06:09), Max: 98.8 (04 Jul 2019 08:57)  HR: 67 (05 Jul 2019 06:09) (52 - 946)  BP: 150/65 (05 Jul 2019 06:09) (100/46 - 157/55)  BP(mean): --  RR: 19 (05 Jul 2019 06:09) (18 - 19)  SpO2: 100% (05 Jul 2019 06:09) (90% - 100%)    PHYSICAL EXAM:    GENERAL: Pt lying comfortably, thin Elderly male.   CHEST/LUNG: Crackles+, no wheezing, good air entry.   HEART: S1S2+, Regular rate and rhythm; No murmurs.  ABDOMEN: Soft, Nontender, Nondistended; Bowel sounds present.  Extremities: No LE edema, pulses+  NEURO: AAOX3, no focal deficits, no motor r sensory loss.  PSYCH: normal mood.      LABS:                        9.1    6.95  )-----------( 163      ( 04 Jul 2019 08:19 )             31.8     07-04    140  |  97<L>  |  26.0<H>  ----------------------------<  86  3.9   |  31.0<H>  |  4.29<H>    Ca    8.0<L>      04 Jul 2019 08:19    MEDICATIONS  (STANDING):  amLODIPine   Tablet 5 milliGRAM(s) Oral daily  aspirin  chewable 81 milliGRAM(s) Oral daily  atorvastatin 80 milliGRAM(s) Oral at bedtime  buDESOnide  80 MICROgram(s)/formoterol 4.5 MICROgram(s) Inhaler 2 Puff(s) Inhalation two times a day  chlorhexidine 2% Cloths 1 Application(s) Topical daily  clopidogrel Tablet 75 milliGRAM(s) Oral daily  docusate sodium 100 milliGRAM(s) Oral two times a day  fluticasone propionate 50 MICROgram(s)/spray Nasal Spray 1 Spray(s) Both Nostrils two times a day  hydrALAZINE 50 milliGRAM(s) Oral every 8 hours  isosorbide   dinitrate Tablet (ISORDIL) 20 milliGRAM(s) Oral three times a day  levothyroxine 100 MICROGram(s) Oral daily  metoprolol tartrate 25 milliGRAM(s) Oral two times a day  pantoprazole    Tablet 40 milliGRAM(s) Oral before breakfast  predniSONE   Tablet 10 milliGRAM(s) Oral daily  senna 2 Tablet(s) Oral at bedtime  sevelamer carbonate 800 milliGRAM(s) Oral three times a day with meals    MEDICATIONS  (PRN):  ALBUTerol/ipratropium for Nebulization 3 milliLiter(s) Nebulizer every 6 hours PRN Shortness of Breath and/or Wheezing  bisacodyl Suppository 10 milliGRAM(s) Rectal daily PRN Constipation  HYDROmorphone  Injectable 0.5 milliGRAM(s) IV Push four times a day PRN Dyspnea  hydrOXYzine hydrochloride 25 milliGRAM(s) Oral every 6 hours PRN Itching      RADIOLOGY & ADDITIONAL TESTS:

## 2019-07-05 LAB
GLUCOSE BLDC GLUCOMTR-MCNC: 89 MG/DL — SIGNIFICANT CHANGE UP (ref 70–99)
TROPONIN T SERPL-MCNC: 0.08 NG/ML — HIGH (ref 0–0.06)

## 2019-07-05 PROCEDURE — 71045 X-RAY EXAM CHEST 1 VIEW: CPT | Mod: 26

## 2019-07-05 PROCEDURE — 93010 ELECTROCARDIOGRAM REPORT: CPT

## 2019-07-05 PROCEDURE — 90937 HEMODIALYSIS REPEATED EVAL: CPT

## 2019-07-05 PROCEDURE — 99232 SBSQ HOSP IP/OBS MODERATE 35: CPT

## 2019-07-05 RX ORDER — BUDESONIDE AND FORMOTEROL FUMARATE DIHYDRATE 160; 4.5 UG/1; UG/1
2 AEROSOL RESPIRATORY (INHALATION)
Qty: 7 | Refills: 0
Start: 2019-07-05 | End: 2019-08-03

## 2019-07-05 RX ADMIN — ISOSORBIDE DINITRATE 20 MILLIGRAM(S): 5 TABLET ORAL at 21:00

## 2019-07-05 RX ADMIN — Medication 81 MILLIGRAM(S): at 13:03

## 2019-07-05 RX ADMIN — Medication 50 MILLIGRAM(S): at 21:00

## 2019-07-05 RX ADMIN — ATORVASTATIN CALCIUM 80 MILLIGRAM(S): 80 TABLET, FILM COATED ORAL at 21:00

## 2019-07-05 RX ADMIN — CHLORHEXIDINE GLUCONATE 1 APPLICATION(S): 213 SOLUTION TOPICAL at 13:03

## 2019-07-05 RX ADMIN — AMLODIPINE BESYLATE 5 MILLIGRAM(S): 2.5 TABLET ORAL at 06:11

## 2019-07-05 RX ADMIN — Medication 100 MICROGRAM(S): at 06:11

## 2019-07-05 RX ADMIN — Medication 3 MILLILITER(S): at 09:01

## 2019-07-05 RX ADMIN — HYDROMORPHONE HYDROCHLORIDE 0.5 MILLIGRAM(S): 2 INJECTION INTRAMUSCULAR; INTRAVENOUS; SUBCUTANEOUS at 13:08

## 2019-07-05 RX ADMIN — SENNA PLUS 2 TABLET(S): 8.6 TABLET ORAL at 21:01

## 2019-07-05 RX ADMIN — Medication 100 MILLIGRAM(S): at 06:11

## 2019-07-05 RX ADMIN — HYDROMORPHONE HYDROCHLORIDE 0.5 MILLIGRAM(S): 2 INJECTION INTRAMUSCULAR; INTRAVENOUS; SUBCUTANEOUS at 14:30

## 2019-07-05 RX ADMIN — Medication 10 MILLIGRAM(S): at 06:11

## 2019-07-05 RX ADMIN — Medication 1 SPRAY(S): at 17:32

## 2019-07-05 RX ADMIN — HYDROMORPHONE HYDROCHLORIDE 0.5 MILLIGRAM(S): 2 INJECTION INTRAMUSCULAR; INTRAVENOUS; SUBCUTANEOUS at 04:58

## 2019-07-05 RX ADMIN — ISOSORBIDE DINITRATE 20 MILLIGRAM(S): 5 TABLET ORAL at 06:11

## 2019-07-05 RX ADMIN — CLOPIDOGREL BISULFATE 75 MILLIGRAM(S): 75 TABLET, FILM COATED ORAL at 13:03

## 2019-07-05 RX ADMIN — BUDESONIDE AND FORMOTEROL FUMARATE DIHYDRATE 2 PUFF(S): 160; 4.5 AEROSOL RESPIRATORY (INHALATION) at 20:08

## 2019-07-05 RX ADMIN — SEVELAMER CARBONATE 800 MILLIGRAM(S): 2400 POWDER, FOR SUSPENSION ORAL at 17:32

## 2019-07-05 RX ADMIN — HYDROMORPHONE HYDROCHLORIDE 0.5 MILLIGRAM(S): 2 INJECTION INTRAMUSCULAR; INTRAVENOUS; SUBCUTANEOUS at 17:33

## 2019-07-05 RX ADMIN — Medication 50 MILLIGRAM(S): at 06:11

## 2019-07-05 RX ADMIN — Medication 25 MILLIGRAM(S): at 06:11

## 2019-07-05 RX ADMIN — BUDESONIDE AND FORMOTEROL FUMARATE DIHYDRATE 2 PUFF(S): 160; 4.5 AEROSOL RESPIRATORY (INHALATION) at 12:22

## 2019-07-05 RX ADMIN — Medication 100 MILLIGRAM(S): at 17:32

## 2019-07-05 RX ADMIN — PANTOPRAZOLE SODIUM 40 MILLIGRAM(S): 20 TABLET, DELAYED RELEASE ORAL at 06:11

## 2019-07-05 RX ADMIN — Medication 3 MILLILITER(S): at 16:09

## 2019-07-05 RX ADMIN — HYDROMORPHONE HYDROCHLORIDE 0.5 MILLIGRAM(S): 2 INJECTION INTRAMUSCULAR; INTRAVENOUS; SUBCUTANEOUS at 18:23

## 2019-07-05 RX ADMIN — Medication 1 SPRAY(S): at 06:10

## 2019-07-05 RX ADMIN — SEVELAMER CARBONATE 800 MILLIGRAM(S): 2400 POWDER, FOR SUSPENSION ORAL at 13:03

## 2019-07-05 NOTE — CHART NOTE - NSCHARTNOTEFT_GEN_A_CORE
Chart reviewed. Pt presently not in room, at dialysis. No acute events overnight per RN. D/W hospitalist Dr. Jensen, pt/family want to continue current medical interventions including HD. Plan is to discharge to home when medically stable with Home care services; possible discharge after HD today. I would caution against continued use of IV Dilaudid as pt remains full code and risk of adverse effects. Recommend pt to follow up closely with his OP community physicians including pulm, nephrology and oncology. Will sign off. Please reconsult if we can be of further assistance. Continued medical mgnt per primary/nephrology team.

## 2019-07-05 NOTE — CHART NOTE - NSCHARTNOTEFT_GEN_A_CORE
PA NOTE-MEDICINE    Called by RN due to Pt O2 sat of 88% on 6 LNC ( O2 sat increased to 100% with repositioning of PT) along with Pt c/o of dizziness ( approximately 5 min after receiving Dilaudid for Dyspnea) and baseline diffuse chest discomfort  Pt is 78yo male with PMHx of ESRD, CAD, h/o lung cancer, pulmonary fibrosis/ILD, HFpEF, HTN, HLD, bipolar disorder,with recurrent hospitalizations for dyspnea Admitted on 6/26/19 for increased Dyspnea  Pt is being followed by Palliative but still wants dialysis and as of now is a full code  Family meeting in regard to POC is pending with his Grandson  Pt's Chest Pain is managed with Isordil ASA Plavix  Pt denies radiating Chest Pain states that his CP is the same   Pt states he felt dizzy after receiving Dilaudid for Dyspnea  General: WDWN Male in slight respiratory distress (baseline)  I  T(C): 36.6 (05 Jul 2019 06:09), Max: 37.1 (04 Jul 2019 08:57)  T(F): 97.8 (05 Jul 2019 06:09), Max: 98.8 (04 Jul 2019 08:57)  HR: 67 (05 Jul 2019 06:09) (49 - 946)  BP: 150/65 (05 Jul 2019 06:09) (100/46 - 157/55)  RR: 19 (05 Jul 2019 06:09) (18 - 19)  SpO2: 100% (05 Jul 2019 06:09) (90% - 100%)    HEENT: NC/AT  Cardiac: RRR   Lungs: occasional crackled B/L  Skin: Good color warm/dry    A/P eval Pt c/o Baseline Chest Pain/discomfort (given dilaudid) then felt dizzy  Stat EKG  Stat Port CXR   Troponin x 1   Will Monitor  RN to Call PA if any changes in Pt status  CXR-Pending  EKG-

## 2019-07-05 NOTE — PROGRESS NOTE ADULT - ASSESSMENT
Pt is 80 y/o Male with a history of ESRD on HD, HTN, HFpEF, CAD s/p CABG, and lung cancer s/p radiation on Home Oxygen, presenting with increased dyspnea, likely multifactorial with underlying lung cancer, ILD/ pulmonary fibrosis and volume overload with CHF/ ESRD    >Dyspnea: Chronic hypoxic respiratory failure.  - Most likely multifactorial in nature given history of lung cancer, heart failure, pulmonary fibrosis.   - On home supplemental oxygen 5 liters chronically   - C/w Duonebs.   - HD today   - palliative following.      >Hx HTN- Had Hypotension on and off - Resolved  - C/w amlodipine/ BB/ hydralazine with holding parameters.     >Chronic diastolic heart failure:  - Not in exacerbation at this time, not volume overload.   - C/w HD for volume removal.   - on isosorbide and metoprolol.    >CAD - Stable   - elevated troponin in the setting of renal failure. Prior ischemic evaluation was without significant findings. On aspirin/ clopidogrel/ BB/ Statins.    >ESRD - Nephrology on board, c/w HD today     >Lung cancer / Lung fibrosis - On prednisone. Supplemental oxygen for hypoxia. S/p radiation.    >Hypothyroidism - On levothyroxine.    >GOC- Palliative on board to assists in GOC.     spoke to son on phone and refusing to take father home and demanding rehab. case management aware  lulú mccall home tomorrow

## 2019-07-05 NOTE — PROGRESS NOTE ADULT - SUBJECTIVE AND OBJECTIVE BOX
KAUSHIK HOFFMAN    [id number]    79y      Male    INTERVAL HPI/OVERNIGHT EVENTS:    patient being seen for confusion and chronic sob. Patient had hd today.     REVIEW OF SYSTEMS:    CONSTITUTIONAL: No fever, weight loss, or fatigue  RESPIRATORY: No cough, wheezing, hemoptysis; No shortness of breath  CARDIOVASCULAR: No chest pain, palpitations  GASTROINTESTINAL: No abdominal or epigastric pain. No nausea, vomiting  NEUROLOGICAL: No headaches, memory loss, loss of strength.  MISCELLANEOUS:      Vital Signs Last 24 Hrs  T(C): 36.6 (05 Jul 2019 12:58), Max: 36.8 (05 Jul 2019 07:35)  T(F): 97.9 (05 Jul 2019 12:58), Max: 98.3 (05 Jul 2019 07:35)  HR: 55 (05 Jul 2019 12:58) (52 - 946)  BP: 106/50 (05 Jul 2019 12:58) (103/46 - 157/55)  BP(mean): --  RR: 18 (05 Jul 2019 12:58) (18 - 19)  SpO2: 97% (05 Jul 2019 12:58) (90% - 100%)    PHYSICAL EXAM:    GENERAL: Pt lying comfortably, thin Elderly male.   CHEST/LUNG: Crackles+, no wheezing, good air entry.   HEART: S1S2+, Regular rate and rhythm; No murmurs.  ABDOMEN: Soft, Nontender, Nondistended; Bowel sounds present.  Extremities: No LE edema, pulses+  NEURO: AAOX3, no focal deficits, no motor r sensory loss.  PSYCH: normal mood.    LABS:                        9.1    6.95  )-----------( 163      ( 04 Jul 2019 08:19 )             31.8     07-04    140  |  97<L>  |  26.0<H>  ----------------------------<  86  3.9   |  31.0<H>  |  4.29<H>    Ca    8.0<L>      04 Jul 2019 08:19        MEDICATIONS  (STANDING):  amLODIPine   Tablet 5 milliGRAM(s) Oral daily  aspirin  chewable 81 milliGRAM(s) Oral daily  atorvastatin 80 milliGRAM(s) Oral at bedtime  buDESOnide  80 MICROgram(s)/formoterol 4.5 MICROgram(s) Inhaler 2 Puff(s) Inhalation two times a day  chlorhexidine 2% Cloths 1 Application(s) Topical daily  clopidogrel Tablet 75 milliGRAM(s) Oral daily  docusate sodium 100 milliGRAM(s) Oral two times a day  fluticasone propionate 50 MICROgram(s)/spray Nasal Spray 1 Spray(s) Both Nostrils two times a day  hydrALAZINE 50 milliGRAM(s) Oral every 8 hours  isosorbide   dinitrate Tablet (ISORDIL) 20 milliGRAM(s) Oral three times a day  levothyroxine 100 MICROGram(s) Oral daily  metoprolol tartrate 25 milliGRAM(s) Oral two times a day  pantoprazole    Tablet 40 milliGRAM(s) Oral before breakfast  predniSONE   Tablet 10 milliGRAM(s) Oral daily  senna 2 Tablet(s) Oral at bedtime  sevelamer carbonate 800 milliGRAM(s) Oral three times a day with meals    MEDICATIONS  (PRN):  ALBUTerol/ipratropium for Nebulization 3 milliLiter(s) Nebulizer every 6 hours PRN Shortness of Breath and/or Wheezing  bisacodyl Suppository 10 milliGRAM(s) Rectal daily PRN Constipation  HYDROmorphone  Injectable 0.5 milliGRAM(s) IV Push four times a day PRN Dyspnea  hydrOXYzine hydrochloride 25 milliGRAM(s) Oral every 6 hours PRN Itching      RADIOLOGY & ADDITIONAL TESTS:

## 2019-07-05 NOTE — PROGRESS NOTE ADULT - SUBJECTIVE AND OBJECTIVE BOX
[facility] DIVISION OF KIDNEY DISEASES AND HYPERTENSION -- HEMODIALYSIS NOTE  --------------------------------------------------------------------------------  Chief Complaint: ESRD/Ongoing hemodialysis requirement    24 hour events/subjective:  Pt seen/examined; on dialysis;      PAST HISTORY  --------------------------------------------------------------------------------  No significant changes to PMH, PSH, FHx, SHx, unless otherwise noted    ALLERGIES & MEDICATIONS  --------------------------------------------------------------------------------  Allergies    No Known Allergies    Intolerances      Standing Inpatient Medications  amLODIPine   Tablet 5 milliGRAM(s) Oral daily  aspirin  chewable 81 milliGRAM(s) Oral daily  atorvastatin 80 milliGRAM(s) Oral at bedtime  buDESOnide  80 MICROgram(s)/formoterol 4.5 MICROgram(s) Inhaler 2 Puff(s) Inhalation two times a day  chlorhexidine 2% Cloths 1 Application(s) Topical daily  clopidogrel Tablet 75 milliGRAM(s) Oral daily  docusate sodium 100 milliGRAM(s) Oral two times a day  fluticasone propionate 50 MICROgram(s)/spray Nasal Spray 1 Spray(s) Both Nostrils two times a day  hydrALAZINE 50 milliGRAM(s) Oral every 8 hours  isosorbide   dinitrate Tablet (ISORDIL) 20 milliGRAM(s) Oral three times a day  levothyroxine 100 MICROGram(s) Oral daily  metoprolol tartrate 25 milliGRAM(s) Oral two times a day  pantoprazole    Tablet 40 milliGRAM(s) Oral before breakfast  predniSONE   Tablet 10 milliGRAM(s) Oral daily  senna 2 Tablet(s) Oral at bedtime  sevelamer carbonate 800 milliGRAM(s) Oral three times a day with meals    PRN Inpatient Medications  ALBUTerol/ipratropium for Nebulization 3 milliLiter(s) Nebulizer every 6 hours PRN  bisacodyl Suppository 10 milliGRAM(s) Rectal daily PRN  HYDROmorphone  Injectable 0.5 milliGRAM(s) IV Push four times a day PRN  hydrOXYzine hydrochloride 25 milliGRAM(s) Oral every 6 hours PRN      REVIEW OF SYSTEMS  --------------------------------------------------------------------------------  Gen: No weight changes, fatigue, fevers/chills, weakness  Skin: No rashes  Head/Eyes/Ears/Mouth: No headache; Normal hearing; Normal vision w/o blurriness; No sinus pain/discomfort, sore throat  Respiratory: No dyspnea, cough, wheezing, hemoptysis  CV: No chest pain, PND, orthopnea  GI: No abdominal pain, diarrhea, constipation, nausea, vomiting, melena, hematochezia  : No increased frequency, dysuria, hematuria, nocturia  MSK: No joint pain/swelling; no back pain; no edema  Neuro: No dizziness/lightheadedness, weakness, seizures, numbness, tingling  Heme: No easy bruising or bleeding  Endo: No heat/cold intolerance  Psych: No significant nervousness, anxiety, stress, depression    All other systems were reviewed and are negative, except as noted.    VITALS/PHYSICAL EXAM  --------------------------------------------------------------------------------  T(C): 36.7 (07-05-19 @ 10:49), Max: 36.8 (07-05-19 @ 07:35)  HR: 55 (07-05-19 @ 12:24) (52 - 946)  BP: 112/53 (07-05-19 @ 10:49) (102/43 - 157/55)  RR: 18 (07-05-19 @ 10:49) (18 - 19)  SpO2: 95% (07-05-19 @ 12:24) (90% - 100%)  Wt(kg): --        07-04-19 @ 07:01  -  07-05-19 @ 07:00  --------------------------------------------------------  IN: 600 mL / OUT: 0 mL / NET: 600 mL    07-05-19 @ 07:01  -  07-05-19 @ 12:54  --------------------------------------------------------  IN: 0 mL / OUT: 1000 mL / NET: -1000 mL      Physical Exam:  	Gen: NAD,    	HEENT: PERRL, supple neck, clear oropharynx  	Pulm: CTA B/L  	CV: RRR, S1S2; no rub  	Back: No spinal or CVA tenderness; no sacral edema  	Abd: +BS, soft, nontender/nondistended  	: No suprapubic tenderness  	UE: Warm, FROM, no clubbing, intact strength; no edema; no asterixis  	LE: Warm, FROM, no clubbing, intact strength; no edema  	Neuro: No focal deficits, intact gait  	Psych: Normal affect and mood  	Skin: Warm, without rashes    LABS/STUDIES  --------------------------------------------------------------------------------              9.1    6.95  >-----------<  163      [07-04-19 @ 08:19]              31.8     140  |  97  |  26.0  ----------------------------<  86      [07-04-19 @ 08:19]  3.9   |  31.0  |  4.29        Ca     8.0     [07-04-19 @ 08:19]          Troponin 0.08      [07-05-19 @ 06:39]    Iron 58, TIBC 256, %sat 23      [06-19-19 @ 08:42]  Ferritin 812      [06-19-19 @ 08:42]  PTH -- (Ca 8.1)      [07-01-19 @ 13:24]   83  TSH 0.89      [05-23-19 @ 06:28]  Lipid: chol 159, , HDL 32, LDL 94      [01-29-19 @ 11:39]    HBsAb <3.0      [06-27-19 @ 19:39]  HBsAb Nonreact      [06-27-19 @ 19:39]  HBsAg Reactive      [06-27-19 @ 19:39]  HBcAb Nonreact      [06-27-19 @ 19:39]  HCV 0.10, Nonreact      [06-27-19 @ 19:39]

## 2019-07-05 NOTE — PROGRESS NOTE ADULT - ASSESSMENT
1) ESRD on HD  2) MBD of renal dx  3) Anemia of renal dx  4) Vol HTN    Continue current management    On HD    Poor Prognosis    bari Jensen

## 2019-07-06 PROCEDURE — 99233 SBSQ HOSP IP/OBS HIGH 50: CPT

## 2019-07-06 PROCEDURE — 99232 SBSQ HOSP IP/OBS MODERATE 35: CPT

## 2019-07-06 RX ORDER — HYDROMORPHONE HYDROCHLORIDE 2 MG/ML
0.5 INJECTION INTRAMUSCULAR; INTRAVENOUS; SUBCUTANEOUS ONCE
Refills: 0 | Status: DISCONTINUED | OUTPATIENT
Start: 2019-07-06 | End: 2019-07-06

## 2019-07-06 RX ORDER — HYDROMORPHONE HYDROCHLORIDE 2 MG/ML
0.5 INJECTION INTRAMUSCULAR; INTRAVENOUS; SUBCUTANEOUS
Refills: 0 | Status: DISCONTINUED | OUTPATIENT
Start: 2019-07-06 | End: 2019-07-10

## 2019-07-06 RX ORDER — MORPHINE SULFATE 50 MG/1
0.5 CAPSULE, EXTENDED RELEASE ORAL ONCE
Refills: 0 | Status: DISCONTINUED | OUTPATIENT
Start: 2019-07-06 | End: 2019-07-06

## 2019-07-06 RX ORDER — LACTULOSE 10 G/15ML
20 SOLUTION ORAL ONCE
Refills: 0 | Status: COMPLETED | OUTPATIENT
Start: 2019-07-06 | End: 2019-07-06

## 2019-07-06 RX ORDER — SODIUM CHLORIDE 0.65 %
1 AEROSOL, SPRAY (ML) NASAL
Refills: 0 | Status: DISCONTINUED | OUTPATIENT
Start: 2019-07-06 | End: 2019-07-10

## 2019-07-06 RX ADMIN — PANTOPRAZOLE SODIUM 40 MILLIGRAM(S): 20 TABLET, DELAYED RELEASE ORAL at 05:03

## 2019-07-06 RX ADMIN — ISOSORBIDE DINITRATE 20 MILLIGRAM(S): 5 TABLET ORAL at 13:11

## 2019-07-06 RX ADMIN — BUDESONIDE AND FORMOTEROL FUMARATE DIHYDRATE 2 PUFF(S): 160; 4.5 AEROSOL RESPIRATORY (INHALATION) at 20:50

## 2019-07-06 RX ADMIN — SEVELAMER CARBONATE 800 MILLIGRAM(S): 2400 POWDER, FOR SUSPENSION ORAL at 09:24

## 2019-07-06 RX ADMIN — Medication 100 MILLIGRAM(S): at 05:03

## 2019-07-06 RX ADMIN — BUDESONIDE AND FORMOTEROL FUMARATE DIHYDRATE 2 PUFF(S): 160; 4.5 AEROSOL RESPIRATORY (INHALATION) at 08:53

## 2019-07-06 RX ADMIN — Medication 81 MILLIGRAM(S): at 13:11

## 2019-07-06 RX ADMIN — ATORVASTATIN CALCIUM 80 MILLIGRAM(S): 80 TABLET, FILM COATED ORAL at 22:09

## 2019-07-06 RX ADMIN — HYDROMORPHONE HYDROCHLORIDE 0.5 MILLIGRAM(S): 2 INJECTION INTRAMUSCULAR; INTRAVENOUS; SUBCUTANEOUS at 03:46

## 2019-07-06 RX ADMIN — Medication 1 SPRAY(S): at 05:03

## 2019-07-06 RX ADMIN — Medication 50 MILLIGRAM(S): at 22:09

## 2019-07-06 RX ADMIN — ISOSORBIDE DINITRATE 20 MILLIGRAM(S): 5 TABLET ORAL at 05:03

## 2019-07-06 RX ADMIN — HYDROMORPHONE HYDROCHLORIDE 0.5 MILLIGRAM(S): 2 INJECTION INTRAMUSCULAR; INTRAVENOUS; SUBCUTANEOUS at 14:02

## 2019-07-06 RX ADMIN — ISOSORBIDE DINITRATE 20 MILLIGRAM(S): 5 TABLET ORAL at 22:09

## 2019-07-06 RX ADMIN — SEVELAMER CARBONATE 800 MILLIGRAM(S): 2400 POWDER, FOR SUSPENSION ORAL at 13:11

## 2019-07-06 RX ADMIN — CHLORHEXIDINE GLUCONATE 1 APPLICATION(S): 213 SOLUTION TOPICAL at 13:11

## 2019-07-06 RX ADMIN — Medication 1 SPRAY(S): at 17:00

## 2019-07-06 RX ADMIN — AMLODIPINE BESYLATE 5 MILLIGRAM(S): 2.5 TABLET ORAL at 05:03

## 2019-07-06 RX ADMIN — Medication 50 MILLIGRAM(S): at 13:11

## 2019-07-06 RX ADMIN — HYDROMORPHONE HYDROCHLORIDE 0.5 MILLIGRAM(S): 2 INJECTION INTRAMUSCULAR; INTRAVENOUS; SUBCUTANEOUS at 23:23

## 2019-07-06 RX ADMIN — HYDROMORPHONE HYDROCHLORIDE 0.5 MILLIGRAM(S): 2 INJECTION INTRAMUSCULAR; INTRAVENOUS; SUBCUTANEOUS at 03:07

## 2019-07-06 RX ADMIN — Medication 100 MILLIGRAM(S): at 17:00

## 2019-07-06 RX ADMIN — LACTULOSE 20 GRAM(S): 10 SOLUTION ORAL at 12:03

## 2019-07-06 RX ADMIN — SEVELAMER CARBONATE 800 MILLIGRAM(S): 2400 POWDER, FOR SUSPENSION ORAL at 17:00

## 2019-07-06 RX ADMIN — MORPHINE SULFATE 0.5 MILLIGRAM(S): 50 CAPSULE, EXTENDED RELEASE ORAL at 01:01

## 2019-07-06 RX ADMIN — CLOPIDOGREL BISULFATE 75 MILLIGRAM(S): 75 TABLET, FILM COATED ORAL at 13:10

## 2019-07-06 RX ADMIN — Medication 10 MILLIGRAM(S): at 05:03

## 2019-07-06 RX ADMIN — HYDROMORPHONE HYDROCHLORIDE 0.5 MILLIGRAM(S): 2 INJECTION INTRAMUSCULAR; INTRAVENOUS; SUBCUTANEOUS at 13:10

## 2019-07-06 RX ADMIN — Medication 25 MILLIGRAM(S): at 05:03

## 2019-07-06 RX ADMIN — Medication 10 MILLIGRAM(S): at 10:12

## 2019-07-06 RX ADMIN — SENNA PLUS 2 TABLET(S): 8.6 TABLET ORAL at 22:09

## 2019-07-06 RX ADMIN — MORPHINE SULFATE 0.5 MILLIGRAM(S): 50 CAPSULE, EXTENDED RELEASE ORAL at 01:52

## 2019-07-06 RX ADMIN — Medication 50 MILLIGRAM(S): at 05:03

## 2019-07-06 RX ADMIN — Medication 100 MICROGRAM(S): at 05:03

## 2019-07-06 RX ADMIN — Medication 1 SPRAY(S): at 01:01

## 2019-07-06 NOTE — PROGRESS NOTE ADULT - SUBJECTIVE AND OBJECTIVE BOX
KAUSHIK OHFFMAN  ----------------------------------------  The patient was seen and evaluated for sinus pause.  The patient is in no acute distress.  Denied any chest pain, palpitations, dyspnea, or abdominal pain.  Reports left foot pain.    Vital Signs Last 24 Hrs  T(C): 36.7 (06 Jul 2019 13:02), Max: 36.7 (06 Jul 2019 00:17)  T(F): 98.1 (06 Jul 2019 13:02), Max: 98.1 (06 Jul 2019 13:02)  HR: 71 (06 Jul 2019 13:02) (50 - 71)  BP: 148/65 (06 Jul 2019 13:02) (108/53 - 148/65)  BP(mean): --  RR: 18 (06 Jul 2019 13:02) (18 - 18)  SpO2: 96% (06 Jul 2019 13:02) (95% - 99%)    PHYSICAL EXAMINATION:  ----------------------------------------  General appearance: No acute distress, Awake, Alert  HEENT: Normocephalic, Atraumatic, Conjunctiva clear, EOMI  Neck: Supple, No JVD, No tenderness  Lungs: Breath sound equal bilaterally, No wheezes, No rales  Cardiovascular: S1S2, Regular rhythm  Abdomen: Soft, Nontender, Nondistended, No guarding/rebound, Positive bowel sounds  Extremities: No clubbing, No cyanosis, No edema, No calf tenderness  Neuro: Strength equal bilaterally, No tremors  Psychiatric: Appropriate mood, Normal affect    LABORATORY STUDIES:  ----------------------------------------  CARDIAC MARKERS ( 05 Jul 2019 06:39 )  x     / 0.08 ng/mL / x     / x     / x        MEDICATIONS  (STANDING):  amLODIPine   Tablet 5 milliGRAM(s) Oral daily  aspirin  chewable 81 milliGRAM(s) Oral daily  atorvastatin 80 milliGRAM(s) Oral at bedtime  buDESOnide  80 MICROgram(s)/formoterol 4.5 MICROgram(s) Inhaler 2 Puff(s) Inhalation two times a day  chlorhexidine 2% Cloths 1 Application(s) Topical daily  clopidogrel Tablet 75 milliGRAM(s) Oral daily  docusate sodium 100 milliGRAM(s) Oral two times a day  fluticasone propionate 50 MICROgram(s)/spray Nasal Spray 1 Spray(s) Both Nostrils two times a day  hydrALAZINE 50 milliGRAM(s) Oral every 8 hours  isosorbide   dinitrate Tablet (ISORDIL) 20 milliGRAM(s) Oral three times a day  levothyroxine 100 MICROGram(s) Oral daily  metoprolol tartrate 25 milliGRAM(s) Oral two times a day  pantoprazole    Tablet 40 milliGRAM(s) Oral before breakfast  predniSONE   Tablet 10 milliGRAM(s) Oral daily  senna 2 Tablet(s) Oral at bedtime  sevelamer carbonate 800 milliGRAM(s) Oral three times a day with meals    MEDICATIONS  (PRN):  ALBUTerol/ipratropium for Nebulization 3 milliLiter(s) Nebulizer every 6 hours PRN Shortness of Breath and/or Wheezing  bisacodyl Suppository 10 milliGRAM(s) Rectal daily PRN Constipation  HYDROmorphone  Injectable 0.5 milliGRAM(s) IV Push four times a day PRN Dyspnea  hydrOXYzine hydrochloride 25 milliGRAM(s) Oral every 6 hours PRN Itching  sodium chloride 0.65% Nasal 1 Spray(s) Both Nostrils two times a day PRN Nasal Congestion      ASSESSMENT / PLAN:  ----------------------------------------  Pt is 78 y/o Male with a history of ESRD on HD, HTN, HFpEF, CAD s/p CABG, and lung cancer s/p radiation on Home Oxygen, presenting with increased dyspnea, likely multifactorial with underlying lung cancer, ILD/ pulmonary fibrosis and volume overload with CHF/ ESRD    Chronic hypoxic respiratory failure - On supplemental oxygen. Multifactorial in nature with lung cancer, pulmonary fibrosis, and heart failure. Inhaled bronchodilator therapy. On prednisone.    Hypertension - Close blood pressure monitoring. On amlodipine, hydralazine, and metoprolol.    Chronic diastolic heart failure - Not in acute exacerbation. On isosorbide. Hemodialysis as scheduled.    CAD - On aspirin, clopidogrel, and atorvastatin.    ESRD - Hemodialysis as scheduled.    Hypothyroidism - On levothyroxine.    The patient's family is requesting transfer to a rehabilitation facility. KAUSHIK HOFFMAN  ----------------------------------------  The patient was seen and evaluated for respiratory pause.  The patient is in no acute distress.  Denied any chest pain, palpitations, dyspnea, or abdominal pain.  Reports left foot pain.    Vital Signs Last 24 Hrs  T(C): 36.7 (06 Jul 2019 13:02), Max: 36.7 (06 Jul 2019 00:17)  T(F): 98.1 (06 Jul 2019 13:02), Max: 98.1 (06 Jul 2019 13:02)  HR: 71 (06 Jul 2019 13:02) (50 - 71)  BP: 148/65 (06 Jul 2019 13:02) (108/53 - 148/65)  BP(mean): --  RR: 18 (06 Jul 2019 13:02) (18 - 18)  SpO2: 96% (06 Jul 2019 13:02) (95% - 99%)    PHYSICAL EXAMINATION:  ----------------------------------------  General appearance: No acute distress, Awake, Alert  HEENT: Normocephalic, Atraumatic, Conjunctiva clear, EOMI  Neck: Supple, No JVD, No tenderness  Lungs: Breath sound equal bilaterally, No wheezes, No rales  Cardiovascular: S1S2, Regular rhythm  Abdomen: Soft, Nontender, Nondistended, No guarding/rebound, Positive bowel sounds  Extremities: No clubbing, No cyanosis, No edema, No calf tenderness  Neuro: Strength equal bilaterally, No tremors  Psychiatric: Appropriate mood, Normal affect    LABORATORY STUDIES:  ----------------------------------------  CARDIAC MARKERS ( 05 Jul 2019 06:39 )  x     / 0.08 ng/mL / x     / x     / x        MEDICATIONS  (STANDING):  amLODIPine   Tablet 5 milliGRAM(s) Oral daily  aspirin  chewable 81 milliGRAM(s) Oral daily  atorvastatin 80 milliGRAM(s) Oral at bedtime  buDESOnide  80 MICROgram(s)/formoterol 4.5 MICROgram(s) Inhaler 2 Puff(s) Inhalation two times a day  chlorhexidine 2% Cloths 1 Application(s) Topical daily  clopidogrel Tablet 75 milliGRAM(s) Oral daily  docusate sodium 100 milliGRAM(s) Oral two times a day  fluticasone propionate 50 MICROgram(s)/spray Nasal Spray 1 Spray(s) Both Nostrils two times a day  hydrALAZINE 50 milliGRAM(s) Oral every 8 hours  isosorbide   dinitrate Tablet (ISORDIL) 20 milliGRAM(s) Oral three times a day  levothyroxine 100 MICROGram(s) Oral daily  metoprolol tartrate 25 milliGRAM(s) Oral two times a day  pantoprazole    Tablet 40 milliGRAM(s) Oral before breakfast  predniSONE   Tablet 10 milliGRAM(s) Oral daily  senna 2 Tablet(s) Oral at bedtime  sevelamer carbonate 800 milliGRAM(s) Oral three times a day with meals    MEDICATIONS  (PRN):  ALBUTerol/ipratropium for Nebulization 3 milliLiter(s) Nebulizer every 6 hours PRN Shortness of Breath and/or Wheezing  bisacodyl Suppository 10 milliGRAM(s) Rectal daily PRN Constipation  HYDROmorphone  Injectable 0.5 milliGRAM(s) IV Push four times a day PRN Dyspnea  hydrOXYzine hydrochloride 25 milliGRAM(s) Oral every 6 hours PRN Itching  sodium chloride 0.65% Nasal 1 Spray(s) Both Nostrils two times a day PRN Nasal Congestion      ASSESSMENT / PLAN:  ----------------------------------------  Pt is 80 y/o Male with a history of ESRD on HD, HTN, HFpEF, CAD s/p CABG, and lung cancer s/p radiation on Home Oxygen, presenting with increased dyspnea, likely multifactorial with underlying lung cancer, ILD/ pulmonary fibrosis and volume overload with CHF/ ESRD    Chronic hypoxic respiratory failure - On supplemental oxygen. Multifactorial in nature with lung cancer, pulmonary fibrosis, and heart failure. Inhaled bronchodilator therapy. On prednisone.    Hypertension - Close blood pressure monitoring. On amlodipine, hydralazine, and metoprolol.    Chronic diastolic heart failure - Not in acute exacerbation. On isosorbide. Hemodialysis as scheduled.    CAD - On aspirin, clopidogrel, and atorvastatin.    ESRD - Hemodialysis as scheduled.    Hypothyroidism - On levothyroxine.    The patient's family is requesting transfer to a rehabilitation facility.

## 2019-07-06 NOTE — PROGRESS NOTE ADULT - ASSESSMENT
1)ESRD on HD  2) MBD of renal dx  3) Anemia of renal dx  4) Vol HTN    Continue current management    On HD Mon    Poor Prognosis,     bari Orellana

## 2019-07-06 NOTE — PROGRESS NOTE ADULT - SUBJECTIVE AND OBJECTIVE BOX
[facility] DIVISION OF KIDNEY DISEASES AND HYPERTENSION -- FOLLOW UP NOTE  --------------------------------------------------------------------------------  Chief Complaint: ESRD HD    24 hour events/subjective:  Seen/examined  HD due Mon  Tolerated yesterday      PAST HISTORY  --------------------------------------------------------------------------------  No significant changes to PMH, PSH, FHx, SHx, unless otherwise noted    ALLERGIES & MEDICATIONS  --------------------------------------------------------------------------------  Allergies    No Known Allergies    Intolerances      Standing Inpatient Medications  amLODIPine   Tablet 5 milliGRAM(s) Oral daily  aspirin  chewable 81 milliGRAM(s) Oral daily  atorvastatin 80 milliGRAM(s) Oral at bedtime  buDESOnide  80 MICROgram(s)/formoterol 4.5 MICROgram(s) Inhaler 2 Puff(s) Inhalation two times a day  chlorhexidine 2% Cloths 1 Application(s) Topical daily  clopidogrel Tablet 75 milliGRAM(s) Oral daily  docusate sodium 100 milliGRAM(s) Oral two times a day  fluticasone propionate 50 MICROgram(s)/spray Nasal Spray 1 Spray(s) Both Nostrils two times a day  hydrALAZINE 50 milliGRAM(s) Oral every 8 hours  isosorbide   dinitrate Tablet (ISORDIL) 20 milliGRAM(s) Oral three times a day  levothyroxine 100 MICROGram(s) Oral daily  metoprolol tartrate 25 milliGRAM(s) Oral two times a day  pantoprazole    Tablet 40 milliGRAM(s) Oral before breakfast  predniSONE   Tablet 10 milliGRAM(s) Oral daily  senna 2 Tablet(s) Oral at bedtime  sevelamer carbonate 800 milliGRAM(s) Oral three times a day with meals    PRN Inpatient Medications  ALBUTerol/ipratropium for Nebulization 3 milliLiter(s) Nebulizer every 6 hours PRN  bisacodyl Suppository 10 milliGRAM(s) Rectal daily PRN  HYDROmorphone  Injectable 0.5 milliGRAM(s) IV Push four times a day PRN  hydrOXYzine hydrochloride 25 milliGRAM(s) Oral every 6 hours PRN  sodium chloride 0.65% Nasal 1 Spray(s) Both Nostrils two times a day PRN      REVIEW OF SYSTEMS  --------------------------------------------------------------------------------  Gen: No weight changes, fatigue, fevers/chills, weakness  Skin: No rashes  Head/Eyes/Ears/Mouth: No headache; Normal hearing; Normal vision w/o blurriness; No sinus pain/discomfort, sore throat  Respiratory: No dyspnea, cough, wheezing, hemoptysis  CV: No chest pain, PND, orthopnea  GI: No abdominal pain, diarrhea, constipation, nausea, vomiting, melena, hematochezia  : No increased frequency, dysuria, hematuria, nocturia  MSK: No joint pain/swelling; no back pain; no edema  Neuro: No dizziness/lightheadedness, weakness, seizures, numbness, tingling  Heme: No easy bruising or bleeding  Endo: No heat/cold intolerance  Psych: No significant nervousness, anxiety, stress, depression    All other systems were reviewed and are negative, except as noted.    VITALS/PHYSICAL EXAM  --------------------------------------------------------------------------------  T(C): 36.7 (07-06-19 @ 08:35), Max: 36.7 (07-05-19 @ 15:44)  HR: 64 (07-06-19 @ 08:55) (50 - 64)  BP: 125/58 (07-06-19 @ 08:35) (106/50 - 134/63)  RR: 18 (07-06-19 @ 08:35) (18 - 18)  SpO2: 95% (07-06-19 @ 08:55) (95% - 99%)  Wt(kg): --        07-05-19 @ 07:01  -  07-06-19 @ 07:00  --------------------------------------------------------  IN: 240 mL / OUT: 1000 mL / NET: -760 mL      Physical Exam:  	Gen: NAD, frail; pale  	HEENT: PERRL, supple neck, clear oropharynx  	Pulm: dec BS  	CV: RRR, S1S2; no rub  	Back: No spinal or CVA tenderness; no sacral edema  	Abd: +BS, soft, nontender/nondistended  	: No suprapubic tenderness  	UE: Warm, FROM, no clubbing, intact strength; no edema; no asterixis  	LE: Warm, FROM, no clubbing, intact strength; no edema  	Neuro: No focal deficits, intact gait  	Psych: Normal affect and mood  	Skin: Warm, without rashes  	Vascular access:    LABS/STUDIES  --------------------------------------------------------------------------------              Troponin 0.08      [07-05-19 @ 06:39]    Creatinine Trend:  SCr 4.29 [07-04 @ 08:19]  SCr 7.76 [07-01 @ 13:39]  SCr 6.68 [06-30 @ 16:43]  SCr 4.79 [06-28 @ 05:45]  SCr 4.45 [06-27 @ 22:59]        Iron 58, TIBC 256, %sat 23      [06-19-19 @ 08:42]  Ferritin 812      [06-19-19 @ 08:42]  PTH -- (Ca 8.1)      [07-01-19 @ 13:24]   83  TSH 0.89      [05-23-19 @ 06:28]  Lipid: chol 159, , HDL 32, LDL 94      [01-29-19 @ 11:39]    HBsAb <3.0      [06-27-19 @ 19:39]  HBsAb Nonreact      [06-27-19 @ 19:39]  HBsAg Reactive      [06-27-19 @ 19:39]  HBcAb Nonreact      [06-27-19 @ 19:39]  HCV 0.10, Nonreact      [06-27-19 @ 19:39]

## 2019-07-07 PROCEDURE — 99233 SBSQ HOSP IP/OBS HIGH 50: CPT

## 2019-07-07 PROCEDURE — 99232 SBSQ HOSP IP/OBS MODERATE 35: CPT

## 2019-07-07 RX ADMIN — AMLODIPINE BESYLATE 5 MILLIGRAM(S): 2.5 TABLET ORAL at 05:17

## 2019-07-07 RX ADMIN — HYDROMORPHONE HYDROCHLORIDE 0.5 MILLIGRAM(S): 2 INJECTION INTRAMUSCULAR; INTRAVENOUS; SUBCUTANEOUS at 18:40

## 2019-07-07 RX ADMIN — CHLORHEXIDINE GLUCONATE 1 APPLICATION(S): 213 SOLUTION TOPICAL at 12:00

## 2019-07-07 RX ADMIN — HYDROMORPHONE HYDROCHLORIDE 0.5 MILLIGRAM(S): 2 INJECTION INTRAMUSCULAR; INTRAVENOUS; SUBCUTANEOUS at 18:52

## 2019-07-07 RX ADMIN — Medication 10 MILLIGRAM(S): at 05:17

## 2019-07-07 RX ADMIN — Medication 50 MILLIGRAM(S): at 21:20

## 2019-07-07 RX ADMIN — Medication 100 MILLIGRAM(S): at 16:56

## 2019-07-07 RX ADMIN — HYDROMORPHONE HYDROCHLORIDE 0.5 MILLIGRAM(S): 2 INJECTION INTRAMUSCULAR; INTRAVENOUS; SUBCUTANEOUS at 05:26

## 2019-07-07 RX ADMIN — Medication 100 MILLIGRAM(S): at 05:17

## 2019-07-07 RX ADMIN — HYDROMORPHONE HYDROCHLORIDE 0.5 MILLIGRAM(S): 2 INJECTION INTRAMUSCULAR; INTRAVENOUS; SUBCUTANEOUS at 05:25

## 2019-07-07 RX ADMIN — BUDESONIDE AND FORMOTEROL FUMARATE DIHYDRATE 2 PUFF(S): 160; 4.5 AEROSOL RESPIRATORY (INHALATION) at 20:36

## 2019-07-07 RX ADMIN — HYDROMORPHONE HYDROCHLORIDE 0.5 MILLIGRAM(S): 2 INJECTION INTRAMUSCULAR; INTRAVENOUS; SUBCUTANEOUS at 13:27

## 2019-07-07 RX ADMIN — Medication 1 SPRAY(S): at 05:17

## 2019-07-07 RX ADMIN — ISOSORBIDE DINITRATE 20 MILLIGRAM(S): 5 TABLET ORAL at 11:58

## 2019-07-07 RX ADMIN — Medication 81 MILLIGRAM(S): at 11:59

## 2019-07-07 RX ADMIN — Medication 100 MICROGRAM(S): at 05:17

## 2019-07-07 RX ADMIN — HYDROMORPHONE HYDROCHLORIDE 0.5 MILLIGRAM(S): 2 INJECTION INTRAMUSCULAR; INTRAVENOUS; SUBCUTANEOUS at 19:07

## 2019-07-07 RX ADMIN — SEVELAMER CARBONATE 800 MILLIGRAM(S): 2400 POWDER, FOR SUSPENSION ORAL at 08:29

## 2019-07-07 RX ADMIN — ISOSORBIDE DINITRATE 20 MILLIGRAM(S): 5 TABLET ORAL at 21:20

## 2019-07-07 RX ADMIN — SENNA PLUS 2 TABLET(S): 8.6 TABLET ORAL at 21:20

## 2019-07-07 RX ADMIN — Medication 1 SPRAY(S): at 16:56

## 2019-07-07 RX ADMIN — CLOPIDOGREL BISULFATE 75 MILLIGRAM(S): 75 TABLET, FILM COATED ORAL at 11:59

## 2019-07-07 RX ADMIN — SEVELAMER CARBONATE 800 MILLIGRAM(S): 2400 POWDER, FOR SUSPENSION ORAL at 16:56

## 2019-07-07 RX ADMIN — Medication 25 MILLIGRAM(S): at 16:56

## 2019-07-07 RX ADMIN — HYDROMORPHONE HYDROCHLORIDE 0.5 MILLIGRAM(S): 2 INJECTION INTRAMUSCULAR; INTRAVENOUS; SUBCUTANEOUS at 12:00

## 2019-07-07 RX ADMIN — Medication 50 MILLIGRAM(S): at 11:59

## 2019-07-07 RX ADMIN — ATORVASTATIN CALCIUM 80 MILLIGRAM(S): 80 TABLET, FILM COATED ORAL at 21:20

## 2019-07-07 RX ADMIN — Medication 3 MILLILITER(S): at 08:55

## 2019-07-07 RX ADMIN — PANTOPRAZOLE SODIUM 40 MILLIGRAM(S): 20 TABLET, DELAYED RELEASE ORAL at 05:17

## 2019-07-07 RX ADMIN — BUDESONIDE AND FORMOTEROL FUMARATE DIHYDRATE 2 PUFF(S): 160; 4.5 AEROSOL RESPIRATORY (INHALATION) at 08:55

## 2019-07-07 RX ADMIN — SEVELAMER CARBONATE 800 MILLIGRAM(S): 2400 POWDER, FOR SUSPENSION ORAL at 11:59

## 2019-07-07 NOTE — PROGRESS NOTE ADULT - SUBJECTIVE AND OBJECTIVE BOX
[facility] DIVISION OF KIDNEY DISEASES AND HYPERTENSION -- HEMODIALYSIS NOTE  --------------------------------------------------------------------------------  Chief Complaint: ESRD/Ongoing hemodialysis requirement    24 hour events/subjective:  Seen/examined  Plan for HD in AM      PAST HISTORY  --------------------------------------------------------------------------------  No significant changes to PMH, PSH, FHx, SHx, unless otherwise noted    ALLERGIES & MEDICATIONS  --------------------------------------------------------------------------------  Allergies    No Known Allergies    Intolerances      Standing Inpatient Medications  amLODIPine   Tablet 5 milliGRAM(s) Oral daily  aspirin  chewable 81 milliGRAM(s) Oral daily  atorvastatin 80 milliGRAM(s) Oral at bedtime  buDESOnide  80 MICROgram(s)/formoterol 4.5 MICROgram(s) Inhaler 2 Puff(s) Inhalation two times a day  chlorhexidine 2% Cloths 1 Application(s) Topical daily  clopidogrel Tablet 75 milliGRAM(s) Oral daily  docusate sodium 100 milliGRAM(s) Oral two times a day  fluticasone propionate 50 MICROgram(s)/spray Nasal Spray 1 Spray(s) Both Nostrils two times a day  hydrALAZINE 50 milliGRAM(s) Oral every 8 hours  isosorbide   dinitrate Tablet (ISORDIL) 20 milliGRAM(s) Oral three times a day  levothyroxine 100 MICROGram(s) Oral daily  metoprolol tartrate 25 milliGRAM(s) Oral two times a day  pantoprazole    Tablet 40 milliGRAM(s) Oral before breakfast  predniSONE   Tablet 10 milliGRAM(s) Oral daily  senna 2 Tablet(s) Oral at bedtime  sevelamer carbonate 800 milliGRAM(s) Oral three times a day with meals    PRN Inpatient Medications  ALBUTerol/ipratropium for Nebulization 3 milliLiter(s) Nebulizer every 6 hours PRN  bisacodyl Suppository 10 milliGRAM(s) Rectal daily PRN  HYDROmorphone  Injectable 0.5 milliGRAM(s) IV Push four times a day PRN  hydrOXYzine hydrochloride 25 milliGRAM(s) Oral every 6 hours PRN  sodium chloride 0.65% Nasal 1 Spray(s) Both Nostrils two times a day PRN      REVIEW OF SYSTEMS  --------------------------------------------------------------------------------  Gen: No weight changes, fatigue, fevers/chills, weakness  Skin: No rashes  Head/Eyes/Ears/Mouth: No headache; Normal hearing; Normal vision w/o blurriness; No sinus pain/discomfort, sore throat  Respiratory: No dyspnea, cough, wheezing, hemoptysis  CV: No chest pain, PND, orthopnea  GI: No abdominal pain, diarrhea, constipation, nausea, vomiting, melena, hematochezia  : No increased frequency, dysuria, hematuria, nocturia  MSK: No joint pain/swelling; no back pain; no edema  Neuro: No dizziness/lightheadedness, weakness, seizures, numbness, tingling  Heme: No easy bruising or bleeding  Endo: No heat/cold intolerance  Psych: No significant nervousness, anxiety, stress, depression    All other systems were reviewed and are negative, except as noted.    VITALS/PHYSICAL EXAM  --------------------------------------------------------------------------------  T(C): 36.6 (07-07-19 @ 11:57), Max: 36.9 (07-06-19 @ 16:58)  HR: 60 (07-07-19 @ 12:50) (50 - 76)  BP: 127/56 (07-07-19 @ 11:57) (119/40 - 133/54)  RR: 19 (07-07-19 @ 11:57) (18 - 19)  SpO2: 93% (07-07-19 @ 11:57) (92% - 99%)  Wt(kg): --  Height (cm): 167.64 (07-06-19 @ 13:02)  Weight (kg): 59.3 (07-06-19 @ 13:02)  BMI (kg/m2): 21.1 (07-06-19 @ 13:02)  BSA (m2): 1.67 (07-06-19 @ 13:02)      Physical Exam:  	Gen: NAD, frail  	HEENT: PERRL, supple neck, clear oropharynx  	Pulm: CTA B/L  	CV: RRR, S1S2; no rub  	Back: No spinal or CVA tenderness; no sacral edema  	Abd: +BS, soft, nontender/nondistended  	: No suprapubic tenderness  	UE: Warm, FROM, no clubbing, intact strength; no edema; no asterixis  	LE: Warm, FROM, no clubbing, intact strength; no edema  	Neuro: No focal deficits, intact gait  	Psych: Normal affect and mood  	Skin: Warm, without rashes  	Vascular access:    LABS/STUDIES  --------------------------------------------------------------------------------                Iron 58, TIBC 256, %sat 23      [06-19-19 @ 08:42]  Ferritin 812      [06-19-19 @ 08:42]  PTH -- (Ca 8.1)      [07-01-19 @ 13:24]   83  TSH 0.89      [05-23-19 @ 06:28]  Lipid: chol 159, , HDL 32, LDL 94      [01-29-19 @ 11:39]    HBsAb <3.0      [06-27-19 @ 19:39]  HBsAb Nonreact      [06-27-19 @ 19:39]  HBsAg Reactive      [06-27-19 @ 19:39]  HBcAb Nonreact      [06-27-19 @ 19:39]  HCV 0.10, Nonreact      [06-27-19 @ 19:39]

## 2019-07-07 NOTE — PROGRESS NOTE ADULT - SUBJECTIVE AND OBJECTIVE BOX
KAUSHIK HOFFMAN  ----------------------------------------  The patient was seen and evaluated for dyspnea.  The patient is in no acute distress.  Denied any chest pain, palpitations, dyspnea, or abdominal pain.  Offers no complaints.    Vital Signs Last 24 Hrs  T(C): 36.4 (07 Jul 2019 07:42), Max: 36.9 (06 Jul 2019 16:58)  T(F): 97.6 (07 Jul 2019 07:42), Max: 98.4 (06 Jul 2019 16:58)  HR: 76 (07 Jul 2019 07:42) (50 - 76)  BP: 133/54 (07 Jul 2019 07:42) (119/40 - 148/65)  BP(mean): --  RR: 18 (07 Jul 2019 07:42) (18 - 18)  SpO2: 92% (07 Jul 2019 07:42) (92% - 99%)    PHYSICAL EXAMINATION:  ----------------------------------------  General appearance: No acute distress, Awake, Alert  HEENT: Normocephalic, Atraumatic, Conjunctiva clear, EOMI  Neck: Supple, No JVD, No tenderness  Lungs: Breath sound equal bilaterally, No wheezes, Positive fine rales  Cardiovascular: S1S2, Regular rhythm  Abdomen: Soft, Nontender, Nondistended, No guarding/rebound, Positive bowel sounds  Extremities: No clubbing, No cyanosis, No edema, No calf tenderness  Neuro: Strength equal bilaterally, No tremors  Psychiatric: Appropriate mood, Normal affect    MEDICATIONS  (STANDING):  amLODIPine   Tablet 5 milliGRAM(s) Oral daily  aspirin  chewable 81 milliGRAM(s) Oral daily  atorvastatin 80 milliGRAM(s) Oral at bedtime  buDESOnide  80 MICROgram(s)/formoterol 4.5 MICROgram(s) Inhaler 2 Puff(s) Inhalation two times a day  chlorhexidine 2% Cloths 1 Application(s) Topical daily  clopidogrel Tablet 75 milliGRAM(s) Oral daily  docusate sodium 100 milliGRAM(s) Oral two times a day  fluticasone propionate 50 MICROgram(s)/spray Nasal Spray 1 Spray(s) Both Nostrils two times a day  hydrALAZINE 50 milliGRAM(s) Oral every 8 hours  isosorbide   dinitrate Tablet (ISORDIL) 20 milliGRAM(s) Oral three times a day  levothyroxine 100 MICROGram(s) Oral daily  metoprolol tartrate 25 milliGRAM(s) Oral two times a day  pantoprazole    Tablet 40 milliGRAM(s) Oral before breakfast  predniSONE   Tablet 10 milliGRAM(s) Oral daily  senna 2 Tablet(s) Oral at bedtime  sevelamer carbonate 800 milliGRAM(s) Oral three times a day with meals    MEDICATIONS  (PRN):  ALBUTerol/ipratropium for Nebulization 3 milliLiter(s) Nebulizer every 6 hours PRN Shortness of Breath and/or Wheezing  bisacodyl Suppository 10 milliGRAM(s) Rectal daily PRN Constipation  HYDROmorphone  Injectable 0.5 milliGRAM(s) IV Push four times a day PRN Dyspnea  hydrOXYzine hydrochloride 25 milliGRAM(s) Oral every 6 hours PRN Itching  sodium chloride 0.65% Nasal 1 Spray(s) Both Nostrils two times a day PRN Nasal Congestion      ASSESSMENT / PLAN:  ----------------------------------------  Pt is 80 y/o Male with a history of ESRD on HD, HTN, HFpEF, CAD s/p CABG, and lung cancer s/p radiation on Home Oxygen, presenting with increased dyspnea, likely multifactorial with underlying lung cancer, ILD/ pulmonary fibrosis and volume overload with CHF/ ESRD    Chronic hypoxic respiratory failure - On supplemental oxygen. Multifactorial in nature with lung cancer, pulmonary fibrosis on prednisone, and heart failure. Inhaled bronchodilator therapy.    Hypertension - Close blood pressure monitoring. On amlodipine, hydralazine, and metoprolol.    Chronic diastolic heart failure - Not in acute exacerbation. On isosorbide. Hemodialysis as scheduled.    CAD - On aspirin, clopidogrel, and atorvastatin.    ESRD - Hemodialysis as scheduled.    Hypothyroidism - On levothyroxine.    Constipation - On a bowel regimen. The patient reported having a bowel movement yesterday. No abdominal pain.    The patient's family is requesting transfer to a rehabilitation facility.      Estimate date of discharge

## 2019-07-08 LAB
ANION GAP SERPL CALC-SCNC: 15 MMOL/L — SIGNIFICANT CHANGE UP (ref 5–17)
BUN SERPL-MCNC: 51 MG/DL — HIGH (ref 8–20)
CALCIUM SERPL-MCNC: 8.6 MG/DL — SIGNIFICANT CHANGE UP (ref 8.6–10.2)
CHLORIDE SERPL-SCNC: 96 MMOL/L — LOW (ref 98–107)
CO2 SERPL-SCNC: 24 MMOL/L — SIGNIFICANT CHANGE UP (ref 22–29)
CREAT SERPL-MCNC: 7.65 MG/DL — HIGH (ref 0.5–1.3)
GLUCOSE SERPL-MCNC: 113 MG/DL — SIGNIFICANT CHANGE UP (ref 70–115)
HCT VFR BLD CALC: 30.3 % — LOW (ref 39–50)
HGB BLD-MCNC: 9.1 G/DL — LOW (ref 13–17)
MCHC RBC-ENTMCNC: 29.8 PG — SIGNIFICANT CHANGE UP (ref 27–34)
MCHC RBC-ENTMCNC: 30 GM/DL — LOW (ref 32–36)
MCV RBC AUTO: 99.3 FL — SIGNIFICANT CHANGE UP (ref 80–100)
PLATELET # BLD AUTO: 166 K/UL — SIGNIFICANT CHANGE UP (ref 150–400)
POTASSIUM SERPL-MCNC: 4.7 MMOL/L — SIGNIFICANT CHANGE UP (ref 3.5–5.3)
POTASSIUM SERPL-SCNC: 4.7 MMOL/L — SIGNIFICANT CHANGE UP (ref 3.5–5.3)
RBC # BLD: 3.05 M/UL — LOW (ref 4.2–5.8)
RBC # FLD: 17.6 % — HIGH (ref 10.3–14.5)
SODIUM SERPL-SCNC: 135 MMOL/L — SIGNIFICANT CHANGE UP (ref 135–145)
WBC # BLD: 6.67 K/UL — SIGNIFICANT CHANGE UP (ref 3.8–10.5)
WBC # FLD AUTO: 6.67 K/UL — SIGNIFICANT CHANGE UP (ref 3.8–10.5)

## 2019-07-08 PROCEDURE — 99232 SBSQ HOSP IP/OBS MODERATE 35: CPT

## 2019-07-08 PROCEDURE — 90937 HEMODIALYSIS REPEATED EVAL: CPT

## 2019-07-08 RX ORDER — LACTULOSE 10 G/15ML
10 SOLUTION ORAL ONCE
Refills: 0 | Status: DISCONTINUED | OUTPATIENT
Start: 2019-07-08 | End: 2019-07-10

## 2019-07-08 RX ORDER — IPRATROPIUM/ALBUTEROL SULFATE 18-103MCG
3 AEROSOL WITH ADAPTER (GRAM) INHALATION EVERY 4 HOURS
Refills: 0 | Status: DISCONTINUED | OUTPATIENT
Start: 2019-07-08 | End: 2019-07-10

## 2019-07-08 RX ORDER — ERYTHROPOIETIN 10000 [IU]/ML
10000 INJECTION, SOLUTION INTRAVENOUS; SUBCUTANEOUS
Refills: 0 | Status: DISCONTINUED | OUTPATIENT
Start: 2019-07-08 | End: 2019-07-10

## 2019-07-08 RX ORDER — ALPRAZOLAM 0.25 MG
0.25 TABLET ORAL
Refills: 0 | Status: DISCONTINUED | OUTPATIENT
Start: 2019-07-08 | End: 2019-07-10

## 2019-07-08 RX ADMIN — Medication 100 MILLIGRAM(S): at 18:33

## 2019-07-08 RX ADMIN — Medication 100 MILLIGRAM(S): at 05:30

## 2019-07-08 RX ADMIN — ATORVASTATIN CALCIUM 80 MILLIGRAM(S): 80 TABLET, FILM COATED ORAL at 21:18

## 2019-07-08 RX ADMIN — ISOSORBIDE DINITRATE 20 MILLIGRAM(S): 5 TABLET ORAL at 21:17

## 2019-07-08 RX ADMIN — SEVELAMER CARBONATE 800 MILLIGRAM(S): 2400 POWDER, FOR SUSPENSION ORAL at 18:32

## 2019-07-08 RX ADMIN — Medication 1 SPRAY(S): at 18:33

## 2019-07-08 RX ADMIN — AMLODIPINE BESYLATE 5 MILLIGRAM(S): 2.5 TABLET ORAL at 05:32

## 2019-07-08 RX ADMIN — Medication 50 MILLIGRAM(S): at 05:32

## 2019-07-08 RX ADMIN — HYDROMORPHONE HYDROCHLORIDE 0.5 MILLIGRAM(S): 2 INJECTION INTRAMUSCULAR; INTRAVENOUS; SUBCUTANEOUS at 00:46

## 2019-07-08 RX ADMIN — HYDROMORPHONE HYDROCHLORIDE 0.5 MILLIGRAM(S): 2 INJECTION INTRAMUSCULAR; INTRAVENOUS; SUBCUTANEOUS at 11:33

## 2019-07-08 RX ADMIN — Medication 3 MILLILITER(S): at 15:27

## 2019-07-08 RX ADMIN — Medication 3 MILLILITER(S): at 20:20

## 2019-07-08 RX ADMIN — HYDROMORPHONE HYDROCHLORIDE 0.5 MILLIGRAM(S): 2 INJECTION INTRAMUSCULAR; INTRAVENOUS; SUBCUTANEOUS at 16:00

## 2019-07-08 RX ADMIN — Medication 50 MILLIGRAM(S): at 13:32

## 2019-07-08 RX ADMIN — Medication 1 SPRAY(S): at 05:30

## 2019-07-08 RX ADMIN — Medication 10 MILLIGRAM(S): at 05:30

## 2019-07-08 RX ADMIN — HYDROMORPHONE HYDROCHLORIDE 0.5 MILLIGRAM(S): 2 INJECTION INTRAMUSCULAR; INTRAVENOUS; SUBCUTANEOUS at 20:20

## 2019-07-08 RX ADMIN — Medication 100 MICROGRAM(S): at 05:30

## 2019-07-08 RX ADMIN — HYDROMORPHONE HYDROCHLORIDE 0.5 MILLIGRAM(S): 2 INJECTION INTRAMUSCULAR; INTRAVENOUS; SUBCUTANEOUS at 15:36

## 2019-07-08 RX ADMIN — ISOSORBIDE DINITRATE 20 MILLIGRAM(S): 5 TABLET ORAL at 05:32

## 2019-07-08 RX ADMIN — SEVELAMER CARBONATE 800 MILLIGRAM(S): 2400 POWDER, FOR SUSPENSION ORAL at 08:43

## 2019-07-08 RX ADMIN — ISOSORBIDE DINITRATE 20 MILLIGRAM(S): 5 TABLET ORAL at 13:33

## 2019-07-08 RX ADMIN — PANTOPRAZOLE SODIUM 40 MILLIGRAM(S): 20 TABLET, DELAYED RELEASE ORAL at 05:30

## 2019-07-08 RX ADMIN — CLOPIDOGREL BISULFATE 75 MILLIGRAM(S): 75 TABLET, FILM COATED ORAL at 13:33

## 2019-07-08 RX ADMIN — HYDROMORPHONE HYDROCHLORIDE 0.5 MILLIGRAM(S): 2 INJECTION INTRAMUSCULAR; INTRAVENOUS; SUBCUTANEOUS at 19:47

## 2019-07-08 RX ADMIN — CHLORHEXIDINE GLUCONATE 1 APPLICATION(S): 213 SOLUTION TOPICAL at 13:32

## 2019-07-08 RX ADMIN — Medication 0.25 MILLIGRAM(S): at 18:31

## 2019-07-08 RX ADMIN — SEVELAMER CARBONATE 800 MILLIGRAM(S): 2400 POWDER, FOR SUSPENSION ORAL at 13:33

## 2019-07-08 RX ADMIN — BUDESONIDE AND FORMOTEROL FUMARATE DIHYDRATE 2 PUFF(S): 160; 4.5 AEROSOL RESPIRATORY (INHALATION) at 20:20

## 2019-07-08 RX ADMIN — Medication 50 MILLIGRAM(S): at 21:17

## 2019-07-08 RX ADMIN — Medication 81 MILLIGRAM(S): at 13:33

## 2019-07-08 RX ADMIN — SENNA PLUS 2 TABLET(S): 8.6 TABLET ORAL at 21:17

## 2019-07-08 RX ADMIN — HYDROMORPHONE HYDROCHLORIDE 0.5 MILLIGRAM(S): 2 INJECTION INTRAMUSCULAR; INTRAVENOUS; SUBCUTANEOUS at 11:48

## 2019-07-08 NOTE — PROGRESS NOTE ADULT - SUBJECTIVE AND OBJECTIVE BOX
KAUSHIK HOFFMAN  ----------------------------------------  The patient was seen and evaluated for dyspnea.  The patient is in no acute distress.  Denied any chest pain, palpitations, or abdominal pain.  Reports some dyspnea.    Vital Signs Last 24 Hrs  T(C): 36.8 (08 Jul 2019 07:10), Max: 36.8 (07 Jul 2019 20:45)  T(F): 98.3 (08 Jul 2019 07:10), Max: 98.3 (08 Jul 2019 07:10)  HR: 55 (08 Jul 2019 07:10) (51 - 89)  BP: 116/52 (08 Jul 2019 07:10) (94/58 - 140/55)  BP(mean): --  RR: 18 (08 Jul 2019 07:10) (18 - 19)  SpO2: 95% (08 Jul 2019 07:10) (93% - 96%)    PHYSICAL EXAMINATION:  ----------------------------------------  General appearance: No acute distress, Awake, Alert  HEENT: Normocephalic, Atraumatic, Conjunctiva clear, EOMI  Neck: Supple, No JVD, No tenderness  Lungs: Breath sound equal bilaterally, No wheezes, Positive fine rales  Cardiovascular: S1S2, Regular rhythm  Abdomen: Soft, Nontender, Nondistended, No guarding/rebound, Positive bowel sounds  Extremities: No clubbing, No cyanosis, No edema, No calf tenderness  Neuro: Strength equal bilaterally, No tremors  Psychiatric: Appropriate mood, Normal affect    LABORATORY STUDIES:  ----------------------------------------             9.1    6.67  )-----------( 166      ( 08 Jul 2019 08:00 )             30.3     07-08    135  |  96<L>  |  51.0<H>  ----------------------------<  113  4.7   |  24.0  |  7.65<H>    Ca    8.6      08 Jul 2019 08:00    MEDICATIONS  (STANDING):  amLODIPine   Tablet 5 milliGRAM(s) Oral daily  aspirin  chewable 81 milliGRAM(s) Oral daily  atorvastatin 80 milliGRAM(s) Oral at bedtime  buDESOnide  80 MICROgram(s)/formoterol 4.5 MICROgram(s) Inhaler 2 Puff(s) Inhalation two times a day  chlorhexidine 2% Cloths 1 Application(s) Topical daily  clopidogrel Tablet 75 milliGRAM(s) Oral daily  docusate sodium 100 milliGRAM(s) Oral two times a day  fluticasone propionate 50 MICROgram(s)/spray Nasal Spray 1 Spray(s) Both Nostrils two times a day  hydrALAZINE 50 milliGRAM(s) Oral every 8 hours  isosorbide   dinitrate Tablet (ISORDIL) 20 milliGRAM(s) Oral three times a day  levothyroxine 100 MICROGram(s) Oral daily  metoprolol tartrate 25 milliGRAM(s) Oral two times a day  pantoprazole    Tablet 40 milliGRAM(s) Oral before breakfast  predniSONE   Tablet 10 milliGRAM(s) Oral daily  senna 2 Tablet(s) Oral at bedtime  sevelamer carbonate 800 milliGRAM(s) Oral three times a day with meals    MEDICATIONS  (PRN):  ALBUTerol/ipratropium for Nebulization 3 milliLiter(s) Nebulizer every 6 hours PRN Shortness of Breath and/or Wheezing  bisacodyl Suppository 10 milliGRAM(s) Rectal daily PRN Constipation  HYDROmorphone  Injectable 0.5 milliGRAM(s) IV Push four times a day PRN Dyspnea  hydrOXYzine hydrochloride 25 milliGRAM(s) Oral every 6 hours PRN Itching  sodium chloride 0.65% Nasal 1 Spray(s) Both Nostrils two times a day PRN Nasal Congestion      ASSESSMENT / PLAN:  ----------------------------------------  Pt is 80 y/o Male with a history of ESRD on HD, HTN, HFpEF, CAD s/p CABG, and lung cancer s/p radiation on Home Oxygen, presenting with increased dyspnea, likely multifactorial with underlying lung cancer, ILD/ pulmonary fibrosis and volume overload with CHF/ ESRD    Chronic hypoxic respiratory failure - On supplemental oxygen. Multifactorial in nature with lung cancer, pulmonary fibrosis on prednisone, and heart failure. Inhaled bronchodilator therapy.    Hypertension - Close blood pressure monitoring. On amlodipine, hydralazine, and metoprolol.    Chronic diastolic heart failure - Not in acute exacerbation. On isosorbide. Hemodialysis as scheduled.    CAD - On aspirin, clopidogrel, and atorvastatin.    ESRD - Hemodialysis as scheduled.    Hypothyroidism - On levothyroxine.    Constipation - On a bowel regimen. The patient reported having a bowel movement two days ago. No abdominal pain.    The patient's family is requesting transfer to a rehabilitation facility.

## 2019-07-08 NOTE — PROGRESS NOTE ADULT - ASSESSMENT
1)ESRD on HD  2) MBD of renal dx  3) Anemia of renal dx  4) Vol HTN    Continue current management  HD today  Poor Prognosis  Anemia not at goal;   Starting epogen 10k units TIW     bari Orellana

## 2019-07-08 NOTE — PROGRESS NOTE ADULT - SUBJECTIVE AND OBJECTIVE BOX
[facility] DIVISION OF KIDNEY DISEASES AND HYPERTENSION -- HEMODIALYSIS NOTE  --------------------------------------------------------------------------------  Chief Complaint: ESRD/Ongoing hemodialysis requirement    24 hour events/subjective:  Pt seen/examined  On HD today      PAST HISTORY  --------------------------------------------------------------------------------  No significant changes to PMH, PSH, FHx, SHx, unless otherwise noted    ALLERGIES & MEDICATIONS  --------------------------------------------------------------------------------  Allergies    No Known Allergies    Intolerances      Standing Inpatient Medications  ALBUTerol/ipratropium for Nebulization 3 milliLiter(s) Nebulizer every 4 hours  ALPRAZolam 0.25 milliGRAM(s) Oral two times a day  amLODIPine   Tablet 5 milliGRAM(s) Oral daily  aspirin  chewable 81 milliGRAM(s) Oral daily  atorvastatin 80 milliGRAM(s) Oral at bedtime  buDESOnide  80 MICROgram(s)/formoterol 4.5 MICROgram(s) Inhaler 2 Puff(s) Inhalation two times a day  chlorhexidine 2% Cloths 1 Application(s) Topical daily  clopidogrel Tablet 75 milliGRAM(s) Oral daily  docusate sodium 100 milliGRAM(s) Oral two times a day  fluticasone propionate 50 MICROgram(s)/spray Nasal Spray 1 Spray(s) Both Nostrils two times a day  hydrALAZINE 50 milliGRAM(s) Oral every 8 hours  isosorbide   dinitrate Tablet (ISORDIL) 20 milliGRAM(s) Oral three times a day  lactulose Syrup 10 Gram(s) Oral once  levothyroxine 100 MICROGram(s) Oral daily  metoprolol tartrate 25 milliGRAM(s) Oral two times a day  pantoprazole    Tablet 40 milliGRAM(s) Oral before breakfast  predniSONE   Tablet 10 milliGRAM(s) Oral daily  senna 2 Tablet(s) Oral at bedtime  sevelamer carbonate 800 milliGRAM(s) Oral three times a day with meals    PRN Inpatient Medications  bisacodyl Suppository 10 milliGRAM(s) Rectal daily PRN  HYDROmorphone  Injectable 0.5 milliGRAM(s) IV Push four times a day PRN  hydrOXYzine hydrochloride 25 milliGRAM(s) Oral every 6 hours PRN  sodium chloride 0.65% Nasal 1 Spray(s) Both Nostrils two times a day PRN      REVIEW OF SYSTEMS  --------------------------------------------------------------------------------  Gen: No weight changes, fatigue, fevers/chills, weakness  Skin: No rashes  Head/Eyes/Ears/Mouth: No headache; Normal hearing; Normal vision w/o blurriness; No sinus pain/discomfort, sore throat  Respiratory: No dyspnea, cough, wheezing, hemoptysis  CV: No chest pain, PND, orthopnea  GI: No abdominal pain, diarrhea, constipation, nausea, vomiting, melena, hematochezia  : No increased frequency, dysuria, hematuria, nocturia  MSK: No joint pain/swelling; no back pain; no edema  Neuro: No dizziness/lightheadedness, weakness, seizures, numbness, tingling  Heme: No easy bruising or bleeding  Endo: No heat/cold intolerance  Psych: No significant nervousness, anxiety, stress, depression    All other systems were reviewed and are negative, except as noted.    VITALS/PHYSICAL EXAM  --------------------------------------------------------------------------------  T(C): 36.6 (07-08-19 @ 16:14), Max: 36.8 (07-07-19 @ 20:45)  HR: 73 (07-08-19 @ 16:14) (51 - 73)  BP: 107/48 (07-08-19 @ 16:14) (94/58 - 140/55)  RR: 18 (07-08-19 @ 16:14) (18 - 18)  SpO2: 96% (07-08-19 @ 16:14) (95% - 96%)  Wt(kg): --        07-08-19 @ 07:01  -  07-08-19 @ 16:55  --------------------------------------------------------  IN: 1150 mL / OUT: 2450 mL / NET: -1300 mL      Physical Exam:  	Gen: NAD  	HEENT: PERRL, supple neck, clear oropharynx  	Pulm: CTA B/L  	CV: RRR, S1S2; no rub  	Back: No spinal or CVA tenderness; no sacral edema  	Abd: +BS, soft, nontender/nondistended  	: No suprapubic tenderness  	UE: Warm, FROM, no clubbing, intact strength; no edema; no asterixis  	LE: Warm, FROM, no clubbing, intact strength; no edema  	Neuro: No focal deficits, intact gait  	Psych: Normal affect and mood  	Skin: Warm, without rashes  	Vascular access:    LABS/STUDIES  --------------------------------------------------------------------------------              9.1    6.67  >-----------<  166      [07-08-19 @ 08:00]              30.3     135  |  96  |  51.0  ----------------------------<  113      [07-08-19 @ 08:00]  4.7   |  24.0  |  7.65        Ca     8.6     [07-08-19 @ 08:00]            Iron 58, TIBC 256, %sat 23      [06-19-19 @ 08:42]  Ferritin 812      [06-19-19 @ 08:42]  PTH -- (Ca 8.1)      [07-01-19 @ 13:24]   83  TSH 0.89      [05-23-19 @ 06:28]  Lipid: chol 159, , HDL 32, LDL 94      [01-29-19 @ 11:39]    HBsAb <3.0      [06-27-19 @ 19:39]  HBsAb Nonreact      [06-27-19 @ 19:39]  HBsAg Reactive      [06-27-19 @ 19:39]  HBcAb Nonreact      [06-27-19 @ 19:39]  HCV 0.10, Nonreact      [06-27-19 @ 19:39]

## 2019-07-08 NOTE — CHART NOTE - NSCHARTNOTEFT_GEN_A_CORE
CC: f/u dyspnea   INTERVAL HPI: Called by RN around 16:00 because "Pt does not look right." VSS, however, Pt needed to increase supplemental O2 to 6L NC from 5L NC at baseline for subjective shortness of breath. Had a unsustained episode of desaturating to 85%. As per Pt, work of breathing made better with duoneb treatment around 30 minutes ago. Pt states that he is having difficulty with deep inhalation with associated wheezing. Pt seen and examined with Daughter and Son in law present at bedside. Both are requesting that more dilaudid or morphine be given. Explained to Pt that we are not likely to increase those types of medications because he is requesting full treatment for all ailments including HD for ESRD and explained that use of neb treatments may benefit Pt more at this time. No cough, chest pain, palpitations, difficulty speaking full sentences lightheadedness, dizziness or any other complaints.     REVIEW OF SYSTEMS:  CONSTITUTIONAL: No fever, weight loss, or fatigue  EYES: No eye pain, visual disturbances, or discharge  ENT:  No difficulty hearing, tinnitus, vertigo; No sinus or throat pain  NECK: No pain or stiffness  RESPIRATORY:  see HPI  CARDIOVASCULAR: No chest pain, palpitations, dizziness, or leg swelling  GASTROINTESTINAL: No abdominal or epigastric pain. No nausea, vomiting, or hematemesis; No diarrhea or constipation   GENITOURINARY: No dysuria, frequency, hematuria, or incontinence  NEUROLOGICAL: No headaches, memory loss, loss of strength, numbness, or tremors  SKIN: No itching, burning, rashes, or lesions   MUSCULOSKELETAL: No joint pain or swelling; No muscle, back, or extremity pain    VITAL SIGNS:  T(C): 36.6 (07-08-19 @ 16:14), Max: 36.8 (07-07-19 @ 20:45)  HR: 73 (07-08-19 @ 16:14) (51 - 73)  BP: 107/48 (07-08-19 @ 16:14) (94/58 - 140/55)  RR: 18 (07-08-19 @ 16:14) (18 - 18)  SpO2: 96% (07-08-19 @ 16:14) (95% - 96%)    PHYSICAL EXAM:  GENERAL: Elderly male, sitting in bed in NAD, family at bedside. Speaking in full sentences. No tripoding or pursed lip breathing noted   HEAD:  Atraumatic, Normocephalic  EYES: EOMI, PERRLA, conjunctiva and sclera clear  ENT: Moist mucous membranes  NECK: Supple, No JVD  CHEST/LUNG: Crackles auscultated B/L, scattered wheezing. Rales at bases. No rhonchi or rubs. Unlabored respirations  HEART: Regular rate and rhythm; S1, S2  ABDOMEN: Bowel sounds present; Soft, Nontender, Nondistended   EXTREMITIES:  2+ Peripheral Pulses, brisk capillary refill. No clubbing, cyanosis, or edema  NERVOUS SYSTEM:  Alert & Oriented X3, speech clear, FROM x 4 extremities. No deficits   SKIN: No rashes or lesions    ASSESSMENT/ PLAN: Pt is 78 y/o Male with a history of ESRD on HD, HTN, HFpEF, CAD s/p CABG, and lung cancer s/p radiation on Home Oxygen, presenting with increased dyspnea, likely multifactorial with underlying lung cancer, ILD/ pulmonary fibrosis and volume overload with CHF/ ESRD admitted for Chronic hypoxic respiratory failure due to significant lung history.  -case d/w Attending Dr Orellana. States that physical exam findings are unchanged from multiple days of hospital stay. Crackles/ rales likely to be sec to interstitial lung disease and less likely fluid overload   -changed Duoneb to q4 standing for symptomatic relief   -Afebrile. VSS. Pt remains Full Code because Pt and Family want to pursue Hemodialysis.   -Continue to observe, call PA for worsening dyspnea or symptoms   -plan d/w RN, Pt and Family CC: f/u dyspnea   INTERVAL HPI: Called by RN around 16:00 because "Pt does not look right." VSS, however, Pt needed to increase supplemental O2 to 6L NC from 5L NC at baseline for subjective shortness of breath. Had a unsustained episode of desaturating to 85%. As per Pt, work of breathing made better with duoneb treatment around 30 minutes ago. Pt states that he is having difficulty with deep inhalation with associated wheezing. Pt seen and examined with Daughter and Son in law present at bedside. Both are requesting that more dilaudid or morphine be given. Explained to Pt that we are not likely to increase those types of medications because he is requesting full treatment for all ailments including HD for ESRD and explained that use of neb treatments may benefit Pt more at this time. No cough, chest pain, palpitations, difficulty speaking full sentences lightheadedness, dizziness or any other complaints.     REVIEW OF SYSTEMS:  CONSTITUTIONAL: No fever, weight loss, or fatigue  EYES: No eye pain, visual disturbances, or discharge  ENT:  No difficulty hearing, tinnitus, vertigo; No sinus or throat pain  NECK: No pain or stiffness  RESPIRATORY:  see HPI  CARDIOVASCULAR: No chest pain, palpitations, dizziness, or leg swelling  GASTROINTESTINAL: No abdominal or epigastric pain. No nausea, vomiting, or hematemesis; No diarrhea or constipation   GENITOURINARY: No dysuria, frequency, hematuria, or incontinence  NEUROLOGICAL: No headaches, memory loss, loss of strength, numbness, or tremors  SKIN: No itching, burning, rashes, or lesions   MUSCULOSKELETAL: No joint pain or swelling; No muscle, back, or extremity pain    VITAL SIGNS:  T(C): 36.6 (07-08-19 @ 16:14), Max: 36.8 (07-07-19 @ 20:45)  HR: 73 (07-08-19 @ 16:14) (51 - 73)  BP: 107/48 (07-08-19 @ 16:14) (94/58 - 140/55)  RR: 18 (07-08-19 @ 16:14) (18 - 18)  SpO2: 96% (07-08-19 @ 16:14) (95% - 96%)    PHYSICAL EXAM:  GENERAL: Elderly male, sitting in bed in NAD, family at bedside. Speaking in full sentences. No tripoding or pursed lip breathing noted   HEAD:  Atraumatic, Normocephalic  EYES: EOMI, PERRLA, conjunctiva and sclera clear  ENT: Moist mucous membranes  NECK: Supple, No JVD  CHEST/LUNG: Crackles auscultated B/L, scattered wheezing. Rales at bases. No rhonchi or rubs. Unlabored respirations  HEART: Regular rate and rhythm; S1, S2  ABDOMEN: Bowel sounds present; Soft, Nontender, Nondistended   EXTREMITIES:  2+ Peripheral Pulses, brisk capillary refill. No clubbing, cyanosis, or edema  NERVOUS SYSTEM:  Alert & Oriented X3, speech clear, FROM x 4 extremities. No deficits   SKIN: No rashes or lesions    ASSESSMENT/ PLAN: Pt is 80 y/o Male with a history of ESRD on HD, HTN, HFpEF, CAD s/p CABG, and lung cancer s/p radiation on Home Oxygen, presenting with increased dyspnea, likely multifactorial with underlying lung cancer, ILD/ pulmonary fibrosis and volume overload with CHF/ ESRD admitted for Chronic hypoxic respiratory failure due to significant lung history.  -case d/w Attending Dr Orellana. States that physical exam findings are unchanged from multiple days of hospital stay. Crackles/ rales likely to be sec to interstitial lung disease and less likely fluid overload   -changed Duoneb to q4 standing for symptomatic relief   -Afebrile. VSS. Pt remains Full Code because Pt and Family want to pursue Hemodialysis.   -Continue to observe, call PA for worsening dyspnea or symptoms   -plan d/w RN, Pt and Family.     Off note: Pt's Daughter asking for a larger oxygen tank for home. Pt awaiting COREY placement

## 2019-07-09 PROCEDURE — 99233 SBSQ HOSP IP/OBS HIGH 50: CPT

## 2019-07-09 PROCEDURE — 99232 SBSQ HOSP IP/OBS MODERATE 35: CPT

## 2019-07-09 RX ADMIN — HYDROMORPHONE HYDROCHLORIDE 0.5 MILLIGRAM(S): 2 INJECTION INTRAMUSCULAR; INTRAVENOUS; SUBCUTANEOUS at 10:52

## 2019-07-09 RX ADMIN — Medication 3 MILLILITER(S): at 23:38

## 2019-07-09 RX ADMIN — HYDROMORPHONE HYDROCHLORIDE 0.5 MILLIGRAM(S): 2 INJECTION INTRAMUSCULAR; INTRAVENOUS; SUBCUTANEOUS at 01:25

## 2019-07-09 RX ADMIN — Medication 25 MILLIGRAM(S): at 06:20

## 2019-07-09 RX ADMIN — CHLORHEXIDINE GLUCONATE 1 APPLICATION(S): 213 SOLUTION TOPICAL at 13:37

## 2019-07-09 RX ADMIN — Medication 3 MILLILITER(S): at 16:02

## 2019-07-09 RX ADMIN — Medication 100 MICROGRAM(S): at 06:20

## 2019-07-09 RX ADMIN — ISOSORBIDE DINITRATE 20 MILLIGRAM(S): 5 TABLET ORAL at 21:12

## 2019-07-09 RX ADMIN — BUDESONIDE AND FORMOTEROL FUMARATE DIHYDRATE 2 PUFF(S): 160; 4.5 AEROSOL RESPIRATORY (INHALATION) at 20:16

## 2019-07-09 RX ADMIN — Medication 3 MILLILITER(S): at 08:20

## 2019-07-09 RX ADMIN — Medication 0.25 MILLIGRAM(S): at 17:02

## 2019-07-09 RX ADMIN — HYDROMORPHONE HYDROCHLORIDE 0.5 MILLIGRAM(S): 2 INJECTION INTRAMUSCULAR; INTRAVENOUS; SUBCUTANEOUS at 18:46

## 2019-07-09 RX ADMIN — AMLODIPINE BESYLATE 5 MILLIGRAM(S): 2.5 TABLET ORAL at 06:20

## 2019-07-09 RX ADMIN — HYDROMORPHONE HYDROCHLORIDE 0.5 MILLIGRAM(S): 2 INJECTION INTRAMUSCULAR; INTRAVENOUS; SUBCUTANEOUS at 10:37

## 2019-07-09 RX ADMIN — Medication 0.25 MILLIGRAM(S): at 06:20

## 2019-07-09 RX ADMIN — Medication 3 MILLILITER(S): at 03:32

## 2019-07-09 RX ADMIN — Medication 3 MILLILITER(S): at 20:16

## 2019-07-09 RX ADMIN — Medication 1 SPRAY(S): at 17:02

## 2019-07-09 RX ADMIN — Medication 3 MILLILITER(S): at 00:16

## 2019-07-09 RX ADMIN — ATORVASTATIN CALCIUM 80 MILLIGRAM(S): 80 TABLET, FILM COATED ORAL at 21:12

## 2019-07-09 RX ADMIN — HYDROMORPHONE HYDROCHLORIDE 0.5 MILLIGRAM(S): 2 INJECTION INTRAMUSCULAR; INTRAVENOUS; SUBCUTANEOUS at 00:52

## 2019-07-09 RX ADMIN — BUDESONIDE AND FORMOTEROL FUMARATE DIHYDRATE 2 PUFF(S): 160; 4.5 AEROSOL RESPIRATORY (INHALATION) at 08:21

## 2019-07-09 RX ADMIN — ISOSORBIDE DINITRATE 20 MILLIGRAM(S): 5 TABLET ORAL at 06:20

## 2019-07-09 RX ADMIN — HYDROMORPHONE HYDROCHLORIDE 0.5 MILLIGRAM(S): 2 INJECTION INTRAMUSCULAR; INTRAVENOUS; SUBCUTANEOUS at 18:31

## 2019-07-09 RX ADMIN — Medication 10 MILLIGRAM(S): at 06:20

## 2019-07-09 RX ADMIN — PANTOPRAZOLE SODIUM 40 MILLIGRAM(S): 20 TABLET, DELAYED RELEASE ORAL at 06:20

## 2019-07-09 RX ADMIN — Medication 100 MILLIGRAM(S): at 06:20

## 2019-07-09 RX ADMIN — Medication 50 MILLIGRAM(S): at 06:20

## 2019-07-09 RX ADMIN — ISOSORBIDE DINITRATE 20 MILLIGRAM(S): 5 TABLET ORAL at 13:37

## 2019-07-09 RX ADMIN — SENNA PLUS 2 TABLET(S): 8.6 TABLET ORAL at 21:12

## 2019-07-09 RX ADMIN — Medication 3 MILLILITER(S): at 12:30

## 2019-07-09 RX ADMIN — Medication 1 SPRAY(S): at 06:20

## 2019-07-09 RX ADMIN — Medication 50 MILLIGRAM(S): at 13:37

## 2019-07-09 RX ADMIN — Medication 100 MILLIGRAM(S): at 17:02

## 2019-07-09 RX ADMIN — CLOPIDOGREL BISULFATE 75 MILLIGRAM(S): 75 TABLET, FILM COATED ORAL at 13:37

## 2019-07-09 RX ADMIN — SEVELAMER CARBONATE 800 MILLIGRAM(S): 2400 POWDER, FOR SUSPENSION ORAL at 17:02

## 2019-07-09 RX ADMIN — Medication 81 MILLIGRAM(S): at 13:37

## 2019-07-09 RX ADMIN — Medication 50 MILLIGRAM(S): at 21:12

## 2019-07-09 NOTE — PHYSICAL THERAPY INITIAL EVALUATION ADULT - ADDITIONAL COMMENTS
Pt not willingly answering PT inquiry re: home setup. Per Previous PT eval from May, 2019: Pt. reports he lives in a house with his family with no stairs to enter and none inside. Pt. reports his family is at home at all times to assist him as needed. Pt. reports he was modified independent vs requiring assistance from his family PTA. Pt. owns a RW and no other DME. Wears home O2.

## 2019-07-09 NOTE — PROVIDER CONTACT NOTE (OTHER) - SITUATION
pt bp 92/38 and hr 47, pt otherwise asymptomatic.
Pt is bradycardic 50-54 HR  which pt family states is his baseline.
Pt desaturated to 85% on 5 L nasal cannula, pt sat up in the bed and meds given see MAR.  Pt 02 recovered to 96% on 5 L nasal cannula.
pt c/o sob

## 2019-07-09 NOTE — PROGRESS NOTE ADULT - SUBJECTIVE AND OBJECTIVE BOX
KAUSHIK HOFFMAN  ----------------------------------------  The patient was seen and evaluated for dyspnea.  The patient is in no acute distress.  Denied any chest pain, palpitations, or abdominal pain.  Reported some dyspnea.    Vital Signs Last 24 Hrs  T(C): 36.5 (09 Jul 2019 07:55), Max: 36.9 (08 Jul 2019 21:16)  T(F): 97.7 (09 Jul 2019 07:55), Max: 98.5 (08 Jul 2019 21:16)  HR: 75 (09 Jul 2019 08:40) (47 - 75)  BP: 92/38 (09 Jul 2019 07:55) (92/38 - 157/56)  BP(mean): --  RR: 19 (09 Jul 2019 07:55) (18 - 19)  SpO2: 95% (09 Jul 2019 09:29) (94% - 98%)    PHYSICAL EXAMINATION:  ----------------------------------------  General appearance: No acute distress, Awake, Alert  HEENT: Normocephalic, Atraumatic, Conjunctiva clear, EOMI  Neck: Supple, No JVD, No tenderness  Lungs: Breath sound equal bilaterally, No wheezes, Positive fine rales  Cardiovascular: S1S2, Regular rhythm  Abdomen: Soft, Nontender, Nondistended, No guarding/rebound, Positive bowel sounds  Extremities: No clubbing, No cyanosis, No edema, No calf tenderness  Neuro: Strength equal bilaterally, No tremors  Psychiatric: Appropriate mood, Normal affect    LABORATORY STUDIES:  ----------------------------------------             9.1    6.67  )-----------( 166      ( 08 Jul 2019 08:00 )             30.3     07-08    135  |  96<L>  |  51.0<H>  ----------------------------<  113  4.7   |  24.0  |  7.65<H>    Ca    8.6      08 Jul 2019 08:00    MEDICATIONS  (STANDING):  ALBUTerol/ipratropium for Nebulization 3 milliLiter(s) Nebulizer every 4 hours  ALPRAZolam 0.25 milliGRAM(s) Oral two times a day  aspirin  chewable 81 milliGRAM(s) Oral daily  atorvastatin 80 milliGRAM(s) Oral at bedtime  buDESOnide  80 MICROgram(s)/formoterol 4.5 MICROgram(s) Inhaler 2 Puff(s) Inhalation two times a day  chlorhexidine 2% Cloths 1 Application(s) Topical daily  clopidogrel Tablet 75 milliGRAM(s) Oral daily  docusate sodium 100 milliGRAM(s) Oral two times a day  epoetin nguyễn Injectable 83220 Unit(s) IV Push <User Schedule>  fluticasone propionate 50 MICROgram(s)/spray Nasal Spray 1 Spray(s) Both Nostrils two times a day  hydrALAZINE 50 milliGRAM(s) Oral every 8 hours  isosorbide   dinitrate Tablet (ISORDIL) 20 milliGRAM(s) Oral three times a day  lactulose Syrup 10 Gram(s) Oral once  levothyroxine 100 MICROGram(s) Oral daily  pantoprazole    Tablet 40 milliGRAM(s) Oral before breakfast  predniSONE   Tablet 10 milliGRAM(s) Oral daily  senna 2 Tablet(s) Oral at bedtime  sevelamer carbonate 800 milliGRAM(s) Oral three times a day with meals    MEDICATIONS  (PRN):  bisacodyl Suppository 10 milliGRAM(s) Rectal daily PRN Constipation  HYDROmorphone  Injectable 0.5 milliGRAM(s) IV Push four times a day PRN Dyspnea  hydrOXYzine hydrochloride 25 milliGRAM(s) Oral every 6 hours PRN Itching  sodium chloride 0.65% Nasal 1 Spray(s) Both Nostrils two times a day PRN Nasal Congestion      ASSESSMENT / PLAN:  ----------------------------------------  Pt is 80 y/o Male with a history of ESRD on HD, HTN, HFpEF, CAD s/p CABG, and lung cancer s/p radiation on Home Oxygen, presenting with increased dyspnea, likely multifactorial with underlying lung cancer, ILD/ pulmonary fibrosis and volume overload with CHF/ ESRD    Chronic hypoxic respiratory failure - Multifactorial in nature with lung cancer, pulmonary fibrosis on prednisone, and heart failure. Inhaled bronchodilator therapy and supplemental oxygen.    Hypertension - Close blood pressure monitoring. On amlodipine and metoprolol discontinued due to hypotension and bradycardia.    Chronic diastolic heart failure - Not in acute exacerbation. On isosorbide. Hemodialysis as scheduled.    CAD - On aspirin, clopidogrel, and atorvastatin.    ESRD - Hemodialysis as scheduled.    Hypothyroidism - On levothyroxine.    Constipation - On a bowel regimen. No abdominal pain.    The patient's family is requesting transfer to a rehabilitation facility.

## 2019-07-09 NOTE — PROGRESS NOTE ADULT - SUBJECTIVE AND OBJECTIVE BOX
The patient was seen and evaluated for dyspnea.  The patient is in no acute distress.  Denied any chest pain, palpitations, or abdominal pain.  Reported some dyspnea.    Vital Signs Last 24 Hrs,    T(C): 36.5 (09 Jul 2019 07:55), Max: 36.9 (08 Jul 2019 21:16)  T(F): 97.7 (09 Jul 2019 07:55), Max: 98.5 (08 Jul 2019 21:16)  HR: 75 (09 Jul 2019 08:40) (47 - 75)  BP: 92/38 (09 Jul 2019 07:55) (92/38 - 157/56)  RR: 19 (09 Jul 2019 07:55) (18 - 19)  SpO2: 95% (09 Jul 2019 09:29) (94% - 98%)    PHYSICAL EXAMINATION:  ----------------------------------------  General appearance: Frail, Debilitated, No acute distress, Awake, lethargic, Pale,   HEENT: Normocephalic, Atraumatic, Conjunctiva clear, EOMI  Neck: Supple, No JVD, No tenderness  Lungs: Breath sound equal bilaterally, No wheezes, Positive fine rales  Cardiovascular: S1S2, Regular rhythm  Abdomen: Soft, Nontender, Nondistended, No guarding/rebound, Positive bowel sounds  Extremities: No clubbing, No cyanosis, No edema, No calf tenderness  Neuro: Strength equal bilaterally, No tremors  Psychiatric: Appropriate mood, Normal affect, AVF + ,     LABORATORY STUDIES:  ----------------------------------------              9.1    6.67  )-----------( 166      ( 08 Jul 2019 08:00 )             30.3     07-08    135  |  96<L>  |  51.0<H>  ----------------------------<  113  4.7   |  24.0  |  7.65<H>    Ca    8.6      08 Jul 2019 08:00    MEDICATIONS  (STANDING):  ALBUTerol/ipratropium for Nebulization 3 milliLiter(s) Nebulizer every 4 hours  ALPRAZolam 0.25 milliGRAM(s) Oral two times a day  aspirin  chewable 81 milliGRAM(s) Oral daily  atorvastatin 80 milliGRAM(s) Oral at bedtime  buDESOnide  80 MICROgram(s)/formoterol 4.5 MICROgram(s) Inhaler 2 Puff(s) Inhalation two times a day  chlorhexidine 2% Cloths 1 Application(s) Topical daily  clopidogrel Tablet 75 milliGRAM(s) Oral daily  docusate sodium 100 milliGRAM(s) Oral two times a day  epoetin nguyễn Injectable 03030 Unit(s) IV Push <User Schedule>  fluticasone propionate 50 MICROgram(s)/spray Nasal Spray 1 Spray(s) Both Nostrils two times a day  hydrALAZINE 50 milliGRAM(s) Oral every 8 hours  isosorbide   dinitrate Tablet (ISORDIL) 20 milliGRAM(s) Oral three times a day  lactulose Syrup 10 Gram(s) Oral once  levothyroxine 100 MICROGram(s) Oral daily  pantoprazole    Tablet 40 milliGRAM(s) Oral before breakfast  predniSONE   Tablet 10 milliGRAM(s) Oral daily  senna 2 Tablet(s) Oral at bedtime  sevelamer carbonate 800 milliGRAM(s) Oral three times a day with meals    MEDICATIONS  (PRN):  bisacodyl Suppository 10 milliGRAM(s) Rectal daily PRN Constipation  HYDROmorphone  Injectable 0.5 milliGRAM(s) IV Push four times a day PRN Dyspnea  hydrOXYzine hydrochloride 25 milliGRAM(s) Oral every 6 hours PRN Itching  sodium chloride 0.65% Nasal 1 Spray(s) Both Nostrils two times a day PRN Nasal Congestion    ASSESSMENT / PLAN:  ----------------------------------------  Pt is 78 y/o Male with a history of ESRD on HD, HTN, HFpEF, CAD s/p CABG, and lung cancer s/p radiation on Home Oxygen 5 L/ Min.,  presenting with increased dyspnea,  multifactorial with underlying lung cancer, ILD/ pulmonary fibrosis and volume overload with CHF/ ESRD    Chronic hypoxic respiratory failure - Multifactorial in nature with lung cancer, pulmonary fibrosis on prednisone, and heart failure. Inhaled bronchodilator therapy and supplemental oxygen.    Hypertension - On amlodipine and metoprolol discontinued due to hypotension and bradycardia.    Chronic diastolic heart failure - Not in acute exacerbation. On isosorbide.     CAD - On aspirin, clopidogrel, and atorvastatin.    ESRD - Hemodialysis as scheduled. HD in AM,     Hypothyroidism - On levothyroxine.    The patient's family is requesting transfer to a rehabilitation facility.    D/W the Daughter  in Law & Dr. Orellana,

## 2019-07-09 NOTE — PROVIDER CONTACT NOTE (OTHER) - ACTION/TREATMENT ORDERED:
md mota
Dr. Orellana aware and respiratory called and came to give patient a neb treatment.
PA aware, ordered to give diluadid 0.5mg as ordered
md aware and he d/c a couple bp meds will continue to monitor pt.

## 2019-07-09 NOTE — PHYSICAL THERAPY INITIAL EVALUATION ADULT - IMPAIRMENTS FOUND, PT EVAL
aerobic capacity/endurance/arousal, attention, and cognition/gait, locomotion, and balance/muscle strength/ventilation and respiration/gas exchange

## 2019-07-09 NOTE — PHYSICAL THERAPY INITIAL EVALUATION ADULT - PERTINENT HX OF CURRENT PROBLEM, REHAB EVAL
Per Hospitalist note: 78 y/o Male with a history of ESRD on HD, HTN, HFpEF, CAD s/p CABG, and lung cancer s/p radiation on Home Oxygen, presenting with increased dyspnea, likely multifactorial with underlying lung cancer, ILD/ pulmonary fibrosis and volume overload with CHF/ ESRD

## 2019-07-10 ENCOUNTER — TRANSCRIPTION ENCOUNTER (OUTPATIENT)
Age: 79
End: 2019-07-10

## 2019-07-10 ENCOUNTER — INPATIENT (INPATIENT)
Facility: HOSPITAL | Age: 79
LOS: 15 days | Discharge: NURSING, REHAB OR LONG-TERM CARE | DRG: 291 | End: 2019-07-26
Attending: INTERNAL MEDICINE | Admitting: INTERNAL MEDICINE
Payer: COMMERCIAL

## 2019-07-10 VITALS
HEART RATE: 62 BPM | HEIGHT: 67 IN | WEIGHT: 149.91 LBS | RESPIRATION RATE: 28 BRPM | SYSTOLIC BLOOD PRESSURE: 166 MMHG | DIASTOLIC BLOOD PRESSURE: 70 MMHG | TEMPERATURE: 98 F | OXYGEN SATURATION: 89 %

## 2019-07-10 VITALS
OXYGEN SATURATION: 91 % | HEART RATE: 66 BPM | DIASTOLIC BLOOD PRESSURE: 54 MMHG | SYSTOLIC BLOOD PRESSURE: 131 MMHG | TEMPERATURE: 98 F

## 2019-07-10 DIAGNOSIS — Z95.1 PRESENCE OF AORTOCORONARY BYPASS GRAFT: Chronic | ICD-10-CM

## 2019-07-10 LAB
ALBUMIN SERPL ELPH-MCNC: 3.8 G/DL — SIGNIFICANT CHANGE UP (ref 3.3–5.2)
ALP SERPL-CCNC: 73 U/L — SIGNIFICANT CHANGE UP (ref 40–120)
ALT FLD-CCNC: 12 U/L — SIGNIFICANT CHANGE UP
ANION GAP SERPL CALC-SCNC: 17 MMOL/L — SIGNIFICANT CHANGE UP (ref 5–17)
AST SERPL-CCNC: 24 U/L — SIGNIFICANT CHANGE UP
BASOPHILS # BLD AUTO: 0.02 K/UL — SIGNIFICANT CHANGE UP (ref 0–0.2)
BASOPHILS NFR BLD AUTO: 0.3 % — SIGNIFICANT CHANGE UP (ref 0–2)
BILIRUB SERPL-MCNC: 0.3 MG/DL — LOW (ref 0.4–2)
BUN SERPL-MCNC: 44 MG/DL — HIGH (ref 8–20)
CALCIUM SERPL-MCNC: 8.3 MG/DL — LOW (ref 8.6–10.2)
CHLORIDE SERPL-SCNC: 93 MMOL/L — LOW (ref 98–107)
CK SERPL-CCNC: 46 U/L — SIGNIFICANT CHANGE UP (ref 30–200)
CO2 SERPL-SCNC: 25 MMOL/L — SIGNIFICANT CHANGE UP (ref 22–29)
CREAT SERPL-MCNC: 7.2 MG/DL — HIGH (ref 0.5–1.3)
EOSINOPHIL # BLD AUTO: 0.17 K/UL — SIGNIFICANT CHANGE UP (ref 0–0.5)
EOSINOPHIL NFR BLD AUTO: 2.3 % — SIGNIFICANT CHANGE UP (ref 0–6)
GLUCOSE SERPL-MCNC: 96 MG/DL — SIGNIFICANT CHANGE UP (ref 70–115)
HCT VFR BLD CALC: 32.7 % — LOW (ref 39–50)
HGB BLD-MCNC: 9.8 G/DL — LOW (ref 13–17)
IMM GRANULOCYTES NFR BLD AUTO: 0.3 % — SIGNIFICANT CHANGE UP (ref 0–1.5)
LACTATE BLDV-MCNC: 1.3 MMOL/L — SIGNIFICANT CHANGE UP (ref 0.5–2)
LYMPHOCYTES # BLD AUTO: 0.73 K/UL — LOW (ref 1–3.3)
LYMPHOCYTES # BLD AUTO: 9.8 % — LOW (ref 13–44)
MCHC RBC-ENTMCNC: 29.9 PG — SIGNIFICANT CHANGE UP (ref 27–34)
MCHC RBC-ENTMCNC: 30 GM/DL — LOW (ref 32–36)
MCV RBC AUTO: 99.7 FL — SIGNIFICANT CHANGE UP (ref 80–100)
MONOCYTES # BLD AUTO: 0.77 K/UL — SIGNIFICANT CHANGE UP (ref 0–0.9)
MONOCYTES NFR BLD AUTO: 10.4 % — SIGNIFICANT CHANGE UP (ref 2–14)
NEUTROPHILS # BLD AUTO: 5.71 K/UL — SIGNIFICANT CHANGE UP (ref 1.8–7.4)
NEUTROPHILS NFR BLD AUTO: 76.9 % — SIGNIFICANT CHANGE UP (ref 43–77)
NT-PROBNP SERPL-SCNC: HIGH PG/ML (ref 0–300)
PLATELET # BLD AUTO: 175 K/UL — SIGNIFICANT CHANGE UP (ref 150–400)
POTASSIUM SERPL-MCNC: 5.6 MMOL/L — HIGH (ref 3.5–5.3)
POTASSIUM SERPL-SCNC: 5.6 MMOL/L — HIGH (ref 3.5–5.3)
PROT SERPL-MCNC: 7.1 G/DL — SIGNIFICANT CHANGE UP (ref 6.6–8.7)
RBC # BLD: 3.28 M/UL — LOW (ref 4.2–5.8)
RBC # FLD: 17.1 % — HIGH (ref 10.3–14.5)
SODIUM SERPL-SCNC: 135 MMOL/L — SIGNIFICANT CHANGE UP (ref 135–145)
TROPONIN T SERPL-MCNC: 0.1 NG/ML — HIGH (ref 0–0.06)
WBC # BLD: 7.42 K/UL — SIGNIFICANT CHANGE UP (ref 3.8–10.5)
WBC # FLD AUTO: 7.42 K/UL — SIGNIFICANT CHANGE UP (ref 3.8–10.5)

## 2019-07-10 PROCEDURE — 99261: CPT

## 2019-07-10 PROCEDURE — 80048 BASIC METABOLIC PNL TOTAL CA: CPT

## 2019-07-10 PROCEDURE — 86900 BLOOD TYPING SEROLOGIC ABO: CPT

## 2019-07-10 PROCEDURE — 86901 BLOOD TYPING SEROLOGIC RH(D): CPT

## 2019-07-10 PROCEDURE — 99291 CRITICAL CARE FIRST HOUR: CPT

## 2019-07-10 PROCEDURE — 87340 HEPATITIS B SURFACE AG IA: CPT

## 2019-07-10 PROCEDURE — 86850 RBC ANTIBODY SCREEN: CPT

## 2019-07-10 PROCEDURE — 36415 COLL VENOUS BLD VENIPUNCTURE: CPT

## 2019-07-10 PROCEDURE — 85730 THROMBOPLASTIN TIME PARTIAL: CPT

## 2019-07-10 PROCEDURE — 80053 COMPREHEN METABOLIC PANEL: CPT

## 2019-07-10 PROCEDURE — 90937 HEMODIALYSIS REPEATED EVAL: CPT

## 2019-07-10 PROCEDURE — 99285 EMERGENCY DEPT VISIT HI MDM: CPT | Mod: 25

## 2019-07-10 PROCEDURE — 84484 ASSAY OF TROPONIN QUANT: CPT

## 2019-07-10 PROCEDURE — 87517 HEPATITIS B DNA QUANT: CPT

## 2019-07-10 PROCEDURE — 82310 ASSAY OF CALCIUM: CPT

## 2019-07-10 PROCEDURE — 82962 GLUCOSE BLOOD TEST: CPT

## 2019-07-10 PROCEDURE — 85610 PROTHROMBIN TIME: CPT

## 2019-07-10 PROCEDURE — 93005 ELECTROCARDIOGRAM TRACING: CPT

## 2019-07-10 PROCEDURE — 87640 STAPH A DNA AMP PROBE: CPT

## 2019-07-10 PROCEDURE — 83970 ASSAY OF PARATHORMONE: CPT

## 2019-07-10 PROCEDURE — 97163 PT EVAL HIGH COMPLEX 45 MIN: CPT

## 2019-07-10 PROCEDURE — 85027 COMPLETE CBC AUTOMATED: CPT

## 2019-07-10 PROCEDURE — 86803 HEPATITIS C AB TEST: CPT

## 2019-07-10 PROCEDURE — 83880 ASSAY OF NATRIURETIC PEPTIDE: CPT

## 2019-07-10 PROCEDURE — 71045 X-RAY EXAM CHEST 1 VIEW: CPT

## 2019-07-10 PROCEDURE — 99292 CRITICAL CARE ADDL 30 MIN: CPT

## 2019-07-10 PROCEDURE — 71045 X-RAY EXAM CHEST 1 VIEW: CPT | Mod: 26

## 2019-07-10 PROCEDURE — 99239 HOSP IP/OBS DSCHRG MGMT >30: CPT

## 2019-07-10 PROCEDURE — 94640 AIRWAY INHALATION TREATMENT: CPT

## 2019-07-10 PROCEDURE — 86706 HEP B SURFACE ANTIBODY: CPT

## 2019-07-10 PROCEDURE — 86704 HEP B CORE ANTIBODY TOTAL: CPT

## 2019-07-10 PROCEDURE — 80069 RENAL FUNCTION PANEL: CPT

## 2019-07-10 PROCEDURE — 96374 THER/PROPH/DIAG INJ IV PUSH: CPT

## 2019-07-10 PROCEDURE — 94760 N-INVAS EAR/PLS OXIMETRY 1: CPT

## 2019-07-10 RX ORDER — HYDRALAZINE HCL 50 MG
25 TABLET ORAL ONCE
Refills: 0 | Status: COMPLETED | OUTPATIENT
Start: 2019-07-10 | End: 2019-07-10

## 2019-07-10 RX ADMIN — HYDROMORPHONE HYDROCHLORIDE 0.5 MILLIGRAM(S): 2 INJECTION INTRAMUSCULAR; INTRAVENOUS; SUBCUTANEOUS at 00:50

## 2019-07-10 RX ADMIN — Medication 1 SPRAY(S): at 04:51

## 2019-07-10 RX ADMIN — HYDROMORPHONE HYDROCHLORIDE 0.5 MILLIGRAM(S): 2 INJECTION INTRAMUSCULAR; INTRAVENOUS; SUBCUTANEOUS at 00:20

## 2019-07-10 RX ADMIN — HYDROMORPHONE HYDROCHLORIDE 0.5 MILLIGRAM(S): 2 INJECTION INTRAMUSCULAR; INTRAVENOUS; SUBCUTANEOUS at 13:16

## 2019-07-10 RX ADMIN — ISOSORBIDE DINITRATE 20 MILLIGRAM(S): 5 TABLET ORAL at 04:51

## 2019-07-10 RX ADMIN — BUDESONIDE AND FORMOTEROL FUMARATE DIHYDRATE 2 PUFF(S): 160; 4.5 AEROSOL RESPIRATORY (INHALATION) at 07:30

## 2019-07-10 RX ADMIN — Medication 3 MILLILITER(S): at 07:29

## 2019-07-10 RX ADMIN — HYDROMORPHONE HYDROCHLORIDE 0.5 MILLIGRAM(S): 2 INJECTION INTRAMUSCULAR; INTRAVENOUS; SUBCUTANEOUS at 08:35

## 2019-07-10 RX ADMIN — SEVELAMER CARBONATE 800 MILLIGRAM(S): 2400 POWDER, FOR SUSPENSION ORAL at 13:13

## 2019-07-10 RX ADMIN — Medication 81 MILLIGRAM(S): at 13:13

## 2019-07-10 RX ADMIN — Medication 10 MILLIGRAM(S): at 04:52

## 2019-07-10 RX ADMIN — Medication 3 MILLILITER(S): at 15:53

## 2019-07-10 RX ADMIN — CHLORHEXIDINE GLUCONATE 1 APPLICATION(S): 213 SOLUTION TOPICAL at 13:34

## 2019-07-10 RX ADMIN — Medication 25 MILLIGRAM(S): at 10:46

## 2019-07-10 RX ADMIN — HYDROMORPHONE HYDROCHLORIDE 0.5 MILLIGRAM(S): 2 INJECTION INTRAMUSCULAR; INTRAVENOUS; SUBCUTANEOUS at 05:30

## 2019-07-10 RX ADMIN — Medication 3 MILLILITER(S): at 03:26

## 2019-07-10 RX ADMIN — Medication 0.25 MILLIGRAM(S): at 04:51

## 2019-07-10 RX ADMIN — SEVELAMER CARBONATE 800 MILLIGRAM(S): 2400 POWDER, FOR SUSPENSION ORAL at 08:30

## 2019-07-10 RX ADMIN — CLOPIDOGREL BISULFATE 75 MILLIGRAM(S): 75 TABLET, FILM COATED ORAL at 13:13

## 2019-07-10 RX ADMIN — Medication 1 SPRAY(S): at 10:45

## 2019-07-10 RX ADMIN — Medication 50 MILLIGRAM(S): at 04:51

## 2019-07-10 RX ADMIN — Medication 3 MILLILITER(S): at 12:20

## 2019-07-10 RX ADMIN — Medication 100 MICROGRAM(S): at 04:51

## 2019-07-10 RX ADMIN — Medication 25 MILLIGRAM(S): at 22:34

## 2019-07-10 RX ADMIN — PANTOPRAZOLE SODIUM 40 MILLIGRAM(S): 20 TABLET, DELAYED RELEASE ORAL at 07:58

## 2019-07-10 RX ADMIN — Medication 50 MILLIGRAM(S): at 13:13

## 2019-07-10 RX ADMIN — HYDROMORPHONE HYDROCHLORIDE 0.5 MILLIGRAM(S): 2 INJECTION INTRAMUSCULAR; INTRAVENOUS; SUBCUTANEOUS at 06:00

## 2019-07-10 RX ADMIN — ISOSORBIDE DINITRATE 20 MILLIGRAM(S): 5 TABLET ORAL at 13:13

## 2019-07-10 RX ADMIN — Medication 100 MILLIGRAM(S): at 04:51

## 2019-07-10 NOTE — ED ADULT NURSE NOTE - OBJECTIVE STATEMENT
Pt presents from [facility] with c/o of dyspnea, pt has hx of COPD 5l NC O2 dependant, presents with left upper arm fistula pt is T/TH/Sat dialysis pt is sleepy with c/o of weakness, pt is stable at this time, A&Ox3, resting comfortably on stretcher, VSS sustaining 96% on supplemented air via NC., pt only complaint at this time is mild HA

## 2019-07-10 NOTE — DISCHARGE NOTE NURSING/CASE MANAGEMENT/SOCIAL WORK - NSDCDPATPORTLINK_GEN_ALL_CORE
You can access the [facility] Patient Portal, offered by [facility], by registering with the following website: [email or web address]

## 2019-07-10 NOTE — PROGRESS NOTE ADULT - ATTENDING COMMENTS
Estimate date of discharge 7/10/19
Estimate date of discharge undetermined.
D/W pt, RN, hospitalist Dr. Bird
Estimate date of discharge 7/9/19
Estimate date of discharge undetermined.
DC in 24- 48h pending palliative discussion on 7/3/19
Estimate date of discharge 7/8/19
Estimate date of discharge 7/8/19
D/W pt, extensive family including HCP/grandson Frances, hospitalist Dr. Epstein, RN, SW/CM

## 2019-07-10 NOTE — ED ADULT NURSE NOTE - NSIMPLEMENTINTERV_GEN_ALL_ED
Implemented All Fall with Harm Risk Interventions:  [place] to call system. Call bell, personal items and telephone within reach. Instruct patient to call for assistance. Room bathroom lighting operational. Non-slip footwear when patient is off stretcher. Physically safe environment: no spills, clutter or unnecessary equipment. Stretcher in lowest position, wheels locked, appropriate side rails in place. Provide visual cue, wrist band, yellow gown, etc. Monitor gait and stability. Monitor for mental status changes and reorient to person, place, and time. Review medications for side effects contributing to fall risk. Reinforce activity limits and safety measures with patient and family. Provide visual clues: red socks.

## 2019-07-10 NOTE — ED ADULT NURSE REASSESSMENT NOTE - NS ED NURSE REASSESS COMMENT FT1
Family at bedside at this time, pt condition discussed, questions encouraged and answered appropriately, pt stable, resting comfortably, sleepy but arousable, safety maintained, awaiting MD ordered.  Family reports pt recvd Dialudid 2mg pta at [facility] for pain.

## 2019-07-10 NOTE — ED ADULT NURSE NOTE - NS ED NURSE TRANSPORT WITH
Cardiac Monitor/Defib/ACLS/Rescue Kit/O2/BVM/bipap Cardiac Monitor/Defib/ACLS/Rescue Kit/O2/BVM/pulse ox/bipap

## 2019-07-10 NOTE — PROGRESS NOTE ADULT - REASON FOR ADMISSION
Dyspnea
ESRD HD
ESRD HD
sob
ESRD
ESRD HD
Dyspnea
ESRD HD
SOB
Dyspnea
confusion
Dyspnea

## 2019-07-10 NOTE — PROGRESS NOTE ADULT - SUBJECTIVE AND OBJECTIVE BOX
[facility] DIVISION OF KIDNEY DISEASES AND HYPERTENSION -- HEMODIALYSIS NOTE  --------------------------------------------------------------------------------  Chief Complaint: ESRD/Ongoing hemodialysis requirement    24 hour events/subjective:  Pt seen/examined  HD today      PAST HISTORY  --------------------------------------------------------------------------------  No significant changes to PMH, PSH, FHx, SHx, unless otherwise noted    ALLERGIES & MEDICATIONS  --------------------------------------------------------------------------------  Allergies    No Known Allergies    Intolerances      Standing Inpatient Medications  ALBUTerol/ipratropium for Nebulization 3 milliLiter(s) Nebulizer every 4 hours  ALPRAZolam 0.25 milliGRAM(s) Oral two times a day  aspirin  chewable 81 milliGRAM(s) Oral daily  atorvastatin 80 milliGRAM(s) Oral at bedtime  buDESOnide  80 MICROgram(s)/formoterol 4.5 MICROgram(s) Inhaler 2 Puff(s) Inhalation two times a day  chlorhexidine 2% Cloths 1 Application(s) Topical daily  clopidogrel Tablet 75 milliGRAM(s) Oral daily  docusate sodium 100 milliGRAM(s) Oral two times a day  epoetin nguyễn Injectable 44582 Unit(s) IV Push <User Schedule>  fluticasone propionate 50 MICROgram(s)/spray Nasal Spray 1 Spray(s) Both Nostrils two times a day  hydrALAZINE 50 milliGRAM(s) Oral every 8 hours  isosorbide   dinitrate Tablet (ISORDIL) 20 milliGRAM(s) Oral three times a day  lactulose Syrup 10 Gram(s) Oral once  levothyroxine 100 MICROGram(s) Oral daily  pantoprazole    Tablet 40 milliGRAM(s) Oral before breakfast  predniSONE   Tablet 20 milliGRAM(s) Oral daily  senna 2 Tablet(s) Oral at bedtime  sevelamer carbonate 800 milliGRAM(s) Oral three times a day with meals    PRN Inpatient Medications  bisacodyl Suppository 10 milliGRAM(s) Rectal daily PRN  HYDROmorphone  Injectable 0.5 milliGRAM(s) IV Push four times a day PRN  hydrOXYzine hydrochloride 25 milliGRAM(s) Oral every 6 hours PRN  sodium chloride 0.65% Nasal 1 Spray(s) Both Nostrils two times a day PRN      REVIEW OF SYSTEMS  --------------------------------------------------------------------------------  Gen: No weight changes, fatigue, fevers/chills, weakness  Skin: No rashes  Head/Eyes/Ears/Mouth: No headache; Normal hearing; Normal vision w/o blurriness; No sinus pain/discomfort, sore throat  Respiratory: No dyspnea, cough, wheezing, hemoptysis  CV: No chest pain, PND, orthopnea  GI: No abdominal pain, diarrhea, constipation, nausea, vomiting, melena, hematochezia  : No increased frequency, dysuria, hematuria, nocturia  MSK: No joint pain/swelling; no back pain; no edema  Neuro: No dizziness/lightheadedness, weakness, seizures, numbness, tingling  Heme: No easy bruising or bleeding  Endo: No heat/cold intolerance  Psych: No significant nervousness, anxiety, stress, depression    All other systems were reviewed and are negative, except as noted.    VITALS/PHYSICAL EXAM  --------------------------------------------------------------------------------  T(C): 36.8 (07-10-19 @ 16:03), Max: 36.9 (07-09-19 @ 21:08)  HR: 66 (07-10-19 @ 16:03) (50 - 85)  BP: 131/54 (07-10-19 @ 16:03) (110/46 - 152/65)  RR: 19 (07-10-19 @ 08:37) (18 - 25)  SpO2: 91% (07-10-19 @ 16:03) (88% - 98%)  Wt(kg): --        Physical Exam:  	Gen: NAD   	HEENT: PERRL, supple neck, clear oropharynx  	Pulm: CTA B/L  	CV: RRR, S1S2; no rub  	Back: No spinal or CVA tenderness; no sacral edema  	Abd: +BS, soft, nontender/nondistended  	: No suprapubic tenderness  	UE: Warm, FROM, no clubbing, intact strength; no edema; no asterixis  	LE: Warm, FROM, no clubbing, intact strength; no edema  	Neuro: No focal deficits, intact gait  	Psych: Normal affect and mood  	Skin: Warm, without rashes  	Vascular access:    LABS/STUDIES  --------------------------------------------------------------------------------                Iron 58, TIBC 256, %sat 23      [06-19-19 @ 08:42]  Ferritin 812      [06-19-19 @ 08:42]  PTH -- (Ca 8.1)      [07-01-19 @ 13:24]   83  TSH 0.89      [05-23-19 @ 06:28]  Lipid: chol 159, , HDL 32, LDL 94      [01-29-19 @ 11:39]    HBsAb <3.0      [06-27-19 @ 19:39]  HBsAb Nonreact      [06-27-19 @ 19:39]  HBsAg Reactive      [06-27-19 @ 19:39]  HBcAb Nonreact      [06-27-19 @ 19:39]  HCV 0.10, Nonreact      [06-27-19 @ 19:39]

## 2019-07-10 NOTE — ED ADULT TRIAGE NOTE - CHIEF COMPLAINT QUOTE
pt arrive by ambulance from [facility] with reports of SOB. pt just d/c'd from [place]. pt appears tired, hypoxic on 4L NC, abd muscles used for deep labored breathing. pt sent to critical care.

## 2019-07-10 NOTE — PROGRESS NOTE ADULT - PROVIDER SPECIALTY LIST ADULT
Hospitalist
Nephrology
Palliative Care
Hospitalist
Internal Medicine
Nephrology
Nephrology
Hospitalist
Hospitalist
Internal Medicine
Nephrology
Hospitalist
Nephrology
Palliative Care

## 2019-07-10 NOTE — PROGRESS NOTE ADULT - SUBJECTIVE AND OBJECTIVE BOX
KAUSHIK HOFFMAN  ----------------------------------------  The patient was seen and evaluated for dyspnea.  The patient is in no acute distress.  Denied any chest pain, palpitations, or abdominal pain.  Reports episodes of dyspnea.    Vital Signs Last 24 Hrs  T(C): 36.7 (10 Jul 2019 08:37), Max: 36.9 (09 Jul 2019 21:08)  T(F): 98 (10 Jul 2019 08:37), Max: 98.4 (09 Jul 2019 21:08)  HR: 58 (10 Jul 2019 08:37) (50 - 85)  BP: 110/46 (10 Jul 2019 08:37) (110/46 - 152/65)  BP(mean): --  RR: 19 (10 Jul 2019 08:37) (18 - 25)  SpO2: 95% (10 Jul 2019 08:37) (93% - 98%)    PHYSICAL EXAMINATION:  ----------------------------------------  General appearance: No acute distress, Awake, Alert  HEENT: Normocephalic, Atraumatic, Conjunctiva clear, EOMI  Neck: Supple, No JVD, No tenderness  Lungs: Breath sound equal bilaterally, No wheezes, Positive fine rales  Cardiovascular: S1S2, Regular rhythm  Abdomen: Soft, Nontender, Nondistended, No guarding/rebound, Positive bowel sounds  Extremities: No clubbing, No cyanosis, No edema, No calf tenderness  Neuro: Strength equal bilaterally, No tremors  Psychiatric: Appropriate mood, Normal affect    MEDICATIONS  (STANDING):  ALBUTerol/ipratropium for Nebulization 3 milliLiter(s) Nebulizer every 4 hours  ALPRAZolam 0.25 milliGRAM(s) Oral two times a day  aspirin  chewable 81 milliGRAM(s) Oral daily  atorvastatin 80 milliGRAM(s) Oral at bedtime  buDESOnide  80 MICROgram(s)/formoterol 4.5 MICROgram(s) Inhaler 2 Puff(s) Inhalation two times a day  chlorhexidine 2% Cloths 1 Application(s) Topical daily  clopidogrel Tablet 75 milliGRAM(s) Oral daily  docusate sodium 100 milliGRAM(s) Oral two times a day  epoetin nguyễn Injectable 01840 Unit(s) IV Push <User Schedule>  fluticasone propionate 50 MICROgram(s)/spray Nasal Spray 1 Spray(s) Both Nostrils two times a day  hydrALAZINE 50 milliGRAM(s) Oral every 8 hours  isosorbide   dinitrate Tablet (ISORDIL) 20 milliGRAM(s) Oral three times a day  lactulose Syrup 10 Gram(s) Oral once  levothyroxine 100 MICROGram(s) Oral daily  pantoprazole    Tablet 40 milliGRAM(s) Oral before breakfast  predniSONE   Tablet 20 milliGRAM(s) Oral daily  senna 2 Tablet(s) Oral at bedtime  sevelamer carbonate 800 milliGRAM(s) Oral three times a day with meals    MEDICATIONS  (PRN):  bisacodyl Suppository 10 milliGRAM(s) Rectal daily PRN Constipation  HYDROmorphone  Injectable 0.5 milliGRAM(s) IV Push four times a day PRN Dyspnea  hydrOXYzine hydrochloride 25 milliGRAM(s) Oral every 6 hours PRN Itching  sodium chloride 0.65% Nasal 1 Spray(s) Both Nostrils two times a day PRN Nasal Congestion      ASSESSMENT / PLAN:  ----------------------------------------  Pt is 78 y/o Male with a history of ESRD on HD, HTN, HFpEF, CAD s/p CABG, and lung cancer s/p radiation on Home Oxygen, presenting with increased dyspnea, likely multifactorial with underlying lung cancer, ILD/ pulmonary fibrosis and volume overload with CHF/ ESRD    Chronic hypoxic respiratory failure - Multifactorial in nature with lung cancer, pulmonary fibrosis, and heart failure. Inhaled bronchodilator therapy and supplemental oxygen. Overall prognosis poor with limited treatment options. Trial of increase in prednisone dose.    Hypertension - Close blood pressure monitoring. On amlodipine.    Chronic diastolic heart failure - Not in acute exacerbation. On isosorbide. Hemodialysis as scheduled.    CAD - On aspirin, clopidogrel, and atorvastatin.    ESRD - Hemodialysis as scheduled.    Hypothyroidism - On levothyroxine.    Constipation - On a bowel regimen. No abdominal pain.

## 2019-07-10 NOTE — ED ADULT NURSE NOTE - CHIEF COMPLAINT QUOTE
pt arrive by ambulance from [facility] with reports of SOB. pt just d/c'd from [place]. pt appears tired, hypoxic on 4L NC, abd muscles used for deep labored breathing. pt sent to critical care.

## 2019-07-10 NOTE — ED ADULT NURSE REASSESSMENT NOTE - NS ED NURSE REASSESS COMMENT FT1
Pt status changed, pt tachypneic, breathing labored, sat 82% pt reporting " I can't breathe", pt placed on NRB mask at this time, sat 100% MD Duron at bedside, pt with continued labor and tachypneic, RT called to bedside for BiPap at this time.  Pt family at bedside POC discussed with pt and family who verbalize understanding and agree with plan, pt to be re-admitted to facility.

## 2019-07-10 NOTE — PROGRESS NOTE ADULT - ASSESSMENT
1)ESRD on HD  2) MBD of renal dx  3) Anemia of renal dx  4) Vol HTN    Continue current management  HD today ; possible dc to rehab post HD  Poor Prognosis  Anemia not at goal;   cw epogen 10k units TIW     bari Orellana

## 2019-07-10 NOTE — ED ADULT NURSE REASSESSMENT NOTE - NS ED NURSE REASSESS COMMENT FT1
Pt resting comfortably on stretcher, tolerating BiPap, VSS, offers no complaints, pt awaiting admission orders, verbalize understanding, repositioned for comfort, warm blanket provided, safety maintained.

## 2019-07-11 ENCOUNTER — RESULT REVIEW (OUTPATIENT)
Age: 79
End: 2019-07-11

## 2019-07-11 DIAGNOSIS — N18.6 END STAGE RENAL DISEASE: ICD-10-CM

## 2019-07-11 DIAGNOSIS — R06.02 SHORTNESS OF BREATH: ICD-10-CM

## 2019-07-11 DIAGNOSIS — J90 PLEURAL EFFUSION, NOT ELSEWHERE CLASSIFIED: ICD-10-CM

## 2019-07-11 DIAGNOSIS — I50.32 CHRONIC DIASTOLIC (CONGESTIVE) HEART FAILURE: ICD-10-CM

## 2019-07-11 DIAGNOSIS — J96.01 ACUTE RESPIRATORY FAILURE WITH HYPOXIA: ICD-10-CM

## 2019-07-11 DIAGNOSIS — J84.9 INTERSTITIAL PULMONARY DISEASE, UNSPECIFIED: ICD-10-CM

## 2019-07-11 DIAGNOSIS — C34.90 MALIGNANT NEOPLASM OF UNSPECIFIED PART OF UNSPECIFIED BRONCHUS OR LUNG: ICD-10-CM

## 2019-07-11 LAB
ANION GAP SERPL CALC-SCNC: 15 MMOL/L — SIGNIFICANT CHANGE UP (ref 5–17)
B PERT IGG+IGM PNL SER: CLEAR — SIGNIFICANT CHANGE UP
BASE EXCESS BLDA CALC-SCNC: 2 MMOL/L — SIGNIFICANT CHANGE UP (ref -2–2)
BLOOD GAS COMMENTS ARTERIAL: SIGNIFICANT CHANGE UP
BUN SERPL-MCNC: 47 MG/DL — HIGH (ref 8–20)
CALCIUM SERPL-MCNC: 8 MG/DL — LOW (ref 8.6–10.2)
CHLORIDE SERPL-SCNC: 90 MMOL/L — LOW (ref 98–107)
CK SERPL-CCNC: 42 U/L — SIGNIFICANT CHANGE UP (ref 30–200)
CO2 SERPL-SCNC: 25 MMOL/L — SIGNIFICANT CHANGE UP (ref 22–29)
COLOR FLD: YELLOW
CREAT SERPL-MCNC: 7.53 MG/DL — HIGH (ref 0.5–1.3)
FLUID INTAKE SUBSTANCE CLASS: SIGNIFICANT CHANGE UP
FLUID SEGMENTED GRANULOCYTES: 1 % — SIGNIFICANT CHANGE UP
GAS PNL BLDA: SIGNIFICANT CHANGE UP
GLUCOSE SERPL-MCNC: 179 MG/DL — HIGH (ref 70–115)
GRAM STN FLD: SIGNIFICANT CHANGE UP
HCO3 BLDA-SCNC: 26 MMOL/L — SIGNIFICANT CHANGE UP (ref 20–26)
HCT VFR BLD CALC: 30.3 % — LOW (ref 39–50)
HGB BLD-MCNC: 9.1 G/DL — LOW (ref 13–17)
HOROWITZ INDEX BLDA+IHG-RTO: 40 — SIGNIFICANT CHANGE UP
LYMPHOCYTES # FLD: 98 % — SIGNIFICANT CHANGE UP
MAGNESIUM SERPL-MCNC: 2.1 MG/DL — SIGNIFICANT CHANGE UP (ref 1.8–2.6)
MCHC RBC-ENTMCNC: 29.8 PG — SIGNIFICANT CHANGE UP (ref 27–34)
MCHC RBC-ENTMCNC: 30 GM/DL — LOW (ref 32–36)
MCV RBC AUTO: 99.3 FL — SIGNIFICANT CHANGE UP (ref 80–100)
MONOS+MACROS # FLD: 1 % — SIGNIFICANT CHANGE UP
MRSA PCR RESULT.: SIGNIFICANT CHANGE UP
PCO2 BLDA: 49 MMHG — HIGH (ref 35–45)
PH BLDA: 7.36 — SIGNIFICANT CHANGE UP (ref 7.35–7.45)
PH FLD: 8 — SIGNIFICANT CHANGE UP
PHOSPHATE SERPL-MCNC: 6.4 MG/DL — HIGH (ref 2.4–4.7)
PLATELET # BLD AUTO: 152 K/UL — SIGNIFICANT CHANGE UP (ref 150–400)
PO2 BLDA: 64 MMHG — LOW (ref 83–108)
POTASSIUM SERPL-MCNC: 5 MMOL/L — SIGNIFICANT CHANGE UP (ref 3.5–5.3)
POTASSIUM SERPL-SCNC: 5 MMOL/L — SIGNIFICANT CHANGE UP (ref 3.5–5.3)
PROCALCITONIN SERPL-MCNC: 0.35 NG/ML — HIGH (ref 0.02–0.1)
RAPID RVP RESULT: SIGNIFICANT CHANGE UP
RBC # BLD: 3.05 M/UL — LOW (ref 4.2–5.8)
RBC # FLD: 17 % — HIGH (ref 10.3–14.5)
RCV VOL RI: 1025 /UL — HIGH (ref 0–5)
S AUREUS DNA NOSE QL NAA+PROBE: DETECTED
SAO2 % BLDA: 92 % — LOW (ref 95–99)
SODIUM SERPL-SCNC: 130 MMOL/L — LOW (ref 135–145)
SPECIMEN SOURCE: SIGNIFICANT CHANGE UP
TOTAL NUCLEATED CELL COUNT, BODY FLUID: 267 /UL — HIGH (ref 0–5)
TROPONIN T SERPL-MCNC: 0.12 NG/ML — HIGH (ref 0–0.06)
TUBE TYPE: SIGNIFICANT CHANGE UP
WBC # BLD: 6.38 K/UL — SIGNIFICANT CHANGE UP (ref 3.8–10.5)
WBC # FLD AUTO: 6.38 K/UL — SIGNIFICANT CHANGE UP (ref 3.8–10.5)

## 2019-07-11 PROCEDURE — 93306 TTE W/DOPPLER COMPLETE: CPT | Mod: 26

## 2019-07-11 PROCEDURE — 99223 1ST HOSP IP/OBS HIGH 75: CPT

## 2019-07-11 PROCEDURE — 88305 TISSUE EXAM BY PATHOLOGIST: CPT | Mod: 26

## 2019-07-11 PROCEDURE — 71045 X-RAY EXAM CHEST 1 VIEW: CPT | Mod: 26

## 2019-07-11 PROCEDURE — 93010 ELECTROCARDIOGRAM REPORT: CPT | Mod: 76

## 2019-07-11 PROCEDURE — 88112 CYTOPATH CELL ENHANCE TECH: CPT | Mod: 26

## 2019-07-11 RX ORDER — CHLORHEXIDINE GLUCONATE 213 G/1000ML
1 SOLUTION TOPICAL
Refills: 0 | Status: DISCONTINUED | OUTPATIENT
Start: 2019-07-11 | End: 2019-07-26

## 2019-07-11 RX ORDER — AZITHROMYCIN 500 MG/1
500 TABLET, FILM COATED ORAL ONCE
Refills: 0 | Status: COMPLETED | OUTPATIENT
Start: 2019-07-11 | End: 2019-07-11

## 2019-07-11 RX ORDER — IPRATROPIUM/ALBUTEROL SULFATE 18-103MCG
3 AEROSOL WITH ADAPTER (GRAM) INHALATION EVERY 6 HOURS
Refills: 0 | Status: DISCONTINUED | OUTPATIENT
Start: 2019-07-11 | End: 2019-07-26

## 2019-07-11 RX ORDER — VANCOMYCIN HCL 1 G
1000 VIAL (EA) INTRAVENOUS ONCE
Refills: 0 | Status: COMPLETED | OUTPATIENT
Start: 2019-07-11 | End: 2019-07-11

## 2019-07-11 RX ORDER — MORPHINE SULFATE 50 MG/1
2 CAPSULE, EXTENDED RELEASE ORAL ONCE
Refills: 0 | Status: DISCONTINUED | OUTPATIENT
Start: 2019-07-11 | End: 2019-07-11

## 2019-07-11 RX ORDER — PANTOPRAZOLE SODIUM 20 MG/1
40 TABLET, DELAYED RELEASE ORAL DAILY
Refills: 0 | Status: DISCONTINUED | OUTPATIENT
Start: 2019-07-11 | End: 2019-07-16

## 2019-07-11 RX ORDER — AMLODIPINE BESYLATE 2.5 MG/1
5 TABLET ORAL DAILY
Refills: 0 | Status: DISCONTINUED | OUTPATIENT
Start: 2019-07-11 | End: 2019-07-26

## 2019-07-11 RX ORDER — MORPHINE SULFATE 50 MG/1
2 CAPSULE, EXTENDED RELEASE ORAL
Refills: 0 | Status: DISCONTINUED | OUTPATIENT
Start: 2019-07-11 | End: 2019-07-18

## 2019-07-11 RX ORDER — ERYTHROPOIETIN 10000 [IU]/ML
10000 INJECTION, SOLUTION INTRAVENOUS; SUBCUTANEOUS
Refills: 0 | Status: DISCONTINUED | OUTPATIENT
Start: 2019-07-11 | End: 2019-07-15

## 2019-07-11 RX ORDER — CLOPIDOGREL BISULFATE 75 MG/1
75 TABLET, FILM COATED ORAL DAILY
Refills: 0 | Status: DISCONTINUED | OUTPATIENT
Start: 2019-07-11 | End: 2019-07-26

## 2019-07-11 RX ORDER — ISOSORBIDE DINITRATE 5 MG/1
20 TABLET ORAL THREE TIMES A DAY
Refills: 0 | Status: DISCONTINUED | OUTPATIENT
Start: 2019-07-11 | End: 2019-07-26

## 2019-07-11 RX ORDER — HYDRALAZINE HCL 50 MG
50 TABLET ORAL EVERY 8 HOURS
Refills: 0 | Status: DISCONTINUED | OUTPATIENT
Start: 2019-07-11 | End: 2019-07-26

## 2019-07-11 RX ORDER — HEPARIN SODIUM 5000 [USP'U]/ML
5000 INJECTION INTRAVENOUS; SUBCUTANEOUS EVERY 8 HOURS
Refills: 0 | Status: DISCONTINUED | OUTPATIENT
Start: 2019-07-11 | End: 2019-07-26

## 2019-07-11 RX ORDER — DOCUSATE SODIUM 100 MG
100 CAPSULE ORAL
Refills: 0 | Status: DISCONTINUED | OUTPATIENT
Start: 2019-07-11 | End: 2019-07-26

## 2019-07-11 RX ORDER — ATORVASTATIN CALCIUM 80 MG/1
80 TABLET, FILM COATED ORAL AT BEDTIME
Refills: 0 | Status: DISCONTINUED | OUTPATIENT
Start: 2019-07-11 | End: 2019-07-26

## 2019-07-11 RX ORDER — SEVELAMER CARBONATE 2400 MG/1
800 POWDER, FOR SUSPENSION ORAL
Refills: 0 | Status: DISCONTINUED | OUTPATIENT
Start: 2019-07-11 | End: 2019-07-26

## 2019-07-11 RX ORDER — ALPRAZOLAM 0.25 MG
0.25 TABLET ORAL EVERY 6 HOURS
Refills: 0 | Status: DISCONTINUED | OUTPATIENT
Start: 2019-07-11 | End: 2019-07-18

## 2019-07-11 RX ORDER — PIPERACILLIN AND TAZOBACTAM 4; .5 G/20ML; G/20ML
3.38 INJECTION, POWDER, LYOPHILIZED, FOR SOLUTION INTRAVENOUS ONCE
Refills: 0 | Status: COMPLETED | OUTPATIENT
Start: 2019-07-11 | End: 2019-07-11

## 2019-07-11 RX ORDER — LEVOTHYROXINE SODIUM 125 MCG
50 TABLET ORAL AT BEDTIME
Refills: 0 | Status: DISCONTINUED | OUTPATIENT
Start: 2019-07-11 | End: 2019-07-16

## 2019-07-11 RX ORDER — AZITHROMYCIN 500 MG/1
TABLET, FILM COATED ORAL
Refills: 0 | Status: DISCONTINUED | OUTPATIENT
Start: 2019-07-11 | End: 2019-07-11

## 2019-07-11 RX ORDER — SENNA PLUS 8.6 MG/1
2 TABLET ORAL AT BEDTIME
Refills: 0 | Status: DISCONTINUED | OUTPATIENT
Start: 2019-07-11 | End: 2019-07-26

## 2019-07-11 RX ORDER — ASPIRIN/CALCIUM CARB/MAGNESIUM 324 MG
81 TABLET ORAL DAILY
Refills: 0 | Status: DISCONTINUED | OUTPATIENT
Start: 2019-07-11 | End: 2019-07-26

## 2019-07-11 RX ADMIN — HEPARIN SODIUM 5000 UNIT(S): 5000 INJECTION INTRAVENOUS; SUBCUTANEOUS at 13:39

## 2019-07-11 RX ADMIN — MORPHINE SULFATE 2 MILLIGRAM(S): 50 CAPSULE, EXTENDED RELEASE ORAL at 12:45

## 2019-07-11 RX ADMIN — Medication 3 MILLILITER(S): at 15:16

## 2019-07-11 RX ADMIN — Medication 50 MILLIGRAM(S): at 21:16

## 2019-07-11 RX ADMIN — Medication 250 MILLIGRAM(S): at 06:07

## 2019-07-11 RX ADMIN — MORPHINE SULFATE 2 MILLIGRAM(S): 50 CAPSULE, EXTENDED RELEASE ORAL at 08:10

## 2019-07-11 RX ADMIN — Medication 50 MILLIGRAM(S): at 15:37

## 2019-07-11 RX ADMIN — MORPHINE SULFATE 2 MILLIGRAM(S): 50 CAPSULE, EXTENDED RELEASE ORAL at 12:38

## 2019-07-11 RX ADMIN — Medication 40 MILLIGRAM(S): at 18:55

## 2019-07-11 RX ADMIN — Medication 40 MILLIGRAM(S): at 11:26

## 2019-07-11 RX ADMIN — MORPHINE SULFATE 2 MILLIGRAM(S): 50 CAPSULE, EXTENDED RELEASE ORAL at 17:19

## 2019-07-11 RX ADMIN — SEVELAMER CARBONATE 800 MILLIGRAM(S): 2400 POWDER, FOR SUSPENSION ORAL at 17:13

## 2019-07-11 RX ADMIN — MORPHINE SULFATE 2 MILLIGRAM(S): 50 CAPSULE, EXTENDED RELEASE ORAL at 14:45

## 2019-07-11 RX ADMIN — Medication 3 MILLILITER(S): at 20:45

## 2019-07-11 RX ADMIN — Medication 3 MILLILITER(S): at 08:20

## 2019-07-11 RX ADMIN — Medication 100 MILLIGRAM(S): at 17:15

## 2019-07-11 RX ADMIN — ISOSORBIDE DINITRATE 20 MILLIGRAM(S): 5 TABLET ORAL at 15:38

## 2019-07-11 RX ADMIN — CLOPIDOGREL BISULFATE 75 MILLIGRAM(S): 75 TABLET, FILM COATED ORAL at 13:40

## 2019-07-11 RX ADMIN — Medication 40 MILLIGRAM(S): at 03:31

## 2019-07-11 RX ADMIN — Medication 50 MICROGRAM(S): at 21:16

## 2019-07-11 RX ADMIN — MORPHINE SULFATE 2 MILLIGRAM(S): 50 CAPSULE, EXTENDED RELEASE ORAL at 21:16

## 2019-07-11 RX ADMIN — CHLORHEXIDINE GLUCONATE 1 APPLICATION(S): 213 SOLUTION TOPICAL at 05:08

## 2019-07-11 RX ADMIN — MORPHINE SULFATE 2 MILLIGRAM(S): 50 CAPSULE, EXTENDED RELEASE ORAL at 17:49

## 2019-07-11 RX ADMIN — Medication 81 MILLIGRAM(S): at 13:39

## 2019-07-11 RX ADMIN — AMLODIPINE BESYLATE 5 MILLIGRAM(S): 2.5 TABLET ORAL at 13:40

## 2019-07-11 RX ADMIN — MORPHINE SULFATE 2 MILLIGRAM(S): 50 CAPSULE, EXTENDED RELEASE ORAL at 14:30

## 2019-07-11 RX ADMIN — SEVELAMER CARBONATE 800 MILLIGRAM(S): 2400 POWDER, FOR SUSPENSION ORAL at 15:37

## 2019-07-11 RX ADMIN — HEPARIN SODIUM 5000 UNIT(S): 5000 INJECTION INTRAVENOUS; SUBCUTANEOUS at 21:16

## 2019-07-11 RX ADMIN — ISOSORBIDE DINITRATE 20 MILLIGRAM(S): 5 TABLET ORAL at 21:16

## 2019-07-11 RX ADMIN — MORPHINE SULFATE 2 MILLIGRAM(S): 50 CAPSULE, EXTENDED RELEASE ORAL at 10:39

## 2019-07-11 RX ADMIN — HEPARIN SODIUM 5000 UNIT(S): 5000 INJECTION INTRAVENOUS; SUBCUTANEOUS at 05:07

## 2019-07-11 RX ADMIN — AZITHROMYCIN 255 MILLIGRAM(S): 500 TABLET, FILM COATED ORAL at 03:31

## 2019-07-11 RX ADMIN — ATORVASTATIN CALCIUM 80 MILLIGRAM(S): 80 TABLET, FILM COATED ORAL at 21:16

## 2019-07-11 RX ADMIN — MORPHINE SULFATE 2 MILLIGRAM(S): 50 CAPSULE, EXTENDED RELEASE ORAL at 08:40

## 2019-07-11 RX ADMIN — MORPHINE SULFATE 2 MILLIGRAM(S): 50 CAPSULE, EXTENDED RELEASE ORAL at 21:35

## 2019-07-11 RX ADMIN — PIPERACILLIN AND TAZOBACTAM 200 GRAM(S): 4; .5 INJECTION, POWDER, LYOPHILIZED, FOR SOLUTION INTRAVENOUS at 06:07

## 2019-07-11 RX ADMIN — SENNA PLUS 2 TABLET(S): 8.6 TABLET ORAL at 21:16

## 2019-07-11 RX ADMIN — MORPHINE SULFATE 2 MILLIGRAM(S): 50 CAPSULE, EXTENDED RELEASE ORAL at 10:09

## 2019-07-11 RX ADMIN — PANTOPRAZOLE SODIUM 40 MILLIGRAM(S): 20 TABLET, DELAYED RELEASE ORAL at 11:26

## 2019-07-11 RX ADMIN — Medication 0.25 MILLIGRAM(S): at 12:38

## 2019-07-11 NOTE — PROCEDURE NOTE - ADDITIONAL PROCEDURE DETAILS
Post procedure Xray noted have small left pneumothorax, patient down to 30% FiO2 on HFNC, does not appear to be in any distress. Denies any SOB, dyspnea at this time. Will check CXR in few hours.

## 2019-07-11 NOTE — ED PROVIDER NOTE - OBJECTIVE STATEMENT
This patient is a 79 year old man hx of lung cancer on home O2 at 5 L and ESRD on dialysis (T/Th/Sat) discharged from [facility] earlier today who presents to the ER c/o SOB.  Patient was discharged to rehab but on arrival there was reported to be in respiratory distress.  Patient c/o SOB and chest tightness.

## 2019-07-11 NOTE — ED ADULT NURSE REASSESSMENT NOTE - NS ED NURSE REASSESS COMMENT FT1
Attempt to ween pt off BiPap unsuccessful, pt desat into 80s, MD Duron aware, pt placed back on BiPap at ordered settings, pt comfortable at this time, awaiting admission orders, transferred to main ED at this time. Pt safety maintained.

## 2019-07-11 NOTE — H&P ADULT - HISTORY OF PRESENT ILLNESS
78 y/o M with a h/o ESRD on HD, HTN, HLD, dCHF (EF: 50-55%), CAD (s/p CABG), lung CA (s/p RT in 2006), ILD (on 5L home O2), anemia, bipolar disorder, recent admission at [facility] for volume overload/ILD (discharged to rehab on 7/10/19), presents to the ED in respiratory distress requiring NIPPV. CXR appears similar to most recent study on 7/5- diffuse interstitial fibrosis and LLL consolidation/effusion. Does not appear volume overloaded and had hemodialysis yesterday. He was weaned from NIPPV in the ED, but did not tolerate this and needed to be placed back on BiPAP urgently.

## 2019-07-11 NOTE — ED ADULT NURSE REASSESSMENT NOTE - NS ED NURSE REASSESS COMMENT FT1
assumed care of pt, pt on biPap 40%, VSS. Pt AOx4, breathing comfortably. IV intact in right hand. family at bedside, pt is awaiting bed placement.

## 2019-07-11 NOTE — PROVIDER CONTACT NOTE (EICU) - RECOMMENDATIONS
CXR with interstitial changes manfred LLL base, slight increase in opacification since prior 7/5/19. abg 7.36/49   Bedside POCUS to evaluate volume component  Empiric abx with azithromycin   Pulmonary f/u ?progression of underlying disease

## 2019-07-11 NOTE — PROCEDURE NOTE - NSPROCDETAILS_GEN_ALL_CORE
location identified, draped/prepped, sterile technique used, needle inserted/introduced/ultrasound assessment of effusion (localization)/ultrasound assessment of effusion (size)

## 2019-07-11 NOTE — H&P ADULT - ASSESSMENT
80 y/o M with a h/o ESRD on HD, HTN, HLD, dCHF (EF: 50-55%), CAD (s/p CABG), lung CA (s/p RT in 2006), ILD (on 5L home O2), anemia, bipolar disorder, recent admission at [facility] for volume overload/ILD (discharged to rehab on 7/10/19), 80 y/o M with a h/o ESRD on HD, HTN, HLD, dCHF (EF: 50-55%), CAD (s/p CABG), lung CA (s/p RT in 2006), ILD (on 5L home O2), anemia, bipolar disorder, recent admission at [facility] for volume overload/ILD (discharged to rehab on 7/10/19), with acute hypoxemic respiratory failure, pleural effusion, atelectasis. 80 y/o M with a h/o ESRD on HD, HTN, HLD, dCHF (EF: 50-55%), CAD (s/p CABG), lung CA (s/p RT in 2006), ILD (on 5L home O2), anemia, bipolar disorder, recent admission at [facility] for volume overload/ILD (discharged to rehab on 7/10/19), with acute hypoxemic respiratory failure, pleural effusion, atelectasis.    Case discussed in detail with eICU attending, Dr. Fernandes.    Total critical care time spent on encounter: 48 mins

## 2019-07-11 NOTE — ED ADULT NURSE REASSESSMENT NOTE - NS ED NURSE REASSESS COMMENT FT1
RT at pt bedside at this time, labs initiated as ordered, pt tolerated well, safety maintained, awaiting admission orders.

## 2019-07-11 NOTE — ED PROVIDER NOTE - CLINICAL SUMMARY MEDICAL DECISION MAKING FREE TEXT BOX
79 year old hx of lung cancer recently discharged earlier in the day presents in respiratory distress patient placed on BiPAP and admitted.

## 2019-07-11 NOTE — CONSULT NOTE ADULT - SUBJECTIVE AND OBJECTIVE BOX
PALLIATIVE CONSULT    CC: Patient is a 79y old  Male who presents with a chief complaint of Acute hypoxemic respiratory failure (11 Jul 2019 04:41)    HPI:  Mr. Lawson is a 79 yr old male with PMHx of ESRD on HD, CAD s/p CABG, HTN, HLD, h/o lung cancer, advanced ILD on home O2 5L, HFpEF, multiple hospitalizations with most recent for acute decompensated heart failure/ILD discharged to rehab on 7/10 now readmitted to MICU for acute respiratory failure requiring NIPPV. CXR bilateral lower lobe infiltrates, small bilateral pleural effusions, unchanged from previous imaging. A MOLST was completed for DNR/DNI this morning by ICU staff. Palliative consulted for support and ongoing goc given overall guarded prognosis. He is well known to our service from previous admissions.     Seen and examined this morning at 11:30 AM. Two nephews present at bedside. Pt was weaned off BIPAP to HFNC 50% shortly prior to my visit. Pt is awake and alert. He reports intermittent dyspnea especially on exertion, associated intermittent pleuritic chest pain and reproducible pain on upper left chest wall on palpation.     Present Symptoms:   Dyspnea: Yes   Nausea/Vomiting:  No  Anxiety:  Yes   Fatigue: Yes    Loss of appetite:  No  Pain: yes pleuritic and reproducible as above  Constipation: yes, no BM in last 2 days per pt          Review of Systems: As per HPI.  All others negative    PERTINENT PMH REVIEWED: Yes     PAST MEDICAL & SURGICAL HISTORY:  ILD (interstitial lung disease)  CAD (coronary artery disease)  Chronic anemia  ESRD (end stage renal disease): on HD (Tue-Thu-Sat) through RUE fistula  CAD (coronary artery disease): s/p remote CABG and multiple stents  Lung cancer: had yoni radiation in 2006.  Bipolar disorder  High cholesterol  Hypertension  S/P CABG (coronary artery bypass graft)      SOCIAL HISTORY:    Admitted from:   [facility] <24 hours discharge from [facility]  - Prior to last hospitalization he lived at home with dtr, HOSSEIN, wife and grandson Frances.    Baseline ADLs (prior to admission): Dependent   Karnofsky: 40-50 %    Surrogate/HCP/Guardian:   - in last hospitalization, HCP form was brought in by family designating grandson Frances.   - pt continues to verbalize deferring medical decisions to his HOSSEIN Boris and dtr Carin.    ADVANCE DIRECTIVES:   DNR YES NO  Completed on:     7/11/19               MOLST  YES NO   Completed on: 7/11/19 by MICU staff with HOSSEIN Escalante  Living Will  YES NO   Completed on:    FAMILY HISTORY:  Family history of leukemia (Child)  Family history of diabetes mellitus (Child)       Allergies    No Known Allergies    Intolerances        MEDICATIONS  (STANDING):  ALBUTerol/ipratropium for Nebulization 3 milliLiter(s) Nebulizer every 6 hours  amLODIPine   Tablet 5 milliGRAM(s) Oral daily  aspirin  chewable 81 milliGRAM(s) Oral daily  atorvastatin 80 milliGRAM(s) Oral at bedtime  chlorhexidine 2% Cloths 1 Application(s) Topical <User Schedule>  clopidogrel Tablet 75 milliGRAM(s) Oral daily  epoetin nguyễn Injectable 79311 Unit(s) SubCutaneous <User Schedule>  heparin  Injectable 5000 Unit(s) SubCutaneous every 8 hours  hydrALAZINE 50 milliGRAM(s) Oral every 8 hours  isosorbide   dinitrate Tablet (ISORDIL) 20 milliGRAM(s) Oral three times a day  levothyroxine Injectable 50 MICROGram(s) IV Push at bedtime  methylPREDNISolone sodium succinate Injectable 40 milliGRAM(s) IV Push every 8 hours  pantoprazole  Injectable 40 milliGRAM(s) IV Push daily  sevelamer carbonate 800 milliGRAM(s) Oral three times a day with meals    MEDICATIONS  (PRN):  ALPRAZolam 0.25 milliGRAM(s) Oral every 6 hours PRN anxiety  morphine  - Injectable 2 milliGRAM(s) IV Push every 2 hours PRN Dyspnea/ Chest pain      PHYSICAL EXAM:    Vital Signs Last 24 Hrs  T(C): 36.8 (11 Jul 2019 12:04), Max: 36.9 (10 Jul 2019 20:28)  T(F): 98.2 (11 Jul 2019 12:04), Max: 98.5 (10 Jul 2019 20:28)  HR: 58 (11 Jul 2019 13:00) (54 - 97)  BP: 168/72 (11 Jul 2019 13:00) (130/58 - 197/77)  BP(mean): 103 (11 Jul 2019 13:00) (90 - 108)  RR: 19 (11 Jul 2019 13:00) (12 - 37)  SpO2: 97% (11 Jul 2019 13:00) (56% - 99%)    General: cachetic. Resting comfortably. No acute distress.   HEENT:  mucous membrane dry.   Lungs: diminished breath sounds at bases. HFNC 50%. conversational dyspnea noted   CV: +s1/s2. Regular rate and rhythm.   GI:+ bowel sound. abdomen soft, non-tender, non-distended.  MSK: Moves all 4 extremities.  No cyanosis or edema. weakness.   Neuro: Awake and alert, oriented x3. Interactive.   Skin: warm and dry.      LABS:                        9.1    6.38  )-----------( 152      ( 11 Jul 2019 06:56 )             30.3     07-11    130<L>  |  90<L>  |  47.0<H>  ----------------------------<  179<H>  5.0   |  25.0  |  7.53<H>    Ca    8.0<L>      11 Jul 2019 06:56  Phos  6.4     07-11  Mg     2.1     07-11    TPro  7.1  /  Alb  3.8  /  TBili  0.3<L>  /  DBili  x   /  AST  24  /  ALT  12  /  AlkPhos  73  07-10    RADIOLOGY & ADDITIONAL STUDIES:     CXR 7/10/19  FINDINGS:    Single frontal view of the chest demonstrates bilateral lower lobe   infiltrates. Small bilateral effusions. Status post CABG procedure. The   cardiomediastinal silhouette is enlarged. No acute osseous abnormalities.   Overlying EKG leads and wires are noted. Mild COPD changes    IMPRESSION: Bilateral lower lobe infiltrates. Small bilateral pleural   effusions. These findings are unchanged. Consider CT as clinically   warranted.

## 2019-07-11 NOTE — PROVIDER CONTACT NOTE (EICU) - BACKGROUND
78 y/o male with HTN, ESRD on HD, CAD s/p CABG, CHF (diastolic), h/o lung cancer s/p XRT 2006, ILD on home O2 5 L, anemia, bipolar d/o presents with respiratory failure req BIPAP.

## 2019-07-11 NOTE — ED PROVIDER NOTE - FAMILY HISTORY
Child  Still living? Yes, Estimated age: Age Unknown  Family history of diabetes mellitus, Age at diagnosis: Age Unknown  Family history of leukemia, Age at diagnosis: Age Unknown

## 2019-07-11 NOTE — H&P ADULT - NSICDXPASTMEDICALHX_GEN_ALL_CORE_FT
PAST MEDICAL HISTORY:  Bipolar disorder     CAD (coronary artery disease) s/p remote CABG and multiple stents    CAD (coronary artery disease)     Chronic anemia     ESRD (end stage renal disease) on HD (Tue-Thu-Sat) through RUE fistula    High cholesterol     Hypertension     ILD (interstitial lung disease)     Lung cancer had yoni radiation in 2006.

## 2019-07-11 NOTE — PROVIDER CONTACT NOTE (EICU) - ACTION/TREATMENT ORDERED:
-have entered AM labs for this patient including CBC, BMP, magnesium and phosphorous levels  -will CTM for results and replete as per [place] standard

## 2019-07-11 NOTE — H&P ADULT - RESPIRATORY COMMENTS
fibrotic sounds in mid-lower lung fields fibrotic sounds in mid-lower lung fields, diminished in LLL

## 2019-07-11 NOTE — H&P ADULT - PROBLEM SELECTOR PLAN 3
HD as per nephrology. Trend BUN/Cr- seem to be at baseline. Monitor lytes. Avoid nephrotoxic agents and renally dose medications.

## 2019-07-11 NOTE — H&P ADULT - PROBLEM SELECTOR PLAN 1
Likely secondary to ILD, pleural effusion, and consolidated lung. CXR appears similar to study from 7/5 during recent admission. Does not appear volume overloaded at this time and had HD yesterday. Bedside POCUS reveals sizeable fluid pocket in lower left lung field- consider thoracentesis. Failed weaning from NIPPV and was placed back on BiPAP emergently, now actively titrating vent settings to maintain SaO2 > 88%. Keep HOB elevated > 30 degrees. Start routine inhaled bronchodilators and IV steroids. Will add azithromycin for atypical coverage and send sputum culture, RVP, and legionella study. Normal WBC, afebrile, but given recent hospital stay cannot r/o HCAP- will give dose of Zosyn and vancomycin and send procalcitonin. Pulmonology consult.

## 2019-07-11 NOTE — H&P ADULT - PROBLEM SELECTOR PLAN 6
S/p RT. Supportive care for now. Will place palliative care consult for assistance with advanced directives given the circumstances.

## 2019-07-12 LAB
ALBUMIN FLD-MCNC: 1.5 G/DL — SIGNIFICANT CHANGE UP
ANION GAP SERPL CALC-SCNC: 19 MMOL/L — HIGH (ref 5–17)
ANION GAP SERPL CALC-SCNC: 19 MMOL/L — HIGH (ref 5–17)
BUN SERPL-MCNC: 66 MG/DL — HIGH (ref 8–20)
BUN SERPL-MCNC: 72 MG/DL — HIGH (ref 8–20)
CALCIUM SERPL-MCNC: 8 MG/DL — LOW (ref 8.6–10.2)
CALCIUM SERPL-MCNC: 8.5 MG/DL — LOW (ref 8.6–10.2)
CHLORIDE SERPL-SCNC: 92 MMOL/L — LOW (ref 98–107)
CHLORIDE SERPL-SCNC: 92 MMOL/L — LOW (ref 98–107)
CO2 SERPL-SCNC: 21 MMOL/L — LOW (ref 22–29)
CO2 SERPL-SCNC: 22 MMOL/L — SIGNIFICANT CHANGE UP (ref 22–29)
CREAT SERPL-MCNC: 9.27 MG/DL — HIGH (ref 0.5–1.3)
CREAT SERPL-MCNC: 9.7 MG/DL — HIGH (ref 0.5–1.3)
GLUCOSE FLD-MCNC: 102 MG/DL — SIGNIFICANT CHANGE UP
GLUCOSE SERPL-MCNC: 111 MG/DL — SIGNIFICANT CHANGE UP (ref 70–115)
GLUCOSE SERPL-MCNC: 129 MG/DL — HIGH (ref 70–115)
HCT VFR BLD CALC: 28.2 % — LOW (ref 39–50)
HGB BLD-MCNC: 8.5 G/DL — LOW (ref 13–17)
LDH SERPL L TO P-CCNC: 104 U/L — SIGNIFICANT CHANGE UP
MAGNESIUM SERPL-MCNC: 2.2 MG/DL — SIGNIFICANT CHANGE UP (ref 1.6–2.6)
MCHC RBC-ENTMCNC: 29.4 PG — SIGNIFICANT CHANGE UP (ref 27–34)
MCHC RBC-ENTMCNC: 30.1 GM/DL — LOW (ref 32–36)
MCV RBC AUTO: 97.6 FL — SIGNIFICANT CHANGE UP (ref 80–100)
NIGHT BLUE STAIN TISS: SIGNIFICANT CHANGE UP
PHOSPHATE SERPL-MCNC: 8.1 MG/DL — HIGH (ref 2.4–4.7)
PLATELET # BLD AUTO: 167 K/UL — SIGNIFICANT CHANGE UP (ref 150–400)
POTASSIUM SERPL-MCNC: 3.6 MMOL/L — SIGNIFICANT CHANGE UP (ref 3.5–5.3)
POTASSIUM SERPL-MCNC: 6.2 MMOL/L — CRITICAL HIGH (ref 3.5–5.3)
POTASSIUM SERPL-MCNC: 6.7 MMOL/L — CRITICAL HIGH (ref 3.5–5.3)
POTASSIUM SERPL-SCNC: 3.6 MMOL/L — SIGNIFICANT CHANGE UP (ref 3.5–5.3)
POTASSIUM SERPL-SCNC: 6.2 MMOL/L — CRITICAL HIGH (ref 3.5–5.3)
POTASSIUM SERPL-SCNC: 6.7 MMOL/L — CRITICAL HIGH (ref 3.5–5.3)
PROT FLD-MCNC: 2.4 G/DL — SIGNIFICANT CHANGE UP
RBC # BLD: 2.89 M/UL — LOW (ref 4.2–5.8)
RBC # FLD: 16.4 % — HIGH (ref 10.3–14.5)
SODIUM SERPL-SCNC: 132 MMOL/L — LOW (ref 135–145)
SODIUM SERPL-SCNC: 133 MMOL/L — LOW (ref 135–145)
SPECIMEN SOURCE: SIGNIFICANT CHANGE UP
TROPONIN T SERPL-MCNC: 0.09 NG/ML — HIGH (ref 0–0.06)
WBC # BLD: 4.73 K/UL — SIGNIFICANT CHANGE UP (ref 3.8–10.5)
WBC # FLD AUTO: 4.73 K/UL — SIGNIFICANT CHANGE UP (ref 3.8–10.5)

## 2019-07-12 PROCEDURE — 99232 SBSQ HOSP IP/OBS MODERATE 35: CPT

## 2019-07-12 PROCEDURE — 99222 1ST HOSP IP/OBS MODERATE 55: CPT

## 2019-07-12 PROCEDURE — 99233 SBSQ HOSP IP/OBS HIGH 50: CPT

## 2019-07-12 PROCEDURE — 99497 ADVNCD CARE PLAN 30 MIN: CPT | Mod: 25

## 2019-07-12 PROCEDURE — 93010 ELECTROCARDIOGRAM REPORT: CPT

## 2019-07-12 PROCEDURE — 99291 CRITICAL CARE FIRST HOUR: CPT

## 2019-07-12 RX ORDER — DIPHENHYDRAMINE HCL 50 MG
25 CAPSULE ORAL EVERY 6 HOURS
Refills: 0 | Status: DISCONTINUED | OUTPATIENT
Start: 2019-07-12 | End: 2019-07-26

## 2019-07-12 RX ORDER — DILTIAZEM HCL 120 MG
10 CAPSULE, EXT RELEASE 24 HR ORAL EVERY 4 HOURS
Refills: 0 | Status: DISCONTINUED | OUTPATIENT
Start: 2019-07-12 | End: 2019-07-26

## 2019-07-12 RX ORDER — CALCIUM GLUCONATE 100 MG/ML
1 VIAL (ML) INTRAVENOUS ONCE
Refills: 0 | Status: COMPLETED | OUTPATIENT
Start: 2019-07-12 | End: 2019-07-12

## 2019-07-12 RX ORDER — METOPROLOL TARTRATE 50 MG
25 TABLET ORAL
Refills: 0 | Status: DISCONTINUED | OUTPATIENT
Start: 2019-07-12 | End: 2019-07-26

## 2019-07-12 RX ADMIN — Medication 10 MILLIGRAM(S): at 19:37

## 2019-07-12 RX ADMIN — Medication 50 MILLIGRAM(S): at 05:23

## 2019-07-12 RX ADMIN — Medication 40 MILLIGRAM(S): at 02:22

## 2019-07-12 RX ADMIN — SEVELAMER CARBONATE 800 MILLIGRAM(S): 2400 POWDER, FOR SUSPENSION ORAL at 22:02

## 2019-07-12 RX ADMIN — Medication 3 MILLILITER(S): at 07:54

## 2019-07-12 RX ADMIN — Medication 100 MILLIGRAM(S): at 16:53

## 2019-07-12 RX ADMIN — MORPHINE SULFATE 2 MILLIGRAM(S): 50 CAPSULE, EXTENDED RELEASE ORAL at 02:34

## 2019-07-12 RX ADMIN — HEPARIN SODIUM 5000 UNIT(S): 5000 INJECTION INTRAVENOUS; SUBCUTANEOUS at 22:01

## 2019-07-12 RX ADMIN — Medication 25 MILLIGRAM(S): at 19:56

## 2019-07-12 RX ADMIN — SEVELAMER CARBONATE 800 MILLIGRAM(S): 2400 POWDER, FOR SUSPENSION ORAL at 14:04

## 2019-07-12 RX ADMIN — CLOPIDOGREL BISULFATE 75 MILLIGRAM(S): 75 TABLET, FILM COATED ORAL at 14:04

## 2019-07-12 RX ADMIN — ATORVASTATIN CALCIUM 80 MILLIGRAM(S): 80 TABLET, FILM COATED ORAL at 22:02

## 2019-07-12 RX ADMIN — Medication 3 MILLILITER(S): at 21:42

## 2019-07-12 RX ADMIN — CHLORHEXIDINE GLUCONATE 1 APPLICATION(S): 213 SOLUTION TOPICAL at 05:24

## 2019-07-12 RX ADMIN — AMLODIPINE BESYLATE 5 MILLIGRAM(S): 2.5 TABLET ORAL at 05:23

## 2019-07-12 RX ADMIN — PANTOPRAZOLE SODIUM 40 MILLIGRAM(S): 20 TABLET, DELAYED RELEASE ORAL at 14:03

## 2019-07-12 RX ADMIN — HEPARIN SODIUM 5000 UNIT(S): 5000 INJECTION INTRAVENOUS; SUBCUTANEOUS at 05:23

## 2019-07-12 RX ADMIN — ISOSORBIDE DINITRATE 20 MILLIGRAM(S): 5 TABLET ORAL at 14:05

## 2019-07-12 RX ADMIN — MORPHINE SULFATE 2 MILLIGRAM(S): 50 CAPSULE, EXTENDED RELEASE ORAL at 08:45

## 2019-07-12 RX ADMIN — Medication 25 MILLIGRAM(S): at 16:53

## 2019-07-12 RX ADMIN — MORPHINE SULFATE 2 MILLIGRAM(S): 50 CAPSULE, EXTENDED RELEASE ORAL at 16:00

## 2019-07-12 RX ADMIN — ISOSORBIDE DINITRATE 20 MILLIGRAM(S): 5 TABLET ORAL at 22:02

## 2019-07-12 RX ADMIN — Medication 200 GRAM(S): at 07:40

## 2019-07-12 RX ADMIN — Medication 100 MILLIGRAM(S): at 05:23

## 2019-07-12 RX ADMIN — ERYTHROPOIETIN 10000 UNIT(S): 10000 INJECTION, SOLUTION INTRAVENOUS; SUBCUTANEOUS at 12:28

## 2019-07-12 RX ADMIN — Medication 3 MILLILITER(S): at 02:46

## 2019-07-12 RX ADMIN — Medication 81 MILLIGRAM(S): at 14:08

## 2019-07-12 RX ADMIN — Medication 50 MILLIGRAM(S): at 22:02

## 2019-07-12 RX ADMIN — Medication 50 MICROGRAM(S): at 22:02

## 2019-07-12 RX ADMIN — SEVELAMER CARBONATE 800 MILLIGRAM(S): 2400 POWDER, FOR SUSPENSION ORAL at 08:45

## 2019-07-12 RX ADMIN — ISOSORBIDE DINITRATE 20 MILLIGRAM(S): 5 TABLET ORAL at 05:23

## 2019-07-12 RX ADMIN — HEPARIN SODIUM 5000 UNIT(S): 5000 INJECTION INTRAVENOUS; SUBCUTANEOUS at 14:04

## 2019-07-12 RX ADMIN — MORPHINE SULFATE 2 MILLIGRAM(S): 50 CAPSULE, EXTENDED RELEASE ORAL at 03:00

## 2019-07-12 RX ADMIN — MORPHINE SULFATE 2 MILLIGRAM(S): 50 CAPSULE, EXTENDED RELEASE ORAL at 09:00

## 2019-07-12 RX ADMIN — Medication 50 MILLIGRAM(S): at 14:06

## 2019-07-12 RX ADMIN — MORPHINE SULFATE 2 MILLIGRAM(S): 50 CAPSULE, EXTENDED RELEASE ORAL at 15:15

## 2019-07-12 RX ADMIN — SENNA PLUS 2 TABLET(S): 8.6 TABLET ORAL at 22:02

## 2019-07-12 NOTE — PROGRESS NOTE ADULT - ASSESSMENT
1: Acute on chronic hypoxic respiratory failure   2: Left sided Pleural effusion s/p thoracentesis with possible acute on chronic diastolic heart failure    3: Post procedure Pneumo-> resolved   4: Chronic ILD/ COPD/ Extensive Hx Tobacco smoking   5: Hyperkalemia with ESRD   6: Anxiety disorder     Patient seen and examined   DNR DNI now  Palliative continues following for further GOC  POC d/w daughter at bedside     Neuro:   Pain Mx: PRN Morphine; could be transitioned to PO on d/c  Sedation/Anxiolytic: Started on Xanax which helped significantly with WOB   PT eval as needed     Cardiovascular:   MAP Target: 65  DAPT, Statin, Imdur, Hydralazine restarted home meds  TTE w/o WMA and EKG wo STT changed with borderline troponin with diastolic failure     Resp/Chest:   BiPAP as needed   HFNC weaned off of to NC  Pulm consulted     Gi/Nutrition:   Diet: Renal diet   IV PPI as needed   Bowel Regimen as needed    ID:   Microbiological studies: Low PCT, no Abx for now; follow up Pleural fluid Cx  Abx: none for now     Nephro/Electrolyte/Acid-Base:   Called Dr. Greco and planning for HD first thing this AM, asymptomatic from K  Avoid nephrotoxic agents  Strict I&O with Cr monitoring   Close Electrolyte monitoring and correction as needed     Endocrinology:   HD synthroid     Haem/Oncology:   Mechanical and Chemical DVT ppx    Can be transferred to Tele with  for now

## 2019-07-12 NOTE — PROGRESS NOTE ADULT - ASSESSMENT
79 M with ESRD on HD, CAD, h/o lung cancer, severe ILD, HFpEF, recurrent hospitalizations readmitted less than 24 hour from discharge to rehab for acute respiratory failure with hypoxia requiring NIPPV. He is now off HFNC and s/p thoracentesis yesterday. Breathing is improved and doing well on O2NC.     PLAN    Acute Respiratory Failure with Hypoxia/Hypercarbia  Severe Diffuse ILD/Pleural effusions  - respiratory status improved, on NC, overall poor prognosis  - pulm noted, appreciate input, prednisone, bronchodilators, IV morphine PRN    ESRD on HD  - plan per nephrology    HFpEF  - no evidence clinically of fluid overload    CAD/HTN/HLD  - ASA, plavix, amlodipine, statin, nitrate    Anxiety  - alprazolam 0.25 mg PRN    Constipation  - last BM 2 days ago per pt, will add senna and colace    Palliative Care Encounter  Met with pt this morning for ongoing goc. Pt does not appear to have full insight or grasp the severity of his underlying comorbidities especially ILD/pulmonary fibrosis, ESRD, debility. He kept redirecting conversation and verbalized wanting to go home, getting appropriate O2 tank in home; he is adamantly against rehab. When I tried to clarify our medical concerns he becomes flustered and defers to his family and son in law. Called and spoke with HOSSEIN Escalante. Boris understands overall prognosis is poor and express the frustration he and wife is having in caring for pt as it requires more than they can provide given his sx burden. We reviewed treatment options explored during last family meeting from last hospitalization 1. continue ongoing medical tx including HD versus 2. comfort measures/focus on quality of life with no further aggressive interventions that would prolong his suffering and not change his underlying issues including stopping HD; allow natural death and optimize sx control. Encouraged family to have ongoing discussions with pt to determine what is in his overall best interest.

## 2019-07-12 NOTE — PROGRESS NOTE ADULT - SUBJECTIVE AND OBJECTIVE BOX
HOSPITALIST PROGRESS NOTE    KAUSHIK HOFFMAN  [id number]  79yMale    Patient is a 79y old  Male who presents with a chief complaint of Acute hypoxemic respiratory failure (12 Jul 2019 15:06)      SUBJECTIVE:   Chart reviewed since admission, discussed with intensivist MICHAEL Silverio  Patient seen and examined at bedside for respiratory failure, ESRD, CAD  Continues to have intermittent dyspnea as well as 'sharp' left sided chest pain.  Occasional dizziness, no syncope. Denies any palpitations  Occasional chills, nausea - none currently.      OBJECTIVE:  Vital Signs Last 24 Hrs  T(C): 36.6 (12 Jul 2019 18:47), Max: 36.9 (11 Jul 2019 20:00)  T(F): 97.9 (12 Jul 2019 18:47), Max: 98.4 (11 Jul 2019 20:00)  HR: 76 (12 Jul 2019 19:01) (54 - 150)  BP: 114/52 (12 Jul 2019 19:01) (94/54 - 157/68)  BP(mean): 86 (12 Jul 2019 14:00) (80 - 105)  RR: 18 (12 Jul 2019 18:47) (5 - 30)  SpO2: 97% (12 Jul 2019 18:47) (95% - 100%)    PHYSICAL EXAMINATION  General: Elderly Pashto gentleman lying in bed, NAD  HEENT:  EOMi  NECK:  Supple  CVS: regular rate and rhythm S1 S2. Reproudcible chest wall tenderness  RESP:  Fair air entry except bases. Left chest bandage (at site of thoracocentesis)  GI:  Soft nondistended nontender BS+  : No suprapubic tenderness  MSK:  FROM, no edema  CNS:  No gross focal or global deficit noted  INTEG:  warm dry skin  PSYCH:  Fair mood    MONITOR:  CAPILLARY BLOOD GLUCOSE            I&O's Summary    11 Jul 2019 07:01  -  12 Jul 2019 07:00  --------------------------------------------------------  IN: 1005 mL / OUT: 450 mL / NET: 555 mL    12 Jul 2019 07:01  -  12 Jul 2019 19:37  --------------------------------------------------------  IN: 202 mL / OUT: 1700 mL / NET: -1498 mL                            8.5    4.73  )-----------( 167      ( 12 Jul 2019 05:53 )             28.2       07-12    x   |  x   |  x   ----------------------------<  x   3.6   |  x   |  x     Ca    8.5<L>      12 Jul 2019 11:32  Phos  8.1     07-12  Mg     2.2     07-12    TPro  7.1  /  Alb  3.8  /  TBili  0.3<L>  /  DBili  x   /  AST  24  /  ALT  12  /  AlkPhos  73  07-10    CARDIAC MARKERS ( 11 Jul 2019 11:10 )  x     / 0.12 ng/mL / 42 U/L / x     / x      CARDIAC MARKERS ( 10 Jul 2019 22:54 )  x     / 0.10 ng/mL / 46 U/L / x     / x              Culture:    TTE:    RADIOLOGY        MEDICATIONS  (STANDING):  ALBUTerol/ipratropium for Nebulization 3 milliLiter(s) Nebulizer every 6 hours  amLODIPine   Tablet 5 milliGRAM(s) Oral daily  aspirin  chewable 81 milliGRAM(s) Oral daily  atorvastatin 80 milliGRAM(s) Oral at bedtime  chlorhexidine 2% Cloths 1 Application(s) Topical <User Schedule>  clopidogrel Tablet 75 milliGRAM(s) Oral daily  docusate sodium 100 milliGRAM(s) Oral two times a day  epoetin nguyễn Injectable 53408 Unit(s) SubCutaneous <User Schedule>  heparin  Injectable 5000 Unit(s) SubCutaneous every 8 hours  hydrALAZINE 50 milliGRAM(s) Oral every 8 hours  isosorbide   dinitrate Tablet (ISORDIL) 20 milliGRAM(s) Oral three times a day  levothyroxine Injectable 50 MICROGram(s) IV Push at bedtime  metoprolol tartrate 25 milliGRAM(s) Oral two times a day  pantoprazole  Injectable 40 milliGRAM(s) IV Push daily  predniSONE   Tablet 50 milliGRAM(s) Oral daily  senna 2 Tablet(s) Oral at bedtime  sevelamer carbonate 800 milliGRAM(s) Oral three times a day with meals      MEDICATIONS  (PRN):  ALPRAZolam 0.25 milliGRAM(s) Oral every 6 hours PRN anxiety  diltiazem Injectable 10 milliGRAM(s) IV Push every 4 hours PRN HR>120  diphenhydrAMINE 25 milliGRAM(s) Oral every 6 hours PRN Rash and/or Itching  morphine  - Injectable 2 milliGRAM(s) IV Push every 2 hours PRN Dyspnea/ Chest pain

## 2019-07-12 NOTE — PROGRESS NOTE ADULT - SUBJECTIVE AND OBJECTIVE BOX
Patient is a 79y old  Male who presents with a chief complaint of Acute hypoxemic respiratory failure (11 Jul 2019 13:22)      BRIEF HOSPITAL COURSE:   78 y/o M with a h/o ESRD on HD, HTN, HLD, dCHF (EF: 50-55%), CAD (s/p CABG), lung CA (s/p RT in 2006), ILD (on 5L home O2), anemia, bipolar disorder, recent admission at [facility] for volume overload/ILD (discharged to rehab on 7/10/19), presents to the ED in respiratory distress requiring NIPPV. CXR appears similar to most recent study on 7/5- diffuse interstitial fibrosis and LLL consolidation/effusion. Does not appear volume overloaded and had hemodialysis yesterday. He was weaned from NIPPV in the ED, but did not tolerate this and needed to be placed back on BiPAP urgently.     Events last 24 hours:   S/p thoracentesis yesterday of 430 cc of transudative pleural fluid from left side  Improved resp failure and transition to NC from HFNC  Needs HD today as K elevated but besides minimal CP at site of thoracentesis, denied any other complaints     PAST MEDICAL & SURGICAL HISTORY:  ILD (interstitial lung disease)  CAD (coronary artery disease)  Chronic anemia  ESRD (end stage renal disease): on HD (Tue-Thu-Sat) through RUE fistula  CAD (coronary artery disease): s/p remote CABG and multiple stents  Lung cancer: had yoni radiation in 2006.  Bipolar disorder  High cholesterol  Hypertension  S/P CABG (coronary artery bypass graft)    All systems reviewed with symptoms mentions as above.     Physical Examination:    ICU Vital Signs Last 24 Hrs  T(C): 36.8 (12 Jul 2019 07:33), Max: 37.2 (11 Jul 2019 16:00)  T(F): 98.3 (12 Jul 2019 07:33), Max: 98.9 (11 Jul 2019 16:00)  HR: 60 (12 Jul 2019 08:00) (54 - 68)  BP: 138/83 (12 Jul 2019 08:00) (112/56 - 171/69)  BP(mean): 105 (12 Jul 2019 08:00) (81 - 105)  ABP: --  ABP(mean): --  RR: 28 (12 Jul 2019 08:00) (14 - 30)  SpO2: 98% (12 Jul 2019 08:00) (90% - 100%)      General: No acute distress.  On NC, comfortably sitting in bed   Neuro: AAO*3, No motor, sensory, or cranial nerve deficit  HEENT: Pupils equal, reactive to light, Oral mucosa moist   PULM: Clear to auscultation bilaterally (but distant chronic), no significant adventitious breath sounds   CVS: Regular rhythm and controlled rate, no murmurs, rubs, or gallops  ABD: Soft, nondistended, nontender, normoactive bowel sounds, no CVA tenderness  EXT: No b/l LE edema, nontender with pedal pulse palpable   SKIN: Warm and well perfused, no acute rashes       Medications:    amLODIPine   Tablet 5 milliGRAM(s) Oral daily  hydrALAZINE 50 milliGRAM(s) Oral every 8 hours  isosorbide   dinitrate Tablet (ISORDIL) 20 milliGRAM(s) Oral three times a day  ALBUTerol/ipratropium for Nebulization 3 milliLiter(s) Nebulizer every 6 hours  ALPRAZolam 0.25 milliGRAM(s) Oral every 6 hours PRN  morphine  - Injectable 2 milliGRAM(s) IV Push every 2 hours PRN  aspirin  chewable 81 milliGRAM(s) Oral daily  clopidogrel Tablet 75 milliGRAM(s) Oral daily  heparin  Injectable 5000 Unit(s) SubCutaneous every 8 hours  docusate sodium 100 milliGRAM(s) Oral two times a day  pantoprazole  Injectable 40 milliGRAM(s) IV Push daily  senna 2 Tablet(s) Oral at bedtime  atorvastatin 80 milliGRAM(s) Oral at bedtime  levothyroxine Injectable 50 MICROGram(s) IV Push at bedtime  methylPREDNISolone sodium succinate Injectable 40 milliGRAM(s) IV Push every 8 hours  epoetin nguyễn Injectable 27358 Unit(s) SubCutaneous <User Schedule>  chlorhexidine 2% Cloths 1 Application(s) Topical <User Schedule>  sevelamer carbonate 800 milliGRAM(s) Oral three times a day with meals      I&O's Detail    11 Jul 2019 07:01  -  12 Jul 2019 07:00  --------------------------------------------------------  IN:    Oral Fluid: 1005 mL  Total IN: 1005 mL    OUT:    Other: 450 mL  Total OUT: 450 mL    Total NET: 555 mL      12 Jul 2019 07:01  -  12 Jul 2019 08:45  --------------------------------------------------------  IN:    Solution: 100 mL  Total IN: 100 mL    OUT:  Total OUT: 0 mL    Total NET: 100 mL    RADIOLOGY/ Microbiology/ Labs: reviewed     CRITICAL CARE TIME SPENT: 35 min

## 2019-07-12 NOTE — PROGRESS NOTE ADULT - SUBJECTIVE AND OBJECTIVE BOX
Nephrology Chart Note  Pt followed by our group outpatient; back in MICU ; sp thoracentesis  Seen by Dr Greco today;   We will take over starting in AM  d/w Dr Silverio and Dr Greco

## 2019-07-12 NOTE — PROGRESS NOTE ADULT - ASSESSMENT
Pt is 78 y/o Male with a history of ESRD on HD, HTN, HFpEF, CAD s/p CABG, and lung cancer s/p radiation on Home Oxygen, presenting with increased dyspnea, likely multifactorial with underlying lung cancer, ILD/ pulmonary fibrosis and volume overload with CHF/ ESRD and admitted with with acute hypoxic respiratory failure requiring NIV in MICu - downgraded earlier today    Acute on Chronic hypoxic respiratory failure - Multifactorial in nature with lung cancer, pulmonary fibrosis on prednisone, and heart failure. Inhaled bronchodilator therapy and supplemental oxygen. Fluid removal. Palliative care following.    AF, paroxysmal. Due to BB being discontinued last hospitalization secondary to bradycardia. CAD   - On aspirin, clopidogrel, atorvastatin and Nitrate  - Resume BB - possible source of chest pain.   - Continue to monitor. PRN Cardizem  - Trend CE. Prior TTE, Cardiac cath results reviewed - recommendations for medical management from Cardiology    Hypertension - Close blood pressure monitoring. On amlodipine     Chronic diastolic heart failure - Not in acute exacerbation. On isosorbide. Hemodialysis as scheduled. Pleural effusion drained    ESRD - Hemodialysis as scheduled.    Hypothyroidism - On levothyroxine.        Poor prognosis, advanced illnesses with multiple readmissions. DNR DNI obtained in ICU earlier today    Disposition - pending clinical improvement

## 2019-07-12 NOTE — PROVIDER CONTACT NOTE (CRITICAL VALUE NOTIFICATION) - ACTION/TREATMENT ORDERED:
pt going to get HD today, ordered calcium gluconate in the meantime for possible arrthymias
next troponin due at 0300

## 2019-07-12 NOTE — CONSULT NOTE ADULT - SUBJECTIVE AND OBJECTIVE BOX
PULMONARY CONSULT NOTE      KAUSHIK HOFFMANYOVANNY-[id number]    Patient is a 79y old  Male who presents with a chief complaint of Acute hypoxemic respiratory failure (12 Jul 2019 12:28)      HISTORY OF PRESENT ILLNESS:  h/o Lung cancer s/p chemo and RT (oncologist in [place] and no subsequent follow up for about the past 2 years per chart), pulmonary fibrosis (does not know if he had any biopsy), O2 dependent (5L), HFpEF, HTN, HLD, bipolar, ESRD on HD, HTN, HLD, dCHF (EF: 50-55%), CAD (s/p CABG), anemia, recent admission at [facility] for volume overload/ILD (discharged to rehab on 7/10/19), presents to the ED in respiratory distress requiring NIPPV. CXR appears similar to most recent study on 7/5- diffuse interstitial fibrosis and LLL consolidation/effusion. Does not appear volume overloaded and had hemodialysis yesterday. He was weaned from NIPPV in the ED, but did not tolerate this and needed to be placed back on BiPAP urgently. S/p thoracentesis 7/11 with removal of 430 cc of transudative pleural fluid from left side. Pneumothorax seen after thora; improving on cxr. Improved resp failure and transition to NC from HFNC. On HD now; K elevated but besides minimal CP at site of thoracentesis, denied any other complaints. Says at baseline with RODRIGUEZ.         MEDICATIONS  (STANDING):  ALBUTerol/ipratropium for Nebulization 3 milliLiter(s) Nebulizer every 6 hours  amLODIPine   Tablet 5 milliGRAM(s) Oral daily  aspirin  chewable 81 milliGRAM(s) Oral daily  atorvastatin 80 milliGRAM(s) Oral at bedtime  chlorhexidine 2% Cloths 1 Application(s) Topical <User Schedule>  clopidogrel Tablet 75 milliGRAM(s) Oral daily  docusate sodium 100 milliGRAM(s) Oral two times a day  epoetin nguyễn Injectable 50665 Unit(s) SubCutaneous <User Schedule>  heparin  Injectable 5000 Unit(s) SubCutaneous every 8 hours  hydrALAZINE 50 milliGRAM(s) Oral every 8 hours  isosorbide   dinitrate Tablet (ISORDIL) 20 milliGRAM(s) Oral three times a day  levothyroxine Injectable 50 MICROGram(s) IV Push at bedtime  pantoprazole  Injectable 40 milliGRAM(s) IV Push daily  predniSONE   Tablet 50 milliGRAM(s) Oral daily  senna 2 Tablet(s) Oral at bedtime  sevelamer carbonate 800 milliGRAM(s) Oral three times a day with meals      MEDICATIONS  (PRN):  ALPRAZolam 0.25 milliGRAM(s) Oral every 6 hours PRN anxiety  morphine  - Injectable 2 milliGRAM(s) IV Push every 2 hours PRN Dyspnea/ Chest pain      Allergies    No Known Allergies    Intolerances        PAST MEDICAL & SURGICAL HISTORY:  ILD (interstitial lung disease)  CAD (coronary artery disease)  Chronic anemia  ESRD (end stage renal disease): on HD (Tue-Thu-Sat) through RUE fistula  CAD (coronary artery disease): s/p remote CABG and multiple stents  Lung cancer: had yoni radiation in 2006.  Bipolar disorder  High cholesterol  Hypertension  S/P CABG (coronary artery bypass graft)      FAMILY HISTORY:  Family history of leukemia (Child)  Family history of diabetes mellitus (Child)      SOCIAL HISTORY  Smoking History: former    REVIEW OF SYSTEMS:    CONSTITUTIONAL:  No fevers, chills, sweats    HEENT:  Eyes:  No diplopia or blurred vision. ENT:  No earache, sore throat or runny nose.    CARDIOVASCULAR:  No pressure, squeezing, tightness, or heaviness about the chest; no palpitations.    RESPIRATORY: per HPI      GASTROINTESTINAL:  No abdominal pain, nausea, vomiting or diarrhea.    GENITOURINARY:  per HPI    NEUROLOGIC:  No paresthesias, fasciculations, seizures or weakness.    PSYCHIATRIC:  No disorder of thought or mood.    Vital Signs Last 24 Hrs  T(C): 36.4 (12 Jul 2019 10:45), Max: 37.2 (11 Jul 2019 16:00)  T(F): 97.6 (12 Jul 2019 10:45), Max: 98.9 (11 Jul 2019 16:00)  HR: 78 (12 Jul 2019 13:00) (54 - 78)  BP: 121/56 (12 Jul 2019 13:00) (112/56 - 167/71)  BP(mean): 80 (12 Jul 2019 13:00) (80 - 105)  RR: 15 (12 Jul 2019 13:00) (5 - 30)  SpO2: 97% (12 Jul 2019 13:00) (90% - 100%)    PHYSICAL EXAMINATION:    GENERAL: The patient is in no apparent distress.     HEENT: Head is normocephalic and atraumatic.   Mucous membranes are moist.     NECK: Supple.     LUNGS: Rales b/l, respirations unlabored    HEART: Regular rate and rhythm without murmur.    ABDOMEN: Soft, nontender, and nondistended.       EXTREMITIES: Without any cyanosis, clubbing, rash, lesions or edema.    NEUROLOGIC: Grossly intact.      LABS:                        8.5    4.73  )-----------( 167      ( 12 Jul 2019 05:53 )             28.2     07-12    132<L>  |  92<L>  |  72.0<H>  ----------------------------<  129<H>  6.2<HH>   |  21.0<L>  |  9.70<H>    Ca    8.5<L>      12 Jul 2019 11:32  Phos  8.1     07-12  Mg     2.2     07-12    TPro  7.1  /  Alb  3.8  /  TBili  0.3<L>  /  DBili  x   /  AST  24  /  ALT  12  /  AlkPhos  73  07-10        ABG - ( 11 Jul 2019 00:36 )  pH, Arterial: 7.36  pH, Blood: x     /  pCO2: 49    /  pO2: 64    / HCO3: 26    / Base Excess: 2.0   /  SaO2: 92                CARDIAC MARKERS ( 11 Jul 2019 11:10 )  x     / 0.12 ng/mL / 42 U/L / x     / x      CARDIAC MARKERS ( 10 Jul 2019 22:54 )  x     / 0.10 ng/mL / 46 U/L / x     / x            Serum Pro-Brain Natriuretic Peptide: 18613 pg/mL (07-10-19 @ 22:54)      Procalcitonin, Serum: 0.35 ng/mL (07-11-19 @ 06:56)           RADIOLOGY & ADDITIONAL STUDIES:  < from: Xray Chest 1 View- PORTABLE-Urgent (07.11.19 @ 20:22) >   EXAM:  XR CHEST PORTABLE URGENT 1V                          PROCEDURE DATE:  07/11/2019          INTERPRETATION:  AP chest on July 11, 2019 at 7:55 PM. Patient has small   left pneumothorax after thoracentesis. This is a follow-up.    Heart enlargement, sternotomy, and bilateral apical thickening right   greater than left again noted.    Diffuse infiltrates in the lungs again seen.    At 4:10 PM there was a small pneumothorax at the left base laterally   which is much improved and barely visible at this time.    IMPRESSION: Markedly improved left pneumothorax. Case discussed with Guero pereyra nurse practitioner.                GALLITO KOVACS M.D., ATTENDING RADIOLOGIST    < end of copied text >  < from: Xray Chest 1 View-PORTABLE IMMEDIATE (07.11.19 @ 16:28) >   EXAM:  XR CHEST PORTABLE IMMED 1V                          PROCEDURE DATE:  07/11/2019          INTERPRETATION:  AP chest on July 11, 2019 at 4:10 PM. Patient had left   thoracentesis.    Heart enlargement, sternotomy, apical thickening right greater than left,   and bilateral diffuse interstitial infiltrates again noted similar to   July 10.    On July 10 there was a small left pleural effusion. The left effusion has   diminished but there is a small local pneumothorax at the left base   laterally.    On subsequent chest x-ray the pneumothorax diminished.    IMPRESSION: As above.                GALLITO KOVACS M.D., ATTENDING RADIOLOGIST    < end of copied text >  < from: Xray Chest 1 View-PORTABLE IMMEDIATE (07.10.19 @ 22:48) >   EXAM:  XR CHEST PORTABLE IMMED 1V                          PROCEDURE DATE:  07/10/2019          INTERPRETATION:  TECHNIQUE: Single portable view of the chest.    COMPARISON: 7/5/2019    CLINICAL HISTORY: SOB and chest tightness    FINDINGS:    Single frontal view of the chest demonstrates bilateral lower lobe   infiltrates. Small bilateral effusions. Status post CABG procedure. The   cardiomediastinal silhouette is enlarged. No acute osseous abnormalities.   Overlying EKG leads and wires are noted. Mild COPD changes    IMPRESSION: Bilateral lower lobe infiltrates. Small bilateral pleural   effusions. These findings are unchanged. Consider CT as clinically   warranted.                LY ALFREDO M.D., ATTENDING RADIOLOGIST    < end of copied text >  < from: CT Chest No Cont (06.18.19 @ 20:50) >     EXAM:  CT CHEST                          PROCEDURE DATE:  06/18/2019          INTERPRETATION:  CLINICAL INFORMATION: Shortness of breath     COMPARISON: 2/4/2019    PROCEDURE:   CT of the Chest was performed without intravenous contrast.  [place] coronal reformats were performed.      FINDINGS:    No evidence of mediastinal or hilar lymphadenopathy. No evidence of   axillary adenopathy.    There is a left pleural effusion, unchanged in size since the prior   study. There is a small right pleural effusion also unchanged.    Cardiomegaly. No evidence of a pericardial effusion.    There is a dense pleural calcification in the right apex, unchanged.   Fibrosis in the right apex medially, unchanged. Multiple calcified   granulomas in the right lung. Pleural calcifications in the right lower   lobe, unchanged. Compressive atelectasis at the left lung base,   unchanged. Extensive emphysematous changes. Bronchiectasis in the right   lower lobe. Interstitial prominence in the right lower lobe and right   middle lobe, less pronounced since the prior study.    No significant osseous abnormality.    IMPRESSION:     Emphysematous changes.    Left pleural effusion with left basilar compressive atelectasis unchanged   since 2/4/2019.    Right apical fibrosis with interstitial changes at the right lung base   and bronchiectasis in the right lower lobe, unchanged. Cannot exclude   superimposed inflammatory disease in the right lower lobe.    Cardiomegaly.                  BERT BARKSDALE M.D., ATTENDING RADIOLOGIST    < end of copied text >

## 2019-07-12 NOTE — PROGRESS NOTE ADULT - SUBJECTIVE AND OBJECTIVE BOX
FOLLOW UP VISIT    INTERVAL HPI/OVERNIGHT EVENTS:  Seen and examined at bedside this AM. Late note entry.   S/P Lt thoracentesis with 420mL removed yesterday. Post-op with PTX that is improving on CXR. Pt report breathing improved. Otherwise offers no complaints. He was getting HD in room at time of visit. Downgraded to medical floor today.    Present Symptoms:   Dyspnea: off HFNC, on O2NC 5L   Nausea/Vomiting:  No  Anxiety:  Yes   Fatigue:  No  Loss of appetite: Yes   Constipation: yes  Pain: No    Review of Systems: Reviewed, All others negative    MEDICATIONS  (STANDING):  ALBUTerol/ipratropium for Nebulization 3 milliLiter(s) Nebulizer every 6 hours  amLODIPine   Tablet 5 milliGRAM(s) Oral daily  aspirin  chewable 81 milliGRAM(s) Oral daily  atorvastatin 80 milliGRAM(s) Oral at bedtime  chlorhexidine 2% Cloths 1 Application(s) Topical <User Schedule>  clopidogrel Tablet 75 milliGRAM(s) Oral daily  docusate sodium 100 milliGRAM(s) Oral two times a day  epoetin nguyễn Injectable 20496 Unit(s) SubCutaneous <User Schedule>  heparin  Injectable 5000 Unit(s) SubCutaneous every 8 hours  hydrALAZINE 50 milliGRAM(s) Oral every 8 hours  isosorbide   dinitrate Tablet (ISORDIL) 20 milliGRAM(s) Oral three times a day  levothyroxine Injectable 50 MICROGram(s) IV Push at bedtime  pantoprazole  Injectable 40 milliGRAM(s) IV Push daily  predniSONE   Tablet 50 milliGRAM(s) Oral daily  senna 2 Tablet(s) Oral at bedtime  sevelamer carbonate 800 milliGRAM(s) Oral three times a day with meals    MEDICATIONS  (PRN):  ALPRAZolam 0.25 milliGRAM(s) Oral every 6 hours PRN anxiety  morphine  - Injectable 2 milliGRAM(s) IV Push every 2 hours PRN Dyspnea/ Chest pain      PHYSICAL EXAM:    Vital Signs Last 24 Hrs  T(C): 36.4 (12 Jul 2019 10:45), Max: 37.2 (11 Jul 2019 16:00)  T(F): 97.6 (12 Jul 2019 10:45), Max: 98.9 (11 Jul 2019 16:00)  HR: 85 (12 Jul 2019 14:15) (54 - 85)  BP: 126/57 (12 Jul 2019 14:15) (112/56 - 164/71)  BP(mean): 86 (12 Jul 2019 14:00) (80 - 105)  RR: 18 (12 Jul 2019 14:15) (5 - 30)  SpO2: 100% (12 Jul 2019 14:15) (95% - 100%)    General: cachetic.  No acute distress.   HEENT:  mucous membrane dry.   Lungs: diminished breath sounds at bases. O2NC  CV: +s1/s2. Regular rate and rhythm.   GI:+ bowel sound. abdomen soft, non-tender, non-distended.  MSK: Moves all 4 extremities.  No cyanosis or edema. weakness.   Neuro: Awake and alert, oriented x3. Interactive.   Skin: warm and dry.      LABS:                          8.5    4.73  )-----------( 167      ( 12 Jul 2019 05:53 )             28.2     07-12    132<L>  |  92<L>  |  72.0<H>  ----------------------------<  129<H>  6.2<HH>   |  21.0<L>  |  9.70<H>    Ca    8.5<L>      12 Jul 2019 11:32  Phos  8.1     07-12  Mg     2.2     07-12    TPro  7.1  /  Alb  3.8  /  TBili  0.3<L>  /  DBili  x   /  AST  24  /  ALT  12  /  AlkPhos  73  07-10    RADIOLOGY & ADDITIONAL STUDIES:      CXR 7/11/19  INTERPRETATION:  AP chest on July 11, 2019 at 7:55 PM. Patient has small   left pneumothorax after thoracentesis. This is a follow-up.    Heart enlargement, sternotomy, and bilateral apical thickening right   greater than left again noted.    Diffuse infiltrates in the lungs again seen.    At 4:10 PM there was a small pneumothorax at the left base laterally   which is much improved and barely visible at this time.    IMPRESSION: Markedly improved left pneumothorax. Case discussed with Guero pereyra nurse practitioner.      ADVANCE DIRECTIVES: DNR/DNI, LON

## 2019-07-12 NOTE — CONSULT NOTE ADULT - SUBJECTIVE AND OBJECTIVE BOX
Patient is a 79y old  Male who presents with a chief complaint of respiratory failure       HPI:  80 y/o M with a h/o ESRD on HD, HTN, HLD, dCHF (EF: 50-55%), CAD (s/p CABG), lung CA (s/p RT in 2006), ILD (on 5L home O2), anemia, bipolar disorder, recent admission to [facility] for volume overload/ILD==> pt discharged to rehab on 7/10/19 and then was sent back to the ED for respiratory distress and dyspnea.  Pt taken to the MICU and underwent L thoracentesis with considerable improvement post procedure. Now requiring minimal supplemental O2.      PAST MEDICAL & SURGICAL HISTORY:  ILD (interstitial lung disease)  CAD (coronary artery disease)  Chronic anemia  ESRD (end stage renal disease): on HD (Tue-Thu-Sat) through RUE fistula  CAD (coronary artery disease): s/p remote CABG and multiple stents  Lung cancer: had yoni radiation in 2006.  Bipolar disorder  High cholesterol  Hypertension  S/P CABG (coronary artery bypass graft)      FAMILY HISTORY:  Family history of leukemia (Child)  Family history of diabetes mellitus (Child)      Social History:  + past tobacco use    MEDICATIONS  (STANDING):  ALBUTerol/ipratropium for Nebulization 3 milliLiter(s) Nebulizer every 6 hours  amLODIPine   Tablet 5 milliGRAM(s) Oral daily  aspirin  chewable 81 milliGRAM(s) Oral daily  atorvastatin 80 milliGRAM(s) Oral at bedtime  chlorhexidine 2% Cloths 1 Application(s) Topical <User Schedule>  clopidogrel Tablet 75 milliGRAM(s) Oral daily  docusate sodium 100 milliGRAM(s) Oral two times a day  epoetin nguyễn Injectable 86954 Unit(s) SubCutaneous <User Schedule>  heparin  Injectable 5000 Unit(s) SubCutaneous every 8 hours  hydrALAZINE 50 milliGRAM(s) Oral every 8 hours  isosorbide   dinitrate Tablet (ISORDIL) 20 milliGRAM(s) Oral three times a day  levothyroxine Injectable 50 MICROGram(s) IV Push at bedtime  pantoprazole  Injectable 40 milliGRAM(s) IV Push daily  predniSONE   Tablet 50 milliGRAM(s) Oral daily  senna 2 Tablet(s) Oral at bedtime  sevelamer carbonate 800 milliGRAM(s) Oral three times a day with meals    MEDICATIONS  (PRN):  ALPRAZolam 0.25 milliGRAM(s) Oral every 6 hours PRN anxiety  morphine  - Injectable 2 milliGRAM(s) IV Push every 2 hours PRN Dyspnea/ Chest pain      Allergies    No Known Allergies    Intolerances        REVIEW OF SYSTEMS:    CONSTITUTIONAL: No fever, weight loss, + fatigue  EYES: No eye pain, visual disturbances, or discharge  ENMT:  No difficulty hearing, tinnitus, vertigo; No sinus or throat pain  NECK: No pain or stiffness  RESPIRATORY: No cough, wheezing, chills or hemoptysis; Mild shortness of breath  CARDIOVASCULAR: No chest pain, palpitations, dizziness, or leg swelling  GASTROINTESTINAL: No abdominal or epigastric pain. No nausea, vomiting, or hematemesis; No diarrhea or constipation. No melena or hematochezia.  GENITOURINARY: No dysuria, frequency, hematuria, or incontinence  NEUROLOGICAL: No headaches, memory loss, loss of strength, numbness, or tremors  SKIN: No itching, burning, rashes, or lesions   MUSCULOSKELETAL: No joint pain or swelling; No muscle, back, or extremity pain        Vital Signs Last 24 Hrs  T(C): 36.8 (12 Jul 2019 07:33), Max: 37.2 (11 Jul 2019 16:00)  T(F): 98.3 (12 Jul 2019 07:33), Max: 98.9 (11 Jul 2019 16:00)  HR: 66 (12 Jul 2019 09:00) (54 - 68)  BP: 153/65 (12 Jul 2019 09:00) (112/56 - 171/69)  BP(mean): 93 (12 Jul 2019 09:00) (81 - 105)  RR: 20 (12 Jul 2019 09:00) (14 - 30)  SpO2: 95% (12 Jul 2019 09:00) (90% - 100%)    PHYSICAL EXAM:    GENERAL: Appears chronically ill  HEAD:  Atraumatic, Normocephalic  EYES: EOMI, PERRLA, conjunctiva and sclera clear  ENMT: No tonsillar erythema, exudates, or enlargement; Moist mucous membranes, Good dentition, No lesions  NECK: Supple, No JVD, Normal thyroid  NERVOUS SYSTEM:  Alert & Oriented X3, intact and symmetric  CHEST/LUNG: Clear to percussion but diminished BS  HEART: Regular rate and rhythm; No rub  ABDOMEN: Soft, Nontender, Nondistended; +BS  EXTREMITIES:  2+ Peripheral Pulses, No LE edema  SKIN: No rashes or lesions      LABS:                        8.5    4.73  )-----------( 167      ( 12 Jul 2019 05:53 )             28.2     07-12    133<L>  |  92<L>  |  66.0<H>  ----------------------------<  111  6.7<HH>   |  22.0  |  9.27<H>    Ca    8.0<L>      12 Jul 2019 05:53  Phos  8.1     07-12  Mg     2.2     07-12    TPro  7.1  /  Alb  3.8  /  TBili  0.3<L>  /  DBili  x   /  AST  24  /  ALT  12  /  AlkPhos  73  07-10        Magnesium, Serum: 2.2 mg/dL (07-12 @ 05:53)  Phosphorus Level, Serum: 8.1 mg/dL (07-12 @ 05:53)      RADIOLOGY & ADDITIONAL TESTS:

## 2019-07-13 LAB
ANION GAP SERPL CALC-SCNC: 15 MMOL/L — SIGNIFICANT CHANGE UP (ref 5–17)
BUN SERPL-MCNC: 40 MG/DL — HIGH (ref 8–20)
CALCIUM SERPL-MCNC: 8.4 MG/DL — LOW (ref 8.6–10.2)
CHLORIDE SERPL-SCNC: 94 MMOL/L — LOW (ref 98–107)
CO2 SERPL-SCNC: 27 MMOL/L — SIGNIFICANT CHANGE UP (ref 22–29)
CREAT SERPL-MCNC: 5.75 MG/DL — HIGH (ref 0.5–1.3)
GLUCOSE SERPL-MCNC: 99 MG/DL — SIGNIFICANT CHANGE UP (ref 70–115)
GRAM STN FLD: SIGNIFICANT CHANGE UP
HCT VFR BLD CALC: 27.7 % — LOW (ref 39–50)
HGB BLD-MCNC: 8.3 G/DL — LOW (ref 13–17)
MAGNESIUM SERPL-MCNC: 2 MG/DL — SIGNIFICANT CHANGE UP (ref 1.6–2.6)
MCHC RBC-ENTMCNC: 29.6 PG — SIGNIFICANT CHANGE UP (ref 27–34)
MCHC RBC-ENTMCNC: 30 GM/DL — LOW (ref 32–36)
MCV RBC AUTO: 98.9 FL — SIGNIFICANT CHANGE UP (ref 80–100)
PLATELET # BLD AUTO: 150 K/UL — SIGNIFICANT CHANGE UP (ref 150–400)
POTASSIUM SERPL-MCNC: 5.3 MMOL/L — SIGNIFICANT CHANGE UP (ref 3.5–5.3)
POTASSIUM SERPL-SCNC: 5.3 MMOL/L — SIGNIFICANT CHANGE UP (ref 3.5–5.3)
RBC # BLD: 2.8 M/UL — LOW (ref 4.2–5.8)
RBC # FLD: 16.4 % — HIGH (ref 10.3–14.5)
SODIUM SERPL-SCNC: 136 MMOL/L — SIGNIFICANT CHANGE UP (ref 135–145)
SPECIMEN SOURCE: SIGNIFICANT CHANGE UP
TROPONIN T SERPL-MCNC: 0.14 NG/ML — HIGH (ref 0–0.06)
TROPONIN T SERPL-MCNC: 0.2 NG/ML — HIGH (ref 0–0.06)
WBC # BLD: 5.32 K/UL — SIGNIFICANT CHANGE UP (ref 3.8–10.5)
WBC # FLD AUTO: 5.32 K/UL — SIGNIFICANT CHANGE UP (ref 3.8–10.5)

## 2019-07-13 PROCEDURE — 71046 X-RAY EXAM CHEST 2 VIEWS: CPT | Mod: 26

## 2019-07-13 PROCEDURE — 99233 SBSQ HOSP IP/OBS HIGH 50: CPT

## 2019-07-13 RX ORDER — POLYETHYLENE GLYCOL 3350 17 G/17G
17 POWDER, FOR SOLUTION ORAL DAILY
Refills: 0 | Status: DISCONTINUED | OUTPATIENT
Start: 2019-07-13 | End: 2019-07-26

## 2019-07-13 RX ADMIN — HEPARIN SODIUM 5000 UNIT(S): 5000 INJECTION INTRAVENOUS; SUBCUTANEOUS at 13:25

## 2019-07-13 RX ADMIN — Medication 50 MICROGRAM(S): at 22:19

## 2019-07-13 RX ADMIN — SENNA PLUS 2 TABLET(S): 8.6 TABLET ORAL at 22:16

## 2019-07-13 RX ADMIN — HEPARIN SODIUM 5000 UNIT(S): 5000 INJECTION INTRAVENOUS; SUBCUTANEOUS at 05:49

## 2019-07-13 RX ADMIN — Medication 3 MILLILITER(S): at 15:28

## 2019-07-13 RX ADMIN — Medication 100 MILLIGRAM(S): at 05:50

## 2019-07-13 RX ADMIN — ATORVASTATIN CALCIUM 80 MILLIGRAM(S): 80 TABLET, FILM COATED ORAL at 22:41

## 2019-07-13 RX ADMIN — Medication 50 MILLIGRAM(S): at 13:24

## 2019-07-13 RX ADMIN — ISOSORBIDE DINITRATE 20 MILLIGRAM(S): 5 TABLET ORAL at 13:25

## 2019-07-13 RX ADMIN — ISOSORBIDE DINITRATE 20 MILLIGRAM(S): 5 TABLET ORAL at 22:16

## 2019-07-13 RX ADMIN — HEPARIN SODIUM 5000 UNIT(S): 5000 INJECTION INTRAVENOUS; SUBCUTANEOUS at 22:16

## 2019-07-13 RX ADMIN — Medication 50 MILLIGRAM(S): at 05:50

## 2019-07-13 RX ADMIN — Medication 3 MILLILITER(S): at 04:01

## 2019-07-13 RX ADMIN — Medication 3 MILLILITER(S): at 21:08

## 2019-07-13 RX ADMIN — Medication 81 MILLIGRAM(S): at 13:24

## 2019-07-13 RX ADMIN — POLYETHYLENE GLYCOL 3350 17 GRAM(S): 17 POWDER, FOR SOLUTION ORAL at 09:25

## 2019-07-13 RX ADMIN — SEVELAMER CARBONATE 800 MILLIGRAM(S): 2400 POWDER, FOR SUSPENSION ORAL at 18:30

## 2019-07-13 RX ADMIN — Medication 50 MILLIGRAM(S): at 22:16

## 2019-07-13 RX ADMIN — CLOPIDOGREL BISULFATE 75 MILLIGRAM(S): 75 TABLET, FILM COATED ORAL at 13:25

## 2019-07-13 RX ADMIN — PANTOPRAZOLE SODIUM 40 MILLIGRAM(S): 20 TABLET, DELAYED RELEASE ORAL at 13:24

## 2019-07-13 RX ADMIN — Medication 100 MILLIGRAM(S): at 18:30

## 2019-07-13 RX ADMIN — CHLORHEXIDINE GLUCONATE 1 APPLICATION(S): 213 SOLUTION TOPICAL at 05:43

## 2019-07-13 RX ADMIN — SEVELAMER CARBONATE 800 MILLIGRAM(S): 2400 POWDER, FOR SUSPENSION ORAL at 13:24

## 2019-07-13 RX ADMIN — SEVELAMER CARBONATE 800 MILLIGRAM(S): 2400 POWDER, FOR SUSPENSION ORAL at 08:04

## 2019-07-13 NOTE — PROGRESS NOTE ADULT - SUBJECTIVE AND OBJECTIVE BOX
HOSPITALIST PROGRESS NOTE    KAUSHIK HOFFMAN  [id number]  79yMale    Patient is a 79y old  Male who presents with a chief complaint of Acute hypoxemic respiratory failure (13 Jul 2019 11:07)      SUBJECTIVE:   Chart reviewed since last visit.  Patient seen and examined at bedside for respiratory failure, chest pain, pleural effusion, ESRD  Feels better today - denies dyspnea at time of examination  Sharp intermittent left chest pain. No palpitations, nausea, vomiting or diaphoresis  Occasional cough, scant if any phlegm. Denies any fevers or chills      OBJECTIVE:  Vital Signs Last 24 Hrs  T(C): 36.5 (13 Jul 2019 15:45), Max: 36.9 (12 Jul 2019 23:48)  T(F): 97.7 (13 Jul 2019 15:45), Max: 98.4 (12 Jul 2019 23:48)  HR: 85 (13 Jul 2019 15:45) (55 - 150)  BP: 107/45 (13 Jul 2019 15:45) (94/46 - 121/64)   RR: 20 (13 Jul 2019 15:45) (18 - 20)  SpO2: 95% (13 Jul 2019 15:45) (93% - 98%)    PHYSICAL EXAMINATION  General: Elderly [place] gentleman lying in bed, NAD  HEENT:  EOMi  NECK:  Supple  CVS: regular rate and rhythm S1 S2. Reproducible left chest wall tenderness  RESP:  Fair air entry except bases. Left chest bandage (at site of thoracocentesis)  GI:  Soft nondistended nontender BS+  : No suprapubic tenderness  MSK:  FROM, no edema  CNS:  No gross focal or global deficit noted  INTEG:  warm dry skin  PSYCH:  Fair mood     MONITOR:  CAPILLARY BLOOD GLUCOSE            I&O's Summary    12 Jul 2019 07:01  -  13 Jul 2019 07:00  --------------------------------------------------------  IN: 202 mL / OUT: 1700 mL / NET: -1498 mL    13 Jul 2019 07:01  -  13 Jul 2019 17:35  --------------------------------------------------------  IN: 520 mL / OUT: 1 mL / NET: 519 mL                            8.3    5.32  )-----------( 150      ( 13 Jul 2019 06:12 )             27.7       07-13    136  |  94<L>  |  40.0<H>  ----------------------------<  99  5.3   |  27.0  |  5.75<H>    Ca    8.4<L>      13 Jul 2019 06:12  Phos  8.1     07-12  Mg     2.0     07-13      CARDIAC MARKERS ( 13 Jul 2019 10:31 )  x     / 0.20 ng/mL / x     / x     / x      CARDIAC MARKERS ( 13 Jul 2019 06:12 )  x     / 0.14 ng/mL / x     / x     / x      CARDIAC MARKERS ( 12 Jul 2019 22:22 )  x     / 0.09 ng/mL / x     / x     / x              Culture:    TTE:    RADIOLOGY        MEDICATIONS  (STANDING):  ALBUTerol/ipratropium for Nebulization 3 milliLiter(s) Nebulizer every 6 hours  amLODIPine   Tablet 5 milliGRAM(s) Oral daily  aspirin  chewable 81 milliGRAM(s) Oral daily  atorvastatin 80 milliGRAM(s) Oral at bedtime  chlorhexidine 2% Cloths 1 Application(s) Topical <User Schedule>  clopidogrel Tablet 75 milliGRAM(s) Oral daily  docusate sodium 100 milliGRAM(s) Oral two times a day  epoetin nguyễn Injectable 23829 Unit(s) SubCutaneous <User Schedule>  heparin  Injectable 5000 Unit(s) SubCutaneous every 8 hours  hydrALAZINE 50 milliGRAM(s) Oral every 8 hours  isosorbide   dinitrate Tablet (ISORDIL) 20 milliGRAM(s) Oral three times a day  levothyroxine Injectable 50 MICROGram(s) IV Push at bedtime  metoprolol tartrate 25 milliGRAM(s) Oral two times a day  pantoprazole  Injectable 40 milliGRAM(s) IV Push daily  predniSONE   Tablet 50 milliGRAM(s) Oral daily  senna 2 Tablet(s) Oral at bedtime  sevelamer carbonate 800 milliGRAM(s) Oral three times a day with meals      MEDICATIONS  (PRN):  ALPRAZolam 0.25 milliGRAM(s) Oral every 6 hours PRN anxiety  diltiazem Injectable 10 milliGRAM(s) IV Push every 4 hours PRN HR>120  diphenhydrAMINE 25 milliGRAM(s) Oral every 6 hours PRN Rash and/or Itching  morphine  - Injectable 2 milliGRAM(s) IV Push every 2 hours PRN Dyspnea/ Chest pain  polyethylene glycol 3350 17 Gram(s) Oral daily PRN Constipation

## 2019-07-13 NOTE — PROGRESS NOTE ADULT - SUBJECTIVE AND OBJECTIVE BOX
PULMONARY PROGRESS NOTE      KAUSHIK HOFFMANYOVANNY-[id number]    Patient is a 79y old  Male who presents with a chief complaint of Acute hypoxemic respiratory failure (12 Jul 2019 19:32)      INTERVAL HPI/OVERNIGHT EVENTS: Says he is feeling better. On NCO2.     MEDICATIONS  (STANDING):  ALBUTerol/ipratropium for Nebulization 3 milliLiter(s) Nebulizer every 6 hours  amLODIPine   Tablet 5 milliGRAM(s) Oral daily  aspirin  chewable 81 milliGRAM(s) Oral daily  atorvastatin 80 milliGRAM(s) Oral at bedtime  chlorhexidine 2% Cloths 1 Application(s) Topical <User Schedule>  clopidogrel Tablet 75 milliGRAM(s) Oral daily  docusate sodium 100 milliGRAM(s) Oral two times a day  epoetin nguyễn Injectable 36534 Unit(s) SubCutaneous <User Schedule>  heparin  Injectable 5000 Unit(s) SubCutaneous every 8 hours  hydrALAZINE 50 milliGRAM(s) Oral every 8 hours  isosorbide   dinitrate Tablet (ISORDIL) 20 milliGRAM(s) Oral three times a day  levothyroxine Injectable 50 MICROGram(s) IV Push at bedtime  metoprolol tartrate 25 milliGRAM(s) Oral two times a day  pantoprazole  Injectable 40 milliGRAM(s) IV Push daily  predniSONE   Tablet 50 milliGRAM(s) Oral daily  senna 2 Tablet(s) Oral at bedtime  sevelamer carbonate 800 milliGRAM(s) Oral three times a day with meals      MEDICATIONS  (PRN):  ALPRAZolam 0.25 milliGRAM(s) Oral every 6 hours PRN anxiety  diltiazem Injectable 10 milliGRAM(s) IV Push every 4 hours PRN HR>120  diphenhydrAMINE 25 milliGRAM(s) Oral every 6 hours PRN Rash and/or Itching  morphine  - Injectable 2 milliGRAM(s) IV Push every 2 hours PRN Dyspnea/ Chest pain  polyethylene glycol 3350 17 Gram(s) Oral daily PRN Constipation      Allergies    No Known Allergies    Intolerances        PAST MEDICAL & SURGICAL HISTORY:  ILD (interstitial lung disease)  CAD (coronary artery disease)  Chronic anemia  ESRD (end stage renal disease): on HD (Tue-Thu-Sat) through RUE fistula  CAD (coronary artery disease): s/p remote CABG and multiple stents  Lung cancer: had yoni radiation in 2006.  Bipolar disorder  High cholesterol  Hypertension  S/P CABG (coronary artery bypass graft)      SOCIAL HISTORY  Smoking History: former smoker            Vital Signs Last 24 Hrs  T(C): 36.8 (13 Jul 2019 08:00), Max: 36.9 (12 Jul 2019 23:48)  T(F): 98.2 (13 Jul 2019 08:00), Max: 98.4 (12 Jul 2019 23:48)  HR: 60 (13 Jul 2019 08:00) (55 - 150)  BP: 121/64 (13 Jul 2019 08:00) (94/46 - 139/60)  BP(mean): 86 (12 Jul 2019 14:00) (80 - 88)  RR: 18 (13 Jul 2019 08:00) (5 - 19)  SpO2: 98% (13 Jul 2019 08:00) (93% - 100%)    PHYSICAL EXAMINATION:    GENERAL: The patient is awake and alert in no apparent distress.     HEENT: Head is normocephalic and atraumatic.  Mucous membranes are moist.    NECK: Supple.    LUNGS: rales b/l, respirations unlabored    HEART: Regular rate and rhythm     ABDOMEN: Soft, nontender, and nondistended.      EXTREMITIES: Without any cyanosis, clubbing, rash, lesions or edema.    NEUROLOGIC: Grossly intact.

## 2019-07-13 NOTE — PROGRESS NOTE ADULT - ASSESSMENT
-Combined emphysema and ILD  -Pleural effusion; transudative  -Fluid overload  -PTX post thoracentesis improving  -Acute on chronic hypoxic respiratory  failure  -Hyperkalemia    REC:    O2; titrate. BiPAP prn. Follow CXR. Prednisone. Nebs. HD TIW , Follow lytes.     Prognosis poor.     Pt DNR.     Consider withdrawal Of  HD,

## 2019-07-13 NOTE — PROGRESS NOTE ADULT - SUBJECTIVE AND OBJECTIVE BOX
INTERVAL HPI/ OVERNIGHT EVENTS:  Seen and examined at bedside this AM.   S/P Lt thoracentesis with 420mL removed yesterday.   Post-op with PTX that is improving on CXR.   Pt report breathing improved. Otherwise offers no complaints.     Present Symptoms:   Dyspnea: off HFNC, on O2NC 5L   Nausea/Vomiting:  No  Anxiety:  Yes   Fatigue:  No  Loss of appetite: Yes   Constipation: yes  Pain: No    Review of Systems: Reviewed, All others negative    MEDICATIONS  (STANDING):  ALBUTerol/ipratropium for Nebulization 3 milliLiter(s) Nebulizer every 6 hours  amLODIPine   Tablet 5 milliGRAM(s) Oral daily  aspirin  chewable 81 milliGRAM(s) Oral daily  atorvastatin 80 milliGRAM(s) Oral at bedtime  chlorhexidine 2% Cloths 1 Application(s) Topical <User Schedule>  clopidogrel Tablet 75 milliGRAM(s) Oral daily  docusate sodium 100 milliGRAM(s) Oral two times a day  epoetin nguyễn Injectable 70045 Unit(s) SubCutaneous <User Schedule>  heparin  Injectable 5000 Unit(s) SubCutaneous every 8 hours  hydrALAZINE 50 milliGRAM(s) Oral every 8 hours  isosorbide   dinitrate Tablet (ISORDIL) 20 milliGRAM(s) Oral three times a day  levothyroxine Injectable 50 MICROGram(s) IV Push at bedtime  pantoprazole  Injectable 40 milliGRAM(s) IV Push daily  predniSONE   Tablet 50 milliGRAM(s) Oral daily  senna 2 Tablet(s) Oral at bedtime  sevelamer carbonate 800 milliGRAM(s) Oral three times a day with meals    MEDICATIONS  (PRN):  ALPRAZolam 0.25 milliGRAM(s) Oral every 6 hours PRN anxiety  morphine  - Injectable 2 milliGRAM(s) IV Push every 2 hours PRN Dyspnea/ Chest pain    PHYSICAL EXAM:    Vital Signs Last 24 Hrs  T(C): 36.4 (12 Jul 2019 10:45), Max: 37.2 (11 Jul 2019 16:00)  T(F): 97.6 (12 Jul 2019 10:45), Max: 98.9 (11 Jul 2019 16:00)  HR: 85 (12 Jul 2019 14:15) (54 - 85)  BP: 126/57 (12 Jul 2019 14:15) (112/56 - 164/71)  BP(mean): 86 (12 Jul 2019 14:00) (80 - 105)  RR: 18 (12 Jul 2019 14:15) (5 - 30)  SpO2: 100% (12 Jul 2019 14:15) (95% - 100%)    General: cachetic.  No acute distress.   HEENT:  mucous membrane dry.   Lungs: diminished breath sounds at bases. O2NC  CV: +s1/s2. Regular rate and rhythm.   GI:+ bowel sound. abdomen soft, non-tender, non-distended.  MSK: Moves all 4 extremities.  No cyanosis or edema. weakness.   Neuro: Awake and alert, oriented x3. Interactive.   Skin: warm and dry.      LABS:                        8.5    4.73  )-----------( 167      ( 12 Jul 2019 05:53 )             28.2     07-12    132<L>  |  92<L>  |  72.0<H>  ----------------------------<  129<H>  6.2<HH>   |  21.0<L>  |  9.70<H>    Ca    8.5<L>      12 Jul 2019 11:32  Phos  8.1     07-12  Mg     2.2     07-12    TPro  7.1  /  Alb  3.8  /  TBili  0.3<L>  /  DBili  x   /  AST  24  /  ALT  12  /  AlkPhos  73  07-10    RADIOLOGY & ADDITIONAL STUDIES:      CXR 7/11/19,    INTERPRETATION:  AP chest on July 11, 2019 at 7:55 PM. Patient has small   left pneumothorax after thoracentesis. This is a follow-up.    Heart enlargement, sternotomy, and bilateral apical thickening right   greater than left again noted.    Diffuse infiltrates in the lungs again seen.    At 4:10 PM there was a small pneumothorax at the left base laterally   which is much improved and barely visible at this time.    IMPRESSION: Markedly improved left pneumothorax. Case discussed with Guero pereyra nurse practitioner.      ADVANCE DIRECTIVES: DNR/DNI, MOLST      79 M with ESRD on HD, CAD, h/o lung cancer, severe ILD, HFpEF, recurrent hospitalizations readmitted less than 24 hour from discharge to rehab for acute respiratory failure with hypoxia requiring NIPPV. He is now off HFNC and s/p thoracentesis yesterday. Breathing is improved and doing well on O2NC.     PLAN    Acute Respiratory Failure with Hypoxia/ CO2 Retention :     Severe Diffuse ILD/Pleural effusions  - respiratory status improved, on NC, overall poor prognosis  - On Prednisone, bronchodilators, IV morphine PRN    ESRD on HD  - HD TIW,     HFpEF  - no evidence clinically of fluid overload    CAD/HTN :   - ASA, Plavix amlodipine, statin, nitrate    Anxiety  - alprazolam 0.25 mg PRN                 Per Palliative Care : " Pt does not appear to have full insight or grasp the severity of his underlying comorbidities especially ILD/pulmonary fibrosis, ESRD, debility. He kept redirecting conversation and verbalized wanting to go home, getting appropriate O2 tank in home; he is adamantly against rehab. When I tried to clarify our medical concerns he becomes flustered and defers to his family and son in law. Called and spoke with HOSSEIN Escalante. Boris understands overall prognosis is poor and express the frustration he and wife is having in caring for pt as it requires more than they can provide given his sx burden. We reviewed treatment options explored during last family meeting from last hospitalization 1. continue ongoing medical tx including HD versus 2. comfort measures/focus on quality of life with no further aggressive interventions that would prolong his suffering and not change his underlying issues including stopping HD; allow natural death and optimize sx control. Encouraged family to have ongoing discussions with pt to determine what is in his overall best interest "

## 2019-07-13 NOTE — PROVIDER CONTACT NOTE (OTHER) - SITUATION
Pt. in afib HR sustained in 120-130s.   Pt. c/o intermittent chest pain.  Cardizem and Lopressor already given
Monitor tech called me to state pt HR went up to 150 non sustained   and now he is in and out of a fib. Pt is asymptomatic   and resting comfortable in bed. cardiac strip is in chart.
Troponin 0.14

## 2019-07-13 NOTE — PROVIDER CONTACT NOTE (OTHER) - ACTION/TREATMENT ORDERED:
md mota
dr silverio states obtain 12 lead ekg and have PA read it and have PA call Dr. Silverio with interpretation.
stat troponin ordered.  continue to monitor

## 2019-07-13 NOTE — PROGRESS NOTE ADULT - ASSESSMENT
Pt is 78 y/o Male with a history of ESRD on HD, HTN, HFpEF, CAD s/p CABG, and lung cancer s/p radiation on Home Oxygen, presenting with increased dyspnea, likely multifactorial with underlying lung cancer, ILD/ pulmonary fibrosis and volume overload with CHF/ ESRD and admitted with with acute hypoxic respiratory failure requiring NIV in MICu - downgraded earlier today    Acute on Chronic hypoxic respiratory failure - Multifactorial in nature with lung cancer, pulmonary fibrosis on prednisone, and heart failure. Left pleural effusion transudative secondary to ESRD. Culture negative to date  - Inhaled bronchodilator therapy and supplemental oxygen. Fluid removal. Palliative care following.    AF, paroxysmal. Due to BB being discontinued last hospitalization secondary to bradycardia. CAD. Elevated Troponin however upon review of prior chart it seems patient has chronically elevated Troponin (as high as 0.3)  - On aspirin, clopidogrel, atorvastatin and Nitrate  - Resumed BB    - Continue to monitor. PRN Cardizem  - Trend CE. Prior TTE, Cardiac cath results reviewed - recommendations for medical management from Cardiology    Hypertension - Close blood pressure monitoring. On amlodipine     Chronic diastolic heart failure - Not in acute exacerbation. On isosorbide. Hemodialysis as scheduled. Pleural effusion drained    ESRD - Hemodialysis as scheduled.    Hypothyroidism - On levothyroxine.        Poor prognosis, advanced illnesses with multiple readmissions. DNR DNI obtained in ICU earlier today    Disposition - pending clinical improvement    Discussed with patient daughter in law at bedside

## 2019-07-13 NOTE — PROGRESS NOTE ADULT - ASSESSMENT
-Combined emphysema and ILD  -Pleural effusion; transudative  -Fluid overload  -PTX post thoracentesis improving  -Acute on chronic hypoxic resp failure  -Hyperkalemia    RECC:  O2; titrate. BPAP prn. Follow CXR. Prednisone. Nebs. HD per renal. Follow lytes.     Prognosis poor.     Pt DNR.

## 2019-07-14 LAB
ANION GAP SERPL CALC-SCNC: 13 MMOL/L — SIGNIFICANT CHANGE UP (ref 5–17)
BUN SERPL-MCNC: 64 MG/DL — HIGH (ref 8–20)
CALCIUM SERPL-MCNC: 8.2 MG/DL — LOW (ref 8.6–10.2)
CHLORIDE SERPL-SCNC: 91 MMOL/L — LOW (ref 98–107)
CO2 SERPL-SCNC: 27 MMOL/L — SIGNIFICANT CHANGE UP (ref 22–29)
CREAT SERPL-MCNC: 7.34 MG/DL — HIGH (ref 0.5–1.3)
GLUCOSE SERPL-MCNC: 88 MG/DL — SIGNIFICANT CHANGE UP (ref 70–115)
HCT VFR BLD CALC: 29.6 % — LOW (ref 39–50)
HGB BLD-MCNC: 8.7 G/DL — LOW (ref 13–17)
MAGNESIUM SERPL-MCNC: 2 MG/DL — SIGNIFICANT CHANGE UP (ref 1.6–2.6)
POTASSIUM SERPL-MCNC: 5.2 MMOL/L — SIGNIFICANT CHANGE UP (ref 3.5–5.3)
POTASSIUM SERPL-SCNC: 5.2 MMOL/L — SIGNIFICANT CHANGE UP (ref 3.5–5.3)
SODIUM SERPL-SCNC: 131 MMOL/L — LOW (ref 135–145)

## 2019-07-14 PROCEDURE — 99233 SBSQ HOSP IP/OBS HIGH 50: CPT

## 2019-07-14 PROCEDURE — 93010 ELECTROCARDIOGRAM REPORT: CPT

## 2019-07-14 RX ADMIN — Medication 50 MILLIGRAM(S): at 06:13

## 2019-07-14 RX ADMIN — CHLORHEXIDINE GLUCONATE 1 APPLICATION(S): 213 SOLUTION TOPICAL at 06:13

## 2019-07-14 RX ADMIN — ISOSORBIDE DINITRATE 20 MILLIGRAM(S): 5 TABLET ORAL at 10:36

## 2019-07-14 RX ADMIN — ATORVASTATIN CALCIUM 80 MILLIGRAM(S): 80 TABLET, FILM COATED ORAL at 21:43

## 2019-07-14 RX ADMIN — Medication 3 MILLILITER(S): at 16:32

## 2019-07-14 RX ADMIN — SEVELAMER CARBONATE 800 MILLIGRAM(S): 2400 POWDER, FOR SUSPENSION ORAL at 07:47

## 2019-07-14 RX ADMIN — SEVELAMER CARBONATE 800 MILLIGRAM(S): 2400 POWDER, FOR SUSPENSION ORAL at 16:44

## 2019-07-14 RX ADMIN — PANTOPRAZOLE SODIUM 40 MILLIGRAM(S): 20 TABLET, DELAYED RELEASE ORAL at 10:37

## 2019-07-14 RX ADMIN — HEPARIN SODIUM 5000 UNIT(S): 5000 INJECTION INTRAVENOUS; SUBCUTANEOUS at 10:37

## 2019-07-14 RX ADMIN — ISOSORBIDE DINITRATE 20 MILLIGRAM(S): 5 TABLET ORAL at 21:42

## 2019-07-14 RX ADMIN — Medication 3 MILLILITER(S): at 20:09

## 2019-07-14 RX ADMIN — CLOPIDOGREL BISULFATE 75 MILLIGRAM(S): 75 TABLET, FILM COATED ORAL at 10:36

## 2019-07-14 RX ADMIN — AMLODIPINE BESYLATE 5 MILLIGRAM(S): 2.5 TABLET ORAL at 06:12

## 2019-07-14 RX ADMIN — SEVELAMER CARBONATE 800 MILLIGRAM(S): 2400 POWDER, FOR SUSPENSION ORAL at 10:36

## 2019-07-14 RX ADMIN — Medication 100 MILLIGRAM(S): at 06:12

## 2019-07-14 RX ADMIN — Medication 50 MILLIGRAM(S): at 10:37

## 2019-07-14 RX ADMIN — Medication 50 MILLIGRAM(S): at 21:42

## 2019-07-14 RX ADMIN — Medication 25 MILLIGRAM(S): at 00:53

## 2019-07-14 RX ADMIN — Medication 25 MILLIGRAM(S): at 06:12

## 2019-07-14 RX ADMIN — Medication 50 MICROGRAM(S): at 21:47

## 2019-07-14 RX ADMIN — HEPARIN SODIUM 5000 UNIT(S): 5000 INJECTION INTRAVENOUS; SUBCUTANEOUS at 21:42

## 2019-07-14 RX ADMIN — HEPARIN SODIUM 5000 UNIT(S): 5000 INJECTION INTRAVENOUS; SUBCUTANEOUS at 06:12

## 2019-07-14 RX ADMIN — ISOSORBIDE DINITRATE 20 MILLIGRAM(S): 5 TABLET ORAL at 06:13

## 2019-07-14 RX ADMIN — Medication 81 MILLIGRAM(S): at 10:36

## 2019-07-14 RX ADMIN — Medication 3 MILLILITER(S): at 09:29

## 2019-07-14 RX ADMIN — SENNA PLUS 2 TABLET(S): 8.6 TABLET ORAL at 21:42

## 2019-07-14 RX ADMIN — Medication 100 MILLIGRAM(S): at 16:44

## 2019-07-14 NOTE — PROGRESS NOTE ADULT - SUBJECTIVE AND OBJECTIVE BOX
NEPHROLOGY INTERVAL HPI/ OVERNIGHT EVENTS: On NC Oxygen, In Bed, in NAD,     Reviewed w. the RN,     MEDICATIONS  (STANDING):  ALBUTerol/ipratropium for Nebulization 3 milliLiter(s) Nebulizer every 6 hours  amLODIPine   Tablet 5 milliGRAM(s) Oral daily  aspirin  chewable 81 milliGRAM(s) Oral daily  atorvastatin 80 milliGRAM(s) Oral at bedtime  chlorhexidine 2% Cloths 1 Application(s) Topical <User Schedule>  clopidogrel Tablet 75 milliGRAM(s) Oral daily  docusate sodium 100 milliGRAM(s) Oral two times a day  epoetin nguyễn Injectable 82279 Unit(s) SubCutaneous <User Schedule>  heparin  Injectable 5000 Unit(s) SubCutaneous every 8 hours  hydrALAZINE 50 milliGRAM(s) Oral every 8 hours  isosorbide   dinitrate Tablet (ISORDIL) 20 milliGRAM(s) Oral three times a day  levothyroxine Injectable 50 MICROGram(s) IV Push at bedtime  metoprolol tartrate 25 milliGRAM(s) Oral two times a day  pantoprazole  Injectable 40 milliGRAM(s) IV Push daily  predniSONE   Tablet 50 milliGRAM(s) Oral daily  senna 2 Tablet(s) Oral at bedtime  sevelamer carbonate 800 milliGRAM(s) Oral three times a day with meals    MEDICATIONS  (PRN):  ALPRAZolam 0.25 milliGRAM(s) Oral every 6 hours PRN anxiety  diltiazem Injectable 10 milliGRAM(s) IV Push every 4 hours PRN HR>120  diphenhydrAMINE 25 milliGRAM(s) Oral every 6 hours PRN Rash and/or Itching  morphine  - Injectable 2 milliGRAM(s) IV Push every 2 hours PRN Dyspnea/ Chest pain  polyethylene glycol 3350 17 Gram(s) Oral daily PRN Constipation    Allergies    No Known Allergies    REVIEW OF SYSTEMS:    CONSTITUTIONAL: No fever, + weight loss, + fatigue  EYES: No eye pain, visual disturbances, or discharge  ENMT:  No difficulty hearing, tinnitus, vertigo; No sinus or throat pain  NECK: No pain or stiffness  RESPIRATORY: No cough, wheezing, chills or hemoptysis; + shortness of breath  CARDIOVASCULAR: No chest pain, palpitations, dizziness, or leg swelling  GASTROINTESTINAL: No abdominal or epigastric pain. No nausea, vomiting, or hematemesis; No diarrhea or constipation. No melena or hematochezia.  GENITOURINARY: No dysuria, frequency, hematuria, or incontinence  NEUROLOGICAL: No headaches, memory loss, loss of strength, numbness, or tremors  SKIN: No itching, burning, rashes, or lesions   LYMPH NODES: No enlarged glands  ENDOCRINE: No heat or cold intolerance; No hair loss  MUSCULOSKELETAL: No joint pain or swelling; No muscle, back, or extremity pain  PSYCHIATRIC: +++ depression, anxiety, mood swings, difficulty sleeping  HEME/LYMPH: No easy bruising, or bleeding gums  ALLERGY AND IMMUNOLOGIC: No hives or eczema    Vital Signs Last 24 Hrs  T(C): 36.7 (14 Jul 2019 09:51), Max: 36.7 (14 Jul 2019 09:51)  T(F): 98.1 (14 Jul 2019 09:51), Max: 98.1 (14 Jul 2019 09:51)  HR: 75 (14 Jul 2019 09:51) (58 - 85)  BP: 113/58 (14 Jul 2019 09:51) (107/45 - 143/57)  BP(mean): --  RR: 19 (14 Jul 2019 09:51) (18 - 20)  SpO2: 95% (14 Jul 2019 09:51) (93% - 100%)    PHYSICAL EXAM:    GENERAL: NAD, Frail, Thin, Older Appearing,   HEAD:  Atraumatic, Normocephalic  EYES: EOMI, PERRLA, conjunctiva and sclera clear , Pale,   ENMT: Dry  mucous membranes,   NECK: Supple, No JVD,   NERVOUS SYSTEM:  Alert & Oriented X3, poor  concentration;   CHEST/LUNG: Clear to percussion bilaterally; + rales, rhonchi, wheezing, No rubs  HEART: Regular rate and rhythm;  3/6  murmur, No  rubs, or gallops  ABDOMEN: Soft, Nontender, Nondistended; Bowel sounds present  EXTREMITIES:  2+ Peripheral Pulses, No clubbing, cyanosis, or edema  LYMPH: No lymphadenopathy noted  SKIN: No rashes or lesions    LABS:                        8.7    x     )-----------( x        ( 14 Jul 2019 09:12 )             29.6     07-14    131<L>  |  91<L>  |  64.0<H>  ----------------------------<  88  5.2   |  27.0  |  7.34<H>    Ca    8.2<L>      14 Jul 2019 09:12  Mg     2.0     07-14    Magnesium, Serum: 2.0 mg/dL (07-14 @ 09:12)

## 2019-07-14 NOTE — PROGRESS NOTE ADULT - ASSESSMENT
AP chest on July 11, 2019 at 7:55 PM.     Patient has small left pneumothorax after thoracentesis.     Heart enlargement, sternotomy, and bilateral apical thickening right   greater than left again noted.    Diffuse infiltrates in the lungs again seen.    At 4:10 PM there was a small pneumothorax at the left base laterally   which is much improved and barely visible at this time.    IMPRESSION: Markedly improved left pneumothorax.     ADVANCE DIRECTIVES: DNR / DNI , LON,       79 M with ESRD on HD, CAD, h/o lung cancer, severe ILD, HFpEF, recurrent hospitalizations readmitted less than 24 hour from discharge to rehab for acute respiratory failure with hypoxia requiring NIPPV. He is now off HFNC and s/p thoracentesis yesterday. Breathing is improved and doing well on O2NC.     PLAN    Acute Respiratory Failure with Hypoxia/ CO2 Retention :     Severe Diffuse ILD/Pleural effusions  - respiratory status improved, on NC, overall poor prognosis  - On Prednisone, bronchodilators, IV morphine PRN    ESRD on HD  - HD TIW,     HFpEF  - no evidence clinically of fluid overload    CAD/HTN :   - ASA, Plavix amlodipine, statin, nitrate    Anxiety  - alprazolam 0.25 mg PRN                 Per Palliative Care : " Pt does not appear to have full insight or grasp the severity of his underlying comorbidities especially ILD/pulmonary fibrosis, ESRD, debility. He kept redirecting conversation and verbalized wanting to go home, getting appropriate O2 tank in home; he is adamantly against rehab. When I tried to clarify our medical concerns he becomes flustered and defers to his family and son in law. Called and spoke with HOSSEIN Escalante. Boris understands overall prognosis is poor and express the frustration he and wife is having in caring for pt as it requires more than they can provide given his sx burden. We reviewed treatment options explored during last family meeting from last hospitalization 1. continue ongoing medical tx including HD versus 2. comfort measures/focus on quality of life with no further aggressive interventions that would prolong his suffering and not change his underlying issues including stopping HD; allow natural death and optimize sx control. Encouraged family to have ongoing discussions with pt to determine what is in his overall best interest "    -Combined emphysema and ILD  -Pleural effusion; transudative  -Fluid overload  -PTX post thoracentesis improving  -Acute on chronic hypoxic respiratory  failure  -Hyperkalemia    REC:    O2; titrate. BiPAP prn. Follow CXR. Prednisone. Nebs. HD TIW , Follow lytes.     Prognosis poor.     Pt DNR.     Consider withdrawal Of  HD,  ( CARRIE NGUYEN )

## 2019-07-14 NOTE — PROGRESS NOTE ADULT - ASSESSMENT
Pt is 80 y/o Male with a history of ESRD on HD, HTN, HFpEF, CAD s/p CABG, and lung cancer s/p radiation on Home Oxygen, presenting with increased dyspnea, likely multifactorial with underlying lung cancer, ILD/ pulmonary fibrosis and volume overload with CHF/ ESRD and admitted with with acute hypoxic respiratory failure requiring NIV in MICu - downgraded earlier today    Acute on Chronic hypoxic respiratory failure - Multifactorial in nature with lung cancer, pulmonary fibrosis on prednisone, and heart failure. Left pleural effusion transudative secondary to ESRD. Culture negative to date  - Inhaled bronchodilator therapy and supplemental oxygen.  Palliative care following.  - Continue Prednisone    AF, paroxysmal. Due to BB being discontinued last hospitalization secondary to bradycardia. CAD. Elevated Troponin however upon review of prior chart it seems patient has chronically elevated Troponin (as high as 0.3)  - On aspirin, clopidogrel, atorvastatin and Nitrate  - Resumed BB    - Continue to monitor. PRN Cardizem  - Prior TTE, Cardiac cath results reviewed - recommendations for medical management from Cardiology    Hypertension - Close blood pressure monitoring. On amlodipine     Chronic diastolic heart failure - Not in acute exacerbation. On isosorbide. Hemodialysis as scheduled. Pleural effusion drained    ESRD - Hemodialysis as scheduled.    Hypothyroidism - On levothyroxine.        Poor prognosis, advanced illnesses with multiple readmissions. DNR DNI obtained in ICU earlier today    Disposition - pending clinical improvement    Discussed with patient, RN at bedside

## 2019-07-14 NOTE — PROGRESS NOTE ADULT - SUBJECTIVE AND OBJECTIVE BOX
HOSPITALIST PROGRESS NOTE    KAUSHIK HOFFMAN  [id number]  79yMale    Patient is a 79y old  Male who presents with a chief complaint of Acute hypoxemic respiratory failure (14 Jul 2019 12:43)      SUBJECTIVE:   Chart reviewed since last visit.  Patient seen and examined at bedside for respiratory failure, pleural effusion.  'Dont feel well'. Dyspnea+, cough with scant phlegm if any.  Intermittent chest pain, better than before.  Denies any fever or chills.      OBJECTIVE:  Vital Signs Last 24 Hrs  T(C): 36.7 (14 Jul 2019 09:51), Max: 36.7 (14 Jul 2019 09:51)  T(F): 98.1 (14 Jul 2019 09:51), Max: 98.1 (14 Jul 2019 09:51)  HR: 75 (14 Jul 2019 09:51) (58 - 85)  BP: 113/58 (14 Jul 2019 09:51) (107/45 - 143/57)   RR: 19 (14 Jul 2019 09:51) (18 - 20)  SpO2: 95% (14 Jul 2019 09:51) (93% - 100%)    PHYSICAL EXAMINATION  General: Elderly [place]ian gentleman lying in bed, NAD  HEENT:  EOMi  NECK:  Supple  CVS: regular rate and rhythm S1 S2. Reproducible left chest wall tenderness  RESP:  Fair air entry except bases. Left chest bandage (at site of thoracocentesis)  GI:  Soft nondistended nontender BS+  : No suprapubic tenderness  MSK:  FROM, no edema  CNS:  No gross focal or global deficit noted  INTEG:  warm dry skin  PSYCH:  Fair mood     MONITOR:  CAPILLARY BLOOD GLUCOSE            I&O's Summary    13 Jul 2019 07:01  -  14 Jul 2019 07:00  --------------------------------------------------------  IN: 640 mL / OUT: 1 mL / NET: 639 mL                            8.7    x     )-----------( x        ( 14 Jul 2019 09:12 )             29.6       07-14    131<L>  |  91<L>  |  64.0<H>  ----------------------------<  88  5.2   |  27.0  |  7.34<H>    Ca    8.2<L>      14 Jul 2019 09:12  Mg     2.0     07-14      CARDIAC MARKERS ( 13 Jul 2019 10:31 )  x     / 0.20 ng/mL / x     / x     / x      CARDIAC MARKERS ( 13 Jul 2019 06:12 )  x     / 0.14 ng/mL / x     / x     / x      CARDIAC MARKERS ( 12 Jul 2019 22:22 )  x     / 0.09 ng/mL / x     / x     / x              Culture:    TTE:    RADIOLOGY        MEDICATIONS  (STANDING):  ALBUTerol/ipratropium for Nebulization 3 milliLiter(s) Nebulizer every 6 hours  amLODIPine   Tablet 5 milliGRAM(s) Oral daily  aspirin  chewable 81 milliGRAM(s) Oral daily  atorvastatin 80 milliGRAM(s) Oral at bedtime  chlorhexidine 2% Cloths 1 Application(s) Topical <User Schedule>  clopidogrel Tablet 75 milliGRAM(s) Oral daily  docusate sodium 100 milliGRAM(s) Oral two times a day  epoetin nguyễn Injectable 90728 Unit(s) SubCutaneous <User Schedule>  heparin  Injectable 5000 Unit(s) SubCutaneous every 8 hours  hydrALAZINE 50 milliGRAM(s) Oral every 8 hours  isosorbide   dinitrate Tablet (ISORDIL) 20 milliGRAM(s) Oral three times a day  levothyroxine Injectable 50 MICROGram(s) IV Push at bedtime  metoprolol tartrate 25 milliGRAM(s) Oral two times a day  pantoprazole  Injectable 40 milliGRAM(s) IV Push daily  predniSONE   Tablet 50 milliGRAM(s) Oral daily  senna 2 Tablet(s) Oral at bedtime  sevelamer carbonate 800 milliGRAM(s) Oral three times a day with meals      MEDICATIONS  (PRN):  ALPRAZolam 0.25 milliGRAM(s) Oral every 6 hours PRN anxiety  diltiazem Injectable 10 milliGRAM(s) IV Push every 4 hours PRN HR>120  diphenhydrAMINE 25 milliGRAM(s) Oral every 6 hours PRN Rash and/or Itching  morphine  - Injectable 2 milliGRAM(s) IV Push every 2 hours PRN Dyspnea/ Chest pain  polyethylene glycol 3350 17 Gram(s) Oral daily PRN Constipation

## 2019-07-15 LAB
-  AMPICILLIN/SULBACTAM: SIGNIFICANT CHANGE UP
-  CEFAZOLIN: SIGNIFICANT CHANGE UP
-  CLINDAMYCIN: SIGNIFICANT CHANGE UP
-  ERYTHROMYCIN: SIGNIFICANT CHANGE UP
-  GENTAMICIN: SIGNIFICANT CHANGE UP
-  OXACILLIN: SIGNIFICANT CHANGE UP
-  PENICILLIN: SIGNIFICANT CHANGE UP
-  RIFAMPIN: SIGNIFICANT CHANGE UP
-  TETRACYCLINE: SIGNIFICANT CHANGE UP
-  TRIMETHOPRIM/SULFAMETHOXAZOLE: SIGNIFICANT CHANGE UP
-  VANCOMYCIN: SIGNIFICANT CHANGE UP
CULTURE RESULTS: SIGNIFICANT CHANGE UP
METHOD TYPE: SIGNIFICANT CHANGE UP
ORGANISM # SPEC MICROSCOPIC CNT: SIGNIFICANT CHANGE UP
ORGANISM # SPEC MICROSCOPIC CNT: SIGNIFICANT CHANGE UP
SPECIMEN SOURCE: SIGNIFICANT CHANGE UP

## 2019-07-15 PROCEDURE — 99223 1ST HOSP IP/OBS HIGH 75: CPT

## 2019-07-15 PROCEDURE — 99233 SBSQ HOSP IP/OBS HIGH 50: CPT

## 2019-07-15 PROCEDURE — 90937 HEMODIALYSIS REPEATED EVAL: CPT

## 2019-07-15 RX ORDER — ERYTHROPOIETIN 10000 [IU]/ML
10000 INJECTION, SOLUTION INTRAVENOUS; SUBCUTANEOUS
Refills: 0 | Status: DISCONTINUED | OUTPATIENT
Start: 2019-07-15 | End: 2019-07-26

## 2019-07-15 RX ADMIN — Medication 3 MILLILITER(S): at 15:41

## 2019-07-15 RX ADMIN — Medication 3 MILLILITER(S): at 08:08

## 2019-07-15 RX ADMIN — HEPARIN SODIUM 5000 UNIT(S): 5000 INJECTION INTRAVENOUS; SUBCUTANEOUS at 05:30

## 2019-07-15 RX ADMIN — CLOPIDOGREL BISULFATE 75 MILLIGRAM(S): 75 TABLET, FILM COATED ORAL at 12:52

## 2019-07-15 RX ADMIN — PANTOPRAZOLE SODIUM 40 MILLIGRAM(S): 20 TABLET, DELAYED RELEASE ORAL at 12:52

## 2019-07-15 RX ADMIN — Medication 50 MILLIGRAM(S): at 12:52

## 2019-07-15 RX ADMIN — Medication 25 MILLIGRAM(S): at 17:10

## 2019-07-15 RX ADMIN — Medication 50 MILLIGRAM(S): at 05:29

## 2019-07-15 RX ADMIN — MORPHINE SULFATE 2 MILLIGRAM(S): 50 CAPSULE, EXTENDED RELEASE ORAL at 07:46

## 2019-07-15 RX ADMIN — SENNA PLUS 2 TABLET(S): 8.6 TABLET ORAL at 23:23

## 2019-07-15 RX ADMIN — SEVELAMER CARBONATE 800 MILLIGRAM(S): 2400 POWDER, FOR SUSPENSION ORAL at 12:52

## 2019-07-15 RX ADMIN — ISOSORBIDE DINITRATE 20 MILLIGRAM(S): 5 TABLET ORAL at 05:29

## 2019-07-15 RX ADMIN — Medication 81 MILLIGRAM(S): at 12:52

## 2019-07-15 RX ADMIN — ERYTHROPOIETIN 10000 UNIT(S): 10000 INJECTION, SOLUTION INTRAVENOUS; SUBCUTANEOUS at 10:35

## 2019-07-15 RX ADMIN — Medication 3 MILLILITER(S): at 02:35

## 2019-07-15 RX ADMIN — MORPHINE SULFATE 2 MILLIGRAM(S): 50 CAPSULE, EXTENDED RELEASE ORAL at 08:01

## 2019-07-15 RX ADMIN — HEPARIN SODIUM 5000 UNIT(S): 5000 INJECTION INTRAVENOUS; SUBCUTANEOUS at 23:23

## 2019-07-15 RX ADMIN — Medication 50 MILLIGRAM(S): at 05:30

## 2019-07-15 RX ADMIN — HEPARIN SODIUM 5000 UNIT(S): 5000 INJECTION INTRAVENOUS; SUBCUTANEOUS at 12:52

## 2019-07-15 RX ADMIN — Medication 100 MILLIGRAM(S): at 05:29

## 2019-07-15 RX ADMIN — Medication 100 MILLIGRAM(S): at 17:10

## 2019-07-15 RX ADMIN — ISOSORBIDE DINITRATE 20 MILLIGRAM(S): 5 TABLET ORAL at 23:23

## 2019-07-15 RX ADMIN — ISOSORBIDE DINITRATE 20 MILLIGRAM(S): 5 TABLET ORAL at 12:52

## 2019-07-15 RX ADMIN — ATORVASTATIN CALCIUM 80 MILLIGRAM(S): 80 TABLET, FILM COATED ORAL at 23:23

## 2019-07-15 RX ADMIN — Medication 50 MILLIGRAM(S): at 23:23

## 2019-07-15 RX ADMIN — CHLORHEXIDINE GLUCONATE 1 APPLICATION(S): 213 SOLUTION TOPICAL at 05:32

## 2019-07-15 RX ADMIN — SEVELAMER CARBONATE 800 MILLIGRAM(S): 2400 POWDER, FOR SUSPENSION ORAL at 17:10

## 2019-07-15 RX ADMIN — Medication 50 MICROGRAM(S): at 23:24

## 2019-07-15 RX ADMIN — SEVELAMER CARBONATE 800 MILLIGRAM(S): 2400 POWDER, FOR SUSPENSION ORAL at 07:46

## 2019-07-15 RX ADMIN — AMLODIPINE BESYLATE 5 MILLIGRAM(S): 2.5 TABLET ORAL at 05:29

## 2019-07-15 RX ADMIN — Medication 25 MILLIGRAM(S): at 05:29

## 2019-07-15 NOTE — CONSULT NOTE ADULT - REASON FOR ADMISSION
Acute hypoxemic respiratory failure

## 2019-07-15 NOTE — PROGRESS NOTE ADULT - SUBJECTIVE AND OBJECTIVE BOX
HOSPITALIST PROGRESS NOTE    KAUSHIK HOFFMAN  [id number]  79yMale    Patient is a 79y old  Male who presents with a chief complaint of Acute hypoxemic respiratory failure (16 Jul 2019 14:44)      SUBJECTIVE:   Chart reviewed since last visit.  Patient seen and examined at bedside for respiratory failure. ESRD  Feels nauseous, no vomiting.   Still gets occasional left chest pain and dyspnea.      OBJECTIVE:  Vital Signs Last 24 Hrs  Reviewed nursing flowsheet.  Afebrile with VSS      PHYSICAL EXAMINATION  General: Elderly Khmer gentleman sitting in chair, NAD  HEENT:  extraocular movements intact, mild blood at nares  NECK:  Supple  CVS: regular rate and rhythm S1 S2. Reproducible left chest wall tenderness  RESP:  Fair air entry except bases. Left chest bandage (at site of thoracocentesis)  GI:  Soft nondistended nontender BS+  : No suprapubic tenderness  MSK:  FROM, no edema  CNS:  No gross focal or global deficit noted  INTEG:  warm dry skin  PSYCH:  Fair mood       MONITOR:  CAPILLARY BLOOD GLUCOSE            I&O's Summary    16 Jul 2019 07:01  -  17 Jul 2019 07:00  --------------------------------------------------------  IN: 620 mL / OUT: 1200 mL / NET: -580 mL                            8.6    7.06  )-----------( 194      ( 17 Jul 2019 07:16 )             29.4       07-17    136  |  95<L>  |  57.0<H>  ----------------------------<  118<H>  4.5   |  28.0  |  6.21<H>    Ca    8.3<L>      17 Jul 2019 07:16              Culture:    TTE:    RADIOLOGY        MEDICATIONS  (STANDING):  ALBUTerol/ipratropium for Nebulization 3 milliLiter(s) Nebulizer every 6 hours  amLODIPine   Tablet 5 milliGRAM(s) Oral daily  aspirin  chewable 81 milliGRAM(s) Oral daily  atorvastatin 80 milliGRAM(s) Oral at bedtime  chlorhexidine 2% Cloths 1 Application(s) Topical <User Schedule>  clopidogrel Tablet 75 milliGRAM(s) Oral daily  docusate sodium 100 milliGRAM(s) Oral two times a day  epoetin nguyễn Injectable 20604 Unit(s) IV Push <User Schedule>  heparin  Injectable 5000 Unit(s) SubCutaneous every 8 hours  hydrALAZINE 50 milliGRAM(s) Oral every 8 hours  isosorbide   dinitrate Tablet (ISORDIL) 20 milliGRAM(s) Oral three times a day  levothyroxine 100 MICROGram(s) Oral daily  metoprolol tartrate 25 milliGRAM(s) Oral two times a day  pantoprazole    Tablet 40 milliGRAM(s) Oral before breakfast  predniSONE   Tablet 50 milliGRAM(s) Oral daily  senna 2 Tablet(s) Oral at bedtime  sevelamer carbonate 800 milliGRAM(s) Oral three times a day with meals      MEDICATIONS  (PRN):  ALPRAZolam 0.25 milliGRAM(s) Oral every 6 hours PRN anxiety  diltiazem Injectable 10 milliGRAM(s) IV Push every 4 hours PRN HR>120  diphenhydrAMINE 25 milliGRAM(s) Oral every 6 hours PRN Rash and/or Itching  HYDROcodone/homatropine Syrup 5 milliLiter(s) Oral every 6 hours PRN Cough  morphine  - Injectable 2 milliGRAM(s) IV Push every 2 hours PRN Dyspnea/ Chest pain  polyethylene glycol 3350 17 Gram(s) Oral daily PRN Constipation

## 2019-07-15 NOTE — CONSULT NOTE ADULT - ATTENDING COMMENTS
D[facility]W pt[facility]      Thank you for the opportunity to assist with the care of this patient.   [facility] Consult [facility] [phone].
pt seen and examined.  plan of care d/w np.   Pt with CAD, mid ischemia on stress test. He was on palliative care before.   We will ask EP to evaluate.   He is back on metoprolol.  We will monitor on tele

## 2019-07-15 NOTE — CONSULT NOTE ADULT - SUBJECTIVE AND OBJECTIVE BOX
Patient is a 79y old  Male who presents with a chief complaint of Acute hypoxemic respiratory failure (2019 14:49)      HPI:  78yo male with PMH HFpEF ( EF 60-65%), CAD s/p CABG (ANA-LAD, stents to LCx) with recent NST ( mild ischemia, medically managed), HTN, HLD, ESRD on HD (, , Sat) via L AVF, Lung Ca s/p radiation therapy , ILD on 5L home o2, anemia, bipolar d/o, with frequent readmissions for SOB, volume overload, and ILD.  Patient presents from rehab  for resp distress, Was in the MICU and underwent L thoracentesis with considerable improvement post procedure.  Consult called d/t patient having episode of possible AFib  and 11 beat run NSVT .  Patient denies knowledge of symptoms during those events.  c/t c/o intermittent 4/10 vague CP, unchanged from baseline. Denies palpitations, irregular and/or rapid heart beat, syncope/near syncope, dizziness, cough, edema, n/v/d, hematuria, or hematochezia.         PAST MEDICAL & SURGICAL HISTORY:  ILD (interstitial lung disease)  CAD (coronary artery disease)  Chronic anemia  ESRD (end stage renal disease): on HD (Tue-Thu-Sat) through RUE fistula  CAD (coronary artery disease): s/p remote CABG and multiple stents  Lung cancer: had yoni radiation in .  Bipolar disorder  High cholesterol  Hypertension  S/P CABG (coronary artery bypass graft)      PREVIOUS DIAGNOSTIC TESTING:      ECHO  FINDINGS:  < from: TTE Echo Complete w/Doppler (19 @ 10:37) >  Summary:   1. Technically good study.   2. Normal global left ventricular systolic function.   3. Left ventricular ejection fraction, by visual estimation, is 60 to   65%.   4. Elevated mean left atrial pressure.   5. Spectral Doppler shows pseudonormal pattern of left ventricular   myocardial filling (Grade II diastolic dysfunction).   6. Thickening of the anterior and posterior mitral valve leaflets.   7. Moderate mitral valve regurgitation.   8. Sclerotic aortic valve with normal opening.   9. Mild-moderate tricuspid regurgitation.  10. Estimated pulmonary artery systolic pressure is 45.9 mmHg assuming a   right atrial pressure of 8 mmHg, which is consistent with mild pulmonary   hypertension.  11. There is no evidence of pericardial effusion.  12. Large pleural effusion in the left lateral region.    MD Mario Electronically signed on 2019 at 7:12:39 PM    < end of copied text >      STRESS  FINDINGS:  < from: Nuclear Stress Test-Pharmacologic (19 @ 12:21) >  LV/RV OBSERVATION:  Normal LV ejection fraction.  ------------------------------------------------------------------------    IMPRESSIONS:  * There is a small, mild to moderate defectin apical  wall that is partially reversible suggestive of very mild  ischemia.  * Gated wall motion analysis is performed, and shows  paradoxical spetal motion with post stress LVEF of 54%.  * Chest Pain: No chest pain with administration of  Regadenoson.  * Symptom: No Symptom.  * HR Response: Appropriate.  * BP Response: Appropriate.  * Heart Rhythm: Normal Sinus Rhythm.  * ECG Abnormalities: There were no diagnostic changes.  * Arrhythmia: None.  * post infusion 05:50 complaints of abdominal pain vomited  gave 50mg Aminophyllin, with continued nausea and vomitine  another 25 mg given 10:42      < end of copied text >      CATHETERIZATION  FINDINGS:  < from: Cardiac Cath Lab - Adult (19 @ 16:57) >  VENTRICLES: There were no left ventricular global or regional wall motion  abnormalities. Analysis of regional contractile function demonstrated  severe diaphragmatic hypokinesis. EF estimated was 65 %.  VALVES: AORTIC VALVE: No significant aortic valve gradient.  CORONARY VESSELS: The coronary circulation is right dominant.  LM:   --  LM: Normal. The vessel was normal sized, heavily calcified, and  moderately tortuous. Angiography showed moderate atherosclerosis. There  was a discrete 30 % stenosis at the site of a prior stent, at the ostium  of the vessel segment. The lesion was without evidence of thrombus. There  was BRUCE grade 3 flow through the vessel (brisk flow).  LAD:   --  LAD: The vessel was normal sized, heavily calcified, and  moderately tortuous. Angiography showed severe atherosclerosis. There was  a diffuse 100 % stenosis at the ostium of the vessel segment. The lesion  was without evidence of thrombus. There was BRUCE grade 0 flow through the  vessel (no flow). This lesion is a chronic total occlusion. Fills via  patent ANA.  CX:   --  Circumflex: Normal. The vessel was normal sized, moderately  calcified, and excessively tortuous. Angiography showed mild  atherosclerosis with no flow limiting lesions. Patent stent in Prox LCx  and OM1.  RCA:   --  RCA: Normal. The vessel was normal sized, moderately calcified,  and moderately tortuous. Angiography showed severe atherosclerosis. There  was a diffuse 100 % stenosis at the ostium of the vessel segment, just  after RV marginal 1. The lesion was without evidence of thrombus. There  was BRCUE grade 0 flow through the vessel (no flow). Thislesion is a  chronic total occlusion.  The distal RCA fills from collaterals from the LCx.  GRAFTS:   --  Graft to the LAD: The graft was a normal sized ANA. Graft  angiography showed no degeneration, no calcification, and moderate  tortuosity.  COMPLICATIONS: There were no complications. No complications occurred  during the cath lab visit.  DIAGNOSTIC IMPRESSIONS: There is significant double vessel coronary artery  disease.  Ostial PRX=598%  Ostial OHT=685%  Patent ANA to LAD. Left ventricular function is normal.  LVEF=65%  DIAGNOSTIC RECOMMENDATIONS: The patient should continue with the present  medications. Patient management should include aggressive medical therapy,  close monitoring of BUN and creatinine, resumption of all previous  activities in 2 days, and a cardiac rehabilitation program. Medical  management is recommended.  Prepared and signed by  Job Galvan MD  Signed 2019 17:50:14    < end of copied text >      Allergies    No Known Allergies    Intolerances        MEDICATIONS  (STANDING):  ALBUTerol/ipratropium for Nebulization 3 milliLiter(s) Nebulizer every 6 hours  amLODIPine   Tablet 5 milliGRAM(s) Oral daily  aspirin  chewable 81 milliGRAM(s) Oral daily  atorvastatin 80 milliGRAM(s) Oral at bedtime  chlorhexidine 2% Cloths 1 Application(s) Topical <User Schedule>  clopidogrel Tablet 75 milliGRAM(s) Oral daily  docusate sodium 100 milliGRAM(s) Oral two times a day  epoetin nguyễn Injectable 65215 Unit(s) IV Push <User Schedule>  heparin  Injectable 5000 Unit(s) SubCutaneous every 8 hours  hydrALAZINE 50 milliGRAM(s) Oral every 8 hours  isosorbide   dinitrate Tablet (ISORDIL) 20 milliGRAM(s) Oral three times a day  levothyroxine Injectable 50 MICROGram(s) IV Push at bedtime  metoprolol tartrate 25 milliGRAM(s) Oral two times a day  pantoprazole  Injectable 40 milliGRAM(s) IV Push daily  predniSONE   Tablet 50 milliGRAM(s) Oral daily  senna 2 Tablet(s) Oral at bedtime  sevelamer carbonate 800 milliGRAM(s) Oral three times a day with meals    MEDICATIONS  (PRN):  ALPRAZolam 0.25 milliGRAM(s) Oral every 6 hours PRN anxiety  diltiazem Injectable 10 milliGRAM(s) IV Push every 4 hours PRN HR>120  diphenhydrAMINE 25 milliGRAM(s) Oral every 6 hours PRN Rash and/or Itching  HYDROcodone/homatropine Syrup 5 milliLiter(s) Oral every 6 hours PRN Cough  morphine  - Injectable 2 milliGRAM(s) IV Push every 2 hours PRN Dyspnea/ Chest pain  polyethylene glycol 3350 17 Gram(s) Oral daily PRN Constipation    Home Medications:  albuterol 2.5 mg/3 mL (0.083%) inhalation solution: 3 milliliter(s) inhaled every 6 hours, As Needed for wheezing (2019 10:43)  aspirin 81 mg oral tablet, chewable: 1 tab(s) orally once a day (2019 10:43)  diphenhydrAMINE 25 mg oral capsule: 1 cap(s) orally every 4 hours, As needed, Rash and/or Itching (2019 10:43)  docusate sodium 100 mg oral capsule: 1 cap(s) orally 2 times a day (2019 10:43)  HYDROmorphone 2 mg oral tablet: 1 tab(s) orally every 6 hours, As Needed (10 Jul 2019 11:20)  isosorbide dinitrate 30 mg oral tablet: 1 tab(s) orally 4 times a day (2019 10:43)  pantoprazole 40 mg oral delayed release tablet: 1 tab(s) orally once a day (2019 10:43)  petrolatum topical ointment: 1 application topically 3 times a day (2019 10:43)  polyethylene glycol 3350 oral powder for reconstitution: 17 gram(s) orally once a day (2019 10:43)  predniSONE 20 mg oral tablet: 1 tab(s) orally once a day (10 Jul 2019 11:18)  sevelamer carbonate 800 mg oral tablet: 1 tab(s) orally 3 times a day (with meals) (2019 10:43)      FAMILY HISTORY:  Family history of leukemia (Child)  Family history of diabetes mellitus (Child)      SOCIAL HISTORY: recently in rehab, O2 dependent 5L NC, usually resides with daughter    CIGARETTES: former    ALCOHOL: denies    REVIEW OF SYSTEMS:  CONSTITUTIONAL: No fever, weight loss, or fatigue  EYES: No eye pain, visual disturbances, or discharge  ENMT:  No difficulty hearing, tinnitus, vertigo; No sinus or throat pain  NECK: No pain or stiffness  RESPIRATORY: as per HPI  CARDIOVASCULAR: as per HPI  GASTROINTESTINAL: No abdominal or epigastric pain. No nausea, vomiting, or hematemesis; No diarrhea or constipation. No melena or hematochezia.  GENITOURINARY: No dysuria, frequency, hematuria, or incontinence  NEUROLOGICAL: No headaches, memory loss, loss of strength, numbness, or tremors  SKIN: No itching, burning, rashes, or lesions   LYMPH Nodes: No enlarged glands  ENDOCRINE: No heat or cold intolerance; No hair loss  MUSCULOSKELETAL: No joint pain or swelling; No muscle, back, or extremity pain  PSYCHIATRIC: No depression, anxiety, mood swings, or difficulty sleeping  HEME/LYMPH: No easy bruising, or bleeding gums  ALLERGY AND IMMUNOLOGIC: No hives or eczema	    Vital Signs Last 24 Hrs  T(C): 36.4 (15 Jul 2019 12:03), Max: 36.8 (2019 21:48)  T(F): 97.5 (15 Jul 2019 12:03), Max: 98.2 (2019 21:48)  HR: 75 (15 Jul 2019 12:03) (49 - 78)  BP: 141/64 (15 Jul 2019 12:03) (113/57 - 141/64)  BP(mean): --  RR: 17 (15 Jul 2019 12:03) (17 - 18)  SpO2: 97% (15 Jul 2019 12:03) (91% - 100%)    Daily     Daily Weight in k (15 Jul 2019 12:03)    I&O's Detail    15 Jul 2019 07:01  -  15 Jul 2019 14:57  --------------------------------------------------------  IN:  Total IN: 0 mL    OUT:    Other: 1000 mL  Total OUT: 1000 mL    Total NET: -1000 mL          PHYSICAL EXAM:  Appearance: Normal, well nourished	  HEENT:   Normal oral mucosa, PERRL, EOMI, sclera non-icteric	  Lymphatic: No cervical lymphadenopathy  Cardiovascular: Normal S1 S2, No JVD, II/VI systolic murmur, No carotid bruits, No peripheral edema  Respiratory: Lungs diminished bases	  Psychiatry: A & O x 3, Mood & affect appropriate  Gastrointestinal:  Soft, Non-tender, + BS, no bruits	  Skin: No rashes, No ecchymoses, No cyanosis  Neurologic: Grossly non-focal with full strength in all four extremities  Extremities: Normal range of motion, No clubbing, cyanosis or edema  Vascular: Peripheral pulses palpable 2+ bilaterally      INTERPRETATION OF TELEMETRY: SR 60s with frequent PVCs,  11 beat run NSVT,  atrial tachycardia vs PAF    ECG: SB at 58bpm    LABS:                        8.7    x     )-----------( x        ( 2019 09:12 )             29.6     07-14    131<L>  |  91<L>  |  64.0<H>  ----------------------------<  88  5.2   |  27.0  |  7.34<H>    Ca    8.2<L>      2019 09:12  Mg     2.0                   I&O's Summary    15 Jul 2019 07:01  -  15 Jul 2019 14:57  --------------------------------------------------------  IN: 0 mL / OUT: 1000 mL / NET: -1000 mL      BNP    RADIOLOGY & ADDITIONAL STUDIES:  < from: Xray Chest 2 Views PA/Lat (19 @ 15:48) >   EXAM:  XR CHEST PA LAT 2V                          PROCEDURE DATE:  2019          INTERPRETATION:  CLINICAL INFORMATION: follow pnuemothorax. pneumothorax   s/p thora. ADMDIAG1: R06.02 SHORTNESS OF BREATH/.    EXAM: PA and lateral chest.    COMPARISON: Chest radiograph 2019 at 7:55 PM.    FINDINGS:  Diffuse opacities in bilateral lungs again seen. Biapical opacities,   right greater than left, unchanged.   Small bilateral pleural effusions. Previously seen left pneumothorax is   no longer seen.  There is cardiomegaly.  Status post median sternotomy and CABG.    IMPRESSION:   No evidence of pneumothorax. Small bilateral pleural effusions.   Diffuse opacities in bilateral lungs again seen.     < end of copied text > Patient is a 79y old  Male who presents with a chief complaint of Acute hypoxemic respiratory failure (2019 14:49)      HPI:  80yo male with PMH HFpEF ( EF 60-65%), CAD s/p CABG (ANA-LAD, stents to LCx) with recent NST ( mild ischemia, medically managed), HTN, HLD, ESRD on HD (, , Sat) via L AVF, Lung Ca s/p radiation therapy , ILD on 5L home o2, anemia, bipolar d/o, with frequent readmissions for SOB, volume overload, and ILD.  Patient presents from rehab  for resp distress, Was in the MICU and underwent L thoracentesis with considerable improvement post procedure.  Consult called d/t patient having episode of possible AFib  and 11 beat run NSVT .  Patient denies knowledge of symptoms during those events.  c/t c/o intermittent 4/10 vague CP, unchanged from baseline. Denies palpitations, irregular and/or rapid heart beat, syncope/near syncope, dizziness, cough, edema, n/v/d, hematuria, or hematochezia.     PAST MEDICAL & SURGICAL HISTORY:  ILD (interstitial lung disease)  CAD (coronary artery disease)  Chronic anemia  ESRD (end stage renal disease): on HD (Tue-Thu-Sat) through RUE fistula  CAD (coronary artery disease): s/p remote CABG and multiple stents  Lung cancer: had yoni radiation in .  Bipolar disorder  High cholesterol  Hypertension  S/P CABG (coronary artery bypass graft)    PREVIOUS DIAGNOSTIC TESTING:      ECHO  FINDINGS:  < from: TTE Echo Complete w/Doppler (19 @ 10:37) >  Summary:   1. Technically good study.   2. Normal global left ventricular systolic function.   3. Left ventricular ejection fraction, by visual estimation, is 60 to   65%.   4. Elevated mean left atrial pressure.   5. Spectral Doppler shows pseudonormal pattern of left ventricular   myocardial filling (Grade II diastolic dysfunction).   6. Thickening of the anterior and posterior mitral valve leaflets.   7. Moderate mitral valve regurgitation.   8. Sclerotic aortic valve with normal opening.   9. Mild-moderate tricuspid regurgitation.  10. Estimated pulmonary artery systolic pressure is 45.9 mmHg assuming a   right atrial pressure of 8 mmHg, which is consistent with mild pulmonary   hypertension.  11. There is no evidence of pericardial effusion.  12. Large pleural effusion in the left lateral region.    MD Mario Electronically signed on 2019 at 7:12:39 PM    < end of copied text >      STRESS  FINDINGS:  < from: Nuclear Stress Test-Pharmacologic (19 @ 12:21) >  LV/RV OBSERVATION:  Normal LV ejection fraction.  ------------------------------------------------------------------------    IMPRESSIONS:  * There is a small, mild to moderate defectin apical  wall that is partially reversible suggestive of very mild  ischemia.  * Gated wall motion analysis is performed, and shows  paradoxical spetal motion with post stress LVEF of 54%.  * Chest Pain: No chest pain with administration of  Regadenoson.  * Symptom: No Symptom.  * HR Response: Appropriate.  * BP Response: Appropriate.  * Heart Rhythm: Normal Sinus Rhythm.  * ECG Abnormalities: There were no diagnostic changes.  * Arrhythmia: None.  * post infusion 05:50 complaints of abdominal pain vomited  gave 50mg Aminophyllin, with continued nausea and vomitine  another 25 mg given 10:42      CATHETERIZATION  FINDINGS:  < from: Cardiac Cath Lab - Adult (19 @ 16:57) >  VENTRICLES: There were no left ventricular global or regional wall motion  abnormalities. Analysis of regional contractile function demonstrated  severe diaphragmatic hypokinesis. EF estimated was 65 %.  VALVES: AORTIC VALVE: No significant aortic valve gradient.  CORONARY VESSELS: The coronary circulation is right dominant.  LM:   --  LM: Normal. The vessel was normal sized, heavily calcified, and  moderately tortuous. Angiography showed moderate atherosclerosis. There  was a discrete 30 % stenosis at the site of a prior stent, at the ostium  of the vessel segment. The lesion was without evidence of thrombus. There  was BRUCE grade 3 flow through the vessel (brisk flow).  LAD:   --  LAD: The vessel was normal sized, heavily calcified, and  moderately tortuous. Angiography showed severe atherosclerosis. There was  a diffuse 100 % stenosis at the ostium of the vessel segment. The lesion  was without evidence of thrombus. There was BRUCE grade 0 flow through the  vessel (no flow). This lesion is a chronic total occlusion. Fills via  patent ANA.  CX:   --  Circumflex: Normal. The vessel was normal sized, moderately  calcified, and excessively tortuous. Angiography showed mild  atherosclerosis with no flow limiting lesions. Patent stent in Prox LCx  and OM1.  RCA:   --  RCA: Normal. The vessel was normal sized, moderately calcified,  and moderately tortuous. Angiography showed severe atherosclerosis. There  was a diffuse 100 % stenosis at the ostium of the vessel segment, just  after RV marginal 1. The lesion was without evidence of thrombus. There  was BRUCE grade 0 flow through the vessel (no flow). Thislesion is a  chronic total occlusion.  The distal RCA fills from collaterals from the LCx.  GRAFTS:   --  Graft to the LAD: The graft was a normal sized ANA. Graft  angiography showed no degeneration, no calcification, and moderate  tortuosity.  COMPLICATIONS: There were no complications. No complications occurred  during the cath lab visit.  DIAGNOSTIC IMPRESSIONS: There is significant double vessel coronary artery  disease.  Ostial BYI=530%  Ostial CKY=835%  Patent ANA to LAD. Left ventricular function is normal.  LVEF=65%  DIAGNOSTIC RECOMMENDATIONS: The patient should continue with the present  medications. Patient management should include aggressive medical therapy,  close monitoring of BUN and creatinine, resumption of all previous  activities in 2 days, and a cardiac rehabilitation program. Medical  management is recommended.  Prepared and signed by  Job Galvan MD  Signed 2019 17:50:14    < end of copied text >    Allergies  No Known Allergies    MEDICATIONS  (STANDING):  ALBUTerol/ipratropium for Nebulization 3 milliLiter(s) Nebulizer every 6 hours  amLODIPine   Tablet 5 milliGRAM(s) Oral daily  aspirin  chewable 81 milliGRAM(s) Oral daily  atorvastatin 80 milliGRAM(s) Oral at bedtime  chlorhexidine 2% Cloths 1 Application(s) Topical <User Schedule>  clopidogrel Tablet 75 milliGRAM(s) Oral daily  docusate sodium 100 milliGRAM(s) Oral two times a day  epoetin nguyễn Injectable 28944 Unit(s) IV Push <User Schedule>  heparin  Injectable 5000 Unit(s) SubCutaneous every 8 hours  hydrALAZINE 50 milliGRAM(s) Oral every 8 hours  isosorbide   dinitrate Tablet (ISORDIL) 20 milliGRAM(s) Oral three times a day  levothyroxine Injectable 50 MICROGram(s) IV Push at bedtime  metoprolol tartrate 25 milliGRAM(s) Oral two times a day  pantoprazole  Injectable 40 milliGRAM(s) IV Push daily  predniSONE   Tablet 50 milliGRAM(s) Oral daily  senna 2 Tablet(s) Oral at bedtime  sevelamer carbonate 800 milliGRAM(s) Oral three times a day with meals    MEDICATIONS  (PRN):  ALPRAZolam 0.25 milliGRAM(s) Oral every 6 hours PRN anxiety  diltiazem Injectable 10 milliGRAM(s) IV Push every 4 hours PRN HR>120  diphenhydrAMINE 25 milliGRAM(s) Oral every 6 hours PRN Rash and/or Itching  HYDROcodone/homatropine Syrup 5 milliLiter(s) Oral every 6 hours PRN Cough  morphine  - Injectable 2 milliGRAM(s) IV Push every 2 hours PRN Dyspnea/ Chest pain  polyethylene glycol 3350 17 Gram(s) Oral daily PRN Constipation    Home Medications:  albuterol 2.5 mg/3 mL (0.083%) inhalation solution: 3 milliliter(s) inhaled every 6 hours, As Needed for wheezing (2019 10:43)  aspirin 81 mg oral tablet, chewable: 1 tab(s) orally once a day (2019 10:43)  diphenhydrAMINE 25 mg oral capsule: 1 cap(s) orally every 4 hours, As needed, Rash and/or Itching (2019 10:43)  docusate sodium 100 mg oral capsule: 1 cap(s) orally 2 times a day (2019 10:43)  HYDROmorphone 2 mg oral tablet: 1 tab(s) orally every 6 hours, As Needed (10 Jul 2019 11:20)  isosorbide dinitrate 30 mg oral tablet: 1 tab(s) orally 4 times a day (2019 10:43)  pantoprazole 40 mg oral delayed release tablet: 1 tab(s) orally once a day (2019 10:43)  petrolatum topical ointment: 1 application topically 3 times a day (2019 10:43)  polyethylene glycol 3350 oral powder for reconstitution: 17 gram(s) orally once a day (2019 10:43)  predniSONE 20 mg oral tablet: 1 tab(s) orally once a day (10 Jul 2019 11:18)  sevelamer carbonate 800 mg oral tablet: 1 tab(s) orally 3 times a day (with meals) (2019 10:43)      FAMILY HISTORY:  Family history of leukemia (Child)  Family history of diabetes mellitus (Child)      SOCIAL HISTORY: recently in rehab, O2 dependent 5L NC, usually resides with daughter  CIGARETTES: former  ALCOHOL: denies    REVIEW OF SYSTEMS:  CONSTITUTIONAL: No fever, weight loss, or fatigue  EYES: No eye pain, visual disturbances, or discharge  ENMT:  No difficulty hearing, tinnitus, vertigo; No sinus or throat pain  NECK: No pain or stiffness  RESPIRATORY: as per HPI  CARDIOVASCULAR: as per HPI  GASTROINTESTINAL: No abdominal or epigastric pain. No nausea, vomiting, or hematemesis; No diarrhea or constipation. No melena or hematochezia.  GENITOURINARY: No dysuria, frequency, hematuria, or incontinence  NEUROLOGICAL: No headaches, memory loss, loss of strength, numbness, or tremors  SKIN: No itching, burning, rashes, or lesions   LYMPH Nodes: No enlarged glands  ENDOCRINE: No heat or cold intolerance; No hair loss  MUSCULOSKELETAL: No joint pain or swelling; No muscle, back, or extremity pain  PSYCHIATRIC: No depression, anxiety, mood swings, or difficulty sleeping  HEME/LYMPH: No easy bruising, or bleeding gums  ALLERGY AND IMMUNOLOGIC: No hives or eczema	    Vital Signs Last 24 Hrs  T(C): 36.4 (15 Jul 2019 12:03), Max: 36.8 (2019 21:48)  T(F): 97.5 (15 Jul 2019 12:03), Max: 98.2 (2019 21:48)  HR: 75 (15 Jul 2019 12:03) (49 - 78)  BP: 141/64 (15 Jul 2019 12:03) (113/57 - 141/64)  BP(mean): --  RR: 17 (15 Jul 2019 12:03) (17 - 18)  SpO2: 97% (15 Jul 2019 12:03) (91% - 100%)    Daily     Daily Weight in k (15 Jul 2019 12:03)    I&O's Detail    15 Jul 2019 07:01  -  15 Jul 2019 14:57  --------------------------------------------------------  IN:  Total IN: 0 mL    OUT:    Other: 1000 mL  Total OUT: 1000 mL    Total NET: -1000 mL    PHYSICAL EXAM:  Appearance: Normal, well nourished	  HEENT:   Normal oral mucosa, PERRL, EOMI, sclera non-icteric	  Lymphatic: No cervical lymphadenopathy  Cardiovascular: Normal S1 S2, No JVD, II/VI systolic murmur, No carotid bruits, No peripheral edema  Respiratory: Lungs diminished bases	  Psychiatry: A & O x 3, Mood & affect appropriate  Gastrointestinal:  Soft, Non-tender, + BS, no bruits	  Skin: No rashes, No ecchymoses, No cyanosis  Neurologic: Grossly non-focal with full strength in all four extremities  Extremities: Normal range of motion, No clubbing, cyanosis or edema  Vascular: Peripheral pulses palpable 2+ bilaterally    INTERPRETATION OF TELEMETRY: SR 60s with frequent PVCs,  11 beat run NSVT,  atrial tachycardia vs PAF  ECG: SB at 58bpm    LABS:                        8.7    x     )-----------( x        ( 2019 09:12 )             29.6     07-14    131<L>  |  91<L>  |  64.0<H>  ----------------------------<  88  5.2   |  27.0  |  7.34<H>    Ca    8.2<L>      2019 09:12  Mg     2.0     -      I&O's Summary    15 Jul 2019 07:01  -  15 Jul 2019 14:57  --------------------------------------------------------  IN: 0 mL / OUT: 1000 mL / NET: -1000 mL    RADIOLOGY & ADDITIONAL STUDIES:  < from: Xray Chest 2 Views PA/Lat (19 @ 15:48) >   EXAM:  XR CHEST PA LAT 2V                          PROCEDURE DATE:  2019          INTERPRETATION:  CLINICAL INFORMATION: follow pnuemothorax. pneumothorax   s/p thora. ADMDIAG1: R06.02 SHORTNESS OF BREATH/.    EXAM: PA and lateral chest.  COMPARISON: Chest radiograph 2019 at 7:55 PM.  FINDINGS:  Diffuse opacities in bilateral lungs again seen. Biapical opacities,   right greater than left, unchanged.   Small bilateral pleural effusions. Previously seen left pneumothorax is   no longer seen.  There is cardiomegaly.  Status post median sternotomy and CABG.    IMPRESSION:   No evidence of pneumothorax. Small bilateral pleural effusions.   Diffuse opacities in bilateral lungs again seen.

## 2019-07-15 NOTE — PROGRESS NOTE ADULT - ASSESSMENT
-Combined emphysema and ILD  -Pleural effusion; transudative  -Fluid overload  -PTX post thoracentesis improving  -Acute on chronic hypoxic resp failure  -Hyperkalemia  -Renal failure; on HD    RECC:  O2; titrate. BPAP prn. Follow CXR. Prednisone taper. Nebs. HD per renal. Follow lytes.     Prognosis poor.     Pt DNR. -Combined emphysema and ILD  -Pleural effusion; transudative  -Fluid overload  -PTX post thoracentesis improving  -Acute on chronic hypoxic resp failure  -Hyperkalemia  -Renal failure; on HD    RECC:  O2; titrate. BPAP prn. Follow CXR. Prednisone taper slowly back down to dose he was on at home. Nebs. HD per renal. Follow lytes.     Prognosis poor.     Pt DNR.

## 2019-07-15 NOTE — PHYSICAL THERAPY INITIAL EVALUATION ADULT - ADDITIONAL COMMENTS
Pt. reports he lives in a house with his family with no stairs to enter and none inside. Pt. reports his family is at home at all times to assist him as needed. Pt. reports he was modified independent vs requiring assistance from his family PTA. Pt. owns a RW and no other DME. Wears home O2. Was at subacute rehab prior to arrival

## 2019-07-15 NOTE — PROGRESS NOTE ADULT - SUBJECTIVE AND OBJECTIVE BOX
PULMONARY PROGRESS NOTE      KAUSHIK HOFFMANYOVANNY-[id number]    Patient is a 79y old  Male who presents with a chief complaint of Acute hypoxemic respiratory failure (15 Jul 2019 14:56)      INTERVAL HPI/OVERNIGHT EVENTS: Still with sob. Had HD today.    MEDICATIONS  (STANDING):  ALBUTerol/ipratropium for Nebulization 3 milliLiter(s) Nebulizer every 6 hours  amLODIPine   Tablet 5 milliGRAM(s) Oral daily  aspirin  chewable 81 milliGRAM(s) Oral daily  atorvastatin 80 milliGRAM(s) Oral at bedtime  chlorhexidine 2% Cloths 1 Application(s) Topical <User Schedule>  clopidogrel Tablet 75 milliGRAM(s) Oral daily  docusate sodium 100 milliGRAM(s) Oral two times a day  epoetin nguyễn Injectable 68836 Unit(s) IV Push <User Schedule>  heparin  Injectable 5000 Unit(s) SubCutaneous every 8 hours  hydrALAZINE 50 milliGRAM(s) Oral every 8 hours  isosorbide   dinitrate Tablet (ISORDIL) 20 milliGRAM(s) Oral three times a day  levothyroxine Injectable 50 MICROGram(s) IV Push at bedtime  metoprolol tartrate 25 milliGRAM(s) Oral two times a day  pantoprazole  Injectable 40 milliGRAM(s) IV Push daily  predniSONE   Tablet 50 milliGRAM(s) Oral daily  senna 2 Tablet(s) Oral at bedtime  sevelamer carbonate 800 milliGRAM(s) Oral three times a day with meals      MEDICATIONS  (PRN):  ALPRAZolam 0.25 milliGRAM(s) Oral every 6 hours PRN anxiety  diltiazem Injectable 10 milliGRAM(s) IV Push every 4 hours PRN HR>120  diphenhydrAMINE 25 milliGRAM(s) Oral every 6 hours PRN Rash and/or Itching  HYDROcodone/homatropine Syrup 5 milliLiter(s) Oral every 6 hours PRN Cough  morphine  - Injectable 2 milliGRAM(s) IV Push every 2 hours PRN Dyspnea/ Chest pain  polyethylene glycol 3350 17 Gram(s) Oral daily PRN Constipation      Allergies    No Known Allergies    Intolerances        PAST MEDICAL & SURGICAL HISTORY:  ILD (interstitial lung disease)  CAD (coronary artery disease)  Chronic anemia  ESRD (end stage renal disease): on HD (Tue-Thu-Sat) through RUE fistula  CAD (coronary artery disease): s/p remote CABG and multiple stents  Lung cancer: had yoni radiation in 2006.  Bipolar disorder  High cholesterol  Hypertension  S/P CABG (coronary artery bypass graft)      SOCIAL HISTORY  Smoking History: former smoker      REVIEW OF SYSTEMS:    CONSTITUTIONAL:  No distress    HEENT:  Eyes:  No diplopia or blurred vision. ENT:  No earache, sore throat or runny nose.    CARDIOVASCULAR:  No pressure, squeezing, tightness, heaviness or aching about the chest; no palpitations.    RESPIRATORY:  per HPI     GASTROINTESTINAL:  No nausea, vomiting or diarrhea.    GENITOURINARY:  No dysuria, frequency or urgency.    NEUROLOGIC:  No paresthesias, fasciculations, seizures or weakness.    PSYCHIATRIC:  No disorder of thought or mood.    Vital Signs Last 24 Hrs  T(C): 36.4 (15 Jul 2019 12:03), Max: 36.8 (14 Jul 2019 21:48)  T(F): 97.5 (15 Jul 2019 12:03), Max: 98.2 (14 Jul 2019 21:48)  HR: 65 (15 Jul 2019 15:41) (49 - 75)  BP: 141/64 (15 Jul 2019 12:03) (117/54 - 141/64)  BP(mean): --  RR: 17 (15 Jul 2019 12:03) (17 - 18)  SpO2: 97% (15 Jul 2019 15:41) (92% - 100%)    PHYSICAL EXAMINATION:    GENERAL: The patient is awake and alert in no apparent distress.     HEENT: Head is normocephalic and atraumatic.  Mucous membranes are moist.    NECK: Supple.    LUNGS: rales b/l, no wheeze, respirations unlabored    HEART: Regular rate and rhythm      ABDOMEN: Soft, nontender, and nondistended.      EXTREMITIES: Without any cyanosis, clubbing, rash, lesions or edema.    NEUROLOGIC: Grossly intact.    LABS:                        8.7    x     )-----------( x        ( 14 Jul 2019 09:12 )             29.6     07-14    131<L>  |  91<L>  |  64.0<H>  ----------------------------<  88  5.2   |  27.0  |  7.34<H>    Ca    8.2<L>      14 Jul 2019 09:12  Mg     2.0     07-14                  RADIOLOGY & ADDITIONAL STUDIES:  < from: Xray Chest 2 Views PA/Lat (07.13.19 @ 15:48) >  EXAM:  XR CHEST PA LAT 2V                          PROCEDURE DATE:  07/13/2019          INTERPRETATION:  CLINICAL INFORMATION: follow pnuemothorax. pneumothorax   s/p thora. ADMDIAG1: R06.02 SHORTNESS OF BREATH/.    EXAM: PA and lateral chest.    COMPARISON: Chest radiograph 7/11/2019 at 7:55 PM.    FINDINGS:  Diffuse opacities in bilateral lungs again seen. Biapical opacities,   right greater than left, unchanged.   Small bilateral pleural effusions. Previously seen left pneumothorax is   no longer seen.  There is cardiomegaly.  Status post median sternotomy and CABG.    IMPRESSION:   No evidence of pneumothorax. Small bilateral pleural effusions.   Diffuse opacities in bilateral lungs again seen.                 COURTNEY ROBERTS     < end of copied text >

## 2019-07-15 NOTE — CONSULT NOTE ADULT - ASSESSMENT
A/P:  80yo male with PMH HFpEF (7/11 EF 60-65%), CAD s/p CABG (ANA-LAD, stents to LCx) with recent NST (04/19 mild ischemia, medically managed), HTN, HLD, ESRD on HD (T, Th, Sat) via L AVF, Lung Ca s/p radiation therapy 2006, ILD on 5L home o2, anemia, bipolar d/o, with frequent readmissions for SOB, volume overload, and ILD.  Patient presents from rehab 7/11 for resp distress, Was in the MICU and underwent L thoracentesis with considerable improvement post procedure.  Consult called d/t patient having episode of possible AFib 7/12 and 11 beat run NSVT 7/14.  Patient denies knowledge of symptoms during those events.  c/t c/o intermittent 4/10 vague CP, unchanged from baseline.       1. Arrhythmia   - current SR in the 50-60s, likely due to PVCs  - 7/11 episode atrial tach vs PAF  - 7/14 11beat run NSVT  - Episodes of Tachy/Alvaro on telemetry during previous admissions   - c/w Lopressor 25mg PO BID  - plan for ILR placement tomorrow    2. Acute on Chronic hypoxic respiratory failure   - Multifactorial in nature with lung cancer, pulmonary fibrosis on prednisone, HFpEF   - Left pleural effusion transudative secondary to ESRD, culture negative to date  - TTE 7/11 EF 60-65%  - consider resuming bumex 2mg PO qday  - Nephro following  - Symptoms already improving  - Palliative following, family requesting HD continue, not candidate for Hospice  - Cont supplement Oxygen  - Continue HD    3. CAD  - Intermittent chest pain today.  - Recent cardiac cath 3/12, no new blockages  - Nuclear stress test 04/19 with mild ischemia, medically managed  - No further interventions planned at this time.   - Troponin elevated secondary to ESRD, no further work up  - Continue ASA, statin, metoprolol, losartan, and Imdur
79 M with ESRD on HD, CAD, h/o lung cancer, severe ILD, HFpEF, recurrent hospitalizations readmitted less than 24 hour from discharge to rehab for acute respiratory failure with hypoxia requiring NIPPV.     PLAN    Acute Respiratory Failure with Hypoxia/Hypercarbia  Severe Diffuse ILD/Pleural effusions  - s/p 1 dose of azithromycin, zosyn, vanco for ?PNA (clinically not supportive of infectious process)  - weaned off BIPAP to HFNC 50% today  - multifactorial: diffuse ILD, bilateral pleural effusion, anxiety  - increased baseline O2 requirement was 3L prior to last admission, D/C'ed on 5L  - overall poor prognosis; multiple readmissions for similar presentation  - mgnt per ICU, pulm eval, IV methylprednisolone, bronchodilators, IV morphine PRN    ESRD on HD  - plan per nephrology    HFpEF  - no evidence clinically of fluid overload    CAD/HTN/HLD  - ASA, plavix, amlodipine, statin, nitrate    Anxiety  - alprazolam 0.25 mg PRN    Constipation  - last BM 2 days ago per pt, will add senna and colace    Palliative Care Encounter  Met with pt and nephew at bedside. He continues to have intermittent dyspnea and pleuritic chest pain. He is known to me from previous admission; GOC at that time pt/family wanted to continue HD (precludes eligibility for hospice services) and COREY. His overall prognosis is poor given complexity of comorbidities (ex. severe ILD, ESRD, CHF), rehospitalization and frailty. Pt continues to defer medical decision making to his HOSSEIN Boris and dtr Carin. A MOLST was completed by MICU team with HOSSEIN today for DNR/I. Will follow hospital course for clinical improvement and ongoing goc.
-Combined emphysema and ILD  -Pleural effusion; transudative  -Fluid overload  -PTX post thoracentesis improving  -Acute on chronic hypoxic resp failure  -Hyperkalemia    RECC:  O2; titrate. BPAP prn. Follow CXR. Prednisone. Nebs. HD per renal. Follow lytes.     Prognosis poor.     Pt DNR.
ESRD: hyperkalemia noted  - HD today with UF as tolerates    Anemia: ANUJ at HD    RO: hyperphosphatemia  - low phos diet  - cont binders

## 2019-07-15 NOTE — PHYSICAL THERAPY INITIAL EVALUATION ADULT - GAIT PATTERN USED, PT EVAL
O2 sat dropped to 82% p ambulation on 6L, rising to 90% p 1 minute sitting rest break, decreased gait velocity and activity tolerance, RN aware of function

## 2019-07-15 NOTE — PHYSICAL THERAPY INITIAL EVALUATION ADULT - GENERAL OBSERVATIONS, REHAB EVAL
pt received sitting in chair at bedside, O2 line, compression boots, [facility] language line ID# [id number].

## 2019-07-15 NOTE — PROGRESS NOTE ADULT - ASSESSMENT
1) ESRD on HD  2)MBD of renal dx  3) Anemia of renal dx  4) Vol HTN    Seen on HD this AM ;tolerating;  Next planned for Wed

## 2019-07-15 NOTE — PROGRESS NOTE ADULT - ASSESSMENT
Pt is 78 y/o Male with a history of ESRD on HD, HTN, HFpEF, CAD s/p CABG, and lung cancer s/p radiation on Home Oxygen, presenting with increased dyspnea, likely multifactorial with underlying lung cancer, ILD/ pulmonary fibrosis and volume overload with CHF/ ESRD and admitted with with acute hypoxic respiratory failure requiring NIV in MICU - downgraded     Acute on Chronic hypoxic respiratory failure - Multifactorial in nature with lung cancer, pulmonary fibrosis on prednisone, and heart failure. Left pleural effusion transudative secondary to ESRD. Culture negative to date  - Inhaled bronchodilator therapy and supplemental oxygen.  Palliative care following.  - Continue Prednisone, taper slowly    AF, paroxysmal. Due to BB being discontinued last hospitalization secondary to bradycardia. CAD. Elevated Troponin however upon review of prior chart it seems patient has chronically elevated Troponin (as high as 0.3)  - On aspirin, clopidogrel, atorvastatin and Nitrate  - Resumed BB    - Continue to monitor. PRN Cardizem  - Prior TTE, Cardiac cath results reviewed - recommendations for medical management from Cardiology    Hypertension - Close blood pressure monitoring. On amlodipine     Chronic diastolic heart failure - Not in acute exacerbation. On isosorbide. Hemodialysis as scheduled. Pleural effusion drained    ESRD - Hemodialysis as scheduled.    Hypothyroidism - On levothyroxine.        Poor prognosis, advanced illnesses with multiple readmissions. DNR DNI obtained in ICU earlier today    Disposition - pending clinical improvement    Discussed with patient, RN at bedside

## 2019-07-15 NOTE — PROGRESS NOTE ADULT - SUBJECTIVE AND OBJECTIVE BOX
[facility] DIVISION OF KIDNEY DISEASES AND HYPERTENSION -- HEMODIALYSIS NOTE  --------------------------------------------------------------------------------  Chief Complaint: ESRD/Ongoing hemodialysis requirement    24 hour events/subjective:  Seen/examined on HD this AM      PAST HISTORY  --------------------------------------------------------------------------------  No significant changes to PMH, PSH, FHx, SHx, unless otherwise noted    ALLERGIES & MEDICATIONS  --------------------------------------------------------------------------------  Allergies    No Known Allergies    Intolerances      Standing Inpatient Medications  ALBUTerol/ipratropium for Nebulization 3 milliLiter(s) Nebulizer every 6 hours  amLODIPine   Tablet 5 milliGRAM(s) Oral daily  aspirin  chewable 81 milliGRAM(s) Oral daily  atorvastatin 80 milliGRAM(s) Oral at bedtime  chlorhexidine 2% Cloths 1 Application(s) Topical <User Schedule>  clopidogrel Tablet 75 milliGRAM(s) Oral daily  docusate sodium 100 milliGRAM(s) Oral two times a day  epoetin nguyễn Injectable 82123 Unit(s) IV Push <User Schedule>  heparin  Injectable 5000 Unit(s) SubCutaneous every 8 hours  hydrALAZINE 50 milliGRAM(s) Oral every 8 hours  isosorbide   dinitrate Tablet (ISORDIL) 20 milliGRAM(s) Oral three times a day  levothyroxine Injectable 50 MICROGram(s) IV Push at bedtime  metoprolol tartrate 25 milliGRAM(s) Oral two times a day  pantoprazole  Injectable 40 milliGRAM(s) IV Push daily  predniSONE   Tablet 50 milliGRAM(s) Oral daily  senna 2 Tablet(s) Oral at bedtime  sevelamer carbonate 800 milliGRAM(s) Oral three times a day with meals    PRN Inpatient Medications  ALPRAZolam 0.25 milliGRAM(s) Oral every 6 hours PRN  diltiazem Injectable 10 milliGRAM(s) IV Push every 4 hours PRN  diphenhydrAMINE 25 milliGRAM(s) Oral every 6 hours PRN  HYDROcodone/homatropine Syrup 5 milliLiter(s) Oral every 6 hours PRN  morphine  - Injectable 2 milliGRAM(s) IV Push every 2 hours PRN  polyethylene glycol 3350 17 Gram(s) Oral daily PRN      REVIEW OF SYSTEMS  --------------------------------------------------------------------------------  Gen: No weight changes, fatigue, fevers/chills, weakness  Skin: No rashes  Head/Eyes/Ears/Mouth: No headache; Normal hearing; Normal vision w/o blurriness; No sinus pain/discomfort, sore throat  Respiratory: No dyspnea, cough, wheezing, hemoptysis  CV: No chest pain, PND, orthopnea  GI: No abdominal pain, diarrhea, constipation, nausea, vomiting, melena, hematochezia  : No increased frequency, dysuria, hematuria, nocturia  MSK: No joint pain/swelling; no back pain; no edema  Neuro: No dizziness/lightheadedness, weakness, seizures, numbness, tingling  Heme: No easy bruising or bleeding  Endo: No heat/cold intolerance  Psych: No significant nervousness, anxiety, stress, depression    All other systems were reviewed and are negative, except as noted.    VITALS/PHYSICAL EXAM  --------------------------------------------------------------------------------  T(C): 37.1 (07-15-19 @ 16:00), Max: 37.1 (07-15-19 @ 16:00)  HR: 66 (07-15-19 @ 16:00) (49 - 75)  BP: 126/58 (07-15-19 @ 16:00) (117/54 - 141/64)  RR: 18 (07-15-19 @ 16:00) (17 - 18)  SpO2: 100% (07-15-19 @ 16:00) (92% - 100%)  Wt(kg): --        07-15-19 @ 07:01  -  07-15-19 @ 17:49  --------------------------------------------------------  IN: 0 mL / OUT: 1000 mL / NET: -1000 mL      Physical Exam:  	Gen: NAD, pale; frail  	HEENT: PERRL, supple neck, clear oropharynx  	Pulm: CTA B/L  	CV: RRR, S1S2; no rub  	Back: No spinal or CVA tenderness; no sacral edema  	Abd: +BS, soft, nontender/nondistended  	: No suprapubic tenderness  	UE: Warm, FROM, no clubbing, intact strength; no edema; no asterixis  	LE: Warm, FROM, no clubbing, intact strength; no edema  	Neuro: No focal deficits, intact gait  	Psych: Normal affect and mood  	Skin: Warm, without rashes  	Vascular access:    LABS/STUDIES  --------------------------------------------------------------------------------              8.7    x     >-----------<  x        [07-14-19 @ 09:12]              29.6     131  |  91  |  64.0  ----------------------------<  88      [07-14-19 @ 09:12]  5.2   |  27.0  |  7.34        Ca     8.2     [07-14-19 @ 09:12]      Mg     2.0     [07-14-19 @ 09:12]            Iron 58, TIBC 256, %sat 23      [06-19-19 @ 08:42]  Ferritin 812      [06-19-19 @ 08:42]  PTH -- (Ca 8.1)      [07-01-19 @ 13:24]   83  TSH 0.89      [05-23-19 @ 06:28]  Lipid: chol 159, , HDL 32, LDL 94      [01-29-19 @ 11:39]    HBsAb <3.0      [06-27-19 @ 19:39]  HBsAb Nonreact      [06-27-19 @ 19:39]  HBsAg Reactive      [06-27-19 @ 19:39]  HBcAb Nonreact      [06-27-19 @ 19:39]  HCV 0.10, Nonreact      [06-27-19 @ 19:39]

## 2019-07-16 LAB
CULTURE RESULTS: SIGNIFICANT CHANGE UP
NON-GYNECOLOGICAL CYTOLOGY STUDY: SIGNIFICANT CHANGE UP
SPECIMEN SOURCE: SIGNIFICANT CHANGE UP

## 2019-07-16 PROCEDURE — 99233 SBSQ HOSP IP/OBS HIGH 50: CPT

## 2019-07-16 PROCEDURE — 99232 SBSQ HOSP IP/OBS MODERATE 35: CPT

## 2019-07-16 PROCEDURE — 90937 HEMODIALYSIS REPEATED EVAL: CPT

## 2019-07-16 RX ORDER — PANTOPRAZOLE SODIUM 20 MG/1
40 TABLET, DELAYED RELEASE ORAL
Refills: 0 | Status: DISCONTINUED | OUTPATIENT
Start: 2019-07-16 | End: 2019-07-26

## 2019-07-16 RX ORDER — LEVOTHYROXINE SODIUM 125 MCG
100 TABLET ORAL DAILY
Refills: 0 | Status: DISCONTINUED | OUTPATIENT
Start: 2019-07-16 | End: 2019-07-26

## 2019-07-16 RX ADMIN — Medication 50 MILLIGRAM(S): at 21:14

## 2019-07-16 RX ADMIN — POLYETHYLENE GLYCOL 3350 17 GRAM(S): 17 POWDER, FOR SOLUTION ORAL at 12:40

## 2019-07-16 RX ADMIN — Medication 81 MILLIGRAM(S): at 12:40

## 2019-07-16 RX ADMIN — Medication 25 MILLIGRAM(S): at 05:09

## 2019-07-16 RX ADMIN — Medication 25 MILLIGRAM(S): at 21:14

## 2019-07-16 RX ADMIN — Medication 3 MILLILITER(S): at 03:23

## 2019-07-16 RX ADMIN — HEPARIN SODIUM 5000 UNIT(S): 5000 INJECTION INTRAVENOUS; SUBCUTANEOUS at 21:14

## 2019-07-16 RX ADMIN — Medication 50 MILLIGRAM(S): at 13:12

## 2019-07-16 RX ADMIN — AMLODIPINE BESYLATE 5 MILLIGRAM(S): 2.5 TABLET ORAL at 05:09

## 2019-07-16 RX ADMIN — Medication 3 MILLILITER(S): at 20:38

## 2019-07-16 RX ADMIN — ISOSORBIDE DINITRATE 20 MILLIGRAM(S): 5 TABLET ORAL at 13:12

## 2019-07-16 RX ADMIN — MORPHINE SULFATE 2 MILLIGRAM(S): 50 CAPSULE, EXTENDED RELEASE ORAL at 12:41

## 2019-07-16 RX ADMIN — ISOSORBIDE DINITRATE 20 MILLIGRAM(S): 5 TABLET ORAL at 21:14

## 2019-07-16 RX ADMIN — ISOSORBIDE DINITRATE 20 MILLIGRAM(S): 5 TABLET ORAL at 05:09

## 2019-07-16 RX ADMIN — Medication 3 MILLILITER(S): at 08:23

## 2019-07-16 RX ADMIN — Medication 100 MILLIGRAM(S): at 05:09

## 2019-07-16 RX ADMIN — PANTOPRAZOLE SODIUM 40 MILLIGRAM(S): 20 TABLET, DELAYED RELEASE ORAL at 12:41

## 2019-07-16 RX ADMIN — CLOPIDOGREL BISULFATE 75 MILLIGRAM(S): 75 TABLET, FILM COATED ORAL at 12:40

## 2019-07-16 RX ADMIN — Medication 50 MILLIGRAM(S): at 05:09

## 2019-07-16 RX ADMIN — ATORVASTATIN CALCIUM 80 MILLIGRAM(S): 80 TABLET, FILM COATED ORAL at 21:14

## 2019-07-16 RX ADMIN — MORPHINE SULFATE 2 MILLIGRAM(S): 50 CAPSULE, EXTENDED RELEASE ORAL at 08:20

## 2019-07-16 RX ADMIN — MORPHINE SULFATE 2 MILLIGRAM(S): 50 CAPSULE, EXTENDED RELEASE ORAL at 08:35

## 2019-07-16 RX ADMIN — SENNA PLUS 2 TABLET(S): 8.6 TABLET ORAL at 21:14

## 2019-07-16 RX ADMIN — MORPHINE SULFATE 2 MILLIGRAM(S): 50 CAPSULE, EXTENDED RELEASE ORAL at 13:01

## 2019-07-16 RX ADMIN — CHLORHEXIDINE GLUCONATE 1 APPLICATION(S): 213 SOLUTION TOPICAL at 05:10

## 2019-07-16 RX ADMIN — Medication 0.25 MILLIGRAM(S): at 05:09

## 2019-07-16 RX ADMIN — Medication 3 MILLILITER(S): at 14:43

## 2019-07-16 RX ADMIN — SEVELAMER CARBONATE 800 MILLIGRAM(S): 2400 POWDER, FOR SUSPENSION ORAL at 13:14

## 2019-07-16 RX ADMIN — HEPARIN SODIUM 5000 UNIT(S): 5000 INJECTION INTRAVENOUS; SUBCUTANEOUS at 13:13

## 2019-07-16 NOTE — DIETITIAN INITIAL EVALUATION ADULT. - DIET TYPE
NPO except Medications DASH/TLC (sodium and cholesterol restricted diet)/renal replacement pts:no protein restr,no conc K & phos, low sodium

## 2019-07-16 NOTE — DIETITIAN INITIAL EVALUATION ADULT. - ADD RECOMMEND
when no longer NPO Rec Renal replacement/ DASH/TLC diet, Rec Nepro BID, Rec Nephrovite monitor po intake, wts, labs,

## 2019-07-16 NOTE — PROGRESS NOTE ADULT - ASSESSMENT
1. pt with CAD, cath from earlier this year reviewed with pt  2. pt with diffuse pulmonary opacities. On steroids.   3. PUF today, Did Not tolerate UF Last Hanane,   4. No more VT on monitor  5. EP eval Noted,   6. BP to goal,    P : 2 Hours UF,     D/W Dr. Nesbitt,

## 2019-07-16 NOTE — PROGRESS NOTE ADULT - SUBJECTIVE AND OBJECTIVE BOX
CHIEF COMPLAINT:Patient is a 79y old  Male who presents with a chief complaint of Acute hypoxemic respiratory failure (15 Jul 2019 17:49)    INTERVAL HISTORY:  pt seen and examined.   He has transudative pleural effusion, PTx improved and also with ILD.     Allergies  No Known Allergies  	  MEDICATIONS:  amLODIPine   Tablet 5 milliGRAM(s) Oral daily  diltiazem Injectable 10 milliGRAM(s) IV Push every 4 hours PRN  hydrALAZINE 50 milliGRAM(s) Oral every 8 hours  isosorbide   dinitrate Tablet (ISORDIL) 20 milliGRAM(s) Oral three times a day  metoprolol tartrate 25 milliGRAM(s) Oral two times a day  ALBUTerol/ipratropium for Nebulization 3 milliLiter(s) Nebulizer every 6 hours  HYDROcodone/homatropine Syrup 5 milliLiter(s) Oral every 6 hours PRN  ALPRAZolam 0.25 milliGRAM(s) Oral every 6 hours PRN  diphenhydrAMINE 25 milliGRAM(s) Oral every 6 hours PRN  morphine  - Injectable 2 milliGRAM(s) IV Push every 2 hours PRN  docusate sodium 100 milliGRAM(s) Oral two times a day  pantoprazole  Injectable 40 milliGRAM(s) IV Push daily  polyethylene glycol 3350 17 Gram(s) Oral daily PRN  senna 2 Tablet(s) Oral at bedtime  atorvastatin 80 milliGRAM(s) Oral at bedtime  levothyroxine Injectable 50 MICROGram(s) IV Push at bedtime  predniSONE   Tablet 50 milliGRAM(s) Oral daily  aspirin  chewable 81 milliGRAM(s) Oral daily  chlorhexidine 2% Cloths 1 Application(s) Topical <User Schedule>  clopidogrel Tablet 75 milliGRAM(s) Oral daily  epoetin nguyễn Injectable 07908 Unit(s) IV Push <User Schedule>  heparin  Injectable 5000 Unit(s) SubCutaneous every 8 hours    PHYSICAL EXAM:  T(C): 36.5 (07-16-19 @ 07:56), Max: 37.1 (07-15-19 @ 16:00)  HR: 52 (07-16-19 @ 07:56) (52 - 103)  BP: 127/54 (07-16-19 @ 07:56) (126/58 - 141/64)  RR: 18 (07-16-19 @ 07:56) (17 - 18)  SpO2: 96% (07-16-19 @ 08:00) (96% - 100%)  I&O's Summary    15 Jul 2019 07:01  -  16 Jul 2019 07:00  --------------------------------------------------------  IN: 0 mL / OUT: 1000 mL / NET: -1000 mL    Appearance: Sitting upright, sob	  HEENT:   NC/AT  Eye: Pink Conjunctiva  Lungs: Crackles b/l lung fields   CVS: RRR, Normal S1 and S2, 3/6 sm lsb  Neuro: A&O x3    TELEMETRY: no more VT.   APVCs. 	        ASSESSMENT/PLAN:

## 2019-07-16 NOTE — PROGRESS NOTE ADULT - ASSESSMENT
1. pt with CAD, cath from earlier this year reviewed with pt  2. pt with diffuse pulmonary opacities. On steroids.   3. Will ask Renal to remove more fluid is possible.  4. No more VT on monitor  5. EP eval appreciated  6. BP to goal

## 2019-07-16 NOTE — PROGRESS NOTE ADULT - SUBJECTIVE AND OBJECTIVE BOX
CC: SOB    INTERVAL HPI/OVERNIGHT EVENTS: Patient seen and examined, still feels short of breath. ILR was cancelled this morning as patient was unable to lay flat. Symptoms improved with morphine this morning; for ultrafiltration today.       Vital Signs Last 24 Hrs  T(C): 36.5 (16 Jul 2019 07:56), Max: 37.1 (15 Jul 2019 16:00)  T(F): 97.7 (16 Jul 2019 07:56), Max: 98.7 (15 Jul 2019 16:00)  HR: 55 (16 Jul 2019 12:44) (52 - 103)  BP: 150/56 (16 Jul 2019 12:44) (126/58 - 150/56)  BP(mean): --  RR: 16 (16 Jul 2019 12:44) (16 - 18)  SpO2: 98% (16 Jul 2019 12:44) (96% - 100%)    PHYSICAL EXAM:    GENERAL:  AOX3; tachypneic   ENMT: Moist mucous membranes  NECK: Supple, No JVD  CHEST/LUNG: Bilateral rhonchi   HEART: Regular rate and rhythm; No murmurs, rubs, or gallops  ABDOMEN: Soft, Nontender, Nondistended; Bowel sounds present  EXTREMITIES:  2+ Peripheral Pulses, No clubbing, cyanosis, or edema        MEDICATIONS  (STANDING):  ALBUTerol/ipratropium for Nebulization 3 milliLiter(s) Nebulizer every 6 hours  amLODIPine   Tablet 5 milliGRAM(s) Oral daily  aspirin  chewable 81 milliGRAM(s) Oral daily  atorvastatin 80 milliGRAM(s) Oral at bedtime  chlorhexidine 2% Cloths 1 Application(s) Topical <User Schedule>  clopidogrel Tablet 75 milliGRAM(s) Oral daily  docusate sodium 100 milliGRAM(s) Oral two times a day  epoetin nguyễn Injectable 52465 Unit(s) IV Push <User Schedule>  heparin  Injectable 5000 Unit(s) SubCutaneous every 8 hours  hydrALAZINE 50 milliGRAM(s) Oral every 8 hours  isosorbide   dinitrate Tablet (ISORDIL) 20 milliGRAM(s) Oral three times a day  levothyroxine 100 MICROGram(s) Oral daily  metoprolol tartrate 25 milliGRAM(s) Oral two times a day  pantoprazole    Tablet 40 milliGRAM(s) Oral before breakfast  predniSONE   Tablet 50 milliGRAM(s) Oral daily  senna 2 Tablet(s) Oral at bedtime  sevelamer carbonate 800 milliGRAM(s) Oral three times a day with meals    MEDICATIONS  (PRN):  ALPRAZolam 0.25 milliGRAM(s) Oral every 6 hours PRN anxiety  diltiazem Injectable 10 milliGRAM(s) IV Push every 4 hours PRN HR>120  diphenhydrAMINE 25 milliGRAM(s) Oral every 6 hours PRN Rash and/or Itching  HYDROcodone/homatropine Syrup 5 milliLiter(s) Oral every 6 hours PRN Cough  morphine  - Injectable 2 milliGRAM(s) IV Push every 2 hours PRN Dyspnea/ Chest pain  polyethylene glycol 3350 17 Gram(s) Oral daily PRN Constipation      Allergies    No Known Allergies    Intolerances          LABS:                  RADIOLOGY & ADDITIONAL TESTS:

## 2019-07-16 NOTE — DIETITIAN INITIAL EVALUATION ADULT. - OTHER INFO
Pt is 80 y/o Male with a history of ESRD on HD, HTN, HFpEF, CAD s/p CABG, and lung cancer s/p radiation on Home Oxygen, presenting with increased dyspnea, likely multifactorial with underlying lung cancer, ILD/ pulmonary fibrosis and volume overload with CHF/ ESRD and admitted with acute hypoxic respiratory failure. Pt seen by RDs on past admissions for multiple hospitalization and remains malnourished. Info obtained from last assessment 6/24/19. pt noted to have 30lb wt loss in 8 mos. NFPE was previously conducted indicated pt had moderate fat/muscle depletion in clav, shoulders, orbitals, buccals, triceps. Pt perviously had a poor po intake <75% >1 mo. Pt is currently NPO. When NPO is d/c Rec pt be put on a  Renal replacement, DASH/TLC diet. Rec pt would benefit from Nepro TID, and Nephrovite. Pt is 78 y/o Male with a history of ESRD on HD, HTN, HFpEF, CAD s/p CABG, and lung cancer s/p radiation on Home Oxygen, presenting with increased dyspnea, likely multifactorial with underlying lung cancer, ILD/ pulmonary fibrosis and volume overload with CHF/ ESRD and admitted with acute hypoxic respiratory failure. Pt seen by RDs on past admissions for multiple hospitalization and remains malnourished. Info obtained from last assessment 6/24/19. pt noted to have 30lb wt loss in 8 mos. NFPE was previously conducted indicated pt had moderate fat/muscle depletion in clav, shoulders, orbitals, buccals, triceps. Pt perviously had a poor po intake <75% >1 mo. Pt is currently NPO. When NPO is d/c Rec pt be put on a  Renal replacement, DASH/TLC diet. Rec pt would benefit from Nepro TID, and Nephrovite. Pallaitive following and family still wants aggressive treatment.

## 2019-07-16 NOTE — DIETITIAN INITIAL EVALUATION ADULT. - NUTRITIONGOAL OUTCOME1
when d/c NPO tolerate po intake well with improved appetite on renal replacement/ DASH/TLC diet. Tolerate po intake as diet is advanced with improved appetite.

## 2019-07-16 NOTE — PROGRESS NOTE ADULT - SUBJECTIVE AND OBJECTIVE BOX
[facility] DIVISION OF KIDNEY DISEASES AND HYPERTENSION -- HEMODIALYSIS NOTE  --------------------------------------------------------------------------------  Chief Complaint: ESRD/Ongoing hemodialysis requirement    24 hour events/subjective:  Resting comfortably, on NC Oxygen,    PAST HISTORY  --------------------------------------------------------------------------------  No significant changes to PMH, PSH, FHx, SHx, unless otherwise noted    ALLERGIES & MEDICATIONS  --------------------------------------------------------------------------------  Allergies    No Known Allergies    Standing Inpatient Medications  ALBUTerol/ipratropium for Nebulization 3 milliLiter(s) Nebulizer every 6 hours  amLODIPine   Tablet 5 milliGRAM(s) Oral daily  aspirin  chewable 81 milliGRAM(s) Oral daily  atorvastatin 80 milliGRAM(s) Oral at bedtime  chlorhexidine 2% Cloths 1 Application(s) Topical <User Schedule>  clopidogrel Tablet 75 milliGRAM(s) Oral daily  docusate sodium 100 milliGRAM(s) Oral two times a day  epoetin nguyễn Injectable 48588 Unit(s) IV Push <User Schedule>  heparin  Injectable 5000 Unit(s) SubCutaneous every 8 hours  hydrALAZINE 50 milliGRAM(s) Oral every 8 hours  isosorbide   dinitrate Tablet (ISORDIL) 20 milliGRAM(s) Oral three times a day  levothyroxine 100 MICROGram(s) Oral daily  metoprolol tartrate 25 milliGRAM(s) Oral two times a day  pantoprazole    Tablet 40 milliGRAM(s) Oral before breakfast  predniSONE   Tablet 50 milliGRAM(s) Oral daily  senna 2 Tablet(s) Oral at bedtime  sevelamer carbonate 800 milliGRAM(s) Oral three times a day with meals    PRN Inpatient Medications  ALPRAZolam 0.25 milliGRAM(s) Oral every 6 hours PRN  diltiazem Injectable 10 milliGRAM(s) IV Push every 4 hours PRN  diphenhydrAMINE 25 milliGRAM(s) Oral every 6 hours PRN  HYDROcodone/homatropine Syrup 5 milliLiter(s) Oral every 6 hours PRN  morphine  - Injectable 2 milliGRAM(s) IV Push every 2 hours PRN  polyethylene glycol 3350 17 Gram(s) Oral daily PRN    REVIEW OF SYSTEMS  --------------------------------------------------------------------------------  Gen: + weight changes, fatigue, No  fevers/chills, weakness  Skin: No rashes  Head/Eyes/Ears/Mouth: No headache; Normal hearing; Normal vision w/o blurriness; No sinus pain/discomfort, sore throat  Respiratory: + dyspnea,  No cough, wheezing, hemoptysis  CV: No chest pain, PND, orthopnea  GI: No abdominal pain, diarrhea, constipation, nausea, vomiting, melena, hematochezia  : No increased frequency, dysuria, hematuria, nocturia  MSK: No joint pain/swelling; no back pain; no edema  Neuro: No dizziness/lightheadedness, weakness, seizures, numbness, tingling  Heme: No easy bruising or bleeding  Endo: No heat/cold intolerance  Psych: No significant nervousness, anxiety, stress, depression    All other systems were reviewed and are negative, except as noted.    VITALS/PHYSICAL EXAM  --------------------------------------------------------------------------------  T(C): 36.5 (07-16-19 @ 07:56), Max: 37.1 (07-15-19 @ 16:00)  HR: 55 (07-16-19 @ 12:44) (52 - 103)  BP: 150/56 (07-16-19 @ 12:44) (126/58 - 150/56)  RR: 16 (07-16-19 @ 12:44) (16 - 18)  SpO2: 98% (07-16-19 @ 12:44) (96% - 100%)    07-15-19 @ 07:01  -  07-16-19 @ 07:00  --------------------------------------------------------  IN: 0 mL / OUT: 1000 mL / NET: -1000 mL    Physical Exam:  	Gen: NAD, ill-appearing  	HEENT: PERRL, supple neck, Pale,   	Pulm: Rales * Rhonchi  B/L  	CV: RRR, S1S2; no rub  	Back: No spinal or CVA tenderness; no sacral edema  	Abd: +BS, soft, nontender/nondistended  	: No suprapubic tenderness  	UE: Warm, FROM, no clubbing, intact strength; no edema; no asterixis  	LE: Warm, FROM, no clubbing, intact strength; no edema  	Neuro: No focal deficits,   	Psych: Normal affect and mood  	Skin: Warm, without rashes  	Vascular access: AVf,     LABS/STUDIES  --------------------------------------------------------------------------------  Iron 58, TIBC 256, %sat 23      [06-19-19 @ 08:42]  Ferritin 812      [06-19-19 @ 08:42]  PTH -- (Ca 8.1)      [07-01-19 @ 13:24]   83  TSH 0.89      [05-23-19 @ 06:28]  Lipid: chol 159, , HDL 32, LDL 94      [01-29-19 @ 11:39]    HBsAb <3.0      [06-27-19 @ 19:39]  HBsAb Nonreact      [06-27-19 @ 19:39]  HBsAg Reactive      [06-27-19 @ 19:39]  HBcAb Nonreact      [06-27-19 @ 19:39]  HCV 0.10, Nonreact      [06-27-19 @ 19:39]

## 2019-07-16 NOTE — PROGRESS NOTE ADULT - ASSESSMENT
The patient is a 79 year old male with a history of ESRD on HD, hypertension, CAD status post CABG HFpEF and lung cancer status radiation with pulmonary fibrosis who presented to the ER with worsening dyspnea. Admitted with acute on chronic hypoxic respiratory failure initially on NIV in MICU.     Assessment/Plan:    1. Acute on chronic hypoxic respiratory failure- multifactorial secondary to pulmonary fibrosis and  acute on chronic diastolic CHF- CUrrently on nasal cannula  PO prednisone  NEbs as needed      2. Paroxysmal afib- ILR was to be placed today but cancelled due to dyspnea   ILR to be placed prior to admission once symptoms improved- BB was discontinued previous hospitalization due to bradycardia now with episodes of VT- BB was resumed      3. ESRD On HD- For ultrafiltration today    4. Hypertension: BP controlled    5. Chronic diastolic heart failure - Not in acute exacerbation. On isosorbide. Hemodialysis as scheduled. Pleural effusion drained    6. Hypothyroidism - Synthroid changed to PO     Patient is DNR/DNI- poor prognosis. Family met with hospice but would like to continue HD and therefore is not a candidate for home hospice.     VTE_ heparin subcut

## 2019-07-17 LAB
ANION GAP SERPL CALC-SCNC: 13 MMOL/L — SIGNIFICANT CHANGE UP (ref 5–17)
BUN SERPL-MCNC: 57 MG/DL — HIGH (ref 8–20)
CALCIUM SERPL-MCNC: 8.3 MG/DL — LOW (ref 8.6–10.2)
CHLORIDE SERPL-SCNC: 95 MMOL/L — LOW (ref 98–107)
CO2 SERPL-SCNC: 28 MMOL/L — SIGNIFICANT CHANGE UP (ref 22–29)
CREAT SERPL-MCNC: 6.21 MG/DL — HIGH (ref 0.5–1.3)
GLUCOSE SERPL-MCNC: 118 MG/DL — HIGH (ref 70–115)
HCT VFR BLD CALC: 29.4 % — LOW (ref 39–50)
HGB BLD-MCNC: 8.6 G/DL — LOW (ref 13–17)
MCHC RBC-ENTMCNC: 29.3 GM/DL — LOW (ref 32–36)
MCHC RBC-ENTMCNC: 29.4 PG — SIGNIFICANT CHANGE UP (ref 27–34)
MCV RBC AUTO: 100.3 FL — HIGH (ref 80–100)
MRSA PCR RESULT.: SIGNIFICANT CHANGE UP
PLATELET # BLD AUTO: 194 K/UL — SIGNIFICANT CHANGE UP (ref 150–400)
POTASSIUM SERPL-MCNC: 4.5 MMOL/L — SIGNIFICANT CHANGE UP (ref 3.5–5.3)
POTASSIUM SERPL-SCNC: 4.5 MMOL/L — SIGNIFICANT CHANGE UP (ref 3.5–5.3)
RBC # BLD: 2.93 M/UL — LOW (ref 4.2–5.8)
RBC # FLD: 16.7 % — HIGH (ref 10.3–14.5)
S AUREUS DNA NOSE QL NAA+PROBE: DETECTED
SODIUM SERPL-SCNC: 136 MMOL/L — SIGNIFICANT CHANGE UP (ref 135–145)
WBC # BLD: 7.06 K/UL — SIGNIFICANT CHANGE UP (ref 3.8–10.5)
WBC # FLD AUTO: 7.06 K/UL — SIGNIFICANT CHANGE UP (ref 3.8–10.5)

## 2019-07-17 PROCEDURE — 90937 HEMODIALYSIS REPEATED EVAL: CPT

## 2019-07-17 PROCEDURE — 99233 SBSQ HOSP IP/OBS HIGH 50: CPT

## 2019-07-17 PROCEDURE — 99497 ADVNCD CARE PLAN 30 MIN: CPT

## 2019-07-17 RX ADMIN — ISOSORBIDE DINITRATE 20 MILLIGRAM(S): 5 TABLET ORAL at 22:10

## 2019-07-17 RX ADMIN — Medication 50 MILLIGRAM(S): at 05:32

## 2019-07-17 RX ADMIN — Medication 3 MILLILITER(S): at 08:14

## 2019-07-17 RX ADMIN — CHLORHEXIDINE GLUCONATE 1 APPLICATION(S): 213 SOLUTION TOPICAL at 05:32

## 2019-07-17 RX ADMIN — ISOSORBIDE DINITRATE 20 MILLIGRAM(S): 5 TABLET ORAL at 18:49

## 2019-07-17 RX ADMIN — Medication 50 MILLIGRAM(S): at 22:11

## 2019-07-17 RX ADMIN — Medication 100 MILLIGRAM(S): at 05:32

## 2019-07-17 RX ADMIN — PANTOPRAZOLE SODIUM 40 MILLIGRAM(S): 20 TABLET, DELAYED RELEASE ORAL at 05:46

## 2019-07-17 RX ADMIN — SENNA PLUS 2 TABLET(S): 8.6 TABLET ORAL at 22:10

## 2019-07-17 RX ADMIN — SEVELAMER CARBONATE 800 MILLIGRAM(S): 2400 POWDER, FOR SUSPENSION ORAL at 18:49

## 2019-07-17 RX ADMIN — AMLODIPINE BESYLATE 5 MILLIGRAM(S): 2.5 TABLET ORAL at 05:31

## 2019-07-17 RX ADMIN — Medication 0.25 MILLIGRAM(S): at 05:31

## 2019-07-17 RX ADMIN — SEVELAMER CARBONATE 800 MILLIGRAM(S): 2400 POWDER, FOR SUSPENSION ORAL at 13:42

## 2019-07-17 RX ADMIN — Medication 25 MILLIGRAM(S): at 22:11

## 2019-07-17 RX ADMIN — Medication 3 MILLILITER(S): at 20:58

## 2019-07-17 RX ADMIN — Medication 100 MICROGRAM(S): at 05:44

## 2019-07-17 RX ADMIN — Medication 3 MILLILITER(S): at 03:40

## 2019-07-17 RX ADMIN — MORPHINE SULFATE 2 MILLIGRAM(S): 50 CAPSULE, EXTENDED RELEASE ORAL at 18:45

## 2019-07-17 RX ADMIN — CLOPIDOGREL BISULFATE 75 MILLIGRAM(S): 75 TABLET, FILM COATED ORAL at 18:49

## 2019-07-17 RX ADMIN — Medication 25 MILLIGRAM(S): at 13:42

## 2019-07-17 RX ADMIN — Medication 81 MILLIGRAM(S): at 18:49

## 2019-07-17 RX ADMIN — HEPARIN SODIUM 5000 UNIT(S): 5000 INJECTION INTRAVENOUS; SUBCUTANEOUS at 05:32

## 2019-07-17 RX ADMIN — MORPHINE SULFATE 2 MILLIGRAM(S): 50 CAPSULE, EXTENDED RELEASE ORAL at 19:00

## 2019-07-17 RX ADMIN — Medication 100 MILLIGRAM(S): at 18:49

## 2019-07-17 RX ADMIN — HEPARIN SODIUM 5000 UNIT(S): 5000 INJECTION INTRAVENOUS; SUBCUTANEOUS at 22:11

## 2019-07-17 RX ADMIN — Medication 50 MILLIGRAM(S): at 18:48

## 2019-07-17 RX ADMIN — Medication 25 MILLIGRAM(S): at 05:32

## 2019-07-17 RX ADMIN — ERYTHROPOIETIN 10000 UNIT(S): 10000 INJECTION, SOLUTION INTRAVENOUS; SUBCUTANEOUS at 13:43

## 2019-07-17 RX ADMIN — HEPARIN SODIUM 5000 UNIT(S): 5000 INJECTION INTRAVENOUS; SUBCUTANEOUS at 18:49

## 2019-07-17 RX ADMIN — ISOSORBIDE DINITRATE 20 MILLIGRAM(S): 5 TABLET ORAL at 05:31

## 2019-07-17 RX ADMIN — ATORVASTATIN CALCIUM 80 MILLIGRAM(S): 80 TABLET, FILM COATED ORAL at 22:10

## 2019-07-17 NOTE — PROGRESS NOTE ADULT - ASSESSMENT
The patient is a 79 year old male with a history of ESRD on HD, hypertension, CAD status post CABG HFpEF and lung cancer status radiation with pulmonary fibrosis who presented to the ER with worsening dyspnea. Admitted with acute on chronic hypoxic respiratory failure initially on NIV in MICU.     Acute on chronic hypoxic respiratory failure- multifactorial secondary to pulmonary fibrosis and  acute on chronic diastolic CHF- Currently on nasal cannula  PO prednisone  NEbs as needed      Paroxysmal afib- ILR was to be placed 7/16/19 but cancelled due to dyspnea/Orthopnea  ILR to be placed prior to admission once symptoms improved- BB was discontinued previous hospitalization due to bradycardia now with episodes of VT- BB was resumed      ESRD On HD- For ultrafiltration today    Hypertension: BP controlled    Chronic diastolic heart failure - Not in acute exacerbation. On isosorbide. Hemodialysis as scheduled. Pleural effusion drained    Hypothyroidism - Synthroid changed to PO     Patient is DNR/DNI- poor prognosis. Family met with hospice but would like to continue HD and therefore is not a candidate for home hospice.     VTE_ heparin subcut     Disposition - unclear currently. Patient very fragile and decompensates - Palliative appropriate however family do not want to discontinue HD

## 2019-07-17 NOTE — PROGRESS NOTE ADULT - SUBJECTIVE AND OBJECTIVE BOX
[facility] DIVISION OF KIDNEY DISEASES AND HYPERTENSION -- FOLLOW UP NOTE  --------------------------------------------------------------------------------  Chief Complaint: ESRD HD    24 hour events/subjective:  Seen/examined on HD today      PAST HISTORY  --------------------------------------------------------------------------------  No significant changes to PMH, PSH, FHx, SHx, unless otherwise noted    ALLERGIES & MEDICATIONS  --------------------------------------------------------------------------------  Allergies    No Known Allergies    Intolerances      Standing Inpatient Medications  ALBUTerol/ipratropium for Nebulization 3 milliLiter(s) Nebulizer every 6 hours  amLODIPine   Tablet 5 milliGRAM(s) Oral daily  aspirin  chewable 81 milliGRAM(s) Oral daily  atorvastatin 80 milliGRAM(s) Oral at bedtime  chlorhexidine 2% Cloths 1 Application(s) Topical <User Schedule>  clopidogrel Tablet 75 milliGRAM(s) Oral daily  docusate sodium 100 milliGRAM(s) Oral two times a day  epoetin nguyễn Injectable 21156 Unit(s) IV Push <User Schedule>  heparin  Injectable 5000 Unit(s) SubCutaneous every 8 hours  hydrALAZINE 50 milliGRAM(s) Oral every 8 hours  isosorbide   dinitrate Tablet (ISORDIL) 20 milliGRAM(s) Oral three times a day  levothyroxine 100 MICROGram(s) Oral daily  metoprolol tartrate 25 milliGRAM(s) Oral two times a day  pantoprazole    Tablet 40 milliGRAM(s) Oral before breakfast  predniSONE   Tablet 50 milliGRAM(s) Oral daily  senna 2 Tablet(s) Oral at bedtime  sevelamer carbonate 800 milliGRAM(s) Oral three times a day with meals    PRN Inpatient Medications  ALPRAZolam 0.25 milliGRAM(s) Oral every 6 hours PRN  diltiazem Injectable 10 milliGRAM(s) IV Push every 4 hours PRN  diphenhydrAMINE 25 milliGRAM(s) Oral every 6 hours PRN  HYDROcodone/homatropine Syrup 5 milliLiter(s) Oral every 6 hours PRN  morphine  - Injectable 2 milliGRAM(s) IV Push every 2 hours PRN  polyethylene glycol 3350 17 Gram(s) Oral daily PRN      REVIEW OF SYSTEMS  --------------------------------------------------------------------------------  Gen: No weight changes, fatigue, fevers/chills, weakness  Skin: No rashes  Head/Eyes/Ears/Mouth: No headache; Normal hearing; Normal vision w/o blurriness; No sinus pain/discomfort, sore throat  Respiratory: No dyspnea, cough, wheezing, hemoptysis  CV: No chest pain, PND, orthopnea  GI: No abdominal pain, diarrhea, constipation, nausea, vomiting, melena, hematochezia  : No increased frequency, dysuria, hematuria, nocturia  MSK: No joint pain/swelling; no back pain; no edema  Neuro: No dizziness/lightheadedness, weakness, seizures, numbness, tingling  Heme: No easy bruising or bleeding  Endo: No heat/cold intolerance  Psych: No significant nervousness, anxiety, stress, depression    All other systems were reviewed and are negative, except as noted.    VITALS/PHYSICAL EXAM  --------------------------------------------------------------------------------  T(C): 36.5 (07-17-19 @ 16:45), Max: 36.7 (07-16-19 @ 18:00)  HR: 63 (07-17-19 @ 16:45) (48 - 63)  BP: 131/59 (07-17-19 @ 16:45) (114/48 - 149/63)  RR: 18 (07-17-19 @ 16:45) (18 - 95)  SpO2: 99% (07-17-19 @ 16:45) (91% - 99%)  Wt(kg): --        07-16-19 @ 07:01  -  07-17-19 @ 07:00  --------------------------------------------------------  IN: 620 mL / OUT: 1200 mL / NET: -580 mL    07-17-19 @ 07:01  -  07-17-19 @ 17:01  --------------------------------------------------------  IN: 200 mL / OUT: 2000 mL / NET: -1800 mL      Physical Exam:  	Gen: NAD pale; frail  	HEENT: PERRL, supple neck, clear oropharynx  	Pulm: CTA B/L  	CV: RRR, S1S2; no rub  	Back: No spinal or CVA tenderness; no sacral edema  	Abd: +BS, soft, nontender/nondistended  	: No suprapubic tenderness  	UE: Warm, FROM, no clubbing, intact strength; no edema; no asterixis  	LE: Warm, FROM, no clubbing, intact strength; no edema  	Neuro: No focal deficits, intact gait  	Psych: Normal affect and mood  	Skin: Warm, without rashes  	Vascular access:    LABS/STUDIES  --------------------------------------------------------------------------------              8.6    7.06  >-----------<  194      [07-17-19 @ 07:16]              29.4     136  |  95  |  57.0  ----------------------------<  118      [07-17-19 @ 07:16]  4.5   |  28.0  |  6.21        Ca     8.3     [07-17-19 @ 07:16]            Creatinine Trend:  SCr 6.21 [07-17 @ 07:16]  SCr 7.34 [07-14 @ 09:12]  SCr 5.75 [07-13 @ 06:12]  SCr 9.70 [07-12 @ 11:32]  SCr 9.27 [07-12 @ 05:53]        Iron 58, TIBC 256, %sat 23      [06-19-19 @ 08:42]  Ferritin 812      [06-19-19 @ 08:42]  PTH -- (Ca 8.1)      [07-01-19 @ 13:24]   83  TSH 0.89      [05-23-19 @ 06:28]  Lipid: chol 159, , HDL 32, LDL 94      [01-29-19 @ 11:39]    HBsAb <3.0      [06-27-19 @ 19:39]  HBsAb Nonreact      [06-27-19 @ 19:39]  HBsAg Reactive      [06-27-19 @ 19:39]  HBcAb Nonreact      [06-27-19 @ 19:39]  HCV 0.10, Nonreact      [06-27-19 @ 19:39]

## 2019-07-17 NOTE — PROGRESS NOTE ADULT - ASSESSMENT
1) ESRD on HD  2)MBD of renal dx  3) Anemia of renal dx  4) Vol HTN    Seen on HD this AM ;tolerating;

## 2019-07-17 NOTE — PROGRESS NOTE ADULT - SUBJECTIVE AND OBJECTIVE BOX
HOSPITALIST PROGRESS NOTE    KAUSHIKJOSE F RAMDELIA  [id number]  79yMale    Patient is a 79y old  Male who presents with a chief complaint of Acute hypoxemic respiratory failure (16 Jul 2019 14:44)      SUBJECTIVE:   Chart reviewed since last visit, discussed with Dr Odonnell  Patient seen and examined at bedside for respiratory failure.  Somnolent today  Feels sick - nauseous and dyspneic.  Continues to have intermittent left chest pain.  AS per RN had stated to her yesterday (after getting Morphine) that he just wants to be comfortable.      OBJECTIVE:  Vital Signs Last 24 Hrs  T(C): 36.3 (17 Jul 2019 07:35), Max: 36.7 (16 Jul 2019 18:00)  T(F): 97.3 (17 Jul 2019 07:35), Max: 98.1 (16 Jul 2019 18:00)  HR: 62 (17 Jul 2019 08:15) (50 - 62)  BP: 114/48 (17 Jul 2019 07:35) (114/48 - 150/56)   RR: 18 (17 Jul 2019 07:35) (16 - 19)  SpO2: 96% (17 Jul 2019 08:15) (91% - 99%)    PHYSICAL EXAMINATION  General: Elderly Spanish gentleman lying in bed - somnolent  HEENT:  extraocular movements intact, nasal cannula i nplace  NECK:  Supple  CVS: regular rate and rhythm S1 S2. Reproducible left chest wall tenderness  RESP:  Fair air entry except bases. Poor effort  GI:  Soft nondistended nontender BS+  : No suprapubic tenderness  MSK:  FROM, no edema  CNS:  No gross focal or global deficit noted  INTEG:  warm dry skin  PSYCH:  Fair mood     MONITOR:  CAPILLARY BLOOD GLUCOSE            I&O's Summary    16 Jul 2019 07:01  -  17 Jul 2019 07:00  --------------------------------------------------------  IN: 620 mL / OUT: 1200 mL / NET: -580 mL                            8.6    7.06  )-----------( 194      ( 17 Jul 2019 07:16 )             29.4       07-17    136  |  95<L>  |  57.0<H>  ----------------------------<  118<H>  4.5   |  28.0  |  6.21<H>    Ca    8.3<L>      17 Jul 2019 07:16              Culture:    TTE:    RADIOLOGY        MEDICATIONS  (STANDING):  ALBUTerol/ipratropium for Nebulization 3 milliLiter(s) Nebulizer every 6 hours  amLODIPine   Tablet 5 milliGRAM(s) Oral daily  aspirin  chewable 81 milliGRAM(s) Oral daily  atorvastatin 80 milliGRAM(s) Oral at bedtime  chlorhexidine 2% Cloths 1 Application(s) Topical <User Schedule>  clopidogrel Tablet 75 milliGRAM(s) Oral daily  docusate sodium 100 milliGRAM(s) Oral two times a day  epoetin nguyễn Injectable 83441 Unit(s) IV Push <User Schedule>  heparin  Injectable 5000 Unit(s) SubCutaneous every 8 hours  hydrALAZINE 50 milliGRAM(s) Oral every 8 hours  isosorbide   dinitrate Tablet (ISORDIL) 20 milliGRAM(s) Oral three times a day  levothyroxine 100 MICROGram(s) Oral daily  metoprolol tartrate 25 milliGRAM(s) Oral two times a day  pantoprazole    Tablet 40 milliGRAM(s) Oral before breakfast  predniSONE   Tablet 50 milliGRAM(s) Oral daily  senna 2 Tablet(s) Oral at bedtime  sevelamer carbonate 800 milliGRAM(s) Oral three times a day with meals      MEDICATIONS  (PRN):  ALPRAZolam 0.25 milliGRAM(s) Oral every 6 hours PRN anxiety  diltiazem Injectable 10 milliGRAM(s) IV Push every 4 hours PRN HR>120  diphenhydrAMINE 25 milliGRAM(s) Oral every 6 hours PRN Rash and/or Itching  HYDROcodone/homatropine Syrup 5 milliLiter(s) Oral every 6 hours PRN Cough  morphine  - Injectable 2 milliGRAM(s) IV Push every 2 hours PRN Dyspnea/ Chest pain  polyethylene glycol 3350 17 Gram(s) Oral daily PRN Constipation

## 2019-07-18 PROCEDURE — 90937 HEMODIALYSIS REPEATED EVAL: CPT

## 2019-07-18 PROCEDURE — 99233 SBSQ HOSP IP/OBS HIGH 50: CPT

## 2019-07-18 RX ORDER — MORPHINE SULFATE 50 MG/1
2 CAPSULE, EXTENDED RELEASE ORAL ONCE
Refills: 0 | Status: DISCONTINUED | OUTPATIENT
Start: 2019-07-19 | End: 2019-07-19

## 2019-07-18 RX ORDER — MORPHINE SULFATE 50 MG/1
2 CAPSULE, EXTENDED RELEASE ORAL ONCE
Refills: 0 | Status: DISCONTINUED | OUTPATIENT
Start: 2019-07-18 | End: 2019-07-18

## 2019-07-18 RX ORDER — ALPRAZOLAM 0.25 MG
0.25 TABLET ORAL ONCE
Refills: 0 | Status: DISCONTINUED | OUTPATIENT
Start: 2019-07-19 | End: 2019-07-19

## 2019-07-18 RX ADMIN — Medication 50 MILLIGRAM(S): at 22:25

## 2019-07-18 RX ADMIN — Medication 25 MILLIGRAM(S): at 05:41

## 2019-07-18 RX ADMIN — Medication 25 MILLIGRAM(S): at 22:39

## 2019-07-18 RX ADMIN — SEVELAMER CARBONATE 800 MILLIGRAM(S): 2400 POWDER, FOR SUSPENSION ORAL at 08:44

## 2019-07-18 RX ADMIN — MORPHINE SULFATE 2 MILLIGRAM(S): 50 CAPSULE, EXTENDED RELEASE ORAL at 13:45

## 2019-07-18 RX ADMIN — HEPARIN SODIUM 5000 UNIT(S): 5000 INJECTION INTRAVENOUS; SUBCUTANEOUS at 13:46

## 2019-07-18 RX ADMIN — CLOPIDOGREL BISULFATE 75 MILLIGRAM(S): 75 TABLET, FILM COATED ORAL at 11:48

## 2019-07-18 RX ADMIN — SEVELAMER CARBONATE 800 MILLIGRAM(S): 2400 POWDER, FOR SUSPENSION ORAL at 11:48

## 2019-07-18 RX ADMIN — Medication 50 MILLIGRAM(S): at 13:46

## 2019-07-18 RX ADMIN — ISOSORBIDE DINITRATE 20 MILLIGRAM(S): 5 TABLET ORAL at 22:25

## 2019-07-18 RX ADMIN — POLYETHYLENE GLYCOL 3350 17 GRAM(S): 17 POWDER, FOR SOLUTION ORAL at 22:26

## 2019-07-18 RX ADMIN — MORPHINE SULFATE 2 MILLIGRAM(S): 50 CAPSULE, EXTENDED RELEASE ORAL at 14:10

## 2019-07-18 RX ADMIN — Medication 3 MILLILITER(S): at 03:54

## 2019-07-18 RX ADMIN — HEPARIN SODIUM 5000 UNIT(S): 5000 INJECTION INTRAVENOUS; SUBCUTANEOUS at 22:25

## 2019-07-18 RX ADMIN — Medication 100 MILLIGRAM(S): at 05:41

## 2019-07-18 RX ADMIN — ISOSORBIDE DINITRATE 20 MILLIGRAM(S): 5 TABLET ORAL at 13:46

## 2019-07-18 RX ADMIN — MORPHINE SULFATE 2 MILLIGRAM(S): 50 CAPSULE, EXTENDED RELEASE ORAL at 16:50

## 2019-07-18 RX ADMIN — ATORVASTATIN CALCIUM 80 MILLIGRAM(S): 80 TABLET, FILM COATED ORAL at 22:25

## 2019-07-18 RX ADMIN — Medication 0.25 MILLIGRAM(S): at 01:10

## 2019-07-18 RX ADMIN — ISOSORBIDE DINITRATE 20 MILLIGRAM(S): 5 TABLET ORAL at 05:41

## 2019-07-18 RX ADMIN — AMLODIPINE BESYLATE 5 MILLIGRAM(S): 2.5 TABLET ORAL at 05:41

## 2019-07-18 RX ADMIN — Medication 25 MILLIGRAM(S): at 05:42

## 2019-07-18 RX ADMIN — Medication 3 MILLILITER(S): at 15:57

## 2019-07-18 RX ADMIN — SEVELAMER CARBONATE 800 MILLIGRAM(S): 2400 POWDER, FOR SUSPENSION ORAL at 16:31

## 2019-07-18 RX ADMIN — CHLORHEXIDINE GLUCONATE 1 APPLICATION(S): 213 SOLUTION TOPICAL at 05:42

## 2019-07-18 RX ADMIN — Medication 81 MILLIGRAM(S): at 11:48

## 2019-07-18 RX ADMIN — PANTOPRAZOLE SODIUM 40 MILLIGRAM(S): 20 TABLET, DELAYED RELEASE ORAL at 05:42

## 2019-07-18 RX ADMIN — Medication 3 MILLILITER(S): at 08:32

## 2019-07-18 RX ADMIN — SENNA PLUS 2 TABLET(S): 8.6 TABLET ORAL at 22:25

## 2019-07-18 RX ADMIN — MORPHINE SULFATE 2 MILLIGRAM(S): 50 CAPSULE, EXTENDED RELEASE ORAL at 16:33

## 2019-07-18 RX ADMIN — HEPARIN SODIUM 5000 UNIT(S): 5000 INJECTION INTRAVENOUS; SUBCUTANEOUS at 05:41

## 2019-07-18 RX ADMIN — Medication 50 MILLIGRAM(S): at 05:42

## 2019-07-18 RX ADMIN — Medication 100 MILLIGRAM(S): at 16:31

## 2019-07-18 RX ADMIN — Medication 3 MILLILITER(S): at 21:57

## 2019-07-18 RX ADMIN — Medication 50 MILLIGRAM(S): at 05:40

## 2019-07-18 RX ADMIN — Medication 100 MICROGRAM(S): at 05:41

## 2019-07-18 NOTE — PROGRESS NOTE ADULT - SUBJECTIVE AND OBJECTIVE BOX
HOSPITALIST PROGRESS NOTE    KAUSHIK HOFFMAN  [id number]  79yMale    Patient is a 79y old  Male who presents with a chief complaint of Acute hypoxemic respiratory failure (17 Jul 2019 17:01)      SUBJECTIVE:   Chart reviewed since last visit.  Patient seen and examined at bedside for acute on chronic respiratory failure. Continues to feel dyspnea, intermittent chest pain, occasional nausea.       OBJECTIVE:  Vital Signs Last 24 Hrs  T(C): 36.6 (18 Jul 2019 08:57), Max: 37.1 (17 Jul 2019 22:07)  T(F): 97.8 (18 Jul 2019 08:57), Max: 98.7 (17 Jul 2019 22:07)  HR: 58 (18 Jul 2019 08:57) (48 - 66)  BP: 128/58 (18 Jul 2019 08:57) (110/42 - 133/60)   RR: 18 (18 Jul 2019 08:57) (18 - 95)  SpO2: 96% (18 Jul 2019 08:57) (95% - 99%)    PHYSICAL EXAMINATION  General: Elderly [place]ive[place] gentleman lying in bed - NAD  HEENT:  extraocular movements intact, nasal cannula in place  NECK:  Supple  CVS: regular rate and rhythm S1 S2. Reproducible left chest wall tenderness improved  RESP:  Fair air entry except bases. Poor effort  GI:  Soft nondistended nontender BS+  : No suprapubic tenderness  MSK:  FROM, no edema  CNS:  No gross focal or global deficit noted  INTEG:  warm dry skin  PSYCH:  Fair mood       MONITOR:  CAPILLARY BLOOD GLUCOSE            I&O's Summary    17 Jul 2019 07:01  -  18 Jul 2019 07:00  --------------------------------------------------------  IN: 200 mL / OUT: 2000 mL / NET: -1800 mL                            8.6    7.06  )-----------( 194      ( 17 Jul 2019 07:16 )             29.4       07-17    136  |  95<L>  |  57.0<H>  ----------------------------<  118<H>  4.5   |  28.0  |  6.21<H>    Ca    8.3<L>      17 Jul 2019 07:16              Culture:    TTE:    RADIOLOGY        MEDICATIONS  (STANDING):  ALBUTerol/ipratropium for Nebulization 3 milliLiter(s) Nebulizer every 6 hours  amLODIPine   Tablet 5 milliGRAM(s) Oral daily  aspirin  chewable 81 milliGRAM(s) Oral daily  atorvastatin 80 milliGRAM(s) Oral at bedtime  chlorhexidine 2% Cloths 1 Application(s) Topical <User Schedule>  clopidogrel Tablet 75 milliGRAM(s) Oral daily  docusate sodium 100 milliGRAM(s) Oral two times a day  epoetin nguyễn Injectable 72219 Unit(s) IV Push <User Schedule>  heparin  Injectable 5000 Unit(s) SubCutaneous every 8 hours  hydrALAZINE 50 milliGRAM(s) Oral every 8 hours  isosorbide   dinitrate Tablet (ISORDIL) 20 milliGRAM(s) Oral three times a day  levothyroxine 100 MICROGram(s) Oral daily  metoprolol tartrate 25 milliGRAM(s) Oral two times a day  pantoprazole    Tablet 40 milliGRAM(s) Oral before breakfast  predniSONE   Tablet 50 milliGRAM(s) Oral daily  senna 2 Tablet(s) Oral at bedtime  sevelamer carbonate 800 milliGRAM(s) Oral three times a day with meals      MEDICATIONS  (PRN):  ALPRAZolam 0.25 milliGRAM(s) Oral every 6 hours PRN anxiety  diltiazem Injectable 10 milliGRAM(s) IV Push every 4 hours PRN HR>120  diphenhydrAMINE 25 milliGRAM(s) Oral every 6 hours PRN Rash and/or Itching  HYDROcodone/homatropine Syrup 5 milliLiter(s) Oral every 6 hours PRN Cough  morphine  - Injectable 2 milliGRAM(s) IV Push every 2 hours PRN Dyspnea/ Chest pain  polyethylene glycol 3350 17 Gram(s) Oral daily PRN Constipation

## 2019-07-18 NOTE — PROGRESS NOTE ADULT - SUBJECTIVE AND OBJECTIVE BOX
PULMONARY PROGRESS NOTE      KAUSHIK HOFFMAN  MRN-[id number]    Patient is a 79y old  Male who presents with a chief complaint of Acute hypoxemic respiratory failure (18 Jul 2019 12:23)      INTERVAL HPI/OVERNIGHT EVENTS:    Patient is awake and alert  Feels better  On O2    MEDICATIONS  (STANDING):  ALBUTerol/ipratropium for Nebulization 3 milliLiter(s) Nebulizer every 6 hours  amLODIPine   Tablet 5 milliGRAM(s) Oral daily  aspirin  chewable 81 milliGRAM(s) Oral daily  atorvastatin 80 milliGRAM(s) Oral at bedtime  chlorhexidine 2% Cloths 1 Application(s) Topical <User Schedule>  clopidogrel Tablet 75 milliGRAM(s) Oral daily  docusate sodium 100 milliGRAM(s) Oral two times a day  epoetin nguyễn Injectable 73177 Unit(s) IV Push <User Schedule>  heparin  Injectable 5000 Unit(s) SubCutaneous every 8 hours  hydrALAZINE 50 milliGRAM(s) Oral every 8 hours  isosorbide   dinitrate Tablet (ISORDIL) 20 milliGRAM(s) Oral three times a day  levothyroxine 100 MICROGram(s) Oral daily  metoprolol tartrate 25 milliGRAM(s) Oral two times a day  pantoprazole    Tablet 40 milliGRAM(s) Oral before breakfast  predniSONE   Tablet 50 milliGRAM(s) Oral daily  senna 2 Tablet(s) Oral at bedtime  sevelamer carbonate 800 milliGRAM(s) Oral three times a day with meals      MEDICATIONS  (PRN):  ALPRAZolam 0.25 milliGRAM(s) Oral every 6 hours PRN anxiety  diltiazem Injectable 10 milliGRAM(s) IV Push every 4 hours PRN HR>120  diphenhydrAMINE 25 milliGRAM(s) Oral every 6 hours PRN Rash and/or Itching  HYDROcodone/homatropine Syrup 5 milliLiter(s) Oral every 6 hours PRN Cough  morphine  - Injectable 2 milliGRAM(s) IV Push every 2 hours PRN Dyspnea/ Chest pain  polyethylene glycol 3350 17 Gram(s) Oral daily PRN Constipation      Allergies    No Known Allergies    Intolerances        PAST MEDICAL & SURGICAL HISTORY:  ILD (interstitial lung disease)  CAD (coronary artery disease)  Chronic anemia  ESRD (end stage renal disease): on HD (Tue-Thu-Sat) through RUE fistula  CAD (coronary artery disease): s/p remote CABG and multiple stents  Lung cancer: had yoni radiation in 2006.  Bipolar disorder  High cholesterol  Hypertension  S/P CABG (coronary artery bypass graft)        REVIEW OF SYSTEMS:    CONSTITUTIONAL:  No distress    HEENT:  Eyes:  No diplopia or blurred vision. ENT:  No earache, sore throat or runny nose.    CARDIOVASCULAR:  No pressure, squeezing, tightness, heaviness or aching about the chest; no palpitations.    RESPIRATORY:  Improved cough, shortness of breath, no PND or orthopnea. Mild SOBOE    GASTROINTESTINAL:  No nausea, vomiting or diarrhea.    GENITOURINARY:  No dysuria, frequency or urgency.    NEUROLOGIC:  No paresthesias, fasciculations, seizures or weakness.    PSYCHIATRIC:  No disorder of thought or mood.    Vital Signs Last 24 Hrs  T(C): 36.6 (18 Jul 2019 08:57), Max: 37.1 (17 Jul 2019 22:07)  T(F): 97.8 (18 Jul 2019 08:57), Max: 98.7 (17 Jul 2019 22:07)  HR: 65 (18 Jul 2019 13:47) (55 - 66)  BP: 127/56 (18 Jul 2019 13:47) (110/42 - 133/60)  BP(mean): --  RR: 18 (18 Jul 2019 08:57) (18 - 19)  SpO2: 96% (18 Jul 2019 08:57) (95% - 99%)    PHYSICAL EXAMINATION:    GENERAL: The patient is awake and alert in no apparent distress.     HEENT: Head is normocephalic and atraumatic. Extraocular muscles are intact. Mucous membranes are moist.    NECK: Supple.    LUNGS: Clear to auscultation with bibasilar rales but without wheezing or rhonchi; respirations unlabored    HEART: Regular rate and rhythm without murmur.    ABDOMEN: Soft, nontender, and nondistended.      EXTREMITIES: Without any cyanosis, clubbing, rash, lesions or edema.    NEUROLOGIC: Grossly intact.    LABS:                        8.6    7.06  )-----------( 194      ( 17 Jul 2019 07:16 )             29.4     07-17    136  |  95<L>  |  57.0<H>  ----------------------------<  118<H>  4.5   |  28.0  |  6.21<H>    Ca    8.3<L>      17 Jul 2019 07:16      MICROBIOLOGY:    Culture - Sputum . (07.13.19 @ 17:49)    -  Cefazolin: S <=4    -  Ampicillin/Sulbactam: S <=8/4    -  Tetra/Doxy: S <=4    -  Trimethoprim/Sulfamethoxazole: S <=0.5/9.5    -  Vancomycin: S 2    -  RIF- Rifampin: S <=1 Should not be used as monotherapy    -  Oxacillin: S 0.5    -  Penicillin: R >8    -  Gentamicin: S <=4 Should not be used as monotherapy    -  Erythromycin: R >4    -  Clindamycin: R <=0.5 This isolate is presumed to be clindamycin resistant based on detection of inducible resistance. Clindamycin may still be effective in some patients.    Gram Stain:   Few White blood cells  Few Gram Positive Cocci in Clusters  Rare Gram positive cocci in pairs  Rare Yeast    Specimen Source: .Sputum    Culture Results:   Numerous Staphylococcus aureus  Numerous Candida albicans  Numerous Routine respiratory miguelina present    Organism Identification: Staphylococcus aureus    Organism: Staphylococcus aureus    Method Type: LIVIA        RADIOLOGY & ADDITIONAL STUDIES:       EXAM:  XR CHEST PA LAT 2V                          PROCEDURE DATE:  07/13/2019          INTERPRETATION:  CLINICAL INFORMATION: follow pnuemothorax. pneumothorax   s/p thora. ADMDIAG1: R06.02 SHORTNESS OF BREATH/.    EXAM: PA and lateral chest.    COMPARISON: Chest radiograph 7/11/2019 at 7:55 PM.    FINDINGS:  Diffuse opacities in bilateral lungs again seen. Biapical opacities,   right greater than left, unchanged.   Small bilateral pleural effusions. Previously seen left pneumothorax is   no longer seen.  There is cardiomegaly.  Status post median sternotomy and CABG.    IMPRESSION:   No evidence of pneumothorax. Small bilateral pleural effusions.   Diffuse opacities in bilateral lungs again seen.         COURTNEY ROBERTS   This document has been electronically signed. Jul 13 2019  5:47PM          ECHO:      Summary:   1. Technically good study.   2. Normal global left ventricular systolic function.   3. Left ventricular ejection fraction, by visual estimation, is 60 to   65%.   4. Elevated mean left atrial pressure.   5. Spectral Doppler shows pseudonormal pattern of left ventricular   myocardial filling (Grade II diastolic dysfunction).   6. Thickening of the anterior and posterior mitral valve leaflets.   7. Moderate mitral valve regurgitation.   8. Sclerotic aortic valve with normal opening.   9. Mild-moderate tricuspid regurgitation.  10. Estimated pulmonary artery systolic pressure is 45.9 mmHg assuming a   right atrial pressure of 8 mmHg, which is consistent with mild pulmonary   hypertension.  11. There is no evidence of pericardial effusion.  12. Large pleural effusion in the left lateral region.    MD Mario Electronically signed on 7/11/2019 at 7:12:39 PM

## 2019-07-18 NOTE — PROGRESS NOTE ADULT - ASSESSMENT
The patient is a 79 year old male with a history of ESRD on HD, hypertension, CAD status post CABG HFpEF and lung cancer status radiation with pulmonary fibrosis who presented to the ER with worsening dyspnea. Admitted with acute on chronic hypoxic respiratory failure initially on NIV in MICU.     Acute on chronic hypoxic respiratory failure- multifactorial secondary to pulmonary fibrosis and  acute on chronic diastolic CHF- Currently on nasal cannula  PO prednisone  Nebs PRN      Paroxysmal afib- ILR was to be placed 7/16/19 but cancelled due to dyspnea/Orthopnea. ILR to be placed prior to admission once symptoms improved- BB was discontinued previous hospitalization due to bradycardia now with episodes of VT- BB was resumed. However patient unable to get ILR as cannot lay flat for procedure.    ESRD On HD- Continued on HD    Hypertension: BP controlled    Chronic diastolic heart failure - Not in acute exacerbation. On isosorbide. Hemodialysis as scheduled. Pleural effusion drained    Hypothyroidism - Synthroid changed to PO     Patient is DNR/DNI- poor prognosis. Family met with pallaitive but would like to continue HD and therefore is not a candidate for home hospice.     VTE_ heparin subcut     Disposition - unclear currently. Patient very fragile and decompensates - Palliative appropriate however family do not want to discontinue HD currently.    Revisited GOC with patient, who defer decision making to the doctors and his family. Expecting his son and daughter with spouse to come in on Friday from [place]. Will have family meeting then.

## 2019-07-18 NOTE — PROGRESS NOTE ADULT - SUBJECTIVE AND OBJECTIVE BOX
KAUSHIK [facility]  [id number]  79yMale    Patient is a 79y old  Male who presents with a chief complaint of Acute hypoxemic respiratory failure (17 Jul 2019 17:01)    SUBJECTIVE:   Chart reviewed since last visit.  Patient seen and examined at bedside for acute on chronic respiratory failure. Continues to feel dyspnea, intermittent chest pain, occasional nausea.     OBJECTIVE:  Vital Signs Last 24 Hrs  T(C): 36.6 (18 Jul 2019 08:57), Max: 37.1 (17 Jul 2019 22:07)  T(F): 97.8 (18 Jul 2019 08:57), Max: 98.7 (17 Jul 2019 22:07)  HR: 58 (18 Jul 2019 08:57) (48 - 66)  BP: 128/58 (18 Jul 2019 08:57) (110/42 - 133/60)   RR: 18 (18 Jul 2019 08:57) (18 - 95)  SpO2: 96% (18 Jul 2019 08:57) (95% - 99%)    PHYSICAL EXAMINATION  General: Elderly Yoruba gentleman lying in bed - NAD  HEENT:  extraocular movements intact, nasal cannula in place  NECK:  Supple  CVS: regular rate and rhythm S1 S2. Reproducible left chest wall tenderness improved  RESP:  Fair air entry except bases. Poor effort  GI:  Soft nondistended nontender BS+  : No suprapubic tenderness  MSK:  FROM, no edema  CNS:  No gross focal or global deficit noted  INTEG:  warm dry skin  PSYCH:  Fair mood     I&O's Summary    17 Jul 2019 07:01  -  18 Jul 2019 07:00  --------------------------------------------------------  IN: 200 mL / OUT: 2000 mL / NET: -1800 mL                       8.6    7.06  )-----------( 194      ( 17 Jul 2019 07:16 )             29.4     07-17    136  |  95<L>  |  57.0<H>  ----------------------------<  118<H>  4.5   |  28.0  |  6.21<H>    Ca    8.3<L>      17 Jul 2019 07:16    MEDICATIONS  (STANDING):  ALBUTerol/ipratropium for Nebulization 3 milliLiter(s) Nebulizer every 6 hours  amLODIPine   Tablet 5 milliGRAM(s) Oral daily  aspirin  chewable 81 milliGRAM(s) Oral daily  atorvastatin 80 milliGRAM(s) Oral at bedtime  chlorhexidine 2% Cloths 1 Application(s) Topical <User Schedule>  clopidogrel Tablet 75 milliGRAM(s) Oral daily  docusate sodium 100 milliGRAM(s) Oral two times a day  epoetin nguyễn Injectable 09126 Unit(s) IV Push <User Schedule>  heparin  Injectable 5000 Unit(s) SubCutaneous every 8 hours  hydrALAZINE 50 milliGRAM(s) Oral every 8 hours  isosorbide   dinitrate Tablet (ISORDIL) 20 milliGRAM(s) Oral three times a day  levothyroxine 100 MICROGram(s) Oral daily  metoprolol tartrate 25 milliGRAM(s) Oral two times a day  pantoprazole    Tablet 40 milliGRAM(s) Oral before breakfast  predniSONE   Tablet 50 milliGRAM(s) Oral daily  senna 2 Tablet(s) Oral at bedtime  sevelamer carbonate 800 milliGRAM(s) Oral three times a day with meals    MEDICATIONS  (PRN):  ALPRAZolam 0.25 milliGRAM(s) Oral every 6 hours PRN anxiety  diltiazem Injectable 10 milliGRAM(s) IV Push every 4 hours PRN HR>120  diphenhydrAMINE 25 milliGRAM(s) Oral every 6 hours PRN Rash and/or Itching  HYDROcodone/homatropine Syrup 5 milliLiter(s) Oral every 6 hours PRN Cough  morphine  - Injectable 2 milliGRAM(s) IV Push every 2 hours PRN Dyspnea/ Chest pain  polyethylene glycol 3350 17 Gram(s) Oral daily PRN Constipation                  The patient is a 79 year old male with a history of ESRD on HD, hypertension, CAD status post CABG HFpEF and lung cancer status radiation with pulmonary fibrosis who presented to the ER with worsening dyspnea. Admitted with acute on chronic hypoxic respiratory failure initially on NIV in MICU.     Acute on chronic hypoxic respiratory failure- multifactorial secondary to pulmonary fibrosis and  acute on chronic diastolic CHF- Currently on nasal cannula  PO prednisone  Nebs PRN    Paroxysmal afib- ILR was to be placed 7/16/19 but cancelled due to dyspnea/Orthopnea. ILR to be placed prior to admission once symptoms improved- BB was discontinued previous hospitalization due to bradycardia now with episodes of VT- BB was resumed. However patient unable to get ILR as cannot lay flat for procedure.    ESRD On HD- Continued on HD    Hypertension: BP controlled    Chronic diastolic heart failure - Not in acute exacerbation. On isosorbide. Hemodialysis as scheduled. Pleural effusion drained    Hypothyroidism - Synthroid changed to PO     Patient is DNR/ DNI- poor prognosis. Family met with palliative but would like to continue HD and therefore is not a candidate for home hospice.

## 2019-07-18 NOTE — PROGRESS NOTE ADULT - ASSESSMENT
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

## 2019-07-19 LAB
ANION GAP SERPL CALC-SCNC: 15 MMOL/L — SIGNIFICANT CHANGE UP (ref 5–17)
BUN SERPL-MCNC: 54 MG/DL — HIGH (ref 8–20)
CALCIUM SERPL-MCNC: 8.5 MG/DL — LOW (ref 8.6–10.2)
CHLORIDE SERPL-SCNC: 93 MMOL/L — LOW (ref 98–107)
CO2 SERPL-SCNC: 28 MMOL/L — SIGNIFICANT CHANGE UP (ref 22–29)
CREAT SERPL-MCNC: 6.16 MG/DL — HIGH (ref 0.5–1.3)
GLUCOSE SERPL-MCNC: 139 MG/DL — HIGH (ref 70–115)
HCT VFR BLD CALC: 31.5 % — LOW (ref 39–50)
HGB BLD-MCNC: 9.1 G/DL — LOW (ref 13–17)
MCHC RBC-ENTMCNC: 28.9 GM/DL — LOW (ref 32–36)
MCHC RBC-ENTMCNC: 29.1 PG — SIGNIFICANT CHANGE UP (ref 27–34)
MCV RBC AUTO: 100.6 FL — HIGH (ref 80–100)
PLATELET # BLD AUTO: 232 K/UL — SIGNIFICANT CHANGE UP (ref 150–400)
POTASSIUM SERPL-MCNC: 5.1 MMOL/L — SIGNIFICANT CHANGE UP (ref 3.5–5.3)
POTASSIUM SERPL-SCNC: 5.1 MMOL/L — SIGNIFICANT CHANGE UP (ref 3.5–5.3)
RBC # BLD: 3.13 M/UL — LOW (ref 4.2–5.8)
RBC # FLD: 16.6 % — HIGH (ref 10.3–14.5)
SODIUM SERPL-SCNC: 136 MMOL/L — SIGNIFICANT CHANGE UP (ref 135–145)
WBC # BLD: 7.03 K/UL — SIGNIFICANT CHANGE UP (ref 3.8–10.5)
WBC # FLD AUTO: 7.03 K/UL — SIGNIFICANT CHANGE UP (ref 3.8–10.5)

## 2019-07-19 PROCEDURE — 99497 ADVNCD CARE PLAN 30 MIN: CPT

## 2019-07-19 PROCEDURE — 99232 SBSQ HOSP IP/OBS MODERATE 35: CPT

## 2019-07-19 PROCEDURE — 99233 SBSQ HOSP IP/OBS HIGH 50: CPT

## 2019-07-19 PROCEDURE — 90937 HEMODIALYSIS REPEATED EVAL: CPT

## 2019-07-19 PROCEDURE — 71045 X-RAY EXAM CHEST 1 VIEW: CPT | Mod: 26

## 2019-07-19 RX ORDER — LACTULOSE 10 G/15ML
10 SOLUTION ORAL ONCE
Refills: 0 | Status: COMPLETED | OUTPATIENT
Start: 2019-07-19 | End: 2019-07-19

## 2019-07-19 RX ADMIN — Medication 50 MILLIGRAM(S): at 05:23

## 2019-07-19 RX ADMIN — Medication 3 MILLILITER(S): at 03:36

## 2019-07-19 RX ADMIN — Medication 25 MILLIGRAM(S): at 17:07

## 2019-07-19 RX ADMIN — Medication 3 MILLILITER(S): at 08:55

## 2019-07-19 RX ADMIN — ERYTHROPOIETIN 10000 UNIT(S): 10000 INJECTION, SOLUTION INTRAVENOUS; SUBCUTANEOUS at 17:03

## 2019-07-19 RX ADMIN — CLOPIDOGREL BISULFATE 75 MILLIGRAM(S): 75 TABLET, FILM COATED ORAL at 14:20

## 2019-07-19 RX ADMIN — ISOSORBIDE DINITRATE 20 MILLIGRAM(S): 5 TABLET ORAL at 21:35

## 2019-07-19 RX ADMIN — SEVELAMER CARBONATE 800 MILLIGRAM(S): 2400 POWDER, FOR SUSPENSION ORAL at 14:20

## 2019-07-19 RX ADMIN — ISOSORBIDE DINITRATE 20 MILLIGRAM(S): 5 TABLET ORAL at 14:21

## 2019-07-19 RX ADMIN — HEPARIN SODIUM 5000 UNIT(S): 5000 INJECTION INTRAVENOUS; SUBCUTANEOUS at 14:21

## 2019-07-19 RX ADMIN — SEVELAMER CARBONATE 800 MILLIGRAM(S): 2400 POWDER, FOR SUSPENSION ORAL at 08:51

## 2019-07-19 RX ADMIN — CHLORHEXIDINE GLUCONATE 1 APPLICATION(S): 213 SOLUTION TOPICAL at 05:23

## 2019-07-19 RX ADMIN — LACTULOSE 10 GRAM(S): 10 SOLUTION ORAL at 23:16

## 2019-07-19 RX ADMIN — AMLODIPINE BESYLATE 5 MILLIGRAM(S): 2.5 TABLET ORAL at 05:23

## 2019-07-19 RX ADMIN — ISOSORBIDE DINITRATE 20 MILLIGRAM(S): 5 TABLET ORAL at 05:23

## 2019-07-19 RX ADMIN — Medication 50 MILLIGRAM(S): at 21:35

## 2019-07-19 RX ADMIN — HEPARIN SODIUM 5000 UNIT(S): 5000 INJECTION INTRAVENOUS; SUBCUTANEOUS at 21:35

## 2019-07-19 RX ADMIN — SEVELAMER CARBONATE 800 MILLIGRAM(S): 2400 POWDER, FOR SUSPENSION ORAL at 18:32

## 2019-07-19 RX ADMIN — Medication 100 MILLIGRAM(S): at 05:23

## 2019-07-19 RX ADMIN — ATORVASTATIN CALCIUM 80 MILLIGRAM(S): 80 TABLET, FILM COATED ORAL at 21:35

## 2019-07-19 RX ADMIN — Medication 50 MILLIGRAM(S): at 14:21

## 2019-07-19 RX ADMIN — POLYETHYLENE GLYCOL 3350 17 GRAM(S): 17 POWDER, FOR SOLUTION ORAL at 14:21

## 2019-07-19 RX ADMIN — PANTOPRAZOLE SODIUM 40 MILLIGRAM(S): 20 TABLET, DELAYED RELEASE ORAL at 05:23

## 2019-07-19 RX ADMIN — Medication 0.25 MILLIGRAM(S): at 00:13

## 2019-07-19 RX ADMIN — Medication 81 MILLIGRAM(S): at 14:20

## 2019-07-19 RX ADMIN — Medication 25 MILLIGRAM(S): at 05:23

## 2019-07-19 RX ADMIN — Medication 3 MILLILITER(S): at 14:42

## 2019-07-19 RX ADMIN — Medication 100 MICROGRAM(S): at 05:23

## 2019-07-19 RX ADMIN — HEPARIN SODIUM 5000 UNIT(S): 5000 INJECTION INTRAVENOUS; SUBCUTANEOUS at 05:23

## 2019-07-19 RX ADMIN — SENNA PLUS 2 TABLET(S): 8.6 TABLET ORAL at 21:34

## 2019-07-19 NOTE — PROGRESS NOTE ADULT - SUBJECTIVE AND OBJECTIVE BOX
[facility] DIVISION OF KIDNEY DISEASES AND HYPERTENSION -- HEMODIALYSIS NOTE  --------------------------------------------------------------------------------  Chief Complaint: ESRD/Ongoing hemodialysis requirement    24 hour events/subjective:    Seen/examined  On HD today    PAST HISTORY  --------------------------------------------------------------------------------  No significant changes to PMH, PSH, FHx, SHx, unless otherwise noted    ALLERGIES & MEDICATIONS  --------------------------------------------------------------------------------  Allergies    No Known Allergies    Intolerances      Standing Inpatient Medications  ALBUTerol/ipratropium for Nebulization 3 milliLiter(s) Nebulizer every 6 hours  amLODIPine   Tablet 5 milliGRAM(s) Oral daily  aspirin  chewable 81 milliGRAM(s) Oral daily  atorvastatin 80 milliGRAM(s) Oral at bedtime  chlorhexidine 2% Cloths 1 Application(s) Topical <User Schedule>  clopidogrel Tablet 75 milliGRAM(s) Oral daily  docusate sodium 100 milliGRAM(s) Oral two times a day  epoetin nguynễ Injectable 93663 Unit(s) IV Push <User Schedule>  heparin  Injectable 5000 Unit(s) SubCutaneous every 8 hours  hydrALAZINE 50 milliGRAM(s) Oral every 8 hours  isosorbide   dinitrate Tablet (ISORDIL) 20 milliGRAM(s) Oral three times a day  levothyroxine 100 MICROGram(s) Oral daily  metoprolol tartrate 25 milliGRAM(s) Oral two times a day  pantoprazole    Tablet 40 milliGRAM(s) Oral before breakfast  predniSONE   Tablet 50 milliGRAM(s) Oral daily  senna 2 Tablet(s) Oral at bedtime  sevelamer carbonate 800 milliGRAM(s) Oral three times a day with meals    PRN Inpatient Medications  diltiazem Injectable 10 milliGRAM(s) IV Push every 4 hours PRN  diphenhydrAMINE 25 milliGRAM(s) Oral every 6 hours PRN  HYDROcodone/homatropine Syrup 5 milliLiter(s) Oral every 6 hours PRN  LORazepam     Tablet 0.5 milliGRAM(s) Oral two times a day PRN  polyethylene glycol 3350 17 Gram(s) Oral daily PRN      REVIEW OF SYSTEMS  --------------------------------------------------------------------------------  Gen: No weight changes, fatigue, fevers/chills, weakness  Skin: No rashes  Head/Eyes/Ears/Mouth: No headache; Normal hearing; Normal vision w/o blurriness; No sinus pain/discomfort, sore throat  Respiratory: No dyspnea, cough, wheezing, hemoptysis  CV: No chest pain, PND, orthopnea  GI: No abdominal pain, diarrhea, constipation, nausea, vomiting, melena, hematochezia  : No increased frequency, dysuria, hematuria, nocturia  MSK: No joint pain/swelling; no back pain; no edema  Neuro: No dizziness/lightheadedness, weakness, seizures, numbness, tingling  Heme: No easy bruising or bleeding  Endo: No heat/cold intolerance  Psych: No significant nervousness, anxiety, stress, depression    All other systems were reviewed and are negative, except as noted.    VITALS/PHYSICAL EXAM  --------------------------------------------------------------------------------  T(C): 36.7 (07-19-19 @ 08:23), Max: 36.9 (07-18-19 @ 16:28)  HR: 55 (07-19-19 @ 08:55) (53 - 65)  BP: 119/50 (07-19-19 @ 08:23) (106/40 - 127/56)  RR: 18 (07-19-19 @ 08:23) (18 - 18)  SpO2: 96% (07-19-19 @ 08:55) (94% - 98%)  Wt(kg): --        07-18-19 @ 07:01  -  07-19-19 @ 07:00  --------------------------------------------------------  IN: 480 mL / OUT: 0 mL / NET: 480 mL      Physical Exam:  	Gen: NAD, ill-appearing  	HEENT: PERRL, supple neck, Pale,   	Pulm: Rales * Rhonchi  B/L  	CV: RRR, S1S2; no rub  	Back: No spinal or CVA tenderness; no sacral edema  	Abd: +BS, soft, nontender/nondistended  	: No suprapubic tenderness  	UE: Warm, FROM, no clubbing, intact strength; no edema; no asterixis  	LE: Warm, FROM, no clubbing, intact strength; no edema  	Neuro: No focal deficits,   	Psych: Normal affect and mood  	Skin: Warm, without rashes  	Vascular access: AVf,   LABS/STUDIES  --------------------------------------------------------------------------------                Iron 58, TIBC 256, %sat 23      [06-19-19 @ 08:42]  Ferritin 812      [06-19-19 @ 08:42]  PTH -- (Ca 8.1)      [07-01-19 @ 13:24]   83  TSH 0.89      [05-23-19 @ 06:28]  Lipid: chol 159, , HDL 32, LDL 94      [01-29-19 @ 11:39]    HBsAb <3.0      [06-27-19 @ 19:39]  HBsAb Nonreact      [06-27-19 @ 19:39]  HBsAg Reactive      [06-27-19 @ 19:39]  HBcAb Nonreact      [06-27-19 @ 19:39]  HCV 0.10, Nonreact      [06-27-19 @ 19:39]

## 2019-07-19 NOTE — PROGRESS NOTE ADULT - ASSESSMENT
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 Will f/u on Monday unless called

## 2019-07-19 NOTE — GOALS OF CARE CONVERSATION - PERSONAL ADVANCE DIRECTIVE - CONVERSATION/DISCUSSION
Prognosis/Treatment Options
Diagnosis/Prognosis/Treatment Options/Hospice Referral/Palliative Care Referral

## 2019-07-19 NOTE — PROGRESS NOTE ADULT - SUBJECTIVE AND OBJECTIVE BOX
PULMONARY PROGRESS NOTE      KAUSHIK HOFFMANYOVANNY-[id number]    Patient is a 79y old  Male who presents with a chief complaint of Acute hypoxemic respiratory failure (18 Jul 2019 14:27)      INTERVAL HPI/OVERNIGHT EVENTS:  Awake alert in NAD on NCO  No cough or sputun    MEDICATIONS  (STANDING):  ALBUTerol/ipratropium for Nebulization 3 milliLiter(s) Nebulizer every 6 hours  amLODIPine   Tablet 5 milliGRAM(s) Oral daily  aspirin  chewable 81 milliGRAM(s) Oral daily  atorvastatin 80 milliGRAM(s) Oral at bedtime  chlorhexidine 2% Cloths 1 Application(s) Topical <User Schedule>  clopidogrel Tablet 75 milliGRAM(s) Oral daily  docusate sodium 100 milliGRAM(s) Oral two times a day  epoetin nguyễn Injectable 64573 Unit(s) IV Push <User Schedule>  heparin  Injectable 5000 Unit(s) SubCutaneous every 8 hours  hydrALAZINE 50 milliGRAM(s) Oral every 8 hours  isosorbide   dinitrate Tablet (ISORDIL) 20 milliGRAM(s) Oral three times a day  levothyroxine 100 MICROGram(s) Oral daily  metoprolol tartrate 25 milliGRAM(s) Oral two times a day  pantoprazole    Tablet 40 milliGRAM(s) Oral before breakfast  predniSONE   Tablet 50 milliGRAM(s) Oral daily  senna 2 Tablet(s) Oral at bedtime  sevelamer carbonate 800 milliGRAM(s) Oral three times a day with meals      MEDICATIONS  (PRN):  diltiazem Injectable 10 milliGRAM(s) IV Push every 4 hours PRN HR>120  diphenhydrAMINE 25 milliGRAM(s) Oral every 6 hours PRN Rash and/or Itching  HYDROcodone/homatropine Syrup 5 milliLiter(s) Oral every 6 hours PRN Cough  LORazepam     Tablet 0.5 milliGRAM(s) Oral two times a day PRN Anxiety  polyethylene glycol 3350 17 Gram(s) Oral daily PRN Constipation      Allergies    No Known Allergies    Intolerances        PAST MEDICAL & SURGICAL HISTORY:  ILD (interstitial lung disease)  CAD (coronary artery disease)  Chronic anemia  ESRD (end stage renal disease): on HD (Tue-Thu-Sat) through RUE fistula  CAD (coronary artery disease): s/p remote CABG and multiple stents  Lung cancer: had yoni radiation in 2006.  Bipolar disorder  High cholesterol  Hypertension  S/P CABG (coronary artery bypass graft)      SOCIAL HISTORY  Smoking History:       REVIEW OF SYSTEMS:    CONSTITUTIONAL:  No distress    HEENT:  Eyes:  No diplopia or blurred vision. ENT:  No earache, sore throat or runny nose.    CARDIOVASCULAR:  No pressure, squeezing, tightness, heaviness or aching about the chest; no palpitations.    RESPIRATORY:  above    GASTROINTESTINAL:  No nausea, vomiting or diarrhea.    GENITOURINARY:  No dysuria, frequency or urgency.    NEUROLOGIC:  No paresthesias, fasciculations, seizures or weakness.    Extremities: No cyanosis, clubbing or edema    PSYCHIATRIC:  No disorder of thought or mood.    Vital Signs Last 24 Hrs  T(C): 36.7 (19 Jul 2019 08:23), Max: 36.9 (18 Jul 2019 16:28)  T(F): 98.1 (19 Jul 2019 08:23), Max: 98.5 (18 Jul 2019 16:28)  HR: 55 (19 Jul 2019 08:55) (53 - 65)  BP: 119/50 (19 Jul 2019 08:23) (106/40 - 127/56)  BP(mean): --  RR: 18 (19 Jul 2019 08:23) (18 - 18)  SpO2: 96% (19 Jul 2019 08:55) (94% - 98%)    PHYSICAL EXAMINATION:    GENERAL: The patient is awake and alert in no apparent distress.     HEENT: Head is normocephalic and atraumatic. Extraocular muscles are intact. Mucous membranes are moist.    NECK: Supple.    LUNGS:  rales rt base    HEART: Regular rate and rhythm without murmur.    ABDOMEN: Soft, nontender, and nondistended.      EXTREMITIES: Without any cyanosis, clubbing, rash, lesions or edema.    NEUROLOGIC: Grossly intact.    LABS:                              MICROBIOLOGY:    RADIOLOGY & ADDITIONAL STUDIES:  < from: Xray Chest 1 View- PORTABLE-Routine (07.19.19 @ 05:53) >     EXAM:  XR CHEST PORTABLE ROUTINE 1V                          PROCEDURE DATE:  07/19/2019          INTERPRETATION:  XR CHEST    Single AP view    HISTORY:  Hypoxia    Comparison:  Chest x-ray 7/13/2019 and CT chest 6/18/2019    Bilateral pleural effusions and patchy bilateral airspace disease,   unchanged.    The heart is enlarged.    Status post sternotomy.    IMPRESSION:    Bilateral pleural effusions and patchy bilateral airspace disease,   unchanged since 7/13/2019.                ROMAINE RODRIGEZ   This document has been electronically signed. Jul 19 2019 10:52AM    < end of copied text >  < from: CT Chest No Cont (06.18.19 @ 20:50) >   EXAM:  CT CHEST                          PROCEDURE DATE:  06/18/2019          INTERPRETATION:  CLINICAL INFORMATION: Shortness of breath     COMPARISON: 2/4/2019    PROCEDURE:   CT of the Chest was performed without intravenous contrast.  [place] coronal reformats were performed.      FINDINGS:    No evidence of mediastinal or hilar lymphadenopathy. No evidence of   axillary adenopathy.    There is a left pleural effusion, unchanged in size since the prior   study. There is a small right pleural effusion also unchanged.    Cardiomegaly. No evidence of a pericardial effusion.    There is a dense pleural calcification in the right apex, unchanged.   Fibrosis in the right apex medially, unchanged. Multiple calcified   granulomas in the right lung. Pleural calcifications in the right lower   lobe, unchanged. Compressive atelectasis at the left lung base,   unchanged. Extensive emphysematous changes. Bronchiectasis in the right   lower lobe. Interstitial prominence in the right lower lobe and right   middle lobe, less pronounced since the prior study.    No significant osseous abnormality.    IMPRESSION:     Emphysematous changes.    Left pleural effusion with left basilar compressive atelectasis unchanged   since 2/4/2019.    Right apical fibrosis with interstitial changes at the right lung base   and bronchiectasis in the right lower lobe, unchanged. Cannot exclude   superimposed inflammatory disease in the right lower lobe.    Cardiomegaly.                  BERT BARKSDALE M.D., ATTENDING RADIOLOGIST  This document has been electronically signed. Jun 18 2019  8:55PM        < end of copied text >

## 2019-07-19 NOTE — PROGRESS NOTE ADULT - ASSESSMENT
The patient is a 79 year old male with a history of ESRD on HD, hypertension, CAD status post CABG HFpEF and lung cancer status radiation with pulmonary fibrosis who presented to the ER with worsening dyspnea. Admitted with acute on chronic hypoxic respiratory failure initially on NIV in MICU.     Acute on chronic hypoxic respiratory failure- multifactorial secondary to pulmonary fibrosis and  acute on chronic diastolic CHF- Currently on nasal cannula  PO prednisone  Nebs PRN  Repeat CXR  Added Ativan for comfort.    Paroxysmal afib- ILR was to be placed 7/16/19 but cancelled due to dyspnea/Orthopnea. ILR to be placed prior to admission once symptoms improved- BB was discontinued previous hospitalization due to bradycardia now with episodes of VT- BB was resumed. However patient unable to get ILR as cannot lay flat for procedure.    ESRD On HD- Continued on HD    Hypertension: BP controlled    Chronic diastolic heart failure - Not in acute exacerbation. On isosorbide. Hemodialysis as scheduled. Pleural effusion drained    Hypothyroidism - Synthroid changed to PO     Patient is DNR/DNI- poor prognosis. Family met with palliative but would like to continue HD and therefore is not a candidate for home hospice.     VTE_ heparin subcut     Disposition - unclear currently. Patient very fragile and decompensates - Palliative appropriate however patient and family do not want to discontinue HD currently.    Once respiratory status stabilize and other chronic issues at baseline would discharge home. Both patient and family understand patient has very high risk of readmission    Discussed with RN

## 2019-07-19 NOTE — PROGRESS NOTE ADULT - SUBJECTIVE AND OBJECTIVE BOX
HOSPITALIST PROGRESS NOTE    KAUSHIK HOFFMAN  [id number]  79yMale    Patient is a 79y old  Male who presents with a chief complaint of Acute hypoxemic respiratory failure (19 Jul 2019 13:06)      SUBJECTIVE:   Chart reviewed since last visit.  Patient seen and examined at bedside for respiratory failure, ESRD.  Feels dyspneic as well as continues to have intermittent chest pain and nausea.      OBJECTIVE:  Vital Signs Last 24 Hrs  T(C): 36.7 (19 Jul 2019 08:23), Max: 36.9 (18 Jul 2019 16:28)  T(F): 98.1 (19 Jul 2019 08:23), Max: 98.5 (18 Jul 2019 16:28)  HR: 115 (19 Jul 2019 14:42) (53 - 115)  BP: 124/64 (19 Jul 2019 14:21) (106/40 - 126/58)   RR: 18 (19 Jul 2019 08:23) (18 - 18)  SpO2: 96% (19 Jul 2019 14:42) (94% - 98%)    PHYSICAL EXAMINATION  General: Elderly [place] gentleman lying in bed - NAD  HEENT:  extraocular movements intact, nasal cannula in place  NECK:  Supple  CVS: regular rate and rhythm S1 S2. Reproducible left chest wall tenderness improved  RESP:  Fair air entry except bases. Poor effort  GI:  Soft nondistended nontender BS+  : No suprapubic tenderness  MSK:  FROM, no edema  CNS:  No gross focal or global deficit noted  INTEG:  warm dry skin  PSYCH:  Fair mood        MONITOR:  CAPILLARY BLOOD GLUCOSE            I&O's Summary    18 Jul 2019 07:01  -  19 Jul 2019 07:00  --------------------------------------------------------  IN: 480 mL / OUT: 0 mL / NET: 480 mL                            9.1    7.03  )-----------( 232      ( 19 Jul 2019 14:32 )             31.5       07-19    136  |  93<L>  |  54.0<H>  ----------------------------<  139<H>  5.1   |  28.0  |  6.16<H>    Ca    8.5<L>      19 Jul 2019 14:32              Culture:    TTE:    RADIOLOGY        MEDICATIONS  (STANDING):  ALBUTerol/ipratropium for Nebulization 3 milliLiter(s) Nebulizer every 6 hours  amLODIPine   Tablet 5 milliGRAM(s) Oral daily  aspirin  chewable 81 milliGRAM(s) Oral daily  atorvastatin 80 milliGRAM(s) Oral at bedtime  chlorhexidine 2% Cloths 1 Application(s) Topical <User Schedule>  clopidogrel Tablet 75 milliGRAM(s) Oral daily  docusate sodium 100 milliGRAM(s) Oral two times a day  epoetin nguyễn Injectable 38628 Unit(s) IV Push <User Schedule>  heparin  Injectable 5000 Unit(s) SubCutaneous every 8 hours  hydrALAZINE 50 milliGRAM(s) Oral every 8 hours  isosorbide   dinitrate Tablet (ISORDIL) 20 milliGRAM(s) Oral three times a day  levothyroxine 100 MICROGram(s) Oral daily  metoprolol tartrate 25 milliGRAM(s) Oral two times a day  pantoprazole    Tablet 40 milliGRAM(s) Oral before breakfast  predniSONE   Tablet 50 milliGRAM(s) Oral daily  senna 2 Tablet(s) Oral at bedtime  sevelamer carbonate 800 milliGRAM(s) Oral three times a day with meals      MEDICATIONS  (PRN):  diltiazem Injectable 10 milliGRAM(s) IV Push every 4 hours PRN HR>120  diphenhydrAMINE 25 milliGRAM(s) Oral every 6 hours PRN Rash and/or Itching  HYDROcodone/homatropine Syrup 5 milliLiter(s) Oral every 6 hours PRN Cough  LORazepam     Tablet 0.5 milliGRAM(s) Oral two times a day PRN Anxiety  polyethylene glycol 3350 17 Gram(s) Oral daily PRN Constipation

## 2019-07-19 NOTE — GOALS OF CARE CONVERSATION - PERSONAL ADVANCE DIRECTIVE - CONVERSATION DETAILS
Revisited patient medical conditions, viz, respiratory failure, Pulmonary Fibrosis, COPD, AF, ESRD, recurrent fluid overload, pleural effusion, severe malnutrition and hospital course.    Reviewed options - current care, palliative care, Hospice.   Patients son equate discontinuing Dialysis to killing him.     Patient himself kept quiet during initial part of conversation, deferring to sons, but when questioned directly he is currently happy with his QoL and wants to continue HD. He understands that he is not being cured and has high risk for decompensation and readmission. If anything changes and he is suffering then he would consider withdrawing HD and Hospice then.
Discussed patient health condition, viz., recurrent respiratory failure, Pulmonary fibrosis, ESRD, PCM and recurrent admissions, TachyBrady syndrome, cannot get ILR. Overall poor prognosis. DNR/DNI signed in MICU.     Family understands he is sick, currently do not want to take him off HD as he will die. Ideally they want to take him to [facility] so he can spend his remaining time there. I did tell them that cannot be ensured given his fragile condition and propensity to decompensate.    They did not commit either way. Discussion also touched upon Hospice and discontinuing HD - family was receptive to obtain more information and discuss settings, options for additional support etc.

## 2019-07-20 PROCEDURE — 99233 SBSQ HOSP IP/OBS HIGH 50: CPT

## 2019-07-20 PROCEDURE — 99232 SBSQ HOSP IP/OBS MODERATE 35: CPT

## 2019-07-20 RX ORDER — ACETAMINOPHEN 500 MG
650 TABLET ORAL EVERY 6 HOURS
Refills: 0 | Status: DISCONTINUED | OUTPATIENT
Start: 2019-07-20 | End: 2019-07-26

## 2019-07-20 RX ORDER — ACETAMINOPHEN 500 MG
650 TABLET ORAL ONCE
Refills: 0 | Status: COMPLETED | OUTPATIENT
Start: 2019-07-20 | End: 2019-07-20

## 2019-07-20 RX ADMIN — Medication 650 MILLIGRAM(S): at 06:00

## 2019-07-20 RX ADMIN — HEPARIN SODIUM 5000 UNIT(S): 5000 INJECTION INTRAVENOUS; SUBCUTANEOUS at 14:49

## 2019-07-20 RX ADMIN — ATORVASTATIN CALCIUM 80 MILLIGRAM(S): 80 TABLET, FILM COATED ORAL at 23:01

## 2019-07-20 RX ADMIN — PANTOPRAZOLE SODIUM 40 MILLIGRAM(S): 20 TABLET, DELAYED RELEASE ORAL at 05:03

## 2019-07-20 RX ADMIN — Medication 650 MILLIGRAM(S): at 23:40

## 2019-07-20 RX ADMIN — Medication 0.5 MILLIGRAM(S): at 23:01

## 2019-07-20 RX ADMIN — SEVELAMER CARBONATE 800 MILLIGRAM(S): 2400 POWDER, FOR SUSPENSION ORAL at 11:38

## 2019-07-20 RX ADMIN — ISOSORBIDE DINITRATE 20 MILLIGRAM(S): 5 TABLET ORAL at 05:02

## 2019-07-20 RX ADMIN — AMLODIPINE BESYLATE 5 MILLIGRAM(S): 2.5 TABLET ORAL at 05:02

## 2019-07-20 RX ADMIN — CLOPIDOGREL BISULFATE 75 MILLIGRAM(S): 75 TABLET, FILM COATED ORAL at 11:38

## 2019-07-20 RX ADMIN — Medication 50 MILLIGRAM(S): at 05:03

## 2019-07-20 RX ADMIN — Medication 81 MILLIGRAM(S): at 11:38

## 2019-07-20 RX ADMIN — Medication 100 MILLIGRAM(S): at 05:03

## 2019-07-20 RX ADMIN — Medication 25 MILLIGRAM(S): at 18:24

## 2019-07-20 RX ADMIN — Medication 3 MILLILITER(S): at 03:40

## 2019-07-20 RX ADMIN — Medication 100 MICROGRAM(S): at 05:03

## 2019-07-20 RX ADMIN — Medication 3 MILLILITER(S): at 09:35

## 2019-07-20 RX ADMIN — Medication 25 MILLIGRAM(S): at 05:02

## 2019-07-20 RX ADMIN — Medication 100 MILLIGRAM(S): at 18:25

## 2019-07-20 RX ADMIN — HEPARIN SODIUM 5000 UNIT(S): 5000 INJECTION INTRAVENOUS; SUBCUTANEOUS at 23:01

## 2019-07-20 RX ADMIN — Medication 3 MILLILITER(S): at 15:45

## 2019-07-20 RX ADMIN — Medication 50 MILLIGRAM(S): at 14:49

## 2019-07-20 RX ADMIN — ISOSORBIDE DINITRATE 20 MILLIGRAM(S): 5 TABLET ORAL at 23:07

## 2019-07-20 RX ADMIN — SEVELAMER CARBONATE 800 MILLIGRAM(S): 2400 POWDER, FOR SUSPENSION ORAL at 18:25

## 2019-07-20 RX ADMIN — HEPARIN SODIUM 5000 UNIT(S): 5000 INJECTION INTRAVENOUS; SUBCUTANEOUS at 05:02

## 2019-07-20 RX ADMIN — Medication 3 MILLILITER(S): at 20:02

## 2019-07-20 RX ADMIN — Medication 650 MILLIGRAM(S): at 04:58

## 2019-07-20 RX ADMIN — Medication 0.5 MILLIGRAM(S): at 04:58

## 2019-07-20 RX ADMIN — ISOSORBIDE DINITRATE 20 MILLIGRAM(S): 5 TABLET ORAL at 14:49

## 2019-07-20 RX ADMIN — CHLORHEXIDINE GLUCONATE 1 APPLICATION(S): 213 SOLUTION TOPICAL at 05:02

## 2019-07-20 RX ADMIN — Medication 50 MILLIGRAM(S): at 23:01

## 2019-07-20 RX ADMIN — SENNA PLUS 2 TABLET(S): 8.6 TABLET ORAL at 23:01

## 2019-07-20 NOTE — PROGRESS NOTE ADULT - ASSESSMENT
1) ESRD on HD  2)MBD of renal dx  3) Anemia of renal dx  4) Vol HTN    HD Monday  Poor overall prognosis;  Not candidate for hospice; pt and family wish to continue with HD  bari Silverio

## 2019-07-20 NOTE — PROGRESS NOTE ADULT - ASSESSMENT
The patient is a 79 year old male with a history of ESRD on HD, hypertension, CAD status post CABG HFpEF and lung cancer status radiation with pulmonary fibrosis who presented to the ER with worsening dyspnea. Admitted with acute on chronic hypoxic respiratory failure initially on NIV in MICU.     Acute on chronic hypoxic respiratory failure- multifactorial secondary to pulmonary fibrosis and  acute on chronic diastolic CHF- Currently on nasal cannula  PO prednisone  Nebs PRN  Ativan for comfort.    Paroxysmal afib- ILR was to be placed 7/16/19 but cancelled due to dyspnea/Orthopnea. ILR to be placed prior to admission once symptoms improved- BB was discontinued previous hospitalization due to bradycardia now with episodes of VT- BB was resumed. However patient unable to get ILR as cannot lay flat for procedure.    ESRD On HD- Continued on HD    Hypertension: BP controlled    Chronic diastolic heart failure - Not in acute exacerbation. On isosorbide. Hemodialysis as scheduled. Pleural effusion drained    Hypothyroidism - Synthroid changed to PO     Patient is DNR/DNI- poor prognosis. Family met with palliative but would like to continue HD and therefore is not a candidate for home hospice.     VTE_ heparin subcut     Disposition - unclear currently. Patient very fragile and decompensates - Palliative appropriate however patient and family do not want to discontinue HD currently.    Once respiratory status stabilize and other chronic issues at baseline would discharge home. Both patient and family understand patient has very high risk of readmission    Discussed with RN

## 2019-07-20 NOTE — PROGRESS NOTE ADULT - SUBJECTIVE AND OBJECTIVE BOX
[facility] DIVISION OF KIDNEY DISEASES AND HYPERTENSION -- FOLLOW UP NOTE  --------------------------------------------------------------------------------  Chief Complaint:  ESRD HD  24 hour events/subjective:  Seen/examined  Next HD Mon      PAST HISTORY  --------------------------------------------------------------------------------  No significant changes to PMH, PSH, FHx, SHx, unless otherwise noted    ALLERGIES & MEDICATIONS  --------------------------------------------------------------------------------  Allergies    No Known Allergies    Intolerances      Standing Inpatient Medications  ALBUTerol/ipratropium for Nebulization 3 milliLiter(s) Nebulizer every 6 hours  amLODIPine   Tablet 5 milliGRAM(s) Oral daily  aspirin  chewable 81 milliGRAM(s) Oral daily  atorvastatin 80 milliGRAM(s) Oral at bedtime  chlorhexidine 2% Cloths 1 Application(s) Topical <User Schedule>  clopidogrel Tablet 75 milliGRAM(s) Oral daily  docusate sodium 100 milliGRAM(s) Oral two times a day  epoetin nguyễn Injectable 98838 Unit(s) IV Push <User Schedule>  heparin  Injectable 5000 Unit(s) SubCutaneous every 8 hours  hydrALAZINE 50 milliGRAM(s) Oral every 8 hours  isosorbide   dinitrate Tablet (ISORDIL) 20 milliGRAM(s) Oral three times a day  levothyroxine 100 MICROGram(s) Oral daily  metoprolol tartrate 25 milliGRAM(s) Oral two times a day  pantoprazole    Tablet 40 milliGRAM(s) Oral before breakfast  predniSONE   Tablet 50 milliGRAM(s) Oral daily  senna 2 Tablet(s) Oral at bedtime  sevelamer carbonate 800 milliGRAM(s) Oral three times a day with meals    PRN Inpatient Medications  diltiazem Injectable 10 milliGRAM(s) IV Push every 4 hours PRN  diphenhydrAMINE 25 milliGRAM(s) Oral every 6 hours PRN  HYDROcodone/homatropine Syrup 5 milliLiter(s) Oral every 6 hours PRN  LORazepam     Tablet 0.5 milliGRAM(s) Oral two times a day PRN  polyethylene glycol 3350 17 Gram(s) Oral daily PRN      REVIEW OF SYSTEMS  --------------------------------------------------------------------------------  Gen: No weight changes, fatigue, fevers/chills, weakness  Skin: No rashes  Head/Eyes/Ears/Mouth: No headache; Normal hearing; Normal vision w/o blurriness; No sinus pain/discomfort, sore throat  Respiratory: No dyspnea, cough, wheezing, hemoptysis  CV: No chest pain, PND, orthopnea  GI: No abdominal pain, diarrhea, constipation, nausea, vomiting, melena, hematochezia  : No increased frequency, dysuria, hematuria, nocturia  MSK: No joint pain/swelling; no back pain; no edema  Neuro: No dizziness/lightheadedness, weakness, seizures, numbness, tingling  Heme: No easy bruising or bleeding  Endo: No heat/cold intolerance  Psych: No significant nervousness, anxiety, stress, depression    All other systems were reviewed and are negative, except as noted.    VITALS/PHYSICAL EXAM  --------------------------------------------------------------------------------  T(C): 36.7 (07-20-19 @ 08:00), Max: 36.7 (07-19-19 @ 20:15)  HR: 88 (07-20-19 @ 09:35) (50 - 115)  BP: 110/97 (07-20-19 @ 08:00) (109/47 - 131/64)  RR: 18 (07-20-19 @ 08:00) (18 - 18)  SpO2: 98% (07-20-19 @ 11:41) (93% - 99%)  Wt(kg): --        07-19-19 @ 07:01  -  07-20-19 @ 07:00  --------------------------------------------------------  IN: 480 mL / OUT: 2001 mL / NET: -1521 mL      Physical Exam:  	Gen: NAD   	HEENT: PERRL, supple neck, clear oropharynx  	Pulm: CTA B/L  	CV: RRR, S1S2; no rub  	Back: No spinal or CVA tenderness; no sacral edema  	Abd: +BS, soft, nontender/nondistended  	: No suprapubic tenderness  	UE: Warm, FROM, no clubbing, intact strength; no edema; no asterixis  	LE: Warm, FROM, no clubbing, intact strength; no edema  	Neuro: No focal deficits, intact gait  	Psych: Normal affect and mood  	Skin: Warm, without rashes  	Vascular access:    LABS/STUDIES  --------------------------------------------------------------------------------              9.1    7.03  >-----------<  232      [07-19-19 @ 14:32]              31.5     136  |  93  |  54.0  ----------------------------<  139      [07-19-19 @ 14:32]  5.1   |  28.0  |  6.16        Ca     8.5     [07-19-19 @ 14:32]            Creatinine Trend:  SCr 6.16 [07-19 @ 14:32]  SCr 6.21 [07-17 @ 07:16]  SCr 7.34 [07-14 @ 09:12]  SCr 5.75 [07-13 @ 06:12]  SCr 9.70 [07-12 @ 11:32]        Iron 58, TIBC 256, %sat 23      [06-19-19 @ 08:42]  Ferritin 812      [06-19-19 @ 08:42]  PTH -- (Ca 8.1)      [07-01-19 @ 13:24]   83  TSH 0.89      [05-23-19 @ 06:28]  Lipid: chol 159, , HDL 32, LDL 94      [01-29-19 @ 11:39]    HBsAb <3.0      [06-27-19 @ 19:39]  HBsAb Nonreact      [06-27-19 @ 19:39]  HBsAg Reactive      [06-27-19 @ 19:39]  HBcAb Nonreact      [06-27-19 @ 19:39]  HCV 0.10, Nonreact      [06-27-19 @ 19:39]

## 2019-07-20 NOTE — PROGRESS NOTE ADULT - SUBJECTIVE AND OBJECTIVE BOX
HOSPITALIST PROGRESS NOTE    KAUSHIK HOFFMAN  [id number]  79yMale    Patient is a 79y old  Male who presents with a chief complaint of Acute hypoxemic respiratory failure (20 Jul 2019 14:47)      SUBJECTIVE:   Chart reviewed since last visit.  Patient seen and examined at bedside for respiratory failure, ESRD  Feels the same - dyspneic mostly. Occasional chest pain, mild. No nausea or vomiting.      OBJECTIVE:  Vital Signs Last 24 Hrs  T(C): 36.7 (21 Jul 2019 00:00), Max: 36.7 (20 Jul 2019 17:26)  T(F): 98.1 (21 Jul 2019 00:00), Max: 98.1 (20 Jul 2019 17:26)  HR: 72 (21 Jul 2019 04:02) (61 - 92)  BP: 128/50 (20 Jul 2019 23:00) (92/51 - 128/50)   RR: 18 (21 Jul 2019 00:00) (18 - 18)  SpO2: 95% (21 Jul 2019 08:13) (93% - 98%)    PHYSICAL EXAMINATION  General: Elderly [place] gentleman lying in bed - NAD  HEENT:  extraocular movements intact, nasal cannula in place  NECK:  Supple  CVS: regular rate and rhythm S1 S2. Reproducible left chest wall tenderness improved  RESP:  Fair air entry except bases. Poor effort  GI:  Soft nondistended nontender BS+  : No suprapubic tenderness  MSK:  FROM, no edema  CNS:  No gross focal or global deficit noted  INTEG:  warm dry skin  PSYCH:  Fair mood     MONITOR:  CAPILLARY BLOOD GLUCOSE            I&O's Summary    20 Jul 2019 07:01  -  21 Jul 2019 07:00  --------------------------------------------------------  IN: 480 mL / OUT: 0 mL / NET: 480 mL                            9.1    7.03  )-----------( 232      ( 19 Jul 2019 14:32 )             31.5       07-19    136  |  93<L>  |  54.0<H>  ----------------------------<  139<H>  5.1   |  28.0  |  6.16<H>    Ca    8.5<L>      19 Jul 2019 14:32              Culture:    TTE:    RADIOLOGY  < from: Xray Chest 1 View- PORTABLE-Routine (07.19.19 @ 05:53) >     EXAM:  XR CHEST PORTABLE ROUTINE 1V                          PROCEDURE DATE:  07/19/2019          INTERPRETATION:  XR CHEST    Single AP view    HISTORY:  Hypoxia    Comparison:  Chest x-ray 7/13/2019 and CT chest 6/18/2019    Bilateral pleural effusions and patchy bilateral airspace disease,   unchanged.    The heart is enlarged.    Status post sternotomy.    IMPRESSION:    Bilateral pleural effusions and patchy bilateral airspace disease,   unchanged since 7/13/2019.                ROMAINE RODRIGEZ   This document has been electronically signed. Jul 19 2019 10:52AM    < end of copied text >        MEDICATIONS  (STANDING):  ALBUTerol/ipratropium for Nebulization 3 milliLiter(s) Nebulizer every 6 hours  amLODIPine   Tablet 5 milliGRAM(s) Oral daily  aspirin  chewable 81 milliGRAM(s) Oral daily  atorvastatin 80 milliGRAM(s) Oral at bedtime  chlorhexidine 2% Cloths 1 Application(s) Topical <User Schedule>  clopidogrel Tablet 75 milliGRAM(s) Oral daily  docusate sodium 100 milliGRAM(s) Oral two times a day  epoetin nguyễn Injectable 12515 Unit(s) IV Push <User Schedule>  heparin  Injectable 5000 Unit(s) SubCutaneous every 8 hours  hydrALAZINE 50 milliGRAM(s) Oral every 8 hours  isosorbide   dinitrate Tablet (ISORDIL) 20 milliGRAM(s) Oral three times a day  levothyroxine 100 MICROGram(s) Oral daily  metoprolol tartrate 25 milliGRAM(s) Oral two times a day  pantoprazole    Tablet 40 milliGRAM(s) Oral before breakfast  predniSONE   Tablet 50 milliGRAM(s) Oral daily  senna 2 Tablet(s) Oral at bedtime  sevelamer carbonate 800 milliGRAM(s) Oral three times a day with meals      MEDICATIONS  (PRN):  acetaminophen   Tablet .. 650 milliGRAM(s) Oral every 6 hours PRN Mild Pain (1 - 3)  diltiazem Injectable 10 milliGRAM(s) IV Push every 4 hours PRN HR>120  diphenhydrAMINE 25 milliGRAM(s) Oral every 6 hours PRN Rash and/or Itching  HYDROcodone/homatropine Syrup 5 milliLiter(s) Oral every 6 hours PRN Cough  LORazepam     Tablet 0.5 milliGRAM(s) Oral two times a day PRN Anxiety  polyethylene glycol 3350 17 Gram(s) Oral daily PRN Constipation

## 2019-07-20 NOTE — PROGRESS NOTE ADULT - ASSESSMENT
80yo male with PMH HFpEF (7/11 EF 60-65%), CAD s/p CABG (ANA-LAD, stents to LCx) with recent NST (04/19 mild ischemia, medically managed), HTN, HLD, ESRD on HD (T, Th, Sat) via L AVF, Lung Ca s/p radiation therapy 2006, ILD on 5L home o2, anemia, bipolar d/o, with frequent readmissions for SOB, volume overload, and ILD.        Acute on chronic hypoxic resp failure  Blood cultures neg to date, pleural fluid- cultures pending.  7/19 CXR- B/L pleural effusion- patchy airspace disease- unchanged from 7/13  on exam pt moving air freely, conversing, sitting up in bed.     CAD  continue asa/plavix, bb, statin, imdur    ESRD  HD monday

## 2019-07-20 NOTE — PROGRESS NOTE ADULT - SUBJECTIVE AND OBJECTIVE BOX
CHIEF COMPLAINT:Patient is a 79y old  Male who presents with a chief complaint of Acute hypoxemic respiratory failure (2019 13:52)      INTERVAL HISTORY:   pt seen and examined, awake, smiling. Denies chest pain, states some shortness of breath. Appears comfortable, wife at bedside.       Allergies  No Known Allergies  Intolerances      	  MEDICATIONS:  amLODIPine   Tablet 5 milliGRAM(s) Oral daily  diltiazem Injectable 10 milliGRAM(s) IV Push every 4 hours PRN  hydrALAZINE 50 milliGRAM(s) Oral every 8 hours  isosorbide   dinitrate Tablet (ISORDIL) 20 milliGRAM(s) Oral three times a day  metoprolol tartrate 25 milliGRAM(s) Oral two times a day  ALBUTerol/ipratropium for Nebulization 3 milliLiter(s) Nebulizer every 6 hours  HYDROcodone/homatropine Syrup 5 milliLiter(s) Oral every 6 hours PRN  diphenhydrAMINE 25 milliGRAM(s) Oral every 6 hours PRN  LORazepam     Tablet 0.5 milliGRAM(s) Oral two times a day PRN  docusate sodium 100 milliGRAM(s) Oral two times a day  pantoprazole    Tablet 40 milliGRAM(s) Oral before breakfast  polyethylene glycol 3350 17 Gram(s) Oral daily PRN  senna 2 Tablet(s) Oral at bedtime  atorvastatin 80 milliGRAM(s) Oral at bedtime  levothyroxine 100 MICROGram(s) Oral daily  predniSONE   Tablet 50 milliGRAM(s) Oral daily  aspirin  chewable 81 milliGRAM(s) Oral daily  chlorhexidine 2% Cloths 1 Application(s) Topical <User Schedule>  clopidogrel Tablet 75 milliGRAM(s) Oral daily  epoetin nguyễn Injectable 33942 Unit(s) IV Push <User Schedule>  heparin  Injectable 5000 Unit(s) SubCutaneous every 8 hours    PHYSICAL EXAM:    T(C): 36.7 (19 @ 08:00), Max: 36.7 (19 @ 20:15)  HR: 88 (19 @ 09:35) (50 - 96)  BP: 110/97 (19 @ 08:00) (109/47 - 131/64)  RR: 18 (19 @ 08:00) (18 - 18)  SpO2: 98% (19 @ 11:41) (93% - 99%)      I&O's Summary    2019 07:01  -  2019 07:00  --------------------------------------------------------  IN: 480 mL / OUT: 2001 mL / NET: -1521 mL        Daily Weight in k.1 (2019 19:35)    Appearance: thin, elderly	  HEENT: NC/AT  Eye: Pink Conjunctiva  Lungs: CTA B/L  CVS: RRR, Normal S1 and S2, No Edema, 2/6 sushil LSB  Pulses: Normal distal pulses.  Neuro: A&O x3       LABS:	 	             9.1    7.03  )-----------( 232      ( 2019 14:32 )             31.5     07-    136  |  93<L>  |  54.0<H>  ----------------------------<  139<H>  5.1   |  28.0  |  6.16<H>    Ca    8.5<L>      2019 14:32      < from: Xray Chest 1 View- PORTABLE-Routine (19 @ 05:53) >  Bilateral pleural effusions and patchy bilateral airspace disease,   unchanged.    The heart is enlarged.    Status post sternotomy.    IMPRESSION:    Bilateral pleural effusions and patchy bilateral airspace disease,   unchanged since 2019.      < end of copied text > CHIEF COMPLAINT:Patient is a 79y old  Male who presents with a chief complaint of Acute hypoxemic respiratory failure (20 Jul 2019 13:52)      INTERVAL HISTORY:   pt seen and examined, awake, smiling. Denies chest pain, states some shortness of breath. Appears comfortable, wife at bedside.       Allergies  No Known Allergies  Intolerances  	  MEDICATIONS:  amLODIPine   Tablet 5 milliGRAM(s) Oral daily  diltiazem Injectable 10 milliGRAM(s) IV Push every 4 hours PRN  hydrALAZINE 50 milliGRAM(s) Oral every 8 hours  isosorbide   dinitrate Tablet (ISORDIL) 20 milliGRAM(s) Oral three times a day  metoprolol tartrate 25 milliGRAM(s) Oral two times a day  ALBUTerol/ipratropium for Nebulization 3 milliLiter(s) Nebulizer every 6 hours  HYDROcodone/homatropine Syrup 5 milliLiter(s) Oral every 6 hours PRN  diphenhydrAMINE 25 milliGRAM(s) Oral every 6 hours PRN  LORazepam     Tablet 0.5 milliGRAM(s) Oral two times a day PRN  docusate sodium 100 milliGRAM(s) Oral two times a day  pantoprazole    Tablet 40 milliGRAM(s) Oral before breakfast  polyethylene glycol 3350 17 Gram(s) Oral daily PRN  senna 2 Tablet(s) Oral at bedtime  atorvastatin 80 milliGRAM(s) Oral at bedtime  levothyroxine 100 MICROGram(s) Oral daily  predniSONE   Tablet 50 milliGRAM(s) Oral daily  aspirin  chewable 81 milliGRAM(s) Oral daily  chlorhexidine 2% Cloths 1 Application(s) Topical <User Schedule>  clopidogrel Tablet 75 milliGRAM(s) Oral daily  epoetin nguyễn Injectable 10537 Unit(s) IV Push <User Schedule>  heparin  Injectable 5000 Unit(s) SubCutaneous every 8 hours    PHYSICAL EXAM:  T(C): 36.7 (07-20-19 @ 08:00), Max: 36.7 (07-19-19 @ 20:15)  HR: 88 (07-20-19 @ 09:35) (50 - 96)  BP: 110/97 (07-20-19 @ 08:00) (109/47 - 131/64)  RR: 18 (07-20-19 @ 08:00) (18 - 18)  SpO2: 98% (07-20-19 @ 11:41) (93% - 99%)      I&O's Summary    19 Jul 2019 07:01  -  20 Jul 2019 07:00  --------------------------------------------------------  IN: 480 mL / OUT: 2001 mL / NET: -1521 mL  Appearance: thin, elderly	  HEENT: NC/AT  Eye: Pink Conjunctiva  Lungs: CTA B/L  CVS: RRR, Normal S1 and S2, No Edema, 2/6 sushil LSB  Pulses: Normal distal pulses.  Neuro: A&O x3       LABS:	 	             9.1    7.03  )-----------( 232      ( 19 Jul 2019 14:32 )             31.5     07-19    136  |  93<L>  |  54.0<H>  ----------------------------<  139<H>  5.1   |  28.0  |  6.16<H>    Ca    8.5<L>      19 Jul 2019 14:32      < from: Xray Chest 1 View- PORTABLE-Routine (07.19.19 @ 05:53) >  Bilateral pleural effusions and patchy bilateral airspace disease,   unchanged.    The heart is enlarged.    Status post sternotomy.    IMPRESSION:    Bilateral pleural effusions and patchy bilateral airspace disease,   unchanged since 7/13/2019.      < end of copied text >

## 2019-07-21 PROCEDURE — 99232 SBSQ HOSP IP/OBS MODERATE 35: CPT

## 2019-07-21 PROCEDURE — 99233 SBSQ HOSP IP/OBS HIGH 50: CPT

## 2019-07-21 RX ORDER — LANOLIN ALCOHOL/MO/W.PET/CERES
3 CREAM (GRAM) TOPICAL ONCE
Refills: 0 | Status: COMPLETED | OUTPATIENT
Start: 2019-07-21 | End: 2019-07-21

## 2019-07-21 RX ADMIN — Medication 50 MILLIGRAM(S): at 07:14

## 2019-07-21 RX ADMIN — AMLODIPINE BESYLATE 5 MILLIGRAM(S): 2.5 TABLET ORAL at 07:15

## 2019-07-21 RX ADMIN — Medication 3 MILLIGRAM(S): at 02:07

## 2019-07-21 RX ADMIN — Medication 81 MILLIGRAM(S): at 10:39

## 2019-07-21 RX ADMIN — ISOSORBIDE DINITRATE 20 MILLIGRAM(S): 5 TABLET ORAL at 07:15

## 2019-07-21 RX ADMIN — Medication 100 MILLIGRAM(S): at 17:12

## 2019-07-21 RX ADMIN — ISOSORBIDE DINITRATE 20 MILLIGRAM(S): 5 TABLET ORAL at 22:46

## 2019-07-21 RX ADMIN — Medication 3 MILLILITER(S): at 15:36

## 2019-07-21 RX ADMIN — PANTOPRAZOLE SODIUM 40 MILLIGRAM(S): 20 TABLET, DELAYED RELEASE ORAL at 07:14

## 2019-07-21 RX ADMIN — Medication 100 MICROGRAM(S): at 07:15

## 2019-07-21 RX ADMIN — Medication 100 MILLIGRAM(S): at 07:15

## 2019-07-21 RX ADMIN — SENNA PLUS 2 TABLET(S): 8.6 TABLET ORAL at 22:46

## 2019-07-21 RX ADMIN — CLOPIDOGREL BISULFATE 75 MILLIGRAM(S): 75 TABLET, FILM COATED ORAL at 10:39

## 2019-07-21 RX ADMIN — HEPARIN SODIUM 5000 UNIT(S): 5000 INJECTION INTRAVENOUS; SUBCUTANEOUS at 22:47

## 2019-07-21 RX ADMIN — Medication 650 MILLIGRAM(S): at 00:30

## 2019-07-21 RX ADMIN — Medication 3 MILLILITER(S): at 04:01

## 2019-07-21 RX ADMIN — SEVELAMER CARBONATE 800 MILLIGRAM(S): 2400 POWDER, FOR SUSPENSION ORAL at 17:12

## 2019-07-21 RX ADMIN — HEPARIN SODIUM 5000 UNIT(S): 5000 INJECTION INTRAVENOUS; SUBCUTANEOUS at 13:56

## 2019-07-21 RX ADMIN — CHLORHEXIDINE GLUCONATE 1 APPLICATION(S): 213 SOLUTION TOPICAL at 07:14

## 2019-07-21 RX ADMIN — HEPARIN SODIUM 5000 UNIT(S): 5000 INJECTION INTRAVENOUS; SUBCUTANEOUS at 07:15

## 2019-07-21 RX ADMIN — ISOSORBIDE DINITRATE 20 MILLIGRAM(S): 5 TABLET ORAL at 10:39

## 2019-07-21 RX ADMIN — SEVELAMER CARBONATE 800 MILLIGRAM(S): 2400 POWDER, FOR SUSPENSION ORAL at 07:49

## 2019-07-21 RX ADMIN — ATORVASTATIN CALCIUM 80 MILLIGRAM(S): 80 TABLET, FILM COATED ORAL at 22:47

## 2019-07-21 RX ADMIN — Medication 3 MILLILITER(S): at 20:00

## 2019-07-21 RX ADMIN — SEVELAMER CARBONATE 800 MILLIGRAM(S): 2400 POWDER, FOR SUSPENSION ORAL at 10:38

## 2019-07-21 RX ADMIN — Medication 50 MILLIGRAM(S): at 13:56

## 2019-07-21 RX ADMIN — Medication 50 MILLIGRAM(S): at 22:46

## 2019-07-21 RX ADMIN — Medication 3 MILLILITER(S): at 08:12

## 2019-07-21 RX ADMIN — Medication 25 MILLIGRAM(S): at 07:15

## 2019-07-21 NOTE — PROGRESS NOTE ADULT - SUBJECTIVE AND OBJECTIVE BOX
HOSPITALIST PROGRESS NOTE    KAUSHIK HOFFMAN  [id number]  79yMale    Patient is a 79y old  Male who presents with a chief complaint of Acute hypoxemic respiratory failure (21 Jul 2019 13:51)      SUBJECTIVE:   Chart reviewed since last visit.  Patient seen and examined at bedside for chronic respiratory failure, ESRD  Feels comfortable, dyspnea with exertion.      OBJECTIVE:  Vital Signs Last 24 Hrs  T(C): 36.4 (21 Jul 2019 09:00), Max: 36.7 (20 Jul 2019 17:26)  T(F): 97.5 (21 Jul 2019 09:00), Max: 98.1 (20 Jul 2019 17:26)  HR: 51 (21 Jul 2019 09:00) (51 - 89)  BP: 106/43 (21 Jul 2019 09:00) (92/51 - 128/50)   RR: 16 (21 Jul 2019 09:00) (16 - 18)  SpO2: 95% (21 Jul 2019 08:13) (93% - 98%)    PHYSICAL EXAMINATION  General: Elderly Amharic gentleman lying in bed - NAD  HEENT:  extraocular movements intact, nasal cannula in place  NECK:  Supple  CVS: regular rate and rhythm S1 S2. Reproducible left chest wall tenderness improved  RESP:  Fair air entry except bases. Poor effort  GI:  Soft nondistended nontender BS+  : No suprapubic tenderness  MSK:  FROM, no edema  CNS:  No gross focal or global deficit noted  INTEG:  warm dry skin  PSYCH:  Fair mood     MONITOR:  CAPILLARY BLOOD GLUCOSE            I&O's Summary    20 Jul 2019 07:01  -  21 Jul 2019 07:00  --------------------------------------------------------  IN: 480 mL / OUT: 0 mL / NET: 480 mL                        Culture:    TTE:    RADIOLOGY        MEDICATIONS  (STANDING):  ALBUTerol/ipratropium for Nebulization 3 milliLiter(s) Nebulizer every 6 hours  amLODIPine   Tablet 5 milliGRAM(s) Oral daily  aspirin  chewable 81 milliGRAM(s) Oral daily  atorvastatin 80 milliGRAM(s) Oral at bedtime  chlorhexidine 2% Cloths 1 Application(s) Topical <User Schedule>  clopidogrel Tablet 75 milliGRAM(s) Oral daily  docusate sodium 100 milliGRAM(s) Oral two times a day  epoetin nguyễn Injectable 75686 Unit(s) IV Push <User Schedule>  heparin  Injectable 5000 Unit(s) SubCutaneous every 8 hours  hydrALAZINE 50 milliGRAM(s) Oral every 8 hours  isosorbide   dinitrate Tablet (ISORDIL) 20 milliGRAM(s) Oral three times a day  levothyroxine 100 MICROGram(s) Oral daily  metoprolol tartrate 25 milliGRAM(s) Oral two times a day  pantoprazole    Tablet 40 milliGRAM(s) Oral before breakfast  predniSONE   Tablet 50 milliGRAM(s) Oral daily  senna 2 Tablet(s) Oral at bedtime  sevelamer carbonate 800 milliGRAM(s) Oral three times a day with meals      MEDICATIONS  (PRN):  acetaminophen   Tablet .. 650 milliGRAM(s) Oral every 6 hours PRN Mild Pain (1 - 3)  diltiazem Injectable 10 milliGRAM(s) IV Push every 4 hours PRN HR>120  diphenhydrAMINE 25 milliGRAM(s) Oral every 6 hours PRN Rash and/or Itching  HYDROcodone/homatropine Syrup 5 milliLiter(s) Oral every 6 hours PRN Cough  LORazepam     Tablet 0.5 milliGRAM(s) Oral two times a day PRN Anxiety  polyethylene glycol 3350 17 Gram(s) Oral daily PRN Constipation

## 2019-07-21 NOTE — PROGRESS NOTE ADULT - SUBJECTIVE AND OBJECTIVE BOX
[facility] DIVISION OF KIDNEY DISEASES AND HYPERTENSION -- FOLLOW UP NOTE  --------------------------------------------------------------------------------  Chief Complaint:  ESRD HD  24 hour events/subjective:  Seen/examined  Next HD Mon        PAST HISTORY  --------------------------------------------------------------------------------  No significant changes to PMH, PSH, FHx, SHx, unless otherwise noted    ALLERGIES & MEDICATIONS  --------------------------------------------------------------------------------  Allergies    No Known Allergies    Intolerances      Standing Inpatient Medications  ALBUTerol/ipratropium for Nebulization 3 milliLiter(s) Nebulizer every 6 hours  amLODIPine   Tablet 5 milliGRAM(s) Oral daily  aspirin  chewable 81 milliGRAM(s) Oral daily  atorvastatin 80 milliGRAM(s) Oral at bedtime  chlorhexidine 2% Cloths 1 Application(s) Topical <User Schedule>  clopidogrel Tablet 75 milliGRAM(s) Oral daily  docusate sodium 100 milliGRAM(s) Oral two times a day  epoetin nguyễn Injectable 45505 Unit(s) IV Push <User Schedule>  heparin  Injectable 5000 Unit(s) SubCutaneous every 8 hours  hydrALAZINE 50 milliGRAM(s) Oral every 8 hours  isosorbide   dinitrate Tablet (ISORDIL) 20 milliGRAM(s) Oral three times a day  levothyroxine 100 MICROGram(s) Oral daily  metoprolol tartrate 25 milliGRAM(s) Oral two times a day  pantoprazole    Tablet 40 milliGRAM(s) Oral before breakfast  predniSONE   Tablet 50 milliGRAM(s) Oral daily  senna 2 Tablet(s) Oral at bedtime  sevelamer carbonate 800 milliGRAM(s) Oral three times a day with meals    PRN Inpatient Medications  acetaminophen   Tablet .. 650 milliGRAM(s) Oral every 6 hours PRN  diltiazem Injectable 10 milliGRAM(s) IV Push every 4 hours PRN  diphenhydrAMINE 25 milliGRAM(s) Oral every 6 hours PRN  HYDROcodone/homatropine Syrup 5 milliLiter(s) Oral every 6 hours PRN  LORazepam     Tablet 0.5 milliGRAM(s) Oral two times a day PRN  polyethylene glycol 3350 17 Gram(s) Oral daily PRN      REVIEW OF SYSTEMS  --------------------------------------------------------------------------------  Gen: No weight changes, fatigue, fevers/chills, weakness  Skin: No rashes  Head/Eyes/Ears/Mouth: No headache; Normal hearing; Normal vision w/o blurriness; No sinus pain/discomfort, sore throat  Respiratory: No dyspnea, cough, wheezing, hemoptysis  CV: No chest pain, PND, orthopnea  GI: No abdominal pain, diarrhea, constipation, nausea, vomiting, melena, hematochezia  : No increased frequency, dysuria, hematuria, nocturia  MSK: No joint pain/swelling; no back pain; no edema  Neuro: No dizziness/lightheadedness, weakness, seizures, numbness, tingling  Heme: No easy bruising or bleeding  Endo: No heat/cold intolerance  Psych: No significant nervousness, anxiety, stress, depression    All other systems were reviewed and are negative, except as noted.    VITALS/PHYSICAL EXAM  --------------------------------------------------------------------------------  T(C): 36.4 (07-21-19 @ 09:00), Max: 36.7 (07-20-19 @ 17:26)  HR: 51 (07-21-19 @ 09:00) (51 - 89)  BP: 106/43 (07-21-19 @ 09:00) (92/51 - 128/50)  RR: 16 (07-21-19 @ 09:00) (16 - 18)  SpO2: 95% (07-21-19 @ 08:13) (93% - 98%)  Wt(kg): --        07-20-19 @ 07:01  -  07-21-19 @ 07:00  --------------------------------------------------------  IN: 480 mL / OUT: 0 mL / NET: 480 mL      Physical Exam:  	Gen: NAD   	HEENT: PERRL, supple neck, clear oropharynx  	Pulm: CTA B/L  	CV: RRR, S1S2; no rub  	Back: No spinal or CVA tenderness; no sacral edema  	Abd: +BS, soft, nontender/nondistended  	: No suprapubic tenderness  	UE: Warm, FROM, no clubbing, intact strength; no edema; no asterixis  	LE: Warm, FROM, no clubbing, intact strength; no edema  	Neuro: No focal deficits, intact gait  	Psych: Normal affect and mood  	Skin: Warm, without rashes    LABS/STUDIES  --------------------------------------------------------------------------------              9.1    7.03  >-----------<  232      [07-19-19 @ 14:32]              31.5     136  |  93  |  54.0  ----------------------------<  139      [07-19-19 @ 14:32]  5.1   |  28.0  |  6.16        Ca     8.5     [07-19-19 @ 14:32]            Creatinine Trend:  SCr 6.16 [07-19 @ 14:32]  SCr 6.21 [07-17 @ 07:16]  SCr 7.34 [07-14 @ 09:12]  SCr 5.75 [07-13 @ 06:12]  SCr 9.70 [07-12 @ 11:32]        Iron 58, TIBC 256, %sat 23      [06-19-19 @ 08:42]  Ferritin 812      [06-19-19 @ 08:42]  PTH -- (Ca 8.1)      [07-01-19 @ 13:24]   83  TSH 0.89      [05-23-19 @ 06:28]  Lipid: chol 159, , HDL 32, LDL 94      [01-29-19 @ 11:39]    HBsAb <3.0      [06-27-19 @ 19:39]  HBsAb Nonreact      [06-27-19 @ 19:39]  HBsAg Reactive      [06-27-19 @ 19:39]  HBcAb Nonreact      [06-27-19 @ 19:39]  HCV 0.10, Nonreact      [06-27-19 @ 19:39]

## 2019-07-21 NOTE — PROGRESS NOTE ADULT - ASSESSMENT
The patient is a 79 year old male with a history of ESRD on HD, hypertension, CAD status post CABG HFpEF and lung cancer status radiation with pulmonary fibrosis who presented to the ER with worsening dyspnea. Admitted with acute on chronic hypoxic respiratory failure initially on NIV in MICU.     Acute on chronic hypoxic respiratory failure- multifactorial secondary to pulmonary fibrosis and  acute on chronic diastolic CHF- Currently on nasal cannula  PO prednisone taper  Nebs PRN  Ativan for comfort.    Paroxysmal afib- ILR was to be placed 7/16/19 but cancelled due to dyspnea/Orthopnea. ILR to be placed prior to admission once symptoms improved- BB was discontinued previous hospitalization due to bradycardia now with episodes of VT- BB was resumed. However patient unable to get ILR as cannot lay flat for procedure.    ESRD On HD- Continued on HD    Hypertension: BP controlled    Chronic diastolic heart failure - Not in acute exacerbation. On isosorbide. Hemodialysis as scheduled. Pleural effusion drained    Hypothyroidism - Synthroid changed to PO     Patient is DNR/DNI- poor prognosis. Family met with palliative but would like to continue HD and therefore is not a candidate for home hospice.     VTE_ heparin subcut     Disposition - unclear currently. Patient very fragile and decompensates - Palliative appropriate however patient and family do not want to discontinue HD currently. Will prepare for discharge. Daughter concerned about adequate resources - will have CCC evaluate    Once respiratory status stabilize and other chronic issues at baseline would discharge home. Both patient and family understand patient has very high risk of readmission.    Discussed with RN, daughter at bedside

## 2019-07-22 LAB
ANION GAP SERPL CALC-SCNC: 15 MMOL/L — SIGNIFICANT CHANGE UP (ref 5–17)
BUN SERPL-MCNC: 64 MG/DL — HIGH (ref 8–20)
CALCIUM SERPL-MCNC: 8.4 MG/DL — LOW (ref 8.6–10.2)
CHLORIDE SERPL-SCNC: 89 MMOL/L — LOW (ref 98–107)
CO2 SERPL-SCNC: 26 MMOL/L — SIGNIFICANT CHANGE UP (ref 22–29)
CREAT SERPL-MCNC: 6.49 MG/DL — HIGH (ref 0.5–1.3)
GLUCOSE SERPL-MCNC: 125 MG/DL — HIGH (ref 70–115)
HCT VFR BLD CALC: 30.6 % — LOW (ref 39–50)
HGB BLD-MCNC: 9 G/DL — LOW (ref 13–17)
MCHC RBC-ENTMCNC: 29 PG — SIGNIFICANT CHANGE UP (ref 27–34)
MCHC RBC-ENTMCNC: 29.4 GM/DL — LOW (ref 32–36)
MCV RBC AUTO: 98.7 FL — SIGNIFICANT CHANGE UP (ref 80–100)
PHOSPHATE SERPL-MCNC: 5.3 MG/DL — HIGH (ref 2.4–4.7)
PLATELET # BLD AUTO: 250 K/UL — SIGNIFICANT CHANGE UP (ref 150–400)
POTASSIUM SERPL-MCNC: 4.8 MMOL/L — SIGNIFICANT CHANGE UP (ref 3.5–5.3)
POTASSIUM SERPL-SCNC: 4.8 MMOL/L — SIGNIFICANT CHANGE UP (ref 3.5–5.3)
RBC # BLD: 3.1 M/UL — LOW (ref 4.2–5.8)
RBC # FLD: 16.5 % — HIGH (ref 10.3–14.5)
SODIUM SERPL-SCNC: 130 MMOL/L — LOW (ref 135–145)
WBC # BLD: 10.2 K/UL — SIGNIFICANT CHANGE UP (ref 3.8–10.5)
WBC # FLD AUTO: 10.2 K/UL — SIGNIFICANT CHANGE UP (ref 3.8–10.5)

## 2019-07-22 PROCEDURE — 90937 HEMODIALYSIS REPEATED EVAL: CPT

## 2019-07-22 PROCEDURE — 99232 SBSQ HOSP IP/OBS MODERATE 35: CPT

## 2019-07-22 RX ORDER — HYDROMORPHONE HYDROCHLORIDE 2 MG/ML
2 INJECTION INTRAMUSCULAR; INTRAVENOUS; SUBCUTANEOUS THREE TIMES A DAY
Refills: 0 | Status: DISCONTINUED | OUTPATIENT
Start: 2019-07-22 | End: 2019-07-26

## 2019-07-22 RX ADMIN — HYDROMORPHONE HYDROCHLORIDE 2 MILLIGRAM(S): 2 INJECTION INTRAMUSCULAR; INTRAVENOUS; SUBCUTANEOUS at 15:54

## 2019-07-22 RX ADMIN — SEVELAMER CARBONATE 800 MILLIGRAM(S): 2400 POWDER, FOR SUSPENSION ORAL at 17:19

## 2019-07-22 RX ADMIN — ATORVASTATIN CALCIUM 80 MILLIGRAM(S): 80 TABLET, FILM COATED ORAL at 21:39

## 2019-07-22 RX ADMIN — Medication 3 MILLILITER(S): at 03:51

## 2019-07-22 RX ADMIN — PANTOPRAZOLE SODIUM 40 MILLIGRAM(S): 20 TABLET, DELAYED RELEASE ORAL at 05:04

## 2019-07-22 RX ADMIN — HEPARIN SODIUM 5000 UNIT(S): 5000 INJECTION INTRAVENOUS; SUBCUTANEOUS at 13:00

## 2019-07-22 RX ADMIN — Medication 100 MILLIGRAM(S): at 05:04

## 2019-07-22 RX ADMIN — ISOSORBIDE DINITRATE 20 MILLIGRAM(S): 5 TABLET ORAL at 21:37

## 2019-07-22 RX ADMIN — Medication 50 MILLIGRAM(S): at 05:05

## 2019-07-22 RX ADMIN — Medication 25 MILLIGRAM(S): at 17:19

## 2019-07-22 RX ADMIN — CLOPIDOGREL BISULFATE 75 MILLIGRAM(S): 75 TABLET, FILM COATED ORAL at 11:45

## 2019-07-22 RX ADMIN — Medication 3 MILLILITER(S): at 14:58

## 2019-07-22 RX ADMIN — Medication 100 MICROGRAM(S): at 05:05

## 2019-07-22 RX ADMIN — HEPARIN SODIUM 5000 UNIT(S): 5000 INJECTION INTRAVENOUS; SUBCUTANEOUS at 21:37

## 2019-07-22 RX ADMIN — Medication 50 MILLIGRAM(S): at 21:37

## 2019-07-22 RX ADMIN — HYDROMORPHONE HYDROCHLORIDE 2 MILLIGRAM(S): 2 INJECTION INTRAMUSCULAR; INTRAVENOUS; SUBCUTANEOUS at 16:54

## 2019-07-22 RX ADMIN — Medication 81 MILLIGRAM(S): at 11:45

## 2019-07-22 RX ADMIN — Medication 100 MILLIGRAM(S): at 17:19

## 2019-07-22 RX ADMIN — ERYTHROPOIETIN 10000 UNIT(S): 10000 INJECTION, SOLUTION INTRAVENOUS; SUBCUTANEOUS at 10:13

## 2019-07-22 RX ADMIN — SENNA PLUS 2 TABLET(S): 8.6 TABLET ORAL at 21:37

## 2019-07-22 RX ADMIN — CHLORHEXIDINE GLUCONATE 1 APPLICATION(S): 213 SOLUTION TOPICAL at 05:03

## 2019-07-22 RX ADMIN — Medication 3 MILLILITER(S): at 21:10

## 2019-07-22 RX ADMIN — ISOSORBIDE DINITRATE 20 MILLIGRAM(S): 5 TABLET ORAL at 13:00

## 2019-07-22 RX ADMIN — Medication 0.5 MILLIGRAM(S): at 00:25

## 2019-07-22 RX ADMIN — AMLODIPINE BESYLATE 5 MILLIGRAM(S): 2.5 TABLET ORAL at 05:05

## 2019-07-22 RX ADMIN — ISOSORBIDE DINITRATE 20 MILLIGRAM(S): 5 TABLET ORAL at 05:04

## 2019-07-22 RX ADMIN — Medication 50 MILLIGRAM(S): at 05:04

## 2019-07-22 RX ADMIN — POLYETHYLENE GLYCOL 3350 17 GRAM(S): 17 POWDER, FOR SOLUTION ORAL at 21:45

## 2019-07-22 RX ADMIN — SEVELAMER CARBONATE 800 MILLIGRAM(S): 2400 POWDER, FOR SUSPENSION ORAL at 11:45

## 2019-07-22 RX ADMIN — HEPARIN SODIUM 5000 UNIT(S): 5000 INJECTION INTRAVENOUS; SUBCUTANEOUS at 05:03

## 2019-07-22 RX ADMIN — Medication 50 MILLIGRAM(S): at 13:00

## 2019-07-22 NOTE — PROGRESS NOTE ADULT - SUBJECTIVE AND OBJECTIVE BOX
CHIEF COMPLAINT:Patient is a 79y old  Male who presents with a chief complaint of Acute hypoxemic respiratory failure (22 Jul 2019 10:28)      INTERVAL HISTORY:  HD this morning, denies change in chronic cardiac and respiratory complaints.  c/o diffuse abd pain since this morning, last BM reported Saturday.  Per family at bedside he appears more tired today than yesterday.    Allergies    No Known Allergies    Intolerances      	  MEDICATIONS:  amLODIPine   Tablet 5 milliGRAM(s) Oral daily  diltiazem Injectable 10 milliGRAM(s) IV Push every 4 hours PRN  hydrALAZINE 50 milliGRAM(s) Oral every 8 hours  isosorbide   dinitrate Tablet (ISORDIL) 20 milliGRAM(s) Oral three times a day  metoprolol tartrate 25 milliGRAM(s) Oral two times a day      ALBUTerol/ipratropium for Nebulization 3 milliLiter(s) Nebulizer every 6 hours    acetaminophen   Tablet .. 650 milliGRAM(s) Oral every 6 hours PRN  diphenhydrAMINE 25 milliGRAM(s) Oral every 6 hours PRN  LORazepam     Tablet 0.5 milliGRAM(s) Oral two times a day PRN    docusate sodium 100 milliGRAM(s) Oral two times a day  pantoprazole    Tablet 40 milliGRAM(s) Oral before breakfast  polyethylene glycol 3350 17 Gram(s) Oral daily PRN  senna 2 Tablet(s) Oral at bedtime    atorvastatin 80 milliGRAM(s) Oral at bedtime  levothyroxine 100 MICROGram(s) Oral daily  predniSONE   Tablet 50 milliGRAM(s) Oral daily    aspirin  chewable 81 milliGRAM(s) Oral daily  chlorhexidine 2% Cloths 1 Application(s) Topical <User Schedule>  clopidogrel Tablet 75 milliGRAM(s) Oral daily  epoetin nguyễn Injectable 80855 Unit(s) IV Push <User Schedule>  heparin  Injectable 5000 Unit(s) SubCutaneous every 8 hours        PHYSICAL EXAM:    T(C): 36.6 (07-22-19 @ 12:14), Max: 36.7 (07-22-19 @ 07:20)  HR: 64 (07-22-19 @ 12:14) (55 - 73)  BP: 135/57 (07-22-19 @ 12:14) (108/45 - 135/57)  RR: 18 (07-22-19 @ 12:14) (18 - 22)  SpO2: 98% (07-22-19 @ 12:14) (92% - 100%)  Wt(kg): --    I&O's Summary    22 Jul 2019 07:01  -  22 Jul 2019 14:11  --------------------------------------------------------  IN: 0 mL / OUT: 1500 mL / NET: -1500 mL        Daily     Daily     Appearance: Normal	  HEENT:   NC/AT  Eye: Pink Conjunctiva  Lungs: B/L wheeze  CVS: RRR, Normal S1 and S2, No Edema, II/VI murmur  GI: soft, non-tender, non-distended  Pulses: Normal distal pulses.  Neuro: A&O x3      RADIOLOGY:   < from: Xray Chest 1 View- PORTABLE-Routine (07.19.19 @ 05:53) >   EXAM:  XR CHEST PORTABLE ROUTINE 1V                          PROCEDURE DATE:  07/19/2019          INTERPRETATION:  XR CHEST    Single AP view    HISTORY:  Hypoxia    Comparison:  Chest x-ray 7/13/2019 and CT chest 6/18/2019    Bilateral pleural effusions and patchy bilateral airspace disease,   unchanged.    The heart is enlarged.    Status post sternotomy.    IMPRESSION:    Bilateral pleural effusions and patchy bilateral airspace disease,   unchanged since 7/13/2019.    < end of copied text >      LABS:	 	    CARDIAC MARKERS:                            9.0    10.20 )-----------( 250      ( 22 Jul 2019 08:02 )             30.6     07-22    130<L>  |  89<L>  |  64.0<H>  ----------------------------<  125<H>  4.8   |  26.0  |  6.49<H>    Ca    8.4<L>      22 Jul 2019 08:02  Phos  5.3     07-22      proBNP:   Lipid Profile:   HgA1c:   TSH: CHIEF COMPLAINT:Patient is a 79y old  Male who presents with a chief complaint of Acute hypoxemic respiratory failure (22 Jul 2019 10:28)      INTERVAL HISTORY:  HD this morning, denies change in chronic cardiac and respiratory complaints.  c/o diffuse abd pain since this morning, last BM reported Saturday.  Per family at bedside he appears more tired today than yesterday.    Allergies    No Known Allergies    Intolerances      	  MEDICATIONS:  amLODIPine   Tablet 5 milliGRAM(s) Oral daily  diltiazem Injectable 10 milliGRAM(s) IV Push every 4 hours PRN  hydrALAZINE 50 milliGRAM(s) Oral every 8 hours  isosorbide   dinitrate Tablet (ISORDIL) 20 milliGRAM(s) Oral three times a day  metoprolol tartrate 25 milliGRAM(s) Oral two times a day      ALBUTerol/ipratropium for Nebulization 3 milliLiter(s) Nebulizer every 6 hours    acetaminophen   Tablet .. 650 milliGRAM(s) Oral every 6 hours PRN  diphenhydrAMINE 25 milliGRAM(s) Oral every 6 hours PRN  LORazepam     Tablet 0.5 milliGRAM(s) Oral two times a day PRN    docusate sodium 100 milliGRAM(s) Oral two times a day  pantoprazole    Tablet 40 milliGRAM(s) Oral before breakfast  polyethylene glycol 3350 17 Gram(s) Oral daily PRN  senna 2 Tablet(s) Oral at bedtime    atorvastatin 80 milliGRAM(s) Oral at bedtime  levothyroxine 100 MICROGram(s) Oral daily  predniSONE   Tablet 50 milliGRAM(s) Oral daily    aspirin  chewable 81 milliGRAM(s) Oral daily  chlorhexidine 2% Cloths 1 Application(s) Topical <User Schedule>  clopidogrel Tablet 75 milliGRAM(s) Oral daily  epoetin nguyễn Injectable 04388 Unit(s) IV Push <User Schedule>  heparin  Injectable 5000 Unit(s) SubCutaneous every 8 hours        PHYSICAL EXAM:    T(C): 36.6 (07-22-19 @ 12:14), Max: 36.7 (07-22-19 @ 07:20)  HR: 64 (07-22-19 @ 12:14) (55 - 73)  BP: 135/57 (07-22-19 @ 12:14) (108/45 - 135/57)  RR: 18 (07-22-19 @ 12:14) (18 - 22)  SpO2: 98% (07-22-19 @ 12:14) (92% - 100%)  Wt(kg): --    I&O's Summary    22 Jul 2019 07:01  -  22 Jul 2019 14:11  --------------------------------------------------------  IN: 0 mL / OUT: 1500 mL / NET: -1500 mL        Daily     Daily     Appearance: Normal	  HEENT:   NC/AT  Eye: Pink Conjunctiva  Lungs: B/L wheeze  CVS: RRR, Normal S1 and S2, No Edema, II/VI murmur  GI: soft, non-tender, non-distended  Pulses: Normal distal pulses.  Neuro: A&O x3      RADIOLOGY:   < from: Xray Chest 1 View- PORTABLE-Routine (07.19.19 @ 05:53) >   EXAM:  XR CHEST PORTABLE ROUTINE 1V                          PROCEDURE DATE:  07/19/2019          INTERPRETATION:  XR CHEST    Single AP view    HISTORY:  Hypoxia    Comparison:  Chest x-ray 7/13/2019 and CT chest 6/18/2019    Bilateral pleural effusions and patchy bilateral airspace disease,   unchanged.    The heart is enlarged.    Status post sternotomy.    IMPRESSION:    Bilateral pleural effusions and patchy bilateral airspace disease,   unchanged since 7/13/2019.    < end of copied text >      LABS:	 	    CARDIAC MARKERS:                            9.0    10.20 )-----------( 250      ( 22 Jul 2019 08:02 )             30.6     07-22    130<L>  |  89<L>  |  64.0<H>  ----------------------------<  125<H>  4.8   |  26.0  |  6.49<H>    Ca    8.4<L>      22 Jul 2019 08:02  Phos  5.3     07-22

## 2019-07-22 NOTE — PROGRESS NOTE ADULT - ASSESSMENT
The patient is a 79 year old male with a history of ESRD on HD, hypertension, CAD status post CABG HFpEF and lung cancer status radiation with pulmonary fibrosis who presented to the ER with worsening dyspnea. Admitted with acute on chronic hypoxic respiratory failure initially on NIV in MICU.     Acute on chronic hypoxic respiratory failure- multifactorial secondary to pulmonary fibrosis and  acute on chronic diastolic CHF- Currently on nasal cannula  PO prednisone taper  Nebs PRN  Ativan for comfort.    Paroxysmal afib- ILR was to be placed 7/16/19 but cancelled due to dyspnea/Orthopnea. ILR to be placed prior to admission once symptoms improved- BB was discontinued previous hospitalization due to bradycardia now with episodes of VT- BB was resumed. However patient unable to get ILR as cannot lay flat for procedure. Add Dilaudid for comfort.    ESRD On HD- Continued on HD    Hypertension: BP controlled    Chronic diastolic heart failure - Not in acute exacerbation. On isosorbide. Hemodialysis as scheduled. Pleural effusion drained    Hypothyroidism - Synthroid changed to PO     Patient is DNR/DNI- poor prognosis. Family met with palliative but would like to continue HD and therefore is not a candidate for home hospice.     VTE-  heparin subcut     Disposition - unclear currently. Patient very fragile and decompensates - Palliative appropriate however patient and family do not want to discontinue HD currently. Will prepare for discharge. Daughter concerned about adequate resources - will have CCC evaluate    Once respiratory status stabilize and other chronic issues at baseline would discharge home. Both patient and family understand patient has very high risk of readmission.    Discussed with patient, RN, SW, CC.  Informed by CCC that family unable to care for him at home, want Nursing Home. Patient may be discharged to Nursing Home once arrangements in place

## 2019-07-22 NOTE — PROGRESS NOTE ADULT - SUBJECTIVE AND OBJECTIVE BOX
HOSPITALIST PROGRESS NOTE    KAUSHIK HOFFMAN  [id number]  79yMale    Patient is a 79y old  Male who presents with a chief complaint of Acute hypoxemic respiratory failure (22 Jul 2019 14:10)      SUBJECTIVE:   Chart reviewed since last visit.  Patient seen and examined at bedside for chronic respiratory failure, Pulmonary fibrosis, ESRD  Feels dyspneic (baseline), mild chest pain. No nausea, dizziness, vomiting or abdominal pain      OBJECTIVE:  Vital Signs Last 24 Hrs  T(C): 36.4 (22 Jul 2019 15:00), Max: 36.7 (22 Jul 2019 07:20)  T(F): 97.6 (22 Jul 2019 15:00), Max: 98 (22 Jul 2019 07:20)  HR: 67 (22 Jul 2019 15:35) (55 - 73)  BP: 124/51 (22 Jul 2019 15:00) (120/55 - 135/57)   RR: 18 (22 Jul 2019 15:00) (18 - 22)  SpO2: 99% (22 Jul 2019 15:35) (92% - 100%)    PHYSICAL EXAMINATION  General: Elderly Dive[place] gentleman lying in bed - NAD  HEENT:  extraocular movements intact, nasal cannula in place  NECK:  Supple  CVS: regular rate and rhythm S1 S2. Reproducible left chest wall tenderness improved  RESP:  Fair air entry except bases. Poor effort  GI:  Soft nondistended nontender BS+  : No suprapubic tenderness  MSK:  FROM, no edema  CNS:  No gross focal or global deficit noted  INTEG:  warm dry skin  PSYCH:  Fair mood      MONITOR:  CAPILLARY BLOOD GLUCOSE            I&O's Summary    22 Jul 2019 07:01  -  22 Jul 2019 17:19  --------------------------------------------------------  IN: 0 mL / OUT: 1500 mL / NET: -1500 mL                            9.0    10.20 )-----------( 250      ( 22 Jul 2019 08:02 )             30.6       07-22    130<L>  |  89<L>  |  64.0<H>  ----------------------------<  125<H>  4.8   |  26.0  |  6.49<H>    Ca    8.4<L>      22 Jul 2019 08:02  Phos  5.3     07-22              Culture:    TTE:    RADIOLOGY        MEDICATIONS  (STANDING):  ALBUTerol/ipratropium for Nebulization 3 milliLiter(s) Nebulizer every 6 hours  amLODIPine   Tablet 5 milliGRAM(s) Oral daily  aspirin  chewable 81 milliGRAM(s) Oral daily  atorvastatin 80 milliGRAM(s) Oral at bedtime  chlorhexidine 2% Cloths 1 Application(s) Topical <User Schedule>  clopidogrel Tablet 75 milliGRAM(s) Oral daily  docusate sodium 100 milliGRAM(s) Oral two times a day  epoetin nguyễn Injectable 49950 Unit(s) IV Push <User Schedule>  heparin  Injectable 5000 Unit(s) SubCutaneous every 8 hours  hydrALAZINE 50 milliGRAM(s) Oral every 8 hours  isosorbide   dinitrate Tablet (ISORDIL) 20 milliGRAM(s) Oral three times a day  levothyroxine 100 MICROGram(s) Oral daily  metoprolol tartrate 25 milliGRAM(s) Oral two times a day  pantoprazole    Tablet 40 milliGRAM(s) Oral before breakfast  predniSONE   Tablet 50 milliGRAM(s) Oral daily  senna 2 Tablet(s) Oral at bedtime  sevelamer carbonate 800 milliGRAM(s) Oral three times a day with meals      MEDICATIONS  (PRN):  acetaminophen   Tablet .. 650 milliGRAM(s) Oral every 6 hours PRN Mild Pain (1 - 3)  diltiazem Injectable 10 milliGRAM(s) IV Push every 4 hours PRN HR>120  diphenhydrAMINE 25 milliGRAM(s) Oral every 6 hours PRN Rash and/or Itching  HYDROmorphone   Tablet 2 milliGRAM(s) Oral three times a day PRN pain,dyspnea  LORazepam     Tablet 0.5 milliGRAM(s) Oral two times a day PRN Anxiety  polyethylene glycol 3350 17 Gram(s) Oral daily PRN Constipation

## 2019-07-22 NOTE — PROGRESS NOTE ADULT - SUBJECTIVE AND OBJECTIVE BOX
[facility] DIVISION OF KIDNEY DISEASES AND HYPERTENSION -- FOLLOW UP NOTE  --------------------------------------------------------------------------------  Chief Complaint:  ESRD on HD    24 hour events/subjective:  Pt seen and examined  NAD  HD today      PAST HISTORY  --------------------------------------------------------------------------------  No significant changes to PMH, PSH, FHx, SHx, unless otherwise noted    ALLERGIES & MEDICATIONS  --------------------------------------------------------------------------------  Allergies    No Known Allergies    Intolerances      Standing Inpatient Medications  ALBUTerol/ipratropium for Nebulization 3 milliLiter(s) Nebulizer every 6 hours  amLODIPine   Tablet 5 milliGRAM(s) Oral daily  aspirin  chewable 81 milliGRAM(s) Oral daily  atorvastatin 80 milliGRAM(s) Oral at bedtime  chlorhexidine 2% Cloths 1 Application(s) Topical <User Schedule>  clopidogrel Tablet 75 milliGRAM(s) Oral daily  docusate sodium 100 milliGRAM(s) Oral two times a day  epoetin nguyễn Injectable 22108 Unit(s) IV Push <User Schedule>  heparin  Injectable 5000 Unit(s) SubCutaneous every 8 hours  hydrALAZINE 50 milliGRAM(s) Oral every 8 hours  isosorbide   dinitrate Tablet (ISORDIL) 20 milliGRAM(s) Oral three times a day  levothyroxine 100 MICROGram(s) Oral daily  metoprolol tartrate 25 milliGRAM(s) Oral two times a day  pantoprazole    Tablet 40 milliGRAM(s) Oral before breakfast  predniSONE   Tablet 50 milliGRAM(s) Oral daily  senna 2 Tablet(s) Oral at bedtime  sevelamer carbonate 800 milliGRAM(s) Oral three times a day with meals    PRN Inpatient Medications  acetaminophen   Tablet .. 650 milliGRAM(s) Oral every 6 hours PRN  diltiazem Injectable 10 milliGRAM(s) IV Push every 4 hours PRN  diphenhydrAMINE 25 milliGRAM(s) Oral every 6 hours PRN  LORazepam     Tablet 0.5 milliGRAM(s) Oral two times a day PRN  polyethylene glycol 3350 17 Gram(s) Oral daily PRN      REVIEW OF SYSTEMS  --------------------------------------------------------------------------------  Gen: No weight changes, fatigue, fevers/chills, weakness  Skin: No rashes  Head/Eyes/Ears/Mouth: No headache; Normal hearing; Normal vision w/o blurriness; No sinus pain/discomfort, sore throat  Respiratory: No dyspnea, cough, wheezing, hemoptysis  CV: No chest pain, PND, orthopnea  GI: No abdominal pain, diarrhea, constipation, nausea, vomiting, melena, hematochezia  : No increased frequency, dysuria, hematuria, nocturia  MSK: No joint pain/swelling; no back pain; no edema  Neuro: No dizziness/lightheadedness, weakness, seizures, numbness, tingling  Heme: No easy bruising or bleeding  Endo: No heat/cold intolerance  Psych: No significant nervousness, anxiety, stress, depression    All other systems were reviewed and are negative, except as noted.    VITALS/PHYSICAL EXAM  --------------------------------------------------------------------------------  T(C): 36.7 (07-22-19 @ 07:20), Max: 36.7 (07-22-19 @ 07:20)  HR: 55 (07-22-19 @ 07:20) (55 - 73)  BP: 121/55 (07-22-19 @ 07:20) (108/45 - 121/55)  RR: 22 (07-22-19 @ 07:20) (18 - 22)  SpO2: 92% (07-22-19 @ 07:20) (92% - 98%)  Wt(kg): --        Physical Exam:  	Gen: NAD, well-appearing  	HEENT: PERRL, supple neck, clear oropharynx  	Pulm: CTA B/L  	CV: RRR, S1S2; no rub  	Back: No spinal or CVA tenderness; no sacral edema  	Abd: +BS, soft, nontender/nondistended  	: No suprapubic tenderness  	UE: Warm, FROM, no clubbing, intact strength; no edema; no asterixis  	LE: Warm, FROM, no clubbing, intact strength; no edema  	Neuro: No focal deficits, intact gait  	Psych: Normal affect and mood  	Skin: Warm, without rashes  	Vascular access:    LABS/STUDIES  --------------------------------------------------------------------------------              9.0    10.20 >-----------<  250      [07-22-19 @ 08:02]              30.6     130  |  89  |  64.0  ----------------------------<  125      [07-22-19 @ 08:02]  4.8   |  26.0  |  6.49        Ca     8.4     [07-22-19 @ 08:02]      Phos  5.3     [07-22-19 @ 08:02]            Creatinine Trend:  SCr 6.49 [07-22 @ 08:02]  SCr 6.16 [07-19 @ 14:32]  SCr 6.21 [07-17 @ 07:16]  SCr 7.34 [07-14 @ 09:12]  SCr 5.75 [07-13 @ 06:12]        Iron 58, TIBC 256, %sat 23      [06-19-19 @ 08:42]  Ferritin 812      [06-19-19 @ 08:42]  PTH -- (Ca 8.1)      [07-01-19 @ 13:24]   83  TSH 0.89      [05-23-19 @ 06:28]  Lipid: chol 159, , HDL 32, LDL 94      [01-29-19 @ 11:39]    HBsAb <3.0      [06-27-19 @ 19:39]  HBsAb Nonreact      [06-27-19 @ 19:39]  HBsAg Reactive      [06-27-19 @ 19:39]  HBcAb Nonreact      [06-27-19 @ 19:39]  HCV 0.10, Nonreact      [06-27-19 @ 19:39]

## 2019-07-22 NOTE — PROGRESS NOTE ADULT - ASSESSMENT
1) ESRD on HD  2)MBD of renal dx  3) Anemia of renal dx  4) Vol HTN    HD today  Poor overall prognosis  Not candidate for hospice; pt and family wish to continue with HD  Continue current management

## 2019-07-23 ENCOUNTER — TRANSCRIPTION ENCOUNTER (OUTPATIENT)
Age: 79
End: 2019-07-23

## 2019-07-23 PROCEDURE — 99232 SBSQ HOSP IP/OBS MODERATE 35: CPT

## 2019-07-23 PROCEDURE — 99233 SBSQ HOSP IP/OBS HIGH 50: CPT

## 2019-07-23 RX ORDER — DIPHENHYDRAMINE HCL 50 MG
1 CAPSULE ORAL
Qty: 0 | Refills: 0 | DISCHARGE
Start: 2019-07-23

## 2019-07-23 RX ORDER — DOCUSATE SODIUM 100 MG
1 CAPSULE ORAL
Qty: 0 | Refills: 0 | DISCHARGE
Start: 2019-07-23

## 2019-07-23 RX ORDER — SENNA PLUS 8.6 MG/1
2 TABLET ORAL
Qty: 0 | Refills: 0 | DISCHARGE
Start: 2019-07-23

## 2019-07-23 RX ORDER — HYDRALAZINE HCL 50 MG
1 TABLET ORAL
Qty: 0 | Refills: 0 | DISCHARGE
Start: 2019-07-23

## 2019-07-23 RX ORDER — AMLODIPINE BESYLATE 2.5 MG/1
1 TABLET ORAL
Qty: 0 | Refills: 0 | DISCHARGE
Start: 2019-07-23

## 2019-07-23 RX ORDER — BENZOCAINE AND MENTHOL 5; 1 G/100ML; G/100ML
1 LIQUID ORAL THREE TIMES A DAY
Refills: 0 | Status: DISCONTINUED | OUTPATIENT
Start: 2019-07-23 | End: 2019-07-26

## 2019-07-23 RX ORDER — SEVELAMER CARBONATE 2400 MG/1
1 POWDER, FOR SUSPENSION ORAL
Qty: 0 | Refills: 0 | DISCHARGE
Start: 2019-07-23

## 2019-07-23 RX ORDER — METOPROLOL TARTRATE 50 MG
1 TABLET ORAL
Qty: 0 | Refills: 0 | DISCHARGE
Start: 2019-07-23

## 2019-07-23 RX ORDER — LEVOTHYROXINE SODIUM 125 MCG
1 TABLET ORAL
Qty: 0 | Refills: 0 | DISCHARGE
Start: 2019-07-23

## 2019-07-23 RX ORDER — IPRATROPIUM/ALBUTEROL SULFATE 18-103MCG
3 AEROSOL WITH ADAPTER (GRAM) INHALATION
Qty: 0 | Refills: 0 | DISCHARGE
Start: 2019-07-23

## 2019-07-23 RX ORDER — CLOPIDOGREL BISULFATE 75 MG/1
1 TABLET, FILM COATED ORAL
Qty: 0 | Refills: 0 | DISCHARGE
Start: 2019-07-23

## 2019-07-23 RX ORDER — ASPIRIN/CALCIUM CARB/MAGNESIUM 324 MG
1 TABLET ORAL
Qty: 0 | Refills: 0 | DISCHARGE
Start: 2019-07-23

## 2019-07-23 RX ORDER — POLYETHYLENE GLYCOL 3350 17 G/17G
17 POWDER, FOR SOLUTION ORAL
Qty: 0 | Refills: 0 | DISCHARGE
Start: 2019-07-23

## 2019-07-23 RX ORDER — ATORVASTATIN CALCIUM 80 MG/1
1 TABLET, FILM COATED ORAL
Qty: 0 | Refills: 0 | DISCHARGE
Start: 2019-07-23

## 2019-07-23 RX ORDER — ISOSORBIDE DINITRATE 5 MG/1
1 TABLET ORAL
Qty: 0 | Refills: 0 | DISCHARGE
Start: 2019-07-23

## 2019-07-23 RX ORDER — HYDROMORPHONE HYDROCHLORIDE 2 MG/ML
1 INJECTION INTRAMUSCULAR; INTRAVENOUS; SUBCUTANEOUS
Qty: 0 | Refills: 0 | DISCHARGE
Start: 2019-07-23

## 2019-07-23 RX ORDER — PANTOPRAZOLE SODIUM 20 MG/1
1 TABLET, DELAYED RELEASE ORAL
Qty: 0 | Refills: 0 | DISCHARGE
Start: 2019-07-23

## 2019-07-23 RX ADMIN — Medication 50 MILLIGRAM(S): at 05:50

## 2019-07-23 RX ADMIN — BENZOCAINE AND MENTHOL 1 LOZENGE: 5; 1 LIQUID ORAL at 18:16

## 2019-07-23 RX ADMIN — PANTOPRAZOLE SODIUM 40 MILLIGRAM(S): 20 TABLET, DELAYED RELEASE ORAL at 05:50

## 2019-07-23 RX ADMIN — SEVELAMER CARBONATE 800 MILLIGRAM(S): 2400 POWDER, FOR SUSPENSION ORAL at 07:51

## 2019-07-23 RX ADMIN — Medication 3 MILLILITER(S): at 15:44

## 2019-07-23 RX ADMIN — HEPARIN SODIUM 5000 UNIT(S): 5000 INJECTION INTRAVENOUS; SUBCUTANEOUS at 13:36

## 2019-07-23 RX ADMIN — Medication 50 MILLIGRAM(S): at 13:36

## 2019-07-23 RX ADMIN — HYDROMORPHONE HYDROCHLORIDE 2 MILLIGRAM(S): 2 INJECTION INTRAMUSCULAR; INTRAVENOUS; SUBCUTANEOUS at 04:49

## 2019-07-23 RX ADMIN — HYDROMORPHONE HYDROCHLORIDE 2 MILLIGRAM(S): 2 INJECTION INTRAMUSCULAR; INTRAVENOUS; SUBCUTANEOUS at 15:50

## 2019-07-23 RX ADMIN — Medication 0.5 MILLIGRAM(S): at 23:38

## 2019-07-23 RX ADMIN — Medication 81 MILLIGRAM(S): at 12:03

## 2019-07-23 RX ADMIN — Medication 100 MICROGRAM(S): at 05:50

## 2019-07-23 RX ADMIN — Medication 40 MILLIGRAM(S): at 05:50

## 2019-07-23 RX ADMIN — Medication 10 MILLIGRAM(S): at 00:44

## 2019-07-23 RX ADMIN — ISOSORBIDE DINITRATE 20 MILLIGRAM(S): 5 TABLET ORAL at 13:36

## 2019-07-23 RX ADMIN — Medication 3 MILLILITER(S): at 09:03

## 2019-07-23 RX ADMIN — Medication 0.5 MILLIGRAM(S): at 00:44

## 2019-07-23 RX ADMIN — ATORVASTATIN CALCIUM 80 MILLIGRAM(S): 80 TABLET, FILM COATED ORAL at 21:19

## 2019-07-23 RX ADMIN — Medication 100 MILLIGRAM(S): at 16:55

## 2019-07-23 RX ADMIN — SEVELAMER CARBONATE 800 MILLIGRAM(S): 2400 POWDER, FOR SUSPENSION ORAL at 16:55

## 2019-07-23 RX ADMIN — ISOSORBIDE DINITRATE 20 MILLIGRAM(S): 5 TABLET ORAL at 21:19

## 2019-07-23 RX ADMIN — Medication 50 MILLIGRAM(S): at 21:19

## 2019-07-23 RX ADMIN — HYDROMORPHONE HYDROCHLORIDE 2 MILLIGRAM(S): 2 INJECTION INTRAMUSCULAR; INTRAVENOUS; SUBCUTANEOUS at 03:46

## 2019-07-23 RX ADMIN — CLOPIDOGREL BISULFATE 75 MILLIGRAM(S): 75 TABLET, FILM COATED ORAL at 12:03

## 2019-07-23 RX ADMIN — SEVELAMER CARBONATE 800 MILLIGRAM(S): 2400 POWDER, FOR SUSPENSION ORAL at 12:03

## 2019-07-23 RX ADMIN — ISOSORBIDE DINITRATE 20 MILLIGRAM(S): 5 TABLET ORAL at 05:50

## 2019-07-23 RX ADMIN — HEPARIN SODIUM 5000 UNIT(S): 5000 INJECTION INTRAVENOUS; SUBCUTANEOUS at 05:50

## 2019-07-23 RX ADMIN — Medication 100 MILLIGRAM(S): at 05:51

## 2019-07-23 RX ADMIN — AMLODIPINE BESYLATE 5 MILLIGRAM(S): 2.5 TABLET ORAL at 05:50

## 2019-07-23 RX ADMIN — HEPARIN SODIUM 5000 UNIT(S): 5000 INJECTION INTRAVENOUS; SUBCUTANEOUS at 21:18

## 2019-07-23 RX ADMIN — SENNA PLUS 2 TABLET(S): 8.6 TABLET ORAL at 21:18

## 2019-07-23 RX ADMIN — CHLORHEXIDINE GLUCONATE 1 APPLICATION(S): 213 SOLUTION TOPICAL at 05:51

## 2019-07-23 RX ADMIN — Medication 3 MILLILITER(S): at 20:18

## 2019-07-23 RX ADMIN — HYDROMORPHONE HYDROCHLORIDE 2 MILLIGRAM(S): 2 INJECTION INTRAMUSCULAR; INTRAVENOUS; SUBCUTANEOUS at 14:48

## 2019-07-23 RX ADMIN — Medication 3 MILLILITER(S): at 03:29

## 2019-07-23 RX ADMIN — Medication 25 MILLIGRAM(S): at 16:55

## 2019-07-23 NOTE — PROGRESS NOTE ADULT - SUBJECTIVE AND OBJECTIVE BOX
CHIEF COMPLAINT:Patient is a 79y old  Male who presents with a chief complaint of Acute hypoxemic respiratory failure (23 Jul 2019 10:13)      INTERVAL HISTORY:  Patient resting comfortably.  Discharge planning to NH in progress.    Allergies    No Known Allergies    Intolerances      	  MEDICATIONS:  amLODIPine   Tablet 5 milliGRAM(s) Oral daily  diltiazem Injectable 10 milliGRAM(s) IV Push every 4 hours PRN  hydrALAZINE 50 milliGRAM(s) Oral every 8 hours  isosorbide   dinitrate Tablet (ISORDIL) 20 milliGRAM(s) Oral three times a day  metoprolol tartrate 25 milliGRAM(s) Oral two times a day      ALBUTerol/ipratropium for Nebulization 3 milliLiter(s) Nebulizer every 6 hours    acetaminophen   Tablet .. 650 milliGRAM(s) Oral every 6 hours PRN  diphenhydrAMINE 25 milliGRAM(s) Oral every 6 hours PRN  HYDROmorphone   Tablet 2 milliGRAM(s) Oral three times a day PRN  LORazepam     Tablet 0.5 milliGRAM(s) Oral two times a day PRN    bisacodyl Suppository 10 milliGRAM(s) Rectal daily PRN  docusate sodium 100 milliGRAM(s) Oral two times a day  pantoprazole    Tablet 40 milliGRAM(s) Oral before breakfast  polyethylene glycol 3350 17 Gram(s) Oral daily PRN  senna 2 Tablet(s) Oral at bedtime    atorvastatin 80 milliGRAM(s) Oral at bedtime  levothyroxine 100 MICROGram(s) Oral daily  predniSONE   Tablet 40 milliGRAM(s) Oral daily    aspirin  chewable 81 milliGRAM(s) Oral daily  chlorhexidine 2% Cloths 1 Application(s) Topical <User Schedule>  clopidogrel Tablet 75 milliGRAM(s) Oral daily  epoetin nguyễn Injectable 37508 Unit(s) IV Push <User Schedule>  heparin  Injectable 5000 Unit(s) SubCutaneous every 8 hours        PHYSICAL EXAM:    T(C): 36.5 (07-23-19 @ 08:10), Max: 36.5 (07-23-19 @ 08:10)  HR: 54 (07-23-19 @ 09:04) (54 - 76)  BP: 134/57 (07-23-19 @ 08:10) (110/48 - 134/57)  RR: 18 (07-23-19 @ 08:10) (18 - 18)  SpO2: 100% (07-23-19 @ 09:04) (96% - 100%)  Wt(kg): --    I&O's Summary    22 Jul 2019 07:01  -  23 Jul 2019 07:00  --------------------------------------------------------  IN: 0 mL / OUT: 1500 mL / NET: -1500 mL        Daily     Daily     Appearance: Normal	  HEENT:   NC/AT  Eye: Pink Conjunctiva  Lungs: CTA B/L  CVS: RRR, Normal S1 and S2, No Edema  Pulses: Normal distal pulses.  Neuro: A&O x3      RADIOLOGY:   < from: Xray Chest 1 View- PORTABLE-Routine (07.19.19 @ 05:53) >     EXAM:  XR CHEST PORTABLE ROUTINE 1V                          PROCEDURE DATE:  07/19/2019          INTERPRETATION:  XR CHEST    Single AP view    HISTORY:  Hypoxia    Comparison:  Chest x-ray 7/13/2019 and CT chest 6/18/2019    Bilateral pleural effusions and patchy bilateral airspace disease,   unchanged.    The heart is enlarged.    Status post sternotomy.    IMPRESSION:    Bilateral pleural effusions and patchy bilateral airspace disease,   unchanged since 7/13/2019.    < end of copied text >      LABS:	 	    CARDIAC MARKERS:                            9.0    10.20 )-----------( 250      ( 22 Jul 2019 08:02 )             30.6     07-22    130<L>  |  89<L>  |  64.0<H>  ----------------------------<  125<H>  4.8   |  26.0  |  6.49<H>    Ca    8.4<L>      22 Jul 2019 08:02  Phos  5.3     07-22      proBNP:   Lipid Profile:   HgA1c:   TSH:

## 2019-07-23 NOTE — PROVIDER CONTACT NOTE (EICU) - ASSESSMENT
PCR informed pt of information below.    Acute respiratory failure secondary to acute on chronic diastolic CHF  Hypertensive crisis

## 2019-07-23 NOTE — DISCHARGE NOTE PROVIDER - CARE PROVIDER_API CALL
Brigido Newberry (DO)  Internal Medicine  [address] [place] [address], [address] [place] [address]  Phone: [phone]  Fax: [phone]  Follow Up Time: Routine

## 2019-07-23 NOTE — PROGRESS NOTE ADULT - ASSESSMENT
1) ESRD on HD  2)MBD of renal dx  3) Anemia of renal dx  4) Vol HTN    HD in am - orders placed  Poor overall prognosis  Not candidate for hospice; pt and family wish to continue with HD  Continue current management

## 2019-07-23 NOTE — PROGRESS NOTE ADULT - ASSESSMENT
A/P:  78yo male with PMH HFpEF (7/11 EF 60-65%), CAD s/p CABG (ANA-LAD, stents to LCx) with recent NST (04/19 mild ischemia, medically managed), HTN, HLD, ESRD on HD (T, Th, Sat) via L AVF, Lung Ca s/p radiation therapy 2006, ILD on 5L home o2, anemia, bipolar d/o, with frequent readmissions for SOB, volume overload, and ILD.   Patient resting comfortably.  Discharge planning to NH in progress.      1. Acute on chronic hypoxic resp failure.  - Blood cultures neg to date, pleural fluid- cultures pending.  - 7/19 CXR- B/L pleural effusion- patchy airspace disease- unchanged from 7/13 on exam pt with mild b/l wheeze, able to speak full sentences     2. CAD  - continue DAPT, BB, statin, Imdur     3. Paroxysmal afib  - ILR was to be placed 7/16/19 but cancelled due to dyspnea/Orthopnea. ILR to be placed prior to DC once symptoms improved  - BB was discontinued previous hospitalization due to bradycardia, episodes of VT during this admission, BB was resumed, no recurrent events    4. ESRD  - c/t per neph     Poor overall prognosis. Not candidate for hospice as pt and family wish to continue with HD, palliative recommended.

## 2019-07-23 NOTE — PROGRESS NOTE ADULT - SUBJECTIVE AND OBJECTIVE BOX
[facility] DIVISION OF KIDNEY DISEASES AND HYPERTENSION -- FOLLOW UP NOTE  --------------------------------------------------------------------------------  Chief Complaint:  ESRD on HD    24 hour events/subjective:  Pt seen and examined  NAD  HD yesterday      PAST HISTORY  --------------------------------------------------------------------------------  No significant changes to PMH, PSH, FHx, SHx, unless otherwise noted    ALLERGIES & MEDICATIONS  --------------------------------------------------------------------------------  Allergies    No Known Allergies    Intolerances      Standing Inpatient Medications  ALBUTerol/ipratropium for Nebulization 3 milliLiter(s) Nebulizer every 6 hours  amLODIPine   Tablet 5 milliGRAM(s) Oral daily  aspirin  chewable 81 milliGRAM(s) Oral daily  atorvastatin 80 milliGRAM(s) Oral at bedtime  chlorhexidine 2% Cloths 1 Application(s) Topical <User Schedule>  clopidogrel Tablet 75 milliGRAM(s) Oral daily  docusate sodium 100 milliGRAM(s) Oral two times a day  epoetin nguyễn Injectable 59347 Unit(s) IV Push <User Schedule>  heparin  Injectable 5000 Unit(s) SubCutaneous every 8 hours  hydrALAZINE 50 milliGRAM(s) Oral every 8 hours  isosorbide   dinitrate Tablet (ISORDIL) 20 milliGRAM(s) Oral three times a day  levothyroxine 100 MICROGram(s) Oral daily  metoprolol tartrate 25 milliGRAM(s) Oral two times a day  pantoprazole    Tablet 40 milliGRAM(s) Oral before breakfast  predniSONE   Tablet 40 milliGRAM(s) Oral daily  senna 2 Tablet(s) Oral at bedtime  sevelamer carbonate 800 milliGRAM(s) Oral three times a day with meals    PRN Inpatient Medications  acetaminophen   Tablet .. 650 milliGRAM(s) Oral every 6 hours PRN  bisacodyl Suppository 10 milliGRAM(s) Rectal daily PRN  diltiazem Injectable 10 milliGRAM(s) IV Push every 4 hours PRN  diphenhydrAMINE 25 milliGRAM(s) Oral every 6 hours PRN  HYDROmorphone   Tablet 2 milliGRAM(s) Oral three times a day PRN  LORazepam     Tablet 0.5 milliGRAM(s) Oral two times a day PRN  polyethylene glycol 3350 17 Gram(s) Oral daily PRN      REVIEW OF SYSTEMS  --------------------------------------------------------------------------------  Gen: No weight changes, fatigue, fevers/chills, weakness  Skin: No rashes  Head/Eyes/Ears/Mouth: No headache; Normal hearing; Normal vision w/o blurriness; No sinus pain/discomfort, sore throat  Respiratory: No dyspnea, cough, wheezing, hemoptysis  CV: No chest pain, PND, orthopnea  GI: No abdominal pain, diarrhea, constipation, nausea, vomiting, melena, hematochezia  : No increased frequency, dysuria, hematuria, nocturia  MSK: No joint pain/swelling; no back pain; no edema  Neuro: No dizziness/lightheadedness, weakness, seizures, numbness, tingling  Heme: No easy bruising or bleeding  Endo: No heat/cold intolerance  Psych: No significant nervousness, anxiety, stress, depression    All other systems were reviewed and are negative, except as noted.    VITALS/PHYSICAL EXAM  --------------------------------------------------------------------------------  T(C): 36.5 (07-23-19 @ 08:10), Max: 36.7 (07-22-19 @ 11:00)  HR: 54 (07-23-19 @ 09:04) (54 - 76)  BP: 134/57 (07-23-19 @ 08:10) (110/48 - 135/57)  RR: 18 (07-23-19 @ 08:10) (18 - 18)  SpO2: 100% (07-23-19 @ 09:04) (96% - 100%)  Wt(kg): --        07-22-19 @ 07:01  -  07-23-19 @ 07:00  --------------------------------------------------------  IN: 0 mL / OUT: 1500 mL / NET: -1500 mL      Physical Exam:  	Gen: NAD  	HEENT: PERRL, supple neck, clear oropharynx  	Pulm: CTA B/L  	CV: RRR, S1S2; no rub  	Back: No spinal or CVA tenderness; no sacral edema  	Abd: +BS, soft, nontender/nondistended  	: No suprapubic tenderness  	UE: Warm, FROM, no clubbing, intact strength; no edema; no asterixis  	LE: Warm, FROM, no clubbing, intact strength; no edema  	Neuro: No focal deficits, intact gait  	Psych: Normal affect and mood  	Skin: Warm, without rashes  	Vascular access:  AVG    LABS/STUDIES  --------------------------------------------------------------------------------              9.0    10.20 >-----------<  250      [07-22-19 @ 08:02]              30.6     130  |  89  |  64.0  ----------------------------<  125      [07-22-19 @ 08:02]  4.8   |  26.0  |  6.49        Ca     8.4     [07-22-19 @ 08:02]      Phos  5.3     [07-22-19 @ 08:02]            Creatinine Trend:  SCr 6.49 [07-22 @ 08:02]  SCr 6.16 [07-19 @ 14:32]  SCr 6.21 [07-17 @ 07:16]  SCr 7.34 [07-14 @ 09:12]  SCr 5.75 [07-13 @ 06:12]        Iron 58, TIBC 256, %sat 23      [06-19-19 @ 08:42]  Ferritin 812      [06-19-19 @ 08:42]  PTH -- (Ca 8.1)      [07-01-19 @ 13:24]   83  TSH 0.89      [05-23-19 @ 06:28]  Lipid: chol 159, , HDL 32, LDL 94      [01-29-19 @ 11:39]    HBsAb <3.0      [06-27-19 @ 19:39]  HBsAb Nonreact      [06-27-19 @ 19:39]  HBsAg Reactive      [06-27-19 @ 19:39]  HBcAb Nonreact      [06-27-19 @ 19:39]  HCV 0.10, Nonreact      [06-27-19 @ 19:39]

## 2019-07-23 NOTE — PROGRESS NOTE ADULT - ATTENDING COMMENTS
D/W pt, HOSSEIN Escalante RN, ICU Dr. Silverio    Spent 25 min on goc/acp discussion
pt seen and examined.   plan of care d/w np. Breathing is unchanged. He does have abdominal pain but his abdomen is soft, no rebound tenderness.  Further evaluation as per pmd. No n/v/ or fever.  No active cardiac issues at this time.
pt see and examined. No more cp   He looks better today s/p HD yesterday.   No acute issues at this time  I reviewed the above note and d/w np.   I agree with a/p.
Mr. Lawson is stable from our stand point. We will plan to insert a loop recorder.   I agree with phoebe

## 2019-07-23 NOTE — DISCHARGE NOTE PROVIDER - HOSPITAL COURSE
The patient is a 79 year old male with a history of ESRD on HD, hypertension, CAD status post CABG HFpEF and lung cancer status radiation with pulmonary fibrosis who presented to the ER with worsening dyspnea. Admitted with acute on chronic hypoxic respiratory failure initially on NIV in MICU.         Acute on chronic hypoxic respiratory failure- multifactorial secondary to pulmonary fibrosis and  acute on chronic diastolic CHF- Currently on nasal cannula    PO prednisone taper and Nebs PRN.  Ativan and Dilaudid added for comfort.        Paroxysmal afib- ILR was to be placed 7/16/19 but cancelled due to dyspnea/Orthopnea. ILR to be placed prior to admission once symptoms improved- BB was discontinued previous hospitalization due to bradycardia now with episodes of VT- BB was resumed. However patient unable to get ILR as cannot lay flat for procedure. Add Dilaudid for comfort.        ESRD On HD- Continued on HD        Hypertension: BP controlled        Chronic diastolic heart failure - Not in acute exacerbation. On isosorbide. Hemodialysis as scheduled. Pleural effusion drained        Hypothyroidism - Synthroid changed to PO         Patient is DNR/DNI- poor prognosis. Family met with palliative but would like to continue HD and therefore is not a candidate for home hospice.     Patient very fragile and decompensates - Palliative appropriate however patient and family do not want to discontinue HD currently.  Informed by [facility] that family unable to care for him at home, want Nursing Home. Patient may be discharged to Nursing Home once arrangements in place.            Discharge time 40 minutes The patient is a 79 year old male with a history of ESRD on HD, hypertension, CAD status post CABG HFpEF and lung cancer status radiation with pulmonary fibrosis who presented to the ER with worsening dyspnea. Admitted with acute on chronic hypoxic respiratory failure initially on NIV in MICU.         Acute on chronic hypoxic respiratory failure- multifactorial secondary to pulmonary fibrosis and  acute on chronic diastolic CHF- Currently on nasal cannula    PO prednisone taper and Nebs PRN.  Ativan and Dilaudid added for comfort.        Paroxysmal afib- ILR was to be placed 7/16/19 but cancelled due to dyspnea/Orthopnea. ILR to be placed prior to admission once symptoms improved- BB was discontinued previous hospitalization due to bradycardia now with episodes of VT- BB was resumed. However patient unable to get ILR as cannot lay flat for procedure. Add Dilaudid for comfort.        ESRD On HD- Continued on HD        Hypertension: BP controlled        Chronic diastolic heart failure - Not in acute exacerbation. On isosorbide. Hemodialysis as scheduled. Pleural effusion drained        Hypothyroidism - Synthroid changed to PO         Patient is DNR/DNI- poor prognosis. Family met with palliative but would like to continue HD and therefore is not a candidate for home hospice.     Patient very fragile and decompensates - Palliative appropriate however patient and family do not want to discontinue HD currently.  Informed by [facility] that family unable to care for him at home, want Nursing Home. Patient may be discharged to Nursing Home once arrangements in place.            Discharge time 40 minutes        Vital Signs Last 24 Hrs    T(C): 36.6 (07-26-19 @ 09:12), Max: 36.9 (07-25-19 @ 15:34)    T(F): 97.9 (07-26-19 @ 09:12), Max: 98.4 (07-25-19 @ 15:34)    HR: 61 (07-26-19 @ 09:12) (51 - 83)    BP: 133/57 (07-26-19 @ 09:12) (107/36 - 140/60)    BP(mean): --    RR: 18 (07-26-19 @ 09:12) (18 - 19)    SpO2: 95% (07-26-19 @ 09:12) (95% - 100%)    General: Elderly French gentleman lying in bed - NAD    HEENT:  extraocular movements intact, nasal cannula in place    NECK:  Supple    CVS: regular rate and rhythm S1 S2. Reproducible left chest wall tenderness improved    RESP:  Fair air entry except bases. Poor effort    GI:  Soft nondistended nontender BS+    : No suprapubic tenderness    MSK:  FROM, no edema    CNS:  No gross focal or global deficit noted    INTEG:  warm dry skin    PSYCH:  Fair mood

## 2019-07-23 NOTE — DISCHARGE NOTE PROVIDER - NSDCACTIVITY_GEN_ALL_CORE
Bathing allowed/Showering allowed/Stairs allowed/Walking - Indoors allowed/Walking - Outdoors allowed

## 2019-07-23 NOTE — DISCHARGE NOTE PROVIDER - NSDCCPCAREPLAN_GEN_ALL_CORE_FT
PRINCIPAL DISCHARGE DIAGNOSIS  Diagnosis: Acute on chronic respiratory failure with hypoxia  Assessment and Plan of Treatment: Continue supplemental Oxygen, nebs, Ativan  Continue Prednisone taper      SECONDARY DISCHARGE DIAGNOSES  Diagnosis: ILD (interstitial lung disease)  Assessment and Plan of Treatment: Continue O2, nebs, Prednisone    Diagnosis: Severe protein-calorie malnutrition  Assessment and Plan of Treatment: Continue Nutrition supplement    Diagnosis: Anemia of chronic kidney failure  Assessment and Plan of Treatment: Continue supplements, Epogen    Diagnosis: ESRD on dialysis  Assessment and Plan of Treatment: HD per renal

## 2019-07-24 LAB
ANION GAP SERPL CALC-SCNC: 16 MMOL/L — SIGNIFICANT CHANGE UP (ref 5–17)
BUN SERPL-MCNC: 55 MG/DL — HIGH (ref 8–20)
CALCIUM SERPL-MCNC: 8 MG/DL — LOW (ref 8.6–10.2)
CHLORIDE SERPL-SCNC: 94 MMOL/L — LOW (ref 98–107)
CO2 SERPL-SCNC: 25 MMOL/L — SIGNIFICANT CHANGE UP (ref 22–29)
CREAT SERPL-MCNC: 5.84 MG/DL — HIGH (ref 0.5–1.3)
GLUCOSE SERPL-MCNC: 105 MG/DL — SIGNIFICANT CHANGE UP (ref 70–115)
HCT VFR BLD CALC: 30.7 % — LOW (ref 39–50)
HGB BLD-MCNC: 9.2 G/DL — LOW (ref 13–17)
MCHC RBC-ENTMCNC: 29.6 PG — SIGNIFICANT CHANGE UP (ref 27–34)
MCHC RBC-ENTMCNC: 30 GM/DL — LOW (ref 32–36)
MCV RBC AUTO: 98.7 FL — SIGNIFICANT CHANGE UP (ref 80–100)
PHOSPHATE SERPL-MCNC: 5.2 MG/DL — HIGH (ref 2.4–4.7)
PLATELET # BLD AUTO: 229 K/UL — SIGNIFICANT CHANGE UP (ref 150–400)
POTASSIUM SERPL-MCNC: 4.5 MMOL/L — SIGNIFICANT CHANGE UP (ref 3.5–5.3)
POTASSIUM SERPL-SCNC: 4.5 MMOL/L — SIGNIFICANT CHANGE UP (ref 3.5–5.3)
RBC # BLD: 3.11 M/UL — LOW (ref 4.2–5.8)
RBC # FLD: 16.6 % — HIGH (ref 10.3–14.5)
SODIUM SERPL-SCNC: 135 MMOL/L — SIGNIFICANT CHANGE UP (ref 135–145)
WBC # BLD: 9.5 K/UL — SIGNIFICANT CHANGE UP (ref 3.8–10.5)
WBC # FLD AUTO: 9.5 K/UL — SIGNIFICANT CHANGE UP (ref 3.8–10.5)

## 2019-07-24 PROCEDURE — 90937 HEMODIALYSIS REPEATED EVAL: CPT

## 2019-07-24 PROCEDURE — 99232 SBSQ HOSP IP/OBS MODERATE 35: CPT

## 2019-07-24 RX ADMIN — ERYTHROPOIETIN 10000 UNIT(S): 10000 INJECTION, SOLUTION INTRAVENOUS; SUBCUTANEOUS at 10:49

## 2019-07-24 RX ADMIN — Medication 50 MILLIGRAM(S): at 05:56

## 2019-07-24 RX ADMIN — Medication 25 MILLIGRAM(S): at 01:40

## 2019-07-24 RX ADMIN — HYDROMORPHONE HYDROCHLORIDE 2 MILLIGRAM(S): 2 INJECTION INTRAMUSCULAR; INTRAVENOUS; SUBCUTANEOUS at 06:54

## 2019-07-24 RX ADMIN — HEPARIN SODIUM 5000 UNIT(S): 5000 INJECTION INTRAVENOUS; SUBCUTANEOUS at 22:13

## 2019-07-24 RX ADMIN — HEPARIN SODIUM 5000 UNIT(S): 5000 INJECTION INTRAVENOUS; SUBCUTANEOUS at 05:56

## 2019-07-24 RX ADMIN — SENNA PLUS 2 TABLET(S): 8.6 TABLET ORAL at 22:13

## 2019-07-24 RX ADMIN — Medication 25 MILLIGRAM(S): at 17:56

## 2019-07-24 RX ADMIN — Medication 50 MILLIGRAM(S): at 22:13

## 2019-07-24 RX ADMIN — SEVELAMER CARBONATE 800 MILLIGRAM(S): 2400 POWDER, FOR SUSPENSION ORAL at 17:56

## 2019-07-24 RX ADMIN — ISOSORBIDE DINITRATE 20 MILLIGRAM(S): 5 TABLET ORAL at 14:59

## 2019-07-24 RX ADMIN — HYDROMORPHONE HYDROCHLORIDE 2 MILLIGRAM(S): 2 INJECTION INTRAMUSCULAR; INTRAVENOUS; SUBCUTANEOUS at 22:13

## 2019-07-24 RX ADMIN — Medication 3 MILLILITER(S): at 20:47

## 2019-07-24 RX ADMIN — HYDROMORPHONE HYDROCHLORIDE 2 MILLIGRAM(S): 2 INJECTION INTRAMUSCULAR; INTRAVENOUS; SUBCUTANEOUS at 05:54

## 2019-07-24 RX ADMIN — SEVELAMER CARBONATE 800 MILLIGRAM(S): 2400 POWDER, FOR SUSPENSION ORAL at 13:28

## 2019-07-24 RX ADMIN — HYDROMORPHONE HYDROCHLORIDE 2 MILLIGRAM(S): 2 INJECTION INTRAMUSCULAR; INTRAVENOUS; SUBCUTANEOUS at 23:10

## 2019-07-24 RX ADMIN — CLOPIDOGREL BISULFATE 75 MILLIGRAM(S): 75 TABLET, FILM COATED ORAL at 13:28

## 2019-07-24 RX ADMIN — Medication 50 MILLIGRAM(S): at 13:28

## 2019-07-24 RX ADMIN — AMLODIPINE BESYLATE 5 MILLIGRAM(S): 2.5 TABLET ORAL at 05:56

## 2019-07-24 RX ADMIN — Medication 25 MILLIGRAM(S): at 05:58

## 2019-07-24 RX ADMIN — Medication 40 MILLIGRAM(S): at 05:57

## 2019-07-24 RX ADMIN — Medication 100 MILLIGRAM(S): at 17:56

## 2019-07-24 RX ADMIN — ATORVASTATIN CALCIUM 80 MILLIGRAM(S): 80 TABLET, FILM COATED ORAL at 22:13

## 2019-07-24 RX ADMIN — CHLORHEXIDINE GLUCONATE 1 APPLICATION(S): 213 SOLUTION TOPICAL at 05:58

## 2019-07-24 RX ADMIN — Medication 3 MILLILITER(S): at 03:26

## 2019-07-24 RX ADMIN — Medication 100 MILLIGRAM(S): at 05:56

## 2019-07-24 RX ADMIN — PANTOPRAZOLE SODIUM 40 MILLIGRAM(S): 20 TABLET, DELAYED RELEASE ORAL at 05:55

## 2019-07-24 RX ADMIN — HEPARIN SODIUM 5000 UNIT(S): 5000 INJECTION INTRAVENOUS; SUBCUTANEOUS at 13:28

## 2019-07-24 RX ADMIN — ISOSORBIDE DINITRATE 20 MILLIGRAM(S): 5 TABLET ORAL at 22:13

## 2019-07-24 RX ADMIN — ISOSORBIDE DINITRATE 20 MILLIGRAM(S): 5 TABLET ORAL at 05:55

## 2019-07-24 RX ADMIN — Medication 81 MILLIGRAM(S): at 13:28

## 2019-07-24 RX ADMIN — Medication 100 MICROGRAM(S): at 05:55

## 2019-07-24 NOTE — PROGRESS NOTE ADULT - SUBJECTIVE AND OBJECTIVE BOX
[facility] DIVISION OF KIDNEY DISEASES AND HYPERTENSION -- FOLLOW UP NOTE  --------------------------------------------------------------------------------  Chief Complaint: ESRD HD    24 hour events/subjective:  Seen/examined  On HD today      PAST HISTORY  --------------------------------------------------------------------------------  No significant changes to PMH, PSH, FHx, SHx, unless otherwise noted    ALLERGIES & MEDICATIONS  --------------------------------------------------------------------------------  Allergies    No Known Allergies    Intolerances      Standing Inpatient Medications  ALBUTerol/ipratropium for Nebulization 3 milliLiter(s) Nebulizer every 6 hours  amLODIPine   Tablet 5 milliGRAM(s) Oral daily  aspirin  chewable 81 milliGRAM(s) Oral daily  atorvastatin 80 milliGRAM(s) Oral at bedtime  chlorhexidine 2% Cloths 1 Application(s) Topical <User Schedule>  clopidogrel Tablet 75 milliGRAM(s) Oral daily  docusate sodium 100 milliGRAM(s) Oral two times a day  epoetin nguyễn Injectable 99434 Unit(s) IV Push <User Schedule>  heparin  Injectable 5000 Unit(s) SubCutaneous every 8 hours  hydrALAZINE 50 milliGRAM(s) Oral every 8 hours  isosorbide   dinitrate Tablet (ISORDIL) 20 milliGRAM(s) Oral three times a day  levothyroxine 100 MICROGram(s) Oral daily  metoprolol tartrate 25 milliGRAM(s) Oral two times a day  pantoprazole    Tablet 40 milliGRAM(s) Oral before breakfast  predniSONE   Tablet 40 milliGRAM(s) Oral daily  senna 2 Tablet(s) Oral at bedtime  sevelamer carbonate 800 milliGRAM(s) Oral three times a day with meals    PRN Inpatient Medications  acetaminophen   Tablet .. 650 milliGRAM(s) Oral every 6 hours PRN  benzocaine 15 mG/menthol 3.6 mG Lozenge 1 Lozenge Oral three times a day PRN  bisacodyl Suppository 10 milliGRAM(s) Rectal daily PRN  diltiazem Injectable 10 milliGRAM(s) IV Push every 4 hours PRN  diphenhydrAMINE 25 milliGRAM(s) Oral every 6 hours PRN  HYDROmorphone   Tablet 2 milliGRAM(s) Oral three times a day PRN  LORazepam     Tablet 0.5 milliGRAM(s) Oral two times a day PRN  polyethylene glycol 3350 17 Gram(s) Oral daily PRN      REVIEW OF SYSTEMS  --------------------------------------------------------------------------------  Gen: No weight changes, fatigue, fevers/chills, weakness  Skin: No rashes  Head/Eyes/Ears/Mouth: No headache; Normal hearing; Normal vision w/o blurriness; No sinus pain/discomfort, sore throat  Respiratory: No dyspnea, cough, wheezing, hemoptysis  CV: No chest pain, PND, orthopnea  GI: No abdominal pain, diarrhea, constipation, nausea, vomiting, melena, hematochezia  : No increased frequency, dysuria, hematuria, nocturia  MSK: No joint pain/swelling; no back pain; no edema  Neuro: No dizziness/lightheadedness, weakness, seizures, numbness, tingling  Heme: No easy bruising or bleeding  Endo: No heat/cold intolerance  Psych: No significant nervousness, anxiety, stress, depression    All other systems were reviewed and are negative, except as noted.    VITALS/PHYSICAL EXAM  --------------------------------------------------------------------------------  T(C): 36.8 (07-24-19 @ 11:00), Max: 36.8 (07-24-19 @ 11:00)  HR: 56 (07-24-19 @ 11:00) (56 - 72)  BP: 140/63 (07-24-19 @ 11:00) (115/49 - 140/63)  RR: 18 (07-24-19 @ 11:00) (18 - 18)  SpO2: 95% (07-24-19 @ 11:00) (95% - 100%)  Wt(kg): --        07-24-19 @ 07:01  -  07-24-19 @ 13:09  --------------------------------------------------------  IN: 0 mL / OUT: 2000 mL / NET: -2000 mL      Physical Exam:  	Gen: NAD,   	HEENT: PERRL, supple neck, clear oropharynx  	Pulm: CTA B/L  	CV: RRR, S1S2; no rub  	Back: No spinal or CVA tenderness; no sacral edema  	Abd: +BS, soft, nontender/nondistended  	: No suprapubic tenderness  	UE: Warm, FROM, no clubbing, intact strength; no edema; no asterixis  	LE: Warm, FROM, no clubbing, intact strength; no edema  	Neuro: No focal deficits, intact gait  	Psych: Normal affect and mood  	Skin: Warm, without rashes  	Vascular access:    LABS/STUDIES  --------------------------------------------------------------------------------              9.2    9.50  >-----------<  229      [07-24-19 @ 08:47]              30.7     135  |  94  |  55.0  ----------------------------<  105      [07-24-19 @ 08:47]  4.5   |  25.0  |  5.84        Ca     8.0     [07-24-19 @ 08:47]      Phos  5.2     [07-24-19 @ 08:47]            Creatinine Trend:  SCr 5.84 [07-24 @ 08:47]  SCr 6.49 [07-22 @ 08:02]  SCr 6.16 [07-19 @ 14:32]  SCr 6.21 [07-17 @ 07:16]  SCr 7.34 [07-14 @ 09:12]        Iron 58, TIBC 256, %sat 23      [06-19-19 @ 08:42]  Ferritin 812      [06-19-19 @ 08:42]  PTH -- (Ca 8.1)      [07-01-19 @ 13:24]   83  TSH 0.89      [05-23-19 @ 06:28]  Lipid: chol 159, , HDL 32, LDL 94      [01-29-19 @ 11:39]    HBsAb <3.0      [06-27-19 @ 19:39]  HBsAb Nonreact      [06-27-19 @ 19:39]  HBsAg Reactive      [06-27-19 @ 19:39]  HBcAb Nonreact      [06-27-19 @ 19:39]  HCV 0.10, Nonreact      [06-27-19 @ 19:39]

## 2019-07-24 NOTE — PROGRESS NOTE ADULT - SUBJECTIVE AND OBJECTIVE BOX
HOSPITALIST PROGRESS NOTE    KAUSHIK HOFFMAN  [id number]  79yMale    Patient is a 79y old  Male who presents with a chief complaint of Acute hypoxemic respiratory failure (24 Jul 2019 13:09)      SUBJECTIVE:   Chart reviewed since last visit.  Patient seen and examined at bedside for respiratory failure, ESRD  Continues to have intermittent chest pain, dyspnea and nausea - but remains comfortable.  Pending acceptance and transfer to HD facility for [facility]      OBJECTIVE:  Vital Signs Last 24 Hrs  T(C): 36.9 (24 Jul 2019 13:16), Max: 36.9 (24 Jul 2019 13:16)  T(F): 98.4 (24 Jul 2019 13:16), Max: 98.4 (24 Jul 2019 13:16)  HR: 61 (24 Jul 2019 13:16) (56 - 72)  BP: 150/55 (24 Jul 2019 13:16) (115/49 - 150/55)  BP(mean): --  RR: 18 (24 Jul 2019 13:16) (18 - 18)  SpO2: 100% (24 Jul 2019 13:16) (95% - 100%)    PHYSICAL EXAMINATION  General: Elderly Maltese gentleman lying in bed - NAD  HEENT:  extraocular movements intact, nasal cannula in place  NECK:  Supple  CVS: regular rate and rhythm S1 S2. Reproducible left chest wall tenderness improved  RESP:  Fair air entry except bases. Poor effort  GI:  Soft nondistended nontender BS+  : No suprapubic tenderness  MSK:  FROM, no edema  CNS:  No gross focal or global deficit noted  INTEG:  warm dry skin  PSYCH:  Fair mood      MONITOR:  CAPILLARY BLOOD GLUCOSE            I&O's Summary    24 Jul 2019 07:01  -  24 Jul 2019 15:30  --------------------------------------------------------  IN: 0 mL / OUT: 2000 mL / NET: -2000 mL                            9.2    9.50  )-----------( 229      ( 24 Jul 2019 08:47 )             30.7       07-24    135  |  94<L>  |  55.0<H>  ----------------------------<  105  4.5   |  25.0  |  5.84<H>    Ca    8.0<L>      24 Jul 2019 08:47  Phos  5.2     07-24              Culture:    TTE:    RADIOLOGY        MEDICATIONS  (STANDING):  ALBUTerol/ipratropium for Nebulization 3 milliLiter(s) Nebulizer every 6 hours  amLODIPine   Tablet 5 milliGRAM(s) Oral daily  aspirin  chewable 81 milliGRAM(s) Oral daily  atorvastatin 80 milliGRAM(s) Oral at bedtime  chlorhexidine 2% Cloths 1 Application(s) Topical <User Schedule>  clopidogrel Tablet 75 milliGRAM(s) Oral daily  docusate sodium 100 milliGRAM(s) Oral two times a day  epoetin nguyễn Injectable 64164 Unit(s) IV Push <User Schedule>  heparin  Injectable 5000 Unit(s) SubCutaneous every 8 hours  hydrALAZINE 50 milliGRAM(s) Oral every 8 hours  isosorbide   dinitrate Tablet (ISORDIL) 20 milliGRAM(s) Oral three times a day  levothyroxine 100 MICROGram(s) Oral daily  metoprolol tartrate 25 milliGRAM(s) Oral two times a day  pantoprazole    Tablet 40 milliGRAM(s) Oral before breakfast  predniSONE   Tablet 40 milliGRAM(s) Oral daily  senna 2 Tablet(s) Oral at bedtime  sevelamer carbonate 800 milliGRAM(s) Oral three times a day with meals      MEDICATIONS  (PRN):  acetaminophen   Tablet .. 650 milliGRAM(s) Oral every 6 hours PRN Mild Pain (1 - 3)  benzocaine 15 mG/menthol 3.6 mG Lozenge 1 Lozenge Oral three times a day PRN Sore Throat  bisacodyl Suppository 10 milliGRAM(s) Rectal daily PRN Constipation  diltiazem Injectable 10 milliGRAM(s) IV Push every 4 hours PRN HR>120  diphenhydrAMINE 25 milliGRAM(s) Oral every 6 hours PRN Rash and/or Itching  HYDROmorphone   Tablet 2 milliGRAM(s) Oral three times a day PRN pain,dyspnea  LORazepam     Tablet 0.5 milliGRAM(s) Oral two times a day PRN Anxiety  polyethylene glycol 3350 17 Gram(s) Oral daily PRN Constipation

## 2019-07-24 NOTE — PROGRESS NOTE ADULT - ASSESSMENT
The patient is a 79 year old male with a history of ESRD on HD, hypertension, CAD status post CABG HFpEF and lung cancer status radiation with pulmonary fibrosis who presented to the ER with worsening dyspnea. Admitted with acute on chronic hypoxic respiratory failure initially on NIV in MICU.     Acute on chronic hypoxic respiratory failure- multifactorial secondary to pulmonary fibrosis and  acute on chronic diastolic CHF- Currently on nasal cannula  PO prednisone taper  Nebs PRN  Ativan for comfort.    Paroxysmal afib- ILR was to be placed 7/16/19 but cancelled due to dyspnea/Orthopnea. ILR to be placed prior to admission once symptoms improved- BB was discontinued previous hospitalization due to bradycardia now with episodes of VT- BB was resumed. However patient unable to get ILR as cannot lay flat for procedure. Add Dilaudid for comfort.    ESRD On HD- Continued on HD    Hypertension: BP controlled    Chronic diastolic heart failure - Not in acute exacerbation. On isosorbide. Hemodialysis as scheduled. Pleural effusion drained    Hypothyroidism - Synthroid changed to PO     Patient is DNR/DNI- poor prognosis. Family met with palliative but would like to continue HD and therefore is not a candidate for home hospice.     VTE-  heparin subcut     Disposition - unclear currently. Patient very fragile and decompensates - Palliative appropriate however patient and family do not want to discontinue HD currently. Will prepare for discharge. Daughter concerned about adequate resources - Informed by [facility] that family unable to care for him at home, want Nursing Home. Patient may be discharged to Nursing Home once arrangements in place

## 2019-07-25 PROCEDURE — 99233 SBSQ HOSP IP/OBS HIGH 50: CPT

## 2019-07-25 PROCEDURE — 99232 SBSQ HOSP IP/OBS MODERATE 35: CPT

## 2019-07-25 RX ADMIN — HYDROMORPHONE HYDROCHLORIDE 2 MILLIGRAM(S): 2 INJECTION INTRAMUSCULAR; INTRAVENOUS; SUBCUTANEOUS at 08:51

## 2019-07-25 RX ADMIN — CHLORHEXIDINE GLUCONATE 1 APPLICATION(S): 213 SOLUTION TOPICAL at 07:22

## 2019-07-25 RX ADMIN — HEPARIN SODIUM 5000 UNIT(S): 5000 INJECTION INTRAVENOUS; SUBCUTANEOUS at 14:20

## 2019-07-25 RX ADMIN — Medication 0.5 MILLIGRAM(S): at 00:25

## 2019-07-25 RX ADMIN — ATORVASTATIN CALCIUM 80 MILLIGRAM(S): 80 TABLET, FILM COATED ORAL at 22:33

## 2019-07-25 RX ADMIN — ISOSORBIDE DINITRATE 20 MILLIGRAM(S): 5 TABLET ORAL at 07:26

## 2019-07-25 RX ADMIN — ISOSORBIDE DINITRATE 20 MILLIGRAM(S): 5 TABLET ORAL at 22:33

## 2019-07-25 RX ADMIN — CLOPIDOGREL BISULFATE 75 MILLIGRAM(S): 75 TABLET, FILM COATED ORAL at 12:05

## 2019-07-25 RX ADMIN — Medication 100 MICROGRAM(S): at 07:26

## 2019-07-25 RX ADMIN — ISOSORBIDE DINITRATE 20 MILLIGRAM(S): 5 TABLET ORAL at 14:20

## 2019-07-25 RX ADMIN — Medication 40 MILLIGRAM(S): at 07:25

## 2019-07-25 RX ADMIN — Medication 25 MILLIGRAM(S): at 00:27

## 2019-07-25 RX ADMIN — Medication 25 MILLIGRAM(S): at 07:25

## 2019-07-25 RX ADMIN — Medication 50 MILLIGRAM(S): at 22:33

## 2019-07-25 RX ADMIN — SEVELAMER CARBONATE 800 MILLIGRAM(S): 2400 POWDER, FOR SUSPENSION ORAL at 09:11

## 2019-07-25 RX ADMIN — SENNA PLUS 2 TABLET(S): 8.6 TABLET ORAL at 22:33

## 2019-07-25 RX ADMIN — Medication 3 MILLILITER(S): at 03:21

## 2019-07-25 RX ADMIN — AMLODIPINE BESYLATE 5 MILLIGRAM(S): 2.5 TABLET ORAL at 07:25

## 2019-07-25 RX ADMIN — SEVELAMER CARBONATE 800 MILLIGRAM(S): 2400 POWDER, FOR SUSPENSION ORAL at 12:05

## 2019-07-25 RX ADMIN — Medication 81 MILLIGRAM(S): at 12:05

## 2019-07-25 RX ADMIN — Medication 50 MILLIGRAM(S): at 07:26

## 2019-07-25 RX ADMIN — Medication 50 MILLIGRAM(S): at 14:20

## 2019-07-25 RX ADMIN — HYDROMORPHONE HYDROCHLORIDE 2 MILLIGRAM(S): 2 INJECTION INTRAMUSCULAR; INTRAVENOUS; SUBCUTANEOUS at 23:30

## 2019-07-25 RX ADMIN — PANTOPRAZOLE SODIUM 40 MILLIGRAM(S): 20 TABLET, DELAYED RELEASE ORAL at 07:26

## 2019-07-25 RX ADMIN — SEVELAMER CARBONATE 800 MILLIGRAM(S): 2400 POWDER, FOR SUSPENSION ORAL at 18:06

## 2019-07-25 RX ADMIN — Medication 3 MILLILITER(S): at 20:54

## 2019-07-25 RX ADMIN — HYDROMORPHONE HYDROCHLORIDE 2 MILLIGRAM(S): 2 INJECTION INTRAMUSCULAR; INTRAVENOUS; SUBCUTANEOUS at 22:33

## 2019-07-25 RX ADMIN — Medication 100 MILLIGRAM(S): at 07:25

## 2019-07-25 RX ADMIN — Medication 3 MILLILITER(S): at 08:29

## 2019-07-25 NOTE — PROGRESS NOTE ADULT - ASSESSMENT
The patient is a 79 year old male with a history of ESRD on HD, hypertension, CAD status post CABG HFpEF and lung cancer status radiation with pulmonary fibrosis who presented to the ER with worsening dyspnea. Admitted with acute on chronic hypoxic respiratory failure initially on NIV in MICU.     Acute on chronic hypoxic respiratory failure- multifactorial secondary to pulmonary fibrosis and  acute on chronic diastolic CHF- Currently on nasal cannula  PO prednisone taper  Nebs PRN  Ativan for comfort.    Paroxysmal afib- ILR was to be placed 7/16/19 but cancelled due to dyspnea/Orthopnea. ILR to be placed prior to admission once symptoms improved- BB was discontinued previous hospitalization due to bradycardia now with episodes of VT- BB was resumed. However patient unable to get ILR as cannot lay flat for procedure. Add Dilaudid for comfort.    ESRD On HD- Continued on HD    Hypertension: BP controlled    Chronic diastolic heart failure - Not in acute exacerbation. On isosorbide. Hemodialysis as scheduled. Pleural effusion drained    Hypothyroidism - Synthroid changed to PO     Patient is DNR/DNI- poor prognosis. Family met with palliative but would like to continue HD and therefore is not a candidate for home hospice.     VTE-  heparin subcut     Disposition -  Patient very fragile and decompensates - Palliative appropriate however patient and family do not want to discontinue HD currently. Will prepare for discharge. Daughter concerned about adequate resources - Informed by [facility] that family unable to care for him at home, want Nursing Home. Patient may be discharged to Nursing Home once arrangements in place.      Discussed with patient daughter at bedside.    Discussed with YAMILET Hernandez; Patient currently on M,W,F schedule. Possible placement with T,T,S schedule. Discussed with Nephrology will dialyze on Friday and then discharge to Nursing Home - resumption of HD on Saturday with switch to T,T,S schedule

## 2019-07-25 NOTE — PROGRESS NOTE ADULT - SUBJECTIVE AND OBJECTIVE BOX
HOSPITALIST PROGRESS NOTE    KAUSHIK HOFFMAN  [id number]  79yMale    Patient is a 79y old  Male who presents with a chief complaint of Acute hypoxemic respiratory failure (24 Jul 2019 15:29)      SUBJECTIVE:   Chart reviewed since last visit.  Patient seen and examined at bedside for respiratory failure, ESRD  Continues to have intermittent chest pain, dyspnea and nausea.  Awaiting placement in [facility] with HD      OBJECTIVE:  Vital Signs Last 24 Hrs  T(C): 36.9 (25 Jul 2019 15:34), Max: 36.9 (25 Jul 2019 00:00)  T(F): 98.4 (25 Jul 2019 15:34), Max: 98.5 (25 Jul 2019 00:00)  HR: 53 (25 Jul 2019 17:34) (53 - 84)  BP: 112/51 (25 Jul 2019 17:34) (99/59 - 139/49)   RR: 19 (25 Jul 2019 15:34) (19 - 20)  SpO2: 96% (25 Jul 2019 15:34) (96% - 97%)    PHYSICAL EXAMINATION  General: Elderly German gentleman lying in bed - NAD  HEENT:  extraocular movements intact, nasal cannula in place  NECK:  Supple  CVS: regular rate and rhythm S1 S2. Reproducible left chest wall tenderness improved  RESP:  Fair air entry except bases. Poor effort  GI:  Soft nondistended nontender BS+  : No suprapubic tenderness  MSK:  FROM, no edema  CNS:  No gross focal or global deficit noted  INTEG:  warm dry skin  PSYCH:  Fair mood        MONITOR:  CAPILLARY BLOOD GLUCOSE            I&O's Summary    24 Jul 2019 07:01  -  25 Jul 2019 07:00  --------------------------------------------------------  IN: 0 mL / OUT: 2150 mL / NET: -2150 mL                            9.2    9.50  )-----------( 229      ( 24 Jul 2019 08:47 )             30.7       07-24    135  |  94<L>  |  55.0<H>  ----------------------------<  105  4.5   |  25.0  |  5.84<H>    Ca    8.0<L>      24 Jul 2019 08:47  Phos  5.2     07-24              Culture:    TTE:    RADIOLOGY        MEDICATIONS  (STANDING):  ALBUTerol/ipratropium for Nebulization 3 milliLiter(s) Nebulizer every 6 hours  amLODIPine   Tablet 5 milliGRAM(s) Oral daily  aspirin  chewable 81 milliGRAM(s) Oral daily  atorvastatin 80 milliGRAM(s) Oral at bedtime  chlorhexidine 2% Cloths 1 Application(s) Topical <User Schedule>  clopidogrel Tablet 75 milliGRAM(s) Oral daily  docusate sodium 100 milliGRAM(s) Oral two times a day  epoetin nguyễn Injectable 40456 Unit(s) IV Push <User Schedule>  heparin  Injectable 5000 Unit(s) SubCutaneous every 8 hours  hydrALAZINE 50 milliGRAM(s) Oral every 8 hours  isosorbide   dinitrate Tablet (ISORDIL) 20 milliGRAM(s) Oral three times a day  levothyroxine 100 MICROGram(s) Oral daily  metoprolol tartrate 25 milliGRAM(s) Oral two times a day  pantoprazole    Tablet 40 milliGRAM(s) Oral before breakfast  predniSONE   Tablet 40 milliGRAM(s) Oral daily  senna 2 Tablet(s) Oral at bedtime  sevelamer carbonate 800 milliGRAM(s) Oral three times a day with meals      MEDICATIONS  (PRN):  acetaminophen   Tablet .. 650 milliGRAM(s) Oral every 6 hours PRN Mild Pain (1 - 3)  benzocaine 15 mG/menthol 3.6 mG Lozenge 1 Lozenge Oral three times a day PRN Sore Throat  bisacodyl Suppository 10 milliGRAM(s) Rectal daily PRN Constipation  diltiazem Injectable 10 milliGRAM(s) IV Push every 4 hours PRN HR>120  diphenhydrAMINE 25 milliGRAM(s) Oral every 6 hours PRN Rash and/or Itching  HYDROmorphone   Tablet 2 milliGRAM(s) Oral three times a day PRN pain,dyspnea  LORazepam     Tablet 0.5 milliGRAM(s) Oral two times a day PRN Anxiety  polyethylene glycol 3350 17 Gram(s) Oral daily PRN Constipation

## 2019-07-25 NOTE — PROGRESS NOTE ADULT - SUBJECTIVE AND OBJECTIVE BOX
[facility]  [id number]    79yMale    Patient is a 79y old  Male who presents with a chief complaint of Acute hypoxemic respiratory failure (24 Jul 2019 15:29)    SUBJECTIVE:   Chart reviewed since last visit.  Patient seen and examined at bedside for respiratory failure, ESRD  Continues to have intermittent chest pain, dyspnea and nausea.  Awaiting placement in [facility] with HD    Vital Signs Last 24 Hrs  T(C): 36.9 (25 Jul 2019 15:34), Max: 36.9 (25 Jul 2019 00:00)  T(F): 98.4 (25 Jul 2019 15:34), Max: 98.5 (25 Jul 2019 00:00)  HR: 53 (25 Jul 2019 17:34) (53 - 84)  BP: 112/51 (25 Jul 2019 17:34) (99/59 - 139/49)   RR: 19 (25 Jul 2019 15:34) (19 - 20)  SpO2: 96% (25 Jul 2019 15:34) (96% - 97%)    PHYSICAL EXAMINATION  General: Elderly Frisian gentleman lying in bed - NAD  HEENT:  extraocular movements intact, nasal cannula in place  NECK:  Supple  CVS: regular rate and rhythm S1 S2. Reproducible left chest wall tenderness improved  RESP:  Fair air entry except bases. Poor effort  GI:  Soft nondistended nontender BS+  : No suprapubic tenderness  MSK:  FROM, no edema  CNS:  No gross focal or global deficit noted  INTEG:  warm dry skin  PSYCH: Bi Polar,     I&O's Summary    24 Jul 2019 07:01  -  25 Jul 2019 07:00  --------------------------------------------------------  IN: 0 mL / OUT: 2150 mL / NET: -2150 mL                        9.2    9.50  )-----------( 229      ( 24 Jul 2019 08:47 )             30.7     07-24    135  |  94<L>  |  55.0<H>  ----------------------------<  105  4.5   |  25.0  |  5.84<H>    Ca    8.0<L>      24 Jul 2019 08:47  Phos  5.2     07-24    MEDICATIONS  (STANDING):  ALBUTerol/ipratropium for Nebulization 3 milliLiter(s) Nebulizer every 6 hours  amLODIPine   Tablet 5 milliGRAM(s) Oral daily  aspirin  chewable 81 milliGRAM(s) Oral daily  atorvastatin 80 milliGRAM(s) Oral at bedtime  chlorhexidine 2% Cloths 1 Application(s) Topical <User Schedule>  clopidogrel Tablet 75 milliGRAM(s) Oral daily  docusate sodium 100 milliGRAM(s) Oral two times a day  epoetin nguyễn Injectable 89978 Unit(s) IV Push <User Schedule>  heparin  Injectable 5000 Unit(s) SubCutaneous every 8 hours  hydrALAZINE 50 milliGRAM(s) Oral every 8 hours  isosorbide   dinitrate Tablet (ISORDIL) 20 milliGRAM(s) Oral three times a day  levothyroxine 100 MICROGram(s) Oral daily  metoprolol tartrate 25 milliGRAM(s) Oral two times a day  pantoprazole    Tablet 40 milliGRAM(s) Oral before breakfast  predniSONE   Tablet 40 milliGRAM(s) Oral daily  senna 2 Tablet(s) Oral at bedtime  sevelamer carbonate 800 milliGRAM(s) Oral three times a day with meals    MEDICATIONS  (PRN):  acetaminophen   Tablet .. 650 milliGRAM(s) Oral every 6 hours PRN Mild Pain (1 - 3)  benzocaine 15 mG/menthol 3.6 mG Lozenge 1 Lozenge Oral three times a day PRN Sore Throat  bisacodyl Suppository 10 milliGRAM(s) Rectal daily PRN Constipation  diltiazem Injectable 10 milliGRAM(s) IV Push every 4 hours PRN HR>120  diphenhydrAMINE 25 milliGRAM(s) Oral every 6 hours PRN Rash and/or Itching  HYDROmorphone   Tablet 2 milliGRAM(s) Oral three times a day PRN pain,dyspnea  LORazepam     Tablet 0.5 milliGRAM(s) Oral two times a day PRN Anxiety  polyethylene glycol 3350 17 Gram(s) Oral daily PRN Constipation                      The patient is a 79 year old male with a history of ESRD on HD, hypertension, CAD status post CABG HFpEF and lung cancer status radiation with pulmonary fibrosis who presented to the ER with worsening dyspnea. Admitted with acute on chronic hypoxic respiratory failure initially on NIV in MICU.     Acute on chronic hypoxic respiratory failure- multifactorial secondary to pulmonary fibrosis and  acute on chronic diastolic CHF- Currently on nasal cannula  PO prednisone taper  Nebs PRN  Ativan for comfort.    Paroxysmal afib- ILR was to be placed 7/16/19 but cancelled due to dyspnea/Orthopnea. ILR to be placed prior to admission once symptoms improved- BB was discontinued previous hospitalization due to bradycardia now with episodes of VT- BB was resumed. However patient unable to get ILR as cannot lay flat for procedure. Add Dilaudid for comfort.    ESRD On HD- Continued on HD    Hypertension: BP controlled    Chronic diastolic heart failure - Not in acute exacerbation. On isosorbide. Hemodialysis as scheduled. Pleural effusion drained    Hypothyroidism - Synthroid changed to PO     Patient is DNR/ DNI- poor prognosis. Family met with palliative but would like to continue HD and therefore is not a candidate for home hospice.     Disposition -  Patient very fragile and decompensates - Palliative appropriate however patient and family do not want to discontinue HD currently. Will prepare for discharge. Daughter concerned about adequate resources - Informed by [facility] that family unable to care for him at home, want Nursing Home.     Patient may be discharged to Nursing Home once arrangements in place.    Discussed with patient's daughter at bedside.    HD in AM,     D/W Dr. Silverio,

## 2019-07-26 ENCOUNTER — TRANSCRIPTION ENCOUNTER (OUTPATIENT)
Age: 79
End: 2019-07-26

## 2019-07-26 ENCOUNTER — EMERGENCY (EMERGENCY)
Facility: HOSPITAL | Age: 79
LOS: 0 days | Discharge: HOME | End: 2019-07-27
Attending: EMERGENCY MEDICINE | Admitting: EMERGENCY MEDICINE
Payer: MEDICARE

## 2019-07-26 VITALS
DIASTOLIC BLOOD PRESSURE: 47 MMHG | WEIGHT: 115.08 LBS | SYSTOLIC BLOOD PRESSURE: 141 MMHG | HEIGHT: 66 IN | HEART RATE: 76 BPM | RESPIRATION RATE: 18 BRPM | TEMPERATURE: 98 F | OXYGEN SATURATION: 95 %

## 2019-07-26 VITALS — OXYGEN SATURATION: 93 %

## 2019-07-26 DIAGNOSIS — R11.0 NAUSEA: ICD-10-CM

## 2019-07-26 DIAGNOSIS — C34.90 MALIGNANT NEOPLASM OF UNSPECIFIED PART OF UNSPECIFIED BRONCHUS OR LUNG: ICD-10-CM

## 2019-07-26 DIAGNOSIS — R10.11 RIGHT UPPER QUADRANT PAIN: ICD-10-CM

## 2019-07-26 DIAGNOSIS — Z95.1 PRESENCE OF AORTOCORONARY BYPASS GRAFT: Chronic | ICD-10-CM

## 2019-07-26 LAB
ALBUMIN SERPL ELPH-MCNC: 3 G/DL — LOW (ref 3.3–5)
ALP SERPL-CCNC: 49 U/L — SIGNIFICANT CHANGE UP (ref 40–120)
ALT FLD-CCNC: 22 U/L — SIGNIFICANT CHANGE UP (ref 12–78)
ANION GAP SERPL CALC-SCNC: 6 MMOL/L — SIGNIFICANT CHANGE UP (ref 5–17)
AST SERPL-CCNC: 37 U/L — SIGNIFICANT CHANGE UP (ref 15–37)
BASOPHILS # BLD AUTO: 0.01 K/UL — SIGNIFICANT CHANGE UP (ref 0–0.2)
BASOPHILS NFR BLD AUTO: 0.1 % — SIGNIFICANT CHANGE UP (ref 0–2)
BILIRUB SERPL-MCNC: 0.4 MG/DL — SIGNIFICANT CHANGE UP (ref 0.2–1.2)
BUN SERPL-MCNC: 26 MG/DL — HIGH (ref 7–23)
CALCIUM SERPL-MCNC: 7.7 MG/DL — LOW (ref 8.5–10.1)
CHLORIDE SERPL-SCNC: 98 MMOL/L — SIGNIFICANT CHANGE UP (ref 96–108)
CO2 SERPL-SCNC: 30 MMOL/L — SIGNIFICANT CHANGE UP (ref 22–31)
CREAT SERPL-MCNC: 3.55 MG/DL — HIGH (ref 0.5–1.3)
EOSINOPHIL # BLD AUTO: 0.06 K/UL — SIGNIFICANT CHANGE UP (ref 0–0.5)
EOSINOPHIL NFR BLD AUTO: 0.7 % — SIGNIFICANT CHANGE UP (ref 0–6)
GLUCOSE SERPL-MCNC: 98 MG/DL — SIGNIFICANT CHANGE UP (ref 70–99)
HCT VFR BLD CALC: 33.9 % — LOW (ref 39–50)
HCT VFR BLD CALC: 35.2 % — LOW (ref 39–50)
HGB BLD-MCNC: 10.4 G/DL — LOW (ref 13–17)
HGB BLD-MCNC: 9.9 G/DL — LOW (ref 13–17)
IMM GRANULOCYTES NFR BLD AUTO: 0.6 % — SIGNIFICANT CHANGE UP (ref 0–1.5)
LYMPHOCYTES # BLD AUTO: 0.97 K/UL — LOW (ref 1–3.3)
LYMPHOCYTES # BLD AUTO: 10.7 % — LOW (ref 13–44)
MCHC RBC-ENTMCNC: 29.1 PG — SIGNIFICANT CHANGE UP (ref 27–34)
MCHC RBC-ENTMCNC: 29.2 GM/DL — LOW (ref 32–36)
MCHC RBC-ENTMCNC: 29.5 GM/DL — LOW (ref 32–36)
MCHC RBC-ENTMCNC: 30 PG — SIGNIFICANT CHANGE UP (ref 27–34)
MCV RBC AUTO: 101.4 FL — HIGH (ref 80–100)
MCV RBC AUTO: 99.7 FL — SIGNIFICANT CHANGE UP (ref 80–100)
MONOCYTES # BLD AUTO: 0.99 K/UL — HIGH (ref 0–0.9)
MONOCYTES NFR BLD AUTO: 11 % — SIGNIFICANT CHANGE UP (ref 2–14)
NEUTROPHILS # BLD AUTO: 6.96 K/UL — SIGNIFICANT CHANGE UP (ref 1.8–7.4)
NEUTROPHILS NFR BLD AUTO: 76.9 % — SIGNIFICANT CHANGE UP (ref 43–77)
PLATELET # BLD AUTO: 221 K/UL — SIGNIFICANT CHANGE UP (ref 150–400)
PLATELET # BLD AUTO: 228 K/UL — SIGNIFICANT CHANGE UP (ref 150–400)
POTASSIUM SERPL-MCNC: 4 MMOL/L — SIGNIFICANT CHANGE UP (ref 3.5–5.3)
POTASSIUM SERPL-SCNC: 4 MMOL/L — SIGNIFICANT CHANGE UP (ref 3.5–5.3)
PROT SERPL-MCNC: 6.9 GM/DL — SIGNIFICANT CHANGE UP (ref 6–8.3)
RBC # BLD: 3.4 M/UL — LOW (ref 4.2–5.8)
RBC # BLD: 3.47 M/UL — LOW (ref 4.2–5.8)
RBC # FLD: 16.7 % — HIGH (ref 10.3–14.5)
RBC # FLD: 17.2 % — HIGH (ref 10.3–14.5)
SODIUM SERPL-SCNC: 134 MMOL/L — LOW (ref 135–145)
WBC # BLD: 8.21 K/UL — SIGNIFICANT CHANGE UP (ref 3.8–10.5)
WBC # BLD: 9.04 K/UL — SIGNIFICANT CHANGE UP (ref 3.8–10.5)
WBC # FLD AUTO: 8.21 K/UL — SIGNIFICANT CHANGE UP (ref 3.8–10.5)
WBC # FLD AUTO: 9.04 K/UL — SIGNIFICANT CHANGE UP (ref 3.8–10.5)

## 2019-07-26 PROCEDURE — 82550 ASSAY OF CK (CPK): CPT

## 2019-07-26 PROCEDURE — 83986 ASSAY PH BODY FLUID NOS: CPT

## 2019-07-26 PROCEDURE — 90937 HEMODIALYSIS REPEATED EVAL: CPT

## 2019-07-26 PROCEDURE — 87015 SPECIMEN INFECT AGNT CONCNTJ: CPT

## 2019-07-26 PROCEDURE — 71045 X-RAY EXAM CHEST 1 VIEW: CPT

## 2019-07-26 PROCEDURE — 99291 CRITICAL CARE FIRST HOUR: CPT

## 2019-07-26 PROCEDURE — 84100 ASSAY OF PHOSPHORUS: CPT

## 2019-07-26 PROCEDURE — 99292 CRITICAL CARE ADDL 30 MIN: CPT

## 2019-07-26 PROCEDURE — 87206 SMEAR FLUORESCENT/ACID STAI: CPT

## 2019-07-26 PROCEDURE — 82945 GLUCOSE OTHER FLUID: CPT

## 2019-07-26 PROCEDURE — 84132 ASSAY OF SERUM POTASSIUM: CPT

## 2019-07-26 PROCEDURE — 71046 X-RAY EXAM CHEST 2 VIEWS: CPT

## 2019-07-26 PROCEDURE — 93306 TTE W/DOPPLER COMPLETE: CPT

## 2019-07-26 PROCEDURE — 87640 STAPH A DNA AMP PROBE: CPT

## 2019-07-26 PROCEDURE — 84157 ASSAY OF PROTEIN OTHER: CPT

## 2019-07-26 PROCEDURE — 84484 ASSAY OF TROPONIN QUANT: CPT

## 2019-07-26 PROCEDURE — 85014 HEMATOCRIT: CPT

## 2019-07-26 PROCEDURE — 85027 COMPLETE CBC AUTOMATED: CPT

## 2019-07-26 PROCEDURE — 87205 SMEAR GRAM STAIN: CPT

## 2019-07-26 PROCEDURE — 87070 CULTURE OTHR SPECIMN AEROBIC: CPT

## 2019-07-26 PROCEDURE — 87040 BLOOD CULTURE FOR BACTERIA: CPT

## 2019-07-26 PROCEDURE — 89051 BODY FLUID CELL COUNT: CPT

## 2019-07-26 PROCEDURE — 80053 COMPREHEN METABOLIC PANEL: CPT

## 2019-07-26 PROCEDURE — 97163 PT EVAL HIGH COMPLEX 45 MIN: CPT

## 2019-07-26 PROCEDURE — 99285 EMERGENCY DEPT VISIT HI MDM: CPT | Mod: 25

## 2019-07-26 PROCEDURE — 87798 DETECT AGENT NOS DNA AMP: CPT

## 2019-07-26 PROCEDURE — 87486 CHLMYD PNEUM DNA AMP PROBE: CPT

## 2019-07-26 PROCEDURE — 99261: CPT

## 2019-07-26 PROCEDURE — 85018 HEMOGLOBIN: CPT

## 2019-07-26 PROCEDURE — 94640 AIRWAY INHALATION TREATMENT: CPT

## 2019-07-26 PROCEDURE — 83615 LACTATE (LD) (LDH) ENZYME: CPT

## 2019-07-26 PROCEDURE — 82042 OTHER SOURCE ALBUMIN QUAN EA: CPT

## 2019-07-26 PROCEDURE — 83735 ASSAY OF MAGNESIUM: CPT

## 2019-07-26 PROCEDURE — 87116 MYCOBACTERIA CULTURE: CPT

## 2019-07-26 PROCEDURE — 83605 ASSAY OF LACTIC ACID: CPT

## 2019-07-26 PROCEDURE — 97116 GAIT TRAINING THERAPY: CPT

## 2019-07-26 PROCEDURE — 88305 TISSUE EXAM BY PATHOLOGIST: CPT

## 2019-07-26 PROCEDURE — 97530 THERAPEUTIC ACTIVITIES: CPT

## 2019-07-26 PROCEDURE — 94660 CPAP INITIATION&MGMT: CPT

## 2019-07-26 PROCEDURE — 93005 ELECTROCARDIOGRAM TRACING: CPT

## 2019-07-26 PROCEDURE — 87633 RESP VIRUS 12-25 TARGETS: CPT

## 2019-07-26 PROCEDURE — 80048 BASIC METABOLIC PNL TOTAL CA: CPT

## 2019-07-26 PROCEDURE — 87186 SC STD MICRODIL/AGAR DIL: CPT

## 2019-07-26 PROCEDURE — 82803 BLOOD GASES ANY COMBINATION: CPT

## 2019-07-26 PROCEDURE — 87581 M.PNEUMON DNA AMP PROBE: CPT

## 2019-07-26 PROCEDURE — 84145 PROCALCITONIN (PCT): CPT

## 2019-07-26 PROCEDURE — 99239 HOSP IP/OBS DSCHRG MGMT >30: CPT

## 2019-07-26 PROCEDURE — 36415 COLL VENOUS BLD VENIPUNCTURE: CPT

## 2019-07-26 PROCEDURE — 87075 CULTR BACTERIA EXCEPT BLOOD: CPT

## 2019-07-26 PROCEDURE — 83880 ASSAY OF NATRIURETIC PEPTIDE: CPT

## 2019-07-26 PROCEDURE — 88112 CYTOPATH CELL ENHANCE TECH: CPT

## 2019-07-26 RX ORDER — HYDROMORPHONE HYDROCHLORIDE 2 MG/ML
1 INJECTION INTRAMUSCULAR; INTRAVENOUS; SUBCUTANEOUS ONCE
Refills: 0 | Status: DISCONTINUED | OUTPATIENT
Start: 2019-07-26 | End: 2019-07-26

## 2019-07-26 RX ORDER — SODIUM CHLORIDE 9 MG/ML
1000 INJECTION INTRAMUSCULAR; INTRAVENOUS; SUBCUTANEOUS ONCE
Refills: 0 | Status: DISCONTINUED | OUTPATIENT
Start: 2019-07-26 | End: 2019-07-26

## 2019-07-26 RX ADMIN — HEPARIN SODIUM 5000 UNIT(S): 5000 INJECTION INTRAVENOUS; SUBCUTANEOUS at 06:22

## 2019-07-26 RX ADMIN — ISOSORBIDE DINITRATE 20 MILLIGRAM(S): 5 TABLET ORAL at 06:22

## 2019-07-26 RX ADMIN — AMLODIPINE BESYLATE 5 MILLIGRAM(S): 2.5 TABLET ORAL at 06:22

## 2019-07-26 RX ADMIN — Medication 3 MILLILITER(S): at 14:26

## 2019-07-26 RX ADMIN — HYDROMORPHONE HYDROCHLORIDE 1 MILLIGRAM(S): 2 INJECTION INTRAMUSCULAR; INTRAVENOUS; SUBCUTANEOUS at 23:55

## 2019-07-26 RX ADMIN — Medication 100 MILLIGRAM(S): at 06:22

## 2019-07-26 RX ADMIN — HYDROMORPHONE HYDROCHLORIDE 2 MILLIGRAM(S): 2 INJECTION INTRAMUSCULAR; INTRAVENOUS; SUBCUTANEOUS at 08:57

## 2019-07-26 RX ADMIN — PANTOPRAZOLE SODIUM 40 MILLIGRAM(S): 20 TABLET, DELAYED RELEASE ORAL at 06:22

## 2019-07-26 RX ADMIN — Medication 50 MILLIGRAM(S): at 14:46

## 2019-07-26 RX ADMIN — ERYTHROPOIETIN 10000 UNIT(S): 10000 INJECTION, SOLUTION INTRAVENOUS; SUBCUTANEOUS at 11:07

## 2019-07-26 RX ADMIN — ISOSORBIDE DINITRATE 20 MILLIGRAM(S): 5 TABLET ORAL at 14:46

## 2019-07-26 RX ADMIN — Medication 50 MILLIGRAM(S): at 06:22

## 2019-07-26 RX ADMIN — Medication 40 MILLIGRAM(S): at 06:23

## 2019-07-26 RX ADMIN — CLOPIDOGREL BISULFATE 75 MILLIGRAM(S): 75 TABLET, FILM COATED ORAL at 14:45

## 2019-07-26 RX ADMIN — CHLORHEXIDINE GLUCONATE 1 APPLICATION(S): 213 SOLUTION TOPICAL at 06:21

## 2019-07-26 RX ADMIN — Medication 100 MICROGRAM(S): at 06:22

## 2019-07-26 RX ADMIN — SEVELAMER CARBONATE 800 MILLIGRAM(S): 2400 POWDER, FOR SUSPENSION ORAL at 08:57

## 2019-07-26 RX ADMIN — Medication 25 MILLIGRAM(S): at 01:02

## 2019-07-26 RX ADMIN — Medication 81 MILLIGRAM(S): at 14:44

## 2019-07-26 RX ADMIN — Medication 0.5 MILLIGRAM(S): at 01:01

## 2019-07-26 RX ADMIN — HYDROMORPHONE HYDROCHLORIDE 1 MILLIGRAM(S): 2 INJECTION INTRAMUSCULAR; INTRAVENOUS; SUBCUTANEOUS at 23:28

## 2019-07-26 RX ADMIN — SEVELAMER CARBONATE 800 MILLIGRAM(S): 2400 POWDER, FOR SUSPENSION ORAL at 14:44

## 2019-07-26 NOTE — PROGRESS NOTE ADULT - REASON FOR ADMISSION
Acute hypoxemic respiratory failure

## 2019-07-26 NOTE — ED ADULT NURSE NOTE - OBJECTIVE STATEMENT
pt presents to ED c/o L. rib pain x "a long time". family reports pt has d/c'd treatment for lung CA 2yrs ago and his doctors explained the pain was from the CA but pt reports the medication they gave hime at [facility], PO Dilaudid, was not strong enough.

## 2019-07-26 NOTE — CHART NOTE - NSCHARTNOTESELECT_GEN_ALL_CORE
Event Note
Nutrition Services
Palliative/Event Note
Electrophysiology/Event Note
Event Note
Event Note
Nutrition Services
Nutrition Services

## 2019-07-26 NOTE — ED ADULT NURSE NOTE - NSIMPLEMENTINTERV_GEN_ALL_ED
Implemented All Fall Risk Interventions:  [place] to call system. Call bell, personal items and telephone within reach. Instruct patient to call for assistance. Room bathroom lighting operational. Non-slip footwear when patient is off stretcher. Physically safe environment: no spills, clutter or unnecessary equipment. Stretcher in lowest position, wheels locked, appropriate side rails in place. Provide visual cue, wrist band, yellow gown, etc. Monitor gait and stability. Monitor for mental status changes and reorient to person, place, and time. Review medications for side effects contributing to fall risk. Reinforce activity limits and safety measures with patient and family.

## 2019-07-26 NOTE — DISCHARGE NOTE NURSING/CASE MANAGEMENT/SOCIAL WORK - NSDCDPATPORTLINK_GEN_ALL_CORE
You can access the [facility], offered by [facility], by registering with the following website: [email or web address]

## 2019-07-26 NOTE — ED ADULT NURSE NOTE - CAS EDN DISCHARGE ASSESSMENT
Alert and oriented to person, place and time/Patient baseline mental status/Awake/Symptoms improved/No adverse reaction to first time med in ED

## 2019-07-26 NOTE — ED ADULT NURSE NOTE - CHIEF COMPLAINT QUOTE
pt presents to ED with complaints of LUQ pain. per EMS, pt AMAed from [place] re[facility] earlier today after recent D/C from [facility].

## 2019-07-26 NOTE — PROGRESS NOTE ADULT - SUBJECTIVE AND OBJECTIVE BOX
[facility] DIVISION OF KIDNEY DISEASES AND HYPERTENSION -- FOLLOW UP NOTE  --------------------------------------------------------------------------------  Chief Complaint:  ESRD on HD    24 hour events/subjective:  Pt seen and examined  NAD  HD today      PAST HISTORY  --------------------------------------------------------------------------------  No significant changes to PMH, PSH, FHx, SHx, unless otherwise noted    ALLERGIES & MEDICATIONS  --------------------------------------------------------------------------------  Allergies    No Known Allergies    Intolerances      Standing Inpatient Medications  ALBUTerol/ipratropium for Nebulization 3 milliLiter(s) Nebulizer every 6 hours  amLODIPine   Tablet 5 milliGRAM(s) Oral daily  aspirin  chewable 81 milliGRAM(s) Oral daily  atorvastatin 80 milliGRAM(s) Oral at bedtime  chlorhexidine 2% Cloths 1 Application(s) Topical <User Schedule>  clopidogrel Tablet 75 milliGRAM(s) Oral daily  docusate sodium 100 milliGRAM(s) Oral two times a day  epoetin nguyễn Injectable 28571 Unit(s) IV Push <User Schedule>  heparin  Injectable 5000 Unit(s) SubCutaneous every 8 hours  hydrALAZINE 50 milliGRAM(s) Oral every 8 hours  isosorbide   dinitrate Tablet (ISORDIL) 20 milliGRAM(s) Oral three times a day  levothyroxine 100 MICROGram(s) Oral daily  metoprolol tartrate 25 milliGRAM(s) Oral two times a day  pantoprazole    Tablet 40 milliGRAM(s) Oral before breakfast  predniSONE   Tablet 40 milliGRAM(s) Oral daily  senna 2 Tablet(s) Oral at bedtime  sevelamer carbonate 800 milliGRAM(s) Oral three times a day with meals    PRN Inpatient Medications  acetaminophen   Tablet .. 650 milliGRAM(s) Oral every 6 hours PRN  benzocaine 15 mG/menthol 3.6 mG Lozenge 1 Lozenge Oral three times a day PRN  bisacodyl Suppository 10 milliGRAM(s) Rectal daily PRN  diltiazem Injectable 10 milliGRAM(s) IV Push every 4 hours PRN  diphenhydrAMINE 25 milliGRAM(s) Oral every 6 hours PRN  HYDROmorphone   Tablet 2 milliGRAM(s) Oral three times a day PRN  LORazepam     Tablet 0.5 milliGRAM(s) Oral two times a day PRN  polyethylene glycol 3350 17 Gram(s) Oral daily PRN      REVIEW OF SYSTEMS  --------------------------------------------------------------------------------  Gen: No weight changes, fatigue, fevers/chills, weakness  Skin: No rashes  Head/Eyes/Ears/Mouth: No headache; Normal hearing; Normal vision w/o blurriness; No sinus pain/discomfort, sore throat  Respiratory: No dyspnea, cough, wheezing, hemoptysis  CV: No chest pain, PND, orthopnea  GI: No abdominal pain, diarrhea, constipation, nausea, vomiting, melena, hematochezia  : No increased frequency, dysuria, hematuria, nocturia  MSK: No joint pain/swelling; no back pain; no edema  Neuro: No dizziness/lightheadedness, weakness, seizures, numbness, tingling  Heme: No easy bruising or bleeding  Endo: No heat/cold intolerance  Psych: No significant nervousness, anxiety, stress, depression    All other systems were reviewed and are negative, except as noted.    VITALS/PHYSICAL EXAM  --------------------------------------------------------------------------------  T(C): 36.6 (07-26-19 @ 09:12), Max: 36.9 (07-25-19 @ 15:34)  HR: 61 (07-26-19 @ 09:12) (51 - 83)  BP: 133/57 (07-26-19 @ 09:12) (107/36 - 140/60)  RR: 18 (07-26-19 @ 09:12) (18 - 19)  SpO2: 95% (07-26-19 @ 09:12) (95% - 100%)  Wt(kg): --        Physical Exam:  	Gen: NAD, pale, ill-appearing  	HEENT: PERRL, supple neck, clear oropharynx  	Pulm: CTA B/L  	CV: RRR, S1S2; no rub  	Back: No spinal or CVA tenderness; no sacral edema  	Abd: +BS, soft, nontender/nondistended  	: No suprapubic tenderness  	UE: Warm, FROM, no clubbing, intact strength; no edema; no asterixis  	LE: Warm, FROM, no clubbing, intact strength; no edema  	Neuro: No focal deficits, intact gait  	Psych: Normal affect and mood  	Skin: Warm, without rashes  	Vascular access:  CVC    LABS/STUDIES  --------------------------------------------------------------------------------              9.9    8.21  >-----------<  228      [07-26-19 @ 00:07]              33.9                 Creatinine Trend:  SCr 5.84 [07-24 @ 08:47]  SCr 6.49 [07-22 @ 08:02]  SCr 6.16 [07-19 @ 14:32]  SCr 6.21 [07-17 @ 07:16]  SCr 7.34 [07-14 @ 09:12]        Iron 58, TIBC 256, %sat 23      [06-19-19 @ 08:42]  Ferritin 812      [06-19-19 @ 08:42]  PTH -- (Ca 8.1)      [07-01-19 @ 13:24]   83  TSH 0.89      [05-23-19 @ 06:28]  Lipid: chol 159, , HDL 32, LDL 94      [01-29-19 @ 11:39]    HBsAb <3.0      [06-27-19 @ 19:39]  HBsAb Nonreact      [06-27-19 @ 19:39]  HBsAg Reactive      [06-27-19 @ 19:39]  HBcAb Nonreact      [06-27-19 @ 19:39]  HCV 0.10, Nonreact      [06-27-19 @ 19:39]

## 2019-07-26 NOTE — CHART NOTE - NSCHARTNOTEFT_GEN_A_CORE
ILR insertion requested by cardiology team for pAF and NSVT in patient with ESRD and end-stage ILD.   Palliative care on board, but family still pressing for aggressive care.     Pt currently unable to lie flat due to chronic, severe SOB. He has needed IV morphine prn in the past for SOB- could benefit now but precludes ability to obtain consent directly from patient.   Family not at bedside at time of evaluation.     He is not a candidate for implantation in his current state. Needs further medical optimization to ensure he can remain supine for at least 30 min comfortably prior to reconsideration.    Please call back once this has been achieved and documented and will then proceed with procedure.
Medicine PA- *Medicine PA Note*    Complaints: Cd. to see pt. c/o dizziness/lightheadedness.     Subjective: Pt. admitted w/acute hypoxemic respiratory failure, hx. ESRD on HD, CAD, s/p CABG, lung ca. He denies HA, visual changes, no N/V, just feels "tired", tolerating PO drinks. As per nurse/pt. he's had some bleeding at site of heparin injections abdomen. Having dialysis tomorrow.    Vital Signs Last 24 Hrs  T(C): 36.7 (25 Jul 2019 20:19), Max: 36.9 (25 Jul 2019 00:00)  T(F): 98 (25 Jul 2019 20:19), Max: 98.5 (25 Jul 2019 00:00)  HR: 80 (25 Jul 2019 20:55) (53 - 84)  BP: 126/54 (25 Jul 2019 20:19) (99/59 - 126/54)  BP(mean): --  RR: 19 (25 Jul 2019 20:19) (19 - 20)  SpO2: 96% (25 Jul 2019 20:55) (96% - 99%)    PHYSICAL EXAM:  GENERAL: Pt. in NAD, A+O x3  HEENT:  PERRLA, EOMs intact, normal   NECK: Supple, No JVD  CHEST/LUNG: Clear to auscultation bilaterally; No rales, rhonchi or wheezing  HEART: S1S2 ausc,  No murmur   ABDOMEN: +small area bruise left side (s/p heparin, non-indurated) Soft, Nontender  EXTREMITIES: FROM, 5/5 muscle strength bilat., No edema  NEURO: No Focal deficits, sensory and motor intact    Assessment/Plan:   1) Dizziness- Possibly 2* Anemia, dehydration- stat CBC and encourage PO fluids  2 Mild bleeding abdomen s/p heparin injection- monitor site  Call PA if any status changes
PA NOTE-MED    Called by nurse due to pt c/o constipation x approx 5 days  Pt on colace BID and senna BID    Pt denies:  pain, nausea, vomiting   states he's passing gas   Pt's family states that they gave Pt two senna tabs themselves (from home) a few minutes ago  stating that 4 senna are better than 2   I instructed Pt's family that they are not allowed to administer ANY medications (over the counter or prescription) to Pt at any time   I also informed Nurse of event     T(C): 36.7 (19 Jul 2019 20:15), Max: 36.8 (19 Jul 2019 00:18)  T(F): 98 (19 Jul 2019 20:15), Max: 98.3 (19 Jul 2019 00:18)  HR: 61 (19 Jul 2019 21:23) (50 - 115)  BP: 131/64 (19 Jul 2019 21:23) (106/40 - 131/64)  RR: 18 (19 Jul 2019 20:15) (18 - 18)  SpO2: 98% (19 Jul 2019 21:23) (94% - 99%)    General: WD sl cachetic male, comfortable  NAD sitting up in bed   Cardiac: RRR  Lungs: CTA B/L  ABD: ND/NT no guarding no rigidity + hyperactive BS x 4 Q    A/P- eval Pt c/o constipation x 5 days  Pt on senna bid/colace bid  family gave 2 extra senna  without knowledge of staff   RX'd Lactulose 10G Po x 1 now (before extra senna given)  continue Monitoring   call PA if any changes in Pt status
Source: Patient [ ]  Family [ ]   other [x ]EMR    Current Diet: renal replacement with Nepro TID  The patient is a 79 year old male with a history of ESRD on HD, hypertension, CAD status post CABG and lung cancer status radiation with pulmonary fibrosis who presented to the ER with worsening dyspnea.        PO intake:  < 50% [ ]   50-75%  [ ]   %  [ ]  other :po intake varies but mostly poor    Source for PO intake [ ] Patient [ ] family [x ] chart [ ] staff [ ] other    Enteral /Parenteral Nutrition:     Current Weight: 7/11 47.9kg, 7/15 49kg, 7/19 52.9kg, 7/24 46kg        % Weight Change     Pertinent Medications: MEDICATIONS  (STANDING):  ALBUTerol/ipratropium for Nebulization 3 milliLiter(s) Nebulizer every 6 hours  amLODIPine   Tablet 5 milliGRAM(s) Oral daily  aspirin  chewable 81 milliGRAM(s) Oral daily  atorvastatin 80 milliGRAM(s) Oral at bedtime  chlorhexidine 2% Cloths 1 Application(s) Topical <User Schedule>  clopidogrel Tablet 75 milliGRAM(s) Oral daily  docusate sodium 100 milliGRAM(s) Oral two times a day  epoetin nguyễn Injectable 35746 Unit(s) IV Push <User Schedule>  heparin  Injectable 5000 Unit(s) SubCutaneous every 8 hours  hydrALAZINE 50 milliGRAM(s) Oral every 8 hours  isosorbide   dinitrate Tablet (ISORDIL) 20 milliGRAM(s) Oral three times a day  levothyroxine 100 MICROGram(s) Oral daily  metoprolol tartrate 25 milliGRAM(s) Oral two times a day  pantoprazole    Tablet 40 milliGRAM(s) Oral before breakfast  predniSONE   Tablet 40 milliGRAM(s) Oral daily  senna 2 Tablet(s) Oral at bedtime  sevelamer carbonate 800 milliGRAM(s) Oral three times a day with meals    MEDICATIONS  (PRN):  acetaminophen   Tablet .. 650 milliGRAM(s) Oral every 6 hours PRN Mild Pain (1 - 3)  benzocaine 15 mG/menthol 3.6 mG Lozenge 1 Lozenge Oral three times a day PRN Sore Throat  bisacodyl Suppository 10 milliGRAM(s) Rectal daily PRN Constipation  diltiazem Injectable 10 milliGRAM(s) IV Push every 4 hours PRN HR>120  diphenhydrAMINE 25 milliGRAM(s) Oral every 6 hours PRN Rash and/or Itching  HYDROmorphone   Tablet 2 milliGRAM(s) Oral three times a day PRN pain,dyspnea  LORazepam     Tablet 0.5 milliGRAM(s) Oral two times a day PRN Anxiety  polyethylene glycol 3350 17 Gram(s) Oral daily PRN Constipation    Pertinent Labs: CBC Full  -  ( 24 Jul 2019 08:47 )  WBC Count : 9.50 K/uL  RBC Count : 3.11 M/uL  Hemoglobin : 9.2 g/dL  Hematocrit : 30.7 %  Platelet Count - Automated : 229 K/uL  Mean Cell Volume : 98.7 fl  Mean Cell Hemoglobin : 29.6 pg  Mean Cell Hemoglobin Concentration : 30.0 gm/dL  Auto Neutrophil # : x  Auto Lymphocyte # : x  Auto Monocyte # : x  Auto Eosinophil # : x  Auto Basophil # : x  Auto Neutrophil % : x  Auto Lymphocyte % : x  Auto Monocyte % : x  Auto Eosinophil % : x  Auto Basophil % : x      07-24 Na135 mmol/L Glu 105 mg/dL K+ 4.5 mmol/L Cr  5.84 mg/dL<H> BUN 55.0 mg/dL<H> Phos 5.2 mg/dL<H> Alb n/a   PAB n/a           Skin:     Nutrition focused physical exam previously conducted - found signs of malnutrition [ ]absent [x ]present    Subcutaneous fat loss: [x ] Orbital fat pads region, [x ]Buccal fat region, [x ]Triceps region,  [ ]Ribs region    Muscle wasting: [x ]Temples region, [x ]Clavicle region, [ x]Shoulder region, [ ]Scapula region, [ ]Interosseous region,  [ ]thigh region, [ ]Calf region    Estimated Needs:   [x ] no change since previous assessment  [ ] recalculated:     Current Nutrition Diagnosis: Malnutrition severe chronic related to inadequate energy intake in setting of ESRD, lung CA  as evidenced by <75% intake >1 mo, Moderate fat/muscle depletion, 30# (20.68%) wt loss in 9 months. GOC  noted. As per family they want to continue with HD. Pt with varied po intake but mostly poor.       Recommendations: Continue Nepro BID      Monitoring and Evaluation:   [ x] PO intake [x ] Tolerance to diet prescription [X] Weights  [X] Follow up per protocol [X] Labs:
Source: Patient [ ]  Family [ ]   other [x ]EMR, nursing    Current Diet: renal replacement. Pt on HD    PO intake:  < 50% [ ]   50-75%  [ ]   %  [ ]  other : varies from poor to good    Source for PO intake [ ] Patient [ ] family [x ] chart [ x] staff [ ] other    Enteral /Parenteral Nutrition:     Current Weight: 7/11 47.9kg, 7/15 49kg, 7/19 52.9kg    % Weight Change     Pertinent Medications: MEDICATIONS  (STANDING):  ALBUTerol/ipratropium for Nebulization 3 milliLiter(s) Nebulizer every 6 hours  amLODIPine   Tablet 5 milliGRAM(s) Oral daily  aspirin  chewable 81 milliGRAM(s) Oral daily  atorvastatin 80 milliGRAM(s) Oral at bedtime  chlorhexidine 2% Cloths 1 Application(s) Topical <User Schedule>  clopidogrel Tablet 75 milliGRAM(s) Oral daily  docusate sodium 100 milliGRAM(s) Oral two times a day  epoetin nguyễn Injectable 88598 Unit(s) IV Push <User Schedule>  heparin  Injectable 5000 Unit(s) SubCutaneous every 8 hours  hydrALAZINE 50 milliGRAM(s) Oral every 8 hours  isosorbide   dinitrate Tablet (ISORDIL) 20 milliGRAM(s) Oral three times a day  levothyroxine 100 MICROGram(s) Oral daily  metoprolol tartrate 25 milliGRAM(s) Oral two times a day  pantoprazole    Tablet 40 milliGRAM(s) Oral before breakfast  predniSONE   Tablet 50 milliGRAM(s) Oral daily  senna 2 Tablet(s) Oral at bedtime  sevelamer carbonate 800 milliGRAM(s) Oral three times a day with meals    MEDICATIONS  (PRN):  diltiazem Injectable 10 milliGRAM(s) IV Push every 4 hours PRN HR>120  diphenhydrAMINE 25 milliGRAM(s) Oral every 6 hours PRN Rash and/or Itching  HYDROcodone/homatropine Syrup 5 milliLiter(s) Oral every 6 hours PRN Cough  polyethylene glycol 3350 17 Gram(s) Oral daily PRN Constipation    Pertinent Labs:         Skin:     Nutrition focused physical exam previously conducted - found signs of malnutrition [ ]absent x ]present    Subcutaneous fat loss: [x ] Orbital fat pads region, [x ]Buccal fat region, [x ]Triceps region,  [ ]Ribs region    Muscle wasting: [x ]Temples region, [x]Clavicle region, [x ]Shoulder region, [ ]Scapula region, [ ]Interosseous region,  [ ]thigh region, [ ]Calf region    Estimated Needs:   [x ] no change since previous assessment  [ ] recalculated:     Current Nutrition Diagnosis: Malnutrition severe chronic related to inadequate energy intake in setting of ESRD, lung CA  as evidenced by <75% intake >1 mo, Moderate fat/muscle depletion, 30# (20.68%) wt loss in 9 months. GOC  noted. As per family they want to continue with HD. Pt with improved appetite yesterday.       Recommendations: Nepro BID    Monitoring and Evaluation:   [x ] PO intake [x ] Tolerance to diet prescription [X] Weights  [X] Follow up per protocol [X] Labs:
Chart reviewed. Pt at dialysis. D/W RN, no acute events overnight. Received call from Boris CATALAN, this morning who verbalized talking to family and pt this weekend and they wanted to continue hemodialysis, which unfortunately precludes him from hospice eligibility. Family considering taking patient back home but requesting larger oxygen tank and aide/homecare support. Family will be here later this afternoon; advised them to follow up with unit SW/CM on their arrival. Recommend advance illness referral if plan is for home.  Updated CM/SW Christina and Jesika and they will follow up with family to coordinate discharge planning. Spoke with hospitalist Dr. Silverio and made aware of above. GOC established and advance directives in place (DNR/I, MOLST completed by ICU team earlier this admission). Will sign off, please reconsult if goc should change or if we can be of additional assistance.
Medicine PA- Pt. resting comfortably, no new complaints, VSS.                     9.9    8.21  )-----------( 228      ( 26 Jul 2019 00:07 )             33.9
Upon Nutritional Assessment by the Registered Dietitian your patient was determined to meet criteria / has evidence of the following diagnosis/diagnoses:          [ ]  Mild Protein Calorie Malnutrition        [ ]  Moderate Protein Calorie Malnutrition        [x ] Severe Protein Calorie Malnutrition  chronic        [ ] Unspecified Protein Calorie Malnutrition        [ ] Underweight / BMI <19        [ ] Morbid Obesity / BMI > 40      Findings as based on:  •  Comprehensive nutrition assessment and consultation  •  Calorie counts (nutrient intake analysis)  •  Food acceptance and intake status from observations by staff  •  Follow up  •  Patient education  •  Intervention secondary to interdisciplinary rounds  •   concerns      Treatment:    The following diet has been recommended: advance diet to renal replacement, dash with Nepro TID      PROVIDER Section:     By signing this assessment you are acknowledging and agree with the diagnosis/diagnoses assigned by the Registered Dietitian    Comments:

## 2019-07-26 NOTE — ED ADULT TRIAGE NOTE - CHIEF COMPLAINT QUOTE
pt presents to ED with complaints of LUQ pain. per EMS, pt AMAed from [facility] earlier today after recent D/C from [facility].

## 2019-07-27 VITALS
OXYGEN SATURATION: 97 % | SYSTOLIC BLOOD PRESSURE: 145 MMHG | TEMPERATURE: 98 F | DIASTOLIC BLOOD PRESSURE: 51 MMHG | HEART RATE: 69 BPM | RESPIRATION RATE: 18 BRPM

## 2019-07-27 RX ORDER — ONDANSETRON 8 MG/1
4 TABLET, FILM COATED ORAL ONCE
Refills: 0 | Status: DISCONTINUED | OUTPATIENT
Start: 2019-07-27 | End: 2019-07-27

## 2019-07-27 RX ORDER — MORPHINE SULFATE 50 MG/1
4 CAPSULE, EXTENDED RELEASE ORAL ONCE
Refills: 0 | Status: DISCONTINUED | OUTPATIENT
Start: 2019-07-27 | End: 2019-07-27

## 2019-07-27 RX ORDER — ONDANSETRON 8 MG/1
8 TABLET, FILM COATED ORAL ONCE
Refills: 0 | Status: COMPLETED | OUTPATIENT
Start: 2019-07-27 | End: 2019-07-27

## 2019-07-27 RX ORDER — ONDANSETRON 8 MG/1
4 TABLET, FILM COATED ORAL ONCE
Refills: 0 | Status: COMPLETED | OUTPATIENT
Start: 2019-07-27 | End: 2019-07-27

## 2019-07-27 RX ADMIN — MORPHINE SULFATE 4 MILLIGRAM(S): 50 CAPSULE, EXTENDED RELEASE ORAL at 00:54

## 2019-07-27 RX ADMIN — MORPHINE SULFATE 4 MILLIGRAM(S): 50 CAPSULE, EXTENDED RELEASE ORAL at 01:20

## 2019-07-27 RX ADMIN — ONDANSETRON 8 MILLIGRAM(S): 8 TABLET, FILM COATED ORAL at 02:55

## 2019-07-27 RX ADMIN — ONDANSETRON 4 MILLIGRAM(S): 8 TABLET, FILM COATED ORAL at 13:44

## 2019-07-27 NOTE — ED ADULT NURSE REASSESSMENT NOTE - NS ED NURSE REASSESS COMMENT FT1
pt a/ox3, resting In bed. pt denies complaints at this time. pt awaiting social work consult this AM. pt appear to be in nad. pt safety maintained, will CTM. menu provided to pt for breakfast.

## 2019-07-27 NOTE — ED PROVIDER NOTE - PROGRESS NOTE DETAILS
family states took pt out of [facility] AMA and requesting to speak with SW in AM.   will hold for SW family states pt with h/o kidney failure but under no treatment vss, no fever, eating breakfast. awaiting sw consult for possible new placement AILYN Kingston DO pt family here, they would like to take him home via ambulance. spoke to  who arranged this. pt with nausea will give zofran. transfer to come appro 230 to 3pm AILYN Kingston DO

## 2019-07-27 NOTE — DISCHARGE NOTE NURSING/CASE MANAGEMENT/SOCIAL WORK - NSDCFUADDAPPT_GEN_ALL_CORE_FT
[address] [facility]  Monday 10AM [address]  Monday 10AM  [phone] Routine  care and anticipatory guidance

## 2019-07-27 NOTE — DISCHARGE NOTE NURSING/CASE MANAGEMENT/SOCIAL WORK - NSDCDPATPORTLINK_GEN_ALL_CORE
You can access the [facility] Patient Portal, offered by [facility], by registering with the following website: [email or web address]

## 2019-07-27 NOTE — ED PROVIDER NOTE - OBJECTIVE STATEMENT
80 y/o male in ED with lung CA c/o chronic LUQ pain due t his lung CA x months.   states d/c from [facility] today to [facility] for same.    states not on chemo.    states tonight pain increase and given Dilaudid PO at [facility] with no relief so decided to come to ED for pain control.   pt denies any fever, HA, cp, sob, n/v/d.    tolerating PO.

## 2019-08-31 LAB
CULTURE RESULTS: SIGNIFICANT CHANGE UP
SPECIMEN SOURCE: SIGNIFICANT CHANGE UP

## 2019-09-06 NOTE — SWALLOW VFSS/MBS ASSESSMENT ADULT - RESIDUE IN VALLECULAE
Mammogram and labs look good; keep f/u   Cleared with liquid wash/Mild Cleared with subsequent swallow and liquid wash/Trace cleared with subsequent swallows/Trace

## 2019-10-01 NOTE — PROGRESS NOTE ADULT - PROBLEM/PLAN-5
DISPLAY PLAN FREE TEXT
DISPLAY PLAN FREE TEXT
Glycopyrrolate Counseling:  I discussed with the patient the risks of glycopyrrolate including but not limited to skin rash, drowsiness, dry mouth, difficulty urinating, and blurred vision.

## 2019-11-10 NOTE — PROVIDER CONTACT NOTE (CRITICAL VALUE NOTIFICATION) - TEST AND RESULT REPORTED:
trop 0.32, creatinine 3.4
Bld cultures gram + cocci in clusters 4/19
vancomycin random 36.3
trop 0.21
Single Mechanical/Accidental Fall

## 2020-07-23 NOTE — DIETITIAN INITIAL EVALUATION ADULT. - PROBLEM/PLAN-6
Chart review completed 2020.  Care Everywhere updates requested and reviewed.  Immunizations reconciled. Media reports reviewed.  Duplicate HM overrides and  orders removed.  Overdue HM topic chart audit and/or requested.  Overdue lab testing linked to upcoming lab appointments if applies.    Lab lyric, and [employer] (IMMI) reviewed   Pap Smear and Lab testing   YES    DIS reviewed      Mammogram and DEXA  YES    PORTAL MESSAGE SENT    Health Maintenance Due   Topic Date Due    TETANUS VACCINE  1965    Shingles Vaccine (1 of 2) 1999    DEXA SCAN  2017    Lipid Panel  2019    Hemoglobin A1c  2020    Foot Exam  2020         
DISPLAY PLAN FREE TEXT

## 2020-07-29 NOTE — ED ADULT NURSE NOTE - DRUG PRE-SCREENING (DAST -1)
Azucena Morrison is a 71 y.o. female with a history of DM II, HTN, CKD 3 and a complex past surgical history with development of enterocutaneous and colocutaneous fistulae. Colorectal is planning on taking patient to OR for LAR on Friday, 7/31/20. Urology was consulted for bilateral ureteral catheter placement pre-operatively.    - Will be available for cystoscopy with bilateral ureteral catheter placement prior to colorectal surgery's portion of the case.  - Consent signed with the aid of language line .  - Please call with questions.   Statement Selected

## 2020-10-28 NOTE — PATIENT PROFILE ADULT - OVER THE PAST TWO WEEKS HAVE YOU FELT DOWN, DEPRESSED OR HOPELESS?
no

## 2020-11-17 NOTE — PHYSICAL THERAPY INITIAL EVALUATION ADULT - MD/RN NOTIFIED
Provider Procedure Text (C): After obtaining clear surgical margins the defect was repaired by another provider. yes

## 2021-01-18 NOTE — PATIENT PROFILE ADULT - ABILITY TO HEAR (WITH HEARING AID OR HEARING APPLIANCE IF NORMALLY USED):
sees a private therapist Leesa Topete  has an appointment with Hussein Wei
Adequate: hears normal conversation without difficulty

## 2021-04-06 NOTE — PATIENT PROFILE ADULT - NSPROPTRIGHTCAREGIVER_GEN_A_NUR
Due to COVID-19 ACTION PLAN, the patient's office visit was converted to a phone visit..     History of Present Illness    Lupe is a  89 year old female here with past medical history of HTN, CKD3, and Atherosclerosis of Coronary Artery who we are calling today for routine follow-up.      Lab results from 3/19/21 were reviewed and discussed - Calcium 10.5, otherwise unremarkable.     Lupe had a Laparoscopic Cholecystectomy 3/3/21 and states it is healing well. She denies any drainage and inquires about how to wash the incision area.     The patient denied any CP, SOB, n/v/d/c. Negative melena or hematochezia. Hence, the patient presents for follow-up evaluation.    PAST MEDICAL HISTORY:   Past Medical History:   Diagnosis Date   • Coronary artery disease        PAST SURGICAL HISTORY:   Past Surgical History:   Procedure Laterality Date   • Hysterectomy     • Removal gallbladder  03/03/2021       FAMILY HISTORY:   Family History   Problem Relation Age of Onset   • Coronary Artery Disease Other    • Cancer, Colon Other    • Diabetes Other    • Stroke Other        SOCIAL HISTORY:   Social History     Tobacco Use   • Smoking status: Former Smoker   • Smokeless tobacco: Never Used   Substance Use Topics   • Alcohol use: No   • Drug use: No       Drug Use:    No                ALLERGIES:   ALLERGIES:   Allergen Reactions   • Egg Yolk   (Food Or Med) HEADACHES   • Codeine Other (See Comments), Nausea & Vomiting and RASH     unknown    Unknown  unknown   • Egg White   (Food Or Med) Other (See Comments)   • Eggs Or Egg-Derived Products   (Food Or Med) Other (See Comments)     unknown   • Penicillins Other (See Comments)     unknown       MEDICATIONS:   Current Outpatient Medications   Medication Sig Dispense Refill   • atorvastatin (LIPITOR) 20 MG tablet TAKE 1 TABLET BY MOUTH AT BEDTIME 90 tablet 3   • hydrochlorothiazide (HYDRODIURIL) 25 MG tablet TAKE 1/2 TABLET BY MOUTH EVERY DAY 45 tablet 1   • losartan (COZAAR) 50 MG  tablet Take 1 tablet by mouth daily. 90 tablet 3   • aspirin 81 MG EC tablet Take 1 tablet by mouth daily.     • acetaminophen (TYLENOL) 500 MG tablet Take 2 tablets by mouth.     • estradiol (ESTRACE) 0.5 MG tablet Take 0.5 mg by mouth 3 days a week.  3   • Glucosamine Sulfate 1000 MG Tab      • zinc gluconate 50 MG tablet Take by mouth daily.     • vitamin E 400 UNIT capsule Take by mouth daily.     • Ascorbic Acid (VITAMIN C) 1000 MG tablet Take by mouth daily.     • Pyridoxine HCl (VITAMIN B-6) 100 MG tablet Take by mouth daily.     • diphenhydrAMINE (BENADRYL) 25 MG capsule Take by mouth daily.     • Cholecalciferol (VITAMIN D-3) 1000 units Cap TAKE 1 CAPSULE BY MOUTH DAILY       No current facility-administered medications for this visit.         Review of Systems   All other systems reviewed and are negative.        Physical Exam - not performed, phone visit.     Assessment   Benign hypertensive heart and kidney disease with chronic kidney disease, stage 1 through stage 4 or unspecified chronic kidney disease, without heart failure  - CBC WITH DIFFERENTIAL; Future  - COMPREHENSIVE METABOLIC PANEL; Future  - LIPID PANEL WITH REFLEX; Future  - MICROALBUMIN URINE RANDOM; Future  - VITAMIN D -25 HYDROXY; Future  - URINALYSIS & REFLEX MICROSCOPY WITH CULTURE IF INDICATED; Future  - THYROID STIMULATING HORMONE; Future    Benign hypertension with CKD (chronic kidney disease), stage II  - CBC WITH DIFFERENTIAL; Future  - COMPREHENSIVE METABOLIC PANEL; Future  - LIPID PANEL WITH REFLEX; Future  - MICROALBUMIN URINE RANDOM; Future  - VITAMIN D -25 HYDROXY; Future  - URINALYSIS & REFLEX MICROSCOPY WITH CULTURE IF INDICATED; Future  - THYROID STIMULATING HORMONE; Future    Atherosclerosis of native coronary artery of native heart without angina pectoris  - LIPID PANEL WITH REFLEX; Future    Vitamin D deficiency  - VITAMIN D -25 HYDROXY; Future    Hypertensive kidney disease  - CBC WITH DIFFERENTIAL; Future  -  COMPREHENSIVE METABOLIC PANEL; Future    Prediabetes  - GLYCOHEMOGLOBIN; Future    Dyslipidemia  - LIPID PANEL WITH REFLEX; Future    Stage 2 chronic kidney disease  - CBC WITH DIFFERENTIAL; Future  - COMPREHENSIVE METABOLIC PANEL; Future  - LIPID PANEL WITH REFLEX; Future  - MICROALBUMIN URINE RANDOM; Future    Visit for screening mammogram  - MAMMO SCREENING BILATERAL W GISEL; Future      PLAN:    Atherosclerosis of Native Coronary Artery   - Stable. CPM  - Defer to Cardiology Consult     HTN with CKD2  - Continue Losartan 50 MG PO QD  - Continue Hydrochlorothiazide 25 MG PO QD  - Continue Aspirin 81 MG PO QD  - Stable. CPM   - Encouraged maintaining hydration (48 - 64 ounces daily)  - Defer to Nephrology Consult  - Routine labs ordered      Vitamin D Deficiency   - Continue Vitamin D3 1000 IU PO QD  - Order Labs to monitor Vitamin D3 Levels  Vitamin D, 25-Hydroxy (ng/mL)   Date Value   01/22/2021 45.4     Dyslipidemia  - Order Labs to monitor Cholesterol Levels   - Continue Atorvastatin 20 mg PO at bedtime   LDL (mg/dL)   Date Value   01/22/2021 48     Prediabetes   - Order HBA1C to screen for DM  - Discussed dietary modifications  - Continue ASA-EC 81 mg PO QD  Hemoglobin A1C (%)   Date Value   01/22/2021 5.7 (H)     Routine Health Maintenance   - Mammogram-- Last done on 6/3/2020 which showed no mammographic evidence of malignancy. Ordered 4/6/21  - Pap Smear-- None in chart   - Colonoscopy-- (9/4/15); 2 polyps. Last Fecal occult was negative on 1/23/2019   - Immunizations-- Patient is due for TDAP and shingles vaccinations   - DEXA Scan -- Last done on 6/22/2018 which showed normal bone density      DURATION OF VISIT: 15 Minutes    Follow up in 2 months       Scribe Attestation: Entered by Xena Vivar, acting as scribe for Dr. Reed.     Provider Attestation: The documentation recorded by the scribe accurately reflects the service I personally performed and the decisions made by me, Bethany Reed MD.    no

## 2021-05-21 NOTE — PROGRESS NOTE ADULT - SUBJECTIVE AND OBJECTIVE BOX
[facility] DIVISION OF KIDNEY DISEASES AND HYPERTENSION -- FOLLOW UP NOTE  --------------------------------------------------------------------------------  Chief Complaint:  ESRD on HD    24 hour events/subjective:  Pt seen today  HD today  Fluid overload  Hypoxia      PAST HISTORY  --------------------------------------------------------------------------------  No significant changes to PMH, PSH, FHx, SHx, unless otherwise noted    ALLERGIES & MEDICATIONS  --------------------------------------------------------------------------------  Allergies    No Known Allergies    Intolerances      Standing Inpatient Medications  amLODIPine   Tablet 10 milliGRAM(s) Oral daily  aspirin enteric coated 81 milliGRAM(s) Oral daily  atorvastatin 80 milliGRAM(s) Oral at bedtime  cefTRIAXone   IVPB 1 Gram(s) IV Intermittent every 24 hours  chlorhexidine 2% Cloths 1 Application(s) Topical daily  clopidogrel Tablet 75 milliGRAM(s) Oral daily  DULoxetine 20 milliGRAM(s) Oral daily  gabapentin 100 milliGRAM(s) Oral daily  hydrALAZINE 50 milliGRAM(s) Oral every 8 hours  iron sucrose IVPB 100 milliGRAM(s) IV Intermittent <User Schedule>  isosorbide   mononitrate ER Tablet (IMDUR) 30 milliGRAM(s) Oral daily  levothyroxine 100 MICROGram(s) Oral daily  losartan 50 milliGRAM(s) Oral at bedtime  metoprolol tartrate 50 milliGRAM(s) Oral two times a day  Nephro-jeana 1 Tablet(s) Oral daily  pantoprazole  Injectable 40 milliGRAM(s) IV Push every 12 hours  predniSONE   Tablet 40 milliGRAM(s) Oral daily  sodium chloride 0.9% lock flush 3 milliLiter(s) IV Push every 8 hours    PRN Inpatient Medications  ALPRAZolam 0.25 milliGRAM(s) Oral two times a day PRN  artificial  tears Solution 1 Drop(s) Both EYES four times a day PRN  benzonatate 100 milliGRAM(s) Oral every 8 hours PRN  guaiFENesin    Syrup 200 milliGRAM(s) Oral every 6 hours PRN  ondansetron Injectable 4 milliGRAM(s) IV Push every 6 hours PRN      REVIEW OF SYSTEMS  --------------------------------------------------------------------------------  Gen: No weight changes, fatigue, fevers/chills, weakness  Skin: No rashes  Head/Eyes/Ears/Mouth: No headache; Normal hearing; Normal vision w/o blurriness; No sinus pain/discomfort, sore throat  Respiratory: No dyspnea, cough, wheezing, hemoptysis  CV: No chest pain, PND, orthopnea  GI: No abdominal pain, diarrhea, constipation, nausea, vomiting, melena, hematochezia  : No increased frequency, dysuria, hematuria, nocturia  MSK: No joint pain/swelling; no back pain; no edema  Neuro: No dizziness/lightheadedness, weakness, seizures, numbness, tingling  Heme: No easy bruising or bleeding  Endo: No heat/cold intolerance  Psych: No significant nervousness, anxiety, stress, depression    All other systems were reviewed and are negative, except as noted.    VITALS/PHYSICAL EXAM  --------------------------------------------------------------------------------  T(C): 36.6 (01-07-19 @ 08:49), Max: 36.8 (01-06-19 @ 13:30)  HR: 56 (01-07-19 @ 08:49) (56 - 68)  BP: 121/54 (01-07-19 @ 08:49) (112/52 - 128/63)  RR: 18 (01-07-19 @ 08:49) (18 - 20)  SpO2: 94% (01-07-19 @ 08:49) (86% - 96%)  Wt(kg): --        01-06-19 @ 07:01  -  01-07-19 @ 07:00  --------------------------------------------------------  IN: 720 mL / OUT: 200 mL / NET: 520 mL      Physical Exam:  	Gen: NAD, well-appearing  	HEENT: PERRL, supple neck, clear oropharynx  	Pulm: CTA B/L  	CV: RRR, S1S2; no rub  	Back: No spinal or CVA tenderness; no sacral edema  	Abd: +BS, soft, nontender/nondistended  	: No suprapubic tenderness  	UE: Warm, FROM, no clubbing, intact strength; no edema; no asterixis  	LE: Warm, FROM, no clubbing, intact strength; no edema  	Neuro: No focal deficits, intact gait  	Psych: Normal affect and mood  	Skin: Warm, without rashes  	Vascular access:  CVC/AVG    LABS/STUDIES  --------------------------------------------------------------------------------              7.9    11.7  >-----------<  241      [01-06-19 @ 07:51]              26.0     134  |  95  |  76.0  ----------------------------<  122      [01-07-19 @ 09:13]  5.3   |  24.0  |  6.42        Ca     7.5     [01-07-19 @ 09:13]            Creatinine Trend:  SCr 6.42 [01-07 @ 09:13]  SCr 4.38 [01-03 @ 08:16]  SCr 4.73 [12-31 @ 08:15]  SCr 5.51 [12-30 @ 08:28]  SCr 4.12 [12-29 @ 09:24]    Urinalysis - [01-01-19 @ 15:02]      Color Red / Appearance Bloody / SG 1.015 / pH 6.0      Gluc Negative / Ketone Negative  / Bili Negative / Urobili Negative       Blood Large / Protein 500 / Leuk Est Trace / Nitrite Negative      RBC TNTC / WBC 3-5 / Hyaline  / Gran  / Sq Epi  / Non Sq Epi Negative / Bacteria Occasional      Iron 46, TIBC 259, %sat 18      [01-01-19 @ 09:11]  Ferritin 470      [01-01-19 @ 09:11]  TSH 7.33      [11-29-18 @ 02:31] Length To Time In Minutes Device Was In Place: 10

## 2021-11-16 NOTE — PROVIDER CONTACT NOTE (CRITICAL VALUE NOTIFICATION) - SITUATION
Have Your Bumps Been Treated?: not been treated
Is This A New Presentation, Or A Follow-Up?: Bumps
Notified by Lab of elevated Troponin (See above), PA (Emily) made aware.

## 2021-11-18 NOTE — DISCHARGE NOTE ADULT - PRINCIPAL DIAGNOSIS
Chief Complaint   Patient presents with   • Follow-up     1 MONTH FOLLOW UP HYPERTENSION       History of Present Illness      The patient presents for a follow-up related to hypertension. The patient reports that she has had no headaches, chest pain, dyspnea, edema, syncope, blurred vision or palpitations. She states that she does not take medication. She is training with her  for a 1/2 marathon.    The patient presents for a follow-up related to anxiety. She denies currently having anxiety symptoms. She is having panic attacks which are manifested by sweats. Her energy level is good. She denies agorophobia. She sleeps well. She is currently taking a medication. She states that her current anxiety symptoms are stable. The current medication regimen consists of sertraline. She had increased panic attacks on the sertraline and stopped it about a week ago. The patient denies having medication side effects including nausea, headaches, anxiety, increased depression or fatigue.    Review of Systems    CONSTITUTIONAL- Denies Unexplained Weight Loss, Fever, Chills, Sweats, Weakness or Malaise.    PULMONARY- Denies Wheezing, Sputum Production, Cough, Hemoptysis or Pleuritic Chest Pain.    CARDIOVASCULAR- Denies Claudication or Irregular Heart Beat.      Medications      Current Outpatient Medications:   •  Levonorgest-Eth Estrad-Fe Bisg (BALCOLTRA PO), Take 1 tablet by mouth Daily., Disp: , Rfl:   •  sertraline (Zoloft) 25 MG tablet, Take 1 tablet by mouth Daily., Disp: 30 tablet, Rfl: 2     Allergies    No Known Allergies    Problem List    There is no problem list on file for this patient.      Medications, Allergies, Problems List and Past History were reviewed and updated.    Physical Examination    /82 (BP Location: Left arm, Patient Position: Sitting, Cuff Size: Adult)   Pulse 76   Temp 97.7 °F (36.5 °C) (Infrared)   Resp 16   Wt 76.7 kg (169 lb)   LMP 10/27/2021 (Exact Date)   Breastfeeding No    BMI 26.47 kg/m²       Neck: Thyroid- non enlarged, symmetric and has no nodules. No bruits are detected.    Lungs: Auscultation- Clear to auscultation bilaterally. There are no retractions, clubbing or cyanosis. The Expiratory to Inspiratory ratio is equal.    Cardiovascular: There are no carotid bruits. Heart- Normal Rate with Regular rhythm and no murmurs. There are no gallops. There are no rubs. In the lower extremities there is no edema. The upper extremities do not have edema.    Abdomen: Soft, benign, non-tender with no masses, hernias, organomegaly or scars.    Impression and Assessment    Essential Hypertension.    Anxiety Disorder Unspecified.    Plan    Essential Hypertension Plan: The condition is stable. No change was made in the current plan.    Anxiety Disorder Unspecified Plan: The medications were adjusted as noted.    Diagnoses and all orders for this visit:    1. Essential hypertension (Primary)    2. Anxiety  -     busPIRone (BUSPAR) 15 MG tablet; Take 1 tablet by mouth 2 (Two) Times a Day.  Dispense: 60 tablet; Refill: 5        Return to Office    The patient was instructed to return for follow-up in 4 months. The patient was instructed to return sooner if the condition changes, worsens, or does not resolve.       Acute on chronic respiratory failure with hypoxia

## 2021-12-20 NOTE — CONSULT NOTE ADULT - ASSESSMENT
80y/o  Male with h/o chronic diastolic CHF, lung CA, pulmonary fibrosis CAD s/p CABG, ESRD on dialysis (TTS), on home 2L O2.   Admitted with Acute hypoxemia respiratory failure due to Acute diastolic CHF.    Blood culture with CoNS.      CoNS in Blood cultures  Acute hypoxemia respiratory failure due to Acute diastolic CHF      - Blood cultures 2 of 2 bottles with CoNS  - Only 1 set was drawn  - Will Continue Vancomycin post HD  - Check Vancomycin level in AM  - Repeat blood cultures  - D/C Zosyn  - Trend Fever  - Trend Leukocytosis      Will Follow
CRF 5 with CHF with CAD. Anemia.
78 yo M hx of chronic diastolic CHF, lung CA, pulmonary fibrosis CAD s/p CABG, ESRD on dialysis (TTS), on home 2L O2, p/w sob and orthopnea x 3 days. Has been wearing 4 L nc o2 all the time. denies lower ext swelling. He also report intermittent left sided chest pain, intermittently radiating to left upper chest but now improved at tiem of hx , c/o productive cough with mild blood tinged yellowish brown phlegm, also had episode of chills and vomiting with nausea yesterday. denies any blood in vomitus ,  no abdominal pain/ vomiting  has been chronic 2-3x/day most days , has low appetite .    The patient has been dialyzed yesterday and again this AM with some improvement  Note positive blood cultures for Gram + cocci    CXR 's were reviewed going back several months  No substantial changes were noted--increased interstitial markings and effusions noted  Indistinguishable from CHF, pneumonitis  or pulmonary fibrosis    Would focus on Rx of fluid overload and possible pneumontis
80 yo M with chronic diastolic Heart faillure, lung cancer, pulmonary fibrosis, s/p CABG, ESRD on HD p/w SOB and chest pain likely 2/2 to demand ischemia and CHF exacerbation c/b dysphagia.  Palliative care called for GOC.
A/P:  78yo male PMH CHF, lung CA, CAD s/p CABG, ESRD on HD (T, Th, Sa) via LAVF, on home 2L O2, presents to ED with c/o worsening sob, cough with blood tinged brown sputum, and orthopnea x 3 days.  Family at bedside, patient a&ox3 though has difficulty providing clear detailed history.  patient has also been c/o 2-3 left sided "faint" intermittent pain.  Per daughter he has had a decrease in PO intake and continues to c/o chronic vomiting 2-3x/day most days.
71

## 2022-02-14 NOTE — DISCHARGE NOTE PROVIDER - NSDCDCMDCOMP_GEN_ALL_CORE
This document is complete and the patient is ready for discharge. Render Risk Assessment In Note?: yes Additional Notes: Patient consent was obtained to proceed with the visit and recommended plan of care after discussion of all risks and benefits, including the risks of COVID-19 exposure. Detail Level: Zone

## 2022-05-11 NOTE — PROVIDER CONTACT NOTE (CRITICAL VALUE NOTIFICATION) - DATE AND TIME:
Thank you for coming to see me today.    If you have any questions about this visit or questions come up prior to your next visit, feel free to contact our clinic at  [phone].  This is also the number you should call if you need to make an appointment, would like to talk with our triage nurse or need to speak to someone after hours.    If you receive a survey from [place] in the future, please take time to fill it out and send it back to us.  Your opinion and input is important to us.    Take a look at the American Academy of Pediatric's Website for parents:  HealthyChildren.org  This is a great reference for healthy eating/feeding guidelines, sleep behaviors, early discipline and general medical questions. If you go to the Ages and Stages Tab at the top of the page, you find lots of information specific to your child's age group.    Don't forget to READ to your child!  Reach Out and Read is wonderful program that helps our clinic support reading and literacy at an early age.  Please visit the [email or web address] website for resources on reading to your child.    Tips for reading to your child.    - Make reading part of every day, even for just a few minutes. Have fun.  - Talk about the pictures. You do not have to read the book to tell a story.  - Let your child turn the pages.  - Show your child the cover page and explain what the story is about.  - Run your finger along the words as you read them.  - Silly sounds, especially animal sounds, are fun to make.  - Choose books about events in your child’s life such as starting , going to the dentist, getting a new pet, or moving to a new home.  - Make the story come alive. Create voices for the story characters.  - Ask questions about the story. What do you think will happen next? What is this?  - Let your child ask questions about the story. Talk about familiar activities and objects.  - Let your child retell the story.  - Visit your local library  often.    How to Access Your Child's Immunization Record Online  All immunizations administered in our clinic are entered into the [place] [facility] ([facility]).    A parent can access their child's immunizations using the following steps:  1) Log into [email or web address]  2) Click on Public Immunization Record Access (alf down page)  3) Enter PATIENT information   Name      SSN or Medicaid ID # OR Healthcare ID#      PAIN RELIEVER DOSING CHART for CHILDREN  The following tables refer to children's Motrin, Advil, Tylenol, and acetaminophen; look for any other ingredients on the bottles (as might be found in multi-symptom or combination medications) and ask if you are unsure about the dosing regimen.    Acetaminophen/ Tylenol Dosing Chart           Weight       Dose   Liquid   Chewables/  [place]  (80 mg) Jr  Strength  Chewable  (160 mg) Adult Regular  Strength Tab  (325 mg)   6-11 pounds 40 mg 1.25 ml      12-17 pounds 80 mg 2.5 ml      18-23 pounds 120 mg 3.75 ml 1 ½ tablets     24-35 pounds 160 mg 5 ml 2 tablets 1 tablet    36-47 pounds 240 mg 7.5 ml 3 tablets 1 ½ tablets    48-59 pounds 320 mg 10 ml 4 tablets 2 tablets 1 tablet   60- 71 pounds 400 mg 12.5 ml 5 tablets 2 ½ tablets 1 tablet   72-95 pounds 480 mg 15 ml 6 tablets 3 tablets 1 ½ tablet   96 + pounds 640 mg 20 ml 8 tablets 4 tablets 2 tablets   **Remember to shake liquid well**    Ibuprofen / Motrin Dosing Chart  Ibuprofen should not be given prior to 6 months of age        Weight       Dose     Infant       drops     Children’s      liquid    Jr  Strength  Chewable     Adult Regular  Strength Tab     12-17 pounds 50 mg 1.25 ml      2.5 ml     18-23 pounds 75 mg 1.875 ml     3.75 ml     24-35 pounds 100 mg  2.5  ml       5 ml 1 tablet    36-47 pounds 150 mg  3.75 ml     7.5 ml 1 ½ tablets    48-59 pounds 200 mg     10 ml 2 tablets 1 tablet   60- 71 pounds 250 mg    12.5 ml 2 ½ tablets 1 tablet   72-95 pounds 300mg     15 ml 3 tablets 1  19-Apr-2019 12:06 tablet   96 + pounds 400 mg      20 ml 4 tablets 2 tablets   **Remember to shake liquid well**    If you have questions for me, our triage nurse, need to speak to someone after hours or would like to make an appointment, call [phone].    Below is some information specific to your child's age.  Patient Education     Well-Child Checkup: 4 Years     Bicycle safety equipment, such as a helmet, helps keep your child safe.   Even if your child is healthy, keep taking him or her for yearly checkups. This helps to make sure that your child’s health is protected with scheduled vaccines and health screenings. Your child's healthcare provider can make sure your child’s growth and development is progressing well. A check-up is a great time to have any questions answered about your child’s emotional and physical development. Bring a list of your questions to the appointment so you can address all of your concerns.   This sheet describes some of what you can expect.   Development and milestones  The healthcare provider will ask questions and observe your child’s behavior to get an idea of their development. By this visit, your child is likely doing some of the following:   · Enjoys being with and helping other children  · Talks about what he or she likes (for example, toys, games, people)  · Tells a story or sings a song  · Knows most colors and shapes  · Says first and last name  · Uses scissors  · Draws a person with 2 to 4 body parts  · Catches a ball that is bounced to them, most of the time  · Stands briefly on one foot  School and social issues  The healthcare provider will ask how your child is getting along with other kids. Talk about your child’s experience in group settings such as . If your child isn’t in , you could talk instead about behavior at  or during play dates. You may also want to discuss  choices and how to help your child get ready for . The healthcare  provider may ask about:   · Behavior and taking part in group settings. How does your child act at school or other group settings? Does he or she follow the routine and take part in group activities? What do teachers or caregivers say about your child’s behavior?  · Behavior at home. How does your child act at home? Is behavior at home better or worse than at school? Be aware that it’s common for kids to be better behaved at school than at home.  · Friendships. Has your child made friends with other children? What are the kids like? How does your child get along with these friends?  · Play. How does your child like to play? For example, do they play “make believe”? Does your child interact with others during playtime?  · [place].  How is your child adjusting to school? How does he or she react when you leave? Some anxiety is normal. This should get better over time, as your child becomes more independent.  Nutrition and exercise tips  Healthy eating and activity are 2 important keys to a healthy future. It’s not too early to start teaching your child healthy habits that will last a lifetime. Here are some things you can do:   · Limit juice and sports drinks.  These drinks--even pure fruit juice--have too much sugar. This leads to unhealthy weight gain and tooth decay. Water and low-fat or nonfat milk are best to drink. Limit juice to a small glass of 100% juice each day, such as during a meal.  · Don’t serve soda. It’s healthiest not to let your child have soda. If you do allow soda, save it for very special occasions.  · Offer healthy foods. Keep a variety of healthy foods on hand for snacks. These can include fresh fruits and vegetables, lean meats, and whole grains. Foods such as french fries, candy, and snack foods should only be served rarely.  · Serve child-sized portions. Children don’t need as much food as adults. Serve your child portions that make sense for their age. Let your child stop eating when  they are full. If your child is still hungry after a meal, offer more vegetables or fruit. It's OK to put limits on how much your child eats.  · Encourage at least 30 to 60 minutes of active play per day. Moving around helps keep your child healthy. Bring your child to the park, ride bikes, or play active games like tag or ball.  · Limit screen time to 1 hour each day. This includes TV watching, computer use, and video games.  · Ask the healthcare provider about your child’s weight. At this age, your child should gain about 4 to 5 pounds each year. If they are gaining more than that, talk with the provider about healthy eating habits and activity guidelines.  · Have regular dental visits. Take your child to the dentist at least twice a year for teeth cleaning and a checkup.  Safety tips  Advice to keep your child safe includes:    · When riding a bike, have your child wear a helmet with the strap fastened. While roller-skating or using a scooter or skateboard, it’s safest to wear wrist guards, elbow pads, knee pads, and a helmet.  · Keep using a car seat until your child outgrows it. This is when your child's height or weight is more than the forward-facing limit for their car seat. Check your car seat owner’s manual for the specific height or weight. Ask the healthcare provider if there are state laws regarding car seat use that you need to know about.  · Once your child outgrows the car seat, switch to a high-back booster seat. This allows the seat belt to fit correctly. A booster seat should be used until your child is 4 feet 9 inches tall and between 8 and 12 years of age. All children younger than 13 years old should sit in the back seat.  · Teach your child not to talk to or go anywhere with a stranger.  · Start to teach your child his or her phone number, address, and parents’ first names. These are important to know in an emergency.  · Teach your child to swim. Many communities offer low-cost swimming  lessons.  · If you have a swimming pool, check that it is entirely fenced on all sides. Close and lock childs or doors leading to the pool. Don't let your child play in or around the pool alone, even if he or she knows how to swim.  · Teach your child to stay away from strange dogs and cats. Never leave your child alone around animals.  · Remember sun safety. Wear protective clothing. Try to stay out of the sun between 10 a.m. and 4 p.m. That's when the sun's rays are strongest. Apply sunscreen with an SPF of 15 or greater to your child's skin that aren't covered by clothing.  Vaccines  Based on recommendations from the CDC, at this visit your child may get the following vaccines:   · Diphtheria, tetanus, and pertussis  · Flu (influenza) every year  · Measles, mumps, and rubella  · Polio  · Chickenpox (varicella)  Give your child positive reinforcement  It’s easy to tell a child what they’re doing wrong. It’s often harder to remember to praise a child for what they do right. Rewarding good behavior (positive reinforcement) helps your child gain confidence and a healthy self-esteem. Here are some tips:   · Give your child praise and attention for behaving well. When appropriate, let the whole family know that the child has done well.  · Reward good behavior with hugs, kisses, and small gifts such as stickers. When being good has rewards, kids will keep doing those behaviors to get the rewards. Don't use sweets or candy as rewards. Using these treats as positive reinforcement can lead to unhealthy eating habits and an emotional attachment to food.  · When your child doesn’t act the way you want, don’t label them as bad or naughty. Instead, describe why the action is not acceptable. For example, say “It’s not nice to hit” instead of “You’re a bad girl.” When your child chooses the right behavior over the wrong one, such as walking away instead of hitting, remember to praise the good choice!  · Pledge to say 5 nice  things to your child every day. Then do it!  Julius last reviewed this educational content on 8/1/2020  © 7599-1766 [employer]. All rights reserved. This information is not intended as a substitute for professional medical care. Always follow your healthcare professional's instructions.

## 2022-06-18 NOTE — PROGRESS NOTE ADULT - ASSESSMENT
A/P:  78yo male with PMH HFpEF (7/11 EF 60-65%), CAD s/p CABG (ANA-LAD, stents to LCx) with recent NST (04/19 mild ischemia, medically managed), HTN, HLD, ESRD on HD (T, Th, Sat) via L AVF, Lung Ca s/p radiation therapy 2006, ILD on 5L home o2, anemia, bipolar d/o, with frequent readmissions for SOB, volume overload, and ILD.       1. Acute on chronic hypoxic resp failure  - Blood cultures neg to date, pleural fluid- cultures pending.  - 7/19 CXR- B/L pleural effusion- patchy airspace disease- unchanged from 7/13 on exam pt with mild b/l wheeze, able to speak full sentences     2. Abd pain  - vague c/o abd, soft, NT/ND, call out to PMT to advise    3. CAD  - continue DAPT, BB, statin, Imdur     4. Paroxysmal afib  - ILR was to be placed 7/16/19 but cancelled due to dyspnea/Orthopnea. ILR to be placed prior to DC once symptoms improved  - BB was discontinued previous hospitalization due to bradycardia, episodes of VT during this admission, BB was resumed, no recurrent events    5. ESRD  - s/p HD this morning A/P:  80yo male with PMH HFpEF (7/11 EF 60-65%), CAD s/p CABG (AAN-LAD, stents to LCx) with recent NST (04/19 mild ischemia, medically managed), HTN, HLD, ESRD on HD (T, Th, Sat) via L AVF, Lung Ca s/p radiation therapy 2006, ILD on 5L home o2, anemia, bipolar d/o, with frequent readmissions for SOB, volume overload, and ILD.       1. Acute on chronic hypoxic resp failure.  - Blood cultures neg to date, pleural fluid- cultures pending.  - 7/19 CXR- B/L pleural effusion- patchy airspace disease- unchanged from 7/13 on exam pt with mild b/l wheeze, able to speak full sentences     2. Abd pain  - vague c/o abd, soft, NT/ND, call out to PMD to advise    3. CAD  - continue DAPT, BB, statin, Imdur     4. Paroxysmal afib  - ILR was to be placed 7/16/19 but cancelled due to dyspnea/Orthopnea. ILR to be placed prior to DC once symptoms improved  - BB was discontinued previous hospitalization due to bradycardia, episodes of VT during this admission, BB was resumed, no recurrent events    5. ESRD  - s/p HD this morning General

## 2022-07-07 NOTE — DISCHARGE NOTE PROVIDER - PROVIDER TOKENS
MAC (minimal sedation) planned, backup GA  Discussed risks, including dental injury and more serious complications including cardiac and pulmonary complications and stroke.  Patient expresses understanding with regard to risks of anesthesia and wishes to proceed. PROVIDER:[TOKEN:[[id number]]]

## 2022-08-12 NOTE — DISCHARGE NOTE PROVIDER - NSDCHHATTENDCERT_GEN_ALL_CORE
My signature below certifies that the above stated patient is homebound and upon completion of the Face-To-Face encounter, has the need for intermittent skilled nursing, physical therapy and/or speech or occupational therapy services in their home for their current diagnosis as outlined in their initial plan of care. These services will continue to be monitored by myself or another physician. Declines

## 2022-09-29 NOTE — ED PROVIDER NOTE - MUSCULOSKELETAL, MLM
BPP 8/8  Cholestasis of Pregnancy her itching better on Ursodiol   repeat c/section moving to 37 wks  NST/BPP till then   Discussed fetal movements and signs and symptoms of labor and delivery.    
Gisela presents today for her OB visit with biophysical profile.    Patient would like communication of their results via: DataContact    Patient's current [facility] status: Active.     Chaperone needed:  Yes    [facility] - Patient is on [facility] Scheduling.            
Spine appears normal, range of motion is not limited, no muscle or joint tenderness

## 2023-01-01 NOTE — H&P ADULT - PSH
S: HC RN calling to report bilirubin result and f/u plan.    B:  Result from today's visit was 12.5.    A:  Pt has appt with PCP in clinic Wed . Does he want HC RN to check prior to appt? Other f/u know?    R: Please call Hanna to advise @[phone], ok to  dt msg.    Denia Garnica, RN, BSN  [facility] Nurse Advisor 4:03 PM 2023    Reason for Disposition    Lab calling with important or urgent test results    Additional Information    Negative: Lab calling with strep culture results and triager can call in prescription    Negative: Medication questions    Negative: Pre-operative or pre-procedural questions    Negative: ED call to PCP    Negative: MD call to PCP    Negative: Call about child who is currently hospitalized    Negative: [1] Prescription not at pharmacy AND [2] was prescribed today by PCP    Negative: [1] Follow-up call from parent regarding patient's clinical status AND [2] information urgent    Negative: Caller requesting results for important or urgent lab test (such as blood work in sick child or bilirubin in )    Protocols used: PCP CALL - NO TRIAGE-P-       S/P CABG (coronary artery bypass graft)  pt's son in law states h/o some stents near neck area ? carotid ? he is not sure.

## 2023-02-08 NOTE — ED ADULT TRIAGE NOTE - TEMPERATURE IN CELSIUS (DEGREES C)
Patient calling clinic to request Gabapentin to help manage symptoms. RN relayed PCP message to patient regarding the Gabapentin. Patient stated that providers at facility were also not willing to prescribe him Gabapentin. Patient is requesting PCP take another look at this.     Routing to PCP for recommendations.     Patient provided client number to call back [phone].   36.8

## 2023-04-28 NOTE — ED ADULT NURSE NOTE - NEURO WDL
breathing is unlabored without accessory muscle use.
Alert and oriented to person, place and time, memory intact, behavior appropriate to situation, PERRL.

## 2023-06-26 NOTE — DIETITIAN INITIAL EVALUATION ADULT. - NS FNS CHANGE IN WEIGHT
Loss Mercedes Flap Text: The defect edges were debeveled with a #15 scalpel blade.  Given the location of the defect, shape of the defect and the proximity to free margins a Mercedes flap was deemed most appropriate.  Using a sterile surgical marker, an appropriate advancement flap was drawn incorporating the defect and placing the expected incisions within the relaxed skin tension lines where possible. The area thus outlined was incised deep to adipose tissue with a #15 scalpel blade.  The skin margins were undermined to an appropriate distance in all directions utilizing iris scissors.  Following this, the designed flap was advanced and carried over into the primary defect and sutured into place.

## 2023-07-06 NOTE — PATIENT PROFILE ADULT - NSPROMEDSHERBAL_GEN_A_NUR
no Double M-Plasty Intermediate Repair Preamble Text (Leave Blank If You Do Not Want): Undermining was performed with blunt dissection.

## 2023-08-14 NOTE — ED PROVIDER NOTE - PROGRESS NOTE DETAILS
13-Aug-2023 22:00 Family at bedside. Pt's BP has improved. Family member states that Pt's BP was not improving today. PT. had BP of 199,200 systolic. Pt. was given his BP medication but it was not improving. Pt. did have a headache but he always has a headache as per family member. PT. with no focal deficit. Pt. states that both his headache and chest pain are much less now. Pt. had to have his chemistry and troponin redrawn. Family would like to take pt. home. Pt. is hungry and is being feed now. Awaiting for repeat labs. As per signout from Dr. Kemp given discussion with family and Patient with review of previous hospitalization patient pending ct head and repeat trop. If stable patient to be discharged home and follow-up with PMD. Informed earlier of elevated blood pressure. Medication ordered as the patient missed his evening dose. Called back to bedisde due to difficulty breathing and persistently elevated blood pressu.re patient noted with 0xygen sat of 100% on RA with bp of 201/102. Patient placed on nasal cannula and found to maintain sat of 96%.  Patient will require admission of management of acute hypertensive urgency.

## 2023-08-18 NOTE — PATIENT PROFILE ADULT - DO YOU FEEL THREATENED BY OTHERS?
NUTRITIONAL RE-ASSESSMENT GLYCEMIC CONTROL/ PLAN OF CARE Fina Shaver           66 y.o.           9/26/2018                
 
  
 INTERVENTIONS/PLAN:  
    Perative TF  Is  Infusing at 40 ml/hr which   provides 1248 calories, 64 g protein/d with 758 ml free water from the TF. ASSESSMENT:  
Nutritional Status: 
Pt is comfort care. Diabetes Management: Lantus 30 units daily- 
 Recent Glucose Results:  
Lab Results Component Value Date/Time GLUCPOC 213 (H) 11/09/2018 11:57 AM  
 GLUCPOC 199 (H) 11/09/2018 05:36 AM  
 GLUCPOC 234 (H) 11/09/2018 12:02 AM  
 
Within target range (non-ICU: <140; ICU<180): [] Yes   [x]  No 
  
Current Insulin regimen: 30 units Lantus/24 hrs  Plus  VIR corrective lispro 
  
   
HbA1c: 10% equivalent  to ave Blood Glucose of 240mg/dl for 2-3 months prior to admission Adequate glycemic control PTA:  [] Yes  [x] No 
  
SUBJECTIVE/OBJECTIVE: Information obtained from: ICU rounds/pt is on a vent Diet:Perative TF at 40 ml/hr Medications: [x]                Reviewed  
  
 Labs:  
Lab Results Component Value Date/Time Sodium 138 11/08/2018 04:10 AM  
 Potassium 5.5 11/08/2018 04:10 AM  
 Chloride 102 11/08/2018 04:10 AM  
 CO2 21 11/08/2018 04:10 AM  
 Anion gap 15 11/08/2018 04:10 AM  
 Glucose 170 (H) 11/08/2018 04:10 AM  
  (H) 11/08/2018 04:10 AM  
 Creatinine 6.63 (H) 11/08/2018 04:10 AM  
 BUN/Creatinine ratio 21 (H) 11/08/2018 04:10 AM  
 GFR est AA 10 (L) 11/08/2018 04:10 AM  
 GFR est non-AA 8 (L) 11/08/2018 04:10 AM  
 Calcium 7.6 (L) 11/08/2018 04:10 AM  
 
 
     
Lab Results Component Value Date/Time  
  Hemoglobin A1c 10.0 (H) 09/30/2018 04:18 AM  
  
     
prealbumin 17.9 Lipase 1785 (11/7) 
  Anthropometrics: IBW : 69.9 kg (154 lb), % IBW (Calculated): 134.85 %, BMI (calculated): 31 Wt Readings from Last 1 Encounters:  
11/02/18 89.8 kg (198 lb) Ht Readings from Last 1 Encounters:  
10/22/18 5' 7\" (1.702 m)  
  
  
 Estimated Nutrition Needs:  1880 Kcals/day, Protein (g): 85 g Fluid (ml): 1800 ml Based on:   [x]          Actual BW           []          ABW          []            Adjusted BW   
Nutrition Diagnoses: as stated above Nutrition Interventions:  Perative TF Goal:  
Blood glucose will be within target range of  mg/dL by 11 /19 Intake will meet >75% of energy and protein requirements by 11/15. Nutrition Monitoring and Evaluation   
  
[]     Monitor po intake on meal rounds 
[x]     Continue inpatient monitoring and intervention 
[]     Other: 
  
  
Nutrition Risk:  []   High       [x]  Moderate (comfort Care)      []  Minimal/Uncompromised 
  
Albert Basurto, RD 
pgr [phone] psychosis no

## 2023-08-22 NOTE — PATIENT PROFILE ADULT - IS THERE A SUSPICION OF ABUSE/NEGLIGENCE?
Please schedule appointment with patient dermatologist to evaluate LLE wound to rule out reoccurrence of carcinoma ( son is aware )
no

## 2023-09-17 NOTE — DISCHARGE NOTE PROVIDER - NSDCHHATTENDCERT_GEN_ALL_CORE
My signature below certifies that the above stated patient is homebound and upon completion of the Face-To-Face encounter, has the need for intermittent skilled nursing, physical therapy and/or speech or occupational therapy services in their home for their current diagnosis as outlined in their initial plan of care. These services will continue to be monitored by myself or another physician.
0 (no pain/absence of nonverbal indicators of pain)

## 2024-02-09 NOTE — PATIENT PROFILE ADULT - FUNCTIONAL SCREEN CURRENT LEVEL: BATHING, MLM
Subjective:    Pt transferred to floor this afternoon. He was seen and examined at bedside. He appears to be resting comfortably on 15 L nasal cannula.  He denies any acute concerns or complaints at this time.  No shortness of breath, chest pain, abdominal pain, nausea, vomiting, fevers, chills or headaches.    Objective:   Vitals: T 98.2, , RR 19, /61, 94% Spo2 on 15L NC.  PE:  Constitutional:       General: He is not in acute distress.  HENT:      Head: Normocephalic and atraumatic.   Eyes:      Extraocular Movements: Extraocular movements intact.      Conjunctiva/sclera: Conjunctivae normal.      Pupils: Pupils are equal, round, and reactive to light.   Cardiovascular:      Rate and Rhythm: Normal rate and regular rhythm.      Pulses: Normal pulses.   Pulmonary:      Effort: No respiratory distress.      Breath sounds: Rhonchi (improved from prior) present. No wheezing or rales.   Abdominal:      General: Bowel sounds are normal. There is no distension.      Palpations: Abdomen is soft.      Tenderness: There is no abdominal tenderness. There is no guarding.   Musculoskeletal:         General: Normal range of motion.   Skin:     General: Skin is warm and dry.   Neurological:      General: No focal deficit present.      Mental Status: He is alert and oriented to person, place, and time.   Psychiatric:         Mood and Affect: Mood normal.     A/P:   Continue with current treatment plan. Cardiology following.       0 = independent

## 2024-02-17 NOTE — ED ADULT NURSE NOTE - IS THE PATIENT ABLE TO BE SCREENED?
Pt comes from assisted living had a witnessed seizure, no seizure hx. Lasted 5 min , ems gave 5 versed. EMS report full body shakes, pt bit tongue. Pt on Eliquis for afib.          Yes

## 2024-03-07 NOTE — PATIENT PROFILE ADULT - NSPROEDAREADYLEARN_GEN_A_NUR
Render Post-Care Instructions In Note?: yes Detail Level: Simple Number Of Freeze-Thaw Cycles: 5 freeze-thaw cycles Medical Necessity Information: It is in your best interest to select a reason for this procedure from the list below. All of these items fulfill various CMS LCD requirements except the new and changing color options. Post-Care Instructions: I reviewed with the patient in detail post-care instructions. Patient is to wear sunprotection, and avoid picking at any of the treated lesions. Pt may apply Vaseline to crusted or scabbing areas. Include Z78.9 (Other Specified Conditions Influencing Health Status) As An Associated Diagnosis?: No Consent: The patient's verbal consent was obtained including but not limited to risks of crusting, scabbing, blistering, scarring, darker or lighter pigmentary change, recurrence, incomplete removal and infection. Spray Paint Text: The liquid nitrogen was applied to the skin utilizing a spray paint frosting technique. Number Of Freeze-Thaw Cycles: 2 freeze-thaw cycles Detail Level: Zone Duration Of Freeze Thaw-Cycle (Seconds): 5 Post-Care Instructions: I reviewed with the patient in detail post-care instructions. Patient is to wear sunprotection, and avoid picking at any of the treated lesions. Pt may apply Aquaphor  to crusted or scabbing areas. acuteness of illness

## 2024-03-28 RX ORDER — LEVOTHYROXINE SODIUM 125 MCG
1 TABLET ORAL
Qty: 0 | Refills: 0 | DISCHARGE

## 2024-03-28 RX ORDER — PANTOPRAZOLE SODIUM 20 MG/1
1 TABLET, DELAYED RELEASE ORAL
Qty: 0 | Refills: 0 | DISCHARGE

## 2024-03-28 RX ORDER — ERYTHROPOIETIN 10000 [IU]/ML
6000 INJECTION, SOLUTION INTRAVENOUS; SUBCUTANEOUS
Qty: 0 | Refills: 0 | DISCHARGE

## 2024-03-28 RX ORDER — ISOSORBIDE DINITRATE 5 MG/1
1 TABLET ORAL
Qty: 0 | Refills: 0 | DISCHARGE

## 2024-03-28 RX ORDER — HYDROMORPHONE HYDROCHLORIDE 2 MG/ML
1 INJECTION INTRAMUSCULAR; INTRAVENOUS; SUBCUTANEOUS
Qty: 0 | Refills: 0 | DISCHARGE

## 2024-03-28 RX ORDER — ASPIRIN/CALCIUM CARB/MAGNESIUM 324 MG
1 TABLET ORAL
Qty: 0 | Refills: 0 | DISCHARGE

## 2024-03-28 RX ORDER — FUROSEMIDE 40 MG
1 TABLET ORAL
Qty: 0 | Refills: 0 | DISCHARGE

## 2024-03-28 RX ORDER — LOSARTAN POTASSIUM 100 MG/1
1 TABLET, FILM COATED ORAL
Qty: 0 | Refills: 0 | DISCHARGE

## 2024-03-28 RX ORDER — SEVELAMER CARBONATE 2400 MG/1
1 POWDER, FOR SUSPENSION ORAL
Qty: 0 | Refills: 0 | DISCHARGE

## 2024-03-28 RX ORDER — GABAPENTIN 400 MG/1
1 CAPSULE ORAL
Qty: 0 | Refills: 0 | DISCHARGE

## 2024-03-28 RX ORDER — CLOPIDOGREL BISULFATE 75 MG/1
1 TABLET, FILM COATED ORAL
Qty: 0 | Refills: 0 | DISCHARGE

## 2024-03-28 RX ORDER — AMLODIPINE BESYLATE 2.5 MG/1
1 TABLET ORAL
Qty: 0 | Refills: 0 | DISCHARGE

## 2024-03-28 RX ORDER — ISOSORBIDE MONONITRATE 60 MG/1
1 TABLET, EXTENDED RELEASE ORAL
Qty: 0 | Refills: 0 | DISCHARGE

## 2024-03-28 RX ORDER — DULOXETINE HYDROCHLORIDE 30 MG/1
20 CAPSULE, DELAYED RELEASE ORAL
Qty: 0 | Refills: 0 | DISCHARGE

## 2024-03-28 RX ORDER — FEXOFENADINE HCL 30 MG
60 TABLET ORAL
Qty: 0 | Refills: 0 | DISCHARGE

## 2024-03-28 RX ORDER — ALBUTEROL 90 UG/1
3 AEROSOL, METERED ORAL
Qty: 0 | Refills: 0 | DISCHARGE

## 2024-03-28 RX ORDER — OMEPRAZOLE 10 MG/1
1 CAPSULE, DELAYED RELEASE ORAL
Qty: 0 | Refills: 0 | DISCHARGE

## 2024-04-03 NOTE — ED ADULT TRIAGE NOTE - RESPIRATORY RATE (BREATHS/MIN)
Patient [facility].  Verbalized understanding.   18

## 2024-05-23 NOTE — ED STATDOCS - LANGUAGE ASSISTANCE NEEDED
Home in stable condition to use medication as written to help out with pain.  Stay well-hydrated.  Follow-up with family doctor concerning the confusion issues and the blood pressure medication changes.  Also consider following up with the ENT concerning the nodules in the neck.  Monitor for gradual improvement.  Return to the ER for any emergency worsening or concern.    CT HEAD WO CONTRAST   Preliminary Result   No acute intracranial abnormality. Age-appropriate atrophy and small-vessel   disease ischemic changes.         CTA HEAD NECK W CONTRAST   Final Result   1. No evidence of large vessel occlusion in the head or neck.   2. Mild stenosis of the left cavernous ICA.   3. Mild narrowing of the left vertebral artery origin.   4. Mild dilatation of the aortic arch measuring 4 cm in diameter.   5. 3 hyperenhancing nodules in the right level 1 B and 2 regions with the   largest in the right level 1 B region measuring 1.6 x 0.9 cm.  Consider   follow-up CT of the neck with contrast in 3 months to assess for stability   and potentially nonemergent ENT referral.         XR CHEST PORTABLE   Final Result   Right basilar opacity has morphology favoring atelectasis or scar.               
No-Patient/Caregiver offered and refused free interpretation services.

## 2024-06-01 NOTE — PROGRESS NOTE ADULT - SUBJECTIVE AND OBJECTIVE BOX
Upon arrival to cath lab pt began vomiting. Rolled onto side and suctioned mouth before sliding over to procedure table   CHIEF COMPLAINT/INTERVAL HISTORY:    Patient is a 79y old  Male who presents with a chief complaint of sob (20 Apr 2019 17:37)      HPI:  78 yo M hx of chronic diastolic CHF, lung CA, pulmonary fibrosis CAD s/p CABG, ESRD on dialysis (TTS), on home 2L O2, p/w sob and orthopnea x 3 days. Has been wearing 4 L nc o2 all the time. denies lower ext swelling. He also report intermittent left sided chest pain, intermittently radiating to left upper chest but now improved at tiem of hx , c/o productive cough with mild blood tinged yellowish brown phlegm, also had episode of chills and vomiting with nausea yesterday. denies any blood in vomitus ,  no abdominal pain/ vomiting  has been chronic 2-3x/day most days , has low appetite .  Patient last dialyzed yesterday. Denies any sick contacts   Family two daughters  at bedside, patient a&ox3, states that he has not been able to ambulate much at home, has been requiring 2-3 people assist for transfers.  patient. has.    Denies palpitations, irregular and/or rapid heart beat, syncope/near syncope, dizziness, edema, n/v/d, hematuria, or hematochezia.    Patient was DNR/DNI at last admission , today he wishes to be full code.     seen by cardio and nephro in er. plan for urgent HD (19 Apr 2019 16:18)      SUBJECTIVE & OBJECTIVE/ ROS: Pt seen and examined at bedside. Pt stable on 3L NC at 95%. Breathing improved since yesterday. "I feel hungry". As per pt he has trouble swallowing since the past month where food gets stuck in his throat. Reports 25 lbs unintentional weight loss in 2-3 months. No palpitations,  light headedness/dizziness, fevers/chills, abdominal pain, n/v, diarrhea/constipation    ICU Vital Signs Last 24 Hrs  T(C): 36.8 (20 Apr 2019 16:13), Max: 37.1 (19 Apr 2019 21:45)  T(F): 98.2 (20 Apr 2019 16:13), Max: 98.8 (19 Apr 2019 21:45)  HR: 71 (20 Apr 2019 16:00) (58 - 88)  BP: 161/57 (20 Apr 2019 16:00) (144/58 - 182/86)  BP(mean): 92 (20 Apr 2019 12:03) (91 - 99)  ABP: --  ABP(mean): --  RR: 20 (20 Apr 2019 16:00) (10 - 23)  SpO2: 96% (20 Apr 2019 16:00) (94% - 100%)        MEDICATIONS  (STANDING):  ALBUTerol    90 MICROgram(s) HFA Inhaler 1 Puff(s) Inhalation every 4 hours  ALBUTerol/ipratropium for Nebulization 3 milliLiter(s) Nebulizer every 6 hours  aspirin  chewable 324 milliGRAM(s) Oral daily  atorvastatin 80 milliGRAM(s) Oral at bedtime  clopidogrel Tablet 75 milliGRAM(s) Oral daily  docusate sodium 100 milliGRAM(s) Oral three times a day  DULoxetine 20 milliGRAM(s) Oral daily  gabapentin 300 milliGRAM(s) Oral two times a day  heparin  Injectable 5000 Unit(s) SubCutaneous every 12 hours  isosorbide   mononitrate ER Tablet (IMDUR) 120 milliGRAM(s) Oral daily  levothyroxine 100 MICROGram(s) Oral daily  losartan 50 milliGRAM(s) Oral daily  metoprolol tartrate 100 milliGRAM(s) Oral two times a day  Nephro-jeana 1 Tablet(s) Oral daily  NIFEdipine XL 60 milliGRAM(s) Oral daily  pantoprazole    Tablet 40 milliGRAM(s) Oral before breakfast  senna 2 Tablet(s) Oral at bedtime  tiotropium 18 MICROgram(s) Capsule 1 Capsule(s) Inhalation daily    MEDICATIONS  (PRN):  metoprolol tartrate Injectable 5 milliGRAM(s) IV Push every 6 hours PRN for HR > 100, hold for HR < 50 or SBP < 100      LABS:                        10.6   5.9   )-----------( 230      ( 20 Apr 2019 04:45 )             33.4     04-20    133<L>  |  93<L>  |  9.0  ----------------------------<  137<H>  4.0   |  26.0  |  2.36<H>    Ca    8.6      20 Apr 2019 04:45    TPro  7.5  /  Alb  3.4  /  TBili  0.3<L>  /  DBili  x   /  AST  100<H>  /  ALT  41<H>  /  AlkPhos  55  04-19    PT/INR - ( 19 Apr 2019 11:32 )   PT: 11.0 sec;   INR: 0.96 ratio         PTT - ( 19 Apr 2019 11:32 )  PTT:30.7 sec      CAPILLARY BLOOD GLUCOSE      POCT Blood Glucose.: 148 mg/dL (20 Apr 2019 17:31)  POCT Blood Glucose.: 96 mg/dL (20 Apr 2019 00:31)      RECENT CULTURES:      RADIOLOGY & ADDITIONAL TESTS:      PHYSICAL EXAM:    GENERAL: mild resp distress  HEAD:  Atraumatic, Normocephalic  EYES: EOMI, PERRLA, conjunctiva and sclera clear  NECK: Supple, No JVD  NERVOUS SYSTEM:  Alert & Oriented X3, Motor Strength 5/5 B/L upper and lower extremities; DTRs 2+ intact and symmetric  CHEST/LUNG: Decreased BS b/l  HEART: Regular rate and rhythm; No murmurs, rubs, or gallops  ABDOMEN: Soft, Nontender, Nondistended; Bowel sounds present  EXTREMITIES:  2+ Peripheral Pulses, No clubbing, cyanosis, or edema  SKIN: No rashes or lesions

## 2024-06-11 NOTE — PATIENT PROFILE ADULT - NSPROGENANESREACTION_GEN_A_NUR
Diagnosis:   OAB (overactive bladder) (N32.81)         Referring Provider: Farzaneh Irene  Date of Evaluation:    6/3/2024    Precautions:  None    PFDI-20: 116.66/300 Next MD visit:   none scheduled  Date of Surgery: n/a   Insurance Primary/Secondary: [employer] MCR / N/A     # Auth Visits: no auth, no limit            Subjective:   Had leakage only 1 time when lifting heavy pot. Amount of leakage was only 1 spot. Incorporating diaphragmatic breathing and calming of SNS throughout the week.    Pain: 0/10      Objective: Tx flow sheet     Initial evaluation:   URINARY HABITS  Types of symptoms: stress incontinence and urge incontinence  Events associated with the onset of urinary complaints: N/A  Abdominal/Vaginal Pressure complaints: no  Urinary Frequency: 6x  Leaking occurs: urgency, SHIV  Episodes of Leakage: 0-2x/day  Amount of leakage: minimal  Pad use: pantyliner  Pad Change frequency: 1x  Nocturia: 0-1x  Hovering: yes      BOWEL HABITS  Types of symptoms: Constipation   Frequency of bowel movements: 1x/day - every 3rd day  Stool consistency: [place] Stool Scale: 1-4  Do you strain with defecation: Yes       SEXUAL HEALTH STATUS  No pelvic pain      Range Of Motion  Lumbar AROM screen: wnl  LE AROM screen: grossly WNL     Strength (MMT) 5/5 ANGELA LE  Transverse Abdominis: 1/5    Flexibility Summary: WNL ANGELA LE     Informed consent for internal pelvic evaluation given: Yes    External Observation:   Voluntary contraction: present   Voluntary relaxation: present  Involuntary contraction: present  Involuntary relaxation: present    Mons pubis: WNL  Labia majora: WNL  Labia minora: WNL  Urethral meatus: WNL  Introitus: WNL  Perineal body: WNL      Internal Examination     Pelvic Floor Muscle strength: (PERF= Power/Endurance/Reps/Fast) MMT: 2/4/4/4  Accessory Muscle Use: gluteals, adductors, abdominals    Tissue Laxity Test:  Anterior Wall: WNL  Posterior Wall: WNL  Apical: WNL    Eccentric lengthening contraction:  wnl  Bearing down Valsalva maneuver (2-3x): no prolape    Internal Palpation: WNL       Assessment:   Improved coordination of pelvic floor muscles via diaphragmatic breathing , abdominal bracing and pelvic floor muscles retraining . Pt appeared to understand techniques to reduce constipation and discussed use of positioning and use of squatty potty.      Goals:   Goals: (to be met in 10 visits)-progressing  1. Independent in HEP and progression with improved understanding of bladder/bowel health, bladder retraining and long-term management.    2. Patient will demonstrate improved bladder voiding habits to voiding every 2 hours without urinary leakage.  3. Patient will demonstrate improve PFM isolation, coordination and strength so she is able to reduce urinary urge and prevent leakage during ADL's.  4. Pt will have increased transverse abdominis muscle strength to 2/5 to assist with supporting pelvic floor muscles.   5. PFM contraction before increase intra-abdominal pressure.     Plan: Continue plan of care as pt tolerates with focus on pelvic floor muscles . Next visit: grecia's w fascially connected mm  Date: 6/11/2024  TX#: 2/10 Date:                 TX#: 3/ Date:                 TX#: 4/ Date:                 TX#: 5/ Date:   Tx#: 6/   NM/TE  Bladder diary  Bladder fitness+handout  Bladder/bowel voiding/strategies  Constipation/squatty potty strategies  Mere/phys PFM/fascially connected mm/diaphragm/Transverse abdominis    PNS activation    diaphragmatic breathing   abdominal bracing  Kegel           HEP: Kegel 10 repetitions 3x/day, 10 sec on/10 sec off, 2 sec on 2-4 sec off , diaphragmatic breathing x10, abdominal bracing x10    Charges: NMR2 30 TE 15       Total Timed Treatment: 45 min  Total Treatment Time: 45 min         no previous reaction

## 2024-06-19 NOTE — PATIENT PROFILE ADULT - BRADEN NUTRITION
Neuro: [phone]  [phone]: sonogram y stress test   Wellness Visit for Adults   AMBULATORY CARE:   A wellness visit  is when you see your healthcare provider to get screened for health problems. Your healthcare provider will also give you advice on how to stay healthy. Write down your questions so you remember to ask them. Ask your healthcare provider how often you should have a wellness visit.  What happens at a wellness visit:  Your healthcare provider will ask about your health, and your family history of health problems. This includes high blood pressure, heart disease, and cancer. He or she will ask if you have symptoms that concern you, if you smoke, and about your mood. You may also be asked about your intake of medicines, supplements, food, and alcohol. Any of the following may be done:  Your weight  will be checked. Your height may also be checked so your body mass index (BMI) can be calculated. Your BMI shows if you are at a healthy weight.    Your blood pressure  and heart rate will be checked. Your temperature may also be checked.    Blood and urine tests  may be done. Blood tests may be done to check your cholesterol levels. Abnormal cholesterol levels increase your risk for heart disease and stroke. You may also need a blood or urine test to check for diabetes if you are at increased risk. Urine tests may be done to look for signs of an infection or kidney disease.    A physical exam  includes checking your heartbeat and lungs with a stethoscope. Your healthcare provider may also check your skin to look for sun damage.    Screening tests  may be recommended. A screening test is done to check for diseases that may not cause symptoms. The screening tests you may need depend on your age, gender, family history, and lifestyle habits. For example, colorectal screening may be recommended if you are 50 years old or older.    Screening tests you need if you are a woman:   A Pap smear  is used to screen 
Pt has been discharged home from the ED. Pt well appearing and no distress noted. Pt to f/u with PCP in 3 days. If pt has any mental status changes, dev projectile vomiting, or any other concerns pt to come back to ED. Parents understood   
for cervical cancer. Pap smears are usually done every 3 to 5 years depending on your age. You may need them more often if you have had abnormal Pap smear test results in the past. Ask your healthcare provider how often you should have a Pap smear.    A mammogram  is an x-ray of your breasts to screen for breast cancer. Experts recommend mammograms every 2 years starting at age 50 years. You may need a mammogram at age 49 years or younger if you have an increased risk for breast cancer. Talk to your healthcare provider about when you should start having mammograms and how often you need them.    Vaccines you may need:   Get an influenza vaccine  every year. The influenza vaccine protects you from the flu. Several types of viruses cause the flu. The viruses change over time, so new vaccines are made each year.    Get a tetanus-diphtheria (Td) booster vaccine  every 10 years. This vaccine protects you against tetanus and diphtheria. Tetanus is a severe infection that may cause painful muscle spasms and lockjaw. Diphtheria is a severe bacterial infection that causes a thick covering in the back of your mouth and throat.    Get a human papillomavirus (HPV) vaccine  if you are female and aged 19 to 26 or male 19 to 21 and never received it. This vaccine protects you from HPV infection. HPV is the most common infection spread by sexual contact. HPV may also cause vaginal, penile, and anal cancers.    Get a pneumococcal vaccine  if you are aged 65 years or older. The pneumococcal vaccine is an injection given to protect you from pneumococcal disease. Pneumococcal disease is an infection caused by pneumococcal bacteria. The infection may cause pneumonia, meningitis, or an ear infection.    Get a shingles vaccine  if you are 60 or older, even if you have had shingles before. The shingles vaccine is an injection to protect you from the varicella-zoster virus. This is the same virus that causes chickenpox. Shingles is a 
painful rash that develops in people who had chickenpox or have been exposed to the virus.    How to eat healthy:  My Plate is a model for planning healthy meals. It shows the types and amounts of foods that should go on your plate. Fruits and vegetables make up about half of your plate, and grains and protein make up the other half. A serving of dairy is included on the side of your plate. The amount of calories and serving sizes you need depends on your age, gender, weight, and height. Examples of healthy foods are listed below:  Eat a variety of vegetables  such as dark green, red, and orange vegetables. You can also include canned vegetables low in sodium (salt) and frozen vegetables without added butter or sauces.    Eat a variety of fresh fruits , canned fruit in 100% juice, frozen fruit, and dried fruit.    Include whole grains.  At least half of the grains you eat should be whole grains. Examples include whole-wheat bread, wheat pasta, brown rice, and whole-grain cereals such as oatmeal.    Eat a variety of protein foods such as seafood (fish and shellfish), lean meat, and poultry without skin (turkey and chicken). Examples of lean meats include pork leg, shoulder, or tenderloin, and beef round, sirloin, tenderloin, and extra lean ground beef. Other protein foods include eggs and egg substitutes, beans, peas, soy products, nuts, and seeds.    Choose low-fat dairy products such as skim or 1% milk or low-fat yogurt, cheese, and cottage cheese.    Limit unhealthy fats  such as butter, hard margarine, and shortening.       Exercise:  Exercise at least 30 minutes per day on most days of the week. Some examples of exercise include walking, biking, dancing, and swimming. You can also fit in more physical activity by taking the stairs instead of the elevator or parking farther away from stores. Include muscle strengthening activities 2 days each week. Regular exercise provides many health benefits. It helps you 
manage your weight, and decreases your risk for type 2 diabetes, heart disease, stroke, and high blood pressure. Exercise can also help improve your mood. Ask your healthcare provider about the best exercise plan for you.       General health and safety guidelines:   Do not smoke.  Nicotine and other chemicals in cigarettes and cigars can cause lung damage. Ask your healthcare provider for information if you currently smoke and need help to quit. E-cigarettes or smokeless tobacco still contain nicotine. Talk to your healthcare provider before you use these products.    Limit alcohol.  A drink of alcohol is 12 ounces of beer, 5 ounces of wine, or 1½ ounces of liquor.    Lose weight, if needed.  Being overweight increases your risk of certain health conditions. These include heart disease, high blood pressure, type 2 diabetes, and certain types of cancer.    Protect your skin.  Do not sunbathe or use tanning beds. Use sunscreen with a SPF 15 or higher. Apply sunscreen at least 15 minutes before you go outside. Reapply sunscreen every 2 hours. Wear protective clothing, hats, and sunglasses when you are outside.    Drive safely.  Always wear your seatbelt. Make sure everyone in your car wears a seatbelt. A seatbelt can save your life if you are in an accident. Do not use your cell phone when you are driving. This could distract you and cause an accident. Pull over if you need to make a call or send a text message.    Practice safe sex.  Use latex condoms if are sexually active and have more than one partner. Your healthcare provider may recommend screening tests for sexually transmitted infections (STIs).    Wear helmets, lifejackets, and protective gear.  Always wear a helmet when you ride a bike or motorcycle, go skiing, or play sports that could cause a head injury. Wear protective equipment when you play sports. Wear a lifejacket when you are on a boat or doing water sports.    © Copyright [employer] 2023 Information 
is for End User's use only and may not be sold, redistributed or otherwise used for commercial purposes.  The above information is an  only. It is not intended as medical advice for individual conditions or treatments. Talk to your doctor, nurse or pharmacist before following any medical regimen to see if it is safe and effective for you.    
(3) adequate

## 2024-07-30 NOTE — H&P ADULT - NSICDXPASTMEDICALHX_GEN_ALL_CORE_FT
[Initial Visit] : an initial visit for PAST MEDICAL HISTORY:  Bipolar disorder     CAD (coronary artery disease) s/p remote CABG and multiple stents    Chronic anemia     ESRD (end stage renal disease) on HD (Tue-Thu-Sat) through RUE fistula    High cholesterol     Hypertension     Lung cancer had yoni radiation in 2006.

## 2025-03-11 NOTE — H&P ADULT - REASON FOR ADMISSION
Called patient and conveyed results/recommendations as below. Patient verbalized understanding and has no further questions at this time.     ----- Message from Josephine HERNANDEZ RN sent at 3/11/2025  2:29 PM CDT -----  Reviewed with Dr Sparrow. Negative Aldosterone and Renin Activity Ratio. Recommend to start triamterene-hydrochlorothiazide 37.5-25 mg daily for BP management. BMP in one week      HCAP

## 2025-03-17 NOTE — PATIENT PROFILE ADULT - BRADEN MOBILITY
Phone message left regarding scheduled upcoming GI procedure.     Place of procedure: [facility] [place]   Arrival Time: 0615  Procedure Time:0730  Prep Type: EGD    Below is some helpful information about your upcoming EGD .    It is ok to drink clear liquids up until 0300.  Some examples of clear liquids include: water, apple juice, jello or black coffee.   Please don’t drink anything with pulp such as, orange juice.    Please take the following medication the morning of surgery: none.  Bring any cases for your contact lens, dentures, partials or glasses.  Leave any valuables at home and remove all jewelry prior to your arrival.  Please don't wear any make-up or hair product the morning of surgery.    Bring your insurance card and a photo ID.  Make sure you have arranged for someone to bring you home. Yes/No: informed in message    If you were to become ill prior to your surgery, please contact your family care physician.  The quality of your stay is important to us.  We want to ensure you have excellent care during your stay.  Please don’t hesitate to call with any questions about your procedure at [phone] (hours of operation are 7am-4pm Monday-Friday).  We look forward to caring for you!    (3) slightly limited

## 2025-04-14 NOTE — PROGRESS NOTE ADULT - SUBJECTIVE AND OBJECTIVE BOX
Creatine stable for now. BMP reviewed- noted Estimated Creatinine Clearance: 7.5 mL/min (A) (based on SCr of 16.8 mg/dL (H)). according to latest data. 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  Monitor UOP and serial BMP and adjust therapy as needed. Renally dose meds. Avoid nephrotoxic medications and procedures.  Nephrology consulted for HD.  Continue Monday, Wednesday, and Friday hemodialysis schedule.      has hypertensive urgency and needs BP control. I have uptitrated metoprolol to 100mg bid and he has had 1.5 L fluid removal in HD today. I've also added doxazosin at bedtime and I am upgrading him to monitored bed.     I examined him in HD today, he appears comfortable but tired.  is in renal failure, has a hx of pulmonary fibrosis, hx of acute on chronic diastolic heart failure, and he has a very guarded prognosis with high risk of sudden death. Currently, he is responding to BP control with uptitration of medications but his functional status appears poor. I am going to call his family to discuss further goals of care.    Vital Signs Last 24 Hrs  T(C): 36.7 (11 Apr 2019 12:50), Max: 36.7 (11 Apr 2019 00:33)  T(F): 98 (11 Apr 2019 12:50), Max: 98 (11 Apr 2019 00:33)  HR: 62 (11 Apr 2019 13:29) (60 - 70)  BP: 166/61 (11 Apr 2019 13:29) (126/60 - 196/74)  BP(mean): 99 (10 Apr 2019 17:00) (87 - 100)  RR: 18 (11 Apr 2019 12:50) (17 - 25)  SpO2: 98% (11 Apr 2019 12:50) (93% - 98%)  PHYSICAL EXAM:  GENERAL: NAD, AOX3  NECK: Supple, No JVD  CHEST/LUNG: Bilateral rhonchi   HEART: Regular rate and rhythm; No murmurs, rubs, or gallops  ABDOMEN: Soft, Nontender, Nondistended; Bowel sounds present  EXTREMITIES:  2+ Peripheral Pulses, No clubbing, cyanosis, or edema                        11.5   8.3   )-----------( 219      ( 11 Apr 2019 10:22 )             37.0     04-11    137  |  96<L>  |  36.0<H>  ----------------------------<  119<H>  4.0   |  26.0  |  5.53<H>    Ca    9.2      11 Apr 2019 10:22  Phos  5.2     04-11  Mg     2.2     04-11    TPro  7.9  /  Alb  3.6  /  TBili  0.4  /  DBili  x   /  AST  28  /  ALT  14  /  AlkPhos  66  04-10    LIVER FUNCTIONS - ( 10 Apr 2019 06:55 )  Alb: 3.6 g/dL / Pro: 7.9 g/dL / ALK PHOS: 66 U/L / ALT: 14 U/L / AST: 28 U/L / GGT: x           Radiology:     MEDICATIONS  (STANDING):  aspirin enteric coated 81 milliGRAM(s) Oral daily  atorvastatin 80 milliGRAM(s) Oral at bedtime  chlorhexidine 2% Cloths 1 Application(s) Topical daily  chlorhexidine 4% Liquid 1 Application(s) Topical <User Schedule>  clopidogrel Tablet 75 milliGRAM(s) Oral daily  dextrose 5%. 1000 milliLiter(s) (50 mL/Hr) IV Continuous <Continuous>  dextrose 50% Injectable 12.5 Gram(s) IV Push once  dextrose 50% Injectable 25 Gram(s) IV Push once  dextrose 50% Injectable 25 Gram(s) IV Push once  docusate sodium 100 milliGRAM(s) Oral three times a day  doxazosin 4 milliGRAM(s) Oral at bedtime  DULoxetine 20 milliGRAM(s) Oral daily  gabapentin 300 milliGRAM(s) Oral two times a day  heparin  Injectable 5000 Unit(s) SubCutaneous every 12 hours  isosorbide   mononitrate ER Tablet (IMDUR) 60 milliGRAM(s) Oral daily  levothyroxine 100 MICROGram(s) Oral daily  losartan 100 milliGRAM(s) Oral daily  metoprolol tartrate 100 milliGRAM(s) Oral two times a day  multivitamin 1 Tablet(s) Oral daily  NIFEdipine XL 60 milliGRAM(s) Oral every 12 hours  pantoprazole    Tablet 40 milliGRAM(s) Oral before breakfast  senna 2 Tablet(s) Oral at bedtime    MEDICATIONS  (PRN):  ALBUTerol/ipratropium for Nebulization 3 milliLiter(s) Nebulizer every 6 hours PRN Shortness of Breath and/or Wheezing  dextrose 40% Gel 15 Gram(s) Oral once PRN Blood Glucose LESS THAN 70 milliGRAM(s)/deciLiter  glucagon  Injectable 1 milliGRAM(s) IntraMuscular once PRN Glucose <70 milliGRAM(s)/deciLiter  hydrALAZINE Injectable 10 milliGRAM(s) IV Push every 4 hours PRN SBP > 160  ondansetron Injectable 4 milliGRAM(s) IV Push every 6 hours PRN Nausea and/or Vomiting  has hypertensive urgency and needs BP control. I have uptitrated metoprolol to 100mg bid and he has had 1.5 L fluid removal in HD today. I've also added doxazosin at bedtime and I am upgrading him to monitored bed.     I examined him in HD today, he appears comfortable but tired.  is in renal failure, has a hx of pulmonary fibrosis, hx of acute on chronic diastolic heart failure, and he has a very guarded prognosis with high risk of sudden death. Currently, he is responding to BP control with uptitration of medications but his functional status appears poor. I called his daughter Carin and she is going to bring the family in at 11:00 am for a meeting to discuss our goals of care, and DNR/DNI code status change.    Vital Signs Last 24 Hrs  T(C): 36.7 (11 Apr 2019 12:50), Max: 36.7 (11 Apr 2019 00:33)  T(F): 98 (11 Apr 2019 12:50), Max: 98 (11 Apr 2019 00:33)  HR: 62 (11 Apr 2019 13:29) (60 - 70)  BP: 166/61 (11 Apr 2019 13:29) (126/60 - 196/74)  BP(mean): 99 (10 Apr 2019 17:00) (87 - 100)  RR: 18 (11 Apr 2019 12:50) (17 - 25)  SpO2: 98% (11 Apr 2019 12:50) (93% - 98%)  PHYSICAL EXAM:  GENERAL: NAD, AOX3  NECK: Supple, No JVD  CHEST/LUNG: Bilateral rhonchi   HEART: Regular rate and rhythm; No murmurs, rubs, or gallops  ABDOMEN: Soft, Nontender, Nondistended; Bowel sounds present  EXTREMITIES:  2+ Peripheral Pulses, No clubbing, cyanosis, or edema                        11.5   8.3   )-----------( 219      ( 11 Apr 2019 10:22 )             37.0     04-11    137  |  96<L>  |  36.0<H>  ----------------------------<  119<H>  4.0   |  26.0  |  5.53<H>    Ca    9.2      11 Apr 2019 10:22  Phos  5.2     04-11  Mg     2.2     04-11    TPro  7.9  /  Alb  3.6  /  TBili  0.4  /  DBili  x   /  AST  28  /  ALT  14  /  AlkPhos  66  04-10    LIVER FUNCTIONS - ( 10 Apr 2019 06:55 )  Alb: 3.6 g/dL / Pro: 7.9 g/dL / ALK PHOS: 66 U/L / ALT: 14 U/L / AST: 28 U/L / GGT: x           Radiology:     MEDICATIONS  (STANDING):  aspirin enteric coated 81 milliGRAM(s) Oral daily  atorvastatin 80 milliGRAM(s) Oral at bedtime  chlorhexidine 2% Cloths 1 Application(s) Topical daily  chlorhexidine 4% Liquid 1 Application(s) Topical <User Schedule>  clopidogrel Tablet 75 milliGRAM(s) Oral daily  dextrose 5%. 1000 milliLiter(s) (50 mL/Hr) IV Continuous <Continuous>  dextrose 50% Injectable 12.5 Gram(s) IV Push once  dextrose 50% Injectable 25 Gram(s) IV Push once  dextrose 50% Injectable 25 Gram(s) IV Push once  docusate sodium 100 milliGRAM(s) Oral three times a day  doxazosin 4 milliGRAM(s) Oral at bedtime  DULoxetine 20 milliGRAM(s) Oral daily  gabapentin 300 milliGRAM(s) Oral two times a day  heparin  Injectable 5000 Unit(s) SubCutaneous every 12 hours  isosorbide   mononitrate ER Tablet (IMDUR) 60 milliGRAM(s) Oral daily  levothyroxine 100 MICROGram(s) Oral daily  losartan 100 milliGRAM(s) Oral daily  metoprolol tartrate 100 milliGRAM(s) Oral two times a day  multivitamin 1 Tablet(s) Oral daily  NIFEdipine XL 60 milliGRAM(s) Oral every 12 hours  pantoprazole    Tablet 40 milliGRAM(s) Oral before breakfast  senna 2 Tablet(s) Oral at bedtime    MEDICATIONS  (PRN):  ALBUTerol/ipratropium for Nebulization 3 milliLiter(s) Nebulizer every 6 hours PRN Shortness of Breath and/or Wheezing  dextrose 40% Gel 15 Gram(s) Oral once PRN Blood Glucose LESS THAN 70 milliGRAM(s)/deciLiter  glucagon  Injectable 1 milliGRAM(s) IntraMuscular once PRN Glucose <70 milliGRAM(s)/deciLiter  hydrALAZINE Injectable 10 milliGRAM(s) IV Push every 4 hours PRN SBP > 160  ondansetron Injectable 4 milliGRAM(s) IV Push every 6 hours PRN Nausea and/or Vomiting

## 2025-05-27 NOTE — ED ADULT NURSE NOTE - NSFALLRSKASSESSDT_ED_ALL_ED
Pt states he recently had a dog bite wound and went to the ED. Pt was at the ED last night at [facility]. Pt needs to follow up between 14 and 30 days. Pt states he did not get a rabies vaccine, but was able to get a rabies vaccine records from the dog's owners, pt received a TDAP.     Pt offered 6/24/25 at 8:20AM as an OB. Pt accepted. ED precautions reviewed.      
26-Jun-2019 08:38

## 2025-06-05 NOTE — PROCEDURE NOTE - NSPROCDETAILS_GEN_ALL_CORE
6/5- Doing well post operatively, pain is in control.   guidewire recovered/lumen(s) aspirated and flushed/sterile dressing applied/sterile technique, catheter placed/ultrasound guidance
